# Patient Record
Sex: FEMALE | Race: WHITE | NOT HISPANIC OR LATINO | Employment: FULL TIME | URBAN - METROPOLITAN AREA
[De-identification: names, ages, dates, MRNs, and addresses within clinical notes are randomized per-mention and may not be internally consistent; named-entity substitution may affect disease eponyms.]

---

## 2017-05-05 ENCOUNTER — OFFICE VISIT (OUTPATIENT)
Dept: NEUROLOGY | Facility: CLINIC | Age: 47
End: 2017-05-05
Payer: COMMERCIAL

## 2017-05-05 ENCOUNTER — OFFICE VISIT (OUTPATIENT)
Dept: PSYCHIATRY | Facility: CLINIC | Age: 47
End: 2017-05-05
Payer: COMMERCIAL

## 2017-05-05 VITALS
HEART RATE: 114 BPM | DIASTOLIC BLOOD PRESSURE: 75 MMHG | BODY MASS INDEX: 26.32 KG/M2 | WEIGHT: 148.56 LBS | SYSTOLIC BLOOD PRESSURE: 128 MMHG | HEIGHT: 63 IN

## 2017-05-05 VITALS
WEIGHT: 149 LBS | BODY MASS INDEX: 26.4 KG/M2 | SYSTOLIC BLOOD PRESSURE: 116 MMHG | HEIGHT: 63 IN | DIASTOLIC BLOOD PRESSURE: 74 MMHG | HEART RATE: 118 BPM

## 2017-05-05 DIAGNOSIS — M54.12 CERVICAL RADICULOPATHY: Primary | ICD-10-CM

## 2017-05-05 DIAGNOSIS — M79.601 PAIN OF RIGHT UPPER EXTREMITY: ICD-10-CM

## 2017-05-05 DIAGNOSIS — F90.9 ATTENTION DEFICIT HYPERACTIVITY DISORDER (ADHD), UNSPECIFIED ADHD TYPE: Primary | ICD-10-CM

## 2017-05-05 PROCEDURE — 99213 OFFICE O/P EST LOW 20 MIN: CPT | Mod: S$GLB,,, | Performed by: PSYCHIATRY & NEUROLOGY

## 2017-05-05 PROCEDURE — 99999 PR PBB SHADOW E&M-EST. PATIENT-LVL II: CPT | Mod: PBBFAC,,, | Performed by: PSYCHIATRY & NEUROLOGY

## 2017-05-05 PROCEDURE — 1160F RVW MEDS BY RX/DR IN RCRD: CPT | Mod: S$GLB,,, | Performed by: PHYSICIAN ASSISTANT

## 2017-05-05 PROCEDURE — 99204 OFFICE O/P NEW MOD 45 MIN: CPT | Mod: S$GLB,,, | Performed by: PHYSICIAN ASSISTANT

## 2017-05-05 PROCEDURE — 99999 PR PBB SHADOW E&M-EST. PATIENT-LVL III: CPT | Mod: PBBFAC,,, | Performed by: PHYSICIAN ASSISTANT

## 2017-05-05 RX ORDER — DEXTROAMPHETAMINE SACCHARATE, AMPHETAMINE ASPARTATE, DEXTROAMPHETAMINE SULFATE AND AMPHETAMINE SULFATE 5; 5; 5; 5 MG/1; MG/1; MG/1; MG/1
1 TABLET ORAL 2 TIMES DAILY
Qty: 60 TABLET | Refills: 0 | Status: SHIPPED | OUTPATIENT
Start: 2017-07-04 | End: 2017-09-14 | Stop reason: SDUPTHER

## 2017-05-05 RX ORDER — DEXTROAMPHETAMINE SACCHARATE, AMPHETAMINE ASPARTATE, DEXTROAMPHETAMINE SULFATE AND AMPHETAMINE SULFATE 5; 5; 5; 5 MG/1; MG/1; MG/1; MG/1
1 TABLET ORAL 2 TIMES DAILY
Qty: 60 TABLET | Refills: 0 | Status: SHIPPED | OUTPATIENT
Start: 2017-05-05 | End: 2017-06-04

## 2017-05-05 RX ORDER — DEXTROAMPHETAMINE SACCHARATE, AMPHETAMINE ASPARTATE, DEXTROAMPHETAMINE SULFATE AND AMPHETAMINE SULFATE 5; 5; 5; 5 MG/1; MG/1; MG/1; MG/1
1 TABLET ORAL 2 TIMES DAILY
Qty: 60 TABLET | Refills: 0 | Status: SHIPPED | OUTPATIENT
Start: 2017-06-04 | End: 2017-07-04

## 2017-05-05 RX ORDER — GABAPENTIN 300 MG/1
300 CAPSULE ORAL 3 TIMES DAILY
Qty: 90 CAPSULE | Refills: 2 | Status: SHIPPED | OUTPATIENT
Start: 2017-05-05 | End: 2017-12-08

## 2017-05-05 NOTE — PATIENT INSTRUCTIONS
[facility]  Mental Health Medications: Stimulants    What are the side effects?    Most side effects are minor and disappear when dosage levels are lowered. The most common side effects include:   Decreased appetite. People seem to be less hungry during the middle of the day, but they are often hungry by dinnertime as the medication wears off.   Sleep problems. If a person cannot fall asleep, the doctor may prescribe a lower dose. The doctor might also suggest that the medication is taken earlier in the day, or stop the afternoon or evening dose.    Stomachaches and headaches.   Tics.  A few people develop sudden, repetitive movements or sounds called tics. These tics may or may not be noticeable. Changing the medication dosage may make tics go away. Some people also may appear to have a personality change, such as appearing flat or without emotion. Talk with yourdoctor if you see any of these side effects.    FDA warning on possible rare side effects    In 2007, the FDA required that all makers of ADHD medications develop Patient Medication Guides. The guides must alert patients to possible heart and psychiatric problems related to ADHD medicine. The FDA required the Patient Medication Guides because a review of data found that ADHD patients with heart conditions had a slightly higher risk of strokes, heart attacks, and sudden death when taking the medications. The review also found a slightly higher risk (about 1 in 1,000) for medication-related psychiatric problems, such as hearing voices, having hallucinations, becoming suspicious for no reason, or becoming manic. This happened to patients who had no history of psychiatric problems.    Stimulant medications may lead to drug abuse or dependence, but the risk is highest for those patient taking this class of medication who do not have ADHD. Research shows that teens with ADHD who took stimulant medications were less likely to abuse  drugs than those who did not take stimulant medications.  Proper diagnosis is the key to minimizing this risk.

## 2017-05-05 NOTE — PROGRESS NOTES
"5/5/2017 1:47 PM   Malu Montes   1970   [id number]       OUTPATIENT PSYCHIATRY FOLLOW UP NOTE      Clinical Status of Patient:  Outpatient (Ambulatory)    Chief Complaint:  Malu Montes is a 47 y.o. female who presents today for follow-up of attention problems.  Met with patient and I have reviewed the patient's medical history in detail and updated the computerized patient record.    Prefers to be called Malu    Interval History and Content of Current Session:  Interim Events/Subjective Report/Content of Current Session:   45 y/o female w/PMH ADHD presents for medication management/follow up. States mood today as "pretty good". Since last visit has tried to reduce Adderall use to during the work week as not to develop tolerance to medication. Was able to do this without issue. Discussed tachycardia on vitals. Says "I was just at another appointment for pinched nerve in my neck. I'm in pain." Says has MRI and physical therapy pending. No medication side effects. Denies problems with sleep or appetite.  Says medicine is effective. Denies chest pain, palpitations.  Denies HI/SI/AVH.      Current psychiatric medications:  Adderall IR 20mg PO BID PRN - has started taking holidays from using medication on weekend    Prior medication trials:  Denies prior trials      Substance use:  Etoh: ~1 drink a week, denies ever 6 or more drinks, denies blackouts  Drug use: denies  Nicotine: 1/2 or less per day - is trying to transition to vaporizer        SUBJECTIVE     Psychiatric Review Of Systems - Is patient experiencing or having changes in:  sleep: no  appetite: no  weight: no  energy/anergy: no  interest/pleasure/anhedonia: no  somatic symptoms: no  libido: no  guilty/hopelessness: no  concentration: no  S.I.B.s/risky behavior: no  SI/SA:  no    anxiety/panic: no  Agoraphobia:  no  Social phobia:  no  Recurrent nightmares:  no  hyper startle response:  no  Avoidance: no  Recurrent thoughts:  no  Recurrent " behaviors:  no    Irritability: no  Racing thoughts: no  Impulsive behaviors: no  Pressured speech:  no    Paranoia:no  Delusions: no  AVH:no    Screens and Inventories:  none    Substance abuse:  ETOH- ~1 wine glass per week  Drugs- denies    Past Medical, Family and Social History: The patient's past medical, family and social history have been reviewed and updated as appropriate within the electronic medical record - see encounter notes.    Current Psychotropic medications:  See HPI    Compliance: yes    Side effects: None    Risk Parameters:  Patient reports no suicidal ideation  Patient reports no homicidal ideation  Patient reports no self-injurious behavior  Patient reports no violent behavior    Psychotherapy:  · none    Psychosocial Stressors: none endorsed.   Functioning Relationships: good support system and good relationship with children  Strengths AND Liabilities  Strength: Patient accepts guidance/feedback, Strength: Patient is expressive/articulate., Strength: Patient is intelligent., Strength: Patient is physically healthy., Strength: Patient has positive support network., Strength: Patient has reasonable judgment., Strength: Patient is stable.    OBJECTIVE     Medical ROS  General ROS: negative for - chills, fatigue or fever  Respiratory ROS: no cough, shortness of breath, or wheezing  Cardiovascular ROS: no chest pain or dyspnea on exertion  Gastrointestinal ROS: no abdominal pain, change in bowel habits, or black or bloody stools  Musculoskeletal ROS: negative for - gait disturbance, joint stiffness, muscle pain or muscular weakness  Neurological ROS: negative for - confusion, dizziness, gait disturbance, headaches, impaired coordination/balance, seizures or visual changes    Nutritional Screening: Considering the patient's height and weight, medications, medical history and preferences, should a referral be made to the dietitian? no    Musculoskeletal  Muscle Strength/Tone:  not examined   Gait  "& Station:  non-ataxic     Relevant Elements of Neurological Exam: normal gait  AIMS:  n/a    Constitutional  Vitals:  Most recent vital signs, dated greater than 90 days prior to this appointment, were reviewed.    Vitals:    05/05/17 1022   BP: 116/74   Pulse: (!) 118   Weight: 67.6 kg (149 lb)   Height: 5' 3" (1.6 m)          Allergies  Review of patient's allergies indicates:   Allergen Reactions    No known drug allergies        Laboratory Data  No visits with results within 1 Month(s) from this visit.  Latest known visit with results is:    Lab Visit on 04/23/2015   Component Date Value Ref Range Status    Specimen UA 04/23/2015 Urine, Clean Catch   Final    Color, UA 04/23/2015 Yellow  Yellow, Straw, Marie Final    Appearance, UA 04/23/2015 Clear  Clear Final    pH, UA 04/23/2015 6.0  5.0 - 8.0 Final    Specific Gravity, UA 04/23/2015 1.020  1.005 - 1.030 Final    Protein, UA 04/23/2015 Negative  Negative Final    Glucose, UA 04/23/2015 Negative  Negative Final    Ketones, UA 04/23/2015 Negative  Negative Final    Bilirubin (UA) 04/23/2015 Negative  Negative Final    Occult Blood UA 04/23/2015 Negative  Negative Final    Nitrite, UA 04/23/2015 Negative  Negative Final    Urobilinogen, UA 04/23/2015 Negative  <2.0 EU/dL Final    Leukocytes, UA 04/23/2015 Negative  Negative Final    Microalbum.,U,Random 04/23/2015 9.0  ug/mL Final    Creatinine, Random Ur 04/23/2015 128.0  15.0 - 325.0 mg/dL Final    Microalb Creat Ratio 04/23/2015 7.0  0.0 - 30.0 ug/mg Final       All Medications  Outpatient Encounter Prescriptions as of 5/5/2017   Medication Sig Dispense Refill    dextroamphetamine-amphetamine (ADDERALL) 20 mg tablet Take 1 tablet by mouth 2 (two) times daily. 60 tablet 0    gabapentin (NEURONTIN) 300 MG capsule Take 1 capsule (300 mg total) by mouth 3 (three) times daily. 90 capsule 2    polyethylene glycol (MIRALAX) 17 gram/dose powder Take 17 g by mouth. 17 Powder Oral Every day.  take " while taking pain medication, stop if stools become loose      [DISCONTINUED] ciclopirox (PENLAC) 8 % Soln Apply to affected toenails once per day for 3 months. Once per week use alcohol to remove excess product 6.6 mL 3    [DISCONTINUED] dextroamphetamine-amphetamine (AMPHETAMINE SALT COMBO) 10 mg Tab Take 10 mg by mouth 3 (three) times daily. 90 tablet 0    [DISCONTINUED] dextroamphetamine-amphetamine (AMPHETAMINE SALT COMBO) 10 mg Tab Take 10 mg by mouth 3 (three) times daily. 90 tablet 0    [DISCONTINUED] dextroamphetamine-amphetamine (AMPHETAMINE SALT COMBO) 20 mg tablet Take 1 tablet by mouth 2 (two) times daily. 60 tablet 0    [DISCONTINUED] dextroamphetamine-amphetamine 10 mg Tab Take 10 mg by mouth 3 (three) times daily. 90 tablet 0     No facility-administered encounter medications on file as of 5/5/2017.        Psychiatric Mental Status Exam  Appearance: unremarkable, age appropriate  Behavior/Cooperation: limited/ appopriate friendly and cooperative  Speech: appropriate rate, volume and tone  Mood: steady, happy  Affect:  congruent with mood and appropriate to situation/content    Thought Process: normal and logical  Thought Content: normal, no suicidality, no homicidality, delusions, or paranoia  Sensorium:    grossly intact  Alert and Oriented: x3  Memory: grossly intact    Attention/concentration: appropriate for age/education.  Similarities:  grossly intact  Abstract reasoning: grossly intact  Insight: Intact  Judgment: Intact    ASSESSMENT      Impression:     ICD-10-CM ICD-9-CM   1. Attention deficit hyperactivity disorder (ADHD), unspecified ADHD type F90.9 314.01       Treatment Goals:  Specify outcomes written in observable, behavioral terms:   Drug & Alcohol: nicotine cessation - will continue to address with patient. Contemplating at this time and refusing further resources  Attention symptoms: continued remittance of symptoms via medication management    Status/Progress: Based on the  examination today, the patient's problem(s) is/are well controlled.  New problems have not been presented today.   Co-morbidities are not complicating management of the primary condition.  There are no active rule-out diagnoses for this patient at this time.     TREATMENT PLAN     · Medication Management: Continue current medications. Renew current medications Adderall IR 20mg PO BID. 3 - 30 day prescriptions provided  · Tachycardia on vitals noted - likely secondary to pain (neck injury). Prior vital signs in normal range. No cardiac symptoms endorsed.  · [place]  checked - no concerning findings - last fill 3/16/17  · Labs: prior labs reviewed - no recent labs  · The treatment plan and follow up plan were reviewed with the patient.  · Discussed with patient informed consent, risks vs. benefits, alternative treatments, side effect profile and the inherent unpredictability of individual responses to these treatments. The patient expresses understanding of the above and displays the capacity to agree with this current plan.  · Encouraged Patient to keep future appointments.   · Take medications as prescribed and abstain from substance abuse.   · In the event of an emergency patient was advised to go to the emergency room.    Return to Clinic: 3 months    Counseling/ psychotherapy time: 16 minutes motivational interviewing  Total time: 20 minutes  Consulting clinician was informed of the encounter and consult note    DO FERNANDO Caicedo, [facility]   [phone]  5/5/2017 1:47 PM

## 2017-05-05 NOTE — PROGRESS NOTES
[facility], Department of Neurology   [facility] [place] [address]  Phone:[phone]  Fax: [phone]    Patient Name: Malu Montes  : 1970  MRN:  [id number]    2017     chief complaint: arm pain    PCP: Brayden Chaney MD.    HPI 17:  Malu Montes is a 48 yo female presents complaining of burning pain of right upper extremity x 2 wks. Pt states 2-3 wks ago she had a cough, during a coughing spell, she felt a pain in her right shoulder. Later, noted radiating pain down right upper extremity. Pain is described as burning, aching pain. Fairly constant, made worse with certain positions. She has intermittent tingling of the right pointer and middle fingers. Denies neck pain or prior neck surgery. Denies weakness or numbness.    Has tried Mobic, Advil, Ultram- with no relief. Using ice on shoulder and heat on forearm which helps some.      Medications:   Current Outpatient Prescriptions   Medication Sig Dispense Refill    dextroamphetamine-amphetamine (ADDERALL) 20 mg tablet Take 1 tablet by mouth 2 (two) times daily. 60 tablet 0    polyethylene glycol (MIRALAX) 17 gram/dose powder Take 17 g by mouth. 17 Powder Oral Every day.  take while taking pain medication, stop if stools become loose      gabapentin (NEURONTIN) 300 MG capsule Take 1 capsule (300 mg total) by mouth 3 (three) times daily. 90 capsule 2     No current facility-administered medications for this visit.        Allergies:  Review of patient's allergies indicates:   Allergen Reactions    No known drug allergies        PMHx:  Past Medical History:   Diagnosis Date    ADD (attention deficit disorder)     Constipation     Vitamin D deficiency      Past Surgical History:   Procedure Laterality Date    breast augmentation       SECTION      HEMORRHOID SURGERY         Fhx:  Family History   Problem Relation Age of Onset    Cancer Mother      Uterine Cancer     No Known Problems  "Sister     Sleep apnea Daughter     ADD / ADHD Daughter     Heart disease Maternal Grandmother      CHF    COPD Maternal Grandfather     Heart disease Paternal Grandmother      CHF    Colon cancer Neg Hx     Inflammatory bowel disease Neg Hx     Stomach cancer Neg Hx     Breast cancer Neg Hx     Ovarian cancer Neg Hx        Shx:   Social History     Social History    Marital status:      Spouse name: N/A    Number of children: N/A    Years of education: N/A     Occupational History    [employer]      Social History Main Topics    Smoking status: Current Every Day Smoker     Packs/day: 0.50     Years: 27.00    Smokeless tobacco: Never Used    Alcohol use Yes      Comment: occas    Drug use: No    Sexual activity: Yes     Partners: Male     Other Topics Concern    Not on file     Social History Narrative       ROS:  Review of Systems (Questions asked; positives or additions noted in BOLD)  Gen Malaise/fatigue, weight change   HEENT Hearing loss, blurred vision, diplopia   Card CP, palpitations   Pulm SOB, cough    Vasc Easy bruising, easy bleeding    GI Nausea, vomiting, constipation    Frequency, urgency   M/S Joint pain, myalgias, falls    Endocrine Temperature intolerance, polyuria, polydipsia   Neuro Dizziness, tremors, seizures, focal weakness, Others- as noted in HPI   Psych Memory loss, depression, anxiety, hallucinations     PHYSICAL EXAM:   /75  Pulse (!) 114  Ht 5' 3" (1.6 m)  Wt 67.4 kg (148 lb 9.4 oz)  BMI 26.32 kg/m2   GEN:  NAD, well-developed, well-groomed.  HEENT: No cervical lymphadenopathy noted.  CARDIOVASCULAR: Radial pulses 2+ bilaterally. No LE edema noted.  NEURO:  Mental Status: Awake, alert, and oriented. Normal attention and concentration.  Speech is fluent and appropriate language function.    Cranial Nerves:  II Pupils are round and reactive to direct and consensual light and accommodation. Visual fields full to confrontation.   III, IV, VI " Extraocular eye movements full bilaterally. No ptosis noted.   V Normal facial sensation to pinprick and light touch.   VII Symmetric facial expressions.   VIII Hearing intact to finger rub bilaterally.   IX, X Palate elevates midline and symmetrically.   XI Shoulder elevation is symmetric and full strength bilaterally. Neck rotation strength is normal bilaterally.   XII Tongue is midline.     Sensory: Sensation is normal to vibration and pinprick in the upper and lower extremities bilaterally.    Motor: Extremities demonstrate normal bulk and tone in all four limbs. No atrophy or fasciculations noted. No pronator drift.   Strength-       RUE: 5/5                LUE: 5/5                RLE: 5/5                LLE: 5/5     Deep Tendon Relfexes: 2+ and symmetric in the upper and lower extremities bilaterally. Babinski downgoing bilaterally.    Coordination: Finger to nose WNL.     Gait: Normal heel, toe, tandem, and casual gait.    ASSESSMENT:     ICD-10-CM ICD-9-CM   1. Cervical radiculopathy M54.12 723.4   2. Pain of right upper extremity M79.601 729.5       PLAN:  Orders Placed This Encounter    MRI Cervical Spine Without Contrast    Ambulatory consult to Physical Therapy    gabapentin (NEURONTIN) 300 MG capsule     Orders Placed This Encounter   Procedures    MRI Cervical Spine Without Contrast    Ambulatory consult to Physical Therapy       46 yo female with 2 wks right upper extremity pain described as burning, aching. Query cervical radiculopathy. Will obtain MRI c spine- pt wishes to get outside of [facility] due to cost, gave her script with instructions to fax report to us.     -initiate Gabapentin 300 mg tid, can begin with qHS dosing and increase if tolerating   -MRI c spine  -physical therapy   -RTC after above     The patient indicates understanding of these issues and agrees to the plan.      Attending, Dr. Sandoval, was available during today's encounter. Any change to plan along with cosign to  appear in the EMR.       This document has been electronically signed by Michelle De La Cruz PA-C on 5/5/2017, 8:22 AM. I have personally typed this message using the EMR.

## 2017-05-10 ENCOUNTER — CLINICAL SUPPORT (OUTPATIENT)
Dept: REHABILITATION | Facility: HOSPITAL | Age: 47
End: 2017-05-10
Attending: PSYCHIATRY & NEUROLOGY
Payer: COMMERCIAL

## 2017-05-10 DIAGNOSIS — M79.2 RADICULAR PAIN IN RIGHT ARM: ICD-10-CM

## 2017-05-10 DIAGNOSIS — M54.12 CERVICAL RADICULOPATHY: ICD-10-CM

## 2017-05-10 PROCEDURE — 97161 PT EVAL LOW COMPLEX 20 MIN: CPT | Mod: PO | Performed by: PHYSICAL THERAPIST

## 2017-05-10 PROCEDURE — 97140 MANUAL THERAPY 1/> REGIONS: CPT | Mod: PO | Performed by: PHYSICAL THERAPIST

## 2017-05-10 NOTE — PROGRESS NOTES
Physical Therapy Evaluation    Name: Malu Styles [facility] Number: [id number]    Diagnosis:   Encounter Diagnoses   Name Primary?    Cervical radiculopathy     Radicular pain in right arm      Physician: Lius Sandoval, *  Treatment Orders: PT Eval and Treat    Past Medical History:   Diagnosis Date    ADD (attention deficit disorder)     Constipation     Vitamin D deficiency      Current Outpatient Prescriptions   Medication Sig    dextroamphetamine-amphetamine (ADDERALL) 20 mg tablet Take 1 tablet by mouth 2 (two) times daily.    [START ON 6/4/2017] dextroamphetamine-amphetamine (ADDERALL) 20 mg tablet Take 1 tablet by mouth 2 (two) times daily.    [START ON 7/4/2017] dextroamphetamine-amphetamine (ADDERALL) 20 mg tablet Take 1 tablet by mouth 2 (two) times daily.    gabapentin (NEURONTIN) 300 MG capsule Take 1 capsule (300 mg total) by mouth 3 (three) times daily.    polyethylene glycol (MIRALAX) 17 gram/dose powder Take 17 g by mouth. 17 Powder Oral Every day.  take while taking pain medication, stop if stools become loose     No current facility-administered medications for this visit.      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 female that presents to [facility] secondary to cervical radiculopathy. Malu states she had a sinus infection a few weeks ago and was couching a lot, her R arm started to tingle and have pain. Her pain is under her R shoulder blade, pec minor, down her R tricep and around her  forearm to her middle finger which is also numb.  It has not improved    Imaging: none on file, MRI ordered.    Pain: current 5/10, worst 10/10, best 3/10, Burning, Throbbing, Tingling, Numb and Shooting, intermittent  Radicular symptoms: yes  Aggravating factors: working on a computer, looking down and to right (increases pain in R shoulder blade)  Easing factors: placing her R arm on her head    Pts goals: to get rid of her pain.        Objective         Posture: WNL    Cervical Range of Motion:    Degrees Pain   Flexion full no     Extension 75% Increased R arm pain     Right Rotation full no     Left Rotation full no     Right Side Bending 50% R arm pain   Left Side Bending full no      Shoulder Range of Motion: WNL bilaterally      Upper Extremity Strength  (R) UE  (L) UE    Shoulder flexion: 5/5 Shoulder flexion: 5/5   Shoulder Abduction: 5/5 Shoulder abduction: 5/5   Shoulder ER 5/5 Shoulder ER 5/5   Shoulder IR 5/5 Shoulder IR 5/5   Elbow flexion: 5/5 Elbow flexion: 5/5   Elbow extension: 5/5 Elbow extension: 5/5   Wrist flexion: 5/5 Wrist flexion: 5/5   Wrist extension: 5/5 Wrist extension: 5/5    5/5 : 5/5         Special Tests:  Distraction relief   Compression -   Spurlings -   Sharp-Sushil -   VA test -   Lateral Flexion Alar Ligament -     Upper Limb Neurodynamic testing:   Right   Left     S1 S2  S1 S2   BPNT 5/5 5/5  5/5 5/5   UNT 5/5 5/5  5/5 5/5   MNT 5/5 5/5  5/5 5/5   RNT 5/5 5/5  5/5 5/5         Joint Mobility: cervical joint mobility WNL,      Thoracic mobility: limited mid and upper T spine    Palpation: R 4-5 ribs rotated anteriorly  , R first rib slightly elevated and palpation causes radicular symptoms    Sensation: intact    FOTO: 41% limitation   Goal: 27%    PT Evaluation Completed? Yes  Discussed Plan of Care with patient: Yes    TREATMENT:    Malu  received the following manual therapy techniques x 20 min. To include Joint mobilizations and Soft tissue Mobilization were  "applied to the: R ribs To include:     MET for anterior ribs on R 3 x 10"  MET for elevated 1st rib on R; x 3  Grade 3-5 mobilizations of 1st rib on R  Grade 5 mobilization C7/T1 seated  Manual traction x 4 minutes       Assessment     This is a 47 y.o. female referred to outpatient physical therapy and presents with a medical diagnosis of cervical radiculopathy and demonstrates limitations as described in the problem list. Pt will benefit from physcial therapy services in order to maximize pain free functional independence. The following goals were discussed with the patient and patient is in agreement with them as to be addressed in the treatment plan.     Anticipated barriers to physical therapy: none    Medical necessity is demonstrated by the following IMPAIRMENTS/PROBLEM LIST:     History  Co-morbidities and personal factors that may impact the plan of care Examination  Body Structures and Functions, activity limitations and participation restrictions that may impact the plan of care Clinical Presentation   Decision Making/ Complexity Score   Co-morbidities:         ADD        Personal Factors:    Body Regions:  R arm pain  Body Systems:   Decreased joint mobility  Poor rib alignment        Activity limitations:   Computer work    Participation Restrictions:   Some difficulty performing her work duties on her computer       Pt presents with radicular pain in her R arm after long bout of coughing. Pt presents with malalignment of her R ribs and a slightly elevated 1st rib with decreased mobility. Pt presents with decreased thoracic mobility. Her elevated first rib could be the source of her radicular pain, symptoms increased with mobilization but were lessened afterwards. Her condition is stable and non-evolving Low complexity           GOALS: Short Term Goals: 4 weeks  1.Report decreased R arm pain  <   / =  4  /10  to increase tolerance for ADLs  2. Pt to improve R first rib position in order to reduce her " radicular symptoms.  3. Pt to tolerate HEP to improve ROM and independence with ADL's      Long Term Goals: 8 weeks  1.Report decreased R arm pain  <   / =  2  /10  to increase tolerance for ADLs  2.Pt to present with normal rib 2-5 alignment on R to improve mobility and decrease pain.  3.  Pt will report at < or = 27% limitation on FOTO neck survey score for neck pain disability to demonstrate decrease in disability and improvement in neck pain.  4. Pt to be Independent with HEP to improve ROM and independence with ADL's        Plan     Pt will be treated by physical therapy 1-2 times a week for 8 weeks for Pt Education, HEP, therapeutic exercises, neuromuscular re-education, joint mobilizations, modalities prn to achieve established goals. Malu may at times be seen by a PTA as part of the Rehab Team.     Cont PT for  8  weeks.     Avni Quezada, PT 5/10/2017     I certify the need for these services furnished under this plan of treatment and while under my care.______________________________ Physician/Referring Practitioner  Date of Signature

## 2017-05-10 NOTE — PLAN OF CARE
Physical Therapy Evaluation    Name: Malu Styles [facility] Number: [id number]    Diagnosis:   Encounter Diagnoses   Name Primary?    Cervical radiculopathy     Radicular pain in right arm      Physician: Luis Sandoval, *  Treatment Orders: PT Eval and Treat    Past Medical History:   Diagnosis Date    ADD (attention deficit disorder)     Constipation     Vitamin D deficiency      Current Outpatient Prescriptions   Medication Sig    dextroamphetamine-amphetamine (ADDERALL) 20 mg tablet Take 1 tablet by mouth 2 (two) times daily.    [START ON 6/4/2017] dextroamphetamine-amphetamine (ADDERALL) 20 mg tablet Take 1 tablet by mouth 2 (two) times daily.    [START ON 7/4/2017] dextroamphetamine-amphetamine (ADDERALL) 20 mg tablet Take 1 tablet by mouth 2 (two) times daily.    gabapentin (NEURONTIN) 300 MG capsule Take 1 capsule (300 mg total) by mouth 3 (three) times daily.    polyethylene glycol (MIRALAX) 17 gram/dose powder Take 17 g by mouth. 17 Powder Oral Every day.  take while taking pain medication, stop if stools become loose     No current facility-administered medications for this visit.      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 female that presents to [facility] secondary to cervical radiculopathy. Malu states she had a sinus infection a few weeks ago and was couching a lot, her R arm started to tingle and have pain. Her pain is under her R shoulder blade, pec minor, down her R tricep and around her  forearm to her middle finger which is also numb.  It has not improved    Imaging: none on file, MRI ordered.    Pain: current 5/10, worst 10/10, best 3/10, Burning, Throbbing, Tingling, Numb and Shooting, intermittent  Radicular symptoms: yes  Aggravating factors: working on a computer, looking down and to right (increases pain in R shoulder blade)  Easing factors: placing her R arm on her head    Pts goals: to get rid of her pain.        Objective         Posture: WNL    Cervical Range of Motion:    Degrees Pain   Flexion full no     Extension 75% Increased R arm pain     Right Rotation full no     Left Rotation full no     Right Side Bending 50% R arm pain   Left Side Bending full no      Shoulder Range of Motion: WNL bilaterally      Upper Extremity Strength  (R) UE  (L) UE    Shoulder flexion: 5/5 Shoulder flexion: 5/5   Shoulder Abduction: 5/5 Shoulder abduction: 5/5   Shoulder ER 5/5 Shoulder ER 5/5   Shoulder IR 5/5 Shoulder IR 5/5   Elbow flexion: 5/5 Elbow flexion: 5/5   Elbow extension: 5/5 Elbow extension: 5/5   Wrist flexion: 5/5 Wrist flexion: 5/5   Wrist extension: 5/5 Wrist extension: 5/5    5/5 : 5/5         Special Tests:  Distraction relief   Compression -   Spurlings -   Sharp-Sushil -   VA test -   Lateral Flexion Alar Ligament -     Upper Limb Neurodynamic testing:   Right   Left     S1 S2  S1 S2   BPNT 5/5 5/5  5/5 5/5   UNT 5/5 5/5  5/5 5/5   MNT 5/5 5/5  5/5 5/5   RNT 5/5 5/5  5/5 5/5         Joint Mobility: cervical joint mobility WNL,      Thoracic mobility: limited mid and upper T spine    Palpation: R 4-5 ribs rotated anteriorly  , R first rib slightly elevated and palpation causes radicular symptoms    Sensation: intact    FOTO: 41% limitation   Goal: 27%    PT Evaluation Completed? Yes  Discussed Plan of Care with patient: Yes    TREATMENT:    Malu  received the following manual therapy techniques x 20 min. To include Joint mobilizations and Soft tissue Mobilization were  "applied to the: R ribs To include:     MET for anterior ribs on R 3 x 10"  MET for elevated 1st rib on R; x 3  Grade 3-5 mobilizations of 1st rib on R  Grade 5 mobilization C7/T1 seated  Manual traction x 4 minutes       Assessment     This is a 47 y.o. female referred to outpatient physical therapy and presents with a medical diagnosis of cervical radiculopathy and demonstrates limitations as described in the problem list. Pt will benefit from physcial therapy services in order to maximize pain free functional independence. The following goals were discussed with the patient and patient is in agreement with them as to be addressed in the treatment plan.     Anticipated barriers to physical therapy: none    Medical necessity is demonstrated by the following IMPAIRMENTS/PROBLEM LIST:     History  Co-morbidities and personal factors that may impact the plan of care Examination  Body Structures and Functions, activity limitations and participation restrictions that may impact the plan of care Clinical Presentation   Decision Making/ Complexity Score   Co-morbidities:         ADD        Personal Factors:    Body Regions:  R arm pain  Body Systems:   Decreased joint mobility  Poor rib alignment        Activity limitations:   Computer work    Participation Restrictions:   Some difficulty performing her work duties on her computer       Pt presents with radicular pain in her R arm after long bout of coughing. Pt presents with malalignment of her R ribs and a slightly elevated 1st rib with decreased mobility. Pt presents with decreased thoracic mobility. Her elevated first rib could be the source of her radicular pain, symptoms increased with mobilization but were lessened afterwards. Her condition is stable and non-evolving Low complexity           GOALS: Short Term Goals: 4 weeks  1.Report decreased R arm pain  <   / =  4  /10  to increase tolerance for ADLs  2. Pt to improve R first rib position in order to reduce her " radicular symptoms.  3. Pt to tolerate HEP to improve ROM and independence with ADL's      Long Term Goals: 8 weeks  1.Report decreased R arm pain  <   / =  2  /10  to increase tolerance for ADLs  2.Pt to present with normal rib 2-5 alignment on R to improve mobility and decrease pain.  3.  Pt will report at < or = 27% limitation on FOTO neck survey score for neck pain disability to demonstrate decrease in disability and improvement in neck pain.  4. Pt to be Independent with HEP to improve ROM and independence with ADL's        Plan     Pt will be treated by physical therapy 1-2 times a week for 8 weeks for Pt Education, HEP, therapeutic exercises, neuromuscular re-education, joint mobilizations, modalities prn to achieve established goals. Malu may at times be seen by a PTA as part of the Rehab Team.     Cont PT for  8  weeks.     Avni Quezada, PT 5/10/2017     I certify the need for these services furnished under this plan of treatment and while under my care.______________________________ Physician/Referring Practitioner  Date of Signature

## 2017-05-17 ENCOUNTER — CLINICAL SUPPORT (OUTPATIENT)
Dept: REHABILITATION | Facility: HOSPITAL | Age: 47
End: 2017-05-17
Attending: PSYCHIATRY & NEUROLOGY
Payer: COMMERCIAL

## 2017-05-17 DIAGNOSIS — M79.2 RADICULAR PAIN IN RIGHT ARM: ICD-10-CM

## 2017-05-17 PROCEDURE — 97140 MANUAL THERAPY 1/> REGIONS: CPT | Mod: PO | Performed by: PHYSICAL THERAPIST

## 2017-05-17 PROCEDURE — 97110 THERAPEUTIC EXERCISES: CPT | Mod: PO | Performed by: PHYSICAL THERAPIST

## 2017-05-17 NOTE — PROGRESS NOTES
"                                                    Physical Therapy Progress Note     Name: Malu Styles [facility] Number: [id number]  Diagnosis:   Encounter Diagnosis   Name Primary?    Radicular pain in right arm      Physician: Luis Sandoval, *  Treatment Orders: PT Eval and Treat  Past Medical History:   Diagnosis Date    ADD (attention deficit disorder)     Constipation     Vitamin D deficiency        Precautions: standard  Evaluation Date: 5/10/17  Visit # authorized: 2/  Authorization period: 5/5/18  Plan of care Expiration: 7/7/17    Missed visits:  no show(),  Cancellations()    Subjective   Pt reports: that her arm is feeling better  Pain Scale:  2/10 R arm radicular pain    Objective     Patient received individual therapy to increase strength, ROM, flexibility and posture with 0 patients with activities as follows:     Malu received therapeutic exercises to develop strength, ROM, flexibility and posture for 25 minutes including:     UBE: 3'/3' at 1.0 resistance    SL thoracic rotation: 10 x 10" B  Cat camel: 30 x 3"  R Scalene stretch: 3 x 30"  Thoracic extensions over 1/2 foam: x 6 minutes      Malu received the following manual therapy techniques: Joint mobilizations and Manual traction were applied to the: neck/1st rib for 30 minutes.     MET for elevated 1st rib on R; x 3  Grade 3-5 mobilizations of 1st rib on R  Manual traction x 5 minutes  Prone Thoracic PA grade 3-4  CT junction mob grade 5        Written Home Exercises Provided: thoracic extensions and rotations  Pt demo good understanding of the education provided. Malu demonstrated good return demonstration of activities.     Education provided re:  No spiritual or educational barriers to learning provided    Pt has no cultural, educational or language barriers to learning provided.  Assessment   Pt tolerated therapy well with decreased complaints of radicular pain post treatment. Pt continues to present with an elevated " 1st rib on R.  Pt prognosis is Good. Pt will continue to benefit from skilled outpatient physical therapy to address the deficits listed below in the problem list, provide pt/family education and to maximize pt's level of independence in the home and community environment.   Anticipated barriers to physical therapy: none    Pt's spiritual, cultural and educational needs considered and pt agreeable to plan of care and goals as stated below:     GOALS: Short Term Goals: 4 weeks  1.Report decreased R arm pain < / = 4 /10 to increase tolerance for ADLs  2. Pt to improve R first rib position in order to reduce her radicular symptoms.  3. Pt to tolerate HEP to improve ROM and independence with ADL's        Long Term Goals: 8 weeks  1.Report decreased R arm pain < / = 2 /10 to increase tolerance for ADLs  2.Pt to present with normal rib 2-5 alignment on R to improve mobility and decrease pain.  3. Pt will report at < or = 27% limitation on FOTO neck survey score for neck pain disability to demonstrate decrease in disability and improvement in neck pain.  4. Pt to be Independent with HEP to improve ROM and independence with ADL's     Plan   Continue with established Plan of Care towards PT goals.  Avni Quezada, PT 5/17/2017

## 2017-05-18 NOTE — PROGRESS NOTES
STAFF NOTE:  I have reviewed pt's chart and concur with the resident's history, assessment, and plan.  Case formally discussed on rounds.

## 2017-05-25 ENCOUNTER — PATIENT MESSAGE (OUTPATIENT)
Dept: ADMINISTRATIVE | Facility: OTHER | Age: 47
End: 2017-05-25

## 2017-07-06 ENCOUNTER — DOCUMENTATION ONLY (OUTPATIENT)
Dept: REHABILITATION | Facility: HOSPITAL | Age: 47
End: 2017-07-06

## 2017-07-06 NOTE — PROGRESS NOTES
PHYSICAL THERAPY DISCHARGE SUMMARY     Name: Malu Styles [facility] Number: [id number]    Diagnosis: R UE radicular pain  Physician: Luis Sandoval,  Treatment Orders: PT Eval and Treat  Past Medical History:   Diagnosis Date    ADD (attention deficit disorder)     Constipation     Vitamin D deficiency        Initial visit: 5/10/17  Date of Last visit: 5/17/17  Date of Discharge Note:  7/6/17  Total Visits Received: 2  Missed Visits: 7 (cancelled 6, no show 1  ASSESSMENT   Status Towards Goals Met:  Pt did not meet any goals.    Goals Not achieved and why: pt only attended 1 visit post eval. She cancelled 6 and no showed 1. Pt cancelled her last visits stating that her condition improved, however she was not reassessed by her PT.     Discharge reason : Patient self discharge    GOALS: Short Term Goals: 4 weeks  1.Report decreased R arm pain < / = 4 /10 to increase tolerance for ADLs  2. Pt to improve R first rib position in order to reduce her radicular symptoms.  3. Pt to tolerate HEP to improve ROM and independence with ADL's        Long Term Goals: 8 weeks  1.Report decreased R arm pain < / = 2 /10 to increase tolerance for ADLs  2.Pt to present with normal rib 2-5 alignment on R to improve mobility and decrease pain.  3. Pt will report at < or = 27% limitation on FOTO neck survey score for neck pain disability to demonstrate decrease in disability and improvement in neck pain.  4. Pt to be Independent with HEP to improve ROM and independence with ADL's        PLAN   This patient is discharged from Physical Therapy Services.       Avni Quezada, PT 7/6/2017

## 2017-09-13 ENCOUNTER — PATIENT MESSAGE (OUTPATIENT)
Dept: PSYCHIATRY | Facility: CLINIC | Age: 47
End: 2017-09-13

## 2017-09-13 RX ORDER — DEXTROAMPHETAMINE SACCHARATE, AMPHETAMINE ASPARTATE, DEXTROAMPHETAMINE SULFATE AND AMPHETAMINE SULFATE 5; 5; 5; 5 MG/1; MG/1; MG/1; MG/1
1 TABLET ORAL 2 TIMES DAILY
Qty: 60 TABLET | Refills: 0 | OUTPATIENT
Start: 2017-09-13 | End: 2017-10-13

## 2017-09-14 DIAGNOSIS — F90.9 ATTENTION DEFICIT HYPERACTIVITY DISORDER (ADHD), UNSPECIFIED ADHD TYPE: Primary | ICD-10-CM

## 2017-09-14 RX ORDER — DEXTROAMPHETAMINE SACCHARATE, AMPHETAMINE ASPARTATE, DEXTROAMPHETAMINE SULFATE AND AMPHETAMINE SULFATE 5; 5; 5; 5 MG/1; MG/1; MG/1; MG/1
1 TABLET ORAL 2 TIMES DAILY
Qty: 60 TABLET | Refills: 0 | Status: SHIPPED | OUTPATIENT
Start: 2017-09-14 | End: 2017-09-14 | Stop reason: SDUPTHER

## 2017-09-14 RX ORDER — DEXTROAMPHETAMINE SACCHARATE, AMPHETAMINE ASPARTATE, DEXTROAMPHETAMINE SULFATE AND AMPHETAMINE SULFATE 5; 5; 5; 5 MG/1; MG/1; MG/1; MG/1
1 TABLET ORAL 2 TIMES DAILY
Qty: 60 TABLET | Refills: 0 | Status: SHIPPED | OUTPATIENT
Start: 2017-10-12 | End: 2017-12-08 | Stop reason: SDUPTHER

## 2017-09-14 NOTE — PROGRESS NOTES
Received request for prescription refill. Chart reviewed as she is new to this provider. Patient on Adderall IR 20mg BID, and has appeared stable on this dose for months. Patient unable to get appointment prior to November 3. Will refill Adderall for 2 months to provide coverage until scheduled appointment. LA  was reviewed, and no concerning findings were present.    Caleb Duran MD  [facility]  PGY-3  9/14/2017 10:42 AM

## 2017-12-08 ENCOUNTER — OFFICE VISIT (OUTPATIENT)
Dept: PSYCHIATRY | Facility: CLINIC | Age: 47
End: 2017-12-08
Payer: COMMERCIAL

## 2017-12-08 VITALS
DIASTOLIC BLOOD PRESSURE: 75 MMHG | SYSTOLIC BLOOD PRESSURE: 142 MMHG | BODY MASS INDEX: 28 KG/M2 | HEART RATE: 100 BPM | HEIGHT: 63 IN | WEIGHT: 158 LBS

## 2017-12-08 DIAGNOSIS — F90.9 ATTENTION DEFICIT HYPERACTIVITY DISORDER (ADHD), UNSPECIFIED ADHD TYPE: Primary | ICD-10-CM

## 2017-12-08 PROCEDURE — 99999 PR PBB SHADOW E&M-EST. PATIENT-LVL II: CPT | Mod: PBBFAC,,, | Performed by: PSYCHIATRY & NEUROLOGY

## 2017-12-08 PROCEDURE — 99214 OFFICE O/P EST MOD 30 MIN: CPT | Mod: S$GLB,,, | Performed by: PSYCHIATRY & NEUROLOGY

## 2017-12-08 RX ORDER — DEXTROAMPHETAMINE SACCHARATE, AMPHETAMINE ASPARTATE, DEXTROAMPHETAMINE SULFATE AND AMPHETAMINE SULFATE 5; 5; 5; 5 MG/1; MG/1; MG/1; MG/1
1 TABLET ORAL 2 TIMES DAILY
Qty: 60 TABLET | Refills: 0 | Status: ON HOLD | OUTPATIENT
Start: 2018-02-02 | End: 2021-01-28 | Stop reason: HOSPADM

## 2017-12-08 RX ORDER — DEXTROAMPHETAMINE SACCHARATE, AMPHETAMINE ASPARTATE, DEXTROAMPHETAMINE SULFATE AND AMPHETAMINE SULFATE 5; 5; 5; 5 MG/1; MG/1; MG/1; MG/1
1 TABLET ORAL 2 TIMES DAILY
Qty: 60 TABLET | Refills: 0 | Status: SHIPPED | OUTPATIENT
Start: 2018-01-05 | End: 2017-12-08 | Stop reason: SDUPTHER

## 2017-12-08 RX ORDER — DEXTROAMPHETAMINE SACCHARATE, AMPHETAMINE ASPARTATE, DEXTROAMPHETAMINE SULFATE AND AMPHETAMINE SULFATE 5; 5; 5; 5 MG/1; MG/1; MG/1; MG/1
1 TABLET ORAL 2 TIMES DAILY
Qty: 60 TABLET | Refills: 0 | Status: SHIPPED | OUTPATIENT
Start: 2017-12-08 | End: 2017-12-08 | Stop reason: SDUPTHER

## 2017-12-08 NOTE — PROGRESS NOTES
"12/8/2017  Malu Montes   1970   [id number]       OUTPATIENT PSYCHIATRY FOLLOW UP NOTE      Clinical Status of Patient:  Outpatient (Ambulatory)    Chief Complaint:  Malu Montes is a 47 y.o. female who presents today for follow-up of attention problems.  Met with patient and I have reviewed the patient's medical history in detail and updated the computerized patient record.    Prefers to be called Malu    Interval History and Content of Current Session:  Interim Events/Subjective Report/Content of Current Session:   45 y/o female w/PMH ADHD presents for medication management/follow up. States mood today as "pretty good". Switched to a new position at work and has increased work load. Tolerating medication without adverse effects. Denies that medication has affected sleep, appetite, or cardiac complications. Sleeping "good," and reports ~6-7 hours/night. Appetite is "good," and reports some minor subjective weight gain. Denies any depression or anxiety. No SI, HI, AVH, delusions, or paranoia. Denies somatic complaints today. Overall stable and needs refills.    Current psychiatric medications:  Adderall IR 20mg PO BID PRN - has started taking holidays from using medication on weekend    Compliance: yes    Side effects: None    Prior medication trials:  Denies prior trials    Substance use:  Etoh: ~1 drink a week, denies ever 6 or more drinks, denies blackouts  Drug use: denies  Nicotine: ~1/2 ppd - will occasionally use a  vaporizer    SUBJECTIVE     Psychiatric Review Of Systems - Is patient experiencing or having changes in:  sleep: no  appetite: no  weight: no  energy/anergy: no  interest/pleasure/anhedonia: no  somatic symptoms: no  libido: no  guilty/hopelessness: no  concentration: no  S.I.B.s/risky behavior: no  SI/SA:  no    anxiety/panic: no  Agoraphobia:  no  Social phobia:  no  Recurrent nightmares:  no  hyper startle response:  no  Avoidance: no  Recurrent thoughts:  no  Recurrent " behaviors:  no    Irritability: no  Racing thoughts: no  Impulsive behaviors: no  Pressured speech:  no    Paranoia:no  Delusions: no  AVH:no    Screens and Inventories:  none    Past Medical, Family and Social History: The patient's past medical, family and social history have been reviewed and updated as appropriate within the electronic medical record - see encounter notes.    Risk Parameters:  Patient reports no suicidal ideation  Patient reports no homicidal ideation  Patient reports no self-injurious behavior  Patient reports no violent behavior    Psychotherapy:  · none    Psychosocial Stressors: none endorsed.   Functioning Relationships: good support system and good relationship with children  Strengths AND Liabilities  Strength: Patient accepts guidance/feedback, Strength: Patient is expressive/articulate., Strength: Patient is intelligent., Strength: Patient is physically healthy., Strength: Patient has positive support network., Strength: Patient has reasonable judgment., Strength: Patient is stable.    OBJECTIVE     Medical ROS  General ROS: negative for - chills, fatigue or fever  Respiratory ROS: no cough, shortness of breath, or wheezing  Cardiovascular ROS: no chest pain or dyspnea on exertion  Gastrointestinal ROS: no abdominal pain, change in bowel habits, or black or bloody stools  Musculoskeletal ROS: negative for - gait disturbance, joint stiffness, muscle pain or muscular weakness  Neurological ROS: negative for - confusion, dizziness, gait disturbance, headaches, impaired coordination/balance, seizures or visual changes    Nutritional Screening: Considering the patient's height and weight, medications, medical history and preferences, should a referral be made to the dietitian? no    Musculoskeletal  Muscle Strength/Tone:  not examined   Gait & Station:  non-ataxic     Relevant Elements of Neurological Exam: normal gait  AIMS:  n/a    Constitutional  Vitals:  Most recent vital signs, dated  "greater than 90 days prior to this appointment, were reviewed.    Vitals:    12/08/17 0934   BP: (!) 142/75   Pulse: 100   Weight: 71.7 kg (158 lb)   Height: 5' 3" (1.6 m)          Allergies  Review of patient's allergies indicates:   Allergen Reactions    No known drug allergies        Laboratory Data  No visits with results within 1 Month(s) from this visit.   Latest known visit with results is:   Lab Visit on 04/23/2015   Component Date Value Ref Range Status    Specimen UA 04/23/2015 Urine, Clean Catch   Final    Color, UA 04/23/2015 Yellow  Yellow, Straw, Marie Final    Appearance, UA 04/23/2015 Clear  Clear Final    pH, UA 04/23/2015 6.0  5.0 - 8.0 Final    Specific Gravity, UA 04/23/2015 1.020  1.005 - 1.030 Final    Protein, UA 04/23/2015 Negative  Negative Final    Glucose, UA 04/23/2015 Negative  Negative Final    Ketones, UA 04/23/2015 Negative  Negative Final    Bilirubin (UA) 04/23/2015 Negative  Negative Final    Occult Blood UA 04/23/2015 Negative  Negative Final    Nitrite, UA 04/23/2015 Negative  Negative Final    Urobilinogen, UA 04/23/2015 Negative  <2.0 EU/dL Final    Leukocytes, UA 04/23/2015 Negative  Negative Final    Microalbum.,U,Random 04/23/2015 9.0  ug/mL Final    Creatinine, Random Ur 04/23/2015 128.0  15.0 - 325.0 mg/dL Final    Microalb Creat Ratio 04/23/2015 7.0  0.0 - 30.0 ug/mg Final       All Medications  Outpatient Encounter Prescriptions as of 12/8/2017   Medication Sig Dispense Refill    dextroamphetamine-amphetamine (ADDERALL) 20 mg tablet Take 1 tablet by mouth 2 (two) times daily. 60 tablet 0    gabapentin (NEURONTIN) 300 MG capsule Take 1 capsule (300 mg total) by mouth 3 (three) times daily. 90 capsule 2    polyethylene glycol (MIRALAX) 17 gram/dose powder Take 17 g by mouth. 17 Powder Oral Every day.  take while taking pain medication, stop if stools become loose       No facility-administered encounter medications on file as of 12/8/2017.  " "      Psychiatric Mental Status Exam  Appearance: unremarkable, age appropriate  Behavior/Cooperation: limited/ appopriate friendly and cooperative  Speech: appropriate rate, volume and tone  Mood: "fine"  Affect:  congruent with mood and appropriate to situation/content    Thought Process: normal and logical  Thought Content: normal, no suicidality, no homicidality, delusions, or paranoia  Sensorium:    grossly intact  Alert and Oriented: x3  Memory: grossly intact    Attention/concentration: appropriate for age/education.  Similarities:  grossly intact  Abstract reasoning: grossly intact  Insight: Intact  Judgment: Intact    ASSESSMENT      Impression:     ICD-10-CM ICD-9-CM   1. Attention deficit hyperactivity disorder (ADHD), unspecified ADHD type F90.9 314.01       Treatment Goals:  Specify outcomes written in observable, behavioral terms:   Drug & Alcohol: nicotine cessation - will continue to address with patient. Contemplating at this time and refusing further resources  Attention symptoms: continued remittance of symptoms via medication management    Status/Progress: Based on the examination today, the patient's problem(s) is/are well controlled.  New problems have not been presented today.   Co-morbidities are not complicating management of the primary condition.  There are no active rule-out diagnoses for this patient at this time.     TREATMENT PLAN     · Medication Management: Continue current medications. Renew current medications   · Adderall IR 20mg PO BID   · Tachycardia and elevated BP on vitals noted - attributed to rushing to get to appointment on time after having difficulty parking  · LA  checked - no concerning findings   · Labs: prior labs reviewed - no recent labs. Advised to get well-exam checkup  · The treatment plan and follow up plan were reviewed with the patient.  · Discussed with patient informed consent, risks vs. benefits, alternative treatments, side effect profile and the " inherent unpredictability of individual responses to these treatments. The patient expresses understanding of the above and displays the capacity to agree with this current plan.  · Encouraged Patient to keep future appointments.   · Take medications as prescribed and abstain from substance abuse.   · In the event of an emergency patient was advised to go to the emergency room.    Return to Clinic: 3 months    Counseling/ psychotherapy time:  none  Total time: 20 minutes    Caleb Duran MD  [facility]  PGY-3  12/8/2017

## 2020-04-26 ENCOUNTER — PATIENT MESSAGE (OUTPATIENT)
Dept: INTERNAL MEDICINE | Facility: CLINIC | Age: 50
End: 2020-04-26

## 2020-04-26 DIAGNOSIS — Z30.9 ENCOUNTER FOR CONTRACEPTIVE MANAGEMENT, UNSPECIFIED TYPE: Primary | ICD-10-CM

## 2020-04-28 ENCOUNTER — OFFICE VISIT (OUTPATIENT)
Dept: OBSTETRICS AND GYNECOLOGY | Facility: CLINIC | Age: 50
End: 2020-04-28
Attending: OBSTETRICS & GYNECOLOGY
Payer: COMMERCIAL

## 2020-04-28 ENCOUNTER — PATIENT MESSAGE (OUTPATIENT)
Dept: OBSTETRICS AND GYNECOLOGY | Facility: CLINIC | Age: 50
End: 2020-04-28

## 2020-04-28 DIAGNOSIS — Z30.09 OTHER GENERAL COUNSELING AND ADVICE FOR CONTRACEPTIVE MANAGEMENT: Primary | ICD-10-CM

## 2020-04-28 DIAGNOSIS — Z12.39 BREAST CANCER SCREENING: ICD-10-CM

## 2020-04-28 PROCEDURE — 99203 OFFICE O/P NEW LOW 30 MIN: CPT | Mod: 95,,, | Performed by: OBSTETRICS & GYNECOLOGY

## 2020-04-28 PROCEDURE — 99203 PR OFFICE/OUTPT VISIT, NEW, LEVL III, 30-44 MIN: ICD-10-PCS | Mod: 95,,, | Performed by: OBSTETRICS & GYNECOLOGY

## 2020-04-28 RX ORDER — NORETHINDRONE ACETATE AND ETHINYL ESTRADIOL 1MG-20(21)
1 KIT ORAL DAILY
Qty: 90 TABLET | Refills: 0 | Status: SHIPPED | OUTPATIENT
Start: 2020-04-28 | End: 2020-07-22 | Stop reason: SDUPTHER

## 2020-04-28 RX ORDER — NORETHINDRONE ACETATE AND ETHINYL ESTRADIOL 1MG-20(21)
1 KIT ORAL DAILY
Qty: 28 TABLET | Refills: 11 | OUTPATIENT
Start: 2020-04-28 | End: 2021-04-28

## 2020-04-28 NOTE — PROGRESS NOTES
The patient location is: home  The chief complaint leading to consultation is: birth control pills  Visit type: Virtual visit with synchronous audio and video  Total time spent with patient: 15 minutes  Each patient to whom he or she provides medical services by telemedicine is:  (1) informed of the relationship between the physician and patient and the respective role of any other health care provider with respect to management of the patient; and (2) notified that he or she may decline to receive medical services by telemedicine and may withdraw from such care at any time.      SUBJECTIVE:   50 y.o. female   for contraception counseling. No LMP recorded..  PCP has been refilling OCP.  Patient has run out of Rx and desires to continue Junel.  Reports cutting back on cigarettes and vapes intermittently.         Past Medical History:   Diagnosis Date    ADD (attention deficit disorder)     Constipation     Vitamin D deficiency      Past Surgical History:   Procedure Laterality Date    breast augmentation       SECTION      HEMORRHOID SURGERY       Social History     Socioeconomic History    Marital status:      Spouse name: Not on file    Number of children: Not on file    Years of education: Not on file    Highest education level: Not on file   Occupational History    Occupation: [employer]   Social Needs    Financial resource strain: Not on file    Food insecurity:     Worry: Not on file     Inability: Not on file    Transportation needs:     Medical: Not on file     Non-medical: Not on file   Tobacco Use    Smoking status: Current Every Day Smoker     Packs/day: 0.50     Years: 27.00     Pack years: 13.50    Smokeless tobacco: Never Used   Substance and Sexual Activity    Alcohol use: Yes     Comment: occas    Drug use: No    Sexual activity: Yes     Partners: Male   Lifestyle    Physical activity:     Days per week: Not on file     Minutes per session: Not on file     Stress: Not on file   Relationships    Social connections:     Talks on phone: Not on file     Gets together: Not on file     Attends Moravian service: Not on file     Active member of club or organization: Not on file     Attends meetings of clubs or organizations: Not on file     Relationship status: Not on file   Other Topics Concern    Not on file   Social History Narrative    Not on file     Family History   Problem Relation Age of Onset    Cancer Mother         Uterine Cancer     No Known Problems Sister     Sleep apnea Daughter     ADD / ADHD Daughter     Heart disease Maternal Grandmother         CHF    COPD Maternal Grandfather     Heart disease Paternal Grandmother         CHF    Colon cancer Neg Hx     Inflammatory bowel disease Neg Hx     Stomach cancer Neg Hx     Breast cancer Neg Hx     Ovarian cancer Neg Hx      OB History    Para Term  AB Living   2 2       2   SAB TAB Ectopic Multiple Live Births           2      # Outcome Date GA Lbr Mervin/2nd Weight Sex Delivery Anes PTL Lv   2 Para      CS-Unspec   [place]   1 Para      Vag-Spont   [place]         Current Outpatient Medications   Medication Sig Dispense Refill    dextroamphetamine-amphetamine (ADDERALL) 20 mg tablet Take 1 tablet by mouth 2 (two) times daily. 60 tablet 0    norethindrone-ethinyl estradiol (JUNEL FE ) 1 mg-20 mcg (21)/75 mg (7) per tablet Take 1 tablet by mouth once daily. 90 tablet 0    polyethylene glycol (MIRALAX) 17 gram/dose powder Take 17 g by mouth. 17 Powder Oral Every day.  take while taking pain medication, stop if stools become loose       No current facility-administered medications for this visit.      Allergies: No known drug allergies     ROS:  Constitutional: no weight loss, weight gain, fever, fatigue  Eyes:  No vision changes, glasses/contacts  ENT/Mouth: No ulcers, sinus problems, ears ringing, headache  Cardiovascular: No inability to lie flat, chest pain, exercise intolerance,  swelling, heart palpitations  Respiratory: No wheezing, coughing blood, shortness of breath, or cough  Gastrointestinal: No diarrhea, bloody stool, nausea/vomiting, constipation, gas, hemorrhoids  Genitourinary: No blood in urine, painful urination, urgency of urination, frequency of urination, incomplete emptying, incontinence, abnormal bleeding, painful periods, heavy periods, vaginal discharge, vaginal odor, painful intercourse, sexual problems, bleeding after intercourse.  Musculoskeletal: No muscle weakness  Skin/Breast: No painful breasts, nipple discharge, masses, rash, ulcers  Neurological: No passing out, seizures, numbness, headache  Endocrine: No diabetes, hypothyroid, hyperthyroid, hot flashes, hair loss, abnormal hair growth, ance  Psychiatric: No depression, crying  Hematologic: No bruises, bleeding, swollen lymph nodes, anemia.      OBJECTIVE:   The patient appears well, alert, oriented x 3, in no distress.      ASSESSMENT:   1. Other general counseling and advice for contraceptive management     2. Breast cancer screening  Mammo Digital Screening Bilat w/ Bebeto       PLAN:   Orders Placed This Encounter    Mammo Digital Screening Bilat w/ Bebeto    norethindrone-ethinyl estradiol (JUNEL FE 1/20) 1 mg-20 mcg (21)/75 mg (7) per tablet     Discussed contraception options and risks of VTE including smoking.  Discussed Mirena, Nexplanon, Depo provera, prog-only pills.  Patient reports has been doing well on OCP's and only vapes occasionally.  Aware of risks.  Return to clinic for WWE

## 2020-05-06 ENCOUNTER — TELEPHONE (OUTPATIENT)
Dept: OBSTETRICS AND GYNECOLOGY | Facility: CLINIC | Age: 50
End: 2020-05-06

## 2020-05-12 ENCOUNTER — PATIENT MESSAGE (OUTPATIENT)
Dept: OBSTETRICS AND GYNECOLOGY | Facility: CLINIC | Age: 50
End: 2020-05-12

## 2020-07-06 ENCOUNTER — HOSPITAL ENCOUNTER (EMERGENCY)
Facility: HOSPITAL | Age: 50
Discharge: HOME | End: 2020-07-06
Attending: EMERGENCY MEDICINE
Payer: COMMERCIAL

## 2020-07-06 VITALS
DIASTOLIC BLOOD PRESSURE: 86 MMHG | SYSTOLIC BLOOD PRESSURE: 186 MMHG | TEMPERATURE: 98 F | HEIGHT: 63 IN | BODY MASS INDEX: 27.46 KG/M2 | WEIGHT: 155 LBS | RESPIRATION RATE: 16 BRPM | OXYGEN SATURATION: 98 % | HEART RATE: 93 BPM

## 2020-07-06 DIAGNOSIS — M25.511 ACUTE PAIN OF RIGHT SHOULDER: Primary | ICD-10-CM

## 2020-07-06 PROCEDURE — 99284 EMERGENCY DEPT VISIT MOD MDM: CPT | Mod: ,,, | Performed by: EMERGENCY MEDICINE

## 2020-07-06 PROCEDURE — 25000003 PHARM REV CODE 250: Performed by: EMERGENCY MEDICINE

## 2020-07-06 PROCEDURE — 99283 EMERGENCY DEPT VISIT LOW MDM: CPT

## 2020-07-06 PROCEDURE — 99284 PR EMERGENCY DEPT VISIT,LEVEL IV: ICD-10-PCS | Mod: ,,, | Performed by: EMERGENCY MEDICINE

## 2020-07-06 RX ORDER — CYCLOBENZAPRINE HCL 10 MG
10 TABLET ORAL
Status: COMPLETED | OUTPATIENT
Start: 2020-07-06 | End: 2020-07-06

## 2020-07-06 RX ORDER — CYCLOBENZAPRINE HCL 10 MG
10 TABLET ORAL 3 TIMES DAILY PRN
Qty: 6 TABLET | Refills: 0 | Status: SHIPPED | OUTPATIENT
Start: 2020-07-06 | End: 2020-07-08

## 2020-07-06 RX ADMIN — CYCLOBENZAPRINE 10 MG: 10 TABLET, FILM COATED ORAL at 02:07

## 2020-07-06 NOTE — ED TRIAGE NOTES
Left shoulder pain after waking up yesterday morning has gotten increasingly worse. Describes pain as constant throbbing/burning that radiates down to elbow and around to upper back. Tender with palpation. Decreased ROM due to pain. Has been alternating ibuprofen and Tylenol. Last took ibuprofen 800 mg about 5 hours pta. Denies CP, SOB.

## 2020-07-09 NOTE — ED PROVIDER NOTES
Source of History:  pt    Chief complaint:  Shoulder Pain (Pt reports left shoulder pain and painful to palpation starting after she woke up yesterday morning. She tried tylenol and ibuprofen 5 hours ago with minimal relief. )      HPI:  Malu Montes is a 50 y.o. female presenting with acute shoulder pain. She states she woke up with pain in the L shoulder that is worse with movement, better with rest.  It feels like it is superior to her scapula and deep.  No obvious trauma or overuse.  She states she had this once before it went away spontaneously.  Does not radiate down arm.  No neck injury or tenderness.  She notes that when she moves her own arm with her good arm he does not have pain, however she is unable to hold her own arm up by itself against gravity.    ROS: As per HPI and below:    General: No fever.  No chills.  Eyes: No visual changes.  Head: No headache.    Integument: No rashes or lesions.  Chest: No shortness of breath.  Cardiovascular: No chest pain.  Abdomen: No abdominal pain.  No nausea or vomiting.  Urinary: No abnormal urination.  Neurologic: No focal weakness.  No numbness.  Hematologic: No easy bruising.  Endocrine: No excessive thirst or urination.      Review of patient's allergies indicates:   Allergen Reactions    No known drug allergies        No current facility-administered medications on file prior to encounter.      Current Outpatient Medications on File Prior to Encounter   Medication Sig Dispense Refill    dextroamphetamine-amphetamine (ADDERALL) 20 mg tablet Take 1 tablet by mouth 2 (two) times daily. 60 tablet 0    norethindrone-ethinyl estradiol (JUNEL FE 1/20) 1 mg-20 mcg (21)/75 mg (7) per tablet Take 1 tablet by mouth once daily. 90 tablet 0    polyethylene glycol (MIRALAX) 17 gram/dose powder Take 17 g by mouth. 17 Powder Oral Every day.  take while taking pain medication, stop if stools become loose         PMH:  As per HPI and below:  Past Medical History:    Diagnosis Date    ADD (attention deficit disorder)     Constipation     Vitamin D deficiency      Past Surgical History:   Procedure Laterality Date    breast augmentation       SECTION      HEMORRHOID SURGERY         Social History     Socioeconomic History    Marital status:      Spouse name: Not on file    Number of children: Not on file    Years of education: Not on file    Highest education level: Not on file   Occupational History    Occupation: [employer]   Social Needs    Financial resource strain: Not on file    Food insecurity     Worry: Not on file     Inability: Not on file    Transportation needs     Medical: Not on file     Non-medical: Not on file   Tobacco Use    Smoking status: Current Every Day Smoker     Packs/day: 0.50     Years: 27.00     Pack years: 13.50    Smokeless tobacco: Never Used   Substance and Sexual Activity    Alcohol use: Yes     Comment: 1 per day    Drug use: No    Sexual activity: Yes     Partners: Male   Lifestyle    Physical activity     Days per week: Not on file     Minutes per session: Not on file    Stress: Not on file   Relationships    Social connections     Talks on phone: Not on file     Gets together: Not on file     Attends Lutheran service: Not on file     Active member of club or organization: Not on file     Attends meetings of clubs or organizations: Not on file     Relationship status: Not on file   Other Topics Concern    Not on file   Social History Narrative    Not on file       Family History   Problem Relation Age of Onset    Cancer Mother         Uterine Cancer     No Known Problems Sister     Sleep apnea Daughter     ADD / ADHD Daughter     Heart disease Maternal Grandmother         CHF    COPD Maternal Grandfather     Heart disease Paternal Grandmother         CHF    Colon cancer Neg Hx     Inflammatory bowel disease Neg Hx     Stomach cancer Neg Hx     Breast cancer Neg Hx     Ovarian cancer Neg  Hx        Physical Exam:    Vitals:    07/06/20 0211   BP: (!) 186/86   Pulse: 93   Resp: 16   Temp: 98.1 °F (36.7 °C)     Appearance: No acute distress.  Skin: No rashes seen.  Good turgor.  No abrasions.  No ecchymoses.  Eyes: No conjunctival injection. EOMI, PERRL.  ENT: Oropharynx clear.    Chest:  No increased work of breathing, bilateral chest rise.  Cardiovascular: Regular rate and rhythm.    Abdomen: Soft.  Not distended.  Nontender.  No guarding.  No rebound. No Masses  Musculoskeletal:  Pain in left shoulder with abduction or extension, pain holding her arm up against gravity.  Able to range arm fully without assistance.  No deformities.  Neck supple, no midline tenderness or deformity.  No meningismus.  Neurologic: Moves all extremities.  Normal sensation.  No facial droop.  Normal speech.    Mental Status:  Alert and oriented x 3.  Appropriate, conversant.          Laboratory Studies:  Labs Reviewed - No data to display        Imaging Results    None         Medications Given:  Medications   cyclobenzaprine tablet 10 mg (10 mg Oral Given 7/6/20 0258)       Discussed with:  Patient    MDM:    50 y.o. female with left shoulder pain for 2 days.  Vital stable.  Neurovascularly intact.  The patient is able to range her arm without pain when assisted, and I do not suspect septic joint or gout.  Most likely musculoskeletal given the reproducibility of the pain.  Given Flexeril in the ED along with short course of Flexeril for home.  Advised close outpatient follow-up.  Advised pt to follow up with PCP or return if concerning symptoms arise. Pt understands and agrees with plan. Will d/c home.          Diagnostic Impression:    1. Acute pain of right shoulder             Luis Adams MD  07/08/20 [id number]

## 2020-07-10 ENCOUNTER — OFFICE VISIT (OUTPATIENT)
Dept: SPORTS MEDICINE | Facility: CLINIC | Age: 50
End: 2020-07-10
Payer: COMMERCIAL

## 2020-07-10 ENCOUNTER — HOSPITAL ENCOUNTER (OUTPATIENT)
Dept: RADIOLOGY | Facility: HOSPITAL | Age: 50
Discharge: HOME | End: 2020-07-10
Attending: PHYSICIAN ASSISTANT
Payer: COMMERCIAL

## 2020-07-10 VITALS
BODY MASS INDEX: 27.46 KG/M2 | HEIGHT: 63 IN | WEIGHT: 155 LBS | HEART RATE: 107 BPM | DIASTOLIC BLOOD PRESSURE: 96 MMHG | SYSTOLIC BLOOD PRESSURE: 143 MMHG

## 2020-07-10 DIAGNOSIS — M75.32 CALCIFIC TENDONITIS OF LEFT SHOULDER: ICD-10-CM

## 2020-07-10 DIAGNOSIS — M25.512 ACUTE PAIN OF LEFT SHOULDER: ICD-10-CM

## 2020-07-10 DIAGNOSIS — M75.02 ADHESIVE CAPSULITIS OF LEFT SHOULDER: ICD-10-CM

## 2020-07-10 DIAGNOSIS — M25.512 ACUTE PAIN OF LEFT SHOULDER: Primary | ICD-10-CM

## 2020-07-10 PROCEDURE — 99204 OFFICE O/P NEW MOD 45 MIN: CPT | Mod: 25,S$GLB,, | Performed by: PHYSICIAN ASSISTANT

## 2020-07-10 PROCEDURE — 99204 PR OFFICE/OUTPT VISIT, NEW, LEVL IV, 45-59 MIN: ICD-10-PCS | Mod: 25,S$GLB,, | Performed by: PHYSICIAN ASSISTANT

## 2020-07-10 PROCEDURE — 73030 X-RAY EXAM OF SHOULDER: CPT | Mod: 26,LT,, | Performed by: RADIOLOGY

## 2020-07-10 PROCEDURE — 99999 PR PBB SHADOW E&M-EST. PATIENT-LVL III: CPT | Mod: PBBFAC,,, | Performed by: PHYSICIAN ASSISTANT

## 2020-07-10 PROCEDURE — 73030 X-RAY EXAM OF SHOULDER: CPT | Mod: TC,LT

## 2020-07-10 PROCEDURE — 73030 XR SHOULDER COMPLETE 2 OR MORE VIEWS LEFT: ICD-10-PCS | Mod: 26,LT,, | Performed by: RADIOLOGY

## 2020-07-10 PROCEDURE — 20610 DRAIN/INJ JOINT/BURSA W/O US: CPT | Mod: LT,S$GLB,, | Performed by: PHYSICIAN ASSISTANT

## 2020-07-10 PROCEDURE — 20610 PR DRAIN/INJECT LARGE JOINT/BURSA: ICD-10-PCS | Mod: LT,S$GLB,, | Performed by: PHYSICIAN ASSISTANT

## 2020-07-10 PROCEDURE — 99999 PR PBB SHADOW E&M-EST. PATIENT-LVL III: ICD-10-PCS | Mod: PBBFAC,,, | Performed by: PHYSICIAN ASSISTANT

## 2020-07-10 RX ORDER — BUPIVACAINE HYDROCHLORIDE 2.5 MG/ML
3 INJECTION, SOLUTION INFILTRATION; PERINEURAL
Status: COMPLETED | OUTPATIENT
Start: 2020-07-10 | End: 2020-07-10

## 2020-07-10 RX ORDER — TRIAMCINOLONE ACETONIDE 40 MG/ML
40 INJECTION, SUSPENSION INTRA-ARTICULAR; INTRAMUSCULAR
Status: COMPLETED | OUTPATIENT
Start: 2020-07-10 | End: 2020-07-10

## 2020-07-10 RX ORDER — MELOXICAM 15 MG/1
15 TABLET ORAL DAILY
Qty: 30 TABLET | Refills: 1 | Status: SHIPPED | OUTPATIENT
Start: 2020-07-10 | End: 2020-09-08

## 2020-07-10 RX ADMIN — TRIAMCINOLONE ACETONIDE 40 MG: 40 INJECTION, SUSPENSION INTRA-ARTICULAR; INTRAMUSCULAR at 03:07

## 2020-07-10 RX ADMIN — BUPIVACAINE HYDROCHLORIDE 7.5 MG: 2.5 INJECTION, SOLUTION INFILTRATION; PERINEURAL at 03:07

## 2020-07-10 NOTE — PROGRESS NOTES
Subjective:          Chief Complaint: Malu Montes is a 50 y.o. female who had concerns including Pain of the Left Shoulder.    HPI  Patient who is RHD senior  at [employer] presents to clinic with left shoulder pain x 5 days. She states that she woke up on 7/5/20 and was unable to move her left shoulder. Denies a specific INGA. She went to the ER on 7/6/20 and was prescribed Flexeril to take as needed. She has been icing her shoulder and taking Aleve as needed for pain. Pain today is 8/10 and 10/10 at its worst. She is unable to raise her arm above her head. This happened one week prior to 7/5/20 as well and resolved in a few days.  Denies any neck pain/numbness/tingling. She was seen by a chiropractor yesterday which increased her pain.     Review of Systems   Constitution: Negative. Negative for chills, fever, weight gain and weight loss.   HENT: Negative for congestion and sore throat.    Eyes: Negative for blurred vision and double vision.   Cardiovascular: Negative for chest pain, leg swelling and palpitations.   Respiratory: Negative for cough and shortness of breath.    Hematologic/Lymphatic: Does not bruise/bleed easily.   Skin: Negative for itching, poor wound healing and rash.   Musculoskeletal: Positive for joint pain and stiffness. Negative for back pain, joint swelling, muscle weakness and myalgias.   Gastrointestinal: Negative for abdominal pain, constipation, diarrhea, nausea and vomiting.   Genitourinary: Negative.  Negative for frequency and hematuria.   Neurological: Negative for dizziness, headaches, numbness, paresthesias and sensory change.   Psychiatric/Behavioral: Negative for altered mental status and depression. The patient is not nervous/anxious.    Allergic/Immunologic: Negative for hives.       Pain Related Questions  Over the past 3 days, what was your average pain during activity? (I.e. running, jogging, walking, climbing stairs, getting dressed, ect.): 4  Over the past  3 days, what was your highest pain level?: 10    Other  How many nights a week are you awakened by your affected body part?: 7  Was the patient's HEIGHT measured or patient reported?: Patient Reported  Was the patient's WEIGHT measured or patient reported?: Measured      Objective:        General: Malu is well-developed, well-nourished, appears stated age, in no acute distress, alert and oriented to time, place and person.     General    Vitals reviewed.  Constitutional: She is oriented to person, place, and time. She appears well-developed and well-nourished. No distress.   HENT:   Head: Normocephalic.   Eyes: EOM are normal.   Neck: Normal range of motion.   Cardiovascular: Normal rate and regular rhythm.    Pulmonary/Chest: Effort normal. No respiratory distress.   Neurological: She is alert and oriented to person, place, and time. She has normal reflexes. No cranial nerve deficit. Coordination normal.   Psychiatric: She has a normal mood and affect. Her behavior is normal. Judgment and thought content normal.         Right Shoulder Exam     Inspection/Observation   Swelling: absent  Bruising: absent  Scars: absent  Deformity: absent  Scapular Winging: absent  Scapular Dyskinesia: negative  Atrophy: absent    Tenderness   The patient is experiencing no tenderness.    Range of Motion   Active abduction:  160 normal   Passive abduction:  160 normal   Extension:  50 normal   Forward Flexion:  180 normal   Adduction: 60 normal  External Rotation 0 degrees:  60 normal   Internal rotation 0 degrees:  T8 normal     Muscle Strength   The patient has normal right shoulder strength.    Tests & Signs   Apprehension: negative  Cross arm: negative  Drop arm: negative  Cordero test: negative  Impingement: negative  Sulcus: absent  Lift Off Sign: negative  Active Compression Test (Hartley's Sign): negative  Yergason's Test: negative  Speed's Test: negative  Anterior Drawer Test: 0   Posterior Drawer Test: 0    Other    Sensation: normal    Left Shoulder Exam     Inspection/Observation   Swelling: absent  Bruising: absent  Scars: absent  Deformity: absent  Scapular Winging: absent  Scapular Dyskinesia: negative  Atrophy: absent    Tenderness   The patient is tender to palpation of the greater tuberosity.    Range of Motion   Active abduction:  30 (pain) abnormal   Passive abduction:  60 (pain) abnormal   Extension:  10 (pain) abnormal   Forward Flexion:  30 abnormal   Adduction: 0 abnormal  External Rotation 0 degrees:  0 (pain) abnormal   Internal rotation 0 degrees: abnormal Left shoulder internal rotation 0 degrees: body.     Tests & Signs   Apprehension: positive  Cross arm: positive  Cordero test: positive  Rotator Cuff Painful Arc/Range: severe    Other   Sensation: normal     Comments:  Unable to complete Special testing due to inability to raise arm      Muscle Strength   Right Upper Extremity   Shoulder Abduction: 5/5   Shoulder Internal Rotation: 5/5   Shoulder External Rotation: 5/5   Supraspinatus: 5/5/5   Subscapularis: 5/5/5   Biceps: 5/5/5     Reflexes     Left Side  Biceps:  2+  Triceps:  2+  Brachioradialis:  2+    Right Side   Biceps:  2+  Triceps:  2+  Brachioradialis:  2+    RADIOGRAPHS:  Left shoulder:  FINDINGS:  Two views: No fracture dislocation bone destruction seen.  There is DJD and calcific rotator cuff tendinitis.        Assessment:       Encounter Diagnoses   Name Primary?    Acute pain of left shoulder Yes    Adhesive capsulitis of left shoulder     Calcific tendonitis of left shoulder           Plan:       1.I made the decision to obtain old records of the patient including previous notes and imaging. New imaging was ordered today of the extremity or extremities evaluated. I independently reviewed and interpreted the radiographs  today as well as prior imaging.  Reviewed radiographs with patient in detail.     2. Injection Procedure left shoulder  A time out was performed, including verification  of patient ID, procedure, site and side, availability of information and equipment, review of safety issues, and agreement with consent, the procedure site was marked.    After time out was performed, the patient was prepped aseptically with povidone-iodine swabsticks. A diagnostic and therapeutic injection of 1:3cc Kenalog/Marcaine was given under sterile technique using a 22g x 1.5 needle from the Posterior  aspect of the left Subacromial in the sitting position.      Malu Montes had no adverse reactions to the medication. Pain decreased. She was instructed to apply ice to the joint for 20 minutes and avoid strenuous activities for 24-36 hours following the injection. She was warned of possible blood sugar and/or blood pressure changes during that time. Following that time, she can resume regular activities.    She was reminded to call the clinic immediately for any adverse side effects as explained in clinic today.    3. Stretches and range of motion exercises    4. Referral to formal PT to focus on ROM and strengthening    5. Mobic 15 mg once daily prn pain    6. Patient will message me next week regarding her pain.     7. Alternate ice and heat                    Patient questionnaires may have been collected.

## 2020-08-24 DIAGNOSIS — Z30.40 ENCOUNTER FOR REFILL OF PRESCRIPTION FOR CONTRACEPTION: Primary | ICD-10-CM

## 2020-08-24 RX ORDER — NORETHINDRONE ACETATE AND ETHINYL ESTRADIOL 1MG-20(21)
1 KIT ORAL DAILY
Qty: 28 TABLET | Refills: 0 | Status: SHIPPED | OUTPATIENT
Start: 2020-08-24 | End: 2021-07-29 | Stop reason: ALTCHOICE

## 2021-01-25 ENCOUNTER — HOSPITAL ENCOUNTER (INPATIENT)
Facility: HOSPITAL | Age: 51
LOS: 3 days | Discharge: HOME | DRG: 439 | End: 2021-01-28
Attending: EMERGENCY MEDICINE | Admitting: HOSPITALIST
Payer: COMMERCIAL

## 2021-01-25 DIAGNOSIS — E87.29 HIGH ANION GAP METABOLIC ACIDOSIS: ICD-10-CM

## 2021-01-25 DIAGNOSIS — R10.9 ABDOMINAL PAIN: ICD-10-CM

## 2021-01-25 DIAGNOSIS — R00.0 TACHYCARDIA: ICD-10-CM

## 2021-01-25 DIAGNOSIS — E87.29 ALCOHOLIC KETOACIDOSIS: Primary | ICD-10-CM

## 2021-01-25 DIAGNOSIS — E87.29 KETOACIDOSIS: ICD-10-CM

## 2021-01-25 DIAGNOSIS — E53.8 FOLATE DEFICIENCY: ICD-10-CM

## 2021-01-25 DIAGNOSIS — R94.31 QT PROLONGATION: ICD-10-CM

## 2021-01-25 DIAGNOSIS — K85.20 ALCOHOL-INDUCED ACUTE PANCREATITIS WITHOUT INFECTION OR NECROSIS: ICD-10-CM

## 2021-01-25 DIAGNOSIS — Z78.9 ALCOHOL USE: ICD-10-CM

## 2021-01-25 PROBLEM — E16.2 HYPOGLYCEMIA: Status: ACTIVE | Noted: 2021-01-25

## 2021-01-25 PROBLEM — D75.89 MACROCYTOSIS: Status: ACTIVE | Noted: 2021-01-25

## 2021-01-25 PROBLEM — E87.1 HYPONATREMIA: Status: ACTIVE | Noted: 2021-01-25

## 2021-01-25 PROBLEM — K85.90 ACUTE PANCREATITIS: Status: ACTIVE | Noted: 2021-01-25

## 2021-01-25 PROBLEM — R74.01 TRANSAMINITIS: Status: ACTIVE | Noted: 2021-01-25

## 2021-01-25 LAB
ALBUMIN SERPL BCP-MCNC: 3.8 G/DL (ref 3.5–5.2)
ALLENS TEST: ABNORMAL
ALLENS TEST: ABNORMAL
ALP SERPL-CCNC: 85 U/L (ref 55–135)
ALT SERPL W/O P-5'-P-CCNC: 86 U/L (ref 10–44)
ANION GAP SERPL CALC-SCNC: 26 MMOL/L (ref 8–16)
AST SERPL-CCNC: 157 U/L (ref 10–40)
BACTERIA #/AREA URNS AUTO: ABNORMAL /HPF
BASOPHILS # BLD AUTO: 0.06 K/UL (ref 0–0.2)
BASOPHILS NFR BLD: 0.7 % (ref 0–1.9)
BILIRUB SERPL-MCNC: 1.4 MG/DL (ref 0.1–1)
BILIRUB UR QL STRIP: NEGATIVE
BUN SERPL-MCNC: 10 MG/DL (ref 6–20)
CALCIUM SERPL-MCNC: 8.5 MG/DL (ref 8.7–10.5)
CHLORIDE SERPL-SCNC: 95 MMOL/L (ref 95–110)
CLARITY UR REFRACT.AUTO: ABNORMAL
CO2 SERPL-SCNC: 13 MMOL/L (ref 23–29)
COLOR UR AUTO: ABNORMAL
CREAT SERPL-MCNC: 0.7 MG/DL (ref 0.5–1.4)
CTP QC/QA: YES
DIFFERENTIAL METHOD: ABNORMAL
EOSINOPHIL # BLD AUTO: 0 K/UL (ref 0–0.5)
EOSINOPHIL NFR BLD: 0.1 % (ref 0–8)
ERYTHROCYTE [DISTWIDTH] IN BLOOD BY AUTOMATED COUNT: 12.6 % (ref 11.5–14.5)
EST. GFR  (AFRICAN AMERICAN): >60 ML/MIN/1.73 M^2
EST. GFR  (NON AFRICAN AMERICAN): >60 ML/MIN/1.73 M^2
GLUCOSE SERPL-MCNC: 48 MG/DL (ref 70–110)
GLUCOSE UR QL STRIP: NEGATIVE
HCO3 UR-SCNC: 14.5 MMOL/L (ref 24–28)
HCT VFR BLD AUTO: 45.8 % (ref 37–48.5)
HGB BLD-MCNC: 15.2 G/DL (ref 12–16)
HGB UR QL STRIP: ABNORMAL
HYALINE CASTS UR QL AUTO: 25 /LPF
IMM GRANULOCYTES # BLD AUTO: 0.03 K/UL (ref 0–0.04)
IMM GRANULOCYTES NFR BLD AUTO: 0.3 % (ref 0–0.5)
KETONES UR QL STRIP: ABNORMAL
LACTATE SERPL-SCNC: 2.7 MMOL/L (ref 0.5–2.2)
LDH SERPL L TO P-CCNC: 3.5 MMOL/L (ref 0.5–2.2)
LEUKOCYTE ESTERASE UR QL STRIP: NEGATIVE
LIPASE SERPL-CCNC: >1000 U/L (ref 4–60)
LYMPHOCYTES # BLD AUTO: 0.8 K/UL (ref 1–4.8)
LYMPHOCYTES NFR BLD: 8.2 % (ref 18–48)
MCH RBC QN AUTO: 34.9 PG (ref 27–31)
MCHC RBC AUTO-ENTMCNC: 33.2 G/DL (ref 32–36)
MCV RBC AUTO: 105 FL (ref 82–98)
MICROSCOPIC COMMENT: ABNORMAL
MONOCYTES # BLD AUTO: 0.5 K/UL (ref 0.3–1)
MONOCYTES NFR BLD: 5.4 % (ref 4–15)
NEUTROPHILS # BLD AUTO: 7.9 K/UL (ref 1.8–7.7)
NEUTROPHILS NFR BLD: 85.3 % (ref 38–73)
NITRITE UR QL STRIP: NEGATIVE
NRBC BLD-RTO: 0 /100 WBC
PCO2 BLDA: 26 MMHG (ref 35–45)
PH SMN: 7.35 [PH] (ref 7.35–7.45)
PH UR STRIP: 6 [PH] (ref 5–8)
PLATELET # BLD AUTO: 164 K/UL (ref 150–350)
PMV BLD AUTO: 11.3 FL (ref 9.2–12.9)
PO2 BLDA: 75 MMHG (ref 40–60)
POC BE: -11 MMOL/L
POC SATURATED O2: 95 % (ref 95–100)
POC TCO2: 15 MMOL/L (ref 24–29)
POCT GLUCOSE: 42 MG/DL (ref 70–110)
POTASSIUM SERPL-SCNC: 4.3 MMOL/L (ref 3.5–5.1)
PROT SERPL-MCNC: 7.2 G/DL (ref 6–8.4)
PROT UR QL STRIP: ABNORMAL
RBC # BLD AUTO: 4.36 M/UL (ref 4–5.4)
RBC #/AREA URNS AUTO: 1 /HPF (ref 0–4)
SAMPLE: ABNORMAL
SAMPLE: ABNORMAL
SARS-COV-2 RDRP RESP QL NAA+PROBE: NEGATIVE
SITE: ABNORMAL
SITE: ABNORMAL
SODIUM SERPL-SCNC: 134 MMOL/L (ref 136–145)
SP GR UR STRIP: 1.02 (ref 1–1.03)
SQUAMOUS #/AREA URNS AUTO: 11 /HPF
URN SPEC COLLECT METH UR: ABNORMAL
WBC # BLD AUTO: 9.23 K/UL (ref 3.9–12.7)
WBC #/AREA URNS AUTO: 3 /HPF (ref 0–5)

## 2021-01-25 PROCEDURE — 99291 CRITICAL CARE FIRST HOUR: CPT | Mod: 25

## 2021-01-25 PROCEDURE — 83690 ASSAY OF LIPASE: CPT

## 2021-01-25 PROCEDURE — 99223 PR INITIAL HOSPITAL CARE,LEVL III: ICD-10-PCS | Mod: ,,, | Performed by: HOSPITALIST

## 2021-01-25 PROCEDURE — U0002 COVID-19 LAB TEST NON-CDC: HCPCS | Performed by: EMERGENCY MEDICINE

## 2021-01-25 PROCEDURE — 86803 HEPATITIS C AB TEST: CPT

## 2021-01-25 PROCEDURE — 99291 PR CRITICAL CARE, E/M 30-74 MINUTES: ICD-10-PCS | Mod: CS,,, | Performed by: EMERGENCY MEDICINE

## 2021-01-25 PROCEDURE — 63600175 PHARM REV CODE 636 W HCPCS: Performed by: EMERGENCY MEDICINE

## 2021-01-25 PROCEDURE — 93005 ELECTROCARDIOGRAM TRACING: CPT

## 2021-01-25 PROCEDURE — 11000001 HC ACUTE MED/SURG PRIVATE ROOM

## 2021-01-25 PROCEDURE — 25000003 PHARM REV CODE 250: Performed by: EMERGENCY MEDICINE

## 2021-01-25 PROCEDURE — 96375 TX/PRO/DX INJ NEW DRUG ADDON: CPT

## 2021-01-25 PROCEDURE — 83605 ASSAY OF LACTIC ACID: CPT

## 2021-01-25 PROCEDURE — 63600175 PHARM REV CODE 636 W HCPCS: Performed by: HOSPITALIST

## 2021-01-25 PROCEDURE — 82803 BLOOD GASES ANY COMBINATION: CPT

## 2021-01-25 PROCEDURE — 86703 HIV-1/HIV-2 1 RESULT ANTBDY: CPT

## 2021-01-25 PROCEDURE — 93010 EKG 12-LEAD: ICD-10-PCS | Mod: ,,, | Performed by: INTERNAL MEDICINE

## 2021-01-25 PROCEDURE — 85025 COMPLETE CBC W/AUTO DIFF WBC: CPT

## 2021-01-25 PROCEDURE — 80053 COMPREHEN METABOLIC PANEL: CPT

## 2021-01-25 PROCEDURE — 99900035 HC TECH TIME PER 15 MIN (STAT)

## 2021-01-25 PROCEDURE — 82962 GLUCOSE BLOOD TEST: CPT

## 2021-01-25 PROCEDURE — 96374 THER/PROPH/DIAG INJ IV PUSH: CPT

## 2021-01-25 PROCEDURE — 93010 ELECTROCARDIOGRAM REPORT: CPT | Mod: ,,, | Performed by: INTERNAL MEDICINE

## 2021-01-25 PROCEDURE — 96361 HYDRATE IV INFUSION ADD-ON: CPT

## 2021-01-25 PROCEDURE — 99223 1ST HOSP IP/OBS HIGH 75: CPT | Mod: ,,, | Performed by: HOSPITALIST

## 2021-01-25 PROCEDURE — 81001 URINALYSIS AUTO W/SCOPE: CPT

## 2021-01-25 PROCEDURE — 25000003 PHARM REV CODE 250: Performed by: HOSPITALIST

## 2021-01-25 PROCEDURE — 99291 CRITICAL CARE FIRST HOUR: CPT | Mod: CS,,, | Performed by: EMERGENCY MEDICINE

## 2021-01-25 RX ORDER — AMOXICILLIN 250 MG
1 CAPSULE ORAL 2 TIMES DAILY PRN
Status: DISCONTINUED | OUTPATIENT
Start: 2021-01-25 | End: 2021-01-28 | Stop reason: HOSPADM

## 2021-01-25 RX ORDER — SODIUM CHLORIDE 0.9 % (FLUSH) 0.9 %
5 SYRINGE (ML) INJECTION
Status: DISCONTINUED | OUTPATIENT
Start: 2021-01-25 | End: 2021-01-28 | Stop reason: HOSPADM

## 2021-01-25 RX ORDER — DEXTROSE, SODIUM CHLORIDE, SODIUM LACTATE, POTASSIUM CHLORIDE, AND CALCIUM CHLORIDE 5; .6; .31; .03; .02 G/100ML; G/100ML; G/100ML; G/100ML; G/100ML
INJECTION, SOLUTION INTRAVENOUS CONTINUOUS
Status: DISCONTINUED | OUTPATIENT
Start: 2021-01-25 | End: 2021-01-26

## 2021-01-25 RX ORDER — DEXTROSE 50 % IN WATER (D50W) INTRAVENOUS SYRINGE
12.5
Status: COMPLETED | OUTPATIENT
Start: 2021-01-25 | End: 2021-01-25

## 2021-01-25 RX ORDER — HYDROCODONE BITARTRATE AND ACETAMINOPHEN 5; 325 MG/1; MG/1
1 TABLET ORAL EVERY 6 HOURS PRN
Status: DISCONTINUED | OUTPATIENT
Start: 2021-01-25 | End: 2021-01-26

## 2021-01-25 RX ORDER — LORAZEPAM 2 MG/ML
1 INJECTION INTRAMUSCULAR
Status: DISCONTINUED | OUTPATIENT
Start: 2021-01-25 | End: 2021-01-25

## 2021-01-25 RX ORDER — THIAMINE HCL 100 MG
100 TABLET ORAL DAILY
Status: DISCONTINUED | OUTPATIENT
Start: 2021-01-26 | End: 2021-01-28 | Stop reason: HOSPADM

## 2021-01-25 RX ORDER — DIAZEPAM 5 MG/1
5 TABLET ORAL
Status: COMPLETED | OUTPATIENT
Start: 2021-01-25 | End: 2021-01-25

## 2021-01-25 RX ORDER — ONDANSETRON 2 MG/ML
4 INJECTION INTRAMUSCULAR; INTRAVENOUS
Status: COMPLETED | OUTPATIENT
Start: 2021-01-25 | End: 2021-01-25

## 2021-01-25 RX ORDER — LORAZEPAM 2 MG/ML
1 INJECTION INTRAMUSCULAR
Status: DISCONTINUED | OUTPATIENT
Start: 2021-01-25 | End: 2021-01-26

## 2021-01-25 RX ORDER — ONDANSETRON 2 MG/ML
8 INJECTION INTRAMUSCULAR; INTRAVENOUS EVERY 8 HOURS PRN
Status: DISCONTINUED | OUTPATIENT
Start: 2021-01-25 | End: 2021-01-25

## 2021-01-25 RX ORDER — TALC
6 POWDER (GRAM) TOPICAL NIGHTLY PRN
Status: DISCONTINUED | OUTPATIENT
Start: 2021-01-25 | End: 2021-01-26 | Stop reason: SDUPTHER

## 2021-01-25 RX ORDER — MAGNESIUM SULFATE HEPTAHYDRATE 40 MG/ML
2 INJECTION, SOLUTION INTRAVENOUS ONCE
Status: COMPLETED | OUTPATIENT
Start: 2021-01-25 | End: 2021-01-25

## 2021-01-25 RX ORDER — ACETAMINOPHEN 325 MG/1
650 TABLET ORAL EVERY 4 HOURS PRN
Status: DISCONTINUED | OUTPATIENT
Start: 2021-01-25 | End: 2021-01-28 | Stop reason: HOSPADM

## 2021-01-25 RX ORDER — HYDROMORPHONE HYDROCHLORIDE 1 MG/ML
1 INJECTION, SOLUTION INTRAMUSCULAR; INTRAVENOUS; SUBCUTANEOUS EVERY 4 HOURS PRN
Status: DISCONTINUED | OUTPATIENT
Start: 2021-01-25 | End: 2021-01-25

## 2021-01-25 RX ORDER — FOLIC ACID 1 MG/1
1 TABLET ORAL DAILY
Status: DISCONTINUED | OUTPATIENT
Start: 2021-01-26 | End: 2021-01-26

## 2021-01-25 RX ORDER — FAMOTIDINE 10 MG/ML
20 INJECTION INTRAVENOUS
Status: COMPLETED | OUTPATIENT
Start: 2021-01-25 | End: 2021-01-25

## 2021-01-25 RX ORDER — SENNOSIDES 8.6 MG/1
8.6 TABLET ORAL 2 TIMES DAILY
Status: DISCONTINUED | OUTPATIENT
Start: 2021-01-25 | End: 2021-01-28 | Stop reason: HOSPADM

## 2021-01-25 RX ORDER — MORPHINE SULFATE 2 MG/ML
4 INJECTION, SOLUTION INTRAMUSCULAR; INTRAVENOUS EVERY 4 HOURS PRN
Status: DISCONTINUED | OUTPATIENT
Start: 2021-01-25 | End: 2021-01-28 | Stop reason: HOSPADM

## 2021-01-25 RX ADMIN — FAMOTIDINE 20 MG: 10 INJECTION INTRAVENOUS at 07:01

## 2021-01-25 RX ADMIN — DEXTROSE, SODIUM CHLORIDE, SODIUM LACTATE, POTASSIUM CHLORIDE, AND CALCIUM CHLORIDE: 5; .6; .31; .03; .02 INJECTION, SOLUTION INTRAVENOUS at 10:01

## 2021-01-25 RX ADMIN — DEXTROSE MONOHYDRATE 12.5 G: 25 INJECTION, SOLUTION INTRAVENOUS at 08:01

## 2021-01-25 RX ADMIN — ONDANSETRON 4 MG: 2 INJECTION INTRAMUSCULAR; INTRAVENOUS at 05:01

## 2021-01-25 RX ADMIN — SODIUM CHLORIDE, SODIUM LACTATE, POTASSIUM CHLORIDE, AND CALCIUM CHLORIDE 1000 ML: .6; .31; .03; .02 INJECTION, SOLUTION INTRAVENOUS at 07:01

## 2021-01-25 RX ADMIN — DIAZEPAM 5 MG: 5 TABLET ORAL at 09:01

## 2021-01-25 RX ADMIN — SODIUM CHLORIDE, SODIUM LACTATE, POTASSIUM CHLORIDE, AND CALCIUM CHLORIDE 1000 ML: .6; .31; .03; .02 INJECTION, SOLUTION INTRAVENOUS at 05:01

## 2021-01-25 RX ADMIN — SENNOSIDES 8.6 MG: 8.6 TABLET, FILM COATED ORAL at 09:01

## 2021-01-25 RX ADMIN — MAGNESIUM SULFATE 2 G: 2 INJECTION INTRAVENOUS at 09:01

## 2021-01-26 PROBLEM — E16.2 HYPOGLYCEMIA: Status: RESOLVED | Noted: 2021-01-25 | Resolved: 2021-01-26

## 2021-01-26 PROBLEM — E53.8 FOLATE DEFICIENCY: Status: ACTIVE | Noted: 2021-01-26

## 2021-01-26 LAB
ALBUMIN SERPL BCP-MCNC: 3.5 G/DL (ref 3.5–5.2)
ALP SERPL-CCNC: 81 U/L (ref 55–135)
ALT SERPL W/O P-5'-P-CCNC: 74 U/L (ref 10–44)
ANION GAP SERPL CALC-SCNC: 17 MMOL/L (ref 8–16)
AST SERPL-CCNC: 131 U/L (ref 10–40)
B-OH-BUTYR BLD STRIP-SCNC: 3.7 MMOL/L (ref 0–0.5)
BASOPHILS # BLD AUTO: 0.04 K/UL (ref 0–0.2)
BASOPHILS NFR BLD: 0.5 % (ref 0–1.9)
BILIRUB SERPL-MCNC: 1.8 MG/DL (ref 0.1–1)
BUN SERPL-MCNC: 7 MG/DL (ref 6–20)
CALCIUM SERPL-MCNC: 8.6 MG/DL (ref 8.7–10.5)
CHLORIDE SERPL-SCNC: 96 MMOL/L (ref 95–110)
CHOLEST SERPL-MCNC: 218 MG/DL (ref 120–199)
CHOLEST/HDLC SERPL: 3.6 {RATIO} (ref 2–5)
CO2 SERPL-SCNC: 20 MMOL/L (ref 23–29)
CREAT SERPL-MCNC: 0.7 MG/DL (ref 0.5–1.4)
DIFFERENTIAL METHOD: ABNORMAL
EOSINOPHIL # BLD AUTO: 0.1 K/UL (ref 0–0.5)
EOSINOPHIL NFR BLD: 1.1 % (ref 0–8)
ERYTHROCYTE [DISTWIDTH] IN BLOOD BY AUTOMATED COUNT: 12.3 % (ref 11.5–14.5)
EST. GFR  (AFRICAN AMERICAN): >60 ML/MIN/1.73 M^2
EST. GFR  (NON AFRICAN AMERICAN): >60 ML/MIN/1.73 M^2
FERRITIN SERPL-MCNC: 519 NG/ML (ref 20–300)
FOLATE SERPL-MCNC: 3.7 NG/ML (ref 4–24)
GLUCOSE SERPL-MCNC: 102 MG/DL (ref 70–110)
HCT VFR BLD AUTO: 36.6 % (ref 37–48.5)
HCV AB SERPL QL IA: NEGATIVE
HDLC SERPL-MCNC: 60 MG/DL (ref 40–75)
HDLC SERPL: 27.5 % (ref 20–50)
HGB BLD-MCNC: 12.6 G/DL (ref 12–16)
HIV 1+2 AB+HIV1 P24 AG SERPL QL IA: NEGATIVE
IMM GRANULOCYTES # BLD AUTO: 0.04 K/UL (ref 0–0.04)
IMM GRANULOCYTES NFR BLD AUTO: 0.5 % (ref 0–0.5)
IRON SERPL-MCNC: 141 UG/DL (ref 30–160)
LACTATE SERPL-SCNC: 0.8 MMOL/L (ref 0.5–2.2)
LDLC SERPL CALC-MCNC: 140.8 MG/DL (ref 63–159)
LYMPHOCYTES # BLD AUTO: 0.8 K/UL (ref 1–4.8)
LYMPHOCYTES NFR BLD: 10.9 % (ref 18–48)
MAGNESIUM SERPL-MCNC: 1.8 MG/DL (ref 1.6–2.6)
MCH RBC QN AUTO: 35.6 PG (ref 27–31)
MCHC RBC AUTO-ENTMCNC: 34.4 G/DL (ref 32–36)
MCV RBC AUTO: 103 FL (ref 82–98)
MONOCYTES # BLD AUTO: 0.4 K/UL (ref 0.3–1)
MONOCYTES NFR BLD: 5.5 % (ref 4–15)
NEUTROPHILS # BLD AUTO: 6.1 K/UL (ref 1.8–7.7)
NEUTROPHILS NFR BLD: 81.5 % (ref 38–73)
NONHDLC SERPL-MCNC: 158 MG/DL
NRBC BLD-RTO: 0 /100 WBC
PHOSPHATE SERPL-MCNC: 2 MG/DL (ref 2.7–4.5)
PLATELET # BLD AUTO: 113 K/UL (ref 150–350)
PMV BLD AUTO: 11.1 FL (ref 9.2–12.9)
POCT GLUCOSE: 97 MG/DL (ref 70–110)
POTASSIUM SERPL-SCNC: 3.4 MMOL/L (ref 3.5–5.1)
PROT SERPL-MCNC: 6.5 G/DL (ref 6–8.4)
RBC # BLD AUTO: 3.54 M/UL (ref 4–5.4)
SATURATED IRON: 54 % (ref 20–50)
SODIUM SERPL-SCNC: 133 MMOL/L (ref 136–145)
TOTAL IRON BINDING CAPACITY: 262 UG/DL (ref 250–450)
TRANSFERRIN SERPL-MCNC: 177 MG/DL (ref 200–375)
TRIGL SERPL-MCNC: 86 MG/DL (ref 30–150)
VIT B12 SERPL-MCNC: 716 PG/ML (ref 210–950)
WBC # BLD AUTO: 7.49 K/UL (ref 3.9–12.7)

## 2021-01-26 PROCEDURE — 85025 COMPLETE CBC W/AUTO DIFF WBC: CPT

## 2021-01-26 PROCEDURE — 11000001 HC ACUTE MED/SURG PRIVATE ROOM

## 2021-01-26 PROCEDURE — 25000003 PHARM REV CODE 250: Performed by: HOSPITALIST

## 2021-01-26 PROCEDURE — 93010 EKG 12-LEAD: ICD-10-PCS | Mod: ,,, | Performed by: INTERNAL MEDICINE

## 2021-01-26 PROCEDURE — 82010 KETONE BODYS QUAN: CPT

## 2021-01-26 PROCEDURE — 63600175 PHARM REV CODE 636 W HCPCS: Performed by: HOSPITALIST

## 2021-01-26 PROCEDURE — 84100 ASSAY OF PHOSPHORUS: CPT

## 2021-01-26 PROCEDURE — 99233 PR SUBSEQUENT HOSPITAL CARE,LEVL III: ICD-10-PCS | Mod: ,,, | Performed by: HOSPITALIST

## 2021-01-26 PROCEDURE — 80321 ALCOHOLS BIOMARKERS 1OR 2: CPT

## 2021-01-26 PROCEDURE — 83540 ASSAY OF IRON: CPT

## 2021-01-26 PROCEDURE — 82746 ASSAY OF FOLIC ACID SERUM: CPT

## 2021-01-26 PROCEDURE — 83735 ASSAY OF MAGNESIUM: CPT

## 2021-01-26 PROCEDURE — 83605 ASSAY OF LACTIC ACID: CPT

## 2021-01-26 PROCEDURE — 80061 LIPID PANEL: CPT

## 2021-01-26 PROCEDURE — 93005 ELECTROCARDIOGRAM TRACING: CPT

## 2021-01-26 PROCEDURE — 25000003 PHARM REV CODE 250: Performed by: EMERGENCY MEDICINE

## 2021-01-26 PROCEDURE — 82728 ASSAY OF FERRITIN: CPT

## 2021-01-26 PROCEDURE — 82607 VITAMIN B-12: CPT

## 2021-01-26 PROCEDURE — 99233 SBSQ HOSP IP/OBS HIGH 50: CPT | Mod: ,,, | Performed by: HOSPITALIST

## 2021-01-26 PROCEDURE — 93010 ELECTROCARDIOGRAM REPORT: CPT | Mod: ,,, | Performed by: INTERNAL MEDICINE

## 2021-01-26 PROCEDURE — 36415 COLL VENOUS BLD VENIPUNCTURE: CPT

## 2021-01-26 PROCEDURE — 80053 COMPREHEN METABOLIC PANEL: CPT

## 2021-01-26 RX ORDER — TALC
6 POWDER (GRAM) TOPICAL NIGHTLY PRN
Status: DISCONTINUED | OUTPATIENT
Start: 2021-01-26 | End: 2021-01-28 | Stop reason: HOSPADM

## 2021-01-26 RX ORDER — AMOXICILLIN 250 MG
1 CAPSULE ORAL 2 TIMES DAILY
Status: DISCONTINUED | OUTPATIENT
Start: 2021-01-26 | End: 2021-01-28 | Stop reason: HOSPADM

## 2021-01-26 RX ORDER — POLYETHYLENE GLYCOL 3350 17 G/17G
17 POWDER, FOR SOLUTION ORAL DAILY
Status: DISCONTINUED | OUTPATIENT
Start: 2021-01-26 | End: 2021-01-28 | Stop reason: HOSPADM

## 2021-01-26 RX ORDER — LORAZEPAM 1 MG/1
1 TABLET ORAL EVERY 4 HOURS PRN
Status: DISCONTINUED | OUTPATIENT
Start: 2021-01-26 | End: 2021-01-28 | Stop reason: HOSPADM

## 2021-01-26 RX ORDER — SODIUM CHLORIDE 0.9 % (FLUSH) 0.9 %
10 SYRINGE (ML) INJECTION
Status: DISCONTINUED | OUTPATIENT
Start: 2021-01-26 | End: 2021-01-28 | Stop reason: HOSPADM

## 2021-01-26 RX ORDER — SODIUM,POTASSIUM PHOSPHATES 280-250MG
2 POWDER IN PACKET (EA) ORAL
Status: COMPLETED | OUTPATIENT
Start: 2021-01-26 | End: 2021-01-27

## 2021-01-26 RX ORDER — BISACODYL 5 MG
5 TABLET, DELAYED RELEASE (ENTERIC COATED) ORAL DAILY PRN
Status: ON HOLD | COMMUNITY
End: 2022-06-10 | Stop reason: HOSPADM

## 2021-01-26 RX ORDER — BISACODYL 5 MG
5 TABLET, DELAYED RELEASE (ENTERIC COATED) ORAL DAILY PRN
Status: DISCONTINUED | OUTPATIENT
Start: 2021-01-26 | End: 2021-01-28 | Stop reason: HOSPADM

## 2021-01-26 RX ORDER — MORPHINE SULFATE 2 MG/ML
2 INJECTION, SOLUTION INTRAMUSCULAR; INTRAVENOUS EVERY 4 HOURS PRN
Status: DISCONTINUED | OUTPATIENT
Start: 2021-01-26 | End: 2021-01-28 | Stop reason: HOSPADM

## 2021-01-26 RX ORDER — ENOXAPARIN SODIUM 100 MG/ML
40 INJECTION SUBCUTANEOUS EVERY 24 HOURS
Status: DISCONTINUED | OUTPATIENT
Start: 2021-01-26 | End: 2021-01-28 | Stop reason: HOSPADM

## 2021-01-26 RX ORDER — POTASSIUM CHLORIDE 20 MEQ/1
40 TABLET, EXTENDED RELEASE ORAL 3 TIMES DAILY
Status: COMPLETED | OUTPATIENT
Start: 2021-01-26 | End: 2021-01-26

## 2021-01-26 RX ORDER — SODIUM CHLORIDE, SODIUM LACTATE, POTASSIUM CHLORIDE, CALCIUM CHLORIDE 600; 310; 30; 20 MG/100ML; MG/100ML; MG/100ML; MG/100ML
INJECTION, SOLUTION INTRAVENOUS CONTINUOUS
Status: DISCONTINUED | OUTPATIENT
Start: 2021-01-26 | End: 2021-01-27

## 2021-01-26 RX ORDER — FOLIC ACID 1 MG/1
1 TABLET ORAL
Status: DISCONTINUED | OUTPATIENT
Start: 2021-01-26 | End: 2021-01-28 | Stop reason: HOSPADM

## 2021-01-26 RX ADMIN — SENNOSIDES 8.6 MG: 8.6 TABLET, FILM COATED ORAL at 08:01

## 2021-01-26 RX ADMIN — POLYETHYLENE GLYCOL 3350 17 G: 17 POWDER, FOR SOLUTION ORAL at 10:01

## 2021-01-26 RX ADMIN — LORAZEPAM 1 MG: 2 INJECTION INTRAMUSCULAR; INTRAVENOUS at 04:01

## 2021-01-26 RX ADMIN — SODIUM CHLORIDE, SODIUM LACTATE, POTASSIUM CHLORIDE, AND CALCIUM CHLORIDE: .6; .31; .03; .02 INJECTION, SOLUTION INTRAVENOUS at 02:01

## 2021-01-26 RX ADMIN — FOLIC ACID 1 MG: 1 TABLET ORAL at 04:01

## 2021-01-26 RX ADMIN — MELATONIN TAB 3 MG 6 MG: 3 TAB at 08:01

## 2021-01-26 RX ADMIN — ACETAMINOPHEN 650 MG: 325 TABLET ORAL at 06:01

## 2021-01-26 RX ADMIN — SODIUM CHLORIDE, SODIUM LACTATE, POTASSIUM CHLORIDE, AND CALCIUM CHLORIDE: .6; .31; .03; .02 INJECTION, SOLUTION INTRAVENOUS at 06:01

## 2021-01-26 RX ADMIN — THIAMINE HCL TAB 100 MG 100 MG: 100 TAB at 08:01

## 2021-01-26 RX ADMIN — FOLIC ACID 1 MG: 1 TABLET ORAL at 08:01

## 2021-01-26 RX ADMIN — POTASSIUM CHLORIDE 40 MEQ: 1500 TABLET, EXTENDED RELEASE ORAL at 10:01

## 2021-01-26 RX ADMIN — POTASSIUM & SODIUM PHOSPHATES POWDER PACK 280-160-250 MG 2 PACKET: 280-160-250 PACK at 12:01

## 2021-01-26 RX ADMIN — THERA TABS 1 TABLET: TAB at 08:01

## 2021-01-26 RX ADMIN — POTASSIUM & SODIUM PHOSPHATES POWDER PACK 280-160-250 MG 2 PACKET: 280-160-250 PACK at 04:01

## 2021-01-26 RX ADMIN — ENOXAPARIN SODIUM 40 MG: 40 INJECTION SUBCUTANEOUS at 11:01

## 2021-01-26 RX ADMIN — SODIUM CHLORIDE, SODIUM LACTATE, POTASSIUM CHLORIDE, AND CALCIUM CHLORIDE: .6; .31; .03; .02 INJECTION, SOLUTION INTRAVENOUS at 11:01

## 2021-01-26 RX ADMIN — LORAZEPAM 1 MG: 1 TABLET ORAL at 09:01

## 2021-01-26 RX ADMIN — POTASSIUM CHLORIDE 40 MEQ: 1500 TABLET, EXTENDED RELEASE ORAL at 04:01

## 2021-01-26 RX ADMIN — POTASSIUM & SODIUM PHOSPHATES POWDER PACK 280-160-250 MG 2 PACKET: 280-160-250 PACK at 09:01

## 2021-01-26 RX ADMIN — DOCUSATE SODIUM 50MG AND SENNOSIDES 8.6MG 1 TABLET: 8.6; 5 TABLET, FILM COATED ORAL at 09:01

## 2021-01-27 PROBLEM — E87.6 HYPOKALEMIA: Status: RESOLVED | Noted: 2021-01-27 | Resolved: 2021-01-27

## 2021-01-27 PROBLEM — E83.42 HYPOMAGNESEMIA: Status: RESOLVED | Noted: 2021-01-27 | Resolved: 2021-01-27

## 2021-01-27 PROBLEM — R94.31 PROLONGED Q-T INTERVAL ON ECG: Status: RESOLVED | Noted: 2021-01-25 | Resolved: 2021-01-27

## 2021-01-27 PROBLEM — E83.39 HYPOPHOSPHATEMIA: Status: ACTIVE | Noted: 2021-01-27

## 2021-01-27 PROBLEM — E87.1 HYPONATREMIA: Status: RESOLVED | Noted: 2021-01-25 | Resolved: 2021-01-27

## 2021-01-27 PROBLEM — F10.10 ALCOHOL ABUSE: Status: ACTIVE | Noted: 2021-01-27

## 2021-01-27 PROBLEM — E87.6 HYPOKALEMIA: Status: ACTIVE | Noted: 2021-01-27

## 2021-01-27 PROBLEM — E83.42 HYPOMAGNESEMIA: Status: ACTIVE | Noted: 2021-01-27

## 2021-01-27 LAB
ALBUMIN SERPL BCP-MCNC: 3.3 G/DL (ref 3.5–5.2)
ALBUMIN SERPL BCP-MCNC: 3.7 G/DL (ref 3.5–5.2)
ALP SERPL-CCNC: 81 U/L (ref 55–135)
ALT SERPL W/O P-5'-P-CCNC: 69 U/L (ref 10–44)
ANION GAP SERPL CALC-SCNC: 14 MMOL/L (ref 8–16)
ANION GAP SERPL CALC-SCNC: 14 MMOL/L (ref 8–16)
AST SERPL-CCNC: 137 U/L (ref 10–40)
B-OH-BUTYR BLD STRIP-SCNC: 4.3 MMOL/L (ref 0–0.5)
BASOPHILS # BLD AUTO: 0.03 K/UL (ref 0–0.2)
BASOPHILS NFR BLD: 0.4 % (ref 0–1.9)
BILIRUB SERPL-MCNC: 1.7 MG/DL (ref 0.1–1)
BUN SERPL-MCNC: 2 MG/DL (ref 6–20)
BUN SERPL-MCNC: 3 MG/DL (ref 6–20)
CALCIUM SERPL-MCNC: 8.5 MG/DL (ref 8.7–10.5)
CALCIUM SERPL-MCNC: 8.8 MG/DL (ref 8.7–10.5)
CHLORIDE SERPL-SCNC: 94 MMOL/L (ref 95–110)
CHLORIDE SERPL-SCNC: 98 MMOL/L (ref 95–110)
CO2 SERPL-SCNC: 23 MMOL/L (ref 23–29)
CO2 SERPL-SCNC: 28 MMOL/L (ref 23–29)
CREAT SERPL-MCNC: 0.5 MG/DL (ref 0.5–1.4)
CREAT SERPL-MCNC: 0.5 MG/DL (ref 0.5–1.4)
DIFFERENTIAL METHOD: ABNORMAL
EOSINOPHIL # BLD AUTO: 0.2 K/UL (ref 0–0.5)
EOSINOPHIL NFR BLD: 2.2 % (ref 0–8)
ERYTHROCYTE [DISTWIDTH] IN BLOOD BY AUTOMATED COUNT: 12.1 % (ref 11.5–14.5)
EST. GFR  (AFRICAN AMERICAN): >60 ML/MIN/1.73 M^2
EST. GFR  (AFRICAN AMERICAN): >60 ML/MIN/1.73 M^2
EST. GFR  (NON AFRICAN AMERICAN): >60 ML/MIN/1.73 M^2
EST. GFR  (NON AFRICAN AMERICAN): >60 ML/MIN/1.73 M^2
GLUCOSE SERPL-MCNC: 69 MG/DL (ref 70–110)
GLUCOSE SERPL-MCNC: 88 MG/DL (ref 70–110)
HCT VFR BLD AUTO: 34.6 % (ref 37–48.5)
HGB BLD-MCNC: 11.5 G/DL (ref 12–16)
IMM GRANULOCYTES # BLD AUTO: 0.03 K/UL (ref 0–0.04)
IMM GRANULOCYTES NFR BLD AUTO: 0.4 % (ref 0–0.5)
LYMPHOCYTES # BLD AUTO: 1.3 K/UL (ref 1–4.8)
LYMPHOCYTES NFR BLD: 16.1 % (ref 18–48)
MAGNESIUM SERPL-MCNC: 1.2 MG/DL (ref 1.6–2.6)
MAGNESIUM SERPL-MCNC: 2.5 MG/DL (ref 1.6–2.6)
MCH RBC QN AUTO: 34.7 PG (ref 27–31)
MCHC RBC AUTO-ENTMCNC: 33.2 G/DL (ref 32–36)
MCV RBC AUTO: 105 FL (ref 82–98)
MONOCYTES # BLD AUTO: 0.4 K/UL (ref 0.3–1)
MONOCYTES NFR BLD: 4.7 % (ref 4–15)
NEUTROPHILS # BLD AUTO: 5.9 K/UL (ref 1.8–7.7)
NEUTROPHILS NFR BLD: 76.2 % (ref 38–73)
NRBC BLD-RTO: 0 /100 WBC
PHOSPHATE SERPL-MCNC: 1.9 MG/DL (ref 2.7–4.5)
PHOSPHATE SERPL-MCNC: 4.4 MG/DL (ref 2.7–4.5)
PLATELET # BLD AUTO: 91 K/UL (ref 150–350)
PMV BLD AUTO: 11.8 FL (ref 9.2–12.9)
POCT GLUCOSE: 73 MG/DL (ref 70–110)
POCT GLUCOSE: 74 MG/DL (ref 70–110)
POCT GLUCOSE: 98 MG/DL (ref 70–110)
POTASSIUM SERPL-SCNC: 3.5 MMOL/L (ref 3.5–5.1)
POTASSIUM SERPL-SCNC: 3.7 MMOL/L (ref 3.5–5.1)
PROT SERPL-MCNC: 5.9 G/DL (ref 6–8.4)
RBC # BLD AUTO: 3.31 M/UL (ref 4–5.4)
SODIUM SERPL-SCNC: 135 MMOL/L (ref 136–145)
SODIUM SERPL-SCNC: 136 MMOL/L (ref 136–145)
TRIGL SERPL-MCNC: 90 MG/DL (ref 30–150)
WBC # BLD AUTO: 7.74 K/UL (ref 3.9–12.7)

## 2021-01-27 PROCEDURE — 99233 PR SUBSEQUENT HOSPITAL CARE,LEVL III: ICD-10-PCS | Mod: ,,, | Performed by: HOSPITALIST

## 2021-01-27 PROCEDURE — 85025 COMPLETE CBC W/AUTO DIFF WBC: CPT

## 2021-01-27 PROCEDURE — 25000003 PHARM REV CODE 250: Performed by: HOSPITALIST

## 2021-01-27 PROCEDURE — 82010 KETONE BODYS QUAN: CPT

## 2021-01-27 PROCEDURE — 63600175 PHARM REV CODE 636 W HCPCS: Performed by: HOSPITALIST

## 2021-01-27 PROCEDURE — 84100 ASSAY OF PHOSPHORUS: CPT

## 2021-01-27 PROCEDURE — 84478 ASSAY OF TRIGLYCERIDES: CPT

## 2021-01-27 PROCEDURE — 36415 COLL VENOUS BLD VENIPUNCTURE: CPT

## 2021-01-27 PROCEDURE — 83735 ASSAY OF MAGNESIUM: CPT

## 2021-01-27 PROCEDURE — 80053 COMPREHEN METABOLIC PANEL: CPT

## 2021-01-27 PROCEDURE — 83735 ASSAY OF MAGNESIUM: CPT | Mod: 91

## 2021-01-27 PROCEDURE — 80069 RENAL FUNCTION PANEL: CPT

## 2021-01-27 PROCEDURE — 11000001 HC ACUTE MED/SURG PRIVATE ROOM

## 2021-01-27 PROCEDURE — 25000003 PHARM REV CODE 250: Performed by: EMERGENCY MEDICINE

## 2021-01-27 PROCEDURE — 99233 SBSQ HOSP IP/OBS HIGH 50: CPT | Mod: ,,, | Performed by: HOSPITALIST

## 2021-01-27 RX ORDER — MAGNESIUM SULFATE HEPTAHYDRATE 40 MG/ML
2 INJECTION, SOLUTION INTRAVENOUS
Status: ACTIVE | OUTPATIENT
Start: 2021-01-27 | End: 2021-01-27

## 2021-01-27 RX ORDER — IBUPROFEN 200 MG
24 TABLET ORAL
Status: DISCONTINUED | OUTPATIENT
Start: 2021-01-27 | End: 2021-01-28 | Stop reason: HOSPADM

## 2021-01-27 RX ORDER — IBUPROFEN 200 MG
1 TABLET ORAL DAILY
Status: DISCONTINUED | OUTPATIENT
Start: 2021-01-27 | End: 2021-01-28 | Stop reason: HOSPADM

## 2021-01-27 RX ORDER — GLUCAGON 1 MG
1 KIT INJECTION
Status: DISCONTINUED | OUTPATIENT
Start: 2021-01-27 | End: 2021-01-28 | Stop reason: HOSPADM

## 2021-01-27 RX ORDER — MAGNESIUM SULFATE HEPTAHYDRATE 40 MG/ML
2 INJECTION, SOLUTION INTRAVENOUS ONCE
Status: COMPLETED | OUTPATIENT
Start: 2021-01-27 | End: 2021-01-27

## 2021-01-27 RX ORDER — POTASSIUM CHLORIDE 750 MG/1
30 CAPSULE, EXTENDED RELEASE ORAL 3 TIMES DAILY
Status: COMPLETED | OUTPATIENT
Start: 2021-01-27 | End: 2021-01-27

## 2021-01-27 RX ORDER — DEXTROSE MONOHYDRATE AND SODIUM CHLORIDE 5; .9 G/100ML; G/100ML
INJECTION, SOLUTION INTRAVENOUS CONTINUOUS
Status: ACTIVE | OUTPATIENT
Start: 2021-01-27 | End: 2021-01-28

## 2021-01-27 RX ORDER — ONDANSETRON 2 MG/ML
4 INJECTION INTRAMUSCULAR; INTRAVENOUS EVERY 6 HOURS PRN
Status: DISCONTINUED | OUTPATIENT
Start: 2021-01-27 | End: 2021-01-28 | Stop reason: HOSPADM

## 2021-01-27 RX ORDER — IBUPROFEN 200 MG
16 TABLET ORAL
Status: DISCONTINUED | OUTPATIENT
Start: 2021-01-27 | End: 2021-01-28 | Stop reason: HOSPADM

## 2021-01-27 RX ORDER — PROCHLORPERAZINE EDISYLATE 5 MG/ML
5 INJECTION INTRAMUSCULAR; INTRAVENOUS EVERY 6 HOURS PRN
Status: DISCONTINUED | OUTPATIENT
Start: 2021-01-27 | End: 2021-01-28 | Stop reason: HOSPADM

## 2021-01-27 RX ADMIN — POTASSIUM & SODIUM PHOSPHATES POWDER PACK 280-160-250 MG 2 PACKET: 280-160-250 PACK at 09:01

## 2021-01-27 RX ADMIN — MAGNESIUM SULFATE 2 G: 2 INJECTION INTRAVENOUS at 09:01

## 2021-01-27 RX ADMIN — SENNOSIDES 8.6 MG: 8.6 TABLET, FILM COATED ORAL at 08:01

## 2021-01-27 RX ADMIN — POTASSIUM CHLORIDE 30 MEQ: 10 CAPSULE, COATED, EXTENDED RELEASE ORAL at 09:01

## 2021-01-27 RX ADMIN — THERA TABS 1 TABLET: TAB at 09:01

## 2021-01-27 RX ADMIN — SODIUM CHLORIDE, SODIUM LACTATE, POTASSIUM CHLORIDE, AND CALCIUM CHLORIDE: .6; .31; .03; .02 INJECTION, SOLUTION INTRAVENOUS at 05:01

## 2021-01-27 RX ADMIN — ENOXAPARIN SODIUM 40 MG: 40 INJECTION SUBCUTANEOUS at 04:01

## 2021-01-27 RX ADMIN — POTASSIUM PHOSPHATE, MONOBASIC AND POTASSIUM PHOSPHATE, DIBASIC 30 MMOL: 224; 236 INJECTION, SOLUTION, CONCENTRATE INTRAVENOUS at 01:01

## 2021-01-27 RX ADMIN — FOLIC ACID 1 MG: 1 TABLET ORAL at 06:01

## 2021-01-27 RX ADMIN — MELATONIN TAB 3 MG 6 MG: 3 TAB at 08:01

## 2021-01-27 RX ADMIN — DOCUSATE SODIUM 50MG AND SENNOSIDES 8.6MG 1 TABLET: 8.6; 5 TABLET, FILM COATED ORAL at 08:01

## 2021-01-27 RX ADMIN — THIAMINE HCL TAB 100 MG 100 MG: 100 TAB at 09:01

## 2021-01-27 RX ADMIN — MAGNESIUM SULFATE 2 G: 2 INJECTION INTRAVENOUS at 02:01

## 2021-01-27 RX ADMIN — POTASSIUM CHLORIDE 30 MEQ: 10 CAPSULE, COATED, EXTENDED RELEASE ORAL at 02:01

## 2021-01-27 RX ADMIN — LORAZEPAM 1 MG: 1 TABLET ORAL at 03:01

## 2021-01-27 RX ADMIN — FOLIC ACID 1 MG: 1 TABLET ORAL at 04:01

## 2021-01-27 RX ADMIN — LORAZEPAM 1 MG: 1 TABLET ORAL at 08:01

## 2021-01-27 RX ADMIN — DEXTROSE MONOHYDRATE AND SODIUM CHLORIDE: 5; .9 INJECTION, SOLUTION INTRAVENOUS at 10:01

## 2021-01-27 RX ADMIN — SODIUM CHLORIDE, SODIUM LACTATE, POTASSIUM CHLORIDE, AND CALCIUM CHLORIDE: .6; .31; .03; .02 INJECTION, SOLUTION INTRAVENOUS at 08:01

## 2021-01-28 VITALS
DIASTOLIC BLOOD PRESSURE: 75 MMHG | WEIGHT: 156.06 LBS | SYSTOLIC BLOOD PRESSURE: 120 MMHG | HEART RATE: 106 BPM | BODY MASS INDEX: 27.65 KG/M2 | HEIGHT: 63 IN | TEMPERATURE: 98 F | RESPIRATION RATE: 20 BRPM | OXYGEN SATURATION: 98 %

## 2021-01-28 PROBLEM — E83.39 HYPOPHOSPHATEMIA: Status: RESOLVED | Noted: 2021-01-27 | Resolved: 2021-01-28

## 2021-01-28 PROBLEM — D69.6 THROMBOCYTOPENIA: Status: ACTIVE | Noted: 2021-01-28

## 2021-01-28 LAB
ALBUMIN SERPL BCP-MCNC: 3.5 G/DL (ref 3.5–5.2)
ALP SERPL-CCNC: 86 U/L (ref 55–135)
ALT SERPL W/O P-5'-P-CCNC: 68 U/L (ref 10–44)
ANION GAP SERPL CALC-SCNC: 18 MMOL/L (ref 8–16)
AST SERPL-CCNC: 112 U/L (ref 10–40)
BASOPHILS # BLD AUTO: 0.03 K/UL (ref 0–0.2)
BASOPHILS NFR BLD: 0.4 % (ref 0–1.9)
BILIRUB SERPL-MCNC: 1.4 MG/DL (ref 0.1–1)
BUN SERPL-MCNC: <2 MG/DL (ref 6–20)
CALCIUM SERPL-MCNC: 8.7 MG/DL (ref 8.7–10.5)
CHLORIDE SERPL-SCNC: 95 MMOL/L (ref 95–110)
CO2 SERPL-SCNC: 25 MMOL/L (ref 23–29)
CREAT SERPL-MCNC: 0.5 MG/DL (ref 0.5–1.4)
DIFFERENTIAL METHOD: ABNORMAL
EOSINOPHIL # BLD AUTO: 0.2 K/UL (ref 0–0.5)
EOSINOPHIL NFR BLD: 2.1 % (ref 0–8)
ERYTHROCYTE [DISTWIDTH] IN BLOOD BY AUTOMATED COUNT: 12 % (ref 11.5–14.5)
EST. GFR  (AFRICAN AMERICAN): >60 ML/MIN/1.73 M^2
EST. GFR  (NON AFRICAN AMERICAN): >60 ML/MIN/1.73 M^2
GLUCOSE SERPL-MCNC: 107 MG/DL (ref 70–110)
HCT VFR BLD AUTO: 36.6 % (ref 37–48.5)
HGB BLD-MCNC: 12 G/DL (ref 12–16)
IMM GRANULOCYTES # BLD AUTO: 0.04 K/UL (ref 0–0.04)
IMM GRANULOCYTES NFR BLD AUTO: 0.5 % (ref 0–0.5)
LYMPHOCYTES # BLD AUTO: 1.3 K/UL (ref 1–4.8)
LYMPHOCYTES NFR BLD: 16 % (ref 18–48)
MAGNESIUM SERPL-MCNC: 1.8 MG/DL (ref 1.6–2.6)
MCH RBC QN AUTO: 34.6 PG (ref 27–31)
MCHC RBC AUTO-ENTMCNC: 32.8 G/DL (ref 32–36)
MCV RBC AUTO: 106 FL (ref 82–98)
MONOCYTES # BLD AUTO: 0.5 K/UL (ref 0.3–1)
MONOCYTES NFR BLD: 5.9 % (ref 4–15)
NEUTROPHILS # BLD AUTO: 6.2 K/UL (ref 1.8–7.7)
NEUTROPHILS NFR BLD: 75.1 % (ref 38–73)
NRBC BLD-RTO: 0 /100 WBC
PHOSPHATE SERPL-MCNC: 4.6 MG/DL (ref 2.7–4.5)
PLATELET # BLD AUTO: 103 K/UL (ref 150–350)
PMV BLD AUTO: 12.3 FL (ref 9.2–12.9)
POCT GLUCOSE: 112 MG/DL (ref 70–110)
POCT GLUCOSE: 143 MG/DL (ref 70–110)
POTASSIUM SERPL-SCNC: 3.5 MMOL/L (ref 3.5–5.1)
PROT SERPL-MCNC: 6.4 G/DL (ref 6–8.4)
RBC # BLD AUTO: 3.47 M/UL (ref 4–5.4)
SODIUM SERPL-SCNC: 138 MMOL/L (ref 136–145)
WBC # BLD AUTO: 8.24 K/UL (ref 3.9–12.7)

## 2021-01-28 PROCEDURE — 84100 ASSAY OF PHOSPHORUS: CPT

## 2021-01-28 PROCEDURE — 36415 COLL VENOUS BLD VENIPUNCTURE: CPT

## 2021-01-28 PROCEDURE — 85025 COMPLETE CBC W/AUTO DIFF WBC: CPT

## 2021-01-28 PROCEDURE — 80053 COMPREHEN METABOLIC PANEL: CPT

## 2021-01-28 PROCEDURE — 83735 ASSAY OF MAGNESIUM: CPT

## 2021-01-28 PROCEDURE — 99239 PR HOSPITAL DISCHARGE DAY,>30 MIN: ICD-10-PCS | Mod: ,,, | Performed by: HOSPITALIST

## 2021-01-28 PROCEDURE — 25000003 PHARM REV CODE 250: Performed by: HOSPITALIST

## 2021-01-28 PROCEDURE — 99239 HOSP IP/OBS DSCHRG MGMT >30: CPT | Mod: ,,, | Performed by: HOSPITALIST

## 2021-01-28 RX ORDER — FOLIC ACID 1 MG/1
2 TABLET ORAL DAILY
Qty: 60 TABLET | Refills: 2 | Status: SHIPPED | OUTPATIENT
Start: 2021-01-28 | End: 2021-06-02 | Stop reason: SDUPTHER

## 2021-01-28 RX ORDER — LANOLIN ALCOHOL/MO/W.PET/CERES
100 CREAM (GRAM) TOPICAL DAILY
Qty: 30 TABLET | Refills: 2 | Status: SHIPPED | OUTPATIENT
Start: 2021-01-28 | End: 2021-06-02 | Stop reason: SDUPTHER

## 2021-01-28 RX ADMIN — POLYETHYLENE GLYCOL 3350 17 G: 17 POWDER, FOR SOLUTION ORAL at 09:01

## 2021-01-28 RX ADMIN — FOLIC ACID 1 MG: 1 TABLET ORAL at 05:01

## 2021-01-28 RX ADMIN — THIAMINE HCL TAB 100 MG 100 MG: 100 TAB at 09:01

## 2021-01-28 RX ADMIN — DOCUSATE SODIUM 50MG AND SENNOSIDES 8.6MG 1 TABLET: 8.6; 5 TABLET, FILM COATED ORAL at 09:01

## 2021-01-28 RX ADMIN — SENNOSIDES 8.6 MG: 8.6 TABLET, FILM COATED ORAL at 09:01

## 2021-01-28 RX ADMIN — THERA TABS 1 TABLET: TAB at 09:01

## 2021-01-29 LAB — PHOSPHATIDYLETHANOL (PETH): 1588 NG/ML

## 2021-04-10 ENCOUNTER — HOSPITAL ENCOUNTER (EMERGENCY)
Facility: HOSPITAL | Age: 51
Discharge: HOME | End: 2021-04-11
Attending: EMERGENCY MEDICINE
Payer: COMMERCIAL

## 2021-04-10 VITALS
HEIGHT: 63 IN | WEIGHT: 136 LBS | BODY MASS INDEX: 24.1 KG/M2 | SYSTOLIC BLOOD PRESSURE: 112 MMHG | RESPIRATION RATE: 16 BRPM | TEMPERATURE: 98 F | DIASTOLIC BLOOD PRESSURE: 69 MMHG | OXYGEN SATURATION: 98 % | HEART RATE: 103 BPM

## 2021-04-10 DIAGNOSIS — K80.20 CALCULUS OF GALLBLADDER WITHOUT CHOLECYSTITIS WITHOUT OBSTRUCTION: ICD-10-CM

## 2021-04-10 DIAGNOSIS — R00.0 TACHYCARDIA: ICD-10-CM

## 2021-04-10 DIAGNOSIS — R79.89 ELEVATED LFTS: ICD-10-CM

## 2021-04-10 DIAGNOSIS — K85.90 ACUTE PANCREATITIS WITHOUT INFECTION OR NECROSIS, UNSPECIFIED PANCREATITIS TYPE: Primary | ICD-10-CM

## 2021-04-10 LAB
ALBUMIN SERPL BCP-MCNC: 3.7 G/DL (ref 3.5–5.2)
ALP SERPL-CCNC: 148 U/L (ref 55–135)
ALT SERPL W/O P-5'-P-CCNC: 157 U/L (ref 10–44)
ANION GAP SERPL CALC-SCNC: 27 MMOL/L (ref 8–16)
AST SERPL-CCNC: 559 U/L (ref 10–40)
B-HCG UR QL: NEGATIVE
BACTERIA #/AREA URNS AUTO: ABNORMAL /HPF
BASOPHILS # BLD AUTO: 0.07 K/UL (ref 0–0.2)
BASOPHILS NFR BLD: 1.4 % (ref 0–1.9)
BILIRUB SERPL-MCNC: 2.5 MG/DL (ref 0.1–1)
BILIRUB UR QL STRIP: ABNORMAL
BUN SERPL-MCNC: 4 MG/DL (ref 6–20)
CALCIUM SERPL-MCNC: 9.1 MG/DL (ref 8.7–10.5)
CHLORIDE SERPL-SCNC: 91 MMOL/L (ref 95–110)
CLARITY UR REFRACT.AUTO: ABNORMAL
CO2 SERPL-SCNC: 19 MMOL/L (ref 23–29)
COLOR UR AUTO: ABNORMAL
CREAT SERPL-MCNC: 0.7 MG/DL (ref 0.5–1.4)
CTP QC/QA: YES
CTP QC/QA: YES
DIFFERENTIAL METHOD: ABNORMAL
EOSINOPHIL # BLD AUTO: 0 K/UL (ref 0–0.5)
EOSINOPHIL NFR BLD: 0.6 % (ref 0–8)
ERYTHROCYTE [DISTWIDTH] IN BLOOD BY AUTOMATED COUNT: 14.5 % (ref 11.5–14.5)
EST. GFR  (AFRICAN AMERICAN): >60 ML/MIN/1.73 M^2
EST. GFR  (NON AFRICAN AMERICAN): >60 ML/MIN/1.73 M^2
GLUCOSE SERPL-MCNC: 103 MG/DL (ref 70–110)
GLUCOSE UR QL STRIP: NEGATIVE
HCT VFR BLD AUTO: 36.2 % (ref 37–48.5)
HGB BLD-MCNC: 12.1 G/DL (ref 12–16)
HGB UR QL STRIP: ABNORMAL
HYALINE CASTS UR QL AUTO: 0 /LPF
IMM GRANULOCYTES # BLD AUTO: 0.03 K/UL (ref 0–0.04)
IMM GRANULOCYTES NFR BLD AUTO: 0.6 % (ref 0–0.5)
KETONES UR QL STRIP: ABNORMAL
LEUKOCYTE ESTERASE UR QL STRIP: NEGATIVE
LIPASE SERPL-CCNC: 209 U/L (ref 4–60)
LIPASE SERPL-CCNC: 227 U/L (ref 4–60)
LYMPHOCYTES # BLD AUTO: 0.9 K/UL (ref 1–4.8)
LYMPHOCYTES NFR BLD: 17.3 % (ref 18–48)
MAGNESIUM SERPL-MCNC: 1.2 MG/DL (ref 1.6–2.6)
MCH RBC QN AUTO: 31.3 PG (ref 27–31)
MCHC RBC AUTO-ENTMCNC: 33.4 G/DL (ref 32–36)
MCV RBC AUTO: 94 FL (ref 82–98)
MICROSCOPIC COMMENT: ABNORMAL
MONOCYTES # BLD AUTO: 0.4 K/UL (ref 0.3–1)
MONOCYTES NFR BLD: 7.8 % (ref 4–15)
NEUTROPHILS # BLD AUTO: 3.7 K/UL (ref 1.8–7.7)
NEUTROPHILS NFR BLD: 72.3 % (ref 38–73)
NITRITE UR QL STRIP: NEGATIVE
NRBC BLD-RTO: 0 /100 WBC
PH UR STRIP: 6 [PH] (ref 5–8)
PLATELET # BLD AUTO: 161 K/UL (ref 150–450)
PMV BLD AUTO: 10.2 FL (ref 9.2–12.9)
POTASSIUM SERPL-SCNC: 3.2 MMOL/L (ref 3.5–5.1)
PROT SERPL-MCNC: 7.3 G/DL (ref 6–8.4)
PROT UR QL STRIP: ABNORMAL
RBC # BLD AUTO: 3.86 M/UL (ref 4–5.4)
RBC #/AREA URNS AUTO: 42 /HPF (ref 0–4)
SARS-COV-2 RDRP RESP QL NAA+PROBE: NEGATIVE
SODIUM SERPL-SCNC: 137 MMOL/L (ref 136–145)
SP GR UR STRIP: 1.02 (ref 1–1.03)
SQUAMOUS #/AREA URNS AUTO: 13 /HPF
T4 FREE SERPL-MCNC: 0.75 NG/DL (ref 0.71–1.51)
TROPONIN I SERPL DL<=0.01 NG/ML-MCNC: <0.006 NG/ML (ref 0–0.03)
TSH SERPL DL<=0.005 MIU/L-ACNC: 11.57 UIU/ML (ref 0.4–4)
URN SPEC COLLECT METH UR: ABNORMAL
WBC # BLD AUTO: 5.13 K/UL (ref 3.9–12.7)
WBC #/AREA URNS AUTO: 2 /HPF (ref 0–5)

## 2021-04-10 PROCEDURE — 93010 ELECTROCARDIOGRAM REPORT: CPT | Mod: ,,, | Performed by: INTERNAL MEDICINE

## 2021-04-10 PROCEDURE — 81025 URINE PREGNANCY TEST: CPT | Performed by: EMERGENCY MEDICINE

## 2021-04-10 PROCEDURE — 93010 EKG 12-LEAD: ICD-10-PCS | Mod: ,,, | Performed by: INTERNAL MEDICINE

## 2021-04-10 PROCEDURE — 99285 EMERGENCY DEPT VISIT HI MDM: CPT | Mod: CS,,, | Performed by: EMERGENCY MEDICINE

## 2021-04-10 PROCEDURE — 99285 PR EMERGENCY DEPT VISIT,LEVEL V: ICD-10-PCS | Mod: CS,,, | Performed by: EMERGENCY MEDICINE

## 2021-04-10 PROCEDURE — 93005 ELECTROCARDIOGRAM TRACING: CPT

## 2021-04-10 PROCEDURE — 85025 COMPLETE CBC W/AUTO DIFF WBC: CPT | Performed by: EMERGENCY MEDICINE

## 2021-04-10 PROCEDURE — 81001 URINALYSIS AUTO W/SCOPE: CPT | Performed by: EMERGENCY MEDICINE

## 2021-04-10 PROCEDURE — 96374 THER/PROPH/DIAG INJ IV PUSH: CPT

## 2021-04-10 PROCEDURE — 83690 ASSAY OF LIPASE: CPT | Mod: 91 | Performed by: EMERGENCY MEDICINE

## 2021-04-10 PROCEDURE — U0002 COVID-19 LAB TEST NON-CDC: HCPCS | Performed by: EMERGENCY MEDICINE

## 2021-04-10 PROCEDURE — 84443 ASSAY THYROID STIM HORMONE: CPT | Performed by: EMERGENCY MEDICINE

## 2021-04-10 PROCEDURE — 80053 COMPREHEN METABOLIC PANEL: CPT | Performed by: EMERGENCY MEDICINE

## 2021-04-10 PROCEDURE — 25000003 PHARM REV CODE 250: Performed by: EMERGENCY MEDICINE

## 2021-04-10 PROCEDURE — 84439 ASSAY OF FREE THYROXINE: CPT | Performed by: EMERGENCY MEDICINE

## 2021-04-10 PROCEDURE — 84484 ASSAY OF TROPONIN QUANT: CPT | Performed by: EMERGENCY MEDICINE

## 2021-04-10 PROCEDURE — 25500020 PHARM REV CODE 255: Performed by: EMERGENCY MEDICINE

## 2021-04-10 PROCEDURE — 99284 EMERGENCY DEPT VISIT MOD MDM: CPT | Mod: 25

## 2021-04-10 PROCEDURE — 63600175 PHARM REV CODE 636 W HCPCS: Performed by: EMERGENCY MEDICINE

## 2021-04-10 PROCEDURE — 96361 HYDRATE IV INFUSION ADD-ON: CPT

## 2021-04-10 PROCEDURE — 83735 ASSAY OF MAGNESIUM: CPT | Performed by: EMERGENCY MEDICINE

## 2021-04-10 RX ORDER — ONDANSETRON 4 MG/1
4 TABLET, ORALLY DISINTEGRATING ORAL EVERY 6 HOURS PRN
Qty: 20 TABLET | Refills: 0 | Status: SHIPPED | OUTPATIENT
Start: 2021-04-10 | End: 2021-07-29 | Stop reason: ALTCHOICE

## 2021-04-10 RX ORDER — ONDANSETRON 2 MG/ML
4 INJECTION INTRAMUSCULAR; INTRAVENOUS
Status: COMPLETED | OUTPATIENT
Start: 2021-04-10 | End: 2021-04-10

## 2021-04-10 RX ADMIN — IOHEXOL 75 ML: 350 INJECTION, SOLUTION INTRAVENOUS at 10:04

## 2021-04-10 RX ADMIN — ONDANSETRON 4 MG: 2 INJECTION INTRAMUSCULAR; INTRAVENOUS at 06:04

## 2021-04-10 RX ADMIN — SODIUM CHLORIDE 1000 ML: 0.9 INJECTION, SOLUTION INTRAVENOUS at 06:04

## 2021-04-16 ENCOUNTER — PATIENT MESSAGE (OUTPATIENT)
Dept: RESEARCH | Facility: HOSPITAL | Age: 51
End: 2021-04-16

## 2021-04-20 ENCOUNTER — TELEPHONE (OUTPATIENT)
Dept: INTERNAL MEDICINE | Facility: CLINIC | Age: 51
End: 2021-04-20

## 2021-04-21 ENCOUNTER — TELEPHONE (OUTPATIENT)
Dept: INTERNAL MEDICINE | Facility: CLINIC | Age: 51
End: 2021-04-21

## 2021-04-22 ENCOUNTER — OFFICE VISIT (OUTPATIENT)
Dept: INTERNAL MEDICINE | Facility: CLINIC | Age: 51
End: 2021-04-22
Payer: COMMERCIAL

## 2021-04-22 ENCOUNTER — LAB VISIT (OUTPATIENT)
Dept: LAB | Facility: HOSPITAL | Age: 51
End: 2021-04-22
Attending: INTERNAL MEDICINE
Payer: COMMERCIAL

## 2021-04-22 VITALS
TEMPERATURE: 97 F | SYSTOLIC BLOOD PRESSURE: 116 MMHG | BODY MASS INDEX: 25.2 KG/M2 | HEART RATE: 106 BPM | OXYGEN SATURATION: 96 % | DIASTOLIC BLOOD PRESSURE: 80 MMHG | WEIGHT: 142.19 LBS | HEIGHT: 63 IN

## 2021-04-22 DIAGNOSIS — K85.90 ACUTE PANCREATITIS, UNSPECIFIED COMPLICATION STATUS, UNSPECIFIED PANCREATITIS TYPE: ICD-10-CM

## 2021-04-22 DIAGNOSIS — F41.9 ANXIETY: ICD-10-CM

## 2021-04-22 DIAGNOSIS — F10.10 ETOH ABUSE: ICD-10-CM

## 2021-04-22 DIAGNOSIS — R74.8 ELEVATED LIVER ENZYMES: ICD-10-CM

## 2021-04-22 DIAGNOSIS — R79.89 ELEVATED TSH: ICD-10-CM

## 2021-04-22 DIAGNOSIS — Z12.4 CERVICAL CANCER SCREENING: ICD-10-CM

## 2021-04-22 DIAGNOSIS — N94.89 ADNEXAL MASS: ICD-10-CM

## 2021-04-22 DIAGNOSIS — K85.90 ACUTE PANCREATITIS, UNSPECIFIED COMPLICATION STATUS, UNSPECIFIED PANCREATITIS TYPE: Primary | ICD-10-CM

## 2021-04-22 LAB
ALBUMIN SERPL BCP-MCNC: 3.2 G/DL (ref 3.5–5.2)
ALP SERPL-CCNC: 159 U/L (ref 55–135)
ALT SERPL W/O P-5'-P-CCNC: 126 U/L (ref 10–44)
AMYLASE SERPL-CCNC: 22 U/L (ref 20–110)
ANION GAP SERPL CALC-SCNC: 14 MMOL/L (ref 8–16)
AST SERPL-CCNC: 363 U/L (ref 10–40)
BILIRUB SERPL-MCNC: 1.3 MG/DL (ref 0.1–1)
BUN SERPL-MCNC: 3 MG/DL (ref 6–20)
CALCIUM SERPL-MCNC: 8.2 MG/DL (ref 8.7–10.5)
CHLORIDE SERPL-SCNC: 100 MMOL/L (ref 95–110)
CO2 SERPL-SCNC: 29 MMOL/L (ref 23–29)
CREAT SERPL-MCNC: 0.6 MG/DL (ref 0.5–1.4)
EST. GFR  (AFRICAN AMERICAN): >60 ML/MIN/1.73 M^2
EST. GFR  (NON AFRICAN AMERICAN): >60 ML/MIN/1.73 M^2
GLUCOSE SERPL-MCNC: 120 MG/DL (ref 70–110)
LIPASE SERPL-CCNC: 54 U/L (ref 4–60)
POTASSIUM SERPL-SCNC: 3.3 MMOL/L (ref 3.5–5.1)
PROT SERPL-MCNC: 6.9 G/DL (ref 6–8.4)
SODIUM SERPL-SCNC: 143 MMOL/L (ref 136–145)
T4 FREE SERPL-MCNC: 0.82 NG/DL (ref 0.71–1.51)
TSH SERPL DL<=0.005 MIU/L-ACNC: 5.95 UIU/ML (ref 0.4–4)

## 2021-04-22 PROCEDURE — 84443 ASSAY THYROID STIM HORMONE: CPT | Performed by: INTERNAL MEDICINE

## 2021-04-22 PROCEDURE — 99204 OFFICE O/P NEW MOD 45 MIN: CPT | Mod: S$GLB,,, | Performed by: INTERNAL MEDICINE

## 2021-04-22 PROCEDURE — 99999 PR PBB SHADOW E&M-EST. PATIENT-LVL III: ICD-10-PCS | Mod: PBBFAC,,, | Performed by: INTERNAL MEDICINE

## 2021-04-22 PROCEDURE — 99204 PR OFFICE/OUTPT VISIT, NEW, LEVL IV, 45-59 MIN: ICD-10-PCS | Mod: S$GLB,,, | Performed by: INTERNAL MEDICINE

## 2021-04-22 PROCEDURE — 80053 COMPREHEN METABOLIC PANEL: CPT | Performed by: INTERNAL MEDICINE

## 2021-04-22 PROCEDURE — 82150 ASSAY OF AMYLASE: CPT | Performed by: INTERNAL MEDICINE

## 2021-04-22 PROCEDURE — 84439 ASSAY OF FREE THYROXINE: CPT | Performed by: INTERNAL MEDICINE

## 2021-04-22 PROCEDURE — 99999 PR PBB SHADOW E&M-EST. PATIENT-LVL III: CPT | Mod: PBBFAC,,, | Performed by: INTERNAL MEDICINE

## 2021-04-22 PROCEDURE — 83690 ASSAY OF LIPASE: CPT | Performed by: INTERNAL MEDICINE

## 2021-04-22 PROCEDURE — 36415 COLL VENOUS BLD VENIPUNCTURE: CPT | Mod: PO | Performed by: INTERNAL MEDICINE

## 2021-04-26 ENCOUNTER — TELEPHONE (OUTPATIENT)
Dept: INTERNAL MEDICINE | Facility: CLINIC | Age: 51
End: 2021-04-26

## 2021-04-26 DIAGNOSIS — R74.8 ELEVATED LIVER ENZYMES: Primary | ICD-10-CM

## 2021-04-26 RX ORDER — POTASSIUM CHLORIDE 20 MEQ/1
20 TABLET, EXTENDED RELEASE ORAL 2 TIMES DAILY
Qty: 6 TABLET | Refills: 0 | Status: SHIPPED | OUTPATIENT
Start: 2021-04-26 | End: 2021-04-29

## 2021-04-27 NOTE — TELEPHONE ENCOUNTER
Spoke to patient , lab results given. She is out of town and will  the [facility] Friday morning and scheduled the lab for Monday 5-3-21

## 2021-04-29 ENCOUNTER — OFFICE VISIT (OUTPATIENT)
Dept: PSYCHIATRY | Facility: CLINIC | Age: 51
End: 2021-04-29
Payer: COMMERCIAL

## 2021-04-29 VITALS
BODY MASS INDEX: 24.56 KG/M2 | HEART RATE: 95 BPM | SYSTOLIC BLOOD PRESSURE: 97 MMHG | WEIGHT: 138.69 LBS | DIASTOLIC BLOOD PRESSURE: 69 MMHG

## 2021-04-29 DIAGNOSIS — F43.21 GRIEF: Primary | ICD-10-CM

## 2021-04-29 PROCEDURE — 99999 PR PBB SHADOW E&M-EST. PATIENT-LVL I: ICD-10-PCS | Mod: PBBFAC,,, | Performed by: PSYCHIATRY & NEUROLOGY

## 2021-04-29 PROCEDURE — 90792 PSYCH DIAG EVAL W/MED SRVCS: CPT | Mod: S$GLB,,, | Performed by: PSYCHIATRY & NEUROLOGY

## 2021-04-29 PROCEDURE — 90792 PR PSYCHIATRIC DIAGNOSTIC EVALUATION W/MEDICAL SERVICES: ICD-10-PCS | Mod: S$GLB,,, | Performed by: PSYCHIATRY & NEUROLOGY

## 2021-04-29 PROCEDURE — 99999 PR PBB SHADOW E&M-EST. PATIENT-LVL I: CPT | Mod: PBBFAC,,, | Performed by: PSYCHIATRY & NEUROLOGY

## 2021-04-30 ENCOUNTER — PATIENT MESSAGE (OUTPATIENT)
Dept: PSYCHIATRY | Facility: CLINIC | Age: 51
End: 2021-04-30

## 2021-05-03 ENCOUNTER — LAB VISIT (OUTPATIENT)
Dept: LAB | Facility: HOSPITAL | Age: 51
End: 2021-05-03
Attending: INTERNAL MEDICINE
Payer: COMMERCIAL

## 2021-05-03 DIAGNOSIS — R74.8 ELEVATED LIVER ENZYMES: ICD-10-CM

## 2021-05-03 PROCEDURE — 83735 ASSAY OF MAGNESIUM: CPT | Performed by: INTERNAL MEDICINE

## 2021-05-03 PROCEDURE — 36415 COLL VENOUS BLD VENIPUNCTURE: CPT | Mod: PO | Performed by: INTERNAL MEDICINE

## 2021-05-03 PROCEDURE — 80053 COMPREHEN METABOLIC PANEL: CPT | Performed by: INTERNAL MEDICINE

## 2021-05-04 ENCOUNTER — TELEPHONE (OUTPATIENT)
Dept: TRANSPLANT | Facility: CLINIC | Age: 51
End: 2021-05-04

## 2021-05-04 ENCOUNTER — HOSPITAL ENCOUNTER (OUTPATIENT)
Dept: RADIOLOGY | Facility: OTHER | Age: 51
Discharge: HOME | End: 2021-05-04
Attending: INTERNAL MEDICINE
Payer: COMMERCIAL

## 2021-05-04 ENCOUNTER — TELEPHONE (OUTPATIENT)
Dept: INTERNAL MEDICINE | Facility: CLINIC | Age: 51
End: 2021-05-04

## 2021-05-04 DIAGNOSIS — R74.8 ELEVATED LIVER ENZYMES: Primary | ICD-10-CM

## 2021-05-04 DIAGNOSIS — N94.89 ADNEXAL MASS: ICD-10-CM

## 2021-05-04 DIAGNOSIS — E87.6 HYPOKALEMIA: ICD-10-CM

## 2021-05-04 LAB
ALBUMIN SERPL BCP-MCNC: 3.1 G/DL (ref 3.5–5.2)
ALP SERPL-CCNC: 180 U/L (ref 55–135)
ALT SERPL W/O P-5'-P-CCNC: 109 U/L (ref 10–44)
ANION GAP SERPL CALC-SCNC: 17 MMOL/L (ref 8–16)
AST SERPL-CCNC: 331 U/L (ref 10–40)
BILIRUB SERPL-MCNC: 3.2 MG/DL (ref 0.1–1)
BUN SERPL-MCNC: 4 MG/DL (ref 6–20)
CALCIUM SERPL-MCNC: 8.2 MG/DL (ref 8.7–10.5)
CHLORIDE SERPL-SCNC: 95 MMOL/L (ref 95–110)
CO2 SERPL-SCNC: 25 MMOL/L (ref 23–29)
CREAT SERPL-MCNC: 0.5 MG/DL (ref 0.5–1.4)
EST. GFR  (AFRICAN AMERICAN): >60 ML/MIN/1.73 M^2
EST. GFR  (NON AFRICAN AMERICAN): >60 ML/MIN/1.73 M^2
GLUCOSE SERPL-MCNC: 76 MG/DL (ref 70–110)
MAGNESIUM SERPL-MCNC: 1.4 MG/DL (ref 1.6–2.6)
POTASSIUM SERPL-SCNC: 3 MMOL/L (ref 3.5–5.1)
PROT SERPL-MCNC: 6.9 G/DL (ref 6–8.4)
SODIUM SERPL-SCNC: 137 MMOL/L (ref 136–145)

## 2021-05-04 PROCEDURE — 76856 US EXAM PELVIC COMPLETE: CPT | Mod: 26,,, | Performed by: RADIOLOGY

## 2021-05-04 PROCEDURE — 76856 US EXAM PELVIC COMPLETE: CPT | Mod: TC

## 2021-05-04 PROCEDURE — 76830 TRANSVAGINAL US NON-OB: CPT | Mod: 26,,, | Performed by: RADIOLOGY

## 2021-05-04 PROCEDURE — 76856 US PELVIS COMP WITH TRANSVAG NON-OB (XPD): ICD-10-PCS | Mod: 26,,, | Performed by: RADIOLOGY

## 2021-05-04 PROCEDURE — 76830 US PELVIS COMP WITH TRANSVAG NON-OB (XPD): ICD-10-PCS | Mod: 26,,, | Performed by: RADIOLOGY

## 2021-05-04 RX ORDER — LANOLIN ALCOHOL/MO/W.PET/CERES
400 CREAM (GRAM) TOPICAL 2 TIMES DAILY
Qty: 6 TABLET | Refills: 0 | Status: SHIPPED | OUTPATIENT
Start: 2021-05-04 | End: 2021-05-07

## 2021-05-04 RX ORDER — POTASSIUM CHLORIDE 20 MEQ/1
20 TABLET, EXTENDED RELEASE ORAL 2 TIMES DAILY
Qty: 6 TABLET | Refills: 11 | Status: SHIPPED | OUTPATIENT
Start: 2021-05-04 | End: 2021-05-07

## 2021-05-19 ENCOUNTER — PATIENT MESSAGE (OUTPATIENT)
Dept: PSYCHIATRY | Facility: CLINIC | Age: 51
End: 2021-05-19

## 2021-05-21 ENCOUNTER — TELEPHONE (OUTPATIENT)
Dept: INTERNAL MEDICINE | Facility: CLINIC | Age: 51
End: 2021-05-21

## 2021-05-21 DIAGNOSIS — Z00.00 ANNUAL PHYSICAL EXAM: Primary | ICD-10-CM

## 2021-05-21 DIAGNOSIS — E87.6 HYPOKALEMIA: ICD-10-CM

## 2021-05-21 DIAGNOSIS — R74.8 ELEVATED LIVER ENZYMES: ICD-10-CM

## 2021-05-21 DIAGNOSIS — R79.89 ELEVATED TSH: ICD-10-CM

## 2021-05-25 ENCOUNTER — OFFICE VISIT (OUTPATIENT)
Dept: PSYCHIATRY | Facility: CLINIC | Age: 51
End: 2021-05-25
Payer: COMMERCIAL

## 2021-05-25 VITALS
SYSTOLIC BLOOD PRESSURE: 116 MMHG | WEIGHT: 142.06 LBS | DIASTOLIC BLOOD PRESSURE: 76 MMHG | BODY MASS INDEX: 25.17 KG/M2 | HEIGHT: 63 IN | HEART RATE: 106 BPM

## 2021-05-25 DIAGNOSIS — F43.21 GRIEF: Primary | ICD-10-CM

## 2021-05-25 PROCEDURE — 99215 PR OFFICE/OUTPT VISIT, EST, LEVL V, 40-54 MIN: ICD-10-PCS | Mod: S$GLB,,, | Performed by: PSYCHIATRY & NEUROLOGY

## 2021-05-25 PROCEDURE — 99999 PR PBB SHADOW E&M-EST. PATIENT-LVL II: ICD-10-PCS | Mod: PBBFAC,,, | Performed by: PSYCHIATRY & NEUROLOGY

## 2021-05-25 PROCEDURE — 99999 PR PBB SHADOW E&M-EST. PATIENT-LVL II: CPT | Mod: PBBFAC,,, | Performed by: PSYCHIATRY & NEUROLOGY

## 2021-05-25 PROCEDURE — 99215 OFFICE O/P EST HI 40 MIN: CPT | Mod: S$GLB,,, | Performed by: PSYCHIATRY & NEUROLOGY

## 2021-06-02 ENCOUNTER — OFFICE VISIT (OUTPATIENT)
Dept: HEPATOLOGY | Facility: CLINIC | Age: 51
End: 2021-06-02
Payer: COMMERCIAL

## 2021-06-02 ENCOUNTER — LAB VISIT (OUTPATIENT)
Dept: LAB | Facility: HOSPITAL | Age: 51
End: 2021-06-02
Attending: INTERNAL MEDICINE
Payer: COMMERCIAL

## 2021-06-02 VITALS
DIASTOLIC BLOOD PRESSURE: 82 MMHG | HEIGHT: 63 IN | BODY MASS INDEX: 26.16 KG/M2 | HEART RATE: 118 BPM | TEMPERATURE: 98 F | WEIGHT: 147.63 LBS | OXYGEN SATURATION: 98 % | SYSTOLIC BLOOD PRESSURE: 126 MMHG | RESPIRATION RATE: 17 BRPM

## 2021-06-02 DIAGNOSIS — Z12.12 SCREENING FOR COLORECTAL CANCER: ICD-10-CM

## 2021-06-02 DIAGNOSIS — R74.01 ELEVATED TRANSAMINASE LEVEL: ICD-10-CM

## 2021-06-02 DIAGNOSIS — R74.8 ELEVATED LIVER ENZYMES: ICD-10-CM

## 2021-06-02 DIAGNOSIS — F10.10 ALCOHOL ABUSE: Primary | ICD-10-CM

## 2021-06-02 DIAGNOSIS — R14.0 ABDOMINAL DISTENSION: ICD-10-CM

## 2021-06-02 DIAGNOSIS — K74.60 HEPATIC CIRRHOSIS, UNSPECIFIED HEPATIC CIRRHOSIS TYPE, UNSPECIFIED WHETHER ASCITES PRESENT: ICD-10-CM

## 2021-06-02 DIAGNOSIS — Z12.11 SCREENING FOR COLORECTAL CANCER: ICD-10-CM

## 2021-06-02 LAB
A1AT SERPL-MCNC: 271 MG/DL (ref 100–190)
AFP SERPL-MCNC: 14 NG/ML (ref 0–8.4)
ALBUMIN SERPL BCP-MCNC: 3 G/DL (ref 3.5–5.2)
ALP SERPL-CCNC: 185 U/L (ref 55–135)
ALT SERPL W/O P-5'-P-CCNC: 37 U/L (ref 10–44)
ANION GAP SERPL CALC-SCNC: 18 MMOL/L (ref 8–16)
AST SERPL-CCNC: 200 U/L (ref 10–40)
BASOPHILS # BLD AUTO: 0.15 K/UL (ref 0–0.2)
BASOPHILS NFR BLD: 1.3 % (ref 0–1.9)
BILIRUB SERPL-MCNC: 2.5 MG/DL (ref 0.1–1)
BUN SERPL-MCNC: 6 MG/DL (ref 6–20)
CALCIUM SERPL-MCNC: 9.1 MG/DL (ref 8.7–10.5)
CHLORIDE SERPL-SCNC: 96 MMOL/L (ref 95–110)
CO2 SERPL-SCNC: 23 MMOL/L (ref 23–29)
CREAT SERPL-MCNC: 0.6 MG/DL (ref 0.5–1.4)
DIFFERENTIAL METHOD: ABNORMAL
EOSINOPHIL # BLD AUTO: 0 K/UL (ref 0–0.5)
EOSINOPHIL NFR BLD: 0.3 % (ref 0–8)
ERYTHROCYTE [DISTWIDTH] IN BLOOD BY AUTOMATED COUNT: 14 % (ref 11.5–14.5)
EST. GFR  (AFRICAN AMERICAN): >60 ML/MIN/1.73 M^2
EST. GFR  (NON AFRICAN AMERICAN): >60 ML/MIN/1.73 M^2
ETHANOL SERPL-MCNC: 132 MG/DL
FERRITIN SERPL-MCNC: 184 NG/ML (ref 20–300)
GGT SERPL-CCNC: 620 U/L (ref 8–55)
GLUCOSE SERPL-MCNC: 95 MG/DL (ref 70–110)
HCT VFR BLD AUTO: 42 % (ref 37–48.5)
HGB BLD-MCNC: 13.6 G/DL (ref 12–16)
IGA SERPL-MCNC: 587 MG/DL (ref 40–350)
IGG SERPL-MCNC: 1425 MG/DL (ref 650–1600)
IMM GRANULOCYTES # BLD AUTO: 0.04 K/UL (ref 0–0.04)
IMM GRANULOCYTES NFR BLD AUTO: 0.3 % (ref 0–0.5)
INR PPP: 1.2 (ref 0.8–1.2)
IRON SERPL-MCNC: 117 UG/DL (ref 30–160)
LYMPHOCYTES # BLD AUTO: 1.6 K/UL (ref 1–4.8)
LYMPHOCYTES NFR BLD: 13.3 % (ref 18–48)
MCH RBC QN AUTO: 36.3 PG (ref 27–31)
MCHC RBC AUTO-ENTMCNC: 32.4 G/DL (ref 32–36)
MCV RBC AUTO: 112 FL (ref 82–98)
MONOCYTES # BLD AUTO: 0.5 K/UL (ref 0.3–1)
MONOCYTES NFR BLD: 4.3 % (ref 4–15)
NEUTROPHILS # BLD AUTO: 9.3 K/UL (ref 1.8–7.7)
NEUTROPHILS NFR BLD: 80.5 % (ref 38–73)
NRBC BLD-RTO: 0 /100 WBC
PLATELET # BLD AUTO: 269 K/UL (ref 150–450)
PMV BLD AUTO: 10.8 FL (ref 9.2–12.9)
POTASSIUM SERPL-SCNC: 3.2 MMOL/L (ref 3.5–5.1)
PROT SERPL-MCNC: 7.5 G/DL (ref 6–8.4)
PROTHROMBIN TIME: 12.6 SEC (ref 9–12.5)
RBC # BLD AUTO: 3.75 M/UL (ref 4–5.4)
SATURATED IRON: 37 % (ref 20–50)
SODIUM SERPL-SCNC: 137 MMOL/L (ref 136–145)
TOTAL IRON BINDING CAPACITY: 317 UG/DL (ref 250–450)
TRANSFERRIN SERPL-MCNC: 214 MG/DL (ref 200–375)
WBC # BLD AUTO: 11.62 K/UL (ref 3.9–12.7)

## 2021-06-02 PROCEDURE — 86256 FLUORESCENT ANTIBODY TITER: CPT | Performed by: INTERNAL MEDICINE

## 2021-06-02 PROCEDURE — 83516 IMMUNOASSAY NONANTIBODY: CPT | Mod: 59 | Performed by: INTERNAL MEDICINE

## 2021-06-02 PROCEDURE — 86235 NUCLEAR ANTIGEN ANTIBODY: CPT | Mod: 59 | Performed by: INTERNAL MEDICINE

## 2021-06-02 PROCEDURE — 86704 HEP B CORE ANTIBODY TOTAL: CPT | Performed by: INTERNAL MEDICINE

## 2021-06-02 PROCEDURE — 80053 COMPREHEN METABOLIC PANEL: CPT | Performed by: INTERNAL MEDICINE

## 2021-06-02 PROCEDURE — 85025 COMPLETE CBC W/AUTO DIFF WBC: CPT | Performed by: INTERNAL MEDICINE

## 2021-06-02 PROCEDURE — 99204 PR OFFICE/OUTPT VISIT, NEW, LEVL IV, 45-59 MIN: ICD-10-PCS | Mod: S$GLB,,, | Performed by: INTERNAL MEDICINE

## 2021-06-02 PROCEDURE — 86039 ANTINUCLEAR ANTIBODIES (ANA): CPT | Performed by: INTERNAL MEDICINE

## 2021-06-02 PROCEDURE — 82105 ALPHA-FETOPROTEIN SERUM: CPT | Performed by: INTERNAL MEDICINE

## 2021-06-02 PROCEDURE — 99999 PR PBB SHADOW E&M-EST. PATIENT-LVL V: ICD-10-PCS | Mod: PBBFAC,,, | Performed by: INTERNAL MEDICINE

## 2021-06-02 PROCEDURE — 99204 OFFICE O/P NEW MOD 45 MIN: CPT | Mod: S$GLB,,, | Performed by: INTERNAL MEDICINE

## 2021-06-02 PROCEDURE — 86706 HEP B SURFACE ANTIBODY: CPT | Performed by: INTERNAL MEDICINE

## 2021-06-02 PROCEDURE — 85610 PROTHROMBIN TIME: CPT | Performed by: INTERNAL MEDICINE

## 2021-06-02 PROCEDURE — 80321 ALCOHOLS BIOMARKERS 1OR 2: CPT | Performed by: INTERNAL MEDICINE

## 2021-06-02 PROCEDURE — 86790 VIRUS ANTIBODY NOS: CPT | Performed by: INTERNAL MEDICINE

## 2021-06-02 PROCEDURE — 99999 PR PBB SHADOW E&M-EST. PATIENT-LVL V: CPT | Mod: PBBFAC,,, | Performed by: INTERNAL MEDICINE

## 2021-06-02 PROCEDURE — 83540 ASSAY OF IRON: CPT | Performed by: INTERNAL MEDICINE

## 2021-06-02 PROCEDURE — 82784 ASSAY IGA/IGD/IGG/IGM EACH: CPT | Mod: 59 | Performed by: INTERNAL MEDICINE

## 2021-06-02 PROCEDURE — 86703 HIV-1/HIV-2 1 RESULT ANTBDY: CPT | Performed by: INTERNAL MEDICINE

## 2021-06-02 PROCEDURE — 82977 ASSAY OF GGT: CPT | Performed by: INTERNAL MEDICINE

## 2021-06-02 PROCEDURE — 87340 HEPATITIS B SURFACE AG IA: CPT | Performed by: INTERNAL MEDICINE

## 2021-06-02 PROCEDURE — 82103 ALPHA-1-ANTITRYPSIN TOTAL: CPT | Mod: 91 | Performed by: INTERNAL MEDICINE

## 2021-06-02 PROCEDURE — 82728 ASSAY OF FERRITIN: CPT | Performed by: INTERNAL MEDICINE

## 2021-06-02 PROCEDURE — 36415 COLL VENOUS BLD VENIPUNCTURE: CPT | Mod: PN | Performed by: INTERNAL MEDICINE

## 2021-06-02 PROCEDURE — 82077 ASSAY SPEC XCP UR&BREATH IA: CPT | Performed by: INTERNAL MEDICINE

## 2021-06-02 PROCEDURE — 82784 ASSAY IGA/IGD/IGG/IGM EACH: CPT | Performed by: INTERNAL MEDICINE

## 2021-06-02 PROCEDURE — 86038 ANTINUCLEAR ANTIBODIES: CPT | Performed by: INTERNAL MEDICINE

## 2021-06-02 PROCEDURE — 82103 ALPHA-1-ANTITRYPSIN TOTAL: CPT | Performed by: INTERNAL MEDICINE

## 2021-06-02 RX ORDER — FOLIC ACID 1 MG/1
2 TABLET ORAL DAILY
Qty: 60 TABLET | Refills: 11 | Status: ON HOLD | OUTPATIENT
Start: 2021-06-02 | End: 2021-12-14

## 2021-06-02 RX ORDER — LANOLIN ALCOHOL/MO/W.PET/CERES
100 CREAM (GRAM) TOPICAL DAILY
Qty: 30 TABLET | Refills: 11 | Status: SHIPPED | OUTPATIENT
Start: 2021-06-02 | End: 2021-10-11

## 2021-06-03 DIAGNOSIS — R79.89 ELEVATED LIVER FUNCTION TESTS: Primary | ICD-10-CM

## 2021-06-03 LAB
ANA PATTERN 1: NORMAL
ANA PATTERN 2: NORMAL
ANA SER QL IF: POSITIVE
ANA TITER 2: NORMAL
ANA TITR SER IF: NORMAL {TITER}
HBV CORE AB SERPL QL IA: NEGATIVE
HBV SURFACE AB SER-ACNC: NEGATIVE M[IU]/ML
HBV SURFACE AG SERPL QL IA: NEGATIVE
HEPATITIS A ANTIBODY, IGG: NEGATIVE
HIV 1+2 AB+HIV1 P24 AG SERPL QL IA: NEGATIVE
SMOOTH MUSCLE AB TITR SER IF: NORMAL {TITER}

## 2021-06-04 ENCOUNTER — HOSPITAL ENCOUNTER (OUTPATIENT)
Dept: RADIOLOGY | Facility: OTHER | Age: 51
Discharge: HOME | End: 2021-06-04
Attending: INTERNAL MEDICINE
Payer: COMMERCIAL

## 2021-06-04 DIAGNOSIS — R18.8 OTHER ASCITES: Primary | ICD-10-CM

## 2021-06-04 DIAGNOSIS — R14.0 ABDOMINAL DISTENSION: ICD-10-CM

## 2021-06-04 LAB
ANTI SM ANTIBODY: 0.06 RATIO (ref 0–0.99)
ANTI SM/RNP ANTIBODY: 0.06 RATIO (ref 0–0.99)
ANTI-SM INTERPRETATION: NEGATIVE
ANTI-SM/RNP INTERPRETATION: NEGATIVE
ANTI-SSA ANTIBODY: 0.06 RATIO (ref 0–0.99)
ANTI-SSA INTERPRETATION: NEGATIVE
ANTI-SSB ANTIBODY: 0.07 RATIO (ref 0–0.99)
ANTI-SSB INTERPRETATION: NEGATIVE
DSDNA AB SER-ACNC: NORMAL [IU]/ML

## 2021-06-04 PROCEDURE — 76705 ECHO EXAM OF ABDOMEN: CPT | Mod: TC

## 2021-06-04 PROCEDURE — 76705 US ABDOMEN LIMITED: ICD-10-PCS | Mod: 26,,, | Performed by: RADIOLOGY

## 2021-06-04 PROCEDURE — 76705 ECHO EXAM OF ABDOMEN: CPT | Mod: 26,,, | Performed by: RADIOLOGY

## 2021-06-04 RX ORDER — SPIRONOLACTONE 50 MG/1
100 TABLET, FILM COATED ORAL DAILY
Qty: 60 TABLET | Refills: 11 | Status: SHIPPED | OUTPATIENT
Start: 2021-06-04 | End: 2021-06-14

## 2021-06-04 RX ORDER — FUROSEMIDE 40 MG/1
40 TABLET ORAL DAILY
Qty: 30 TABLET | Refills: 11 | Status: SHIPPED | OUTPATIENT
Start: 2021-06-04 | End: 2021-06-14

## 2021-06-06 ENCOUNTER — PATIENT MESSAGE (OUTPATIENT)
Dept: HEPATOLOGY | Facility: CLINIC | Age: 51
End: 2021-06-06

## 2021-06-07 LAB
A1AT PHENOTYP SERPL-IMP: ABNORMAL BANDS
A1AT SERPL NEPH-MCNC: 280 MG/DL (ref 100–190)
TTG IGA SER-ACNC: 13 UNITS

## 2021-06-08 ENCOUNTER — HOSPITAL ENCOUNTER (OUTPATIENT)
Facility: OTHER | Age: 51
Discharge: HOME | End: 2021-06-08
Attending: RADIOLOGY | Admitting: RADIOLOGY
Payer: COMMERCIAL

## 2021-06-08 VITALS
WEIGHT: 139 LBS | BODY MASS INDEX: 24.63 KG/M2 | SYSTOLIC BLOOD PRESSURE: 130 MMHG | DIASTOLIC BLOOD PRESSURE: 73 MMHG | TEMPERATURE: 99 F | HEART RATE: 110 BPM | HEIGHT: 63 IN | OXYGEN SATURATION: 98 % | RESPIRATION RATE: 16 BRPM

## 2021-06-08 DIAGNOSIS — R79.89 ELEVATED LIVER FUNCTION TESTS: ICD-10-CM

## 2021-06-08 DIAGNOSIS — R18.8 OTHER ASCITES: ICD-10-CM

## 2021-06-08 LAB
APPEARANCE FLD: NORMAL
BODY FLD TYPE: NORMAL
COLOR FLD: YELLOW
LYMPHOCYTES NFR FLD MANUAL: 11 %
MONOS+MACROS NFR FLD MANUAL: 85 %
NEUTROPHILS NFR FLD MANUAL: 4 %
WBC # FLD: 128 /CU MM

## 2021-06-08 PROCEDURE — C1894 INTRO/SHEATH, NON-LASER: HCPCS | Performed by: RADIOLOGY

## 2021-06-08 PROCEDURE — 25000003 PHARM REV CODE 250: Performed by: RADIOLOGY

## 2021-06-08 PROCEDURE — 89051 BODY FLUID CELL COUNT: CPT | Performed by: INTERNAL MEDICINE

## 2021-06-08 PROCEDURE — 84157 ASSAY OF PROTEIN OTHER: CPT | Performed by: INTERNAL MEDICINE

## 2021-06-08 PROCEDURE — 82042 OTHER SOURCE ALBUMIN QUAN EA: CPT | Performed by: INTERNAL MEDICINE

## 2021-06-08 RX ORDER — LIDOCAINE HYDROCHLORIDE 10 MG/ML
INJECTION INFILTRATION; PERINEURAL
Status: DISCONTINUED | OUTPATIENT
Start: 2021-06-08 | End: 2021-06-08 | Stop reason: HOSPADM

## 2021-06-09 LAB
ALBUMIN FLD-MCNC: 0.6 G/DL
PROT FLD-MCNC: 1.2 G/DL
SPECIMEN SOURCE: NORMAL
SPECIMEN SOURCE: NORMAL

## 2021-06-11 LAB — PHOSPHATIDYLETHANOL (PETH): >2000 NG/ML

## 2021-06-14 ENCOUNTER — OFFICE VISIT (OUTPATIENT)
Dept: HEPATOLOGY | Facility: CLINIC | Age: 51
End: 2021-06-14
Payer: COMMERCIAL

## 2021-06-14 ENCOUNTER — TELEPHONE (OUTPATIENT)
Dept: HEPATOLOGY | Facility: CLINIC | Age: 51
End: 2021-06-14

## 2021-06-14 ENCOUNTER — LAB VISIT (OUTPATIENT)
Dept: LAB | Facility: HOSPITAL | Age: 51
End: 2021-06-14
Attending: INTERNAL MEDICINE
Payer: COMMERCIAL

## 2021-06-14 VITALS
HEIGHT: 63 IN | OXYGEN SATURATION: 99 % | SYSTOLIC BLOOD PRESSURE: 112 MMHG | BODY MASS INDEX: 24.92 KG/M2 | WEIGHT: 140.63 LBS | RESPIRATION RATE: 18 BRPM | DIASTOLIC BLOOD PRESSURE: 61 MMHG | HEART RATE: 110 BPM | TEMPERATURE: 99 F

## 2021-06-14 DIAGNOSIS — R18.8 OTHER ASCITES: ICD-10-CM

## 2021-06-14 DIAGNOSIS — K76.6 PORTAL HYPERTENSION: ICD-10-CM

## 2021-06-14 DIAGNOSIS — F10.10 ALCOHOL ABUSE: ICD-10-CM

## 2021-06-14 DIAGNOSIS — R79.89 ELEVATED LIVER FUNCTION TESTS: Primary | ICD-10-CM

## 2021-06-14 DIAGNOSIS — R79.89 ELEVATED LIVER FUNCTION TESTS: ICD-10-CM

## 2021-06-14 PROBLEM — R74.01 ELEVATED TRANSAMINASE LEVEL: Status: RESOLVED | Noted: 2021-01-25 | Resolved: 2021-06-14

## 2021-06-14 LAB
ALBUMIN SERPL BCP-MCNC: 2.7 G/DL (ref 3.5–5.2)
ALP SERPL-CCNC: 152 U/L (ref 55–135)
ALT SERPL W/O P-5'-P-CCNC: 29 U/L (ref 10–44)
ANION GAP SERPL CALC-SCNC: 15 MMOL/L (ref 8–16)
AST SERPL-CCNC: 96 U/L (ref 10–40)
BASOPHILS # BLD AUTO: 0.11 K/UL (ref 0–0.2)
BASOPHILS NFR BLD: 0.8 % (ref 0–1.9)
BILIRUB SERPL-MCNC: 4 MG/DL (ref 0.1–1)
BUN SERPL-MCNC: 7 MG/DL (ref 6–20)
CALCIUM SERPL-MCNC: 8.6 MG/DL (ref 8.7–10.5)
CHLORIDE SERPL-SCNC: 88 MMOL/L (ref 95–110)
CO2 SERPL-SCNC: 32 MMOL/L (ref 23–29)
CREAT SERPL-MCNC: 0.8 MG/DL (ref 0.5–1.4)
DIFFERENTIAL METHOD: ABNORMAL
EOSINOPHIL # BLD AUTO: 0.1 K/UL (ref 0–0.5)
EOSINOPHIL NFR BLD: 0.7 % (ref 0–8)
ERYTHROCYTE [DISTWIDTH] IN BLOOD BY AUTOMATED COUNT: 13.5 % (ref 11.5–14.5)
EST. GFR  (AFRICAN AMERICAN): >60 ML/MIN/1.73 M^2
EST. GFR  (NON AFRICAN AMERICAN): >60 ML/MIN/1.73 M^2
GLUCOSE SERPL-MCNC: 108 MG/DL (ref 70–110)
HCT VFR BLD AUTO: 39.7 % (ref 37–48.5)
HGB BLD-MCNC: 13.5 G/DL (ref 12–16)
IMM GRANULOCYTES # BLD AUTO: 0.05 K/UL (ref 0–0.04)
IMM GRANULOCYTES NFR BLD AUTO: 0.4 % (ref 0–0.5)
INR PPP: 1.3 (ref 0.8–1.2)
LYMPHOCYTES # BLD AUTO: 1.8 K/UL (ref 1–4.8)
LYMPHOCYTES NFR BLD: 13.5 % (ref 18–48)
MCH RBC QN AUTO: 35.3 PG (ref 27–31)
MCHC RBC AUTO-ENTMCNC: 34 G/DL (ref 32–36)
MCV RBC AUTO: 104 FL (ref 82–98)
MONOCYTES # BLD AUTO: 1 K/UL (ref 0.3–1)
MONOCYTES NFR BLD: 7.3 % (ref 4–15)
NEUTROPHILS # BLD AUTO: 10 K/UL (ref 1.8–7.7)
NEUTROPHILS NFR BLD: 77.3 % (ref 38–73)
NRBC BLD-RTO: 0 /100 WBC
PLATELET # BLD AUTO: 310 K/UL (ref 150–450)
PMV BLD AUTO: 11.3 FL (ref 9.2–12.9)
POTASSIUM SERPL-SCNC: 2.7 MMOL/L (ref 3.5–5.1)
PROT SERPL-MCNC: 7 G/DL (ref 6–8.4)
PROTHROMBIN TIME: 14.2 SEC (ref 9–12.5)
RBC # BLD AUTO: 3.82 M/UL (ref 4–5.4)
SODIUM SERPL-SCNC: 135 MMOL/L (ref 136–145)
WBC # BLD AUTO: 12.97 K/UL (ref 3.9–12.7)

## 2021-06-14 PROCEDURE — 36415 COLL VENOUS BLD VENIPUNCTURE: CPT | Mod: PN | Performed by: INTERNAL MEDICINE

## 2021-06-14 PROCEDURE — 85610 PROTHROMBIN TIME: CPT | Performed by: INTERNAL MEDICINE

## 2021-06-14 PROCEDURE — 99999 PR PBB SHADOW E&M-EST. PATIENT-LVL IV: ICD-10-PCS | Mod: PBBFAC,,, | Performed by: INTERNAL MEDICINE

## 2021-06-14 PROCEDURE — 80321 ALCOHOLS BIOMARKERS 1OR 2: CPT | Performed by: INTERNAL MEDICINE

## 2021-06-14 PROCEDURE — 80053 COMPREHEN METABOLIC PANEL: CPT | Performed by: INTERNAL MEDICINE

## 2021-06-14 PROCEDURE — 85025 COMPLETE CBC W/AUTO DIFF WBC: CPT | Performed by: INTERNAL MEDICINE

## 2021-06-14 PROCEDURE — 99214 OFFICE O/P EST MOD 30 MIN: CPT | Mod: S$GLB,,, | Performed by: INTERNAL MEDICINE

## 2021-06-14 PROCEDURE — 99214 PR OFFICE/OUTPT VISIT, EST, LEVL IV, 30-39 MIN: ICD-10-PCS | Mod: S$GLB,,, | Performed by: INTERNAL MEDICINE

## 2021-06-14 PROCEDURE — 99999 PR PBB SHADOW E&M-EST. PATIENT-LVL IV: CPT | Mod: PBBFAC,,, | Performed by: INTERNAL MEDICINE

## 2021-06-14 RX ORDER — SPIRONOLACTONE 50 MG/1
150 TABLET, FILM COATED ORAL DAILY
Qty: 90 TABLET | Refills: 11 | Status: SHIPPED | OUTPATIENT
Start: 2021-06-14 | End: 2021-07-29 | Stop reason: ALTCHOICE

## 2021-06-14 RX ORDER — POTASSIUM CHLORIDE 20 MEQ/1
40 TABLET, EXTENDED RELEASE ORAL ONCE
Qty: 2 TABLET | Refills: 2 | Status: SHIPPED | OUTPATIENT
Start: 2021-06-14 | End: 2021-06-14

## 2021-06-14 RX ORDER — FUROSEMIDE 40 MG/1
20 TABLET ORAL DAILY
Qty: 30 TABLET | Refills: 11 | Status: SHIPPED | OUTPATIENT
Start: 2021-06-14 | End: 2021-07-29 | Stop reason: ALTCHOICE

## 2021-06-15 ENCOUNTER — LAB VISIT (OUTPATIENT)
Dept: LAB | Facility: HOSPITAL | Age: 51
End: 2021-06-15
Attending: INTERNAL MEDICINE
Payer: COMMERCIAL

## 2021-06-15 DIAGNOSIS — R18.8 OTHER ASCITES: ICD-10-CM

## 2021-06-15 PROCEDURE — 36415 COLL VENOUS BLD VENIPUNCTURE: CPT | Mod: PO | Performed by: INTERNAL MEDICINE

## 2021-06-15 PROCEDURE — 84132 ASSAY OF SERUM POTASSIUM: CPT | Performed by: INTERNAL MEDICINE

## 2021-06-16 ENCOUNTER — TELEPHONE (OUTPATIENT)
Dept: HEPATOLOGY | Facility: CLINIC | Age: 51
End: 2021-06-16

## 2021-06-16 DIAGNOSIS — E87.6 LOW BLOOD POTASSIUM: Primary | ICD-10-CM

## 2021-06-16 LAB — POTASSIUM SERPL-SCNC: 3 MMOL/L (ref 3.5–5.1)

## 2021-06-21 LAB — PHOSPHATIDYLETHANOL (PETH): 495 NG/ML

## 2021-06-22 ENCOUNTER — PATIENT MESSAGE (OUTPATIENT)
Dept: HEPATOLOGY | Facility: CLINIC | Age: 51
End: 2021-06-22

## 2021-06-22 DIAGNOSIS — R18.8 OTHER ASCITES: ICD-10-CM

## 2021-06-22 DIAGNOSIS — N39.0 URINARY TRACT INFECTION WITHOUT HEMATURIA, SITE UNSPECIFIED: Primary | ICD-10-CM

## 2021-06-24 ENCOUNTER — LAB VISIT (OUTPATIENT)
Dept: LAB | Facility: HOSPITAL | Age: 51
End: 2021-06-24
Attending: INTERNAL MEDICINE
Payer: COMMERCIAL

## 2021-06-24 DIAGNOSIS — N39.0 URINARY TRACT INFECTION WITHOUT HEMATURIA, SITE UNSPECIFIED: ICD-10-CM

## 2021-06-24 DIAGNOSIS — E87.6 LOW BLOOD POTASSIUM: ICD-10-CM

## 2021-06-24 LAB
BACTERIA #/AREA URNS AUTO: ABNORMAL /HPF
BILIRUB UR QL STRIP: ABNORMAL
CLARITY UR REFRACT.AUTO: ABNORMAL
COLOR UR AUTO: ABNORMAL
GLUCOSE UR QL STRIP: NEGATIVE
HGB UR QL STRIP: ABNORMAL
HYALINE CASTS UR QL AUTO: 36 /LPF
KETONES UR QL STRIP: ABNORMAL
LEUKOCYTE ESTERASE UR QL STRIP: NEGATIVE
MICROSCOPIC COMMENT: ABNORMAL
NITRITE UR QL STRIP: NEGATIVE
NON-SQ EPI CELLS #/AREA URNS AUTO: <1 /HPF
PH UR STRIP: 5 [PH] (ref 5–8)
POTASSIUM SERPL-SCNC: 3.4 MMOL/L (ref 3.5–5.1)
PROT UR QL STRIP: ABNORMAL
RBC #/AREA URNS AUTO: >100 /HPF (ref 0–4)
SP GR UR STRIP: 1.02 (ref 1–1.03)
SQUAMOUS #/AREA URNS AUTO: 19 /HPF
URN SPEC COLLECT METH UR: ABNORMAL
WBC #/AREA URNS AUTO: 11 /HPF (ref 0–5)

## 2021-06-24 PROCEDURE — 84132 ASSAY OF SERUM POTASSIUM: CPT | Performed by: INTERNAL MEDICINE

## 2021-06-24 PROCEDURE — 36415 COLL VENOUS BLD VENIPUNCTURE: CPT | Mod: PO | Performed by: INTERNAL MEDICINE

## 2021-06-24 PROCEDURE — 81001 URINALYSIS AUTO W/SCOPE: CPT | Performed by: INTERNAL MEDICINE

## 2021-06-25 ENCOUNTER — HOSPITAL ENCOUNTER (OUTPATIENT)
Facility: OTHER | Age: 51
Discharge: HOME | End: 2021-06-25
Attending: RADIOLOGY | Admitting: RADIOLOGY
Payer: COMMERCIAL

## 2021-06-25 VITALS
TEMPERATURE: 98 F | OXYGEN SATURATION: 98 % | SYSTOLIC BLOOD PRESSURE: 94 MMHG | HEART RATE: 101 BPM | DIASTOLIC BLOOD PRESSURE: 67 MMHG | RESPIRATION RATE: 18 BRPM

## 2021-06-25 DIAGNOSIS — R18.8 ASCITES: ICD-10-CM

## 2021-06-25 DIAGNOSIS — R18.8 OTHER ASCITES: ICD-10-CM

## 2021-06-25 PROCEDURE — C1894 INTRO/SHEATH, NON-LASER: HCPCS | Performed by: RADIOLOGY

## 2021-06-28 ENCOUNTER — PATIENT MESSAGE (OUTPATIENT)
Dept: HEPATOLOGY | Facility: CLINIC | Age: 51
End: 2021-06-28

## 2021-06-28 DIAGNOSIS — N39.0 URINARY TRACT INFECTION WITHOUT HEMATURIA, SITE UNSPECIFIED: Primary | ICD-10-CM

## 2021-06-28 RX ORDER — CIPROFLOXACIN 500 MG/1
500 TABLET ORAL EVERY 12 HOURS
Qty: 14 TABLET | Refills: 0 | Status: ON HOLD | OUTPATIENT
Start: 2021-06-28 | End: 2021-07-12 | Stop reason: ALTCHOICE

## 2021-06-30 ENCOUNTER — LAB VISIT (OUTPATIENT)
Dept: LAB | Facility: HOSPITAL | Age: 51
End: 2021-06-30
Attending: INTERNAL MEDICINE
Payer: COMMERCIAL

## 2021-06-30 DIAGNOSIS — F10.10 ALCOHOL ABUSE: ICD-10-CM

## 2021-06-30 LAB
ALBUMIN SERPL BCP-MCNC: 2.1 G/DL (ref 3.5–5.2)
ALP SERPL-CCNC: 159 U/L (ref 55–135)
ALT SERPL W/O P-5'-P-CCNC: 25 U/L (ref 10–44)
ANION GAP SERPL CALC-SCNC: 10 MMOL/L (ref 8–16)
AST SERPL-CCNC: 106 U/L (ref 10–40)
BASOPHILS # BLD AUTO: 0.14 K/UL (ref 0–0.2)
BASOPHILS NFR BLD: 0.6 % (ref 0–1.9)
BILIRUB SERPL-MCNC: 4.7 MG/DL (ref 0.1–1)
BUN SERPL-MCNC: 14 MG/DL (ref 6–20)
CALCIUM SERPL-MCNC: 8.6 MG/DL (ref 8.7–10.5)
CHLORIDE SERPL-SCNC: 96 MMOL/L (ref 95–110)
CO2 SERPL-SCNC: 28 MMOL/L (ref 23–29)
CREAT SERPL-MCNC: 1.2 MG/DL (ref 0.5–1.4)
DIFFERENTIAL METHOD: ABNORMAL
EOSINOPHIL # BLD AUTO: 0.6 K/UL (ref 0–0.5)
EOSINOPHIL NFR BLD: 2.7 % (ref 0–8)
ERYTHROCYTE [DISTWIDTH] IN BLOOD BY AUTOMATED COUNT: 14.7 % (ref 11.5–14.5)
EST. GFR  (AFRICAN AMERICAN): >60 ML/MIN/1.73 M^2
EST. GFR  (NON AFRICAN AMERICAN): 52.8 ML/MIN/1.73 M^2
ETHANOL SERPL-MCNC: <10 MG/DL
GLUCOSE SERPL-MCNC: 124 MG/DL (ref 70–110)
HCT VFR BLD AUTO: 39 % (ref 37–48.5)
HGB BLD-MCNC: 12.7 G/DL (ref 12–16)
IMM GRANULOCYTES # BLD AUTO: 0.1 K/UL (ref 0–0.04)
IMM GRANULOCYTES NFR BLD AUTO: 0.4 % (ref 0–0.5)
INR PPP: 1.3 (ref 0.8–1.2)
LYMPHOCYTES # BLD AUTO: 2.6 K/UL (ref 1–4.8)
LYMPHOCYTES NFR BLD: 11.3 % (ref 18–48)
MCH RBC QN AUTO: 33.2 PG (ref 27–31)
MCHC RBC AUTO-ENTMCNC: 32.6 G/DL (ref 32–36)
MCV RBC AUTO: 102 FL (ref 82–98)
MONOCYTES # BLD AUTO: 1 K/UL (ref 0.3–1)
MONOCYTES NFR BLD: 4.6 % (ref 4–15)
NEUTROPHILS # BLD AUTO: 18.2 K/UL (ref 1.8–7.7)
NEUTROPHILS NFR BLD: 80.4 % (ref 38–73)
NRBC BLD-RTO: 0 /100 WBC
PLATELET # BLD AUTO: 309 K/UL (ref 150–450)
PMV BLD AUTO: 11.3 FL (ref 9.2–12.9)
POTASSIUM SERPL-SCNC: 3.5 MMOL/L (ref 3.5–5.1)
PROT SERPL-MCNC: 6.6 G/DL (ref 6–8.4)
PROTHROMBIN TIME: 13.8 SEC (ref 9–12.5)
RBC # BLD AUTO: 3.82 M/UL (ref 4–5.4)
SODIUM SERPL-SCNC: 134 MMOL/L (ref 136–145)
WBC # BLD AUTO: 22.61 K/UL (ref 3.9–12.7)

## 2021-06-30 PROCEDURE — 85025 COMPLETE CBC W/AUTO DIFF WBC: CPT | Performed by: INTERNAL MEDICINE

## 2021-06-30 PROCEDURE — 85610 PROTHROMBIN TIME: CPT | Performed by: INTERNAL MEDICINE

## 2021-06-30 PROCEDURE — 36415 COLL VENOUS BLD VENIPUNCTURE: CPT | Mod: PO | Performed by: INTERNAL MEDICINE

## 2021-06-30 PROCEDURE — 80053 COMPREHEN METABOLIC PANEL: CPT | Performed by: INTERNAL MEDICINE

## 2021-06-30 PROCEDURE — 80321 ALCOHOLS BIOMARKERS 1OR 2: CPT | Performed by: INTERNAL MEDICINE

## 2021-06-30 PROCEDURE — 82077 ASSAY SPEC XCP UR&BREATH IA: CPT | Performed by: INTERNAL MEDICINE

## 2021-07-06 ENCOUNTER — PATIENT MESSAGE (OUTPATIENT)
Dept: HEPATOLOGY | Facility: CLINIC | Age: 51
End: 2021-07-06

## 2021-07-06 ENCOUNTER — PATIENT MESSAGE (OUTPATIENT)
Dept: ADMINISTRATIVE | Facility: HOSPITAL | Age: 51
End: 2021-07-06

## 2021-07-06 DIAGNOSIS — R18.8 OTHER ASCITES: Primary | ICD-10-CM

## 2021-07-06 RX ORDER — METOLAZONE 2.5 MG/1
2.5 TABLET ORAL DAILY
Qty: 30 TABLET | Refills: 11 | Status: SHIPPED | OUTPATIENT
Start: 2021-07-06 | End: 2021-07-29 | Stop reason: ALTCHOICE

## 2021-07-07 ENCOUNTER — TELEPHONE (OUTPATIENT)
Dept: HEPATOLOGY | Facility: CLINIC | Age: 51
End: 2021-07-07

## 2021-07-07 ENCOUNTER — TELEPHONE (OUTPATIENT)
Dept: TRANSPLANT | Facility: CLINIC | Age: 51
End: 2021-07-07

## 2021-07-08 ENCOUNTER — PATIENT MESSAGE (OUTPATIENT)
Dept: HEPATOLOGY | Facility: CLINIC | Age: 51
End: 2021-07-08

## 2021-07-08 ENCOUNTER — TELEPHONE (OUTPATIENT)
Dept: HEPATOLOGY | Facility: CLINIC | Age: 51
End: 2021-07-08

## 2021-07-08 DIAGNOSIS — R18.8 OTHER ASCITES: Primary | ICD-10-CM

## 2021-07-08 LAB — PHOSPHATIDYLETHANOL (PETH): 88 NG/ML

## 2021-07-09 ENCOUNTER — LAB VISIT (OUTPATIENT)
Dept: LAB | Facility: HOSPITAL | Age: 51
End: 2021-07-09
Attending: INTERNAL MEDICINE
Payer: COMMERCIAL

## 2021-07-09 DIAGNOSIS — R18.8 OTHER ASCITES: ICD-10-CM

## 2021-07-09 LAB
ALBUMIN SERPL BCP-MCNC: 2.4 G/DL (ref 3.5–5.2)
ALP SERPL-CCNC: 154 U/L (ref 55–135)
ALT SERPL W/O P-5'-P-CCNC: 26 U/L (ref 10–44)
ANION GAP SERPL CALC-SCNC: 13 MMOL/L (ref 8–16)
AST SERPL-CCNC: 84 U/L (ref 10–40)
BILIRUB SERPL-MCNC: 3.6 MG/DL (ref 0.1–1)
BUN SERPL-MCNC: 31 MG/DL (ref 6–20)
CALCIUM SERPL-MCNC: 9.9 MG/DL (ref 8.7–10.5)
CHLORIDE SERPL-SCNC: 97 MMOL/L (ref 95–110)
CO2 SERPL-SCNC: 24 MMOL/L (ref 23–29)
CREAT SERPL-MCNC: 2.3 MG/DL (ref 0.5–1.4)
EST. GFR  (AFRICAN AMERICAN): 27.7 ML/MIN/1.73 M^2
EST. GFR  (NON AFRICAN AMERICAN): 24.1 ML/MIN/1.73 M^2
GLUCOSE SERPL-MCNC: 116 MG/DL (ref 70–110)
POTASSIUM SERPL-SCNC: 3.5 MMOL/L (ref 3.5–5.1)
PROT SERPL-MCNC: 7.4 G/DL (ref 6–8.4)
SODIUM SERPL-SCNC: 134 MMOL/L (ref 136–145)

## 2021-07-09 PROCEDURE — 80053 COMPREHEN METABOLIC PANEL: CPT | Mod: NTX | Performed by: INTERNAL MEDICINE

## 2021-07-09 PROCEDURE — 36415 COLL VENOUS BLD VENIPUNCTURE: CPT | Mod: PO,TXP | Performed by: INTERNAL MEDICINE

## 2021-07-10 DIAGNOSIS — N17.9 AKI (ACUTE KIDNEY INJURY): Primary | ICD-10-CM

## 2021-07-12 ENCOUNTER — HOSPITAL ENCOUNTER (OUTPATIENT)
Facility: OTHER | Age: 51
Discharge: HOME | End: 2021-07-12
Attending: RADIOLOGY | Admitting: RADIOLOGY
Payer: COMMERCIAL

## 2021-07-12 ENCOUNTER — PATIENT MESSAGE (OUTPATIENT)
Dept: HEPATOLOGY | Facility: CLINIC | Age: 51
End: 2021-07-12

## 2021-07-12 ENCOUNTER — TELEPHONE (OUTPATIENT)
Dept: HEPATOLOGY | Facility: CLINIC | Age: 51
End: 2021-07-12

## 2021-07-12 VITALS
SYSTOLIC BLOOD PRESSURE: 101 MMHG | DIASTOLIC BLOOD PRESSURE: 60 MMHG | TEMPERATURE: 98 F | OXYGEN SATURATION: 98 % | RESPIRATION RATE: 18 BRPM | HEART RATE: 110 BPM

## 2021-07-12 DIAGNOSIS — R18.8 OTHER ASCITES: ICD-10-CM

## 2021-07-12 DIAGNOSIS — K70.31 ASCITES DUE TO ALCOHOLIC CIRRHOSIS: Primary | ICD-10-CM

## 2021-07-12 LAB
ALBUMIN FLD-MCNC: 0.5 G/DL
ALBUMIN SERPL BCP-MCNC: 2.3 G/DL (ref 3.5–5.2)
ALP SERPL-CCNC: 134 U/L (ref 55–135)
ALT SERPL W/O P-5'-P-CCNC: 18 U/L (ref 10–44)
ANION GAP SERPL CALC-SCNC: 12 MMOL/L (ref 8–16)
APPEARANCE FLD: NORMAL
AST SERPL-CCNC: 70 U/L (ref 10–40)
BILIRUB SERPL-MCNC: 3.4 MG/DL (ref 0.1–1)
BODY FLD TYPE: NORMAL
BUN SERPL-MCNC: 28 MG/DL (ref 6–20)
CALCIUM SERPL-MCNC: 8.4 MG/DL (ref 8.7–10.5)
CHLORIDE SERPL-SCNC: 99 MMOL/L (ref 95–110)
CO2 SERPL-SCNC: 23 MMOL/L (ref 23–29)
COLOR FLD: YELLOW
CREAT SERPL-MCNC: 1.9 MG/DL (ref 0.5–1.4)
EOSINOPHIL NFR FLD MANUAL: 2 %
EST. GFR  (AFRICAN AMERICAN): 35 ML/MIN/1.73 M^2
EST. GFR  (NON AFRICAN AMERICAN): 30 ML/MIN/1.73 M^2
GLUCOSE SERPL-MCNC: 105 MG/DL (ref 70–110)
LYMPHOCYTES NFR FLD MANUAL: 35 %
MESOTHL CELL NFR FLD MANUAL: 61 %
MONOS+MACROS NFR FLD MANUAL: 2 %
POTASSIUM SERPL-SCNC: 3.4 MMOL/L (ref 3.5–5.1)
PROT FLD-MCNC: 1 G/DL
PROT SERPL-MCNC: 7 G/DL (ref 6–8.4)
SODIUM SERPL-SCNC: 134 MMOL/L (ref 136–145)
SPECIMEN SOURCE: NORMAL
SPECIMEN SOURCE: NORMAL
WBC # FLD: 86 /CU MM

## 2021-07-12 PROCEDURE — 82042 OTHER SOURCE ALBUMIN QUAN EA: CPT | Mod: NTX | Performed by: RADIOLOGY

## 2021-07-12 PROCEDURE — P9047 ALBUMIN (HUMAN), 25%, 50ML: HCPCS | Mod: JG,TXP | Performed by: RADIOLOGY

## 2021-07-12 PROCEDURE — 63600175 PHARM REV CODE 636 W HCPCS: Mod: JG,TXP | Performed by: RADIOLOGY

## 2021-07-12 PROCEDURE — 36415 COLL VENOUS BLD VENIPUNCTURE: CPT | Mod: TXP | Performed by: RADIOLOGY

## 2021-07-12 PROCEDURE — 25000003 PHARM REV CODE 250: Mod: TXP | Performed by: RADIOLOGY

## 2021-07-12 PROCEDURE — 89051 BODY FLUID CELL COUNT: CPT | Mod: NTX | Performed by: RADIOLOGY

## 2021-07-12 PROCEDURE — 80053 COMPREHEN METABOLIC PANEL: CPT | Mod: NTX | Performed by: RADIOLOGY

## 2021-07-12 PROCEDURE — 84157 ASSAY OF PROTEIN OTHER: CPT | Mod: NTX | Performed by: RADIOLOGY

## 2021-07-12 RX ORDER — ALBUMIN HUMAN 250 G/1000ML
SOLUTION INTRAVENOUS
Status: DISCONTINUED | OUTPATIENT
Start: 2021-07-12 | End: 2021-07-12 | Stop reason: HOSPADM

## 2021-07-12 RX ORDER — LIDOCAINE HYDROCHLORIDE 10 MG/ML
INJECTION INFILTRATION; PERINEURAL
Status: DISCONTINUED | OUTPATIENT
Start: 2021-07-12 | End: 2021-07-12 | Stop reason: HOSPADM

## 2021-07-13 ENCOUNTER — LAB VISIT (OUTPATIENT)
Dept: LAB | Facility: HOSPITAL | Age: 51
End: 2021-07-13
Attending: INTERNAL MEDICINE
Payer: COMMERCIAL

## 2021-07-13 DIAGNOSIS — N17.9 AKI (ACUTE KIDNEY INJURY): ICD-10-CM

## 2021-07-13 LAB
ALBUMIN SERPL BCP-MCNC: 3 G/DL (ref 3.5–5.2)
ALP SERPL-CCNC: 123 U/L (ref 55–135)
ALT SERPL W/O P-5'-P-CCNC: 17 U/L (ref 10–44)
ANION GAP SERPL CALC-SCNC: 11 MMOL/L (ref 8–16)
AST SERPL-CCNC: 60 U/L (ref 10–40)
BILIRUB SERPL-MCNC: 3.1 MG/DL (ref 0.1–1)
BUN SERPL-MCNC: 22 MG/DL (ref 6–20)
CALCIUM SERPL-MCNC: 9 MG/DL (ref 8.7–10.5)
CHLORIDE SERPL-SCNC: 100 MMOL/L (ref 95–110)
CO2 SERPL-SCNC: 23 MMOL/L (ref 23–29)
CREAT SERPL-MCNC: 1.4 MG/DL (ref 0.5–1.4)
EST. GFR  (AFRICAN AMERICAN): 50.5 ML/MIN/1.73 M^2
EST. GFR  (NON AFRICAN AMERICAN): 43.8 ML/MIN/1.73 M^2
GLUCOSE SERPL-MCNC: 120 MG/DL (ref 70–110)
POTASSIUM SERPL-SCNC: 3.4 MMOL/L (ref 3.5–5.1)
PROT SERPL-MCNC: 7.1 G/DL (ref 6–8.4)
SODIUM SERPL-SCNC: 134 MMOL/L (ref 136–145)

## 2021-07-13 PROCEDURE — 80053 COMPREHEN METABOLIC PANEL: CPT | Mod: NTX | Performed by: INTERNAL MEDICINE

## 2021-07-13 PROCEDURE — 36415 COLL VENOUS BLD VENIPUNCTURE: CPT | Mod: PO,TXP | Performed by: INTERNAL MEDICINE

## 2021-07-14 ENCOUNTER — LAB VISIT (OUTPATIENT)
Dept: LAB | Facility: HOSPITAL | Age: 51
End: 2021-07-14
Attending: INTERNAL MEDICINE
Payer: COMMERCIAL

## 2021-07-14 DIAGNOSIS — R79.89 ELEVATED LIVER FUNCTION TESTS: ICD-10-CM

## 2021-07-14 LAB
ALBUMIN SERPL BCP-MCNC: 2.9 G/DL (ref 3.5–5.2)
ALP SERPL-CCNC: 126 U/L (ref 55–135)
ALT SERPL W/O P-5'-P-CCNC: 16 U/L (ref 10–44)
ANION GAP SERPL CALC-SCNC: 16 MMOL/L (ref 8–16)
AST SERPL-CCNC: 61 U/L (ref 10–40)
BASOPHILS # BLD AUTO: 0.11 K/UL (ref 0–0.2)
BASOPHILS NFR BLD: 0.7 % (ref 0–1.9)
BILIRUB SERPL-MCNC: 2.5 MG/DL (ref 0.1–1)
BUN SERPL-MCNC: 25 MG/DL (ref 6–20)
CALCIUM SERPL-MCNC: 8.8 MG/DL (ref 8.7–10.5)
CHLORIDE SERPL-SCNC: 100 MMOL/L (ref 95–110)
CO2 SERPL-SCNC: 17 MMOL/L (ref 23–29)
CREAT SERPL-MCNC: 1.8 MG/DL (ref 0.5–1.4)
DIFFERENTIAL METHOD: ABNORMAL
EOSINOPHIL # BLD AUTO: 0.4 K/UL (ref 0–0.5)
EOSINOPHIL NFR BLD: 2.6 % (ref 0–8)
ERYTHROCYTE [DISTWIDTH] IN BLOOD BY AUTOMATED COUNT: 14.9 % (ref 11.5–14.5)
EST. GFR  (AFRICAN AMERICAN): 37.3 ML/MIN/1.73 M^2
EST. GFR  (NON AFRICAN AMERICAN): 32.4 ML/MIN/1.73 M^2
GLUCOSE SERPL-MCNC: 139 MG/DL (ref 70–110)
HCT VFR BLD AUTO: 36.7 % (ref 37–48.5)
HGB BLD-MCNC: 12.3 G/DL (ref 12–16)
IMM GRANULOCYTES # BLD AUTO: 0.06 K/UL (ref 0–0.04)
IMM GRANULOCYTES NFR BLD AUTO: 0.4 % (ref 0–0.5)
INR PPP: 1.2 (ref 0.8–1.2)
LYMPHOCYTES # BLD AUTO: 2.3 K/UL (ref 1–4.8)
LYMPHOCYTES NFR BLD: 15 % (ref 18–48)
MCH RBC QN AUTO: 33.2 PG (ref 27–31)
MCHC RBC AUTO-ENTMCNC: 33.5 G/DL (ref 32–36)
MCV RBC AUTO: 99 FL (ref 82–98)
MONOCYTES # BLD AUTO: 0.8 K/UL (ref 0.3–1)
MONOCYTES NFR BLD: 5.5 % (ref 4–15)
NEUTROPHILS # BLD AUTO: 11.6 K/UL (ref 1.8–7.7)
NEUTROPHILS NFR BLD: 75.8 % (ref 38–73)
NRBC BLD-RTO: 0 /100 WBC
PLATELET # BLD AUTO: 258 K/UL (ref 150–450)
PMV BLD AUTO: 10.9 FL (ref 9.2–12.9)
POTASSIUM SERPL-SCNC: 3.3 MMOL/L (ref 3.5–5.1)
PROT SERPL-MCNC: 6.9 G/DL (ref 6–8.4)
PROTHROMBIN TIME: 12.6 SEC (ref 9–12.5)
RBC # BLD AUTO: 3.71 M/UL (ref 4–5.4)
SODIUM SERPL-SCNC: 133 MMOL/L (ref 136–145)
WBC # BLD AUTO: 15.24 K/UL (ref 3.9–12.7)

## 2021-07-14 PROCEDURE — 36415 COLL VENOUS BLD VENIPUNCTURE: CPT | Mod: PO,TXP | Performed by: INTERNAL MEDICINE

## 2021-07-14 PROCEDURE — 85610 PROTHROMBIN TIME: CPT | Mod: NTX | Performed by: INTERNAL MEDICINE

## 2021-07-14 PROCEDURE — 80053 COMPREHEN METABOLIC PANEL: CPT | Mod: NTX | Performed by: INTERNAL MEDICINE

## 2021-07-14 PROCEDURE — 80321 ALCOHOLS BIOMARKERS 1OR 2: CPT | Mod: NTX | Performed by: INTERNAL MEDICINE

## 2021-07-14 PROCEDURE — 85025 COMPLETE CBC W/AUTO DIFF WBC: CPT | Mod: TXP | Performed by: INTERNAL MEDICINE

## 2021-07-15 ENCOUNTER — LAB VISIT (OUTPATIENT)
Dept: LAB | Facility: HOSPITAL | Age: 51
End: 2021-07-15
Payer: COMMERCIAL

## 2021-07-15 DIAGNOSIS — E87.6 HYPOKALEMIA: ICD-10-CM

## 2021-07-15 DIAGNOSIS — Z00.00 ANNUAL PHYSICAL EXAM: ICD-10-CM

## 2021-07-15 DIAGNOSIS — R74.8 ELEVATED LIVER ENZYMES: ICD-10-CM

## 2021-07-15 DIAGNOSIS — R79.89 ELEVATED TSH: ICD-10-CM

## 2021-07-15 LAB
ALBUMIN SERPL BCP-MCNC: 2.9 G/DL (ref 3.5–5.2)
ALP SERPL-CCNC: 123 U/L (ref 55–135)
ALT SERPL W/O P-5'-P-CCNC: 18 U/L (ref 10–44)
ANION GAP SERPL CALC-SCNC: 14 MMOL/L (ref 8–16)
AST SERPL-CCNC: 70 U/L (ref 10–40)
BASOPHILS # BLD AUTO: 0.16 K/UL (ref 0–0.2)
BASOPHILS NFR BLD: 1 % (ref 0–1.9)
BILIRUB SERPL-MCNC: 3.3 MG/DL (ref 0.1–1)
BUN SERPL-MCNC: 25 MG/DL (ref 6–20)
CALCIUM SERPL-MCNC: 9.3 MG/DL (ref 8.7–10.5)
CHLORIDE SERPL-SCNC: 101 MMOL/L (ref 95–110)
CHOLEST SERPL-MCNC: 265 MG/DL (ref 120–199)
CHOLEST/HDLC SERPL: 15.6 {RATIO} (ref 2–5)
CO2 SERPL-SCNC: 21 MMOL/L (ref 23–29)
CREAT SERPL-MCNC: 1.6 MG/DL (ref 0.5–1.4)
DIFFERENTIAL METHOD: ABNORMAL
EOSINOPHIL # BLD AUTO: 0.7 K/UL (ref 0–0.5)
EOSINOPHIL NFR BLD: 4.3 % (ref 0–8)
ERYTHROCYTE [DISTWIDTH] IN BLOOD BY AUTOMATED COUNT: 14.8 % (ref 11.5–14.5)
EST. GFR  (AFRICAN AMERICAN): 43 ML/MIN/1.73 M^2
EST. GFR  (NON AFRICAN AMERICAN): 37.3 ML/MIN/1.73 M^2
ESTIMATED AVG GLUCOSE: 85 MG/DL (ref 68–131)
GLUCOSE SERPL-MCNC: 105 MG/DL (ref 70–110)
HBA1C MFR BLD: 4.6 % (ref 4–5.6)
HCT VFR BLD AUTO: 38.7 % (ref 37–48.5)
HDLC SERPL-MCNC: 17 MG/DL (ref 40–75)
HDLC SERPL: 6.4 % (ref 20–50)
HGB BLD-MCNC: 13 G/DL (ref 12–16)
IMM GRANULOCYTES # BLD AUTO: 0.05 K/UL (ref 0–0.04)
IMM GRANULOCYTES NFR BLD AUTO: 0.3 % (ref 0–0.5)
LDLC SERPL CALC-MCNC: 213.6 MG/DL (ref 63–159)
LYMPHOCYTES # BLD AUTO: 3.1 K/UL (ref 1–4.8)
LYMPHOCYTES NFR BLD: 19.4 % (ref 18–48)
MCH RBC QN AUTO: 33.2 PG (ref 27–31)
MCHC RBC AUTO-ENTMCNC: 33.6 G/DL (ref 32–36)
MCV RBC AUTO: 99 FL (ref 82–98)
MONOCYTES # BLD AUTO: 1.1 K/UL (ref 0.3–1)
MONOCYTES NFR BLD: 7.2 % (ref 4–15)
NEUTROPHILS # BLD AUTO: 10.7 K/UL (ref 1.8–7.7)
NEUTROPHILS NFR BLD: 67.8 % (ref 38–73)
NONHDLC SERPL-MCNC: 248 MG/DL
NRBC BLD-RTO: 0 /100 WBC
PLATELET # BLD AUTO: 311 K/UL (ref 150–450)
PMV BLD AUTO: 10.6 FL (ref 9.2–12.9)
POTASSIUM SERPL-SCNC: 3.4 MMOL/L (ref 3.5–5.1)
PROT SERPL-MCNC: 7.2 G/DL (ref 6–8.4)
RBC # BLD AUTO: 3.91 M/UL (ref 4–5.4)
SODIUM SERPL-SCNC: 136 MMOL/L (ref 136–145)
T4 FREE SERPL-MCNC: 0.94 NG/DL (ref 0.71–1.51)
TRIGL SERPL-MCNC: 172 MG/DL (ref 30–150)
TSH SERPL DL<=0.005 MIU/L-ACNC: 7.39 UIU/ML (ref 0.4–4)
WBC # BLD AUTO: 15.74 K/UL (ref 3.9–12.7)

## 2021-07-15 PROCEDURE — 84443 ASSAY THYROID STIM HORMONE: CPT | Mod: TXP | Performed by: INTERNAL MEDICINE

## 2021-07-15 PROCEDURE — 85025 COMPLETE CBC W/AUTO DIFF WBC: CPT | Mod: NTX | Performed by: INTERNAL MEDICINE

## 2021-07-15 PROCEDURE — 83036 HEMOGLOBIN GLYCOSYLATED A1C: CPT | Mod: TXP | Performed by: INTERNAL MEDICINE

## 2021-07-15 PROCEDURE — 80053 COMPREHEN METABOLIC PANEL: CPT | Mod: NTX | Performed by: INTERNAL MEDICINE

## 2021-07-15 PROCEDURE — 36415 COLL VENOUS BLD VENIPUNCTURE: CPT | Mod: PO,TXP | Performed by: INTERNAL MEDICINE

## 2021-07-15 PROCEDURE — 80061 LIPID PANEL: CPT | Mod: NTX | Performed by: INTERNAL MEDICINE

## 2021-07-15 PROCEDURE — 84439 ASSAY OF FREE THYROXINE: CPT | Mod: NTX | Performed by: INTERNAL MEDICINE

## 2021-07-19 ENCOUNTER — TELEPHONE (OUTPATIENT)
Dept: TRANSPLANT | Facility: CLINIC | Age: 51
End: 2021-07-19

## 2021-07-20 ENCOUNTER — TELEPHONE (OUTPATIENT)
Dept: TRANSPLANT | Facility: CLINIC | Age: 51
End: 2021-07-20

## 2021-07-20 ENCOUNTER — PATIENT MESSAGE (OUTPATIENT)
Dept: HEPATOLOGY | Facility: CLINIC | Age: 51
End: 2021-07-20

## 2021-07-20 DIAGNOSIS — Z76.82 ORGAN TRANSPLANT CANDIDATE: Primary | ICD-10-CM

## 2021-07-21 ENCOUNTER — PATIENT MESSAGE (OUTPATIENT)
Dept: HEPATOLOGY | Facility: CLINIC | Age: 51
End: 2021-07-21

## 2021-07-21 ENCOUNTER — TELEPHONE (OUTPATIENT)
Dept: HEPATOLOGY | Facility: CLINIC | Age: 51
End: 2021-07-21

## 2021-07-21 ENCOUNTER — LAB VISIT (OUTPATIENT)
Dept: LAB | Facility: HOSPITAL | Age: 51
End: 2021-07-21
Attending: INTERNAL MEDICINE
Payer: COMMERCIAL

## 2021-07-21 DIAGNOSIS — R18.8 OTHER ASCITES: Primary | ICD-10-CM

## 2021-07-21 DIAGNOSIS — F10.10 ALCOHOL ABUSE: ICD-10-CM

## 2021-07-21 LAB
ALBUMIN SERPL BCP-MCNC: 2.7 G/DL (ref 3.5–5.2)
ALP SERPL-CCNC: 116 U/L (ref 55–135)
ALT SERPL W/O P-5'-P-CCNC: 17 U/L (ref 10–44)
ANION GAP SERPL CALC-SCNC: 12 MMOL/L (ref 8–16)
AST SERPL-CCNC: 59 U/L (ref 10–40)
BASOPHILS # BLD AUTO: 0.12 K/UL (ref 0–0.2)
BASOPHILS NFR BLD: 0.9 % (ref 0–1.9)
BILIRUB SERPL-MCNC: 2.7 MG/DL (ref 0.1–1)
BUN SERPL-MCNC: 22 MG/DL (ref 6–20)
CALCIUM SERPL-MCNC: 9.4 MG/DL (ref 8.7–10.5)
CHLORIDE SERPL-SCNC: 101 MMOL/L (ref 95–110)
CO2 SERPL-SCNC: 23 MMOL/L (ref 23–29)
CREAT SERPL-MCNC: 1.5 MG/DL (ref 0.5–1.4)
DIFFERENTIAL METHOD: ABNORMAL
EOSINOPHIL # BLD AUTO: 0.5 K/UL (ref 0–0.5)
EOSINOPHIL NFR BLD: 3.5 % (ref 0–8)
ERYTHROCYTE [DISTWIDTH] IN BLOOD BY AUTOMATED COUNT: 13.9 % (ref 11.5–14.5)
EST. GFR  (AFRICAN AMERICAN): 46.5 ML/MIN/1.73 M^2
EST. GFR  (NON AFRICAN AMERICAN): 40.3 ML/MIN/1.73 M^2
ETHANOL SERPL-MCNC: <10 MG/DL
GLUCOSE SERPL-MCNC: 102 MG/DL (ref 70–110)
HCT VFR BLD AUTO: 38.1 % (ref 37–48.5)
HGB BLD-MCNC: 12.3 G/DL (ref 12–16)
IMM GRANULOCYTES # BLD AUTO: 0.04 K/UL (ref 0–0.04)
IMM GRANULOCYTES NFR BLD AUTO: 0.3 % (ref 0–0.5)
INR PPP: 1.2 (ref 0.8–1.2)
LYMPHOCYTES # BLD AUTO: 2.6 K/UL (ref 1–4.8)
LYMPHOCYTES NFR BLD: 20.1 % (ref 18–48)
MCH RBC QN AUTO: 33.2 PG (ref 27–31)
MCHC RBC AUTO-ENTMCNC: 32.3 G/DL (ref 32–36)
MCV RBC AUTO: 103 FL (ref 82–98)
MONOCYTES # BLD AUTO: 1.1 K/UL (ref 0.3–1)
MONOCYTES NFR BLD: 8.3 % (ref 4–15)
NEUTROPHILS # BLD AUTO: 8.7 K/UL (ref 1.8–7.7)
NEUTROPHILS NFR BLD: 66.9 % (ref 38–73)
NRBC BLD-RTO: 0 /100 WBC
PLATELET # BLD AUTO: 288 K/UL (ref 150–450)
PMV BLD AUTO: 10.9 FL (ref 9.2–12.9)
POTASSIUM SERPL-SCNC: 3.4 MMOL/L (ref 3.5–5.1)
PROT SERPL-MCNC: 7 G/DL (ref 6–8.4)
PROTHROMBIN TIME: 12.9 SEC (ref 9–12.5)
RBC # BLD AUTO: 3.71 M/UL (ref 4–5.4)
SODIUM SERPL-SCNC: 136 MMOL/L (ref 136–145)
WBC # BLD AUTO: 13.02 K/UL (ref 3.9–12.7)

## 2021-07-21 PROCEDURE — 36415 COLL VENOUS BLD VENIPUNCTURE: CPT | Mod: PO,TXP | Performed by: INTERNAL MEDICINE

## 2021-07-21 PROCEDURE — 85025 COMPLETE CBC W/AUTO DIFF WBC: CPT | Mod: TXP | Performed by: INTERNAL MEDICINE

## 2021-07-21 PROCEDURE — 80321 ALCOHOLS BIOMARKERS 1OR 2: CPT | Mod: TXP | Performed by: INTERNAL MEDICINE

## 2021-07-21 PROCEDURE — 80053 COMPREHEN METABOLIC PANEL: CPT | Mod: TXP | Performed by: INTERNAL MEDICINE

## 2021-07-21 PROCEDURE — 85610 PROTHROMBIN TIME: CPT | Mod: TXP | Performed by: INTERNAL MEDICINE

## 2021-07-21 PROCEDURE — 82077 ASSAY SPEC XCP UR&BREATH IA: CPT | Mod: TXP | Performed by: INTERNAL MEDICINE

## 2021-07-22 ENCOUNTER — LAB VISIT (OUTPATIENT)
Dept: LAB | Facility: HOSPITAL | Age: 51
End: 2021-07-22
Attending: INTERNAL MEDICINE
Payer: COMMERCIAL

## 2021-07-22 ENCOUNTER — OFFICE VISIT (OUTPATIENT)
Dept: INTERNAL MEDICINE | Facility: CLINIC | Age: 51
End: 2021-07-22
Payer: COMMERCIAL

## 2021-07-22 ENCOUNTER — PATIENT MESSAGE (OUTPATIENT)
Dept: INTERNAL MEDICINE | Facility: CLINIC | Age: 51
End: 2021-07-22

## 2021-07-22 VITALS
SYSTOLIC BLOOD PRESSURE: 118 MMHG | RESPIRATION RATE: 16 BRPM | BODY MASS INDEX: 24.34 KG/M2 | DIASTOLIC BLOOD PRESSURE: 70 MMHG | HEART RATE: 107 BPM | HEIGHT: 63 IN | TEMPERATURE: 98 F | WEIGHT: 137.38 LBS | OXYGEN SATURATION: 98 %

## 2021-07-22 DIAGNOSIS — N18.30 STAGE 3 CHRONIC KIDNEY DISEASE, UNSPECIFIED WHETHER STAGE 3A OR 3B CKD: ICD-10-CM

## 2021-07-22 DIAGNOSIS — R31.9 HEMATURIA, UNSPECIFIED TYPE: ICD-10-CM

## 2021-07-22 DIAGNOSIS — F10.10 ALCOHOL ABUSE: ICD-10-CM

## 2021-07-22 DIAGNOSIS — R18.8 OTHER ASCITES: ICD-10-CM

## 2021-07-22 DIAGNOSIS — F17.200 NICOTINE USE DISORDER: ICD-10-CM

## 2021-07-22 DIAGNOSIS — K70.0 ALCOHOL INDUCED FATTY LIVER: ICD-10-CM

## 2021-07-22 DIAGNOSIS — E03.9 HYPOTHYROIDISM, UNSPECIFIED TYPE: ICD-10-CM

## 2021-07-22 DIAGNOSIS — E78.00 HYPERCHOLESTEREMIA: ICD-10-CM

## 2021-07-22 DIAGNOSIS — Z00.00 ANNUAL PHYSICAL EXAM: Primary | ICD-10-CM

## 2021-07-22 LAB
BACTERIA #/AREA URNS AUTO: ABNORMAL /HPF
BILIRUB UR QL STRIP: NEGATIVE
CLARITY UR REFRACT.AUTO: ABNORMAL
COLOR UR AUTO: ABNORMAL
GLUCOSE UR QL STRIP: NEGATIVE
HGB UR QL STRIP: ABNORMAL
HYALINE CASTS UR QL AUTO: 0 /LPF
KETONES UR QL STRIP: NEGATIVE
LEUKOCYTE ESTERASE UR QL STRIP: ABNORMAL
MICROSCOPIC COMMENT: ABNORMAL
NITRITE UR QL STRIP: NEGATIVE
PH UR STRIP: 5 [PH] (ref 5–8)
PROT UR QL STRIP: ABNORMAL
RBC #/AREA URNS AUTO: 15 /HPF (ref 0–4)
SP GR UR STRIP: 1.02 (ref 1–1.03)
SQUAMOUS #/AREA URNS AUTO: 15 /HPF
URN SPEC COLLECT METH UR: ABNORMAL
WBC #/AREA URNS AUTO: 14 /HPF (ref 0–5)

## 2021-07-22 PROCEDURE — 81001 URINALYSIS AUTO W/SCOPE: CPT | Mod: TXP | Performed by: INTERNAL MEDICINE

## 2021-07-22 PROCEDURE — 99396 PR PREVENTIVE VISIT,EST,40-64: ICD-10-PCS | Mod: S$GLB,,, | Performed by: INTERNAL MEDICINE

## 2021-07-22 PROCEDURE — 99396 PREV VISIT EST AGE 40-64: CPT | Mod: S$GLB,,, | Performed by: INTERNAL MEDICINE

## 2021-07-22 PROCEDURE — 99999 PR PBB SHADOW E&M-EST. PATIENT-LVL III: CPT | Mod: PBBFAC,,, | Performed by: INTERNAL MEDICINE

## 2021-07-22 PROCEDURE — 87086 URINE CULTURE/COLONY COUNT: CPT | Mod: TXP | Performed by: INTERNAL MEDICINE

## 2021-07-22 PROCEDURE — 99999 PR PBB SHADOW E&M-EST. PATIENT-LVL III: ICD-10-PCS | Mod: PBBFAC,,, | Performed by: INTERNAL MEDICINE

## 2021-07-22 RX ORDER — LEVOTHYROXINE SODIUM 50 UG/1
50 TABLET ORAL
Qty: 30 TABLET | Refills: 1 | Status: SHIPPED | OUTPATIENT
Start: 2021-07-22 | End: 2021-09-24

## 2021-07-23 ENCOUNTER — HOSPITAL ENCOUNTER (OUTPATIENT)
Facility: OTHER | Age: 51
Discharge: HOME | End: 2021-07-23
Attending: RADIOLOGY | Admitting: RADIOLOGY
Payer: COMMERCIAL

## 2021-07-23 VITALS
DIASTOLIC BLOOD PRESSURE: 64 MMHG | OXYGEN SATURATION: 99 % | HEART RATE: 92 BPM | SYSTOLIC BLOOD PRESSURE: 103 MMHG | RESPIRATION RATE: 18 BRPM | TEMPERATURE: 98 F

## 2021-07-23 DIAGNOSIS — R18.8 OTHER ASCITES: ICD-10-CM

## 2021-07-23 LAB — BACTERIA UR CULT: NO GROWTH

## 2021-07-23 PROCEDURE — C1894 INTRO/SHEATH, NON-LASER: HCPCS | Mod: TXP | Performed by: RADIOLOGY

## 2021-07-23 PROCEDURE — 25000003 PHARM REV CODE 250: Mod: NTX | Performed by: RADIOLOGY

## 2021-07-23 PROCEDURE — 63600175 PHARM REV CODE 636 W HCPCS: Mod: JG,NTX | Performed by: RADIOLOGY

## 2021-07-23 PROCEDURE — P9047 ALBUMIN (HUMAN), 25%, 50ML: HCPCS | Mod: JG,NTX | Performed by: RADIOLOGY

## 2021-07-23 RX ORDER — ALBUMIN HUMAN 250 G/1000ML
SOLUTION INTRAVENOUS
Status: DISCONTINUED | OUTPATIENT
Start: 2021-07-23 | End: 2021-07-23 | Stop reason: HOSPADM

## 2021-07-23 RX ORDER — LIDOCAINE HYDROCHLORIDE 10 MG/ML
INJECTION INFILTRATION; PERINEURAL
Status: DISCONTINUED | OUTPATIENT
Start: 2021-07-23 | End: 2021-07-23 | Stop reason: HOSPADM

## 2021-07-24 PROBLEM — K70.0 ALCOHOL INDUCED FATTY LIVER: Status: ACTIVE | Noted: 2021-07-24

## 2021-07-27 ENCOUNTER — TELEPHONE (OUTPATIENT)
Dept: TRANSPLANT | Facility: CLINIC | Age: 51
End: 2021-07-27

## 2021-07-27 ENCOUNTER — TELEPHONE (OUTPATIENT)
Dept: INTERNAL MEDICINE | Facility: CLINIC | Age: 51
End: 2021-07-27

## 2021-07-27 DIAGNOSIS — R31.9 HEMATURIA, UNSPECIFIED TYPE: Primary | ICD-10-CM

## 2021-07-27 LAB — PHOSPHATIDYLETHANOL (PETH): NEGATIVE NG/ML

## 2021-07-29 ENCOUNTER — HOSPITAL ENCOUNTER (OUTPATIENT)
Dept: CARDIOLOGY | Facility: HOSPITAL | Age: 51
Discharge: HOME | End: 2021-07-29
Attending: INTERNAL MEDICINE
Payer: COMMERCIAL

## 2021-07-29 ENCOUNTER — EVALUATION (OUTPATIENT)
Dept: TRANSPLANT | Facility: CLINIC | Age: 51
End: 2021-07-29
Payer: COMMERCIAL

## 2021-07-29 ENCOUNTER — CLINICAL SUPPORT (OUTPATIENT)
Dept: TRANSPLANT | Facility: CLINIC | Age: 51
End: 2021-07-29
Payer: COMMERCIAL

## 2021-07-29 ENCOUNTER — PATIENT MESSAGE (OUTPATIENT)
Dept: HEPATOLOGY | Facility: CLINIC | Age: 51
End: 2021-07-29

## 2021-07-29 ENCOUNTER — TELEPHONE (OUTPATIENT)
Dept: HEPATOLOGY | Facility: CLINIC | Age: 51
End: 2021-07-29

## 2021-07-29 VITALS
DIASTOLIC BLOOD PRESSURE: 82 MMHG | HEIGHT: 63 IN | HEART RATE: 93 BPM | WEIGHT: 130 LBS | RESPIRATION RATE: 20 BRPM | BODY MASS INDEX: 23.04 KG/M2 | SYSTOLIC BLOOD PRESSURE: 116 MMHG

## 2021-07-29 VITALS
BODY MASS INDEX: 24.53 KG/M2 | RESPIRATION RATE: 20 BRPM | DIASTOLIC BLOOD PRESSURE: 70 MMHG | HEIGHT: 63 IN | WEIGHT: 138.44 LBS | DIASTOLIC BLOOD PRESSURE: 70 MMHG | WEIGHT: 138.44 LBS | HEIGHT: 63 IN | OXYGEN SATURATION: 99 % | SYSTOLIC BLOOD PRESSURE: 113 MMHG | TEMPERATURE: 97 F | TEMPERATURE: 97 F | HEART RATE: 107 BPM | DIASTOLIC BLOOD PRESSURE: 70 MMHG | SYSTOLIC BLOOD PRESSURE: 113 MMHG | OXYGEN SATURATION: 99 % | RESPIRATION RATE: 20 BRPM | RESPIRATION RATE: 20 BRPM | TEMPERATURE: 97 F | BODY MASS INDEX: 24.53 KG/M2 | HEART RATE: 107 BPM | SYSTOLIC BLOOD PRESSURE: 113 MMHG | WEIGHT: 138.44 LBS | OXYGEN SATURATION: 99 % | HEART RATE: 107 BPM | BODY MASS INDEX: 24.53 KG/M2 | HEIGHT: 63 IN

## 2021-07-29 DIAGNOSIS — Z76.82 ORGAN TRANSPLANT CANDIDATE: ICD-10-CM

## 2021-07-29 DIAGNOSIS — Z01.818 PRE-TRANSPLANT EVALUATION FOR LIVER TRANSPLANT: Primary | ICD-10-CM

## 2021-07-29 DIAGNOSIS — R18.8 OTHER ASCITES: Primary | ICD-10-CM

## 2021-07-29 LAB
AMPHET+METHAMPHET UR QL: NEGATIVE
ASCENDING AORTA: 2.25 CM
AV INDEX (PROSTH): 0.53
AV MEAN GRADIENT: 6 MMHG
AV PEAK GRADIENT: 11 MMHG
AV VALVE AREA: 1.68 CM2
AV VELOCITY RATIO: 0.53
BACTERIA #/AREA URNS AUTO: ABNORMAL /HPF
BARBITURATES UR QL SCN>200 NG/ML: NEGATIVE
BENZODIAZ UR QL SCN>200 NG/ML: NEGATIVE
BILIRUB UR QL STRIP: NEGATIVE
BSA FOR ECHO PROCEDURE: 1.62 M2
BZE UR QL SCN: NEGATIVE
CANNABINOIDS UR QL SCN: NEGATIVE
CLARITY UR REFRACT.AUTO: ABNORMAL
COLOR UR AUTO: ABNORMAL
CREAT UR-MCNC: 253 MG/DL (ref 15–325)
CV ECHO LV RWT: 0.31 CM
CV STRESS BASE HR: 93 BPM
DIASTOLIC BLOOD PRESSURE: 82 MMHG
DOP CALC AO PEAK VEL: 1.63 M/S
DOP CALC AO VTI: 32.24 CM
DOP CALC LVOT AREA: 3.1 CM2
DOP CALC LVOT DIAMETER: 2 CM
DOP CALC LVOT PEAK VEL: 0.87 M/S
DOP CALC LVOT STROKE VOLUME: 54.13 CM3
DOP CALCLVOT PEAK VEL VTI: 17.24 CM
E WAVE DECELERATION TIME: 162.8 MSEC
E/A RATIO: 0.7
E/E' RATIO: 8.93 M/S
ECHO LV POSTERIOR WALL: 0.76 CM (ref 0.6–1.1)
EJECTION FRACTION: 58 %
ETHANOL UR-MCNC: <10 MG/DL
FRACTIONAL SHORTENING: 30 % (ref 28–44)
GLUCOSE UR QL STRIP: NEGATIVE
HGB UR QL STRIP: ABNORMAL
HYALINE CASTS UR QL AUTO: 17 /LPF
INTERVENTRICULAR SEPTUM: 0.65 CM (ref 0.6–1.1)
IVRT: 111.32 MSEC
KETONES UR QL STRIP: NEGATIVE
LA MAJOR: 3.41 CM
LA MINOR: 3.24 CM
LA WIDTH: 3.45 CM
LEFT ATRIUM SIZE: 3.32 CM
LEFT ATRIUM VOLUME INDEX MOD: 11.4 ML/M2
LEFT ATRIUM VOLUME INDEX: 20.1 ML/M2
LEFT ATRIUM VOLUME MOD: 18.37 CM3
LEFT ATRIUM VOLUME: 32.35 CM3
LEFT INTERNAL DIMENSION IN SYSTOLE: 3.41 CM (ref 2.1–4)
LEFT VENTRICLE DIASTOLIC VOLUME INDEX: 68.78 ML/M2
LEFT VENTRICLE DIASTOLIC VOLUME: 110.73 ML
LEFT VENTRICLE MASS INDEX: 68 G/M2
LEFT VENTRICLE SYSTOLIC VOLUME INDEX: 29.7 ML/M2
LEFT VENTRICLE SYSTOLIC VOLUME: 47.84 ML
LEFT VENTRICULAR INTERNAL DIMENSION IN DIASTOLE: 4.86 CM (ref 3.5–6)
LEFT VENTRICULAR MASS: 110.17 G
LEUKOCYTE ESTERASE UR QL STRIP: ABNORMAL
LV LATERAL E/E' RATIO: 11.17 M/S
LV SEPTAL E/E' RATIO: 7.44 M/S
METHADONE UR QL SCN>300 NG/ML: NEGATIVE
MICROSCOPIC COMMENT: ABNORMAL
MV A" WAVE DURATION": 14.84 MSEC
MV PEAK A VEL: 0.96 M/S
MV PEAK E VEL: 0.67 M/S
MV STENOSIS PRESSURE HALF TIME: 47.21 MS
MV VALVE AREA P 1/2 METHOD: 4.66 CM2
NITRITE UR QL STRIP: NEGATIVE
OHS CV CPX 1 MINUTE RECOVERY HEART RATE: 150 BPM
OHS CV CPX 85 PERCENT MAX PREDICTED HEART RATE MALE: 138
OHS CV CPX MAX PREDICTED HEART RATE: 162
OHS CV CPX PATIENT IS FEMALE: 1
OHS CV CPX PATIENT IS MALE: 0
OHS CV CPX PEAK DIASTOLIC BLOOD PRESSURE: 74 MMHG
OHS CV CPX PEAK HEAR RATE: 150 BPM
OHS CV CPX PEAK RATE PRESSURE PRODUCT: NORMAL
OHS CV CPX PEAK SYSTOLIC BLOOD PRESSURE: 112 MMHG
OHS CV CPX PERCENT MAX PREDICTED HEART RATE ACHIEVED: 93
OHS CV CPX RATE PRESSURE PRODUCT PRESENTING: NORMAL
OPIATES UR QL SCN: NEGATIVE
PCP UR QL SCN>25 NG/ML: NEGATIVE
PH UR STRIP: 5 [PH] (ref 5–8)
PISA TR MAX VEL: 2.45 M/S
PROT UR QL STRIP: ABNORMAL
PULM VEIN S/D RATIO: 1.37
PV PEAK D VEL: 0.41 M/S
PV PEAK S VEL: 0.56 M/S
RA MAJOR: 3.29 CM
RA PRESSURE: 3 MMHG
RA WIDTH: 3.07 CM
RBC #/AREA URNS AUTO: 24 /HPF (ref 0–4)
RIGHT VENTRICULAR END-DIASTOLIC DIMENSION: 2.75 CM
RV TISSUE DOPPLER FREE WALL SYSTOLIC VELOCITY 1 (APICAL 4 CHAMBER VIEW): 15.01 CM/S
SINUS: 2.14 CM
SP GR UR STRIP: 1.02 (ref 1–1.03)
SQUAMOUS #/AREA URNS AUTO: 6 /HPF
STJ: 2.07 CM
SYSTOLIC BLOOD PRESSURE: 116 MMHG
TDI LATERAL: 0.06 M/S
TDI SEPTAL: 0.09 M/S
TDI: 0.08 M/S
TOXICOLOGY INFORMATION: NORMAL
TR MAX PG: 24 MMHG
TRICUSPID ANNULAR PLANE SYSTOLIC EXCURSION: 2.09 CM
TV REST PULMONARY ARTERY PRESSURE: 27 MMHG
URN SPEC COLLECT METH UR: ABNORMAL
WBC #/AREA URNS AUTO: 11 /HPF (ref 0–5)

## 2021-07-29 PROCEDURE — 99999 PR PBB SHADOW E&M-EST. PATIENT-LVL III: ICD-10-PCS | Mod: PBBFAC,TXP,, | Performed by: TRANSPLANT SURGERY

## 2021-07-29 PROCEDURE — 93325 STRESS ECHO (CUPID ONLY): ICD-10-PCS | Mod: 26,TXP,, | Performed by: INTERNAL MEDICINE

## 2021-07-29 PROCEDURE — 93351 STRESS ECHO (CUPID ONLY): ICD-10-PCS | Mod: 26,TXP,, | Performed by: INTERNAL MEDICINE

## 2021-07-29 PROCEDURE — 99999 PR PBB SHADOW E&M-EST. PATIENT-LVL III: CPT | Mod: PBBFAC,TXP,, | Performed by: TRANSPLANT SURGERY

## 2021-07-29 PROCEDURE — 99999 PR PBB SHADOW E&M-EST. PATIENT-LVL III: ICD-10-PCS | Mod: PBBFAC,TXP,,

## 2021-07-29 PROCEDURE — 63600175 PHARM REV CODE 636 W HCPCS: Mod: TXP | Performed by: INTERNAL MEDICINE

## 2021-07-29 PROCEDURE — 93325 DOPPLER ECHO COLOR FLOW MAPG: CPT | Mod: 26,TXP,, | Performed by: INTERNAL MEDICINE

## 2021-07-29 PROCEDURE — 99215 PR OFFICE/OUTPT VISIT, EST, LEVL V, 40-54 MIN: ICD-10-PCS | Mod: S$GLB,TXP,, | Performed by: TRANSPLANT SURGERY

## 2021-07-29 PROCEDURE — 97802 PR MED NUTR THER, 1ST, INDIV, EA 15 MIN: ICD-10-PCS | Mod: S$GLB,TXP,, | Performed by: DIETITIAN, REGISTERED

## 2021-07-29 PROCEDURE — 93351 STRESS TTE COMPLETE: CPT | Mod: 26,TXP,, | Performed by: INTERNAL MEDICINE

## 2021-07-29 PROCEDURE — 81001 URINALYSIS AUTO W/SCOPE: CPT | Mod: TXP | Performed by: INTERNAL MEDICINE

## 2021-07-29 PROCEDURE — 93325 DOPPLER ECHO COLOR FLOW MAPG: CPT | Mod: TXP

## 2021-07-29 PROCEDURE — 99215 OFFICE O/P EST HI 40 MIN: CPT | Mod: S$GLB,TXP,, | Performed by: TRANSPLANT SURGERY

## 2021-07-29 PROCEDURE — 80307 DRUG TEST PRSMV CHEM ANLYZR: CPT | Mod: TXP | Performed by: INTERNAL MEDICINE

## 2021-07-29 PROCEDURE — 93320 DOPPLER ECHO COMPLETE: CPT | Mod: 26,TXP,, | Performed by: INTERNAL MEDICINE

## 2021-07-29 PROCEDURE — 99999 PR PBB SHADOW E&M-EST. PATIENT-LVL III: CPT | Mod: PBBFAC,TXP,,

## 2021-07-29 PROCEDURE — 93320 STRESS ECHO (CUPID ONLY): ICD-10-PCS | Mod: 26,TXP,, | Performed by: INTERNAL MEDICINE

## 2021-07-29 PROCEDURE — 97802 MEDICAL NUTRITION INDIV IN: CPT | Mod: S$GLB,TXP,, | Performed by: DIETITIAN, REGISTERED

## 2021-07-29 RX ORDER — DOBUTAMINE HYDROCHLORIDE 400 MG/100ML
20 INJECTION INTRAVENOUS
Status: COMPLETED | OUTPATIENT
Start: 2021-07-29 | End: 2021-07-29

## 2021-07-29 RX ORDER — ATROPINE SULFATE 0.1 MG/ML
1 INJECTION INTRAVENOUS
Status: CANCELLED | OUTPATIENT
Start: 2021-07-29 | End: 2021-07-29

## 2021-07-29 RX ADMIN — DOBUTAMINE HYDROCHLORIDE 20 MCG/KG/MIN: 400 INJECTION INTRAVENOUS at 09:07

## 2021-07-30 ENCOUNTER — HOSPITAL ENCOUNTER (OUTPATIENT)
Dept: RADIOLOGY | Facility: HOSPITAL | Age: 51
Discharge: HOME | End: 2021-07-30
Attending: INTERNAL MEDICINE
Payer: COMMERCIAL

## 2021-07-30 DIAGNOSIS — Z76.82 ORGAN TRANSPLANT CANDIDATE: Primary | ICD-10-CM

## 2021-07-30 DIAGNOSIS — Z76.82 ORGAN TRANSPLANT CANDIDATE: ICD-10-CM

## 2021-07-30 LAB — PHOSPHATIDYLETHANOL (PETH): NEGATIVE NG/ML

## 2021-07-30 PROCEDURE — 77067 MAMMO DIGITAL SCREENING BILAT WITH TOMO: ICD-10-PCS | Mod: 26,TXP,, | Performed by: RADIOLOGY

## 2021-07-30 PROCEDURE — 71046 X-RAY EXAM CHEST 2 VIEWS: CPT | Mod: TC,TXP

## 2021-07-30 PROCEDURE — 76700 US EXAM ABDOM COMPLETE: CPT | Mod: 26,XS,TXP, | Performed by: RADIOLOGY

## 2021-07-30 PROCEDURE — 71046 XR CHEST PA AND LATERAL: ICD-10-PCS | Mod: 26,TXP,, | Performed by: RADIOLOGY

## 2021-07-30 PROCEDURE — 77067 SCR MAMMO BI INCL CAD: CPT | Mod: 26,TXP,, | Performed by: RADIOLOGY

## 2021-07-30 PROCEDURE — 77063 BREAST TOMOSYNTHESIS BI: CPT | Mod: 26,TXP,, | Performed by: RADIOLOGY

## 2021-07-30 PROCEDURE — 76700 US ABDOMEN COMP WITH DOPPLER_PRE LIVER TRANSPLANT: ICD-10-PCS | Mod: 26,XS,TXP, | Performed by: RADIOLOGY

## 2021-07-30 PROCEDURE — 77067 SCR MAMMO BI INCL CAD: CPT | Mod: TC,TXP

## 2021-07-30 PROCEDURE — 93975 VASCULAR STUDY: CPT | Mod: 26,TXP,, | Performed by: RADIOLOGY

## 2021-07-30 PROCEDURE — 77063 MAMMO DIGITAL SCREENING BILAT WITH TOMO: ICD-10-PCS | Mod: 26,TXP,, | Performed by: RADIOLOGY

## 2021-07-30 PROCEDURE — 71046 X-RAY EXAM CHEST 2 VIEWS: CPT | Mod: 26,TXP,, | Performed by: RADIOLOGY

## 2021-07-30 PROCEDURE — 93975 VASCULAR STUDY: CPT | Mod: TC,TXP

## 2021-07-30 PROCEDURE — 93975 US ABDOMEN COMP WITH DOPPLER_PRE LIVER TRANSPLANT: ICD-10-PCS | Mod: 26,TXP,, | Performed by: RADIOLOGY

## 2021-08-02 ENCOUNTER — OFFICE VISIT (OUTPATIENT)
Dept: OBSTETRICS AND GYNECOLOGY | Facility: CLINIC | Age: 51
End: 2021-08-02
Attending: STUDENT IN AN ORGANIZED HEALTH CARE EDUCATION/TRAINING PROGRAM
Payer: COMMERCIAL

## 2021-08-02 ENCOUNTER — TELEPHONE (OUTPATIENT)
Dept: TRANSPLANT | Facility: CLINIC | Age: 51
End: 2021-08-02

## 2021-08-02 ENCOUNTER — OFFICE VISIT (OUTPATIENT)
Dept: UROLOGY | Facility: CLINIC | Age: 51
End: 2021-08-02
Payer: COMMERCIAL

## 2021-08-02 VITALS
SYSTOLIC BLOOD PRESSURE: 104 MMHG | DIASTOLIC BLOOD PRESSURE: 60 MMHG | HEIGHT: 63 IN | WEIGHT: 142.75 LBS | BODY MASS INDEX: 25.29 KG/M2

## 2021-08-02 VITALS
WEIGHT: 142.44 LBS | HEIGHT: 63 IN | SYSTOLIC BLOOD PRESSURE: 102 MMHG | DIASTOLIC BLOOD PRESSURE: 70 MMHG | BODY MASS INDEX: 25.24 KG/M2 | HEART RATE: 108 BPM

## 2021-08-02 DIAGNOSIS — R31.9 HEMATURIA, UNSPECIFIED TYPE: ICD-10-CM

## 2021-08-02 DIAGNOSIS — R31.29 HEMATURIA, MICROSCOPIC: Primary | ICD-10-CM

## 2021-08-02 DIAGNOSIS — Z01.419 WELL WOMAN EXAM: ICD-10-CM

## 2021-08-02 DIAGNOSIS — Z12.4 CERVICAL CANCER SCREENING: ICD-10-CM

## 2021-08-02 DIAGNOSIS — R31.9 HEMATURIA OF UNKNOWN CAUSE: ICD-10-CM

## 2021-08-02 DIAGNOSIS — Z11.51 SCREENING FOR HPV (HUMAN PAPILLOMAVIRUS): ICD-10-CM

## 2021-08-02 DIAGNOSIS — Z12.4 SCREENING FOR CERVICAL CANCER: Primary | ICD-10-CM

## 2021-08-02 DIAGNOSIS — Z12.31 ENCOUNTER FOR SCREENING MAMMOGRAM FOR BREAST CANCER: ICD-10-CM

## 2021-08-02 PROCEDURE — 99999 PR PBB SHADOW E&M-EST. PATIENT-LVL III: CPT | Mod: PBBFAC,,, | Performed by: STUDENT IN AN ORGANIZED HEALTH CARE EDUCATION/TRAINING PROGRAM

## 2021-08-02 PROCEDURE — 88175 CYTOPATH C/V AUTO FLUID REDO: CPT | Performed by: STUDENT IN AN ORGANIZED HEALTH CARE EDUCATION/TRAINING PROGRAM

## 2021-08-02 PROCEDURE — 87624 HPV HI-RISK TYP POOLED RSLT: CPT | Performed by: STUDENT IN AN ORGANIZED HEALTH CARE EDUCATION/TRAINING PROGRAM

## 2021-08-02 PROCEDURE — 99204 PR OFFICE/OUTPT VISIT, NEW, LEVL IV, 45-59 MIN: ICD-10-PCS | Mod: NTX,S$GLB,, | Performed by: UROLOGY

## 2021-08-02 PROCEDURE — 99204 OFFICE O/P NEW MOD 45 MIN: CPT | Mod: NTX,S$GLB,, | Performed by: UROLOGY

## 2021-08-02 PROCEDURE — 99396 PR PREVENTIVE VISIT,EST,40-64: ICD-10-PCS | Mod: S$GLB,,, | Performed by: STUDENT IN AN ORGANIZED HEALTH CARE EDUCATION/TRAINING PROGRAM

## 2021-08-02 PROCEDURE — 99999 PR PBB SHADOW E&M-EST. PATIENT-LVL III: ICD-10-PCS | Mod: PBBFAC,,, | Performed by: STUDENT IN AN ORGANIZED HEALTH CARE EDUCATION/TRAINING PROGRAM

## 2021-08-02 PROCEDURE — 99999 PR PBB SHADOW E&M-EST. PATIENT-LVL IV: ICD-10-PCS | Mod: PBBFAC,TXP,, | Performed by: UROLOGY

## 2021-08-02 PROCEDURE — 99999 PR PBB SHADOW E&M-EST. PATIENT-LVL IV: CPT | Mod: PBBFAC,TXP,, | Performed by: UROLOGY

## 2021-08-02 PROCEDURE — 99396 PREV VISIT EST AGE 40-64: CPT | Mod: S$GLB,,, | Performed by: STUDENT IN AN ORGANIZED HEALTH CARE EDUCATION/TRAINING PROGRAM

## 2021-08-02 NOTE — TELEPHONE ENCOUNTER
Pt is in liver transplant evaluation. Per Junior requesting financial clearance for outpatient kidney evaluation for (chronic kidney disease) stage 3, GFR 30-59 ml/min [N18.30]

## 2021-08-03 ENCOUNTER — HOSPITAL ENCOUNTER (OUTPATIENT)
Facility: OTHER | Age: 51
Discharge: HOME | End: 2021-08-03
Attending: RADIOLOGY | Admitting: RADIOLOGY
Payer: COMMERCIAL

## 2021-08-03 ENCOUNTER — TELEPHONE (OUTPATIENT)
Dept: TRANSPLANT | Facility: CLINIC | Age: 51
End: 2021-08-03

## 2021-08-03 ENCOUNTER — HOSPITAL ENCOUNTER (EMERGENCY)
Facility: HOSPITAL | Age: 51
Discharge: HOME | End: 2021-08-03
Attending: EMERGENCY MEDICINE
Payer: COMMERCIAL

## 2021-08-03 ENCOUNTER — HOSPITAL ENCOUNTER (OUTPATIENT)
Dept: RADIOLOGY | Facility: HOSPITAL | Age: 51
Discharge: HOME | End: 2021-08-03
Attending: INTERNAL MEDICINE
Payer: COMMERCIAL

## 2021-08-03 VITALS
HEART RATE: 99 BPM | TEMPERATURE: 99 F | RESPIRATION RATE: 18 BRPM | HEIGHT: 63 IN | DIASTOLIC BLOOD PRESSURE: 53 MMHG | WEIGHT: 128 LBS | SYSTOLIC BLOOD PRESSURE: 106 MMHG | OXYGEN SATURATION: 95 % | BODY MASS INDEX: 22.68 KG/M2

## 2021-08-03 VITALS
BODY MASS INDEX: 25.16 KG/M2 | OXYGEN SATURATION: 99 % | RESPIRATION RATE: 18 BRPM | HEART RATE: 108 BPM | TEMPERATURE: 99 F | SYSTOLIC BLOOD PRESSURE: 110 MMHG | DIASTOLIC BLOOD PRESSURE: 65 MMHG | WEIGHT: 142 LBS | HEIGHT: 63 IN

## 2021-08-03 DIAGNOSIS — Z76.82 ORGAN TRANSPLANT CANDIDATE: ICD-10-CM

## 2021-08-03 DIAGNOSIS — R18.8 OTHER ASCITES: ICD-10-CM

## 2021-08-03 DIAGNOSIS — K65.2 SBP (SPONTANEOUS BACTERIAL PERITONITIS): Primary | ICD-10-CM

## 2021-08-03 LAB
ALBUMIN SERPL BCP-MCNC: 3.2 G/DL (ref 3.5–5.2)
ALP SERPL-CCNC: 82 U/L (ref 55–135)
ALT SERPL W/O P-5'-P-CCNC: 9 U/L (ref 10–44)
ANION GAP SERPL CALC-SCNC: 6 MMOL/L (ref 8–16)
APPEARANCE FLD: NORMAL
AST SERPL-CCNC: 20 U/L (ref 10–40)
B-HCG UR QL: NEGATIVE
BACTERIA #/AREA URNS AUTO: ABNORMAL /HPF
BASOPHILS # BLD AUTO: 0.05 K/UL (ref 0–0.2)
BASOPHILS NFR BLD: 0.5 % (ref 0–1.9)
BILIRUB SERPL-MCNC: 1.9 MG/DL (ref 0.1–1)
BILIRUB UR QL STRIP: NEGATIVE
BODY FLD TYPE: NORMAL
BUN SERPL-MCNC: 28 MG/DL (ref 6–20)
BUN SERPL-MCNC: 28 MG/DL (ref 6–30)
CALCIUM SERPL-MCNC: 8.8 MG/DL (ref 8.7–10.5)
CHLORIDE SERPL-SCNC: 100 MMOL/L (ref 95–110)
CHLORIDE SERPL-SCNC: 101 MMOL/L (ref 95–110)
CLARITY UR REFRACT.AUTO: ABNORMAL
CO2 SERPL-SCNC: 24 MMOL/L (ref 23–29)
COLOR FLD: YELLOW
COLOR UR AUTO: YELLOW
CREAT SERPL-MCNC: 1.7 MG/DL (ref 0.5–1.4)
CREAT SERPL-MCNC: 1.7 MG/DL (ref 0.5–1.4)
CTP QC/QA: YES
DIFFERENTIAL METHOD: ABNORMAL
EOSINOPHIL # BLD AUTO: 0.3 K/UL (ref 0–0.5)
EOSINOPHIL NFR BLD: 2.5 % (ref 0–8)
ERYTHROCYTE [DISTWIDTH] IN BLOOD BY AUTOMATED COUNT: 13.3 % (ref 11.5–14.5)
EST. GFR  (AFRICAN AMERICAN): 40 ML/MIN/1.73 M^2
EST. GFR  (NON AFRICAN AMERICAN): 34.7 ML/MIN/1.73 M^2
GLUCOSE SERPL-MCNC: 133 MG/DL (ref 70–110)
GLUCOSE SERPL-MCNC: 137 MG/DL (ref 70–110)
GLUCOSE UR QL STRIP: NEGATIVE
HCT VFR BLD AUTO: 30.6 % (ref 37–48.5)
HCT VFR BLD CALC: 30 %PCV (ref 36–54)
HGB BLD-MCNC: 10.5 G/DL (ref 12–16)
HGB UR QL STRIP: ABNORMAL
HYALINE CASTS UR QL AUTO: 0 /LPF
IMM GRANULOCYTES # BLD AUTO: 0.04 K/UL (ref 0–0.04)
IMM GRANULOCYTES NFR BLD AUTO: 0.4 % (ref 0–0.5)
KETONES UR QL STRIP: NEGATIVE
LACTATE SERPL-SCNC: 1.8 MMOL/L (ref 0.5–2.2)
LEUKOCYTE ESTERASE UR QL STRIP: NEGATIVE
LIPASE SERPL-CCNC: 9 U/L (ref 4–60)
LYMPHOCYTES # BLD AUTO: 1.5 K/UL (ref 1–4.8)
LYMPHOCYTES NFR BLD: 14.1 % (ref 18–48)
LYMPHOCYTES NFR FLD MANUAL: 9 %
MCH RBC QN AUTO: 32.9 PG (ref 27–31)
MCHC RBC AUTO-ENTMCNC: 34.3 G/DL (ref 32–36)
MCV RBC AUTO: 96 FL (ref 82–98)
MICROSCOPIC COMMENT: ABNORMAL
MONOCYTES # BLD AUTO: 0.8 K/UL (ref 0.3–1)
MONOCYTES NFR BLD: 7.6 % (ref 4–15)
MONOS+MACROS NFR FLD MANUAL: 19 %
NEUTROPHILS # BLD AUTO: 8.1 K/UL (ref 1.8–7.7)
NEUTROPHILS NFR BLD: 74.9 % (ref 38–73)
NEUTROPHILS NFR FLD MANUAL: 72 %
NITRITE UR QL STRIP: NEGATIVE
NRBC BLD-RTO: 0 /100 WBC
PH UR STRIP: 6 [PH] (ref 5–8)
PLATELET # BLD AUTO: 249 K/UL (ref 150–450)
PMV BLD AUTO: 10.5 FL (ref 9.2–12.9)
POC IONIZED CALCIUM: 1.11 MMOL/L (ref 1.06–1.42)
POC TCO2 (MEASURED): 19 MMOL/L (ref 23–29)
POTASSIUM BLD-SCNC: 3 MMOL/L (ref 3.5–5.1)
POTASSIUM SERPL-SCNC: 3 MMOL/L (ref 3.5–5.1)
PROT SERPL-MCNC: 6.6 G/DL (ref 6–8.4)
PROT UR QL STRIP: ABNORMAL
RBC # BLD AUTO: 3.19 M/UL (ref 4–5.4)
RBC #/AREA URNS AUTO: 97 /HPF (ref 0–4)
SAMPLE: ABNORMAL
SARS-COV-2 RDRP RESP QL NAA+PROBE: NEGATIVE
SODIUM BLD-SCNC: 136 MMOL/L (ref 136–145)
SODIUM SERPL-SCNC: 131 MMOL/L (ref 136–145)
SP GR UR STRIP: 1.02 (ref 1–1.03)
SQUAMOUS #/AREA URNS AUTO: 6 /HPF
URN SPEC COLLECT METH UR: ABNORMAL
WBC # BLD AUTO: 10.79 K/UL (ref 3.9–12.7)
WBC # FLD: 1429 /CU MM
WBC #/AREA URNS AUTO: 5 /HPF (ref 0–5)

## 2021-08-03 PROCEDURE — P9047 ALBUMIN (HUMAN), 25%, 50ML: HCPCS | Mod: JG,NTX | Performed by: RADIOLOGY

## 2021-08-03 PROCEDURE — 96365 THER/PROPH/DIAG IV INF INIT: CPT | Mod: NTX

## 2021-08-03 PROCEDURE — 80053 COMPREHEN METABOLIC PANEL: CPT | Mod: NTX | Performed by: EMERGENCY MEDICINE

## 2021-08-03 PROCEDURE — 81025 URINE PREGNANCY TEST: CPT | Mod: NTX | Performed by: EMERGENCY MEDICINE

## 2021-08-03 PROCEDURE — 83690 ASSAY OF LIPASE: CPT | Mod: NTX | Performed by: EMERGENCY MEDICINE

## 2021-08-03 PROCEDURE — 81001 URINALYSIS AUTO W/SCOPE: CPT | Mod: NTX | Performed by: EMERGENCY MEDICINE

## 2021-08-03 PROCEDURE — 63600175 PHARM REV CODE 636 W HCPCS: Mod: JG,NTX | Performed by: RADIOLOGY

## 2021-08-03 PROCEDURE — 89051 BODY FLUID CELL COUNT: CPT | Mod: NTX | Performed by: INTERNAL MEDICINE

## 2021-08-03 PROCEDURE — U0002 COVID-19 LAB TEST NON-CDC: HCPCS | Mod: TXP | Performed by: RADIOLOGY

## 2021-08-03 PROCEDURE — 99285 PR EMERGENCY DEPT VISIT,LEVEL V: ICD-10-PCS | Mod: NTX,,, | Performed by: EMERGENCY MEDICINE

## 2021-08-03 PROCEDURE — 25000003 PHARM REV CODE 250: Mod: TXP | Performed by: RADIOLOGY

## 2021-08-03 PROCEDURE — 63600175 PHARM REV CODE 636 W HCPCS: Mod: NTX | Performed by: EMERGENCY MEDICINE

## 2021-08-03 PROCEDURE — 77080 DXA BONE DENSITY AXIAL: CPT | Mod: 26,TXP,, | Performed by: RADIOLOGY

## 2021-08-03 PROCEDURE — 85025 COMPLETE CBC W/AUTO DIFF WBC: CPT | Mod: NTX | Performed by: EMERGENCY MEDICINE

## 2021-08-03 PROCEDURE — 77080 DXA BONE DENSITY AXIAL: CPT | Mod: TC,TXP

## 2021-08-03 PROCEDURE — 99285 EMERGENCY DEPT VISIT HI MDM: CPT | Mod: NTX,,, | Performed by: EMERGENCY MEDICINE

## 2021-08-03 PROCEDURE — 77080 DEXA BONE DENSITY SPINE HIP: ICD-10-PCS | Mod: 26,TXP,, | Performed by: RADIOLOGY

## 2021-08-03 PROCEDURE — 83605 ASSAY OF LACTIC ACID: CPT | Mod: NTX | Performed by: EMERGENCY MEDICINE

## 2021-08-03 PROCEDURE — 25000003 PHARM REV CODE 250: Mod: NTX | Performed by: PHYSICIAN ASSISTANT

## 2021-08-03 PROCEDURE — 99284 EMERGENCY DEPT VISIT MOD MDM: CPT | Mod: 25,NTX

## 2021-08-03 PROCEDURE — 25000003 PHARM REV CODE 250: Mod: NTX | Performed by: EMERGENCY MEDICINE

## 2021-08-03 RX ORDER — CIPROFLOXACIN 500 MG/1
500 TABLET ORAL EVERY 12 HOURS
Qty: 14 TABLET | Refills: 0 | Status: SHIPPED | OUTPATIENT
Start: 2021-08-03 | End: 2021-08-12

## 2021-08-03 RX ORDER — ALBUMIN HUMAN 250 G/1000ML
SOLUTION INTRAVENOUS
Status: DISCONTINUED | OUTPATIENT
Start: 2021-08-03 | End: 2021-08-03 | Stop reason: HOSPADM

## 2021-08-03 RX ORDER — LIDOCAINE HYDROCHLORIDE 10 MG/ML
INJECTION INFILTRATION; PERINEURAL
Status: DISCONTINUED | OUTPATIENT
Start: 2021-08-03 | End: 2021-08-03 | Stop reason: HOSPADM

## 2021-08-03 RX ORDER — CIPROFLOXACIN 500 MG/1
500 TABLET ORAL
Status: COMPLETED | OUTPATIENT
Start: 2021-08-03 | End: 2021-08-03

## 2021-08-03 RX ORDER — POTASSIUM CHLORIDE 20 MEQ/1
40 TABLET, EXTENDED RELEASE ORAL ONCE
Qty: 2 TABLET | Refills: 0 | Status: SHIPPED | OUTPATIENT
Start: 2021-08-03 | End: 2021-08-03

## 2021-08-03 RX ORDER — POTASSIUM CHLORIDE 20 MEQ/1
40 TABLET, EXTENDED RELEASE ORAL ONCE
Status: COMPLETED | OUTPATIENT
Start: 2021-08-03 | End: 2021-08-03

## 2021-08-03 RX ADMIN — CIPROFLOXACIN 500 MG: 500 TABLET, FILM COATED ORAL at 11:08

## 2021-08-03 RX ADMIN — SODIUM CHLORIDE 500 ML: 0.9 INJECTION, SOLUTION INTRAVENOUS at 09:08

## 2021-08-03 RX ADMIN — POTASSIUM CHLORIDE 40 MEQ: 1500 TABLET, EXTENDED RELEASE ORAL at 10:08

## 2021-08-03 RX ADMIN — CEFTRIAXONE 2 G: 2 INJECTION, SOLUTION INTRAVENOUS at 09:08

## 2021-08-04 ENCOUNTER — TELEPHONE (OUTPATIENT)
Dept: TRANSPLANT | Facility: CLINIC | Age: 51
End: 2021-08-04

## 2021-08-04 DIAGNOSIS — Z01.818 PRE-PROCEDURAL EXAMINATION: Primary | ICD-10-CM

## 2021-08-06 ENCOUNTER — HOSPITAL ENCOUNTER (OUTPATIENT)
Dept: INTERVENTIONAL RADIOLOGY/VASCULAR | Facility: HOSPITAL | Age: 51
Discharge: HOME | End: 2021-08-06
Attending: INTERNAL MEDICINE
Payer: COMMERCIAL

## 2021-08-06 ENCOUNTER — HOSPITAL ENCOUNTER (OUTPATIENT)
Dept: RADIOLOGY | Facility: HOSPITAL | Age: 51
Discharge: HOME | End: 2021-08-06
Attending: INTERNAL MEDICINE
Payer: COMMERCIAL

## 2021-08-06 ENCOUNTER — OFFICE VISIT (OUTPATIENT)
Dept: INFECTIOUS DISEASES | Facility: CLINIC | Age: 51
End: 2021-08-06
Payer: COMMERCIAL

## 2021-08-06 VITALS
HEART RATE: 109 BPM | TEMPERATURE: 99 F | HEIGHT: 63 IN | DIASTOLIC BLOOD PRESSURE: 74 MMHG | WEIGHT: 127 LBS | SYSTOLIC BLOOD PRESSURE: 116 MMHG | BODY MASS INDEX: 22.5 KG/M2

## 2021-08-06 DIAGNOSIS — Z76.82 ORGAN TRANSPLANT CANDIDATE: ICD-10-CM

## 2021-08-06 DIAGNOSIS — K65.2 SBP (SPONTANEOUS BACTERIAL PERITONITIS): ICD-10-CM

## 2021-08-06 DIAGNOSIS — Z23 NEED FOR VACCINATION WITH TWINRIX: ICD-10-CM

## 2021-08-06 DIAGNOSIS — Z23 NEED FOR VACCINATION AGAINST STREPTOCOCCUS PNEUMONIAE: ICD-10-CM

## 2021-08-06 DIAGNOSIS — R18.8 OTHER ASCITES: ICD-10-CM

## 2021-08-06 DIAGNOSIS — Z23 NEED FOR ZOSTER VACCINATION: ICD-10-CM

## 2021-08-06 LAB
APPEARANCE FLD: CLEAR
B-HCG UR QL: NEGATIVE
BODY FLD TYPE: NORMAL
COLOR FLD: YELLOW
CTP QC/QA: YES
EOSINOPHIL NFR FLD MANUAL: 1 %
LYMPHOCYTES NFR FLD MANUAL: 68 %
MESOTHL CELL NFR FLD MANUAL: 15 %
MONOS+MACROS NFR FLD MANUAL: 10 %
NEUTROPHILS NFR FLD MANUAL: 6 %
WBC # FLD: 168 /CU MM

## 2021-08-06 PROCEDURE — 74183 MRI ABD W/O CNTR FLWD CNTR: CPT | Mod: 26,TXP,, | Performed by: RADIOLOGY

## 2021-08-06 PROCEDURE — 25500020 PHARM REV CODE 255: Mod: TXP | Performed by: INTERNAL MEDICINE

## 2021-08-06 PROCEDURE — 74183 MRI ABD W/O CNTR FLWD CNTR: CPT | Mod: TC,TXP

## 2021-08-06 PROCEDURE — 99203 OFFICE O/P NEW LOW 30 MIN: CPT | Mod: S$GLB,TXP,, | Performed by: INTERNAL MEDICINE

## 2021-08-06 PROCEDURE — 99999 PR PBB SHADOW E&M-EST. PATIENT-LVL III: ICD-10-PCS | Mod: PBBFAC,TXP,, | Performed by: INTERNAL MEDICINE

## 2021-08-06 PROCEDURE — 87075 CULTR BACTERIA EXCEPT BLOOD: CPT | Mod: TXP | Performed by: INTERNAL MEDICINE

## 2021-08-06 PROCEDURE — 49083 IR PARACENTESIS WITH IMAGING: ICD-10-PCS | Mod: TXP,,, | Performed by: FAMILY MEDICINE

## 2021-08-06 PROCEDURE — 49083 ABD PARACENTESIS W/IMAGING: CPT | Mod: TXP,,, | Performed by: FAMILY MEDICINE

## 2021-08-06 PROCEDURE — 89051 BODY FLUID CELL COUNT: CPT | Mod: TXP | Performed by: INTERNAL MEDICINE

## 2021-08-06 PROCEDURE — A4215 STERILE NEEDLE: HCPCS | Mod: NTX

## 2021-08-06 PROCEDURE — A9585 GADOBUTROL INJECTION: HCPCS | Mod: TXP | Performed by: INTERNAL MEDICINE

## 2021-08-06 PROCEDURE — 87070 CULTURE OTHR SPECIMN AEROBIC: CPT | Mod: NTX | Performed by: INTERNAL MEDICINE

## 2021-08-06 PROCEDURE — 99203 PR OFFICE/OUTPT VISIT, NEW, LEVL III, 30-44 MIN: ICD-10-PCS | Mod: S$GLB,TXP,, | Performed by: INTERNAL MEDICINE

## 2021-08-06 PROCEDURE — 74183 MRI ABDOMEN W WO CONTRAST: ICD-10-PCS | Mod: 26,TXP,, | Performed by: RADIOLOGY

## 2021-08-06 PROCEDURE — 99999 PR PBB SHADOW E&M-EST. PATIENT-LVL III: CPT | Mod: PBBFAC,TXP,, | Performed by: INTERNAL MEDICINE

## 2021-08-06 PROCEDURE — 49083 ABD PARACENTESIS W/IMAGING: CPT | Mod: TXP | Performed by: RADIOLOGY

## 2021-08-06 RX ORDER — POTASSIUM CHLORIDE 20 MEQ/1
TABLET, EXTENDED RELEASE ORAL
Status: ON HOLD | COMMUNITY
Start: 2021-08-04 | End: 2021-08-23

## 2021-08-06 RX ORDER — GADOBUTROL 604.72 MG/ML
10 INJECTION INTRAVENOUS
Status: COMPLETED | OUTPATIENT
Start: 2021-08-06 | End: 2021-08-06

## 2021-08-06 RX ADMIN — GADOBUTROL 10 ML: 604.72 INJECTION INTRAVENOUS at 04:08

## 2021-08-08 DIAGNOSIS — Z12.11 SPECIAL SCREENING FOR MALIGNANT NEOPLASM OF COLON: Primary | ICD-10-CM

## 2021-08-09 ENCOUNTER — TELEPHONE (OUTPATIENT)
Dept: TRANSPLANT | Facility: CLINIC | Age: 51
End: 2021-08-09

## 2021-08-10 LAB — BACTERIA SPEC AEROBE CULT: NO GROWTH

## 2021-08-11 ENCOUNTER — TELEPHONE (OUTPATIENT)
Dept: TRANSPLANT | Facility: CLINIC | Age: 51
End: 2021-08-11

## 2021-08-11 ENCOUNTER — LAB VISIT (OUTPATIENT)
Dept: LAB | Facility: HOSPITAL | Age: 51
End: 2021-08-11
Attending: INTERNAL MEDICINE
Payer: COMMERCIAL

## 2021-08-11 ENCOUNTER — PATIENT MESSAGE (OUTPATIENT)
Dept: HEPATOLOGY | Facility: CLINIC | Age: 51
End: 2021-08-11

## 2021-08-11 DIAGNOSIS — Z12.11 SPECIAL SCREENING FOR MALIGNANT NEOPLASMS, COLON: Primary | ICD-10-CM

## 2021-08-11 DIAGNOSIS — K74.60 HEPATIC CIRRHOSIS, UNSPECIFIED HEPATIC CIRRHOSIS TYPE, UNSPECIFIED WHETHER ASCITES PRESENT: Primary | ICD-10-CM

## 2021-08-11 DIAGNOSIS — R79.89 ELEVATED LIVER FUNCTION TESTS: ICD-10-CM

## 2021-08-11 DIAGNOSIS — E55.9 VITAMIN D DEFICIENCY: Primary | ICD-10-CM

## 2021-08-11 DIAGNOSIS — N17.9 AKI (ACUTE KIDNEY INJURY): Primary | ICD-10-CM

## 2021-08-11 DIAGNOSIS — Z01.818 PRE-OP TESTING: ICD-10-CM

## 2021-08-11 LAB
ALBUMIN SERPL BCP-MCNC: 3 G/DL (ref 3.5–5.2)
ALP SERPL-CCNC: 94 U/L (ref 55–135)
ALT SERPL W/O P-5'-P-CCNC: 18 U/L (ref 10–44)
ANION GAP SERPL CALC-SCNC: 12 MMOL/L (ref 8–16)
AST SERPL-CCNC: 39 U/L (ref 10–40)
BASOPHILS # BLD AUTO: 0.09 K/UL (ref 0–0.2)
BASOPHILS NFR BLD: 0.6 % (ref 0–1.9)
BILIRUB SERPL-MCNC: 1.9 MG/DL (ref 0.1–1)
BUN SERPL-MCNC: 24 MG/DL (ref 6–20)
CALCIUM SERPL-MCNC: 8.9 MG/DL (ref 8.7–10.5)
CHLORIDE SERPL-SCNC: 101 MMOL/L (ref 95–110)
CO2 SERPL-SCNC: 18 MMOL/L (ref 23–29)
CREAT SERPL-MCNC: 2.1 MG/DL (ref 0.5–1.4)
DIFFERENTIAL METHOD: ABNORMAL
EOSINOPHIL # BLD AUTO: 0.7 K/UL (ref 0–0.5)
EOSINOPHIL NFR BLD: 4.8 % (ref 0–8)
ERYTHROCYTE [DISTWIDTH] IN BLOOD BY AUTOMATED COUNT: 13.6 % (ref 11.5–14.5)
EST. GFR  (AFRICAN AMERICAN): 31 ML/MIN/1.73 M^2
EST. GFR  (NON AFRICAN AMERICAN): 26.9 ML/MIN/1.73 M^2
GLUCOSE SERPL-MCNC: 116 MG/DL (ref 70–110)
HCT VFR BLD AUTO: 35.8 % (ref 37–48.5)
HGB BLD-MCNC: 11.7 G/DL (ref 12–16)
IMM GRANULOCYTES # BLD AUTO: 0.04 K/UL (ref 0–0.04)
IMM GRANULOCYTES NFR BLD AUTO: 0.3 % (ref 0–0.5)
INR PPP: 1.2 (ref 0.8–1.2)
LYMPHOCYTES # BLD AUTO: 2.3 K/UL (ref 1–4.8)
LYMPHOCYTES NFR BLD: 16.2 % (ref 18–48)
MCH RBC QN AUTO: 32.3 PG (ref 27–31)
MCHC RBC AUTO-ENTMCNC: 32.7 G/DL (ref 32–36)
MCV RBC AUTO: 99 FL (ref 82–98)
MONOCYTES # BLD AUTO: 1 K/UL (ref 0.3–1)
MONOCYTES NFR BLD: 6.7 % (ref 4–15)
NEUTROPHILS # BLD AUTO: 10.2 K/UL (ref 1.8–7.7)
NEUTROPHILS NFR BLD: 71.4 % (ref 38–73)
NRBC BLD-RTO: 0 /100 WBC
PLATELET # BLD AUTO: 295 K/UL (ref 150–450)
PMV BLD AUTO: 10 FL (ref 9.2–12.9)
POTASSIUM SERPL-SCNC: 4.1 MMOL/L (ref 3.5–5.1)
PROT SERPL-MCNC: 6.6 G/DL (ref 6–8.4)
PROTHROMBIN TIME: 12.8 SEC (ref 9–12.5)
RBC # BLD AUTO: 3.62 M/UL (ref 4–5.4)
SODIUM SERPL-SCNC: 131 MMOL/L (ref 136–145)
WBC # BLD AUTO: 14.24 K/UL (ref 3.9–12.7)

## 2021-08-11 PROCEDURE — 85025 COMPLETE CBC W/AUTO DIFF WBC: CPT | Mod: TXP | Performed by: INTERNAL MEDICINE

## 2021-08-11 PROCEDURE — 80053 COMPREHEN METABOLIC PANEL: CPT | Mod: NTX | Performed by: INTERNAL MEDICINE

## 2021-08-11 PROCEDURE — 85610 PROTHROMBIN TIME: CPT | Mod: NTX | Performed by: INTERNAL MEDICINE

## 2021-08-11 PROCEDURE — 80321 ALCOHOLS BIOMARKERS 1OR 2: CPT | Mod: NTX | Performed by: INTERNAL MEDICINE

## 2021-08-11 PROCEDURE — 36415 COLL VENOUS BLD VENIPUNCTURE: CPT | Mod: PO,NTX | Performed by: INTERNAL MEDICINE

## 2021-08-11 RX ORDER — SODIUM, POTASSIUM,MAG SULFATES 17.5-3.13G
1 SOLUTION, RECONSTITUTED, ORAL ORAL DAILY
Qty: 1 KIT | Refills: 0 | Status: SHIPPED | OUTPATIENT
Start: 2021-08-11 | End: 2021-08-13

## 2021-08-11 RX ORDER — ERGOCALCIFEROL 1.25 MG/1
50000 CAPSULE ORAL
Qty: 12 CAPSULE | Refills: 1 | Status: SHIPPED | OUTPATIENT
Start: 2021-08-11 | End: 2021-10-02 | Stop reason: SDUPTHER

## 2021-08-12 ENCOUNTER — TELEPHONE (OUTPATIENT)
Dept: HEPATOLOGY | Facility: CLINIC | Age: 51
End: 2021-08-12

## 2021-08-12 ENCOUNTER — LAB VISIT (OUTPATIENT)
Dept: LAB | Facility: HOSPITAL | Age: 51
End: 2021-08-12
Attending: INTERNAL MEDICINE
Payer: COMMERCIAL

## 2021-08-12 DIAGNOSIS — N17.9 AKI (ACUTE KIDNEY INJURY): ICD-10-CM

## 2021-08-12 LAB
ALBUMIN SERPL BCP-MCNC: 3 G/DL (ref 3.5–5.2)
ALP SERPL-CCNC: 88 U/L (ref 55–135)
ALT SERPL W/O P-5'-P-CCNC: 15 U/L (ref 10–44)
ANION GAP SERPL CALC-SCNC: 8 MMOL/L (ref 8–16)
AST SERPL-CCNC: 34 U/L (ref 10–40)
BILIRUB SERPL-MCNC: 1.5 MG/DL (ref 0.1–1)
BUN SERPL-MCNC: 29 MG/DL (ref 6–20)
CALCIUM SERPL-MCNC: 9.1 MG/DL (ref 8.7–10.5)
CHLORIDE SERPL-SCNC: 103 MMOL/L (ref 95–110)
CO2 SERPL-SCNC: 23 MMOL/L (ref 23–29)
CREAT SERPL-MCNC: 2.2 MG/DL (ref 0.5–1.4)
EST. GFR  (AFRICAN AMERICAN): 29.3 ML/MIN/1.73 M^2
EST. GFR  (NON AFRICAN AMERICAN): 25.4 ML/MIN/1.73 M^2
GLUCOSE SERPL-MCNC: 102 MG/DL (ref 70–110)
POTASSIUM SERPL-SCNC: 4.3 MMOL/L (ref 3.5–5.1)
PROT SERPL-MCNC: 6.7 G/DL (ref 6–8.4)
SODIUM SERPL-SCNC: 134 MMOL/L (ref 136–145)

## 2021-08-12 PROCEDURE — 80053 COMPREHEN METABOLIC PANEL: CPT | Mod: TXP | Performed by: INTERNAL MEDICINE

## 2021-08-12 PROCEDURE — 36415 COLL VENOUS BLD VENIPUNCTURE: CPT | Mod: PO,TXP | Performed by: INTERNAL MEDICINE

## 2021-08-12 RX ORDER — CIPROFLOXACIN 500 MG/1
500 TABLET ORAL DAILY
Qty: 30 TABLET | Refills: 11 | Status: ON HOLD | OUTPATIENT
Start: 2021-08-12 | End: 2021-08-16 | Stop reason: SDUPTHER

## 2021-08-13 ENCOUNTER — PATIENT MESSAGE (OUTPATIENT)
Dept: HEPATOLOGY | Facility: CLINIC | Age: 51
End: 2021-08-13

## 2021-08-16 ENCOUNTER — HOSPITAL ENCOUNTER (OUTPATIENT)
Facility: OTHER | Age: 51
Discharge: HOME | End: 2021-08-16
Attending: RADIOLOGY | Admitting: RADIOLOGY
Payer: COMMERCIAL

## 2021-08-16 VITALS
RESPIRATION RATE: 14 BRPM | OXYGEN SATURATION: 99 % | WEIGHT: 127.19 LBS | HEART RATE: 94 BPM | TEMPERATURE: 99 F | SYSTOLIC BLOOD PRESSURE: 105 MMHG | DIASTOLIC BLOOD PRESSURE: 64 MMHG | HEIGHT: 63 IN | BODY MASS INDEX: 22.54 KG/M2

## 2021-08-16 DIAGNOSIS — R18.8 OTHER ASCITES: ICD-10-CM

## 2021-08-16 DIAGNOSIS — R18.8 ASCITES: ICD-10-CM

## 2021-08-16 DIAGNOSIS — K74.60 HEPATIC CIRRHOSIS, UNSPECIFIED HEPATIC CIRRHOSIS TYPE, UNSPECIFIED WHETHER ASCITES PRESENT: ICD-10-CM

## 2021-08-16 LAB
APPEARANCE FLD: NORMAL
BACTERIA SPEC ANAEROBE CULT: NORMAL
BODY FLD TYPE: NORMAL
COLOR FLD: YELLOW
LYMPHOCYTES NFR FLD MANUAL: 32 %
MONOS+MACROS NFR FLD MANUAL: 53 %
NEUTROPHILS NFR FLD MANUAL: 15 %
SARS-COV-2 RDRP RESP QL NAA+PROBE: NEGATIVE
WBC # FLD: 196 /CU MM

## 2021-08-16 PROCEDURE — 25000003 PHARM REV CODE 250: Mod: TXP | Performed by: RADIOLOGY

## 2021-08-16 PROCEDURE — U0002 COVID-19 LAB TEST NON-CDC: HCPCS | Mod: TXP | Performed by: RADIOLOGY

## 2021-08-16 PROCEDURE — 89051 BODY FLUID CELL COUNT: CPT | Mod: TXP | Performed by: INTERNAL MEDICINE

## 2021-08-16 PROCEDURE — C1894 INTRO/SHEATH, NON-LASER: HCPCS | Mod: TXP | Performed by: RADIOLOGY

## 2021-08-16 PROCEDURE — 63600175 PHARM REV CODE 636 W HCPCS: Mod: JG,TXP | Performed by: RADIOLOGY

## 2021-08-16 PROCEDURE — P9047 ALBUMIN (HUMAN), 25%, 50ML: HCPCS | Mod: JG,TXP | Performed by: RADIOLOGY

## 2021-08-16 RX ORDER — CIPROFLOXACIN 500 MG/1
500 TABLET ORAL DAILY
Qty: 30 TABLET | Refills: 11 | Status: ON HOLD | OUTPATIENT
Start: 2021-08-16 | End: 2021-12-14 | Stop reason: SDUPTHER

## 2021-08-16 RX ORDER — LIDOCAINE HYDROCHLORIDE 10 MG/ML
INJECTION INFILTRATION; PERINEURAL
Status: DISCONTINUED | OUTPATIENT
Start: 2021-08-16 | End: 2021-08-16 | Stop reason: HOSPADM

## 2021-08-16 RX ORDER — ALBUMIN HUMAN 250 G/1000ML
SOLUTION INTRAVENOUS
Status: DISCONTINUED | OUTPATIENT
Start: 2021-08-16 | End: 2021-08-16 | Stop reason: HOSPADM

## 2021-08-16 RX ORDER — ZINC SULFATE 50(220)MG
220 CAPSULE ORAL NIGHTLY
COMMUNITY
End: 2021-10-01 | Stop reason: ALTCHOICE

## 2021-08-17 ENCOUNTER — TELEPHONE (OUTPATIENT)
Dept: TRANSPLANT | Facility: CLINIC | Age: 51
End: 2021-08-17

## 2021-08-17 ENCOUNTER — LAB VISIT (OUTPATIENT)
Dept: LAB | Facility: HOSPITAL | Age: 51
End: 2021-08-17
Attending: INTERNAL MEDICINE
Payer: COMMERCIAL

## 2021-08-17 DIAGNOSIS — Z76.82 ORGAN TRANSPLANT CANDIDATE: Primary | ICD-10-CM

## 2021-08-17 DIAGNOSIS — Z76.82 ORGAN TRANSPLANT CANDIDATE: ICD-10-CM

## 2021-08-17 DIAGNOSIS — R79.89 ELEVATED SERUM CREATININE: Primary | ICD-10-CM

## 2021-08-17 DIAGNOSIS — R79.89 ELEVATED SERUM CREATININE: ICD-10-CM

## 2021-08-17 LAB
ANION GAP SERPL CALC-SCNC: 14 MMOL/L (ref 8–16)
BUN SERPL-MCNC: 25 MG/DL (ref 6–20)
CALCIUM SERPL-MCNC: 9.5 MG/DL (ref 8.7–10.5)
CHLORIDE SERPL-SCNC: 101 MMOL/L (ref 95–110)
CO2 SERPL-SCNC: 21 MMOL/L (ref 23–29)
CREAT SERPL-MCNC: 1.6 MG/DL (ref 0.5–1.4)
EST. GFR  (AFRICAN AMERICAN): 43 ML/MIN/1.73 M^2
EST. GFR  (NON AFRICAN AMERICAN): 37.3 ML/MIN/1.73 M^2
GLUCOSE SERPL-MCNC: 98 MG/DL (ref 70–110)
POTASSIUM SERPL-SCNC: 3.7 MMOL/L (ref 3.5–5.1)
SODIUM SERPL-SCNC: 136 MMOL/L (ref 136–145)

## 2021-08-17 PROCEDURE — 86829 HLA CLASS I/II ANTIBODY QUAL: CPT | Mod: PO,TXP | Performed by: INTERNAL MEDICINE

## 2021-08-17 PROCEDURE — 81373 HLA I TYPING 1 LOCUS LR: CPT | Mod: 91,PO,TXP | Performed by: INTERNAL MEDICINE

## 2021-08-17 PROCEDURE — 81376 HLA II TYPING 1 LOCUS LR: CPT | Mod: 91,PO,TXP | Performed by: INTERNAL MEDICINE

## 2021-08-17 PROCEDURE — 81373 HLA I TYPING 1 LOCUS LR: CPT | Mod: PO,TXP | Performed by: INTERNAL MEDICINE

## 2021-08-17 PROCEDURE — 36415 COLL VENOUS BLD VENIPUNCTURE: CPT | Mod: PO,NTX | Performed by: INTERNAL MEDICINE

## 2021-08-17 PROCEDURE — 81376 HLA II TYPING 1 LOCUS LR: CPT | Mod: PO,TXP | Performed by: INTERNAL MEDICINE

## 2021-08-17 PROCEDURE — 80048 BASIC METABOLIC PNL TOTAL CA: CPT | Mod: TXP | Performed by: INTERNAL MEDICINE

## 2021-08-17 PROCEDURE — 86825 HLA X-MATH NON-CYTOTOXIC: CPT | Mod: 91,PO,TXP | Performed by: INTERNAL MEDICINE

## 2021-08-17 PROCEDURE — 86825 HLA X-MATH NON-CYTOTOXIC: CPT | Mod: PO,TXP | Performed by: INTERNAL MEDICINE

## 2021-08-17 PROCEDURE — 86828 HLA CLASS I&II ANTIBODY QUAL: CPT | Mod: 91,PO,TXP | Performed by: INTERNAL MEDICINE

## 2021-08-18 ENCOUNTER — CLINICAL SUPPORT (OUTPATIENT)
Dept: TRANSPLANT | Facility: CLINIC | Age: 51
End: 2021-08-18
Payer: COMMERCIAL

## 2021-08-18 LAB — PHOSPHATIDYLETHANOL (PETH): NEGATIVE NG/ML

## 2021-08-19 ENCOUNTER — LAB VISIT (OUTPATIENT)
Dept: LAB | Facility: HOSPITAL | Age: 51
End: 2021-08-19
Attending: INTERNAL MEDICINE
Payer: COMMERCIAL

## 2021-08-19 DIAGNOSIS — Z76.82 ORGAN TRANSPLANT CANDIDATE: ICD-10-CM

## 2021-08-19 PROCEDURE — 82575 CREATININE CLEARANCE TEST: CPT | Mod: TXP | Performed by: INTERNAL MEDICINE

## 2021-08-20 ENCOUNTER — DOCUMENTATION ONLY (OUTPATIENT)
Dept: TRANSPLANT | Facility: CLINIC | Age: 51
End: 2021-08-20

## 2021-08-20 ENCOUNTER — TELEPHONE (OUTPATIENT)
Dept: UROLOGY | Facility: CLINIC | Age: 51
End: 2021-08-20

## 2021-08-20 DIAGNOSIS — R31.9 HEMATURIA, UNSPECIFIED TYPE: Primary | ICD-10-CM

## 2021-08-20 LAB
CREAT CL/1.73 SQ M 12H UR+SERPL-ARVRAT: 39 ML/MIN (ref 70–110)
CREAT SERPL-MCNC: 1.7 MG/DL (ref 0.5–1.4)
CREAT UR-MCNC: 120 MG/DL (ref 15–325)
CREATININE, URINE (MG/SPEC): 960 MG/SPEC
URINE COLLECTION DURATION: 24 HR
URINE VOLUME: 800 ML

## 2021-08-23 ENCOUNTER — TELEPHONE (OUTPATIENT)
Dept: TRANSPLANT | Facility: CLINIC | Age: 51
End: 2021-08-23

## 2021-08-23 ENCOUNTER — HOSPITAL ENCOUNTER (OUTPATIENT)
Facility: OTHER | Age: 51
Discharge: HOME | End: 2021-08-23
Attending: RADIOLOGY | Admitting: RADIOLOGY
Payer: COMMERCIAL

## 2021-08-23 VITALS
HEART RATE: 92 BPM | TEMPERATURE: 98 F | OXYGEN SATURATION: 100 % | DIASTOLIC BLOOD PRESSURE: 63 MMHG | RESPIRATION RATE: 18 BRPM | SYSTOLIC BLOOD PRESSURE: 101 MMHG

## 2021-08-23 DIAGNOSIS — R18.8 OTHER ASCITES: ICD-10-CM

## 2021-08-23 DIAGNOSIS — Z76.82 MULTIPLE ORGAN TRANSPLANT CANDIDATE: Primary | ICD-10-CM

## 2021-08-23 DIAGNOSIS — R18.8 ASCITES: ICD-10-CM

## 2021-08-23 LAB
APPEARANCE FLD: NORMAL
BODY FLD TYPE: NORMAL
COLOR FLD: YELLOW
LYMPHOCYTES NFR FLD MANUAL: 26 %
MESOTHL CELL NFR FLD MANUAL: 12 %
MONOS+MACROS NFR FLD MANUAL: 55 %
NEUTROPHILS NFR FLD MANUAL: 7 %
SARS-COV-2 RDRP RESP QL NAA+PROBE: NEGATIVE
WBC # FLD: 187 /CU MM

## 2021-08-23 PROCEDURE — 63600175 PHARM REV CODE 636 W HCPCS: Mod: JG,TXP | Performed by: RADIOLOGY

## 2021-08-23 PROCEDURE — 89051 BODY FLUID CELL COUNT: CPT | Mod: TXP | Performed by: INTERNAL MEDICINE

## 2021-08-23 PROCEDURE — P9047 ALBUMIN (HUMAN), 25%, 50ML: HCPCS | Mod: JG,TXP | Performed by: RADIOLOGY

## 2021-08-23 PROCEDURE — C1894 INTRO/SHEATH, NON-LASER: HCPCS | Mod: NTX | Performed by: RADIOLOGY

## 2021-08-23 PROCEDURE — 99000 SPECIMEN HANDLING OFFICE-LAB: CPT | Mod: TXP | Performed by: INTERNAL MEDICINE

## 2021-08-23 PROCEDURE — U0002 COVID-19 LAB TEST NON-CDC: HCPCS | Mod: TXP | Performed by: RADIOLOGY

## 2021-08-23 RX ORDER — VITAMIN A 3000 MCG
10000 CAPSULE ORAL NIGHTLY
Status: ON HOLD | COMMUNITY
End: 2022-06-10 | Stop reason: HOSPADM

## 2021-08-23 RX ORDER — ALBUMIN HUMAN 250 G/1000ML
SOLUTION INTRAVENOUS
Status: DISCONTINUED | OUTPATIENT
Start: 2021-08-23 | End: 2021-08-23 | Stop reason: HOSPADM

## 2021-09-07 ENCOUNTER — HOSPITAL ENCOUNTER (OUTPATIENT)
Dept: INTERVENTIONAL RADIOLOGY/VASCULAR | Facility: HOSPITAL | Age: 51
Discharge: HOME | End: 2021-09-07
Attending: INTERNAL MEDICINE | Admitting: INTERNAL MEDICINE
Payer: COMMERCIAL

## 2021-09-07 VITALS
RESPIRATION RATE: 18 BRPM | DIASTOLIC BLOOD PRESSURE: 61 MMHG | SYSTOLIC BLOOD PRESSURE: 108 MMHG | HEART RATE: 80 BPM | TEMPERATURE: 98 F | OXYGEN SATURATION: 100 %

## 2021-09-07 DIAGNOSIS — R18.8 OTHER ASCITES: ICD-10-CM

## 2021-09-07 LAB
APPEARANCE FLD: CLEAR
BODY FLD TYPE: NORMAL
COLOR FLD: YELLOW
EOSINOPHIL NFR FLD MANUAL: 1 %
LYMPHOCYTES NFR FLD MANUAL: 58 %
MESOTHL CELL NFR FLD MANUAL: 4 %
MONOS+MACROS NFR FLD MANUAL: 14 %
NEUTROPHILS NFR FLD MANUAL: 23 %
WBC # FLD: 97 /CU MM

## 2021-09-07 PROCEDURE — 63600175 PHARM REV CODE 636 W HCPCS: Mod: JG,TXP | Performed by: PHYSICIAN ASSISTANT

## 2021-09-07 PROCEDURE — P9047 ALBUMIN (HUMAN), 25%, 50ML: HCPCS | Mod: JG,TXP | Performed by: PHYSICIAN ASSISTANT

## 2021-09-07 PROCEDURE — 49083 ABD PARACENTESIS W/IMAGING: CPT | Mod: TXP,,, | Performed by: PHYSICIAN ASSISTANT

## 2021-09-07 PROCEDURE — 27100111 IR PARACENTESIS WITH IMAGING: Mod: TXP

## 2021-09-07 PROCEDURE — 89051 BODY FLUID CELL COUNT: CPT | Mod: TXP | Performed by: INTERNAL MEDICINE

## 2021-09-07 PROCEDURE — 49083 ABD PARACENTESIS W/IMAGING: CPT | Mod: TXP | Performed by: RADIOLOGY

## 2021-09-07 PROCEDURE — 49083 IR PARACENTESIS WITH IMAGING: ICD-10-PCS | Mod: TXP,,, | Performed by: PHYSICIAN ASSISTANT

## 2021-09-07 RX ORDER — ALBUMIN HUMAN 250 G/1000ML
50 SOLUTION INTRAVENOUS ONCE AS NEEDED
Status: COMPLETED | OUTPATIENT
Start: 2021-09-07 | End: 2021-09-07

## 2021-09-07 RX ADMIN — ALBUMIN HUMAN 50 G: 0.25 SOLUTION INTRAVENOUS at 09:09

## 2021-09-08 ENCOUNTER — PATIENT MESSAGE (OUTPATIENT)
Dept: UROLOGY | Facility: CLINIC | Age: 51
End: 2021-09-08

## 2021-09-09 ENCOUNTER — TELEPHONE (OUTPATIENT)
Dept: TRANSPLANT | Facility: CLINIC | Age: 51
End: 2021-09-09

## 2021-09-09 ENCOUNTER — OFFICE VISIT (OUTPATIENT)
Dept: TRANSPLANT | Facility: CLINIC | Age: 51
End: 2021-09-09
Payer: COMMERCIAL

## 2021-09-09 ENCOUNTER — TELEPHONE (OUTPATIENT)
Dept: UROLOGY | Facility: CLINIC | Age: 51
End: 2021-09-09

## 2021-09-09 VITALS
TEMPERATURE: 98 F | RESPIRATION RATE: 16 BRPM | OXYGEN SATURATION: 97 % | BODY MASS INDEX: 21.99 KG/M2 | HEART RATE: 116 BPM | HEIGHT: 63 IN | DIASTOLIC BLOOD PRESSURE: 70 MMHG | SYSTOLIC BLOOD PRESSURE: 123 MMHG | WEIGHT: 124.13 LBS

## 2021-09-09 DIAGNOSIS — F10.10 ALCOHOL ABUSE: ICD-10-CM

## 2021-09-09 DIAGNOSIS — R73.01 IMPAIRED FASTING GLUCOSE: ICD-10-CM

## 2021-09-09 DIAGNOSIS — N18.32 STAGE 3B CHRONIC KIDNEY DISEASE: Primary | ICD-10-CM

## 2021-09-09 DIAGNOSIS — K76.6 PORTAL HYPERTENSION: ICD-10-CM

## 2021-09-09 PROCEDURE — 99999 PR PBB SHADOW E&M-EST. PATIENT-LVL IV: ICD-10-PCS | Mod: PBBFAC,TXP,, | Performed by: INTERNAL MEDICINE

## 2021-09-09 PROCEDURE — 99205 OFFICE O/P NEW HI 60 MIN: CPT | Mod: S$GLB,TXP,, | Performed by: INTERNAL MEDICINE

## 2021-09-09 PROCEDURE — 99205 PR OFFICE/OUTPT VISIT, NEW, LEVL V, 60-74 MIN: ICD-10-PCS | Mod: S$GLB,TXP,, | Performed by: INTERNAL MEDICINE

## 2021-09-09 PROCEDURE — 99999 PR PBB SHADOW E&M-EST. PATIENT-LVL IV: CPT | Mod: PBBFAC,TXP,, | Performed by: INTERNAL MEDICINE

## 2021-09-10 ENCOUNTER — ANESTHESIA EVENT (OUTPATIENT)
Dept: SURGERY | Facility: HOSPITAL | Age: 51
End: 2021-09-10
Payer: COMMERCIAL

## 2021-09-10 ENCOUNTER — ANESTHESIA (OUTPATIENT)
Dept: SURGERY | Facility: HOSPITAL | Age: 51
End: 2021-09-10
Payer: COMMERCIAL

## 2021-09-10 ENCOUNTER — HOSPITAL ENCOUNTER (OUTPATIENT)
Facility: HOSPITAL | Age: 51
Discharge: HOME | End: 2021-09-10
Attending: UROLOGY | Admitting: UROLOGY
Payer: COMMERCIAL

## 2021-09-10 ENCOUNTER — TELEPHONE (OUTPATIENT)
Dept: TRANSPLANT | Facility: CLINIC | Age: 51
End: 2021-09-10

## 2021-09-10 VITALS
BODY MASS INDEX: 21.97 KG/M2 | HEIGHT: 63 IN | DIASTOLIC BLOOD PRESSURE: 55 MMHG | RESPIRATION RATE: 18 BRPM | SYSTOLIC BLOOD PRESSURE: 92 MMHG | TEMPERATURE: 97 F | WEIGHT: 124 LBS | HEART RATE: 89 BPM | OXYGEN SATURATION: 99 %

## 2021-09-10 DIAGNOSIS — R31.29 OTHER MICROSCOPIC HEMATURIA: ICD-10-CM

## 2021-09-10 DIAGNOSIS — R31.29 MICROSCOPIC HEMATURIA: Primary | ICD-10-CM

## 2021-09-10 LAB
B-HCG UR QL: NEGATIVE
CTP QC/QA: YES
SARS-COV-2 RDRP RESP QL NAA+PROBE: NEGATIVE

## 2021-09-10 PROCEDURE — D9220A PRA ANESTHESIA: Mod: ANES,NTX,, | Performed by: ANESTHESIOLOGY

## 2021-09-10 PROCEDURE — 63600175 PHARM REV CODE 636 W HCPCS: Mod: NTX | Performed by: STUDENT IN AN ORGANIZED HEALTH CARE EDUCATION/TRAINING PROGRAM

## 2021-09-10 PROCEDURE — 25500020 PHARM REV CODE 255: Mod: NTX | Performed by: UROLOGY

## 2021-09-10 PROCEDURE — C1769 GUIDE WIRE: HCPCS | Mod: NTX | Performed by: UROLOGY

## 2021-09-10 PROCEDURE — 25000003 PHARM REV CODE 250: Mod: TXP | Performed by: STUDENT IN AN ORGANIZED HEALTH CARE EDUCATION/TRAINING PROGRAM

## 2021-09-10 PROCEDURE — 71000044 HC DOSC ROUTINE RECOVERY FIRST HOUR: Mod: TXP | Performed by: UROLOGY

## 2021-09-10 PROCEDURE — C1758 CATHETER, URETERAL: HCPCS | Mod: NTX | Performed by: UROLOGY

## 2021-09-10 PROCEDURE — U0002 COVID-19 LAB TEST NON-CDC: HCPCS | Mod: TXP | Performed by: UROLOGY

## 2021-09-10 PROCEDURE — 37000008 HC ANESTHESIA 1ST 15 MINUTES: Mod: TXP | Performed by: UROLOGY

## 2021-09-10 PROCEDURE — 52005 PR CYSTOURETHROSCOPY,URETER CATHETER: ICD-10-PCS | Mod: NTX,,, | Performed by: UROLOGY

## 2021-09-10 PROCEDURE — D9220A PRA ANESTHESIA: ICD-10-PCS | Mod: CRNA,NTX,, | Performed by: STUDENT IN AN ORGANIZED HEALTH CARE EDUCATION/TRAINING PROGRAM

## 2021-09-10 PROCEDURE — 74420 UROGRAPHY RTRGR +-KUB: CPT | Mod: 26,NTX,, | Performed by: UROLOGY

## 2021-09-10 PROCEDURE — 63600175 PHARM REV CODE 636 W HCPCS: Mod: TXP | Performed by: STUDENT IN AN ORGANIZED HEALTH CARE EDUCATION/TRAINING PROGRAM

## 2021-09-10 PROCEDURE — 71000015 HC POSTOP RECOV 1ST HR: Mod: TXP | Performed by: UROLOGY

## 2021-09-10 PROCEDURE — 81025 URINE PREGNANCY TEST: CPT | Mod: NTX | Performed by: UROLOGY

## 2021-09-10 PROCEDURE — D9220A PRA ANESTHESIA: Mod: CRNA,NTX,, | Performed by: STUDENT IN AN ORGANIZED HEALTH CARE EDUCATION/TRAINING PROGRAM

## 2021-09-10 PROCEDURE — 36000707: Mod: NTX | Performed by: UROLOGY

## 2021-09-10 PROCEDURE — 74420 PR  X-RAY RETROGRADE PYELOGRAM: ICD-10-PCS | Mod: 26,NTX,, | Performed by: UROLOGY

## 2021-09-10 PROCEDURE — 37000009 HC ANESTHESIA EA ADD 15 MINS: Mod: NTX | Performed by: UROLOGY

## 2021-09-10 PROCEDURE — 52005 CYSTO W/URTRL CATHJ: CPT | Mod: NTX,,, | Performed by: UROLOGY

## 2021-09-10 PROCEDURE — D9220A PRA ANESTHESIA: ICD-10-PCS | Mod: ANES,NTX,, | Performed by: ANESTHESIOLOGY

## 2021-09-10 PROCEDURE — 36000706: Mod: NTX | Performed by: UROLOGY

## 2021-09-10 RX ORDER — FENTANYL CITRATE 50 UG/ML
INJECTION, SOLUTION INTRAMUSCULAR; INTRAVENOUS
Status: DISCONTINUED | OUTPATIENT
Start: 2021-09-10 | End: 2021-09-10

## 2021-09-10 RX ORDER — PROPOFOL 10 MG/ML
VIAL (ML) INTRAVENOUS CONTINUOUS PRN
Status: DISCONTINUED | OUTPATIENT
Start: 2021-09-10 | End: 2021-09-10

## 2021-09-10 RX ORDER — CEFAZOLIN SODIUM 1 G/3ML
2 INJECTION, POWDER, FOR SOLUTION INTRAMUSCULAR; INTRAVENOUS
Status: COMPLETED | OUTPATIENT
Start: 2021-09-10 | End: 2021-09-10

## 2021-09-10 RX ORDER — SODIUM CHLORIDE 0.9 % (FLUSH) 0.9 %
3 SYRINGE (ML) INJECTION
Status: DISCONTINUED | OUTPATIENT
Start: 2021-09-10 | End: 2021-09-10 | Stop reason: HOSPADM

## 2021-09-10 RX ORDER — ONDANSETRON 2 MG/ML
INJECTION INTRAMUSCULAR; INTRAVENOUS
Status: DISCONTINUED | OUTPATIENT
Start: 2021-09-10 | End: 2021-09-10

## 2021-09-10 RX ORDER — LIDOCAINE HYDROCHLORIDE 20 MG/ML
INJECTION, SOLUTION EPIDURAL; INFILTRATION; INTRACAUDAL; PERINEURAL
Status: DISCONTINUED | OUTPATIENT
Start: 2021-09-10 | End: 2021-09-10

## 2021-09-10 RX ORDER — MIDAZOLAM HYDROCHLORIDE 1 MG/ML
INJECTION, SOLUTION INTRAMUSCULAR; INTRAVENOUS
Status: DISCONTINUED | OUTPATIENT
Start: 2021-09-10 | End: 2021-09-10

## 2021-09-10 RX ORDER — LIDOCAINE HYDROCHLORIDE 10 MG/ML
1 INJECTION, SOLUTION EPIDURAL; INFILTRATION; INTRACAUDAL; PERINEURAL ONCE
Status: DISCONTINUED | OUTPATIENT
Start: 2021-09-10 | End: 2021-09-10 | Stop reason: HOSPADM

## 2021-09-10 RX ORDER — PROPOFOL 10 MG/ML
VIAL (ML) INTRAVENOUS
Status: DISCONTINUED | OUTPATIENT
Start: 2021-09-10 | End: 2021-09-10

## 2021-09-10 RX ADMIN — LIDOCAINE HYDROCHLORIDE 40 MG: 20 INJECTION, SOLUTION EPIDURAL; INFILTRATION; INTRACAUDAL at 07:09

## 2021-09-10 RX ADMIN — SODIUM CHLORIDE: 9 INJECTION, SOLUTION INTRAVENOUS at 06:09

## 2021-09-10 RX ADMIN — CEFAZOLIN 2 G: 330 INJECTION, POWDER, FOR SOLUTION INTRAMUSCULAR; INTRAVENOUS at 07:09

## 2021-09-10 RX ADMIN — PROPOFOL 30 MG: 10 INJECTION, EMULSION INTRAVENOUS at 07:09

## 2021-09-10 RX ADMIN — Medication 150 MCG/KG/MIN: at 07:09

## 2021-09-10 RX ADMIN — FENTANYL CITRATE 25 MCG: 50 INJECTION INTRAMUSCULAR; INTRAVENOUS at 07:09

## 2021-09-10 RX ADMIN — MIDAZOLAM 2 MG: 1 INJECTION INTRAMUSCULAR; INTRAVENOUS at 06:09

## 2021-09-10 RX ADMIN — ONDANSETRON 4 MG: 2 INJECTION INTRAMUSCULAR; INTRAVENOUS at 07:09

## 2021-09-13 ENCOUNTER — PATIENT MESSAGE (OUTPATIENT)
Dept: ENDOSCOPY | Facility: HOSPITAL | Age: 51
End: 2021-09-13

## 2021-09-13 DIAGNOSIS — Z76.82 ORGAN TRANSPLANT CANDIDATE: Primary | ICD-10-CM

## 2021-09-14 ENCOUNTER — LAB VISIT (OUTPATIENT)
Dept: SURGERY | Facility: CLINIC | Age: 51
End: 2021-09-14
Payer: COMMERCIAL

## 2021-09-14 ENCOUNTER — LAB VISIT (OUTPATIENT)
Dept: LAB | Facility: HOSPITAL | Age: 51
End: 2021-09-14
Attending: INTERNAL MEDICINE
Payer: COMMERCIAL

## 2021-09-14 DIAGNOSIS — R79.89 ELEVATED LIVER FUNCTION TESTS: ICD-10-CM

## 2021-09-14 DIAGNOSIS — Z01.818 PRE-OP TESTING: ICD-10-CM

## 2021-09-14 LAB
ALBUMIN SERPL BCP-MCNC: 3.3 G/DL (ref 3.5–5.2)
ALP SERPL-CCNC: 76 U/L (ref 55–135)
ALT SERPL W/O P-5'-P-CCNC: 7 U/L (ref 10–44)
ANION GAP SERPL CALC-SCNC: 14 MMOL/L (ref 8–16)
AST SERPL-CCNC: 26 U/L (ref 10–40)
BASOPHILS # BLD AUTO: 0.1 K/UL (ref 0–0.2)
BASOPHILS NFR BLD: 1 % (ref 0–1.9)
BILIRUB SERPL-MCNC: 1.4 MG/DL (ref 0.1–1)
BUN SERPL-MCNC: 46 MG/DL (ref 6–20)
CALCIUM SERPL-MCNC: 9.8 MG/DL (ref 8.7–10.5)
CHLORIDE SERPL-SCNC: 102 MMOL/L (ref 95–110)
CO2 SERPL-SCNC: 22 MMOL/L (ref 23–29)
CREAT SERPL-MCNC: 2.4 MG/DL (ref 0.5–1.4)
DIFFERENTIAL METHOD: ABNORMAL
EOSINOPHIL # BLD AUTO: 1 K/UL (ref 0–0.5)
EOSINOPHIL NFR BLD: 10 % (ref 0–8)
ERYTHROCYTE [DISTWIDTH] IN BLOOD BY AUTOMATED COUNT: 13.2 % (ref 11.5–14.5)
EST. GFR  (AFRICAN AMERICAN): 26.3 ML/MIN/1.73 M^2
EST. GFR  (NON AFRICAN AMERICAN): 22.8 ML/MIN/1.73 M^2
GLUCOSE SERPL-MCNC: 92 MG/DL (ref 70–110)
HCT VFR BLD AUTO: 37.9 % (ref 37–48.5)
HGB BLD-MCNC: 12.5 G/DL (ref 12–16)
IMM GRANULOCYTES # BLD AUTO: 0.03 K/UL (ref 0–0.04)
IMM GRANULOCYTES NFR BLD AUTO: 0.3 % (ref 0–0.5)
INR PPP: 1.1 (ref 0.8–1.2)
LYMPHOCYTES # BLD AUTO: 2.1 K/UL (ref 1–4.8)
LYMPHOCYTES NFR BLD: 22.2 % (ref 18–48)
MCH RBC QN AUTO: 30.6 PG (ref 27–31)
MCHC RBC AUTO-ENTMCNC: 33 G/DL (ref 32–36)
MCV RBC AUTO: 93 FL (ref 82–98)
MONOCYTES # BLD AUTO: 0.7 K/UL (ref 0.3–1)
MONOCYTES NFR BLD: 7.6 % (ref 4–15)
NEUTROPHILS # BLD AUTO: 5.7 K/UL (ref 1.8–7.7)
NEUTROPHILS NFR BLD: 58.9 % (ref 38–73)
NRBC BLD-RTO: 0 /100 WBC
PLATELET # BLD AUTO: 303 K/UL (ref 150–450)
PMV BLD AUTO: 10 FL (ref 9.2–12.9)
POTASSIUM SERPL-SCNC: 4 MMOL/L (ref 3.5–5.1)
PROT SERPL-MCNC: 6.9 G/DL (ref 6–8.4)
PROTHROMBIN TIME: 12.2 SEC (ref 9–12.5)
RBC # BLD AUTO: 4.08 M/UL (ref 4–5.4)
SARS-COV-2 RNA RESP QL NAA+PROBE: NOT DETECTED
SODIUM SERPL-SCNC: 138 MMOL/L (ref 136–145)
WBC # BLD AUTO: 9.66 K/UL (ref 3.9–12.7)

## 2021-09-14 PROCEDURE — 85025 COMPLETE CBC W/AUTO DIFF WBC: CPT | Mod: TXP | Performed by: INTERNAL MEDICINE

## 2021-09-14 PROCEDURE — 85610 PROTHROMBIN TIME: CPT | Mod: TXP | Performed by: INTERNAL MEDICINE

## 2021-09-14 PROCEDURE — 80321 ALCOHOLS BIOMARKERS 1OR 2: CPT | Mod: TXP | Performed by: INTERNAL MEDICINE

## 2021-09-14 PROCEDURE — U0005 INFEC AGEN DETEC AMPLI PROBE: HCPCS | Performed by: FAMILY MEDICINE

## 2021-09-14 PROCEDURE — U0003 INFECTIOUS AGENT DETECTION BY NUCLEIC ACID (DNA OR RNA); SEVERE ACUTE RESPIRATORY SYNDROME CORONAVIRUS 2 (SARS-COV-2) (CORONAVIRUS DISEASE [COVID-19]), AMPLIFIED PROBE TECHNIQUE, MAKING USE OF HIGH THROUGHPUT TECHNOLOGIES AS DESCRIBED BY CMS-2020-01-R: HCPCS | Mod: NTX | Performed by: FAMILY MEDICINE

## 2021-09-14 PROCEDURE — 36415 COLL VENOUS BLD VENIPUNCTURE: CPT | Mod: TXP | Performed by: INTERNAL MEDICINE

## 2021-09-14 PROCEDURE — 80053 COMPREHEN METABOLIC PANEL: CPT | Mod: TXP | Performed by: INTERNAL MEDICINE

## 2021-09-15 DIAGNOSIS — Z01.818 PRE-PROCEDURAL EXAMINATION: Primary | ICD-10-CM

## 2021-09-15 LAB
B CELL RESULTS - XM AUTO: NEGATIVE
B MCS AVERAGE - XM AUTO: 3.7
FXMAU TESTING DATE: NORMAL
HLA AB QL: NEGATIVE
HLA AB SERPL: NEGATIVE
HLA DQA1 1: NORMAL
HLA DQA1 2: NORMAL
HLA DRB4 1: NORMAL
HLA-A 1 SERO. EQUIV: 26
HLA-A 1: NORMAL
HLA-A 2 SERO. EQUIV: 32
HLA-A 2: NORMAL
HLA-B 1 SERO. EQUIV: 18
HLA-B 1: NORMAL
HLA-B 2 SERO. EQUIV: 61
HLA-B 2: NORMAL
HLA-BW 1 SERO. EQUIV: 6
HLA-BW 2 SERO. EQUIV: NORMAL
HLA-C 1: NORMAL
HLA-C 2: NORMAL
HLA-CW 1 SERO. EQUIV: 2
HLA-CW 2 SERO. EQUIV: 12
HLA-DPA1 1: NORMAL
HLA-DPA1 2: NORMAL
HLA-DPB1 1: NORMAL
HLA-DPB1 2: NORMAL
HLA-DQ 1 SERO. EQUIV: 7
HLA-DQ 2 SERO. EQUIV: 6
HLA-DQB1 1: NORMAL
HLA-DQB1 2: NORMAL
HLA-DRB1 1 SERO. EQUIV: 11
HLA-DRB1 1: NORMAL
HLA-DRB1 2 SERO. EQUIV: 13
HLA-DRB1 2: NORMAL
HLA-DRB3 1: NORMAL
HLA-DRB3 2: NORMAL
HLA-DRB345 1 SERO. EQUIV: 52
HLA-DRB345 2 SERO. EQUIV: NORMAL
HLA-DRB4 2: NORMAL
HLA-DRB5 1: NORMAL
HLA-DRB5 2: NORMAL
HLATY INTERPRETATION: NORMAL
SCRFL TESTING DATE: NORMAL
SERUM COLLECTION DT - XM AUTO: NORMAL
SERUM COLLECTION DT: NORMAL
SSALL INTERPRETATION: NORMAL
SSALL TESTING DATE: NORMAL
SSDPA TESTING DATE: NORMAL
SSDPB TESTING DATE: NORMAL
SSDQA TESTING DATE: NORMAL
SSDQB TESTING DATE: NORMAL
SSDRB TESTING DATE: NORMAL
SSOA TESTING DATE: NORMAL
SSOB TESTING DATE: NORMAL
SSOC TESTING DATE: NORMAL
SSODR TESTING DATE: NORMAL
T CELL RESULTS - XM AUTO: NEGATIVE
T MCS AVERAGE - XM AUTO: -1

## 2021-09-16 ENCOUNTER — TELEPHONE (OUTPATIENT)
Dept: TRANSPLANT | Facility: CLINIC | Age: 51
End: 2021-09-16

## 2021-09-16 ENCOUNTER — OFFICE VISIT (OUTPATIENT)
Dept: HEPATOLOGY | Facility: CLINIC | Age: 51
End: 2021-09-16
Payer: COMMERCIAL

## 2021-09-16 ENCOUNTER — HOSPITAL ENCOUNTER (OUTPATIENT)
Facility: OTHER | Age: 51
Discharge: HOME | End: 2021-09-16
Attending: RADIOLOGY | Admitting: RADIOLOGY
Payer: COMMERCIAL

## 2021-09-16 VITALS
BODY MASS INDEX: 22.86 KG/M2 | WEIGHT: 129 LBS | TEMPERATURE: 99 F | HEART RATE: 97 BPM | SYSTOLIC BLOOD PRESSURE: 108 MMHG | RESPIRATION RATE: 18 BRPM | OXYGEN SATURATION: 99 % | HEIGHT: 63 IN | DIASTOLIC BLOOD PRESSURE: 62 MMHG

## 2021-09-16 VITALS
BODY MASS INDEX: 23.02 KG/M2 | OXYGEN SATURATION: 100 % | TEMPERATURE: 98 F | DIASTOLIC BLOOD PRESSURE: 69 MMHG | WEIGHT: 129.94 LBS | SYSTOLIC BLOOD PRESSURE: 100 MMHG | HEART RATE: 90 BPM | HEIGHT: 63 IN | RESPIRATION RATE: 17 BRPM

## 2021-09-16 DIAGNOSIS — F10.10 ALCOHOL ABUSE: Primary | ICD-10-CM

## 2021-09-16 DIAGNOSIS — N18.32 STAGE 3B CHRONIC KIDNEY DISEASE: ICD-10-CM

## 2021-09-16 DIAGNOSIS — R18.8 ASCITES: ICD-10-CM

## 2021-09-16 DIAGNOSIS — R18.8 OTHER ASCITES: ICD-10-CM

## 2021-09-16 DIAGNOSIS — K70.31 ALCOHOLIC CIRRHOSIS OF LIVER WITH ASCITES: ICD-10-CM

## 2021-09-16 DIAGNOSIS — K65.2 SBP (SPONTANEOUS BACTERIAL PERITONITIS): ICD-10-CM

## 2021-09-16 PROBLEM — K74.60 CIRRHOSIS: Status: ACTIVE | Noted: 2021-09-16

## 2021-09-16 LAB
APPEARANCE FLD: CLEAR
BODY FLD TYPE: NORMAL
COLOR FLD: YELLOW
LYMPHOCYTES NFR FLD MANUAL: 49 %
MESOTHL CELL NFR FLD MANUAL: 43 %
MONOS+MACROS NFR FLD MANUAL: 1 %
NEUTROPHILS NFR FLD MANUAL: 7 %
WBC # FLD: 158 /CU MM

## 2021-09-16 PROCEDURE — 89051 BODY FLUID CELL COUNT: CPT | Mod: TXP | Performed by: RADIOLOGY

## 2021-09-16 PROCEDURE — 99999 PR PBB SHADOW E&M-EST. PATIENT-LVL IV: ICD-10-PCS | Mod: PBBFAC,TXP,, | Performed by: INTERNAL MEDICINE

## 2021-09-16 PROCEDURE — C1894 INTRO/SHEATH, NON-LASER: HCPCS | Mod: TXP | Performed by: RADIOLOGY

## 2021-09-16 PROCEDURE — 99999 PR PBB SHADOW E&M-EST. PATIENT-LVL IV: CPT | Mod: PBBFAC,TXP,, | Performed by: INTERNAL MEDICINE

## 2021-09-16 PROCEDURE — 99214 PR OFFICE/OUTPT VISIT, EST, LEVL IV, 30-39 MIN: ICD-10-PCS | Mod: S$GLB,TXP,, | Performed by: INTERNAL MEDICINE

## 2021-09-16 PROCEDURE — 63600175 PHARM REV CODE 636 W HCPCS: Mod: JG,NTX | Performed by: RADIOLOGY

## 2021-09-16 PROCEDURE — P9047 ALBUMIN (HUMAN), 25%, 50ML: HCPCS | Mod: JG,NTX | Performed by: RADIOLOGY

## 2021-09-16 PROCEDURE — 99214 OFFICE O/P EST MOD 30 MIN: CPT | Mod: S$GLB,TXP,, | Performed by: INTERNAL MEDICINE

## 2021-09-16 PROCEDURE — 25000003 PHARM REV CODE 250: Mod: NTX | Performed by: RADIOLOGY

## 2021-09-16 RX ORDER — LIDOCAINE HYDROCHLORIDE 10 MG/ML
INJECTION INFILTRATION; PERINEURAL
Status: DISCONTINUED | OUTPATIENT
Start: 2021-09-16 | End: 2021-09-16 | Stop reason: HOSPADM

## 2021-09-16 RX ORDER — ALBUMIN HUMAN 250 G/1000ML
SOLUTION INTRAVENOUS
Status: DISCONTINUED | OUTPATIENT
Start: 2021-09-16 | End: 2021-09-16 | Stop reason: HOSPADM

## 2021-09-17 ENCOUNTER — HOSPITAL ENCOUNTER (OUTPATIENT)
Facility: HOSPITAL | Age: 51
Discharge: HOME | End: 2021-09-17
Attending: INTERNAL MEDICINE | Admitting: INTERNAL MEDICINE
Payer: COMMERCIAL

## 2021-09-17 ENCOUNTER — TELEPHONE (OUTPATIENT)
Dept: GASTROENTEROLOGY | Facility: HOSPITAL | Age: 51
End: 2021-09-17

## 2021-09-17 ENCOUNTER — COMMITTEE REVIEW (OUTPATIENT)
Dept: TRANSPLANT | Facility: CLINIC | Age: 51
End: 2021-09-17

## 2021-09-17 ENCOUNTER — ANESTHESIA EVENT (OUTPATIENT)
Dept: ENDOSCOPY | Facility: HOSPITAL | Age: 51
End: 2021-09-17
Payer: COMMERCIAL

## 2021-09-17 ENCOUNTER — ANESTHESIA (OUTPATIENT)
Dept: ENDOSCOPY | Facility: HOSPITAL | Age: 51
End: 2021-09-17
Payer: COMMERCIAL

## 2021-09-17 VITALS
SYSTOLIC BLOOD PRESSURE: 107 MMHG | OXYGEN SATURATION: 100 % | HEART RATE: 97 BPM | RESPIRATION RATE: 18 BRPM | TEMPERATURE: 97 F | DIASTOLIC BLOOD PRESSURE: 50 MMHG

## 2021-09-17 DIAGNOSIS — Z12.11 COLON CANCER SCREENING: ICD-10-CM

## 2021-09-17 DIAGNOSIS — K70.31 ALCOHOLIC CIRRHOSIS OF LIVER WITH ASCITES: Primary | ICD-10-CM

## 2021-09-17 DIAGNOSIS — K65.2 SBP (SPONTANEOUS BACTERIAL PERITONITIS): Primary | ICD-10-CM

## 2021-09-17 DIAGNOSIS — K70.31 ALCOHOLIC CIRRHOSIS OF LIVER WITH ASCITES: ICD-10-CM

## 2021-09-17 LAB
B-HCG UR QL: NEGATIVE
CTP QC/QA: YES

## 2021-09-17 PROCEDURE — 63600175 PHARM REV CODE 636 W HCPCS: Mod: TXP | Performed by: NURSE ANESTHETIST, CERTIFIED REGISTERED

## 2021-09-17 PROCEDURE — D9220A PRA ANESTHESIA: ICD-10-PCS | Mod: 33,ANES,TXP, | Performed by: ANESTHESIOLOGY

## 2021-09-17 PROCEDURE — 25000003 PHARM REV CODE 250: Mod: TXP | Performed by: NURSE ANESTHETIST, CERTIFIED REGISTERED

## 2021-09-17 PROCEDURE — 43235 PR EGD, FLEX, DIAGNOSTIC: ICD-10-PCS | Mod: 51,TXP,, | Performed by: INTERNAL MEDICINE

## 2021-09-17 PROCEDURE — G0121 COLON CA SCRN NOT HI RSK IND: HCPCS | Mod: TXP,,, | Performed by: INTERNAL MEDICINE

## 2021-09-17 PROCEDURE — G0121 COLON CA SCRN NOT HI RSK IND: HCPCS | Mod: TXP | Performed by: INTERNAL MEDICINE

## 2021-09-17 PROCEDURE — 43235 EGD DIAGNOSTIC BRUSH WASH: CPT | Mod: 51,TXP,, | Performed by: INTERNAL MEDICINE

## 2021-09-17 PROCEDURE — D9220A PRA ANESTHESIA: ICD-10-PCS | Mod: 33,CRNA,TXP, | Performed by: NURSE ANESTHETIST, CERTIFIED REGISTERED

## 2021-09-17 PROCEDURE — 43235 EGD DIAGNOSTIC BRUSH WASH: CPT | Mod: TXP | Performed by: INTERNAL MEDICINE

## 2021-09-17 PROCEDURE — D9220A PRA ANESTHESIA: Mod: 33,ANES,TXP, | Performed by: ANESTHESIOLOGY

## 2021-09-17 PROCEDURE — G0121 COLON CA SCRN NOT HI RSK IND: ICD-10-PCS | Mod: TXP,,, | Performed by: INTERNAL MEDICINE

## 2021-09-17 PROCEDURE — 37000009 HC ANESTHESIA EA ADD 15 MINS: Mod: TXP | Performed by: INTERNAL MEDICINE

## 2021-09-17 PROCEDURE — 37000008 HC ANESTHESIA 1ST 15 MINUTES: Mod: TXP | Performed by: INTERNAL MEDICINE

## 2021-09-17 PROCEDURE — D9220A PRA ANESTHESIA: Mod: 33,CRNA,TXP, | Performed by: NURSE ANESTHETIST, CERTIFIED REGISTERED

## 2021-09-17 RX ORDER — PROPOFOL 10 MG/ML
VIAL (ML) INTRAVENOUS
Status: DISCONTINUED | OUTPATIENT
Start: 2021-09-17 | End: 2021-09-17

## 2021-09-17 RX ORDER — LIDOCAINE HYDROCHLORIDE 20 MG/ML
INJECTION INTRAVENOUS
Status: DISCONTINUED | OUTPATIENT
Start: 2021-09-17 | End: 2021-09-17

## 2021-09-17 RX ORDER — PROPOFOL 10 MG/ML
VIAL (ML) INTRAVENOUS CONTINUOUS PRN
Status: DISCONTINUED | OUTPATIENT
Start: 2021-09-17 | End: 2021-09-17

## 2021-09-17 RX ORDER — SODIUM CHLORIDE 9 MG/ML
INJECTION, SOLUTION INTRAVENOUS CONTINUOUS
Status: DISCONTINUED | OUTPATIENT
Start: 2021-09-17 | End: 2021-09-17 | Stop reason: HOSPADM

## 2021-09-17 RX ADMIN — Medication 30 MG: at 09:09

## 2021-09-17 RX ADMIN — Medication 100 MG: at 09:09

## 2021-09-17 RX ADMIN — SODIUM CHLORIDE: 0.9 INJECTION, SOLUTION INTRAVENOUS at 09:09

## 2021-09-17 RX ADMIN — Medication 70 MG: at 09:09

## 2021-09-17 RX ADMIN — GLYCOPYRROLATE 0.2 MG: 0.2 INJECTION, SOLUTION INTRAMUSCULAR; INTRAVITREAL at 09:09

## 2021-09-17 RX ADMIN — PROPOFOL 200 MCG/KG/MIN: 10 INJECTION, EMULSION INTRAVENOUS at 09:09

## 2021-09-17 RX ADMIN — LIDOCAINE HYDROCHLORIDE 100 MG: 20 INJECTION, SOLUTION INTRAVENOUS at 09:09

## 2021-09-21 DIAGNOSIS — E03.9 HYPOTHYROIDISM, UNSPECIFIED TYPE: ICD-10-CM

## 2021-09-23 ENCOUNTER — TELEPHONE (OUTPATIENT)
Dept: TRANSPLANT | Facility: CLINIC | Age: 51
End: 2021-09-23

## 2021-09-23 ENCOUNTER — OFFICE VISIT (OUTPATIENT)
Dept: PSYCHIATRY | Facility: CLINIC | Age: 51
End: 2021-09-23
Payer: COMMERCIAL

## 2021-09-23 VITALS
BODY MASS INDEX: 23.01 KG/M2 | DIASTOLIC BLOOD PRESSURE: 74 MMHG | HEART RATE: 99 BPM | SYSTOLIC BLOOD PRESSURE: 117 MMHG | HEIGHT: 63 IN | WEIGHT: 129.88 LBS

## 2021-09-23 DIAGNOSIS — K70.31 ALCOHOLIC CIRRHOSIS OF LIVER WITH ASCITES: ICD-10-CM

## 2021-09-23 DIAGNOSIS — F17.200 NICOTINE USE DISORDER: ICD-10-CM

## 2021-09-23 DIAGNOSIS — Z01.818 ENCOUNTER FOR PRE-TRANSPLANT EVALUATION FOR LIVER TRANSPLANT: Primary | ICD-10-CM

## 2021-09-23 DIAGNOSIS — F10.21 ALCOHOL USE DISORDER, SEVERE, IN EARLY REMISSION: Chronic | ICD-10-CM

## 2021-09-23 PROCEDURE — 99214 OFFICE O/P EST MOD 30 MIN: CPT | Mod: S$GLB,TXP,, | Performed by: PSYCHIATRY & NEUROLOGY

## 2021-09-23 PROCEDURE — 99999 PR PBB SHADOW E&M-EST. PATIENT-LVL III: CPT | Mod: PBBFAC,TXP,, | Performed by: PSYCHIATRY & NEUROLOGY

## 2021-09-23 PROCEDURE — 99999 PR PBB SHADOW E&M-EST. PATIENT-LVL III: ICD-10-PCS | Mod: PBBFAC,TXP,, | Performed by: PSYCHIATRY & NEUROLOGY

## 2021-09-23 PROCEDURE — 99214 PR OFFICE/OUTPT VISIT, EST, LEVL IV, 30-39 MIN: ICD-10-PCS | Mod: S$GLB,TXP,, | Performed by: PSYCHIATRY & NEUROLOGY

## 2021-09-24 ENCOUNTER — PATIENT MESSAGE (OUTPATIENT)
Dept: INTERNAL MEDICINE | Facility: CLINIC | Age: 51
End: 2021-09-24

## 2021-09-24 ENCOUNTER — HOSPITAL ENCOUNTER (OUTPATIENT)
Facility: OTHER | Age: 51
Discharge: HOME | End: 2021-09-24
Attending: RADIOLOGY | Admitting: RADIOLOGY
Payer: COMMERCIAL

## 2021-09-24 VITALS
BODY MASS INDEX: 22.86 KG/M2 | RESPIRATION RATE: 16 BRPM | HEART RATE: 90 BPM | HEIGHT: 63 IN | DIASTOLIC BLOOD PRESSURE: 53 MMHG | TEMPERATURE: 98 F | WEIGHT: 129 LBS | SYSTOLIC BLOOD PRESSURE: 96 MMHG | OXYGEN SATURATION: 100 %

## 2021-09-24 DIAGNOSIS — R18.8 ASCITES: ICD-10-CM

## 2021-09-24 DIAGNOSIS — R18.8 OTHER ASCITES: ICD-10-CM

## 2021-09-24 LAB
PHOSPHATIDYLETHANOL (PETH): NEGATIVE NG/ML
SARS-COV-2 RDRP RESP QL NAA+PROBE: NEGATIVE

## 2021-09-24 PROCEDURE — C1894 INTRO/SHEATH, NON-LASER: HCPCS | Mod: NTX | Performed by: RADIOLOGY

## 2021-09-24 PROCEDURE — U0002 COVID-19 LAB TEST NON-CDC: HCPCS | Mod: NTX | Performed by: RADIOLOGY

## 2021-09-24 PROCEDURE — 25000003 PHARM REV CODE 250: Mod: NTX | Performed by: RADIOLOGY

## 2021-09-24 PROCEDURE — 63600175 PHARM REV CODE 636 W HCPCS: Mod: JG,TXP | Performed by: RADIOLOGY

## 2021-09-24 PROCEDURE — P9047 ALBUMIN (HUMAN), 25%, 50ML: HCPCS | Mod: JG,TXP | Performed by: RADIOLOGY

## 2021-09-24 RX ORDER — LIDOCAINE HYDROCHLORIDE 10 MG/ML
INJECTION INFILTRATION; PERINEURAL
Status: DISCONTINUED | OUTPATIENT
Start: 2021-09-24 | End: 2021-09-24 | Stop reason: HOSPADM

## 2021-09-24 RX ORDER — ALBUMIN HUMAN 250 G/1000ML
SOLUTION INTRAVENOUS
Status: DISCONTINUED | OUTPATIENT
Start: 2021-09-24 | End: 2021-09-24 | Stop reason: HOSPADM

## 2021-09-25 RX ORDER — LEVOTHYROXINE SODIUM 50 UG/1
TABLET ORAL
Qty: 90 TABLET | Refills: 3 | Status: SHIPPED | OUTPATIENT
Start: 2021-09-25 | End: 2022-06-24 | Stop reason: SDUPTHER

## 2021-09-30 ENCOUNTER — PATIENT OUTREACH (OUTPATIENT)
Dept: ADMINISTRATIVE | Facility: OTHER | Age: 51
End: 2021-09-30

## 2021-10-01 ENCOUNTER — OFFICE VISIT (OUTPATIENT)
Dept: NEPHROLOGY | Facility: CLINIC | Age: 51
End: 2021-10-01
Payer: COMMERCIAL

## 2021-10-01 ENCOUNTER — HOSPITAL ENCOUNTER (OUTPATIENT)
Dept: RADIOLOGY | Facility: HOSPITAL | Age: 51
Discharge: HOME | End: 2021-10-01
Attending: INTERNAL MEDICINE
Payer: COMMERCIAL

## 2021-10-01 VITALS
OXYGEN SATURATION: 100 % | HEART RATE: 105 BPM | DIASTOLIC BLOOD PRESSURE: 82 MMHG | WEIGHT: 131.81 LBS | HEIGHT: 63 IN | BODY MASS INDEX: 23.36 KG/M2 | SYSTOLIC BLOOD PRESSURE: 110 MMHG

## 2021-10-01 DIAGNOSIS — Z76.82 ORGAN TRANSPLANT CANDIDATE: ICD-10-CM

## 2021-10-01 DIAGNOSIS — E55.9 VITAMIN D DEFICIENCY: ICD-10-CM

## 2021-10-01 DIAGNOSIS — N18.32 STAGE 3B CHRONIC KIDNEY DISEASE: Primary | ICD-10-CM

## 2021-10-01 DIAGNOSIS — E87.20 METABOLIC ACIDOSIS: ICD-10-CM

## 2021-10-01 PROCEDURE — 99999 PR PBB SHADOW E&M-EST. PATIENT-LVL IV: ICD-10-PCS | Mod: PBBFAC,,, | Performed by: INTERNAL MEDICINE

## 2021-10-01 PROCEDURE — 76770 US EXAM ABDO BACK WALL COMP: CPT | Mod: 59,TC,TXP

## 2021-10-01 PROCEDURE — 99204 PR OFFICE/OUTPT VISIT, NEW, LEVL IV, 45-59 MIN: ICD-10-PCS | Mod: S$GLB,,, | Performed by: INTERNAL MEDICINE

## 2021-10-01 PROCEDURE — 99204 OFFICE O/P NEW MOD 45 MIN: CPT | Mod: S$GLB,,, | Performed by: INTERNAL MEDICINE

## 2021-10-01 PROCEDURE — 93978 US DOPPLER ILIACS BILATERAL PRE TRANSPLANT: ICD-10-PCS | Mod: 26,TXP,, | Performed by: RADIOLOGY

## 2021-10-01 PROCEDURE — 99999 PR PBB SHADOW E&M-EST. PATIENT-LVL IV: CPT | Mod: PBBFAC,,, | Performed by: INTERNAL MEDICINE

## 2021-10-01 PROCEDURE — 93978 VASCULAR STUDY: CPT | Mod: 26,TXP,, | Performed by: RADIOLOGY

## 2021-10-01 PROCEDURE — 76770 US EXAM ABDO BACK WALL COMP: CPT | Mod: 26,59,TXP, | Performed by: RADIOLOGY

## 2021-10-01 PROCEDURE — 76770 US RETROPERITONEAL COMPLETE: ICD-10-PCS | Mod: 26,59,TXP, | Performed by: RADIOLOGY

## 2021-10-01 PROCEDURE — 93978 VASCULAR STUDY: CPT | Mod: TC,TXP

## 2021-10-02 RX ORDER — SODIUM BICARBONATE 650 MG/1
650 TABLET ORAL 2 TIMES DAILY
Qty: 60 TABLET | Refills: 5 | Status: SHIPPED | OUTPATIENT
Start: 2021-10-02 | End: 2022-03-04

## 2021-10-02 RX ORDER — ERGOCALCIFEROL 1.25 MG/1
50000 CAPSULE ORAL
Qty: 14 CAPSULE | Refills: 0 | Status: SHIPPED | OUTPATIENT
Start: 2021-10-02 | End: 2022-02-24 | Stop reason: ALTCHOICE

## 2021-10-04 ENCOUNTER — HOSPITAL ENCOUNTER (OUTPATIENT)
Dept: INTERVENTIONAL RADIOLOGY/VASCULAR | Facility: HOSPITAL | Age: 51
Discharge: HOME | End: 2021-10-04
Attending: INTERNAL MEDICINE
Payer: COMMERCIAL

## 2021-10-04 DIAGNOSIS — R18.8 OTHER ASCITES: ICD-10-CM

## 2021-10-04 LAB
ALBUMIN FLD-MCNC: 1.2 G/DL
APPEARANCE FLD: CLEAR
BODY FLD TYPE: NORMAL
COLOR FLD: YELLOW
LYMPHOCYTES NFR FLD MANUAL: 66 %
MONOS+MACROS NFR FLD MANUAL: 15 %
NEUTROPHILS NFR FLD MANUAL: 19 %
PROT FLD-MCNC: 1.8 G/DL
SPECIMEN SOURCE: NORMAL
SPECIMEN SOURCE: NORMAL
WBC # FLD: 81 /CU MM

## 2021-10-04 PROCEDURE — P9047 ALBUMIN (HUMAN), 25%, 50ML: HCPCS | Mod: JG,TXP | Performed by: INTERNAL MEDICINE

## 2021-10-04 PROCEDURE — 89051 BODY FLUID CELL COUNT: CPT | Mod: NTX | Performed by: INTERNAL MEDICINE

## 2021-10-04 PROCEDURE — 49083 ABD PARACENTESIS W/IMAGING: CPT | Mod: TXP,,, | Performed by: FAMILY MEDICINE

## 2021-10-04 PROCEDURE — 49083 ABD PARACENTESIS W/IMAGING: CPT | Mod: NTX | Performed by: RADIOLOGY

## 2021-10-04 PROCEDURE — 63600175 PHARM REV CODE 636 W HCPCS: Mod: JG,TXP | Performed by: INTERNAL MEDICINE

## 2021-10-04 PROCEDURE — 49083 IR PARACENTESIS WITH IMAGING: ICD-10-PCS | Mod: TXP,,, | Performed by: FAMILY MEDICINE

## 2021-10-04 PROCEDURE — 82042 OTHER SOURCE ALBUMIN QUAN EA: CPT | Mod: NTX | Performed by: INTERNAL MEDICINE

## 2021-10-04 PROCEDURE — 87070 CULTURE OTHR SPECIMN AEROBIC: CPT | Mod: TXP | Performed by: INTERNAL MEDICINE

## 2021-10-04 PROCEDURE — 27100111 IR PARACENTESIS WITH IMAGING: Mod: TXP

## 2021-10-04 PROCEDURE — 84157 ASSAY OF PROTEIN OTHER: CPT | Mod: TXP | Performed by: INTERNAL MEDICINE

## 2021-10-04 PROCEDURE — 87075 CULTR BACTERIA EXCEPT BLOOD: CPT | Mod: NTX | Performed by: INTERNAL MEDICINE

## 2021-10-04 RX ORDER — ALBUMIN HUMAN 250 G/1000ML
12.5 SOLUTION INTRAVENOUS ONCE
Status: COMPLETED | OUTPATIENT
Start: 2021-10-04 | End: 2021-10-04

## 2021-10-04 RX ORDER — ALBUMIN HUMAN 250 G/1000ML
50 SOLUTION INTRAVENOUS ONCE
Status: COMPLETED | OUTPATIENT
Start: 2021-10-04 | End: 2021-10-04

## 2021-10-04 RX ORDER — ALBUMIN HUMAN 250 G/1000ML
12.5 SOLUTION INTRAVENOUS ONCE
Status: DISCONTINUED | OUTPATIENT
Start: 2021-10-04 | End: 2021-10-05 | Stop reason: HOSPADM

## 2021-10-04 RX ADMIN — ALBUMIN HUMAN 50 G: 0.25 SOLUTION INTRAVENOUS at 11:10

## 2021-10-04 RX ADMIN — ALBUMIN (HUMAN) 12.5 G: 12.5 SOLUTION INTRAVENOUS at 11:10

## 2021-10-06 ENCOUNTER — TELEPHONE (OUTPATIENT)
Dept: NEPHROLOGY | Facility: CLINIC | Age: 51
End: 2021-10-06

## 2021-10-06 DIAGNOSIS — N17.9 AKI (ACUTE KIDNEY INJURY): Primary | ICD-10-CM

## 2021-10-07 ENCOUNTER — LAB VISIT (OUTPATIENT)
Dept: LAB | Facility: HOSPITAL | Age: 51
End: 2021-10-07
Attending: INTERNAL MEDICINE
Payer: COMMERCIAL

## 2021-10-07 DIAGNOSIS — N17.9 AKI (ACUTE KIDNEY INJURY): ICD-10-CM

## 2021-10-07 LAB
APTT BLDCRRT: 29.2 SEC (ref 21–32)
C3 SERPL-MCNC: 90 MG/DL (ref 50–180)
C4 SERPL-MCNC: 22 MG/DL (ref 11–44)
INR PPP: 1.1 (ref 0.8–1.2)
PROTHROMBIN TIME: 12.3 SEC (ref 9–12.5)

## 2021-10-07 PROCEDURE — 86334 IMMUNOFIX E-PHORESIS SERUM: CPT | Mod: NTX | Performed by: INTERNAL MEDICINE

## 2021-10-07 PROCEDURE — 83516 IMMUNOASSAY NONANTIBODY: CPT | Mod: 59,TXP | Performed by: INTERNAL MEDICINE

## 2021-10-07 PROCEDURE — 83520 IMMUNOASSAY QUANT NOS NONAB: CPT | Mod: NTX | Performed by: INTERNAL MEDICINE

## 2021-10-07 PROCEDURE — 86334 IMMUNOFIX E-PHORESIS SERUM: CPT | Mod: 26,NTX,, | Performed by: PATHOLOGY

## 2021-10-07 PROCEDURE — 85610 PROTHROMBIN TIME: CPT | Mod: NTX | Performed by: INTERNAL MEDICINE

## 2021-10-07 PROCEDURE — 85730 THROMBOPLASTIN TIME PARTIAL: CPT | Mod: NTX | Performed by: INTERNAL MEDICINE

## 2021-10-07 PROCEDURE — 86334 PATHOLOGIST INTERPRETATION IFE: ICD-10-PCS | Mod: 26,NTX,, | Performed by: PATHOLOGY

## 2021-10-07 PROCEDURE — 84165 PROTEIN E-PHORESIS SERUM: CPT | Mod: NTX | Performed by: INTERNAL MEDICINE

## 2021-10-07 PROCEDURE — 86160 COMPLEMENT ANTIGEN: CPT | Mod: TXP | Performed by: INTERNAL MEDICINE

## 2021-10-07 PROCEDURE — 83520 IMMUNOASSAY QUANT NOS NONAB: CPT | Mod: 59,TXP | Performed by: INTERNAL MEDICINE

## 2021-10-07 PROCEDURE — 84165 PROTEIN E-PHORESIS SERUM: CPT | Mod: 26,NTX,, | Performed by: PATHOLOGY

## 2021-10-07 PROCEDURE — 86160 COMPLEMENT ANTIGEN: CPT | Mod: 59,TXP | Performed by: INTERNAL MEDICINE

## 2021-10-07 PROCEDURE — 84165 PATHOLOGIST INTERPRETATION SPE: ICD-10-PCS | Mod: 26,NTX,, | Performed by: PATHOLOGY

## 2021-10-07 PROCEDURE — 82595 ASSAY OF CRYOGLOBULIN: CPT | Mod: TXP | Performed by: INTERNAL MEDICINE

## 2021-10-07 PROCEDURE — 83516 IMMUNOASSAY NONANTIBODY: CPT | Mod: TXP | Performed by: INTERNAL MEDICINE

## 2021-10-08 LAB
ALBUMIN SERPL ELPH-MCNC: 3.47 G/DL (ref 3.35–5.55)
ALPHA1 GLOB SERPL ELPH-MCNC: 0.33 G/DL (ref 0.17–0.41)
ALPHA2 GLOB SERPL ELPH-MCNC: 0.68 G/DL (ref 0.43–0.99)
B-GLOBULIN SERPL ELPH-MCNC: 0.64 G/DL (ref 0.5–1.1)
BACTERIA SPEC AEROBE CULT: NO GROWTH
GAMMA GLOB SERPL ELPH-MCNC: 0.78 G/DL (ref 0.67–1.58)
INTERPRETATION SERPL IFE-IMP: NORMAL
KAPPA LC SER QL IA: 9.12 MG/DL (ref 0.33–1.94)
KAPPA LC/LAMBDA SER IA: 2.08 (ref 0.26–1.65)
LAMBDA LC SER QL IA: 4.39 MG/DL (ref 0.57–2.63)
PROT SERPL-MCNC: 5.9 G/DL (ref 6–8.4)

## 2021-10-09 LAB
BM IGG SER-ACNC: <0.2 U
PROTEINASE3 IGG SER-ACNC: <0.2 U

## 2021-10-11 ENCOUNTER — HOSPITAL ENCOUNTER (OUTPATIENT)
Dept: INTERVENTIONAL RADIOLOGY/VASCULAR | Facility: HOSPITAL | Age: 51
Discharge: HOME | End: 2021-10-11
Attending: INTERNAL MEDICINE
Payer: COMMERCIAL

## 2021-10-11 DIAGNOSIS — R18.8 OTHER ASCITES: ICD-10-CM

## 2021-10-11 LAB
MYELOPEROXIDASE AB SER-ACNC: 2 UNITS
PATHOLOGIST INTERPRETATION IFE: NORMAL
PATHOLOGIST INTERPRETATION SPE: NORMAL

## 2021-10-11 PROCEDURE — 27100111 IR PARACENTESIS WITH IMAGING: Mod: NTX

## 2021-10-11 PROCEDURE — 49083 IR PARACENTESIS WITH IMAGING: ICD-10-PCS | Mod: TXP,,, | Performed by: FAMILY MEDICINE

## 2021-10-11 PROCEDURE — 49083 ABD PARACENTESIS W/IMAGING: CPT | Mod: NTX | Performed by: RADIOLOGY

## 2021-10-11 PROCEDURE — 49083 ABD PARACENTESIS W/IMAGING: CPT | Mod: TXP,,, | Performed by: FAMILY MEDICINE

## 2021-10-11 PROCEDURE — 63600175 PHARM REV CODE 636 W HCPCS: Mod: JG,NTX | Performed by: INTERNAL MEDICINE

## 2021-10-11 PROCEDURE — P9047 ALBUMIN (HUMAN), 25%, 50ML: HCPCS | Mod: JG,NTX | Performed by: INTERNAL MEDICINE

## 2021-10-11 RX ORDER — ALBUMIN HUMAN 250 G/1000ML
25 SOLUTION INTRAVENOUS ONCE
Status: COMPLETED | OUTPATIENT
Start: 2021-10-11 | End: 2021-10-11

## 2021-10-11 RX ADMIN — ALBUMIN HUMAN 25 G: 0.25 SOLUTION INTRAVENOUS at 11:10

## 2021-10-13 LAB — BACTERIA SPEC ANAEROBE CULT: NORMAL

## 2021-10-13 PROCEDURE — 86833 HLA CLASS II HIGH DEFIN QUAL: CPT | Mod: PO,TXP | Performed by: INTERNAL MEDICINE

## 2021-10-13 PROCEDURE — 86977 RBC SERUM PRETX INCUBJ/INHIB: CPT | Mod: 59,PO,TXP | Performed by: INTERNAL MEDICINE

## 2021-10-13 PROCEDURE — 86832 HLA CLASS I HIGH DEFIN QUAL: CPT | Mod: PO,TXP | Performed by: INTERNAL MEDICINE

## 2021-10-14 ENCOUNTER — TELEPHONE (OUTPATIENT)
Dept: HEMATOLOGY/ONCOLOGY | Facility: CLINIC | Age: 51
End: 2021-10-14

## 2021-10-14 ENCOUNTER — OFFICE VISIT (OUTPATIENT)
Dept: HEPATOLOGY | Facility: CLINIC | Age: 51
End: 2021-10-14
Payer: COMMERCIAL

## 2021-10-14 ENCOUNTER — PATIENT MESSAGE (OUTPATIENT)
Dept: ENDOSCOPY | Facility: HOSPITAL | Age: 51
End: 2021-10-14
Payer: COMMERCIAL

## 2021-10-14 ENCOUNTER — TELEPHONE (OUTPATIENT)
Dept: TRANSPLANT | Facility: CLINIC | Age: 51
End: 2021-10-14

## 2021-10-14 ENCOUNTER — TELEPHONE (OUTPATIENT)
Dept: ENDOSCOPY | Facility: HOSPITAL | Age: 51
End: 2021-10-14

## 2021-10-14 ENCOUNTER — LAB VISIT (OUTPATIENT)
Dept: LAB | Facility: HOSPITAL | Age: 51
End: 2021-10-14
Attending: INTERNAL MEDICINE
Payer: COMMERCIAL

## 2021-10-14 VITALS
BODY MASS INDEX: 21.84 KG/M2 | HEIGHT: 63 IN | HEART RATE: 93 BPM | RESPIRATION RATE: 17 BRPM | DIASTOLIC BLOOD PRESSURE: 58 MMHG | WEIGHT: 123.25 LBS | TEMPERATURE: 98 F | SYSTOLIC BLOOD PRESSURE: 109 MMHG | OXYGEN SATURATION: 99 %

## 2021-10-14 DIAGNOSIS — R79.89 ELEVATED LIVER FUNCTION TESTS: ICD-10-CM

## 2021-10-14 DIAGNOSIS — R18.8 OTHER ASCITES: ICD-10-CM

## 2021-10-14 DIAGNOSIS — F10.21 ALCOHOL USE DISORDER, SEVERE, IN EARLY REMISSION: Chronic | ICD-10-CM

## 2021-10-14 DIAGNOSIS — K74.60 HEPATIC CIRRHOSIS, UNSPECIFIED HEPATIC CIRRHOSIS TYPE, UNSPECIFIED WHETHER ASCITES PRESENT: Primary | ICD-10-CM

## 2021-10-14 DIAGNOSIS — D89.89 KAPPA LIGHT CHAIN DISEASE: ICD-10-CM

## 2021-10-14 DIAGNOSIS — K70.31 ALCOHOLIC CIRRHOSIS OF LIVER WITH ASCITES: Primary | ICD-10-CM

## 2021-10-14 DIAGNOSIS — K74.60 HEPATIC CIRRHOSIS, UNSPECIFIED HEPATIC CIRRHOSIS TYPE, UNSPECIFIED WHETHER ASCITES PRESENT: ICD-10-CM

## 2021-10-14 DIAGNOSIS — Z01.812 PRE-PROCEDURE LAB EXAM: Primary | ICD-10-CM

## 2021-10-14 DIAGNOSIS — N18.32 STAGE 3B CHRONIC KIDNEY DISEASE: ICD-10-CM

## 2021-10-14 LAB
ALBUMIN SERPL BCP-MCNC: 3.3 G/DL (ref 3.5–5.2)
ALP SERPL-CCNC: 71 U/L (ref 55–135)
ALT SERPL W/O P-5'-P-CCNC: 13 U/L (ref 10–44)
ANION GAP SERPL CALC-SCNC: 14 MMOL/L (ref 8–16)
AST SERPL-CCNC: 29 U/L (ref 10–40)
BASOPHILS # BLD AUTO: 0.08 K/UL (ref 0–0.2)
BASOPHILS NFR BLD: 0.9 % (ref 0–1.9)
BILIRUB SERPL-MCNC: 0.8 MG/DL (ref 0.1–1)
BUN SERPL-MCNC: 49 MG/DL (ref 6–20)
CALCIUM SERPL-MCNC: 9.4 MG/DL (ref 8.7–10.5)
CHLORIDE SERPL-SCNC: 97 MMOL/L (ref 95–110)
CLASS I ANTIBODIES - LUMINEX: NORMAL
CLASS I ANTIBODY COMMENTS - LUMINEX: NORMAL
CLASS II ANTIBODY COMMENTS - LUMINEX: NORMAL
CO2 SERPL-SCNC: 20 MMOL/L (ref 23–29)
CPRA %: 7
CREAT SERPL-MCNC: 2.1 MG/DL (ref 0.5–1.4)
DIFFERENTIAL METHOD: NORMAL
EOSINOPHIL # BLD AUTO: 0.5 K/UL (ref 0–0.5)
EOSINOPHIL NFR BLD: 5.5 % (ref 0–8)
ERYTHROCYTE [DISTWIDTH] IN BLOOD BY AUTOMATED COUNT: 13.9 % (ref 11.5–14.5)
EST. GFR  (AFRICAN AMERICAN): 31 ML/MIN/1.73 M^2
EST. GFR  (NON AFRICAN AMERICAN): 26.9 ML/MIN/1.73 M^2
ETHANOL SERPL-MCNC: <10 MG/DL
GLUCOSE SERPL-MCNC: 109 MG/DL (ref 70–110)
HCT VFR BLD AUTO: 38.1 % (ref 37–48.5)
HGB BLD-MCNC: 12.4 G/DL (ref 12–16)
HPRA INTERPRETATION: NORMAL
IMM GRANULOCYTES # BLD AUTO: 0.03 K/UL (ref 0–0.04)
IMM GRANULOCYTES NFR BLD AUTO: 0.3 % (ref 0–0.5)
INR PPP: 1 (ref 0.8–1.2)
LYMPHOCYTES # BLD AUTO: 1.7 K/UL (ref 1–4.8)
LYMPHOCYTES NFR BLD: 18.2 % (ref 18–48)
MCH RBC QN AUTO: 30.7 PG (ref 27–31)
MCHC RBC AUTO-ENTMCNC: 32.5 G/DL (ref 32–36)
MCV RBC AUTO: 94 FL (ref 82–98)
MONOCYTES # BLD AUTO: 0.6 K/UL (ref 0.3–1)
MONOCYTES NFR BLD: 6.9 % (ref 4–15)
NEUTROPHILS # BLD AUTO: 6.3 K/UL (ref 1.8–7.7)
NEUTROPHILS NFR BLD: 68.2 % (ref 38–73)
NRBC BLD-RTO: 0 /100 WBC
PLATELET # BLD AUTO: 279 K/UL (ref 150–450)
PMV BLD AUTO: 10.3 FL (ref 9.2–12.9)
POTASSIUM SERPL-SCNC: 3.7 MMOL/L (ref 3.5–5.1)
PROT SERPL-MCNC: 6.2 G/DL (ref 6–8.4)
PROTHROMBIN TIME: 11.4 SEC (ref 9–12.5)
RBC # BLD AUTO: 4.04 M/UL (ref 4–5.4)
SERUM COLLECTION DT - LUMINEX CLASS I: NORMAL
SERUM COLLECTION DT - LUMINEX CLASS II: NORMAL
SODIUM SERPL-SCNC: 131 MMOL/L (ref 136–145)
SPCL1 TESTING DATE: NORMAL
SPCL2 TESTING DATE: NORMAL
SPLUA TESTING DATE: NORMAL
WBC # BLD AUTO: 9.22 K/UL (ref 3.9–12.7)

## 2021-10-14 PROCEDURE — 99999 PR PBB SHADOW E&M-EST. PATIENT-LVL V: ICD-10-PCS | Mod: PBBFAC,TXP,, | Performed by: INTERNAL MEDICINE

## 2021-10-14 PROCEDURE — 99999 PR PBB SHADOW E&M-EST. PATIENT-LVL V: CPT | Mod: PBBFAC,TXP,, | Performed by: INTERNAL MEDICINE

## 2021-10-14 PROCEDURE — 85610 PROTHROMBIN TIME: CPT | Mod: TXP | Performed by: INTERNAL MEDICINE

## 2021-10-14 PROCEDURE — 82077 ASSAY SPEC XCP UR&BREATH IA: CPT | Mod: TXP | Performed by: INTERNAL MEDICINE

## 2021-10-14 PROCEDURE — 80321 ALCOHOLS BIOMARKERS 1OR 2: CPT | Mod: TXP | Performed by: INTERNAL MEDICINE

## 2021-10-14 PROCEDURE — 99215 OFFICE O/P EST HI 40 MIN: CPT | Mod: S$GLB,TXP,, | Performed by: INTERNAL MEDICINE

## 2021-10-14 PROCEDURE — 99215 PR OFFICE/OUTPT VISIT, EST, LEVL V, 40-54 MIN: ICD-10-PCS | Mod: S$GLB,TXP,, | Performed by: INTERNAL MEDICINE

## 2021-10-14 PROCEDURE — 85025 COMPLETE CBC W/AUTO DIFF WBC: CPT | Mod: TXP | Performed by: INTERNAL MEDICINE

## 2021-10-14 PROCEDURE — 36415 COLL VENOUS BLD VENIPUNCTURE: CPT | Mod: PN,TXP | Performed by: INTERNAL MEDICINE

## 2021-10-14 PROCEDURE — 80053 COMPREHEN METABOLIC PANEL: CPT | Mod: TXP | Performed by: INTERNAL MEDICINE

## 2021-10-15 ENCOUNTER — OFFICE VISIT (OUTPATIENT)
Dept: HEMATOLOGY/ONCOLOGY | Facility: CLINIC | Age: 51
End: 2021-10-15
Payer: COMMERCIAL

## 2021-10-15 ENCOUNTER — TELEPHONE (OUTPATIENT)
Dept: TRANSPLANT | Facility: CLINIC | Age: 51
End: 2021-10-15

## 2021-10-15 VITALS
HEART RATE: 93 BPM | HEIGHT: 63 IN | RESPIRATION RATE: 12 BRPM | WEIGHT: 125.75 LBS | BODY MASS INDEX: 22.28 KG/M2 | OXYGEN SATURATION: 100 % | SYSTOLIC BLOOD PRESSURE: 92 MMHG | DIASTOLIC BLOOD PRESSURE: 53 MMHG | TEMPERATURE: 99 F

## 2021-10-15 DIAGNOSIS — D89.89 KAPPA LIGHT CHAIN DISEASE: ICD-10-CM

## 2021-10-15 DIAGNOSIS — R77.9 ELEVATED BLOOD PROTEIN: Primary | ICD-10-CM

## 2021-10-15 PROCEDURE — 99204 OFFICE O/P NEW MOD 45 MIN: CPT | Mod: S$GLB,TXP,, | Performed by: INTERNAL MEDICINE

## 2021-10-15 PROCEDURE — 99204 PR OFFICE/OUTPT VISIT, NEW, LEVL IV, 45-59 MIN: ICD-10-PCS | Mod: S$GLB,TXP,, | Performed by: INTERNAL MEDICINE

## 2021-10-15 PROCEDURE — 99999 PR PBB SHADOW E&M-EST. PATIENT-LVL IV: ICD-10-PCS | Mod: PBBFAC,TXP,, | Performed by: INTERNAL MEDICINE

## 2021-10-15 PROCEDURE — 99999 PR PBB SHADOW E&M-EST. PATIENT-LVL IV: CPT | Mod: PBBFAC,TXP,, | Performed by: INTERNAL MEDICINE

## 2021-10-18 ENCOUNTER — HOSPITAL ENCOUNTER (OUTPATIENT)
Dept: INTERVENTIONAL RADIOLOGY/VASCULAR | Facility: HOSPITAL | Age: 51
Discharge: HOME | End: 2021-10-18
Attending: INTERNAL MEDICINE
Payer: COMMERCIAL

## 2021-10-18 VITALS
TEMPERATURE: 99 F | DIASTOLIC BLOOD PRESSURE: 62 MMHG | OXYGEN SATURATION: 100 % | SYSTOLIC BLOOD PRESSURE: 97 MMHG | RESPIRATION RATE: 16 BRPM | HEART RATE: 86 BPM

## 2021-10-18 DIAGNOSIS — R18.8 OTHER ASCITES: ICD-10-CM

## 2021-10-18 LAB
APPEARANCE FLD: NORMAL
B-HCG UR QL: NEGATIVE
BODY FLD TYPE: NORMAL
COLOR FLD: YELLOW
CRYOGLOB SER QL: NORMAL
CTP QC/QA: YES
LYMPHOCYTES NFR FLD MANUAL: 90 %
MESOTHL CELL NFR FLD MANUAL: 2 %
MONOS+MACROS NFR FLD MANUAL: 6 %
NEUTROPHILS NFR FLD MANUAL: 2 %
WBC # FLD: 104 /CU MM

## 2021-10-18 PROCEDURE — P9047 ALBUMIN (HUMAN), 25%, 50ML: HCPCS | Mod: JG,TXP | Performed by: INTERNAL MEDICINE

## 2021-10-18 PROCEDURE — 49083 ABD PARACENTESIS W/IMAGING: CPT | Mod: NTX | Performed by: RADIOLOGY

## 2021-10-18 PROCEDURE — 49083 IR PARACENTESIS WITH IMAGING: ICD-10-PCS | Mod: TXP,,, | Performed by: FAMILY MEDICINE

## 2021-10-18 PROCEDURE — 49083 ABD PARACENTESIS W/IMAGING: CPT | Mod: TXP,,, | Performed by: FAMILY MEDICINE

## 2021-10-18 PROCEDURE — 63600175 PHARM REV CODE 636 W HCPCS: Mod: JG,TXP | Performed by: INTERNAL MEDICINE

## 2021-10-18 PROCEDURE — 89051 BODY FLUID CELL COUNT: CPT | Mod: NTX | Performed by: INTERNAL MEDICINE

## 2021-10-18 PROCEDURE — 81025 URINE PREGNANCY TEST: CPT | Mod: NTX | Performed by: FAMILY MEDICINE

## 2021-10-18 PROCEDURE — 27100111 IR PARACENTESIS WITH IMAGING: Mod: TXP

## 2021-10-18 RX ORDER — ALBUMIN HUMAN 250 G/1000ML
12.5 SOLUTION INTRAVENOUS ONCE
Status: DISCONTINUED | OUTPATIENT
Start: 2021-10-18 | End: 2021-10-19 | Stop reason: HOSPADM

## 2021-10-18 RX ORDER — ALBUMIN HUMAN 250 G/1000ML
37.5 SOLUTION INTRAVENOUS ONCE
Status: COMPLETED | OUTPATIENT
Start: 2021-10-18 | End: 2021-10-18

## 2021-10-18 RX ADMIN — ALBUMIN (HUMAN) 37.5 G: 25 SOLUTION INTRAVENOUS at 09:10

## 2021-10-19 ENCOUNTER — TELEPHONE (OUTPATIENT)
Dept: TRANSPLANT | Facility: CLINIC | Age: 51
End: 2021-10-19

## 2021-10-19 ENCOUNTER — TELEPHONE (OUTPATIENT)
Dept: NEPHROLOGY | Facility: CLINIC | Age: 51
End: 2021-10-19

## 2021-10-20 ENCOUNTER — COMMITTEE REVIEW (OUTPATIENT)
Dept: TRANSPLANT | Facility: CLINIC | Age: 51
End: 2021-10-20

## 2021-10-20 ENCOUNTER — TELEPHONE (OUTPATIENT)
Dept: TRANSPLANT | Facility: CLINIC | Age: 51
End: 2021-10-20

## 2021-10-21 ENCOUNTER — TELEPHONE (OUTPATIENT)
Dept: TRANSPLANT | Facility: CLINIC | Age: 51
End: 2021-10-21

## 2021-10-22 ENCOUNTER — TELEPHONE (OUTPATIENT)
Dept: TRANSPLANT | Facility: CLINIC | Age: 51
End: 2021-10-22

## 2021-10-22 ENCOUNTER — COMMITTEE REVIEW (OUTPATIENT)
Dept: TRANSPLANT | Facility: CLINIC | Age: 51
End: 2021-10-22
Payer: COMMERCIAL

## 2021-10-23 ENCOUNTER — LAB VISIT (OUTPATIENT)
Dept: SPORTS MEDICINE | Facility: CLINIC | Age: 51
End: 2021-10-23
Payer: COMMERCIAL

## 2021-10-23 DIAGNOSIS — Z01.812 PRE-PROCEDURE LAB EXAM: ICD-10-CM

## 2021-10-23 LAB
SARS-COV-2 RNA RESP QL NAA+PROBE: NOT DETECTED
SARS-COV-2- CYCLE NUMBER: NORMAL

## 2021-10-23 PROCEDURE — U0003 INFECTIOUS AGENT DETECTION BY NUCLEIC ACID (DNA OR RNA); SEVERE ACUTE RESPIRATORY SYNDROME CORONAVIRUS 2 (SARS-COV-2) (CORONAVIRUS DISEASE [COVID-19]), AMPLIFIED PROBE TECHNIQUE, MAKING USE OF HIGH THROUGHPUT TECHNOLOGIES AS DESCRIBED BY CMS-2020-01-R: HCPCS | Mod: TXP | Performed by: FAMILY MEDICINE

## 2021-10-23 PROCEDURE — U0005 INFEC AGEN DETEC AMPLI PROBE: HCPCS | Mod: TXP | Performed by: FAMILY MEDICINE

## 2021-10-25 ENCOUNTER — HOSPITAL ENCOUNTER (OUTPATIENT)
Dept: INTERVENTIONAL RADIOLOGY/VASCULAR | Facility: HOSPITAL | Age: 51
Discharge: HOME | End: 2021-10-25
Attending: INTERNAL MEDICINE
Payer: COMMERCIAL

## 2021-10-25 VITALS
HEART RATE: 87 BPM | SYSTOLIC BLOOD PRESSURE: 100 MMHG | OXYGEN SATURATION: 100 % | DIASTOLIC BLOOD PRESSURE: 61 MMHG | RESPIRATION RATE: 18 BRPM

## 2021-10-25 DIAGNOSIS — R18.8 OTHER ASCITES: ICD-10-CM

## 2021-10-25 LAB
ALBUMIN FLD-MCNC: 1.2 G/DL
APPEARANCE FLD: CLEAR
BODY FLD TYPE: NORMAL
COLOR FLD: YELLOW
LYMPHOCYTES NFR FLD MANUAL: 17 %
MONOS+MACROS NFR FLD MANUAL: 82 %
NEUTROPHILS NFR FLD MANUAL: 1 %
PROT FLD-MCNC: 1.8 G/DL
SPECIMEN SOURCE: NORMAL
SPECIMEN SOURCE: NORMAL
WBC # FLD: 106 /CU MM

## 2021-10-25 PROCEDURE — 84157 ASSAY OF PROTEIN OTHER: CPT | Mod: NTX | Performed by: INTERNAL MEDICINE

## 2021-10-25 PROCEDURE — P9047 ALBUMIN (HUMAN), 25%, 50ML: HCPCS | Mod: JG,NTX | Performed by: INTERNAL MEDICINE

## 2021-10-25 PROCEDURE — 89051 BODY FLUID CELL COUNT: CPT | Mod: NTX | Performed by: INTERNAL MEDICINE

## 2021-10-25 PROCEDURE — 49083 ABD PARACENTESIS W/IMAGING: CPT | Mod: TXP,,, | Performed by: FAMILY MEDICINE

## 2021-10-25 PROCEDURE — 49083 ABD PARACENTESIS W/IMAGING: CPT | Mod: TXP | Performed by: RADIOLOGY

## 2021-10-25 PROCEDURE — 63600175 PHARM REV CODE 636 W HCPCS: Mod: JG,NTX | Performed by: INTERNAL MEDICINE

## 2021-10-25 PROCEDURE — 49083 IR PARACENTESIS WITH IMAGING: ICD-10-PCS | Mod: TXP,,, | Performed by: FAMILY MEDICINE

## 2021-10-25 PROCEDURE — 82042 OTHER SOURCE ALBUMIN QUAN EA: CPT | Mod: TXP | Performed by: INTERNAL MEDICINE

## 2021-10-25 PROCEDURE — 27100111 IR PARACENTESIS WITH IMAGING: Mod: NTX

## 2021-10-25 RX ORDER — ALBUMIN HUMAN 250 G/1000ML
50 SOLUTION INTRAVENOUS ONCE
Status: COMPLETED | OUTPATIENT
Start: 2021-10-25 | End: 2021-10-25

## 2021-10-25 RX ADMIN — ALBUMIN HUMAN 50 G: 0.25 SOLUTION INTRAVENOUS at 12:10

## 2021-10-26 ENCOUNTER — HOSPITAL ENCOUNTER (OUTPATIENT)
Facility: HOSPITAL | Age: 51
Discharge: HOME | End: 2021-10-26
Attending: INTERNAL MEDICINE | Admitting: INTERNAL MEDICINE
Payer: COMMERCIAL

## 2021-10-26 ENCOUNTER — ANESTHESIA (OUTPATIENT)
Dept: ENDOSCOPY | Facility: HOSPITAL | Age: 51
End: 2021-10-26
Payer: COMMERCIAL

## 2021-10-26 ENCOUNTER — ANESTHESIA EVENT (OUTPATIENT)
Dept: ENDOSCOPY | Facility: HOSPITAL | Age: 51
End: 2021-10-26
Payer: COMMERCIAL

## 2021-10-26 VITALS
HEIGHT: 63 IN | HEART RATE: 89 BPM | RESPIRATION RATE: 16 BRPM | TEMPERATURE: 98 F | SYSTOLIC BLOOD PRESSURE: 80 MMHG | WEIGHT: 113 LBS | DIASTOLIC BLOOD PRESSURE: 52 MMHG | BODY MASS INDEX: 20.02 KG/M2 | OXYGEN SATURATION: 100 %

## 2021-10-26 DIAGNOSIS — I85.10: ICD-10-CM

## 2021-10-26 DIAGNOSIS — I85.10 SECONDARY ESOPHAGEAL VARICES WITHOUT BLEEDING: Primary | ICD-10-CM

## 2021-10-26 LAB
B-HCG UR QL: NEGATIVE
CTP QC/QA: YES

## 2021-10-26 PROCEDURE — 25000003 PHARM REV CODE 250: Mod: TXP | Performed by: NURSE ANESTHETIST, CERTIFIED REGISTERED

## 2021-10-26 PROCEDURE — 27201022: Mod: TXP | Performed by: INTERNAL MEDICINE

## 2021-10-26 PROCEDURE — 37000008 HC ANESTHESIA 1ST 15 MINUTES: Mod: NTX | Performed by: INTERNAL MEDICINE

## 2021-10-26 PROCEDURE — D9220A PRA ANESTHESIA: Mod: CRNA,NTX,, | Performed by: NURSE ANESTHETIST, CERTIFIED REGISTERED

## 2021-10-26 PROCEDURE — 37000009 HC ANESTHESIA EA ADD 15 MINS: Mod: TXP | Performed by: INTERNAL MEDICINE

## 2021-10-26 PROCEDURE — 81025 URINE PREGNANCY TEST: CPT | Mod: NTX | Performed by: INTERNAL MEDICINE

## 2021-10-26 PROCEDURE — 43244 EGD VARICES LIGATION: CPT | Mod: NTX | Performed by: INTERNAL MEDICINE

## 2021-10-26 PROCEDURE — 43244 PR EGD, FLEX, W/BAND LIGATION, ESOPH/GASTR VARICES: ICD-10-PCS | Mod: NTX,,, | Performed by: INTERNAL MEDICINE

## 2021-10-26 PROCEDURE — 63600175 PHARM REV CODE 636 W HCPCS: Mod: NTX | Performed by: NURSE ANESTHETIST, CERTIFIED REGISTERED

## 2021-10-26 PROCEDURE — D9220A PRA ANESTHESIA: Mod: ANES,NTX,, | Performed by: ANESTHESIOLOGY

## 2021-10-26 PROCEDURE — D9220A PRA ANESTHESIA: ICD-10-PCS | Mod: CRNA,NTX,, | Performed by: NURSE ANESTHETIST, CERTIFIED REGISTERED

## 2021-10-26 PROCEDURE — 25000003 PHARM REV CODE 250: Mod: TXP | Performed by: INTERNAL MEDICINE

## 2021-10-26 PROCEDURE — 43244 EGD VARICES LIGATION: CPT | Mod: NTX,,, | Performed by: INTERNAL MEDICINE

## 2021-10-26 PROCEDURE — D9220A PRA ANESTHESIA: ICD-10-PCS | Mod: ANES,NTX,, | Performed by: ANESTHESIOLOGY

## 2021-10-26 RX ORDER — PHENYLEPHRINE HYDROCHLORIDE 10 MG/ML
INJECTION INTRAVENOUS
Status: DISCONTINUED | OUTPATIENT
Start: 2021-10-26 | End: 2021-10-26

## 2021-10-26 RX ORDER — LIDOCAINE HYDROCHLORIDE 20 MG/ML
INJECTION INTRAVENOUS
Status: DISCONTINUED | OUTPATIENT
Start: 2021-10-26 | End: 2021-10-26

## 2021-10-26 RX ORDER — FENTANYL CITRATE 50 UG/ML
INJECTION, SOLUTION INTRAMUSCULAR; INTRAVENOUS
Status: DISCONTINUED | OUTPATIENT
Start: 2021-10-26 | End: 2021-10-26

## 2021-10-26 RX ORDER — ONDANSETRON 2 MG/ML
4 INJECTION INTRAMUSCULAR; INTRAVENOUS ONCE AS NEEDED
Status: DISCONTINUED | OUTPATIENT
Start: 2021-10-26 | End: 2021-10-26 | Stop reason: HOSPADM

## 2021-10-26 RX ORDER — PROPOFOL 10 MG/ML
VIAL (ML) INTRAVENOUS CONTINUOUS PRN
Status: DISCONTINUED | OUTPATIENT
Start: 2021-10-26 | End: 2021-10-26

## 2021-10-26 RX ORDER — PROPOFOL 10 MG/ML
VIAL (ML) INTRAVENOUS
Status: DISCONTINUED | OUTPATIENT
Start: 2021-10-26 | End: 2021-10-26

## 2021-10-26 RX ORDER — SODIUM CHLORIDE 0.9 % (FLUSH) 0.9 %
10 SYRINGE (ML) INJECTION
Status: DISCONTINUED | OUTPATIENT
Start: 2021-10-26 | End: 2021-10-26 | Stop reason: HOSPADM

## 2021-10-26 RX ORDER — SODIUM CHLORIDE 9 MG/ML
INJECTION, SOLUTION INTRAVENOUS CONTINUOUS
Status: DISCONTINUED | OUTPATIENT
Start: 2021-10-26 | End: 2021-10-26 | Stop reason: HOSPADM

## 2021-10-26 RX ADMIN — PHENYLEPHRINE HYDROCHLORIDE 80 MCG: 10 INJECTION INTRAVENOUS at 08:10

## 2021-10-26 RX ADMIN — SODIUM CHLORIDE: 0.9 INJECTION, SOLUTION INTRAVENOUS at 07:10

## 2021-10-26 RX ADMIN — FENTANYL CITRATE 50 MCG: 50 INJECTION, SOLUTION INTRAMUSCULAR; INTRAVENOUS at 07:10

## 2021-10-26 RX ADMIN — PROPOFOL 60 MG: 10 INJECTION, EMULSION INTRAVENOUS at 07:10

## 2021-10-26 RX ADMIN — FENTANYL CITRATE 25 MCG: 50 INJECTION, SOLUTION INTRAMUSCULAR; INTRAVENOUS at 08:10

## 2021-10-26 RX ADMIN — LIDOCAINE HYDROCHLORIDE 50 MG: 20 INJECTION, SOLUTION INTRAVENOUS at 07:10

## 2021-10-26 RX ADMIN — Medication 200 MCG/KG/MIN: at 07:10

## 2021-10-26 RX ADMIN — PROPOFOL 40 MG: 10 INJECTION, EMULSION INTRAVENOUS at 07:10

## 2021-10-26 RX ADMIN — GLYCOPYRROLATE 0.2 MG: 0.2 INJECTION, SOLUTION INTRAMUSCULAR; INTRAVITREAL at 07:10

## 2021-10-29 ENCOUNTER — PATIENT MESSAGE (OUTPATIENT)
Dept: NEPHROLOGY | Facility: CLINIC | Age: 51
End: 2021-10-29

## 2021-10-29 ENCOUNTER — OFFICE VISIT (OUTPATIENT)
Dept: NEPHROLOGY | Facility: CLINIC | Age: 51
End: 2021-10-29
Payer: COMMERCIAL

## 2021-10-29 VITALS
BODY MASS INDEX: 21.25 KG/M2 | WEIGHT: 119.94 LBS | OXYGEN SATURATION: 99 % | HEART RATE: 96 BPM | SYSTOLIC BLOOD PRESSURE: 90 MMHG | DIASTOLIC BLOOD PRESSURE: 68 MMHG | HEIGHT: 63 IN

## 2021-10-29 DIAGNOSIS — N18.4 CKD (CHRONIC KIDNEY DISEASE) STAGE 4, GFR 15-29 ML/MIN: ICD-10-CM

## 2021-10-29 DIAGNOSIS — K59.00 CONSTIPATION, UNSPECIFIED CONSTIPATION TYPE: Primary | ICD-10-CM

## 2021-10-29 DIAGNOSIS — N18.32 STAGE 3B CHRONIC KIDNEY DISEASE: ICD-10-CM

## 2021-10-29 PROCEDURE — 99215 PR OFFICE/OUTPT VISIT, EST, LEVL V, 40-54 MIN: ICD-10-PCS | Mod: S$GLB,,, | Performed by: INTERNAL MEDICINE

## 2021-10-29 PROCEDURE — 99999 PR PBB SHADOW E&M-EST. PATIENT-LVL III: CPT | Mod: PBBFAC,,, | Performed by: INTERNAL MEDICINE

## 2021-10-29 PROCEDURE — 99999 PR PBB SHADOW E&M-EST. PATIENT-LVL III: ICD-10-PCS | Mod: PBBFAC,,, | Performed by: INTERNAL MEDICINE

## 2021-10-29 PROCEDURE — 99215 OFFICE O/P EST HI 40 MIN: CPT | Mod: S$GLB,,, | Performed by: INTERNAL MEDICINE

## 2021-10-29 RX ORDER — LACTULOSE 10 G/15ML
10 SOLUTION ORAL DAILY PRN
Qty: 300 ML | Refills: 0 | Status: SHIPPED | OUTPATIENT
Start: 2021-10-29 | End: 2022-02-04 | Stop reason: ALTCHOICE

## 2021-11-01 ENCOUNTER — HOSPITAL ENCOUNTER (OUTPATIENT)
Dept: INTERVENTIONAL RADIOLOGY/VASCULAR | Facility: HOSPITAL | Age: 51
Discharge: HOME | End: 2021-11-01
Attending: INTERNAL MEDICINE
Payer: COMMERCIAL

## 2021-11-01 VITALS
OXYGEN SATURATION: 99 % | TEMPERATURE: 98 F | RESPIRATION RATE: 18 BRPM | HEART RATE: 88 BPM | DIASTOLIC BLOOD PRESSURE: 54 MMHG | SYSTOLIC BLOOD PRESSURE: 93 MMHG

## 2021-11-01 DIAGNOSIS — R18.8 OTHER ASCITES: ICD-10-CM

## 2021-11-01 LAB
APPEARANCE FLD: NORMAL
BODY FLD TYPE: NORMAL
COLOR FLD: YELLOW
EOSINOPHIL NFR FLD MANUAL: 1 %
LYMPHOCYTES NFR FLD MANUAL: 88 %
MONOS+MACROS NFR FLD MANUAL: 10 %
NEUTROPHILS NFR FLD MANUAL: 1 %
WBC # FLD: 120 /CU MM

## 2021-11-01 PROCEDURE — A4215 STERILE NEEDLE: HCPCS | Mod: TXP

## 2021-11-01 PROCEDURE — 49083 ABD PARACENTESIS W/IMAGING: CPT | Mod: TXP | Performed by: RADIOLOGY

## 2021-11-01 PROCEDURE — 27100111 IR PARACENTESIS WITH IMAGING: Mod: TXP

## 2021-11-01 PROCEDURE — 63600175 PHARM REV CODE 636 W HCPCS: Mod: JG,NTX | Performed by: INTERNAL MEDICINE

## 2021-11-01 PROCEDURE — P9047 ALBUMIN (HUMAN), 25%, 50ML: HCPCS | Mod: JG,NTX | Performed by: INTERNAL MEDICINE

## 2021-11-01 PROCEDURE — 89051 BODY FLUID CELL COUNT: CPT | Mod: TXP | Performed by: INTERNAL MEDICINE

## 2021-11-01 PROCEDURE — 49083 ABD PARACENTESIS W/IMAGING: CPT | Mod: TXP,,, | Performed by: FAMILY MEDICINE

## 2021-11-01 PROCEDURE — 49083 IR PARACENTESIS WITH IMAGING: ICD-10-PCS | Mod: TXP,,, | Performed by: FAMILY MEDICINE

## 2021-11-01 RX ORDER — ALBUMIN HUMAN 250 G/1000ML
25 SOLUTION INTRAVENOUS ONCE
Status: COMPLETED | OUTPATIENT
Start: 2021-11-01 | End: 2021-11-01

## 2021-11-01 RX ADMIN — ALBUMIN HUMAN 25 G: 0.25 SOLUTION INTRAVENOUS at 11:11

## 2021-11-01 RX ADMIN — ALBUMIN (HUMAN) 25 G: 25 SOLUTION INTRAVENOUS at 12:11

## 2021-11-05 ENCOUNTER — PATIENT MESSAGE (OUTPATIENT)
Dept: NEPHROLOGY | Facility: CLINIC | Age: 51
End: 2021-11-05
Payer: COMMERCIAL

## 2021-11-08 ENCOUNTER — HOSPITAL ENCOUNTER (OUTPATIENT)
Dept: INTERVENTIONAL RADIOLOGY/VASCULAR | Facility: HOSPITAL | Age: 51
Discharge: HOME | End: 2021-11-08
Attending: INTERNAL MEDICINE
Payer: COMMERCIAL

## 2021-11-08 VITALS
OXYGEN SATURATION: 100 % | RESPIRATION RATE: 18 BRPM | DIASTOLIC BLOOD PRESSURE: 63 MMHG | SYSTOLIC BLOOD PRESSURE: 106 MMHG | HEART RATE: 63 BPM

## 2021-11-08 DIAGNOSIS — R18.8 OTHER ASCITES: ICD-10-CM

## 2021-11-08 LAB
APPEARANCE FLD: CLEAR
BODY FLD TYPE: NORMAL
COLOR FLD: YELLOW
LYMPHOCYTES NFR FLD MANUAL: 66 %
MESOTHL CELL NFR FLD MANUAL: 7 %
MONOS+MACROS NFR FLD MANUAL: 14 %
NEUTROPHILS NFR FLD MANUAL: 13 %
WBC # FLD: 113 /CU MM

## 2021-11-08 PROCEDURE — 49083 ABD PARACENTESIS W/IMAGING: CPT | Mod: NTX | Performed by: RADIOLOGY

## 2021-11-08 PROCEDURE — 27100111 IR PARACENTESIS WITH IMAGING: Mod: TXP

## 2021-11-08 PROCEDURE — 49083 IR PARACENTESIS WITH IMAGING: ICD-10-PCS | Mod: TXP,,, | Performed by: RADIOLOGY

## 2021-11-08 PROCEDURE — 63600175 PHARM REV CODE 636 W HCPCS: Mod: JG,NTX | Performed by: INTERNAL MEDICINE

## 2021-11-08 PROCEDURE — 89051 BODY FLUID CELL COUNT: CPT | Mod: NTX | Performed by: INTERNAL MEDICINE

## 2021-11-08 PROCEDURE — P9047 ALBUMIN (HUMAN), 25%, 50ML: HCPCS | Mod: JG,NTX | Performed by: INTERNAL MEDICINE

## 2021-11-08 RX ORDER — ALBUMIN HUMAN 250 G/1000ML
37.5 SOLUTION INTRAVENOUS ONCE
Status: COMPLETED | OUTPATIENT
Start: 2021-11-08 | End: 2021-11-08

## 2021-11-08 RX ORDER — ALBUMIN HUMAN 250 G/1000ML
12.5 SOLUTION INTRAVENOUS ONCE
Status: COMPLETED | OUTPATIENT
Start: 2021-11-08 | End: 2021-11-08

## 2021-11-08 RX ADMIN — ALBUMIN (HUMAN) 37.5 G: 12.5 SOLUTION INTRAVENOUS at 10:11

## 2021-11-08 RX ADMIN — ALBUMIN (HUMAN) 12.5 G: 25 SOLUTION INTRAVENOUS at 11:11

## 2021-11-09 LAB
PETH 16:0/18.1 (POPETH): <10 NG/ML
PETH 16:0/18.2 (PLPETH): <10 NG/ML

## 2021-11-10 ENCOUNTER — PATIENT OUTREACH (OUTPATIENT)
Dept: ADMINISTRATIVE | Facility: OTHER | Age: 51
End: 2021-11-10
Payer: COMMERCIAL

## 2021-11-11 ENCOUNTER — LAB VISIT (OUTPATIENT)
Dept: LAB | Facility: HOSPITAL | Age: 51
End: 2021-11-11
Attending: INTERNAL MEDICINE
Payer: COMMERCIAL

## 2021-11-11 ENCOUNTER — OFFICE VISIT (OUTPATIENT)
Dept: NEPHROLOGY | Facility: CLINIC | Age: 51
End: 2021-11-11
Payer: COMMERCIAL

## 2021-11-11 DIAGNOSIS — N18.4 CKD (CHRONIC KIDNEY DISEASE) STAGE 4, GFR 15-29 ML/MIN: Primary | ICD-10-CM

## 2021-11-11 DIAGNOSIS — N18.4 CKD (CHRONIC KIDNEY DISEASE) STAGE 4, GFR 15-29 ML/MIN: ICD-10-CM

## 2021-11-11 DIAGNOSIS — N17.9 AKI (ACUTE KIDNEY INJURY): Primary | ICD-10-CM

## 2021-11-11 LAB
ALBUMIN SERPL BCP-MCNC: 3.3 G/DL (ref 3.5–5.2)
ALP SERPL-CCNC: 65 U/L (ref 55–135)
ALP SERPL-CCNC: 65 U/L (ref 55–135)
ALT SERPL W/O P-5'-P-CCNC: 16 U/L (ref 10–44)
ALT SERPL W/O P-5'-P-CCNC: 16 U/L (ref 10–44)
ANION GAP SERPL CALC-SCNC: 9 MMOL/L (ref 8–16)
ANION GAP SERPL CALC-SCNC: 9 MMOL/L (ref 8–16)
APTT BLDCRRT: 27.6 SEC (ref 21–32)
AST SERPL-CCNC: 28 U/L (ref 10–40)
AST SERPL-CCNC: 28 U/L (ref 10–40)
BASOPHILS # BLD AUTO: 0.08 K/UL (ref 0–0.2)
BASOPHILS NFR BLD: 0.8 % (ref 0–1.9)
BILIRUB DIRECT SERPL-MCNC: 0.4 MG/DL (ref 0.1–0.3)
BILIRUB SERPL-MCNC: 1 MG/DL (ref 0.1–1)
BILIRUB SERPL-MCNC: 1 MG/DL (ref 0.1–1)
BUN SERPL-MCNC: 68 MG/DL (ref 6–20)
BUN SERPL-MCNC: 68 MG/DL (ref 6–20)
CALCIUM SERPL-MCNC: 9.2 MG/DL (ref 8.7–10.5)
CALCIUM SERPL-MCNC: 9.2 MG/DL (ref 8.7–10.5)
CHLORIDE SERPL-SCNC: 101 MMOL/L (ref 95–110)
CHLORIDE SERPL-SCNC: 101 MMOL/L (ref 95–110)
CO2 SERPL-SCNC: 26 MMOL/L (ref 23–29)
CO2 SERPL-SCNC: 26 MMOL/L (ref 23–29)
CREAT SERPL-MCNC: 3 MG/DL (ref 0.5–1.4)
CREAT SERPL-MCNC: 3 MG/DL (ref 0.5–1.4)
DIFFERENTIAL METHOD: ABNORMAL
EOSINOPHIL # BLD AUTO: 0.6 K/UL (ref 0–0.5)
EOSINOPHIL NFR BLD: 5.9 % (ref 0–8)
ERYTHROCYTE [DISTWIDTH] IN BLOOD BY AUTOMATED COUNT: 14.1 % (ref 11.5–14.5)
EST. GFR  (AFRICAN AMERICAN): 20.1 ML/MIN/1.73 M^2
EST. GFR  (AFRICAN AMERICAN): 20.1 ML/MIN/1.73 M^2
EST. GFR  (NON AFRICAN AMERICAN): 17.4 ML/MIN/1.73 M^2
EST. GFR  (NON AFRICAN AMERICAN): 17.4 ML/MIN/1.73 M^2
GLUCOSE SERPL-MCNC: 114 MG/DL (ref 70–110)
GLUCOSE SERPL-MCNC: 114 MG/DL (ref 70–110)
HCT VFR BLD AUTO: 36.7 % (ref 37–48.5)
HGB BLD-MCNC: 11.7 G/DL (ref 12–16)
IMM GRANULOCYTES # BLD AUTO: 0.03 K/UL (ref 0–0.04)
IMM GRANULOCYTES NFR BLD AUTO: 0.3 % (ref 0–0.5)
INR PPP: 1 (ref 0.8–1.2)
LYMPHOCYTES # BLD AUTO: 1.6 K/UL (ref 1–4.8)
LYMPHOCYTES NFR BLD: 15.7 % (ref 18–48)
MAGNESIUM SERPL-MCNC: 2.4 MG/DL (ref 1.6–2.6)
MCH RBC QN AUTO: 29.9 PG (ref 27–31)
MCHC RBC AUTO-ENTMCNC: 31.9 G/DL (ref 32–36)
MCV RBC AUTO: 94 FL (ref 82–98)
MONOCYTES # BLD AUTO: 0.7 K/UL (ref 0.3–1)
MONOCYTES NFR BLD: 6.5 % (ref 4–15)
NEUTROPHILS # BLD AUTO: 7.2 K/UL (ref 1.8–7.7)
NEUTROPHILS NFR BLD: 70.8 % (ref 38–73)
NRBC BLD-RTO: 0 /100 WBC
PHOSPHATE SERPL-MCNC: 4.9 MG/DL (ref 2.7–4.5)
PHOSPHATE SERPL-MCNC: 4.9 MG/DL (ref 2.7–4.5)
PLATELET # BLD AUTO: 265 K/UL (ref 150–450)
PMV BLD AUTO: 10.1 FL (ref 9.2–12.9)
POTASSIUM SERPL-SCNC: 4.7 MMOL/L (ref 3.5–5.1)
POTASSIUM SERPL-SCNC: 4.7 MMOL/L (ref 3.5–5.1)
PROT SERPL-MCNC: 5.8 G/DL (ref 6–8.4)
PROT SERPL-MCNC: 5.8 G/DL (ref 6–8.4)
PROTHROMBIN TIME: 11.3 SEC (ref 9–12.5)
RBC # BLD AUTO: 3.91 M/UL (ref 4–5.4)
SODIUM SERPL-SCNC: 136 MMOL/L (ref 136–145)
SODIUM SERPL-SCNC: 136 MMOL/L (ref 136–145)
URATE SERPL-MCNC: 11.2 MG/DL (ref 2.4–5.7)
WBC # BLD AUTO: 10.14 K/UL (ref 3.9–12.7)

## 2021-11-11 PROCEDURE — 84550 ASSAY OF BLOOD/URIC ACID: CPT | Mod: TXP | Performed by: INTERNAL MEDICINE

## 2021-11-11 PROCEDURE — 85730 THROMBOPLASTIN TIME PARTIAL: CPT | Mod: TXP | Performed by: INTERNAL MEDICINE

## 2021-11-11 PROCEDURE — 84100 ASSAY OF PHOSPHORUS: CPT | Mod: NTX | Performed by: INTERNAL MEDICINE

## 2021-11-11 PROCEDURE — 99213 OFFICE O/P EST LOW 20 MIN: CPT | Mod: S$GLB,,, | Performed by: INTERNAL MEDICINE

## 2021-11-11 PROCEDURE — 83735 ASSAY OF MAGNESIUM: CPT | Mod: NTX | Performed by: INTERNAL MEDICINE

## 2021-11-11 PROCEDURE — 99213 PR OFFICE/OUTPT VISIT, EST, LEVL III, 20-29 MIN: ICD-10-PCS | Mod: S$GLB,,, | Performed by: INTERNAL MEDICINE

## 2021-11-11 PROCEDURE — 36415 COLL VENOUS BLD VENIPUNCTURE: CPT | Mod: NTX | Performed by: INTERNAL MEDICINE

## 2021-11-11 PROCEDURE — 85025 COMPLETE CBC W/AUTO DIFF WBC: CPT | Mod: NTX | Performed by: INTERNAL MEDICINE

## 2021-11-11 PROCEDURE — 85610 PROTHROMBIN TIME: CPT | Mod: NTX | Performed by: INTERNAL MEDICINE

## 2021-11-11 PROCEDURE — 80053 COMPREHEN METABOLIC PANEL: CPT | Mod: NTX | Performed by: INTERNAL MEDICINE

## 2021-11-12 ENCOUNTER — TELEPHONE (OUTPATIENT)
Dept: NEPHROLOGY | Facility: CLINIC | Age: 51
End: 2021-11-12
Payer: COMMERCIAL

## 2021-11-12 DIAGNOSIS — E79.0 HYPERURICEMIA: ICD-10-CM

## 2021-11-12 DIAGNOSIS — N18.4 CKD (CHRONIC KIDNEY DISEASE) STAGE 4, GFR 15-29 ML/MIN: Primary | ICD-10-CM

## 2021-11-12 RX ORDER — ALLOPURINOL 100 MG/1
100 TABLET ORAL DAILY
Qty: 90 TABLET | Refills: 0 | Status: SHIPPED | OUTPATIENT
Start: 2021-11-12 | End: 2022-02-23

## 2021-11-16 ENCOUNTER — HOSPITAL ENCOUNTER (OUTPATIENT)
Dept: INTERVENTIONAL RADIOLOGY/VASCULAR | Facility: HOSPITAL | Age: 51
Discharge: HOME | End: 2021-11-16
Attending: INTERNAL MEDICINE
Payer: COMMERCIAL

## 2021-11-16 DIAGNOSIS — R18.8 OTHER ASCITES: ICD-10-CM

## 2021-11-16 LAB
ALBUMIN FLD-MCNC: 1.1 G/DL
APPEARANCE FLD: CLEAR
B-HCG UR QL: NEGATIVE
BODY FLD TYPE: NORMAL
COLOR FLD: YELLOW
CTP QC/QA: YES
EOSINOPHIL NFR FLD MANUAL: 1 %
GRAM STN SPEC: NORMAL
GRAM STN SPEC: NORMAL
LYMPHOCYTES NFR FLD MANUAL: 67 %
MESOTHL CELL NFR FLD MANUAL: 6 %
MONOS+MACROS NFR FLD MANUAL: 13 %
NEUTROPHILS NFR FLD MANUAL: 13 %
PROT FLD-MCNC: 1.6 G/DL
SPECIMEN SOURCE: NORMAL
SPECIMEN SOURCE: NORMAL
WBC # FLD: 100 /CU MM

## 2021-11-16 PROCEDURE — 84157 ASSAY OF PROTEIN OTHER: CPT | Mod: TXP | Performed by: INTERNAL MEDICINE

## 2021-11-16 PROCEDURE — 87070 CULTURE OTHR SPECIMN AEROBIC: CPT | Mod: 59,TXP | Performed by: INTERNAL MEDICINE

## 2021-11-16 PROCEDURE — 87102 FUNGUS ISOLATION CULTURE: CPT | Mod: TXP | Performed by: INTERNAL MEDICINE

## 2021-11-16 PROCEDURE — 87116 MYCOBACTERIA CULTURE: CPT | Mod: NTX | Performed by: INTERNAL MEDICINE

## 2021-11-16 PROCEDURE — 82042 OTHER SOURCE ALBUMIN QUAN EA: CPT | Mod: TXP | Performed by: INTERNAL MEDICINE

## 2021-11-16 PROCEDURE — 49083 ABD PARACENTESIS W/IMAGING: CPT | Mod: TXP | Performed by: RADIOLOGY

## 2021-11-16 PROCEDURE — 63600175 PHARM REV CODE 636 W HCPCS: Mod: JG,TXP | Performed by: INTERNAL MEDICINE

## 2021-11-16 PROCEDURE — 87206 SMEAR FLUORESCENT/ACID STAI: CPT | Mod: TXP | Performed by: INTERNAL MEDICINE

## 2021-11-16 PROCEDURE — 27100111 IR PARACENTESIS WITH IMAGING: Mod: TXP

## 2021-11-16 PROCEDURE — 25000003 PHARM REV CODE 250: Mod: NTX | Performed by: PHYSICIAN ASSISTANT

## 2021-11-16 PROCEDURE — 89051 BODY FLUID CELL COUNT: CPT | Mod: TXP | Performed by: INTERNAL MEDICINE

## 2021-11-16 PROCEDURE — P9047 ALBUMIN (HUMAN), 25%, 50ML: HCPCS | Mod: JG,TXP | Performed by: INTERNAL MEDICINE

## 2021-11-16 PROCEDURE — 87075 CULTR BACTERIA EXCEPT BLOOD: CPT | Mod: NTX | Performed by: INTERNAL MEDICINE

## 2021-11-16 PROCEDURE — 81025 URINE PREGNANCY TEST: CPT | Mod: NTX | Performed by: PHYSICIAN ASSISTANT

## 2021-11-16 PROCEDURE — 87070 CULTURE OTHR SPECIMN AEROBIC: CPT | Mod: TXP | Performed by: INTERNAL MEDICINE

## 2021-11-16 PROCEDURE — 49083 IR PARACENTESIS WITH IMAGING: ICD-10-PCS | Mod: NTX,,, | Performed by: RADIOLOGY

## 2021-11-16 PROCEDURE — 87205 SMEAR GRAM STAIN: CPT | Mod: NTX | Performed by: INTERNAL MEDICINE

## 2021-11-16 RX ORDER — ALBUMIN HUMAN 250 G/1000ML
25 SOLUTION INTRAVENOUS ONCE
Status: COMPLETED | OUTPATIENT
Start: 2021-11-16 | End: 2021-11-16

## 2021-11-16 RX ORDER — LIDOCAINE HYDROCHLORIDE 10 MG/ML
INJECTION INFILTRATION; PERINEURAL CODE/TRAUMA/SEDATION MEDICATION
Status: COMPLETED | OUTPATIENT
Start: 2021-11-16 | End: 2021-11-16

## 2021-11-16 RX ADMIN — ALBUMIN (HUMAN) 25 G: 25 SOLUTION INTRAVENOUS at 11:11

## 2021-11-16 RX ADMIN — LIDOCAINE HYDROCHLORIDE 5 ML: 10 INJECTION, SOLUTION INFILTRATION; PERINEURAL at 09:11

## 2021-11-19 LAB — BACTERIA SPEC AEROBE CULT: NO GROWTH

## 2021-11-21 LAB — BACTERIA FLD CULT: NORMAL

## 2021-11-22 ENCOUNTER — HOSPITAL ENCOUNTER (OUTPATIENT)
Dept: INTERVENTIONAL RADIOLOGY/VASCULAR | Facility: HOSPITAL | Age: 51
Discharge: HOME | End: 2021-11-22
Attending: INTERNAL MEDICINE
Payer: COMMERCIAL

## 2021-11-22 VITALS
DIASTOLIC BLOOD PRESSURE: 54 MMHG | OXYGEN SATURATION: 100 % | HEART RATE: 91 BPM | SYSTOLIC BLOOD PRESSURE: 90 MMHG | RESPIRATION RATE: 16 BRPM

## 2021-11-22 DIAGNOSIS — R18.8 OTHER ASCITES: ICD-10-CM

## 2021-11-22 LAB
APPEARANCE FLD: CLEAR
BASOPHILS NFR FLD MANUAL: 1 %
BODY FLD TYPE: NORMAL
COLOR FLD: YELLOW
LYMPHOCYTES NFR FLD MANUAL: 92 %
MONOS+MACROS NFR FLD MANUAL: 5 %
NEUTROPHILS NFR FLD MANUAL: 2 %
WBC # FLD: 37 /CU MM

## 2021-11-22 PROCEDURE — 63600175 PHARM REV CODE 636 W HCPCS: Mod: JG,TXP | Performed by: INTERNAL MEDICINE

## 2021-11-22 PROCEDURE — A4215 STERILE NEEDLE: HCPCS | Mod: TXP

## 2021-11-22 PROCEDURE — P9047 ALBUMIN (HUMAN), 25%, 50ML: HCPCS | Mod: JG,TXP | Performed by: INTERNAL MEDICINE

## 2021-11-22 PROCEDURE — 49083 IR PARACENTESIS WITH IMAGING: ICD-10-PCS | Mod: TXP,,, | Performed by: FAMILY MEDICINE

## 2021-11-22 PROCEDURE — 27100111 IR PARACENTESIS WITH IMAGING: Mod: NTX

## 2021-11-22 PROCEDURE — 49083 ABD PARACENTESIS W/IMAGING: CPT | Mod: TXP,,, | Performed by: FAMILY MEDICINE

## 2021-11-22 PROCEDURE — 89051 BODY FLUID CELL COUNT: CPT | Mod: NTX | Performed by: INTERNAL MEDICINE

## 2021-11-22 PROCEDURE — 49083 ABD PARACENTESIS W/IMAGING: CPT | Mod: NTX | Performed by: STUDENT IN AN ORGANIZED HEALTH CARE EDUCATION/TRAINING PROGRAM

## 2021-11-22 PROCEDURE — 25000003 PHARM REV CODE 250: Mod: NTX | Performed by: FAMILY MEDICINE

## 2021-11-22 RX ORDER — ALBUMIN HUMAN 250 G/1000ML
37.5 SOLUTION INTRAVENOUS ONCE
Status: DISCONTINUED | OUTPATIENT
Start: 2021-11-22 | End: 2021-11-23 | Stop reason: HOSPADM

## 2021-11-22 RX ORDER — LIDOCAINE HYDROCHLORIDE 10 MG/ML
INJECTION INFILTRATION; PERINEURAL CODE/TRAUMA/SEDATION MEDICATION
Status: COMPLETED | OUTPATIENT
Start: 2021-11-22 | End: 2021-11-22

## 2021-11-22 RX ORDER — ALBUMIN HUMAN 250 G/1000ML
25 SOLUTION INTRAVENOUS ONCE
Status: COMPLETED | OUTPATIENT
Start: 2021-11-22 | End: 2021-11-22

## 2021-11-22 RX ORDER — ALBUMIN HUMAN 250 G/1000ML
12.5 SOLUTION INTRAVENOUS ONCE
Status: DISCONTINUED | OUTPATIENT
Start: 2021-11-22 | End: 2021-11-23 | Stop reason: HOSPADM

## 2021-11-22 RX ADMIN — ALBUMIN HUMAN 25 G: 0.25 SOLUTION INTRAVENOUS at 09:11

## 2021-11-22 RX ADMIN — LIDOCAINE HYDROCHLORIDE 5 ML: 10 INJECTION, SOLUTION INFILTRATION; PERINEURAL at 08:11

## 2021-11-23 LAB — BACTERIA SPEC ANAEROBE CULT: NORMAL

## 2021-11-26 ENCOUNTER — OFFICE VISIT (OUTPATIENT)
Dept: HEPATOLOGY | Facility: CLINIC | Age: 51
End: 2021-11-26
Payer: COMMERCIAL

## 2021-11-26 DIAGNOSIS — K59.00 CONSTIPATION, UNSPECIFIED CONSTIPATION TYPE: ICD-10-CM

## 2021-11-26 DIAGNOSIS — N18.32 STAGE 3B CHRONIC KIDNEY DISEASE: ICD-10-CM

## 2021-11-26 DIAGNOSIS — I85.10 SECONDARY ESOPHAGEAL VARICES WITHOUT BLEEDING: ICD-10-CM

## 2021-11-26 DIAGNOSIS — K70.31 ALCOHOLIC CIRRHOSIS OF LIVER WITH ASCITES: ICD-10-CM

## 2021-11-26 DIAGNOSIS — R18.8 OTHER ASCITES: Primary | ICD-10-CM

## 2021-11-26 DIAGNOSIS — K65.2 SBP (SPONTANEOUS BACTERIAL PERITONITIS): ICD-10-CM

## 2021-11-26 DIAGNOSIS — I85.00 ESOPHAGEAL VARICES WITHOUT BLEEDING, UNSPECIFIED ESOPHAGEAL VARICES TYPE: Primary | ICD-10-CM

## 2021-11-26 PROCEDURE — 99213 PR OFFICE/OUTPT VISIT, EST, LEVL III, 20-29 MIN: ICD-10-PCS | Mod: 95,TXP,, | Performed by: INTERNAL MEDICINE

## 2021-11-26 PROCEDURE — 99213 OFFICE O/P EST LOW 20 MIN: CPT | Mod: 95,TXP,, | Performed by: INTERNAL MEDICINE

## 2021-11-29 ENCOUNTER — HOSPITAL ENCOUNTER (OUTPATIENT)
Dept: INTERVENTIONAL RADIOLOGY/VASCULAR | Facility: HOSPITAL | Age: 51
Discharge: HOME | End: 2021-11-29
Attending: INTERNAL MEDICINE
Payer: COMMERCIAL

## 2021-11-29 DIAGNOSIS — R18.8 OTHER ASCITES: ICD-10-CM

## 2021-11-29 LAB
ALBUMIN FLD-MCNC: 1.2 G/DL
APPEARANCE FLD: NORMAL
B-HCG UR QL: NEGATIVE
BODY FLD TYPE: NORMAL
COLOR FLD: YELLOW
CTP QC/QA: YES
GRAM STN SPEC: NORMAL
GRAM STN SPEC: NORMAL
LYMPHOCYTES NFR FLD MANUAL: 63 %
MESOTHL CELL NFR FLD MANUAL: 2 %
MONOS+MACROS NFR FLD MANUAL: 34 %
NEUTROPHILS NFR FLD MANUAL: 1 %
PROT FLD-MCNC: 1.8 G/DL
SPECIMEN SOURCE: NORMAL
SPECIMEN SOURCE: NORMAL
WBC # FLD: 94 /CU MM

## 2021-11-29 PROCEDURE — 89051 BODY FLUID CELL COUNT: CPT | Mod: NTX | Performed by: INTERNAL MEDICINE

## 2021-11-29 PROCEDURE — 87070 CULTURE OTHR SPECIMN AEROBIC: CPT | Mod: TXP | Performed by: INTERNAL MEDICINE

## 2021-11-29 PROCEDURE — 87116 MYCOBACTERIA CULTURE: CPT | Mod: NTX | Performed by: INTERNAL MEDICINE

## 2021-11-29 PROCEDURE — 87206 SMEAR FLUORESCENT/ACID STAI: CPT | Mod: TXP | Performed by: INTERNAL MEDICINE

## 2021-11-29 PROCEDURE — 63600175 PHARM REV CODE 636 W HCPCS: Mod: JG,TXP | Performed by: INTERNAL MEDICINE

## 2021-11-29 PROCEDURE — 84157 ASSAY OF PROTEIN OTHER: CPT | Mod: NTX | Performed by: INTERNAL MEDICINE

## 2021-11-29 PROCEDURE — P9047 ALBUMIN (HUMAN), 25%, 50ML: HCPCS | Mod: JG,TXP | Performed by: INTERNAL MEDICINE

## 2021-11-29 PROCEDURE — 87070 CULTURE OTHR SPECIMN AEROBIC: CPT | Mod: 59,TXP | Performed by: INTERNAL MEDICINE

## 2021-11-29 PROCEDURE — 81025 URINE PREGNANCY TEST: CPT | Mod: TXP | Performed by: FAMILY MEDICINE

## 2021-11-29 PROCEDURE — 27100111 IR PARACENTESIS WITH IMAGING: Mod: TXP

## 2021-11-29 PROCEDURE — 49083 ABD PARACENTESIS W/IMAGING: CPT | Mod: TXP | Performed by: RADIOLOGY

## 2021-11-29 PROCEDURE — 49083 IR PARACENTESIS WITH IMAGING: ICD-10-PCS | Mod: NTX,,, | Performed by: FAMILY MEDICINE

## 2021-11-29 PROCEDURE — 87102 FUNGUS ISOLATION CULTURE: CPT | Mod: TXP | Performed by: INTERNAL MEDICINE

## 2021-11-29 PROCEDURE — 25000003 PHARM REV CODE 250: Mod: TXP | Performed by: FAMILY MEDICINE

## 2021-11-29 PROCEDURE — 87075 CULTR BACTERIA EXCEPT BLOOD: CPT | Mod: NTX | Performed by: INTERNAL MEDICINE

## 2021-11-29 PROCEDURE — 87205 SMEAR GRAM STAIN: CPT | Mod: NTX | Performed by: INTERNAL MEDICINE

## 2021-11-29 PROCEDURE — 49083 ABD PARACENTESIS W/IMAGING: CPT | Mod: NTX,,, | Performed by: FAMILY MEDICINE

## 2021-11-29 PROCEDURE — 82042 OTHER SOURCE ALBUMIN QUAN EA: CPT | Mod: TXP | Performed by: INTERNAL MEDICINE

## 2021-11-29 RX ORDER — ALBUMIN HUMAN 250 G/1000ML
50 SOLUTION INTRAVENOUS ONCE
Status: COMPLETED | OUTPATIENT
Start: 2021-11-29 | End: 2021-11-29

## 2021-11-29 RX ORDER — LIDOCAINE HYDROCHLORIDE 10 MG/ML
INJECTION INFILTRATION; PERINEURAL CODE/TRAUMA/SEDATION MEDICATION
Status: COMPLETED | OUTPATIENT
Start: 2021-11-29 | End: 2021-11-29

## 2021-11-29 RX ADMIN — LIDOCAINE HYDROCHLORIDE 5 ML: 10 INJECTION, SOLUTION INFILTRATION; PERINEURAL at 08:11

## 2021-11-29 RX ADMIN — ALBUMIN HUMAN 50 G: 0.25 SOLUTION INTRAVENOUS at 09:11

## 2021-12-02 ENCOUNTER — PATIENT MESSAGE (OUTPATIENT)
Dept: INTERNAL MEDICINE | Facility: CLINIC | Age: 51
End: 2021-12-02
Payer: COMMERCIAL

## 2021-12-02 ENCOUNTER — PATIENT MESSAGE (OUTPATIENT)
Dept: HEPATOLOGY | Facility: CLINIC | Age: 51
End: 2021-12-02
Payer: COMMERCIAL

## 2021-12-02 LAB — BACTERIA SPEC AEROBE CULT: NO GROWTH

## 2021-12-04 LAB — BACTERIA FLD CULT: NORMAL

## 2021-12-06 ENCOUNTER — HOSPITAL ENCOUNTER (OUTPATIENT)
Dept: INTERVENTIONAL RADIOLOGY/VASCULAR | Facility: HOSPITAL | Age: 51
Discharge: HOME | End: 2021-12-06
Attending: INTERNAL MEDICINE
Payer: COMMERCIAL

## 2021-12-06 DIAGNOSIS — R18.8 OTHER ASCITES: ICD-10-CM

## 2021-12-06 LAB
ALBUMIN FLD-MCNC: 1.2 G/DL
APPEARANCE FLD: NORMAL
BACTERIA SPEC ANAEROBE CULT: NORMAL
BODY FLD TYPE: NORMAL
COLOR FLD: YELLOW
EOSINOPHIL NFR FLD MANUAL: 1 %
LYMPHOCYTES NFR FLD MANUAL: 45 %
MESOTHL CELL NFR FLD MANUAL: 7 %
MONOS+MACROS NFR FLD MANUAL: 42 %
NEUTROPHILS NFR FLD MANUAL: 5 %
PROT FLD-MCNC: 1.7 G/DL
SPECIMEN SOURCE: NORMAL
SPECIMEN SOURCE: NORMAL
WBC # FLD: 101 /CU MM

## 2021-12-06 PROCEDURE — 89051 BODY FLUID CELL COUNT: CPT | Mod: NTX | Performed by: INTERNAL MEDICINE

## 2021-12-06 PROCEDURE — 49083 IR PARACENTESIS WITH IMAGING: ICD-10-PCS | Mod: TXP,,, | Performed by: STUDENT IN AN ORGANIZED HEALTH CARE EDUCATION/TRAINING PROGRAM

## 2021-12-06 PROCEDURE — P9047 ALBUMIN (HUMAN), 25%, 50ML: HCPCS | Mod: JG,NTX

## 2021-12-06 PROCEDURE — 49083 ABD PARACENTESIS W/IMAGING: CPT | Mod: NTX | Performed by: STUDENT IN AN ORGANIZED HEALTH CARE EDUCATION/TRAINING PROGRAM

## 2021-12-06 PROCEDURE — 63600175 PHARM REV CODE 636 W HCPCS: Mod: JG,NTX

## 2021-12-06 PROCEDURE — 63600175 PHARM REV CODE 636 W HCPCS: Mod: JG,NTX | Performed by: INTERNAL MEDICINE

## 2021-12-06 PROCEDURE — P9047 ALBUMIN (HUMAN), 25%, 50ML: HCPCS | Mod: JG,NTX | Performed by: INTERNAL MEDICINE

## 2021-12-06 PROCEDURE — 82042 OTHER SOURCE ALBUMIN QUAN EA: CPT | Mod: TXP | Performed by: INTERNAL MEDICINE

## 2021-12-06 PROCEDURE — 27100111 IR PARACENTESIS WITH IMAGING: Mod: TXP

## 2021-12-06 PROCEDURE — 84157 ASSAY OF PROTEIN OTHER: CPT | Mod: NTX | Performed by: INTERNAL MEDICINE

## 2021-12-06 RX ORDER — ALBUMIN HUMAN 250 G/1000ML
SOLUTION INTRAVENOUS
Status: COMPLETED
Start: 2021-12-06 | End: 2021-12-06

## 2021-12-06 RX ORDER — ALBUMIN HUMAN 250 G/1000ML
25 SOLUTION INTRAVENOUS ONCE
Status: COMPLETED | OUTPATIENT
Start: 2021-12-06 | End: 2021-12-06

## 2021-12-06 RX ADMIN — ALBUMIN HUMAN 25 G: 0.25 SOLUTION INTRAVENOUS at 09:12

## 2021-12-06 RX ADMIN — ALBUMIN HUMAN 25 G: 0.25 SOLUTION INTRAVENOUS at 10:12

## 2021-12-06 RX ADMIN — ALBUMIN HUMAN 25 G: 250 SOLUTION INTRAVENOUS at 09:12

## 2021-12-06 RX ADMIN — ALBUMIN (HUMAN) 25 G: 25 SOLUTION INTRAVENOUS at 10:12

## 2021-12-07 ENCOUNTER — NURSE TRIAGE (OUTPATIENT)
Dept: ADMINISTRATIVE | Facility: CLINIC | Age: 51
End: 2021-12-07
Payer: COMMERCIAL

## 2021-12-07 ENCOUNTER — HOSPITAL ENCOUNTER (OUTPATIENT)
Facility: HOSPITAL | Age: 51
Discharge: HOME | End: 2021-12-08
Attending: EMERGENCY MEDICINE | Admitting: INTERNAL MEDICINE
Payer: COMMERCIAL

## 2021-12-07 ENCOUNTER — PATIENT MESSAGE (OUTPATIENT)
Dept: ENDOSCOPY | Facility: HOSPITAL | Age: 51
End: 2021-12-07
Payer: COMMERCIAL

## 2021-12-07 ENCOUNTER — PATIENT MESSAGE (OUTPATIENT)
Dept: HEPATOLOGY | Facility: CLINIC | Age: 51
End: 2021-12-07
Payer: COMMERCIAL

## 2021-12-07 ENCOUNTER — TELEPHONE (OUTPATIENT)
Dept: ENDOSCOPY | Facility: HOSPITAL | Age: 51
End: 2021-12-07
Payer: COMMERCIAL

## 2021-12-07 ENCOUNTER — TELEPHONE (OUTPATIENT)
Dept: INTERNAL MEDICINE | Facility: CLINIC | Age: 51
End: 2021-12-07
Payer: COMMERCIAL

## 2021-12-07 DIAGNOSIS — R07.9 CHEST PAIN: ICD-10-CM

## 2021-12-07 DIAGNOSIS — R31.9 HEMATURIA, UNSPECIFIED TYPE: ICD-10-CM

## 2021-12-07 DIAGNOSIS — Z01.812 PRE-PROCEDURE LAB EXAM: Primary | ICD-10-CM

## 2021-12-07 DIAGNOSIS — R10.9 ABDOMINAL PAIN, UNSPECIFIED ABDOMINAL LOCATION: Primary | ICD-10-CM

## 2021-12-07 DIAGNOSIS — K74.60 HEPATIC CIRRHOSIS, UNSPECIFIED HEPATIC CIRRHOSIS TYPE, UNSPECIFIED WHETHER ASCITES PRESENT: Primary | ICD-10-CM

## 2021-12-07 DIAGNOSIS — R93.5 ABNORMAL CT OF THE ABDOMEN: ICD-10-CM

## 2021-12-07 PROBLEM — K65.2 SBP (SPONTANEOUS BACTERIAL PERITONITIS): Chronic | Status: ACTIVE | Noted: 2021-08-06

## 2021-12-07 PROBLEM — Z01.818 ENCOUNTER FOR PRE-TRANSPLANT EVALUATION FOR LIVER TRANSPLANT: Chronic | Status: ACTIVE | Noted: 2021-09-23

## 2021-12-07 PROBLEM — K85.20 ALCOHOL-INDUCED ACUTE PANCREATITIS: Chronic | Status: ACTIVE | Noted: 2021-01-25

## 2021-12-07 PROBLEM — N18.30 CKD (CHRONIC KIDNEY DISEASE) STAGE 3, GFR 30-59 ML/MIN: Chronic | Status: ACTIVE | Noted: 2021-07-22

## 2021-12-07 LAB
BACTERIA #/AREA URNS AUTO: ABNORMAL /HPF
BASOPHILS # BLD AUTO: 0.1 K/UL (ref 0–0.2)
BASOPHILS NFR BLD: 0.8 % (ref 0–1.9)
BILIRUB UR QL STRIP: NEGATIVE
BUN SERPL-MCNC: 65 MG/DL (ref 6–30)
CHLORIDE SERPL-SCNC: 101 MMOL/L (ref 95–110)
CLARITY UR REFRACT.AUTO: ABNORMAL
COLOR UR AUTO: YELLOW
CREAT SERPL-MCNC: 2.8 MG/DL (ref 0.5–1.4)
DIFFERENTIAL METHOD: ABNORMAL
EOSINOPHIL # BLD AUTO: 0.6 K/UL (ref 0–0.5)
EOSINOPHIL NFR BLD: 4.6 % (ref 0–8)
ERYTHROCYTE [DISTWIDTH] IN BLOOD BY AUTOMATED COUNT: 14.2 % (ref 11.5–14.5)
GLUCOSE SERPL-MCNC: 109 MG/DL (ref 70–110)
GLUCOSE UR QL STRIP: NEGATIVE
HCT VFR BLD AUTO: 37.5 % (ref 37–48.5)
HCT VFR BLD CALC: 34 %PCV (ref 36–54)
HGB BLD-MCNC: 12.3 G/DL (ref 12–16)
HGB UR QL STRIP: ABNORMAL
HYALINE CASTS UR QL AUTO: 2 /LPF
IMM GRANULOCYTES # BLD AUTO: 0.03 K/UL (ref 0–0.04)
IMM GRANULOCYTES NFR BLD AUTO: 0.3 % (ref 0–0.5)
KETONES UR QL STRIP: NEGATIVE
LEUKOCYTE ESTERASE UR QL STRIP: NEGATIVE
LIPASE SERPL-CCNC: 20 U/L (ref 4–60)
LYMPHOCYTES # BLD AUTO: 2.2 K/UL (ref 1–4.8)
LYMPHOCYTES NFR BLD: 18.7 % (ref 18–48)
MCH RBC QN AUTO: 30.1 PG (ref 27–31)
MCHC RBC AUTO-ENTMCNC: 32.8 G/DL (ref 32–36)
MCV RBC AUTO: 92 FL (ref 82–98)
MICROSCOPIC COMMENT: ABNORMAL
MONOCYTES # BLD AUTO: 0.8 K/UL (ref 0.3–1)
MONOCYTES NFR BLD: 6.8 % (ref 4–15)
NEUTROPHILS # BLD AUTO: 8.2 K/UL (ref 1.8–7.7)
NEUTROPHILS NFR BLD: 68.8 % (ref 38–73)
NITRITE UR QL STRIP: NEGATIVE
NRBC BLD-RTO: 0 /100 WBC
PH UR STRIP: 5 [PH] (ref 5–8)
PLATELET # BLD AUTO: 264 K/UL (ref 150–450)
PMV BLD AUTO: 10.6 FL (ref 9.2–12.9)
POC IONIZED CALCIUM: 1.1 MMOL/L (ref 1.06–1.42)
POC TCO2 (MEASURED): 20 MMOL/L (ref 23–29)
POTASSIUM BLD-SCNC: 4.7 MMOL/L (ref 3.5–5.1)
PROT UR QL STRIP: NEGATIVE
RBC # BLD AUTO: 4.08 M/UL (ref 4–5.4)
RBC #/AREA URNS AUTO: 16 /HPF (ref 0–4)
SAMPLE: ABNORMAL
SODIUM BLD-SCNC: 133 MMOL/L (ref 136–145)
SP GR UR STRIP: 1.01 (ref 1–1.03)
SQUAMOUS #/AREA URNS AUTO: 22 /HPF
URN SPEC COLLECT METH UR: ABNORMAL
WBC # BLD AUTO: 11.91 K/UL (ref 3.9–12.7)

## 2021-12-07 PROCEDURE — 99285 PR EMERGENCY DEPT VISIT,LEVEL V: ICD-10-PCS | Mod: NTX,CS,, | Performed by: EMERGENCY MEDICINE

## 2021-12-07 PROCEDURE — 80053 COMPREHEN METABOLIC PANEL: CPT | Mod: NTX

## 2021-12-07 PROCEDURE — 80047 BASIC METABLC PNL IONIZED CA: CPT | Mod: NTX

## 2021-12-07 PROCEDURE — 83690 ASSAY OF LIPASE: CPT | Mod: NTX

## 2021-12-07 PROCEDURE — 99285 EMERGENCY DEPT VISIT HI MDM: CPT | Mod: NTX,CS,, | Performed by: EMERGENCY MEDICINE

## 2021-12-07 PROCEDURE — 85025 COMPLETE CBC W/AUTO DIFF WBC: CPT | Mod: NTX

## 2021-12-07 PROCEDURE — 99285 EMERGENCY DEPT VISIT HI MDM: CPT | Mod: 25,NTX

## 2021-12-07 PROCEDURE — 81001 URINALYSIS AUTO W/SCOPE: CPT | Mod: NTX

## 2021-12-08 ENCOUNTER — TELEPHONE (OUTPATIENT)
Dept: TRANSPLANT | Facility: CLINIC | Age: 51
End: 2021-12-08
Payer: COMMERCIAL

## 2021-12-08 VITALS
DIASTOLIC BLOOD PRESSURE: 48 MMHG | HEART RATE: 85 BPM | RESPIRATION RATE: 18 BRPM | TEMPERATURE: 98 F | BODY MASS INDEX: 20.02 KG/M2 | OXYGEN SATURATION: 98 % | SYSTOLIC BLOOD PRESSURE: 92 MMHG | WEIGHT: 113 LBS

## 2021-12-08 PROBLEM — D64.9 ANEMIA: Status: ACTIVE | Noted: 2021-12-08

## 2021-12-08 PROBLEM — R10.9 ABDOMINAL PAIN: Status: ACTIVE | Noted: 2021-12-08

## 2021-12-08 PROBLEM — K70.31 ALCOHOLIC CIRRHOSIS OF LIVER WITH ASCITES: Status: ACTIVE | Noted: 2021-09-16

## 2021-12-08 LAB
ALBUMIN SERPL BCP-MCNC: 3.1 G/DL (ref 3.5–5.2)
ALBUMIN SERPL BCP-MCNC: 3.6 G/DL (ref 3.5–5.2)
ALP SERPL-CCNC: 68 U/L (ref 55–135)
ALP SERPL-CCNC: 77 U/L (ref 55–135)
ALT SERPL W/O P-5'-P-CCNC: 12 U/L (ref 10–44)
ALT SERPL W/O P-5'-P-CCNC: 16 U/L (ref 10–44)
ANION GAP SERPL CALC-SCNC: 14 MMOL/L (ref 8–16)
ANION GAP SERPL CALC-SCNC: 9 MMOL/L (ref 8–16)
AST SERPL-CCNC: 29 U/L (ref 10–40)
AST SERPL-CCNC: 32 U/L (ref 10–40)
BASOPHILS # BLD AUTO: 0.09 K/UL (ref 0–0.2)
BASOPHILS NFR BLD: 0.8 % (ref 0–1.9)
BILIRUB SERPL-MCNC: 0.6 MG/DL (ref 0.1–1)
BILIRUB SERPL-MCNC: 0.7 MG/DL (ref 0.1–1)
BUN SERPL-MCNC: 68 MG/DL (ref 6–20)
BUN SERPL-MCNC: 70 MG/DL (ref 6–20)
CALCIUM SERPL-MCNC: 8.9 MG/DL (ref 8.7–10.5)
CALCIUM SERPL-MCNC: 9.1 MG/DL (ref 8.7–10.5)
CHLORIDE SERPL-SCNC: 100 MMOL/L (ref 95–110)
CHLORIDE SERPL-SCNC: 99 MMOL/L (ref 95–110)
CO2 SERPL-SCNC: 21 MMOL/L (ref 23–29)
CO2 SERPL-SCNC: 22 MMOL/L (ref 23–29)
CREAT SERPL-MCNC: 2.3 MG/DL (ref 0.5–1.4)
CREAT SERPL-MCNC: 2.6 MG/DL (ref 0.5–1.4)
CTP QC/QA: YES
DIFFERENTIAL METHOD: ABNORMAL
EOSINOPHIL # BLD AUTO: 0.7 K/UL (ref 0–0.5)
EOSINOPHIL NFR BLD: 7 % (ref 0–8)
ERYTHROCYTE [DISTWIDTH] IN BLOOD BY AUTOMATED COUNT: 13.9 % (ref 11.5–14.5)
EST. GFR  (AFRICAN AMERICAN): 23.9 ML/MIN/1.73 M^2
EST. GFR  (AFRICAN AMERICAN): 27.7 ML/MIN/1.73 M^2
EST. GFR  (NON AFRICAN AMERICAN): 20.7 ML/MIN/1.73 M^2
EST. GFR  (NON AFRICAN AMERICAN): 24.1 ML/MIN/1.73 M^2
GLUCOSE SERPL-MCNC: 110 MG/DL (ref 70–110)
GLUCOSE SERPL-MCNC: 142 MG/DL (ref 70–110)
HCT VFR BLD AUTO: 32.3 % (ref 37–48.5)
HGB BLD-MCNC: 10.8 G/DL (ref 12–16)
HGB BLD-MCNC: 10.9 G/DL (ref 12–16)
IMM GRANULOCYTES # BLD AUTO: 0.03 K/UL (ref 0–0.04)
IMM GRANULOCYTES NFR BLD AUTO: 0.3 % (ref 0–0.5)
INR PPP: 1 (ref 0.8–1.2)
INR PPP: 1.1 (ref 0.8–1.2)
LYMPHOCYTES # BLD AUTO: 2.3 K/UL (ref 1–4.8)
LYMPHOCYTES NFR BLD: 21.6 % (ref 18–48)
MAGNESIUM SERPL-MCNC: 2.2 MG/DL (ref 1.6–2.6)
MCH RBC QN AUTO: 30.1 PG (ref 27–31)
MCHC RBC AUTO-ENTMCNC: 33.4 G/DL (ref 32–36)
MCV RBC AUTO: 90 FL (ref 82–98)
MONOCYTES # BLD AUTO: 0.7 K/UL (ref 0.3–1)
MONOCYTES NFR BLD: 6.8 % (ref 4–15)
NEUTROPHILS # BLD AUTO: 6.8 K/UL (ref 1.8–7.7)
NEUTROPHILS NFR BLD: 63.5 % (ref 38–73)
NRBC BLD-RTO: 0 /100 WBC
PHOSPHATE SERPL-MCNC: 3.4 MG/DL (ref 2.7–4.5)
PLATELET # BLD AUTO: 238 K/UL (ref 150–450)
PMV BLD AUTO: 10.5 FL (ref 9.2–12.9)
POTASSIUM SERPL-SCNC: 4.3 MMOL/L (ref 3.5–5.1)
POTASSIUM SERPL-SCNC: 5.1 MMOL/L (ref 3.5–5.1)
PROT SERPL-MCNC: 5.4 G/DL (ref 6–8.4)
PROT SERPL-MCNC: 5.8 G/DL (ref 6–8.4)
PROTHROMBIN TIME: 11.4 SEC (ref 9–12.5)
PROTHROMBIN TIME: 12 SEC (ref 9–12.5)
RBC # BLD AUTO: 3.59 M/UL (ref 4–5.4)
SARS-COV-2 RDRP RESP QL NAA+PROBE: NEGATIVE
SODIUM SERPL-SCNC: 130 MMOL/L (ref 136–145)
SODIUM SERPL-SCNC: 135 MMOL/L (ref 136–145)
WBC # BLD AUTO: 10.63 K/UL (ref 3.9–12.7)

## 2021-12-08 PROCEDURE — 99499 NO LOS: ICD-10-PCS | Mod: NTX,,, | Performed by: PHYSICIAN ASSISTANT

## 2021-12-08 PROCEDURE — 83735 ASSAY OF MAGNESIUM: CPT | Mod: NTX | Performed by: PHYSICIAN ASSISTANT

## 2021-12-08 PROCEDURE — 80053 COMPREHEN METABOLIC PANEL: CPT | Mod: NTX | Performed by: PHYSICIAN ASSISTANT

## 2021-12-08 PROCEDURE — 99236 HOSP IP/OBS SAME DATE HI 85: CPT | Mod: NTX,,, | Performed by: PHYSICIAN ASSISTANT

## 2021-12-08 PROCEDURE — 85025 COMPLETE CBC W/AUTO DIFF WBC: CPT | Mod: NTX | Performed by: PHYSICIAN ASSISTANT

## 2021-12-08 PROCEDURE — 85018 HEMOGLOBIN: CPT | Mod: NTX | Performed by: PHYSICIAN ASSISTANT

## 2021-12-08 PROCEDURE — 25000003 PHARM REV CODE 250: Mod: NTX | Performed by: PHYSICIAN ASSISTANT

## 2021-12-08 PROCEDURE — G0378 HOSPITAL OBSERVATION PER HR: HCPCS | Mod: NTX

## 2021-12-08 PROCEDURE — 99499 UNLISTED E&M SERVICE: CPT | Mod: NTX,,, | Performed by: PHYSICIAN ASSISTANT

## 2021-12-08 PROCEDURE — 84100 ASSAY OF PHOSPHORUS: CPT | Mod: NTX | Performed by: PHYSICIAN ASSISTANT

## 2021-12-08 PROCEDURE — U0002 COVID-19 LAB TEST NON-CDC: HCPCS | Mod: NTX | Performed by: EMERGENCY MEDICINE

## 2021-12-08 PROCEDURE — 87040 BLOOD CULTURE FOR BACTERIA: CPT | Mod: NTX | Performed by: PHYSICIAN ASSISTANT

## 2021-12-08 PROCEDURE — 99236 PR OBSERV/HOSP SAME DATE,LEVL V: ICD-10-PCS | Mod: NTX,,, | Performed by: PHYSICIAN ASSISTANT

## 2021-12-08 PROCEDURE — 85610 PROTHROMBIN TIME: CPT | Mod: NTX | Performed by: PHYSICIAN ASSISTANT

## 2021-12-08 PROCEDURE — 85610 PROTHROMBIN TIME: CPT | Mod: 91,NTX

## 2021-12-08 RX ORDER — PROMETHAZINE HYDROCHLORIDE 25 MG/1
25 TABLET ORAL EVERY 6 HOURS PRN
Status: DISCONTINUED | OUTPATIENT
Start: 2021-12-08 | End: 2021-12-08 | Stop reason: HOSPADM

## 2021-12-08 RX ORDER — ONDANSETRON 8 MG/1
8 TABLET, ORALLY DISINTEGRATING ORAL EVERY 8 HOURS PRN
Status: DISCONTINUED | OUTPATIENT
Start: 2021-12-08 | End: 2021-12-08 | Stop reason: HOSPADM

## 2021-12-08 RX ORDER — IBUPROFEN 200 MG
1 TABLET ORAL DAILY PRN
Status: DISCONTINUED | OUTPATIENT
Start: 2021-12-08 | End: 2021-12-08 | Stop reason: HOSPADM

## 2021-12-08 RX ORDER — FOLIC ACID 1 MG/1
2 TABLET ORAL DAILY
Status: DISCONTINUED | OUTPATIENT
Start: 2021-12-08 | End: 2021-12-08 | Stop reason: HOSPADM

## 2021-12-08 RX ORDER — HYDROXYZINE PAMOATE 25 MG/1
25 CAPSULE ORAL NIGHTLY PRN
Status: DISCONTINUED | OUTPATIENT
Start: 2021-12-08 | End: 2021-12-08 | Stop reason: HOSPADM

## 2021-12-08 RX ORDER — ALLOPURINOL 100 MG/1
100 TABLET ORAL DAILY
Status: DISCONTINUED | OUTPATIENT
Start: 2021-12-08 | End: 2021-12-08 | Stop reason: HOSPADM

## 2021-12-08 RX ORDER — GLUCAGON 1 MG
1 KIT INJECTION
Status: DISCONTINUED | OUTPATIENT
Start: 2021-12-08 | End: 2021-12-08 | Stop reason: HOSPADM

## 2021-12-08 RX ORDER — SODIUM BICARBONATE 650 MG/1
650 TABLET ORAL DAILY
Status: DISCONTINUED | OUTPATIENT
Start: 2021-12-08 | End: 2021-12-08 | Stop reason: HOSPADM

## 2021-12-08 RX ORDER — IBUPROFEN 200 MG
24 TABLET ORAL
Status: DISCONTINUED | OUTPATIENT
Start: 2021-12-08 | End: 2021-12-08 | Stop reason: HOSPADM

## 2021-12-08 RX ORDER — THIAMINE HCL 100 MG
100 TABLET ORAL NIGHTLY
Status: DISCONTINUED | OUTPATIENT
Start: 2021-12-08 | End: 2021-12-08 | Stop reason: HOSPADM

## 2021-12-08 RX ORDER — LEVOTHYROXINE SODIUM 50 UG/1
50 TABLET ORAL
Status: DISCONTINUED | OUTPATIENT
Start: 2021-12-08 | End: 2021-12-08 | Stop reason: HOSPADM

## 2021-12-08 RX ORDER — SENNOSIDES 8.6 MG/1
8.6 TABLET ORAL DAILY
Status: DISCONTINUED | OUTPATIENT
Start: 2021-12-08 | End: 2021-12-08 | Stop reason: HOSPADM

## 2021-12-08 RX ORDER — VITAMIN A 3000 MCG
10000 CAPSULE ORAL NIGHTLY
Status: DISCONTINUED | OUTPATIENT
Start: 2021-12-08 | End: 2021-12-08 | Stop reason: HOSPADM

## 2021-12-08 RX ORDER — POLYETHYLENE GLYCOL 3350 17 G/17G
17 POWDER, FOR SOLUTION ORAL DAILY PRN
Status: DISCONTINUED | OUTPATIENT
Start: 2021-12-08 | End: 2021-12-08 | Stop reason: HOSPADM

## 2021-12-08 RX ORDER — POLYETHYLENE GLYCOL 3350 17 G/17G
17 POWDER, FOR SOLUTION ORAL 2 TIMES DAILY
Status: DISCONTINUED | OUTPATIENT
Start: 2021-12-08 | End: 2021-12-08 | Stop reason: HOSPADM

## 2021-12-08 RX ORDER — ACETAMINOPHEN 325 MG/1
650 TABLET ORAL EVERY 4 HOURS PRN
Status: DISCONTINUED | OUTPATIENT
Start: 2021-12-08 | End: 2021-12-08 | Stop reason: HOSPADM

## 2021-12-08 RX ORDER — CIPROFLOXACIN 500 MG/1
500 TABLET ORAL DAILY
Status: DISCONTINUED | OUTPATIENT
Start: 2021-12-08 | End: 2021-12-08 | Stop reason: HOSPADM

## 2021-12-08 RX ORDER — IPRATROPIUM BROMIDE AND ALBUTEROL SULFATE 2.5; .5 MG/3ML; MG/3ML
3 SOLUTION RESPIRATORY (INHALATION) EVERY 4 HOURS PRN
Status: DISCONTINUED | OUTPATIENT
Start: 2021-12-08 | End: 2021-12-08 | Stop reason: HOSPADM

## 2021-12-08 RX ORDER — TALC
6 POWDER (GRAM) TOPICAL NIGHTLY PRN
Status: DISCONTINUED | OUTPATIENT
Start: 2021-12-08 | End: 2021-12-08 | Stop reason: HOSPADM

## 2021-12-08 RX ORDER — BISACODYL 10 MG
10 SUPPOSITORY, RECTAL RECTAL DAILY PRN
Status: DISCONTINUED | OUTPATIENT
Start: 2021-12-08 | End: 2021-12-08 | Stop reason: HOSPADM

## 2021-12-08 RX ORDER — IBUPROFEN 200 MG
16 TABLET ORAL
Status: DISCONTINUED | OUTPATIENT
Start: 2021-12-08 | End: 2021-12-08 | Stop reason: HOSPADM

## 2021-12-08 RX ADMIN — FOLIC ACID 2 MG: 1 TABLET ORAL at 08:12

## 2021-12-08 RX ADMIN — POLYETHYLENE GLYCOL 3350 17 G: 17 POWDER, FOR SOLUTION ORAL at 08:12

## 2021-12-08 RX ADMIN — SODIUM BICARBONATE 650 MG TABLET 650 MG: at 08:12

## 2021-12-08 RX ADMIN — SENNOSIDES 8.6 MG: 8.6 TABLET, FILM COATED ORAL at 08:12

## 2021-12-09 ENCOUNTER — HOSPITAL ENCOUNTER (INPATIENT)
Facility: HOSPITAL | Age: 51
LOS: 2 days | Discharge: HOME | DRG: 433 | End: 2021-12-14
Attending: EMERGENCY MEDICINE | Admitting: STUDENT IN AN ORGANIZED HEALTH CARE EDUCATION/TRAINING PROGRAM
Payer: COMMERCIAL

## 2021-12-09 ENCOUNTER — TELEPHONE (OUTPATIENT)
Dept: TRANSPLANT | Facility: CLINIC | Age: 51
End: 2021-12-09
Payer: COMMERCIAL

## 2021-12-09 ENCOUNTER — TELEPHONE (OUTPATIENT)
Dept: NEPHROLOGY | Facility: CLINIC | Age: 51
End: 2021-12-09
Payer: COMMERCIAL

## 2021-12-09 ENCOUNTER — TELEPHONE (OUTPATIENT)
Dept: HEPATOLOGY | Facility: CLINIC | Age: 51
End: 2021-12-09
Payer: COMMERCIAL

## 2021-12-09 DIAGNOSIS — N28.9 DECREASED RENAL FUNCTION: ICD-10-CM

## 2021-12-09 DIAGNOSIS — K74.60 HEPATIC CIRRHOSIS, UNSPECIFIED HEPATIC CIRRHOSIS TYPE, UNSPECIFIED WHETHER ASCITES PRESENT: ICD-10-CM

## 2021-12-09 DIAGNOSIS — R60.0 EDEMA OF ABDOMINAL WALL: ICD-10-CM

## 2021-12-09 DIAGNOSIS — N18.9 ACUTE KIDNEY INJURY SUPERIMPOSED ON CHRONIC KIDNEY DISEASE: ICD-10-CM

## 2021-12-09 DIAGNOSIS — K76.9 IGA NEPHROPATHY ASSOCIATED WITH LIVER DISEASE: ICD-10-CM

## 2021-12-09 DIAGNOSIS — N17.9 ACUTE KIDNEY INJURY SUPERIMPOSED ON CHRONIC KIDNEY DISEASE: ICD-10-CM

## 2021-12-09 DIAGNOSIS — K70.31 ASCITES DUE TO ALCOHOLIC CIRRHOSIS: Primary | ICD-10-CM

## 2021-12-09 DIAGNOSIS — N18.32 STAGE 3B CHRONIC KIDNEY DISEASE: ICD-10-CM

## 2021-12-09 DIAGNOSIS — K70.31 ALCOHOLIC CIRRHOSIS OF LIVER WITH ASCITES: ICD-10-CM

## 2021-12-09 DIAGNOSIS — F10.10 ALCOHOL ABUSE: ICD-10-CM

## 2021-12-09 DIAGNOSIS — N02.B9 IGA NEPHROPATHY ASSOCIATED WITH LIVER DISEASE: ICD-10-CM

## 2021-12-09 PROBLEM — D75.89 MACROCYTOSIS: Status: RESOLVED | Noted: 2021-01-25 | Resolved: 2021-12-09

## 2021-12-09 PROBLEM — Z23 NEED FOR ZOSTER VACCINATION: Status: RESOLVED | Noted: 2021-08-06 | Resolved: 2021-12-09

## 2021-12-09 PROBLEM — R79.89 ELEVATED LIVER FUNCTION TESTS: Status: RESOLVED | Noted: 2021-06-08 | Resolved: 2021-12-09

## 2021-12-09 PROBLEM — Z23 NEED FOR VACCINATION WITH TWINRIX: Status: RESOLVED | Noted: 2021-08-06 | Resolved: 2021-12-09

## 2021-12-09 PROBLEM — D69.6 THROMBOCYTOPENIA: Status: RESOLVED | Noted: 2021-01-28 | Resolved: 2021-12-09

## 2021-12-09 PROBLEM — Z12.11 COLON CANCER SCREENING: Status: RESOLVED | Noted: 2021-09-17 | Resolved: 2021-12-09

## 2021-12-09 PROBLEM — Z23 NEED FOR VACCINATION AGAINST STREPTOCOCCUS PNEUMONIAE: Status: RESOLVED | Noted: 2021-08-06 | Resolved: 2021-12-09

## 2021-12-09 LAB
ALBUMIN SERPL BCP-MCNC: 3.1 G/DL (ref 3.5–5.2)
ALP SERPL-CCNC: 85 U/L (ref 55–135)
ALT SERPL W/O P-5'-P-CCNC: 17 U/L (ref 10–44)
ANION GAP SERPL CALC-SCNC: 13 MMOL/L (ref 8–16)
AST SERPL-CCNC: 38 U/L (ref 10–40)
B-HCG UR QL: NEGATIVE
BACTERIA #/AREA URNS AUTO: ABNORMAL /HPF
BASOPHILS # BLD AUTO: 0.08 K/UL (ref 0–0.2)
BASOPHILS NFR BLD: 0.7 % (ref 0–1.9)
BILIRUB SERPL-MCNC: 0.5 MG/DL (ref 0.1–1)
BILIRUB UR QL STRIP: NEGATIVE
BUN SERPL-MCNC: 64 MG/DL (ref 6–20)
CALCIUM SERPL-MCNC: 9.2 MG/DL (ref 8.7–10.5)
CHLORIDE SERPL-SCNC: 106 MMOL/L (ref 95–110)
CLARITY UR REFRACT.AUTO: CLEAR
CO2 SERPL-SCNC: 18 MMOL/L (ref 23–29)
COLOR UR AUTO: YELLOW
CREAT SERPL-MCNC: 2.4 MG/DL (ref 0.5–1.4)
CTP QC/QA: YES
CTP QC/QA: YES
DIFFERENTIAL METHOD: ABNORMAL
EOSINOPHIL # BLD AUTO: 0.7 K/UL (ref 0–0.5)
EOSINOPHIL NFR BLD: 6 % (ref 0–8)
ERYTHROCYTE [DISTWIDTH] IN BLOOD BY AUTOMATED COUNT: 14.2 % (ref 11.5–14.5)
EST. GFR  (AFRICAN AMERICAN): 26.3 ML/MIN/1.73 M^2
EST. GFR  (NON AFRICAN AMERICAN): 22.8 ML/MIN/1.73 M^2
GLUCOSE SERPL-MCNC: 93 MG/DL (ref 70–110)
GLUCOSE UR QL STRIP: NEGATIVE
HCT VFR BLD AUTO: 33.4 % (ref 37–48.5)
HGB BLD-MCNC: 10.6 G/DL (ref 12–16)
HGB UR QL STRIP: ABNORMAL
IMM GRANULOCYTES # BLD AUTO: 0.03 K/UL (ref 0–0.04)
IMM GRANULOCYTES NFR BLD AUTO: 0.3 % (ref 0–0.5)
KETONES UR QL STRIP: ABNORMAL
LACTATE SERPL-SCNC: 2 MMOL/L (ref 0.5–2.2)
LEUKOCYTE ESTERASE UR QL STRIP: NEGATIVE
LYMPHOCYTES # BLD AUTO: 2.1 K/UL (ref 1–4.8)
LYMPHOCYTES NFR BLD: 19.3 % (ref 18–48)
MCH RBC QN AUTO: 29.3 PG (ref 27–31)
MCHC RBC AUTO-ENTMCNC: 31.7 G/DL (ref 32–36)
MCV RBC AUTO: 92 FL (ref 82–98)
MICROSCOPIC COMMENT: ABNORMAL
MONOCYTES # BLD AUTO: 0.8 K/UL (ref 0.3–1)
MONOCYTES NFR BLD: 7.8 % (ref 4–15)
NEUTROPHILS # BLD AUTO: 7.1 K/UL (ref 1.8–7.7)
NEUTROPHILS NFR BLD: 65.9 % (ref 38–73)
NITRITE UR QL STRIP: NEGATIVE
NRBC BLD-RTO: 0 /100 WBC
PH UR STRIP: 6 [PH] (ref 5–8)
PLATELET # BLD AUTO: 229 K/UL (ref 150–450)
PMV BLD AUTO: 10.5 FL (ref 9.2–12.9)
POTASSIUM SERPL-SCNC: 4.8 MMOL/L (ref 3.5–5.1)
PROT SERPL-MCNC: 6 G/DL (ref 6–8.4)
PROT UR QL STRIP: NEGATIVE
RBC # BLD AUTO: 3.62 M/UL (ref 4–5.4)
RBC #/AREA URNS AUTO: 14 /HPF (ref 0–4)
SARS-COV-2 RDRP RESP QL NAA+PROBE: NEGATIVE
SODIUM SERPL-SCNC: 137 MMOL/L (ref 136–145)
SP GR UR STRIP: 1.01 (ref 1–1.03)
SQUAMOUS #/AREA URNS AUTO: 6 /HPF
URN SPEC COLLECT METH UR: ABNORMAL
WBC # BLD AUTO: 10.77 K/UL (ref 3.9–12.7)
WBC #/AREA URNS AUTO: 2 /HPF (ref 0–5)

## 2021-12-09 PROCEDURE — 81025 URINE PREGNANCY TEST: CPT | Mod: NTX | Performed by: PHYSICIAN ASSISTANT

## 2021-12-09 PROCEDURE — 99285 EMERGENCY DEPT VISIT HI MDM: CPT | Mod: NTX,CS,, | Performed by: PHYSICIAN ASSISTANT

## 2021-12-09 PROCEDURE — U0002 COVID-19 LAB TEST NON-CDC: HCPCS | Mod: NTX | Performed by: PHYSICIAN ASSISTANT

## 2021-12-09 PROCEDURE — 99285 PR EMERGENCY DEPT VISIT,LEVEL V: ICD-10-PCS | Mod: NTX,CS,, | Performed by: PHYSICIAN ASSISTANT

## 2021-12-09 PROCEDURE — 63600175 PHARM REV CODE 636 W HCPCS: Mod: NTX | Performed by: PHYSICIAN ASSISTANT

## 2021-12-09 PROCEDURE — 99220 PR INITIAL OBSERVATION CARE,LEVL III: CPT | Mod: NTX,,, | Performed by: HOSPITALIST

## 2021-12-09 PROCEDURE — 83605 ASSAY OF LACTIC ACID: CPT | Mod: NTX | Performed by: PHYSICIAN ASSISTANT

## 2021-12-09 PROCEDURE — 63600175 PHARM REV CODE 636 W HCPCS: Mod: NTX | Performed by: HOSPITALIST

## 2021-12-09 PROCEDURE — 96374 THER/PROPH/DIAG INJ IV PUSH: CPT | Mod: NTX

## 2021-12-09 PROCEDURE — 85025 COMPLETE CBC W/AUTO DIFF WBC: CPT | Mod: NTX | Performed by: PHYSICIAN ASSISTANT

## 2021-12-09 PROCEDURE — 25000003 PHARM REV CODE 250: Mod: NTX | Performed by: HOSPITALIST

## 2021-12-09 PROCEDURE — 80053 COMPREHEN METABOLIC PANEL: CPT | Mod: NTX | Performed by: PHYSICIAN ASSISTANT

## 2021-12-09 PROCEDURE — 81001 URINALYSIS AUTO W/SCOPE: CPT | Mod: NTX | Performed by: PHYSICIAN ASSISTANT

## 2021-12-09 PROCEDURE — G0378 HOSPITAL OBSERVATION PER HR: HCPCS | Mod: NTX

## 2021-12-09 PROCEDURE — 96375 TX/PRO/DX INJ NEW DRUG ADDON: CPT | Mod: NTX

## 2021-12-09 PROCEDURE — 99285 EMERGENCY DEPT VISIT HI MDM: CPT | Mod: 25,NTX

## 2021-12-09 PROCEDURE — 99220 PR INITIAL OBSERVATION CARE,LEVL III: ICD-10-PCS | Mod: NTX,,, | Performed by: HOSPITALIST

## 2021-12-09 RX ORDER — SODIUM BICARBONATE 650 MG/1
650 TABLET ORAL NIGHTLY
Status: DISCONTINUED | OUTPATIENT
Start: 2021-12-09 | End: 2021-12-14 | Stop reason: HOSPADM

## 2021-12-09 RX ORDER — THIAMINE HCL 100 MG
100 TABLET ORAL NIGHTLY
Status: DISCONTINUED | OUTPATIENT
Start: 2021-12-09 | End: 2021-12-14 | Stop reason: HOSPADM

## 2021-12-09 RX ORDER — CIPROFLOXACIN 500 MG/1
500 TABLET ORAL EVERY OTHER DAY
Status: DISCONTINUED | OUTPATIENT
Start: 2021-12-10 | End: 2021-12-14 | Stop reason: HOSPADM

## 2021-12-09 RX ORDER — FOLIC ACID 1 MG/1
2 TABLET ORAL DAILY
Status: DISCONTINUED | OUTPATIENT
Start: 2021-12-10 | End: 2021-12-09

## 2021-12-09 RX ORDER — ACETAMINOPHEN 500 MG
1000 TABLET ORAL EVERY 8 HOURS PRN
Status: DISCONTINUED | OUTPATIENT
Start: 2021-12-09 | End: 2021-12-14 | Stop reason: HOSPADM

## 2021-12-09 RX ORDER — SODIUM BICARBONATE 650 MG/1
650 TABLET ORAL 2 TIMES DAILY
Status: DISCONTINUED | OUTPATIENT
Start: 2021-12-09 | End: 2021-12-09

## 2021-12-09 RX ORDER — FUROSEMIDE 10 MG/ML
20 INJECTION INTRAMUSCULAR; INTRAVENOUS 2 TIMES DAILY
Status: DISCONTINUED | OUTPATIENT
Start: 2021-12-10 | End: 2021-12-14 | Stop reason: HOSPADM

## 2021-12-09 RX ORDER — SODIUM CHLORIDE 0.9 % (FLUSH) 0.9 %
5 SYRINGE (ML) INJECTION
Status: DISCONTINUED | OUTPATIENT
Start: 2021-12-09 | End: 2021-12-14 | Stop reason: HOSPADM

## 2021-12-09 RX ORDER — TALC
6 POWDER (GRAM) TOPICAL NIGHTLY PRN
Status: DISCONTINUED | OUTPATIENT
Start: 2021-12-09 | End: 2021-12-14 | Stop reason: HOSPADM

## 2021-12-09 RX ORDER — POLYETHYLENE GLYCOL 3350 17 G/17G
17 POWDER, FOR SOLUTION ORAL DAILY PRN
Status: DISCONTINUED | OUTPATIENT
Start: 2021-12-09 | End: 2021-12-14 | Stop reason: HOSPADM

## 2021-12-09 RX ORDER — ONDANSETRON 2 MG/ML
8 INJECTION INTRAMUSCULAR; INTRAVENOUS EVERY 6 HOURS PRN
Status: DISCONTINUED | OUTPATIENT
Start: 2021-12-09 | End: 2021-12-14 | Stop reason: HOSPADM

## 2021-12-09 RX ORDER — ALLOPURINOL 100 MG/1
100 TABLET ORAL DAILY
Status: DISCONTINUED | OUTPATIENT
Start: 2021-12-10 | End: 2021-12-11

## 2021-12-09 RX ORDER — LEVOTHYROXINE SODIUM 50 UG/1
50 TABLET ORAL
Status: DISCONTINUED | OUTPATIENT
Start: 2021-12-10 | End: 2021-12-14 | Stop reason: HOSPADM

## 2021-12-09 RX ORDER — ACETAMINOPHEN 325 MG/1
650 TABLET ORAL EVERY 4 HOURS PRN
Status: DISCONTINUED | OUTPATIENT
Start: 2021-12-09 | End: 2021-12-14 | Stop reason: HOSPADM

## 2021-12-09 RX ORDER — FUROSEMIDE 10 MG/ML
20 INJECTION INTRAMUSCULAR; INTRAVENOUS
Status: COMPLETED | OUTPATIENT
Start: 2021-12-09 | End: 2021-12-09

## 2021-12-09 RX ORDER — FOLIC ACID 1 MG/1
2 TABLET ORAL NIGHTLY
Status: DISCONTINUED | OUTPATIENT
Start: 2021-12-09 | End: 2021-12-14 | Stop reason: HOSPADM

## 2021-12-09 RX ADMIN — SODIUM BICARBONATE 650 MG TABLET 650 MG: at 11:12

## 2021-12-09 RX ADMIN — POLYETHYLENE GLYCOL 3350 17 G: 17 POWDER, FOR SOLUTION ORAL at 11:12

## 2021-12-09 RX ADMIN — FUROSEMIDE 20 MG: 10 INJECTION, SOLUTION INTRAVENOUS at 09:12

## 2021-12-09 RX ADMIN — FOLIC ACID 2 MG: 1 TABLET ORAL at 11:12

## 2021-12-09 RX ADMIN — ONDANSETRON 8 MG: 2 INJECTION INTRAMUSCULAR; INTRAVENOUS at 11:12

## 2021-12-09 RX ADMIN — Medication 100 MG: at 11:12

## 2021-12-10 PROBLEM — N18.9 ACUTE KIDNEY INJURY SUPERIMPOSED ON CHRONIC KIDNEY DISEASE: Status: ACTIVE | Noted: 2021-12-10

## 2021-12-10 PROBLEM — N17.9 ACUTE RENAL FAILURE SUPERIMPOSED ON STAGE 3 CHRONIC KIDNEY DISEASE: Status: ACTIVE | Noted: 2021-12-10

## 2021-12-10 PROBLEM — N18.30 ACUTE RENAL FAILURE SUPERIMPOSED ON STAGE 3 CHRONIC KIDNEY DISEASE: Status: ACTIVE | Noted: 2021-12-10

## 2021-12-10 LAB
ALBUMIN SERPL BCP-MCNC: 2.8 G/DL (ref 3.5–5.2)
ALP SERPL-CCNC: 74 U/L (ref 55–135)
ALT SERPL W/O P-5'-P-CCNC: 13 U/L (ref 10–44)
ANION GAP SERPL CALC-SCNC: 10 MMOL/L (ref 8–16)
AST SERPL-CCNC: 29 U/L (ref 10–40)
BASOPHILS # BLD AUTO: 0.08 K/UL (ref 0–0.2)
BASOPHILS NFR BLD: 1 % (ref 0–1.9)
BILIRUB SERPL-MCNC: 0.5 MG/DL (ref 0.1–1)
BUN SERPL-MCNC: 70 MG/DL (ref 6–20)
CALCIUM SERPL-MCNC: 8.8 MG/DL (ref 8.7–10.5)
CHLORIDE SERPL-SCNC: 105 MMOL/L (ref 95–110)
CO2 SERPL-SCNC: 21 MMOL/L (ref 23–29)
CREAT SERPL-MCNC: 2.5 MG/DL (ref 0.5–1.4)
DIFFERENTIAL METHOD: ABNORMAL
EOSINOPHIL # BLD AUTO: 0.6 K/UL (ref 0–0.5)
EOSINOPHIL NFR BLD: 7.2 % (ref 0–8)
ERYTHROCYTE [DISTWIDTH] IN BLOOD BY AUTOMATED COUNT: 14.2 % (ref 11.5–14.5)
EST. GFR  (AFRICAN AMERICAN): 25.1 ML/MIN/1.73 M^2
EST. GFR  (NON AFRICAN AMERICAN): 21.7 ML/MIN/1.73 M^2
GLUCOSE SERPL-MCNC: 114 MG/DL (ref 70–110)
HCT VFR BLD AUTO: 28.5 % (ref 37–48.5)
HGB BLD-MCNC: 9.4 G/DL (ref 12–16)
IMM GRANULOCYTES # BLD AUTO: 0.02 K/UL (ref 0–0.04)
IMM GRANULOCYTES NFR BLD AUTO: 0.2 % (ref 0–0.5)
INR PPP: 1.1 (ref 0.8–1.2)
LYMPHOCYTES # BLD AUTO: 2.2 K/UL (ref 1–4.8)
LYMPHOCYTES NFR BLD: 26 % (ref 18–48)
MAGNESIUM SERPL-MCNC: 2.1 MG/DL (ref 1.6–2.6)
MCH RBC QN AUTO: 29.7 PG (ref 27–31)
MCHC RBC AUTO-ENTMCNC: 33 G/DL (ref 32–36)
MCV RBC AUTO: 90 FL (ref 82–98)
MONOCYTES # BLD AUTO: 0.8 K/UL (ref 0.3–1)
MONOCYTES NFR BLD: 9.9 % (ref 4–15)
NEUTROPHILS # BLD AUTO: 4.7 K/UL (ref 1.8–7.7)
NEUTROPHILS NFR BLD: 55.7 % (ref 38–73)
NRBC BLD-RTO: 0 /100 WBC
PHOSPHATE SERPL-MCNC: 4.3 MG/DL (ref 2.7–4.5)
PLATELET # BLD AUTO: 173 K/UL (ref 150–450)
PMV BLD AUTO: 10.7 FL (ref 9.2–12.9)
POTASSIUM SERPL-SCNC: 4.7 MMOL/L (ref 3.5–5.1)
PROT SERPL-MCNC: 5.1 G/DL (ref 6–8.4)
PROTHROMBIN TIME: 11.9 SEC (ref 9–12.5)
RBC # BLD AUTO: 3.16 M/UL (ref 4–5.4)
SODIUM SERPL-SCNC: 136 MMOL/L (ref 136–145)
WBC # BLD AUTO: 8.39 K/UL (ref 3.9–12.7)

## 2021-12-10 PROCEDURE — 85025 COMPLETE CBC W/AUTO DIFF WBC: CPT | Mod: NTX | Performed by: HOSPITALIST

## 2021-12-10 PROCEDURE — 80321 ALCOHOLS BIOMARKERS 1OR 2: CPT | Mod: NTX | Performed by: HOSPITALIST

## 2021-12-10 PROCEDURE — 25000003 PHARM REV CODE 250: Mod: NTX | Performed by: HOSPITALIST

## 2021-12-10 PROCEDURE — 87075 CULTR BACTERIA EXCEPT BLOOD: CPT | Mod: NTX | Performed by: HOSPITALIST

## 2021-12-10 PROCEDURE — G0378 HOSPITAL OBSERVATION PER HR: HCPCS | Mod: NTX

## 2021-12-10 PROCEDURE — 25000003 PHARM REV CODE 250: Mod: NTX | Performed by: STUDENT IN AN ORGANIZED HEALTH CARE EDUCATION/TRAINING PROGRAM

## 2021-12-10 PROCEDURE — 99283 PR EMERGENCY DEPT VISIT,LEVEL III: ICD-10-PCS | Mod: NTX,,, | Performed by: INTERNAL MEDICINE

## 2021-12-10 PROCEDURE — 99225 PR SUBSEQUENT OBSERVATION CARE,LEVEL II: ICD-10-PCS | Mod: NTX,,, | Performed by: STUDENT IN AN ORGANIZED HEALTH CARE EDUCATION/TRAINING PROGRAM

## 2021-12-10 PROCEDURE — 87070 CULTURE OTHR SPECIMN AEROBIC: CPT | Mod: NTX | Performed by: HOSPITALIST

## 2021-12-10 PROCEDURE — 83735 ASSAY OF MAGNESIUM: CPT | Mod: NTX | Performed by: HOSPITALIST

## 2021-12-10 PROCEDURE — 87205 SMEAR GRAM STAIN: CPT | Mod: NTX | Performed by: HOSPITALIST

## 2021-12-10 PROCEDURE — 99215 PR OFFICE/OUTPT VISIT, EST, LEVL V, 40-54 MIN: ICD-10-PCS | Mod: NTX,,, | Performed by: INTERNAL MEDICINE

## 2021-12-10 PROCEDURE — 99225 PR SUBSEQUENT OBSERVATION CARE,LEVEL II: CPT | Mod: NTX,,, | Performed by: STUDENT IN AN ORGANIZED HEALTH CARE EDUCATION/TRAINING PROGRAM

## 2021-12-10 PROCEDURE — 85610 PROTHROMBIN TIME: CPT | Mod: NTX | Performed by: HOSPITALIST

## 2021-12-10 PROCEDURE — 96376 TX/PRO/DX INJ SAME DRUG ADON: CPT | Mod: NTX

## 2021-12-10 PROCEDURE — 63600175 PHARM REV CODE 636 W HCPCS: Mod: NTX | Performed by: HOSPITALIST

## 2021-12-10 PROCEDURE — 84100 ASSAY OF PHOSPHORUS: CPT | Mod: NTX | Performed by: HOSPITALIST

## 2021-12-10 PROCEDURE — 99215 OFFICE O/P EST HI 40 MIN: CPT | Mod: NTX,,, | Performed by: INTERNAL MEDICINE

## 2021-12-10 PROCEDURE — 99283 EMERGENCY DEPT VISIT LOW MDM: CPT | Mod: NTX,,, | Performed by: INTERNAL MEDICINE

## 2021-12-10 PROCEDURE — 80053 COMPREHEN METABOLIC PANEL: CPT | Mod: NTX | Performed by: HOSPITALIST

## 2021-12-10 PROCEDURE — 96376 TX/PRO/DX INJ SAME DRUG ADON: CPT

## 2021-12-10 RX ORDER — AMOXICILLIN 250 MG
1 CAPSULE ORAL DAILY PRN
Status: DISCONTINUED | OUTPATIENT
Start: 2021-12-10 | End: 2021-12-14 | Stop reason: HOSPADM

## 2021-12-10 RX ADMIN — FOLIC ACID 2 MG: 1 TABLET ORAL at 10:12

## 2021-12-10 RX ADMIN — SODIUM BICARBONATE 650 MG TABLET 650 MG: at 10:12

## 2021-12-10 RX ADMIN — CIPROFLOXACIN 500 MG: 500 TABLET, FILM COATED ORAL at 08:12

## 2021-12-10 RX ADMIN — ALLOPURINOL 100 MG: 100 TABLET ORAL at 08:12

## 2021-12-10 RX ADMIN — POLYETHYLENE GLYCOL 3350 17 G: 17 POWDER, FOR SOLUTION ORAL at 10:12

## 2021-12-10 RX ADMIN — MELATONIN TAB 3 MG 6 MG: 3 TAB at 10:12

## 2021-12-10 RX ADMIN — Medication 100 MG: at 10:12

## 2021-12-10 RX ADMIN — FUROSEMIDE 20 MG: 20 INJECTION, SOLUTION INTRAMUSCULAR; INTRAVENOUS at 08:12

## 2021-12-10 RX ADMIN — SENNOSIDES AND DOCUSATE SODIUM 1 TABLET: 50; 8.6 TABLET ORAL at 10:12

## 2021-12-10 RX ADMIN — ACETAMINOPHEN 1000 MG: 500 TABLET ORAL at 10:12

## 2021-12-10 RX ADMIN — LEVOTHYROXINE SODIUM 50 MCG: 50 TABLET ORAL at 05:12

## 2021-12-11 PROBLEM — I95.89 CHRONIC LOW BLOOD PRESSURE: Status: ACTIVE | Noted: 2021-12-11

## 2021-12-11 LAB
ALBUMIN SERPL BCP-MCNC: 2.6 G/DL (ref 3.5–5.2)
ANION GAP SERPL CALC-SCNC: 12 MMOL/L (ref 8–16)
BACTERIA BLD CULT: ABNORMAL
BASOPHILS # BLD AUTO: 0.07 K/UL (ref 0–0.2)
BASOPHILS NFR BLD: 1.1 % (ref 0–1.9)
BUN SERPL-MCNC: 66 MG/DL (ref 6–20)
CALCIUM SERPL-MCNC: 8.3 MG/DL (ref 8.7–10.5)
CHLORIDE SERPL-SCNC: 105 MMOL/L (ref 95–110)
CO2 SERPL-SCNC: 20 MMOL/L (ref 23–29)
CREAT SERPL-MCNC: 2.7 MG/DL (ref 0.5–1.4)
DIFFERENTIAL METHOD: ABNORMAL
EOSINOPHIL # BLD AUTO: 0.6 K/UL (ref 0–0.5)
EOSINOPHIL NFR BLD: 8.9 % (ref 0–8)
ERYTHROCYTE [DISTWIDTH] IN BLOOD BY AUTOMATED COUNT: 14.6 % (ref 11.5–14.5)
EST. GFR  (AFRICAN AMERICAN): 22.8 ML/MIN/1.73 M^2
EST. GFR  (NON AFRICAN AMERICAN): 19.8 ML/MIN/1.73 M^2
GLUCOSE SERPL-MCNC: 92 MG/DL (ref 70–110)
HCT VFR BLD AUTO: 28.1 % (ref 37–48.5)
HGB BLD-MCNC: 9.4 G/DL (ref 12–16)
IMM GRANULOCYTES # BLD AUTO: 0.01 K/UL (ref 0–0.04)
IMM GRANULOCYTES NFR BLD AUTO: 0.2 % (ref 0–0.5)
LYMPHOCYTES # BLD AUTO: 1.8 K/UL (ref 1–4.8)
LYMPHOCYTES NFR BLD: 28.4 % (ref 18–48)
MCH RBC QN AUTO: 30.6 PG (ref 27–31)
MCHC RBC AUTO-ENTMCNC: 33.5 G/DL (ref 32–36)
MCV RBC AUTO: 92 FL (ref 82–98)
MONOCYTES # BLD AUTO: 0.7 K/UL (ref 0.3–1)
MONOCYTES NFR BLD: 10.3 % (ref 4–15)
NEUTROPHILS # BLD AUTO: 3.3 K/UL (ref 1.8–7.7)
NEUTROPHILS NFR BLD: 51.1 % (ref 38–73)
NRBC BLD-RTO: 0 /100 WBC
PHOSPHATE SERPL-MCNC: 4.9 MG/DL (ref 2.7–4.5)
PLATELET # BLD AUTO: 186 K/UL (ref 150–450)
PMV BLD AUTO: 10.8 FL (ref 9.2–12.9)
POTASSIUM SERPL-SCNC: 4.5 MMOL/L (ref 3.5–5.1)
RBC # BLD AUTO: 3.07 M/UL (ref 4–5.4)
SODIUM SERPL-SCNC: 137 MMOL/L (ref 136–145)
WBC # BLD AUTO: 6.41 K/UL (ref 3.9–12.7)

## 2021-12-11 PROCEDURE — 96376 TX/PRO/DX INJ SAME DRUG ADON: CPT

## 2021-12-11 PROCEDURE — 80069 RENAL FUNCTION PANEL: CPT | Mod: NTX | Performed by: STUDENT IN AN ORGANIZED HEALTH CARE EDUCATION/TRAINING PROGRAM

## 2021-12-11 PROCEDURE — 99225 PR SUBSEQUENT OBSERVATION CARE,LEVEL II: CPT | Mod: NTX,,, | Performed by: STUDENT IN AN ORGANIZED HEALTH CARE EDUCATION/TRAINING PROGRAM

## 2021-12-11 PROCEDURE — 63600175 PHARM REV CODE 636 W HCPCS: Mod: NTX | Performed by: HOSPITALIST

## 2021-12-11 PROCEDURE — 25000003 PHARM REV CODE 250: Mod: NTX | Performed by: HOSPITALIST

## 2021-12-11 PROCEDURE — G0378 HOSPITAL OBSERVATION PER HR: HCPCS | Mod: NTX

## 2021-12-11 PROCEDURE — 25000003 PHARM REV CODE 250: Mod: NTX | Performed by: STUDENT IN AN ORGANIZED HEALTH CARE EDUCATION/TRAINING PROGRAM

## 2021-12-11 PROCEDURE — 36415 COLL VENOUS BLD VENIPUNCTURE: CPT | Mod: NTX | Performed by: STUDENT IN AN ORGANIZED HEALTH CARE EDUCATION/TRAINING PROGRAM

## 2021-12-11 PROCEDURE — 85025 COMPLETE CBC W/AUTO DIFF WBC: CPT | Mod: NTX | Performed by: STUDENT IN AN ORGANIZED HEALTH CARE EDUCATION/TRAINING PROGRAM

## 2021-12-11 PROCEDURE — 99225 PR SUBSEQUENT OBSERVATION CARE,LEVEL II: ICD-10-PCS | Mod: NTX,,, | Performed by: STUDENT IN AN ORGANIZED HEALTH CARE EDUCATION/TRAINING PROGRAM

## 2021-12-11 RX ORDER — LACTULOSE 10 G/15ML
15 SOLUTION ORAL EVERY 6 HOURS PRN
Status: DISCONTINUED | OUTPATIENT
Start: 2021-12-11 | End: 2021-12-14 | Stop reason: HOSPADM

## 2021-12-11 RX ADMIN — POLYETHYLENE GLYCOL 3350 17 G: 17 POWDER, FOR SOLUTION ORAL at 09:12

## 2021-12-11 RX ADMIN — SODIUM BICARBONATE 650 MG TABLET 650 MG: at 09:12

## 2021-12-11 RX ADMIN — LACTULOSE 15 G: 10 SOLUTION ORAL at 09:12

## 2021-12-11 RX ADMIN — LEVOTHYROXINE SODIUM 50 MCG: 50 TABLET ORAL at 05:12

## 2021-12-11 RX ADMIN — FOLIC ACID 2 MG: 1 TABLET ORAL at 09:12

## 2021-12-11 RX ADMIN — Medication 100 MG: at 09:12

## 2021-12-11 RX ADMIN — SENNOSIDES AND DOCUSATE SODIUM 1 TABLET: 50; 8.6 TABLET ORAL at 09:12

## 2021-12-11 RX ADMIN — FUROSEMIDE 20 MG: 20 INJECTION, SOLUTION INTRAMUSCULAR; INTRAVENOUS at 06:12

## 2021-12-11 RX ADMIN — FUROSEMIDE 20 MG: 20 INJECTION, SOLUTION INTRAMUSCULAR; INTRAVENOUS at 08:12

## 2021-12-12 LAB
ALBUMIN SERPL BCP-MCNC: 2.6 G/DL (ref 3.5–5.2)
ANION GAP SERPL CALC-SCNC: 8 MMOL/L (ref 8–16)
BUN SERPL-MCNC: 64 MG/DL (ref 6–20)
CALCIUM SERPL-MCNC: 8.5 MG/DL (ref 8.7–10.5)
CHLORIDE SERPL-SCNC: 111 MMOL/L (ref 95–110)
CO2 SERPL-SCNC: 17 MMOL/L (ref 23–29)
CREAT SERPL-MCNC: 2.2 MG/DL (ref 0.5–1.4)
EST. GFR  (AFRICAN AMERICAN): 29.3 ML/MIN/1.73 M^2
EST. GFR  (NON AFRICAN AMERICAN): 25.4 ML/MIN/1.73 M^2
GLUCOSE SERPL-MCNC: 110 MG/DL (ref 70–110)
PHOSPHATE SERPL-MCNC: 4.8 MG/DL (ref 2.7–4.5)
POTASSIUM SERPL-SCNC: 3.5 MMOL/L (ref 3.5–5.1)
SODIUM SERPL-SCNC: 136 MMOL/L (ref 136–145)

## 2021-12-12 PROCEDURE — 80069 RENAL FUNCTION PANEL: CPT | Mod: NTX | Performed by: STUDENT IN AN ORGANIZED HEALTH CARE EDUCATION/TRAINING PROGRAM

## 2021-12-12 PROCEDURE — 99233 PR SUBSEQUENT HOSPITAL CARE,LEVL III: ICD-10-PCS | Mod: GT,NTX,, | Performed by: INTERNAL MEDICINE

## 2021-12-12 PROCEDURE — 25000003 PHARM REV CODE 250: Mod: NTX | Performed by: HOSPITALIST

## 2021-12-12 PROCEDURE — 99233 SBSQ HOSP IP/OBS HIGH 50: CPT | Mod: GT,NTX,, | Performed by: INTERNAL MEDICINE

## 2021-12-12 PROCEDURE — 36415 COLL VENOUS BLD VENIPUNCTURE: CPT | Mod: NTX | Performed by: STUDENT IN AN ORGANIZED HEALTH CARE EDUCATION/TRAINING PROGRAM

## 2021-12-12 PROCEDURE — 96376 TX/PRO/DX INJ SAME DRUG ADON: CPT

## 2021-12-12 PROCEDURE — 63600175 PHARM REV CODE 636 W HCPCS: Mod: NTX | Performed by: HOSPITALIST

## 2021-12-12 PROCEDURE — 11000001 HC ACUTE MED/SURG PRIVATE ROOM: Mod: NTX

## 2021-12-12 PROCEDURE — 25000003 PHARM REV CODE 250: Mod: NTX | Performed by: STUDENT IN AN ORGANIZED HEALTH CARE EDUCATION/TRAINING PROGRAM

## 2021-12-12 RX ADMIN — Medication 100 MG: at 09:12

## 2021-12-12 RX ADMIN — LEVOTHYROXINE SODIUM 50 MCG: 50 TABLET ORAL at 05:12

## 2021-12-12 RX ADMIN — FUROSEMIDE 20 MG: 20 INJECTION, SOLUTION INTRAMUSCULAR; INTRAVENOUS at 09:12

## 2021-12-12 RX ADMIN — FUROSEMIDE 20 MG: 20 INJECTION, SOLUTION INTRAMUSCULAR; INTRAVENOUS at 05:12

## 2021-12-12 RX ADMIN — SENNOSIDES AND DOCUSATE SODIUM 1 TABLET: 50; 8.6 TABLET ORAL at 09:12

## 2021-12-12 RX ADMIN — FOLIC ACID 2 MG: 1 TABLET ORAL at 09:12

## 2021-12-12 RX ADMIN — CIPROFLOXACIN 500 MG: 500 TABLET, FILM COATED ORAL at 09:12

## 2021-12-12 RX ADMIN — LACTULOSE 15 G: 10 SOLUTION ORAL at 09:12

## 2021-12-12 RX ADMIN — ALLOPURINOL 50 MG: 300 TABLET ORAL at 09:12

## 2021-12-12 RX ADMIN — MELATONIN TAB 3 MG 6 MG: 3 TAB at 09:12

## 2021-12-12 RX ADMIN — POLYETHYLENE GLYCOL 3350 17 G: 17 POWDER, FOR SOLUTION ORAL at 09:12

## 2021-12-12 RX ADMIN — SODIUM BICARBONATE 650 MG TABLET 650 MG: at 09:12

## 2021-12-13 LAB — BACTERIA BLD CULT: NORMAL

## 2021-12-13 PROCEDURE — 11000001 HC ACUTE MED/SURG PRIVATE ROOM: Mod: NTX

## 2021-12-13 PROCEDURE — P9047 ALBUMIN (HUMAN), 25%, 50ML: HCPCS | Mod: JG,NTX

## 2021-12-13 PROCEDURE — 25000003 PHARM REV CODE 250: Mod: NTX | Performed by: FAMILY MEDICINE

## 2021-12-13 PROCEDURE — 99233 PR SUBSEQUENT HOSPITAL CARE,LEVL III: ICD-10-PCS | Mod: GT,NTX,, | Performed by: INTERNAL MEDICINE

## 2021-12-13 PROCEDURE — 99231 PR SUBSEQUENT HOSPITAL CARE,LEVL I: ICD-10-PCS | Mod: NTX,,, | Performed by: INTERNAL MEDICINE

## 2021-12-13 PROCEDURE — 63600175 PHARM REV CODE 636 W HCPCS: Mod: JG,NTX

## 2021-12-13 PROCEDURE — 25000003 PHARM REV CODE 250: Mod: NTX | Performed by: STUDENT IN AN ORGANIZED HEALTH CARE EDUCATION/TRAINING PROGRAM

## 2021-12-13 PROCEDURE — 99231 SBSQ HOSP IP/OBS SF/LOW 25: CPT | Mod: NTX,,, | Performed by: INTERNAL MEDICINE

## 2021-12-13 PROCEDURE — 25000003 PHARM REV CODE 250: Mod: NTX | Performed by: HOSPITALIST

## 2021-12-13 PROCEDURE — 99233 SBSQ HOSP IP/OBS HIGH 50: CPT | Mod: GT,NTX,, | Performed by: INTERNAL MEDICINE

## 2021-12-13 PROCEDURE — 63600175 PHARM REV CODE 636 W HCPCS: Mod: NTX | Performed by: HOSPITALIST

## 2021-12-13 RX ORDER — LIDOCAINE HYDROCHLORIDE 10 MG/ML
INJECTION INFILTRATION; PERINEURAL CODE/TRAUMA/SEDATION MEDICATION
Status: COMPLETED | OUTPATIENT
Start: 2021-12-13 | End: 2021-12-13

## 2021-12-13 RX ORDER — ALBUMIN HUMAN 250 G/1000ML
SOLUTION INTRAVENOUS
Status: COMPLETED
Start: 2021-12-13 | End: 2021-12-13

## 2021-12-13 RX ADMIN — ALBUMIN HUMAN: 0.25 SOLUTION INTRAVENOUS at 02:12

## 2021-12-13 RX ADMIN — MELATONIN TAB 3 MG 6 MG: 3 TAB at 10:12

## 2021-12-13 RX ADMIN — LEVOTHYROXINE SODIUM 50 MCG: 50 TABLET ORAL at 06:12

## 2021-12-13 RX ADMIN — FUROSEMIDE 20 MG: 20 INJECTION, SOLUTION INTRAMUSCULAR; INTRAVENOUS at 08:12

## 2021-12-13 RX ADMIN — SENNOSIDES AND DOCUSATE SODIUM 1 TABLET: 50; 8.6 TABLET ORAL at 09:12

## 2021-12-13 RX ADMIN — SODIUM BICARBONATE 650 MG TABLET 650 MG: at 09:12

## 2021-12-13 RX ADMIN — LIDOCAINE HYDROCHLORIDE 5 ML: 10 INJECTION, SOLUTION INFILTRATION; PERINEURAL at 01:12

## 2021-12-13 RX ADMIN — Medication 100 MG: at 09:12

## 2021-12-13 RX ADMIN — FOLIC ACID 2 MG: 1 TABLET ORAL at 09:12

## 2021-12-13 RX ADMIN — LACTULOSE 15 G: 10 SOLUTION ORAL at 09:12

## 2021-12-13 RX ADMIN — POLYETHYLENE GLYCOL 3350 17 G: 17 POWDER, FOR SOLUTION ORAL at 09:12

## 2021-12-13 RX ADMIN — FUROSEMIDE 20 MG: 20 INJECTION, SOLUTION INTRAMUSCULAR; INTRAVENOUS at 06:12

## 2021-12-14 ENCOUNTER — TELEPHONE (OUTPATIENT)
Dept: TRANSPLANT | Facility: CLINIC | Age: 51
End: 2021-12-14
Payer: COMMERCIAL

## 2021-12-14 ENCOUNTER — DOCUMENTATION ONLY (OUTPATIENT)
Dept: TRANSPLANT | Facility: CLINIC | Age: 51
End: 2021-12-14
Payer: COMMERCIAL

## 2021-12-14 VITALS
TEMPERATURE: 97 F | SYSTOLIC BLOOD PRESSURE: 96 MMHG | WEIGHT: 113 LBS | HEART RATE: 107 BPM | RESPIRATION RATE: 18 BRPM | BODY MASS INDEX: 20.02 KG/M2 | OXYGEN SATURATION: 100 % | DIASTOLIC BLOOD PRESSURE: 54 MMHG | HEIGHT: 63 IN

## 2021-12-14 DIAGNOSIS — Z76.82 MULTIPLE ORGAN TRANSPLANT CANDIDATE: Primary | ICD-10-CM

## 2021-12-14 DIAGNOSIS — Z76.82 ORGAN TRANSPLANT CANDIDATE: Primary | ICD-10-CM

## 2021-12-14 PROCEDURE — 99231 SBSQ HOSP IP/OBS SF/LOW 25: CPT | Mod: NTX,,, | Performed by: INTERNAL MEDICINE

## 2021-12-14 PROCEDURE — 99231 PR SUBSEQUENT HOSPITAL CARE,LEVL I: ICD-10-PCS | Mod: NTX,,, | Performed by: INTERNAL MEDICINE

## 2021-12-14 PROCEDURE — 25000003 PHARM REV CODE 250: Mod: NTX | Performed by: STUDENT IN AN ORGANIZED HEALTH CARE EDUCATION/TRAINING PROGRAM

## 2021-12-14 PROCEDURE — 25000003 PHARM REV CODE 250: Mod: NTX | Performed by: HOSPITALIST

## 2021-12-14 PROCEDURE — 99239 HOSP IP/OBS DSCHRG MGMT >30: CPT | Mod: GT,NTX,, | Performed by: INTERNAL MEDICINE

## 2021-12-14 PROCEDURE — 63600175 PHARM REV CODE 636 W HCPCS: Mod: NTX | Performed by: HOSPITALIST

## 2021-12-14 PROCEDURE — 99239 PR HOSPITAL DISCHARGE DAY,>30 MIN: ICD-10-PCS | Mod: GT,NTX,, | Performed by: INTERNAL MEDICINE

## 2021-12-14 RX ORDER — CIPROFLOXACIN 500 MG/1
500 TABLET ORAL EVERY OTHER DAY
Start: 2021-12-14 | End: 2021-12-23

## 2021-12-14 RX ORDER — FOLIC ACID 1 MG/1
2 TABLET ORAL DAILY
Qty: 60 TABLET | Refills: 11 | Status: SHIPPED | OUTPATIENT
Start: 2021-12-14 | End: 2022-05-26

## 2021-12-14 RX ORDER — FUROSEMIDE 40 MG/1
40 TABLET ORAL 2 TIMES DAILY
Qty: 60 TABLET | Refills: 11 | Status: SHIPPED | OUTPATIENT
Start: 2021-12-14 | End: 2022-01-06

## 2021-12-14 RX ADMIN — CIPROFLOXACIN 500 MG: 500 TABLET, FILM COATED ORAL at 09:12

## 2021-12-14 RX ADMIN — ALLOPURINOL 50 MG: 300 TABLET ORAL at 09:12

## 2021-12-14 RX ADMIN — FUROSEMIDE 20 MG: 20 INJECTION, SOLUTION INTRAMUSCULAR; INTRAVENOUS at 09:12

## 2021-12-14 RX ADMIN — LEVOTHYROXINE SODIUM 50 MCG: 50 TABLET ORAL at 06:12

## 2021-12-15 ENCOUNTER — PATIENT OUTREACH (OUTPATIENT)
Dept: ADMINISTRATIVE | Facility: CLINIC | Age: 51
End: 2021-12-15
Payer: COMMERCIAL

## 2021-12-15 ENCOUNTER — TELEPHONE (OUTPATIENT)
Dept: TRANSPLANT | Facility: CLINIC | Age: 51
End: 2021-12-15

## 2021-12-15 ENCOUNTER — TELEPHONE (OUTPATIENT)
Dept: TRANSPLANT | Facility: CLINIC | Age: 51
End: 2021-12-15
Payer: COMMERCIAL

## 2021-12-15 ENCOUNTER — COMMITTEE REVIEW (OUTPATIENT)
Dept: TRANSPLANT | Facility: CLINIC | Age: 51
End: 2021-12-15

## 2021-12-15 LAB
PETH 16:0/18.1 (POPETH): <10 NG/ML
PETH 16:0/18.2 (PLPETH): <10 NG/ML

## 2021-12-15 NOTE — COMMITTEE REVIEW
Malu Montes's case presented to selection committee.  Patient has been accepted for liver/kidney transplant due to Alcoholic Cirrhosis and complications of end stage liver disease including ascites, esophageal varices, splenomegaly, SBP and CKD, SBP and fatigue.  with a MELD score of 16.  Patient has no absolute contraindications for liver transplant.  Patient will be listed pending financial approval.    Patient will accept HBcAb positive livers.  Patient will accept HCVAB positive livers.  Patient will accept DCD livers.  Patient will accept HCV JACOBO positive livers  Patient will accept HBV JACOBO positive livers  I was present at the committee meeting and attest to the decision of the committee.    Kendrick Conroyondos  12/17/2021

## 2021-12-16 ENCOUNTER — TELEPHONE (OUTPATIENT)
Dept: TRANSPLANT | Facility: CLINIC | Age: 51
End: 2021-12-16
Payer: COMMERCIAL

## 2021-12-17 ENCOUNTER — TELEPHONE (OUTPATIENT)
Dept: NEPHROLOGY | Facility: CLINIC | Age: 51
End: 2021-12-17
Payer: COMMERCIAL

## 2021-12-20 ENCOUNTER — HOSPITAL ENCOUNTER (OUTPATIENT)
Dept: INTERVENTIONAL RADIOLOGY/VASCULAR | Facility: HOSPITAL | Age: 51
Discharge: HOME | End: 2021-12-20
Attending: INTERNAL MEDICINE
Payer: COMMERCIAL

## 2021-12-20 DIAGNOSIS — N18.4 CKD (CHRONIC KIDNEY DISEASE) STAGE 4, GFR 15-29 ML/MIN: Primary | ICD-10-CM

## 2021-12-20 DIAGNOSIS — R18.8 OTHER ASCITES: ICD-10-CM

## 2021-12-20 LAB
APPEARANCE FLD: CLEAR
B-HCG UR QL: NEGATIVE
BODY FLD TYPE: NORMAL
COLOR FLD: YELLOW
CTP QC/QA: YES
FUNGUS SPEC CULT: NORMAL
LYMPHOCYTES NFR FLD MANUAL: 69 %
MESOTHL CELL NFR FLD MANUAL: 9 %
MONOS+MACROS NFR FLD MANUAL: 21 %
NEUTROPHILS NFR FLD MANUAL: 1 %
WBC # FLD: 24 /CU MM

## 2021-12-20 PROCEDURE — 63600175 PHARM REV CODE 636 W HCPCS: Mod: JG,NTX

## 2021-12-20 PROCEDURE — P9047 ALBUMIN (HUMAN), 25%, 50ML: HCPCS | Mod: JG,NTX

## 2021-12-20 PROCEDURE — 81025 URINE PREGNANCY TEST: CPT | Mod: TXP | Performed by: FAMILY MEDICINE

## 2021-12-20 PROCEDURE — 25000003 PHARM REV CODE 250: Mod: NTX | Performed by: FAMILY MEDICINE

## 2021-12-20 PROCEDURE — 49083 ABD PARACENTESIS W/IMAGING: CPT | Mod: NTX,,, | Performed by: FAMILY MEDICINE

## 2021-12-20 PROCEDURE — 49083 ABD PARACENTESIS W/IMAGING: CPT | Mod: TXP | Performed by: STUDENT IN AN ORGANIZED HEALTH CARE EDUCATION/TRAINING PROGRAM

## 2021-12-20 PROCEDURE — 89051 BODY FLUID CELL COUNT: CPT | Mod: NTX | Performed by: INTERNAL MEDICINE

## 2021-12-20 PROCEDURE — 27100111 IR PARACENTESIS WITH IMAGING: Mod: TXP

## 2021-12-20 PROCEDURE — 49083 IR PARACENTESIS WITH IMAGING: ICD-10-PCS | Mod: NTX,,, | Performed by: FAMILY MEDICINE

## 2021-12-20 RX ORDER — LIDOCAINE HYDROCHLORIDE 10 MG/ML
INJECTION INFILTRATION; PERINEURAL CODE/TRAUMA/SEDATION MEDICATION
Status: COMPLETED | OUTPATIENT
Start: 2021-12-20 | End: 2021-12-20

## 2021-12-20 RX ORDER — ALBUMIN HUMAN 250 G/1000ML
SOLUTION INTRAVENOUS
Status: COMPLETED
Start: 2021-12-20 | End: 2021-12-20

## 2021-12-20 RX ADMIN — ALBUMIN (HUMAN): 12.5 SOLUTION INTRAVENOUS at 10:12

## 2021-12-20 RX ADMIN — ALBUMIN HUMAN: 0.25 SOLUTION INTRAVENOUS at 10:12

## 2021-12-20 RX ADMIN — LIDOCAINE HYDROCHLORIDE 5 ML: 10 INJECTION, SOLUTION INFILTRATION; PERINEURAL at 09:12

## 2021-12-21 ENCOUNTER — ANESTHESIA (OUTPATIENT)
Dept: ENDOSCOPY | Facility: HOSPITAL | Age: 51
End: 2021-12-21
Payer: COMMERCIAL

## 2021-12-21 ENCOUNTER — HOSPITAL ENCOUNTER (OUTPATIENT)
Facility: HOSPITAL | Age: 51
Discharge: HOME | End: 2021-12-21
Attending: INTERNAL MEDICINE | Admitting: INTERNAL MEDICINE
Payer: COMMERCIAL

## 2021-12-21 ENCOUNTER — ANESTHESIA EVENT (OUTPATIENT)
Dept: ENDOSCOPY | Facility: HOSPITAL | Age: 51
End: 2021-12-21
Payer: COMMERCIAL

## 2021-12-21 VITALS
SYSTOLIC BLOOD PRESSURE: 96 MMHG | TEMPERATURE: 98 F | OXYGEN SATURATION: 99 % | DIASTOLIC BLOOD PRESSURE: 54 MMHG | RESPIRATION RATE: 16 BRPM | HEART RATE: 103 BPM

## 2021-12-21 DIAGNOSIS — K74.60 CIRRHOSIS: Primary | ICD-10-CM

## 2021-12-21 DIAGNOSIS — K74.60 HEPATIC CIRRHOSIS, UNSPECIFIED HEPATIC CIRRHOSIS TYPE, UNSPECIFIED WHETHER ASCITES PRESENT: ICD-10-CM

## 2021-12-21 DIAGNOSIS — K70.31 ALCOHOLIC CIRRHOSIS OF LIVER WITH ASCITES: Primary | ICD-10-CM

## 2021-12-21 LAB
B-HCG UR QL: NEGATIVE
CTP QC/QA: YES
SARS-COV-2 RDRP RESP QL NAA+PROBE: NEGATIVE

## 2021-12-21 PROCEDURE — 27201012 HC FORCEPS, HOT/COLD, DISP: Mod: TXP | Performed by: INTERNAL MEDICINE

## 2021-12-21 PROCEDURE — E9220 PRA ENDO ANESTHESIA: HCPCS | Mod: NTX,,, | Performed by: STUDENT IN AN ORGANIZED HEALTH CARE EDUCATION/TRAINING PROGRAM

## 2021-12-21 PROCEDURE — 88305 TISSUE EXAM BY PATHOLOGIST: CPT | Mod: 26,TXP,, | Performed by: PATHOLOGY

## 2021-12-21 PROCEDURE — 37000008 HC ANESTHESIA 1ST 15 MINUTES: Mod: TXP | Performed by: INTERNAL MEDICINE

## 2021-12-21 PROCEDURE — 37000009 HC ANESTHESIA EA ADD 15 MINS: Mod: TXP | Performed by: INTERNAL MEDICINE

## 2021-12-21 PROCEDURE — 81025 URINE PREGNANCY TEST: CPT | Mod: TXP | Performed by: INTERNAL MEDICINE

## 2021-12-21 PROCEDURE — 88305 TISSUE EXAM BY PATHOLOGIST: CPT | Mod: TXP | Performed by: PATHOLOGY

## 2021-12-21 PROCEDURE — 63600175 PHARM REV CODE 636 W HCPCS: Mod: TXP | Performed by: STUDENT IN AN ORGANIZED HEALTH CARE EDUCATION/TRAINING PROGRAM

## 2021-12-21 PROCEDURE — 25000003 PHARM REV CODE 250: Mod: TXP | Performed by: STUDENT IN AN ORGANIZED HEALTH CARE EDUCATION/TRAINING PROGRAM

## 2021-12-21 PROCEDURE — 43239 EGD BIOPSY SINGLE/MULTIPLE: CPT | Mod: NTX,,, | Performed by: INTERNAL MEDICINE

## 2021-12-21 PROCEDURE — E9220 PRA ENDO ANESTHESIA: ICD-10-PCS | Mod: NTX,,, | Performed by: STUDENT IN AN ORGANIZED HEALTH CARE EDUCATION/TRAINING PROGRAM

## 2021-12-21 PROCEDURE — 25000003 PHARM REV CODE 250: Mod: TXP | Performed by: INTERNAL MEDICINE

## 2021-12-21 PROCEDURE — U0002 COVID-19 LAB TEST NON-CDC: HCPCS | Mod: TXP | Performed by: INTERNAL MEDICINE

## 2021-12-21 PROCEDURE — 43239 PR EGD, FLEX, W/BIOPSY, SGL/MULTI: ICD-10-PCS | Mod: NTX,,, | Performed by: INTERNAL MEDICINE

## 2021-12-21 PROCEDURE — 88305 TISSUE EXAM BY PATHOLOGIST: ICD-10-PCS | Mod: 26,TXP,, | Performed by: PATHOLOGY

## 2021-12-21 PROCEDURE — 43239 EGD BIOPSY SINGLE/MULTIPLE: CPT | Mod: TXP | Performed by: INTERNAL MEDICINE

## 2021-12-21 RX ORDER — PROPOFOL 10 MG/ML
VIAL (ML) INTRAVENOUS
Status: DISCONTINUED | OUTPATIENT
Start: 2021-12-21 | End: 2021-12-21

## 2021-12-21 RX ORDER — PROPOFOL 10 MG/ML
VIAL (ML) INTRAVENOUS CONTINUOUS PRN
Status: DISCONTINUED | OUTPATIENT
Start: 2021-12-21 | End: 2021-12-21

## 2021-12-21 RX ORDER — SODIUM CHLORIDE 9 MG/ML
INJECTION, SOLUTION INTRAVENOUS CONTINUOUS
Status: DISCONTINUED | OUTPATIENT
Start: 2021-12-21 | End: 2021-12-21 | Stop reason: HOSPADM

## 2021-12-21 RX ORDER — LIDOCAINE HCL/PF 100 MG/5ML
SYRINGE (ML) INTRAVENOUS
Status: DISCONTINUED | OUTPATIENT
Start: 2021-12-21 | End: 2021-12-21

## 2021-12-21 RX ORDER — FENTANYL CITRATE 50 UG/ML
INJECTION, SOLUTION INTRAMUSCULAR; INTRAVENOUS
Status: DISCONTINUED | OUTPATIENT
Start: 2021-12-21 | End: 2021-12-21

## 2021-12-21 RX ADMIN — SODIUM CHLORIDE: 0.9 INJECTION, SOLUTION INTRAVENOUS at 08:12

## 2021-12-21 RX ADMIN — PROPOFOL 200 MCG/KG/MIN: 10 INJECTION, EMULSION INTRAVENOUS at 10:12

## 2021-12-21 RX ADMIN — FENTANYL CITRATE 25 MCG: 50 INJECTION, SOLUTION INTRAMUSCULAR; INTRAVENOUS at 10:12

## 2021-12-21 RX ADMIN — PROPOFOL 100 MG: 10 INJECTION, EMULSION INTRAVENOUS at 10:12

## 2021-12-21 RX ADMIN — Medication 80 MG: at 10:12

## 2021-12-21 RX ADMIN — GLYCOPYRROLATE 0.2 MG: 0.2 INJECTION, SOLUTION INTRAMUSCULAR; INTRAVITREAL at 10:12

## 2021-12-22 ENCOUNTER — TELEPHONE (OUTPATIENT)
Dept: TRANSPLANT | Facility: CLINIC | Age: 51
End: 2021-12-22
Payer: COMMERCIAL

## 2021-12-22 DIAGNOSIS — Z76.82 ORGAN TRANSPLANT CANDIDATE: Primary | ICD-10-CM

## 2021-12-22 DIAGNOSIS — N17.9 AKI (ACUTE KIDNEY INJURY): Primary | ICD-10-CM

## 2021-12-22 RX ORDER — SODIUM CHLORIDE 9 MG/ML
INJECTION, SOLUTION INTRAVENOUS CONTINUOUS
Status: CANCELLED | OUTPATIENT
Start: 2021-12-22 | End: 2021-12-22

## 2021-12-23 ENCOUNTER — TELEPHONE (OUTPATIENT)
Dept: NEPHROLOGY | Facility: CLINIC | Age: 51
End: 2021-12-23
Payer: COMMERCIAL

## 2021-12-23 ENCOUNTER — HOSPITAL ENCOUNTER (OUTPATIENT)
Facility: OTHER | Age: 51
Discharge: HOME | End: 2021-12-23
Attending: RADIOLOGY | Admitting: RADIOLOGY
Payer: COMMERCIAL

## 2021-12-23 VITALS
HEART RATE: 92 BPM | OXYGEN SATURATION: 100 % | TEMPERATURE: 98 F | RESPIRATION RATE: 16 BRPM | SYSTOLIC BLOOD PRESSURE: 100 MMHG | DIASTOLIC BLOOD PRESSURE: 56 MMHG

## 2021-12-23 DIAGNOSIS — D50.8 OTHER IRON DEFICIENCY ANEMIA: ICD-10-CM

## 2021-12-23 DIAGNOSIS — R18.8 ASCITES: ICD-10-CM

## 2021-12-23 DIAGNOSIS — R18.8 OTHER ASCITES: ICD-10-CM

## 2021-12-23 PROBLEM — D50.9 IRON DEFICIENCY ANEMIA: Status: ACTIVE | Noted: 2021-12-23

## 2021-12-23 PROCEDURE — 25000003 PHARM REV CODE 250: Mod: TXP | Performed by: RADIOLOGY

## 2021-12-23 PROCEDURE — C1894 INTRO/SHEATH, NON-LASER: HCPCS | Mod: NTX | Performed by: RADIOLOGY

## 2021-12-23 PROCEDURE — P9047 ALBUMIN (HUMAN), 25%, 50ML: HCPCS | Mod: JG,TXP | Performed by: RADIOLOGY

## 2021-12-23 PROCEDURE — 63600175 PHARM REV CODE 636 W HCPCS: Mod: JG,TXP | Performed by: RADIOLOGY

## 2021-12-23 RX ORDER — SODIUM CHLORIDE 0.9 % (FLUSH) 0.9 %
10 SYRINGE (ML) INJECTION
Status: CANCELLED | OUTPATIENT
Start: 2021-12-23

## 2021-12-23 RX ORDER — METHYLPREDNISOLONE SOD SUCC 125 MG
125 VIAL (EA) INJECTION ONCE AS NEEDED
Status: CANCELLED | OUTPATIENT
Start: 2021-12-23

## 2021-12-23 RX ORDER — DIPHENHYDRAMINE HYDROCHLORIDE 50 MG/ML
50 INJECTION INTRAMUSCULAR; INTRAVENOUS ONCE AS NEEDED
Status: CANCELLED | OUTPATIENT
Start: 2021-12-23

## 2021-12-23 RX ORDER — EPINEPHRINE 0.3 MG/.3ML
0.3 INJECTION SUBCUTANEOUS ONCE AS NEEDED
Status: CANCELLED | OUTPATIENT
Start: 2021-12-23

## 2021-12-23 RX ORDER — ALBUMIN HUMAN 250 G/1000ML
SOLUTION INTRAVENOUS
Status: DISCONTINUED | OUTPATIENT
Start: 2021-12-23 | End: 2021-12-23 | Stop reason: HOSPADM

## 2021-12-23 RX ORDER — HEPARIN 100 UNIT/ML
500 SYRINGE INTRAVENOUS
Status: CANCELLED | OUTPATIENT
Start: 2021-12-23

## 2021-12-23 RX ORDER — LIDOCAINE HYDROCHLORIDE 10 MG/ML
INJECTION INFILTRATION; PERINEURAL
Status: DISCONTINUED | OUTPATIENT
Start: 2021-12-23 | End: 2021-12-23 | Stop reason: HOSPADM

## 2021-12-27 ENCOUNTER — TELEPHONE (OUTPATIENT)
Dept: TRANSPLANT | Facility: CLINIC | Age: 51
End: 2021-12-27

## 2021-12-27 ENCOUNTER — TELEPHONE (OUTPATIENT)
Dept: TRANSPLANT | Facility: CLINIC | Age: 51
End: 2021-12-27
Payer: COMMERCIAL

## 2021-12-27 ENCOUNTER — PATIENT MESSAGE (OUTPATIENT)
Dept: TRANSPLANT | Facility: CLINIC | Age: 51
End: 2021-12-27
Payer: COMMERCIAL

## 2021-12-27 ENCOUNTER — HOSPITAL ENCOUNTER (OUTPATIENT)
Dept: INTERVENTIONAL RADIOLOGY/VASCULAR | Facility: HOSPITAL | Age: 51
Discharge: HOME | End: 2021-12-27
Attending: INTERNAL MEDICINE
Payer: COMMERCIAL

## 2021-12-27 VITALS
RESPIRATION RATE: 18 BRPM | HEART RATE: 91 BPM | DIASTOLIC BLOOD PRESSURE: 68 MMHG | SYSTOLIC BLOOD PRESSURE: 106 MMHG | OXYGEN SATURATION: 100 %

## 2021-12-27 DIAGNOSIS — R18.8 OTHER ASCITES: ICD-10-CM

## 2021-12-27 LAB
ALBUMIN FLD-MCNC: 1.2 G/DL
APPEARANCE FLD: CLEAR
BASOPHILS NFR FLD MANUAL: 1 %
BODY FLD TYPE: NORMAL
COLOR FLD: YELLOW
EOSINOPHIL NFR FLD MANUAL: 2 %
LYMPHOCYTES NFR FLD MANUAL: 66 %
MESOTHL CELL NFR FLD MANUAL: 6 %
MONOS+MACROS NFR FLD MANUAL: 20 %
NEUTROPHILS NFR FLD MANUAL: 5 %
PROT FLD-MCNC: 1.6 G/DL
SPECIMEN SOURCE: NORMAL
SPECIMEN SOURCE: NORMAL
WBC # FLD: 71 /CU MM

## 2021-12-27 PROCEDURE — 49083 IR PARACENTESIS WITH IMAGING: ICD-10-PCS | Mod: TXP,,, | Performed by: FAMILY MEDICINE

## 2021-12-27 PROCEDURE — P9047 ALBUMIN (HUMAN), 25%, 50ML: HCPCS | Mod: JG,TXP

## 2021-12-27 PROCEDURE — 84157 ASSAY OF PROTEIN OTHER: CPT | Mod: NTX | Performed by: INTERNAL MEDICINE

## 2021-12-27 PROCEDURE — 27100111 IR PARACENTESIS WITH IMAGING: Mod: NTX

## 2021-12-27 PROCEDURE — 89051 BODY FLUID CELL COUNT: CPT | Mod: TXP | Performed by: INTERNAL MEDICINE

## 2021-12-27 PROCEDURE — 82042 OTHER SOURCE ALBUMIN QUAN EA: CPT | Mod: TXP | Performed by: INTERNAL MEDICINE

## 2021-12-27 PROCEDURE — 49083 ABD PARACENTESIS W/IMAGING: CPT | Mod: TXP | Performed by: RADIOLOGY

## 2021-12-27 PROCEDURE — 49083 ABD PARACENTESIS W/IMAGING: CPT | Mod: TXP,,, | Performed by: FAMILY MEDICINE

## 2021-12-27 PROCEDURE — 63600175 PHARM REV CODE 636 W HCPCS: Mod: JG,TXP

## 2021-12-27 RX ORDER — ALBUMIN HUMAN 250 G/1000ML
50 SOLUTION INTRAVENOUS ONCE
Status: DISCONTINUED | OUTPATIENT
Start: 2021-12-27 | End: 2021-12-28 | Stop reason: HOSPADM

## 2021-12-27 RX ORDER — ALBUMIN HUMAN 250 G/1000ML
SOLUTION INTRAVENOUS
Status: COMPLETED
Start: 2021-12-27 | End: 2021-12-27

## 2021-12-27 RX ADMIN — ALBUMIN HUMAN 200 G: 0.25 SOLUTION INTRAVENOUS at 09:12

## 2021-12-28 LAB
FINAL PATHOLOGIC DIAGNOSIS: NORMAL
FUNGUS SPEC CULT: NORMAL
GROSS: NORMAL
Lab: NORMAL

## 2021-12-30 ENCOUNTER — HOSPITAL ENCOUNTER (OUTPATIENT)
Facility: OTHER | Age: 51
Discharge: HOME | End: 2021-12-30
Attending: RADIOLOGY | Admitting: RADIOLOGY
Payer: COMMERCIAL

## 2021-12-30 VITALS
HEART RATE: 94 BPM | DIASTOLIC BLOOD PRESSURE: 50 MMHG | RESPIRATION RATE: 16 BRPM | SYSTOLIC BLOOD PRESSURE: 100 MMHG | TEMPERATURE: 98 F | OXYGEN SATURATION: 100 %

## 2021-12-30 DIAGNOSIS — R18.8 OTHER ASCITES: ICD-10-CM

## 2021-12-30 PROCEDURE — C1894 INTRO/SHEATH, NON-LASER: HCPCS | Mod: TXP | Performed by: RADIOLOGY

## 2021-12-30 PROCEDURE — P9047 ALBUMIN (HUMAN), 25%, 50ML: HCPCS | Mod: JG,TXP | Performed by: RADIOLOGY

## 2021-12-30 PROCEDURE — 25000003 PHARM REV CODE 250: Mod: TXP | Performed by: RADIOLOGY

## 2021-12-30 PROCEDURE — 63600175 PHARM REV CODE 636 W HCPCS: Mod: JG,TXP | Performed by: RADIOLOGY

## 2021-12-30 RX ORDER — HEPARIN 100 UNIT/ML
500 SYRINGE INTRAVENOUS
Status: CANCELLED | OUTPATIENT
Start: 2021-12-30

## 2021-12-30 RX ORDER — ALBUMIN HUMAN 250 G/1000ML
SOLUTION INTRAVENOUS
Status: DISCONTINUED | OUTPATIENT
Start: 2021-12-30 | End: 2021-12-30 | Stop reason: HOSPADM

## 2021-12-30 RX ORDER — LIDOCAINE HYDROCHLORIDE 10 MG/ML
INJECTION INFILTRATION; PERINEURAL
Status: DISCONTINUED | OUTPATIENT
Start: 2021-12-30 | End: 2021-12-30 | Stop reason: HOSPADM

## 2021-12-30 RX ORDER — SODIUM CHLORIDE 0.9 % (FLUSH) 0.9 %
10 SYRINGE (ML) INJECTION
Status: CANCELLED | OUTPATIENT
Start: 2021-12-30

## 2021-12-30 NOTE — DISCHARGE INSTRUCTIONS
Patient Education       Abdominal Paracentesis Discharge Instructions   About this topic   The belly has the liver, stomach, bowels and other organs in it. In most cases there is only a small amount of fluid in the belly. But, some illnesses cause fluid to collect in the belly. This is called ascites. Too much fluid in the belly may happen from injury, infection, liver disease, or some kinds of cancers.  An abdominal paracentesis is a procedure done to get rid of the extra fluid in the belly. It may also help your doctor learn the cause of the extra fluid.  What care is needed at home?   · Ask your doctor what you need to do when you go home. Make sure you ask questions if you do not understand what the doctor says. This way you will know what you need to do.  · Talk to your doctor about how to care for your drainage site. Ask your doctor about:  ? When you should change your bandages. Fluid may still come out from the site.  ? When you may take a bath or shower  ? If you need to be careful with lifting things over 10 pounds (4.5 kg)  ? When you may go back to your normal activities like work or driving  · Be sure to wash your hands before and after touching your wound or dressing.  · Ask your doctor if you need a low salt diet.  · Based on the reason for the ascites, your doctor may tell you not to drink beer, wine, or mixed drinks (alcohol).  · The doctor may ask you to measure your belly with a tape measure and weigh yourself each day.  What follow-up care is needed?   Your doctor may ask you to make visits to the office to check on your progress. Be sure to keep these visits.  What drugs may be needed?   Your doctor may order drugs to:  · Get rid of extra fluid  · Build up the protein in your blood  Will physical activity be limited?   You may have to limit your activity for a short time. Talk to your doctor about the right amount of activity for you.  What problems could happen?    · Infection  · Bleeding  · Injury to an organ near where fluid was removed, such as the liver, spleen, or other organs  · Drop in blood pressure due to the amount of fluid that was removed  When do I need to call the doctor?   · Signs of infection. These include fever of 100.4°F (38°C) or higher, chills.  · Very bad belly pain  · Trouble breathing  · Blood in the urine  · Bleeding or fluid leak from the site does not stop in 24 to 48 hours  Teach Back: Helping You Understand   The Teach Back Method helps you understand the information we are giving you. After you talk with the staff, tell them in your own words what you learned. This helps to make sure the staff has described each thing clearly. It also helps to explain things that may have been confusing. Before going home, make sure you can do these:  · I can tell you about my procedure.  · I can tell you how to care for my drainage site.  · I can tell you what I will do if I have a fever, chills, blood in my urine, belly pain, or trouble breathing.  Last Reviewed Date   2021-02-09  Consumer Information Use and Disclaimer   This information is not specific medical advice and does not replace information you receive from your health care provider. This is only a brief summary of general information. It does NOT include all information about conditions, illnesses, injuries, tests, procedures, treatments, therapies, discharge instructions or life-style choices that may apply to you. You must talk with your health care provider for complete information about your health and treatment options. This information should not be used to decide whether or not to accept your health care providers advice, instructions or recommendations. Only your health care provider has the knowledge and training to provide advice that is right for you.  Copyright   Copyright © 2021 [employer] and its affiliates and/or licensors. All rights reserved.

## 2021-12-30 NOTE — BRIEF OP NOTE
Radiology Post-Procedure Note    Pre Op Diagnosis: Recurrent painful, tense ascites  Post Op Diagnosis: Same    Procedure: 1. US-guided percutaneous RUQ-approach therapeutic LVP    Procedure performed by: Salas Cabrera MD    Written Informed Consent Obtained: Yes  Specimen Removed: YES, 4,250-cc of thin, straw-colored ascitic fluid  Estimated Blood Loss: none    Findings:   Successful US-guided percutaneous RUQ-approach therapeutic LVP with local anesthetic only and albumin infusion post PRN as indicated per institutional protocol. Patient tolerated the procedure well. No immediate post-procedural complications noted.     Thank you for considering IR for the care of your patient.     Salas Cabrera MD  Interventional Radiology

## 2021-12-30 NOTE — DISCHARGE SUMMARY
Radiology Discharge Summary      Hospital Course: No complications    Admit Date: 12/30/2021  Discharge Date: 12/30/2021     Instructions Given to Patient: Yes  Diet: Resume prior diet  Activity: activity as tolerated    Description of Condition on Discharge: Good  Vital Signs (Most Recent): Pulse: 92 (12/30/21 1210)  Resp: 16 (12/30/21 1210)  BP: (!) 89/46 (12/30/21 1210)  SpO2: 100 % (12/30/21 1210)    Discharge Disposition: Home    Discharge Diagnosis:  51 y.o. female with recurrent painful, tense ascites s/p successful US-guided percutaneous RUQ-approach therapeutic LVP with local anesthetic only and albumin infusion post PRN as indicated per institutional protocol. Patient tolerated the procedure well. No immediate post-procedural complications noted.     Thank you for considering IR for the care of your patient.     Salas Cabrera MD  Interventional Radiology

## 2021-12-30 NOTE — H&P
Radiology History & Physical      SUBJECTIVE:     Chief Complaint: Recurrent painful, tense ascites    History of Present Illness:  Malu Montes is a 51 y.o. female with pertinent PMHx of decompensated EtOH hepatic cirrhosis complicated by recurrent abdominal distension and pain 2/2 recurrent painful, tense ascites wrequiring frequent therapeutic large-volume paracentesis.     A new outpatient IR consult received for US-guided percutaneous RUQ-approach therapeutic LVP.    Past Medical History:   Diagnosis Date    ADD (attention deficit disorder)     Anxiety     Ascites of liver     Cirrhosis 2021    CKD (chronic kidney disease) stage 3, GFR 30-59 ml/min     Constipation     ETOH abuse     Hyperlipidemia     Other ascites 2021    Pancreatitis, acute     Tobacco use     Vitamin D deficiency      Past Surgical History:   Procedure Laterality Date    AUGMENTATION OF BREAST      breast augmentation       SECTION      COLONOSCOPY N/A 2021    Procedure: COLONOSCOPY;  Surgeon: Dao Gray MD;  Location: [facility] (2ND FLR);  Service: Endoscopy;  Laterality: N/A;  COVID test 21 General surgery clinic [facility]    CYSTOSCOPY N/A 9/10/2021    Procedure: CYSTOSCOPY;  Surgeon: Rj Sánchez Jr., MD;  [facility];  Service: Urology;  Laterality: N/A;    ESOPHAGOGASTRODUODENOSCOPY N/A 2021    Procedure: ESOPHAGOGASTRODUODENOSCOPY (EGD);  Surgeon: Dao Gray MD;  Location: [facility] (2ND FLR);  Service: Endoscopy;  Laterality: N/A;  labs current on     ESOPHAGOGASTRODUODENOSCOPY N/A 10/26/2021    Procedure: ESOPHAGOGASTRODUODENOSCOPY (EGD);  Surgeon: Dao Gray MD;  Location: [facility] (2ND FLR);  Service: Endoscopy;  Laterality: N/A;  to be done the week of 10/18/21 per Dr. Gray-BB  covid-10/23/21-[facility]   10/25 arrival time confirmed with pt-rb    ESOPHAGOGASTRODUODENOSCOPY Left 2021    Procedure: EGD  (ESOPHAGOGASTRODUODENOSCOPY);  Surgeon: Koffi Monteiro MD;  [facility]  Service: Endoscopy;  Laterality: Left;  Rapid  pt requested AM appt and first available in December-BB  labs morning of procedure-BB  12/14 lvm to confirm appt-rb    HEMORRHOID SURGERY      PERITONEOCENTESIS Right 6/8/2021    Procedure: PARACENTESIS, ABDOMINAL;  Surgeon: Jay Torre MD;  Location: [facility] LAB;  Service: Radiology;  Laterality: Right;    PERITONEOCENTESIS Right 6/25/2021    Procedure: PARACENTESIS, ABDOMINAL;  Surgeon: Jay Torre MD;  Location: [facility] CATH LAB;  Service: Radiology;  Laterality: Right;    PERITONEOCENTESIS Right 7/12/2021    Procedure: PARACENTESIS, ABDOMINAL;  Surgeon: Jay Torre MD;  Location: [facility] CATH LAB;  Service: Radiology;  Laterality: Right;    PERITONEOCENTESIS Right 7/23/2021    Procedure: PARACENTESIS, ABDOMINAL;  Surgeon: Edward De La Torre II, MD;  [facility] LAB;  Service: Interventional Radiology;  Laterality: Right;    PERITONEOCENTESIS Right 8/3/2021    Procedure: PARACENTESIS, ABDOMINAL;  Surgeon: Franco Cheung MD;  Location: [facility] CATH LAB;  Service: Radiology;  Laterality: Right;    PERITONEOCENTESIS Right 8/16/2021    Procedure: PARACENTESIS, ABDOMINAL;  Surgeon: Jay Torre MD;  Location: [facility] CATH LAB;  Service: Radiology;  Laterality: Right;    PERITONEOCENTESIS Right 8/23/2021    Procedure: PARACENTESIS, ABDOMINAL;  Surgeon: Jay Torre MD;  Location: [facility] CATH LAB;  Service: Radiology;  Laterality: Right;    PERITONEOCENTESIS Right 9/16/2021    Procedure: PARACENTESIS, ABDOMINAL;  Surgeon: Jay Torre MD;  Location: [facility] CATH LAB;  Service: Radiology;  Laterality: Right;    PERITONEOCENTESIS N/A 9/24/2021    Procedure: PARACENTESIS, ABDOMINAL;  Surgeon: Jay Torre MD;  Location: [facility] CATH LAB;  Service: Radiology;  Laterality: N/A;    PERITONEOCENTESIS Right 12/23/2021    Procedure:  PARACENTESIS, ABDOMINAL;  Surgeon: Jay Torre MD;  Location: [facility] CATH LAB;  Service: Radiology;  Laterality: Right;    RETROGRADE PYELOGRAPHY Bilateral 9/10/2021    Procedure: PYELOGRAM, RETROGRADE;  Surgeon: Rj Sánchez Jr., MD;  Location: [facility] OR [address] [place] [address];  Service: Urology;  Laterality: Bilateral;    TONSILLECTOMY       Home Meds:   Prior to Admission medications    Medication Sig Start Date End Date Taking? Authorizing Provider   allopurinoL (ZYLOPRIM) 100 MG tablet Take 1 tablet (100 mg total) by mouth once daily. 11/12/21   Rashel Cohn MD   bisacodyL (DULCOLAX) 5 mg EC tablet Take 5 mg by mouth daily as needed for Constipation.    Historical Provider   ciprofloxacin HCl (CIPRO) 250 MG tablet Take 1 tablet (250 mg total) by mouth once daily. 12/23/21   Rashel Cohn MD   ergocalciferol (ERGOCALCIFEROL) 50,000 unit Cap Take 1 capsule (50,000 Units total) by mouth every 7 days. 10/2/21   Rashel Cohn MD   folic acid (FOLVITE) 1 MG tablet Take 2 tablets (2 mg total) by mouth once daily. 12/14/21 3/14/22  Uma Perry MD   furosemide (LASIX) 40 MG tablet Take 1 tablet (40 mg total) by mouth 2 (two) times daily. 12/14/21 12/14/22  Uma Perry MD   lactulose 10 gram/15 ml (CHRONULAC) 10 gram/15 mL (15 mL) solution Take 15 mLs (10 g total) by mouth daily as needed (Constipation). 10/29/21   Rashel Cohn MD   levothyroxine (SYNTHROID) 50 MCG tablet TAKE 1 TABLET(50 MCG) BY MOUTH BEFORE BREAKFAST 9/25/21   Killian Dodd DO   linaCLOtide (LINZESS) 72 mcg Cap capsule Take 2 capsules (144 mcg total) by mouth before breakfast. 11/26/21   Sharmila Pacheco MD   sodium bicarbonate 650 MG tablet Take 1 tablet (650 mg total) by mouth 2 (two) times daily. 10/2/21 3/31/22  Rashel Cohn MD   thiamine (VITAMIN B-1) 100 MG tablet Take 1 tablet (100 mg total) by mouth every evening. 10/11/21   Sharmila Pacheco MD   vitamin A 94161 UNIT capsule Take 10,000 Units by mouth every  evening.     Historical Provider     Anticoagulants/Antiplatelets: no anticoagulation    Allergies: Review of patient's allergies indicates:  No Known Allergies     Sedation History:  no adverse reactions    Review of Systems:   Hematological: no known coagulopathies  Respiratory: no cough, shortness of breath, or wheezing  Cardiovascular: no chest pain or dyspnea on exertion  Gastrointestinal: positive for - abdominal pain and distension  Genito-Urinary: no dysuria, trouble voiding, or hematuria  Musculoskeletal: negative  Neurological: no TIA or stroke symptoms       OBJECTIVE:     Vital Signs (Most Recent)     Physical Exam:  General: no acute distress  Mental Status: alert and oriented to person, place and time  HEENT: normocephalic, atraumatic  Chest: unlabored breathing  Heart: regular heart rate  Abdomen: +distended with +fluid-wave. No TTP/r/g.  Extremity: moves all extremities    Laboratory  Lab Results   Component Value Date    INR 1.0 12/27/2021       Lab Results   Component Value Date    WBC 8.22 12/27/2021    HGB 11.2 (L) 12/27/2021    HCT 35.4 (L) 12/27/2021    MCV 95 12/27/2021     12/27/2021      Lab Results   Component Value Date     (H) 12/27/2021     (L) 12/27/2021    K 3.6 12/27/2021     12/27/2021    CO2 23 12/27/2021    BUN 51 (H) 12/27/2021    CREATININE 1.9 (H) 12/27/2021    CALCIUM 8.9 12/27/2021    MG 2.1 12/10/2021    ALT 16 12/27/2021    AST 31 12/27/2021    ALBUMIN 3.3 (L) 12/27/2021    BILITOT 0.7 12/27/2021    BILIDIR 0.4 (H) 11/11/2021     ASSESSMENT/PLAN:     51 y.o. female with pertinent PMHx of decompensated EtOH hepatic cirrhosis complicated by recurrent abdominal distension and pain 2/2 recurrent painful, tense ascites wrequiring frequent therapeutic large-volume paracentesis.    1. Recurrent painful, tense ascites - Will attempt US-guided percutaneous RUQ-approach therapeutic LVP with local anesthetic only and albumin infusion post PRN as indicated per  institutional protocol.    Risks (including, but not limited to, pain, bleeding, infection, damage to nearby structures, failure to obtain sufficient material for a diagnosis, the need for additional procedures, and death), benefits, and alternatives were discussed with the patient. All questions were answered to the best of my abilities. The patient wishes to proceed with the procedure. Written informed consent was obtained.    Thank you for considering IR for the care of your patient.     Salas Cabrera MD  Interventional Radiology

## 2022-01-03 ENCOUNTER — HOSPITAL ENCOUNTER (OUTPATIENT)
Dept: INTERVENTIONAL RADIOLOGY/VASCULAR | Facility: HOSPITAL | Age: 52
Discharge: HOME | End: 2022-01-03
Attending: INTERNAL MEDICINE
Payer: COMMERCIAL

## 2022-01-03 VITALS
OXYGEN SATURATION: 100 % | HEART RATE: 94 BPM | SYSTOLIC BLOOD PRESSURE: 91 MMHG | DIASTOLIC BLOOD PRESSURE: 58 MMHG | RESPIRATION RATE: 16 BRPM

## 2022-01-03 DIAGNOSIS — R18.8 OTHER ASCITES: ICD-10-CM

## 2022-01-03 PROCEDURE — 76705 IR US ABDOMEN LIMITED: ICD-10-PCS | Mod: 26,NTX,, | Performed by: PHYSICIAN ASSISTANT

## 2022-01-03 PROCEDURE — 76705 ECHO EXAM OF ABDOMEN: CPT | Mod: 26,NTX,, | Performed by: PHYSICIAN ASSISTANT

## 2022-01-03 PROCEDURE — 76705 ECHO EXAM OF ABDOMEN: CPT | Mod: TC,NTX | Performed by: RADIOLOGY

## 2022-01-03 NOTE — PLAN OF CARE
Pt arrived to [facility] bay1 for outpt para. Pt in no acute distress, placed on monitor, VSS. Awaiting consent.

## 2022-01-03 NOTE — H&P
Radiology History & Physical      SUBJECTIVE:     Chief Complaint: abdominal distention    History of Present Illness:  Malu Montes is a 51 y.o. female who presents for ultrasound guided paracentesis  Past Medical History:   Diagnosis Date    ADD (attention deficit disorder)     Anxiety     Ascites of liver     Cirrhosis 2021    CKD (chronic kidney disease) stage 3, GFR 30-59 ml/min     Constipation     ETOH abuse     Hyperlipidemia     Other ascites 2021    Pancreatitis, acute     Tobacco use     Vitamin D deficiency      Past Surgical History:   Procedure Laterality Date    AUGMENTATION OF BREAST      breast augmentation       SECTION      COLONOSCOPY N/A 2021    Procedure: COLONOSCOPY;  Surgeon: Dao Gray MD;  Location: [facility] (2ND FLR);  Service: Endoscopy;  Laterality: N/A;  COVID test 21 General surgery clinic [facility]    CYSTOSCOPY N/A 9/10/2021    Procedure: CYSTOSCOPY;  Surgeon: Rj Sánchez Jr., MD;  [facility];  Service: Urology;  Laterality: N/A;    ESOPHAGOGASTRODUODENOSCOPY N/A 2021    Procedure: ESOPHAGOGASTRODUODENOSCOPY (EGD);  Surgeon: Dao Gray MD;  Location: [facility] (2ND FLR);  Service: Endoscopy;  Laterality: N/A;  labs current on     ESOPHAGOGASTRODUODENOSCOPY N/A 10/26/2021    Procedure: ESOPHAGOGASTRODUODENOSCOPY (EGD);  Surgeon: Dao Gray MD;  Location: [facility] (2ND FLR);  Service: Endoscopy;  Laterality: N/A;  to be done the week of 10/18/21 per Dr. Gray-BB  covid-10/23/21-[facility]-   10/25 arrival time confirmed with pt-rb    ESOPHAGOGASTRODUODENOSCOPY Left 2021    Procedure: EGD (ESOPHAGOGASTRODUODENOSCOPY);  Surgeon: Koffi Monteiro MD;  Location: [facility] (4TH FLR);  Service: Endoscopy;  Laterality: Left;  Rapid  pt requested AM appt and first available in December-  labs morning of procedure-BB   lvm to confirm appt-rb    HEMORRHOID SURGERY       PERITONEOCENTESIS Right 6/8/2021    Procedure: PARACENTESIS, ABDOMINAL;  Surgeon: Jay Torre MD;  Location: [facility] CATH LAB;  Service: Radiology;  Laterality: Right;    PERITONEOCENTESIS Right 6/25/2021    Procedure: PARACENTESIS, ABDOMINAL;  Surgeon: Jay Torre MD;  Location: [facility] CATH LAB;  Service: Radiology;  Laterality: Right;    PERITONEOCENTESIS Right 7/12/2021    Procedure: PARACENTESIS, ABDOMINAL;  Surgeon: Jay Torre MD;  Location: [facility] CATH LAB;  Service: Radiology;  Laterality: Right;    PERITONEOCENTESIS Right 7/23/2021    Procedure: PARACENTESIS, ABDOMINAL;  Surgeon: Edward De La Torre II, MD;  Location: [facility] CATH LAB;  Service: Interventional Radiology;  Laterality: Right;    PERITONEOCENTESIS Right 8/3/2021    Procedure: PARACENTESIS, ABDOMINAL;  Surgeon: Franco Cheung MD;  Location: [facility] CATH LAB;  Service: Radiology;  Laterality: Right;    PERITONEOCENTESIS Right 8/16/2021    Procedure: PARACENTESIS, ABDOMINAL;  Surgeon: Jay Torre MD;  Location: [facility] CATH LAB;  Service: Radiology;  Laterality: Right;    PERITONEOCENTESIS Right 8/23/2021    Procedure: PARACENTESIS, ABDOMINAL;  Surgeon: Jay Torre MD;  Location: [facility] CATH LAB;  Service: Radiology;  Laterality: Right;    PERITONEOCENTESIS Right 9/16/2021    Procedure: PARACENTESIS, ABDOMINAL;  Surgeon: Jay Torre MD;  Location: [facility] CATH LAB;  Service: Radiology;  Laterality: Right;    PERITONEOCENTESIS N/A 9/24/2021    Procedure: PARACENTESIS, ABDOMINAL;  Surgeon: Jay Torre MD;  Location: [facility] CATH LAB;  Service: Radiology;  Laterality: N/A;    PERITONEOCENTESIS Right 12/23/2021    Procedure: PARACENTESIS, ABDOMINAL;  Surgeon: Jay Torre MD;  Location: [facility] CATH LAB;  Service: Radiology;  Laterality: Right;    RETROGRADE PYELOGRAPHY Bilateral 9/10/2021    Procedure: PYELOGRAM, RETROGRADE;  Surgeon: Rj Sánchez Jr., MD;  Location: [address];  Service:  Urology;  Laterality: Bilateral;    TONSILLECTOMY         Home Meds:   Prior to Admission medications    Medication Sig Start Date End Date Taking? Authorizing Provider   allopurinoL (ZYLOPRIM) 100 MG tablet Take 1 tablet (100 mg total) by mouth once daily. 11/12/21   Rashel Cohn MD   bisacodyL (DULCOLAX) 5 mg EC tablet Take 5 mg by mouth daily as needed for Constipation.    Historical Provider   ciprofloxacin HCl (CIPRO) 250 MG tablet Take 1 tablet (250 mg total) by mouth once daily.  Patient taking differently: Take 250 mg by mouth every other day. 12/23/21   Rashel Cohn MD   ergocalciferol (ERGOCALCIFEROL) 50,000 unit Cap Take 1 capsule (50,000 Units total) by mouth every 7 days. 10/2/21   Rashel Cohn MD   folic acid (FOLVITE) 1 MG tablet Take 2 tablets (2 mg total) by mouth once daily. 12/14/21 3/14/22  Uma Perry MD   furosemide (LASIX) 40 MG tablet Take 1 tablet (40 mg total) by mouth 2 (two) times daily. 12/14/21 12/14/22  Uma Perry MD   lactulose 10 gram/15 ml (CHRONULAC) 10 gram/15 mL (15 mL) solution Take 15 mLs (10 g total) by mouth daily as needed (Constipation). 10/29/21   Rashel Cohn MD   levothyroxine (SYNTHROID) 50 MCG tablet TAKE 1 TABLET(50 MCG) BY MOUTH BEFORE BREAKFAST 9/25/21   Killian Dodd DO   linaCLOtide (LINZESS) 72 mcg Cap capsule Take 2 capsules (144 mcg total) by mouth before breakfast. 11/26/21   Sharmila Pacheco MD   sodium bicarbonate 650 MG tablet Take 1 tablet (650 mg total) by mouth 2 (two) times daily.  Patient taking differently: Take 650 mg by mouth Daily. 10/2/21 3/31/22  Rashel Cohn MD   thiamine (VITAMIN B-1) 100 MG tablet Take 1 tablet (100 mg total) by mouth every evening. 10/11/21   Sharmila Pacheco MD   vitamin A 58097 UNIT capsule Take 10,000 Units by mouth every evening.     Historical Provider     Anticoagulants/Antiplatelets: no anticoagulation    Allergies: Review of patient's allergies indicates:  No Known  Allergies  Sedation History:  no adverse reactions    Review of Systems:   Hematological: no known coagulopathies  Respiratory: no shortness of breath  Cardiovascular: no chest pain  Gastrointestinal: no abdominal pain  Genito-Urinary: no dysuria  Musculoskeletal: negative  Neurological: no TIA or stroke symptoms         OBJECTIVE:     Vital Signs (Most Recent)  Pulse: 94 (01/03/22 0817)  Resp: 16 (01/03/22 0817)  BP: (!) 91/58 (01/03/22 0817)  SpO2: 100 % (01/03/22 0817)    Physical Exam:  ASA: 2  Mallampati: n/a    General: no acute distress  Mental Status: alert and oriented to person, place and time  HEENT: normocephalic, atraumatic  Chest: unlabored breathing  Heart: regular heart rate  Abdomen: nondistended  Extremity: moves all extremities    ASSESSMENT/PLAN:     Sedation Plan: local  Patient will undergo ultrasound guided paracentesis.    Kay Arnold PA-C  Interventional Radiology  Clinic [phone]

## 2022-01-03 NOTE — PROCEDURES
During ultrasound evaluation, small volume ascites identified LLQ.   Patient declined therapeutic paracentesis, citing has appt. Later this week and will hope to have more fluid drained then.  No paracentesis performed.     Kay Arnold PA-C  Interventional Radiology  Clinic [phone]

## 2022-01-04 LAB
ACID FAST MOD KINY STN SPEC: NORMAL
MYCOBACTERIUM SPEC QL CULT: NORMAL

## 2022-01-06 ENCOUNTER — PATIENT MESSAGE (OUTPATIENT)
Dept: NEPHROLOGY | Facility: CLINIC | Age: 52
End: 2022-01-06
Payer: COMMERCIAL

## 2022-01-06 ENCOUNTER — HOSPITAL ENCOUNTER (OUTPATIENT)
Dept: INTERVENTIONAL RADIOLOGY/VASCULAR | Facility: HOSPITAL | Age: 52
Discharge: HOME | End: 2022-01-06
Attending: INTERNAL MEDICINE | Admitting: RADIOLOGY
Payer: COMMERCIAL

## 2022-01-06 ENCOUNTER — OFFICE VISIT (OUTPATIENT)
Dept: HEPATOLOGY | Facility: CLINIC | Age: 52
End: 2022-01-06
Payer: COMMERCIAL

## 2022-01-06 ENCOUNTER — TELEPHONE (OUTPATIENT)
Dept: TRANSPLANT | Facility: CLINIC | Age: 52
End: 2022-01-06

## 2022-01-06 VITALS
HEIGHT: 63 IN | BODY MASS INDEX: 20.54 KG/M2 | DIASTOLIC BLOOD PRESSURE: 59 MMHG | HEART RATE: 110 BPM | TEMPERATURE: 99 F | RESPIRATION RATE: 17 BRPM | OXYGEN SATURATION: 99 % | SYSTOLIC BLOOD PRESSURE: 99 MMHG | WEIGHT: 115.94 LBS

## 2022-01-06 VITALS
HEART RATE: 89 BPM | OXYGEN SATURATION: 100 % | DIASTOLIC BLOOD PRESSURE: 55 MMHG | SYSTOLIC BLOOD PRESSURE: 106 MMHG | RESPIRATION RATE: 14 BRPM

## 2022-01-06 DIAGNOSIS — N02.B9 IGA NEPHROPATHY ASSOCIATED WITH LIVER DISEASE: ICD-10-CM

## 2022-01-06 DIAGNOSIS — K76.6 PORTAL HYPERTENSION: ICD-10-CM

## 2022-01-06 DIAGNOSIS — K70.31 ALCOHOLIC CIRRHOSIS OF LIVER WITH ASCITES: Primary | ICD-10-CM

## 2022-01-06 DIAGNOSIS — R18.8 OTHER ASCITES: ICD-10-CM

## 2022-01-06 DIAGNOSIS — R18.8 ASCITES: ICD-10-CM

## 2022-01-06 DIAGNOSIS — I85.10 SECONDARY ESOPHAGEAL VARICES WITHOUT BLEEDING: ICD-10-CM

## 2022-01-06 DIAGNOSIS — K76.9 IGA NEPHROPATHY ASSOCIATED WITH LIVER DISEASE: ICD-10-CM

## 2022-01-06 DIAGNOSIS — K65.2 SBP (SPONTANEOUS BACTERIAL PERITONITIS): Chronic | ICD-10-CM

## 2022-01-06 DIAGNOSIS — K70.0 ALCOHOL INDUCED FATTY LIVER: ICD-10-CM

## 2022-01-06 DIAGNOSIS — N18.32 STAGE 3B CHRONIC KIDNEY DISEASE: ICD-10-CM

## 2022-01-06 PROBLEM — Z01.818 ENCOUNTER FOR PRE-TRANSPLANT EVALUATION FOR LIVER TRANSPLANT: Chronic | Status: RESOLVED | Noted: 2021-09-23 | Resolved: 2022-01-06

## 2022-01-06 LAB
ALBUMIN FLD-MCNC: 1 G/DL
APPEARANCE FLD: NORMAL
BODY FLD TYPE: NORMAL
COLOR FLD: YELLOW
LYMPHOCYTES NFR FLD MANUAL: 53 %
MESOTHL CELL NFR FLD MANUAL: 5 %
MONOS+MACROS NFR FLD MANUAL: 36 %
NEUTROPHILS NFR FLD MANUAL: 6 %
PROT FLD-MCNC: 1.4 G/DL
SPECIMEN SOURCE: NORMAL
SPECIMEN SOURCE: NORMAL
WBC # FLD: 87 /CU MM

## 2022-01-06 PROCEDURE — 82042 OTHER SOURCE ALBUMIN QUAN EA: CPT | Mod: NTX | Performed by: INTERNAL MEDICINE

## 2022-01-06 PROCEDURE — 84157 ASSAY OF PROTEIN OTHER: CPT | Mod: NTX | Performed by: INTERNAL MEDICINE

## 2022-01-06 PROCEDURE — 99999 PR PBB SHADOW E&M-EST. PATIENT-LVL IV: CPT | Mod: PBBFAC,TXP,, | Performed by: INTERNAL MEDICINE

## 2022-01-06 PROCEDURE — 49083 IR PARACENTESIS WITH IMAGING: ICD-10-PCS | Mod: TXP,,, | Performed by: RADIOLOGY

## 2022-01-06 PROCEDURE — 49083 ABD PARACENTESIS W/IMAGING: CPT | Mod: TXP | Performed by: RADIOLOGY

## 2022-01-06 PROCEDURE — P9047 ALBUMIN (HUMAN), 25%, 50ML: HCPCS | Mod: JG,NTX | Performed by: RADIOLOGY

## 2022-01-06 PROCEDURE — 63600175 PHARM REV CODE 636 W HCPCS: Mod: JG,NTX | Performed by: RADIOLOGY

## 2022-01-06 PROCEDURE — 99999 PR PBB SHADOW E&M-EST. PATIENT-LVL IV: ICD-10-PCS | Mod: PBBFAC,TXP,, | Performed by: INTERNAL MEDICINE

## 2022-01-06 PROCEDURE — 99215 OFFICE O/P EST HI 40 MIN: CPT | Mod: S$GLB,TXP,, | Performed by: INTERNAL MEDICINE

## 2022-01-06 PROCEDURE — 89051 BODY FLUID CELL COUNT: CPT | Mod: NTX | Performed by: INTERNAL MEDICINE

## 2022-01-06 PROCEDURE — 25000003 PHARM REV CODE 250: Mod: TXP | Performed by: RADIOLOGY

## 2022-01-06 PROCEDURE — 99215 PR OFFICE/OUTPT VISIT, EST, LEVL V, 40-54 MIN: ICD-10-PCS | Mod: S$GLB,TXP,, | Performed by: INTERNAL MEDICINE

## 2022-01-06 PROCEDURE — C1729 CATH, DRAINAGE: HCPCS | Mod: NTX

## 2022-01-06 RX ORDER — FUROSEMIDE 40 MG/1
TABLET ORAL
Qty: 90 TABLET | Refills: 11 | Status: SHIPPED | OUTPATIENT
Start: 2022-01-06 | End: 2022-03-05 | Stop reason: SDUPTHER

## 2022-01-06 RX ORDER — ALBUMIN HUMAN 250 G/1000ML
50 SOLUTION INTRAVENOUS ONCE
Status: COMPLETED | OUTPATIENT
Start: 2022-01-06 | End: 2022-01-06

## 2022-01-06 RX ORDER — LIDOCAINE HYDROCHLORIDE 10 MG/ML
INJECTION INFILTRATION; PERINEURAL CODE/TRAUMA/SEDATION MEDICATION
Status: COMPLETED | OUTPATIENT
Start: 2022-01-06 | End: 2022-01-06

## 2022-01-06 RX ADMIN — ALBUMIN (HUMAN) 50 G: 5 SOLUTION INTRAVENOUS at 08:01

## 2022-01-06 RX ADMIN — LIDOCAINE HYDROCHLORIDE 4 ML: 10 INJECTION, SOLUTION INFILTRATION; PERINEURAL at 08:01

## 2022-01-06 NOTE — PATIENT INSTRUCTIONS
1. Increase lasix to 80/40 from 40 mg bid  2. Can drink more free water  3. Iron infusions  4. No kidney biopsy  5. Weekly labs  6. Lvisits every 4 weeks  7. Paracenteses prn

## 2022-01-06 NOTE — TELEPHONE ENCOUNTER
Pt seen in clinic today. Per Tramainej recommend Pt does not have a kidney biopsy as requested by pt's nephrologist.

## 2022-01-06 NOTE — H&P
Radiology History & Physical      SUBJECTIVE:     Chief Complaint: Recurrent painful, tense ascites     History of Present Illness:  Malu Montes is a 51 y.o. female with pertinent PMHx of decompensated EtOH hepatic cirrhosis complicated by recurrent abdominal distension and pain 2/2 recurrent painful, tense ascites wrequiring frequent therapeutic large-volume paracentesis.      A new outpatient IR consult received for US-guided percutaneous RUQ-approach therapeutic LVP.    Past Medical History:   Diagnosis Date    ADD (attention deficit disorder)     Anxiety     Ascites of liver     Cirrhosis 2021    CKD (chronic kidney disease) stage 3, GFR 30-59 ml/min     Constipation     ETOH abuse     Hyperlipidemia     Other ascites 2021    Pancreatitis, acute     Tobacco use     Vitamin D deficiency      Past Surgical History:   Procedure Laterality Date    AUGMENTATION OF BREAST      breast augmentation       SECTION      COLONOSCOPY N/A 2021    Procedure: COLONOSCOPY;  Surgeon: Dao Gray MD;  Location: [facility] (2ND FLR);  Service: Endoscopy;  Laterality: N/A;  COVID test 21 General surgery clinic [facility]    CYSTOSCOPY N/A 9/10/2021    Procedure: CYSTOSCOPY;  Surgeon: Rj Sánchez Jr., MD;  [facility];  Service: Urology;  Laterality: N/A;    ESOPHAGOGASTRODUODENOSCOPY N/A 2021    Procedure: ESOPHAGOGASTRODUODENOSCOPY (EGD);  Surgeon: Dao Gray MD;  Location: [facility] (2ND FLR);  Service: Endoscopy;  Laterality: N/A;  labs current on     ESOPHAGOGASTRODUODENOSCOPY N/A 10/26/2021    Procedure: ESOPHAGOGASTRODUODENOSCOPY (EGD);  Surgeon: Dao Gray MD;  Location: [facility] (2ND FLR);  Service: Endoscopy;  Laterality: N/A;  to be done the week of 10/18/21 per Dr. Gray-BB  covid-10/23/21-[facility]-BB   10/25 arrival time confirmed with pt-rb    ESOPHAGOGASTRODUODENOSCOPY Left 2021    Procedure: EGD  (ESOPHAGOGASTRODUODENOSCOPY);  Surgeon: Koffi Monteiro MD;  [facility]  Service: Endoscopy;  Laterality: Left;  Rapid  pt requested AM appt and first available in December-BB  labs morning of procedure-BB  12/14 lvm to confirm appt-rb    HEMORRHOID SURGERY      PERITONEOCENTESIS Right 6/8/2021    Procedure: PARACENTESIS, ABDOMINAL;  Surgeon: Jay Torre MD;  Location: [facility] CATH LAB;  Service: Radiology;  Laterality: Right;    PERITONEOCENTESIS Right 6/25/2021    Procedure: PARACENTESIS, ABDOMINAL;  Surgeon: Jay Torre MD;  Location: [facility] CATH LAB;  Service: Radiology;  Laterality: Right;    PERITONEOCENTESIS Right 7/12/2021    Procedure: PARACENTESIS, ABDOMINAL;  Surgeon: Jay Torre MD;  Location: [facility] CATH LAB;  Service: Radiology;  Laterality: Right;    PERITONEOCENTESIS Right 7/23/2021    Procedure: PARACENTESIS, ABDOMINAL;  Surgeon: Edward De La Torre II, MD;  Location: [facility] LAB;  Service: Interventional Radiology;  Laterality: Right;    PERITONEOCENTESIS Right 8/3/2021    Procedure: PARACENTESIS, ABDOMINAL;  Surgeon: Franco Cheung MD;  Location: [facility] CATH LAB;  Service: Radiology;  Laterality: Right;    PERITONEOCENTESIS Right 8/16/2021    Procedure: PARACENTESIS, ABDOMINAL;  Surgeon: Jay Torre MD;  Location: [facility] CATH LAB;  Service: Radiology;  Laterality: Right;    PERITONEOCENTESIS Right 8/23/2021    Procedure: PARACENTESIS, ABDOMINAL;  Surgeon: Jay Torre MD;  Location: [facility] CATH LAB;  Service: Radiology;  Laterality: Right;    PERITONEOCENTESIS Right 9/16/2021    Procedure: PARACENTESIS, ABDOMINAL;  Surgeon: Jay Torre MD;  Location: [facility] CATH LAB;  Service: Radiology;  Laterality: Right;    PERITONEOCENTESIS N/A 9/24/2021    Procedure: PARACENTESIS, ABDOMINAL;  Surgeon: Jay Torre MD;  Location: [facility] CATH LAB;  Service: Radiology;  Laterality: N/A;    PERITONEOCENTESIS Right 12/23/2021    Procedure:  PARACENTESIS, ABDOMINAL;  Surgeon: Jay Torre MD;  Location: [facility] CATH LAB;  Service: Radiology;  Laterality: Right;    PERITONEOCENTESIS N/A 12/30/2021    Procedure: PARACENTESIS, ABDOMINAL;  Surgeon: Salas Cabrera MD;  Location: [facility] CATH LAB;  Service: Radiology;  Laterality: N/A;    RETROGRADE PYELOGRAPHY Bilateral 9/10/2021    Procedure: PYELOGRAM, RETROGRADE;  Surgeon: Rj Sánchez Jr., MD;  Location: [address];  Service: Urology;  Laterality: Bilateral;    TONSILLECTOMY       Home Meds:   Prior to Admission medications    Medication Sig Start Date End Date Taking? Authorizing Provider   allopurinoL (ZYLOPRIM) 100 MG tablet Take 1 tablet (100 mg total) by mouth once daily. 11/12/21   Rashel Cohn MD   bisacodyL (DULCOLAX) 5 mg EC tablet Take 5 mg by mouth daily as needed for Constipation.    Historical Provider   ciprofloxacin HCl (CIPRO) 250 MG tablet Take 1 tablet (250 mg total) by mouth once daily.  Patient taking differently: Take 250 mg by mouth every other day. 12/23/21   Rashel Cohn MD   ergocalciferol (ERGOCALCIFEROL) 50,000 unit Cap Take 1 capsule (50,000 Units total) by mouth every 7 days. 10/2/21   Rashel Cohn MD   folic acid (FOLVITE) 1 MG tablet Take 2 tablets (2 mg total) by mouth once daily. 12/14/21 3/14/22  Uma Perry MD   furosemide (LASIX) 40 MG tablet Take 1 tablet (40 mg total) by mouth 2 (two) times daily. 12/14/21 12/14/22  Uma Perry MD   lactulose 10 gram/15 ml (CHRONULAC) 10 gram/15 mL (15 mL) solution Take 15 mLs (10 g total) by mouth daily as needed (Constipation). 10/29/21   Rashel Cohn MD   levothyroxine (SYNTHROID) 50 MCG tablet TAKE 1 TABLET(50 MCG) BY MOUTH BEFORE BREAKFAST 9/25/21   Killian Dodd DO   linaCLOtide (LINZESS) 72 mcg Cap capsule Take 2 capsules (144 mcg total) by mouth before breakfast. 11/26/21   Sharmila Pacheco MD   sodium bicarbonate 650 MG tablet Take 1 tablet (650 mg total) by mouth 2 (two)  times daily.  Patient taking differently: Take 650 mg by mouth Daily. 10/2/21 3/31/22  Rashel Cohn MD   thiamine (VITAMIN B-1) 100 MG tablet Take 1 tablet (100 mg total) by mouth every evening. 10/11/21   Sharmila Pacheco MD   vitamin A 95343 UNIT capsule Take 10,000 Units by mouth every evening.     Historical Provider     Anticoagulants/Antiplatelets: no anticoagulation    Allergies: Review of patient's allergies indicates:  No Known Allergies    Sedation History:  no adverse reactions     Review of Systems:   Hematological: no known coagulopathies  Respiratory: no cough, shortness of breath, or wheezing  Cardiovascular: no chest pain or dyspnea on exertion  Gastrointestinal: positive for - abdominal pain and distension  Genito-Urinary: no dysuria, trouble voiding, or hematuria  Musculoskeletal: negative  Neurological: no TIA or stroke symptoms       OBJECTIVE:     Vital Signs (Most Recent)  Pulse: 87 (01/06/22 0814)  Resp: 14 (01/06/22 0814)  BP: 120/64 (01/06/22 0814)  SpO2: 100 % (01/06/22 0814)    Physical Exam:  General: no acute distress  Mental Status: alert and oriented to person, place and time  HEENT: normocephalic, atraumatic  Chest: unlabored breathing  Heart: regular heart rate  Abdomen: +distended with +fluid-wave. No TTP/r/g.  Extremity: moves all extremities    Laboratory  Lab Results   Component Value Date    INR 1.0 12/27/2021       Lab Results   Component Value Date    WBC 8.22 12/27/2021    HGB 11.2 (L) 12/27/2021    HCT 35.4 (L) 12/27/2021    MCV 95 12/27/2021     12/27/2021      Lab Results   Component Value Date     (H) 12/27/2021     (L) 12/27/2021    K 3.6 12/27/2021     12/27/2021    CO2 23 12/27/2021    BUN 51 (H) 12/27/2021    CREATININE 1.9 (H) 12/27/2021    CALCIUM 8.9 12/27/2021    MG 2.1 12/10/2021    ALT 16 12/27/2021    AST 31 12/27/2021    ALBUMIN 3.3 (L) 12/27/2021    BILITOT 0.7 12/27/2021    BILIDIR 0.4 (H) 11/11/2021     ASSESSMENT/PLAN:      51 y.o. female with pertinent PMHx of decompensated EtOH hepatic cirrhosis complicated by recurrent abdominal distension and pain 2/2 recurrent painful, tense ascites wrequiring frequent therapeutic large-volume paracentesis.     1. Recurrent painful, tense ascites - Will attempt US-guided percutaneous RUQ-approach therapeutic LVP with local anesthetic only and albumin infusion post PRN as indicated per institutional protocol.     Risks (including, but not limited to, pain, bleeding, infection, damage to nearby structures, failure to obtain sufficient material for a diagnosis, the need for additional procedures, and death), benefits, and alternatives were discussed with the patient. All questions were answered to the best of my abilities. The patient wishes to proceed with the procedure. Written informed consent was obtained.     Thank you for considering IR for the care of your patient.      Salas Cabrera MD  Interventional Radiology

## 2022-01-06 NOTE — BRIEF OP NOTE
Radiology Post-Procedure Note     Pre Op Diagnosis: Recurrent painful, tense ascites  Post Op Diagnosis: Same     Procedure: 1. US-guided percutaneous RUQ-approach therapeutic LVP     Procedure performed by: Salas Cabrera MD     Written Informed Consent Obtained: Yes  Specimen Removed: YES, 8,600-cc of thin, straw-colored ascitic fluid  Estimated Blood Loss: none     Findings:   Successful US-guided percutaneous RUQ-approach therapeutic LVP with local anesthetic only and albumin infusion post PRN as indicated per institutional protocol. Patient tolerated the procedure well. No immediate post-procedural complications noted.      Thank you for considering IR for the care of your patient.      Salas Cabrera MD  Interventional Radiology

## 2022-01-06 NOTE — PROGRESS NOTES
HEPATOLOGY FOLLOW UP    Referring Physician: Killian Dodd DO  Current Corresponding Physician: Killian Dodd DO, Rashel Cohn MD    Malu Montes is here for follow up of decompensated alcohol-induced cirrhosis    HPI  Malu Montes is a 51 y.o. female who has a history of alcohol abuse and decompensated cirrhosis now undergoing a liver transplant evaluation    Interval History  Since Malu Montes's last visit:    She was listed for liver-kidney transplant. MELD 17. She is requiring a paracentesis every 5-6 days    Labs 12/27/21: Tbil 0.7, ALT 16, AST 31, ALKP 92, AFP 14, plts 265, INR 1.0  Creatinine, elevated: dysmorphic RBC in the urine- kidney bx recommended to r/o IgA nephropathy- now deferred  Hematologic labs: Juliette free light chains 9.12 (0.33-1.94), Lambda Free Light Chains 4.39 (0.57-2.63), Kappa/Lambda FLC Ratio: 2.08 (0.26-1.65); hematology consult- abn attributed to CKD; no futher f/u needed    Abdo US 7/30/21: Satisfactory Doppler evaluation of the liver; Coarsened hepatic echotexture with nodular liver contour in keeping with history of cirrhosis.   MRI abdo 8/6/21: The liver is normal in size.  Hepatic parenchyma demonstrates diffuse heterogeneous enhancement on the early arterial phase that normalizes on the portal venous and delayed phases.  There is a 0.8 cm arterial hyperenhancing focus within the superior left hepatic lobe, possibly a prominent vascular structure and unchanged when compared to the previous exam; enlarged spleen    Ascites: on laisx 40 mg bid  SBP: yes; on cipro every other day  HE: on lactulose  CKD: ongoing; now meets criteria for simultaneous kidney transplant  Alcohol abuse: peth negative; now attending completed IOP    MELD-Na score: 17 at 12/27/2021  7:44 AM  MELD score: 13 at 12/27/2021  7:44 AM  Calculated from:  Serum Creatinine: 1.9 mg/dL at 12/27/2021  7:44 AM  Serum Sodium: 132 mmol/L at 12/27/2021  7:44 AM  Total Bilirubin: 0.7  mg/dL (Using min of 1 mg/dL) at 12/27/2021  7:44 AM  INR(ratio): 1.0 at 12/27/2021  7:44 AM  Age: 51 years    Outpatient Encounter Medications as of 1/6/2022   Medication Sig Dispense Refill    allopurinoL (ZYLOPRIM) 100 MG tablet Take 1 tablet (100 mg total) by mouth once daily. 90 tablet 0    bisacodyL (DULCOLAX) 5 mg EC tablet Take 5 mg by mouth daily as needed for Constipation.      ciprofloxacin HCl (CIPRO) 250 MG tablet Take 1 tablet (250 mg total) by mouth once daily. (Patient taking differently: Take 250 mg by mouth every other day.) 90 tablet 0    ergocalciferol (ERGOCALCIFEROL) 50,000 unit Cap Take 1 capsule (50,000 Units total) by mouth every 7 days. 14 capsule 0    folic acid (FOLVITE) 1 MG tablet Take 2 tablets (2 mg total) by mouth once daily. 60 tablet 11    furosemide (LASIX) 40 MG tablet Take 1 tablet (40 mg total) by mouth 2 (two) times daily. 60 tablet 11    lactulose 10 gram/15 ml (CHRONULAC) 10 gram/15 mL (15 mL) solution Take 15 mLs (10 g total) by mouth daily as needed (Constipation). 300 mL 0    levothyroxine (SYNTHROID) 50 MCG tablet TAKE 1 TABLET(50 MCG) BY MOUTH BEFORE BREAKFAST 90 tablet 3    linaCLOtide (LINZESS) 72 mcg Cap capsule Take 2 capsules (144 mcg total) by mouth before breakfast. 180 capsule 5    sodium bicarbonate 650 MG tablet Take 1 tablet (650 mg total) by mouth 2 (two) times daily. (Patient taking differently: Take 650 mg by mouth once daily.) 60 tablet 5    thiamine (VITAMIN B-1) 100 MG tablet Take 1 tablet (100 mg total) by mouth every evening. 30 tablet 5    vitamin A 96937 UNIT capsule Take 10,000 Units by mouth every evening.        Facility-Administered Encounter Medications as of 1/6/2022   Medication Dose Route Frequency Provider Last Rate Last Admin    [COMPLETED] albumin human 25% bottle 50 g  50 g Intravenous Once Salas Cabrera  mL/hr at 01/06/22 0830 50 g at 01/06/22 0830    [COMPLETED] LIDOcaine HCL 10 mg/ml (1%) injection   Other  Code/trauma/sedation Med Salas Cabrera MD   4 mL at 01/06/22 0817     Review of patient's allergies indicates:  No Known Allergies  Past Medical History:   Diagnosis Date    ADD (attention deficit disorder)     Anxiety     Ascites of liver     Cirrhosis 9/16/2021    CKD (chronic kidney disease) stage 3, GFR 30-59 ml/min     Constipation     ETOH abuse     Hyperlipidemia     Other ascites 6/14/2021    Pancreatitis, acute     Tobacco use     Vitamin D deficiency        Review of Systems   Constitutional: Negative.    HENT: Negative.    Eyes: Negative.    Respiratory: Negative.    Cardiovascular: Negative.    Gastrointestinal: Negative.    Genitourinary: Negative.    Musculoskeletal: Negative.    Skin: Negative.    Neurological: Negative.    Psychiatric/Behavioral: Negative.      MELD-Na score: 17 at 12/27/2021  7:44 AM  MELD score: 13 at 12/27/2021  7:44 AM  Calculated from:  Serum Creatinine: 1.9 mg/dL at 12/27/2021  7:44 AM  Serum Sodium: 132 mmol/L at 12/27/2021  7:44 AM  Total Bilirubin: 0.7 mg/dL (Using min of 1 mg/dL) at 12/27/2021  7:44 AM  INR(ratio): 1.0 at 12/27/2021  7:44 AM  Age: 51 years    Physical Exam  Vitals reviewed.   Constitutional:       Appearance: She is well-developed.   HENT:      Head: Normocephalic and atraumatic.   Eyes:      General: No scleral icterus.     Conjunctiva/sclera: Conjunctivae normal.      Pupils: Pupils are equal, round, and reactive to light.   Neck:      Thyroid: No thyromegaly.   Cardiovascular:      Rate and Rhythm: Normal rate and regular rhythm.      Heart sounds: Normal heart sounds.   Pulmonary:      Effort: Pulmonary effort is normal.      Breath sounds: Normal breath sounds. No rales.   Abdominal:      General: Bowel sounds are normal. There is distension (+shifting dullness).      Palpations: Abdomen is soft. There is no mass.      Tenderness: There is no abdominal tenderness.   Musculoskeletal:         General: Normal range of motion.      Cervical  back: Normal range of motion and neck supple.   Skin:     General: Skin is warm and dry.      Findings: No rash.   Neurological:      Mental Status: She is alert and oriented to person, place, and time.         MELD-Na score: 17 at 12/27/2021  7:44 AM  MELD score: 13 at 12/27/2021  7:44 AM  Calculated from:  Serum Creatinine: 1.9 mg/dL at 12/27/2021  7:44 AM  Serum Sodium: 132 mmol/L at 12/27/2021  7:44 AM  Total Bilirubin: 0.7 mg/dL (Using min of 1 mg/dL) at 12/27/2021  7:44 AM  INR(ratio): 1.0 at 12/27/2021  7:44 AM  Age: 51 years    Lab Results   Component Value Date     (H) 12/27/2021    BUN 51 (H) 12/27/2021    CREATININE 1.9 (H) 12/27/2021    CALCIUM 8.9 12/27/2021     (L) 12/27/2021    K 3.6 12/27/2021     12/27/2021    PROT 5.9 (L) 12/27/2021    CO2 23 12/27/2021    ANIONGAP 7 (L) 12/27/2021    WBC 8.22 12/27/2021    RBC 3.71 (L) 12/27/2021    HGB 11.2 (L) 12/27/2021    HCT 35.4 (L) 12/27/2021    HCT 34 (L) 12/07/2021    MCV 95 12/27/2021    MCH 30.2 12/27/2021    MCHC 31.6 (L) 12/27/2021     Lab Results   Component Value Date    RDW 15.6 (H) 12/27/2021     12/27/2021    MPV 9.9 12/27/2021    GRAN 5.3 12/27/2021    GRAN 64.9 12/27/2021    LYMPH 1.6 12/27/2021    LYMPH 19.6 12/27/2021    MONO 0.5 12/27/2021    MONO 6.6 12/27/2021    EOSINOPHIL 7.8 12/27/2021    BASOPHIL 0.9 12/27/2021    EOS 2 12/27/2021    EOS 0.6 (H) 12/27/2021    BASO 1 12/27/2021    BASO 0.07 12/27/2021    APTT 27.6 11/11/2021    GROUPTRH A POS 07/30/2021    CHOL 146 12/06/2021    TRIG 68 12/06/2021    HDL 32 (L) 12/06/2021    CHOLHDL 21.9 12/06/2021    TOTALCHOLEST 4.6 12/06/2021    ALBUMIN 3.3 (L) 12/27/2021    BILIDIR 0.4 (H) 11/11/2021    AST 31 12/27/2021    ALT 16 12/27/2021    ALKPHOS 92 12/27/2021    MG 2.1 12/10/2021    LABPROT 11.4 12/27/2021    INR 1.0 12/27/2021       Assessment and Plan:    Malu Styles Simon is a 51 y.o. female with decompensated cirrhosis secondary to alcohol., now listed for L-K  transplant. Patient willing to consider liver tx alone with aim to be prioritized for kidney tx in the first year post liver tx. Current recomemndations:  1. Ascites complicated by SBP; continue cipro prophylaxis and prn paracentesis every 5-6 days; doubt tips would work given renal insufficinecy; increase lasix to 80 mg in am and 40 mg in pm  2. Portal htn: EV s/p banding; no banding 12/21-repeat in 3 months 03/22   3. Decompensated alcoholic liver disease: labs every week; will consider liver tx alone  4. +MARIBEL: consider liver biopsy if live enzymes increase  5. Colorectal Ca screening: repeat colonoscopy in 10 years  6. Alcohol abuse, in remission: now s/p IOP; continue AA  7. Frailty: at risk for worsening since getting paracenteses every 10 days; continue whey protein supplementation; stay active;   8. CKD: GFR now <30. possible IgA nephropathy; kidney bx deferred;  9. Elevated Kappa/Lambda FLC ratio: due to CKD; no further w/u  Needed  10. Anemia: iron infusions per hematology    Return 4 weeks

## 2022-01-06 NOTE — SEDATION DOCUMENTATION
4x4 gauze placed at previous L. Forearm PIV site and secured with a self-adhesive dressing. 8.6L clear yellow drainage removed during paracentesis. Band-aid placed over RUQ abdomen paracentesis site. Patient without complaints or discomfort.

## 2022-01-06 NOTE — DISCHARGE SUMMARY
Radiology Discharge Summary      Hospital Course: No complications    Admit Date: 1/6/2022  Discharge Date: 01/06/2022     Instructions Given to Patient: Yes  Diet: Resume prior diet  Activity: activity as tolerated    Description of Condition on Discharge: Stable  Vital Signs (Most Recent): Pulse: 89 (01/06/22 0906)  Resp: 14 (01/06/22 0906)  BP: (!) 106/55 (01/06/22 0906)  SpO2: 100 % (01/06/22 0906)    Discharge Disposition: Home      Discharge Diagnosis:  51 y.o. female with recurrent painful, tense ascites s/p successful US-guided percutaneous RUQ-approach therapeutic LVP with local anesthetic only and albumin infusion post PRN as indicated per institutional protocol. Patient tolerated the procedure well. No immediate post-procedural complications noted.      Thank you for considering IR for the care of your patient.      Salas Cabrera MD  Interventional Radiology

## 2022-01-07 ENCOUNTER — TELEPHONE (OUTPATIENT)
Dept: INTERVENTIONAL RADIOLOGY/VASCULAR | Facility: CLINIC | Age: 52
End: 2022-01-07
Payer: COMMERCIAL

## 2022-01-07 ENCOUNTER — PATIENT OUTREACH (OUTPATIENT)
Dept: ADMINISTRATIVE | Facility: OTHER | Age: 52
End: 2022-01-07
Payer: COMMERCIAL

## 2022-01-07 ENCOUNTER — TELEPHONE (OUTPATIENT)
Dept: TRANSPLANT | Facility: CLINIC | Age: 52
End: 2022-01-07
Payer: COMMERCIAL

## 2022-01-07 NOTE — PROGRESS NOTES
Health Maintenance Due   Topic Date Due    Pneumococcal Vaccines (Age 0-64) (1 of 2 - PPSV23) Never done    TETANUS VACCINE  Never done    Shingles Vaccine (1 of 2) Never done    Influenza Vaccine (1) Never done     Updates were requested from care everywhere.  Chart was reviewed for overdue Proactive [facility] Encounters ([facility]) topics (CRS, Breast Cancer Screening, Eye exam)  Health Maintenance has been updated.  LINKS immunization registry triggered.  Immunizations were reconciled.

## 2022-01-07 NOTE — TELEPHONE ENCOUNTER
Left message for pt/calling to confirm appt on 01/10 for IR para/labs. Please forward call to [phone]. Thanks

## 2022-01-08 ENCOUNTER — LAB VISIT (OUTPATIENT)
Dept: TRANSPLANT | Facility: CLINIC | Age: 52
End: 2022-01-08
Payer: COMMERCIAL

## 2022-01-08 ENCOUNTER — TELEPHONE (OUTPATIENT)
Dept: TRANSPLANT | Facility: CLINIC | Age: 52
End: 2022-01-08
Payer: COMMERCIAL

## 2022-01-08 DIAGNOSIS — Z01.818 OTHER SPECIFIED PRE-OPERATIVE EXAMINATION: ICD-10-CM

## 2022-01-08 DIAGNOSIS — Z01.818 OTHER SPECIFIED PRE-OPERATIVE EXAMINATION: Primary | ICD-10-CM

## 2022-01-08 LAB — SARS-COV-2 RDRP RESP QL NAA+PROBE: POSITIVE

## 2022-01-08 PROCEDURE — U0002 COVID-19 LAB TEST NON-CDC: HCPCS | Mod: TXP | Performed by: TRANSPLANT SURGERY

## 2022-01-08 NOTE — TELEPHONE ENCOUNTER
Patient was called as a back up. Her rapid covid test came back as positive.  Patient denies symptoms.  Case reviewed with Dr. Stuart.  Plan- inactivate patient on the liver and kidney list.  Send patient to get a PCR covid test to confirm her result.  Patient has labs, para and nephrology appointment on Monday.  Patient informed she will not be able to keep those appointments.  Patient given resource to get a PCR covid test.  Patient informed she was being inactivated on the liver and kidney transplant list.

## 2022-01-08 NOTE — TELEPHONE ENCOUNTER
On Call Transplant SW Note:    No changes in psychosocial suitability for transplant; Recommend ongoing sobriety and attendance (AA, and additional resources as recommended/indicated). I spoke with pt by phone today. Pt reports celebrated (six) 6 months sober in Dec 2021.    77yo mother-in-law, drives and independent with ADLs  Resides with spouse and mother-in-law, both supportive and involved in pts wellness and care.  Spouse - works and will assist as caregiver as needed.    Sophie older daughter - is local and will also assist as needed    Insurance remains employee group health plan through [employer], pts current employer - works from home    Lost daughter, 22yo - overdose March 2021 -  Resources, supportive services, listening, encouragement and brief counseling provided to pt by phone. Pt is coping well overall with her health and with the loss of her daughter. Pt states she is looking for latif to living well with a healthy liver if transplanted, and plans to remain sober. Pt denies additional needs at this time. Transplant on call SW remains available and pt states clear understanding of how to access resources as needed.

## 2022-01-08 NOTE — TELEPHONE ENCOUNTER
BACK-UP ORGAN OFFER NOTE    Notified by Lorenzo Rubio, , of backup liver offer with donor information.  Donor and recipient information read back and verified.  Spoke with Malu Montes and identified no acute medical issues in telephone assessment.  Patient instructed we do not have times for the transplant. She can stay home, continue to eat, drink, and take medications. Pt informed donor + for covid. Patient verbalized understanding that she has been called as a backup.    Patient was asked if they have had a positive COVID-19 test or if they have any signs or symptoms. Informed patient that they will be tested for COVID-19 upon arrival to the hospital, unless have a previous positive result. If tested and result is positive, the transplant will not be able to occur, they will be inactivated on the wait list for 28 days per protocol and required to quarantine.

## 2022-01-10 ENCOUNTER — PATIENT MESSAGE (OUTPATIENT)
Dept: TRANSPLANT | Facility: CLINIC | Age: 52
End: 2022-01-10
Payer: COMMERCIAL

## 2022-01-10 ENCOUNTER — PATIENT MESSAGE (OUTPATIENT)
Dept: HEPATOLOGY | Facility: CLINIC | Age: 52
End: 2022-01-10
Payer: COMMERCIAL

## 2022-01-10 ENCOUNTER — TELEPHONE (OUTPATIENT)
Dept: TRANSPLANT | Facility: CLINIC | Age: 52
End: 2022-01-10
Payer: COMMERCIAL

## 2022-01-10 ENCOUNTER — TELEPHONE (OUTPATIENT)
Dept: NEPHROLOGY | Facility: CLINIC | Age: 52
End: 2022-01-10
Payer: COMMERCIAL

## 2022-01-10 ENCOUNTER — PATIENT MESSAGE (OUTPATIENT)
Dept: NEPHROLOGY | Facility: CLINIC | Age: 52
End: 2022-01-10
Payer: COMMERCIAL

## 2022-01-10 NOTE — TELEPHONE ENCOUNTER
Spoke to pt regarding positive COVID test 1/8/22. Pt is aware she has been placed on inactive on the transplant list. Pt states she was told to have PCR for COVID test, results should be in tomorrow. Pt states she has not experienced any symptoms.

## 2022-01-11 ENCOUNTER — PATIENT MESSAGE (OUTPATIENT)
Dept: TRANSPLANT | Facility: CLINIC | Age: 52
End: 2022-01-11
Payer: COMMERCIAL

## 2022-01-14 ENCOUNTER — HOSPITAL ENCOUNTER (OUTPATIENT)
Dept: INTERVENTIONAL RADIOLOGY/VASCULAR | Facility: HOSPITAL | Age: 52
Discharge: HOME | End: 2022-01-14
Attending: INTERNAL MEDICINE
Payer: COMMERCIAL

## 2022-01-14 VITALS
OXYGEN SATURATION: 100 % | DIASTOLIC BLOOD PRESSURE: 67 MMHG | RESPIRATION RATE: 20 BRPM | HEART RATE: 101 BPM | SYSTOLIC BLOOD PRESSURE: 99 MMHG

## 2022-01-14 DIAGNOSIS — R18.8 OTHER ASCITES: ICD-10-CM

## 2022-01-14 LAB
ALBUMIN FLD-MCNC: 1.1 G/DL
APPEARANCE FLD: CLEAR
BODY FLD TYPE: NORMAL
COLOR FLD: YELLOW
LYMPHOCYTES NFR FLD MANUAL: 64 %
MONOS+MACROS NFR FLD MANUAL: 22 %
NEUTROPHILS NFR FLD MANUAL: 14 %
PROT FLD-MCNC: 1.5 G/DL
SPECIMEN SOURCE: NORMAL
SPECIMEN SOURCE: NORMAL
WBC # FLD: 45 /CU MM

## 2022-01-14 PROCEDURE — P9047 ALBUMIN (HUMAN), 25%, 50ML: HCPCS | Mod: JG,NTX | Performed by: INTERNAL MEDICINE

## 2022-01-14 PROCEDURE — 84157 ASSAY OF PROTEIN OTHER: CPT | Mod: NTX | Performed by: INTERNAL MEDICINE

## 2022-01-14 PROCEDURE — 49083 ABD PARACENTESIS W/IMAGING: CPT | Mod: TXP,,, | Performed by: FAMILY MEDICINE

## 2022-01-14 PROCEDURE — 49083 IR PARACENTESIS WITH IMAGING: ICD-10-PCS | Mod: TXP,,, | Performed by: FAMILY MEDICINE

## 2022-01-14 PROCEDURE — 82042 OTHER SOURCE ALBUMIN QUAN EA: CPT | Mod: NTX | Performed by: INTERNAL MEDICINE

## 2022-01-14 PROCEDURE — 27100111 IR PARACENTESIS WITH IMAGING: Mod: TXP

## 2022-01-14 PROCEDURE — 89051 BODY FLUID CELL COUNT: CPT | Mod: NTX | Performed by: INTERNAL MEDICINE

## 2022-01-14 PROCEDURE — 49083 ABD PARACENTESIS W/IMAGING: CPT | Mod: TXP | Performed by: RADIOLOGY

## 2022-01-14 PROCEDURE — 63600175 PHARM REV CODE 636 W HCPCS: Mod: JG,NTX | Performed by: INTERNAL MEDICINE

## 2022-01-14 RX ORDER — ALBUMIN HUMAN 250 G/1000ML
50 SOLUTION INTRAVENOUS ONCE
Status: COMPLETED | OUTPATIENT
Start: 2022-01-14 | End: 2022-01-14

## 2022-01-14 RX ORDER — ALBUMIN HUMAN 250 G/1000ML
SOLUTION INTRAVENOUS
Status: DISPENSED
Start: 2022-01-14 | End: 2022-01-14

## 2022-01-14 RX ORDER — ALBUMIN HUMAN 250 G/1000ML
12.5 SOLUTION INTRAVENOUS ONCE
Status: COMPLETED | OUTPATIENT
Start: 2022-01-14 | End: 2022-01-14

## 2022-01-14 RX ADMIN — ALBUMIN (HUMAN) 12.5 G: 25 SOLUTION INTRAVENOUS at 10:01

## 2022-01-14 RX ADMIN — ALBUMIN (HUMAN) 50 G: 25 SOLUTION INTRAVENOUS at 09:01

## 2022-01-14 NOTE — PROCEDURES
Radiology Post-Procedure Note    Pre Op Diagnosis: Ascites  Post Op Diagnosis: Same    Procedure: Ultrasound Guided Paracentesis    Procedure performed by: Fabrizio PEÑA, Cee     Written Informed Consent Obtained: Yes  Specimen Removed: YES clear yellow  Estimated Blood Loss: Minimal    Findings:   Successful paracentesis.  Albumin administered PRN per protocol.    Patient tolerated procedure well.    Cee Dinh, APRN, FNP  Interventional Radiology  [phone] clinic

## 2022-01-14 NOTE — PLAN OF CARE
Patient awake and alert, no distress noted, respirations even and unlabored. Allergies reviewed. Waiting for eval and consent.

## 2022-01-14 NOTE — SEDATION DOCUMENTATION
Patient 6 days s/p positive covid test. Patient states she has been asymptomatic however once brought back into the procedure room patient exhibiting coughing. Patient placed in N95 mask, however patient spent >30 minutes in cloth mask with nose exposed in waiting room. Leadership has been contacted.

## 2022-01-14 NOTE — H&P
Radiology History & Physical      SUBJECTIVE:     Chief Complaint: abdominal distention    History of Present Illness:  Malu Montes is a 51 y.o. female who presents for ultrasound guided paracentesis  Past Medical History:   Diagnosis Date    ADD (attention deficit disorder)     Anxiety     Ascites of liver     Cirrhosis 2021    CKD (chronic kidney disease) stage 3, GFR 30-59 ml/min     Constipation     ETOH abuse     Hyperlipidemia     Other ascites 2021    Pancreatitis, acute     Tobacco use     Vitamin D deficiency      Past Surgical History:   Procedure Laterality Date    AUGMENTATION OF BREAST      breast augmentation       SECTION      COLONOSCOPY N/A 2021    Procedure: COLONOSCOPY;  Surgeon: Dao Gray MD;  Location: [facility] (2ND FLR);  Service: Endoscopy;  Laterality: N/A;  COVID test 21 General surgery clinic [facility]    CYSTOSCOPY N/A 9/10/2021    Procedure: CYSTOSCOPY;  Surgeon: Rj Sánchez Jr., MD;  [facility];  Service: Urology;  Laterality: N/A;    ESOPHAGOGASTRODUODENOSCOPY N/A 2021    Procedure: ESOPHAGOGASTRODUODENOSCOPY (EGD);  Surgeon: Dao Gray MD;  Location: [facility] (2ND FLR);  Service: Endoscopy;  Laterality: N/A;  labs current on     ESOPHAGOGASTRODUODENOSCOPY N/A 10/26/2021    Procedure: ESOPHAGOGASTRODUODENOSCOPY (EGD);  Surgeon: Dao Gray MD;  Location: [facility] (2ND FLR);  Service: Endoscopy;  Laterality: N/A;  to be done the week of 10/18/21 per Dr. Gray-BB  covid-10/23/21-[facility]-   10/25 arrival time confirmed with pt-rb    ESOPHAGOGASTRODUODENOSCOPY Left 2021    Procedure: EGD (ESOPHAGOGASTRODUODENOSCOPY);  Surgeon: Koffi Monteiro MD;  Location: [facility] (4TH FLR);  Service: Endoscopy;  Laterality: Left;  Rapid  pt requested AM appt and first available in December-  labs morning of procedure-BB   lvm to confirm appt-rb    HEMORRHOID SURGERY       PERITONEOCENTESIS Right 6/8/2021    Procedure: PARACENTESIS, ABDOMINAL;  Surgeon: Jay Torre MD;  Location: [facility] CATH LAB;  Service: Radiology;  Laterality: Right;    PERITONEOCENTESIS Right 6/25/2021    Procedure: PARACENTESIS, ABDOMINAL;  Surgeon: Jay Torre MD;  Location: [facility] CATH LAB;  Service: Radiology;  Laterality: Right;    PERITONEOCENTESIS Right 7/12/2021    Procedure: PARACENTESIS, ABDOMINAL;  Surgeon: Jay Torre MD;  Location: [facility] CATH LAB;  Service: Radiology;  Laterality: Right;    PERITONEOCENTESIS Right 7/23/2021    Procedure: PARACENTESIS, ABDOMINAL;  Surgeon: Edward De La Torre II, MD;  Location: [facility] CATH LAB;  Service: Interventional Radiology;  Laterality: Right;    PERITONEOCENTESIS Right 8/3/2021    Procedure: PARACENTESIS, ABDOMINAL;  Surgeon: Franco Cheung MD;  Location: [facility] CATH LAB;  Service: Radiology;  Laterality: Right;    PERITONEOCENTESIS Right 8/16/2021    Procedure: PARACENTESIS, ABDOMINAL;  Surgeon: Jay Torre MD;  Location: [facility] CATH LAB;  Service: Radiology;  Laterality: Right;    PERITONEOCENTESIS Right 8/23/2021    Procedure: PARACENTESIS, ABDOMINAL;  Surgeon: Jay Torre MD;  Location: [facility] CATH LAB;  Service: Radiology;  Laterality: Right;    PERITONEOCENTESIS Right 9/16/2021    Procedure: PARACENTESIS, ABDOMINAL;  Surgeon: Jay Torre MD;  Location: [facility] CATH LAB;  Service: Radiology;  Laterality: Right;    PERITONEOCENTESIS N/A 9/24/2021    Procedure: PARACENTESIS, ABDOMINAL;  Surgeon: Jay Torre MD;  Location: [facility] CATH LAB;  Service: Radiology;  Laterality: N/A;    PERITONEOCENTESIS Right 12/23/2021    Procedure: PARACENTESIS, ABDOMINAL;  Surgeon: Jay Torre MD;  Location: [facility] CATH LAB;  Service: Radiology;  Laterality: Right;    PERITONEOCENTESIS N/A 12/30/2021    Procedure: PARACENTESIS, ABDOMINAL;  Surgeon: Salas Cabrera MD;  Location: [facility] CATH LAB;  Service: Radiology;  Laterality:  N/A;    RETROGRADE PYELOGRAPHY Bilateral 9/10/2021    Procedure: PYELOGRAM, RETROGRADE;  Surgeon: Rj Sánchez Jr., MD;  Location: [facility] OR [address] [place] [address];  Service: Urology;  Laterality: Bilateral;    TONSILLECTOMY         Home Meds:   Prior to Admission medications    Medication Sig Start Date End Date Taking? Authorizing Provider   allopurinoL (ZYLOPRIM) 100 MG tablet Take 1 tablet (100 mg total) by mouth once daily. 11/12/21   Rashel Cohn MD   bisacodyL (DULCOLAX) 5 mg EC tablet Take 5 mg by mouth daily as needed for Constipation.    Historical Provider   ciprofloxacin HCl (CIPRO) 250 MG tablet Take 1 tablet (250 mg total) by mouth once daily.  Patient taking differently: Take 250 mg by mouth every other day. 12/23/21   Rashel Cohn MD   ergocalciferol (ERGOCALCIFEROL) 50,000 unit Cap Take 1 capsule (50,000 Units total) by mouth every 7 days. 10/2/21   Rashel Cohn MD   folic acid (FOLVITE) 1 MG tablet Take 2 tablets (2 mg total) by mouth once daily. 12/14/21 3/14/22  Uma Perry MD   furosemide (LASIX) 40 MG tablet Take 80 mg every am and 40 mg every pm 1/6/22   Sharmila Pacheco MD   lactulose 10 gram/15 ml (CHRONULAC) 10 gram/15 mL (15 mL) solution Take 15 mLs (10 g total) by mouth daily as needed (Constipation). 10/29/21   Rashel Cohn MD   levothyroxine (SYNTHROID) 50 MCG tablet TAKE 1 TABLET(50 MCG) BY MOUTH BEFORE BREAKFAST 9/25/21   Killian Dodd DO   linaCLOtide (LINZESS) 72 mcg Cap capsule Take 2 capsules (144 mcg total) by mouth before breakfast. 11/26/21   Sharmila Pacheco MD   sodium bicarbonate 650 MG tablet Take 1 tablet (650 mg total) by mouth 2 (two) times daily.  Patient taking differently: Take 650 mg by mouth once daily. 10/2/21 3/31/22  Rashel Cohn MD   thiamine (VITAMIN B-1) 100 MG tablet Take 1 tablet (100 mg total) by mouth every evening. 10/11/21   Sharmila Pacheco MD   vitamin A 02080 UNIT capsule Take 10,000 Units by mouth every evening.      Historical Provider     Anticoagulants/Antiplatelets: no anticoagulation    Allergies: Review of patient's allergies indicates:  No Known Allergies  Sedation History:  no adverse reactions    Review of Systems:   Hematological: no known coagulopathies  Respiratory: no shortness of breath  Cardiovascular: no chest pain  Gastrointestinal: no abdominal pain  Genito-Urinary: no dysuria  Musculoskeletal: negative  Neurological: no TIA or stroke symptoms         OBJECTIVE:     Vital Signs (Most Recent)       Physical Exam:  ASA: 2  Mallampati: n/a    General: no acute distress  Mental Status: alert and oriented to person, place and time  HEENT: normocephalic, atraumatic  Chest: unlabored breathing  Heart: regular heart rate  Abdomen: distended  Extremity: moves all extremities    ASSESSMENT/PLAN:     Sedation Plan: local  Patient will undergo ultrasound guided paracentesis.    RENATA Marie, FNP  Interventional Radiology  [phone] [facility]

## 2022-01-17 LAB
ACID FAST MOD KINY STN SPEC: NORMAL
MYCOBACTERIUM SPEC QL CULT: NORMAL

## 2022-01-18 ENCOUNTER — HOSPITAL ENCOUNTER (OUTPATIENT)
Dept: INTERVENTIONAL RADIOLOGY/VASCULAR | Facility: HOSPITAL | Age: 52
Discharge: HOME | End: 2022-01-18
Attending: INTERNAL MEDICINE
Payer: COMMERCIAL

## 2022-01-18 ENCOUNTER — PATIENT MESSAGE (OUTPATIENT)
Dept: TRANSPLANT | Facility: CLINIC | Age: 52
End: 2022-01-18
Payer: COMMERCIAL

## 2022-01-18 ENCOUNTER — TELEPHONE (OUTPATIENT)
Dept: TRANSPLANT | Facility: CLINIC | Age: 52
End: 2022-01-18
Payer: COMMERCIAL

## 2022-01-18 VITALS
RESPIRATION RATE: 20 BRPM | OXYGEN SATURATION: 99 % | SYSTOLIC BLOOD PRESSURE: 89 MMHG | HEART RATE: 92 BPM | DIASTOLIC BLOOD PRESSURE: 50 MMHG

## 2022-01-18 DIAGNOSIS — R18.8 OTHER ASCITES: ICD-10-CM

## 2022-01-18 DIAGNOSIS — Z76.82 ORGAN TRANSPLANT CANDIDATE: Primary | ICD-10-CM

## 2022-01-18 PROCEDURE — 49083 ABD PARACENTESIS W/IMAGING: CPT | Mod: NTX,,, | Performed by: PHYSICIAN ASSISTANT

## 2022-01-18 PROCEDURE — C1729 CATH, DRAINAGE: HCPCS | Mod: NTX

## 2022-01-18 PROCEDURE — 63600175 PHARM REV CODE 636 W HCPCS: Mod: JG,NTX | Performed by: PHYSICIAN ASSISTANT

## 2022-01-18 PROCEDURE — 49083 ABD PARACENTESIS W/IMAGING: CPT | Mod: NTX | Performed by: RADIOLOGY

## 2022-01-18 PROCEDURE — P9047 ALBUMIN (HUMAN), 25%, 50ML: HCPCS | Mod: JG,NTX | Performed by: PHYSICIAN ASSISTANT

## 2022-01-18 PROCEDURE — 49083 IR PARACENTESIS WITH IMAGING: ICD-10-PCS | Mod: NTX,,, | Performed by: PHYSICIAN ASSISTANT

## 2022-01-18 RX ORDER — ALBUMIN HUMAN 250 G/1000ML
SOLUTION INTRAVENOUS
Status: COMPLETED | OUTPATIENT
Start: 2022-01-18 | End: 2022-01-18

## 2022-01-18 RX ADMIN — ALBUMIN (HUMAN) 50 G: 25 SOLUTION INTRAVENOUS at 09:01

## 2022-01-18 NOTE — PLAN OF CARE
Patient awake and alert, no distress noted, respirations even and unlabored. Allergies reviewed. Waiting for eval and consent.  Vitals:    01/18/22 0907   BP: (!) 103/52   Pulse: 103   Resp: (!) 22

## 2022-01-18 NOTE — H&P
Radiology History & Physical      SUBJECTIVE:     Chief Complaint: abdominal distention    History of Present Illness:  Malu Montes is a 51 y.o. female who presents for ultrasound guided paracentesis  Past Medical History:   Diagnosis Date    ADD (attention deficit disorder)     Anxiety     Ascites of liver     Cirrhosis 2021    CKD (chronic kidney disease) stage 3, GFR 30-59 ml/min     Constipation     ETOH abuse     Hyperlipidemia     Other ascites 2021    Pancreatitis, acute     Tobacco use     Vitamin D deficiency      Past Surgical History:   Procedure Laterality Date    AUGMENTATION OF BREAST      breast augmentation       SECTION      COLONOSCOPY N/A 2021    Procedure: COLONOSCOPY;  Surgeon: Dao Gray MD;  Location: [facility] (2ND FLR);  Service: Endoscopy;  Laterality: N/A;  COVID test 21 General surgery clinic [facility]    CYSTOSCOPY N/A 9/10/2021    Procedure: CYSTOSCOPY;  Surgeon: Rj Sánchez Jr., MD;  [facility];  Service: Urology;  Laterality: N/A;    ESOPHAGOGASTRODUODENOSCOPY N/A 2021    Procedure: ESOPHAGOGASTRODUODENOSCOPY (EGD);  Surgeon: Dao Gray MD;  Location: [facility] (2ND FLR);  Service: Endoscopy;  Laterality: N/A;  labs current on     ESOPHAGOGASTRODUODENOSCOPY N/A 10/26/2021    Procedure: ESOPHAGOGASTRODUODENOSCOPY (EGD);  Surgeon: Dao Gray MD;  Location: [facility] (2ND FLR);  Service: Endoscopy;  Laterality: N/A;  to be done the week of 10/18/21 per Dr. Gray-BB  cov[facility]-10[facility]23/21-[facility]-   10/25 arrival time confirmed with pt-rb    ESOPHAGOGASTRODUODENOSCOPY Left 2021    Procedure: EGD (ESOPHAGOGASTRODUODENOSCOPY);  Surgeon: Koffi Monteiro MD;  Location: [facility] (4TH FLR);  Service: Endoscopy;  Laterality: Left;  Rapid  pt requested AM appt and first available in December-  labs morning of procedure-BB   lvm to confirm appt-rb    HEMORRHOID SURGERY       PERITONEOCENTESIS Right 6/8/2021    Procedure: PARACENTESIS, ABDOMINAL;  Surgeon: Jay Torre MD;  Location: [facility] CATH LAB;  Service: Radiology;  Laterality: Right;    PERITONEOCENTESIS Right 6/25/2021    Procedure: PARACENTESIS, ABDOMINAL;  Surgeon: Jay Torre MD;  Location: [facility] CATH LAB;  Service: Radiology;  Laterality: Right;    PERITONEOCENTESIS Right 7/12/2021    Procedure: PARACENTESIS, ABDOMINAL;  Surgeon: Jay Torre MD;  Location: [facility] CATH LAB;  Service: Radiology;  Laterality: Right;    PERITONEOCENTESIS Right 7/23/2021    Procedure: PARACENTESIS, ABDOMINAL;  Surgeon: Edward De La Torre II, MD;  Location: [facility] CATH LAB;  Service: Interventional Radiology;  Laterality: Right;    PERITONEOCENTESIS Right 8/3/2021    Procedure: PARACENTESIS, ABDOMINAL;  Surgeon: Franco Cheung MD;  Location: [facility] CATH LAB;  Service: Radiology;  Laterality: Right;    PERITONEOCENTESIS Right 8/16/2021    Procedure: PARACENTESIS, ABDOMINAL;  Surgeon: Jay Torre MD;  Location: [facility] CATH LAB;  Service: Radiology;  Laterality: Right;    PERITONEOCENTESIS Right 8/23/2021    Procedure: PARACENTESIS, ABDOMINAL;  Surgeon: Jay Torre MD;  Location: [facility] CATH LAB;  Service: Radiology;  Laterality: Right;    PERITONEOCENTESIS Right 9/16/2021    Procedure: PARACENTESIS, ABDOMINAL;  Surgeon: Jay Torre MD;  Location: [facility] CATH LAB;  Service: Radiology;  Laterality: Right;    PERITONEOCENTESIS N/A 9/24/2021    Procedure: PARACENTESIS, ABDOMINAL;  Surgeon: Jay Torre MD;  Location: [facility] CATH LAB;  Service: Radiology;  Laterality: N/A;    PERITONEOCENTESIS Right 12/23/2021    Procedure: PARACENTESIS, ABDOMINAL;  Surgeon: Jay Torre MD;  Location: [facility] CATH LAB;  Service: Radiology;  Laterality: Right;    PERITONEOCENTESIS N/A 12/30/2021    Procedure: PARACENTESIS, ABDOMINAL;  Surgeon: Salas Cabrera MD;  Location: [facility] CATH LAB;  Service: Radiology;  Laterality:  N/A;    RETROGRADE PYELOGRAPHY Bilateral 9/10/2021    Procedure: PYELOGRAM, RETROGRADE;  Surgeon: Rj Sánchez Jr., MD;  Location: [facility] OR [address] [place] [address];  Service: Urology;  Laterality: Bilateral;    TONSILLECTOMY         Home Meds:   Prior to Admission medications    Medication Sig Start Date End Date Taking? Authorizing Provider   allopurinoL (ZYLOPRIM) 100 MG tablet Take 1 tablet (100 mg total) by mouth once daily. 11/12/21   Rashel Cohn MD   bisacodyL (DULCOLAX) 5 mg EC tablet Take 5 mg by mouth daily as needed for Constipation.    Historical Provider   ciprofloxacin HCl (CIPRO) 250 MG tablet Take 1 tablet (250 mg total) by mouth once daily.  Patient taking differently: Take 250 mg by mouth every other day. 12/23/21   Rashel Cohn MD   ergocalciferol (ERGOCALCIFEROL) 50,000 unit Cap Take 1 capsule (50,000 Units total) by mouth every 7 days. 10/2/21   Rashel Cohn MD   folic acid (FOLVITE) 1 MG tablet Take 2 tablets (2 mg total) by mouth once daily. 12/14/21 3/14/22  Uma Perry MD   furosemide (LASIX) 40 MG tablet Take 80 mg every am and 40 mg every pm 1/6/22   Sharmila Pacheco MD   lactulose 10 gram/15 ml (CHRONULAC) 10 gram/15 mL (15 mL) solution Take 15 mLs (10 g total) by mouth daily as needed (Constipation). 10/29/21   Rashel Cohn MD   levothyroxine (SYNTHROID) 50 MCG tablet TAKE 1 TABLET(50 MCG) BY MOUTH BEFORE BREAKFAST 9/25/21   Killian Dodd DO   linaCLOtide (LINZESS) 72 mcg Cap capsule Take 2 capsules (144 mcg total) by mouth before breakfast. 11/26/21   Sharmila Pacheco MD   sodium bicarbonate 650 MG tablet Take 1 tablet (650 mg total) by mouth 2 (two) times daily.  Patient taking differently: Take 650 mg by mouth once daily. 10/2/21 3/31/22  Rashel Cohn MD   thiamine (VITAMIN B-1) 100 MG tablet Take 1 tablet (100 mg total) by mouth every evening. 10/11/21   Sharmila Pacheco MD   vitamin A 27084 UNIT capsule Take 10,000 Units by mouth every evening.      Historical Provider     Anticoagulants/Antiplatelets: no anticoagulation    Allergies: Review of patient's allergies indicates:  No Known Allergies  Sedation History:  no adverse reactions    Review of Systems:   Hematological: no known coagulopathies  Respiratory: no shortness of breath  Cardiovascular: no chest pain  Gastrointestinal: no abdominal pain  Genito-Urinary: no dysuria  Musculoskeletal: negative  Neurological: no TIA or stroke symptoms         OBJECTIVE:     Vital Signs (Most Recent)       Physical Exam:  ASA: 2  Mallampati: n/a    General: no acute distress  Mental Status: alert and oriented to person, place and time  HEENT: normocephalic, atraumatic  Chest: unlabored breathing  Heart: regular heart rate  Abdomen: nondistended  Extremity: moves all extremities    ASSESSMENT/PLAN:     Sedation Plan: local  Patient will undergo ultrasound guided paracentesis.    Kay Arnold PA-C  Interventional Radiology  Clinic [phone]

## 2022-01-18 NOTE — PROCEDURES
Radiology Post-Procedure Note    Pre Op Diagnosis: Ascites  Post Op Diagnosis: Same    Procedure: Ultrasound Guided Paracentesis    Procedure performed by: Kay Arnold PA-C    Written Informed Consent Obtained: Yes  Specimen Removed: YES clear, yellow  Estimated Blood Loss: Minimal    Findings:   Successful paracentesis. RLQ.  Albumin administered PRN per protocol.    Patient tolerated procedure well.    Kay Arnold PA-C  Interventional Radiology  Clinic [phone]

## 2022-01-18 NOTE — TELEPHONE ENCOUNTER
Pt called this morning about a lab being add to Her labs talked with Francesca she's going to check and see if the lab is need and will add if so.      ----- Message from Malu Wyman sent at 1/18/2022  9:36 AM CST -----  Regarding: Labs  Contact: Malu Zavala called inquiring about a letter she received for labs today.     Contact: [phone]

## 2022-01-20 ENCOUNTER — PATIENT MESSAGE (OUTPATIENT)
Dept: NEPHROLOGY | Facility: CLINIC | Age: 52
End: 2022-01-20
Payer: COMMERCIAL

## 2022-01-21 ENCOUNTER — INFUSION (OUTPATIENT)
Dept: INFECTIOUS DISEASES | Facility: HOSPITAL | Age: 52
End: 2022-01-21
Attending: INTERNAL MEDICINE
Payer: COMMERCIAL

## 2022-01-21 ENCOUNTER — HOSPITAL ENCOUNTER (OUTPATIENT)
Dept: INTERVENTIONAL RADIOLOGY/VASCULAR | Facility: HOSPITAL | Age: 52
Discharge: HOME | End: 2022-01-21
Attending: INTERNAL MEDICINE | Admitting: INTERNAL MEDICINE
Payer: COMMERCIAL

## 2022-01-21 VITALS
RESPIRATION RATE: 20 BRPM | WEIGHT: 116.88 LBS | SYSTOLIC BLOOD PRESSURE: 100 MMHG | DIASTOLIC BLOOD PRESSURE: 51 MMHG | BODY MASS INDEX: 20.71 KG/M2 | OXYGEN SATURATION: 100 % | HEIGHT: 63 IN | HEART RATE: 108 BPM | TEMPERATURE: 98 F

## 2022-01-21 VITALS
DIASTOLIC BLOOD PRESSURE: 62 MMHG | RESPIRATION RATE: 16 BRPM | SYSTOLIC BLOOD PRESSURE: 96 MMHG | OXYGEN SATURATION: 100 % | HEART RATE: 89 BPM

## 2022-01-21 DIAGNOSIS — D50.8 OTHER IRON DEFICIENCY ANEMIA: Primary | ICD-10-CM

## 2022-01-21 DIAGNOSIS — R18.8 OTHER ASCITES: ICD-10-CM

## 2022-01-21 PROCEDURE — 49083 ABD PARACENTESIS W/IMAGING: CPT | Mod: TXP | Performed by: STUDENT IN AN ORGANIZED HEALTH CARE EDUCATION/TRAINING PROGRAM

## 2022-01-21 PROCEDURE — 63600175 PHARM REV CODE 636 W HCPCS: Mod: NTX | Performed by: INTERNAL MEDICINE

## 2022-01-21 PROCEDURE — 49083 ABD PARACENTESIS W/IMAGING: CPT | Mod: TXP | Performed by: RADIOLOGY

## 2022-01-21 PROCEDURE — 49083 IR US ABDOMEN LIMITED: ICD-10-PCS | Mod: TXP,,, | Performed by: STUDENT IN AN ORGANIZED HEALTH CARE EDUCATION/TRAINING PROGRAM

## 2022-01-21 PROCEDURE — 96365 THER/PROPH/DIAG IV INF INIT: CPT | Mod: NTX

## 2022-01-21 PROCEDURE — 25000003 PHARM REV CODE 250: Mod: NTX | Performed by: INTERNAL MEDICINE

## 2022-01-21 PROCEDURE — C1729 CATH, DRAINAGE: HCPCS | Mod: TXP

## 2022-01-21 RX ORDER — SODIUM CHLORIDE 0.9 % (FLUSH) 0.9 %
10 SYRINGE (ML) INJECTION
Status: CANCELLED | OUTPATIENT
Start: 2022-01-28

## 2022-01-21 RX ORDER — HEPARIN 100 UNIT/ML
500 SYRINGE INTRAVENOUS
Status: CANCELLED | OUTPATIENT
Start: 2022-01-28

## 2022-01-21 RX ORDER — SODIUM CHLORIDE 0.9 % (FLUSH) 0.9 %
10 SYRINGE (ML) INJECTION
Status: DISCONTINUED | OUTPATIENT
Start: 2022-01-21 | End: 2022-01-21 | Stop reason: HOSPADM

## 2022-01-21 RX ADMIN — SODIUM CHLORIDE: 0.9 INJECTION, SOLUTION INTRAVENOUS at 11:01

## 2022-01-21 RX ADMIN — IRON SUCROSE 200 MG: 20 INJECTION, SOLUTION INTRAVENOUS at 12:01

## 2022-01-21 NOTE — SEDATION DOCUMENTATION
Para complete. Pt tolerated the procedure well. 4.1 L removed. Site CDI. VS stable. Pt discharged to family.

## 2022-01-21 NOTE — H&P
Radiology History & Physical      SUBJECTIVE:     Chief Complaint: abdominal distention    History of Present Illness:  Malu Montes is a 51 y.o. female who presents for ultrasound guided paracentesis  Past Medical History:   Diagnosis Date    ADD (attention deficit disorder)     Anxiety     Ascites of liver     Cirrhosis 2021    CKD (chronic kidney disease) stage 3, GFR 30-59 ml/min     Constipation     ETOH abuse     Hyperlipidemia     Other ascites 2021    Pancreatitis, acute     Tobacco use     Vitamin D deficiency      Past Surgical History:   Procedure Laterality Date    AUGMENTATION OF BREAST      breast augmentation       SECTION      COLONOSCOPY N/A 2021    Procedure: COLONOSCOPY;  Surgeon: Dao Gray MD;  Location: [facility] (2ND FLR);  Service: Endoscopy;  Laterality: N/A;  COVID test 21 General surgery clinic [facility]    CYSTOSCOPY N/A 9/10/2021    Procedure: CYSTOSCOPY;  Surgeon: Rj Sánchez Jr., MD;  [facility];  Service: Urology;  Laterality: N/A;    ESOPHAGOGASTRODUODENOSCOPY N/A 2021    Procedure: ESOPHAGOGASTRODUODENOSCOPY (EGD);  Surgeon: Dao Gray MD;  Location: [facility] (2ND FLR);  Service: Endoscopy;  Laterality: N/A;  labs current on     ESOPHAGOGASTRODUODENOSCOPY N/A 10/26/2021    Procedure: ESOPHAGOGASTRODUODENOSCOPY (EGD);  Surgeon: Dao Gray MD;  Location: [facility] (2ND FLR);  Service: Endoscopy;  Laterality: N/A;  to be done the week of 10/18/21 per Dr. Gray-BB  covid-10/23/21-[facility]-   10/25 arrival time confirmed with pt-rb    ESOPHAGOGASTRODUODENOSCOPY Left 2021    Procedure: EGD (ESOPHAGOGASTRODUODENOSCOPY);  Surgeon: Kfofi Monteiro MD;  Location: [facility] (4TH FLR);  Service: Endoscopy;  Laterality: Left;  Rapid  pt requested AM appt and first available in December-  labs morning of procedure-BB   lvm to confirm appt-rb    HEMORRHOID SURGERY       PERITONEOCENTESIS Right 6/8/2021    Procedure: PARACENTESIS, ABDOMINAL;  Surgeon: Jay Torre MD;  Location: [facility] CATH LAB;  Service: Radiology;  Laterality: Right;    PERITONEOCENTESIS Right 6/25/2021    Procedure: PARACENTESIS, ABDOMINAL;  Surgeon: Jay Torre MD;  Location: [facility] CATH LAB;  Service: Radiology;  Laterality: Right;    PERITONEOCENTESIS Right 7/12/2021    Procedure: PARACENTESIS, ABDOMINAL;  Surgeon: Jay Torre MD;  Location: [facility] CATH LAB;  Service: Radiology;  Laterality: Right;    PERITONEOCENTESIS Right 7/23/2021    Procedure: PARACENTESIS, ABDOMINAL;  Surgeon: Edward De La Torre II, MD;  Location: [facility] CATH LAB;  Service: Interventional Radiology;  Laterality: Right;    PERITONEOCENTESIS Right 8/3/2021    Procedure: PARACENTESIS, ABDOMINAL;  Surgeon: Franco Cheung MD;  Location: [facility] CATH LAB;  Service: Radiology;  Laterality: Right;    PERITONEOCENTESIS Right 8/16/2021    Procedure: PARACENTESIS, ABDOMINAL;  Surgeon: Jay Torre MD;  Location: [facility] CATH LAB;  Service: Radiology;  Laterality: Right;    PERITONEOCENTESIS Right 8/23/2021    Procedure: PARACENTESIS, ABDOMINAL;  Surgeon: Jay Torre MD;  Location: [facility] CATH LAB;  Service: Radiology;  Laterality: Right;    PERITONEOCENTESIS Right 9/16/2021    Procedure: PARACENTESIS, ABDOMINAL;  Surgeon: Jay Torre MD;  Location: [facility] CATH LAB;  Service: Radiology;  Laterality: Right;    PERITONEOCENTESIS N/A 9/24/2021    Procedure: PARACENTESIS, ABDOMINAL;  Surgeon: Jay Torre MD;  Location: [facility] CATH LAB;  Service: Radiology;  Laterality: N/A;    PERITONEOCENTESIS Right 12/23/2021    Procedure: PARACENTESIS, ABDOMINAL;  Surgeon: Jay Torre MD;  Location: [facility] CATH LAB;  Service: Radiology;  Laterality: Right;    PERITONEOCENTESIS N/A 12/30/2021    Procedure: PARACENTESIS, ABDOMINAL;  Surgeon: Salas Cabrera MD;  Location: [facility] CATH LAB;  Service: Radiology;  Laterality:  N/A;    RETROGRADE PYELOGRAPHY Bilateral 9/10/2021    Procedure: PYELOGRAM, RETROGRADE;  Surgeon: Rj Sánchez Jr., MD;  Location: [facility] OR [address] [place] [address];  Service: Urology;  Laterality: Bilateral;    TONSILLECTOMY         Home Meds:   Prior to Admission medications    Medication Sig Start Date End Date Taking? Authorizing Provider   allopurinoL (ZYLOPRIM) 100 MG tablet Take 1 tablet (100 mg total) by mouth once daily. 11/12/21   aRshel Cohn MD   bisacodyL (DULCOLAX) 5 mg EC tablet Take 5 mg by mouth daily as needed for Constipation.    Historical Provider   ciprofloxacin HCl (CIPRO) 250 MG tablet Take 1 tablet (250 mg total) by mouth once daily.  Patient taking differently: Take 250 mg by mouth every other day. 12/23/21   Rashel Cohn MD   ergocalciferol (ERGOCALCIFEROL) 50,000 unit Cap Take 1 capsule (50,000 Units total) by mouth every 7 days. 10/2/21   Rashel Cohn MD   folic acid (FOLVITE) 1 MG tablet Take 2 tablets (2 mg total) by mouth once daily. 12/14/21 3/14/22  Uma Perry MD   furosemide (LASIX) 40 MG tablet Take 80 mg every am and 40 mg every pm 1/6/22   Sharmila Pacheco MD   lactulose 10 gram/15 ml (CHRONULAC) 10 gram/15 mL (15 mL) solution Take 15 mLs (10 g total) by mouth daily as needed (Constipation). 10/29/21   Rashel Cohn MD   levothyroxine (SYNTHROID) 50 MCG tablet TAKE 1 TABLET(50 MCG) BY MOUTH BEFORE BREAKFAST 9/25/21   Killian Dodd DO   linaCLOtide (LINZESS) 72 mcg Cap capsule Take 2 capsules (144 mcg total) by mouth before breakfast. 11/26/21   Sharmila Pacheco MD   sodium bicarbonate 650 MG tablet Take 1 tablet (650 mg total) by mouth 2 (two) times daily.  Patient taking differently: Take 650 mg by mouth once daily. 10/2/21 3/31/22  Rashel Cohn MD   thiamine (VITAMIN B-1) 100 MG tablet Take 1 tablet (100 mg total) by mouth every evening. 10/11/21   Sharmila Pacheco MD   vitamin A 49446 UNIT capsule Take 10,000 Units by mouth every evening.      Historical Provider     Anticoagulants/Antiplatelets: no anticoagulation    Allergies: Review of patient's allergies indicates:  No Known Allergies  Sedation History:  no adverse reactions    Review of Systems:   Hematological: no known coagulopathies  Respiratory: no shortness of breath  Cardiovascular: no chest pain  Gastrointestinal: no abdominal pain  Genito-Urinary: no dysuria  Musculoskeletal: negative  Neurological: no TIA or stroke symptoms         OBJECTIVE:     Vital Signs (Most Recent)  Pulse: 95 (01/21/22 0830)  Resp: 18 (01/21/22 0830)  BP: 107/61 (01/21/22 0830)  SpO2: 100 % (01/21/22 0830)    Physical Exam:  ASA: 2  Mallampati: n/a    General: no acute distress  Mental Status: alert and oriented to person, place and time  HEENT: normocephalic, atraumatic  Chest: unlabored breathing  Heart: regular heart rate  Abdomen: nondistended  Extremity: moves all extremities    ASSESSMENT/PLAN:     Sedation Plan: local  Patient will undergo ultrasound guided paracentesis.    Kay Arnold PA-C  Interventional Radiology  Clinic [phone]

## 2022-01-21 NOTE — PROGRESS NOTES
Arrived per ambulation to Ambulatory Infusion suite  for 1/5 Venofer 200 mg infusion.  Tolerated infusion without signs of adverse reactions observed for 1 hour after infusion completed.      Left unit in NAD next appointment made.

## 2022-01-21 NOTE — PROCEDURES
Radiology Post-Procedure Note    Pre Op Diagnosis: Ascites  Post Op Diagnosis: Same    Procedure: Ultrasound Guided Paracentesis    Procedure performed by: Kay Arnold PA-C    Written Informed Consent Obtained: Yes  Specimen Removed: YES clear, yellow  Estimated Blood Loss: Minimal    Findings:   Successful paracentesis.  LLQ. Albumin administered PRN per protocol.    Patient tolerated procedure well.    Kay Arnold PA-C  Interventional Radiology  Clinic [phone]

## 2022-01-21 NOTE — PLAN OF CARE
Pt arrived to [facility] bay 2 for outpt bay 2. Pt in no acute distress, placed on monitor, VSS. Awaiting consent.

## 2022-01-24 DIAGNOSIS — Z76.82 ORGAN TRANSPLANT CANDIDATE: Primary | ICD-10-CM

## 2022-01-25 ENCOUNTER — HOSPITAL ENCOUNTER (OUTPATIENT)
Dept: INTERVENTIONAL RADIOLOGY/VASCULAR | Facility: HOSPITAL | Age: 52
Discharge: HOME | End: 2022-01-25
Attending: INTERNAL MEDICINE
Payer: COMMERCIAL

## 2022-01-25 ENCOUNTER — PATIENT MESSAGE (OUTPATIENT)
Dept: HEPATOLOGY | Facility: CLINIC | Age: 52
End: 2022-01-25
Payer: COMMERCIAL

## 2022-01-25 VITALS
OXYGEN SATURATION: 100 % | DIASTOLIC BLOOD PRESSURE: 50 MMHG | RESPIRATION RATE: 18 BRPM | SYSTOLIC BLOOD PRESSURE: 91 MMHG | HEART RATE: 88 BPM

## 2022-01-25 DIAGNOSIS — R18.8 OTHER ASCITES: ICD-10-CM

## 2022-01-25 LAB
ALBUMIN FLD-MCNC: 0.9 G/DL
APPEARANCE FLD: CLEAR
BASOPHILS NFR FLD MANUAL: 1 %
BODY FLD TYPE: NORMAL
COLOR FLD: YELLOW
EOSINOPHIL NFR FLD MANUAL: 2 %
LYMPHOCYTES NFR FLD MANUAL: 80 %
MESOTHL CELL NFR FLD MANUAL: 7 %
MONOS+MACROS NFR FLD MANUAL: 8 %
NEUTROPHILS NFR FLD MANUAL: 2 %
PROT FLD-MCNC: 1.3 G/DL
SPECIMEN SOURCE: NORMAL
SPECIMEN SOURCE: NORMAL
WBC # FLD: 56 /CU MM

## 2022-01-25 PROCEDURE — 63600175 PHARM REV CODE 636 W HCPCS: Mod: JG,TXP

## 2022-01-25 PROCEDURE — P9047 ALBUMIN (HUMAN), 25%, 50ML: HCPCS | Mod: JG,TXP

## 2022-01-25 PROCEDURE — 49083 ABD PARACENTESIS W/IMAGING: CPT | Mod: TXP | Performed by: RADIOLOGY

## 2022-01-25 PROCEDURE — 84157 ASSAY OF PROTEIN OTHER: CPT | Mod: TXP | Performed by: INTERNAL MEDICINE

## 2022-01-25 PROCEDURE — 49083 IR PARACENTESIS WITH IMAGING: ICD-10-PCS | Mod: TXP,,, | Performed by: PHYSICIAN ASSISTANT

## 2022-01-25 PROCEDURE — 89051 BODY FLUID CELL COUNT: CPT | Mod: TXP | Performed by: INTERNAL MEDICINE

## 2022-01-25 PROCEDURE — C1729 CATH, DRAINAGE: HCPCS | Mod: TXP

## 2022-01-25 PROCEDURE — 82042 OTHER SOURCE ALBUMIN QUAN EA: CPT | Mod: TXP | Performed by: INTERNAL MEDICINE

## 2022-01-25 PROCEDURE — 49083 ABD PARACENTESIS W/IMAGING: CPT | Mod: TXP,,, | Performed by: PHYSICIAN ASSISTANT

## 2022-01-25 RX ORDER — ALBUMIN HUMAN 250 G/1000ML
50 SOLUTION INTRAVENOUS ONCE
Status: COMPLETED | OUTPATIENT
Start: 2022-01-25 | End: 2022-01-25

## 2022-01-25 RX ORDER — ALBUMIN HUMAN 250 G/1000ML
SOLUTION INTRAVENOUS
Status: COMPLETED
Start: 2022-01-25 | End: 2022-01-25

## 2022-01-25 RX ADMIN — ALBUMIN HUMAN 50 G: 250 SOLUTION INTRAVENOUS at 11:01

## 2022-01-25 RX ADMIN — ALBUMIN HUMAN 50 G: 0.25 SOLUTION INTRAVENOUS at 11:01

## 2022-01-25 NOTE — PROCEDURES
Radiology Post-Procedure Note    Pre Op Diagnosis: Ascites  Post Op Diagnosis: Same    Procedure: Ultrasound Guided Paracentesis    Procedure performed by: Kay Arnold PA-C    Written Informed Consent Obtained: Yes  Specimen Removed: YES clear, yellow  Estimated Blood Loss: Minimal    Findings:   Successful paracentesis.  RLQ. Albumin administered PRN per protocol.    Patient tolerated procedure well.    Kay Arnold PA-C  Interventional Radiology  Clinic [phone]

## 2022-01-25 NOTE — H&P
Radiology History & Physical      SUBJECTIVE:     Chief Complaint: abdominal distention    History of Present Illness:  Malu Montes is a 51 y.o. female who presents for ultrasound guided paracentesis  Past Medical History:   Diagnosis Date    ADD (attention deficit disorder)     Anxiety     Ascites of liver     Cirrhosis 2021    CKD (chronic kidney disease) stage 3, GFR 30-59 ml/min     Constipation     ETOH abuse     Hyperlipidemia     Other ascites 2021    Pancreatitis, acute     Tobacco use     Vitamin D deficiency      Past Surgical History:   Procedure Laterality Date    AUGMENTATION OF BREAST      breast augmentation       SECTION      COLONOSCOPY N/A 2021    Procedure: COLONOSCOPY;  Surgeon: Dao Gray MD;  Location: [facility] (2ND FLR);  Service: Endoscopy;  Laterality: N/A;  COVID test 21 General surgery clinic [facility]    CYSTOSCOPY N/A 9/10/2021    Procedure: CYSTOSCOPY;  Surgeon: Rj Sánchez Jr., MD;  [facility];  Service: Urology;  Laterality: N/A;    ESOPHAGOGASTRODUODENOSCOPY N/A 2021    Procedure: ESOPHAGOGASTRODUODENOSCOPY (EGD);  Surgeon: Dao Gray MD;  Location: [facility] (2ND FLR);  Service: Endoscopy;  Laterality: N/A;  labs current on     ESOPHAGOGASTRODUODENOSCOPY N/A 10/26/2021    Procedure: ESOPHAGOGASTRODUODENOSCOPY (EGD);  Surgeon: Dao Gray MD;  Location: [facility] (2ND FLR);  Service: Endoscopy;  Laterality: N/A;  to be done the week of 10/18/21 per Dr. Gray-BB  covid-10/23/21-[facility]-   10/25 arrival time confirmed with pt-rb    ESOPHAGOGASTRODUODENOSCOPY Left 2021    Procedure: EGD (ESOPHAGOGASTRODUODENOSCOPY);  Surgeon: Koffi Monteiro MD;  Location: [facility] (4TH FLR);  Service: Endoscopy;  Laterality: Left;  Rapid  pt requested AM appt and first available in December-  labs morning of procedure-BB   lvm to confirm appt-rb    HEMORRHOID SURGERY       PERITONEOCENTESIS Right 6/8/2021    Procedure: PARACENTESIS, ABDOMINAL;  Surgeon: Jay Torre MD;  Location: [facility] [place] CATH LAB;  Service: Radiology;  Laterality: Right;    PERITONEOCENTESIS Right 6/25/2021    Procedure: PARACENTESIS, ABDOMINAL;  Surgeon: Jay Torre MD;  Location: [facility] [place] CATH LAB;  Service: Radiology;  Laterality: Right;    PERITONEOCENTESIS Right 7/12/2021    Procedure: PARACENTESIS, ABDOMINAL;  Surgeon: Jay Torre MD;  Location: [facility] [place] CATH LAB;  Service: Radiology;  Laterality: Right;    PERITONEOCENTESIS Right 7/23/2021    Procedure: PARACENTESIS, ABDOMINAL;  Surgeon: Edward De La Torre II, MD;  Location: [facility] [place] CATH LAB;  Service: Interventional Radiology;  Laterality: Right;    PERITONEOCENTESIS Right 8/3/2021    Procedure: PARACENTESIS, ABDOMINAL;  Surgeon: Franco Cheung MD;  Location: [facility] [place] CATH LAB;  Service: Radiology;  Laterality: Right;    PERITONEOCENTESIS Right 8/16/2021    Procedure: PARACENTESIS, ABDOMINAL;  Surgeon: Jay Torre MD;  Location: [facility] [place] CATH LAB;  Service: Radiology;  Laterality: Right;    PERITONEOCENTESIS Right 8/23/2021    Procedure: PARACENTESIS, ABDOMINAL;  Surgeon: Jay Torre MD;  Location: [facility] [place] CATH LAB;  Service: Radiology;  Laterality: Right;    PERITONEOCENTESIS Right 9/16/2021    Procedure: PARACENTESIS, ABDOMINAL;  Surgeon: Jay Torre MD;  Location: [facility] [place] CATH LAB;  Service: Radiology;  Laterality: Right;    PERITONEOCENTESIS N/A 9/24/2021    Procedure: PARACENTESIS, ABDOMINAL;  Surgeon: Jay Torre MD;  Location: [facility] [place] CATH LAB;  Service: Radiology;  Laterality: N/A;    PERITONEOCENTESIS Right 12/23/2021    Procedure: PARACENTESIS, ABDOMINAL;  Surgeon: Jay Torre MD;  Location: [facility] [place] CATH LAB;  Service: Radiology;  Laterality: Right;    PERITONEOCENTESIS N/A 12/30/2021    Procedure: PARACENTESIS, ABDOMINAL;  Surgeon: Salas Cabrera MD;  Location: [facility] [place] CATH LAB;  Service: Radiology;  Laterality:  N/A;    RETROGRADE PYELOGRAPHY Bilateral 9/10/2021    Procedure: PYELOGRAM, RETROGRADE;  Surgeon: Rj Sánchez Jr., MD;  [facility] [address] [place] [address];  Service: Urology;  Laterality: Bilateral;    TONSILLECTOMY         Home Meds:   Prior to Admission medications    Medication Sig Start Date End Date Taking? Authorizing Provider   allopurinoL (ZYLOPRIM) 100 MG tablet Take 1 tablet (100 mg total) by mouth once daily. 11/12/21   Rashel Cohn MD   bisacodyL (DULCOLAX) 5 mg EC tablet Take 5 mg by mouth daily as needed for Constipation.    Historical Provider   ciprofloxacin HCl (CIPRO) 250 MG tablet Take 1 tablet (250 mg total) by mouth once daily.  Patient taking differently: Take 250 mg by mouth every other day. 12/23/21   Rashel Cohn MD   ergocalciferol (ERGOCALCIFEROL) 50,000 unit Cap Take 1 capsule (50,000 Units total) by mouth every 7 days. 10/2/21   Rashel Cohn MD   folic acid (FOLVITE) 1 MG tablet Take 2 tablets (2 mg total) by mouth once daily. 12/14/21 3/14/22  Uma Perry MD   furosemide (LASIX) 40 MG tablet Take 80 mg every am and 40 mg every pm 1/6/22   Sharmila Pacheco MD   lactulose 10 gram/15 ml (CHRONULAC) 10 gram/15 mL (15 mL) solution Take 15 mLs (10 g total) by mouth daily as needed (Constipation). 10/29/21   Rashel Cohn MD   levothyroxine (SYNTHROID) 50 MCG tablet TAKE 1 TABLET(50 MCG) BY MOUTH BEFORE BREAKFAST 9/25/21   Killian Dodd DO   linaCLOtide (LINZESS) 72 mcg Cap capsule Take 2 capsules (144 mcg total) by mouth before breakfast. 11/26/21   Sharmila Pacheco MD   sodium bicarbonate 650 MG tablet Take 1 tablet (650 mg total) by mouth 2 (two) times daily.  Patient taking differently: Take 650 mg by mouth once daily. 10/2/21 3/31/22  Rashel Cohn MD   thiamine (VITAMIN B-1) 100 MG tablet Take 1 tablet (100 mg total) by mouth every evening. 10/11/21   Sharmila Pacheco MD   vitamin A 30714 UNIT capsule Take 10,000 Units by mouth every evening.      Historical Provider     Anticoagulants/Antiplatelets: no anticoagulation    Allergies: Review of patient's allergies indicates:  No Known Allergies  Sedation History:  no adverse reactions    Review of Systems:   Hematological: no known coagulopathies  Respiratory: no shortness of breath  Cardiovascular: no chest pain  Gastrointestinal: no abdominal pain  Genito-Urinary: no dysuria  Musculoskeletal: negative  Neurological: no TIA or stroke symptoms         OBJECTIVE:     Vital Signs (Most Recent)       Physical Exam:  ASA: 2  Mallampati: n/a    General: no acute distress  Mental Status: alert and oriented to person, place and time  HEENT: normocephalic, atraumatic  Chest: unlabored breathing  Heart: regular heart rate  Abdomen: distended  Extremity: moves all extremities    ASSESSMENT/PLAN:     Sedation Plan: local  Patient will undergo ultrasound guided paracentesis.    Kay Arnold PA-C  Interventional Radiology  Clinic [phone]

## 2022-01-27 DIAGNOSIS — R18.8 OTHER ASCITES: Primary | ICD-10-CM

## 2022-01-28 ENCOUNTER — INFUSION (OUTPATIENT)
Dept: INFECTIOUS DISEASES | Facility: HOSPITAL | Age: 52
End: 2022-01-28
Attending: INTERNAL MEDICINE
Payer: COMMERCIAL

## 2022-01-28 ENCOUNTER — HOSPITAL ENCOUNTER (OUTPATIENT)
Dept: INTERVENTIONAL RADIOLOGY/VASCULAR | Facility: HOSPITAL | Age: 52
Discharge: HOME | End: 2022-01-28
Attending: INTERNAL MEDICINE | Admitting: INTERNAL MEDICINE
Payer: COMMERCIAL

## 2022-01-28 VITALS
WEIGHT: 117.06 LBS | SYSTOLIC BLOOD PRESSURE: 99 MMHG | DIASTOLIC BLOOD PRESSURE: 56 MMHG | RESPIRATION RATE: 18 BRPM | BODY MASS INDEX: 20.74 KG/M2 | TEMPERATURE: 98 F | HEART RATE: 115 BPM | OXYGEN SATURATION: 99 %

## 2022-01-28 DIAGNOSIS — R18.8 OTHER ASCITES: ICD-10-CM

## 2022-01-28 DIAGNOSIS — D50.8 OTHER IRON DEFICIENCY ANEMIA: Primary | ICD-10-CM

## 2022-01-28 LAB
APPEARANCE FLD: CLEAR
BODY FLD TYPE: NORMAL
COLOR FLD: YELLOW
LYMPHOCYTES NFR FLD MANUAL: 57 %
MESOTHL CELL NFR FLD MANUAL: 3 %
MONOS+MACROS NFR FLD MANUAL: 30 %
NEUTROPHILS NFR FLD MANUAL: 10 %
WBC # FLD: 90 /CU MM

## 2022-01-28 PROCEDURE — P9047 ALBUMIN (HUMAN), 25%, 50ML: HCPCS | Mod: JG,NTX

## 2022-01-28 PROCEDURE — 49083 ABD PARACENTESIS W/IMAGING: CPT | Mod: TXP | Performed by: RADIOLOGY

## 2022-01-28 PROCEDURE — C1729 CATH, DRAINAGE: HCPCS | Mod: NTX

## 2022-01-28 PROCEDURE — 63600175 PHARM REV CODE 636 W HCPCS: Mod: JG,NTX

## 2022-01-28 PROCEDURE — 25000003 PHARM REV CODE 250: Mod: NTX | Performed by: INTERNAL MEDICINE

## 2022-01-28 PROCEDURE — 49083 ABD PARACENTESIS W/IMAGING: CPT | Mod: NTX,,, | Performed by: PHYSICIAN ASSISTANT

## 2022-01-28 PROCEDURE — 89051 BODY FLUID CELL COUNT: CPT | Mod: NTX | Performed by: INTERNAL MEDICINE

## 2022-01-28 PROCEDURE — 49083 IR US ABDOMEN LIMITED: ICD-10-PCS | Mod: NTX,,, | Performed by: PHYSICIAN ASSISTANT

## 2022-01-28 PROCEDURE — 96365 THER/PROPH/DIAG IV INF INIT: CPT | Mod: TXP

## 2022-01-28 PROCEDURE — 63600175 PHARM REV CODE 636 W HCPCS: Mod: NTX | Performed by: INTERNAL MEDICINE

## 2022-01-28 RX ORDER — ALBUMIN HUMAN 250 G/1000ML
SOLUTION INTRAVENOUS
Status: COMPLETED
Start: 2022-01-28 | End: 2022-01-28

## 2022-01-28 RX ORDER — HEPARIN 100 UNIT/ML
500 SYRINGE INTRAVENOUS
Status: CANCELLED | OUTPATIENT
Start: 2022-02-04

## 2022-01-28 RX ORDER — SODIUM CHLORIDE 0.9 % (FLUSH) 0.9 %
10 SYRINGE (ML) INJECTION
Status: DISCONTINUED | OUTPATIENT
Start: 2022-01-28 | End: 2022-01-28 | Stop reason: HOSPADM

## 2022-01-28 RX ORDER — SODIUM CHLORIDE 0.9 % (FLUSH) 0.9 %
10 SYRINGE (ML) INJECTION
Status: CANCELLED | OUTPATIENT
Start: 2022-02-04

## 2022-01-28 RX ADMIN — ALBUMIN (HUMAN) 12.5 G: 12.5 SOLUTION INTRAVENOUS at 10:01

## 2022-01-28 RX ADMIN — SODIUM CHLORIDE: 0.9 INJECTION, SOLUTION INTRAVENOUS at 11:01

## 2022-01-28 RX ADMIN — ALBUMIN HUMAN 25 G: 0.25 SOLUTION INTRAVENOUS at 10:01

## 2022-01-28 RX ADMIN — IRON SUCROSE 200 MG: 20 INJECTION, SOLUTION INTRAVENOUS at 11:01

## 2022-01-28 NOTE — PROGRESS NOTES
Arrived per ambulation to Ambulatory Infusion suite  for 2/5 Venofer 200 mg infusion.  Tolerated infusion without signs of adverse reactions observed for 30 min after infusion completed.      Left unit in NAD next appointment made.

## 2022-01-28 NOTE — PROCEDURES
Radiology Post-Procedure Note    Pre Op Diagnosis: Ascites  Post Op Diagnosis: Same    Procedure: Ultrasound Guided Paracentesis    Procedure performed by: Kay Arnold PA-C    Written Informed Consent Obtained: Yes  Specimen Removed: YES clear, yellow  Estimated Blood Loss: Minimal    Findings:   Successful paracentesis.  RLQ. Albumin administered PRN per protocol.    Patient tolerated procedure well.    Kay Arnold PA-C  Interventional Radiology  Clinic [phone]

## 2022-01-28 NOTE — H&P
Radiology History & Physical      SUBJECTIVE:     Chief Complaint: abdominal distention    History of Present Illness:  Malu Montes is a 51 y.o. female who presents for ultrasound guided paracentesis  Past Medical History:   Diagnosis Date    ADD (attention deficit disorder)     Anxiety     Ascites of liver     Cirrhosis 2021    CKD (chronic kidney disease) stage 3, GFR 30-59 ml/min     Constipation     ETOH abuse     Hyperlipidemia     Other ascites 2021    Pancreatitis, acute     Tobacco use     Vitamin D deficiency      Past Surgical History:   Procedure Laterality Date    AUGMENTATION OF BREAST      breast augmentation       SECTION      COLONOSCOPY N/A 2021    Procedure: COLONOSCOPY;  Surgeon: Dao Gray MD;  Location: [facility] (2ND FLR);  Service: Endoscopy;  Laterality: N/A;  COVID test 21 General surgery clinic [facility]    CYSTOSCOPY N/A 9/10/2021    Procedure: CYSTOSCOPY;  Surgeon: Rj Sánchez Jr., MD;  [facility];  Service: Urology;  Laterality: N/A;    ESOPHAGOGASTRODUODENOSCOPY N/A 2021    Procedure: ESOPHAGOGASTRODUODENOSCOPY (EGD);  Surgeon: Dao Gray MD;  Location: [facility] (2ND FLR);  Service: Endoscopy;  Laterality: N/A;  labs current on     ESOPHAGOGASTRODUODENOSCOPY N/A 10/26/2021    Procedure: ESOPHAGOGASTRODUODENOSCOPY (EGD);  Surgeon: Dao Gray MD;  Location: [facility] (2ND FLR);  Service: Endoscopy;  Laterality: N/A;  to be done the week of 10/18/21 per Dr. Gray-BB  covid-10/23/21-[facility]-   10/25 arrival time confirmed with pt-rb    ESOPHAGOGASTRODUODENOSCOPY Left 2021    Procedure: EGD (ESOPHAGOGASTRODUODENOSCOPY);  Surgeon: Koffi Monteiro MD;  Location: [facility] (4TH FLR);  Service: Endoscopy;  Laterality: Left;  Rapid  pt requested AM appt and first available in December-  labs morning of procedure-BB   lvm to confirm appt-rb    HEMORRHOID SURGERY       PERITONEOCENTESIS Right 6/8/2021    Procedure: PARACENTESIS, ABDOMINAL;  Surgeon: Jay Torre MD;  Location: [facility] CATH LAB;  Service: Radiology;  Laterality: Right;    PERITONEOCENTESIS Right 6/25/2021    Procedure: PARACENTESIS, ABDOMINAL;  Surgeon: Jay Torre MD;  Location: [facility] CATH LAB;  Service: Radiology;  Laterality: Right;    PERITONEOCENTESIS Right 7/12/2021    Procedure: PARACENTESIS, ABDOMINAL;  Surgeon: Jay Torre MD;  Location: [facility] CATH LAB;  Service: Radiology;  Laterality: Right;    PERITONEOCENTESIS Right 7/23/2021    Procedure: PARACENTESIS, ABDOMINAL;  Surgeon: Edward De La Torre II, MD;  Location: [facility] CATH LAB;  Service: Interventional Radiology;  Laterality: Right;    PERITONEOCENTESIS Right 8/3/2021    Procedure: PARACENTESIS, ABDOMINAL;  Surgeon: Franco Cheung MD;  Location: [facility] CATH LAB;  Service: Radiology;  Laterality: Right;    PERITONEOCENTESIS Right 8/16/2021    Procedure: PARACENTESIS, ABDOMINAL;  Surgeon: Jay Torre MD;  Location: [facility] CATH LAB;  Service: Radiology;  Laterality: Right;    PERITONEOCENTESIS Right 8/23/2021    Procedure: PARACENTESIS, ABDOMINAL;  Surgeon: Jay Torre MD;  Location: [facility] CATH LAB;  Service: Radiology;  Laterality: Right;    PERITONEOCENTESIS Right 9/16/2021    Procedure: PARACENTESIS, ABDOMINAL;  Surgeon: Jay Torre MD;  Location: [facility] CATH LAB;  Service: Radiology;  Laterality: Right;    PERITONEOCENTESIS N/A 9/24/2021    Procedure: PARACENTESIS, ABDOMINAL;  Surgeon: Jay Torre MD;  Location: [facility] CATH LAB;  Service: Radiology;  Laterality: N/A;    PERITONEOCENTESIS Right 12/23/2021    Procedure: PARACENTESIS, ABDOMINAL;  Surgeon: Jay Torre MD;  Location: [facility] CATH LAB;  Service: Radiology;  Laterality: Right;    PERITONEOCENTESIS N/A 12/30/2021    Procedure: PARACENTESIS, ABDOMINAL;  Surgeon: Salas Cabrera MD;  Location: [facility] CATH LAB;  Service: Radiology;  Laterality:  N/A;    RETROGRADE PYELOGRAPHY Bilateral 9/10/2021    Procedure: PYELOGRAM, RETROGRADE;  Surgeon: Rj Sánchez Jr., MD;  Location: [facility] OR [address] [place] [address];  Service: Urology;  Laterality: Bilateral;    TONSILLECTOMY         Home Meds:   Prior to Admission medications    Medication Sig Start Date End Date Taking? Authorizing Provider   allopurinoL (ZYLOPRIM) 100 MG tablet Take 1 tablet (100 mg total) by mouth once daily. 11/12/21   Rashel Cohn MD   bisacodyL (DULCOLAX) 5 mg EC tablet Take 5 mg by mouth daily as needed for Constipation.    Historical Provider   ciprofloxacin HCl (CIPRO) 250 MG tablet Take 1 tablet (250 mg total) by mouth once daily.  Patient taking differently: Take 250 mg by mouth every other day. 12/23/21   Rashel Cohn MD   ergocalciferol (ERGOCALCIFEROL) 50,000 unit Cap Take 1 capsule (50,000 Units total) by mouth every 7 days. 10/2/21   Rashel Cohn MD   folic acid (FOLVITE) 1 MG tablet Take 2 tablets (2 mg total) by mouth once daily. 12/14/21 3/14/22  Uma Perry MD   furosemide (LASIX) 40 MG tablet Take 80 mg every am and 40 mg every pm 1/6/22   Sharmila Pacheco MD   lactulose 10 gram/15 ml (CHRONULAC) 10 gram/15 mL (15 mL) solution Take 15 mLs (10 g total) by mouth daily as needed (Constipation). 10/29/21   Rashel Cohn MD   levothyroxine (SYNTHROID) 50 MCG tablet TAKE 1 TABLET(50 MCG) BY MOUTH BEFORE BREAKFAST 9/25/21   Killian Dodd DO   linaCLOtide (LINZESS) 72 mcg Cap capsule Take 2 capsules (144 mcg total) by mouth before breakfast. 11/26/21   Sharmila Pacheco MD   sodium bicarbonate 650 MG tablet Take 1 tablet (650 mg total) by mouth 2 (two) times daily.  Patient taking differently: Take 650 mg by mouth once daily. 10/2/21 3/31/22  Rashel Cohn MD   thiamine (VITAMIN B-1) 100 MG tablet Take 1 tablet (100 mg total) by mouth every evening. 10/11/21   Sharmila Pacheco MD   vitamin A 71768 UNIT capsule Take 10,000 Units by mouth every evening.      Historical Provider     Anticoagulants/Antiplatelets: no anticoagulation    Allergies: Review of patient's allergies indicates:  No Known Allergies  Sedation History:  no adverse reactions    Review of Systems:   Hematological: no known coagulopathies  Respiratory: no shortness of breath  Cardiovascular: no chest pain  Gastrointestinal: no abdominal pain  Genito-Urinary: no dysuria  Musculoskeletal: negative  Neurological: no TIA or stroke symptoms         OBJECTIVE:     Vital Signs (Most Recent)       Physical Exam:  ASA: 2  Mallampati: n/a    General: no acute distress  Mental Status: alert and oriented to person, place and time  HEENT: normocephalic, atraumatic  Chest: unlabored breathing  Heart: regular heart rate  Abdomen: nondistended  Extremity: moves all extremities    ASSESSMENT/PLAN:     Sedation Plan: local  Patient will undergo ultrasound guided paracentesis.    Kay Arnold PA-C  Interventional Radiology  Clinic [phone]

## 2022-01-28 NOTE — PLAN OF CARE
Paracentesis completed. 5.6 L removed. Patient tolerated well; VSS. RLQ site clean, dry and intact. 37.5 G Albumin administered per protocol. Labs sent per orders. IV placed and left in at discharge per patient request as patient is going to ambulatory infusion center for IV Iron infusion. Infusion center notified and was agreeable with leaving in IV. Patient ambulated to [facility] for discharge; paperwork refused.

## 2022-01-31 ENCOUNTER — HOSPITAL ENCOUNTER (OUTPATIENT)
Dept: INTERVENTIONAL RADIOLOGY/VASCULAR | Facility: HOSPITAL | Age: 52
Discharge: HOME | End: 2022-01-31
Attending: INTERNAL MEDICINE
Payer: COMMERCIAL

## 2022-01-31 ENCOUNTER — PATIENT MESSAGE (OUTPATIENT)
Dept: TRANSPLANT | Facility: CLINIC | Age: 52
End: 2022-01-31
Payer: COMMERCIAL

## 2022-01-31 VITALS
SYSTOLIC BLOOD PRESSURE: 100 MMHG | HEART RATE: 89 BPM | RESPIRATION RATE: 18 BRPM | OXYGEN SATURATION: 100 % | DIASTOLIC BLOOD PRESSURE: 61 MMHG

## 2022-01-31 DIAGNOSIS — K21.9 GASTROESOPHAGEAL REFLUX DISEASE, UNSPECIFIED WHETHER ESOPHAGITIS PRESENT: Primary | ICD-10-CM

## 2022-01-31 DIAGNOSIS — R18.8 OTHER ASCITES: ICD-10-CM

## 2022-01-31 LAB
APPEARANCE FLD: CLEAR
BODY FLD TYPE: NORMAL
COLOR FLD: YELLOW
LYMPHOCYTES NFR FLD MANUAL: 77 %
MESOTHL CELL NFR FLD MANUAL: 9 %
MONOS+MACROS NFR FLD MANUAL: 10 %
NEUTROPHILS NFR FLD MANUAL: 4 %
WBC # FLD: 56 /CU MM

## 2022-01-31 PROCEDURE — C1729 CATH, DRAINAGE: HCPCS | Mod: NTX

## 2022-01-31 PROCEDURE — 89051 BODY FLUID CELL COUNT: CPT | Mod: NTX | Performed by: INTERNAL MEDICINE

## 2022-01-31 PROCEDURE — 49083 ABD PARACENTESIS W/IMAGING: CPT | Mod: NTX,,, | Performed by: FAMILY MEDICINE

## 2022-01-31 PROCEDURE — 49083 ABD PARACENTESIS W/IMAGING: CPT | Mod: NTX | Performed by: RADIOLOGY

## 2022-01-31 PROCEDURE — 49083 IR PARACENTESIS WITH IMAGING: ICD-10-PCS | Mod: NTX,,, | Performed by: FAMILY MEDICINE

## 2022-01-31 NOTE — PLAN OF CARE
Patient awake and alert, no distress noted, respirations even and unlabored. Allergies reviewed. Waiting for eval and consent.  Vitals:    01/31/22 0820   BP: 94/61   Pulse: 102   Resp: 20

## 2022-01-31 NOTE — PROCEDURES
Radiology Post-Procedure Note    Pre Op Diagnosis: Ascites  Post Op Diagnosis: Same    Procedure: Ultrasound Guided Paracentesis    Procedure performed by: Fabrizio PEÑA, Cee     Written Informed Consent Obtained: Yes  Specimen Removed: YES clear yellow  Estimated Blood Loss: Minimal    Findings:   Successful paracentesis.  Albumin administered PRN per protocol.    Patient tolerated procedure well.    Cee Dinh, APRN, FNP  Interventional Radiology  [phone] clinic

## 2022-01-31 NOTE — PLAN OF CARE
Paracentesis done. Removed 4200 ml. Patient awake and alert, no distress noted, respirations even and unlabored.   Vitals:    01/31/22 0907   BP: 100/61   Pulse: 89   Resp: 18

## 2022-01-31 NOTE — H&P
Radiology History & Physical      SUBJECTIVE:     Chief Complaint: abdominal distention    History of Present Illness:  Malu Montes is a 51 y.o. female who presents for ultrasound guided paracentesis  Past Medical History:   Diagnosis Date    ADD (attention deficit disorder)     Anxiety     Ascites of liver     Cirrhosis 2021    CKD (chronic kidney disease) stage 3, GFR 30-59 ml/min     Constipation     ETOH abuse     Hyperlipidemia     Other ascites 2021    Pancreatitis, acute     Tobacco use     Vitamin D deficiency      Past Surgical History:   Procedure Laterality Date    AUGMENTATION OF BREAST      breast augmentation       SECTION      COLONOSCOPY N/A 2021    Procedure: COLONOSCOPY;  Surgeon: Dao Gray MD;  Location: [facility] (2ND FLR);  Service: Endoscopy;  Laterality: N/A;  COVID test 21 General surgery clinic [facility]    CYSTOSCOPY N/A 9/10/2021    Procedure: CYSTOSCOPY;  Surgeon: Rj Sánchez Jr., MD;  [facility]  Service: Urology;  Laterality: N/A;    ESOPHAGOGASTRODUODENOSCOPY N/A 2021    Procedure: ESOPHAGOGASTRODUODENOSCOPY (EGD);  Surgeon: Dao Gray MD;  Location: [facility] (2ND FLR);  Service: Endoscopy;  Laterality: N/A;  labs current on     ESOPHAGOGASTRODUODENOSCOPY N/A 10/26/2021    Procedure: ESOPHAGOGASTRODUODENOSCOPY (EGD);  Surgeon: Dao Gray MD;  Location: [facility] (2ND FLR);  Service: Endoscopy;  Laterality: N/A;  to be done the week of 10/18/21 per Dr. Gray-BB  covid-10/23/21-[facility]-   10/25 arrival time confirmed with pt-rb    ESOPHAGOGASTRODUODENOSCOPY Left 2021    Procedure: EGD (ESOPHAGOGASTRODUODENOSCOPY);  Surgeon: Koffi Monteiro MD;  Location: [facility] (4TH FLR);  Service: Endoscopy;  Laterality: Left;  Rapid  pt requested AM appt and first available in December-  labs morning of procedure-BB   lvm to confirm appt-rb    HEMORRHOID SURGERY       PERITONEOCENTESIS Right 6/8/2021    Procedure: PARACENTESIS, ABDOMINAL;  Surgeon: Jay Torre MD;  Location: [facility] CATH LAB;  Service: Radiology;  Laterality: Right;    PERITONEOCENTESIS Right 6/25/2021    Procedure: PARACENTESIS, ABDOMINAL;  Surgeon: Jay Torre MD;  Location: [facility] CATH LAB;  Service: Radiology;  Laterality: Right;    PERITONEOCENTESIS Right 7/12/2021    Procedure: PARACENTESIS, ABDOMINAL;  Surgeon: Jay Torre MD;  Location: [facility] CATH LAB;  Service: Radiology;  Laterality: Right;    PERITONEOCENTESIS Right 7/23/2021    Procedure: PARACENTESIS, ABDOMINAL;  Surgeon: Ewdard De La Torre II, MD;  Location: [facility] CATH LAB;  Service: Interventional Radiology;  Laterality: Right;    PERITONEOCENTESIS Right 8/3/2021    Procedure: PARACENTESIS, ABDOMINAL;  Surgeon: Franco Cheung MD;  Location: [facility] CATH LAB;  Service: Radiology;  Laterality: Right;    PERITONEOCENTESIS Right 8/16/2021    Procedure: PARACENTESIS, ABDOMINAL;  Surgeon: Jay Torre MD;  Location: [facility] CATH LAB;  Service: Radiology;  Laterality: Right;    PERITONEOCENTESIS Right 8/23/2021    Procedure: PARACENTESIS, ABDOMINAL;  Surgeon: Jay Torre MD;  Location: [facility] CATH LAB;  Service: Radiology;  Laterality: Right;    PERITONEOCENTESIS Right 9/16/2021    Procedure: PARACENTESIS, ABDOMINAL;  Surgeon: Jay Torre MD;  Location: [facility] CATH LAB;  Service: Radiology;  Laterality: Right;    PERITONEOCENTESIS N/A 9/24/2021    Procedure: PARACENTESIS, ABDOMINAL;  Surgeon: Jay Torre MD;  Location: [facility] CATH LAB;  Service: Radiology;  Laterality: N/A;    PERITONEOCENTESIS Right 12/23/2021    Procedure: PARACENTESIS, ABDOMINAL;  Surgeon: Jay Torre MD;  Location: [facility] CATH LAB;  Service: Radiology;  Laterality: Right;    PERITONEOCENTESIS N/A 12/30/2021    Procedure: PARACENTESIS, ABDOMINAL;  Surgeon: Salas Cabrera MD;  Location: [facility] CATH LAB;  Service: Radiology;  Laterality:  N/A;    RETROGRADE PYELOGRAPHY Bilateral 9/10/2021    Procedure: PYELOGRAM, RETROGRADE;  Surgeon: Rj Sánchez Jr., MD;  Location: [facility] OR [address] [place] [address];  Service: Urology;  Laterality: Bilateral;    TONSILLECTOMY         Home Meds:   Prior to Admission medications    Medication Sig Start Date End Date Taking? Authorizing Provider   allopurinoL (ZYLOPRIM) 100 MG tablet Take 1 tablet (100 mg total) by mouth once daily. 11/12/21   Rashel Cohn MD   bisacodyL (DULCOLAX) 5 mg EC tablet Take 5 mg by mouth daily as needed for Constipation.    Historical Provider   ciprofloxacin HCl (CIPRO) 250 MG tablet Take 1 tablet (250 mg total) by mouth once daily.  Patient taking differently: Take 250 mg by mouth every other day. 12/23/21   Rashel Cohn MD   ergocalciferol (ERGOCALCIFEROL) 50,000 unit Cap Take 1 capsule (50,000 Units total) by mouth every 7 days. 10/2/21   Rashel Cohn MD   folic acid (FOLVITE) 1 MG tablet Take 2 tablets (2 mg total) by mouth once daily. 12/14/21 3/14/22  Uma Perry MD   furosemide (LASIX) 40 MG tablet Take 80 mg every am and 40 mg every pm 1/6/22   Sharmila Pacheco MD   lactulose 10 gram/15 ml (CHRONULAC) 10 gram/15 mL (15 mL) solution Take 15 mLs (10 g total) by mouth daily as needed (Constipation). 10/29/21   Rashel Cohn MD   levothyroxine (SYNTHROID) 50 MCG tablet TAKE 1 TABLET(50 MCG) BY MOUTH BEFORE BREAKFAST 9/25/21   Killina Dodd DO   linaCLOtide (LINZESS) 72 mcg Cap capsule Take 2 capsules (144 mcg total) by mouth before breakfast. 11/26/21   Sharmila Pacheco MD   sodium bicarbonate 650 MG tablet Take 1 tablet (650 mg total) by mouth 2 (two) times daily.  Patient taking differently: Take 650 mg by mouth once daily. 10/2/21 3/31/22  Rashel Cohn MD   thiamine (VITAMIN B-1) 100 MG tablet Take 1 tablet (100 mg total) by mouth every evening. 10/11/21   Sharmila Pacheco MD   vitamin A 16030 UNIT capsule Take 10,000 Units by mouth every evening.      Historical Provider     Anticoagulants/Antiplatelets: no anticoagulation    Allergies: Review of patient's allergies indicates:  No Known Allergies  Sedation History:  no adverse reactions    Review of Systems:   Hematological: no known coagulopathies  Respiratory: no shortness of breath  Cardiovascular: no chest pain  Gastrointestinal: no abdominal pain  Genito-Urinary: no dysuria  Musculoskeletal: negative  Neurological: no TIA or stroke symptoms         OBJECTIVE:     Vital Signs (Most Recent)  Pulse: 102 (01/31/22 0820)  Resp: 20 (01/31/22 0820)  BP: 94/61 (01/31/22 0820)  SpO2: 100 % (01/31/22 0820)    Physical Exam:  ASA: 2  Mallampati: n/a    General: no acute distress  Mental Status: alert and oriented to person, place and time  HEENT: normocephalic, atraumatic  Chest: unlabored breathing  Heart: regular heart rate  Abdomen: distended  Extremity: moves all extremities    ASSESSMENT/PLAN:     Sedation Plan: local  Patient will undergo ultrasound guided paracentesis.    RENATA Marie, FNP  Interventional Radiology  [phone] [facility]

## 2022-02-02 ENCOUNTER — PATIENT MESSAGE (OUTPATIENT)
Dept: ENDOSCOPY | Facility: HOSPITAL | Age: 52
End: 2022-02-02
Payer: COMMERCIAL

## 2022-02-02 ENCOUNTER — TELEPHONE (OUTPATIENT)
Dept: TRANSPLANT | Facility: CLINIC | Age: 52
End: 2022-02-02
Payer: COMMERCIAL

## 2022-02-02 NOTE — TELEPHONE ENCOUNTER
Rerturned pt call about her appointment on 02/04 pt will leave everything as is and not R/S her appointment.    ----- Message from Kei Mora sent at 2/2/2022  3:58 PM CST -----  Pt calling to speak w/ Francesca about r/s - 2/4 appts for tomorrow.    [phone]

## 2022-02-03 ENCOUNTER — LAB VISIT (OUTPATIENT)
Dept: LAB | Facility: HOSPITAL | Age: 52
End: 2022-02-03
Attending: INTERNAL MEDICINE
Payer: COMMERCIAL

## 2022-02-03 ENCOUNTER — OFFICE VISIT (OUTPATIENT)
Dept: HEPATOLOGY | Facility: CLINIC | Age: 52
End: 2022-02-03
Payer: COMMERCIAL

## 2022-02-03 ENCOUNTER — TELEPHONE (OUTPATIENT)
Dept: NEPHROLOGY | Facility: CLINIC | Age: 52
End: 2022-02-03
Payer: COMMERCIAL

## 2022-02-03 VITALS
BODY MASS INDEX: 22.61 KG/M2 | RESPIRATION RATE: 17 BRPM | WEIGHT: 127.63 LBS | SYSTOLIC BLOOD PRESSURE: 100 MMHG | OXYGEN SATURATION: 96 % | TEMPERATURE: 99 F | DIASTOLIC BLOOD PRESSURE: 67 MMHG | HEART RATE: 108 BPM | HEIGHT: 63 IN

## 2022-02-03 DIAGNOSIS — K74.60 HEPATIC CIRRHOSIS, UNSPECIFIED HEPATIC CIRRHOSIS TYPE, UNSPECIFIED WHETHER ASCITES PRESENT: Primary | ICD-10-CM

## 2022-02-03 DIAGNOSIS — K65.2 SBP (SPONTANEOUS BACTERIAL PERITONITIS): Chronic | ICD-10-CM

## 2022-02-03 DIAGNOSIS — R18.8 OTHER ASCITES: ICD-10-CM

## 2022-02-03 DIAGNOSIS — K70.31 ALCOHOLIC CIRRHOSIS OF LIVER WITH ASCITES: ICD-10-CM

## 2022-02-03 DIAGNOSIS — K74.60 HEPATIC CIRRHOSIS, UNSPECIFIED HEPATIC CIRRHOSIS TYPE, UNSPECIFIED WHETHER ASCITES PRESENT: ICD-10-CM

## 2022-02-03 LAB
AFP SERPL-MCNC: 3.6 NG/ML (ref 0–8.4)
ALBUMIN SERPL BCP-MCNC: 3 G/DL (ref 3.5–5.2)
ALP SERPL-CCNC: 108 U/L (ref 55–135)
ALT SERPL W/O P-5'-P-CCNC: 20 U/L (ref 10–44)
ANION GAP SERPL CALC-SCNC: 10 MMOL/L (ref 8–16)
AST SERPL-CCNC: 37 U/L (ref 10–40)
BASOPHILS # BLD AUTO: 0.07 K/UL (ref 0–0.2)
BASOPHILS NFR BLD: 0.6 % (ref 0–1.9)
BILIRUB SERPL-MCNC: 0.5 MG/DL (ref 0.1–1)
BUN SERPL-MCNC: 53 MG/DL (ref 6–20)
CALCIUM SERPL-MCNC: 9 MG/DL (ref 8.7–10.5)
CHLORIDE SERPL-SCNC: 106 MMOL/L (ref 95–110)
CO2 SERPL-SCNC: 20 MMOL/L (ref 23–29)
CREAT SERPL-MCNC: 1.5 MG/DL (ref 0.5–1.4)
DIFFERENTIAL METHOD: ABNORMAL
EOSINOPHIL # BLD AUTO: 0.6 K/UL (ref 0–0.5)
EOSINOPHIL NFR BLD: 5.8 % (ref 0–8)
ERYTHROCYTE [DISTWIDTH] IN BLOOD BY AUTOMATED COUNT: 17.1 % (ref 11.5–14.5)
EST. GFR  (AFRICAN AMERICAN): 46.5 ML/MIN/1.73 M^2
EST. GFR  (NON AFRICAN AMERICAN): 40.3 ML/MIN/1.73 M^2
GLUCOSE SERPL-MCNC: 151 MG/DL (ref 70–110)
HCT VFR BLD AUTO: 36 % (ref 37–48.5)
HGB BLD-MCNC: 11.4 G/DL (ref 12–16)
IMM GRANULOCYTES # BLD AUTO: 0.02 K/UL (ref 0–0.04)
IMM GRANULOCYTES NFR BLD AUTO: 0.2 % (ref 0–0.5)
INR PPP: 1.1 (ref 0.8–1.2)
LYMPHOCYTES # BLD AUTO: 1.6 K/UL (ref 1–4.8)
LYMPHOCYTES NFR BLD: 14.5 % (ref 18–48)
MCH RBC QN AUTO: 30.8 PG (ref 27–31)
MCHC RBC AUTO-ENTMCNC: 31.7 G/DL (ref 32–36)
MCV RBC AUTO: 97 FL (ref 82–98)
MONOCYTES # BLD AUTO: 0.8 K/UL (ref 0.3–1)
MONOCYTES NFR BLD: 7.2 % (ref 4–15)
NEUTROPHILS # BLD AUTO: 8 K/UL (ref 1.8–7.7)
NEUTROPHILS NFR BLD: 71.7 % (ref 38–73)
NRBC BLD-RTO: 0 /100 WBC
PLATELET # BLD AUTO: 299 K/UL (ref 150–450)
PMV BLD AUTO: 10.5 FL (ref 9.2–12.9)
POTASSIUM SERPL-SCNC: 4.6 MMOL/L (ref 3.5–5.1)
PROT SERPL-MCNC: 6 G/DL (ref 6–8.4)
PROTHROMBIN TIME: 10.9 SEC (ref 9–12.5)
RBC # BLD AUTO: 3.7 M/UL (ref 4–5.4)
SODIUM SERPL-SCNC: 136 MMOL/L (ref 136–145)
WBC # BLD AUTO: 11.09 K/UL (ref 3.9–12.7)

## 2022-02-03 PROCEDURE — 82105 ALPHA-FETOPROTEIN SERUM: CPT | Mod: TXP | Performed by: INTERNAL MEDICINE

## 2022-02-03 PROCEDURE — 99214 PR OFFICE/OUTPT VISIT, EST, LEVL IV, 30-39 MIN: ICD-10-PCS | Mod: S$GLB,TXP,, | Performed by: INTERNAL MEDICINE

## 2022-02-03 PROCEDURE — 36415 COLL VENOUS BLD VENIPUNCTURE: CPT | Mod: PN,TXP | Performed by: INTERNAL MEDICINE

## 2022-02-03 PROCEDURE — 99999 PR PBB SHADOW E&M-EST. PATIENT-LVL V: ICD-10-PCS | Mod: PBBFAC,TXP,, | Performed by: INTERNAL MEDICINE

## 2022-02-03 PROCEDURE — 80053 COMPREHEN METABOLIC PANEL: CPT | Mod: TXP | Performed by: INTERNAL MEDICINE

## 2022-02-03 PROCEDURE — 85610 PROTHROMBIN TIME: CPT | Mod: TXP | Performed by: INTERNAL MEDICINE

## 2022-02-03 PROCEDURE — 99214 OFFICE O/P EST MOD 30 MIN: CPT | Mod: S$GLB,TXP,, | Performed by: INTERNAL MEDICINE

## 2022-02-03 PROCEDURE — 99999 PR PBB SHADOW E&M-EST. PATIENT-LVL V: CPT | Mod: PBBFAC,TXP,, | Performed by: INTERNAL MEDICINE

## 2022-02-03 PROCEDURE — 85025 COMPLETE CBC W/AUTO DIFF WBC: CPT | Mod: TXP | Performed by: INTERNAL MEDICINE

## 2022-02-03 NOTE — PROGRESS NOTES
HEPATOLOGY FOLLOW UP    Referring Physician: Killian Dodd DO  Current Corresponding Physician: Killian Dodd DO, Rashel Cohn MD    Malu Montes is here for follow up of decompensated alcohol-induced cirrhosis    HPI  Malu Montes is a 51 y.o. female who has a history of alcohol abuse and decompensated cirrhosis now undergoing a liver transplant evaluation    Interval History  Since Malu Montes's last visit:    She was listed for liver-kidney transplant. MELD 17. She is requiring a paracentesis every 5-6 days. Unfortunately she got covid (+1/8/22). She is inactive on the list and can be activated 2/8/22. She has agreed to proceed with liver alone.    Labs 1/31/22: Tbil 0.8, ALT 20, AST 34, ALKP 106, AFP 14, plts 265, INR 1.0  Creatinine, elevated: due to IgA nephropathy- cystatin C 2.45  Hematologic labs: Juliette free light chains 9.12 (0.33-1.94), Lambda Free Light Chains 4.39 (0.57-2.63), Kappa/Lambda FLC Ratio: 2.08 (0.26-1.65); hematology consult- abn attributed to CKD; no futher f/u needed    Abdo US 10/1/21: Satisfactory Doppler evaluation of the liver; no hcc  MRI abdo 8/6/21: The liver is normal in size.  Hepatic parenchyma demonstrates diffuse heterogeneous enhancement on the early arterial phase that normalizes on the portal venous and delayed phases.  There is a 0.8 cm arterial hyperenhancing focus within the superior left hepatic lobe, possibly a prominent vascular structure and unchanged when compared to the previous exam; enlarged spleen    Ascites: on laisx 80 mg in am and 40 mg in pm  SBP: yes; on cipro every other day  HE: on lactulose  CKD: ongoing; now meets criteria for simultaneous kidney transplant  Alcohol abuse: peth negative; now attending completed IOP    MELD-Na score: 14 at 1/31/2022  9:19 AM  MELD score: 12 at 1/31/2022  9:19 AM  Calculated from:  Serum Creatinine: 1.7 mg/dL at 1/31/2022  9:19 AM  Serum Sodium: 135 mmol/L at 1/31/2022  9:19 AM  Total  Bilirubin: 0.8 mg/dL (Using min of 1 mg/dL) at 1/31/2022  9:19 AM  INR(ratio): 1.0 at 1/31/2022  9:19 AM  Age: 51 years    Outpatient Encounter Medications as of 2/3/2022   Medication Sig Dispense Refill    allopurinoL (ZYLOPRIM) 100 MG tablet Take 1 tablet (100 mg total) by mouth once daily. 90 tablet 0    bisacodyL (DULCOLAX) 5 mg EC tablet Take 5 mg by mouth daily as needed for Constipation.      ciprofloxacin HCl (CIPRO) 250 MG tablet Take 1 tablet (250 mg total) by mouth once daily. (Patient taking differently: Take 250 mg by mouth every other day.) 90 tablet 0    folic acid (FOLVITE) 1 MG tablet Take 2 tablets (2 mg total) by mouth once daily. 60 tablet 11    furosemide (LASIX) 40 MG tablet Take 80 mg every am and 40 mg every pm 90 tablet 11    levothyroxine (SYNTHROID) 50 MCG tablet TAKE 1 TABLET(50 MCG) BY MOUTH BEFORE BREAKFAST 90 tablet 3    linaCLOtide (LINZESS) 72 mcg Cap capsule Take 2 capsules (144 mcg total) by mouth before breakfast. 180 capsule 5    sodium bicarbonate 650 MG tablet Take 1 tablet (650 mg total) by mouth 2 (two) times daily. (Patient taking differently: Take 650 mg by mouth once daily.) 60 tablet 5    thiamine (VITAMIN B-1) 100 MG tablet Take 1 tablet (100 mg total) by mouth every evening. 30 tablet 5    vitamin A 61707 UNIT capsule Take 10,000 Units by mouth every evening.       ergocalciferol (ERGOCALCIFEROL) 50,000 unit Cap Take 1 capsule (50,000 Units total) by mouth every 7 days. (Patient not taking: Reported on 2/3/2022) 14 capsule 0    lactulose 10 gram/15 ml (CHRONULAC) 10 gram/15 mL (15 mL) solution Take 15 mLs (10 g total) by mouth daily as needed (Constipation). (Patient not taking: Reported on 2/3/2022) 300 mL 0     No facility-administered encounter medications on file as of 2/3/2022.     Review of patient's allergies indicates:  No Known Allergies  Past Medical History:   Diagnosis Date    ADD (attention deficit disorder)     Anxiety     Ascites of liver      Cirrhosis 9/16/2021    CKD (chronic kidney disease) stage 3, GFR 30-59 ml/min     Constipation     ETOH abuse     Hyperlipidemia     Other ascites 6/14/2021    Pancreatitis, acute     Tobacco use     Vitamin D deficiency        Review of Systems   Constitutional: Negative.    HENT: Negative.    Eyes: Negative.    Respiratory: Negative.    Cardiovascular: Negative.    Gastrointestinal: Negative.    Genitourinary: Negative.    Musculoskeletal: Negative.    Skin: Negative.    Neurological: Negative.    Psychiatric/Behavioral: Negative.      MELD-Na score: 14 at 1/31/2022  9:19 AM  MELD score: 12 at 1/31/2022  9:19 AM  Calculated from:  Serum Creatinine: 1.7 mg/dL at 1/31/2022  9:19 AM  Serum Sodium: 135 mmol/L at 1/31/2022  9:19 AM  Total Bilirubin: 0.8 mg/dL (Using min of 1 mg/dL) at 1/31/2022  9:19 AM  INR(ratio): 1.0 at 1/31/2022  9:19 AM  Age: 51 years    Physical Exam  Vitals reviewed.   Constitutional:       Appearance: She is well-developed.   HENT:      Head: Normocephalic and atraumatic.   Eyes:      General: No scleral icterus.     Conjunctiva/sclera: Conjunctivae normal.      Pupils: Pupils are equal, round, and reactive to light.   Neck:      Thyroid: No thyromegaly.   Cardiovascular:      Rate and Rhythm: Normal rate and regular rhythm.      Heart sounds: Normal heart sounds.   Pulmonary:      Effort: Pulmonary effort is normal.      Breath sounds: Normal breath sounds. No rales.   Abdominal:      General: Bowel sounds are normal. There is distension (+shifting dullness).      Palpations: Abdomen is soft. There is no mass.      Tenderness: There is no abdominal tenderness.   Musculoskeletal:         General: Normal range of motion.      Cervical back: Normal range of motion and neck supple.   Skin:     General: Skin is warm and dry.      Findings: No rash.   Neurological:      Mental Status: She is alert and oriented to person, place, and time.         MELD-Na score: 14 at 1/31/2022  9:19  AM  MELD score: 12 at 1/31/2022  9:19 AM  Calculated from:  Serum Creatinine: 1.7 mg/dL at 1/31/2022  9:19 AM  Serum Sodium: 135 mmol/L at 1/31/2022  9:19 AM  Total Bilirubin: 0.8 mg/dL (Using min of 1 mg/dL) at 1/31/2022  9:19 AM  INR(ratio): 1.0 at 1/31/2022  9:19 AM  Age: 51 years    Lab Results   Component Value Date    GLU 98 01/31/2022    BUN 54 (H) 01/31/2022    CREATININE 1.7 (H) 01/31/2022    CALCIUM 9.1 01/31/2022     (L) 01/31/2022    K 4.0 01/31/2022     01/31/2022    PROT 5.7 (L) 01/31/2022    CO2 24 01/31/2022    ANIONGAP 8 01/31/2022    WBC 9.75 01/31/2022    RBC 3.54 (L) 01/31/2022    HGB 10.9 (L) 01/31/2022    HCT 33.9 (L) 01/31/2022    HCT 34 (L) 12/07/2021    MCV 96 01/31/2022    MCH 30.8 01/31/2022    MCHC 32.2 01/31/2022     Lab Results   Component Value Date    RDW 16.6 (H) 01/31/2022     01/31/2022    MPV 10.1 01/31/2022    GRAN 6.7 01/31/2022    GRAN 68.6 01/31/2022    LYMPH 1.5 01/31/2022    LYMPH 15.6 (L) 01/31/2022    MONO 0.8 01/31/2022    MONO 7.8 01/31/2022    EOSINOPHIL 6.8 01/31/2022    BASOPHIL 0.8 01/31/2022    EOS 0.7 (H) 01/31/2022    EOS 2 01/25/2022    BASO 0.08 01/31/2022    BASO 1 01/25/2022    APTT 27.6 11/11/2021    GROUPTRH A POS 07/30/2021    CHOL 146 12/06/2021    TRIG 68 12/06/2021    HDL 32 (L) 12/06/2021    CHOLHDL 21.9 12/06/2021    TOTALCHOLEST 4.6 12/06/2021    ALBUMIN 3.1 (L) 01/31/2022    BILIDIR 0.4 (H) 11/11/2021    AST 34 01/31/2022    ALT 20 01/31/2022    ALKPHOS 106 01/31/2022    MG 2.1 12/10/2021    LABPROT 10.9 01/31/2022    INR 1.0 01/31/2022       Assessment and Plan:    Malu Montes is a 51 y.o. female with decompensated cirrhosis secondary to alcohol., now listed for L-K transplant. Patient willing to consider liver tx alone with aim to be prioritized for kidney tx in the first year post liver tx. Reactivate 2/5/22. Current recomemndations:  1. Ascites complicated by SBP; continue cipro prophylaxis (every other day) and prn  paracentesis twice a we as needed; doubt tips would work given renal insufficinecy; continue lasix to 80 mg in am and 40 mg in pm  2. Portal htn: EV s/p banding; no banding 12/21-repeat in 3 months 03/22   3. Decompensated alcoholic liver disease: labs every week; will consider liver tx alone  4. +MARIBEL: consider liver biopsy if live enzymes increase  5. Colorectal Ca screening: repeat colonoscopy in 10 years  6. Alcohol abuse, in remission: now s/p IOP; continue AA  7. Frailty: at risk for worsening since getting paracenteses every 10 days; continue whey protein supplementation; stay active;   8. CKD: GFR now <30. possible IgA nephropathy; kidney bx deferred;  9. Elevated Kappa/Lambda FLC ratio: due to CKD; no further w/u  Needed  10. Anemia: iron infusions per hematology    Return 4 weeks

## 2022-02-03 NOTE — TELEPHONE ENCOUNTER
----- Message from Valeriy Otoole sent at 2/3/2022 11:13 AM CST -----  Patient called to speak w/ someone in regards to rescheduling her 2/4 appt for an earlier time slot. Requesting callback [phone]

## 2022-02-04 ENCOUNTER — TELEPHONE (OUTPATIENT)
Dept: TRANSPLANT | Facility: CLINIC | Age: 52
End: 2022-02-04
Payer: COMMERCIAL

## 2022-02-04 ENCOUNTER — HOSPITAL ENCOUNTER (OUTPATIENT)
Dept: INTERVENTIONAL RADIOLOGY/VASCULAR | Facility: HOSPITAL | Age: 52
Discharge: HOME | End: 2022-02-04
Attending: INTERNAL MEDICINE
Payer: COMMERCIAL

## 2022-02-04 ENCOUNTER — OFFICE VISIT (OUTPATIENT)
Dept: NEPHROLOGY | Facility: CLINIC | Age: 52
End: 2022-02-04
Payer: COMMERCIAL

## 2022-02-04 ENCOUNTER — INFUSION (OUTPATIENT)
Dept: INFECTIOUS DISEASES | Facility: HOSPITAL | Age: 52
End: 2022-02-04
Attending: INTERNAL MEDICINE
Payer: COMMERCIAL

## 2022-02-04 VITALS
DIASTOLIC BLOOD PRESSURE: 60 MMHG | SYSTOLIC BLOOD PRESSURE: 100 MMHG | HEART RATE: 98 BPM | OXYGEN SATURATION: 100 % | RESPIRATION RATE: 18 BRPM

## 2022-02-04 VITALS
WEIGHT: 118.19 LBS | HEIGHT: 63 IN | DIASTOLIC BLOOD PRESSURE: 70 MMHG | HEART RATE: 108 BPM | OXYGEN SATURATION: 99 % | SYSTOLIC BLOOD PRESSURE: 100 MMHG | BODY MASS INDEX: 20.94 KG/M2

## 2022-02-04 DIAGNOSIS — E87.20 METABOLIC ACIDOSIS: ICD-10-CM

## 2022-02-04 DIAGNOSIS — N18.4 CKD (CHRONIC KIDNEY DISEASE) STAGE 4, GFR 15-29 ML/MIN: Primary | ICD-10-CM

## 2022-02-04 DIAGNOSIS — R18.8 OTHER ASCITES: ICD-10-CM

## 2022-02-04 DIAGNOSIS — D50.8 OTHER IRON DEFICIENCY ANEMIA: Primary | ICD-10-CM

## 2022-02-04 DIAGNOSIS — D50.8 OTHER IRON DEFICIENCY ANEMIA: ICD-10-CM

## 2022-02-04 LAB
APPEARANCE FLD: NORMAL
BODY FLD TYPE: NORMAL
COLOR FLD: YELLOW
LYMPHOCYTES NFR FLD MANUAL: 81 %
MONOS+MACROS NFR FLD MANUAL: 4 %
NEUTROPHILS NFR FLD MANUAL: 15 %
WBC # FLD: 56 /CU MM

## 2022-02-04 PROCEDURE — 49083 ABD PARACENTESIS W/IMAGING: CPT | Mod: TXP | Performed by: RADIOLOGY

## 2022-02-04 PROCEDURE — 89051 BODY FLUID CELL COUNT: CPT | Mod: NTX | Performed by: INTERNAL MEDICINE

## 2022-02-04 PROCEDURE — 99999 PR PBB SHADOW E&M-EST. PATIENT-LVL IV: CPT | Mod: PBBFAC,,, | Performed by: INTERNAL MEDICINE

## 2022-02-04 PROCEDURE — 99214 OFFICE O/P EST MOD 30 MIN: CPT | Mod: S$GLB,,, | Performed by: INTERNAL MEDICINE

## 2022-02-04 PROCEDURE — 49083 IR PARACENTESIS WITH IMAGING: ICD-10-PCS | Mod: TXP,,, | Performed by: FAMILY MEDICINE

## 2022-02-04 PROCEDURE — 99214 PR OFFICE/OUTPT VISIT, EST, LEVL IV, 30-39 MIN: ICD-10-PCS | Mod: S$GLB,,, | Performed by: INTERNAL MEDICINE

## 2022-02-04 PROCEDURE — C1729 CATH, DRAINAGE: HCPCS | Mod: NTX

## 2022-02-04 PROCEDURE — P9047 ALBUMIN (HUMAN), 25%, 50ML: HCPCS | Mod: JG,NTX

## 2022-02-04 PROCEDURE — 49083 ABD PARACENTESIS W/IMAGING: CPT | Mod: TXP,,, | Performed by: FAMILY MEDICINE

## 2022-02-04 PROCEDURE — 63600175 PHARM REV CODE 636 W HCPCS: Mod: TXP | Performed by: INTERNAL MEDICINE

## 2022-02-04 PROCEDURE — 96365 THER/PROPH/DIAG IV INF INIT: CPT | Mod: TXP

## 2022-02-04 PROCEDURE — 25000003 PHARM REV CODE 250: Mod: NTX | Performed by: INTERNAL MEDICINE

## 2022-02-04 PROCEDURE — 63600175 PHARM REV CODE 636 W HCPCS: Mod: JG,NTX

## 2022-02-04 PROCEDURE — 99999 PR PBB SHADOW E&M-EST. PATIENT-LVL IV: ICD-10-PCS | Mod: PBBFAC,,, | Performed by: INTERNAL MEDICINE

## 2022-02-04 RX ORDER — ALBUMIN HUMAN 250 G/1000ML
50 SOLUTION INTRAVENOUS ONCE
Status: COMPLETED | OUTPATIENT
Start: 2022-02-04 | End: 2022-02-04

## 2022-02-04 RX ORDER — SODIUM CHLORIDE 0.9 % (FLUSH) 0.9 %
10 SYRINGE (ML) INJECTION
Status: DISCONTINUED | OUTPATIENT
Start: 2022-02-04 | End: 2022-02-04 | Stop reason: HOSPADM

## 2022-02-04 RX ORDER — HEPARIN 100 UNIT/ML
500 SYRINGE INTRAVENOUS
Status: CANCELLED | OUTPATIENT
Start: 2022-02-11

## 2022-02-04 RX ORDER — ALBUMIN HUMAN 250 G/1000ML
SOLUTION INTRAVENOUS
Status: COMPLETED
Start: 2022-02-04 | End: 2022-02-04

## 2022-02-04 RX ORDER — SODIUM CHLORIDE 0.9 % (FLUSH) 0.9 %
10 SYRINGE (ML) INJECTION
Status: CANCELLED | OUTPATIENT
Start: 2022-02-11

## 2022-02-04 RX ADMIN — ALBUMIN HUMAN 50 G: 0.25 SOLUTION INTRAVENOUS at 09:02

## 2022-02-04 RX ADMIN — IRON SUCROSE 200 MG: 20 INJECTION, SOLUTION INTRAVENOUS at 12:02

## 2022-02-04 RX ADMIN — ALBUMIN HUMAN 50 G: 250 SOLUTION INTRAVENOUS at 09:02

## 2022-02-04 NOTE — TELEPHONE ENCOUNTER
PATIENT NAME: Malu Styles [facility] #: [id number]    Lab Results   Component Value Date    CREATININE 1.5 (H) 02/03/2022     02/03/2022    BILITOT 0.5 02/03/2022    ALBUMIN 3.0 (L) 02/03/2022    INR 1.1 02/03/2022   MELD 11    Encephalopathy: 1 - 2  Ascites: moderate  Dialysis: no     Recertification requestor: Francesca Brunson

## 2022-02-04 NOTE — H&P
Radiology History & Physical      SUBJECTIVE:     Chief Complaint: abdominal distention    History of Present Illness:  Malu Montes is a 51 y.o. female who presents for ultrasound guided paracentesis  Past Medical History:   Diagnosis Date    ADD (attention deficit disorder)     Anxiety     Ascites of liver     Cirrhosis 2021    CKD (chronic kidney disease) stage 3, GFR 30-59 ml/min     Constipation     ETOH abuse     Hyperlipidemia     Other ascites 2021    Pancreatitis, acute     Tobacco use     Vitamin D deficiency      Past Surgical History:   Procedure Laterality Date    AUGMENTATION OF BREAST      breast augmentation       SECTION      COLONOSCOPY N/A 2021    Procedure: COLONOSCOPY;  Surgeon: Dao Gray MD;  Location: [facility] (2ND FLR);  Service: Endoscopy;  Laterality: N/A;  COVID test 21 General surgery clinic [facility]    CYSTOSCOPY N/A 9/10/2021    Procedure: CYSTOSCOPY;  Surgeon: Rj Sánchez Jr., MD;  [facility];  Service: Urology;  Laterality: N/A;    ESOPHAGOGASTRODUODENOSCOPY N/A 2021    Procedure: ESOPHAGOGASTRODUODENOSCOPY (EGD);  Surgeon: Dao Gray MD;  Location: [facility] (2ND FLR);  Service: Endoscopy;  Laterality: N/A;  labs current on     ESOPHAGOGASTRODUODENOSCOPY N/A 10/26/2021    Procedure: ESOPHAGOGASTRODUODENOSCOPY (EGD);  Surgeon: Dao Gray MD;  Location: [facility] (2ND FLR);  Service: Endoscopy;  Laterality: N/A;  to be done the week of 10/18/21 per Dr. Gray-BB  covid-10/23/21-[facility]-   10/25 arrival time confirmed with pt-rb    ESOPHAGOGASTRODUODENOSCOPY Left 2021    Procedure: EGD (ESOPHAGOGASTRODUODENOSCOPY);  Surgeon: Koffi Monteiro MD;  Location: [facility] (4TH FLR);  Service: Endoscopy;  Laterality: Left;  Rapid  pt requested AM appt and first available in December-  labs morning of procedure-BB   lvm to confirm appt-rb    HEMORRHOID SURGERY       PERITONEOCENTESIS Right 6/8/2021    Procedure: PARACENTESIS, ABDOMINAL;  Surgeon: Jay Torre MD;  Location: [facility] CATH LAB;  Service: Radiology;  Laterality: Right;    PERITONEOCENTESIS Right 6/25/2021    Procedure: PARACENTESIS, ABDOMINAL;  Surgeon: Jay Torre MD;  Location: [facility] CATH LAB;  Service: Radiology;  Laterality: Right;    PERITONEOCENTESIS Right 7/12/2021    Procedure: PARACENTESIS, ABDOMINAL;  Surgeon: Jay Torre MD;  Location: [facility] CATH LAB;  Service: Radiology;  Laterality: Right;    PERITONEOCENTESIS Right 7/23/2021    Procedure: PARACENTESIS, ABDOMINAL;  Surgeon: Edward De La Torre II, MD;  Location: [facility] CATH LAB;  Service: Interventional Radiology;  Laterality: Right;    PERITONEOCENTESIS Right 8/3/2021    Procedure: PARACENTESIS, ABDOMINAL;  Surgeon: Franco Cheung MD;  Location: [facility] CATH LAB;  Service: Radiology;  Laterality: Right;    PERITONEOCENTESIS Right 8/16/2021    Procedure: PARACENTESIS, ABDOMINAL;  Surgeon: Jay Torre MD;  Location: [facility] CATH LAB;  Service: Radiology;  Laterality: Right;    PERITONEOCENTESIS Right 8/23/2021    Procedure: PARACENTESIS, ABDOMINAL;  Surgeon: Jay Torre MD;  Location: [facility] CATH LAB;  Service: Radiology;  Laterality: Right;    PERITONEOCENTESIS Right 9/16/2021    Procedure: PARACENTESIS, ABDOMINAL;  Surgeon: Jay Torre MD;  Location: [facility] CATH LAB;  Service: Radiology;  Laterality: Right;    PERITONEOCENTESIS N/A 9/24/2021    Procedure: PARACENTESIS, ABDOMINAL;  Surgeon: Jay Torre MD;  Location: [facility] CATH LAB;  Service: Radiology;  Laterality: N/A;    PERITONEOCENTESIS Right 12/23/2021    Procedure: PARACENTESIS, ABDOMINAL;  Surgeon: Jay Torre MD;  Location: [facility] CATH LAB;  Service: Radiology;  Laterality: Right;    PERITONEOCENTESIS N/A 12/30/2021    Procedure: PARACENTESIS, ABDOMINAL;  Surgeon: Salas Cabrera MD;  Location: [facility] CATH LAB;  Service: Radiology;  Laterality:  N/A;    RETROGRADE PYELOGRAPHY Bilateral 9/10/2021    Procedure: PYELOGRAM, RETROGRADE;  Surgeon: Rj Sánchez Jr., MD;  Location: [facility] OR [address] [place] [address];  Service: Urology;  Laterality: Bilateral;    TONSILLECTOMY         Home Meds:   Prior to Admission medications    Medication Sig Start Date End Date Taking? Authorizing Provider   allopurinoL (ZYLOPRIM) 100 MG tablet Take 1 tablet (100 mg total) by mouth once daily. 11/12/21   Rashel Cohn MD   bisacodyL (DULCOLAX) 5 mg EC tablet Take 5 mg by mouth daily as needed for Constipation.    Historical Provider   ciprofloxacin HCl (CIPRO) 250 MG tablet Take 1 tablet (250 mg total) by mouth once daily.  Patient taking differently: Take 250 mg by mouth every other day. 12/23/21   Rashel Cohn MD   ergocalciferol (ERGOCALCIFEROL) 50,000 unit Cap Take 1 capsule (50,000 Units total) by mouth every 7 days.  Patient not taking: Reported on 2/3/2022 10/2/21   Rashel Cohn MD   folic acid (FOLVITE) 1 MG tablet Take 2 tablets (2 mg total) by mouth once daily. 12/14/21 3/14/22  Uma Perry MD   furosemide (LASIX) 40 MG tablet Take 80 mg every am and 40 mg every pm 1/6/22   Sharmila Pacheco MD   lactulose 10 gram/15 ml (CHRONULAC) 10 gram/15 mL (15 mL) solution Take 15 mLs (10 g total) by mouth daily as needed (Constipation).  Patient not taking: Reported on 2/3/2022 10/29/21   Rashel Cohn MD   levothyroxine (SYNTHROID) 50 MCG tablet TAKE 1 TABLET(50 MCG) BY MOUTH BEFORE BREAKFAST 9/25/21   Killian Dodd DO   linaCLOtide (LINZESS) 72 mcg Cap capsule Take 2 capsules (144 mcg total) by mouth before breakfast. 11/26/21   Sharmila Pacheco MD   sodium bicarbonate 650 MG tablet Take 1 tablet (650 mg total) by mouth 2 (two) times daily.  Patient taking differently: Take 650 mg by mouth once daily. 10/2/21 3/31/22  Rashel Cohn MD   thiamine (VITAMIN B-1) 100 MG tablet Take 1 tablet (100 mg total) by mouth every evening. 10/11/21   Sharmila Pacheco MD    vitamin A 60210 UNIT capsule Take 10,000 Units by mouth every evening.     Historical Provider     Anticoagulants/Antiplatelets: no anticoagulation    Allergies: Review of patient's allergies indicates:  No Known Allergies  Sedation History:  no adverse reactions    Review of Systems:   Hematological: no known coagulopathies  Respiratory: no shortness of breath  Cardiovascular: no chest pain  Gastrointestinal: no abdominal pain  Genito-Urinary: no dysuria  Musculoskeletal: negative  Neurological: no TIA or stroke symptoms         OBJECTIVE:     Vital Signs (Most Recent)  Pulse: 96 (02/04/22 0820)  Resp: 18 (02/04/22 0820)  BP: 99/65 (02/04/22 0820)  SpO2: 100 % (02/04/22 0820)    Physical Exam:  ASA: 2  Mallampati: n/a    General: no acute distress  Mental Status: alert and oriented to person, place and time  HEENT: normocephalic, atraumatic  Chest: unlabored breathing  Heart: regular heart rate  Abdomen: distended  Extremity: moves all extremities    ASSESSMENT/PLAN:     Sedation Plan: local  Patient will undergo ultrasound guided paracentesis.    RENATA Marie, FNP  Interventional Radiology  [phone] [facility]

## 2022-02-04 NOTE — PROCEDURES
Radiology Post-Procedure Note    Pre Op Diagnosis: Ascites  Post Op Diagnosis: Same    Procedure: Ultrasound Guided Paracentesis    Procedure performed by: Fabrizio PEÑA, Cee     Written Informed Consent Obtained: Yes  Specimen Removed: YES clear yellow  Estimated Blood Loss: Minimal      Findings:   Successful paracentesis.  Albumin administered PRN per protocol.    Patient tolerated procedure well.    Cee Dinh, APRN, FNP  Interventional Radiology  [phone] clinic

## 2022-02-04 NOTE — SEDATION DOCUMENTATION
Paracentesis complete. 6100  mLs peritoneal fluid drained. Pt tolerated well. Dressing to right abd clean, dry, and intact. Albumin 25% given 200 mLs. Specimens sent per lab order. Pt discharged

## 2022-02-04 NOTE — PROGRESS NOTES
Arrived per ambulation to Ambulatory Infusion suite  for 3/5 Venofer 200 mg infusion.  No NS infusing, pt receives paracentesis, 6 liters fluid removed today per pt. Tolerated infusion without signs of adverse reactions observed for 30 min after infusion completed.      Left unit in NAD next appointment made.

## 2022-02-05 ENCOUNTER — TELEPHONE (OUTPATIENT)
Dept: TRANSPLANT | Facility: CLINIC | Age: 52
End: 2022-02-05
Payer: COMMERCIAL

## 2022-02-05 NOTE — PROGRESS NOTES
REASON FOR CONSULT/CHIEF COMPLAIN: Acute kidney injury    REFERRING PHYSICIAN: Killian Dodd DO    HISTORY OF PRESENT ILLNESS: 51 y.o. female  has a past medical history of ADD (attention deficit disorder), Anxiety, Ascites of liver, Cirrhosis (9/16/2021), CKD (chronic kidney disease) stage 3, GFR 30-59 ml/min, Constipation, ETOH abuse, Hyperlipidemia, Other ascites (6/14/2021), Pancreatitis, acute, Tobacco use, and Vitamin D deficiency. patient was referred here for abnormal renal function.   No chronic NSAID use, no known exposure to lithium, lead. No family history of Lupus, kidney disease or deafness. No ingestion of licorice. One kidney stone at age 25 after her first baby, needed lithotripsy. No smoking.   Had an episode of pancreatitis in Jan 2021. Cirrhosis diagnosed June of 2021, blamed on alcohol. Has been needing paracentesis since then, now needing it every 5 days. She reports that the paracentesis is the only thing that has been needed since her diagnosis of cirrhosis. Has not needed admission to the hospital for any other reason. Evaluated and approved for liver and kidney transplant.   Denied any new issues with urination including dysuria, hematuria, urgency, hesitancy, nocturia, incomplete voiding. Denied chest pain, nausea, vomiting, abd pain, nausea, vomiting, diarrhea, shortness of breath, pedal edema, Orthopnea, PND    ROS:  General: negative for chills, or fatigue  Respiratory: No cough, shortness of breath, or wheezing  Cardiovascular: No chest pain or dyspnea   Gastrointestinal: No abdominal pain, change in bowel habits  Neurological: No new focal weakness, no numbness  Dermatological: No rash or ulcers.    PAST MEDICAL HISTORY:  Past Medical History:   Diagnosis Date    ADD (attention deficit disorder)     Anxiety     Ascites of liver     Cirrhosis 9/16/2021    CKD (chronic kidney disease) stage 3, GFR 30-59 ml/min     Constipation     ETOH abuse     Hyperlipidemia     Other  ascites 2021    Pancreatitis, acute     Tobacco use     Vitamin D deficiency        PAST SURGICAL HISTORY:  Past Surgical History:   Procedure Laterality Date    AUGMENTATION OF BREAST      breast augmentation       SECTION      COLONOSCOPY N/A 2021    Procedure: COLONOSCOPY;  Surgeon: Dao Gray MD;  Location: [facility] (2ND FLR);  Service: Endoscopy;  Laterality: N/A;  COVID test 21 General surgery clinic -     CYSTOSCOPY N/A 9/10/2021    Procedure: CYSTOSCOPY;  Surgeon: Rj Sánchez Jr., MD;  Location: [facility] OR 1ST FLR;  Service: Urology;  Laterality: N/A;    ESOPHAGOGASTRODUODENOSCOPY N/A 2021    Procedure: ESOPHAGOGASTRODUODENOSCOPY (EGD);  Surgeon: Dao Gray MD;  Location: [facility] (2ND FLR);  Service: Endoscopy;  Laterality: N/A;  labs current on     ESOPHAGOGASTRODUODENOSCOPY N/A 10/26/2021    Procedure: ESOPHAGOGASTRODUODENOSCOPY (EGD);  Surgeon: Dao Gray MD;  Location: [facility] (2ND FLR);  Service: Endoscopy;  Laterality: N/A;  to be done the week of 10/18/21 per Dr[facility]23[facility]-   10/25 arrival time confirmed with pt-rb    ESOPHAGOGASTRODUODENOSCOPY Left 2021    Procedure: EGD (ESOPHAGOGASTRODUODENOSCOPY);  Surgeon: Koffi Monteiro MD;  Location: [facility] (4TH FLR);  Service: Endoscopy;  Laterality: Left;  Rapid  pt requested AM appt and first available in December-  labs morning of procedure-   lvm to confirm appt-rb    HEMORRHOID SURGERY      PERITONEOCENTESIS Right 2021    Procedure: PARACENTESIS, ABDOMINAL;  Surgeon: Jay Torre MD;  Location: [facility] CATH LAB;  Service: Radiology;  Laterality: Right;    PERITONEOCENTESIS Right 2021    Procedure: PARACENTESIS, ABDOMINAL;  Surgeon: Jay Torre MD;  Location: [facility] CATH LAB;  Service: Radiology;  Laterality: Right;    PERITONEOCENTESIS Right 2021    Procedure: PARACENTESIS, ABDOMINAL;  Surgeon: Jay CARR  MD Nicho;  Location: [facility] CATH LAB;  Service: Radiology;  Laterality: Right;    PERITONEOCENTESIS Right 7/23/2021    Procedure: PARACENTESIS, ABDOMINAL;  Surgeon: Edward De La Torre II, MD;  Location: [facility] CATH LAB;  Service: Interventional Radiology;  Laterality: Right;    PERITONEOCENTESIS Right 8/3/2021    Procedure: PARACENTESIS, ABDOMINAL;  Surgeon: Franco Cheung MD;  Location: [facility] CATH LAB;  Service: Radiology;  Laterality: Right;    PERITONEOCENTESIS Right 8/16/2021    Procedure: PARACENTESIS, ABDOMINAL;  Surgeon: Jay Torre MD;  Location: [facility] CATH LAB;  Service: Radiology;  Laterality: Right;    PERITONEOCENTESIS Right 8/23/2021    Procedure: PARACENTESIS, ABDOMINAL;  Surgeon: Jay Torre MD;  Location: [facility] CATH LAB;  Service: Radiology;  Laterality: Right;    PERITONEOCENTESIS Right 9/16/2021    Procedure: PARACENTESIS, ABDOMINAL;  Surgeon: Jay Torre MD;  Location: [facility] CATH LAB;  Service: Radiology;  Laterality: Right;    PERITONEOCENTESIS N/A 9/24/2021    Procedure: PARACENTESIS, ABDOMINAL;  Surgeon: Jay Torre MD;  Location: [facility] CATH LAB;  Service: Radiology;  Laterality: N/A;    PERITONEOCENTESIS Right 12/23/2021    Procedure: PARACENTESIS, ABDOMINAL;  Surgeon: Jay Torre MD;  Location: [facility] CATH LAB;  Service: Radiology;  Laterality: Right;    PERITONEOCENTESIS N/A 12/30/2021    Procedure: PARACENTESIS, ABDOMINAL;  Surgeon: Salas Cabrera MD;  Location: [facility] CATH LAB;  Service: Radiology;  Laterality: N/A;    RETROGRADE PYELOGRAPHY Bilateral 9/10/2021    Procedure: PYELOGRAM, RETROGRADE;  Surgeon: Rj Sánchez Jr., MD;  Location: [address];  Service: Urology;  Laterality: Bilateral;    TONSILLECTOMY         FAMILY HISTORY:   Family History   Problem Relation Age of Onset    Cancer Mother         Uterine Cancer     No Known Problems Father     No Known Problems Sister     Sleep apnea Daughter     ADD / ADHD Daughter     Heart  disease Maternal Grandmother         CHF    COPD Maternal Grandfather     Heart disease Paternal Grandmother         CHF    Colon cancer Neg Hx     Inflammatory bowel disease Neg Hx     Stomach cancer Neg Hx     Breast cancer Neg Hx     Ovarian cancer Neg Hx     Learning disabilities Neg Hx        SOCIAL HISTORY:  Social History     Socioeconomic History    Marital status:     Number of children: 1   Occupational History    Occupation: Assistant   Tobacco Use    Smoking status: Current Every Day Smoker     Years: 27.00     Types: Cigarettes    Smokeless tobacco: Never Used    Tobacco comment: two cigarettes a day    Substance and Sexual Activity    Alcohol use: Not Currently     Comment: quit caridad 15    Drug use: No    Sexual activity: Yes     Partners: Male   Social History Narrative    They live in [place], [place]      Social Determinants of Health     Financial Resource Strain: Low Risk     Difficulty of Paying Living Expenses: Not hard at all   Food Insecurity: No Food Insecurity    Worried About Running Out of Food in the Last Year: Never true    Ran Out of Food in the Last Year: Never true   Transportation Needs: No Transportation Needs    Lack of Transportation (Medical): No    Lack of Transportation (Non-Medical): No   Physical Activity: Unknown    Days of Exercise per Week: 1 day   Stress: No Stress Concern Present    Feeling of Stress : Only a little   Social Connections: Unknown    Frequency of Communication with Friends and Family: More than three times a week    Frequency of Social Gatherings with Friends and Family: Twice a week    Active Member of Clubs or Organizations: No    Attends Club or Organization Meetings: Never    Marital Status:    Housing Stability: Low Risk     Unable to Pay for Housing in the Last Year: No    Number of Places Lived in the Last Year: 1    Unstable Housing in the Last Year: No       ALLERGIES:  Review of patient's allergies  indicates:  No Known Allergies    MEDICATIONS:    Current Outpatient Medications:     allopurinoL (ZYLOPRIM) 100 MG tablet, Take 1 tablet (100 mg total) by mouth once daily., Disp: 90 tablet, Rfl: 0    bisacodyL (DULCOLAX) 5 mg EC tablet, Take 5 mg by mouth daily as needed for Constipation., Disp: , Rfl:     ciprofloxacin HCl (CIPRO) 250 MG tablet, Take 1 tablet (250 mg total) by mouth once daily. (Patient taking differently: Take 250 mg by mouth every other day.), Disp: 90 tablet, Rfl: 0    folic acid (FOLVITE) 1 MG tablet, Take 2 tablets (2 mg total) by mouth once daily., Disp: 60 tablet, Rfl: 11    furosemide (LASIX) 40 MG tablet, Take 80 mg every am and 40 mg every pm, Disp: 90 tablet, Rfl: 11    levothyroxine (SYNTHROID) 50 MCG tablet, TAKE 1 TABLET(50 MCG) BY MOUTH BEFORE BREAKFAST, Disp: 90 tablet, Rfl: 3    linaCLOtide (LINZESS) 72 mcg Cap capsule, Take 2 capsules (144 mcg total) by mouth before breakfast., Disp: 180 capsule, Rfl: 5    sodium bicarbonate 650 MG tablet, Take 1 tablet (650 mg total) by mouth 2 (two) times daily. (Patient taking differently: Take 650 mg by mouth once daily.), Disp: 60 tablet, Rfl: 5    thiamine (VITAMIN B-1) 100 MG tablet, Take 1 tablet (100 mg total) by mouth every evening., Disp: 30 tablet, Rfl: 5    vitamin A 74837 UNIT capsule, Take 10,000 Units by mouth every evening. , Disp: , Rfl:     ergocalciferol (ERGOCALCIFEROL) 50,000 unit Cap, Take 1 capsule (50,000 Units total) by mouth every 7 days. (Patient not taking: No sig reported), Disp: 14 capsule, Rfl: 0    lactulose 10 gram/15 ml (CHRONULAC) 10 gram/15 mL (15 mL) solution, Take 15 mLs (10 g total) by mouth daily as needed (Constipation). (Patient not taking: No sig reported), Disp: 300 mL, Rfl: 0  No current facility-administered medications for this visit.   Medication List with Changes/Refills   Current Medications    ALLOPURINOL (ZYLOPRIM) 100 MG TABLET    Take 1 tablet (100 mg total) by mouth once daily.  "   BISACODYL (DULCOLAX) 5 MG EC TABLET    Take 5 mg by mouth daily as needed for Constipation.    CIPROFLOXACIN HCL (CIPRO) 250 MG TABLET    Take 1 tablet (250 mg total) by mouth once daily.    ERGOCALCIFEROL (ERGOCALCIFEROL) 50,000 UNIT CAP    Take 1 capsule (50,000 Units total) by mouth every 7 days.    FOLIC ACID (FOLVITE) 1 MG TABLET    Take 2 tablets (2 mg total) by mouth once daily.    FUROSEMIDE (LASIX) 40 MG TABLET    Take 80 mg every am and 40 mg every pm    LACTULOSE 10 GRAM/15 ML (CHRONULAC) 10 GRAM/15 ML (15 ML) SOLUTION    Take 15 mLs (10 g total) by mouth daily as needed (Constipation).    LEVOTHYROXINE (SYNTHROID) 50 MCG TABLET    TAKE 1 TABLET(50 MCG) BY MOUTH BEFORE BREAKFAST    LINACLOTIDE (LINZESS) 72 MCG CAP CAPSULE    Take 2 capsules (144 mcg total) by mouth before breakfast.    SODIUM BICARBONATE 650 MG TABLET    Take 1 tablet (650 mg total) by mouth 2 (two) times daily.    THIAMINE (VITAMIN B-1) 100 MG TABLET    Take 1 tablet (100 mg total) by mouth every evening.    VITAMIN A 95246 UNIT CAPSULE    Take 10,000 Units by mouth every evening.         PHYSICAL EXAM:  /70   Pulse 108   Ht 5' 3" (1.6 m)   Wt 53.6 kg (118 lb 2.7 oz)   SpO2 99%   BMI 20.93 kg/m²     General: No distress, No fever or chills  Neck: No adenopathy,no carotid bruit,no JVD  Lungs:Clear to auscultation bilaterally, No Crackles  Heart: Regular rate and rhythm, no murmur, gallops or rubs  Abdomen: Ascites.  Extremities: Atraumatic, no edema in LE  Skin: Turgor normal. No rashes  Neurologic: No focal weakness, oriented.  Dialysis Access: Non applicable        LABS:  Lab Results   Component Value Date    HGB 11.4 (L) 02/03/2022        Lab Results   Component Value Date    CREATININE 1.5 (H) 02/03/2022       Prot/Creat Ratio, Urine   Date Value Ref Range Status   11/11/2021 0.06 0.00 - 0.20 Final       Lab Results   Component Value Date     02/03/2022    K 4.6 02/03/2022    CO2 20 (L) 02/03/2022       last PTH "   Lab Results   Component Value Date    PTH 78.6 (H) 10/01/2021    CALCIUM 9.0 02/03/2022    PHOS 4.8 (H) 12/12/2021       Lab Results   Component Value Date    HGBA1C 4.6 07/15/2021       Lab Results   Component Value Date    LDLCALC 100.4 12/06/2021         Creatinine, Urine   Date Value Ref Range Status   11/11/2021 188.0 15.0 - 325.0 mg/dL Final   07/29/2021 253.0 15.0 - 325.0 mg/dL Final   07/15/2021 147.0 15.0 - 325.0 mg/dL Final       Protein, Urine   Date Value Ref Range Status   10/18/2021 11 0 - 15 mg/dL Final   07/30/2021 28 (H) 0 - 15 mg/dL Final     Protein, Urine Random   Date Value Ref Range Status   11/11/2021 12 0 - 15 mg/dL Final       Prot/Creat Ratio, Urine   Date Value Ref Range Status   11/11/2021 0.06 0.00 - 0.20 Final         ASSESSMENT:    1) Chronic Kidney disease stage 4  2) Metabolic acidosis  3) Anemia due to iron deficiency  4) Vitamin D deficiency  5) Cirrhosis of liver with ascites   Renal ultrasound showed 9.6 and 11.3 cm kidneys (no known issues from child rosa that would explain the reason for her asymmetric kidney sizes). Patients baseline creatinine is less than 1 till June 2021, since then it has been fluctuating between 1.4-1.8, however her cystatin C is 2.5 and her eGFR is 23. Urine analysis is showing some hematuria without proteinuria. She has had cystoscopy done in the past with no significant detected abnormalities. Urine microscopy showed dysmorphic RBC and so likely etiology for her ARLIN on CKD is IgA nephropathy as her other serological work up is negative (ANCA, GBM, MARIBEL, cryoglobulin).    She was tried on lasix and spironolactone before but were discontinued due to issues with hypokalemia. Tried and tolerating lasix alone for now.   Electrolytes, in acceptable range. IV Iron infusion for iron def anemia. On start sodium bicarbonate for her mild acidosis. Continue vitamin D supplementation.    - Fluid restriction to 1200 ml a day  - Lasix 80 mg am and 40 mg pm  - Avoid  NSAIDs intake  - Continue Sodium bicarbonate 650 mg BID  - Continue Vitamin D    RTC in 2 months    Diagnosis and plan of care explained to the patient.  Pt Verbalized understanding. Answered all questions.  Thanks for allowing me to participate in the care of this patient.     1:19 PM    Rashel Cohn MD  Nephrology & Critical Care

## 2022-02-05 NOTE — TELEPHONE ENCOUNTER
Patient Notification of Waitlist Status Change    Status has been changed from inactive to active on the liver and kidney waitlst on 2/5/2022 due to completing the 28 day wait period due to a positive Covid test. This status change has been discussed with Malu Montes by phone.    Waitlist comments updated in the liver and kidney episodes, including do not retest for 90 days (4/9/2022)

## 2022-02-05 NOTE — TELEPHONE ENCOUNTER
PATIENT NAME: Malu Styles [facility] #: [id number]    Lab Results   Component Value Date    CREATININE 1.5 (H) 02/03/2022     02/03/2022    BILITOT 0.5 02/03/2022    ALBUMIN 3.0 (L) 02/03/2022    INR 1.1 02/03/2022       Encephalopathy: 1 - 2  Ascites: moderate  Dialysis: no     Recertification requestor: Sharmila Pacheco      MELD 11  (labs to reactivate on the liver/kidney)

## 2022-02-07 ENCOUNTER — LAB VISIT (OUTPATIENT)
Dept: LAB | Facility: HOSPITAL | Age: 52
End: 2022-02-07
Attending: INTERNAL MEDICINE
Payer: COMMERCIAL

## 2022-02-07 ENCOUNTER — HOSPITAL ENCOUNTER (OUTPATIENT)
Dept: INTERVENTIONAL RADIOLOGY/VASCULAR | Facility: HOSPITAL | Age: 52
Discharge: HOME | End: 2022-02-07
Attending: INTERNAL MEDICINE
Payer: COMMERCIAL

## 2022-02-07 DIAGNOSIS — K70.31 ALCOHOLIC CIRRHOSIS OF LIVER WITH ASCITES: ICD-10-CM

## 2022-02-07 DIAGNOSIS — R18.8 OTHER ASCITES: ICD-10-CM

## 2022-02-07 LAB
ALBUMIN SERPL BCP-MCNC: 3.6 G/DL (ref 3.5–5.2)
ALP SERPL-CCNC: 99 U/L (ref 55–135)
ALT SERPL W/O P-5'-P-CCNC: 18 U/L (ref 10–44)
ANION GAP SERPL CALC-SCNC: 8 MMOL/L (ref 8–16)
APPEARANCE FLD: NORMAL
AST SERPL-CCNC: 31 U/L (ref 10–40)
B-HCG UR QL: NEGATIVE
BASOPHILS # BLD AUTO: 0.08 K/UL (ref 0–0.2)
BASOPHILS NFR BLD: 0.9 % (ref 0–1.9)
BASOPHILS NFR FLD MANUAL: 1 %
BILIRUB SERPL-MCNC: 0.7 MG/DL (ref 0.1–1)
BODY FLD TYPE: NORMAL
BUN SERPL-MCNC: 48 MG/DL (ref 6–20)
CALCIUM SERPL-MCNC: 9.2 MG/DL (ref 8.7–10.5)
CHLORIDE SERPL-SCNC: 105 MMOL/L (ref 95–110)
CO2 SERPL-SCNC: 23 MMOL/L (ref 23–29)
COLOR FLD: YELLOW
CREAT SERPL-MCNC: 1.5 MG/DL (ref 0.5–1.4)
CTP QC/QA: YES
DIFFERENTIAL METHOD: ABNORMAL
EOSINOPHIL # BLD AUTO: 0.5 K/UL (ref 0–0.5)
EOSINOPHIL NFR BLD: 6.4 % (ref 0–8)
ERYTHROCYTE [DISTWIDTH] IN BLOOD BY AUTOMATED COUNT: 17.4 % (ref 11.5–14.5)
EST. GFR  (AFRICAN AMERICAN): 46.5 ML/MIN/1.73 M^2
EST. GFR  (NON AFRICAN AMERICAN): 40.3 ML/MIN/1.73 M^2
GLUCOSE SERPL-MCNC: 75 MG/DL (ref 70–110)
HCT VFR BLD AUTO: 33.5 % (ref 37–48.5)
HGB BLD-MCNC: 10.5 G/DL (ref 12–16)
IMM GRANULOCYTES # BLD AUTO: 0.02 K/UL (ref 0–0.04)
IMM GRANULOCYTES NFR BLD AUTO: 0.2 % (ref 0–0.5)
INR PPP: 1.1 (ref 0.8–1.2)
LYMPHOCYTES # BLD AUTO: 1.1 K/UL (ref 1–4.8)
LYMPHOCYTES NFR BLD: 13.2 % (ref 18–48)
LYMPHOCYTES NFR FLD MANUAL: 92 %
MCH RBC QN AUTO: 31.2 PG (ref 27–31)
MCHC RBC AUTO-ENTMCNC: 31.3 G/DL (ref 32–36)
MCV RBC AUTO: 99 FL (ref 82–98)
MESOTHL CELL NFR FLD MANUAL: 2 %
MONOCYTES # BLD AUTO: 0.7 K/UL (ref 0.3–1)
MONOCYTES NFR BLD: 8.8 % (ref 4–15)
MONOS+MACROS NFR FLD MANUAL: 4 %
NEUTROPHILS # BLD AUTO: 5.9 K/UL (ref 1.8–7.7)
NEUTROPHILS NFR BLD: 70.5 % (ref 38–73)
NEUTROPHILS NFR FLD MANUAL: 1 %
NRBC BLD-RTO: 0 /100 WBC
PLATELET # BLD AUTO: 244 K/UL (ref 150–450)
PMV BLD AUTO: 10.6 FL (ref 9.2–12.9)
POTASSIUM SERPL-SCNC: 4.4 MMOL/L (ref 3.5–5.1)
PROT SERPL-MCNC: 6 G/DL (ref 6–8.4)
PROTHROMBIN TIME: 11.2 SEC (ref 9–12.5)
RBC # BLD AUTO: 3.37 M/UL (ref 4–5.4)
SODIUM SERPL-SCNC: 136 MMOL/L (ref 136–145)
WBC # BLD AUTO: 8.43 K/UL (ref 3.9–12.7)
WBC # FLD: 53 /CU MM

## 2022-02-07 PROCEDURE — 49083 IR PARACENTESIS WITH IMAGING: ICD-10-PCS | Mod: NTX,,, | Performed by: FAMILY MEDICINE

## 2022-02-07 PROCEDURE — 49083 ABD PARACENTESIS W/IMAGING: CPT | Mod: TXP | Performed by: RADIOLOGY

## 2022-02-07 PROCEDURE — 81025 URINE PREGNANCY TEST: CPT | Mod: TXP | Performed by: INTERNAL MEDICINE

## 2022-02-07 PROCEDURE — 63600175 PHARM REV CODE 636 W HCPCS: Mod: JG,NTX

## 2022-02-07 PROCEDURE — 89051 BODY FLUID CELL COUNT: CPT | Mod: TXP | Performed by: INTERNAL MEDICINE

## 2022-02-07 PROCEDURE — 80053 COMPREHEN METABOLIC PANEL: CPT | Mod: TXP | Performed by: INTERNAL MEDICINE

## 2022-02-07 PROCEDURE — 85025 COMPLETE CBC W/AUTO DIFF WBC: CPT | Mod: TXP | Performed by: INTERNAL MEDICINE

## 2022-02-07 PROCEDURE — C1729 CATH, DRAINAGE: HCPCS | Mod: TXP

## 2022-02-07 PROCEDURE — 85610 PROTHROMBIN TIME: CPT | Mod: TXP | Performed by: INTERNAL MEDICINE

## 2022-02-07 PROCEDURE — 36415 COLL VENOUS BLD VENIPUNCTURE: CPT | Mod: TXP | Performed by: INTERNAL MEDICINE

## 2022-02-07 PROCEDURE — P9047 ALBUMIN (HUMAN), 25%, 50ML: HCPCS | Mod: JG,NTX

## 2022-02-07 PROCEDURE — 49083 ABD PARACENTESIS W/IMAGING: CPT | Mod: NTX,,, | Performed by: FAMILY MEDICINE

## 2022-02-07 RX ORDER — ALBUMIN HUMAN 250 G/1000ML
SOLUTION INTRAVENOUS
Status: COMPLETED
Start: 2022-02-07 | End: 2022-02-07

## 2022-02-07 RX ADMIN — ALBUMIN HUMAN 25 G: 0.25 SOLUTION INTRAVENOUS at 09:02

## 2022-02-07 NOTE — PROCEDURES
Radiology Post-Procedure Note    Pre Op Diagnosis: Ascites  Post Op Diagnosis: Same    Procedure: Ultrasound Guided Paracentesis    Procedure performed by: Fabrizio PEÑA, Cee     Written Informed Consent Obtained: Yes  Specimen Removed: YES clear yellow  Estimated Blood Loss: Minimal    Findings:   Successful paracentesis.  Albumin administered PRN per protocol.    Patient tolerated procedure well.    Cee Dinh, APRN, FNP  Interventional Radiology  [phone] clinic

## 2022-02-07 NOTE — H&P
Radiology History & Physical      SUBJECTIVE:     Chief Complaint: abdominal distention    History of Present Illness:  Malu Montes is a 51 y.o. female who presents for ultrasound guided paracentesis  Past Medical History:   Diagnosis Date    ADD (attention deficit disorder)     Anxiety     Ascites of liver     Cirrhosis 2021    CKD (chronic kidney disease) stage 3, GFR 30-59 ml/min     Constipation     ETOH abuse     Hyperlipidemia     Other ascites 2021    Pancreatitis, acute     Tobacco use     Vitamin D deficiency      Past Surgical History:   Procedure Laterality Date    AUGMENTATION OF BREAST      breast augmentation       SECTION      COLONOSCOPY N/A 2021    Procedure: COLONOSCOPY;  Surgeon: Dao Gary MD;  Location: [facility] (2ND FLR);  Service: Endoscopy;  Laterality: N/A;  COVID test 21 General surgery clinic [facility]    CYSTOSCOPY N/A 9/10/2021    Procedure: CYSTOSCOPY;  Surgeon: Rj Sánchez Jr., MD;  [facility];  Service: Urology;  Laterality: N/A;    ESOPHAGOGASTRODUODENOSCOPY N/A 2021    Procedure: ESOPHAGOGASTRODUODENOSCOPY (EGD);  Surgeon: Dao Gray MD;  Location: [facility] (2ND FLR);  Service: Endoscopy;  Laterality: N/A;  labs current on     ESOPHAGOGASTRODUODENOSCOPY N/A 10/26/2021    Procedure: ESOPHAGOGASTRODUODENOSCOPY (EGD);  Surgeon: Dao Gray MD;  Location: [facility] (2ND FLR);  Service: Endoscopy;  Laterality: N/A;  to be done the week of 10/18/21 per Dr. Gray-[facility]  covid-10/23/21-[facility]-   10/25 arrival time confirmed with pt-rb    ESOPHAGOGASTRODUODENOSCOPY Left 2021    Procedure: EGD (ESOPHAGOGASTRODUODENOSCOPY);  Surgeon: Koffi Monteiro MD;  Location: [facility] (4TH FLR);  Service: Endoscopy;  Laterality: Left;  Rapid  pt requested AM appt and first available in December-  labs morning of procedure-BB   lvm to confirm appt-rb    HEMORRHOID SURGERY       PERITONEOCENTESIS Right 6/8/2021    Procedure: PARACENTESIS, ABDOMINAL;  Surgeon: Jay Torre MD;  Location: [facility] CATH LAB;  Service: Radiology;  Laterality: Right;    PERITONEOCENTESIS Right 6/25/2021    Procedure: PARACENTESIS, ABDOMINAL;  Surgeon: Jay Torre MD;  Location: [facility] CATH LAB;  Service: Radiology;  Laterality: Right;    PERITONEOCENTESIS Right 7/12/2021    Procedure: PARACENTESIS, ABDOMINAL;  Surgeon: Jay Torre MD;  Location: [facility] CATH LAB;  Service: Radiology;  Laterality: Right;    PERITONEOCENTESIS Right 7/23/2021    Procedure: PARACENTESIS, ABDOMINAL;  Surgeon: Edward De La Torre II, MD;  Location: [facility] CATH LAB;  Service: Interventional Radiology;  Laterality: Right;    PERITONEOCENTESIS Right 8/3/2021    Procedure: PARACENTESIS, ABDOMINAL;  Surgeon: Franco Cheung MD;  Location: [facility] CATH LAB;  Service: Radiology;  Laterality: Right;    PERITONEOCENTESIS Right 8/16/2021    Procedure: PARACENTESIS, ABDOMINAL;  Surgeon: Jay Torre MD;  Location: [facility] CATH LAB;  Service: Radiology;  Laterality: Right;    PERITONEOCENTESIS Right 8/23/2021    Procedure: PARACENTESIS, ABDOMINAL;  Surgeon: Jay Torre MD;  Location: [facility] CATH LAB;  Service: Radiology;  Laterality: Right;    PERITONEOCENTESIS Right 9/16/2021    Procedure: PARACENTESIS, ABDOMINAL;  Surgeon: Jay Torre MD;  Location: [facility] CATH LAB;  Service: Radiology;  Laterality: Right;    PERITONEOCENTESIS N/A 9/24/2021    Procedure: PARACENTESIS, ABDOMINAL;  Surgeon: Jay Torre MD;  Location: [facility] CATH LAB;  Service: Radiology;  Laterality: N/A;    PERITONEOCENTESIS Right 12/23/2021    Procedure: PARACENTESIS, ABDOMINAL;  Surgeon: Jay Torre MD;  Location: [facility] CATH LAB;  Service: Radiology;  Laterality: Right;    PERITONEOCENTESIS N/A 12/30/2021    Procedure: PARACENTESIS, ABDOMINAL;  Surgeon: Salas Cabrera MD;  Location: [facility] CATH LAB;  Service: Radiology;  Laterality:  N/A;    RETROGRADE PYELOGRAPHY Bilateral 9/10/2021    Procedure: PYELOGRAM, RETROGRADE;  Surgeon: Rj Sánchez Jr., MD;  Location: [facility] OR [address] [place] [address];  Service: Urology;  Laterality: Bilateral;    TONSILLECTOMY         Home Meds:   Prior to Admission medications    Medication Sig Start Date End Date Taking? Authorizing Provider   allopurinoL (ZYLOPRIM) 100 MG tablet Take 1 tablet (100 mg total) by mouth once daily. 11/12/21   Rashel Cohn MD   bisacodyL (DULCOLAX) 5 mg EC tablet Take 5 mg by mouth daily as needed for Constipation.    Historical Provider   ciprofloxacin HCl (CIPRO) 250 MG tablet Take 1 tablet (250 mg total) by mouth once daily.  Patient taking differently: Take 250 mg by mouth every other day. 12/23/21   Rashel Cohn MD   ergocalciferol (ERGOCALCIFEROL) 50,000 unit Cap Take 1 capsule (50,000 Units total) by mouth every 7 days.  Patient not taking: No sig reported 10/2/21   Rashel Cohn MD   folic acid (FOLVITE) 1 MG tablet Take 2 tablets (2 mg total) by mouth once daily. 12/14/21 3/14/22  Uma Perry MD   furosemide (LASIX) 40 MG tablet Take 80 mg every am and 40 mg every pm 1/6/22   Sharmila Pacheco MD   levothyroxine (SYNTHROID) 50 MCG tablet TAKE 1 TABLET(50 MCG) BY MOUTH BEFORE BREAKFAST 9/25/21   Killian Dodd DO   linaCLOtide (LINZESS) 72 mcg Cap capsule Take 2 capsules (144 mcg total) by mouth before breakfast. 11/26/21   Sharmila Pacheco MD   sodium bicarbonate 650 MG tablet Take 1 tablet (650 mg total) by mouth 2 (two) times daily.  Patient taking differently: Take 650 mg by mouth once daily. 10/2/21 3/31/22  Rashel Cohn MD   thiamine (VITAMIN B-1) 100 MG tablet Take 1 tablet (100 mg total) by mouth every evening. 10/11/21   Sharmila Pacheco MD   vitamin A 57046 UNIT capsule Take 10,000 Units by mouth every evening.     Historical Provider     Anticoagulants/Antiplatelets: no anticoagulation    Allergies: Review of patient's allergies indicates:  No  Known Allergies  Sedation History:  no adverse reactions    Review of Systems:   Hematological: no known coagulopathies  Respiratory: no shortness of breath  Cardiovascular: no chest pain  Gastrointestinal: no abdominal pain  Genito-Urinary: no dysuria  Musculoskeletal: negative  Neurological: no TIA or stroke symptoms         OBJECTIVE:     Vital Signs (Most Recent)       Physical Exam:  ASA: 2  Mallampati: n/a    General: no acute distress  Mental Status: alert and oriented to person, place and time  HEENT: normocephalic, atraumatic  Chest: unlabored breathing  Heart: regular heart rate  Abdomen: distended  Extremity: moves all extremities    ASSESSMENT/PLAN:     Sedation Plan: local  Patient will undergo ultrasound guided paracentesis.    RENATA Marie, FNP  Interventional Radiology  [phone] clinic

## 2022-02-07 NOTE — PLAN OF CARE
Paracentesis completed. Patient tolerated well; VSS. 4200 ml removed; labs sent per orders. RLQ site clean, dry and intact. 25 G Albumin administered per protocol. Patient refused printed discharge instructions and ambulated independently to lobby for discharge home.

## 2022-02-08 ENCOUNTER — CONFERENCE (OUTPATIENT)
Dept: TRANSPLANT | Facility: CLINIC | Age: 52
End: 2022-02-08
Payer: COMMERCIAL

## 2022-02-08 ENCOUNTER — EPISODE CHANGES (OUTPATIENT)
Dept: TRANSPLANT | Facility: CLINIC | Age: 52
End: 2022-02-08

## 2022-02-08 NOTE — TELEPHONE ENCOUNTER
Patient's case discussed in Liver Transplant Discussion Committee. Patient was recently activated  post covid isolation.  She can accept liver alone.  Patient gets paracentesis twice a week.  Her creatinine does not reflect her renal function due to her decreased muscle mass.  The committee recommended to pursue hemodialysis.  Dr. Pacheco will discuss her case with Nephrology.  The committee would like to keep her in mind for back up offers.  Dr. Carlson updated waitlist comments.

## 2022-02-11 ENCOUNTER — HOSPITAL ENCOUNTER (OUTPATIENT)
Dept: INTERVENTIONAL RADIOLOGY/VASCULAR | Facility: HOSPITAL | Age: 52
Discharge: HOME | End: 2022-02-11
Attending: INTERNAL MEDICINE
Payer: COMMERCIAL

## 2022-02-11 ENCOUNTER — INFUSION (OUTPATIENT)
Dept: INFECTIOUS DISEASES | Facility: HOSPITAL | Age: 52
End: 2022-02-11
Attending: INTERNAL MEDICINE
Payer: COMMERCIAL

## 2022-02-11 VITALS
WEIGHT: 117.5 LBS | SYSTOLIC BLOOD PRESSURE: 116 MMHG | DIASTOLIC BLOOD PRESSURE: 65 MMHG | HEIGHT: 63 IN | BODY MASS INDEX: 20.82 KG/M2 | TEMPERATURE: 98 F | HEART RATE: 104 BPM | OXYGEN SATURATION: 100 % | RESPIRATION RATE: 19 BRPM

## 2022-02-11 VITALS
HEART RATE: 91 BPM | RESPIRATION RATE: 18 BRPM | DIASTOLIC BLOOD PRESSURE: 67 MMHG | OXYGEN SATURATION: 99 % | SYSTOLIC BLOOD PRESSURE: 101 MMHG

## 2022-02-11 DIAGNOSIS — R18.8 OTHER ASCITES: ICD-10-CM

## 2022-02-11 DIAGNOSIS — D50.8 OTHER IRON DEFICIENCY ANEMIA: Primary | ICD-10-CM

## 2022-02-11 LAB
APPEARANCE FLD: NORMAL
BODY FLD TYPE: NORMAL
COLOR FLD: YELLOW
LYMPHOCYTES NFR FLD MANUAL: 84 %
MESOTHL CELL NFR FLD MANUAL: 1 %
MONOS+MACROS NFR FLD MANUAL: 11 %
NEUTROPHILS NFR FLD MANUAL: 4 %
WBC # FLD: 51 /CU MM

## 2022-02-11 PROCEDURE — 96365 THER/PROPH/DIAG IV INF INIT: CPT | Mod: 59,NTX

## 2022-02-11 PROCEDURE — 49083 ABD PARACENTESIS W/IMAGING: CPT | Mod: TXP,,, | Performed by: FAMILY MEDICINE

## 2022-02-11 PROCEDURE — 49083 ABD PARACENTESIS W/IMAGING: CPT | Mod: NTX | Performed by: RADIOLOGY

## 2022-02-11 PROCEDURE — 63600175 PHARM REV CODE 636 W HCPCS: Mod: JG,NTX | Performed by: FAMILY MEDICINE

## 2022-02-11 PROCEDURE — C1729 CATH, DRAINAGE: HCPCS | Mod: TXP

## 2022-02-11 PROCEDURE — 25000003 PHARM REV CODE 250: Mod: TXP | Performed by: INTERNAL MEDICINE

## 2022-02-11 PROCEDURE — 89051 BODY FLUID CELL COUNT: CPT | Mod: TXP | Performed by: INTERNAL MEDICINE

## 2022-02-11 PROCEDURE — 63600175 PHARM REV CODE 636 W HCPCS: Mod: NTX | Performed by: INTERNAL MEDICINE

## 2022-02-11 PROCEDURE — P9047 ALBUMIN (HUMAN), 25%, 50ML: HCPCS | Mod: JG,NTX | Performed by: FAMILY MEDICINE

## 2022-02-11 PROCEDURE — 49083 IR PARACENTESIS WITH IMAGING: ICD-10-PCS | Mod: TXP,,, | Performed by: FAMILY MEDICINE

## 2022-02-11 RX ORDER — SODIUM CHLORIDE 0.9 % (FLUSH) 0.9 %
10 SYRINGE (ML) INJECTION
Status: CANCELLED | OUTPATIENT
Start: 2022-02-18

## 2022-02-11 RX ORDER — HEPARIN 100 UNIT/ML
500 SYRINGE INTRAVENOUS
Status: CANCELLED | OUTPATIENT
Start: 2022-02-18

## 2022-02-11 RX ORDER — ALBUMIN HUMAN 250 G/1000ML
SOLUTION INTRAVENOUS
Status: COMPLETED | OUTPATIENT
Start: 2022-02-11 | End: 2022-02-11

## 2022-02-11 RX ORDER — ALBUMIN HUMAN 250 G/1000ML
SOLUTION INTRAVENOUS
Status: DISPENSED
Start: 2022-02-11 | End: 2022-02-11

## 2022-02-11 RX ORDER — SODIUM CHLORIDE 0.9 % (FLUSH) 0.9 %
10 SYRINGE (ML) INJECTION
Status: DISCONTINUED | OUTPATIENT
Start: 2022-02-11 | End: 2022-02-11 | Stop reason: HOSPADM

## 2022-02-11 RX ADMIN — IRON SUCROSE 200 MG: 20 INJECTION, SOLUTION INTRAVENOUS at 10:02

## 2022-02-11 RX ADMIN — ALBUMIN (HUMAN) 25 G: 25 SOLUTION INTRAVENOUS at 09:02

## 2022-02-11 RX ADMIN — ALBUMIN (HUMAN) 12.5 G: 25 SOLUTION INTRAVENOUS at 09:02

## 2022-02-11 NOTE — H&P
Radiology History & Physical      SUBJECTIVE:     Chief Complaint: abdominal distention    History of Present Illness:  Malu Montes is a 51 y.o. female who presents for ultrasound guided paracentesis  Past Medical History:   Diagnosis Date    ADD (attention deficit disorder)     Anxiety     Ascites of liver     Cirrhosis 2021    CKD (chronic kidney disease) stage 3, GFR 30-59 ml/min     Constipation     ETOH abuse     Hyperlipidemia     Other ascites 2021    Pancreatitis, acute     Tobacco use     Vitamin D deficiency      Past Surgical History:   Procedure Laterality Date    AUGMENTATION OF BREAST      breast augmentation       SECTION      COLONOSCOPY N/A 2021    Procedure: COLONOSCOPY;  Surgeon: Dao Gray MD;  Location: [facility] (2ND FLR);  Service: Endoscopy;  Laterality: N/A;  COVID test 21 General surgery clinic [facility]    CYSTOSCOPY N/A 9/10/2021    Procedure: CYSTOSCOPY;  Surgeon: Rj Sánchez Jr., MD;  [facility];  Service: Urology;  Laterality: N/A;    ESOPHAGOGASTRODUODENOSCOPY N/A 2021    Procedure: ESOPHAGOGASTRODUODENOSCOPY (EGD);  Surgeon: Dao Gray MD;  Location: [facility] (2ND FLR);  Service: Endoscopy;  Laterality: N/A;  labs current on     ESOPHAGOGASTRODUODENOSCOPY N/A 10/26/2021    Procedure: ESOPHAGOGASTRODUODENOSCOPY (EGD);  Surgeon: Dao Gray MD;  Location: [facility] (2ND FLR);  Service: Endoscopy;  Laterality: N/A;  to be done the week of 10/18/21 per Dr. Gray-BB  covid-10/23/21-[facility]-   10/25 arrival time confirmed with pt-rb    ESOPHAGOGASTRODUODENOSCOPY Left 2021    Procedure: EGD (ESOPHAGOGASTRODUODENOSCOPY);  Surgeon: Koffi Monteiro MD;  Location: [facility] (4TH FLR);  Service: Endoscopy;  Laterality: Left;  Rapid  pt requested AM appt and first available in December-  labs morning of procedure-BB   lvm to confirm appt-rb    HEMORRHOID SURGERY       PERITONEOCENTESIS Right 6/8/2021    Procedure: PARACENTESIS, ABDOMINAL;  Surgeon: Jay Torre MD;  Location: [facility] CATH LAB;  Service: Radiology;  Laterality: Right;    PERITONEOCENTESIS Right 6/25/2021    Procedure: PARACENTESIS, ABDOMINAL;  Surgeon: Jay Torre MD;  Location: [facility] CATH LAB;  Service: Radiology;  Laterality: Right;    PERITONEOCENTESIS Right 7/12/2021    Procedure: PARACENTESIS, ABDOMINAL;  Surgeon: Jay Torre MD;  Location: [facility] CATH LAB;  Service: Radiology;  Laterality: Right;    PERITONEOCENTESIS Right 7/23/2021    Procedure: PARACENTESIS, ABDOMINAL;  Surgeon: Edward De La Torre II, MD;  Location: [facility] CATH LAB;  Service: Interventional Radiology;  Laterality: Right;    PERITONEOCENTESIS Right 8/3/2021    Procedure: PARACENTESIS, ABDOMINAL;  Surgeon: Franco Cheung MD;  Location: [facility] CATH LAB;  Service: Radiology;  Laterality: Right;    PERITONEOCENTESIS Right 8/16/2021    Procedure: PARACENTESIS, ABDOMINAL;  Surgeon: Jay Torre MD;  Location: [facility] CATH LAB;  Service: Radiology;  Laterality: Right;    PERITONEOCENTESIS Right 8/23/2021    Procedure: PARACENTESIS, ABDOMINAL;  Surgeon: Jay Torre MD;  Location: [facility] CATH LAB;  Service: Radiology;  Laterality: Right;    PERITONEOCENTESIS Right 9/16/2021    Procedure: PARACENTESIS, ABDOMINAL;  Surgeon: Jay Torre MD;  Location: [facility] CATH LAB;  Service: Radiology;  Laterality: Right;    PERITONEOCENTESIS N/A 9/24/2021    Procedure: PARACENTESIS, ABDOMINAL;  Surgeon: Jay Torre MD;  Location: [facility] CATH LAB;  Service: Radiology;  Laterality: N/A;    PERITONEOCENTESIS Right 12/23/2021    Procedure: PARACENTESIS, ABDOMINAL;  Surgeon: Jay Torre MD;  Location: [facility] CATH LAB;  Service: Radiology;  Laterality: Right;    PERITONEOCENTESIS N/A 12/30/2021    Procedure: PARACENTESIS, ABDOMINAL;  Surgeon: Salas Cabrera MD;  Location: [facility] CATH LAB;  Service: Radiology;  Laterality:  N/A;    RETROGRADE PYELOGRAPHY Bilateral 9/10/2021    Procedure: PYELOGRAM, RETROGRADE;  Surgeon: Rj Sánchez Jr., MD;  Location: [facility] OR [address] [place] [address];  Service: Urology;  Laterality: Bilateral;    TONSILLECTOMY         Home Meds:   Prior to Admission medications    Medication Sig Start Date End Date Taking? Authorizing Provider   allopurinoL (ZYLOPRIM) 100 MG tablet Take 1 tablet (100 mg total) by mouth once daily. 11/12/21   Rashel Cohn MD   bisacodyL (DULCOLAX) 5 mg EC tablet Take 5 mg by mouth daily as needed for Constipation.    Historical Provider   ciprofloxacin HCl (CIPRO) 250 MG tablet Take 1 tablet (250 mg total) by mouth once daily.  Patient taking differently: Take 250 mg by mouth every other day. 12/23/21   Rashel Cohn MD   ergocalciferol (ERGOCALCIFEROL) 50,000 unit Cap Take 1 capsule (50,000 Units total) by mouth every 7 days.  Patient not taking: No sig reported 10/2/21   Rashel Cohn MD   folic acid (FOLVITE) 1 MG tablet Take 2 tablets (2 mg total) by mouth once daily. 12/14/21 3/14/22  Uma Perry MD   furosemide (LASIX) 40 MG tablet Take 80 mg every am and 40 mg every pm 1/6/22   Sharmila Pacheco MD   levothyroxine (SYNTHROID) 50 MCG tablet TAKE 1 TABLET(50 MCG) BY MOUTH BEFORE BREAKFAST 9/25/21   Killian Dodd DO   linaCLOtide (LINZESS) 72 mcg Cap capsule Take 2 capsules (144 mcg total) by mouth before breakfast. 11/26/21   Sharmila Pacheco MD   sodium bicarbonate 650 MG tablet Take 1 tablet (650 mg total) by mouth 2 (two) times daily.  Patient taking differently: Take 650 mg by mouth once daily. 10/2/21 3/31/22  Rashel Cohn MD   thiamine (VITAMIN B-1) 100 MG tablet Take 1 tablet (100 mg total) by mouth every evening. 10/11/21   Sharmila Pacheco MD   vitamin A 63363 UNIT capsule Take 10,000 Units by mouth every evening.     Historical Provider     Anticoagulants/Antiplatelets: no anticoagulation    Allergies: Review of patient's allergies indicates:  No  Known Allergies  Sedation History:  no adverse reactions    Review of Systems:   Hematological: no known coagulopathies  Respiratory: no shortness of breath  Cardiovascular: no chest pain  Gastrointestinal: no abdominal pain  Genito-Urinary: no dysuria  Musculoskeletal: negative  Neurological: no TIA or stroke symptoms         OBJECTIVE:     Vital Signs (Most Recent)  Pulse: 97 (02/11/22 0812)  Resp: 18 (02/11/22 0812)  BP: 109/73 (02/11/22 0812)  SpO2: 100 % (02/11/22 0812)    Physical Exam:  ASA: 2  Mallampati: n/a    General: no acute distress  Mental Status: alert and oriented to person, place and time  HEENT: normocephalic, atraumatic  Chest: unlabored breathing  Heart: regular heart rate  Abdomen: distended  Extremity: moves all extremities    ASSESSMENT/PLAN:     Sedation Plan: local  Patient will undergo Ultrasound guided paracentesis.    RENATA Marie, FNP  Interventional Radiology  [phone] [facility]

## 2022-02-11 NOTE — PLAN OF CARE
Patient AAOx3, no distress noted, respirations even and unlabored, will continue to monitor. Allergies reviewed. Waiting for eval and consent.  Vitals:    02/11/22 0812   BP: 109/73   Pulse: 97   Resp: 18

## 2022-02-11 NOTE — PLAN OF CARE
Paracentesis done. Removed 5450 ml. Albumin 37.5 given. Patient AAOx3, no distress noted, respirations even and unlabored.   Vitals:    02/11/22 0927   BP: 101/67   Pulse: 91   Resp: 18

## 2022-02-11 NOTE — PROGRESS NOTES
Arrived for Venofer infusion. S/P paracentesis prior to arrival. Tolerated infusion without any difficulty, observation thirty minutes post administration, left unit in NAD, future appointment on hand.

## 2022-02-11 NOTE — PROCEDURES
Radiology Post-Procedure Note    Pre Op Diagnosis: Ascites  Post Op Diagnosis: Same    Procedure: Ultrasound Guided Paracentesis    Procedure performed by: Fabrizio PEÑA, Cee     Written Informed Consent Obtained: Yes  Specimen Removed: YES clear yellow  Estimated Blood Loss: Minimal    Findings:   Successful paracentesis.  Albumin administered PRN per protocol.    Patient tolerated procedure well.    Cee Dinh, APRN, FNP  Interventional Radiology  [phone] clinic

## 2022-02-14 ENCOUNTER — HOSPITAL ENCOUNTER (OUTPATIENT)
Dept: INTERVENTIONAL RADIOLOGY/VASCULAR | Facility: HOSPITAL | Age: 52
Discharge: HOME | End: 2022-02-14
Attending: INTERNAL MEDICINE
Payer: COMMERCIAL

## 2022-02-14 VITALS
HEART RATE: 85 BPM | DIASTOLIC BLOOD PRESSURE: 57 MMHG | OXYGEN SATURATION: 100 % | SYSTOLIC BLOOD PRESSURE: 94 MMHG | RESPIRATION RATE: 18 BRPM

## 2022-02-14 DIAGNOSIS — R18.8 OTHER ASCITES: ICD-10-CM

## 2022-02-14 LAB
APPEARANCE FLD: NORMAL
BODY FLD TYPE: NORMAL
COLOR FLD: YELLOW
LYMPHOCYTES NFR FLD MANUAL: 87 %
MESOTHL CELL NFR FLD MANUAL: 1 %
MONOS+MACROS NFR FLD MANUAL: 9 %
NEUTROPHILS NFR FLD MANUAL: 3 %
WBC # FLD: 51 /CU MM

## 2022-02-14 PROCEDURE — P9047 ALBUMIN (HUMAN), 25%, 50ML: HCPCS | Mod: JG,NTX

## 2022-02-14 PROCEDURE — 49083 ABD PARACENTESIS W/IMAGING: CPT | Mod: TXP | Performed by: RADIOLOGY

## 2022-02-14 PROCEDURE — C1894 INTRO/SHEATH, NON-LASER: HCPCS | Mod: TXP

## 2022-02-14 PROCEDURE — A4215 STERILE NEEDLE: HCPCS | Mod: NTX

## 2022-02-14 PROCEDURE — 49083 IR PARACENTESIS WITH IMAGING: ICD-10-PCS | Mod: TXP,,, | Performed by: FAMILY MEDICINE

## 2022-02-14 PROCEDURE — 89051 BODY FLUID CELL COUNT: CPT | Mod: TXP | Performed by: INTERNAL MEDICINE

## 2022-02-14 PROCEDURE — 63600175 PHARM REV CODE 636 W HCPCS: Mod: JG,NTX

## 2022-02-14 PROCEDURE — 49083 ABD PARACENTESIS W/IMAGING: CPT | Mod: TXP,,, | Performed by: FAMILY MEDICINE

## 2022-02-14 RX ORDER — ALBUMIN HUMAN 250 G/1000ML
SOLUTION INTRAVENOUS
Status: COMPLETED
Start: 2022-02-14 | End: 2022-02-14

## 2022-02-14 RX ORDER — ALBUMIN HUMAN 250 G/1000ML
50 SOLUTION INTRAVENOUS ONCE
Status: DISCONTINUED | OUTPATIENT
Start: 2022-02-14 | End: 2022-02-14

## 2022-02-14 RX ORDER — ALBUMIN HUMAN 250 G/1000ML
25 SOLUTION INTRAVENOUS ONCE
Status: COMPLETED | OUTPATIENT
Start: 2022-02-14 | End: 2022-02-14

## 2022-02-14 RX ADMIN — ALBUMIN HUMAN 25 G: 0.25 SOLUTION INTRAVENOUS at 09:02

## 2022-02-14 RX ADMIN — ALBUMIN HUMAN 25 G: 250 SOLUTION INTRAVENOUS at 09:02

## 2022-02-14 NOTE — PROCEDURES
Radiology Post-Procedure Note    Pre Op Diagnosis: Ascites  Post Op Diagnosis: Same    Procedure: Ultrasound Guided Paracentesis    Procedure performed by: Fabrizio PEÑA, Cee     Written Informed Consent Obtained: Yes  Specimen Removed: YES clear yellow  Estimated Blood Loss: Minimal    Findings:   Successful paracentesis.  Albumin administered PRN per protocol.    Patient tolerated procedure well.    Cee Dinh, APRN, FNP  Interventional Radiology  [phone] clinic

## 2022-02-14 NOTE — H&P
Radiology History & Physical      SUBJECTIVE:     Chief Complaint: abdominal distention    History of Present Illness:  Malu Montes is a 51 y.o. female who presents for ultrasound guided paracentesis  Past Medical History:   Diagnosis Date    ADD (attention deficit disorder)     Anxiety     Ascites of liver     Cirrhosis 2021    CKD (chronic kidney disease) stage 3, GFR 30-59 ml/min     Constipation     ETOH abuse     Hyperlipidemia     Other ascites 2021    Pancreatitis, acute     Tobacco use     Vitamin D deficiency      Past Surgical History:   Procedure Laterality Date    AUGMENTATION OF BREAST      breast augmentation       SECTION      COLONOSCOPY N/A 2021    Procedure: COLONOSCOPY;  Surgeon: Dao Gray MD;  Location: [facility] (2ND FLR);  Service: Endoscopy;  Laterality: N/A;  COVID test 21 General surgery clinic [facility]    CYSTOSCOPY N/A 9/10/2021    Procedure: CYSTOSCOPY;  Surgeon: Rj Sánchez Jr., MD;  [facility];  Service: Urology;  Laterality: N/A;    ESOPHAGOGASTRODUODENOSCOPY N/A 2021    Procedure: ESOPHAGOGASTRODUODENOSCOPY (EGD);  Surgeon: Dao Gray MD;  Location: [facility] (2ND FLR);  Service: Endoscopy;  Laterality: N/A;  labs current on     ESOPHAGOGASTRODUODENOSCOPY N/A 10/26/2021    Procedure: ESOPHAGOGASTRODUODENOSCOPY (EGD);  Surgeon: Dao Gray MD;  Location: [facility] (2ND FLR);  Service: Endoscopy;  Laterality: N/A;  to be done the week of 10/18/21 per Dr. Gray-BB  cov[facility]-10/23/21-[facility]-   10/25 arrival time confirmed with pt-rb    ESOPHAGOGASTRODUODENOSCOPY Left 2021    Procedure: EGD (ESOPHAGOGASTRODUODENOSCOPY);  Surgeon: Koffi Monteiro MD;  Location: [facility] (4TH FLR);  Service: Endoscopy;  Laterality: Left;  Rapid  pt requested AM appt and first available in December-  labs morning of procedure-BB   lvm to confirm appt-rb    HEMORRHOID SURGERY       PERITONEOCENTESIS Right 6/8/2021    Procedure: PARACENTESIS, ABDOMINAL;  Surgeon: Jay Torre MD;  Location: [facility] [place] CATH LAB;  Service: Radiology;  Laterality: Right;    PERITONEOCENTESIS Right 6/25/2021    Procedure: PARACENTESIS, ABDOMINAL;  Surgeon: Jay Torre MD;  Location: [facility] [place] CATH LAB;  Service: Radiology;  Laterality: Right;    PERITONEOCENTESIS Right 7/12/2021    Procedure: PARACENTESIS, ABDOMINAL;  Surgeon: Jay Torre MD;  Location: [facility] [place] CATH LAB;  Service: Radiology;  Laterality: Right;    PERITONEOCENTESIS Right 7/23/2021    Procedure: PARACENTESIS, ABDOMINAL;  Surgeon: Edward De La Torre II, MD;  Location: [facility] [place] CATH LAB;  Service: Interventional Radiology;  Laterality: Right;    PERITONEOCENTESIS Right 8/3/2021    Procedure: PARACENTESIS, ABDOMINAL;  Surgeon: Franco Cheung MD;  Location: [facility] [place] CATH LAB;  Service: Radiology;  Laterality: Right;    PERITONEOCENTESIS Right 8/16/2021    Procedure: PARACENTESIS, ABDOMINAL;  Surgeon: Jay Torre MD;  Location: [facility] [place] CATH LAB;  Service: Radiology;  Laterality: Right;    PERITONEOCENTESIS Right 8/23/2021    Procedure: PARACENTESIS, ABDOMINAL;  Surgeon: Jay Torre MD;  Location: [facility] [place] CATH LAB;  Service: Radiology;  Laterality: Right;    PERITONEOCENTESIS Right 9/16/2021    Procedure: PARACENTESIS, ABDOMINAL;  Surgeon: Jay Torre MD;  Location: [facility] [place] CATH LAB;  Service: Radiology;  Laterality: Right;    PERITONEOCENTESIS N/A 9/24/2021    Procedure: PARACENTESIS, ABDOMINAL;  Surgeon: Jay Torre MD;  Location: [facility] [place] CATH LAB;  Service: Radiology;  Laterality: N/A;    PERITONEOCENTESIS Right 12/23/2021    Procedure: PARACENTESIS, ABDOMINAL;  Surgeon: Jay Torre MD;  Location: [facility] [place] CATH LAB;  Service: Radiology;  Laterality: Right;    PERITONEOCENTESIS N/A 12/30/2021    Procedure: PARACENTESIS, ABDOMINAL;  Surgeon: Salas Cabrera MD;  Location: [facility] [place] CATH LAB;  Service: Radiology;  Laterality:  N/A;    RETROGRADE PYELOGRAPHY Bilateral 9/10/2021    Procedure: PYELOGRAM, RETROGRADE;  Surgeon: Rj Sánchez Jr., MD;  Location: [facility] OR [address] [place] [address];  Service: Urology;  Laterality: Bilateral;    TONSILLECTOMY         Home Meds:   Prior to Admission medications    Medication Sig Start Date End Date Taking? Authorizing Provider   allopurinoL (ZYLOPRIM) 100 MG tablet Take 1 tablet (100 mg total) by mouth once daily. 11/12/21   Rashel Cohn MD   bisacodyL (DULCOLAX) 5 mg EC tablet Take 5 mg by mouth daily as needed for Constipation.    Historical Provider   ciprofloxacin HCl (CIPRO) 250 MG tablet Take 1 tablet (250 mg total) by mouth once daily.  Patient taking differently: Take 250 mg by mouth every other day. 12/23/21   Rashel Cohn MD   ergocalciferol (ERGOCALCIFEROL) 50,000 unit Cap Take 1 capsule (50,000 Units total) by mouth every 7 days.  Patient not taking: No sig reported 10/2/21   Rashel Cohn MD   folic acid (FOLVITE) 1 MG tablet Take 2 tablets (2 mg total) by mouth once daily. 12/14/21 3/14/22  Uma Perry MD   furosemide (LASIX) 40 MG tablet Take 80 mg every am and 40 mg every pm 1/6/22   Sharmila Pacheco MD   levothyroxine (SYNTHROID) 50 MCG tablet TAKE 1 TABLET(50 MCG) BY MOUTH BEFORE BREAKFAST 9/25/21   Killian Dodd DO   linaCLOtide (LINZESS) 72 mcg Cap capsule Take 2 capsules (144 mcg total) by mouth before breakfast. 11/26/21   Sharmila Pacheco MD   sodium bicarbonate 650 MG tablet Take 1 tablet (650 mg total) by mouth 2 (two) times daily.  Patient taking differently: Take 650 mg by mouth once daily. 10/2/21 3/31/22  Rashel Cohn MD   thiamine (VITAMIN B-1) 100 MG tablet Take 1 tablet (100 mg total) by mouth every evening. 10/11/21   Sharmila Pacheco MD   vitamin A 74173 UNIT capsule Take 10,000 Units by mouth every evening.     Historical Provider     Anticoagulants/Antiplatelets: no anticoagulation    Allergies: Review of patient's allergies indicates:  No  Known Allergies  Sedation History:  no adverse reactions    Review of Systems:   Hematological: no known coagulopathies  Respiratory: no shortness of breath  Cardiovascular: no chest pain  Gastrointestinal: no abdominal pain  Genito-Urinary: no dysuria  Musculoskeletal: negative  Neurological: no TIA or stroke symptoms         OBJECTIVE:     Vital Signs (Most Recent)       Physical Exam:  ASA: 2  Mallampati: n/a    General: no acute distress  Mental Status: alert and oriented to person, place and time  HEENT: normocephalic, atraumatic  Chest: unlabored breathing  Heart: regular heart rate  Abdomen: distended  Extremity: moves all extremities    ASSESSMENT/PLAN:     Sedation Plan: local  Patient will undergo ultrasound guided paracentesis.    RENATA Marie, FNP  Interventional Radiology  [phone] clinic

## 2022-02-14 NOTE — PROCEDURES
Radiology Post-Procedure Note    Pre Op Diagnosis: right kidney transplant ureteral stricture    Post Op Diagnosis: same     Procedure:right transplant nephrostogram, ureteral stricture dilatation, and neph-u tube change    Procedure performed by: Solitario Spencer MD    Written Informed Consent Obtained: Yes    Specimen Removed: YES old neph-u tube    Estimated Blood Loss: Minimal    Findings: Local anesthesia and moderate sedation were used.      The indwelling nephrostomy tube was removed over a wire.  Nephrostogram revealed persistent UPJ stricture.  Stricture dilated to 6 mm.  A new 8.0 neph-u drainage catheter was placed under fluoroscopic guidance.  The drain was secured in place and dressed.      There were no complications and the patient tolerated the procedure well.  Please see Imaging report for further details.      Solitario Spencer MD  Staff Radiologist  Department of Radiology  Pager: [phone]

## 2022-02-15 ENCOUNTER — TELEPHONE (OUTPATIENT)
Dept: HEPATOLOGY | Facility: CLINIC | Age: 52
End: 2022-02-15
Payer: COMMERCIAL

## 2022-02-15 DIAGNOSIS — R18.8 REFRACTORY ASCITES: Primary | ICD-10-CM

## 2022-02-15 RX ORDER — PANTOPRAZOLE SODIUM 40 MG/1
40 TABLET, DELAYED RELEASE ORAL DAILY
Qty: 30 TABLET | Refills: 11 | Status: SHIPPED | OUTPATIENT
Start: 2022-02-15 | End: 2022-06-10

## 2022-02-15 NOTE — TELEPHONE ENCOUNTER
Discussed at selection. Will proceed with IR consult for TIPS. Explained risks, benefits, alternatives of TIPS. Team willing to try TIPS although remains unclear if will work since GFR is low. Pt will see me back in the office after IR consult. Have asked her also to get info from nephrology re terlipressin trial that will start in the near future.

## 2022-02-18 ENCOUNTER — HOSPITAL ENCOUNTER (OUTPATIENT)
Dept: INTERVENTIONAL RADIOLOGY/VASCULAR | Facility: HOSPITAL | Age: 52
Discharge: HOME | End: 2022-02-18
Attending: INTERNAL MEDICINE
Payer: COMMERCIAL

## 2022-02-18 ENCOUNTER — INFUSION (OUTPATIENT)
Dept: INFECTIOUS DISEASES | Facility: HOSPITAL | Age: 52
End: 2022-02-18
Attending: INTERNAL MEDICINE
Payer: COMMERCIAL

## 2022-02-18 VITALS
SYSTOLIC BLOOD PRESSURE: 102 MMHG | TEMPERATURE: 98 F | DIASTOLIC BLOOD PRESSURE: 53 MMHG | HEART RATE: 98 BPM | BODY MASS INDEX: 21.07 KG/M2 | SYSTOLIC BLOOD PRESSURE: 107 MMHG | RESPIRATION RATE: 21 BRPM | TEMPERATURE: 99 F | DIASTOLIC BLOOD PRESSURE: 58 MMHG | OXYGEN SATURATION: 100 % | WEIGHT: 118.94 LBS | RESPIRATION RATE: 20 BRPM | HEART RATE: 96 BPM | HEIGHT: 63 IN | OXYGEN SATURATION: 97 %

## 2022-02-18 DIAGNOSIS — D50.8 OTHER IRON DEFICIENCY ANEMIA: Primary | ICD-10-CM

## 2022-02-18 DIAGNOSIS — R18.8 OTHER ASCITES: ICD-10-CM

## 2022-02-18 LAB
APPEARANCE FLD: NORMAL
BODY FLD TYPE: NORMAL
COLOR FLD: YELLOW
LYMPHOCYTES NFR FLD MANUAL: 82 %
MONOS+MACROS NFR FLD MANUAL: 15 %
NEUTROPHILS NFR FLD MANUAL: 3 %
WBC # FLD: 58 /CU MM

## 2022-02-18 PROCEDURE — 96365 THER/PROPH/DIAG IV INF INIT: CPT | Mod: TXP

## 2022-02-18 PROCEDURE — C1729 CATH, DRAINAGE: HCPCS | Mod: TXP

## 2022-02-18 PROCEDURE — 63600175 PHARM REV CODE 636 W HCPCS: Mod: JG,NTX | Performed by: FAMILY MEDICINE

## 2022-02-18 PROCEDURE — 63600175 PHARM REV CODE 636 W HCPCS: Mod: NTX | Performed by: INTERNAL MEDICINE

## 2022-02-18 PROCEDURE — 89051 BODY FLUID CELL COUNT: CPT | Mod: TXP | Performed by: INTERNAL MEDICINE

## 2022-02-18 PROCEDURE — 49083 ABD PARACENTESIS W/IMAGING: CPT | Mod: TXP,,, | Performed by: FAMILY MEDICINE

## 2022-02-18 PROCEDURE — 49083 IR PARACENTESIS WITH IMAGING: ICD-10-PCS | Mod: TXP,,, | Performed by: FAMILY MEDICINE

## 2022-02-18 PROCEDURE — P9047 ALBUMIN (HUMAN), 25%, 50ML: HCPCS | Mod: JG,NTX | Performed by: FAMILY MEDICINE

## 2022-02-18 PROCEDURE — 25000003 PHARM REV CODE 250: Mod: TXP | Performed by: INTERNAL MEDICINE

## 2022-02-18 PROCEDURE — 49083 ABD PARACENTESIS W/IMAGING: CPT | Mod: TXP | Performed by: RADIOLOGY

## 2022-02-18 RX ORDER — HEPARIN 100 UNIT/ML
500 SYRINGE INTRAVENOUS
Status: CANCELLED | OUTPATIENT
Start: 2022-02-25

## 2022-02-18 RX ORDER — SODIUM CHLORIDE 0.9 % (FLUSH) 0.9 %
10 SYRINGE (ML) INJECTION
Status: DISCONTINUED | OUTPATIENT
Start: 2022-02-18 | End: 2022-02-18 | Stop reason: HOSPADM

## 2022-02-18 RX ORDER — SODIUM CHLORIDE 0.9 % (FLUSH) 0.9 %
10 SYRINGE (ML) INJECTION
Status: CANCELLED | OUTPATIENT
Start: 2022-02-25

## 2022-02-18 RX ORDER — ALBUMIN HUMAN 250 G/1000ML
SOLUTION INTRAVENOUS
Status: DISPENSED
Start: 2022-02-18 | End: 2022-02-18

## 2022-02-18 RX ORDER — ALBUMIN HUMAN 250 G/1000ML
SOLUTION INTRAVENOUS
Status: COMPLETED | OUTPATIENT
Start: 2022-02-18 | End: 2022-02-18

## 2022-02-18 RX ADMIN — ALBUMIN (HUMAN) 37.5 G: 25 SOLUTION INTRAVENOUS at 10:02

## 2022-02-18 RX ADMIN — IRON SUCROSE 200 MG: 20 INJECTION, SOLUTION INTRAVENOUS at 11:02

## 2022-02-18 NOTE — PROCEDURES
Radiology Post-Procedure Note    Pre Op Diagnosis: Ascites  Post Op Diagnosis: Same    Procedure: Ultrasound Guided Paracentesis    Procedure performed by: Fabrizio PEÑA, Cee     Written Informed Consent Obtained: Yes  Specimen Removed: YES clear yellow  Estimated Blood Loss: Minimal    Findings:   Successful paracentesis.  Albumin administered PRN per protocol.    Patient tolerated procedure well.    Cee Dinh, APRN, FNP  Interventional Radiology  [phone] clinic

## 2022-02-18 NOTE — PLAN OF CARE
Paracentesis completed. Removed 5200ml. Albumin 37.5g given. Patient AAOx3, no distress noted, respirations even and unlabored.   Vitals:    02/18/22 1023   BP: (!) 102/58   Pulse: 96   Resp: 20   Temp: 99 °F (37.2 °C)

## 2022-02-18 NOTE — PLAN OF CARE
Pt received into [facility] for paracentesis/thoracentesis. Pt oriented to unit, call bell, and order of events. VS obtained. Patient AAOx3, no distress noted, respirations even and unlabored, will continue to monitor. Allergies reviewed. Waiting for eval and consent.

## 2022-02-18 NOTE — H&P
Radiology History & Physical      SUBJECTIVE:     Chief Complaint: abdominal distention    History of Present Illness:  Malu Montes is a 51 y.o. female who presents for ultrasound guided paracentesid  Past Medical History:   Diagnosis Date    ADD (attention deficit disorder)     Anxiety     Ascites of liver     Cirrhosis 2021    CKD (chronic kidney disease) stage 3, GFR 30-59 ml/min     Constipation     ETOH abuse     Hyperlipidemia     Other ascites 2021    Pancreatitis, acute     Tobacco use     Vitamin D deficiency      Past Surgical History:   Procedure Laterality Date    AUGMENTATION OF BREAST      breast augmentation       SECTION      COLONOSCOPY N/A 2021    Procedure: COLONOSCOPY;  Surgeon: Dao Gray MD;  Location: [facility] (2ND FLR);  Service: Endoscopy;  Laterality: N/A;  COVID test 21 General surgery clinic [facility]    CYSTOSCOPY N/A 9/10/2021    Procedure: CYSTOSCOPY;  Surgeon: Rj Sánchez Jr., MD;  [facility];  Service: Urology;  Laterality: N/A;    ESOPHAGOGASTRODUODENOSCOPY N/A 2021    Procedure: ESOPHAGOGASTRODUODENOSCOPY (EGD);  Surgeon: Dao Gray MD;  Location: [facility] (2ND FLR);  Service: Endoscopy;  Laterality: N/A;  labs current on     ESOPHAGOGASTRODUODENOSCOPY N/A 10/26/2021    Procedure: ESOPHAGOGASTRODUODENOSCOPY (EGD);  Surgeon: Dao Gray MD;  Location: [facility] (2ND FLR);  Service: Endoscopy;  Laterality: N/A;  to be done the week of 10/18/21 per Dr. Gray-BB  covid-10/23/21-[facility]-   10/25 arrival time confirmed with pt-rb    ESOPHAGOGASTRODUODENOSCOPY Left 2021    Procedure: EGD (ESOPHAGOGASTRODUODENOSCOPY);  Surgeon: Koffi Monteiro MD;  Location: [facility] (4TH FLR);  Service: Endoscopy;  Laterality: Left;  Rapid  pt requested AM appt and first available in December-  labs morning of procedure-BB   lvm to confirm appt-rb    HEMORRHOID SURGERY       PERITONEOCENTESIS Right 6/8/2021    Procedure: PARACENTESIS, ABDOMINAL;  Surgeon: Jay Torre MD;  [place] [facility] [place] [facility];  Service: Radiology;  Laterality: Right;    PERITONEOCENTESIS Right 6/25/2021    Procedure: PARACENTESIS, ABDOMINAL;  Surgeon: Jay Torre MD;  [place] [facility] [place] [place] [facility] [facility];  Service: Radiology;  Laterality: Right;    PERITONEOCENTESIS Right 7/12/2021    Procedure: PARACENTESIS, ABDOMINAL;  Surgeon: Jay Torre MD;  [place] [facility] [place] [place] [facility] [facility];  Service: Radiology;  Laterality: Right;    PERITONEOCENTESIS Right 7/23/2021    Procedure: PARACENTESIS, ABDOMINAL;  Surgeon: Edward De La Torre II, MD;  [place] [facility] [place] [place] [facility] C[place] [facility];  Service: Interventional Radiology;  Laterality: Right;    PERITONEOCENTESIS Right 8/3/2021    Procedure: PARACENTESIS, ABDOMINAL;  Surgeon: Franco Cheung MD;  [place]: [facility] [place] [place] [facility] C[facility];  Service: Radiology;  Laterality: Right;    PERITONEOCENTESIS Right 8/16/2021    Procedure: PARACENTESIS, ABDOMINAL;  Surgeon: Jay Torre MD;  [place]: [facility] [place] C[facility];  Service: Radiology;  Laterality: Right;    PERITONEOCENTESIS Right 8/23/2021    Procedure: PARACENTESIS, ABDOMINAL;  Surgeon: Jay Torre MD;  [place]: [facility] [place] [place] [facility] C[facility];  Service: Radiology;  Laterality: Right;    PERITONEOCENTESIS Right 9/16/2021    Procedure: PARACENTESIS, ABDOMINAL;  Surgeon: Jay Torre MD;  [place]: [facility] [place] C[facility];  Service: Radiology;  Laterality: Right;    PERITONEOCENTESIS N/A 9/24/2021    Procedure: PARACENTESIS, ABDOMINAL;  Surgeon: Jay Torre MD;  [place]: [facility] [place] C[facility];  Service: Radiology;  Laterality: N/A;    PERITONEOCENTESIS Right 12/23/2021    Procedure: PARACENTESIS, ABDOMINAL;  Surgeon: Jay Torre MD;  [place]: [facility] [place] C[facility];  Service: Radiology;  Laterality: Right;    PERITONEOCENTESIS N/A 12/30/2021    Procedure: PARACENTESIS, ABDOMINAL;  Surgeon: Salas Cabrera MD;  Location: [facility] [place] C[facility] LAB;  Service: Radiology;  Laterality:  N/A;    RETROGRADE PYELOGRAPHY Bilateral 9/10/2021    Procedure: PYELOGRAM, RETROGRADE;  Surgeon: Rj Sánchez Jr., MD;  [facility] [address] [place] [address];  Service: Urology;  Laterality: Bilateral;    TONSILLECTOMY         Home Meds:   Prior to Admission medications    Medication Sig Start Date End Date Taking? Authorizing Provider   allopurinoL (ZYLOPRIM) 100 MG tablet Take 1 tablet (100 mg total) by mouth once daily. 11/12/21   Rashel Cohn MD   bisacodyL (DULCOLAX) 5 mg EC tablet Take 5 mg by mouth daily as needed for Constipation.    Historical Provider   ciprofloxacin HCl (CIPRO) 250 MG tablet Take 1 tablet (250 mg total) by mouth once daily.  Patient taking differently: Take 250 mg by mouth every other day. 12/23/21   Rashel Cohn MD   ergocalciferol (ERGOCALCIFEROL) 50,000 unit Cap Take 1 capsule (50,000 Units total) by mouth every 7 days.  Patient not taking: No sig reported 10/2/21   Rashel Cohn MD   folic acid (FOLVITE) 1 MG tablet Take 2 tablets (2 mg total) by mouth once daily. 12/14/21 3/14/22  Uma Perry MD   furosemide (LASIX) 40 MG tablet Take 80 mg every am and 40 mg every pm 1/6/22   Sharmila Pacheco MD   levothyroxine (SYNTHROID) 50 MCG tablet TAKE 1 TABLET(50 MCG) BY MOUTH BEFORE BREAKFAST 9/25/21   Killian Dodd DO   linaCLOtide (LINZESS) 72 mcg Cap capsule Take 2 capsules (144 mcg total) by mouth before breakfast. 11/26/21   Sharmila Pacheco MD   pantoprazole (PROTONIX) 40 MG tablet Take 1 tablet (40 mg total) by mouth once daily. 2/15/22 2/15/23  Sharmila Pacheco MD   sodium bicarbonate 650 MG tablet Take 1 tablet (650 mg total) by mouth 2 (two) times daily.  Patient taking differently: Take 650 mg by mouth once daily. 10/2/21 3/31/22  Rashel Cohn MD   thiamine (VITAMIN B-1) 100 MG tablet Take 1 tablet (100 mg total) by mouth every evening. 10/11/21   Sharmila Pacheco MD   vitamin A 42723 UNIT capsule Take 10,000 Units by mouth every evening.      Historical Provider     Anticoagulants/Antiplatelets: no anticoagulation    Allergies: Review of patient's allergies indicates:  No Known Allergies  Sedation History:  no adverse reactions    Review of Systems:   Hematological: no known coagulopathies  Respiratory: no shortness of breath  Cardiovascular: no chest pain  Gastrointestinal: no abdominal pain  Genito-Urinary: no dysuria  Musculoskeletal: negative  Neurological: no TIA or stroke symptoms         OBJECTIVE:     Vital Signs (Most Recent)  Temp: 97.9 °F (36.6 °C) (02/18/22 0836)  Pulse: 99 (02/18/22 0836)  Resp: 20 (02/18/22 0836)  BP: (!) 90/56 (02/18/22 0836)  SpO2: 100 % (02/18/22 0836)    Physical Exam:  ASA: 2  Mallampati: n/a    General: no acute distress  Mental Status: alert and oriented to person, place and time  HEENT: normocephalic, atraumatic  Chest: unlabored breathing  Heart: regular heart rate  Abdomen: distended  Extremity: moves all extremities    ASSESSMENT/PLAN:     Sedation Plan: local  Patient will undergo ultrasound guided paracentesis.    RENATA Marie, FNP  Interventional Radiology  [phone] [facility]

## 2022-02-18 NOTE — PROGRESS NOTES
Arrived per ambulation to Ambulatory Infusion suite  for 5/5 Venofer 200 mg infusion.  No NS infusing, pt receives paracentesis, 6 liters fluid removed today per pt. Tolerated infusion without signs of adverse reactions observed for 30 min after infusion completed.      Left unit in NAD

## 2022-02-21 ENCOUNTER — HOSPITAL ENCOUNTER (OUTPATIENT)
Dept: INTERVENTIONAL RADIOLOGY/VASCULAR | Facility: HOSPITAL | Age: 52
Discharge: HOME | End: 2022-02-21
Attending: INTERNAL MEDICINE
Payer: COMMERCIAL

## 2022-02-21 ENCOUNTER — TELEPHONE (OUTPATIENT)
Dept: TRANSPLANT | Facility: CLINIC | Age: 52
End: 2022-02-21
Payer: COMMERCIAL

## 2022-02-21 VITALS
HEART RATE: 92 BPM | DIASTOLIC BLOOD PRESSURE: 59 MMHG | OXYGEN SATURATION: 100 % | SYSTOLIC BLOOD PRESSURE: 96 MMHG | RESPIRATION RATE: 18 BRPM

## 2022-02-21 DIAGNOSIS — R18.8 OTHER ASCITES: ICD-10-CM

## 2022-02-21 LAB
APPEARANCE FLD: NORMAL
BODY FLD TYPE: NORMAL
COLOR FLD: YELLOW
GRAM STN SPEC: NORMAL
GRAM STN SPEC: NORMAL
LYMPHOCYTES NFR FLD MANUAL: 88 %
MESOTHL CELL NFR FLD MANUAL: 4 %
MONOS+MACROS NFR FLD MANUAL: 4 %
NEUTROPHILS NFR FLD MANUAL: 4 %
WBC # FLD: 52 /CU MM

## 2022-02-21 PROCEDURE — 49083 ABD PARACENTESIS W/IMAGING: CPT | Mod: TXP,,, | Performed by: FAMILY MEDICINE

## 2022-02-21 PROCEDURE — P9047 ALBUMIN (HUMAN), 25%, 50ML: HCPCS | Mod: JG,TXP

## 2022-02-21 PROCEDURE — C1729 CATH, DRAINAGE: HCPCS | Mod: TXP

## 2022-02-21 PROCEDURE — 49083 IR PARACENTESIS WITH IMAGING: ICD-10-PCS | Mod: TXP,,, | Performed by: FAMILY MEDICINE

## 2022-02-21 PROCEDURE — 63600175 PHARM REV CODE 636 W HCPCS: Mod: JG,TXP

## 2022-02-21 PROCEDURE — 89051 BODY FLUID CELL COUNT: CPT | Mod: NTX | Performed by: INTERNAL MEDICINE

## 2022-02-21 PROCEDURE — 49083 ABD PARACENTESIS W/IMAGING: CPT | Mod: TXP | Performed by: RADIOLOGY

## 2022-02-21 RX ORDER — ALBUMIN HUMAN 250 G/1000ML
SOLUTION INTRAVENOUS
Status: DISPENSED
Start: 2022-02-21 | End: 2022-02-21

## 2022-02-21 RX ORDER — ALBUMIN HUMAN 250 G/1000ML
SOLUTION INTRAVENOUS
Status: COMPLETED
Start: 2022-02-21 | End: 2022-02-21

## 2022-02-21 RX ORDER — ALBUMIN HUMAN 250 G/1000ML
37.5 SOLUTION INTRAVENOUS ONCE
Status: COMPLETED | OUTPATIENT
Start: 2022-02-21 | End: 2022-02-21

## 2022-02-21 RX ADMIN — ALBUMIN HUMAN 25 G: 250 SOLUTION INTRAVENOUS at 08:02

## 2022-02-21 RX ADMIN — ALBUMIN HUMAN 25 G: 0.25 SOLUTION INTRAVENOUS at 08:02

## 2022-02-21 NOTE — H&P
Radiology History & Physical      SUBJECTIVE:     Chief Complaint: abdominal distention    History of Present Illness:  Malu Montes is a 51 y.o. female who presents for ultrasound guided paracentesis  Past Medical History:   Diagnosis Date    ADD (attention deficit disorder)     Anxiety     Ascites of liver     Cirrhosis 2021    CKD (chronic kidney disease) stage 3, GFR 30-59 ml/min     Constipation     ETOH abuse     Hyperlipidemia     Other ascites 2021    Pancreatitis, acute     Tobacco use     Vitamin D deficiency      Past Surgical History:   Procedure Laterality Date    AUGMENTATION OF BREAST      breast augmentation       SECTION      COLONOSCOPY N/A 2021    Procedure: COLONOSCOPY;  Surgeon: Dao Gray MD;  Location: [facility] (2ND FLR);  Service: Endoscopy;  Laterality: N/A;  COVID test 21 General surgery clinic [facility]    CYSTOSCOPY N/A 9/10/2021    Procedure: CYSTOSCOPY;  Surgeon: Rj Sánchez Jr., MD;  [facility];  Service: Urology;  Laterality: N/A;    ESOPHAGOGASTRODUODENOSCOPY N/A 2021    Procedure: ESOPHAGOGASTRODUODENOSCOPY (EGD);  Surgeon: Dao Gray MD;  Location: [facility] (2ND FLR);  Service: Endoscopy;  Laterality: N/A;  labs current on     ESOPHAGOGASTRODUODENOSCOPY N/A 10/26/2021    Procedure: ESOPHAGOGASTRODUODENOSCOPY (EGD);  Surgeon: Dao Gray MD;  Location: [facility] (2ND FLR);  Service: Endoscopy;  Laterality: N/A;  to be done the week of 10/18/21 per Dr. Gray-BB  covid-10/23/21-[facility]-   10/25 arrival time confirmed with pt-rb    ESOPHAGOGASTRODUODENOSCOPY Left 2021    Procedure: EGD (ESOPHAGOGASTRODUODENOSCOPY);  Surgeon: Koffi Monteiro MD;  Location: [facility] (4TH FLR);  Service: Endoscopy;  Laterality: Left;  Rapid  pt requested AM appt and first available in December-  labs morning of procedure-BB   lvm to confirm appt-rb    HEMORRHOID SURGERY       PERITONEOCENTESIS Right 6/8/2021    Procedure: PARACENTESIS, ABDOMINAL;  Surgeon: Jay Torre MD;  Location: [facility] CATH LAB;  Service: Radiology;  Laterality: Right;    PERITONEOCENTESIS Right 6/25/2021    Procedure: PARACENTESIS, ABDOMINAL;  Surgeon: Jay Torre MD;  Location: [facility] CATH LAB;  Service: Radiology;  Laterality: Right;    PERITONEOCENTESIS Right 7/12/2021    Procedure: PARACENTESIS, ABDOMINAL;  Surgeon: Jay Torre MD;  Location: [facility] CATH LAB;  Service: Radiology;  Laterality: Right;    PERITONEOCENTESIS Right 7/23/2021    Procedure: PARACENTESIS, ABDOMINAL;  Surgeon: Edward De La Torre II, MD;  Location: [facility] CATH LAB;  Service: Interventional Radiology;  Laterality: Right;    PERITONEOCENTESIS Right 8/3/2021    Procedure: PARACENTESIS, ABDOMINAL;  Surgeon: Franco Cheung MD;  Location: [facility] CATH LAB;  Service: Radiology;  Laterality: Right;    PERITONEOCENTESIS Right 8/16/2021    Procedure: PARACENTESIS, ABDOMINAL;  Surgeon: Jay Torre MD;  Location: [facility] CATH LAB;  Service: Radiology;  Laterality: Right;    PERITONEOCENTESIS Right 8/23/2021    Procedure: PARACENTESIS, ABDOMINAL;  Surgeon: Jay Torre MD;  Location: [facility] CATH LAB;  Service: Radiology;  Laterality: Right;    PERITONEOCENTESIS Right 9/16/2021    Procedure: PARACENTESIS, ABDOMINAL;  Surgeon: Jay Torre MD;  Location: [facility] CATH LAB;  Service: Radiology;  Laterality: Right;    PERITONEOCENTESIS N/A 9/24/2021    Procedure: PARACENTESIS, ABDOMINAL;  Surgeon: Jay Torre MD;  Location: [facility] CATH LAB;  Service: Radiology;  Laterality: N/A;    PERITONEOCENTESIS Right 12/23/2021    Procedure: PARACENTESIS, ABDOMINAL;  Surgeon: Jay Torre MD;  Location: [facility] CATH LAB;  Service: Radiology;  Laterality: Right;    PERITONEOCENTESIS N/A 12/30/2021    Procedure: PARACENTESIS, ABDOMINAL;  Surgeon: Salas Cabrera MD;  Location: [facility] CATH LAB;  Service: Radiology;  Laterality:  N/A;    RETROGRADE PYELOGRAPHY Bilateral 9/10/2021    Procedure: PYELOGRAM, RETROGRADE;  Surgeon: Rj Sánchez Jr., MD;  Location: [facility] OR [address] [place] [address];  Service: Urology;  Laterality: Bilateral;    TONSILLECTOMY         Home Meds:   Prior to Admission medications    Medication Sig Start Date End Date Taking? Authorizing Provider   allopurinoL (ZYLOPRIM) 100 MG tablet Take 1 tablet (100 mg total) by mouth once daily. 11/12/21   Rashel Cohn MD   bisacodyL (DULCOLAX) 5 mg EC tablet Take 5 mg by mouth daily as needed for Constipation.    Historical Provider   ciprofloxacin HCl (CIPRO) 250 MG tablet Take 1 tablet (250 mg total) by mouth once daily.  Patient taking differently: Take 250 mg by mouth every other day. 12/23/21   Rashel Cohn MD   ergocalciferol (ERGOCALCIFEROL) 50,000 unit Cap Take 1 capsule (50,000 Units total) by mouth every 7 days.  Patient not taking: No sig reported 10/2/21   Rashel Cohn MD   folic acid (FOLVITE) 1 MG tablet Take 2 tablets (2 mg total) by mouth once daily. 12/14/21 3/14/22  Uma Perry MD   furosemide (LASIX) 40 MG tablet Take 80 mg every am and 40 mg every pm 1/6/22   Sharmila Pacheco MD   levothyroxine (SYNTHROID) 50 MCG tablet TAKE 1 TABLET(50 MCG) BY MOUTH BEFORE BREAKFAST 9/25/21   Killian Dodd DO   linaCLOtide (LINZESS) 72 mcg Cap capsule Take 2 capsules (144 mcg total) by mouth before breakfast. 11/26/21   Sharmila Pacheco MD   pantoprazole (PROTONIX) 40 MG tablet Take 1 tablet (40 mg total) by mouth once daily. 2/15/22 2/15/23  Sharmila Pacheco MD   sodium bicarbonate 650 MG tablet Take 1 tablet (650 mg total) by mouth 2 (two) times daily.  Patient taking differently: Take 650 mg by mouth once daily. 10/2/21 3/31/22  Rashel Cohn MD   thiamine (VITAMIN B-1) 100 MG tablet Take 1 tablet (100 mg total) by mouth every evening. 10/11/21   Sharmila Pacheco MD   vitamin A 10296 UNIT capsule Take 10,000 Units by mouth every evening.      Historical Provider     Anticoagulants/Antiplatelets: no anticoagulation    Allergies: Review of patient's allergies indicates:  No Known Allergies  Sedation History:  no adverse reactions    Review of Systems:   Hematological: no known coagulopathies  Respiratory: no shortness of breath  Cardiovascular: no chest pain  Gastrointestinal: no abdominal pain  Genito-Urinary: no dysuria  Musculoskeletal: negative  Neurological: no TIA or stroke symptoms         OBJECTIVE:     Vital Signs (Most Recent)       Physical Exam:  ASA: 2  Mallampati: n/a    General: no acute distress  Mental Status: alert and oriented to person, place and time  HEENT: normocephalic, atraumatic  Chest: unlabored breathing  Heart: regular heart rate  Abdomen: distended  Extremity: moves all extremities    ASSESSMENT/PLAN:     Sedation Plan: local  Patient will undergo ultrasound guided paracentesis.    RENATA Marie, FNP  Interventional Radiology  [phone] [facility]

## 2022-02-21 NOTE — PROCEDURES
Radiology Post-Procedure Note    Pre Op Diagnosis: Ascites  Post Op Diagnosis: Same    Procedure: Ultrasound Guided Paracentesis    Procedure performed by: Fabrizio PEÑA, Cee     Written Informed Consent Obtained: Yes  Specimen Removed: YES clear yellow  Estimated Blood Loss: Minimal    Findings:   Successful paracentesis.  Albumin administered PRN per protocol.    Patient tolerated procedure well.    Cee Dinh, APRN, FNP  Interventional Radiology  [phone] clinic

## 2022-02-21 NOTE — TELEPHONE ENCOUNTER
PATIENT NAME: Malu Styles [facility] #: [id number]    Lab Results   Component Value Date    CREATININE 1.6 (H) 02/21/2022     (L) 02/21/2022    BILITOT 1.1 (H) 02/21/2022    ALBUMIN 3.3 (L) 02/21/2022    INR 1.1 02/21/2022   MELD 15    Encephalopathy: 1 - 2  Ascites: moderate  Dialysis: no     Recertification requestor: Francesca Brunson

## 2022-02-23 ENCOUNTER — PATIENT MESSAGE (OUTPATIENT)
Dept: TRANSPLANT | Facility: CLINIC | Age: 52
End: 2022-02-23
Payer: COMMERCIAL

## 2022-02-24 ENCOUNTER — OFFICE VISIT (OUTPATIENT)
Dept: INTERVENTIONAL RADIOLOGY/VASCULAR | Facility: CLINIC | Age: 52
End: 2022-02-24
Payer: COMMERCIAL

## 2022-02-24 ENCOUNTER — PATIENT MESSAGE (OUTPATIENT)
Dept: HEPATOLOGY | Facility: CLINIC | Age: 52
End: 2022-02-24
Payer: COMMERCIAL

## 2022-02-24 ENCOUNTER — TELEPHONE (OUTPATIENT)
Dept: HEPATOLOGY | Facility: CLINIC | Age: 52
End: 2022-02-24
Payer: COMMERCIAL

## 2022-02-24 ENCOUNTER — PATIENT MESSAGE (OUTPATIENT)
Dept: NEPHROLOGY | Facility: CLINIC | Age: 52
End: 2022-02-24
Payer: COMMERCIAL

## 2022-02-24 VITALS
HEART RATE: 108 BPM | SYSTOLIC BLOOD PRESSURE: 92 MMHG | BODY MASS INDEX: 22.81 KG/M2 | WEIGHT: 128.75 LBS | HEIGHT: 63 IN | DIASTOLIC BLOOD PRESSURE: 65 MMHG

## 2022-02-24 DIAGNOSIS — R18.8 OTHER ASCITES: Primary | ICD-10-CM

## 2022-02-24 DIAGNOSIS — Z01.818 PRE-PROCEDURAL EXAMINATION: ICD-10-CM

## 2022-02-24 LAB — BACTERIA SPEC AEROBE CULT: NO GROWTH

## 2022-02-24 PROCEDURE — 99214 PR OFFICE/OUTPT VISIT, EST, LEVL IV, 30-39 MIN: ICD-10-PCS | Mod: S$GLB,TXP,CS, | Performed by: FAMILY MEDICINE

## 2022-02-24 PROCEDURE — 99214 OFFICE O/P EST MOD 30 MIN: CPT | Mod: S$GLB,TXP,CS, | Performed by: FAMILY MEDICINE

## 2022-02-24 PROCEDURE — 99999 PR PBB SHADOW E&M-EST. PATIENT-LVL IV: CPT | Mod: PBBFAC,TXP,, | Performed by: FAMILY MEDICINE

## 2022-02-24 PROCEDURE — 99999 PR PBB SHADOW E&M-EST. PATIENT-LVL IV: ICD-10-PCS | Mod: PBBFAC,TXP,, | Performed by: FAMILY MEDICINE

## 2022-02-24 NOTE — Clinical Note
"Thank you for referring Ms. Montes to Interventional Radiology at the [facility]. Please don't hesitate to contact us if there are any questions regarding this evaluation at [phone]. If you have any other patients for whom you would like a consultation, please place an order for "[id number]", and we will be happy to review their case.  Sincerely, RENATA Marie, FNP Interventional Radiology    "

## 2022-02-24 NOTE — PROGRESS NOTES
"Subjective:       Patient ID: Malu Montes is a 51 y.o. female.    Chief Complaint: Ascites    Patient referred to Interventional Radiology by Sharmila Pacheco MD to discuss transjugular intrahepatic portosystemic shunt (TIPS) placement to alleviate ascites accumulation. Patient endorses requiring large volume paracentesis twice each week. She drains between 4600 - 6600mL with each paracentesis. She has complaints of a "stretching" pain with ascites accumulation. She is accompanied by her .    Review of Systems   Constitutional: Positive for fatigue. Negative for activity change, appetite change, chills and fever.   HENT: Negative for nasal congestion, drooling, ear discharge, postnasal drip, sneezing and trouble swallowing.    Eyes: Negative for pain, discharge, redness and itching.   Respiratory: Negative for cough, shortness of breath, wheezing and stridor.    Cardiovascular: Negative for chest pain, palpitations and leg swelling.   Gastrointestinal: Positive for abdominal distention (with ascites), abdominal pain ("stretching pain") and nausea. Negative for constipation, diarrhea and vomiting.   Genitourinary: Negative for difficulty urinating, dysuria, frequency and urgency.   Musculoskeletal: Positive for arthralgias. Negative for back pain, gait problem, joint swelling, myalgias and neck pain.   Integumentary:  Negative for color change, pallor and rash.   Neurological: Negative for dizziness, weakness and headaches.         Objective:      Physical Exam  Vitals reviewed.   Constitutional:       General: She is not in acute distress.     Appearance: She is well-developed. She is not diaphoretic.   HENT:      Head: Normocephalic and atraumatic.      Right Ear: External ear normal.      Left Ear: External ear normal.      Nose: Nose normal.      Mouth/Throat:      Pharynx: No oropharyngeal exudate.   Eyes:      General: No scleral icterus.        Right eye: No discharge.         Left eye: No " discharge.      Conjunctiva/sclera: Conjunctivae normal.      Pupils: Pupils are equal, round, and reactive to light.   Neck:      Thyroid: No thyromegaly.      Vascular: No JVD.      Trachea: No tracheal deviation.   Cardiovascular:      Rate and Rhythm: Normal rate and regular rhythm.      Heart sounds: Normal heart sounds. No murmur heard.    No friction rub. No gallop.   Pulmonary:      Effort: Pulmonary effort is normal. No respiratory distress.      Breath sounds: Normal breath sounds. No stridor. No wheezing or rales.   Abdominal:      General: Bowel sounds are normal. There is no distension.      Palpations: Abdomen is soft. There is no mass.      Tenderness: There is no abdominal tenderness. There is no guarding or rebound.   Musculoskeletal:      Cervical back: Neck supple.   Lymphadenopathy:      Cervical: No cervical adenopathy.   Skin:     General: Skin is warm and dry.      Nails: There is no clubbing.   Neurological:      Mental Status: She is alert and oriented to person, place, and time.      Gait: Gait normal.       Summary of Stress ECHO 7/29/2021  · The patient reached the end of the protocol.  · The left ventricle is normal in size with normal systolic function.  · The estimated ejection fraction is 58%.  · Normal left ventricular diastolic function.  · Normal right ventricular size with normal right ventricular systolic function.  · There is mild aortic valve stenosis.  · Aortic valve area is 1.68 cm2; peak velocity is 1.63 m/s; mean gradient is 6 mmHg.  · Normal central venous pressure (3 mmHg).  · The estimated PA systolic pressure is 27 mmHg.  · Posterior pericardial effusion.  · There is evidence of ascites present.  · The ECG portion of this study is negative for myocardial ischemia.  · During stress, the following significant arrhythmias were observed: rare PVCs.  · The stress echo portion of this study is negative for myocardial ischemia.    MRI  8/6/2021  Impression:     1.  Heterogeneous arterial enhancement of the hepatic parenchyma, likely related to the patient's known history of cirrhosis.  0.8 cm hepatic segment II arterial hyperenhancing focus, too small to accurately characterize but stable when compared to the previous CT and possibly representing a prominent vascular structure.  Follow-up exam should be considered.  2. Sequela of portal hypertension including splenomegaly, small gastroesophageal varices, ascites and a suspected small recanalized umbilical vein.  3. Gallbladder mural edema, likely related to chronic hepatic dysfunction.  4. Increased number of slightly prominent upper abdominal and retroperitoneal lymph nodes, nonspecific.  5. Additional findings as detailed in the body of the report.    Assessment:       Problem List Items Addressed This Visit        GI    Other ascites - Primary    Relevant Orders    IR TIPS Insertion    US Liver with Doppler (xpd)    CBC Auto Differential    Comprehensive Metabolic Panel    Protime-INR      Other Visit Diagnoses     Pre-procedural examination        Relevant Orders    CBC Auto Differential    Comprehensive Metabolic Panel    Protime-INR    COVID-19 Routine Screening          Plan:         Discussed case with Dr. Camejo and Dr. Jain. Explained to patient can offer TIPS. Discussed how the procedure will be performed, risks (including, but not limited to, pain, bleeding, infection, damage to nearby structures, cardiac arrest, and the need for additional procedures), benefits, possible complications (such as renal failure requiring dialysis and hepatic encephalopathy), pre-post procedure expectations, and alternatives. A doppler ultrasound will be obtained approximately 1 month post TIPS placement. The patient voices understanding and all questions have been answered.  The patient agrees to proceed as planned. Patient scheduled for 4/1/2022. Pre-procedure handout with clinic phone number provided. Pre-procedure labs scheduled  3/28/2022. COVID test scheduled 3/29/2022.

## 2022-02-24 NOTE — LETTER
February 24, 2022    Malu Styles Simon  Formerly [facility] [address]     [facility] 6th Fl  [address]  [facility] [address]  Phone: [phone] PRE-PROCEDURE INSTRUCTIONS    Your procedure with Interventional Radiology is scheduled for 4/1/2022. Please arrive by 6:30am. Please note your appointment time in [facility] will be different.    You must check-in and receive a wristband before going to your procedure. We will call you the day before to let you know your check in location    **Do not eat or drink anything between midnight and the time of your procedure. This includes gum, mints, and candy lemon drops.    **Do not smoke or drink alcoholic beverages 24 hours prior to your procedure.    **If you wear contact lenses, dentures, hearing aids, or glasses, bring a container to put them in during the procedure and give them to a family member for safekeeping.    **If you have been diagnosed with sleep apnea please bring your CPAP machine.    **If your doctor has scheduled you for an overnight stay, bring a small overnight bag with any personal items that you may need.    **Make arrangements in advance for transportation home by a responsible adult. It is not safe to drive a vehicle during the 24 hours following the procedure.    **All [facility] facilities and properties are tobacco free. Smoking is NOT allowed.    PLEASE NOTE: The procedure schedule has many variables which affect the time of your procedure. Family members should be available if your surgery time changes.    If you have any questions about these instructions call Interventional Radiology at [phone] Monday - Friday between 8:00am and 4:00pm or [phone] (ask for interventional radiology resident) for after hours.

## 2022-02-25 ENCOUNTER — PATIENT MESSAGE (OUTPATIENT)
Dept: HEPATOLOGY | Facility: CLINIC | Age: 52
End: 2022-02-25
Payer: COMMERCIAL

## 2022-02-25 ENCOUNTER — HOSPITAL ENCOUNTER (OUTPATIENT)
Dept: INTERVENTIONAL RADIOLOGY/VASCULAR | Facility: HOSPITAL | Age: 52
Discharge: HOME | End: 2022-02-25
Attending: INTERNAL MEDICINE
Payer: COMMERCIAL

## 2022-02-25 DIAGNOSIS — R18.8 OTHER ASCITES: ICD-10-CM

## 2022-02-25 LAB
APPEARANCE FLD: NORMAL
BODY FLD TYPE: NORMAL
COLOR FLD: YELLOW
EOSINOPHIL NFR FLD MANUAL: 2 %
LYMPHOCYTES NFR FLD MANUAL: 76 %
MONOS+MACROS NFR FLD MANUAL: 14 %
NEUTROPHILS NFR FLD MANUAL: 8 %
WBC # FLD: 85 /CU MM

## 2022-02-25 PROCEDURE — 49083 ABD PARACENTESIS W/IMAGING: CPT | Mod: TXP,,, | Performed by: FAMILY MEDICINE

## 2022-02-25 PROCEDURE — C1729 CATH, DRAINAGE: HCPCS | Mod: NTX

## 2022-02-25 PROCEDURE — 49083 IR PARACENTESIS WITH IMAGING: ICD-10-PCS | Mod: TXP,,, | Performed by: FAMILY MEDICINE

## 2022-02-25 PROCEDURE — 49083 ABD PARACENTESIS W/IMAGING: CPT | Mod: NTX | Performed by: RADIOLOGY

## 2022-02-25 PROCEDURE — 89051 BODY FLUID CELL COUNT: CPT | Mod: NTX | Performed by: INTERNAL MEDICINE

## 2022-02-25 PROCEDURE — 63600175 PHARM REV CODE 636 W HCPCS: Mod: JG,NTX

## 2022-02-25 PROCEDURE — P9047 ALBUMIN (HUMAN), 25%, 50ML: HCPCS | Mod: JG,NTX

## 2022-02-25 RX ORDER — ALBUMIN HUMAN 250 G/1000ML
SOLUTION INTRAVENOUS
Status: COMPLETED
Start: 2022-02-25 | End: 2022-02-25

## 2022-02-25 RX ADMIN — ALBUMIN HUMAN 25 G: 0.25 SOLUTION INTRAVENOUS at 09:02

## 2022-02-25 RX ADMIN — ALBUMIN (HUMAN) 12.5 G: 12.5 SOLUTION INTRAVENOUS at 09:02

## 2022-02-25 NOTE — PROCEDURES
Radiology Post-Procedure Note    Pre Op Diagnosis: Ascites  Post Op Diagnosis: Same    Procedure: Ultrasound Guided Paracentesis    Procedure performed by: Fabrizio PEÑA, Cee     Written Informed Consent Obtained: Yes  Specimen Removed: YES clear yellow  Estimated Blood Loss: Minimal    Findings:   Successful paracentesis.  Albumin administered PRN per protocol.    Patient tolerated procedure well.    Cee Dinh, APRN, FNP  Interventional Radiology  [phone] clinic

## 2022-02-25 NOTE — H&P
Radiology History & Physical      SUBJECTIVE:     Chief Complaint: abdominal distention    History of Present Illness:  aMlu Montes is a 51 y.o. female who presents for ultrasound guided paracentesis  Past Medical History:   Diagnosis Date    ADD (attention deficit disorder)     Anxiety     Ascites of liver     Cirrhosis 2021    CKD (chronic kidney disease) stage 3, GFR 30-59 ml/min     Constipation     ETOH abuse     Hyperlipidemia     Hypothyroidism     Other ascites 2021    Pancreatitis, acute     Tobacco use     Vitamin D deficiency      Past Surgical History:   Procedure Laterality Date    AUGMENTATION OF BREAST      breast augmentation       SECTION      COLONOSCOPY N/A 2021    Procedure: COLONOSCOPY;  Surgeon: Dao Gray MD;  Location: [facility] (2ND FLR);  Service: Endoscopy;  Laterality: N/A;  COVID test 21 General surgery clinic [facility]    CYSTOSCOPY N/A 9/10/2021    Procedure: CYSTOSCOPY;  Surgeon: Rj Sánchez Jr., MD;  [facility];  Service: Urology;  Laterality: N/A;    ESOPHAGOGASTRODUODENOSCOPY N/A 2021    Procedure: ESOPHAGOGASTRODUODENOSCOPY (EGD);  Surgeon: Dao Gray MD;  Location: [facility] (2ND FLR);  Service: Endoscopy;  Laterality: N/A;  labs current on     ESOPHAGOGASTRODUODENOSCOPY N/A 10/26/2021    Procedure: ESOPHAGOGASTRODUODENOSCOPY (EGD);  Surgeon: Dao Gray MD;  Location: [facility] (2ND FLR);  Service: Endoscopy;  Laterality: N/A;  to be done the week of 10/18/21 per Dr. Gray-BB  covid-10/23/21-[facility]-   10/25 arrival time confirmed with pt-rb    ESOPHAGOGASTRODUODENOSCOPY Left 2021    Procedure: EGD (ESOPHAGOGASTRODUODENOSCOPY);  Surgeon: Koffi Monteiro MD;  Location: [facility] (4TH FLR);  Service: Endoscopy;  Laterality: Left;  Rapid  pt requested AM appt and first available in December-  labs morning of procedure-BB   lvm to confirm appt-rb    HEMORRHOID SURGERY       PERITONEOCENTESIS Right 6/8/2021    Procedure: PARACENTESIS, ABDOMINAL;  Surgeon: Jay Torre MD;  Location: [facility] CATH LAB;  Service: Radiology;  Laterality: Right;    PERITONEOCENTESIS Right 6/25/2021    Procedure: PARACENTESIS, ABDOMINAL;  Surgeon: Jay Torre MD;  Location: [facility] CATH LAB;  Service: Radiology;  Laterality: Right;    PERITONEOCENTESIS Right 7/12/2021    Procedure: PARACENTESIS, ABDOMINAL;  Surgeon: Jay Torre MD;  Location: [facility] CATH LAB;  Service: Radiology;  Laterality: Right;    PERITONEOCENTESIS Right 7/23/2021    Procedure: PARACENTESIS, ABDOMINAL;  Surgeon: Edward De La Torre II, MD;  Location: [facility] CATH LAB;  Service: Interventional Radiology;  Laterality: Right;    PERITONEOCENTESIS Right 8/3/2021    Procedure: PARACENTESIS, ABDOMINAL;  Surgeon: Franco Cheung MD;  Location: [facility] CATH LAB;  Service: Radiology;  Laterality: Right;    PERITONEOCENTESIS Right 8/16/2021    Procedure: PARACENTESIS, ABDOMINAL;  Surgeon: Jay Torre MD;  Location: [facility] CATH LAB;  Service: Radiology;  Laterality: Right;    PERITONEOCENTESIS Right 8/23/2021    Procedure: PARACENTESIS, ABDOMINAL;  Surgeon: Jay Torre MD;  Location: [facility] CATH LAB;  Service: Radiology;  Laterality: Right;    PERITONEOCENTESIS Right 9/16/2021    Procedure: PARACENTESIS, ABDOMINAL;  Surgeon: Jay Torre MD;  Location: [facility] CATH LAB;  Service: Radiology;  Laterality: Right;    PERITONEOCENTESIS N/A 9/24/2021    Procedure: PARACENTESIS, ABDOMINAL;  Surgeon: Jay Torre MD;  Location: [facility] CATH LAB;  Service: Radiology;  Laterality: N/A;    PERITONEOCENTESIS Right 12/23/2021    Procedure: PARACENTESIS, ABDOMINAL;  Surgeon: Jay Torre MD;  Location: [facility] CATH LAB;  Service: Radiology;  Laterality: Right;    PERITONEOCENTESIS N/A 12/30/2021    Procedure: PARACENTESIS, ABDOMINAL;  Surgeon: Salas Cabrera MD;  Location: [facility] CATH LAB;  Service: Radiology;   Laterality: N/A;    RETROGRADE PYELOGRAPHY Bilateral 9/10/2021    Procedure: PYELOGRAM, RETROGRADE;  Surgeon: Rj Sánchez Jr., MD;  Location: [facility] OR [address] [place] [address];  Service: Urology;  Laterality: Bilateral;    TONSILLECTOMY         Home Meds:   Prior to Admission medications    Medication Sig Start Date End Date Taking? Authorizing Provider   allopurinoL (ZYLOPRIM) 100 MG tablet TAKE 1 TABLET(100 MG) BY MOUTH EVERY DAY 2/23/22   Rashel Cohn MD   bisacodyL (DULCOLAX) 5 mg EC tablet Take 5 mg by mouth daily as needed for Constipation.    Historical Provider   ciprofloxacin HCl (CIPRO) 250 MG tablet Take 1 tablet (250 mg total) by mouth once daily.  Patient taking differently: Take 250 mg by mouth every other day. 12/23/21   Rashel Cohn MD   ergocalciferol, vitamin D2, (VITAMIN D ORAL) Take by mouth.    Historical Provider   folic acid (FOLVITE) 1 MG tablet Take 2 tablets (2 mg total) by mouth once daily. 12/14/21 3/14/22  Uma Perry MD   furosemide (LASIX) 40 MG tablet Take 80 mg every am and 40 mg every pm 1/6/22   Sharmila Pacheco MD   levothyroxine (SYNTHROID) 50 MCG tablet TAKE 1 TABLET(50 MCG) BY MOUTH BEFORE BREAKFAST 9/25/21   Killian Dodd DO   linaCLOtide (LINZESS) 72 mcg Cap capsule Take 2 capsules (144 mcg total) by mouth before breakfast. 11/26/21   Sharmila Pacheco MD   pantoprazole (PROTONIX) 40 MG tablet Take 1 tablet (40 mg total) by mouth once daily. 2/15/22 2/15/23  Sharmila Pacheco MD   sodium bicarbonate 650 MG tablet Take 1 tablet (650 mg total) by mouth 2 (two) times daily.  Patient taking differently: Take 650 mg by mouth once daily. 10/2/21 3/31/22  Rashel Cohn MD   thiamine (VITAMIN B-1) 100 MG tablet Take 1 tablet (100 mg total) by mouth every evening. 10/11/21   Sharmila Pacheco MD   vitamin A 01999 UNIT capsule Take 10,000 Units by mouth every evening.     Historical Provider     Anticoagulants/Antiplatelets: no anticoagulation    Allergies: Review of  patient's allergies indicates:  No Known Allergies  Sedation History:  no adverse reactions    Review of Systems:   Hematological: no known coagulopathies  Respiratory: no shortness of breath  Cardiovascular: no chest pain  Gastrointestinal: no abdominal pain  Genito-Urinary: no dysuria  Musculoskeletal: negative  Neurological: no TIA or stroke symptoms         OBJECTIVE:     Vital Signs (Most Recent)       Physical Exam:  ASA: 2  Mallampati: n/a    General: no acute distress  Mental Status: alert and oriented to person, place and time  HEENT: normocephalic, atraumatic  Chest: unlabored breathing  Heart: regular heart rate  Abdomen: distended  Extremity: moves all extremities    ASSESSMENT/PLAN:     Sedation Plan: local  Patient will undergo ultrasound guided paracentesis.    RENATA Marie, FNP  Interventional Radiology  [phone] [facility]

## 2022-02-25 NOTE — PLAN OF CARE
Paracentesis completed. Patient tolerated well; VSS. 5.5 L removed; labs collected and sent per orders. 37.5 G Albumin administered per protocol. RLQ site clean, dry and intact. Patient refused printed discharge instructions and ambulated to lobby for discharge home.

## 2022-02-28 ENCOUNTER — HOSPITAL ENCOUNTER (EMERGENCY)
Facility: HOSPITAL | Age: 52
Discharge: HOME | End: 2022-02-28
Attending: EMERGENCY MEDICINE
Payer: COMMERCIAL

## 2022-02-28 VITALS
OXYGEN SATURATION: 100 % | RESPIRATION RATE: 27 BRPM | SYSTOLIC BLOOD PRESSURE: 120 MMHG | HEART RATE: 96 BPM | DIASTOLIC BLOOD PRESSURE: 59 MMHG | TEMPERATURE: 98 F | WEIGHT: 116 LBS | BODY MASS INDEX: 20.55 KG/M2

## 2022-02-28 DIAGNOSIS — R41.82 ALTERED MENTAL STATUS: ICD-10-CM

## 2022-02-28 DIAGNOSIS — R00.0 TACHYCARDIA: ICD-10-CM

## 2022-02-28 DIAGNOSIS — R42 LIGHTHEADED: ICD-10-CM

## 2022-02-28 DIAGNOSIS — R41.0 CONFUSION: Primary | ICD-10-CM

## 2022-02-28 LAB
ALBUMIN SERPL BCP-MCNC: 3.6 G/DL (ref 3.5–5.2)
ALP SERPL-CCNC: 114 U/L (ref 55–135)
ALT SERPL W/O P-5'-P-CCNC: 25 U/L (ref 10–44)
AMMONIA PLAS-SCNC: 43 UMOL/L (ref 10–50)
ANION GAP SERPL CALC-SCNC: 14 MMOL/L (ref 8–16)
AST SERPL-CCNC: 35 U/L (ref 10–40)
BASOPHILS # BLD AUTO: 0.08 K/UL (ref 0–0.2)
BASOPHILS NFR BLD: 0.6 % (ref 0–1.9)
BILIRUB SERPL-MCNC: 1.4 MG/DL (ref 0.1–1)
BUN SERPL-MCNC: 60 MG/DL (ref 6–20)
CALCIUM SERPL-MCNC: 10 MG/DL (ref 8.7–10.5)
CHLORIDE SERPL-SCNC: 103 MMOL/L (ref 95–110)
CO2 SERPL-SCNC: 20 MMOL/L (ref 23–29)
CREAT SERPL-MCNC: 1.8 MG/DL (ref 0.5–1.4)
DIFFERENTIAL METHOD: ABNORMAL
EOSINOPHIL # BLD AUTO: 0.3 K/UL (ref 0–0.5)
EOSINOPHIL NFR BLD: 2.6 % (ref 0–8)
ERYTHROCYTE [DISTWIDTH] IN BLOOD BY AUTOMATED COUNT: 16.4 % (ref 11.5–14.5)
EST. GFR  (AFRICAN AMERICAN): 37.3 ML/MIN/1.73 M^2
EST. GFR  (NON AFRICAN AMERICAN): 32.4 ML/MIN/1.73 M^2
ETHANOL SERPL-MCNC: <10 MG/DL
GLUCOSE SERPL-MCNC: 107 MG/DL (ref 70–110)
HCT VFR BLD AUTO: 38.9 % (ref 37–48.5)
HGB BLD-MCNC: 13.5 G/DL (ref 12–16)
IMM GRANULOCYTES # BLD AUTO: 0.05 K/UL (ref 0–0.04)
IMM GRANULOCYTES NFR BLD AUTO: 0.4 % (ref 0–0.5)
INR PPP: 1.1 (ref 0.8–1.2)
LYMPHOCYTES # BLD AUTO: 1.6 K/UL (ref 1–4.8)
LYMPHOCYTES NFR BLD: 12.3 % (ref 18–48)
MAGNESIUM SERPL-MCNC: 2.4 MG/DL (ref 1.6–2.6)
MCH RBC QN AUTO: 33.4 PG (ref 27–31)
MCHC RBC AUTO-ENTMCNC: 34.7 G/DL (ref 32–36)
MCV RBC AUTO: 96 FL (ref 82–98)
MONOCYTES # BLD AUTO: 0.8 K/UL (ref 0.3–1)
MONOCYTES NFR BLD: 5.9 % (ref 4–15)
NEUTROPHILS # BLD AUTO: 9.9 K/UL (ref 1.8–7.7)
NEUTROPHILS NFR BLD: 78.2 % (ref 38–73)
NRBC BLD-RTO: 0 /100 WBC
PLATELET # BLD AUTO: 301 K/UL (ref 150–450)
PMV BLD AUTO: 11.1 FL (ref 9.2–12.9)
POCT GLUCOSE: 105 MG/DL (ref 70–110)
POTASSIUM SERPL-SCNC: 4.4 MMOL/L (ref 3.5–5.1)
PROT SERPL-MCNC: 6.6 G/DL (ref 6–8.4)
PROTHROMBIN TIME: 11.5 SEC (ref 9–12.5)
RBC # BLD AUTO: 4.04 M/UL (ref 4–5.4)
SODIUM SERPL-SCNC: 137 MMOL/L (ref 136–145)
WBC # BLD AUTO: 12.68 K/UL (ref 3.9–12.7)

## 2022-02-28 PROCEDURE — 93010 ELECTROCARDIOGRAM REPORT: CPT | Mod: NTX,,, | Performed by: INTERNAL MEDICINE

## 2022-02-28 PROCEDURE — 93005 ELECTROCARDIOGRAM TRACING: CPT | Mod: NTX

## 2022-02-28 PROCEDURE — 99284 EMERGENCY DEPT VISIT MOD MDM: CPT | Mod: NTX,,, | Performed by: EMERGENCY MEDICINE

## 2022-02-28 PROCEDURE — 82140 ASSAY OF AMMONIA: CPT | Mod: NTX | Performed by: EMERGENCY MEDICINE

## 2022-02-28 PROCEDURE — 80053 COMPREHEN METABOLIC PANEL: CPT | Mod: NTX | Performed by: PHYSICIAN ASSISTANT

## 2022-02-28 PROCEDURE — 85025 COMPLETE CBC W/AUTO DIFF WBC: CPT | Mod: NTX | Performed by: PHYSICIAN ASSISTANT

## 2022-02-28 PROCEDURE — 93010 EKG 12-LEAD: ICD-10-PCS | Mod: NTX,,, | Performed by: INTERNAL MEDICINE

## 2022-02-28 PROCEDURE — 99285 EMERGENCY DEPT VISIT HI MDM: CPT | Mod: 25,NTX

## 2022-02-28 PROCEDURE — 82962 GLUCOSE BLOOD TEST: CPT | Mod: NTX

## 2022-02-28 PROCEDURE — 82077 ASSAY SPEC XCP UR&BREATH IA: CPT | Mod: NTX | Performed by: PHYSICIAN ASSISTANT

## 2022-02-28 PROCEDURE — 99284 PR EMERGENCY DEPT VISIT,LEVEL IV: ICD-10-PCS | Mod: NTX,,, | Performed by: EMERGENCY MEDICINE

## 2022-02-28 PROCEDURE — 85610 PROTHROMBIN TIME: CPT | Mod: NTX | Performed by: PHYSICIAN ASSISTANT

## 2022-02-28 PROCEDURE — 83735 ASSAY OF MAGNESIUM: CPT | Mod: NTX | Performed by: PHYSICIAN ASSISTANT

## 2022-02-28 NOTE — FIRST PROVIDER EVALUATION
Emergency Department TeleTriage Encounter Note      CHIEF COMPLAINT    Chief Complaint   Patient presents with    Altered Mental Status     Feels disoriented, forgot about her paracentesis appt today. Liver/kidney pt       VITAL SIGNS   Initial Vitals [02/28/22 1526]   BP Pulse Resp Temp SpO2   (!) 147/78 (!) 130 20 98 °F (36.7 °C) 100 %      MAP       --            ALLERGIES    Review of patient's allergies indicates:  No Known Allergies    PROVIDER TRIAGE NOTE      51-year-old female presents the ER for evaluation of altered mental status.  Patient reports feeling loopy and disoriented.  Missed paracentesis today reports that she usually gets it twice a week.  No headache, blurred vision.    Initial orders will be placed and care will be transferred to an alternate provider when patient is roomed for a full evaluation. Any additional orders and the final disposition will be determined by that provider.        ORDERS  Labs Reviewed - No data to display    ED Orders (720h ago, onward)    Start Ordered     Status Ordering Provider    02/28/22 1620 02/28/22 1621  Cardiac Monitoring - Adult  Continuous        Comments: Notify Physician If:    Ordered SANDRA VELASQUEZ    02/28/22 1620 02/28/22 1621  Pulse Oximetry Continuous  Continuous         Ordered RON SANDRA    02/28/22 1529 02/28/22 1529  EKG 12-lead  Once         Completed by PENNY AMAYA on 2/28/2022 at  3:32 PM MARQUIS VERMA    Unscheduled 02/28/22 1621  Protime-INR  STAT         Ordered RON, SANDRA    Unscheduled 02/28/22 1621  Ammonia  STAT         Ordered PIERCEUTTY SANDRA    Unscheduled 02/28/22 1621  CBC auto differential  STAT         Ordered PIERCEUTTY, SANDRA    Unscheduled 02/28/22 1621  Comprehensive metabolic panel  STAT         Ordered PIERCEUTTY, SANDRA    Unscheduled 02/28/22 1621  X-Ray Chest AP Portable  1 time imaging         Ordered RON SANDRA    Unscheduled 02/28/22 1621  Magnesium  STAT         Ordered  VINCEYKUTTY, SANDRA    Unscheduled 02/28/22 1621  Urinalysis, Reflex to Urine Culture Urine, Clean Catch  STAT         Ordered JOHNYKUTTY, SANDRA    Unscheduled 02/28/22 1621  Ethanol  STAT         Ordered JOHNYKUTTY, SANDRA    Unscheduled 02/28/22 1621  Insert Saline lock IV  Once         Ordered JOHNYKUTTY, SANDRA    Unscheduled 02/28/22 1621  EKG 12-lead  Once         Ordered JOHNROMEUTTY, SANDRA    Unscheduled 02/28/22 1621  CT Head Without Contrast  1 time imaging         Ordered SANDRA VELASQUEZ            Virtual Visit Note: The provider triage portion of this emergency department evaluation and documentation was performed via Grid20/20, a HIPAA-compliant telemedicine application, in concert with a tele-presenter in the room. A face to face patient evaluation with one of my colleagues will occur once the patient is placed in an emergency department room.      DISCLAIMER: This note was prepared with [employer] voice recognition transcription software. Garbled syntax, mangled pronouns, and other bizarre constructions may be attributed to that software system.

## 2022-03-01 NOTE — ED PROVIDER NOTES
Encounter Date: 2022       History     Chief Complaint   Patient presents with    Altered Mental Status     Feels disoriented, forgot about her paracentesis appt today. Liver/kidney pt     Malu Montes is a 51-year-old female history of alcoholic cirrhosis, pancreatitis, CKD stage 3 presenting today for confusion.  Patient states she gets biweekly paracentesis, overslept and could not remember that she had an appointment today.  She does report feeling slightly disoriented but otherwise denies no fever or chills.  No tremor.  Has been compliant with her medications        Review of patient's allergies indicates:  No Known Allergies  Past Medical History:   Diagnosis Date    ADD (attention deficit disorder)     Anxiety     Ascites of liver     Cirrhosis 2021    CKD (chronic kidney disease) stage 3, GFR 30-59 ml/min     Constipation     ETOH abuse     Hyperlipidemia     Hypothyroidism     Other ascites 2021    Pancreatitis, acute     Tobacco use     Vitamin D deficiency      Past Surgical History:   Procedure Laterality Date    AUGMENTATION OF BREAST      breast augmentation       SECTION      COLONOSCOPY N/A 2021    Procedure: COLONOSCOPY;  Surgeon: Dao Gray MD;  Location: [facility] (2ND FLR);  Service: Endoscopy;  Laterality: N/A;  COVID test 21 [facility] surgery clinic [facility]    CYSTOSCOPY N/A 9/10/2021    Procedure: CYSTOSCOPY;  Surgeon: Rj Sánchez Jr., MD;  Location: [facility] OR [address] [place] [address];  Service: Urology;  Laterality: N/A;    ESOPHAGOGASTRODUODENOSCOPY N/A 2021    Procedure: ESOPHAGOGASTRODUODENOSCOPY (EGD);  Surgeon: Dao Gray MD;  Location: [facility] (2ND FLR);  Service: Endoscopy;  Laterality: N/A;  labs current on     ESOPHAGOGASTRODUODENOSCOPY N/A 10/26/2021    Procedure: ESOPHAGOGASTRODUODENOSCOPY (EGD);  Surgeon: Dao Gray MD;  Location: [facility] (2ND FLR);  Service: Endoscopy;  Laterality: N/A;  to be done the week  of 10/18/21 per Dr. Gray-  covid-10/23/21-[facility]-   10/25 arrival time confirmed with pt-rb    ESOPHAGOGASTRODUODENOSCOPY Left 12/21/2021    Procedure: EGD (ESOPHAGOGASTRODUODENOSCOPY);  Surgeon: Koffi Monteiro MD;  Location: [address]);  Service: Endoscopy;  Laterality: Left;  Rapid  pt requested AM appt and first available in December-  labs morning of procedure-  12/14 lvm to confirm appt-rb    HEMORRHOID SURGERY      PERITONEOCENTESIS Right 6/8/2021    Procedure: PARACENTESIS, ABDOMINAL;  Surgeon: Jay Torre MD;  Location: [facility] CATH LAB;  Service: Radiology;  Laterality: Right;    PERITONEOCENTESIS Right 6/25/2021    Procedure: PARACENTESIS, ABDOMINAL;  Surgeon: Jay Torre MD;  Location: [facility] CATH LAB;  Service: Radiology;  Laterality: Right;    PERITONEOCENTESIS Right 7/12/2021    Procedure: PARACENTESIS, ABDOMINAL;  Surgeon: Jay Torre MD;  Location: [facility] CATH LAB;  Service: Radiology;  Laterality: Right;    PERITONEOCENTESIS Right 7/23/2021    Procedure: PARACENTESIS, ABDOMINAL;  Surgeon: Edward De La Torre II, MD;  Location: [facility] CATH LAB;  Service: Interventional Radiology;  Laterality: Right;    PERITONEOCENTESIS Right 8/3/2021    Procedure: PARACENTESIS, ABDOMINAL;  Surgeon: Franco Cheung MD;  Location: [facility] CATH LAB;  Service: Radiology;  Laterality: Right;    PERITONEOCENTESIS Right 8/16/2021    Procedure: PARACENTESIS, ABDOMINAL;  Surgeon: Jay Torre MD;  Location: [facility] CATH LAB;  Service: Radiology;  Laterality: Right;    PERITONEOCENTESIS Right 8/23/2021    Procedure: PARACENTESIS, ABDOMINAL;  Surgeon: Jay Torre MD;  Location: [facility] CATH LAB;  Service: Radiology;  Laterality: Right;    PERITONEOCENTESIS Right 9/16/2021    Procedure: PARACENTESIS, ABDOMINAL;  Surgeon: Jay Torre MD;  Location: [facility] CATH LAB;  Service: Radiology;  Laterality: Right;    PERITONEOCENTESIS N/A 9/24/2021    Procedure: PARACENTESIS,  ABDOMINAL;  Surgeon: Jay Torre MD;  Location: [facility] CATH LAB;  Service: Radiology;  Laterality: N/A;    PERITONEOCENTESIS Right 12/23/2021    Procedure: PARACENTESIS, ABDOMINAL;  Surgeon: Jay Torre MD;  Location: [facility] CATH LAB;  Service: Radiology;  Laterality: Right;    PERITONEOCENTESIS N/A 12/30/2021    Procedure: PARACENTESIS, ABDOMINAL;  Surgeon: Salas Cabrera MD;  Location: [facility] CATH LAB;  Service: Radiology;  Laterality: N/A;    RETROGRADE PYELOGRAPHY Bilateral 9/10/2021    Procedure: PYELOGRAM, RETROGRADE;  Surgeon: Rj Sánchez Jr., MD;  Location: [facility] OR [address] [place] [address];  Service: Urology;  Laterality: Bilateral;    TONSILLECTOMY       Family History   Problem Relation Age of Onset    Cancer Mother         Uterine Cancer     No Known Problems Father     No Known Problems Sister     Sleep apnea Daughter     ADD / ADHD Daughter     Heart disease Maternal Grandmother         CHF    COPD Maternal Grandfather     Heart disease Paternal Grandmother         CHF    Colon cancer Neg Hx     Inflammatory bowel disease Neg Hx     Stomach cancer Neg Hx     Breast cancer Neg Hx     Ovarian cancer Neg Hx     Learning disabilities Neg Hx      Social History     Tobacco Use    Smoking status: Current Every Day Smoker     Years: 27.00     Types: Cigarettes    Smokeless tobacco: Never Used    Tobacco comment: two cigarettes a day    Substance Use Topics    Alcohol use: Not Currently     Comment: quit caridad 15    Drug use: No     Review of Systems   Constitutional: Negative for chills and fever.   HENT: Negative for sore throat.    Respiratory: Negative for cough and shortness of breath.    Cardiovascular: Positive for palpitations. Negative for chest pain.   Gastrointestinal: Negative for nausea and vomiting.   Genitourinary: Negative for dysuria.   Musculoskeletal: Negative for back pain.   Skin: Negative for rash.   Neurological: Negative for weakness.   Hematological: Does not  bruise/bleed easily.   Psychiatric/Behavioral: Positive for confusion.       Physical Exam     Initial Vitals [02/28/22 1526]   BP Pulse Resp Temp SpO2   (!) 147/78 (!) 130 20 98 °F (36.7 °C) 100 %      MAP       --         Physical Exam    Nursing note and vitals reviewed.  Constitutional: She appears well-developed and well-nourished. No distress.   HENT:   Mouth/Throat: Oropharynx is clear and moist.   Eyes: Conjunctivae and EOM are normal. Pupils are equal, round, and reactive to light. No scleral icterus.   Neck: No JVD present.   Cardiovascular: Normal rate, regular rhythm and intact distal pulses.   Pulmonary/Chest: Breath sounds normal. No respiratory distress. She has no wheezes. She has no rales.   Abdominal: Abdomen is soft. Bowel sounds are normal. She exhibits no distension. There is no abdominal tenderness. There is no rebound.   Musculoskeletal:         General: No edema.     Lymphadenopathy:     She has no cervical adenopathy.   Neurological: She is alert and oriented to person, place, and time.   - tremor  Answers questions appropriately, CNII-XII intact.   Skin: Skin is warm. No rash noted.         ED Course   Procedures  Labs Reviewed   CBC W/ AUTO DIFFERENTIAL - Abnormal; Notable for the following components:       Result Value    MCH 33.4 (*)     RDW 16.4 (*)     Gran # (ANC) 9.9 (*)     Immature Grans (Abs) 0.05 (*)     Gran % 78.2 (*)     Lymph % 12.3 (*)     All other components within normal limits   COMPREHENSIVE METABOLIC PANEL - Abnormal; Notable for the following components:    CO2 20 (*)     BUN 60 (*)     Creatinine 1.8 (*)     Total Bilirubin 1.4 (*)     eGFR if  37.3 (*)     eGFR if non  32.4 (*)     All other components within normal limits   PROTIME-INR   MAGNESIUM   ALCOHOL,MEDICAL (ETHANOL)   AMMONIA   URINALYSIS, REFLEX TO URINE CULTURE   POCT GLUCOSE          Imaging Results          X-Ray Chest AP Portable (Final result)  Result time 02/28/22  18:06:08    Final result by Killian Islas MD (02/28/22 18:06:08)                 Impression:      No acute cardiopulmonary process.    Electronically signed by resident: Vannesa Hanson  Date:    02/28/2022  Time:    17:56    Electronically signed by: Killian Islas MD  Date:    02/28/2022  Time:    18:06             Narrative:    EXAMINATION:  XR CHEST AP PORTABLE    CLINICAL HISTORY:  Dizziness and giddiness    TECHNIQUE:  Single frontal view of the chest was performed.    COMPARISON:  Chest radiograph 07/30/2021.  CT abdomen pelvis 12/08/2021    FINDINGS:  Bilateral breast implants in place.    Mediastinal structures are midline. Cardiac silhouette and pulmonary vascular distribution are normal.    Lung volumes are normal and symmetric.  Bibasilar opacifications, likely related to breast implants.  No pulmonary disease, pleural fluid, pneumothorax, pneumomediastinum, pneumoperitoneum, or significant osseous abnormality.                                 Medications - No data to display  Medical Decision Making:   History:   Old Medical Records: I decided to obtain old medical records.  Initial Assessment:   Emergent evaluation of 51-year-old female presenting today with confusion, missed paracentesis this morning    Vital signs stable  Differential Diagnosis:   Hepatic encephalopathy, electrolyte derangement, SBP, decompensated liver failure  Independently Interpreted Test(s):   I have ordered and independently interpreted X-rays - see prior notes.  I have ordered and independently interpreted EKG Reading(s) - see prior notes  Clinical Tests:   Lab Tests: Reviewed and Ordered  Radiological Study: Ordered and Reviewed  Medical Tests: Ordered and Reviewed  ED Management:  - labs  - EKG  - CT head               ED Course as of 03/01/22 1804   Mon Feb 28, 2022   1910 Ammonia: 43 [JL]      ED Course User Index  [JL] Rose Lozano MD          patient signed out to Dr. Lozano in stable condition pending CT head.   Labs overall reassuring.  Anticipate discharge if workup negative to schedule outpatient paracentesis.    Clinical Impression:   Final diagnoses:  [R00.0] Tachycardia  [R42] Lightheaded  [R41.82] Altered mental status                 Michela Coreas MD  03/01/22 4974

## 2022-03-01 NOTE — DISCHARGE INSTRUCTIONS
Follow up with your doctor.  Contact your doctor about rescheduling your paracentesis.  Return to ED for fevers, confusion, abdominal pain, chest pain or any other concerns

## 2022-03-01 NOTE — ED NOTES
Patient identifiers verified and correct for Malu Simon   C/C: Presents to the ED for evaluation of AMS, loss of memory   APPEARANCE: awake and alert in NAD.  SKIN: warm, dry and intact. No breakdown or bruising.  MUSCULOSKELETAL: Patient moving all extremities spontaneously, no obvious swelling or deformities noted. Ambulates independently.  RESPIRATORY: Denies shortness of breath.Respirations unlabored.   CARDIAC: Denies CP, 2+ distal pulses; no peripheral edema  ABDOMEN: S/ND/NT, Denies nausea  : voids spontaneously, denies difficulty  Neurologic: AAO x 4; follows commands equal strength in all extremities; denies numbness/tingling. Denies dizziness

## 2022-03-03 ENCOUNTER — HOSPITAL ENCOUNTER (OUTPATIENT)
Dept: INTERVENTIONAL RADIOLOGY/VASCULAR | Facility: HOSPITAL | Age: 52
Discharge: HOME | End: 2022-03-03
Attending: INTERNAL MEDICINE
Payer: COMMERCIAL

## 2022-03-03 VITALS
OXYGEN SATURATION: 99 % | DIASTOLIC BLOOD PRESSURE: 55 MMHG | SYSTOLIC BLOOD PRESSURE: 96 MMHG | HEART RATE: 97 BPM | RESPIRATION RATE: 16 BRPM

## 2022-03-03 DIAGNOSIS — R18.8 OTHER ASCITES: ICD-10-CM

## 2022-03-03 LAB
APPEARANCE FLD: NORMAL
BACTERIA SPEC ANAEROBE CULT: NORMAL
BASOPHILS NFR FLD MANUAL: 1 %
BODY FLD TYPE: NORMAL
COLOR FLD: YELLOW
EOSINOPHIL NFR FLD MANUAL: 2 %
LYMPHOCYTES NFR FLD MANUAL: 78 %
MESOTHL CELL NFR FLD MANUAL: 1 %
MONOS+MACROS NFR FLD MANUAL: 13 %
NEUTROPHILS NFR FLD MANUAL: 5 %
WBC # FLD: 59 /CU MM

## 2022-03-03 PROCEDURE — 49083 ABD PARACENTESIS W/IMAGING: CPT | Mod: TXP,,, | Performed by: PHYSICIAN ASSISTANT

## 2022-03-03 PROCEDURE — P9047 ALBUMIN (HUMAN), 25%, 50ML: HCPCS | Mod: JG,TXP | Performed by: INTERNAL MEDICINE

## 2022-03-03 PROCEDURE — C1729 CATH, DRAINAGE: HCPCS | Mod: TXP

## 2022-03-03 PROCEDURE — 89051 BODY FLUID CELL COUNT: CPT | Mod: TXP | Performed by: INTERNAL MEDICINE

## 2022-03-03 PROCEDURE — 63600175 PHARM REV CODE 636 W HCPCS: Mod: JG,TXP | Performed by: INTERNAL MEDICINE

## 2022-03-03 PROCEDURE — 49083 ABD PARACENTESIS W/IMAGING: CPT | Mod: NTX | Performed by: RADIOLOGY

## 2022-03-03 PROCEDURE — 49083 IR PARACENTESIS WITH IMAGING: ICD-10-PCS | Mod: TXP,,, | Performed by: PHYSICIAN ASSISTANT

## 2022-03-03 RX ORDER — ALBUMIN HUMAN 250 G/1000ML
50 SOLUTION INTRAVENOUS ONCE
Status: COMPLETED | OUTPATIENT
Start: 2022-03-03 | End: 2022-03-03

## 2022-03-03 RX ORDER — ALBUMIN HUMAN 250 G/1000ML
SOLUTION INTRAVENOUS
Status: DISPENSED
Start: 2022-03-03 | End: 2022-03-03

## 2022-03-03 RX ADMIN — ALBUMIN (HUMAN) 50 G: 25 SOLUTION INTRAVENOUS at 09:03

## 2022-03-03 NOTE — PROGRESS NOTES
PT in MPU for paracentesis. No acute distress noted. Labs and orders reviewed on chart. Awaiting consent.

## 2022-03-03 NOTE — SEDATION DOCUMENTATION
Paracentesis complete, 6500 MLs removed. Specimen sent to lab. 200 mL 25% Albumin administered per protocol. Dressing applied to RLQ puncture site, dressing clean dry and intact. Pt given discharge instructions and handouts, pt verbalizes understanding. Questions answered. Pt denies pain and discomfort. Pt refusing transport. Pt ambulated to lobby for transport home with family. Gait steady.

## 2022-03-03 NOTE — H&P
Radiology History & Physical      SUBJECTIVE:     Chief Complaint: abdominal distention    History of Present Illness:  Malu Montes is a 51 y.o. female who presents for ultrasound guided paracentesis  Past Medical History:   Diagnosis Date    ADD (attention deficit disorder)     Anxiety     Ascites of liver     Cirrhosis 2021    CKD (chronic kidney disease) stage 3, GFR 30-59 ml/min     Constipation     ETOH abuse     Hyperlipidemia     Hypothyroidism     Other ascites 2021    Pancreatitis, acute     Tobacco use     Vitamin D deficiency      Past Surgical History:   Procedure Laterality Date    AUGMENTATION OF BREAST      breast augmentation       SECTION      COLONOSCOPY N/A 2021    Procedure: COLONOSCOPY;  Surgeon: Dao Gray MD;  Location: [facility] (2ND FLR);  Service: Endoscopy;  Laterality: N/A;  COVID test 21 General surgery clinic [facility]    CYSTOSCOPY N/A 9/10/2021    Procedure: CYSTOSCOPY;  Surgeon: Rj Sánchez Jr., MD;  [facility];  Service: Urology;  Laterality: N/A;    ESOPHAGOGASTRODUODENOSCOPY N/A 2021    Procedure: ESOPHAGOGASTRODUODENOSCOPY (EGD);  Surgeon: Dao Gray MD;  Location: [facility] (2ND FLR);  Service: Endoscopy;  Laterality: N/A;  labs current on     ESOPHAGOGASTRODUODENOSCOPY N/A 10/26/2021    Procedure: ESOPHAGOGASTRODUODENOSCOPY (EGD);  Surgeon: Dao Gray MD;  Location: [facility] (2ND FLR);  Service: Endoscopy;  Laterality: N/A;  to be done the week of 10/18/21 per Dr. Grya-BB  covid-10/23/21-[facility]-   10/25 arrival time confirmed with pt-rb    ESOPHAGOGASTRODUODENOSCOPY Left 2021    Procedure: EGD (ESOPHAGOGASTRODUODENOSCOPY);  Surgeon: Koffi Monteiro MD;  Location: [facility] (4TH FLR);  Service: Endoscopy;  Laterality: Left;  Rapid  pt requested AM appt and first available in December-  labs morning of procedure-BB   lvm to confirm appt-rb    HEMORRHOID SURGERY       PERITONEOCENTESIS Right 6/8/2021    Procedure: PARACENTESIS, ABDOMINAL;  Surgeon: Jay Torre MD;  Location: [facility] CATH LAB;  Service: Radiology;  Laterality: Right;    PERITONEOCENTESIS Right 6/25/2021    Procedure: PARACENTESIS, ABDOMINAL;  Surgeon: Jay Torre MD;  Location: [facility] CATH LAB;  Service: Radiology;  Laterality: Right;    PERITONEOCENTESIS Right 7/12/2021    Procedure: PARACENTESIS, ABDOMINAL;  Surgeon: Jay Torre MD;  Location: [facility] CATH LAB;  Service: Radiology;  Laterality: Right;    PERITONEOCENTESIS Right 7/23/2021    Procedure: PARACENTESIS, ABDOMINAL;  Surgeon: Edward De La Torre II, MD;  Location: [facility] CATH LAB;  Service: Interventional Radiology;  Laterality: Right;    PERITONEOCENTESIS Right 8/3/2021    Procedure: PARACENTESIS, ABDOMINAL;  Surgeon: Franco Cheung MD;  Location: [facility] CATH LAB;  Service: Radiology;  Laterality: Right;    PERITONEOCENTESIS Right 8/16/2021    Procedure: PARACENTESIS, ABDOMINAL;  Surgeon: Jay Torre MD;  Location: [facility] CATH LAB;  Service: Radiology;  Laterality: Right;    PERITONEOCENTESIS Right 8/23/2021    Procedure: PARACENTESIS, ABDOMINAL;  Surgeon: Jay Torre MD;  Location: [facility] CATH LAB;  Service: Radiology;  Laterality: Right;    PERITONEOCENTESIS Right 9/16/2021    Procedure: PARACENTESIS, ABDOMINAL;  Surgeon: Jay Torre MD;  Location: [facility] CATH LAB;  Service: Radiology;  Laterality: Right;    PERITONEOCENTESIS N/A 9/24/2021    Procedure: PARACENTESIS, ABDOMINAL;  Surgeon: Jay Torre MD;  Location: [facility] CATH LAB;  Service: Radiology;  Laterality: N/A;    PERITONEOCENTESIS Right 12/23/2021    Procedure: PARACENTESIS, ABDOMINAL;  Surgeon: Jay Torre MD;  Location: [facility] CATH LAB;  Service: Radiology;  Laterality: Right;    PERITONEOCENTESIS N/A 12/30/2021    Procedure: PARACENTESIS, ABDOMINAL;  Surgeon: Salas Cabrera MD;  Location: [facility] CATH LAB;  Service: Radiology;   Laterality: N/A;    RETROGRADE PYELOGRAPHY Bilateral 9/10/2021    Procedure: PYELOGRAM, RETROGRADE;  Surgeon: Rj Sánchez Jr., MD;  Location: [facility] OR [address] [place] [address];  Service: Urology;  Laterality: Bilateral;    TONSILLECTOMY         Home Meds:   Prior to Admission medications    Medication Sig Start Date End Date Taking? Authorizing Provider   allopurinoL (ZYLOPRIM) 100 MG tablet TAKE 1 TABLET(100 MG) BY MOUTH EVERY DAY 2/23/22   Rashel Cohn MD   bisacodyL (DULCOLAX) 5 mg EC tablet Take 5 mg by mouth daily as needed for Constipation.    Historical Provider   ciprofloxacin HCl (CIPRO) 250 MG tablet Take 1 tablet (250 mg total) by mouth once daily.  Patient taking differently: Take 250 mg by mouth every other day. 12/23/21   Rashel Cohn MD   ergocalciferol, vitamin D2, (VITAMIN D ORAL) Take by mouth.    Historical Provider   folic acid (FOLVITE) 1 MG tablet Take 2 tablets (2 mg total) by mouth once daily. 12/14/21 3/14/22  Uma Perry MD   furosemide (LASIX) 40 MG tablet Take 80 mg every am and 40 mg every pm 1/6/22   Sharmila Pacheco MD   levothyroxine (SYNTHROID) 50 MCG tablet TAKE 1 TABLET(50 MCG) BY MOUTH BEFORE BREAKFAST 9/25/21   Killian Dodd DO   linaCLOtide (LINZESS) 72 mcg Cap capsule Take 2 capsules (144 mcg total) by mouth before breakfast. 11/26/21   Sharmila Pacheco MD   pantoprazole (PROTONIX) 40 MG tablet Take 1 tablet (40 mg total) by mouth once daily. 2/15/22 2/15/23  Sharmila Pacheco MD   sodium bicarbonate 650 MG tablet Take 1 tablet (650 mg total) by mouth 2 (two) times daily.  Patient taking differently: Take 650 mg by mouth once daily. 10/2/21 3/31/22  Rashel Cohn MD   thiamine (VITAMIN B-1) 100 MG tablet Take 1 tablet (100 mg total) by mouth every evening. 10/11/21   Sharmila Pacheco MD   vitamin A 16768 UNIT capsule Take 10,000 Units by mouth every evening.     Historical Provider     Anticoagulants/Antiplatelets: no anticoagulation    Allergies: Review of  patient's allergies indicates:  No Known Allergies  Sedation History:  no adverse reactions    Review of Systems:   Hematological: no known coagulopathies  Respiratory: no shortness of breath  Cardiovascular: no chest pain  Gastrointestinal: no abdominal pain  Genito-Urinary: no dysuria  Musculoskeletal: negative  Neurological: no TIA or stroke symptoms         OBJECTIVE:     Vital Signs (Most Recent)       Physical Exam:  ASA: 2  Mallampati: n/a    General: no acute distress  Mental Status: alert and oriented to person, place and time  HEENT: normocephalic, atraumatic  Chest: unlabored breathing  Heart: regular heart rate  Abdomen: nondistended  Extremity: moves all extremities    ASSESSMENT/PLAN:     Sedation Plan: local  Patient will undergo ultrasound guided paracentesis.    Kay Arnold PA-C  Interventional Radiology  Clinic [phone]

## 2022-03-03 NOTE — PROCEDURES
Radiology Post-Procedure Note    Pre Op Diagnosis: Ascites  Post Op Diagnosis: Same    Procedure: Ultrasound Guided Paracentesis    Procedure performed by: Kay Arnold PA-C    Written Informed Consent Obtained: Yes  Specimen Removed: YES clear,   Estimated Blood Loss: Minimal    Findings:   Successful paracentesis.  RLQ. Albumin administered PRN per protocol.    Patient tolerated procedure well.    Kay Arnold PA-C  Interventional Radiology  Clinic [phone]

## 2022-03-04 ENCOUNTER — OFFICE VISIT (OUTPATIENT)
Dept: HEPATOLOGY | Facility: CLINIC | Age: 52
End: 2022-03-04
Payer: COMMERCIAL

## 2022-03-04 VITALS
TEMPERATURE: 97 F | HEIGHT: 63 IN | WEIGHT: 125 LBS | RESPIRATION RATE: 18 BRPM | BODY MASS INDEX: 22.15 KG/M2 | DIASTOLIC BLOOD PRESSURE: 51 MMHG | HEART RATE: 108 BPM | SYSTOLIC BLOOD PRESSURE: 96 MMHG | OXYGEN SATURATION: 100 %

## 2022-03-04 DIAGNOSIS — N18.4 CKD (CHRONIC KIDNEY DISEASE) STAGE 4, GFR 15-29 ML/MIN: ICD-10-CM

## 2022-03-04 DIAGNOSIS — K70.31 ALCOHOLIC CIRRHOSIS OF LIVER WITH ASCITES: Primary | ICD-10-CM

## 2022-03-04 DIAGNOSIS — I85.10 SECONDARY ESOPHAGEAL VARICES WITHOUT BLEEDING: ICD-10-CM

## 2022-03-04 DIAGNOSIS — R18.8 OTHER ASCITES: ICD-10-CM

## 2022-03-04 PROCEDURE — 99999 PR PBB SHADOW E&M-EST. PATIENT-LVL V: ICD-10-PCS | Mod: PBBFAC,TXP,, | Performed by: INTERNAL MEDICINE

## 2022-03-04 PROCEDURE — 99214 PR OFFICE/OUTPT VISIT, EST, LEVL IV, 30-39 MIN: ICD-10-PCS | Mod: S$GLB,TXP,, | Performed by: INTERNAL MEDICINE

## 2022-03-04 PROCEDURE — 99214 OFFICE O/P EST MOD 30 MIN: CPT | Mod: S$GLB,TXP,, | Performed by: INTERNAL MEDICINE

## 2022-03-04 PROCEDURE — 99999 PR PBB SHADOW E&M-EST. PATIENT-LVL V: CPT | Mod: PBBFAC,TXP,, | Performed by: INTERNAL MEDICINE

## 2022-03-04 RX ORDER — LACTULOSE 10 G/15ML
20 SOLUTION ORAL 2 TIMES DAILY
Qty: 3000 ML | Refills: 1 | Status: SHIPPED | OUTPATIENT
Start: 2022-03-04 | End: 2022-07-11 | Stop reason: SDUPTHER

## 2022-03-04 NOTE — Clinical Note
Rashel, She is not sure she will do the TIPS. In the meantime, I stopped the sodium bicarb (restart if you think this is really wrong); can you increase lasix or not?

## 2022-03-04 NOTE — PROGRESS NOTES
HEPATOLOGY FOLLOW UP    Referring Physician: Killian Dodd DO  Current Corresponding Physician: Killian Dodd DO, Rashel Cohn MD    Malu Montes is here for follow up of decompensated alcohol-induced cirrhosis    HPI  Malu Montes is a 51 y.o. female who has a history of alcohol abuse and decompensated cirrhosis now listed for a liver-kidney transplant.    Interval History  Since Malu Montes's last visit:    She is listed for liver-kidney transplant. MELD 15 (2/21/22-5/22/22). She continues to require a paracentesis every 5-6 days. Unfortunately she got covid (+1/8/22). She was reactivated on the list post covid infection on 2/8/22. She has agreed to proceed with liver alone. There was discussion about proceeding with TIPS to try to improve ascites control. This has been scheduled 4/1/22. The patient is not sure she will proceed with TIPS due to risk of complications and possible acceleration of need for HD.    She was seen in the ER on 3/1/22: confusion but normal ammonia level and ct head- discharged home; lactulose was stopped 2/4/22.    Labs 2/28/22: Tbil 1.4, ALT 25, AST 35, ALKP 114  AFP 2/3/22: 3.6    Creatinine, elevated: due to IgA nephropathy- cystatin C 2.45  Hematologic labs: Juliette free light chains 9.12 (0.33-1.94), Lambda Free Light Chains 4.39 (0.57-2.63), Kappa/Lambda FLC Ratio: 2.08 (0.26-1.65); hematology consult- abn attributed to CKD; no futher f/u needed    Abdo US 10/1/21: Satisfactory Doppler evaluation of the liver; no hcc  MRI abdo 8/6/21: The liver is normal in size.  Hepatic parenchyma demonstrates diffuse heterogeneous enhancement on the early arterial phase that normalizes on the portal venous and delayed phases.  There is a 0.8 cm arterial hyperenhancing focus within the superior left hepatic lobe, possibly a prominent vascular structure and unchanged when compared to the previous exam; enlarged spleen    Ascites: on laisx 80 mg in am and 40 mg in  pm  SBP: yes; on cipro 250 mg every other day  HE: not on lactulose  CKD: ongoing; now meets criteria for simultaneous kidney transplant  Alcohol abuse: peth negative; now attending completed IOP    MELD-Na score: 14 at 2/28/2022  4:33 PM  MELD score: 14 at 2/28/2022  4:33 PM  Calculated from:  Serum Creatinine: 1.8 mg/dL at 2/28/2022  4:33 PM  Serum Sodium: 137 mmol/L at 2/28/2022  4:33 PM  Total Bilirubin: 1.4 mg/dL at 2/28/2022  4:33 PM  INR(ratio): 1.1 at 2/28/2022  4:33 PM  Age: 51 years    Outpatient Encounter Medications as of 3/4/2022   Medication Sig Dispense Refill    allopurinoL (ZYLOPRIM) 100 MG tablet TAKE 1 TABLET(100 MG) BY MOUTH EVERY DAY 90 tablet 0    bisacodyL (DULCOLAX) 5 mg EC tablet Take 5 mg by mouth daily as needed for Constipation.      ciprofloxacin HCl (CIPRO) 250 MG tablet Take 1 tablet (250 mg total) by mouth once daily. (Patient taking differently: Take 250 mg by mouth every other day.) 90 tablet 0    ergocalciferol, vitamin D2, (VITAMIN D ORAL) Take by mouth.      folic acid (FOLVITE) 1 MG tablet Take 2 tablets (2 mg total) by mouth once daily. 60 tablet 11    furosemide (LASIX) 40 MG tablet Take 80 mg every am and 40 mg every pm 90 tablet 11    levothyroxine (SYNTHROID) 50 MCG tablet TAKE 1 TABLET(50 MCG) BY MOUTH BEFORE BREAKFAST 90 tablet 3    linaCLOtide (LINZESS) 72 mcg Cap capsule Take 2 capsules (144 mcg total) by mouth before breakfast. 180 capsule 5    pantoprazole (PROTONIX) 40 MG tablet Take 1 tablet (40 mg total) by mouth once daily. 30 tablet 11    sodium bicarbonate 650 MG tablet Take 1 tablet (650 mg total) by mouth 2 (two) times daily. (Patient taking differently: Take 650 mg by mouth once daily.) 60 tablet 5    thiamine (VITAMIN B-1) 100 MG tablet Take 1 tablet (100 mg total) by mouth every evening. 30 tablet 5    vitamin A 37750 UNIT capsule Take 10,000 Units by mouth every evening.        No facility-administered encounter medications on file as of  3/4/2022.     Review of patient's allergies indicates:  No Known Allergies  Past Medical History:   Diagnosis Date    ADD (attention deficit disorder)     Anxiety     Ascites of liver     Cirrhosis 9/16/2021    CKD (chronic kidney disease) stage 3, GFR 30-59 ml/min     Constipation     ETOH abuse     Hyperlipidemia     Hypothyroidism     Other ascites 6/14/2021    Pancreatitis, acute     Tobacco use     Vitamin D deficiency        Review of Systems   Constitutional: Negative.    HENT: Negative.    Eyes: Negative.    Respiratory: Negative.    Cardiovascular: Negative.    Gastrointestinal: Negative.    Genitourinary: Negative.    Musculoskeletal: Negative.    Skin: Negative.    Neurological: Negative.    Psychiatric/Behavioral: Negative.      Vitals:    03/04/22 1434   BP: (!) 96/51   Pulse: 108   Resp: 18   Temp: 96.7 °F (35.9 °C)       Physical Exam  Vitals reviewed.   Constitutional:       Appearance: She is well-developed.   HENT:      Head: Normocephalic and atraumatic.   Eyes:      General: No scleral icterus.     Conjunctiva/sclera: Conjunctivae normal.      Pupils: Pupils are equal, round, and reactive to light.   Neck:      Thyroid: No thyromegaly.   Cardiovascular:      Rate and Rhythm: Normal rate and regular rhythm.      Heart sounds: Normal heart sounds.   Pulmonary:      Effort: Pulmonary effort is normal.      Breath sounds: Normal breath sounds. No rales.   Abdominal:      General: Bowel sounds are normal. There is distension (+shifting dullness).      Palpations: Abdomen is soft. There is no mass.      Tenderness: There is no abdominal tenderness.   Musculoskeletal:         General: Normal range of motion.      Cervical back: Normal range of motion and neck supple.   Skin:     General: Skin is warm and dry.      Findings: No rash.   Neurological:      Mental Status: She is alert and oriented to person, place, and time.         MELD-Na score: 14 at 2/28/2022  4:33 PM  MELD score: 14 at  2/28/2022  4:33 PM  Calculated from:  Serum Creatinine: 1.8 mg/dL at 2/28/2022  4:33 PM  Serum Sodium: 137 mmol/L at 2/28/2022  4:33 PM  Total Bilirubin: 1.4 mg/dL at 2/28/2022  4:33 PM  INR(ratio): 1.1 at 2/28/2022  4:33 PM  Age: 51 years    Lab Results   Component Value Date     02/28/2022    BUN 60 (H) 02/28/2022    CREATININE 1.8 (H) 02/28/2022    CALCIUM 10.0 02/28/2022     02/28/2022    K 4.4 02/28/2022     02/28/2022    PROT 6.6 02/28/2022    CO2 20 (L) 02/28/2022    ANIONGAP 14 02/28/2022    WBC 12.68 02/28/2022    RBC 4.04 02/28/2022    HGB 13.5 02/28/2022    HCT 38.9 02/28/2022    HCT 34 (L) 12/07/2021    MCV 96 02/28/2022    MCH 33.4 (H) 02/28/2022    MCHC 34.7 02/28/2022     Lab Results   Component Value Date    RDW 16.4 (H) 02/28/2022     02/28/2022    MPV 11.1 02/28/2022    GRAN 9.9 (H) 02/28/2022    GRAN 78.2 (H) 02/28/2022    LYMPH 1.6 02/28/2022    LYMPH 12.3 (L) 02/28/2022    MONO 0.8 02/28/2022    MONO 5.9 02/28/2022    EOSINOPHIL 2.6 02/28/2022    BASOPHIL 0.6 02/28/2022    EOS 2 03/03/2022    BASO 1 03/03/2022    APTT 27.6 11/11/2021    GROUPTRH A POS 07/30/2021    CHOL 146 12/06/2021    TRIG 68 12/06/2021    HDL 32 (L) 12/06/2021    CHOLHDL 21.9 12/06/2021    TOTALCHOLEST 4.6 12/06/2021    ALBUMIN 3.6 02/28/2022    BILIDIR 0.4 (H) 11/11/2021    AST 35 02/28/2022    ALT 25 02/28/2022    ALKPHOS 114 02/28/2022    MG 2.4 02/28/2022    LABPROT 11.5 02/28/2022    INR 1.1 02/28/2022       Assessment and Plan:    Malu Montes is a 51 y.o. female with decompensated cirrhosis secondary to alcohol., now listed for L-K transplant. Patient willing to consider liver tx alone with aim to be prioritized for kidney tx in the first year post liver tx. Current recomemndations:  1. Ascites complicated by SBP; continue cipro prophylaxis (every other day) and prn paracentesis twice a we as needed; continue lasix to 80 mg in am and 40 mg in pm; TIPS scheduled 4/1/22 (she may not  proceed with TIPS)  2. Portal htn: EV s/p banding; no banding 12/21-repeat in 3 months 03/22   3. Decompensated alcoholic liver disease: labs every week; will consider liver tx alone  4. +MARIBEL: consider liver biopsy if live enzymes increase  5. Colorectal Ca screening: repeat colonoscopy in 10 years  6. Alcohol abuse, in remission: now s/p IOP; continue AA  7. Frailty: at risk for worsening since getting paracenteses every 10 days; continue whey protein supplementation; stay active;   8. CKD: GFR now <30. possible IgA nephropathy; kidney bx deferred; hodl Na bicarb; will discuss with nephrology if can increase lasix  9. Elevated Kappa/Lambda FLC ratio: due to CKD; no further w/u  Needed  10. Anemia: iron infusions per hematology  11. Recent episode of confusion- not clear if was HE, but will restart lactulose    Return 6 weeks

## 2022-03-04 NOTE — PATIENT INSTRUCTIONS
Stop sodium bicarbonate  Will ask the nephrologist if can increase diuretic  Restart lactulose  TIPS- scheduled 4/1/22

## 2022-03-05 ENCOUNTER — PATIENT MESSAGE (OUTPATIENT)
Dept: HEPATOLOGY | Facility: CLINIC | Age: 52
End: 2022-03-05
Payer: COMMERCIAL

## 2022-03-05 DIAGNOSIS — R18.8 OTHER ASCITES: ICD-10-CM

## 2022-03-05 RX ORDER — FUROSEMIDE 40 MG/1
80 TABLET ORAL 2 TIMES DAILY
Qty: 120 TABLET | Refills: 11 | Status: SHIPPED | OUTPATIENT
Start: 2022-03-05 | End: 2022-04-01 | Stop reason: SDUPTHER

## 2022-03-07 ENCOUNTER — HOSPITAL ENCOUNTER (OUTPATIENT)
Dept: INTERVENTIONAL RADIOLOGY/VASCULAR | Facility: HOSPITAL | Age: 52
Discharge: HOME | End: 2022-03-07
Attending: INTERNAL MEDICINE
Payer: COMMERCIAL

## 2022-03-07 ENCOUNTER — TELEPHONE (OUTPATIENT)
Dept: INTERVENTIONAL RADIOLOGY/VASCULAR | Facility: CLINIC | Age: 52
End: 2022-03-07
Payer: COMMERCIAL

## 2022-03-07 ENCOUNTER — LAB VISIT (OUTPATIENT)
Dept: LAB | Facility: HOSPITAL | Age: 52
End: 2022-03-07
Attending: INTERNAL MEDICINE
Payer: COMMERCIAL

## 2022-03-07 ENCOUNTER — PATIENT MESSAGE (OUTPATIENT)
Dept: TRANSPLANT | Facility: CLINIC | Age: 52
End: 2022-03-07
Payer: COMMERCIAL

## 2022-03-07 ENCOUNTER — TELEPHONE (OUTPATIENT)
Dept: TRANSPLANT | Facility: CLINIC | Age: 52
End: 2022-03-07
Payer: COMMERCIAL

## 2022-03-07 VITALS
DIASTOLIC BLOOD PRESSURE: 64 MMHG | SYSTOLIC BLOOD PRESSURE: 107 MMHG | HEART RATE: 91 BPM | RESPIRATION RATE: 18 BRPM | OXYGEN SATURATION: 100 %

## 2022-03-07 DIAGNOSIS — R18.8 OTHER ASCITES: ICD-10-CM

## 2022-03-07 DIAGNOSIS — Z76.82 ORGAN TRANSPLANT CANDIDATE: ICD-10-CM

## 2022-03-07 DIAGNOSIS — K70.31 ALCOHOLIC CIRRHOSIS OF LIVER WITH ASCITES: ICD-10-CM

## 2022-03-07 LAB
ALBUMIN SERPL BCP-MCNC: 3.3 G/DL (ref 3.5–5.2)
ALP SERPL-CCNC: 108 U/L (ref 55–135)
ALT SERPL W/O P-5'-P-CCNC: 24 U/L (ref 10–44)
ANION GAP SERPL CALC-SCNC: 11 MMOL/L (ref 8–16)
APPEARANCE FLD: NORMAL
AST SERPL-CCNC: 31 U/L (ref 10–40)
BASOPHILS # BLD AUTO: 0.09 K/UL (ref 0–0.2)
BASOPHILS NFR BLD: 1 % (ref 0–1.9)
BILIRUB SERPL-MCNC: 0.6 MG/DL (ref 0.1–1)
BODY FLD TYPE: NORMAL
BUN SERPL-MCNC: 43 MG/DL (ref 6–20)
CALCIUM SERPL-MCNC: 8.9 MG/DL (ref 8.7–10.5)
CHLORIDE SERPL-SCNC: 106 MMOL/L (ref 95–110)
CO2 SERPL-SCNC: 19 MMOL/L (ref 23–29)
COLOR FLD: YELLOW
CREAT SERPL-MCNC: 1.7 MG/DL (ref 0.5–1.4)
DIFFERENTIAL METHOD: ABNORMAL
EOSINOPHIL # BLD AUTO: 0.6 K/UL (ref 0–0.5)
EOSINOPHIL NFR BLD: 6.8 % (ref 0–8)
ERYTHROCYTE [DISTWIDTH] IN BLOOD BY AUTOMATED COUNT: 16 % (ref 11.5–14.5)
EST. GFR  (AFRICAN AMERICAN): 40 ML/MIN/1.73 M^2
EST. GFR  (NON AFRICAN AMERICAN): 34.7 ML/MIN/1.73 M^2
GLUCOSE SERPL-MCNC: 136 MG/DL (ref 70–110)
HCT VFR BLD AUTO: 35.7 % (ref 37–48.5)
HGB BLD-MCNC: 12 G/DL (ref 12–16)
IMM GRANULOCYTES # BLD AUTO: 0.04 K/UL (ref 0–0.04)
IMM GRANULOCYTES NFR BLD AUTO: 0.4 % (ref 0–0.5)
INR PPP: 1.1 (ref 0.8–1.2)
LYMPHOCYTES # BLD AUTO: 1.6 K/UL (ref 1–4.8)
LYMPHOCYTES NFR BLD: 17.2 % (ref 18–48)
LYMPHOCYTES NFR FLD MANUAL: 69 %
MCH RBC QN AUTO: 33.1 PG (ref 27–31)
MCHC RBC AUTO-ENTMCNC: 33.6 G/DL (ref 32–36)
MCV RBC AUTO: 99 FL (ref 82–98)
MESOTHL CELL NFR FLD MANUAL: 2 %
MONOCYTES # BLD AUTO: 0.6 K/UL (ref 0.3–1)
MONOCYTES NFR BLD: 6.8 % (ref 4–15)
MONOS+MACROS NFR FLD MANUAL: 27 %
NEUTROPHILS # BLD AUTO: 6.3 K/UL (ref 1.8–7.7)
NEUTROPHILS NFR BLD: 67.8 % (ref 38–73)
NEUTROPHILS NFR FLD MANUAL: 2 %
NRBC BLD-RTO: 0 /100 WBC
PLATELET # BLD AUTO: 255 K/UL (ref 150–450)
PMV BLD AUTO: 10.8 FL (ref 9.2–12.9)
POTASSIUM SERPL-SCNC: 3.4 MMOL/L (ref 3.5–5.1)
PROT SERPL-MCNC: 5.9 G/DL (ref 6–8.4)
PROTHROMBIN TIME: 11.4 SEC (ref 9–12.5)
RBC # BLD AUTO: 3.62 M/UL (ref 4–5.4)
SODIUM SERPL-SCNC: 136 MMOL/L (ref 136–145)
WBC # BLD AUTO: 9.23 K/UL (ref 3.9–12.7)
WBC # FLD: 66 /CU MM

## 2022-03-07 PROCEDURE — 85610 PROTHROMBIN TIME: CPT | Mod: TXP | Performed by: INTERNAL MEDICINE

## 2022-03-07 PROCEDURE — 49083 ABD PARACENTESIS W/IMAGING: CPT | Mod: TXP,,, | Performed by: FAMILY MEDICINE

## 2022-03-07 PROCEDURE — 89051 BODY FLUID CELL COUNT: CPT | Mod: NTX | Performed by: INTERNAL MEDICINE

## 2022-03-07 PROCEDURE — 86833 HLA CLASS II HIGH DEFIN QUAL: CPT | Mod: PO,TXP | Performed by: INTERNAL MEDICINE

## 2022-03-07 PROCEDURE — 63600175 PHARM REV CODE 636 W HCPCS: Mod: JG,TXP

## 2022-03-07 PROCEDURE — 49083 ABD PARACENTESIS W/IMAGING: CPT | Mod: TXP | Performed by: STUDENT IN AN ORGANIZED HEALTH CARE EDUCATION/TRAINING PROGRAM

## 2022-03-07 PROCEDURE — 49083 IR PARACENTESIS WITH IMAGING: ICD-10-PCS | Mod: TXP,,, | Performed by: FAMILY MEDICINE

## 2022-03-07 PROCEDURE — 80053 COMPREHEN METABOLIC PANEL: CPT | Mod: TXP | Performed by: INTERNAL MEDICINE

## 2022-03-07 PROCEDURE — C1729 CATH, DRAINAGE: HCPCS | Mod: TXP

## 2022-03-07 PROCEDURE — P9047 ALBUMIN (HUMAN), 25%, 50ML: HCPCS | Mod: JG,TXP

## 2022-03-07 PROCEDURE — 36415 COLL VENOUS BLD VENIPUNCTURE: CPT | Mod: TXP | Performed by: INTERNAL MEDICINE

## 2022-03-07 PROCEDURE — 85025 COMPLETE CBC W/AUTO DIFF WBC: CPT | Mod: TXP | Performed by: INTERNAL MEDICINE

## 2022-03-07 PROCEDURE — 86832 HLA CLASS I HIGH DEFIN QUAL: CPT | Mod: PO,TXP | Performed by: INTERNAL MEDICINE

## 2022-03-07 RX ORDER — ALBUMIN HUMAN 250 G/1000ML
SOLUTION INTRAVENOUS
Status: COMPLETED
Start: 2022-03-07 | End: 2022-03-07

## 2022-03-07 RX ADMIN — ALBUMIN HUMAN 50 G: 0.25 SOLUTION INTRAVENOUS at 09:03

## 2022-03-07 NOTE — PROCEDURES
Radiology Post-Procedure Note    Pre Op Diagnosis: Ascites  Post Op Diagnosis: Same    Procedure: Ultrasound Guided Paracentesis    Procedure performed by: Fabrizio PEÑA, Cee     Written Informed Consent Obtained: Yes  Specimen Removed: YES clear yellow  Estimated Blood Loss: Minimal    Findings:   Successful paracentesis.  Albumin administered PRN per protocol.    Patient tolerated procedure well.    Cee Dinh, APRN, FNP  Interventional Radiology  [phone] clinic

## 2022-03-07 NOTE — TELEPHONE ENCOUNTER
Spoke to pt about her IR procedure (TIPS) on 04/01, pt is cancelling, dont want it at this time, will call if she changes her mind. Verbally understood, Thanks

## 2022-03-07 NOTE — PLAN OF CARE
Patient AAOx3, no distress noted, respirations even and unlabored, will continue to monitor. Allergies reviewed. Waiting for eval and consent.  Vitals:    03/07/22 0827   BP: 105/69   Pulse: 106   Resp: 18

## 2022-03-07 NOTE — H&P
Radiology History & Physical      SUBJECTIVE:     Chief Complaint: abdominal distention    History of Present Illness:  Malu Montes is a 51 y.o. female who presents for ultrasound guided paracentesis  Past Medical History:   Diagnosis Date    ADD (attention deficit disorder)     Anxiety     Ascites of liver     Cirrhosis 2021    CKD (chronic kidney disease) stage 3, GFR 30-59 ml/min     Constipation     ETOH abuse     Hyperlipidemia     Hypothyroidism     Other ascites 2021    Pancreatitis, acute     Tobacco use     Vitamin D deficiency      Past Surgical History:   Procedure Laterality Date    AUGMENTATION OF BREAST      breast augmentation       SECTION      COLONOSCOPY N/A 2021    Procedure: COLONOSCOPY;  Surgeon: Dao Gray MD;  Location: [facility] (2ND FLR);  Service: Endoscopy;  Laterality: N/A;  COVID test 21 General surgery clinic [facility]    CYSTOSCOPY N/A 9/10/2021    Procedure: CYSTOSCOPY;  Surgeon: Rj Sánchez Jr., MD;  [facility];  Service: Urology;  Laterality: N/A;    ESOPHAGOGASTRODUODENOSCOPY N/A 2021    Procedure: ESOPHAGOGASTRODUODENOSCOPY (EGD);  Surgeon: Dao Gray MD;  Location: [facility] (2ND FLR);  Service: Endoscopy;  Laterality: N/A;  labs current on     ESOPHAGOGASTRODUODENOSCOPY N/A 10/26/2021    Procedure: ESOPHAGOGASTRODUODENOSCOPY (EGD);  Surgeon: Dao Gray MD;  Location: [facility] (2ND FLR);  Service: Endoscopy;  Laterality: N/A;  to be done the week of 10/18/21 per Dr. Gray-BB  covid-10/23/21-[facility]-   10/25 arrival time confirmed with pt-rb    ESOPHAGOGASTRODUODENOSCOPY Left 2021    Procedure: EGD (ESOPHAGOGASTRODUODENOSCOPY);  Surgeon: Koffi Monteiro MD;  Location: [facility] (4TH FLR);  Service: Endoscopy;  Laterality: Left;  Rapid  pt requested AM appt and first available in December-  labs morning of procedure-BB   lvm to confirm appt-rb    HEMORRHOID SURGERY       PERITONEOCENTESIS Right 6/8/2021    Procedure: PARACENTESIS, ABDOMINAL;  Surgeon: Jay Torre MD;  Location: [facility] CATH LAB;  Service: Radiology;  Laterality: Right;    PERITONEOCENTESIS Right 6/25/2021    Procedure: PARACENTESIS, ABDOMINAL;  Surgeon: Jay Torre MD;  Location: [facility] CATH LAB;  Service: Radiology;  Laterality: Right;    PERITONEOCENTESIS Right 7/12/2021    Procedure: PARACENTESIS, ABDOMINAL;  Surgeon: Jay Torre MD;  Location: [facility] CATH LAB;  Service: Radiology;  Laterality: Right;    PERITONEOCENTESIS Right 7/23/2021    Procedure: PARACENTESIS, ABDOMINAL;  Surgeon: Edward De La Torre II, MD;  Location: [facility] CATH LAB;  Service: Interventional Radiology;  Laterality: Right;    PERITONEOCENTESIS Right 8/3/2021    Procedure: PARACENTESIS, ABDOMINAL;  Surgeon: Franco Cheung MD;  Location: [facility] CATH LAB;  Service: Radiology;  Laterality: Right;    PERITONEOCENTESIS Right 8/16/2021    Procedure: PARACENTESIS, ABDOMINAL;  Surgeon: Jay Torre MD;  Location: [facility] CATH LAB;  Service: Radiology;  Laterality: Right;    PERITONEOCENTESIS Right 8/23/2021    Procedure: PARACENTESIS, ABDOMINAL;  Surgeon: Jay Torre MD;  Location: [facility] CATH LAB;  Service: Radiology;  Laterality: Right;    PERITONEOCENTESIS Right 9/16/2021    Procedure: PARACENTESIS, ABDOMINAL;  Surgeon: Jay Torre MD;  Location: [facility] CATH LAB;  Service: Radiology;  Laterality: Right;    PERITONEOCENTESIS N/A 9/24/2021    Procedure: PARACENTESIS, ABDOMINAL;  Surgeon: Jay Torre MD;  Location: [facility] CATH LAB;  Service: Radiology;  Laterality: N/A;    PERITONEOCENTESIS Right 12/23/2021    Procedure: PARACENTESIS, ABDOMINAL;  Surgeon: Jay Torre MD;  Location: [facility] CATH LAB;  Service: Radiology;  Laterality: Right;    PERITONEOCENTESIS N/A 12/30/2021    Procedure: PARACENTESIS, ABDOMINAL;  Surgeon: Salas Cabrera MD;  Location: [facility] CATH LAB;  Service: Radiology;   Laterality: N/A;    RETROGRADE PYELOGRAPHY Bilateral 9/10/2021    Procedure: PYELOGRAM, RETROGRADE;  Surgeon: Rj Sánchez Jr., MD;  Location: [facility] OR [address] [place] [address];  Service: Urology;  Laterality: Bilateral;    TONSILLECTOMY         Home Meds:   Prior to Admission medications    Medication Sig Start Date End Date Taking? Authorizing Provider   allopurinoL (ZYLOPRIM) 100 MG tablet TAKE 1 TABLET(100 MG) BY MOUTH EVERY DAY 2/23/22   Rashel Cohn MD   bisacodyL (DULCOLAX) 5 mg EC tablet Take 5 mg by mouth daily as needed for Constipation.    Historical Provider   ciprofloxacin HCl (CIPRO) 250 MG tablet Take 1 tablet (250 mg total) by mouth once daily.  Patient taking differently: Take 250 mg by mouth every other day. 12/23/21   Rashel Cohn MD   ergocalciferol, vitamin D2, (VITAMIN D ORAL) Take by mouth.    Historical Provider   folic acid (FOLVITE) 1 MG tablet Take 2 tablets (2 mg total) by mouth once daily. 12/14/21 3/14/22  Uma Perry MD   furosemide (LASIX) 40 MG tablet Take 2 tablets (80 mg total) by mouth 2 (two) times a day. Take 80 mg every am and 40 mg every pm 3/5/22   Sharmila Pacheco MD   lactulose (CHRONULAC) 20 gram/30 mL Soln Take 30 mLs (20 g total) by mouth 2 (two) times daily. for 100 doses 3/4/22 4/23/22  Sharmila Pacheco MD   levothyroxine (SYNTHROID) 50 MCG tablet TAKE 1 TABLET(50 MCG) BY MOUTH BEFORE BREAKFAST 9/25/21   Killian Dodd DO   linaCLOtide (LINZESS) 72 mcg Cap capsule Take 2 capsules (144 mcg total) by mouth before breakfast. 11/26/21   Sharmila Pacheco MD   pantoprazole (PROTONIX) 40 MG tablet Take 1 tablet (40 mg total) by mouth once daily. 2/15/22 2/15/23  Sharmila Pacheco MD   thiamine (VITAMIN B-1) 100 MG tablet Take 1 tablet (100 mg total) by mouth every evening. 10/11/21   Sharmila Pacheco MD   vitamin A 44888 UNIT capsule Take 10,000 Units by mouth every evening.     Historical Provider     Anticoagulants/Antiplatelets: no  anticoagulation    Allergies: Review of patient's allergies indicates:  No Known Allergies  Sedation History:  no adverse reactions    Review of Systems:   Hematological: no known coagulopathies  Respiratory: no shortness of breath  Cardiovascular: no chest pain  Gastrointestinal: no abdominal pain  Genito-Urinary: no dysuria  Musculoskeletal: negative  Neurological: no TIA or stroke symptoms         OBJECTIVE:     Vital Signs (Most Recent)       Physical Exam:  ASA: 2  Mallampati: n/a    General: no acute distress  Mental Status: alert and oriented to person, place and time  HEENT: normocephalic, atraumatic  Chest: unlabored breathing  Heart: regular heart rate  Abdomen: distended  Extremity: moves all extremities    ASSESSMENT/PLAN:     Sedation Plan: local  Patient will undergo ultrasound guided paracentesis.    RENATA Marie, FNP  Interventional Radiology  [phone] clinic

## 2022-03-07 NOTE — PLAN OF CARE
Paracentesis done. Removed 6600 ml. Albumin 50 grams given. Patient AAOx3, no distress noted, respirations even and unlabored.   Vitals:    03/07/22 0946   BP: 107/64   Pulse: 91   Resp: 18

## 2022-03-08 ENCOUNTER — TELEPHONE (OUTPATIENT)
Dept: TRANSPLANT | Facility: CLINIC | Age: 52
End: 2022-03-08
Payer: COMMERCIAL

## 2022-03-08 NOTE — TELEPHONE ENCOUNTER
Called patient to check and notified no new information regarding case that she is back up for .  Case # [id number]  INformed that once information received she will be called with an update.

## 2022-03-09 ENCOUNTER — TELEPHONE (OUTPATIENT)
Dept: TRANSPLANT | Facility: CLINIC | Age: 52
End: 2022-03-09
Payer: COMMERCIAL

## 2022-03-09 NOTE — TELEPHONE ENCOUNTER
Patient called with information on case.  Instructed that she will need to be at hospital for 2am.  She should begin NPO status at 7pm.  Patient verbalized understanding. Patient allowed time for questions.

## 2022-03-11 ENCOUNTER — TELEPHONE (OUTPATIENT)
Dept: INFECTIOUS DISEASES | Facility: CLINIC | Age: 52
End: 2022-03-11
Payer: COMMERCIAL

## 2022-03-11 ENCOUNTER — HOSPITAL ENCOUNTER (OUTPATIENT)
Dept: INTERVENTIONAL RADIOLOGY/VASCULAR | Facility: HOSPITAL | Age: 52
Discharge: HOME | End: 2022-03-11
Attending: INTERNAL MEDICINE
Payer: COMMERCIAL

## 2022-03-11 ENCOUNTER — CLINICAL SUPPORT (OUTPATIENT)
Dept: INFECTIOUS DISEASES | Facility: CLINIC | Age: 52
End: 2022-03-11
Payer: COMMERCIAL

## 2022-03-11 DIAGNOSIS — Z76.82 ORGAN TRANSPLANT CANDIDATE: ICD-10-CM

## 2022-03-11 DIAGNOSIS — Z76.82 ORGAN TRANSPLANT CANDIDATE: Primary | ICD-10-CM

## 2022-03-11 DIAGNOSIS — R18.8 OTHER ASCITES: ICD-10-CM

## 2022-03-11 LAB
APPEARANCE FLD: CLEAR
BODY FLD TYPE: NORMAL
COLOR FLD: YELLOW
LYMPHOCYTES NFR FLD MANUAL: 58 %
MONOS+MACROS NFR FLD MANUAL: 34 %
NEUTROPHILS NFR FLD MANUAL: 8 %
WBC # FLD: 66 /CU MM

## 2022-03-11 PROCEDURE — 90750 HZV VACC RECOMBINANT IM: CPT | Mod: S$GLB,TXP,, | Performed by: INTERNAL MEDICINE

## 2022-03-11 PROCEDURE — 90670 PCV13 VACCINE IM: CPT | Mod: S$GLB,TXP,, | Performed by: INTERNAL MEDICINE

## 2022-03-11 PROCEDURE — 49083 ABD PARACENTESIS W/IMAGING: CPT | Mod: TXP,,, | Performed by: FAMILY MEDICINE

## 2022-03-11 PROCEDURE — 90670 PNEUMOCOCCAL CONJUGATE VACCINE 13-VALENT LESS THAN 5YO & GREATER THAN: ICD-10-PCS | Mod: S$GLB,TXP,, | Performed by: INTERNAL MEDICINE

## 2022-03-11 PROCEDURE — 90750 ZOSTER RECOMBINANT VACCINE: ICD-10-PCS | Mod: S$GLB,TXP,, | Performed by: INTERNAL MEDICINE

## 2022-03-11 PROCEDURE — 63600175 PHARM REV CODE 636 W HCPCS: Mod: JG,NTX

## 2022-03-11 PROCEDURE — 90472 IMMUNIZATION ADMIN EACH ADD: CPT | Mod: S$GLB,TXP,, | Performed by: INTERNAL MEDICINE

## 2022-03-11 PROCEDURE — 49083 ABD PARACENTESIS W/IMAGING: CPT | Mod: NTX | Performed by: STUDENT IN AN ORGANIZED HEALTH CARE EDUCATION/TRAINING PROGRAM

## 2022-03-11 PROCEDURE — 90636 HEPATITIS A HEPATITIS B COMBINED VACCINE IM: ICD-10-PCS | Mod: S$GLB,TXP,, | Performed by: INTERNAL MEDICINE

## 2022-03-11 PROCEDURE — 90472 PNEUMOCOCCAL CONJUGATE VACCINE 13-VALENT LESS THAN 5YO & GREATER THAN: ICD-10-PCS | Mod: S$GLB,TXP,, | Performed by: INTERNAL MEDICINE

## 2022-03-11 PROCEDURE — P9047 ALBUMIN (HUMAN), 25%, 50ML: HCPCS | Mod: JG,NTX

## 2022-03-11 PROCEDURE — C1729 CATH, DRAINAGE: HCPCS | Mod: TXP

## 2022-03-11 PROCEDURE — 90471 ZOSTER RECOMBINANT VACCINE: ICD-10-PCS | Mod: S$GLB,TXP,, | Performed by: INTERNAL MEDICINE

## 2022-03-11 PROCEDURE — 49083 IR PARACENTESIS WITH IMAGING: ICD-10-PCS | Mod: TXP,,, | Performed by: FAMILY MEDICINE

## 2022-03-11 PROCEDURE — 90471 IMMUNIZATION ADMIN: CPT | Mod: S$GLB,TXP,, | Performed by: INTERNAL MEDICINE

## 2022-03-11 PROCEDURE — 90636 HEP A/HEP B VACC ADULT IM: CPT | Mod: S$GLB,TXP,, | Performed by: INTERNAL MEDICINE

## 2022-03-11 PROCEDURE — 89051 BODY FLUID CELL COUNT: CPT | Mod: TXP | Performed by: INTERNAL MEDICINE

## 2022-03-11 PROCEDURE — 49083 ABD PARACENTESIS W/IMAGING: CPT | Mod: TXP | Performed by: RADIOLOGY

## 2022-03-11 RX ORDER — ALBUMIN HUMAN 250 G/1000ML
SOLUTION INTRAVENOUS
Status: COMPLETED
Start: 2022-03-11 | End: 2022-03-11

## 2022-03-11 RX ADMIN — ALBUMIN HUMAN 50 G: 0.25 SOLUTION INTRAVENOUS at 09:03

## 2022-03-11 NOTE — PROGRESS NOTES
Patient received zoster #1, twinrix #1 in the left deltoid, and prevnar 13 vaccine in the right deltoid. Pt tolerated well. Pt asked to wait in the clinic 15 minutes after injection in the event of an allergic reaction. Pt verbalized understanding. Pt left in NAD.

## 2022-03-11 NOTE — PROCEDURES
Radiology Post-Procedure Note    Pre Op Diagnosis: Ascites  Post Op Diagnosis: Same    Procedure: Ultrasound Guided Paracentesis    Procedure performed by: Fabrizio PEÑA, Cee     Written Informed Consent Obtained: Yes  Specimen Removed: YES clear yellow  Estimated Blood Loss: Minimal    Findings:   Successful paracentesis.  Albumin administered PRN per protocol.    Patient tolerated procedure well.    Cee Dinh, APRN, FNP  Interventional Radiology  [phone] clinic

## 2022-03-11 NOTE — H&P
Radiology History & Physical      SUBJECTIVE:     Chief Complaint: abdominal distention    History of Present Illness:  Malu Montes is a 51 y.o. female who presents for ultrasound guided paracentesis  Past Medical History:   Diagnosis Date    ADD (attention deficit disorder)     Anxiety     Ascites of liver     Cirrhosis 2021    CKD (chronic kidney disease) stage 3, GFR 30-59 ml/min     Constipation     ETOH abuse     Hyperlipidemia     Hypothyroidism     Other ascites 2021    Pancreatitis, acute     Tobacco use     Vitamin D deficiency      Past Surgical History:   Procedure Laterality Date    AUGMENTATION OF BREAST      breast augmentation       SECTION      COLONOSCOPY N/A 2021    Procedure: COLONOSCOPY;  Surgeon: Dao Gray MD;  Location: [facility] (2ND FLR);  Service: Endoscopy;  Laterality: N/A;  COVID test 21 General surgery clinic [facility]    CYSTOSCOPY N/A 9/10/2021    Procedure: CYSTOSCOPY;  Surgeon: Rj Sánchez Jr., MD;  [facility]  Service: Urology;  Laterality: N/A;    ESOPHAGOGASTRODUODENOSCOPY N/A 2021    Procedure: ESOPHAGOGASTRODUODENOSCOPY (EGD);  Surgeon: Dao Gray MD;  Location: [facility] (2ND FLR);  Service: Endoscopy;  Laterality: N/A;  labs current on     ESOPHAGOGASTRODUODENOSCOPY N/A 10/26/2021    Procedure: ESOPHAGOGASTRODUODENOSCOPY (EGD);  Surgeon: Dao Gray MD;  Location: [facility] (2ND FLR);  Service: Endoscopy;  Laterality: N/A;  to be done the week of 10/18/21 per Dr. Gray-BB  covid-10/23/21-[facility]-   10/25 arrival time confirmed with pt-rb    ESOPHAGOGASTRODUODENOSCOPY Left 2021    Procedure: EGD (ESOPHAGOGASTRODUODENOSCOPY);  Surgeon: Koffi Monteiro MD;  Location: [facility] (4TH FLR);  Service: Endoscopy;  Laterality: Left;  Rapid  pt requested AM appt and first available in December-  labs morning of procedure-BB   lvm to confirm appt-rb    HEMORRHOID SURGERY       PERITONEOCENTESIS Right 6/8/2021    Procedure: PARACENTESIS, ABDOMINAL;  Surgeon: Jay Torre MD;  Location: [facility] CATH LAB;  Service: Radiology;  Laterality: Right;    PERITONEOCENTESIS Right 6/25/2021    Procedure: PARACENTESIS, ABDOMINAL;  Surgeon: Jay Torre MD;  Location: [facility] CATH LAB;  Service: Radiology;  Laterality: Right;    PERITONEOCENTESIS Right 7/12/2021    Procedure: PARACENTESIS, ABDOMINAL;  Surgeon: Jay Torre MD;  Location: [facility] CATH LAB;  Service: Radiology;  Laterality: Right;    PERITONEOCENTESIS Right 7/23/2021    Procedure: PARACENTESIS, ABDOMINAL;  Surgeon: Edward De La Torre II, MD;  Location: [facility] CATH LAB;  Service: Interventional Radiology;  Laterality: Right;    PERITONEOCENTESIS Right 8/3/2021    Procedure: PARACENTESIS, ABDOMINAL;  Surgeon: Franco Cheung MD;  Location: [facility] CATH LAB;  Service: Radiology;  Laterality: Right;    PERITONEOCENTESIS Right 8/16/2021    Procedure: PARACENTESIS, ABDOMINAL;  Surgeon: Jay Torre MD;  Location: [facility] CATH LAB;  Service: Radiology;  Laterality: Right;    PERITONEOCENTESIS Right 8/23/2021    Procedure: PARACENTESIS, ABDOMINAL;  Surgeon: Jay Torre MD;  Location: [facility] CATH LAB;  Service: Radiology;  Laterality: Right;    PERITONEOCENTESIS Right 9/16/2021    Procedure: PARACENTESIS, ABDOMINAL;  Surgeon: Jay Torre MD;  Location: [facility] CATH LAB;  Service: Radiology;  Laterality: Right;    PERITONEOCENTESIS N/A 9/24/2021    Procedure: PARACENTESIS, ABDOMINAL;  Surgeon: Jay Torre MD;  Location: [facility] CATH LAB;  Service: Radiology;  Laterality: N/A;    PERITONEOCENTESIS Right 12/23/2021    Procedure: PARACENTESIS, ABDOMINAL;  Surgeon: Jay Torre MD;  Location: [facility] CATH LAB;  Service: Radiology;  Laterality: Right;    PERITONEOCENTESIS N/A 12/30/2021    Procedure: PARACENTESIS, ABDOMINAL;  Surgeon: Salas Cabrera MD;  Location: [facility] CATH LAB;  Service: Radiology;   Laterality: N/A;    RETROGRADE PYELOGRAPHY Bilateral 9/10/2021    Procedure: PYELOGRAM, RETROGRADE;  Surgeon: Rj Sánchez Jr., MD;  Location: [facility] OR [address] [place] [address];  Service: Urology;  Laterality: Bilateral;    TONSILLECTOMY         Home Meds:   Prior to Admission medications    Medication Sig Start Date End Date Taking? Authorizing Provider   allopurinoL (ZYLOPRIM) 100 MG tablet TAKE 1 TABLET(100 MG) BY MOUTH EVERY DAY 2/23/22   Rashel Cohn MD   bisacodyL (DULCOLAX) 5 mg EC tablet Take 5 mg by mouth daily as needed for Constipation.    Historical Provider   ciprofloxacin HCl (CIPRO) 250 MG tablet Take 1 tablet (250 mg total) by mouth once daily.  Patient taking differently: Take 250 mg by mouth every other day. 12/23/21   Rashel Cohn MD   ergocalciferol, vitamin D2, (VITAMIN D ORAL) Take by mouth.    Historical Provider   folic acid (FOLVITE) 1 MG tablet Take 2 tablets (2 mg total) by mouth once daily. 12/14/21 3/14/22  Uma Perry MD   furosemide (LASIX) 40 MG tablet Take 2 tablets (80 mg total) by mouth 2 (two) times a day. Take 80 mg every am and 40 mg every pm 3/5/22   Sharmila Pacheco MD   lactulose (CHRONULAC) 20 gram/30 mL Soln Take 30 mLs (20 g total) by mouth 2 (two) times daily. for 100 doses 3/4/22 4/23/22  Sharmila Pacheco MD   levothyroxine (SYNTHROID) 50 MCG tablet TAKE 1 TABLET(50 MCG) BY MOUTH BEFORE BREAKFAST 9/25/21   Killian Dodd DO   linaCLOtide (LINZESS) 72 mcg Cap capsule Take 2 capsules (144 mcg total) by mouth before breakfast. 11/26/21   Sharmila Pacheco MD   pantoprazole (PROTONIX) 40 MG tablet Take 1 tablet (40 mg total) by mouth once daily. 2/15/22 2/15/23  Sharmila Pacheco MD   thiamine (VITAMIN B-1) 100 MG tablet Take 1 tablet (100 mg total) by mouth every evening. 10/11/21   Sharmila Pcaheco MD   vitamin A 58301 UNIT capsule Take 10,000 Units by mouth every evening.     Historical Provider     Anticoagulants/Antiplatelets: no  anticoagulation    Allergies: Review of patient's allergies indicates:  No Known Allergies  Sedation History:  no adverse reactions    Review of Systems:   Hematological: no known coagulopathies  Respiratory: no shortness of breath  Cardiovascular: no chest pain  Gastrointestinal: no abdominal pain  Genito-Urinary: no dysuria  Musculoskeletal: negative  Neurological: no TIA or stroke symptoms         OBJECTIVE:     Vital Signs (Most Recent)       Physical Exam:  ASA: 2  Mallampati: n/a    General: no acute distress  Mental Status: alert and oriented to person, place and time  HEENT: normocephalic, atraumatic  Chest: unlabored breathing  Heart: regular heart rate  Abdomen: distended  Extremity: moves all extremities    ASSESSMENT/PLAN:     Sedation Plan: local  Patient will undergo ultrasound guided paracentesis.    RENATA Marie, FNP  Interventional Radiology  [phone] clinic

## 2022-03-11 NOTE — PLAN OF CARE
Paracentesis completed. Patient tolerated well; VSS. 6.2 L removed; labs collected and sent per orders. 50 G Albumin administered per protocol. Patient refused printed discharge instructions and ambulated to lobby for discharge home.

## 2022-03-14 ENCOUNTER — HOSPITAL ENCOUNTER (OUTPATIENT)
Dept: INTERVENTIONAL RADIOLOGY/VASCULAR | Facility: HOSPITAL | Age: 52
Discharge: HOME | End: 2022-03-14
Attending: INTERNAL MEDICINE
Payer: COMMERCIAL

## 2022-03-14 VITALS
HEART RATE: 86 BPM | RESPIRATION RATE: 18 BRPM | SYSTOLIC BLOOD PRESSURE: 98 MMHG | OXYGEN SATURATION: 100 % | DIASTOLIC BLOOD PRESSURE: 54 MMHG

## 2022-03-14 DIAGNOSIS — R18.8 OTHER ASCITES: ICD-10-CM

## 2022-03-14 LAB
APPEARANCE FLD: NORMAL
BODY FLD TYPE: NORMAL
COLOR FLD: YELLOW
EOSINOPHIL NFR FLD MANUAL: 3 %
LYMPHOCYTES NFR FLD MANUAL: 64 %
MESOTHL CELL NFR FLD MANUAL: 4 %
MONOS+MACROS NFR FLD MANUAL: 21 %
NEUTROPHILS NFR FLD MANUAL: 8 %
WBC # FLD: 53 /CU MM

## 2022-03-14 PROCEDURE — 89051 BODY FLUID CELL COUNT: CPT | Mod: TXP | Performed by: INTERNAL MEDICINE

## 2022-03-14 PROCEDURE — 49083 ABD PARACENTESIS W/IMAGING: CPT | Mod: TXP,,, | Performed by: FAMILY MEDICINE

## 2022-03-14 PROCEDURE — C1729 CATH, DRAINAGE: HCPCS | Mod: NTX

## 2022-03-14 PROCEDURE — P9047 ALBUMIN (HUMAN), 25%, 50ML: HCPCS | Mod: JG,TXP | Performed by: FAMILY MEDICINE

## 2022-03-14 PROCEDURE — 49083 IR PARACENTESIS WITH IMAGING: ICD-10-PCS | Mod: TXP,,, | Performed by: FAMILY MEDICINE

## 2022-03-14 PROCEDURE — 49083 ABD PARACENTESIS W/IMAGING: CPT | Mod: TXP | Performed by: RADIOLOGY

## 2022-03-14 PROCEDURE — 63600175 PHARM REV CODE 636 W HCPCS: Mod: JG,TXP | Performed by: FAMILY MEDICINE

## 2022-03-14 RX ORDER — ALBUMIN HUMAN 250 G/1000ML
SOLUTION INTRAVENOUS
Status: DISPENSED
Start: 2022-03-14 | End: 2022-03-14

## 2022-03-14 RX ORDER — ALBUMIN HUMAN 250 G/1000ML
SOLUTION INTRAVENOUS
Status: COMPLETED | OUTPATIENT
Start: 2022-03-14 | End: 2022-03-14

## 2022-03-14 RX ADMIN — ALBUMIN (HUMAN) 12.5 G: 25 SOLUTION INTRAVENOUS at 09:03

## 2022-03-14 RX ADMIN — ALBUMIN (HUMAN) 25 G: 25 SOLUTION INTRAVENOUS at 09:03

## 2022-03-14 NOTE — H&P
Radiology History & Physical      SUBJECTIVE:     Chief Complaint: abdominal distention    History of Present Illness:  Malu Montes is a 51 y.o. female who presents for ultrasound guided paracentesis  Past Medical History:   Diagnosis Date    ADD (attention deficit disorder)     Anxiety     Ascites of liver     Cirrhosis 2021    CKD (chronic kidney disease) stage 3, GFR 30-59 ml/min     Constipation     ETOH abuse     Hyperlipidemia     Hypothyroidism     Other ascites 2021    Pancreatitis, acute     Tobacco use     Vitamin D deficiency      Past Surgical History:   Procedure Laterality Date    AUGMENTATION OF BREAST      breast augmentation       SECTION      COLONOSCOPY N/A 2021    Procedure: COLONOSCOPY;  Surgeon: Dao Gray MD;  Location: [facility] (2ND FLR);  Service: Endoscopy;  Laterality: N/A;  COVID test 21 General surgery clinic [facility]    CYSTOSCOPY N/A 9/10/2021    Procedure: CYSTOSCOPY;  Surgeon: Rj Sánchez Jr., MD;  [facility]  Service: Urology;  Laterality: N/A;    ESOPHAGOGASTRODUODENOSCOPY N/A 2021    Procedure: ESOPHAGOGASTRODUODENOSCOPY (EGD);  Surgeon: Dao Gray MD;  Location: [facility] (2ND FLR);  Service: Endoscopy;  Laterality: N/A;  labs current on     ESOPHAGOGASTRODUODENOSCOPY N/A 10/26/2021    Procedure: ESOPHAGOGASTRODUODENOSCOPY (EGD);  Surgeon: Dao Gray MD;  Location: [facility] (2ND FLR);  Service: Endoscopy;  Laterality: N/A;  to be done the week of 10/18/21 per Dr. Gray-BB  covid-10/23/21-[facility]-   10/25 arrival time confirmed with pt-rb    ESOPHAGOGASTRODUODENOSCOPY Left 2021    Procedure: EGD (ESOPHAGOGASTRODUODENOSCOPY);  Surgeon: Koffi Monteiro MD;  Location: [facility] (4TH FLR);  Service: Endoscopy;  Laterality: Left;  Rapid  pt requested AM appt and first available in December-  labs morning of procedure-BB   lvm to confirm appt-rb    HEMORRHOID SURGERY       PERITONEOCENTESIS Right 6/8/2021    Procedure: PARACENTESIS, ABDOMINAL;  Surgeon: Jay Torre MD;  Location: [facility] CATH LAB;  Service: Radiology;  Laterality: Right;    PERITONEOCENTESIS Right 6/25/2021    Procedure: PARACENTESIS, ABDOMINAL;  Surgeon: Jay Torre MD;  Location: [facility] CATH LAB;  Service: Radiology;  Laterality: Right;    PERITONEOCENTESIS Right 7/12/2021    Procedure: PARACENTESIS, ABDOMINAL;  Surgeon: Jay Torre MD;  Location: [facility] CATH LAB;  Service: Radiology;  Laterality: Right;    PERITONEOCENTESIS Right 7/23/2021    Procedure: PARACENTESIS, ABDOMINAL;  Surgeon: Edward De La Torre II, MD;  Location: [facility] CATH LAB;  Service: Interventional Radiology;  Laterality: Right;    PERITONEOCENTESIS Right 8/3/2021    Procedure: PARACENTESIS, ABDOMINAL;  Surgeon: Franco Cheung MD;  Location: [facility] CATH LAB;  Service: Radiology;  Laterality: Right;    PERITONEOCENTESIS Right 8/16/2021    Procedure: PARACENTESIS, ABDOMINAL;  Surgeon: Jay Torre MD;  Location: [facility] CATH LAB;  Service: Radiology;  Laterality: Right;    PERITONEOCENTESIS Right 8/23/2021    Procedure: PARACENTESIS, ABDOMINAL;  Surgeon: Jay Torre MD;  Location: [facility] CATH LAB;  Service: Radiology;  Laterality: Right;    PERITONEOCENTESIS Right 9/16/2021    Procedure: PARACENTESIS, ABDOMINAL;  Surgeon: Jay Torre MD;  Location: [facility] CATH LAB;  Service: Radiology;  Laterality: Right;    PERITONEOCENTESIS N/A 9/24/2021    Procedure: PARACENTESIS, ABDOMINAL;  Surgeon: Jay Torre MD;  Location: [facility] CATH LAB;  Service: Radiology;  Laterality: N/A;    PERITONEOCENTESIS Right 12/23/2021    Procedure: PARACENTESIS, ABDOMINAL;  Surgeon: Jay Torre MD;  Location: [facility] CATH LAB;  Service: Radiology;  Laterality: Right;    PERITONEOCENTESIS N/A 12/30/2021    Procedure: PARACENTESIS, ABDOMINAL;  Surgeon: Salas Cabrera MD;  Location: [facility] CATH LAB;  Service: Radiology;   Laterality: N/A;    RETROGRADE PYELOGRAPHY Bilateral 9/10/2021    Procedure: PYELOGRAM, RETROGRADE;  Surgeon: Rj Sánchez Jr., MD;  Location: [facility] OR [address] [place] [address];  Service: Urology;  Laterality: Bilateral;    TONSILLECTOMY         Home Meds:   Prior to Admission medications    Medication Sig Start Date End Date Taking? Authorizing Provider   allopurinoL (ZYLOPRIM) 100 MG tablet TAKE 1 TABLET(100 MG) BY MOUTH EVERY DAY 2/23/22   Rashel Cohn MD   bisacodyL (DULCOLAX) 5 mg EC tablet Take 5 mg by mouth daily as needed for Constipation.    Historical Provider   ciprofloxacin HCl (CIPRO) 250 MG tablet Take 1 tablet (250 mg total) by mouth once daily.  Patient taking differently: Take 250 mg by mouth every other day. 12/23/21   Rashel Cohn MD   ergocalciferol, vitamin D2, (VITAMIN D ORAL) Take by mouth.    Historical Provider   folic acid (FOLVITE) 1 MG tablet Take 2 tablets (2 mg total) by mouth once daily. 12/14/21 3/14/22  Uma Perry MD   furosemide (LASIX) 40 MG tablet Take 2 tablets (80 mg total) by mouth 2 (two) times a day. Take 80 mg every am and 40 mg every pm 3/5/22   Sharmila Pacheco MD   lactulose (CHRONULAC) 20 gram/30 mL Soln Take 30 mLs (20 g total) by mouth 2 (two) times daily. for 100 doses 3/4/22 4/23/22  Sharmila Pacheco MD   levothyroxine (SYNTHROID) 50 MCG tablet TAKE 1 TABLET(50 MCG) BY MOUTH BEFORE BREAKFAST 9/25/21   Killian Dodd DO   linaCLOtide (LINZESS) 72 mcg Cap capsule Take 2 capsules (144 mcg total) by mouth before breakfast. 11/26/21   Sharmila Pacheco MD   pantoprazole (PROTONIX) 40 MG tablet Take 1 tablet (40 mg total) by mouth once daily. 2/15/22 2/15/23  Sharmila Pacheco MD   thiamine (VITAMIN B-1) 100 MG tablet Take 1 tablet (100 mg total) by mouth every evening. 10/11/21   Sharmila Pacheco MD   vitamin A 09721 UNIT capsule Take 10,000 Units by mouth every evening.     Historical Provider     Anticoagulants/Antiplatelets: no  anticoagulation    Allergies: Review of patient's allergies indicates:  No Known Allergies  Sedation History:  no adverse reactions    Review of Systems:   Hematological: no known coagulopathies  Respiratory: no shortness of breath  Cardiovascular: no chest pain  Gastrointestinal: no abdominal pain  Genito-Urinary: no dysuria  Musculoskeletal: negative  Neurological: no TIA or stroke symptoms         OBJECTIVE:     Vital Signs (Most Recent)  Pulse: 84 (03/14/22 0815)  Resp: 18 (03/14/22 0815)  BP: 111/63 (03/14/22 0815)  SpO2: 100 % (03/14/22 0815)    Physical Exam:  ASA: 2  Mallampati: n/a    General: no acute distress  Mental Status: alert and oriented to person, place and time  HEENT: normocephalic, atraumatic  Chest: unlabored breathing  Heart: regular heart rate  Abdomen: distended  Extremity: moves all extremities    ASSESSMENT/PLAN:     Sedation Plan: local  Patient will undergo ultrasound guided paracentesis.    RENATA Marie, FNP  Interventional Radiology  [phone] [facility]

## 2022-03-14 NOTE — H&P
Radiology History & Physical      SUBJECTIVE:     Chief Complaint: abdominal distention    History of Present Illness:  Malu Montes is a 51 y.o. female who presents for ultrasound guided paracentesis  Past Medical History:   Diagnosis Date    ADD (attention deficit disorder)     Anxiety     Ascites of liver     Cirrhosis 2021    CKD (chronic kidney disease) stage 3, GFR 30-59 ml/min     Constipation     ETOH abuse     Hyperlipidemia     Hypothyroidism     Other ascites 2021    Pancreatitis, acute     Tobacco use     Vitamin D deficiency      Past Surgical History:   Procedure Laterality Date    AUGMENTATION OF BREAST      breast augmentation       SECTION      COLONOSCOPY N/A 2021    Procedure: COLONOSCOPY;  Surgeon: Dao Gray MD;  Location: [facility] (2ND FLR);  Service: Endoscopy;  Laterality: N/A;  COVID test 21 General surgery clinic [facility]    CYSTOSCOPY N/A 9/10/2021    Procedure: CYSTOSCOPY;  Surgeon: Rj Sánchez Jr., MD;  [facility];  Service: Urology;  Laterality: N/A;    ESOPHAGOGASTRODUODENOSCOPY N/A 2021    Procedure: ESOPHAGOGASTRODUODENOSCOPY (EGD);  Surgeon: Dao Gray MD;  Location: [facility] (2ND FLR);  Service: Endoscopy;  Laterality: N/A;  labs current on     ESOPHAGOGASTRODUODENOSCOPY N/A 10/26/2021    Procedure: ESOPHAGOGASTRODUODENOSCOPY (EGD);  Surgeon: Dao Gray MD;  Location: [facility] (2ND FLR);  Service: Endoscopy;  Laterality: N/A;  to be done the week of 10/18/21 per Dr. Gray-BB  covid-10/23/21-[facility]-   10/25 arrival time confirmed with pt-rb    ESOPHAGOGASTRODUODENOSCOPY Left 2021    Procedure: EGD (ESOPHAGOGASTRODUODENOSCOPY);  Surgeon: Koffi Monteiro MD;  Location: [facility] (4TH FLR);  Service: Endoscopy;  Laterality: Left;  Rapid  pt requested AM appt and first available in December-  labs morning of procedure-BB   lvm to confirm appt-rb    HEMORRHOID SURGERY       PERITONEOCENTESIS Right 6/8/2021    Procedure: PARACENTESIS, ABDOMINAL;  Surgeon: Jay Torre MD;  Location: [facility] CATH LAB;  Service: Radiology;  Laterality: Right;    PERITONEOCENTESIS Right 6/25/2021    Procedure: PARACENTESIS, ABDOMINAL;  Surgeon: Jay Torre MD;  Location: [facility] CATH LAB;  Service: Radiology;  Laterality: Right;    PERITONEOCENTESIS Right 7/12/2021    Procedure: PARACENTESIS, ABDOMINAL;  Surgeon: Jay Torre MD;  Location: [facility] CATH LAB;  Service: Radiology;  Laterality: Right;    PERITONEOCENTESIS Right 7/23/2021    Procedure: PARACENTESIS, ABDOMINAL;  Surgeon: Edward De La Torre II, MD;  Location: [facility] CATH LAB;  Service: Interventional Radiology;  Laterality: Right;    PERITONEOCENTESIS Right 8/3/2021    Procedure: PARACENTESIS, ABDOMINAL;  Surgeon: Franco Cheung MD;  Location: [facility] CATH LAB;  Service: Radiology;  Laterality: Right;    PERITONEOCENTESIS Right 8/16/2021    Procedure: PARACENTESIS, ABDOMINAL;  Surgeon: Jay Torre MD;  Location: [facility] CATH LAB;  Service: Radiology;  Laterality: Right;    PERITONEOCENTESIS Right 8/23/2021    Procedure: PARACENTESIS, ABDOMINAL;  Surgeon: Jay Torre MD;  Location: [facility] CATH LAB;  Service: Radiology;  Laterality: Right;    PERITONEOCENTESIS Right 9/16/2021    Procedure: PARACENTESIS, ABDOMINAL;  Surgeon: Jay Torre MD;  Location: [facility] CATH LAB;  Service: Radiology;  Laterality: Right;    PERITONEOCENTESIS N/A 9/24/2021    Procedure: PARACENTESIS, ABDOMINAL;  Surgeon: Jay Torre MD;  Location: [facility] CATH LAB;  Service: Radiology;  Laterality: N/A;    PERITONEOCENTESIS Right 12/23/2021    Procedure: PARACENTESIS, ABDOMINAL;  Surgeon: Jay Torre MD;  Location: [facility] CATH LAB;  Service: Radiology;  Laterality: Right;    PERITONEOCENTESIS N/A 12/30/2021    Procedure: PARACENTESIS, ABDOMINAL;  Surgeon: Salas Caberra MD;  Location: [facility] CATH LAB;  Service: Radiology;   Laterality: N/A;    RETROGRADE PYELOGRAPHY Bilateral 9/10/2021    Procedure: PYELOGRAM, RETROGRADE;  Surgeon: Rj Sánchez Jr., MD;  Location: [facility] OR [address] [place] [address];  Service: Urology;  Laterality: Bilateral;    TONSILLECTOMY         Home Meds:   Prior to Admission medications    Medication Sig Start Date End Date Taking? Authorizing Provider   allopurinoL (ZYLOPRIM) 100 MG tablet TAKE 1 TABLET(100 MG) BY MOUTH EVERY DAY 2/23/22   Rashel Cohn MD   bisacodyL (DULCOLAX) 5 mg EC tablet Take 5 mg by mouth daily as needed for Constipation.    Historical Provider   ciprofloxacin HCl (CIPRO) 250 MG tablet Take 1 tablet (250 mg total) by mouth once daily.  Patient taking differently: Take 250 mg by mouth every other day. 12/23/21   Rashel Cohn MD   ergocalciferol, vitamin D2, (VITAMIN D ORAL) Take by mouth.    Historical Provider   folic acid (FOLVITE) 1 MG tablet Take 2 tablets (2 mg total) by mouth once daily. 12/14/21 3/14/22  Uma Perry MD   furosemide (LASIX) 40 MG tablet Take 2 tablets (80 mg total) by mouth 2 (two) times a day. Take 80 mg every am and 40 mg every pm 3/5/22   Sharmila Pacheco MD   lactulose (CHRONULAC) 20 gram/30 mL Soln Take 30 mLs (20 g total) by mouth 2 (two) times daily. for 100 doses 3/4/22 4/23/22  Sharmila Pacheco MD   levothyroxine (SYNTHROID) 50 MCG tablet TAKE 1 TABLET(50 MCG) BY MOUTH BEFORE BREAKFAST 9/25/21   Killian Dodd DO   linaCLOtide (LINZESS) 72 mcg Cap capsule Take 2 capsules (144 mcg total) by mouth before breakfast. 11/26/21   Sharmila Pacheco MD   pantoprazole (PROTONIX) 40 MG tablet Take 1 tablet (40 mg total) by mouth once daily. 2/15/22 2/15/23  Sharmila Pachceo MD   thiamine (VITAMIN B-1) 100 MG tablet Take 1 tablet (100 mg total) by mouth every evening. 10/11/21   Sharmila Pacheco MD   vitamin A 53036 UNIT capsule Take 10,000 Units by mouth every evening.     Historical Provider     Anticoagulants/Antiplatelets: no  anticoagulation    Allergies: Review of patient's allergies indicates:  No Known Allergies  Sedation History:  no adverse reactions    Review of Systems:   Hematological: no known coagulopathies  Respiratory: no shortness of breath  Cardiovascular: no chest pain  Gastrointestinal: no abdominal pain  Genito-Urinary: no dysuria  Musculoskeletal: negative  Neurological: no TIA or stroke symptoms         OBJECTIVE:     Vital Signs (Most Recent)  Pulse: 84 (03/14/22 0815)  Resp: 18 (03/14/22 0815)  BP: 111/63 (03/14/22 0815)  SpO2: 100 % (03/14/22 0815)    Physical Exam:  ASA: 2  Mallampati: n/a    General: no acute distress  Mental Status: alert and oriented to person, place and time  HEENT: normocephalic, atraumatic  Chest: unlabored breathing  Heart: regular heart rate  Abdomen: distended  Extremity: moves all extremities    ASSESSMENT/PLAN:     Sedation Plan: local  Patient will undergo ultrasound guided paracentesis.    RENATA Marie, FNP  Interventional Radiology  [phone] [facility]

## 2022-03-14 NOTE — PLAN OF CARE
Patient AAOx3, no distress noted, respirations even and unlabored, will continue to monitor. Allergies reviewed. Waiting for eval and consent.  Vitals:    03/14/22 0815   BP: 111/63   Pulse: 84   Resp: 18

## 2022-03-14 NOTE — PROCEDURES
Radiology Post-Procedure Note    Pre Op Diagnosis: Ascites  Post Op Diagnosis: Same    Procedure: Ultrasound Guided Paracentesis    Procedure performed by: Fabrizio PEÑA, Cee     Written Informed Consent Obtained: Yes  Specimen Removed: YES cloudy yellow  Estimated Blood Loss: Minimal    Findings:   Successful paracentesis.  Albumin administered PRN per protocol.    Patient tolerated procedure well.    Cee Dinh, APRN, FNP  Interventional Radiology  [phone] clinic

## 2022-03-14 NOTE — PLAN OF CARE
Paracentesis done. Removed 5400 ml. Albumin 37.5 g given. Patient AAOx3, no distress noted, respirations even and unlabored.   Vitals:    03/14/22 0952   BP: (!) 98/54   Pulse: 86   Resp: 18

## 2022-03-17 ENCOUNTER — TELEPHONE (OUTPATIENT)
Dept: TRANSPLANT | Facility: CLINIC | Age: 52
End: 2022-03-17
Payer: COMMERCIAL

## 2022-03-18 ENCOUNTER — HOSPITAL ENCOUNTER (OUTPATIENT)
Dept: INTERVENTIONAL RADIOLOGY/VASCULAR | Facility: HOSPITAL | Age: 52
Discharge: HOME | End: 2022-03-18
Attending: INTERNAL MEDICINE
Payer: COMMERCIAL

## 2022-03-18 ENCOUNTER — TELEPHONE (OUTPATIENT)
Dept: TRANSPLANT | Facility: CLINIC | Age: 52
End: 2022-03-18
Payer: COMMERCIAL

## 2022-03-18 DIAGNOSIS — R18.8 OTHER ASCITES: ICD-10-CM

## 2022-03-18 LAB
APPEARANCE FLD: NORMAL
BODY FLD TYPE: NORMAL
COLOR FLD: YELLOW
LYMPHOCYTES NFR FLD MANUAL: 50 %
MESOTHL CELL NFR FLD MANUAL: 4 %
MONOS+MACROS NFR FLD MANUAL: 38 %
NEUTROPHILS NFR FLD MANUAL: 8 %
WBC # FLD: 42 /CU MM

## 2022-03-18 PROCEDURE — C1729 CATH, DRAINAGE: HCPCS | Mod: TXP

## 2022-03-18 PROCEDURE — 49083 ABD PARACENTESIS W/IMAGING: CPT | Mod: NTX | Performed by: RADIOLOGY

## 2022-03-18 PROCEDURE — P9047 ALBUMIN (HUMAN), 25%, 50ML: HCPCS | Mod: JG,TXP

## 2022-03-18 PROCEDURE — 63600175 PHARM REV CODE 636 W HCPCS: Mod: JG,TXP

## 2022-03-18 PROCEDURE — 49083 ABD PARACENTESIS W/IMAGING: CPT | Mod: TXP,,, | Performed by: FAMILY MEDICINE

## 2022-03-18 PROCEDURE — 89051 BODY FLUID CELL COUNT: CPT | Mod: TXP | Performed by: INTERNAL MEDICINE

## 2022-03-18 PROCEDURE — 49083 IR PARACENTESIS WITH IMAGING: ICD-10-PCS | Mod: TXP,,, | Performed by: FAMILY MEDICINE

## 2022-03-18 RX ORDER — ALBUMIN HUMAN 250 G/1000ML
25 SOLUTION INTRAVENOUS ONCE
Status: COMPLETED | OUTPATIENT
Start: 2022-03-18 | End: 2022-03-18

## 2022-03-18 RX ORDER — ALBUMIN HUMAN 250 G/1000ML
SOLUTION INTRAVENOUS
Status: COMPLETED
Start: 2022-03-18 | End: 2022-03-18

## 2022-03-18 RX ORDER — ALBUMIN HUMAN 250 G/1000ML
12.5 SOLUTION INTRAVENOUS ONCE
Status: COMPLETED | OUTPATIENT
Start: 2022-03-18 | End: 2022-03-18

## 2022-03-18 RX ADMIN — ALBUMIN HUMAN 12.5 G: 250 SOLUTION INTRAVENOUS at 01:03

## 2022-03-18 RX ADMIN — ALBUMIN HUMAN 25 G: 0.25 SOLUTION INTRAVENOUS at 01:03

## 2022-03-18 RX ADMIN — ALBUMIN (HUMAN) 12.5 G: 12.5 SOLUTION INTRAVENOUS at 01:03

## 2022-03-18 RX ADMIN — ALBUMIN HUMAN 25 G: 250 SOLUTION INTRAVENOUS at 01:03

## 2022-03-18 NOTE — PLAN OF CARE
Paracentesis complete, 6500 MLs removed. Specimen sent to lab. 50 grams (200mL) Albumin administered per protocol. Dressing applied to RLQ puncture site, dressing clean dry and intact. Pt ambulated to [facility] for transport home with family. Gait steady.

## 2022-03-18 NOTE — PROCEDURES
Radiology Post-Procedure Note    Pre Op Diagnosis: Ascites  Post Op Diagnosis: Same    Procedure: Ultrasound Guided Paracentesis    Procedure performed by: Fabrizio PEÑA, Cee and Rylie Winkler NP    Written Informed Consent Obtained: Yes  Specimen Removed: YES clear yellow  Estimated Blood Loss: Minimal    Findings:   Successful paracentesis.  Albumin administered PRN per protocol.    Patient tolerated procedure well.    Cee Dinh, RENATA, FNP  Interventional Radiology  [phone] clinic

## 2022-03-18 NOTE — TELEPHONE ENCOUNTER
"[facility] RISK CRITERIA BEHAVIOR DONOR ORGAN OFFER NOTE    Notified by Rohit Terry, , of liver offer with donor information.  Donor and recipient information read back and verified.  Spoke with Malu Montes and identified no acute medical issues during telephone assessment.  Patient instructed NPO.    Patient verbalized understanding that he/she has been called as backup recipient.    The donor's reported social history includes PHS risk criteria    The risk of getting HIV or other hepatitis viruses from this donor is very small compared to the risk of dying while waiting for another liver. The risk of clinical illness from any transmitted infection is also small due to the availability of highly effective oral drugs for all three of these viruses. While the patient has the right to decline any organ for any reason, available scientific data do not support turning down an organ based on Hepatitis C or behavioral risk factors as the risks associated with waiting longer for the transplant are many times greater. Informed patient that Dr. Slaughter thinks that this is a good liver for the patient.     Patient was asked if they have had a positive COVID-19 test or if they have any signs or symptoms. Informed patient that they will be tested for COVID-19 upon arrival to the hospital, unless have a previous positive result. If tested and result is positive, the transplant will not be able to occur, they will be inactivated on the wait list for 28 days per protocol and required to quarantine.     Patient was also informed that if she turned down the offer, "A transplant doctor will call you after we hang up". Patient was also informed that if she turned down this donor, she would not be punished or removed from the transplant list, that she would remain on the list at her current status/MELD score. However, by waiting on the list longer rather than receiving this transplant, she will face a greater risk of " death than any risk from the slight possibility of transmitted infection.    Patient was also informed that she would have the opportunity to see and talk to the surgeon when she got to the hospital and before she went down to the operating room.    Patient understands risk and agrees to proceed with transplant.

## 2022-03-18 NOTE — H&P
Radiology History & Physical      SUBJECTIVE:     Chief Complaint: abdominal distention    History of Present Illness:  Malu Montes is a 51 y.o. female who presents for ultrasound guided paracentesis  Past Medical History:   Diagnosis Date    ADD (attention deficit disorder)     Anxiety     Ascites of liver     Cirrhosis 2021    CKD (chronic kidney disease) stage 3, GFR 30-59 ml/min     Constipation     ETOH abuse     Hyperlipidemia     Hypothyroidism     Other ascites 2021    Pancreatitis, acute     Tobacco use     Vitamin D deficiency      Past Surgical History:   Procedure Laterality Date    AUGMENTATION OF BREAST      breast augmentation       SECTION      COLONOSCOPY N/A 2021    Procedure: COLONOSCOPY;  Surgeon: Dao Gray MD;  Location: [facility] (2ND FLR);  Service: Endoscopy;  Laterality: N/A;  COVID test 21 General surgery clinic [facility]    CYSTOSCOPY N/A 9/10/2021    Procedure: CYSTOSCOPY;  Surgeon: Rj Sánchez Jr., MD;  [facility];  Service: Urology;  Laterality: N/A;    ESOPHAGOGASTRODUODENOSCOPY N/A 2021    Procedure: ESOPHAGOGASTRODUODENOSCOPY (EGD);  Surgeon: Dao Gray MD;  Location: [facility] (2ND FLR);  Service: Endoscopy;  Laterality: N/A;  labs current on     ESOPHAGOGASTRODUODENOSCOPY N/A 10/26/2021    Procedure: ESOPHAGOGASTRODUODENOSCOPY (EGD);  Surgeon: Dao Gray MD;  Location: [facility] (2ND FLR);  Service: Endoscopy;  Laterality: N/A;  to be done the week of 10/18/21 per Dr. Gray-BB  covid-10/23/21-[facility]-   10/25 arrival time confirmed with pt-rb    ESOPHAGOGASTRODUODENOSCOPY Left 2021    Procedure: EGD (ESOPHAGOGASTRODUODENOSCOPY);  Surgeon: Koffi Monteiro MD;  Location: [facility] (4TH FLR);  Service: Endoscopy;  Laterality: Left;  Rapid  pt requested AM appt and first available in December-  labs morning of procedure-BB   lvm to confirm appt-rb    HEMORRHOID SURGERY       PERITONEOCENTESIS Right 6/8/2021    Procedure: PARACENTESIS, ABDOMINAL;  Surgeon: Jay Torre MD;  Location: [facility] CATH LAB;  Service: Radiology;  Laterality: Right;    PERITONEOCENTESIS Right 6/25/2021    Procedure: PARACENTESIS, ABDOMINAL;  Surgeon: Jay Torre MD;  Location: [facility] CATH LAB;  Service: Radiology;  Laterality: Right;    PERITONEOCENTESIS Right 7/12/2021    Procedure: PARACENTESIS, ABDOMINAL;  Surgeon: Jay Torre MD;  Location: [facility] CATH LAB;  Service: Radiology;  Laterality: Right;    PERITONEOCENTESIS Right 7/23/2021    Procedure: PARACENTESIS, ABDOMINAL;  Surgeon: Edward De La Torre II, MD;  Location: [facility] CATH LAB;  Service: Interventional Radiology;  Laterality: Right;    PERITONEOCENTESIS Right 8/3/2021    Procedure: PARACENTESIS, ABDOMINAL;  Surgeon: Franco Cheung MD;  Location: [facility] CATH LAB;  Service: Radiology;  Laterality: Right;    PERITONEOCENTESIS Right 8/16/2021    Procedure: PARACENTESIS, ABDOMINAL;  Surgeon: Jay Torre MD;  Location: [facility] CATH LAB;  Service: Radiology;  Laterality: Right;    PERITONEOCENTESIS Right 8/23/2021    Procedure: PARACENTESIS, ABDOMINAL;  Surgeon: Jay Torre MD;  Location: [facility] CATH LAB;  Service: Radiology;  Laterality: Right;    PERITONEOCENTESIS Right 9/16/2021    Procedure: PARACENTESIS, ABDOMINAL;  Surgeon: Jay Torre MD;  Location: [facility] CATH LAB;  Service: Radiology;  Laterality: Right;    PERITONEOCENTESIS N/A 9/24/2021    Procedure: PARACENTESIS, ABDOMINAL;  Surgeon: Jay Torre MD;  Location: [facility] CATH LAB;  Service: Radiology;  Laterality: N/A;    PERITONEOCENTESIS Right 12/23/2021    Procedure: PARACENTESIS, ABDOMINAL;  Surgeon: Jay Torre MD;  Location: [facility] CATH LAB;  Service: Radiology;  Laterality: Right;    PERITONEOCENTESIS N/A 12/30/2021    Procedure: PARACENTESIS, ABDOMINAL;  Surgeon: Salas Cabrera MD;  Location: [facility] CATH LAB;  Service: Radiology;   Laterality: N/A;    RETROGRADE PYELOGRAPHY Bilateral 9/10/2021    Procedure: PYELOGRAM, RETROGRADE;  Surgeon: Rj Sánchez Jr., MD;  Location: [facility] OR [address] [place] [address];  Service: Urology;  Laterality: Bilateral;    TONSILLECTOMY         Home Meds:   Prior to Admission medications    Medication Sig Start Date End Date Taking? Authorizing Provider   allopurinoL (ZYLOPRIM) 100 MG tablet TAKE 1 TABLET(100 MG) BY MOUTH EVERY DAY 2/23/22   Rashel Cohn MD   bisacodyL (DULCOLAX) 5 mg EC tablet Take 5 mg by mouth daily as needed for Constipation.    Historical Provider   ciprofloxacin HCl (CIPRO) 250 MG tablet Take 1 tablet (250 mg total) by mouth once daily.  Patient taking differently: Take 250 mg by mouth every other day. 12/23/21   Rashel Cohn MD   ergocalciferol, vitamin D2, (VITAMIN D ORAL) Take by mouth.    Historical Provider   folic acid (FOLVITE) 1 MG tablet Take 2 tablets (2 mg total) by mouth once daily. 12/14/21 3/14/22  Uma Perry MD   furosemide (LASIX) 40 MG tablet Take 2 tablets (80 mg total) by mouth 2 (two) times a day. Take 80 mg every am and 40 mg every pm 3/5/22   Sharmila Pacheco MD   lactulose (CHRONULAC) 20 gram/30 mL Soln Take 30 mLs (20 g total) by mouth 2 (two) times daily. for 100 doses 3/4/22 4/23/22  Sharmila Pacheco MD   levothyroxine (SYNTHROID) 50 MCG tablet TAKE 1 TABLET(50 MCG) BY MOUTH BEFORE BREAKFAST 9/25/21   Killian Dodd DO   linaCLOtide (LINZESS) 72 mcg Cap capsule Take 2 capsules (144 mcg total) by mouth before breakfast. 11/26/21   Sharmila Pacheco MD   pantoprazole (PROTONIX) 40 MG tablet Take 1 tablet (40 mg total) by mouth once daily. 2/15/22 2/15/23  Sharmila Pacheco MD   thiamine (VITAMIN B-1) 100 MG tablet Take 1 tablet (100 mg total) by mouth every evening. 10/11/21   Sharmila Pacheco MD   vitamin A 53176 UNIT capsule Take 10,000 Units by mouth every evening.     Historical Provider     Anticoagulants/Antiplatelets: no  anticoagulation    Allergies: Review of patient's allergies indicates:  No Known Allergies  Sedation History:  no adverse reactions    Review of Systems:   Hematological: no known coagulopathies  Respiratory: no shortness of breath  Cardiovascular: no chest pain  Gastrointestinal: no abdominal pain  Genito-Urinary: no dysuria  Musculoskeletal: negative  Neurological: no TIA or stroke symptoms         OBJECTIVE:     Vital Signs (Most Recent)       Physical Exam:  ASA: 2  Mallampati: n/a    General: no acute distress  Mental Status: alert and oriented to person, place and time  HEENT: normocephalic, atraumatic  Chest: unlabored breathing  Heart: regular heart rate  Abdomen: distended  Extremity: moves all extremities    ASSESSMENT/PLAN:     Sedation Plan: local  Patient will undergo ultrasound guided paracentesis.    RENATA Marie, FNP  Interventional Radiology  [phone] clinic

## 2022-03-21 ENCOUNTER — HOSPITAL ENCOUNTER (OUTPATIENT)
Dept: INTERVENTIONAL RADIOLOGY/VASCULAR | Facility: HOSPITAL | Age: 52
Discharge: HOME | End: 2022-03-21
Attending: INTERNAL MEDICINE
Payer: COMMERCIAL

## 2022-03-21 VITALS
DIASTOLIC BLOOD PRESSURE: 58 MMHG | HEART RATE: 80 BPM | RESPIRATION RATE: 20 BRPM | SYSTOLIC BLOOD PRESSURE: 96 MMHG | OXYGEN SATURATION: 100 % | TEMPERATURE: 98 F

## 2022-03-21 DIAGNOSIS — R18.8 OTHER ASCITES: ICD-10-CM

## 2022-03-21 LAB
APPEARANCE FLD: NORMAL
BODY FLD TYPE: NORMAL
COLOR FLD: YELLOW
LYMPHOCYTES NFR FLD MANUAL: 51 %
MESOTHL CELL NFR FLD MANUAL: 1 %
MONOS+MACROS NFR FLD MANUAL: 42 %
NEUTROPHILS NFR FLD MANUAL: 6 %
WBC # FLD: 57 /CU MM

## 2022-03-21 PROCEDURE — 89051 BODY FLUID CELL COUNT: CPT | Mod: NTX | Performed by: INTERNAL MEDICINE

## 2022-03-21 PROCEDURE — 49083 ABD PARACENTESIS W/IMAGING: CPT | Mod: NTX | Performed by: STUDENT IN AN ORGANIZED HEALTH CARE EDUCATION/TRAINING PROGRAM

## 2022-03-21 PROCEDURE — 49083 IR PARACENTESIS WITH IMAGING: ICD-10-PCS | Mod: TXP,,, | Performed by: FAMILY MEDICINE

## 2022-03-21 PROCEDURE — C1729 CATH, DRAINAGE: HCPCS | Mod: TXP

## 2022-03-21 PROCEDURE — P9047 ALBUMIN (HUMAN), 25%, 50ML: HCPCS | Mod: JG,NTX

## 2022-03-21 PROCEDURE — 49083 ABD PARACENTESIS W/IMAGING: CPT | Mod: TXP,,, | Performed by: FAMILY MEDICINE

## 2022-03-21 PROCEDURE — 63600175 PHARM REV CODE 636 W HCPCS: Mod: JG,NTX

## 2022-03-21 RX ORDER — ALBUMIN HUMAN 250 G/1000ML
SOLUTION INTRAVENOUS
Status: COMPLETED
Start: 2022-03-21 | End: 2022-03-21

## 2022-03-21 RX ADMIN — ALBUMIN (HUMAN) 37.5 G: 12.5 SOLUTION INTRAVENOUS at 10:03

## 2022-03-21 NOTE — PROCEDURES
Radiology Post-Procedure Note    Pre Op Diagnosis: Ascites  Post Op Diagnosis: Same    Procedure: Ultrasound Guided Paracentesis    Procedure performed by: Fabrizio PEÑA, Cee and Rylie Winkler NP    Written Informed Consent Obtained: Yes  Specimen Removed: YES clear yellow  Estimated Blood Loss: Minimal    Findings:   Successful paracentesis.  Albumin administered PRN per protocol.    Patient tolerated procedure well.    Cee Dinh, RENATA, FNP  Interventional Radiology  [phone] clinic

## 2022-03-21 NOTE — H&P
Radiology History & Physical      SUBJECTIVE:     Chief Complaint: abdominal distention    History of Present Illness:  Malu Montes is a 51 y.o. female who presents for ultrasound guided paracentesis  Past Medical History:   Diagnosis Date    ADD (attention deficit disorder)     Anxiety     Ascites of liver     Cirrhosis 2021    CKD (chronic kidney disease) stage 3, GFR 30-59 ml/min     Constipation     ETOH abuse     Hyperlipidemia     Hypothyroidism     Other ascites 2021    Pancreatitis, acute     Tobacco use     Vitamin D deficiency      Past Surgical History:   Procedure Laterality Date    AUGMENTATION OF BREAST      breast augmentation       SECTION      COLONOSCOPY N/A 2021    Procedure: COLONOSCOPY;  Surgeon: Dao Gray MD;  Location: [facility] (2ND FLR);  Service: Endoscopy;  Laterality: N/A;  COVID test 21 General surgery clinic [facility]    CYSTOSCOPY N/A 9/10/2021    Procedure: CYSTOSCOPY;  Surgeon: Rj Sánchez Jr., MD;  [facility];  Service: Urology;  Laterality: N/A;    ESOPHAGOGASTRODUODENOSCOPY N/A 2021    Procedure: ESOPHAGOGASTRODUODENOSCOPY (EGD);  Surgeon: Dao Gray MD;  Location: [facility] (2ND FLR);  Service: Endoscopy;  Laterality: N/A;  labs current on     ESOPHAGOGASTRODUODENOSCOPY N/A 10/26/2021    Procedure: ESOPHAGOGASTRODUODENOSCOPY (EGD);  Surgeon: Dao Gray MD;  Location: [facility] (2ND FLR);  Service: Endoscopy;  Laterality: N/A;  to be done the week of 10/18/21 per Dr. Gray-BB  covid-10/23/21-[facility]-   10/25 arrival time confirmed with pt-rb    ESOPHAGOGASTRODUODENOSCOPY Left 2021    Procedure: EGD (ESOPHAGOGASTRODUODENOSCOPY);  Surgeon: Koffi Monteiro MD;  Location: [facility] (4TH FLR);  Service: Endoscopy;  Laterality: Left;  Rapid  pt requested AM appt and first available in December-  labs morning of procedure-BB   lvm to confirm appt-rb    HEMORRHOID SURGERY       PERITONEOCENTESIS Right 6/8/2021    Procedure: PARACENTESIS, ABDOMINAL;  Surgeon: Jay Torre MD;  Location: [facility] CATH LAB;  Service: Radiology;  Laterality: Right;    PERITONEOCENTESIS Right 6/25/2021    Procedure: PARACENTESIS, ABDOMINAL;  Surgeon: Jay Torre MD;  Location: [facility] CATH LAB;  Service: Radiology;  Laterality: Right;    PERITONEOCENTESIS Right 7/12/2021    Procedure: PARACENTESIS, ABDOMINAL;  Surgeon: Jay Torre MD;  Location: [facility] CATH LAB;  Service: Radiology;  Laterality: Right;    PERITONEOCENTESIS Right 7/23/2021    Procedure: PARACENTESIS, ABDOMINAL;  Surgeon: Edward De La Torre II, MD;  Location: [facility] CATH LAB;  Service: Interventional Radiology;  Laterality: Right;    PERITONEOCENTESIS Right 8/3/2021    Procedure: PARACENTESIS, ABDOMINAL;  Surgeon: Franco Cheung MD;  Location: [facility] CATH LAB;  Service: Radiology;  Laterality: Right;    PERITONEOCENTESIS Right 8/16/2021    Procedure: PARACENTESIS, ABDOMINAL;  Surgeon: Jay Torre MD;  Location: [facility] CATH LAB;  Service: Radiology;  Laterality: Right;    PERITONEOCENTESIS Right 8/23/2021    Procedure: PARACENTESIS, ABDOMINAL;  Surgeon: Jay Torre MD;  Location: [facility] CATH LAB;  Service: Radiology;  Laterality: Right;    PERITONEOCENTESIS Right 9/16/2021    Procedure: PARACENTESIS, ABDOMINAL;  Surgeon: Jay Torre MD;  Location: [facility] CATH LAB;  Service: Radiology;  Laterality: Right;    PERITONEOCENTESIS N/A 9/24/2021    Procedure: PARACENTESIS, ABDOMINAL;  Surgeon: Jay Torre MD;  Location: [facility] CATH LAB;  Service: Radiology;  Laterality: N/A;    PERITONEOCENTESIS Right 12/23/2021    Procedure: PARACENTESIS, ABDOMINAL;  Surgeon: Jay Torre MD;  Location: [facility] CATH LAB;  Service: Radiology;  Laterality: Right;    PERITONEOCENTESIS N/A 12/30/2021    Procedure: PARACENTESIS, ABDOMINAL;  Surgeon: Salas Cabrera MD;  Location: [facility] CATH LAB;  Service: Radiology;   Laterality: N/A;    RETROGRADE PYELOGRAPHY Bilateral 9/10/2021    Procedure: PYELOGRAM, RETROGRADE;  Surgeon: Rj Sánchez Jr., MD;  Location: [facility] OR [address] [place] [address];  Service: Urology;  Laterality: Bilateral;    TONSILLECTOMY         Home Meds:   Prior to Admission medications    Medication Sig Start Date End Date Taking? Authorizing Provider   allopurinoL (ZYLOPRIM) 100 MG tablet TAKE 1 TABLET(100 MG) BY MOUTH EVERY DAY 2/23/22   Rashel Cohn MD   bisacodyL (DULCOLAX) 5 mg EC tablet Take 5 mg by mouth daily as needed for Constipation.    Historical Provider   ciprofloxacin HCl (CIPRO) 250 MG tablet Take 1 tablet (250 mg total) by mouth once daily.  Patient taking differently: Take 250 mg by mouth every other day. 12/23/21   Rashel Cohn MD   ergocalciferol, vitamin D2, (VITAMIN D ORAL) Take by mouth.    Historical Provider   folic acid (FOLVITE) 1 MG tablet Take 2 tablets (2 mg total) by mouth once daily. 12/14/21 3/14/22  Uma Perry MD   furosemide (LASIX) 40 MG tablet Take 2 tablets (80 mg total) by mouth 2 (two) times a day. Take 80 mg every am and 40 mg every pm 3/5/22   Sharmila Pacheco MD   lactulose (CHRONULAC) 20 gram/30 mL Soln Take 30 mLs (20 g total) by mouth 2 (two) times daily. for 100 doses 3/4/22 4/23/22  Sharmila Pacheco MD   levothyroxine (SYNTHROID) 50 MCG tablet TAKE 1 TABLET(50 MCG) BY MOUTH BEFORE BREAKFAST 9/25/21   Killian Dodd DO   linaCLOtide (LINZESS) 72 mcg Cap capsule Take 2 capsules (144 mcg total) by mouth before breakfast. 11/26/21   Sharmila Pacheco MD   pantoprazole (PROTONIX) 40 MG tablet Take 1 tablet (40 mg total) by mouth once daily. 2/15/22 2/15/23  Sharmila Pacheco MD   thiamine (VITAMIN B-1) 100 MG tablet Take 1 tablet (100 mg total) by mouth every evening. 10/11/21   Sharmila Pacheco MD   vitamin A 22415 UNIT capsule Take 10,000 Units by mouth every evening.     Historical Provider     Anticoagulants/Antiplatelets: no  anticoagulation    Allergies: Review of patient's allergies indicates:  No Known Allergies  Sedation History:  no adverse reactions    Review of Systems:   Hematological: no known coagulopathies  Respiratory: no shortness of breath  Cardiovascular: no chest pain  Gastrointestinal: no abdominal pain  Genito-Urinary: no dysuria  Musculoskeletal: negative  Neurological: no TIA or stroke symptoms         OBJECTIVE:     Vital Signs (Most Recent)  Temp: 98.6 °F (37 °C) (03/21/22 0839)  Pulse: 89 (03/21/22 0839)  Resp: (!) 22 (03/21/22 0839)  BP: (!) 98/50 (03/21/22 0839)  SpO2: 100 % (03/21/22 0839)    Physical Exam:  ASA: 2  Mallampati: n/a    General: no acute distress  Mental Status: alert and oriented to person, place and time  HEENT: normocephalic, atraumatic  Chest: unlabored breathing  Heart: regular heart rate  Abdomen: distended  Extremity: moves all extremities    ASSESSMENT/PLAN:     Sedation Plan: local  Patient will undergo  Ultrasound guided paracentesis.    RENATA Marie, FNP  Interventional Radiology  [phone] [facility]

## 2022-03-21 NOTE — PLAN OF CARE
Paracentesis completed. Removed 5800ml. Albumin 37.5g given. Patient AAOx3, no distress noted, respirations even and unlabored.   Vitals:    03/21/22 0839   BP: (!) 98/50   Pulse: 89   Resp: (!) 22   Temp: 98.6 °F (37 °C)

## 2022-03-25 ENCOUNTER — HOSPITAL ENCOUNTER (OUTPATIENT)
Dept: INTERVENTIONAL RADIOLOGY/VASCULAR | Facility: HOSPITAL | Age: 52
Discharge: HOME | End: 2022-03-25
Attending: INTERNAL MEDICINE
Payer: COMMERCIAL

## 2022-03-25 VITALS
RESPIRATION RATE: 18 BRPM | OXYGEN SATURATION: 100 % | DIASTOLIC BLOOD PRESSURE: 61 MMHG | SYSTOLIC BLOOD PRESSURE: 104 MMHG | HEART RATE: 91 BPM

## 2022-03-25 DIAGNOSIS — R18.8 OTHER ASCITES: ICD-10-CM

## 2022-03-25 LAB
APPEARANCE FLD: NORMAL
BODY FLD TYPE: NORMAL
COLOR FLD: YELLOW
LYMPHOCYTES NFR FLD MANUAL: 63 %
MESOTHL CELL NFR FLD MANUAL: 2 %
MONOS+MACROS NFR FLD MANUAL: 30 %
NEUTROPHILS NFR FLD MANUAL: 5 %
WBC # FLD: 50 /CU MM

## 2022-03-25 PROCEDURE — 63600175 PHARM REV CODE 636 W HCPCS: Mod: JG,NTX

## 2022-03-25 PROCEDURE — P9047 ALBUMIN (HUMAN), 25%, 50ML: HCPCS | Mod: JG,NTX

## 2022-03-25 PROCEDURE — 49083 ABD PARACENTESIS W/IMAGING: CPT | Mod: TXP,,, | Performed by: FAMILY MEDICINE

## 2022-03-25 PROCEDURE — 49083 ABD PARACENTESIS W/IMAGING: CPT | Mod: NTX

## 2022-03-25 PROCEDURE — 89051 BODY FLUID CELL COUNT: CPT | Mod: NTX | Performed by: INTERNAL MEDICINE

## 2022-03-25 PROCEDURE — 49083 IR PARACENTESIS WITH IMAGING: ICD-10-PCS | Mod: TXP,,, | Performed by: FAMILY MEDICINE

## 2022-03-25 PROCEDURE — C1729 CATH, DRAINAGE: HCPCS | Mod: TXP

## 2022-03-25 RX ORDER — ALBUMIN HUMAN 250 G/1000ML
50 SOLUTION INTRAVENOUS ONCE
Status: COMPLETED | OUTPATIENT
Start: 2022-03-25 | End: 2022-03-25

## 2022-03-25 RX ORDER — ALBUMIN HUMAN 250 G/1000ML
SOLUTION INTRAVENOUS
Status: COMPLETED
Start: 2022-03-25 | End: 2022-03-25

## 2022-03-25 RX ADMIN — ALBUMIN HUMAN 50 G: 0.25 SOLUTION INTRAVENOUS at 09:03

## 2022-03-25 RX ADMIN — ALBUMIN HUMAN 50 G: 250 SOLUTION INTRAVENOUS at 09:03

## 2022-03-25 NOTE — PROCEDURES
Radiology Post-Procedure Note    Pre Op Diagnosis: Ascites  Post Op Diagnosis: Same    Procedure: Ultrasound Guided Paracentesis    Procedure performed by: Fabrizio PEÑA, Cee and Rylie Winkler NP    Written Informed Consent Obtained: Yes  Specimen Removed: YES clear yellow  Estimated Blood Loss: Minimal    Findings:   Successful paracentesis.  Albumin administered PRN per protocol.    Patient tolerated procedure well.    Cee Dinh, RENATA, FNP  Interventional Radiology  [phone] clinic

## 2022-03-25 NOTE — SEDATION DOCUMENTATION
Paracentesis complete.6300 mLs peritoneal fluid drained. Pt tolerated well. Dressing to right abd clean, dry, and intact. Albumin 25% given 200 mLs. Specimens sent per lab order.  Pt discharged

## 2022-03-25 NOTE — H&P
Medications administered and monitored by anesthesia. See anesthesia record. Radiology History & Physical      SUBJECTIVE:     Chief Complaint: abdominal distention    History of Present Illness:  Malu Montes is a 51 y.o. female who presents for ultrasound guided paracentesis  Past Medical History:   Diagnosis Date    ADD (attention deficit disorder)     Anxiety     Ascites of liver     Cirrhosis 2021    CKD (chronic kidney disease) stage 3, GFR 30-59 ml/min     Constipation     ETOH abuse     Hyperlipidemia     Hypothyroidism     Other ascites 2021    Pancreatitis, acute     Tobacco use     Vitamin D deficiency      Past Surgical History:   Procedure Laterality Date    AUGMENTATION OF BREAST      breast augmentation       SECTION      COLONOSCOPY N/A 2021    Procedure: COLONOSCOPY;  Surgeon: Dao Gray MD;  [facility] (2ND FLR);  Service: Endoscopy;  Laterality: N/A;  COVID test 21 General surgery clinic [facility]    CYSTOSCOPY N/A 9/10/2021    Procedure: CYSTOSCOPY;  Surgeon: Rj Sánchez Jr., MD;  [facility];  Service: Urology;  Laterality: N/A;    ESOPHAGOGASTRODUODENOSCOPY N/A 2021    Procedure: ESOPHAGOGASTRODUODENOSCOPY (EGD);  Surgeon: Dao Gray MD;  Location: [facility] (2ND FLR);  Service: Endoscopy;  Laterality: N/A;  labs current on     ESOPHAGOGASTRODUODENOSCOPY N/A 10/26/2021    Procedure: ESOPHAGOGASTRODUODENOSCOPY (EGD);  Surgeon: Dao Gray MD;  Location: [facility] (2ND FLR);  Service: Endoscopy;  Laterality: N/A;  to be done the week of 10/18/21 per Dr. Gray-BB  [facility]10[facility]23/21-[facility]-   10/25 arrival time confirmed with pt-rb    ESOPHAGOGASTRODUODENOSCOPY Left 2021    Procedure: EGD (ESOPHAGOGASTRODUODENOSCOPY);  Surgeon: Koffi Monteiro MD;  Location: [facility] (4TH FLR);  Service: Endoscopy;  Laterality: Left;  Rapid  pt requested AM appt and first available in December-  labs morning of procedure-BB   lvm to confirm appt-rb    HEMORRHOID SURGERY       PERITONEOCENTESIS Right 6/8/2021    Procedure: PARACENTESIS, ABDOMINAL;  Surgeon: Jay Torer MD;  Location: [facility] CATH LAB;  Service: Radiology;  Laterality: Right;    PERITONEOCENTESIS Right 6/25/2021    Procedure: PARACENTESIS, ABDOMINAL;  Surgeon: Jay Torre MD;  Location: [facility] CATH LAB;  Service: Radiology;  Laterality: Right;    PERITONEOCENTESIS Right 7/12/2021    Procedure: PARACENTESIS, ABDOMINAL;  Surgeon: Jay Torre MD;  Location: [facility] CATH LAB;  Service: Radiology;  Laterality: Right;    PERITONEOCENTESIS Right 7/23/2021    Procedure: PARACENTESIS, ABDOMINAL;  Surgeon: Edward De La Torre II, MD;  Location: [facility] CATH LAB;  Service: Interventional Radiology;  Laterality: Right;    PERITONEOCENTESIS Right 8/3/2021    Procedure: PARACENTESIS, ABDOMINAL;  Surgeon: Franco Cheung MD;  Location: [facility] CATH LAB;  Service: Radiology;  Laterality: Right;    PERITONEOCENTESIS Right 8/16/2021    Procedure: PARACENTESIS, ABDOMINAL;  Surgeon: Jay Torre MD;  Location: [facility] CATH LAB;  Service: Radiology;  Laterality: Right;    PERITONEOCENTESIS Right 8/23/2021    Procedure: PARACENTESIS, ABDOMINAL;  Surgeon: Jay Torre MD;  Location: [facility] CATH LAB;  Service: Radiology;  Laterality: Right;    PERITONEOCENTESIS Right 9/16/2021    Procedure: PARACENTESIS, ABDOMINAL;  Surgeon: Jay Torre MD;  Location: [facility] CATH LAB;  Service: Radiology;  Laterality: Right;    PERITONEOCENTESIS N/A 9/24/2021    Procedure: PARACENTESIS, ABDOMINAL;  Surgeon: Jay Torre MD;  Location: [facility] CATH LAB;  Service: Radiology;  Laterality: N/A;    PERITONEOCENTESIS Right 12/23/2021    Procedure: PARACENTESIS, ABDOMINAL;  Surgeon: Jay Torre MD;  Location: [facility] CATH LAB;  Service: Radiology;  Laterality: Right;    PERITONEOCENTESIS N/A 12/30/2021    Procedure: PARACENTESIS, ABDOMINAL;  Surgeon: Salas Cabrera MD;  Location: [facility] CATH LAB;  Service: Radiology;   Laterality: N/A;    RETROGRADE PYELOGRAPHY Bilateral 9/10/2021    Procedure: PYELOGRAM, RETROGRADE;  Surgeon: Rj Sánchez Jr., MD;  Location: [facility] OR [address] [place] [address];  Service: Urology;  Laterality: Bilateral;    TONSILLECTOMY         Home Meds:   Prior to Admission medications    Medication Sig Start Date End Date Taking? Authorizing Provider   allopurinoL (ZYLOPRIM) 100 MG tablet TAKE 1 TABLET(100 MG) BY MOUTH EVERY DAY 2/23/22   Rashel Cohn MD   bisacodyL (DULCOLAX) 5 mg EC tablet Take 5 mg by mouth daily as needed for Constipation.    Historical Provider   ciprofloxacin HCl (CIPRO) 250 MG tablet Take 1 tablet (250 mg total) by mouth once daily.  Patient taking differently: Take 250 mg by mouth every other day. 12/23/21   Rashel Cohn MD   ergocalciferol, vitamin D2, (VITAMIN D ORAL) Take by mouth.    Historical Provider   folic acid (FOLVITE) 1 MG tablet Take 2 tablets (2 mg total) by mouth once daily. 12/14/21 3/14/22  Uma Perry MD   furosemide (LASIX) 40 MG tablet Take 2 tablets (80 mg total) by mouth 2 (two) times a day. Take 80 mg every am and 40 mg every pm 3/5/22   Sharmila Pacheco MD   lactulose (CHRONULAC) 20 gram/30 mL Soln Take 30 mLs (20 g total) by mouth 2 (two) times daily. for 100 doses 3/4/22 4/23/22  Sharmila Pacheco MD   levothyroxine (SYNTHROID) 50 MCG tablet TAKE 1 TABLET(50 MCG) BY MOUTH BEFORE BREAKFAST 9/25/21   Killian Dodd DO   linaCLOtide (LINZESS) 72 mcg Cap capsule Take 2 capsules (144 mcg total) by mouth before breakfast. 11/26/21   Sharmila Pacheco MD   pantoprazole (PROTONIX) 40 MG tablet Take 1 tablet (40 mg total) by mouth once daily. 2/15/22 2/15/23  Sharmila Pacheco MD   thiamine (VITAMIN B-1) 100 MG tablet Take 1 tablet (100 mg total) by mouth every evening. 10/11/21   Sharmila Pacheco MD   vitamin A 76682 UNIT capsule Take 10,000 Units by mouth every evening.     Historical Provider     Anticoagulants/Antiplatelets: no  anticoagulation    Allergies: Review of patient's allergies indicates:  No Known Allergies  Sedation History:  no adverse reactions    Review of Systems:   Hematological: no known coagulopathies  Respiratory: no shortness of breath  Cardiovascular: no chest pain  Gastrointestinal: no abdominal pain  Genito-Urinary: no dysuria  Musculoskeletal: negative  Neurological: no TIA or stroke symptoms         OBJECTIVE:     Vital Signs (Most Recent)       Physical Exam:  ASA: 2  Mallampati: n/a    General: no acute distress  Mental Status: alert and oriented to person, place and time  HEENT: normocephalic, atraumatic  Chest: unlabored breathing  Heart: regular heart rate  Abdomen: distended  Extremity: moves all extremities    ASSESSMENT/PLAN:     Sedation Plan: local  Patient will undergo ultrasound guided paracentesis.    RENATA Marie, FNP  Interventional Radiology  [phone] clinic

## 2022-03-28 ENCOUNTER — HOSPITAL ENCOUNTER (OUTPATIENT)
Dept: INTERVENTIONAL RADIOLOGY/VASCULAR | Facility: HOSPITAL | Age: 52
Discharge: HOME | End: 2022-03-28
Attending: INTERNAL MEDICINE
Payer: COMMERCIAL

## 2022-03-28 DIAGNOSIS — R18.8 OTHER ASCITES: ICD-10-CM

## 2022-03-28 LAB
APPEARANCE FLD: NORMAL
BODY FLD TYPE: NORMAL
COLOR FLD: YELLOW
LYMPHOCYTES NFR FLD MANUAL: 94 %
MESOTHL CELL NFR FLD MANUAL: 4 %
MONOS+MACROS NFR FLD MANUAL: 1 %
NEUTROPHILS NFR FLD MANUAL: 1 %
WBC # FLD: 48 /CU MM

## 2022-03-28 PROCEDURE — 63600175 PHARM REV CODE 636 W HCPCS: Mod: JG,TXP

## 2022-03-28 PROCEDURE — 49083 IR PARACENTESIS WITH IMAGING: ICD-10-PCS | Mod: TXP,,, | Performed by: FAMILY MEDICINE

## 2022-03-28 PROCEDURE — P9047 ALBUMIN (HUMAN), 25%, 50ML: HCPCS | Mod: JG,NTX

## 2022-03-28 PROCEDURE — 89051 BODY FLUID CELL COUNT: CPT | Mod: NTX | Performed by: INTERNAL MEDICINE

## 2022-03-28 PROCEDURE — 49083 ABD PARACENTESIS W/IMAGING: CPT | Mod: TXP,,, | Performed by: FAMILY MEDICINE

## 2022-03-28 PROCEDURE — 49083 ABD PARACENTESIS W/IMAGING: CPT | Mod: TXP | Performed by: RADIOLOGY

## 2022-03-28 PROCEDURE — C1729 CATH, DRAINAGE: HCPCS | Mod: TXP

## 2022-03-28 RX ORDER — ALBUMIN HUMAN 250 G/1000ML
SOLUTION INTRAVENOUS
Status: COMPLETED
Start: 2022-03-28 | End: 2022-03-28

## 2022-03-28 RX ADMIN — ALBUMIN (HUMAN) 12.5 G: 12.5 SOLUTION INTRAVENOUS at 09:03

## 2022-03-28 RX ADMIN — ALBUMIN HUMAN 25 G: 0.25 SOLUTION INTRAVENOUS at 09:03

## 2022-03-28 NOTE — PROCEDURES
Radiology Post-Procedure Note    Pre Op Diagnosis: Ascites  Post Op Diagnosis: Same    Procedure: Ultrasound Guided Paracentesis    Procedure performed by: Fabrizio PEÑA, Cee     Written Informed Consent Obtained: Yes  Specimen Removed: YES clear yellow  Estimated Blood Loss: Minimal    Findings:   Successful paracentesis.  Albumin administered PRN per protocol.    Patient tolerated procedure well.    Cee Dinh, APRN, FNP  Interventional Radiology  [phone] clinic

## 2022-03-28 NOTE — H&P
Radiology History & Physical      SUBJECTIVE:     Chief Complaint: abdominal distention    History of Present Illness:  Malu Montes is a 51 y.o. female who presents for ultrasound guided paracentesis  Past Medical History:   Diagnosis Date    ADD (attention deficit disorder)     Anxiety     Ascites of liver     Cirrhosis 2021    CKD (chronic kidney disease) stage 3, GFR 30-59 ml/min     Constipation     ETOH abuse     Hyperlipidemia     Hypothyroidism     Other ascites 2021    Pancreatitis, acute     Tobacco use     Vitamin D deficiency      Past Surgical History:   Procedure Laterality Date    AUGMENTATION OF BREAST      breast augmentation       SECTION      COLONOSCOPY N/A 2021    Procedure: COLONOSCOPY;  Surgeon: Dao Gray MD;  Location: [facility] (2ND FLR);  Service: Endoscopy;  Laterality: N/A;  COVID test 21 General surgery clinic [facility]    CYSTOSCOPY N/A 9/10/2021    Procedure: CYSTOSCOPY;  Surgeon: Rj Sánchez Jr., MD;  [facility];  Service: Urology;  Laterality: N/A;    ESOPHAGOGASTRODUODENOSCOPY N/A 2021    Procedure: ESOPHAGOGASTRODUODENOSCOPY (EGD);  Surgeon: Dao Gray MD;  Location: [facility] (2ND FLR);  Service: Endoscopy;  Laterality: N/A;  labs current on     ESOPHAGOGASTRODUODENOSCOPY N/A 10/26/2021    Procedure: ESOPHAGOGASTRODUODENOSCOPY (EGD);  Surgeon: Dao Gray MD;  Location: [facility] (2ND FLR);  Service: Endoscopy;  Laterality: N/A;  to be done the week of 10/18/21 per Dr. Gray-BB  cov[facility]-10/23/21-[facility]-   10/25 arrival time confirmed with pt-rb    ESOPHAGOGASTRODUODENOSCOPY Left 2021    Procedure: EGD (ESOPHAGOGASTRODUODENOSCOPY);  Surgeon: Koffi Monteiro MD;  Location: [facility] (4TH FLR);  Service: Endoscopy;  Laterality: Left;  Rapid  pt requested AM appt and first available in December-  labs morning of procedure-BB   lvm to confirm appt-rb    HEMORRHOID SURGERY       PERITONEOCENTESIS Right 6/8/2021    Procedure: PARACENTESIS, ABDOMINAL;  Surgeon: Jay Torre MD;  Location: [facility] C[facility] LAB;  Service: Radiology;  Laterality: Right;    PERITONEOCENTESIS Right 6/25/2021    Procedure: PARACENTESIS, ABDOMINAL;  Surgeon: Jay Torre MD;  Location: [facility] CATH LAB;  Service: Radiology;  Laterality: Right;    PERITONEOCENTESIS Right 7/12/2021    Procedure: PARACENTESIS, ABDOMINAL;  Surgeon: Jay Torre MD;  Location: [facility] CATH LAB;  Service: Radiology;  Laterality: Right;    PERITONEOCENTESIS Right 7/23/2021    Procedure: PARACENTESIS, ABDOMINAL;  Surgeon: Edward De La Torre II, MD;  Location: [facility] [place] CATH LAB;  Service: Interventional Radiology;  Laterality: Right;    PERITONEOCENTESIS Right 8/3/2021    Procedure: PARACENTESIS, ABDOMINAL;  Surgeon: Franco Cheung MD;  Location: [facility] [place] CATH LAB;  Service: Radiology;  Laterality: Right;    PERITONEOCENTESIS Right 8/16/2021    Procedure: PARACENTESIS, ABDOMINAL;  Surgeon: Jay Torre MD;  Location: [facility] CATH LAB;  Service: Radiology;  Laterality: Right;    PERITONEOCENTESIS Right 8/23/2021    Procedure: PARACENTESIS, ABDOMINAL;  Surgeon: Jay Torre MD;  Location: [facility] [place] CATH LAB;  Service: Radiology;  Laterality: Right;    PERITONEOCENTESIS Right 9/16/2021    Procedure: PARACENTESIS, ABDOMINAL;  Surgeon: Jay Torre MD;  Location: [facility] [place] C[facility] LAB;  Service: Radiology;  Laterality: Right;    PERITONEOCENTESIS N/A 9/24/2021    Procedure: PARACENTESIS, ABDOMINAL;  Surgeon: Jay Torre MD;  Location: [facility] [place] CATH LAB;  Service: Radiology;  Laterality: N/A;    PERITONEOCENTESIS Right 12/23/2021    Procedure: PARACENTESIS, ABDOMINAL;  Surgeon: Jay Torre MD;  Location: [facility] [place] C[facility];  Service: Radiology;  Laterality: Right;    PERITONEOCENTESIS N/A 12/30/2021    Procedure: PARACENTESIS, ABDOMINAL;  Surgeon: Salas Cabrera MD;  Location: [facility] [place] C[facility];  Service: Radiology;   Laterality: N/A;    RETROGRADE PYELOGRAPHY Bilateral 9/10/2021    Procedure: PYELOGRAM, RETROGRADE;  Surgeon: Rj Sánchez Jr., MD;  Location: [facility] OR [address] [place] [address];  Service: Urology;  Laterality: Bilateral;    TONSILLECTOMY         Home Meds:   Prior to Admission medications    Medication Sig Start Date End Date Taking? Authorizing Provider   allopurinoL (ZYLOPRIM) 100 MG tablet TAKE 1 TABLET(100 MG) BY MOUTH EVERY DAY 2/23/22   Rashel Cohn MD   bisacodyL (DULCOLAX) 5 mg EC tablet Take 5 mg by mouth daily as needed for Constipation.    Historical Provider   ciprofloxacin HCl (CIPRO) 250 MG tablet Take 1 tablet (250 mg total) by mouth once daily.  Patient taking differently: Take 250 mg by mouth every other day. 12/23/21   Rashel Cohn MD   ergocalciferol, vitamin D2, (VITAMIN D ORAL) Take by mouth.    Historical Provider   folic acid (FOLVITE) 1 MG tablet Take 2 tablets (2 mg total) by mouth once daily. 12/14/21 3/14/22  Uma Perry MD   furosemide (LASIX) 40 MG tablet Take 2 tablets (80 mg total) by mouth 2 (two) times a day. Take 80 mg every am and 40 mg every pm 3/5/22   Sharmila Pacheco MD   lactulose (CHRONULAC) 20 gram/30 mL Soln Take 30 mLs (20 g total) by mouth 2 (two) times daily. for 100 doses 3/4/22 4/23/22  Sharmila Pacheco MD   levothyroxine (SYNTHROID) 50 MCG tablet TAKE 1 TABLET(50 MCG) BY MOUTH BEFORE BREAKFAST 9/25/21   Killian Dodd DO   linaCLOtide (LINZESS) 72 mcg Cap capsule Take 2 capsules (144 mcg total) by mouth before breakfast. 11/26/21   Sharmila Pacheco MD   pantoprazole (PROTONIX) 40 MG tablet Take 1 tablet (40 mg total) by mouth once daily. 2/15/22 2/15/23  Sharmila Pacheco MD   thiamine (VITAMIN B-1) 100 MG tablet Take 1 tablet (100 mg total) by mouth every evening. 10/11/21   Sharmila Pacheco MD   vitamin A 32238 UNIT capsule Take 10,000 Units by mouth every evening.     Historical Provider     Anticoagulants/Antiplatelets: no  anticoagulation    Allergies: Review of patient's allergies indicates:  No Known Allergies  Sedation History:  no adverse reactions    Review of Systems:   Hematological: no known coagulopathies  Respiratory: no shortness of breath  Cardiovascular: no chest pain  Gastrointestinal: no abdominal pain  Genito-Urinary: no dysuria  Musculoskeletal: negative  Neurological: no TIA or stroke symptoms         OBJECTIVE:     Vital Signs (Most Recent)       Physical Exam:  ASA: 2  Mallampati: n/a    General: no acute distress  Mental Status: alert and oriented to person, place and time  HEENT: normocephalic, atraumatic  Chest: unlabored breathing  Heart: regular heart rate  Abdomen: distended  Extremity: moves all extremities    ASSESSMENT/PLAN:     Sedation Plan: local  Patient will undergo ultrasound guided paracentesis.    RENATA Marie, FNP  Interventional Radiology  [phone] clinic

## 2022-03-28 NOTE — PLAN OF CARE
Paracentesis completed. Patient tolerated well; VSS. 5.8 L removed. Labs collected and sent per orders. RLQ site clean, dry and intact. 37.5 G Albumin administered per protocol. Patient refused printed discharge instructions and ambulated to lobby for discharge home.

## 2022-03-29 LAB — HPRA INTERPRETATION: NORMAL

## 2022-04-01 ENCOUNTER — OFFICE VISIT (OUTPATIENT)
Dept: HEPATOLOGY | Facility: CLINIC | Age: 52
End: 2022-04-01
Payer: COMMERCIAL

## 2022-04-01 ENCOUNTER — HOSPITAL ENCOUNTER (OUTPATIENT)
Dept: INTERVENTIONAL RADIOLOGY/VASCULAR | Facility: HOSPITAL | Age: 52
Discharge: HOME | End: 2022-04-01
Attending: INTERNAL MEDICINE | Admitting: INTERNAL MEDICINE
Payer: COMMERCIAL

## 2022-04-01 VITALS
RESPIRATION RATE: 18 BRPM | HEART RATE: 94 BPM | TEMPERATURE: 98 F | BODY MASS INDEX: 21.94 KG/M2 | DIASTOLIC BLOOD PRESSURE: 61 MMHG | WEIGHT: 123.81 LBS | HEIGHT: 63 IN | SYSTOLIC BLOOD PRESSURE: 107 MMHG | OXYGEN SATURATION: 100 %

## 2022-04-01 DIAGNOSIS — K70.31 ALCOHOLIC CIRRHOSIS OF LIVER WITH ASCITES: ICD-10-CM

## 2022-04-01 DIAGNOSIS — R18.8 OTHER ASCITES: ICD-10-CM

## 2022-04-01 DIAGNOSIS — L29.9 PRURITUS: Primary | ICD-10-CM

## 2022-04-01 DIAGNOSIS — K65.2 SBP (SPONTANEOUS BACTERIAL PERITONITIS): Chronic | ICD-10-CM

## 2022-04-01 DIAGNOSIS — N18.32 STAGE 3B CHRONIC KIDNEY DISEASE: ICD-10-CM

## 2022-04-01 DIAGNOSIS — I85.10 SECONDARY ESOPHAGEAL VARICES WITHOUT BLEEDING: ICD-10-CM

## 2022-04-01 LAB
B-HCG UR QL: NEGATIVE
CTP QC/QA: YES

## 2022-04-01 PROCEDURE — 99214 PR OFFICE/OUTPT VISIT, EST, LEVL IV, 30-39 MIN: ICD-10-PCS | Mod: S$GLB,TXP,, | Performed by: INTERNAL MEDICINE

## 2022-04-01 PROCEDURE — 49083 IR PARACENTESIS WITH IMAGING: ICD-10-PCS | Mod: NTX,,, | Performed by: FAMILY MEDICINE

## 2022-04-01 PROCEDURE — 27100111 IR PARACENTESIS WITH IMAGING: Mod: NTX

## 2022-04-01 PROCEDURE — 63600175 PHARM REV CODE 636 W HCPCS: Mod: JG,TXP

## 2022-04-01 PROCEDURE — 99214 OFFICE O/P EST MOD 30 MIN: CPT | Mod: S$GLB,TXP,, | Performed by: INTERNAL MEDICINE

## 2022-04-01 PROCEDURE — P9047 ALBUMIN (HUMAN), 25%, 50ML: HCPCS | Mod: JG,TXP

## 2022-04-01 PROCEDURE — 49083 ABD PARACENTESIS W/IMAGING: CPT | Mod: TXP | Performed by: RADIOLOGY

## 2022-04-01 PROCEDURE — 99999 PR PBB SHADOW E&M-EST. PATIENT-LVL V: ICD-10-PCS | Mod: PBBFAC,TXP,, | Performed by: INTERNAL MEDICINE

## 2022-04-01 PROCEDURE — 49083 ABD PARACENTESIS W/IMAGING: CPT | Mod: NTX,,, | Performed by: FAMILY MEDICINE

## 2022-04-01 PROCEDURE — 99999 PR PBB SHADOW E&M-EST. PATIENT-LVL V: CPT | Mod: PBBFAC,TXP,, | Performed by: INTERNAL MEDICINE

## 2022-04-01 PROCEDURE — 81025 URINE PREGNANCY TEST: CPT | Mod: TXP | Performed by: FAMILY MEDICINE

## 2022-04-01 RX ORDER — LACTULOSE 10 G/15ML
30 SOLUTION ORAL; RECTAL NIGHTLY
Status: ON HOLD | COMMUNITY
Start: 2022-03-04 | End: 2022-06-10 | Stop reason: HOSPADM

## 2022-04-01 RX ORDER — HYDROXYZINE HYDROCHLORIDE 25 MG/1
25 TABLET, FILM COATED ORAL
Qty: 30 TABLET | Refills: 2 | Status: ON HOLD | OUTPATIENT
Start: 2022-04-01 | End: 2022-06-10 | Stop reason: HOSPADM

## 2022-04-01 RX ORDER — ALBUMIN HUMAN 250 G/1000ML
SOLUTION INTRAVENOUS
Status: COMPLETED
Start: 2022-04-01 | End: 2022-04-01

## 2022-04-01 RX ORDER — MONTELUKAST SODIUM 4 MG/1
1 TABLET, CHEWABLE ORAL 2 TIMES DAILY
Qty: 180 TABLET | Refills: 3 | Status: SHIPPED | OUTPATIENT
Start: 2022-04-01 | End: 2022-04-18

## 2022-04-01 RX ORDER — FUROSEMIDE 40 MG/1
80 TABLET ORAL 2 TIMES DAILY
Qty: 120 TABLET | Refills: 11 | Status: SHIPPED | OUTPATIENT
Start: 2022-04-01 | End: 2022-04-18

## 2022-04-01 RX ADMIN — ALBUMIN HUMAN: 0.25 SOLUTION INTRAVENOUS at 09:04

## 2022-04-01 RX ADMIN — ALBUMIN (HUMAN): 12.5 SOLUTION INTRAVENOUS at 08:04

## 2022-04-01 NOTE — PLAN OF CARE
Patient AAOx3, no distress noted, respirations even and unlabored, vss, will continue to monitor. Acceptance of education, consents signed, H/P done. Labs reviewed. Patient in [facility] [place] 3 for paracentesis

## 2022-04-01 NOTE — PLAN OF CARE
Paracentesis done. Removed 5800 ml. 37.5g of Albumin 25% administered per protocol. Patient AAOx3, no distress noted, respirations even and unlabored. VSS. Right abdominal site clean, dry, and intact; no bleeding or hematoma noted. Patient to ambulate to lobby to meet family for ride home. Patient stable for transport.

## 2022-04-01 NOTE — H&P
Radiology History & Physical      SUBJECTIVE:     Chief Complaint: abdominal distention    History of Present Illness:  Malu Montes is a 51 y.o. female who presents for ultrasound guided paracentesis  Past Medical History:   Diagnosis Date    ADD (attention deficit disorder)     Anxiety     Ascites of liver     Cirrhosis 2021    CKD (chronic kidney disease) stage 3, GFR 30-59 ml/min     Constipation     ETOH abuse     Hyperlipidemia     Hypothyroidism     Other ascites 2021    Pancreatitis, acute     Tobacco use     Vitamin D deficiency      Past Surgical History:   Procedure Laterality Date    AUGMENTATION OF BREAST      breast augmentation       SECTION      COLONOSCOPY N/A 2021    Procedure: COLONOSCOPY;  Surgeon: Dao Gray MD;  Location: [facility] (2ND FLR);  Service: Endoscopy;  Laterality: N/A;  COVID test 21 General surgery clinic [facility]    CYSTOSCOPY N/A 9/10/2021    Procedure: CYSTOSCOPY;  Surgeon: Rj Sánchez Jr., MD;  [facility];  Service: Urology;  Laterality: N/A;    ESOPHAGOGASTRODUODENOSCOPY N/A 2021    Procedure: ESOPHAGOGASTRODUODENOSCOPY (EGD);  Surgeon: Dao Gray MD;  Location: [facility] (2ND FLR);  Service: Endoscopy;  Laterality: N/A;  labs current on     ESOPHAGOGASTRODUODENOSCOPY N/A 10/26/2021    Procedure: ESOPHAGOGASTRODUODENOSCOPY (EGD);  Surgeon: Dao Gray MD;  Location: [facility] (2ND FLR);  Service: Endoscopy;  Laterality: N/A;  to be done the week of 10/18/21 per Dr. Gray-BB  covid-10/23/21-[facility]-   10/25 arrival time confirmed with pt-rb    ESOPHAGOGASTRODUODENOSCOPY Left 2021    Procedure: EGD (ESOPHAGOGASTRODUODENOSCOPY);  Surgeon: Koffi Monteiro MD;  Location: [facility] (4TH FLR);  Service: Endoscopy;  Laterality: Left;  Rapid  pt requested AM appt and first available in December-  labs morning of procedure-BB   lvm to confirm appt-rb    HEMORRHOID SURGERY       PERITONEOCENTESIS Right 6/8/2021    Procedure: PARACENTESIS, ABDOMINAL;  Surgeon: Jay Torre MD;  Location: [facility] CATH LAB;  Service: Radiology;  Laterality: Right;    PERITONEOCENTESIS Right 6/25/2021    Procedure: PARACENTESIS, ABDOMINAL;  Surgeon: Jay Torre MD;  Location: [facility] CATH LAB;  Service: Radiology;  Laterality: Right;    PERITONEOCENTESIS Right 7/12/2021    Procedure: PARACENTESIS, ABDOMINAL;  Surgeon: Jay Torre MD;  Location: [facility] CATH LAB;  Service: Radiology;  Laterality: Right;    PERITONEOCENTESIS Right 7/23/2021    Procedure: PARACENTESIS, ABDOMINAL;  Surgeon: Edward De La Torre II, MD;  Location: [facility] CATH LAB;  Service: Interventional Radiology;  Laterality: Right;    PERITONEOCENTESIS Right 8/3/2021    Procedure: PARACENTESIS, ABDOMINAL;  Surgeon: Franco Cheung MD;  Location: [facility] CATH LAB;  Service: Radiology;  Laterality: Right;    PERITONEOCENTESIS Right 8/16/2021    Procedure: PARACENTESIS, ABDOMINAL;  Surgeon: Jay Torre MD;  Location: [facility] CATH LAB;  Service: Radiology;  Laterality: Right;    PERITONEOCENTESIS Right 8/23/2021    Procedure: PARACENTESIS, ABDOMINAL;  Surgeon: Jay Torre MD;  Location: [facility] CATH LAB;  Service: Radiology;  Laterality: Right;    PERITONEOCENTESIS Right 9/16/2021    Procedure: PARACENTESIS, ABDOMINAL;  Surgeon: Jay Torre MD;  Location: [facility] CATH LAB;  Service: Radiology;  Laterality: Right;    PERITONEOCENTESIS N/A 9/24/2021    Procedure: PARACENTESIS, ABDOMINAL;  Surgeon: Jay Torre MD;  Location: [facility] CATH LAB;  Service: Radiology;  Laterality: N/A;    PERITONEOCENTESIS Right 12/23/2021    Procedure: PARACENTESIS, ABDOMINAL;  Surgeon: Jay Torre MD;  Location: [facility] CATH LAB;  Service: Radiology;  Laterality: Right;    PERITONEOCENTESIS N/A 12/30/2021    Procedure: PARACENTESIS, ABDOMINAL;  Surgeon: Salas Cabrera MD;  Location: [facility] CATH LAB;  Service: Radiology;   Laterality: N/A;    RETROGRADE PYELOGRAPHY Bilateral 9/10/2021    Procedure: PYELOGRAM, RETROGRADE;  Surgeon: Rj Sánchez Jr., MD;  Location: [facility] OR [address] [place] [address];  Service: Urology;  Laterality: Bilateral;    TONSILLECTOMY         Home Meds:   Prior to Admission medications    Medication Sig Start Date End Date Taking? Authorizing Provider   allopurinoL (ZYLOPRIM) 100 MG tablet TAKE 1 TABLET(100 MG) BY MOUTH EVERY DAY 2/23/22   Rashel Cohn MD   bisacodyL (DULCOLAX) 5 mg EC tablet Take 5 mg by mouth daily as needed for Constipation.    Historical Provider   ciprofloxacin HCl (CIPRO) 250 MG tablet Take 1 tablet (250 mg total) by mouth once daily.  Patient taking differently: Take 250 mg by mouth every other day. 12/23/21   Rashel Cohn MD   ergocalciferol, vitamin D2, (VITAMIN D ORAL) Take by mouth.    Historical Provider   folic acid (FOLVITE) 1 MG tablet Take 2 tablets (2 mg total) by mouth once daily. 12/14/21 3/14/22  Uma Perry MD   furosemide (LASIX) 40 MG tablet Take 2 tablets (80 mg total) by mouth 2 (two) times a day. Take 80 mg every am and 40 mg every pm 3/5/22   Sharmila Pacheco MD   lactulose (CHRONULAC) 20 gram/30 mL Soln Take 30 mLs (20 g total) by mouth 2 (two) times daily. for 100 doses 3/4/22 4/23/22  Sharmila Pacheco MD   levothyroxine (SYNTHROID) 50 MCG tablet TAKE 1 TABLET(50 MCG) BY MOUTH BEFORE BREAKFAST 9/25/21   Killian Dodd DO   linaCLOtide (LINZESS) 72 mcg Cap capsule Take 2 capsules (144 mcg total) by mouth before breakfast. 11/26/21   Sharmila Pacheco MD   pantoprazole (PROTONIX) 40 MG tablet Take 1 tablet (40 mg total) by mouth once daily. 2/15/22 2/15/23  Sharmila Pacheco MD   thiamine (VITAMIN B-1) 100 MG tablet Take 1 tablet (100 mg total) by mouth every evening. 10/11/21   Sharmila Pacheco MD   vitamin A 88256 UNIT capsule Take 10,000 Units by mouth every evening.     Historical Provider     Anticoagulants/Antiplatelets: no  anticoagulation    Allergies: Review of patient's allergies indicates:  No Known Allergies  Sedation History:  no adverse reactions    Review of Systems:   Hematological: no known coagulopathies  Respiratory: no shortness of breath  Cardiovascular: no chest pain  Gastrointestinal: no abdominal pain  Genito-Urinary: no dysuria  Musculoskeletal: negative  Neurological: no TIA or stroke symptoms         OBJECTIVE:     Vital Signs (Most Recent)       Physical Exam:  ASA: 2  Mallampati: n/a    General: no acute distress  Mental Status: alert and oriented to person, place and time  HEENT: normocephalic, atraumatic  Chest: unlabored breathing  Heart: regular heart rate  Abdomen: distended  Extremity: moves all extremities    ASSESSMENT/PLAN:     Sedation Plan: local  Patient will undergo ultrasound guided paracentesis.    RENATA Marie, FNP  Interventional Radiology  [phone] clinic

## 2022-04-01 NOTE — PROCEDURES
Radiology Post-Procedure Note    Pre Op Diagnosis: Ascites  Post Op Diagnosis: Same    Procedure: Ultrasound Guided Paracentesis    Procedure performed by: Fabrizio PEÑA, Cee     Written Informed Consent Obtained: Yes  Specimen Removed: YES clear yellow  Estimated Blood Loss: Minimal    Findings:   Successful paracentesis.  Albumin administered PRN per protocol.    Patient tolerated procedure well.    Cee Dinh, APRN, FNP  Interventional Radiology  [phone] clinic

## 2022-04-01 NOTE — PATIENT INSTRUCTIONS
Atarax for itching  Colestid 2 mg twice daily  Check bile acids  Appt as scheduled and also third week of may

## 2022-04-01 NOTE — PROGRESS NOTES
HEPATOLOGY FOLLOW UP    Referring Physician: Killian Dodd DO  Current Corresponding Physician: Killian Dodd DO, Rashel Cohn MD    Malu Montes is here for follow up of decompensated alcohol-induced cirrhosis    HPI  Malu Montes is a 51 y.o. female who has a history of alcohol abuse and decompensated cirrhosis now listed for a liver-kidney transplant.    Interval History  Since Malu Montes's last visit:    She is listed for liver-kidney transplant. MELD 15 (2/21/22-5/22/22). She continues to require a paracentesis every 5-6 days.    She got covid (+1/8/22). She was reactivated on the list post covid infection on 2/8/22. She has agreed to proceed with liver alone. There was discussion about proceeding with TIPS to try to improve ascites control. This has been scheduled 5/6/22. TIPS carries a risk of complications and possible acceleration of need for HD.    She was seen in the ER on 3/1/22: confusion but normal ammonia level and ct head- discharged home; lactulose was stopped 2/4/22.    Labs 3/288/22: Tbil 0.8, ALT 26, AST 35, ALKP 115  AFP 2/3/22: 3.6    Creatinine, elevated: due to IgA nephropathy- cystatin C 2.45  Hematologic labs: Juliette free light chains 9.12 (0.33-1.94), Lambda Free Light Chains 4.39 (0.57-2.63), Kappa/Lambda FLC Ratio: 2.08 (0.26-1.65); hematology consult- abn attributed to CKD; no futher f/u needed    Abdo US 10/1/21: Satisfactory Doppler evaluation of the liver; no hcc  MRI abdo 8/6/21: The liver is normal in size.  Hepatic parenchyma demonstrates diffuse heterogeneous enhancement on the early arterial phase that normalizes on the portal venous and delayed phases.  There is a 0.8 cm arterial hyperenhancing focus within the superior left hepatic lobe, possibly a prominent vascular structure and unchanged when compared to the previous exam; enlarged spleen    Ascites: on laisx 80 mg in am and 80 mg in pm  SBP: yes; on cipro 250 mg every other day  HE:  not on lactulose  CKD: ongoing; now meets criteria for simultaneous kidney transplant  Alcohol abuse: peth negative; now attending completed IOP    MELD-Na score: 11 at 3/28/2022  8:07 AM  MELD score: 11 at 3/28/2022  8:07 AM  Calculated from:  Serum Creatinine: 1.5 mg/dL at 3/28/2022  8:07 AM  Serum Sodium: 134 mmol/L at 3/28/2022  8:07 AM  Total Bilirubin: 0.8 mg/dL (Using min of 1 mg/dL) at 3/28/2022  8:07 AM  INR(ratio): 1.1 at 3/28/2022  8:07 AM  Age: 51 years    Outpatient Encounter Medications as of 4/1/2022   Medication Sig Dispense Refill    allopurinoL (ZYLOPRIM) 100 MG tablet TAKE 1 TABLET(100 MG) BY MOUTH EVERY DAY 90 tablet 0    bisacodyL (DULCOLAX) 5 mg EC tablet Take 5 mg by mouth daily as needed for Constipation.      ciprofloxacin HCl (CIPRO) 250 MG tablet Take 1 tablet (250 mg total) by mouth once daily. (Patient taking differently: Take 250 mg by mouth every other day.) 90 tablet 0    ergocalciferol, vitamin D2, (VITAMIN D ORAL) Take by mouth.      folic acid (FOLVITE) 1 MG tablet Take 2 tablets (2 mg total) by mouth once daily. 60 tablet 11    furosemide (LASIX) 40 MG tablet Take 2 tablets (80 mg total) by mouth 2 (two) times a day. Take 80 mg every am and 40 mg every pm 120 tablet 11    lactulose (CHRONULAC) 10 gram/15 mL solution Take 30 mLs by mouth 2 (two) times daily.      lactulose (CHRONULAC) 20 gram/30 mL Soln Take 30 mLs (20 g total) by mouth 2 (two) times daily. for 100 doses 3000 mL 1    levothyroxine (SYNTHROID) 50 MCG tablet TAKE 1 TABLET(50 MCG) BY MOUTH BEFORE BREAKFAST 90 tablet 3    linaCLOtide (LINZESS) 72 mcg Cap capsule Take 2 capsules (144 mcg total) by mouth before breakfast. 180 capsule 5    pantoprazole (PROTONIX) 40 MG tablet Take 1 tablet (40 mg total) by mouth once daily. 30 tablet 11    thiamine (VITAMIN B-1) 100 MG tablet Take 1 tablet (100 mg total) by mouth every evening. 30 tablet 5    vitamin A 11517 UNIT capsule Take 10,000 Units by mouth every  evening.        Facility-Administered Encounter Medications as of 4/1/2022   Medication Dose Route Frequency Provider Last Rate Last Admin    [COMPLETED] albumin human 25% 25 % bottle        Stopped at 04/01/22 0930    [COMPLETED] albumin human 25% 25 % bottle        Stopped at 04/01/22 0900     Review of patient's allergies indicates:  No Known Allergies  Past Medical History:   Diagnosis Date    ADD (attention deficit disorder)     Anxiety     Ascites of liver     Cirrhosis 9/16/2021    CKD (chronic kidney disease) stage 3, GFR 30-59 ml/min     Constipation     ETOH abuse     Hyperlipidemia     Hypothyroidism     Other ascites 6/14/2021    Pancreatitis, acute     Tobacco use     Vitamin D deficiency        Review of Systems   Constitutional: Negative.    HENT: Negative.    Eyes: Negative.    Respiratory: Negative.    Cardiovascular: Negative.    Gastrointestinal: Negative.    Genitourinary: Negative.    Musculoskeletal: Negative.    Skin: Negative.    Neurological: Negative.    Psychiatric/Behavioral: Negative.      Vitals:    04/01/22 1356   BP: 107/61   Pulse: 94   Resp: 18   Temp: 98.1 °F (36.7 °C)       Physical Exam  Vitals reviewed.   Constitutional:       Appearance: She is well-developed.   HENT:      Head: Normocephalic and atraumatic.   Eyes:      General: No scleral icterus.     Conjunctiva/sclera: Conjunctivae normal.      Pupils: Pupils are equal, round, and reactive to light.   Neck:      Thyroid: No thyromegaly.   Cardiovascular:      Rate and Rhythm: Normal rate and regular rhythm.      Heart sounds: Normal heart sounds.   Pulmonary:      Effort: Pulmonary effort is normal.      Breath sounds: Normal breath sounds. No rales.   Abdominal:      General: Bowel sounds are normal. There is distension (+shifting dullness).      Palpations: Abdomen is soft. There is no mass.      Tenderness: There is no abdominal tenderness.   Musculoskeletal:         General: Normal range of motion.       Cervical back: Normal range of motion and neck supple.   Skin:     General: Skin is warm and dry.      Findings: No rash.   Neurological:      Mental Status: She is alert and oriented to person, place, and time.         MELD-Na score: 11 at 3/28/2022  8:07 AM  MELD score: 11 at 3/28/2022  8:07 AM  Calculated from:  Serum Creatinine: 1.5 mg/dL at 3/28/2022  8:07 AM  Serum Sodium: 134 mmol/L at 3/28/2022  8:07 AM  Total Bilirubin: 0.8 mg/dL (Using min of 1 mg/dL) at 3/28/2022  8:07 AM  INR(ratio): 1.1 at 3/28/2022  8:07 AM  Age: 51 years    Lab Results   Component Value Date     03/28/2022    BUN 46 (H) 03/28/2022    CREATININE 1.5 (H) 03/28/2022    CALCIUM 9.1 03/28/2022     (L) 03/28/2022    K 3.9 03/28/2022     03/28/2022    PROT 6.0 03/28/2022    CO2 21 (L) 03/28/2022    ANIONGAP 10 03/28/2022    WBC 12.14 03/28/2022    RBC 3.57 (L) 03/28/2022    HGB 12.0 03/28/2022    HCT 35.7 (L) 03/28/2022    HCT 34 (L) 12/07/2021     (H) 03/28/2022    MCH 33.6 (H) 03/28/2022    MCHC 33.6 03/28/2022     Lab Results   Component Value Date    RDW 15.1 (H) 03/28/2022     03/28/2022    MPV 10.8 03/28/2022    GRAN 9.1 (H) 03/28/2022    GRAN 75.2 (H) 03/28/2022    LYMPH 1.4 03/28/2022    LYMPH 11.5 (L) 03/28/2022    MONO 0.9 03/28/2022    MONO 7.7 03/28/2022    EOSINOPHIL 4.6 03/28/2022    BASOPHIL 0.7 03/28/2022    EOS 0.6 (H) 03/28/2022    EOS 3 03/14/2022    BASO 0.09 03/28/2022    BASO 1 03/03/2022    APTT 27.6 11/11/2021    GROUPTRH A POS 07/30/2021    CHOL 146 12/06/2021    TRIG 68 12/06/2021    HDL 32 (L) 12/06/2021    CHOLHDL 21.9 12/06/2021    TOTALCHOLEST 4.6 12/06/2021    ALBUMIN 3.5 03/28/2022    BILIDIR 0.4 (H) 11/11/2021    AST 35 03/28/2022    ALT 26 03/28/2022    ALKPHOS 115 03/28/2022    MG 2.4 02/28/2022    LABPROT 11.2 03/28/2022    INR 1.1 03/28/2022       Assessment and Plan:    Malu Styles Simon is a 51 y.o. female with decompensated cirrhosis secondary to alcohol., now listed  for L-K transplant. Patient willing to consider liver tx alone with aim to be prioritized for kidney tx in the first year post liver tx. Current recomemndations:  1. Ascites complicated by SBP; continue cipro prophylaxis (every other day) and prn paracentesis as needed; continue lasix to 80 mg in am and 40 mg in pm; TIPS scheduled   2. Portal htn: EV s/p banding; no banding 12/21-repeat in 3 months 03/22   3. Decompensated alcoholic liver disease: labs every week; will consider liver tx alone  4. +MARIBEL: consider liver biopsy if liver enzymes increase  5. Colorectal Ca screening: repeat colonoscopy in 10 years  6. Alcohol abuse, in remission: now s/p IOP; continue AA  7. Frailty: at risk for worsening since getting paracenteses every 10 days; continue whey protein supplementation; stay active;   8. CKD: GFR now <30. possible IgA nephropathy; kidney bx deferred; hold Na bicarb; will discuss with nephrology if can increase lasix  9. Elevated Kappa/Lambda FLC ratio: due to CKD; no further w/u  Needed  10. Anemia: iron infusions per hematology  11. Recent episode of confusion- not clear if was HE, but will restart lactulose  12. Pruritus with elevated bile acids: atarax and colestid 2 g bid    Return 6 weeks

## 2022-04-04 ENCOUNTER — HOSPITAL ENCOUNTER (OUTPATIENT)
Dept: INTERVENTIONAL RADIOLOGY/VASCULAR | Facility: HOSPITAL | Age: 52
Discharge: HOME | End: 2022-04-04
Attending: INTERNAL MEDICINE
Payer: COMMERCIAL

## 2022-04-04 ENCOUNTER — PATIENT MESSAGE (OUTPATIENT)
Dept: NEPHROLOGY | Facility: CLINIC | Age: 52
End: 2022-04-04
Payer: COMMERCIAL

## 2022-04-04 ENCOUNTER — PATIENT MESSAGE (OUTPATIENT)
Dept: HEPATOLOGY | Facility: CLINIC | Age: 52
End: 2022-04-04
Payer: COMMERCIAL

## 2022-04-04 DIAGNOSIS — R18.8 OTHER ASCITES: ICD-10-CM

## 2022-04-04 LAB
APPEARANCE FLD: NORMAL
BODY FLD TYPE: NORMAL
COLOR FLD: YELLOW
EOSINOPHIL NFR FLD MANUAL: 2 %
LYMPHOCYTES NFR FLD MANUAL: 63 %
MESOTHL CELL NFR FLD MANUAL: 11 %
MONOS+MACROS NFR FLD MANUAL: 17 %
NEUTROPHILS NFR FLD MANUAL: 7 %
WBC # FLD: 150 /CU MM

## 2022-04-04 PROCEDURE — 49083 ABD PARACENTESIS W/IMAGING: CPT | Mod: TXP,,, | Performed by: PHYSICIAN ASSISTANT

## 2022-04-04 PROCEDURE — A4550 SURGICAL TRAYS: HCPCS | Mod: TXP

## 2022-04-04 PROCEDURE — P9047 ALBUMIN (HUMAN), 25%, 50ML: HCPCS | Mod: JG,TXP

## 2022-04-04 PROCEDURE — 49083 IR PARACENTESIS WITH IMAGING: ICD-10-PCS | Mod: TXP,,, | Performed by: PHYSICIAN ASSISTANT

## 2022-04-04 PROCEDURE — 89051 BODY FLUID CELL COUNT: CPT | Mod: NTX | Performed by: INTERNAL MEDICINE

## 2022-04-04 PROCEDURE — 49083 ABD PARACENTESIS W/IMAGING: CPT | Mod: NTX | Performed by: RADIOLOGY

## 2022-04-04 PROCEDURE — A4357 BEDSIDE DRAINAGE BAG: HCPCS | Mod: NTX

## 2022-04-04 PROCEDURE — 63600175 PHARM REV CODE 636 W HCPCS: Mod: JG,TXP

## 2022-04-04 RX ORDER — ALBUMIN HUMAN 250 G/1000ML
25 SOLUTION INTRAVENOUS ONCE
Status: DISCONTINUED | OUTPATIENT
Start: 2022-04-04 | End: 2022-04-05 | Stop reason: HOSPADM

## 2022-04-04 RX ORDER — ALBUMIN HUMAN 250 G/1000ML
SOLUTION INTRAVENOUS
Status: COMPLETED
Start: 2022-04-04 | End: 2022-04-04

## 2022-04-04 RX ORDER — ALBUMIN HUMAN 250 G/1000ML
12.5 SOLUTION INTRAVENOUS ONCE
Status: DISCONTINUED | OUTPATIENT
Start: 2022-04-04 | End: 2022-04-04

## 2022-04-04 RX ADMIN — ALBUMIN HUMAN 25 G: 0.25 SOLUTION INTRAVENOUS at 09:04

## 2022-04-04 NOTE — PROCEDURES
Radiology Post-Procedure Note    Pre Op Diagnosis: Ascites  Post Op Diagnosis: Same    Procedure: Ultrasound Guided Paracentesis    Procedure performed by: kay Arnold PA-C    Written Informed Consent Obtained: Yes  Specimen Removed: YES cloudy, yellow  Estimated Blood Loss: Minimal    Findings:   Successful paracentesis.  RLQ.  Albumin administered PRN per protocol.    Patient tolerated procedure well.    Kay Arnold PA-C  Interventional Radiology  Clinic [phone]

## 2022-04-04 NOTE — PLAN OF CARE
Paracentesis complete, 4800 MLs removed. Specimen sent to lab. 100mL (25g) Albumin administered per MD Pacheco. Dressing applied to RLQ puncture site, dressing clean dry and intact. Pt ambulated to [facility] for transport home. Gait steady.

## 2022-04-06 ENCOUNTER — PATIENT MESSAGE (OUTPATIENT)
Dept: TRANSPLANT | Facility: CLINIC | Age: 52
End: 2022-04-06
Payer: COMMERCIAL

## 2022-04-08 ENCOUNTER — HOSPITAL ENCOUNTER (OUTPATIENT)
Dept: INTERVENTIONAL RADIOLOGY/VASCULAR | Facility: HOSPITAL | Age: 52
Discharge: HOME | End: 2022-04-08
Attending: INTERNAL MEDICINE
Payer: COMMERCIAL

## 2022-04-08 VITALS
SYSTOLIC BLOOD PRESSURE: 100 MMHG | RESPIRATION RATE: 16 BRPM | HEART RATE: 77 BPM | OXYGEN SATURATION: 100 % | DIASTOLIC BLOOD PRESSURE: 60 MMHG

## 2022-04-08 DIAGNOSIS — R18.8 OTHER ASCITES: ICD-10-CM

## 2022-04-08 LAB
APPEARANCE FLD: CLEAR
BODY FLD TYPE: NORMAL
COLOR FLD: YELLOW
EOSINOPHIL NFR FLD MANUAL: 1 %
LYMPHOCYTES NFR FLD MANUAL: 64 %
MONOS+MACROS NFR FLD MANUAL: 29 %
NEUTROPHILS NFR FLD MANUAL: 6 %
WBC # FLD: 67 /CU MM

## 2022-04-08 PROCEDURE — P9047 ALBUMIN (HUMAN), 25%, 50ML: HCPCS | Mod: JG,TXP | Performed by: PHYSICIAN ASSISTANT

## 2022-04-08 PROCEDURE — 49083 ABD PARACENTESIS W/IMAGING: CPT | Mod: TXP,,, | Performed by: PHYSICIAN ASSISTANT

## 2022-04-08 PROCEDURE — C1729 CATH, DRAINAGE: HCPCS | Mod: TXP

## 2022-04-08 PROCEDURE — 89051 BODY FLUID CELL COUNT: CPT | Mod: NTX | Performed by: INTERNAL MEDICINE

## 2022-04-08 PROCEDURE — 49083 ABD PARACENTESIS W/IMAGING: CPT | Mod: NTX | Performed by: RADIOLOGY

## 2022-04-08 PROCEDURE — 49083 IR PARACENTESIS WITH IMAGING: ICD-10-PCS | Mod: TXP,,, | Performed by: PHYSICIAN ASSISTANT

## 2022-04-08 PROCEDURE — 63600175 PHARM REV CODE 636 W HCPCS: Mod: JG,TXP | Performed by: PHYSICIAN ASSISTANT

## 2022-04-08 RX ORDER — ALBUMIN HUMAN 250 G/1000ML
SOLUTION INTRAVENOUS
Status: COMPLETED | OUTPATIENT
Start: 2022-04-08 | End: 2022-04-08

## 2022-04-08 RX ORDER — ALBUMIN HUMAN 250 G/1000ML
SOLUTION INTRAVENOUS
Status: DISPENSED
Start: 2022-04-08 | End: 2022-04-08

## 2022-04-08 RX ADMIN — ALBUMIN (HUMAN) 50 G: 25 SOLUTION INTRAVENOUS at 09:04

## 2022-04-08 NOTE — H&P
Radiology History & Physical      SUBJECTIVE:     Chief Complaint: abdominal distention    History of Present Illness:  Malu Montes is a 51 y.o. female who presents for ultrasound guided paracentesis  Past Medical History:   Diagnosis Date    ADD (attention deficit disorder)     Anxiety     Ascites of liver     Cirrhosis 2021    CKD (chronic kidney disease) stage 3, GFR 30-59 ml/min     Constipation     ETOH abuse     Hyperlipidemia     Hypothyroidism     Other ascites 2021    Pancreatitis, acute     Tobacco use     Vitamin D deficiency      Past Surgical History:   Procedure Laterality Date    AUGMENTATION OF BREAST      breast augmentation       SECTION      COLONOSCOPY N/A 2021    Procedure: COLONOSCOPY;  Surgeon: Dao Gray MD;  Location: [facility] (2ND FLR);  Service: Endoscopy;  Laterality: N/A;  COVID test 21 General surgery clinic [facility]    CYSTOSCOPY N/A 9/10/2021    Procedure: CYSTOSCOPY;  Surgeon: Rj Sánchez Jr., MD;  [facility];  Service: Urology;  Laterality: N/A;    ESOPHAGOGASTRODUODENOSCOPY N/A 2021    Procedure: ESOPHAGOGASTRODUODENOSCOPY (EGD);  Surgeon: Dao Gray MD;  Location: [facility] (2ND FLR);  Service: Endoscopy;  Laterality: N/A;  labs current on     ESOPHAGOGASTRODUODENOSCOPY N/A 10/26/2021    Procedure: ESOPHAGOGASTRODUODENOSCOPY (EGD);  Surgeon: Dao Gray MD;  Location: [facility] (2ND FLR);  Service: Endoscopy;  Laterality: N/A;  to be done the week of 10/18/21 per Dr. Gray-BB  cov[facility]-10/23/21-[facility]-   10/25 arrival time confirmed with pt-rb    ESOPHAGOGASTRODUODENOSCOPY Left 2021    Procedure: EGD (ESOPHAGOGASTRODUODENOSCOPY);  Surgeon: Koffi Monteiro MD;  Location: [facility] (4TH FLR);  Service: Endoscopy;  Laterality: Left;  Rapid  pt requested AM appt and first available in December-  labs morning of procedure-BB   lvm to confirm appt-rb    HEMORRHOID SURGERY       PERITONEOCENTESIS Right 6/8/2021    Procedure: PARACENTESIS, ABDOMINAL;  Surgeon: Jay Torre MD;  Location: [facility] CATH LAB;  Service: Radiology;  Laterality: Right;    PERITONEOCENTESIS Right 6/25/2021    Procedure: PARACENTESIS, ABDOMINAL;  Surgeon: Jay Torre MD;  Location: [facility] CATH LAB;  Service: Radiology;  Laterality: Right;    PERITONEOCENTESIS Right 7/12/2021    Procedure: PARACENTESIS, ABDOMINAL;  Surgeon: Jay Torre MD;  Location: [facility] CATH LAB;  Service: Radiology;  Laterality: Right;    PERITONEOCENTESIS Right 7/23/2021    Procedure: PARACENTESIS, ABDOMINAL;  Surgeon: Edward De La Torre II, MD;  Location: [facility] CATH LAB;  Service: Interventional Radiology;  Laterality: Right;    PERITONEOCENTESIS Right 8/3/2021    Procedure: PARACENTESIS, ABDOMINAL;  Surgeon: Franco Cheung MD;  Location: [facility] CATH LAB;  Service: Radiology;  Laterality: Right;    PERITONEOCENTESIS Right 8/16/2021    Procedure: PARACENTESIS, ABDOMINAL;  Surgeon: Jay Torre MD;  Location: [facility] CATH LAB;  Service: Radiology;  Laterality: Right;    PERITONEOCENTESIS Right 8/23/2021    Procedure: PARACENTESIS, ABDOMINAL;  Surgeon: Jay Torre MD;  Location: [facility] CATH LAB;  Service: Radiology;  Laterality: Right;    PERITONEOCENTESIS Right 9/16/2021    Procedure: PARACENTESIS, ABDOMINAL;  Surgeon: Jay Torre MD;  Location: [facility] CATH LAB;  Service: Radiology;  Laterality: Right;    PERITONEOCENTESIS N/A 9/24/2021    Procedure: PARACENTESIS, ABDOMINAL;  Surgeon: Jay Torre MD;  Location: [facility] CATH LAB;  Service: Radiology;  Laterality: N/A;    PERITONEOCENTESIS Right 12/23/2021    Procedure: PARACENTESIS, ABDOMINAL;  Surgeon: Jay Torre MD;  Location: [facility] CATH LAB;  Service: Radiology;  Laterality: Right;    PERITONEOCENTESIS N/A 12/30/2021    Procedure: PARACENTESIS, ABDOMINAL;  Surgeon: Salas Cabrera MD;  Location: [facility] CATH LAB;  Service: Radiology;   Laterality: N/A;    RETROGRADE PYELOGRAPHY Bilateral 9/10/2021    Procedure: PYELOGRAM, RETROGRADE;  Surgeon: Rj Sánchez Jr., MD;  Location: [facility] OR [address] [place] [address];  Service: Urology;  Laterality: Bilateral;    TONSILLECTOMY         Home Meds:   Prior to Admission medications    Medication Sig Start Date End Date Taking? Authorizing Provider   allopurinoL (ZYLOPRIM) 100 MG tablet TAKE 1 TABLET(100 MG) BY MOUTH EVERY DAY 2/23/22   Rashel Cohn MD   bisacodyL (DULCOLAX) 5 mg EC tablet Take 5 mg by mouth daily as needed for Constipation.    Historical Provider   ciprofloxacin HCl (CIPRO) 250 MG tablet Take 1 tablet (250 mg total) by mouth once daily.  Patient taking differently: Take 250 mg by mouth every other day. 12/23/21   Rashel Cohn MD   colestipoL (COLESTID) 1 gram Tab Take 1 tablet (1 g total) by mouth 2 (two) times daily. 4/1/22 4/1/23  Sharmila Pacheco MD   ergocalciferol, vitamin D2, (VITAMIN D ORAL) Take by mouth.    Historical Provider   folic acid (FOLVITE) 1 MG tablet Take 2 tablets (2 mg total) by mouth once daily. 12/14/21 3/14/22  Uma Perry MD   furosemide (LASIX) 40 MG tablet Take 2 tablets (80 mg total) by mouth 2 (two) times a day. Take 80 mg every am and 40 mg every pm 4/1/22   Sharmila Pacheco MD   hydrOXYzine HCL (ATARAX) 25 MG tablet Take 1 tablet (25 mg total) by mouth every 6 to 8 hours as needed for Itching. 4/1/22   Sharmila Pacheco MD   lactulose (CHRONULAC) 10 gram/15 mL solution Take 30 mLs by mouth 2 (two) times daily. 3/4/22   Historical Provider   lactulose (CHRONULAC) 20 gram/30 mL Soln Take 30 mLs (20 g total) by mouth 2 (two) times daily. for 100 doses 3/4/22 4/23/22  Sharmila Pacheco MD   levothyroxine (SYNTHROID) 50 MCG tablet TAKE 1 TABLET(50 MCG) BY MOUTH BEFORE BREAKFAST 9/25/21   Killian Dodd DO   linaCLOtide (LINZESS) 72 mcg Cap capsule Take 2 capsules (144 mcg total) by mouth before breakfast. 11/26/21   Sharmila Pacheco MD   pantoprazole  (PROTONIX) 40 MG tablet Take 1 tablet (40 mg total) by mouth once daily. 2/15/22 2/15/23  Sharmila Pacheco MD   thiamine (VITAMIN B-1) 100 MG tablet Take 1 tablet (100 mg total) by mouth every evening. 10/11/21   Sharmila Pacheco MD   vitamin A 35428 UNIT capsule Take 10,000 Units by mouth every evening.     Historical Provider     Anticoagulants/Antiplatelets: no anticoagulation    Allergies: Review of patient's allergies indicates:  No Known Allergies  Sedation History:  no adverse reactions    Review of Systems:   Hematological: no known coagulopathies  Respiratory: no shortness of breath  Cardiovascular: no chest pain  Gastrointestinal: no abdominal pain  Genito-Urinary: no dysuria  Musculoskeletal: negative  Neurological: no TIA or stroke symptoms         OBJECTIVE:     Vital Signs (Most Recent)       Physical Exam:  ASA: 2  Mallampati: n/a    General: no acute distress  Mental Status: alert and oriented to person, place and time  HEENT: normocephalic, atraumatic  Chest: unlabored breathing  Heart: regular heart rate  Abdomen: nondistended  Extremity: moves all extremities    ASSESSMENT/PLAN:     Sedation Plan: local  Patient will undergo ultrasound guided paracentesis.    Kay Arnold PA-C  Interventional Radiology  Clinic [phone]

## 2022-04-08 NOTE — PROCEDURES
Radiology Post-Procedure Note    Pre Op Diagnosis: Ascites  Post Op Diagnosis: Same    Procedure: Ultrasound Guided Paracentesis    Procedure performed by: Kay Arnold PA-C    Written Informed Consent Obtained: Yes  Specimen Removed: YES clear, yellow  Estimated Blood Loss: Minimal    Findings:   Successful paracentesis. RLQ. Albumin administered PRN per protocol.    Patient tolerated procedure well.    Kay Arnold PA-C  Interventional Radiology  Clinic [phone]

## 2022-04-08 NOTE — PLAN OF CARE
Para completed, pt tolerated well. No apparent distress noted. 7.4 Liters removed from right abd, mepore applied CDI. Labs collected and sent. Albumin 200 ml given per protocol.  Discharge instructions reviewed and acknowledged. Pt discharged via ambulation steady gait observed and private vehicle.

## 2022-04-08 NOTE — PLAN OF CARE
Pt arrived to MPU room 1 for para, no acute distress noted. Orders and labs reviewed on chart. Awaiting consent.

## 2022-04-11 ENCOUNTER — HOSPITAL ENCOUNTER (OUTPATIENT)
Dept: INTERVENTIONAL RADIOLOGY/VASCULAR | Facility: HOSPITAL | Age: 52
Discharge: HOME | End: 2022-04-11
Attending: INTERNAL MEDICINE
Payer: COMMERCIAL

## 2022-04-11 DIAGNOSIS — R18.8 OTHER ASCITES: ICD-10-CM

## 2022-04-11 LAB
APPEARANCE FLD: NORMAL
BODY FLD TYPE: NORMAL
CLASS I ANTIBODIES - LUMINEX: NEGATIVE
CLASS II ANTIBODY COMMENTS - LUMINEX: NORMAL
COLOR FLD: NORMAL
CPRA %: 0
LYMPHOCYTES NFR FLD MANUAL: 8 %
MESOTHL CELL NFR FLD MANUAL: 3 %
MONOS+MACROS NFR FLD MANUAL: 80 %
NEUTROPHILS NFR FLD MANUAL: 9 %
SERUM COLLECTION DT - LUMINEX CLASS I: NORMAL
SERUM COLLECTION DT - LUMINEX CLASS II: NORMAL
SPCL1 TESTING DATE: NORMAL
SPCL2 TESTING DATE: NORMAL
SPCLU TESTING DATE: NORMAL
WBC # FLD: 103 /CU MM

## 2022-04-11 PROCEDURE — 63600175 PHARM REV CODE 636 W HCPCS: Mod: JG,TXP

## 2022-04-11 PROCEDURE — P9047 ALBUMIN (HUMAN), 25%, 50ML: HCPCS | Mod: JG,TXP

## 2022-04-11 PROCEDURE — 49083 IR PARACENTESIS WITH IMAGING: ICD-10-PCS | Mod: TXP,,, | Performed by: PHYSICIAN ASSISTANT

## 2022-04-11 PROCEDURE — 49083 ABD PARACENTESIS W/IMAGING: CPT | Mod: TXP | Performed by: STUDENT IN AN ORGANIZED HEALTH CARE EDUCATION/TRAINING PROGRAM

## 2022-04-11 PROCEDURE — 49083 ABD PARACENTESIS W/IMAGING: CPT | Mod: TXP,,, | Performed by: PHYSICIAN ASSISTANT

## 2022-04-11 PROCEDURE — 89051 BODY FLUID CELL COUNT: CPT | Mod: TXP | Performed by: INTERNAL MEDICINE

## 2022-04-11 PROCEDURE — C1769 GUIDE WIRE: HCPCS | Mod: NTX

## 2022-04-11 RX ORDER — ALBUMIN HUMAN 250 G/1000ML
SOLUTION INTRAVENOUS
Status: COMPLETED
Start: 2022-04-11 | End: 2022-04-11

## 2022-04-11 RX ORDER — LIDOCAINE HYDROCHLORIDE 20 MG/ML
INJECTION, SOLUTION EPIDURAL; INFILTRATION; INTRACAUDAL; PERINEURAL
Status: DISPENSED
Start: 2022-04-11 | End: 2022-04-11

## 2022-04-11 RX ADMIN — ALBUMIN HUMAN 25 G: 0.25 SOLUTION INTRAVENOUS at 09:04

## 2022-04-11 RX ADMIN — ALBUMIN (HUMAN) 12.5 G: 12.5 SOLUTION INTRAVENOUS at 09:04

## 2022-04-11 NOTE — H&P
Radiology History & Physical      SUBJECTIVE:     Chief Complaint: abdominal distention    History of Present Illness:  Malu Montes is a 51 y.o. female who presents for ultrasound guided paracentesis  Past Medical History:   Diagnosis Date    ADD (attention deficit disorder)     Anxiety     Ascites of liver     Cirrhosis 2021    CKD (chronic kidney disease) stage 3, GFR 30-59 ml/min     Constipation     ETOH abuse     Hyperlipidemia     Hypothyroidism     Other ascites 2021    Pancreatitis, acute     Tobacco use     Vitamin D deficiency      Past Surgical History:   Procedure Laterality Date    AUGMENTATION OF BREAST      breast augmentation       SECTION      COLONOSCOPY N/A 2021    Procedure: COLONOSCOPY;  Surgeon: Dao Gray MD;  Location: [facility] (2ND FLR);  Service: Endoscopy;  Laterality: N/A;  COVID test 21 General surgery clinic [facility]    CYSTOSCOPY N/A 9/10/2021    Procedure: CYSTOSCOPY;  Surgeon: Rj Sánchez Jr., MD;  [facility];  Service: Urology;  Laterality: N/A;    ESOPHAGOGASTRODUODENOSCOPY N/A 2021    Procedure: ESOPHAGOGASTRODUODENOSCOPY (EGD);  Surgeon: Dao Gray MD;  Location: [facility] (2ND FLR);  Service: Endoscopy;  Laterality: N/A;  labs current on     ESOPHAGOGASTRODUODENOSCOPY N/A 10/26/2021    Procedure: ESOPHAGOGASTRODUODENOSCOPY (EGD);  Surgeon: Dao Gray MD;  Location: [facility] (2ND FLR);  Service: Endoscopy;  Laterality: N/A;  to be done the week of 10/18/21 per Dr. Gray-BB  covid-10/23/21-[facility]-   10/25 arrival time confirmed with pt-rb    ESOPHAGOGASTRODUODENOSCOPY Left 2021    Procedure: EGD (ESOPHAGOGASTRODUODENOSCOPY);  Surgeon: Koffi Monteiro MD;  Location: [facility] (4TH FLR);  Service: Endoscopy;  Laterality: Left;  Rapid  pt requested AM appt and first available in December-  labs morning of procedure-BB   lvm to confirm appt-rb    HEMORRHOID SURGERY       PERITONEOCENTESIS Right 6/8/2021    Procedure: PARACENTESIS, ABDOMINAL;  Surgeon: Jay Torre MD;  Location: [facility] CATH LAB;  Service: Radiology;  Laterality: Right;    PERITONEOCENTESIS Right 6/25/2021    Procedure: PARACENTESIS, ABDOMINAL;  Surgeon: Jay Torre MD;  Location: [facility] CATH LAB;  Service: Radiology;  Laterality: Right;    PERITONEOCENTESIS Right 7/12/2021    Procedure: PARACENTESIS, ABDOMINAL;  Surgeon: Jay Torre MD;  Location: [facility] CATH LAB;  Service: Radiology;  Laterality: Right;    PERITONEOCENTESIS Right 7/23/2021    Procedure: PARACENTESIS, ABDOMINAL;  Surgeon: Edward De La Torre II, MD;  Location: [facility] CATH LAB;  Service: Interventional Radiology;  Laterality: Right;    PERITONEOCENTESIS Right 8/3/2021    Procedure: PARACENTESIS, ABDOMINAL;  Surgeon: Franco Cheung MD;  Location: [facility] CATH LAB;  Service: Radiology;  Laterality: Right;    PERITONEOCENTESIS Right 8/16/2021    Procedure: PARACENTESIS, ABDOMINAL;  Surgeon: Jay Torre MD;  Location: [facility] CATH LAB;  Service: Radiology;  Laterality: Right;    PERITONEOCENTESIS Right 8/23/2021    Procedure: PARACENTESIS, ABDOMINAL;  Surgeon: Jay Torre MD;  Location: [facility] CATH LAB;  Service: Radiology;  Laterality: Right;    PERITONEOCENTESIS Right 9/16/2021    Procedure: PARACENTESIS, ABDOMINAL;  Surgeon: Jay Torre MD;  Location: [facility] CATH LAB;  Service: Radiology;  Laterality: Right;    PERITONEOCENTESIS N/A 9/24/2021    Procedure: PARACENTESIS, ABDOMINAL;  Surgeon: Jay Torre MD;  Location: [facility] CATH LAB;  Service: Radiology;  Laterality: N/A;    PERITONEOCENTESIS Right 12/23/2021    Procedure: PARACENTESIS, ABDOMINAL;  Surgeon: Jay Torre MD;  Location: [facility] CATH LAB;  Service: Radiology;  Laterality: Right;    PERITONEOCENTESIS N/A 12/30/2021    Procedure: PARACENTESIS, ABDOMINAL;  Surgeon: Salas Cabrera MD;  Location: [facility] CATH LAB;  Service: Radiology;   Laterality: N/A;    RETROGRADE PYELOGRAPHY Bilateral 9/10/2021    Procedure: PYELOGRAM, RETROGRADE;  Surgeon: Rj Sánchez Jr., MD;  [facility] [address] [place] [address];  Service: Urology;  Laterality: Bilateral;    TONSILLECTOMY         Home Meds:   Prior to Admission medications    Medication Sig Start Date End Date Taking? Authorizing Provider   allopurinoL (ZYLOPRIM) 100 MG tablet TAKE 1 TABLET(100 MG) BY MOUTH EVERY DAY 2/23/22   Rashel Cohn MD   bisacodyL (DULCOLAX) 5 mg EC tablet Take 5 mg by mouth daily as needed for Constipation.    Historical Provider   ciprofloxacin HCl (CIPRO) 250 MG tablet Take 1 tablet (250 mg total) by mouth once daily.  Patient taking differently: Take 250 mg by mouth every other day. 12/23/21   Rashel Cohn MD   colestipoL (COLESTID) 1 gram Tab Take 1 tablet (1 g total) by mouth 2 (two) times daily. 4/1/22 4/1/23  Sharmila Pacheco MD   ergocalciferol, vitamin D2, (VITAMIN D ORAL) Take by mouth.    Historical Provider   folic acid (FOLVITE) 1 MG tablet Take 2 tablets (2 mg total) by mouth once daily. 12/14/21 3/14/22  Uma Perry MD   furosemide (LASIX) 40 MG tablet Take 2 tablets (80 mg total) by mouth 2 (two) times a day. Take 80 mg every am and 40 mg every pm 4/1/22   Sharmila Pacheco MD   hydrOXYzine HCL (ATARAX) 25 MG tablet Take 1 tablet (25 mg total) by mouth every 6 to 8 hours as needed for Itching. 4/1/22   Sharmila Pacheco MD   lactulose (CHRONULAC) 10 gram/15 mL solution Take 30 mLs by mouth 2 (two) times daily. 3/4/22   Historical Provider   lactulose (CHRONULAC) 20 gram/30 mL Soln Take 30 mLs (20 g total) by mouth 2 (two) times daily. for 100 doses 3/4/22 4/23/22  Sharmila Pacheco MD   levothyroxine (SYNTHROID) 50 MCG tablet TAKE 1 TABLET(50 MCG) BY MOUTH BEFORE BREAKFAST 9/25/21   Killian Dodd DO   linaCLOtide (LINZESS) 72 mcg Cap capsule Take 2 capsules (144 mcg total) by mouth before breakfast. 11/26/21   Sharmila Pacheco MD   pantoprazole  (PROTONIX) 40 MG tablet Take 1 tablet (40 mg total) by mouth once daily. 2/15/22 2/15/23  Sharmila Pacheco MD   thiamine (VITAMIN B-1) 100 MG tablet Take 1 tablet (100 mg total) by mouth every evening. 10/11/21   Sharmila Pacheco MD   vitamin A 09843 UNIT capsule Take 10,000 Units by mouth every evening.     Historical Provider     Anticoagulants/Antiplatelets: no anticoagulation    Allergies: Review of patient's allergies indicates:  No Known Allergies  Sedation History:  no adverse reactions    Review of Systems:   Hematological: no known coagulopathies  Respiratory: no shortness of breath  Cardiovascular: no chest pain  Gastrointestinal: no abdominal pain  Genito-Urinary: no dysuria  Musculoskeletal: negative  Neurological: no TIA or stroke symptoms         OBJECTIVE:     Vital Signs (Most Recent)       Physical Exam:  ASA: 2  Mallampati: n/a    General: no acute distress  Mental Status: alert and oriented to person, place and time  HEENT: normocephalic, atraumatic  Chest: unlabored breathing  Heart: regular heart rate  Abdomen: nondistended  Extremity: moves all extremities    ASSESSMENT/PLAN:     Sedation Plan: local  Patient will undergo ultrasound guided paracentesis.    Kay Arnold PA-C  Interventional Radiology  Clinic [phone]

## 2022-04-11 NOTE — PLAN OF CARE
Paracentesis completed. Patient tolerated well; VSS. 5.5 L removed; 37.5 G Albumin administered per protocol. Labs collected and sent per orders. RLQ site clean, dry and intact. Patient refused printed discharge instructions and ambulated to lobby for discharge home.

## 2022-04-11 NOTE — PROCEDURES
Radiology Post-Procedure Note    Pre Op Diagnosis: Ascites  Post Op Diagnosis: Same    Procedure: Ultrasound Guided Paracentesis    Procedure performed by: Kay Arnold PA-C    Written Informed Consent Obtained: Yes  Specimen Removed: YES clear, yellow  Estimated Blood Loss: Minimal    Findings:   Successful paracentesis.  RLQ. Albumin administered PRN per protocol.    Patient tolerated procedure well.    Kay Arnold PA-C  Interventional Radiology  Clinic [phone]

## 2022-04-12 ENCOUNTER — TELEPHONE (OUTPATIENT)
Dept: TRANSPLANT | Facility: CLINIC | Age: 52
End: 2022-04-12
Payer: COMMERCIAL

## 2022-04-12 NOTE — TELEPHONE ENCOUNTER
Spoke to patient regarding HLA needed, pt scheduled for Monday 4/18. Discussed with patient her bile acids are up and Dr. Pacheco wants to make she is taking the Colestid 2 g bid. Need to take other meds 1 hour before or 4- 6 hours after colestid. Pt states the pills are very large and sometimes difficult to take. States she can take them, she thought is was just for itching. Pt has TIPS scheduled 5/6/22

## 2022-04-14 ENCOUNTER — HOSPITAL ENCOUNTER (OUTPATIENT)
Dept: INTERVENTIONAL RADIOLOGY/VASCULAR | Facility: HOSPITAL | Age: 52
Discharge: HOME | End: 2022-04-14
Attending: INTERNAL MEDICINE | Admitting: INTERNAL MEDICINE
Payer: COMMERCIAL

## 2022-04-14 VITALS
SYSTOLIC BLOOD PRESSURE: 113 MMHG | OXYGEN SATURATION: 100 % | DIASTOLIC BLOOD PRESSURE: 64 MMHG | TEMPERATURE: 98 F | HEART RATE: 74 BPM | RESPIRATION RATE: 16 BRPM

## 2022-04-14 DIAGNOSIS — R18.8 OTHER ASCITES: ICD-10-CM

## 2022-04-14 LAB
APPEARANCE FLD: NORMAL
BODY FLD TYPE: NORMAL
COLOR FLD: YELLOW
EOSINOPHIL NFR FLD MANUAL: 1 %
LYMPHOCYTES NFR FLD MANUAL: 59 %
MESOTHL CELL NFR FLD MANUAL: 2 %
MONOS+MACROS NFR FLD MANUAL: 30 %
NEUTROPHILS NFR FLD MANUAL: 8 %
WBC # FLD: 86 /CU MM

## 2022-04-14 PROCEDURE — 49083 IR PARACENTESIS WITH IMAGING: ICD-10-PCS | Mod: TXP,,, | Performed by: RADIOLOGY

## 2022-04-14 PROCEDURE — 89051 BODY FLUID CELL COUNT: CPT | Mod: TXP | Performed by: INTERNAL MEDICINE

## 2022-04-14 PROCEDURE — 63600175 PHARM REV CODE 636 W HCPCS: Mod: JG,NTX | Performed by: INTERNAL MEDICINE

## 2022-04-14 PROCEDURE — P9047 ALBUMIN (HUMAN), 25%, 50ML: HCPCS | Mod: JG,NTX | Performed by: INTERNAL MEDICINE

## 2022-04-14 PROCEDURE — C1729 CATH, DRAINAGE: HCPCS | Mod: TXP

## 2022-04-14 PROCEDURE — 49083 ABD PARACENTESIS W/IMAGING: CPT | Mod: NTX | Performed by: RADIOLOGY

## 2022-04-14 RX ORDER — ALBUMIN HUMAN 250 G/1000ML
SOLUTION INTRAVENOUS
Status: DISPENSED
Start: 2022-04-14 | End: 2022-04-14

## 2022-04-14 RX ORDER — ALBUMIN HUMAN 250 G/1000ML
25 SOLUTION INTRAVENOUS ONCE
Status: COMPLETED | OUTPATIENT
Start: 2022-04-14 | End: 2022-04-14

## 2022-04-14 RX ADMIN — ALBUMIN (HUMAN) 25 G: 25 SOLUTION INTRAVENOUS at 11:04

## 2022-04-14 NOTE — H&P
Radiology History & Physical      SUBJECTIVE:     Chief Complaint: abdominal distension    History of Present Illness:  Malu Montes is a 51 y.o. female who presents for US-guided paracentesis.     Past Medical History:   Diagnosis Date    ADD (attention deficit disorder)     Anxiety     Ascites of liver     Cirrhosis 2021    CKD (chronic kidney disease) stage 3, GFR 30-59 ml/min     Constipation     ETOH abuse     Hyperlipidemia     Hypothyroidism     Other ascites 2021    Pancreatitis, acute     Tobacco use     Vitamin D deficiency      Past Surgical History:   Procedure Laterality Date    AUGMENTATION OF BREAST      breast augmentation       SECTION      COLONOSCOPY N/A 2021    Procedure: COLONOSCOPY;  Surgeon: Dao Gray MD;  Location: [facility] (2ND FLR);  Service: Endoscopy;  Laterality: N/A;  COVID test 21 General surgery clinic [facility]    CYSTOSCOPY N/A 9/10/2021    Procedure: CYSTOSCOPY;  Surgeon: Rj Sánchez Jr., MD;  [facility];  Service: Urology;  Laterality: N/A;    ESOPHAGOGASTRODUODENOSCOPY N/A 2021    Procedure: ESOPHAGOGASTRODUODENOSCOPY (EGD);  Surgeon: Dao Gray MD;  Location: [facility] (2ND FLR);  Service: Endoscopy;  Laterality: N/A;  labs current on     ESOPHAGOGASTRODUODENOSCOPY N/A 10/26/2021    Procedure: ESOPHAGOGASTRODUODENOSCOPY (EGD);  Surgeon: Dao Gray MD;  Location: [facility] (2ND FLR);  Service: Endoscopy;  Laterality: N/A;  to be done the week of 10/18/21 per Dr. Gray-BB  covid-10/23/21-[facility]-   10/25 arrival time confirmed with pt-rb    ESOPHAGOGASTRODUODENOSCOPY Left 2021    Procedure: EGD (ESOPHAGOGASTRODUODENOSCOPY);  Surgeon: Koffi Monteiro MD;  Location: [facility] (4TH FLR);  Service: Endoscopy;  Laterality: Left;  Rapid  pt requested AM appt and first available in December-  labs morning of procedure-BB   lvm to confirm appt-rb    HEMORRHOID SURGERY       PERITONEOCENTESIS Right 6/8/2021    Procedure: PARACENTESIS, ABDOMINAL;  Surgeon: Jay Torre MD;  Location: [facility] CATH LAB;  Service: Radiology;  Laterality: Right;    PERITONEOCENTESIS Right 6/25/2021    Procedure: PARACENTESIS, ABDOMINAL;  Surgeon: Jay Torre MD;  Location: [facility] CATH LAB;  Service: Radiology;  Laterality: Right;    PERITONEOCENTESIS Right 7/12/2021    Procedure: PARACENTESIS, ABDOMINAL;  Surgeon: Jay Torre MD;  Location: [facility] CATH LAB;  Service: Radiology;  Laterality: Right;    PERITONEOCENTESIS Right 7/23/2021    Procedure: PARACENTESIS, ABDOMINAL;  Surgeon: Edward De La Torre II, MD;  Location: [facility] CATH LAB;  Service: Interventional Radiology;  Laterality: Right;    PERITONEOCENTESIS Right 8/3/2021    Procedure: PARACENTESIS, ABDOMINAL;  Surgeon: Franco Cheung MD;  Location: [facility] CATH LAB;  Service: Radiology;  Laterality: Right;    PERITONEOCENTESIS Right 8/16/2021    Procedure: PARACENTESIS, ABDOMINAL;  Surgeon: Jay Torre MD;  Location: [facility] CATH LAB;  Service: Radiology;  Laterality: Right;    PERITONEOCENTESIS Right 8/23/2021    Procedure: PARACENTESIS, ABDOMINAL;  Surgeon: Jay Torre MD;  Location: [facility] CATH LAB;  Service: Radiology;  Laterality: Right;    PERITONEOCENTESIS Right 9/16/2021    Procedure: PARACENTESIS, ABDOMINAL;  Surgeon: Jay Torre MD;  Location: [facility] CATH LAB;  Service: Radiology;  Laterality: Right;    PERITONEOCENTESIS N/A 9/24/2021    Procedure: PARACENTESIS, ABDOMINAL;  Surgeon: Jay Torre MD;  Location: [facility] CATH LAB;  Service: Radiology;  Laterality: N/A;    PERITONEOCENTESIS Right 12/23/2021    Procedure: PARACENTESIS, ABDOMINAL;  Surgeon: Jay Torre MD;  Location: [facility] CATH LAB;  Service: Radiology;  Laterality: Right;    PERITONEOCENTESIS N/A 12/30/2021    Procedure: PARACENTESIS, ABDOMINAL;  Surgeon: Salas Cabrera MD;  Location: [facility] CATH LAB;  Service: Radiology;   Laterality: N/A;    RETROGRADE PYELOGRAPHY Bilateral 9/10/2021    Procedure: PYELOGRAM, RETROGRADE;  Surgeon: Rj Sánchez Jr., MD;  Location: [facility] OR [address] [place] [address];  Service: Urology;  Laterality: Bilateral;    TONSILLECTOMY         Home Meds:   Prior to Admission medications    Medication Sig Start Date End Date Taking? Authorizing Provider   allopurinoL (ZYLOPRIM) 100 MG tablet TAKE 1 TABLET(100 MG) BY MOUTH EVERY DAY 2/23/22   Rashel Cohn MD   bisacodyL (DULCOLAX) 5 mg EC tablet Take 5 mg by mouth daily as needed for Constipation.    Historical Provider   ciprofloxacin HCl (CIPRO) 250 MG tablet Take 1 tablet (250 mg total) by mouth once daily.  Patient taking differently: Take 250 mg by mouth every other day. 12/23/21   Rashel Cohn MD   colestipoL (COLESTID) 1 gram Tab Take 1 tablet (1 g total) by mouth 2 (two) times daily. 4/1/22 4/1/23  Sharmila Pacheco MD   ergocalciferol, vitamin D2, (VITAMIN D ORAL) Take by mouth.    Historical Provider   folic acid (FOLVITE) 1 MG tablet Take 2 tablets (2 mg total) by mouth once daily. 12/14/21 3/14/22  Uma Perry MD   furosemide (LASIX) 40 MG tablet Take 2 tablets (80 mg total) by mouth 2 (two) times a day. Take 80 mg every am and 40 mg every pm 4/1/22   Sharmila Pacheco MD   hydrOXYzine HCL (ATARAX) 25 MG tablet Take 1 tablet (25 mg total) by mouth every 6 to 8 hours as needed for Itching. 4/1/22   Sharmila Pacheco MD   lactulose (CHRONULAC) 10 gram/15 mL solution Take 30 mLs by mouth 2 (two) times daily. 3/4/22   Historical Provider   lactulose (CHRONULAC) 20 gram/30 mL Soln Take 30 mLs (20 g total) by mouth 2 (two) times daily. for 100 doses 3/4/22 4/23/22  Sharmila Pacheco MD   levothyroxine (SYNTHROID) 50 MCG tablet TAKE 1 TABLET(50 MCG) BY MOUTH BEFORE BREAKFAST 9/25/21   Killian Dodd DO   linaCLOtide (LINZESS) 72 mcg Cap capsule Take 2 capsules (144 mcg total) by mouth before breakfast. 11/26/21   Sharmila Pacheco MD   pantoprazole  (PROTONIX) 40 MG tablet Take 1 tablet (40 mg total) by mouth once daily. 2/15/22 2/15/23  Sharmila Pacheco MD   thiamine (VITAMIN B-1) 100 MG tablet Take 1 tablet (100 mg total) by mouth every evening. 10/11/21   Sharmila Pacheco MD   vitamin A 04792 UNIT capsule Take 10,000 Units by mouth every evening.     Historical Provider     Anticoagulants/Antiplatelets: no anticoagulation    Allergies: Review of patient's allergies indicates:  No Known Allergies  Sedation History:  no adverse reactions    Review of Systems:   Hematological: no known coagulopathies  Respiratory: no shortness of breath  Cardiovascular: no chest pain  Gastrointestinal: no abdominal pain  Genito-Urinary: no dysuria  Musculoskeletal: negative  Neurological: no TIA or stroke symptoms         OBJECTIVE:     Vital Signs (Most Recent)       Physical Exam:  ASA: 2  Mallampati: 2    General: no acute distress  Mental Status: alert and oriented to person, place and time  HEENT: normocephalic, atraumatic  Chest: unlabored breathing  Heart: regular heart rate  Abdomen: distended  Extremity: moves all extremities    Laboratory  Lab Results   Component Value Date    INR 1.0 04/11/2022       Lab Results   Component Value Date    WBC 7.52 04/11/2022    HGB 12.2 04/11/2022    HCT 37.0 04/11/2022    MCV 99 (H) 04/11/2022     04/11/2022      Lab Results   Component Value Date    GLU 91 04/11/2022     04/11/2022    K 3.8 04/11/2022     04/11/2022    CO2 21 (L) 04/11/2022    BUN 53 (H) 04/11/2022    CREATININE 1.9 (H) 04/11/2022    CALCIUM 9.3 04/11/2022    MG 2.4 02/28/2022    ALT 26 04/11/2022    AST 38 04/11/2022    ALBUMIN 3.6 04/11/2022    BILITOT 0.6 04/11/2022    BILIDIR 0.4 (H) 11/11/2021       ASSESSMENT/PLAN:     Sedation Plan: local  Patient will undergo US-guided paracentesis.    Bk Nunez MD PGY2  Department of Radiology  [facility]

## 2022-04-14 NOTE — PROCEDURES
Radiology Post-Procedure Note    Pre Op Diagnosis: Ascites  Post Op Diagnosis: Same    Procedure: Ultrasound Guided Paracentesis    Procedure performed by: Bk Nunez MD    Written Informed Consent Obtained: Yes  Specimen Removed: YES cloudy yellow  Estimated Blood Loss: Minimal    Findings:   Successful LLQ US-guided paracentesis.  Albumin administered PRN per protocol.    Patient tolerated procedure well.    Bk Nunez MD PGY2  Department of Radiology  [facility]

## 2022-04-18 ENCOUNTER — HOSPITAL ENCOUNTER (OUTPATIENT)
Dept: INTERVENTIONAL RADIOLOGY/VASCULAR | Facility: HOSPITAL | Age: 52
Discharge: HOME | End: 2022-04-18
Attending: INTERNAL MEDICINE
Payer: COMMERCIAL

## 2022-04-18 ENCOUNTER — OFFICE VISIT (OUTPATIENT)
Dept: HEPATOLOGY | Facility: CLINIC | Age: 52
End: 2022-04-18
Payer: COMMERCIAL

## 2022-04-18 ENCOUNTER — OFFICE VISIT (OUTPATIENT)
Dept: NEPHROLOGY | Facility: CLINIC | Age: 52
End: 2022-04-18
Payer: COMMERCIAL

## 2022-04-18 ENCOUNTER — LAB VISIT (OUTPATIENT)
Dept: LAB | Facility: HOSPITAL | Age: 52
End: 2022-04-18
Payer: COMMERCIAL

## 2022-04-18 VITALS
WEIGHT: 124.88 LBS | RESPIRATION RATE: 17 BRPM | HEIGHT: 63 IN | BODY MASS INDEX: 22.12 KG/M2 | DIASTOLIC BLOOD PRESSURE: 59 MMHG | TEMPERATURE: 98 F | SYSTOLIC BLOOD PRESSURE: 100 MMHG | HEART RATE: 102 BPM | OXYGEN SATURATION: 100 %

## 2022-04-18 VITALS
DIASTOLIC BLOOD PRESSURE: 54 MMHG | RESPIRATION RATE: 20 BRPM | SYSTOLIC BLOOD PRESSURE: 101 MMHG | TEMPERATURE: 98 F | HEART RATE: 94 BPM | OXYGEN SATURATION: 100 % | DIASTOLIC BLOOD PRESSURE: 62 MMHG | HEART RATE: 86 BPM | WEIGHT: 123.69 LBS | HEIGHT: 63 IN | OXYGEN SATURATION: 100 % | SYSTOLIC BLOOD PRESSURE: 98 MMHG | BODY MASS INDEX: 21.91 KG/M2

## 2022-04-18 DIAGNOSIS — R18.8 OTHER ASCITES: ICD-10-CM

## 2022-04-18 DIAGNOSIS — R79.89 ELEVATED LIVER FUNCTION TESTS: ICD-10-CM

## 2022-04-18 DIAGNOSIS — K76.6 PORTAL HYPERTENSION: ICD-10-CM

## 2022-04-18 DIAGNOSIS — N18.32 STAGE 3B CHRONIC KIDNEY DISEASE: ICD-10-CM

## 2022-04-18 DIAGNOSIS — N18.4 CKD (CHRONIC KIDNEY DISEASE) STAGE 4, GFR 15-29 ML/MIN: Primary | ICD-10-CM

## 2022-04-18 DIAGNOSIS — K74.60 HEPATIC CIRRHOSIS, UNSPECIFIED HEPATIC CIRRHOSIS TYPE, UNSPECIFIED WHETHER ASCITES PRESENT: ICD-10-CM

## 2022-04-18 DIAGNOSIS — E79.0 HYPERURICEMIA: ICD-10-CM

## 2022-04-18 DIAGNOSIS — N25.81 SECONDARY HYPERPARATHYROIDISM OF RENAL ORIGIN: ICD-10-CM

## 2022-04-18 DIAGNOSIS — K65.2 SBP (SPONTANEOUS BACTERIAL PERITONITIS): Primary | Chronic | ICD-10-CM

## 2022-04-18 DIAGNOSIS — Z76.82 ORGAN TRANSPLANT CANDIDATE: ICD-10-CM

## 2022-04-18 DIAGNOSIS — E55.9 VITAMIN D DEFICIENCY: ICD-10-CM

## 2022-04-18 LAB
ALBUMIN SERPL BCP-MCNC: 3.3 G/DL (ref 3.5–5.2)
ALP SERPL-CCNC: 125 U/L (ref 55–135)
ALT SERPL W/O P-5'-P-CCNC: 20 U/L (ref 10–44)
ANION GAP SERPL CALC-SCNC: 11 MMOL/L (ref 8–16)
APPEARANCE FLD: NORMAL
AST SERPL-CCNC: 28 U/L (ref 10–40)
BASOPHILS # BLD AUTO: 0.07 K/UL (ref 0–0.2)
BASOPHILS NFR BLD: 0.9 % (ref 0–1.9)
BILIRUB SERPL-MCNC: 0.8 MG/DL (ref 0.1–1)
BODY FLD TYPE: NORMAL
BUN SERPL-MCNC: 60 MG/DL (ref 6–20)
CALCIUM SERPL-MCNC: 9 MG/DL (ref 8.7–10.5)
CHLORIDE SERPL-SCNC: 107 MMOL/L (ref 95–110)
CO2 SERPL-SCNC: 18 MMOL/L (ref 23–29)
COLOR FLD: YELLOW
CREAT SERPL-MCNC: 1.9 MG/DL (ref 0.5–1.4)
DIFFERENTIAL METHOD: ABNORMAL
EOSINOPHIL # BLD AUTO: 0.6 K/UL (ref 0–0.5)
EOSINOPHIL NFR BLD: 7.3 % (ref 0–8)
ERYTHROCYTE [DISTWIDTH] IN BLOOD BY AUTOMATED COUNT: 14.3 % (ref 11.5–14.5)
EST. GFR  (AFRICAN AMERICAN): 34.9 ML/MIN/1.73 M^2
EST. GFR  (NON AFRICAN AMERICAN): 30.3 ML/MIN/1.73 M^2
GLUCOSE SERPL-MCNC: 159 MG/DL (ref 70–110)
HCT VFR BLD AUTO: 36.5 % (ref 37–48.5)
HGB BLD-MCNC: 12.1 G/DL (ref 12–16)
IMM GRANULOCYTES # BLD AUTO: 0.01 K/UL (ref 0–0.04)
IMM GRANULOCYTES NFR BLD AUTO: 0.1 % (ref 0–0.5)
INR PPP: 1.1 (ref 0.8–1.2)
LYMPHOCYTES # BLD AUTO: 1.6 K/UL (ref 1–4.8)
LYMPHOCYTES NFR BLD: 20.7 % (ref 18–48)
LYMPHOCYTES NFR FLD MANUAL: 83 %
MCH RBC QN AUTO: 33.2 PG (ref 27–31)
MCHC RBC AUTO-ENTMCNC: 33.2 G/DL (ref 32–36)
MCV RBC AUTO: 100 FL (ref 82–98)
MONOCYTES # BLD AUTO: 0.6 K/UL (ref 0.3–1)
MONOCYTES NFR BLD: 7.3 % (ref 4–15)
MONOS+MACROS NFR FLD MANUAL: 12 %
NEUTROPHILS # BLD AUTO: 5.1 K/UL (ref 1.8–7.7)
NEUTROPHILS NFR BLD: 63.7 % (ref 38–73)
NEUTROPHILS NFR FLD MANUAL: 5 %
NRBC BLD-RTO: 0 /100 WBC
PLATELET # BLD AUTO: 221 K/UL (ref 150–450)
PMV BLD AUTO: 10.8 FL (ref 9.2–12.9)
POTASSIUM SERPL-SCNC: 4 MMOL/L (ref 3.5–5.1)
PROT SERPL-MCNC: 5.9 G/DL (ref 6–8.4)
PROTHROMBIN TIME: 11.3 SEC (ref 9–12.5)
RBC # BLD AUTO: 3.64 M/UL (ref 4–5.4)
SODIUM SERPL-SCNC: 136 MMOL/L (ref 136–145)
WBC # BLD AUTO: 7.93 K/UL (ref 3.9–12.7)
WBC # FLD: 34 /CU MM

## 2022-04-18 PROCEDURE — P9047 ALBUMIN (HUMAN), 25%, 50ML: HCPCS | Mod: JG,TXP | Performed by: FAMILY MEDICINE

## 2022-04-18 PROCEDURE — 99999 PR PBB SHADOW E&M-EST. PATIENT-LVL V: CPT | Mod: PBBFAC,TXP,, | Performed by: INTERNAL MEDICINE

## 2022-04-18 PROCEDURE — 63600175 PHARM REV CODE 636 W HCPCS: Mod: JG,TXP | Performed by: FAMILY MEDICINE

## 2022-04-18 PROCEDURE — 99215 OFFICE O/P EST HI 40 MIN: CPT | Mod: S$GLB,,, | Performed by: INTERNAL MEDICINE

## 2022-04-18 PROCEDURE — 49083 ABD PARACENTESIS W/IMAGING: CPT | Mod: TXP,,, | Performed by: FAMILY MEDICINE

## 2022-04-18 PROCEDURE — C1729 CATH, DRAINAGE: HCPCS | Mod: NTX

## 2022-04-18 PROCEDURE — 49083 IR PARACENTESIS WITH IMAGING: ICD-10-PCS | Mod: TXP,,, | Performed by: FAMILY MEDICINE

## 2022-04-18 PROCEDURE — 85025 COMPLETE CBC W/AUTO DIFF WBC: CPT | Mod: TXP | Performed by: INTERNAL MEDICINE

## 2022-04-18 PROCEDURE — 99999 PR PBB SHADOW E&M-EST. PATIENT-LVL IV: CPT | Mod: PBBFAC,,, | Performed by: INTERNAL MEDICINE

## 2022-04-18 PROCEDURE — 36415 COLL VENOUS BLD VENIPUNCTURE: CPT | Mod: NTX | Performed by: INTERNAL MEDICINE

## 2022-04-18 PROCEDURE — 99001 SPECIMEN HANDLING PT-LAB: CPT | Mod: PO,TXP | Performed by: INTERNAL MEDICINE

## 2022-04-18 PROCEDURE — 89051 BODY FLUID CELL COUNT: CPT | Mod: TXP | Performed by: INTERNAL MEDICINE

## 2022-04-18 PROCEDURE — 99214 PR OFFICE/OUTPT VISIT, EST, LEVL IV, 30-39 MIN: ICD-10-PCS | Mod: S$GLB,TXP,, | Performed by: INTERNAL MEDICINE

## 2022-04-18 PROCEDURE — 99999 PR PBB SHADOW E&M-EST. PATIENT-LVL V: ICD-10-PCS | Mod: PBBFAC,TXP,, | Performed by: INTERNAL MEDICINE

## 2022-04-18 PROCEDURE — 85610 PROTHROMBIN TIME: CPT | Mod: NTX | Performed by: INTERNAL MEDICINE

## 2022-04-18 PROCEDURE — 80053 COMPREHEN METABOLIC PANEL: CPT | Mod: TXP | Performed by: INTERNAL MEDICINE

## 2022-04-18 PROCEDURE — 99214 OFFICE O/P EST MOD 30 MIN: CPT | Mod: S$GLB,TXP,, | Performed by: INTERNAL MEDICINE

## 2022-04-18 PROCEDURE — 99215 PR OFFICE/OUTPT VISIT, EST, LEVL V, 40-54 MIN: ICD-10-PCS | Mod: S$GLB,,, | Performed by: INTERNAL MEDICINE

## 2022-04-18 PROCEDURE — 49083 ABD PARACENTESIS W/IMAGING: CPT | Mod: NTX | Performed by: RADIOLOGY

## 2022-04-18 PROCEDURE — 99999 PR PBB SHADOW E&M-EST. PATIENT-LVL IV: ICD-10-PCS | Mod: PBBFAC,,, | Performed by: INTERNAL MEDICINE

## 2022-04-18 RX ORDER — ALBUMIN HUMAN 250 G/1000ML
SOLUTION INTRAVENOUS
Status: DISPENSED
Start: 2022-04-18 | End: 2022-04-18

## 2022-04-18 RX ORDER — FUROSEMIDE 40 MG/1
80 TABLET ORAL 3 TIMES DAILY
Qty: 120 TABLET | Refills: 11
Start: 2022-04-18 | End: 2022-04-24 | Stop reason: SDUPTHER

## 2022-04-18 RX ORDER — CIPROFLOXACIN 250 MG/1
250 TABLET, FILM COATED ORAL EVERY OTHER DAY
Qty: 90 TABLET | Refills: 0 | Status: ON HOLD
Start: 2022-04-18 | End: 2022-06-10 | Stop reason: HOSPADM

## 2022-04-18 RX ORDER — LIDOCAINE HYDROCHLORIDE 10 MG/ML
INJECTION INFILTRATION; PERINEURAL
Status: DISPENSED
Start: 2022-04-18 | End: 2022-04-18

## 2022-04-18 RX ORDER — ALBUMIN HUMAN 250 G/1000ML
SOLUTION INTRAVENOUS
Status: COMPLETED | OUTPATIENT
Start: 2022-04-18 | End: 2022-04-18

## 2022-04-18 RX ADMIN — ALBUMIN (HUMAN) 37.5 G: 25 SOLUTION INTRAVENOUS at 09:04

## 2022-04-18 NOTE — PROGRESS NOTES
HEPATOLOGY FOLLOW UP    Referring Physician: Killian Dodd DO  Current Corresponding Physician: Killian Dodd DO, Rashel Cohn MD    Malu Montes is here for follow up of decompensated alcohol-induced cirrhosis    HPI  Malu Montes is a 51 y.o. female who has a history of alcohol abuse and decompensated cirrhosis now listed for a liver-kidney transplant.    Interval History  Since Malu Montes's last visit:    She is listed for liver-kidney transplant. MELD 15 (2/21/22-5/22/22). She continues to require a paracentesis every 5-6 days.    She got covid (+1/8/22). She was reactivated on the list post covid infection on 2/8/22. She has agreed to proceed with liver alone. There was discussion about proceeding with TIPS to try to improve ascites control. This has been scheduled 5/6/22. TIPS carries a risk of complications and possible acceleration of need for HD.    She was seen in the ER on 3/1/22: confusion but normal ammonia level and ct head- discharged home; lactulose was stopped 2/4/22.    Labs 4/18/22: Tbil 0.8, ALT 20, AST 28, ALKP 125  AFP 2/3/22: 3.6  Bile acids 4/4/22: 37    Creatinine, elevated: due to IgA nephropathy- cystatin C 2.45  Hematologic labs: Juliette free light chains 9.12 (0.33-1.94), Lambda Free Light Chains 4.39 (0.57-2.63), Kappa/Lambda FLC Ratio: 2.08 (0.26-1.65); hematology consult- abn attributed to CKD; no futher f/u needed    Abdo US 10/1/21: Satisfactory Doppler evaluation of the liver; no hcc  MRI abdo 8/6/21: The liver is normal in size.  Hepatic parenchyma demonstrates diffuse heterogeneous enhancement on the early arterial phase that normalizes on the portal venous and delayed phases.  There is a 0.8 cm arterial hyperenhancing focus within the superior left hepatic lobe, possibly a prominent vascular structure and unchanged when compared to the previous exam; enlarged spleen    Ascites: on laisx 80 mg tid (no change in amount of fluid or frequency of  taps)  SBP: yes; on cipro 250 mg every other day  HE: not on lactulose  CKD: ongoing; now meets criteria for simultaneous kidney transplant  Alcohol abuse: peth negative; now attending completed IOP  Pruritus: due to elevated bile acids; improved with atarax; ?improved with colestid-having +++bloating and harder to pass BMs    MELD-Na score: 15 at 4/18/2022  8:12 AM  MELD score: 14 at 4/18/2022  8:12 AM  Calculated from:  Serum Creatinine: 1.9 mg/dL at 4/18/2022  8:12 AM  Serum Sodium: 136 mmol/L at 4/18/2022  8:12 AM  Total Bilirubin: 0.8 mg/dL (Using min of 1 mg/dL) at 4/18/2022  8:12 AM  INR(ratio): 1.1 at 4/18/2022  8:12 AM  Age: 51 years    Outpatient Encounter Medications as of 4/18/2022   Medication Sig Dispense Refill    allopurinoL (ZYLOPRIM) 100 MG tablet TAKE 1 TABLET(100 MG) BY MOUTH EVERY DAY 90 tablet 0    bisacodyL (DULCOLAX) 5 mg EC tablet Take 5 mg by mouth daily as needed for Constipation.      ciprofloxacin HCl (CIPRO) 250 MG tablet Take 1 tablet (250 mg total) by mouth every other day. 90 tablet 0    colestipoL (COLESTID) 1 gram Tab Take 1 tablet (1 g total) by mouth 2 (two) times daily. 180 tablet 3    ergocalciferol, vitamin D2, (VITAMIN D ORAL) Take by mouth.      folic acid (FOLVITE) 1 MG tablet Take 2 tablets (2 mg total) by mouth once daily. 60 tablet 11    furosemide (LASIX) 40 MG tablet Take 2 tablets (80 mg total) by mouth 3 (three) times daily. Take 80 mg every am and 40 mg every pm 120 tablet 11    hydrOXYzine HCL (ATARAX) 25 MG tablet Take 1 tablet (25 mg total) by mouth every 6 to 8 hours as needed for Itching. 30 tablet 2    lactulose (CHRONULAC) 10 gram/15 mL solution Take 30 mLs by mouth 2 (two) times daily.      levothyroxine (SYNTHROID) 50 MCG tablet TAKE 1 TABLET(50 MCG) BY MOUTH BEFORE BREAKFAST 90 tablet 3    linaCLOtide (LINZESS) 72 mcg Cap capsule Take 2 capsules (144 mcg total) by mouth before breakfast. 180 capsule 5    pantoprazole (PROTONIX) 40 MG tablet  Take 1 tablet (40 mg total) by mouth once daily. 30 tablet 11    thiamine (VITAMIN B-1) 100 MG tablet Take 1 tablet (100 mg total) by mouth every evening. 30 tablet 5    vitamin A 04424 UNIT capsule Take 10,000 Units by mouth every evening.       [DISCONTINUED] ciprofloxacin HCl (CIPRO) 250 MG tablet Take 1 tablet (250 mg total) by mouth once daily. (Patient taking differently: Take 250 mg by mouth every other day.) 90 tablet 0    [DISCONTINUED] furosemide (LASIX) 40 MG tablet Take 2 tablets (80 mg total) by mouth 2 (two) times a day. Take 80 mg every am and 40 mg every pm 120 tablet 11    [DISCONTINUED] lactulose (CHRONULAC) 20 gram/30 mL Soln Take 30 mLs (20 g total) by mouth 2 (two) times daily. for 100 doses 3000 mL 1     Facility-Administered Encounter Medications as of 4/18/2022   Medication Dose Route Frequency Provider Last Rate Last Admin    albumin human 25% 25 % bottle             albumin human 25% 25 % bottle             [COMPLETED] albumin human 25% bottle   Intravenous Code/Trauma/sedation Continuous Med Cee Dinh, NP   37.5 g at 04/18/22 0948    LIDOcaine HCL 10 mg/ml (1%) 10 mg/mL (1 %) injection              Review of patient's allergies indicates:  No Known Allergies  Past Medical History:   Diagnosis Date    ADD (attention deficit disorder)     Anxiety     Ascites of liver     Cirrhosis 9/16/2021    CKD (chronic kidney disease) stage 3, GFR 30-59 ml/min     Constipation     ETOH abuse     Hyperlipidemia     Hypothyroidism     Other ascites 6/14/2021    Pancreatitis, acute     Tobacco use     Vitamin D deficiency        Review of Systems   Constitutional: Negative.    HENT: Negative.    Eyes: Negative.    Respiratory: Negative.    Cardiovascular: Negative.    Gastrointestinal: Negative.    Genitourinary: Negative.    Musculoskeletal: Negative.    Skin: Negative.    Neurological: Negative.    Psychiatric/Behavioral: Negative.      There were no vitals filed for this  visit.    Physical Exam  Vitals reviewed.   Constitutional:       Appearance: She is well-developed.   HENT:      Head: Normocephalic and atraumatic.   Eyes:      General: No scleral icterus.     Conjunctiva/sclera: Conjunctivae normal.      Pupils: Pupils are equal, round, and reactive to light.   Neck:      Thyroid: No thyromegaly.   Cardiovascular:      Rate and Rhythm: Normal rate and regular rhythm.      Heart sounds: Normal heart sounds.   Pulmonary:      Effort: Pulmonary effort is normal.      Breath sounds: Normal breath sounds. No rales.   Abdominal:      General: Bowel sounds are normal. There is distension (+shifting dullness).      Palpations: Abdomen is soft. There is no mass.      Tenderness: There is no abdominal tenderness.   Musculoskeletal:         General: Normal range of motion.      Cervical back: Normal range of motion and neck supple.   Skin:     General: Skin is warm and dry.      Findings: No rash.   Neurological:      Mental Status: She is alert and oriented to person, place, and time.         MELD-Na score: 15 at 4/18/2022  8:12 AM  MELD score: 14 at 4/18/2022  8:12 AM  Calculated from:  Serum Creatinine: 1.9 mg/dL at 4/18/2022  8:12 AM  Serum Sodium: 136 mmol/L at 4/18/2022  8:12 AM  Total Bilirubin: 0.8 mg/dL (Using min of 1 mg/dL) at 4/18/2022  8:12 AM  INR(ratio): 1.1 at 4/18/2022  8:12 AM  Age: 51 years    Lab Results   Component Value Date     (H) 04/18/2022    BUN 60 (H) 04/18/2022    CREATININE 1.9 (H) 04/18/2022    CALCIUM 9.0 04/18/2022     04/18/2022    K 4.0 04/18/2022     04/18/2022    PROT 5.9 (L) 04/18/2022    CO2 18 (L) 04/18/2022    ANIONGAP 11 04/18/2022    WBC 7.93 04/18/2022    RBC 3.64 (L) 04/18/2022    HGB 12.1 04/18/2022    HCT 36.5 (L) 04/18/2022    HCT 34 (L) 12/07/2021     (H) 04/18/2022    MCH 33.2 (H) 04/18/2022    MCHC 33.2 04/18/2022     Lab Results   Component Value Date    RDW 14.3 04/18/2022     04/18/2022    MPV 10.8  04/18/2022    GRAN 5.1 04/18/2022    GRAN 63.7 04/18/2022    LYMPH 1.6 04/18/2022    LYMPH 20.7 04/18/2022    MONO 0.6 04/18/2022    MONO 7.3 04/18/2022    EOSINOPHIL 7.3 04/18/2022    BASOPHIL 0.9 04/18/2022    EOS 0.6 (H) 04/18/2022    EOS 1 04/14/2022    BASO 0.07 04/18/2022    BASO 1 03/03/2022    APTT 27.6 11/11/2021    GROUPTRH A POS 07/30/2021    CHOL 146 12/06/2021    TRIG 68 12/06/2021    HDL 32 (L) 12/06/2021    CHOLHDL 21.9 12/06/2021    TOTALCHOLEST 4.6 12/06/2021    ALBUMIN 3.3 (L) 04/18/2022    BILIDIR 0.4 (H) 11/11/2021    AST 28 04/18/2022    ALT 20 04/18/2022    ALKPHOS 125 04/18/2022    MG 2.4 02/28/2022    LABPROT 11.3 04/18/2022    INR 1.1 04/18/2022       Assessment and Plan:    Malu Montes is a 51 y.o. female with decompensated cirrhosis secondary to alcohol., now listed for L-K transplant. Patient willing to consider liver tx alone with aim to be prioritized for kidney tx in the first year post liver tx. Current recomemndations:  1. Ascites complicated by SBP; continue cipro prophylaxis (every other day) and prn paracentesis as needed; continue lasix to 80 tid; TIPS scheduled 5/6/22  2. Portal htn: EV s/p banding; no banding 12/21-repeat in 3 months 03/22   3. Decompensated alcoholic liver disease: labs every week; will consider liver tx alone  4. +MARIBEL: consider liver biopsy if liver enzymes increase  5. Colorectal Ca screening: repeat colonoscopy in 10 years  6. Alcohol abuse, in remission: now s/p IOP; continue AA  7. Frailty: at risk for worsening since getting paracenteses every 10 days; continue whey protein supplementation; stay active;   8. CKD: GFR now <30. possible IgA nephropathy; kidney bx deferred; hold Na bicarb; will discuss with nephrology if can increase lasix  9. Elevated Kappa/Lambda FLC ratio: due to CKD; no further w/u  Needed  10. Anemia: iron infusions per hematology  11. Recent episode of confusion- not clear if was HE, but will restart lactulose  12. Pruritus  with elevated bile acids: continue prn atarax; d/c colestid since she is having side effects she attributes to colestid    Return 2 weeks after TIPS; will need weekly labs after TIPS

## 2022-04-18 NOTE — PLAN OF CARE
Paracentesis completed. Removed 5700ml. Albumin 37.5g given. Patient AAOx3, no distress noted, respirations even and unlabored.   Vitals:    04/18/22 0834   BP: (!) 98/55   Pulse: 82   Resp: 18   Temp: 97.9 °F (36.6 °C)

## 2022-04-18 NOTE — PROCEDURES
Radiology History & Physical      SUBJECTIVE:     Chief Complaint: abdominal distention    History of Present Illness:  Malu Montes is a 51 y.o. female who presents for ultrasound guided paracentesis  Past Medical History:   Diagnosis Date    ADD (attention deficit disorder)     Anxiety     Ascites of liver     Cirrhosis 2021    CKD (chronic kidney disease) stage 3, GFR 30-59 ml/min     Constipation     ETOH abuse     Hyperlipidemia     Hypothyroidism     Other ascites 2021    Pancreatitis, acute     Tobacco use     Vitamin D deficiency      Past Surgical History:   Procedure Laterality Date    AUGMENTATION OF BREAST      breast augmentation       SECTION      COLONOSCOPY N/A 2021    Procedure: COLONOSCOPY;  Surgeon: Dao Gray MD;  Location: [facility] (2ND FLR);  Service: Endoscopy;  Laterality: N/A;  COVID test 21 General surgery clinic [facility]    CYSTOSCOPY N/A 9/10/2021    Procedure: CYSTOSCOPY;  Surgeon: Rj Sánchez Jr., MD;  [facility]  Service: Urology;  Laterality: N/A;    ESOPHAGOGASTRODUODENOSCOPY N/A 2021    Procedure: ESOPHAGOGASTRODUODENOSCOPY (EGD);  Surgeon: Dao Gray MD;  Location: [facility] (2ND FLR);  Service: Endoscopy;  Laterality: N/A;  labs current on     ESOPHAGOGASTRODUODENOSCOPY N/A 10/26/2021    Procedure: ESOPHAGOGASTRODUODENOSCOPY (EGD);  Surgeon: Dao Gray MD;  Location: [facility] (2ND FLR);  Service: Endoscopy;  Laterality: N/A;  to be done the week of 10/18/21 per Dr. Gray-BB  covid-10/23/21-[facility]-   10/25 arrival time confirmed with pt-rb    ESOPHAGOGASTRODUODENOSCOPY Left 2021    Procedure: EGD (ESOPHAGOGASTRODUODENOSCOPY);  Surgeon: Koffi Monteiro MD;  Location: [facility] (4TH FLR);  Service: Endoscopy;  Laterality: Left;  Rapid  pt requested AM appt and first available in December-  labs morning of procedure-BB   lvm to confirm appt-rb    HEMORRHOID SURGERY       PERITONEOCENTESIS Right 6/8/2021    Procedure: PARACENTESIS, ABDOMINAL;  Surgeon: Jay Torre MD;  Location: [facility] CATH LAB;  Service: Radiology;  Laterality: Right;    PERITONEOCENTESIS Right 6/25/2021    Procedure: PARACENTESIS, ABDOMINAL;  Surgeon: Jay Torre MD;  Location: [facility] CATH LAB;  Service: Radiology;  Laterality: Right;    PERITONEOCENTESIS Right 7/12/2021    Procedure: PARACENTESIS, ABDOMINAL;  Surgeon: Jay Torre MD;  Location: [facility] CATH LAB;  Service: Radiology;  Laterality: Right;    PERITONEOCENTESIS Right 7/23/2021    Procedure: PARACENTESIS, ABDOMINAL;  Surgeon: Edward De La Torre II, MD;  Location: [facility] CATH LAB;  Service: Interventional Radiology;  Laterality: Right;    PERITONEOCENTESIS Right 8/3/2021    Procedure: PARACENTESIS, ABDOMINAL;  Surgeon: Franco Cheung MD;  Location: [facility] CATH LAB;  Service: Radiology;  Laterality: Right;    PERITONEOCENTESIS Right 8/16/2021    Procedure: PARACENTESIS, ABDOMINAL;  Surgeon: Jay Torre MD;  Location: [facility] CATH LAB;  Service: Radiology;  Laterality: Right;    PERITONEOCENTESIS Right 8/23/2021    Procedure: PARACENTESIS, ABDOMINAL;  Surgeon: Jay Torre MD;  Location: [facility] CATH LAB;  Service: Radiology;  Laterality: Right;    PERITONEOCENTESIS Right 9/16/2021    Procedure: PARACENTESIS, ABDOMINAL;  Surgeon: Jay Torre MD;  Location: [facility] CATH LAB;  Service: Radiology;  Laterality: Right;    PERITONEOCENTESIS N/A 9/24/2021    Procedure: PARACENTESIS, ABDOMINAL;  Surgeon: Jay Torre MD;  Location: [facility] CATH LAB;  Service: Radiology;  Laterality: N/A;    PERITONEOCENTESIS Right 12/23/2021    Procedure: PARACENTESIS, ABDOMINAL;  Surgeon: Jay Torre MD;  Location: [facility] CATH LAB;  Service: Radiology;  Laterality: Right;    PERITONEOCENTESIS N/A 12/30/2021    Procedure: PARACENTESIS, ABDOMINAL;  Surgeon: Salas Cabrera MD;  Location: [facility] CATH LAB;  Service: Radiology;   Laterality: N/A;    RETROGRADE PYELOGRAPHY Bilateral 9/10/2021    Procedure: PYELOGRAM, RETROGRADE;  Surgeon: Rj Sánchez Jr., MD;  [facility] [address] [place] [address];  Service: Urology;  Laterality: Bilateral;    TONSILLECTOMY         Home Meds:   Prior to Admission medications    Medication Sig Start Date End Date Taking? Authorizing Provider   allopurinoL (ZYLOPRIM) 100 MG tablet TAKE 1 TABLET(100 MG) BY MOUTH EVERY DAY 2/23/22   Rashel Cohn MD   bisacodyL (DULCOLAX) 5 mg EC tablet Take 5 mg by mouth daily as needed for Constipation.    Historical Provider   ciprofloxacin HCl (CIPRO) 250 MG tablet Take 1 tablet (250 mg total) by mouth once daily.  Patient taking differently: Take 250 mg by mouth every other day. 12/23/21   Rashel Cohn MD   colestipoL (COLESTID) 1 gram Tab Take 1 tablet (1 g total) by mouth 2 (two) times daily. 4/1/22 4/1/23  Sharmila Pacheco MD   ergocalciferol, vitamin D2, (VITAMIN D ORAL) Take by mouth.    Historical Provider   folic acid (FOLVITE) 1 MG tablet Take 2 tablets (2 mg total) by mouth once daily. 12/14/21 3/14/22  Uma Perry MD   furosemide (LASIX) 40 MG tablet Take 2 tablets (80 mg total) by mouth 2 (two) times a day. Take 80 mg every am and 40 mg every pm 4/1/22   Sharmila Pacheco MD   hydrOXYzine HCL (ATARAX) 25 MG tablet Take 1 tablet (25 mg total) by mouth every 6 to 8 hours as needed for Itching. 4/1/22   Sharmila Pacheco MD   lactulose (CHRONULAC) 10 gram/15 mL solution Take 30 mLs by mouth 2 (two) times daily. 3/4/22   Historical Provider   lactulose (CHRONULAC) 20 gram/30 mL Soln Take 30 mLs (20 g total) by mouth 2 (two) times daily. for 100 doses 3/4/22 4/23/22  Sharmila Pacheco MD   levothyroxine (SYNTHROID) 50 MCG tablet TAKE 1 TABLET(50 MCG) BY MOUTH BEFORE BREAKFAST 9/25/21   Killian Dodd DO   linaCLOtide (LINZESS) 72 mcg Cap capsule Take 2 capsules (144 mcg total) by mouth before breakfast. 11/26/21   Sharmila Pacheco MD   pantoprazole  (PROTONIX) 40 MG tablet Take 1 tablet (40 mg total) by mouth once daily. 2/15/22 2/15/23  Sharmila Pacheco MD   thiamine (VITAMIN B-1) 100 MG tablet Take 1 tablet (100 mg total) by mouth every evening. 10/11/21   Sharmila Pcaheco MD   vitamin A 94949 UNIT capsule Take 10,000 Units by mouth every evening.     Historical Provider     Anticoagulants/Antiplatelets: no anticoagulation    Allergies: Review of patient's allergies indicates:  No Known Allergies  Sedation History:  no adverse reactions    Review of Systems:   Hematological: no known coagulopathies  Respiratory: no shortness of breath  Cardiovascular: no chest pain  Gastrointestinal: no abdominal pain  Genito-Urinary: no dysuria  Musculoskeletal: negative  Neurological: no TIA or stroke symptoms         OBJECTIVE:     Vital Signs (Most Recent)       Physical Exam:  ASA: 2  Mallampati: n/a    General: no acute distress  Mental Status: alert and oriented to person, place and time  HEENT: normocephalic, atraumatic  Chest: unlabored breathing  Heart: regular heart rate  Abdomen: distended  Extremity: moves all extremities    ASSESSMENT/PLAN:     Sedation Plan: local  Patient will undergo ultrasound guided paracentesis.    RENATA Marie, FNP  Interventional Radiology  [phone] clinic

## 2022-04-18 NOTE — PROCEDURES
Radiology Post-Procedure Note    Pre Op Diagnosis: Ascites  Post Op Diagnosis: Same    Procedure: Ultrasound Guided Paracentesis    Procedure performed by: Fabrizio PEÑA, Cee     Written Informed Consent Obtained: Yes  Specimen Removed: YES clear yellow  Estimated Blood Loss: Minimal    Findings:   Successful paracentesis.  Albumin administered PRN per protocol.    Patient tolerated procedure well.    Cee Dinh, APRN, FNP  Interventional Radiology  [phone] clinic

## 2022-04-18 NOTE — PLAN OF CARE
Pt received into [facility] [place] [facility] for paracentesis/thoracentesis. Pt oriented to unit, call bell, and order of events. VS obtained. Patient AAOx3, no distress noted, respirations even and unlabored, will continue to monitor. Allergies reviewed. Waiting for eval and consent.

## 2022-04-21 ENCOUNTER — PATIENT MESSAGE (OUTPATIENT)
Dept: HEPATOLOGY | Facility: CLINIC | Age: 52
End: 2022-04-21
Payer: COMMERCIAL

## 2022-04-22 ENCOUNTER — PATIENT MESSAGE (OUTPATIENT)
Dept: HEPATOLOGY | Facility: CLINIC | Age: 52
End: 2022-04-22
Payer: COMMERCIAL

## 2022-04-22 ENCOUNTER — HOSPITAL ENCOUNTER (OUTPATIENT)
Dept: INTERVENTIONAL RADIOLOGY/VASCULAR | Facility: HOSPITAL | Age: 52
Discharge: HOME | End: 2022-04-22
Attending: INTERNAL MEDICINE
Payer: COMMERCIAL

## 2022-04-22 VITALS
SYSTOLIC BLOOD PRESSURE: 98 MMHG | RESPIRATION RATE: 18 BRPM | DIASTOLIC BLOOD PRESSURE: 58 MMHG | HEART RATE: 85 BPM | OXYGEN SATURATION: 100 %

## 2022-04-22 DIAGNOSIS — R18.8 OTHER ASCITES: ICD-10-CM

## 2022-04-22 LAB
APPEARANCE FLD: NORMAL
BODY FLD TYPE: NORMAL
COLOR FLD: YELLOW
EOSINOPHIL NFR FLD MANUAL: 1 %
LYMPHOCYTES NFR FLD MANUAL: 87 %
MESOTHL CELL NFR FLD MANUAL: 2 %
MONOS+MACROS NFR FLD MANUAL: 6 %
NEUTROPHILS NFR FLD MANUAL: 4 %
WBC # FLD: 39 /CU MM

## 2022-04-22 PROCEDURE — 89051 BODY FLUID CELL COUNT: CPT | Mod: TXP | Performed by: INTERNAL MEDICINE

## 2022-04-22 PROCEDURE — 49083 ABD PARACENTESIS W/IMAGING: CPT | Mod: TXP | Performed by: RADIOLOGY

## 2022-04-22 PROCEDURE — 63600175 PHARM REV CODE 636 W HCPCS: Mod: JG,TXP | Performed by: FAMILY MEDICINE

## 2022-04-22 PROCEDURE — C1729 CATH, DRAINAGE: HCPCS | Mod: TXP

## 2022-04-22 PROCEDURE — 49083 ABD PARACENTESIS W/IMAGING: CPT | Mod: TXP,,, | Performed by: FAMILY MEDICINE

## 2022-04-22 PROCEDURE — P9047 ALBUMIN (HUMAN), 25%, 50ML: HCPCS | Mod: JG,TXP | Performed by: FAMILY MEDICINE

## 2022-04-22 PROCEDURE — 49083 IR PARACENTESIS WITH IMAGING: ICD-10-PCS | Mod: TXP,,, | Performed by: FAMILY MEDICINE

## 2022-04-22 RX ORDER — ALBUMIN HUMAN 250 G/1000ML
SOLUTION INTRAVENOUS
Status: COMPLETED
Start: 2022-04-22 | End: 2022-04-22

## 2022-04-22 RX ORDER — LIDOCAINE HYDROCHLORIDE 10 MG/ML
INJECTION INFILTRATION; PERINEURAL
Status: DISPENSED
Start: 2022-04-22 | End: 2022-04-22

## 2022-04-22 RX ORDER — ALBUMIN HUMAN 250 G/1000ML
SOLUTION INTRAVENOUS
Status: COMPLETED | OUTPATIENT
Start: 2022-04-22 | End: 2022-04-22

## 2022-04-22 RX ADMIN — ALBUMIN (HUMAN) 25 G: 25 SOLUTION INTRAVENOUS at 09:04

## 2022-04-22 NOTE — H&P
Radiology History & Physical      SUBJECTIVE:     Chief Complaint: abdominal distention    History of Present Illness:  Malu Montes is a 51 y.o. female who presents for ultrasound guided paracentesis  Past Medical History:   Diagnosis Date    ADD (attention deficit disorder)     Anxiety     Ascites of liver     Cirrhosis 2021    CKD (chronic kidney disease) stage 3, GFR 30-59 ml/min     Constipation     ETOH abuse     Hyperlipidemia     Hypothyroidism     Other ascites 2021    Pancreatitis, acute     Tobacco use     Vitamin D deficiency      Past Surgical History:   Procedure Laterality Date    AUGMENTATION OF BREAST      breast augmentation       SECTION      COLONOSCOPY N/A 2021    Procedure: COLONOSCOPY;  Surgeon: Dao Gray MD;  Location: [facility] (2ND FLR);  Service: Endoscopy;  Laterality: N/A;  COVID test 21 General surgery clinic [facility]    CYSTOSCOPY N/A 9/10/2021    Procedure: CYSTOSCOPY;  Surgeon: jR Sánchez Jr., MD;  [facility];  Service: Urology;  Laterality: N/A;    ESOPHAGOGASTRODUODENOSCOPY N/A 2021    Procedure: ESOPHAGOGASTRODUODENOSCOPY (EGD);  Surgeon: Dao Gray MD;  Location: [facility] (2ND FLR);  Service: Endoscopy;  Laterality: N/A;  labs current on     ESOPHAGOGASTRODUODENOSCOPY N/A 10/26/2021    Procedure: ESOPHAGOGASTRODUODENOSCOPY (EGD);  Surgeon: Dao Gray MD;  Location: [facility] (2ND FLR);  Service: Endoscopy;  Laterality: N/A;  to be done the week of 10/18/21 per Dr. Gray-BB  cov[facility]-10/23/21-[facility]-   10/25 arrival time confirmed with pt-rb    ESOPHAGOGASTRODUODENOSCOPY Left 2021    Procedure: EGD (ESOPHAGOGASTRODUODENOSCOPY);  Surgeon: Koffi Monteiro MD;  Location: [facility] (4TH FLR);  Service: Endoscopy;  Laterality: Left;  Rapid  pt requested AM appt and first available in December-  labs morning of procedure-BB   lvm to confirm appt-rb    HEMORRHOID SURGERY       PERITONEOCENTESIS Right 6/8/2021    Procedure: PARACENTESIS, ABDOMINAL;  Surgeon: Jay Torre MD;  Location: [facility] [place] CATH LAB;  Service: Radiology;  Laterality: Right;    PERITONEOCENTESIS Right 6/25/2021    Procedure: PARACENTESIS, ABDOMINAL;  Surgeon: Jay Torre MD;  Location: [facility] [place] CATH LAB;  Service: Radiology;  Laterality: Right;    PERITONEOCENTESIS Right 7/12/2021    Procedure: PARACENTESIS, ABDOMINAL;  Surgeon: Jay Torre MD;  Location: [facility] [place] CATH LAB;  Service: Radiology;  Laterality: Right;    PERITONEOCENTESIS Right 7/23/2021    Procedure: PARACENTESIS, ABDOMINAL;  Surgeon: Edward De La Torre II, MD;  Location: [facility] [place] CATH LAB;  Service: Interventional Radiology;  Laterality: Right;    PERITONEOCENTESIS Right 8/3/2021    Procedure: PARACENTESIS, ABDOMINAL;  Surgeon: Franco Cheung MD;  Location: [facility] [place] CATH LAB;  Service: Radiology;  Laterality: Right;    PERITONEOCENTESIS Right 8/16/2021    Procedure: PARACENTESIS, ABDOMINAL;  Surgeon: Jay Torre MD;  Location: [facility] [place] CATH LAB;  Service: Radiology;  Laterality: Right;    PERITONEOCENTESIS Right 8/23/2021    Procedure: PARACENTESIS, ABDOMINAL;  Surgeon: Jay Torre MD;  Location: [facility] [place] CATH LAB;  Service: Radiology;  Laterality: Right;    PERITONEOCENTESIS Right 9/16/2021    Procedure: PARACENTESIS, ABDOMINAL;  Surgeon: Jay Torre MD;  Location: [facility] [place] CATH LAB;  Service: Radiology;  Laterality: Right;    PERITONEOCENTESIS N/A 9/24/2021    Procedure: PARACENTESIS, ABDOMINAL;  Surgeon: Jay Torre MD;  Location: [facility] [place] CATH LAB;  Service: Radiology;  Laterality: N/A;    PERITONEOCENTESIS Right 12/23/2021    Procedure: PARACENTESIS, ABDOMINAL;  Surgeon: Jay Torre MD;  Location: [facility] [place] CATH LAB;  Service: Radiology;  Laterality: Right;    PERITONEOCENTESIS N/A 12/30/2021    Procedure: PARACENTESIS, ABDOMINAL;  Surgeon: Salas Cabrera MD;  Location: [facility] [place] CATH LAB;  Service: Radiology;   Laterality: N/A;    RETROGRADE PYELOGRAPHY Bilateral 9/10/2021    Procedure: PYELOGRAM, RETROGRADE;  Surgeon: Rj Sánchez Jr., MD;  Location: [facility] OR [address] [place] [address];  Service: Urology;  Laterality: Bilateral;    TONSILLECTOMY         Home Meds:   Prior to Admission medications    Medication Sig Start Date End Date Taking? Authorizing Provider   allopurinoL (ZYLOPRIM) 100 MG tablet TAKE 1 TABLET(100 MG) BY MOUTH EVERY DAY 2/23/22   Rashel Cohn MD   bisacodyL (DULCOLAX) 5 mg EC tablet Take 5 mg by mouth daily as needed for Constipation.    Historical Provider   ciprofloxacin HCl (CIPRO) 250 MG tablet Take 1 tablet (250 mg total) by mouth every other day. 4/18/22   Rashel Cohn MD   ergocalciferol, vitamin D2, (VITAMIN D ORAL) Take by mouth.    Historical Provider   folic acid (FOLVITE) 1 MG tablet Take 2 tablets (2 mg total) by mouth once daily. 12/14/21 4/18/22  Uma Perry MD   furosemide (LASIX) 40 MG tablet Take 2 tablets (80 mg total) by mouth 3 (three) times daily. Take 80 mg every am and 40 mg every pm 4/18/22   Rashel Cohn MD   hydrOXYzine HCL (ATARAX) 25 MG tablet Take 1 tablet (25 mg total) by mouth every 6 to 8 hours as needed for Itching. 4/1/22   Sharmila Pacheco MD   lactulose (CHRONULAC) 10 gram/15 mL solution Take 30 mLs by mouth 2 (two) times daily. 3/4/22   Historical Provider   levothyroxine (SYNTHROID) 50 MCG tablet TAKE 1 TABLET(50 MCG) BY MOUTH BEFORE BREAKFAST 9/25/21   Killian Dodd DO   linaCLOtide (LINZESS) 72 mcg Cap capsule Take 2 capsules (144 mcg total) by mouth before breakfast. 11/26/21   Sharmila Pacheco MD   pantoprazole (PROTONIX) 40 MG tablet Take 1 tablet (40 mg total) by mouth once daily. 2/15/22 2/15/23  Sharmila Pacheco MD   thiamine (VITAMIN B-1) 100 MG tablet Take 1 tablet (100 mg total) by mouth every evening. 10/11/21   Sharmila Pacheco MD   vitamin A 72216 UNIT capsule Take 10,000 Units by mouth every evening.     Historical Provider      Anticoagulants/Antiplatelets: no anticoagulation    Allergies: Review of patient's allergies indicates:  No Known Allergies  Sedation History:  no adverse reactions    Review of Systems:   Hematological: no known coagulopathies  Respiratory: no shortness of breath  Cardiovascular: no chest pain  Gastrointestinal: no abdominal pain  Genito-Urinary: no dysuria  Musculoskeletal: negative  Neurological: no TIA or stroke symptoms         OBJECTIVE:     Vital Signs (Most Recent)  Pulse: 70 (04/22/22 0833)  Resp: 18 (04/22/22 0833)  BP: 109/64 (04/22/22 0833)  SpO2: 100 % (04/22/22 0833)    Physical Exam:  ASA: 2  Mallampati: n/a    General: no acute distress  Mental Status: alert and oriented to person, place and time  HEENT: normocephalic, atraumatic  Chest: unlabored breathing  Heart: regular heart rate  Abdomen: distended  Extremity: moves all extremities    ASSESSMENT/PLAN:     Sedation Plan: local   Patient will undergo ultrasound guided paracentesis.    RENATA Marie, FNP  Interventional Radiology  [phone] [facility]

## 2022-04-22 NOTE — PLAN OF CARE
Paracentesis done. Removed 6000 ml. Albumin 50g administered. Patient awake and alert, no distress noted, respirations even and unlabored.   Vitals:    04/22/22 0950   BP: (!) 98/58   Pulse: 85   Resp: 18

## 2022-04-22 NOTE — PLAN OF CARE
Patient awake and alert, no distress noted, respirations even and unlabored. Allergies reviewed. Waiting for eval and consent.  Vitals:    04/22/22 0833   BP: 109/64   Pulse: 70   Resp: 18

## 2022-04-22 NOTE — PROCEDURES
Radiology Post-Procedure Note    Pre Op Diagnosis: Ascites  Post Op Diagnosis: Same    Procedure: Ultrasound Guided Paracentesis    Procedure performed by: Fabrizio PEÑA, Cee     Written Informed Consent Obtained: Yes  Specimen Removed: YES clear yellow  Estimated Blood Loss: Minimal    Findings:   Successful paracentesis.  Albumin administered PRN per protocol.    Patient tolerated procedure well.    Cee Dinh, APRN, FNP  Interventional Radiology  [phone] clinic

## 2022-04-24 ENCOUNTER — PATIENT MESSAGE (OUTPATIENT)
Dept: NEPHROLOGY | Facility: CLINIC | Age: 52
End: 2022-04-24
Payer: COMMERCIAL

## 2022-04-24 ENCOUNTER — PATIENT MESSAGE (OUTPATIENT)
Dept: TRANSPLANT | Facility: CLINIC | Age: 52
End: 2022-04-24
Payer: COMMERCIAL

## 2022-04-24 ENCOUNTER — PATIENT MESSAGE (OUTPATIENT)
Dept: HEPATOLOGY | Facility: CLINIC | Age: 52
End: 2022-04-24
Payer: COMMERCIAL

## 2022-04-24 PROBLEM — E79.0 HYPERURICEMIA: Status: ACTIVE | Noted: 2022-04-24

## 2022-04-24 PROBLEM — N25.81 SECONDARY HYPERPARATHYROIDISM OF RENAL ORIGIN: Status: ACTIVE | Noted: 2022-04-24

## 2022-04-24 NOTE — PROGRESS NOTES
REASON FOR CONSULT/CHIEF COMPLAIN: Acute kidney injury    REFERRING PHYSICIAN: Killian Dodd DO    HISTORY OF PRESENT ILLNESS: 51 y.o. female  has a past medical history of ADD (attention deficit disorder), Anxiety, Ascites of liver, Cirrhosis (9/16/2021), CKD (chronic kidney disease) stage 3, GFR 30-59 ml/min, Constipation, ETOH abuse, Hyperlipidemia, Hypothyroidism, Other ascites (6/14/2021), Pancreatitis, acute, Tobacco use, and Vitamin D deficiency. patient was referred here for abnormal renal function.   No chronic NSAID use, no known exposure to lithium, lead. No family history of Lupus, kidney disease or deafness. No ingestion of licorice. One kidney stone at age 25 after her first baby, needed lithotripsy. No smoking.   Had an episode of pancreatitis in Jan 2021. Cirrhosis diagnosed June of 2021, blamed on alcohol. Has been needing paracentesis since then, now needing it every 5 days. She reports that the paracentesis is the only thing that has been needed since her diagnosis of cirrhosis. Has not needed admission to the hospital for any other reason. Evaluated and approved for liver and kidney transplant.   Denied any new issues with urination including dysuria, hematuria, urgency, hesitancy, nocturia, incomplete voiding. Denied chest pain, nausea, vomiting, abd pain, nausea, vomiting, diarrhea, shortness of breath, pedal edema, Orthopnea, PND    ROS:  General: negative for chills, or fatigue  Respiratory: No cough, shortness of breath, or wheezing  Cardiovascular: No chest pain or dyspnea   Gastrointestinal: No abdominal pain, change in bowel habits  Neurological: No new focal weakness, no numbness  Dermatological: No rash or ulcers.    PAST MEDICAL HISTORY:  Past Medical History:   Diagnosis Date    ADD (attention deficit disorder)     Anxiety     Ascites of liver     Cirrhosis 9/16/2021    CKD (chronic kidney disease) stage 3, GFR 30-59 ml/min     Constipation     ETOH abuse      Hyperlipidemia     Hypothyroidism     Other ascites 2021    Pancreatitis, acute     Tobacco use     Vitamin D deficiency        PAST SURGICAL HISTORY:  Past Surgical History:   Procedure Laterality Date    AUGMENTATION OF BREAST      breast augmentation       SECTION      COLONOSCOPY N/A 2021    Procedure: COLONOSCOPY;  Surgeon: Dao Gray MD;  Location: [facility] (2ND FLR);  Service: Endoscopy;  Laterality: N/A;  COVID test 21 General surgery clinic [facility]    CYSTOSCOPY N/A 9/10/2021    Procedure: CYSTOSCOPY;  Surgeon: Rj Sánchez Jr., MD;  Location: [facility] OR 1ST FLR;  Service: Urology;  Laterality: N/A;    ESOPHAGOGASTRODUODENOSCOPY N/A 2021    Procedure: ESOPHAGOGASTRODUODENOSCOPY (EGD);  Surgeon: Dao Gray MD;  Location: [facility] (2ND FLR);  Service: Endoscopy;  Laterality: N/A;  labs current on     ESOPHAGOGASTRODUODENOSCOPY N/A 10/26/2021    Procedure: ESOPHAGOGASTRODUODENOSCOPY (EGD);  Surgeon: Dao Gray MD;  Location: [facility] (2ND FLR);  Service: Endoscopy;  Laterality: N/A;  to be done the week of 10/18/21 per Dr. Gray-[facility]-   10/25 arrival time confirmed with pt-rb    ESOPHAGOGASTRODUODENOSCOPY Left 2021    Procedure: EGD (ESOPHAGOGASTRODUODENOSCOPY);  Surgeon: Koffi Monteiro MD;  Location: [facility] (4TH FLR);  Service: Endoscopy;  Laterality: Left;  Rapid  pt requested AM appt and first available in December-  labs morning of procedure-   lvm to confirm appt-rb    HEMORRHOID SURGERY      PERITONEOCENTESIS Right 2021    Procedure: PARACENTESIS, ABDOMINAL;  Surgeon: Jay Torre MD;  Location: [facility] CATH LAB;  Service: Radiology;  Laterality: Right;    PERITONEOCENTESIS Right 2021    Procedure: PARACENTESIS, ABDOMINAL;  Surgeon: Jay Torre MD;  Location: [facility] CATH LAB;  Service: Radiology;  Laterality: Right;    PERITONEOCENTESIS Right 2021    Procedure:  PARACENTESIS, ABDOMINAL;  Surgeon: Jay Torre MD;  Location: [facility] CATH LAB;  Service: Radiology;  Laterality: Right;    PERITONEOCENTESIS Right 7/23/2021    Procedure: PARACENTESIS, ABDOMINAL;  Surgeon: Edward De La Torre II, MD;  Location: [facility] CATH LAB;  Service: Interventional Radiology;  Laterality: Right;    PERITONEOCENTESIS Right 8/3/2021    Procedure: PARACENTESIS, ABDOMINAL;  Surgeon: Franco Cheung MD;  Location: [facility] CATH LAB;  Service: Radiology;  Laterality: Right;    PERITONEOCENTESIS Right 8/16/2021    Procedure: PARACENTESIS, ABDOMINAL;  Surgeon: Jay Torre MD;  Location: [facility] CATH LAB;  Service: Radiology;  Laterality: Right;    PERITONEOCENTESIS Right 8/23/2021    Procedure: PARACENTESIS, ABDOMINAL;  Surgeon: Jay Torre MD;  Location: [facility] CATH LAB;  Service: Radiology;  Laterality: Right;    PERITONEOCENTESIS Right 9/16/2021    Procedure: PARACENTESIS, ABDOMINAL;  Surgeon: Jay Torre MD;  Location: [facility] CATH LAB;  Service: Radiology;  Laterality: Right;    PERITONEOCENTESIS N/A 9/24/2021    Procedure: PARACENTESIS, ABDOMINAL;  Surgeon: Jay Torre MD;  Location: [facility] CATH LAB;  Service: Radiology;  Laterality: N/A;    PERITONEOCENTESIS Right 12/23/2021    Procedure: PARACENTESIS, ABDOMINAL;  Surgeon: Jay Torre MD;  Location: [facility] CATH LAB;  Service: Radiology;  Laterality: Right;    PERITONEOCENTESIS N/A 12/30/2021    Procedure: PARACENTESIS, ABDOMINAL;  Surgeon: Salas Cabrera MD;  Location: [facility] CATH LAB;  Service: Radiology;  Laterality: N/A;    RETROGRADE PYELOGRAPHY Bilateral 9/10/2021    Procedure: PYELOGRAM, RETROGRADE;  Surgeon: Rj Sánchez Jr., MD;  Location: [address];  Service: Urology;  Laterality: Bilateral;    TONSILLECTOMY         FAMILY HISTORY:   Family History   Problem Relation Age of Onset    Cancer Mother         Uterine Cancer     No Known Problems Father     No Known Problems Sister     Sleep  apnea Daughter     ADD / ADHD Daughter     Heart disease Maternal Grandmother         CHF    COPD Maternal Grandfather     Heart disease Paternal Grandmother         CHF    Colon cancer Neg Hx     Inflammatory bowel disease Neg Hx     Stomach cancer Neg Hx     Breast cancer Neg Hx     Ovarian cancer Neg Hx     Learning disabilities Neg Hx        SOCIAL HISTORY:  Social History     Socioeconomic History    Marital status:     Number of children: 1   Occupational History    Occupation: Assistant   Tobacco Use    Smoking status: Current Every Day Smoker     Years: 27.00     Types: Cigarettes    Smokeless tobacco: Never Used    Tobacco comment: two cigarettes a day    Substance and Sexual Activity    Alcohol use: Not Currently     Comment: quit caridad 15    Drug use: No    Sexual activity: Yes     Partners: Male   Social History Narrative    They live in [place], [place]      Social Determinants of Health     Financial Resource Strain: Low Risk     Difficulty of Paying Living Expenses: Not hard at all   Food Insecurity: No Food Insecurity    Worried About Running Out of Food in the Last Year: Never true    Ran Out of Food in the Last Year: Never true   Transportation Needs: No Transportation Needs    Lack of Transportation (Medical): No    Lack of Transportation (Non-Medical): No   Physical Activity: Unknown    Days of Exercise per Week: 1 day   Stress: No Stress Concern Present    Feeling of Stress : Only a little   Social Connections: Unknown    Frequency of Communication with Friends and Family: More than three times a week    Frequency of Social Gatherings with Friends and Family: Twice a week    Active Member of Clubs or Organizations: No    Attends Club or Organization Meetings: Never    Marital Status:    Housing Stability: Low Risk     Unable to Pay for Housing in the Last Year: No    Number of Places Lived in the Last Year: 1    Unstable Housing in the Last Year: No        ALLERGIES:  Review of patient's allergies indicates:  No Known Allergies    MEDICATIONS:    Current Outpatient Medications:     allopurinoL (ZYLOPRIM) 100 MG tablet, TAKE 1 TABLET(100 MG) BY MOUTH EVERY DAY, Disp: 90 tablet, Rfl: 0    bisacodyL (DULCOLAX) 5 mg EC tablet, Take 5 mg by mouth daily as needed for Constipation., Disp: , Rfl:     ergocalciferol, vitamin D2, (VITAMIN D ORAL), Take by mouth., Disp: , Rfl:     folic acid (FOLVITE) 1 MG tablet, Take 2 tablets (2 mg total) by mouth once daily., Disp: 60 tablet, Rfl: 11    hydrOXYzine HCL (ATARAX) 25 MG tablet, Take 1 tablet (25 mg total) by mouth every 6 to 8 hours as needed for Itching., Disp: 30 tablet, Rfl: 2    lactulose (CHRONULAC) 10 gram/15 mL solution, Take 30 mLs by mouth 2 (two) times daily., Disp: , Rfl:     levothyroxine (SYNTHROID) 50 MCG tablet, TAKE 1 TABLET(50 MCG) BY MOUTH BEFORE BREAKFAST, Disp: 90 tablet, Rfl: 3    linaCLOtide (LINZESS) 72 mcg Cap capsule, Take 2 capsules (144 mcg total) by mouth before breakfast., Disp: 180 capsule, Rfl: 5    pantoprazole (PROTONIX) 40 MG tablet, Take 1 tablet (40 mg total) by mouth once daily., Disp: 30 tablet, Rfl: 11    thiamine (VITAMIN B-1) 100 MG tablet, Take 1 tablet (100 mg total) by mouth every evening., Disp: 30 tablet, Rfl: 5    vitamin A 24704 UNIT capsule, Take 10,000 Units by mouth every evening. , Disp: , Rfl:     ciprofloxacin HCl (CIPRO) 250 MG tablet, Take 1 tablet (250 mg total) by mouth every other day., Disp: 90 tablet, Rfl: 0    furosemide (LASIX) 40 MG tablet, Take 2 tablets (80 mg total) by mouth 3 (three) times daily. Take 80 mg every am and 40 mg every pm, Disp: 120 tablet, Rfl: 11   Medication List with Changes/Refills   Current Medications    ALLOPURINOL (ZYLOPRIM) 100 MG TABLET    TAKE 1 TABLET(100 MG) BY MOUTH EVERY DAY    BISACODYL (DULCOLAX) 5 MG EC TABLET    Take 5 mg by mouth daily as needed for Constipation.    ERGOCALCIFEROL, VITAMIN D2, (VITAMIN D  "ORAL)    Take by mouth.    FOLIC ACID (FOLVITE) 1 MG TABLET    Take 2 tablets (2 mg total) by mouth once daily.    HYDROXYZINE HCL (ATARAX) 25 MG TABLET    Take 1 tablet (25 mg total) by mouth every 6 to 8 hours as needed for Itching.    LACTULOSE (CHRONULAC) 10 GRAM/15 ML SOLUTION    Take 30 mLs by mouth 2 (two) times daily.    LEVOTHYROXINE (SYNTHROID) 50 MCG TABLET    TAKE 1 TABLET(50 MCG) BY MOUTH BEFORE BREAKFAST    LINACLOTIDE (LINZESS) 72 MCG CAP CAPSULE    Take 2 capsules (144 mcg total) by mouth before breakfast.    PANTOPRAZOLE (PROTONIX) 40 MG TABLET    Take 1 tablet (40 mg total) by mouth once daily.    THIAMINE (VITAMIN B-1) 100 MG TABLET    Take 1 tablet (100 mg total) by mouth every evening.    VITAMIN A 09124 UNIT CAPSULE    Take 10,000 Units by mouth every evening.    Changed and/or Refilled Medications    Modified Medication Previous Medication    CIPROFLOXACIN HCL (CIPRO) 250 MG TABLET ciprofloxacin HCl (CIPRO) 250 MG tablet       Take 1 tablet (250 mg total) by mouth every other day.    Take 1 tablet (250 mg total) by mouth once daily.    FUROSEMIDE (LASIX) 40 MG TABLET furosemide (LASIX) 40 MG tablet       Take 2 tablets (80 mg total) by mouth 3 (three) times daily. Take 80 mg every am and 40 mg every pm    Take 2 tablets (80 mg total) by mouth 2 (two) times a day. Take 80 mg every am and 40 mg every pm   Discontinued Medications    LACTULOSE (CHRONULAC) 20 GRAM/30 ML SOLN    Take 30 mLs (20 g total) by mouth 2 (two) times daily. for 100 doses        PHYSICAL EXAM:  BP 98/62   Pulse 94   Ht 5' 3" (1.6 m)   Wt 56.1 kg (123 lb 10.9 oz)   SpO2 100%   BMI 21.91 kg/m²     General: No distress, No fever or chills  Neck: No adenopathy,no carotid bruit,no JVD  Lungs:Clear to auscultation bilaterally, No Crackles  Heart: Regular rate and rhythm, no murmur, gallops or rubs  Abdomen: Ascites.  Extremities: Atraumatic, no edema in LE  Skin: Turgor normal. No rashes  Neurologic: No focal weakness, " oriented.  Dialysis Access: Non applicable        LABS:  Lab Results   Component Value Date    HGB 12.1 04/18/2022        Lab Results   Component Value Date    CREATININE 1.9 (H) 04/18/2022       Prot/Creat Ratio, Urine   Date Value Ref Range Status   11/11/2021 0.06 0.00 - 0.20 Final       Lab Results   Component Value Date     04/18/2022    K 4.0 04/18/2022    CO2 18 (L) 04/18/2022       last PTH   Lab Results   Component Value Date    PTH 78.6 (H) 10/01/2021    CALCIUM 9.0 04/18/2022    PHOS 4.8 (H) 12/12/2021       Lab Results   Component Value Date    HGBA1C 4.6 07/15/2021       Lab Results   Component Value Date    LDLCALC 100.4 12/06/2021         Creatinine, Urine   Date Value Ref Range Status   11/11/2021 188.0 15.0 - 325.0 mg/dL Final   07/29/2021 253.0 15.0 - 325.0 mg/dL Final   07/15/2021 147.0 15.0 - 325.0 mg/dL Final       Protein, Urine   Date Value Ref Range Status   10/18/2021 11 0 - 15 mg/dL Final   07/30/2021 28 (H) 0 - 15 mg/dL Final     Protein, Urine Random   Date Value Ref Range Status   11/11/2021 12 0 - 15 mg/dL Final       Prot/Creat Ratio, Urine   Date Value Ref Range Status   11/11/2021 0.06 0.00 - 0.20 Final         ASSESSMENT:    1) Chronic Kidney disease stage 4  2) Metabolic acidosis  3) Anemia due to iron deficiency  4) Vitamin D deficiency  5) Cirrhosis of liver with ascites  6) Secondary hyperparathyroidism   Renal ultrasound showed 9.6 and 11.3 cm kidneys (no known issues from child rosa that would explain the reason for her asymmetric kidney sizes). Patients baseline creatinine is less than 1 till June 2021, since then it has been fluctuating between 1.4-1.8, however her cystatin C is 2.5 and her eGFR is 23. Urine analysis is showing some hematuria without proteinuria. She has had cystoscopy done in the past with no significant detected abnormalities.Serological work up including ANCA, GBM, MARIBEL, Cryoglobulin are all negative. Urine microscopy showed dysmorphic RBC and so  likely etiology for her CKD is HRS and IgA nephropathy.   She was tried on lasix and spironolactone before but were discontinued due to issues with hypokalemia. Tried and tolerating lasix alone for now.   Electrolytes, in acceptable range. s/p IV Iron infusion for iron def anemia. On start sodium bicarbonate for her mild acidosis. Continue vitamin D supplementation.    - Fluid restriction to 1200 ml a day  - now on Lasix 80 mg three times a day  - Avoid NSAIDs intake  - Continue Sodium bicarbonate 650 mg BID  - Continue Vitamin D    RTC in 2 months    Diagnosis and plan of care explained to the patient.  Pt Verbalized understanding. Answered all questions.  Thanks for allowing me to participate in the care of this patient.     1:19 PM    Rashel Cohn MD  Nephrology & Critical Care

## 2022-04-25 ENCOUNTER — PATIENT MESSAGE (OUTPATIENT)
Dept: HEPATOLOGY | Facility: CLINIC | Age: 52
End: 2022-04-25
Payer: COMMERCIAL

## 2022-04-25 ENCOUNTER — PATIENT MESSAGE (OUTPATIENT)
Dept: TRANSPLANT | Facility: CLINIC | Age: 52
End: 2022-04-25
Payer: COMMERCIAL

## 2022-04-25 ENCOUNTER — PATIENT MESSAGE (OUTPATIENT)
Dept: NEPHROLOGY | Facility: CLINIC | Age: 52
End: 2022-04-25
Payer: COMMERCIAL

## 2022-04-25 ENCOUNTER — HOSPITAL ENCOUNTER (OUTPATIENT)
Dept: INTERVENTIONAL RADIOLOGY/VASCULAR | Facility: HOSPITAL | Age: 52
Discharge: HOME | End: 2022-04-25
Attending: INTERNAL MEDICINE
Payer: COMMERCIAL

## 2022-04-25 DIAGNOSIS — R18.8 OTHER ASCITES: ICD-10-CM

## 2022-04-25 LAB
APPEARANCE FLD: NORMAL
BODY FLD TYPE: NORMAL
COLOR FLD: YELLOW
EOSINOPHIL NFR FLD MANUAL: 2 %
LYMPHOCYTES NFR FLD MANUAL: 69 %
MESOTHL CELL NFR FLD MANUAL: 1 %
MONOS+MACROS NFR FLD MANUAL: 19 %
NEUTROPHILS NFR FLD MANUAL: 9 %
WBC # FLD: 60 /CU MM

## 2022-04-25 PROCEDURE — 63600175 PHARM REV CODE 636 W HCPCS: Mod: JG,NTX

## 2022-04-25 PROCEDURE — 89051 BODY FLUID CELL COUNT: CPT | Mod: TXP | Performed by: INTERNAL MEDICINE

## 2022-04-25 PROCEDURE — P9047 ALBUMIN (HUMAN), 25%, 50ML: HCPCS | Mod: JG,NTX

## 2022-04-25 PROCEDURE — C1729 CATH, DRAINAGE: HCPCS | Mod: NTX

## 2022-04-25 PROCEDURE — 49083 ABD PARACENTESIS W/IMAGING: CPT | Mod: TXP | Performed by: RADIOLOGY

## 2022-04-25 PROCEDURE — 49083 IR PARACENTESIS WITH IMAGING: ICD-10-PCS | Mod: TXP,,, | Performed by: FAMILY MEDICINE

## 2022-04-25 PROCEDURE — 49083 ABD PARACENTESIS W/IMAGING: CPT | Mod: TXP,,, | Performed by: FAMILY MEDICINE

## 2022-04-25 RX ORDER — ALBUMIN HUMAN 250 G/1000ML
25 SOLUTION INTRAVENOUS ONCE
Status: COMPLETED | OUTPATIENT
Start: 2022-04-25 | End: 2022-04-25

## 2022-04-25 RX ORDER — ALBUMIN HUMAN 250 G/1000ML
SOLUTION INTRAVENOUS
Status: COMPLETED
Start: 2022-04-25 | End: 2022-04-25

## 2022-04-25 RX ORDER — ALBUMIN HUMAN 250 G/1000ML
12.5 SOLUTION INTRAVENOUS ONCE
Status: COMPLETED | OUTPATIENT
Start: 2022-04-25 | End: 2022-04-25

## 2022-04-25 RX ADMIN — ALBUMIN HUMAN 25 G: 250 SOLUTION INTRAVENOUS at 09:04

## 2022-04-25 RX ADMIN — ALBUMIN HUMAN 12.5 G: 250 SOLUTION INTRAVENOUS at 09:04

## 2022-04-25 RX ADMIN — ALBUMIN (HUMAN) 12.5 G: 12.5 SOLUTION INTRAVENOUS at 09:04

## 2022-04-25 RX ADMIN — ALBUMIN HUMAN 25 G: 0.25 SOLUTION INTRAVENOUS at 09:04

## 2022-04-25 NOTE — PLAN OF CARE
Paracentesis complete, 6000 MLs removed. Specimen sent to lab. 37.5g (150mL) Albumin administered per protocol. Dressing applied to RLQ puncture site, dressing clean dry and intact. Pt denies pain and discomfort. Pt refusing transport. Pt ambulated to [facility] for transport home. Gait steady.

## 2022-04-25 NOTE — H&P
Radiology History & Physical      SUBJECTIVE:     Chief Complaint: abdominal distention    History of Present Illness:  Malu Montes is a 51 y.o. female who presents for ultrasound guided paracentesis  Past Medical History:   Diagnosis Date    ADD (attention deficit disorder)     Anxiety     Ascites of liver     Cirrhosis 2021    CKD (chronic kidney disease) stage 3, GFR 30-59 ml/min     Constipation     ETOH abuse     Hyperlipidemia     Hypothyroidism     Other ascites 2021    Pancreatitis, acute     Tobacco use     Vitamin D deficiency      Past Surgical History:   Procedure Laterality Date    AUGMENTATION OF BREAST      breast augmentation       SECTION      COLONOSCOPY N/A 2021    Procedure: COLONOSCOPY;  Surgeon: Dao Gray MD;  Location: [facility] (2ND FLR);  Service: Endoscopy;  Laterality: N/A;  COVID test 21 [facility]    CYSTOSCOPY N/A 9/10/2021    Procedure: CYSTOSCOPY;  Surgeon: Rj Sánchez Jr., MD;  [facility];  Service: Urology;  Laterality: N/A;    ESOPHAGOGASTRODUODENOSCOPY N/A 2021    Procedure: ESOPHAGOGASTRODUODENOSCOPY (EGD);  Surgeon: Dao Gray MD;  Location: [facility] (2ND FLR);  Service: Endoscopy;  Laterality: N/A;  labs current on     ESOPHAGOGASTRODUODENOSCOPY N/A 10/26/2021    Procedure: ESOPHAGOGASTRODUODENOSCOPY (EGD);  Surgeon: Dao Gray MD;  Location: [facility] (2ND FLR);  Service: Endoscopy;  Laterality: N/A;  to be done the week of 10/18/21 per Dr. Gray-BB  covid-10/23/21-[facility]-   10/25 arrival time confirmed with pt-rb    ESOPHAGOGASTRODUODENOSCOPY Left 2021    Procedure: EGD (ESOPHAGOGASTRODUODENOSCOPY);  Surgeon: Koffi Monteiro MD;  Location: [facility] (4TH FLR);  Service: Endoscopy;  Laterality: Left;  Rapid  pt requested AM appt and first available in December-  labs morning of procedure-BB   lvm to confirm appt-rb    HEMORRHOID SURGERY       PERITONEOCENTESIS Right 6/8/2021    Procedure: PARACENTESIS, ABDOMINAL;  Surgeon: Jay Torre MD;  Location: [facility] CATH LAB;  Service: Radiology;  Laterality: Right;    PERITONEOCENTESIS Right 6/25/2021    Procedure: PARACENTESIS, ABDOMINAL;  Surgeon: Jay Torre MD;  Location: [facility] [place] CATH LAB;  Service: Radiology;  Laterality: Right;    PERITONEOCENTESIS Right 7/12/2021    Procedure: PARACENTESIS, ABDOMINAL;  Surgeon: Jay Torre MD;  Location: [facility] [place] CATH LAB;  Service: Radiology;  Laterality: Right;    PERITONEOCENTESIS Right 7/23/2021    Procedure: PARACENTESIS, ABDOMINAL;  Surgeon: Edward De La Torre II, MD;  Location: [facility] [place] CATH LAB;  Service: Interventional Radiology;  Laterality: Right;    PERITONEOCENTESIS Right 8/3/2021    Procedure: PARACENTESIS, ABDOMINAL;  Surgeon: Franco Cheung MD;  Location: [facility] [place] CATH LAB;  Service: Radiology;  Laterality: Right;    PERITONEOCENTESIS Right 8/16/2021    Procedure: PARACENTESIS, ABDOMINAL;  Surgeon: Jay Torre MD;  Location: [facility] [place] CATH LAB;  Service: Radiology;  Laterality: Right;    PERITONEOCENTESIS Right 8/23/2021    Procedure: PARACENTESIS, ABDOMINAL;  Surgeon: Jay Torre MD;  Location: [facility] [place] CATH LAB;  Service: Radiology;  Laterality: Right;    PERITONEOCENTESIS Right 9/16/2021    Procedure: PARACENTESIS, ABDOMINAL;  Surgeon: Jay Torre MD;  Location: [facility] [place] CATH LAB;  Service: Radiology;  Laterality: Right;    PERITONEOCENTESIS N/A 9/24/2021    Procedure: PARACENTESIS, ABDOMINAL;  Surgeon: Jay Torre MD;  Location: [facility] [place] CATH LAB;  Service: Radiology;  Laterality: N/A;    PERITONEOCENTESIS Right 12/23/2021    Procedure: PARACENTESIS, ABDOMINAL;  Surgeon: Jay Torre MD;  Location: [facility] [place] CATH LAB;  Service: Radiology;  Laterality: Right;    PERITONEOCENTESIS N/A 12/30/2021    Procedure: PARACENTESIS, ABDOMINAL;  Surgeon: Salas Cabrera MD;  Location: [facility] [place] CATH LAB;  Service: Radiology;   Laterality: N/A;    RETROGRADE PYELOGRAPHY Bilateral 9/10/2021    Procedure: PYELOGRAM, RETROGRADE;  Surgeon: Rj Sánchez Jr., MD;  Location: [facility] OR [address] [place] [address];  Service: Urology;  Laterality: Bilateral;    TONSILLECTOMY         Home Meds:   Prior to Admission medications    Medication Sig Start Date End Date Taking? Authorizing Provider   allopurinoL (ZYLOPRIM) 100 MG tablet TAKE 1 TABLET(100 MG) BY MOUTH EVERY DAY 2/23/22   Rashel Cohn MD   bisacodyL (DULCOLAX) 5 mg EC tablet Take 5 mg by mouth daily as needed for Constipation.    Historical Provider   ciprofloxacin HCl (CIPRO) 250 MG tablet Take 1 tablet (250 mg total) by mouth every other day. 4/18/22   Rashel Cohn MD   ergocalciferol, vitamin D2, (VITAMIN D ORAL) Take by mouth.    Historical Provider   folic acid (FOLVITE) 1 MG tablet Take 2 tablets (2 mg total) by mouth once daily. 12/14/21 4/18/22  Uma Perry MD   furosemide (LASIX) 40 MG tablet Take 2 tablets (80 mg total) by mouth 3 (three) times daily. Take 80 mg every am and 40 mg every pm 4/18/22   Rashel Cohn MD   hydrOXYzine HCL (ATARAX) 25 MG tablet Take 1 tablet (25 mg total) by mouth every 6 to 8 hours as needed for Itching. 4/1/22   Sharmila Pacheco MD   lactulose (CHRONULAC) 10 gram/15 mL solution Take 30 mLs by mouth 2 (two) times daily. 3/4/22   Historical Provider   levothyroxine (SYNTHROID) 50 MCG tablet TAKE 1 TABLET(50 MCG) BY MOUTH BEFORE BREAKFAST 9/25/21   Killian Dodd DO   linaCLOtide (LINZESS) 72 mcg Cap capsule Take 2 capsules (144 mcg total) by mouth before breakfast. 11/26/21   Sharmila Pacheco MD   pantoprazole (PROTONIX) 40 MG tablet Take 1 tablet (40 mg total) by mouth once daily. 2/15/22 2/15/23  Sharmila Pacheco MD   thiamine (VITAMIN B-1) 100 MG tablet Take 1 tablet (100 mg total) by mouth every evening. 10/11/21   Sharmila Pacheco MD   vitamin A 55224 UNIT capsule Take 10,000 Units by mouth every evening.     Historical Provider      Anticoagulants/Antiplatelets: no anticoagulation    Allergies: Review of patient's allergies indicates:  No Known Allergies  Sedation History:  no adverse reactions    Review of Systems:   Hematological: no known coagulopathies  Respiratory: no shortness of breath  Cardiovascular: no chest pain  Gastrointestinal: no abdominal pain  Genito-Urinary: no dysuria  Musculoskeletal: negative  Neurological: no TIA or stroke symptoms         OBJECTIVE:     Vital Signs (Most Recent)       Physical Exam:  ASA: 2  Mallampati: n/a    General: no acute distress  Mental Status: alert and oriented to person, place and time  HEENT: normocephalic, atraumatic  Chest: unlabored breathing  Heart: regular heart rate  Abdomen: distended  Extremity: moves all extremities    ASSESSMENT/PLAN:     Sedation Plan: local  Patient will undergo ultrasound guided paracentesis.    RENATA Marie, FNP  Interventional Radiology  [phone] [facility]

## 2022-04-25 NOTE — PROCEDURES
Radiology Post-Procedure Note    Pre Op Diagnosis: Ascites  Post Op Diagnosis: Same    Procedure: Ultrasound Guided Paracentesis    Procedure performed by: Fabrizio PEÑA, Cee     Written Informed Consent Obtained: Yes  Specimen Removed: YES clear yellow  Estimated Blood Loss: Minimal    Findings:   Successful paracentesis.  Albumin administered PRN per protocol.    Patient tolerated procedure well.    Cee Dinh, APRN, FNP  Interventional Radiology  [phone] clinic

## 2022-04-26 ENCOUNTER — TELEPHONE (OUTPATIENT)
Dept: TRANSPLANT | Facility: CLINIC | Age: 52
End: 2022-04-26
Payer: COMMERCIAL

## 2022-04-26 ENCOUNTER — CONFERENCE (OUTPATIENT)
Dept: TRANSPLANT | Facility: CLINIC | Age: 52
End: 2022-04-26
Payer: COMMERCIAL

## 2022-04-26 NOTE — TELEPHONE ENCOUNTER
Patient discussed in Liver Transplant Discussion Committee.  Doing a living donor liver on this patient listed for a liver/kidney was discussed.  Plan - obtain an updated ultrasound on patient and re-discuss next week.  We will also begin to screen patient's potential donor.  Patient's coordinator , Francesca, and Nicholas , living donor coordinator messaged.

## 2022-04-26 NOTE — TELEPHONE ENCOUNTER
PATIENT NAME: Malu Styles [facility] #: [id number]    Lab Results   Component Value Date    CREATININE 2.3 (H) 04/25/2022     (L) 04/25/2022    BILITOT 0.5 04/25/2022    ALBUMIN 4.0 04/25/2022    INR 1.1 04/25/2022   MELD 17    Encephalopathy: none  Ascites: moderate  Dialysis: no     Recertification requestor: Francesca Brunson

## 2022-04-28 ENCOUNTER — PATIENT MESSAGE (OUTPATIENT)
Dept: TRANSPLANT | Facility: CLINIC | Age: 52
End: 2022-04-28
Payer: COMMERCIAL

## 2022-04-29 ENCOUNTER — CONFERENCE (OUTPATIENT)
Dept: TRANSPLANT | Facility: CLINIC | Age: 52
End: 2022-04-29
Payer: COMMERCIAL

## 2022-04-29 ENCOUNTER — CLINICAL SUPPORT (OUTPATIENT)
Dept: INFECTIOUS DISEASES | Facility: CLINIC | Age: 52
End: 2022-04-29
Payer: COMMERCIAL

## 2022-04-29 ENCOUNTER — HOSPITAL ENCOUNTER (OUTPATIENT)
Dept: INTERVENTIONAL RADIOLOGY/VASCULAR | Facility: HOSPITAL | Age: 52
Discharge: HOME | End: 2022-04-29
Attending: INTERNAL MEDICINE
Payer: COMMERCIAL

## 2022-04-29 VITALS
DIASTOLIC BLOOD PRESSURE: 68 MMHG | OXYGEN SATURATION: 100 % | RESPIRATION RATE: 16 BRPM | SYSTOLIC BLOOD PRESSURE: 106 MMHG | HEART RATE: 98 BPM

## 2022-04-29 DIAGNOSIS — R18.8 OTHER ASCITES: ICD-10-CM

## 2022-04-29 LAB
APPEARANCE FLD: NORMAL
BODY FLD TYPE: NORMAL
COLOR FLD: NORMAL
LYMPHOCYTES NFR FLD MANUAL: 45 %
MESOTHL CELL NFR FLD MANUAL: 7 %
MONOS+MACROS NFR FLD MANUAL: 43 %
NEUTROPHILS NFR FLD MANUAL: 5 %
WBC # FLD: 83 /CU MM

## 2022-04-29 PROCEDURE — 99999 PR PBB SHADOW E&M-EST. PATIENT-LVL I: ICD-10-PCS | Mod: PBBFAC,TXP,,

## 2022-04-29 PROCEDURE — 49083 IR PARACENTESIS WITH IMAGING: ICD-10-PCS | Mod: TXP,,, | Performed by: FAMILY MEDICINE

## 2022-04-29 PROCEDURE — 49083 ABD PARACENTESIS W/IMAGING: CPT | Mod: TXP,,, | Performed by: FAMILY MEDICINE

## 2022-04-29 PROCEDURE — 63600175 PHARM REV CODE 636 W HCPCS: Mod: JG,TXP

## 2022-04-29 PROCEDURE — 90636 HEPATITIS A HEPATITIS B COMBINED VACCINE IM: ICD-10-PCS | Mod: S$GLB,TXP,, | Performed by: INTERNAL MEDICINE

## 2022-04-29 PROCEDURE — 99999 PR PBB SHADOW E&M-EST. PATIENT-LVL I: CPT | Mod: PBBFAC,TXP,,

## 2022-04-29 PROCEDURE — 90471 IMMUNIZATION ADMIN: CPT | Mod: S$GLB,TXP,, | Performed by: INTERNAL MEDICINE

## 2022-04-29 PROCEDURE — P9047 ALBUMIN (HUMAN), 25%, 50ML: HCPCS | Mod: JG,TXP

## 2022-04-29 PROCEDURE — 63600175 PHARM REV CODE 636 W HCPCS: Mod: JG,NTX | Performed by: FAMILY MEDICINE

## 2022-04-29 PROCEDURE — C1729 CATH, DRAINAGE: HCPCS | Mod: TXP

## 2022-04-29 PROCEDURE — P9047 ALBUMIN (HUMAN), 25%, 50ML: HCPCS | Mod: JG,NTX | Performed by: FAMILY MEDICINE

## 2022-04-29 PROCEDURE — 90471 HEPATITIS A HEPATITIS B COMBINED VACCINE IM: ICD-10-PCS | Mod: S$GLB,TXP,, | Performed by: INTERNAL MEDICINE

## 2022-04-29 PROCEDURE — 90636 HEP A/HEP B VACC ADULT IM: CPT | Mod: S$GLB,TXP,, | Performed by: INTERNAL MEDICINE

## 2022-04-29 PROCEDURE — 49083 ABD PARACENTESIS W/IMAGING: CPT | Mod: NTX | Performed by: STUDENT IN AN ORGANIZED HEALTH CARE EDUCATION/TRAINING PROGRAM

## 2022-04-29 PROCEDURE — 89051 BODY FLUID CELL COUNT: CPT | Mod: TXP | Performed by: FAMILY MEDICINE

## 2022-04-29 RX ORDER — ALBUMIN HUMAN 250 G/1000ML
SOLUTION INTRAVENOUS
Status: COMPLETED | OUTPATIENT
Start: 2022-04-29 | End: 2022-04-29

## 2022-04-29 RX ORDER — ALBUMIN HUMAN 250 G/1000ML
SOLUTION INTRAVENOUS
Status: COMPLETED
Start: 2022-04-29 | End: 2022-04-29

## 2022-04-29 RX ADMIN — ALBUMIN HUMAN 25 G: 0.25 SOLUTION INTRAVENOUS at 09:04

## 2022-04-29 RX ADMIN — ALBUMIN (HUMAN) 12.5 G: 25 SOLUTION INTRAVENOUS at 09:04

## 2022-04-29 NOTE — PROGRESS NOTES
Patient received twinrix vaccine in the left deltoid. Pt tolerated well. Pt asked to wait in the clinic 15 minutes after injection in the event of an allergic reaction. Pt verbalized understanding. Pt left in NAD.

## 2022-04-29 NOTE — PLAN OF CARE
Procedure complete. Pt tolerated well. Site clean, dry, intact no bleeding, no hematoma. Pt given site care and discharge instructions. Pt verbalized understanding. Pt to DC home. Pt refused AVS.

## 2022-04-29 NOTE — H&P
Radiology History & Physical      SUBJECTIVE:     Chief Complaint: abdominal distention    History of Present Illness:  Malu Montes is a 52 y.o. female who presents for ultrasound guided paracentesis  Past Medical History:   Diagnosis Date    ADD (attention deficit disorder)     Anxiety     Ascites of liver     Cirrhosis 2021    CKD (chronic kidney disease) stage 3, GFR 30-59 ml/min     Constipation     ETOH abuse     Hyperlipidemia     Hypothyroidism     Other ascites 2021    Pancreatitis, acute     Tobacco use     Vitamin D deficiency      Past Surgical History:   Procedure Laterality Date    AUGMENTATION OF BREAST      breast augmentation       SECTION      COLONOSCOPY N/A 2021    Procedure: COLONOSCOPY;  Surgeon: Dao Gray MD;  Location: [facility] (2ND FLR);  Service: Endoscopy;  Laterality: N/A;  COVID test 21 General surgery clinic [facility]    CYSTOSCOPY N/A 9/10/2021    Procedure: CYSTOSCOPY;  Surgeon: Rj Sánchez Jr., MD;  [facility];  Service: Urology;  Laterality: N/A;    ESOPHAGOGASTRODUODENOSCOPY N/A 2021    Procedure: ESOPHAGOGASTRODUODENOSCOPY (EGD);  Surgeon: Dao Gray MD;  Location: [facility] (2ND FLR);  Service: Endoscopy;  Laterality: N/A;  labs current on     ESOPHAGOGASTRODUODENOSCOPY N/A 10/26/2021    Procedure: ESOPHAGOGASTRODUODENOSCOPY (EGD);  Surgeon: Dao Gray MD;  Location: [facility] (2ND FLR);  Service: Endoscopy;  Laterality: N/A;  to be done the week of 10/18/21 per Dr. Gray-BB  covid-10/23/21-[facility]-   10/25 arrival time confirmed with pt-rb    ESOPHAGOGASTRODUODENOSCOPY Left 2021    Procedure: EGD (ESOPHAGOGASTRODUODENOSCOPY);  Surgeon: Koffi Monteiro MD;  Location: [facility] (4TH FLR);  Service: Endoscopy;  Laterality: Left;  Rapid  pt requested AM appt and first available in December-  labs morning of procedure-BB   lvm to confirm appt-rb    HEMORRHOID SURGERY       PERITONEOCENTESIS Right 6/8/2021    Procedure: PARACENTESIS, ABDOMINAL;  Surgeon: Jay Torre MD;  Location: [facility] CATH LAB;  Service: Radiology;  Laterality: Right;    PERITONEOCENTESIS Right 6/25/2021    Procedure: PARACENTESIS, ABDOMINAL;  Surgeon: Jay Torre MD;  Location: [facility] CATH LAB;  Service: Radiology;  Laterality: Right;    PERITONEOCENTESIS Right 7/12/2021    Procedure: PARACENTESIS, ABDOMINAL;  Surgeon: Jay Torre MD;  Location: [facility] CATH LAB;  Service: Radiology;  Laterality: Right;    PERITONEOCENTESIS Right 7/23/2021    Procedure: PARACENTESIS, ABDOMINAL;  Surgeon: Edward De La Torre II, MD;  Location: [facility] CATH LAB;  Service: Interventional Radiology;  Laterality: Right;    PERITONEOCENTESIS Right 8/3/2021    Procedure: PARACENTESIS, ABDOMINAL;  Surgeon: Franco Cheung MD;  Location: [facility] CATH LAB;  Service: Radiology;  Laterality: Right;    PERITONEOCENTESIS Right 8/16/2021    Procedure: PARACENTESIS, ABDOMINAL;  Surgeon: Jay Torre MD;  Location: [facility] CATH LAB;  Service: Radiology;  Laterality: Right;    PERITONEOCENTESIS Right 8/23/2021    Procedure: PARACENTESIS, ABDOMINAL;  Surgeon: Jay Torre MD;  Location: [facility] CATH LAB;  Service: Radiology;  Laterality: Right;    PERITONEOCENTESIS Right 9/16/2021    Procedure: PARACENTESIS, ABDOMINAL;  Surgeon: Jay Torre MD;  Location: [facility] CATH LAB;  Service: Radiology;  Laterality: Right;    PERITONEOCENTESIS N/A 9/24/2021    Procedure: PARACENTESIS, ABDOMINAL;  Surgeon: Jay Torre MD;  Location: [facility] CATH LAB;  Service: Radiology;  Laterality: N/A;    PERITONEOCENTESIS Right 12/23/2021    Procedure: PARACENTESIS, ABDOMINAL;  Surgeon: Jay Torre MD;  Location: [facility] CATH LAB;  Service: Radiology;  Laterality: Right;    PERITONEOCENTESIS N/A 12/30/2021    Procedure: PARACENTESIS, ABDOMINAL;  Surgeon: Salas Cabrera MD;  Location: [facility] CATH LAB;  Service: Radiology;   Laterality: N/A;    RETROGRADE PYELOGRAPHY Bilateral 9/10/2021    Procedure: PYELOGRAM, RETROGRADE;  Surgeon: Rj Sánchez Jr., MD;  Location: [facility] OR [address] [place] [address];  Service: Urology;  Laterality: Bilateral;    TONSILLECTOMY         Home Meds:   Prior to Admission medications    Medication Sig Start Date End Date Taking? Authorizing Provider   allopurinoL (ZYLOPRIM) 100 MG tablet TAKE 1 TABLET(100 MG) BY MOUTH EVERY DAY 2/23/22   Rashel Cohn MD   bisacodyL (DULCOLAX) 5 mg EC tablet Take 5 mg by mouth daily as needed for Constipation.    Historical Provider   ciprofloxacin HCl (CIPRO) 250 MG tablet Take 1 tablet (250 mg total) by mouth every other day. 4/18/22   Rashel Cohn MD   ergocalciferol, vitamin D2, (VITAMIN D ORAL) Take by mouth.    Historical Provider   folic acid (FOLVITE) 1 MG tablet Take 2 tablets (2 mg total) by mouth once daily. 12/14/21 4/18/22  Uma Perry MD   furosemide (LASIX) 80 MG tablet Take 1 tablet (80 mg total) by mouth 3 (three) times daily. 4/26/22   Rashel Cohn MD   hydrOXYzine HCL (ATARAX) 25 MG tablet Take 1 tablet (25 mg total) by mouth every 6 to 8 hours as needed for Itching. 4/1/22   Sharmila Pacheco MD   lactulose (CHRONULAC) 10 gram/15 mL solution Take 30 mLs by mouth 2 (two) times daily. 3/4/22   Historical Provider   levothyroxine (SYNTHROID) 50 MCG tablet TAKE 1 TABLET(50 MCG) BY MOUTH BEFORE BREAKFAST 9/25/21   Killian Dodd DO   linaCLOtide (LINZESS) 72 mcg Cap capsule Take 2 capsules (144 mcg total) by mouth before breakfast. 11/26/21   Sharmila Pacheco MD   pantoprazole (PROTONIX) 40 MG tablet Take 1 tablet (40 mg total) by mouth once daily. 2/15/22 2/15/23  Sharmila Pacheco MD   thiamine (VITAMIN B-1) 100 MG tablet Take 1 tablet (100 mg total) by mouth every evening. 10/11/21   Sharmila Pacheco MD   vitamin A 13415 UNIT capsule Take 10,000 Units by mouth every evening.     Historical Provider     Anticoagulants/Antiplatelets: no  anticoagulation    Allergies: Review of patient's allergies indicates:  No Known Allergies  Sedation History:  no adverse reactions    Review of Systems:   Hematological: no known coagulopathies  Respiratory: no shortness of breath  Cardiovascular: no chest pain  Gastrointestinal: no abdominal pain  Genito-Urinary: no dysuria  Musculoskeletal: negative  Neurological: no TIA or stroke symptoms         OBJECTIVE:     Vital Signs (Most Recent)  Pulse: 95 (04/29/22 0752)  Resp: 16 (04/29/22 0752)  BP: 117/65 (04/29/22 0752)  SpO2: 100 % (04/29/22 0752)    Physical Exam:  ASA: 2  Mallampati: n/a    General: no acute distress  Mental Status: alert and oriented to person, place and time  HEENT: normocephalic, atraumatic  Chest: unlabored breathing  Heart: regular heart rate  Abdomen: distended  Extremity: moves all extremities    ASSESSMENT/PLAN:     Sedation Plan: local   Patient will undergo ultrasound guided paracentesis.    RENATA Marie, DEMIP  Interventional Radiology  [phone] [facility]

## 2022-04-29 NOTE — TELEPHONE ENCOUNTER
Discussed LLD case in donor conference on 4/28/2022. Pt to repeat US and discuss in committee next week if US resulted.

## 2022-04-29 NOTE — PROCEDURES
Radiology Post-Procedure Note    Pre Op Diagnosis: Ascites  Post Op Diagnosis: Same    Procedure: Ultrasound Guided Paracentesis    Procedure performed by: Fabrizio PEÑA, Cee     Written Informed Consent Obtained: Yes  Specimen Removed: YES cloudy  Estimated Blood Loss: Minimal    Findings:   Successful paracentesis.  Albumin administered PRN per protocol.    Patient tolerated procedure well.    Cee Dinh, APRN, FNP  Interventional Radiology  [phone] clinic

## 2022-05-02 ENCOUNTER — PATIENT MESSAGE (OUTPATIENT)
Dept: HEPATOLOGY | Facility: CLINIC | Age: 52
End: 2022-05-02
Payer: COMMERCIAL

## 2022-05-02 ENCOUNTER — HOSPITAL ENCOUNTER (OUTPATIENT)
Dept: INTERVENTIONAL RADIOLOGY/VASCULAR | Facility: HOSPITAL | Age: 52
Discharge: HOME | End: 2022-05-02
Attending: INTERNAL MEDICINE
Payer: COMMERCIAL

## 2022-05-02 VITALS
HEART RATE: 89 BPM | OXYGEN SATURATION: 100 % | DIASTOLIC BLOOD PRESSURE: 55 MMHG | RESPIRATION RATE: 18 BRPM | SYSTOLIC BLOOD PRESSURE: 110 MMHG

## 2022-05-02 DIAGNOSIS — R18.8 OTHER ASCITES: ICD-10-CM

## 2022-05-02 DIAGNOSIS — Z76.82 ORGAN TRANSPLANT CANDIDATE: Primary | ICD-10-CM

## 2022-05-02 LAB
APPEARANCE FLD: NORMAL
BODY FLD TYPE: NORMAL
COLOR FLD: YELLOW
LYMPHOCYTES NFR FLD MANUAL: 73 %
MESOTHL CELL NFR FLD MANUAL: 1 %
MONOS+MACROS NFR FLD MANUAL: 15 %
NEUTROPHILS NFR FLD MANUAL: 11 %
WBC # FLD: 74 /CU MM

## 2022-05-02 PROCEDURE — C1769 GUIDE WIRE: HCPCS | Mod: TXP

## 2022-05-02 PROCEDURE — 63600175 PHARM REV CODE 636 W HCPCS: Mod: JG,TXP

## 2022-05-02 PROCEDURE — P9047 ALBUMIN (HUMAN), 25%, 50ML: HCPCS | Mod: JG,TXP

## 2022-05-02 PROCEDURE — 49083 IR PARACENTESIS WITH IMAGING: ICD-10-PCS | Mod: TXP,,, | Performed by: FAMILY MEDICINE

## 2022-05-02 PROCEDURE — 49083 ABD PARACENTESIS W/IMAGING: CPT | Mod: NTX | Performed by: RADIOLOGY

## 2022-05-02 PROCEDURE — 49083 ABD PARACENTESIS W/IMAGING: CPT | Mod: TXP,,, | Performed by: FAMILY MEDICINE

## 2022-05-02 PROCEDURE — 89051 BODY FLUID CELL COUNT: CPT | Mod: TXP | Performed by: INTERNAL MEDICINE

## 2022-05-02 RX ORDER — ALBUMIN HUMAN 250 G/1000ML
SOLUTION INTRAVENOUS
Status: COMPLETED
Start: 2022-05-02 | End: 2022-05-02

## 2022-05-02 RX ADMIN — ALBUMIN HUMAN: 0.25 SOLUTION INTRAVENOUS at 09:05

## 2022-05-02 RX ADMIN — ALBUMIN (HUMAN): 12.5 SOLUTION INTRAVENOUS at 09:05

## 2022-05-02 NOTE — H&P
Radiology History & Physical      SUBJECTIVE:     Chief Complaint: abdominal distention    History of Present Illness:  Malu Montes is a 52 y.o. female who presents for ultrasound guided paracentesis  Past Medical History:   Diagnosis Date    ADD (attention deficit disorder)     Anxiety     Ascites of liver     Cirrhosis 2021    CKD (chronic kidney disease) stage 3, GFR 30-59 ml/min     Constipation     ETOH abuse     Hyperlipidemia     Hypothyroidism     Other ascites 2021    Pancreatitis, acute     Tobacco use     Vitamin D deficiency      Past Surgical History:   Procedure Laterality Date    AUGMENTATION OF BREAST      breast augmentation       SECTION      COLONOSCOPY N/A 2021    Procedure: COLONOSCOPY;  Surgeon: Dao Gray MD;  Location: [facility] (2ND FLR);  Service: Endoscopy;  Laterality: N/A;  COVID test 21 General surgery clinic [facility]    CYSTOSCOPY N/A 9/10/2021    Procedure: CYSTOSCOPY;  Surgeon: Rj Sánchez Jr., MD;  [facility];  Service: Urology;  Laterality: N/A;    ESOPHAGOGASTRODUODENOSCOPY N/A 2021    Procedure: ESOPHAGOGASTRODUODENOSCOPY (EGD);  Surgeon: Dao Gray MD;  Location: [facility] (2ND FLR);  Service: Endoscopy;  Laterality: N/A;  labs current on     ESOPHAGOGASTRODUODENOSCOPY N/A 10/26/2021    Procedure: ESOPHAGOGASTRODUODENOSCOPY (EGD);  Surgeon: Dao Gray MD;  Location: [facility] (2ND FLR);  Service: Endoscopy;  Laterality: N/A;  to be done the week of 10/18/21 per Dr. Gray-BB  [facility]10[facility]23/21-[facility]-   10/25 arrival time confirmed with pt-rb    ESOPHAGOGASTRODUODENOSCOPY Left 2021    Procedure: EGD (ESOPHAGOGASTRODUODENOSCOPY);  Surgeon: Koffi Monteiro MD;  Location: [facility] (4TH FLR);  Service: Endoscopy;  Laterality: Left;  Rapid  pt requested AM appt and first available in December-  labs morning of procedure-BB   lvm to confirm appt-rb    HEMORRHOID SURGERY       PERITONEOCENTESIS Right 6/8/2021    Procedure: PARACENTESIS, ABDOMINAL;  Surgeon: Jay Torre MD;  Location: [facility] CATH LAB;  Service: Radiology;  Laterality: Right;    PERITONEOCENTESIS Right 6/25/2021    Procedure: PARACENTESIS, ABDOMINAL;  Surgeon: Jay Torre MD;  Location: [facility] CATH LAB;  Service: Radiology;  Laterality: Right;    PERITONEOCENTESIS Right 7/12/2021    Procedure: PARACENTESIS, ABDOMINAL;  Surgeon: Jay Torre MD;  Location: [facility] CATH LAB;  Service: Radiology;  Laterality: Right;    PERITONEOCENTESIS Right 7/23/2021    Procedure: PARACENTESIS, ABDOMINAL;  Surgeon: Edward De La Torre II, MD;  Location: [facility] CATH LAB;  Service: Interventional Radiology;  Laterality: Right;    PERITONEOCENTESIS Right 8/3/2021    Procedure: PARACENTESIS, ABDOMINAL;  Surgeon: Franco Cheung MD;  Location: [facility] CATH LAB;  Service: Radiology;  Laterality: Right;    PERITONEOCENTESIS Right 8/16/2021    Procedure: PARACENTESIS, ABDOMINAL;  Surgeon: Jay Torre MD;  Location: [facility] CATH LAB;  Service: Radiology;  Laterality: Right;    PERITONEOCENTESIS Right 8/23/2021    Procedure: PARACENTESIS, ABDOMINAL;  Surgeon: Jay Torre MD;  Location: [facility] CATH LAB;  Service: Radiology;  Laterality: Right;    PERITONEOCENTESIS Right 9/16/2021    Procedure: PARACENTESIS, ABDOMINAL;  Surgeon: Jay Torre MD;  Location: [facility] CATH LAB;  Service: Radiology;  Laterality: Right;    PERITONEOCENTESIS N/A 9/24/2021    Procedure: PARACENTESIS, ABDOMINAL;  Surgeon: Jay Torre MD;  Location: [facility] CATH LAB;  Service: Radiology;  Laterality: N/A;    PERITONEOCENTESIS Right 12/23/2021    Procedure: PARACENTESIS, ABDOMINAL;  Surgeon: Jay Torre MD;  Location: [facility] CATH LAB;  Service: Radiology;  Laterality: Right;    PERITONEOCENTESIS N/A 12/30/2021    Procedure: PARACENTESIS, ABDOMINAL;  Surgeon: Salas Cabrera MD;  Location: [facility] CATH LAB;  Service: Radiology;   Laterality: N/A;    RETROGRADE PYELOGRAPHY Bilateral 9/10/2021    Procedure: PYELOGRAM, RETROGRADE;  Surgeon: Rj Sánchez Jr., MD;  Location: [facility] OR [address] [place] [address];  Service: Urology;  Laterality: Bilateral;    TONSILLECTOMY         Home Meds:   Prior to Admission medications    Medication Sig Start Date End Date Taking? Authorizing Provider   allopurinoL (ZYLOPRIM) 100 MG tablet TAKE 1 TABLET(100 MG) BY MOUTH EVERY DAY 2/23/22   Rashel Cohn MD   bisacodyL (DULCOLAX) 5 mg EC tablet Take 5 mg by mouth daily as needed for Constipation.    Historical Provider   ciprofloxacin HCl (CIPRO) 250 MG tablet Take 1 tablet (250 mg total) by mouth every other day. 4/18/22   Rashel Cohn MD   ergocalciferol, vitamin D2, (VITAMIN D ORAL) Take by mouth.    Historical Provider   folic acid (FOLVITE) 1 MG tablet Take 2 tablets (2 mg total) by mouth once daily. 12/14/21 4/18/22  Uma Perry MD   furosemide (LASIX) 80 MG tablet Take 1 tablet (80 mg total) by mouth 3 (three) times daily. 4/26/22   Rashel Cohn MD   hydrOXYzine HCL (ATARAX) 25 MG tablet Take 1 tablet (25 mg total) by mouth every 6 to 8 hours as needed for Itching. 4/1/22   Sharmila Pacheco MD   lactulose (CHRONULAC) 10 gram/15 mL solution Take 30 mLs by mouth 2 (two) times daily. 3/4/22   Historical Provider   levothyroxine (SYNTHROID) 50 MCG tablet TAKE 1 TABLET(50 MCG) BY MOUTH BEFORE BREAKFAST 9/25/21   Killian Dodd DO   linaCLOtide (LINZESS) 72 mcg Cap capsule Take 2 capsules (144 mcg total) by mouth before breakfast. 11/26/21   Sharmila Pacheco MD   pantoprazole (PROTONIX) 40 MG tablet Take 1 tablet (40 mg total) by mouth once daily. 2/15/22 2/15/23  Sharmila Pacheco MD   thiamine (VITAMIN B-1) 100 MG tablet Take 1 tablet (100 mg total) by mouth every evening. 10/11/21   Sharmila Pacheco MD   vitamin A 78198 UNIT capsule Take 10,000 Units by mouth every evening.     Historical Provider     Anticoagulants/Antiplatelets: no  anticoagulation    Allergies: Review of patient's allergies indicates:  No Known Allergies  Sedation History:  no adverse reactions    Review of Systems:   Hematological: no known coagulopathies  Respiratory: no shortness of breath  Cardiovascular: no chest pain  Gastrointestinal: no abdominal pain  Genito-Urinary: no dysuria  Musculoskeletal: negative  Neurological: no TIA or stroke symptoms         OBJECTIVE:     Vital Signs (Most Recent)       Physical Exam:  ASA: 2  Mallampati: n/a    General: no acute distress  Mental Status: alert and oriented to person, place and time  HEENT: normocephalic, atraumatic  Chest: unlabored breathing  Heart: regular heart rate  Abdomen: distended  Extremity: moves all extremities    ASSESSMENT/PLAN:     Sedation Plan: local  Patient will undergo ultrasound guided paracentesis.    RENATA Marie, FNP  Interventional Radiology  [phone] clinic

## 2022-05-02 NOTE — PROCEDURES
Radiology Post-Procedure Note    Pre Op Diagnosis: Ascites  Post Op Diagnosis: Same    Procedure: Ultrasound Guided Paracentesis    Procedure performed by: Fabrizio PEÑA, Cee     Written Informed Consent Obtained: Yes  Specimen Removed: YES cloudy  Estimated Blood Loss: Minimal    Findings:   Successful paracentesis.  Albumin administered PRN per protocol.    Patient tolerated procedure well.    Cee Dinh, APRN, FNP  Interventional Radiology  [phone] clinic

## 2022-05-02 NOTE — PLAN OF CARE
Para complete. Pt federioc well. 5.7 L off; 37.5 g albumin given per protocol. Pt D/C independently via ambulation, pt A&O, ambulatory, VSS, no c/o pain or distress. D/C instructions reviewed with pt, all questions answered.

## 2022-05-03 ENCOUNTER — TELEPHONE (OUTPATIENT)
Dept: TRANSPLANT | Facility: CLINIC | Age: 52
End: 2022-05-03
Payer: COMMERCIAL

## 2022-05-03 NOTE — TELEPHONE ENCOUNTER
----- Message from Westley Cerda sent at 5/3/2022  4:02 PM CDT -----  Regarding: speak to coordinator  Patient's  Hang returning call. Requesting a call back.    Call: [phone] ( Hang's [phone]

## 2022-05-05 ENCOUNTER — PATIENT MESSAGE (OUTPATIENT)
Dept: INTERVENTIONAL RADIOLOGY/VASCULAR | Facility: HOSPITAL | Age: 52
End: 2022-05-05
Payer: COMMERCIAL

## 2022-05-05 ENCOUNTER — DOCUMENTATION ONLY (OUTPATIENT)
Dept: PREADMISSION TESTING | Facility: HOSPITAL | Age: 52
End: 2022-05-05
Payer: COMMERCIAL

## 2022-05-05 ENCOUNTER — PATIENT MESSAGE (OUTPATIENT)
Dept: HEPATOLOGY | Facility: CLINIC | Age: 52
End: 2022-05-05
Payer: COMMERCIAL

## 2022-05-05 NOTE — PRE-PROCEDURE INSTRUCTIONS
PRE-OP INSTRUCTIONS:  Instructed patient to have no food,milk or milk products after midnight   It is ok to have clear liquids up until 2 hours before the procedure  Medication instructions for pm prior to and am of surgery reviewed.  Instructed patient to avoid taking vitamins,supplements,aspirin or ibuprofen the am of surgery.  Shower instructions provided    Patient denies any side effects or issues with anesthesia or sedation.

## 2022-05-06 ENCOUNTER — ANESTHESIA EVENT (OUTPATIENT)
Dept: INTERVENTIONAL RADIOLOGY/VASCULAR | Facility: HOSPITAL | Age: 52
DRG: 194 | End: 2022-05-06
Payer: COMMERCIAL

## 2022-05-06 PROBLEM — Z98.890 S/P ABDOMINAL PARACENTESIS: Status: ACTIVE | Noted: 2022-05-06

## 2022-05-06 PROCEDURE — 25000003 PHARM REV CODE 250: Mod: NTX | Performed by: STUDENT IN AN ORGANIZED HEALTH CARE EDUCATION/TRAINING PROGRAM

## 2022-05-06 PROCEDURE — D9220A PRA ANESTHESIA: ICD-10-PCS | Mod: ANES,NTX,, | Performed by: ANESTHESIOLOGY

## 2022-05-06 PROCEDURE — 63600175 PHARM REV CODE 636 W HCPCS: Mod: NTX | Performed by: STUDENT IN AN ORGANIZED HEALTH CARE EDUCATION/TRAINING PROGRAM

## 2022-05-06 PROCEDURE — D9220A PRA ANESTHESIA: Mod: ANES,NTX,, | Performed by: ANESTHESIOLOGY

## 2022-05-06 PROCEDURE — D9220A PRA ANESTHESIA: ICD-10-PCS | Mod: CRNA,NTX,, | Performed by: STUDENT IN AN ORGANIZED HEALTH CARE EDUCATION/TRAINING PROGRAM

## 2022-05-06 PROCEDURE — D9220A PRA ANESTHESIA: Mod: CRNA,NTX,, | Performed by: STUDENT IN AN ORGANIZED HEALTH CARE EDUCATION/TRAINING PROGRAM

## 2022-05-06 PROCEDURE — 25000003 PHARM REV CODE 250: Mod: NTX | Performed by: FAMILY MEDICINE

## 2022-05-06 PROCEDURE — P9047 ALBUMIN (HUMAN), 25%, 50ML: HCPCS | Mod: JG,NTX | Performed by: STUDENT IN AN ORGANIZED HEALTH CARE EDUCATION/TRAINING PROGRAM

## 2022-05-06 PROCEDURE — 63600175 PHARM REV CODE 636 W HCPCS: Mod: NTX | Performed by: FAMILY MEDICINE

## 2022-05-06 RX ORDER — LIDOCAINE HYDROCHLORIDE 20 MG/ML
INJECTION, SOLUTION EPIDURAL; INFILTRATION; INTRACAUDAL; PERINEURAL
Status: DISCONTINUED | OUTPATIENT
Start: 2022-05-06 | End: 2022-05-06

## 2022-05-06 RX ORDER — DEXAMETHASONE SODIUM PHOSPHATE 4 MG/ML
INJECTION, SOLUTION INTRA-ARTICULAR; INTRALESIONAL; INTRAMUSCULAR; INTRAVENOUS; SOFT TISSUE
Status: DISCONTINUED | OUTPATIENT
Start: 2022-05-06 | End: 2022-05-06

## 2022-05-06 RX ORDER — ROCURONIUM BROMIDE 10 MG/ML
INJECTION, SOLUTION INTRAVENOUS
Status: DISCONTINUED | OUTPATIENT
Start: 2022-05-06 | End: 2022-05-06

## 2022-05-06 RX ORDER — FENTANYL CITRATE 50 UG/ML
INJECTION, SOLUTION INTRAMUSCULAR; INTRAVENOUS
Status: DISCONTINUED | OUTPATIENT
Start: 2022-05-06 | End: 2022-05-06

## 2022-05-06 RX ORDER — PROPOFOL 10 MG/ML
VIAL (ML) INTRAVENOUS
Status: DISCONTINUED | OUTPATIENT
Start: 2022-05-06 | End: 2022-05-06

## 2022-05-06 RX ORDER — ALBUMIN HUMAN 250 G/1000ML
SOLUTION INTRAVENOUS CONTINUOUS PRN
Status: DISCONTINUED | OUTPATIENT
Start: 2022-05-06 | End: 2022-05-06

## 2022-05-06 RX ORDER — MIDAZOLAM HYDROCHLORIDE 1 MG/ML
INJECTION INTRAMUSCULAR; INTRAVENOUS
Status: DISCONTINUED | OUTPATIENT
Start: 2022-05-06 | End: 2022-05-06

## 2022-05-06 RX ORDER — NEOSTIGMINE METHYLSULFATE 0.5 MG/ML
INJECTION, SOLUTION INTRAVENOUS
Status: DISCONTINUED | OUTPATIENT
Start: 2022-05-06 | End: 2022-05-06

## 2022-05-06 RX ORDER — ONDANSETRON 2 MG/ML
INJECTION INTRAMUSCULAR; INTRAVENOUS
Status: DISCONTINUED | OUTPATIENT
Start: 2022-05-06 | End: 2022-05-06

## 2022-05-06 RX ORDER — PHENYLEPHRINE HCL IN 0.9% NACL 1 MG/10 ML
SYRINGE (ML) INTRAVENOUS
Status: DISCONTINUED | OUTPATIENT
Start: 2022-05-06 | End: 2022-05-06

## 2022-05-06 RX ADMIN — Medication 100 MCG: at 05:05

## 2022-05-06 RX ADMIN — SODIUM CHLORIDE: 0.9 INJECTION, SOLUTION INTRAVENOUS at 04:05

## 2022-05-06 RX ADMIN — FENTANYL CITRATE 50 MCG: 50 INJECTION, SOLUTION INTRAMUSCULAR; INTRAVENOUS at 04:05

## 2022-05-06 RX ADMIN — LIDOCAINE HYDROCHLORIDE 80 MG: 20 INJECTION, SOLUTION EPIDURAL; INFILTRATION; INTRACAUDAL at 05:05

## 2022-05-06 RX ADMIN — ALBUMIN (HUMAN): 12.5 SOLUTION INTRAVENOUS at 05:05

## 2022-05-06 RX ADMIN — MIDAZOLAM HYDROCHLORIDE 2 MG: 1 INJECTION, SOLUTION INTRAMUSCULAR; INTRAVENOUS at 04:05

## 2022-05-06 RX ADMIN — CEFTRIAXONE 1 G: 1 INJECTION, POWDER, FOR SOLUTION INTRAMUSCULAR; INTRAVENOUS at 05:05

## 2022-05-06 RX ADMIN — NEOSTIGMINE METHYLSULFATE 4 MG: 0.5 INJECTION, SOLUTION INTRAVENOUS at 06:05

## 2022-05-06 RX ADMIN — ONDANSETRON 4 MG: 2 INJECTION INTRAMUSCULAR; INTRAVENOUS at 05:05

## 2022-05-06 RX ADMIN — PROPOFOL 150 MG: 10 INJECTION, EMULSION INTRAVENOUS at 05:05

## 2022-05-06 RX ADMIN — DEXAMETHASONE SODIUM PHOSPHATE 4 MG: 4 INJECTION INTRA-ARTICULAR; INTRALESIONAL; INTRAMUSCULAR; INTRAVENOUS; SOFT TISSUE at 05:05

## 2022-05-06 RX ADMIN — ROCURONIUM BROMIDE 40 MG: 10 INJECTION INTRAVENOUS at 05:05

## 2022-05-06 RX ADMIN — GLYCOPYRROLATE 0.6 MG: 0.2 INJECTION INTRAMUSCULAR; INTRAVENOUS at 06:05

## 2022-05-06 NOTE — ANESTHESIA PROCEDURE NOTES
Intubation    Date/Time: 5/6/2022 5:04 PM  Performed by: Niyah Fernandez CRNA  Authorized by: Aakash Cooper MD     Intubation:     Induction:  Intravenous    Intubated:  Postinduction    Mask Ventilation:  N/a    Attempts:  1    Attempted By:  CRNA    Method of Intubation:  Direct    Blade:  Carrera 3    Laryngeal View Grade: Grade I - full view of cords      Difficult Airway Encountered?: No      Complications:  None    Airway Device:  Oral endotracheal tube    Airway Device Size:  7.0    Style/Cuff Inflation:  Cuffed    Tube secured:  22    Secured at:  The lips    Placement Verified By:  Capnometry    Complicating Factors:  None    Findings Post-Intubation:  BS equal bilateral and atraumatic/condition of teeth unchanged

## 2022-05-06 NOTE — ANESTHESIA PREPROCEDURE EVALUATION
[facility]  Anesthesia Pre-Operative Evaluation         Patient Name: Malu Montes  YOB: 1970  MRN: [id number]    SUBJECTIVE:     05/06/2022    * No procedures listed *    Malu Montes is a 52 y.o. female here for above procedure    Drips:    sodium chloride 0.9%         Patient Active Problem List   Diagnosis    Anal fissure    Internal hemorrhoids with other complication    Ptosis of breast    Impaired fasting glucose    Vitamin D deficiency    Nicotine use disorder    Radicular pain in right arm    Ketoacidosis    Alcohol-induced acute pancreatitis    Folate deficiency    Alcohol use disorder, severe, in early remission    Other ascites    Portal hypertension    Hypothyroidism    CKD (chronic kidney disease) stage 3, GFR 30-59 ml/min    Alcohol induced fatty liver    SBP (spontaneous bacterial peritonitis)    Microscopic hematuria    Alcoholic cirrhosis of liver with ascites    Secondary esophageal varices    Right sided abdominal pain    Hyperlipidemia    Anemia    IgA nephropathy associated with liver disease    Acute kidney injury superimposed on chronic kidney disease    Chronic low blood pressure    Iron deficiency anemia    CKD (chronic kidney disease) stage 4, GFR 15-29 ml/min    Metabolic acidosis    Secondary hyperparathyroidism of renal origin    Hyperuricemia       Review of patient's allergies indicates:  No Known Allergies    Current Outpatient Medications on File Prior to Encounter   Medication Sig Dispense Refill    allopurinoL (ZYLOPRIM) 100 MG tablet TAKE 1 TABLET(100 MG) BY MOUTH EVERY DAY (Patient taking differently: Take by mouth every morning.) 90 tablet 0    bisacodyL (DULCOLAX) 5 mg EC tablet Take 5 mg by mouth daily as needed for Constipation.      ciprofloxacin HCl (CIPRO) 250 MG tablet Take 1 tablet (250 mg total) by mouth every other day. 90 tablet 0    ergocalciferol, vitamin D2, (VITAMIN D ORAL) Take by  mouth every evening.      folic acid (FOLVITE) 1 MG tablet Take 2 tablets (2 mg total) by mouth once daily. (Patient taking differently: Take 2 mg by mouth every evening.) 60 tablet 11    furosemide (LASIX) 80 MG tablet Take 1 tablet (80 mg total) by mouth 3 (three) times daily. 270 tablet 1    hydrOXYzine HCL (ATARAX) 25 MG tablet Take 1 tablet (25 mg total) by mouth every 6 to 8 hours as needed for Itching. 30 tablet 2    lactulose (CHRONULAC) 10 gram/15 mL solution Take 30 mLs by mouth every evening.      levothyroxine (SYNTHROID) 50 MCG tablet TAKE 1 TABLET(50 MCG) BY MOUTH BEFORE BREAKFAST 90 tablet 3    linaCLOtide (LINZESS) 72 mcg Cap capsule Take 2 capsules (144 mcg total) by mouth before breakfast. 180 capsule 5    pantoprazole (PROTONIX) 40 MG tablet Take 1 tablet (40 mg total) by mouth once daily. (Patient taking differently: Take 40 mg by mouth every evening.) 30 tablet 11    thiamine (VITAMIN B-1) 100 MG tablet Take 1 tablet (100 mg total) by mouth every evening. 30 tablet 5    vitamin A 16754 UNIT capsule Take 10,000 Units by mouth every evening.        No current facility-administered medications on file prior to encounter.       Past Surgical History:   Procedure Laterality Date    AUGMENTATION OF BREAST      breast augmentation       SECTION      COLONOSCOPY N/A 2021    Procedure: COLONOSCOPY;  Surgeon: Dao Gray MD;  Location: [address]);  Service: Endoscopy;  Laterality: N/A;  COVID test 21 General surgery clinic [facility]    CYSTOSCOPY N/A 9/10/2021    Procedure: CYSTOSCOPY;  Surgeon: Rj Sánchez Jr., MD;  Location: [address];  Service: Urology;  Laterality: N/A;    ESOPHAGOGASTRODUODENOSCOPY N/A 2021    Procedure: ESOPHAGOGASTRODUODENOSCOPY (EGD);  Surgeon: Dao Gray MD;  Location: [address]);  Service: Endoscopy;  Laterality: N/A;  labs current on     ESOPHAGOGASTRODUODENOSCOPY N/A 10/26/2021    Procedure:  ESOPHAGOGASTRODUODENOSCOPY (EGD);  Surgeon: Dao Gray MD;  Location: [facility] ENDO (2ND FLR);  Service: Endoscopy;  Laterality: N/A;  to be done the week of 10/18/21 per Dr. Gray-[facility]-10/23/21[facility]   10/25 arrival time confirmed with pt-rb    ESOPHAGOGASTRODUODENOSCOPY Left 12/21/2021    Procedure: EGD (ESOPHAGOGASTRODUODENOSCOPY);  Surgeon: Koffi Monteiro MD;  Location: [facility] ENDO (4TH FLR);  Service: Endoscopy;  Laterality: Left;  Rapid  pt requested AM appt and first available in December-BB  labs morning of procedure-  12/14 lvm to confirm appt-rb    HEMORRHOID SURGERY      PERITONEOCENTESIS Right 6/8/2021    Procedure: PARACENTESIS, ABDOMINAL;  Surgeon: Jay Torre MD;  Location: [facility] CATH LAB;  Service: Radiology;  Laterality: Right;    PERITONEOCENTESIS Right 6/25/2021    Procedure: PARACENTESIS, ABDOMINAL;  Surgeon: Jay Torre MD;  Location: [facility] CATH LAB;  Service: Radiology;  Laterality: Right;    PERITONEOCENTESIS Right 7/12/2021    Procedure: PARACENTESIS, ABDOMINAL;  Surgeon: Jay Torre MD;  Location: [facility] CATH LAB;  Service: Radiology;  Laterality: Right;    PERITONEOCENTESIS Right 7/23/2021    Procedure: PARACENTESIS, ABDOMINAL;  Surgeon: Edward De La Torre II, MD;  Location: [facility] CATH LAB;  Service: Interventional Radiology;  Laterality: Right;    PERITONEOCENTESIS Right 8/3/2021    Procedure: PARACENTESIS, ABDOMINAL;  Surgeon: Franco Cheung MD;  Location: [facility] CATH LAB;  Service: Radiology;  Laterality: Right;    PERITONEOCENTESIS Right 8/16/2021    Procedure: PARACENTESIS, ABDOMINAL;  Surgeon: Jay Torre MD;  Location: [facility] CATH LAB;  Service: Radiology;  Laterality: Right;    PERITONEOCENTESIS Right 8/23/2021    Procedure: PARACENTESIS, ABDOMINAL;  Surgeon: Jay Torre MD;  Location: [facility] CATH LAB;  Service: Radiology;  Laterality: Right;    PERITONEOCENTESIS Right 9/16/2021    Procedure: PARACENTESIS, ABDOMINAL;  Surgeon:  Jay Torre MD;  Location: [facility] CATH LAB;  Service: Radiology;  Laterality: Right;    PERITONEOCENTESIS N/A 9/24/2021    Procedure: PARACENTESIS, ABDOMINAL;  Surgeon: Jay Torre MD;  Location: [facility] CATH LAB;  Service: Radiology;  Laterality: N/A;    PERITONEOCENTESIS Right 12/23/2021    Procedure: PARACENTESIS, ABDOMINAL;  Surgeon: Jay Torre MD;  Location: [facility] CATH LAB;  Service: Radiology;  Laterality: Right;    PERITONEOCENTESIS N/A 12/30/2021    Procedure: PARACENTESIS, ABDOMINAL;  Surgeon: Salas Cabrera MD;  Location: [facility] CATH LAB;  Service: Radiology;  Laterality: N/A;    RETROGRADE PYELOGRAPHY Bilateral 9/10/2021    Procedure: PYELOGRAM, RETROGRADE;  Surgeon: Rj Sánchez Jr., MD;  Location: [address];  Service: Urology;  Laterality: Bilateral;    TONSILLECTOMY         Social History     Socioeconomic History    Marital status:     Number of children: 1   Occupational History    Occupation: Assistant   Tobacco Use    Smoking status: Current Every Day Smoker     Packs/day: 0.25     Years: 27.00     Pack years: 6.75     Types: Cigarettes    Smokeless tobacco: Never Used    Tobacco comment: two cigarettes a day    Substance and Sexual Activity    Alcohol use: Not Currently     Comment: quit caridad 15    Drug use: No    Sexual activity: Yes     Partners: Male   Social History Narrative    They live in [place], [place]      Social Determinants of Health     Financial Resource Strain: Low Risk     Difficulty of Paying Living Expenses: Not hard at all   Food Insecurity: No Food Insecurity    Worried About Running Out of Food in the Last Year: Never true    Ran Out of Food in the Last Year: Never true   Transportation Needs: No Transportation Needs    Lack of Transportation (Medical): No    Lack of Transportation (Non-Medical): No   Physical Activity: Unknown    Days of Exercise per Week: 1 day   Stress: No Stress Concern Present    Feeling of Stress : Only  a little   Social Connections: Unknown    Frequency of Communication with Friends and Family: More than three times a week    Frequency of Social Gatherings with Friends and Family: Twice a week    Active Member of Clubs or Organizations: No    Attends Club or Organization Meetings: Never    Marital Status:    Housing Stability: Low Risk     Unable to Pay for Housing in the Last Year: No    Number of Places Lived in the Last Year: 1    Unstable Housing in the Last Year: No         OBJECTIVE:     Vital Signs Range (Last 24H):  Temp:  [36.4 °C (97.5 °F)] 36.4 °C (97.5 °F)  Pulse:  [90] 90  Resp:  [16] 16  SpO2:  [100 %] 100 %  BP: (103)/(68) 103/68    Significant Labs:  Lab Results   Component Value Date    WBC 7.31 05/02/2022    HGB 11.8 (L) 05/02/2022    HCT 35.5 (L) 05/02/2022     05/02/2022    CHOL 146 12/06/2021    TRIG 68 12/06/2021    HDL 32 (L) 12/06/2021    ALT 27 05/02/2022    AST 39 05/02/2022     05/02/2022    K 3.6 05/02/2022     05/02/2022    CREATININE 1.6 (H) 05/02/2022    BUN 52 (H) 05/02/2022    CO2 21 (L) 05/02/2022    TSH 2.443 12/06/2021    INR 1.0 05/02/2022    HGBA1C 4.6 07/15/2021       Diagnostic Studies:    EKG:   Results for orders placed or performed during the hospital encounter of 02/28/22   EKG 12-lead    Collection Time: 02/28/22  3:37 PM    Narrative    Test Reason : R00.0,    Vent. Rate : 120 BPM     Atrial Rate : 120 BPM     P-R Int : 140 ms          QRS Dur : 058 ms      QT Int : 312 ms       P-R-T Axes : 078 071 075 degrees     QTc Int : 440 ms    Sinus tachycardia  Biatrial enlargement  Low voltage QRS  Abnormal ECG  When compared with ECG of 29-JUL-2021 08:18,  No significant change was found  Confirmed by YARELIS EMERSON MD ([id number]) on 3/1/2022 10:11:55 AM    Referred By: [email or web address]   SELF           Confirmed By:YARELIS EMERSON MD       2D ECHO:  TTE:  No results found for this or any previous visit.      SHILO:  No results found for this or any  previous visit.        Pre-op Assessment    I have reviewed the Patient Summary Reports.     I have reviewed the Nursing Notes. I have reviewed the NPO Status.   I have reviewed the Medications.     Review of Systems  Anesthesia Hx:  No problems with previous Anesthesia  History of prior surgery of interest to airway management or planning: Previous anesthesia: General   Cardiovascular:  Functional Capacity good / => 4 METS        Physical Exam  General: Well nourished, Cooperative, Alert and Oriented    Airway:  Mallampati: II   Mouth Opening: Normal  TM Distance: Normal  Tongue: Normal  Neck ROM: Normal ROM    Dental:  Intact    Chest/Lungs:  Clear to auscultation    Heart:  Rate: Normal  Rhythm: Regular Rhythm  Sounds: Normal    Abdomen:  Normal, Soft, Ascites, Distention        Anesthesia Plan  Type of Anesthesia, risks & benefits discussed:    Anesthesia Type: Gen ETT, Gen Supraglottic Airway  Intra-op Monitoring Plan: Standard ASA Monitors  Post Op Pain Control Plan: multimodal analgesia and IV/PO Opioids PRN  Induction:  IV  Airway Plan: Direct and Video, Post-Induction  Informed Consent: Informed consent signed with the Patient and all parties understand the risks and agree with anesthesia plan.  All questions answered. Patient consented to blood products? Yes  ASA Score: 3  Day of Surgery Review of History & Physical: H&P Update referred to the surgeon/provider.    Ready For Surgery From Anesthesia Perspective.     .

## 2022-05-06 NOTE — TRANSFER OF CARE
"Anesthesia Transfer of Care Note    Patient: Malu Montes    Procedure(s) Performed: * No procedures listed *    Patient location: PACU    Anesthesia Type: general    Transport from OR: Transported from OR on 6-10 L/min O2 by face mask with adequate spontaneous ventilation    Post pain: adequate analgesia    Post assessment: no apparent anesthetic complications    Post vital signs: stable    Level of consciousness: awake and alert    Nausea/Vomiting: no nausea/vomiting    Complications: none    Transfer of care protocol was followed      Last vitals:   Visit Vitals  /68   Pulse 90   Temp 36.4 °C (97.5 °F) (Temporal)   Resp 16   Ht 5' 3" (1.6 m)   Wt 56.7 kg (125 lb)   LMP 04/01/2022   SpO2 100%   Breastfeeding No   BMI 22.14 kg/m²     "

## 2022-05-08 ENCOUNTER — HOSPITAL ENCOUNTER (INPATIENT)
Facility: HOSPITAL | Age: 52
LOS: 2 days | Discharge: HOME HEALTH | DRG: 194 | End: 2022-05-10
Attending: EMERGENCY MEDICINE | Admitting: SURGERY
Payer: COMMERCIAL

## 2022-05-08 ENCOUNTER — NURSE TRIAGE (OUTPATIENT)
Dept: ADMINISTRATIVE | Facility: CLINIC | Age: 52
End: 2022-05-08
Payer: COMMERCIAL

## 2022-05-08 DIAGNOSIS — K70.31 ALCOHOLIC CIRRHOSIS OF LIVER WITH ASCITES: ICD-10-CM

## 2022-05-08 DIAGNOSIS — E87.20 METABOLIC ACIDOSIS: ICD-10-CM

## 2022-05-08 DIAGNOSIS — E79.0 HYPERURICEMIA: ICD-10-CM

## 2022-05-08 DIAGNOSIS — R18.8 OTHER ASCITES: ICD-10-CM

## 2022-05-08 DIAGNOSIS — K59.09 CHRONIC CONSTIPATION: ICD-10-CM

## 2022-05-08 DIAGNOSIS — R79.89 ELEVATED TROPONIN: ICD-10-CM

## 2022-05-08 DIAGNOSIS — N18.30 STAGE 3 CHRONIC KIDNEY DISEASE, UNSPECIFIED WHETHER STAGE 3A OR 3B CKD: ICD-10-CM

## 2022-05-08 DIAGNOSIS — B95.5 STREPTOCOCCAL BACTEREMIA: Primary | ICD-10-CM

## 2022-05-08 DIAGNOSIS — R06.02 SHORTNESS OF BREATH: ICD-10-CM

## 2022-05-08 DIAGNOSIS — D63.8 ANEMIA OF CHRONIC DISEASE: ICD-10-CM

## 2022-05-08 DIAGNOSIS — D72.829 LEUKOCYTOSIS, UNSPECIFIED TYPE: ICD-10-CM

## 2022-05-08 DIAGNOSIS — E87.6 HYPOKALEMIA: ICD-10-CM

## 2022-05-08 DIAGNOSIS — R78.81 STREPTOCOCCAL BACTEREMIA: Primary | ICD-10-CM

## 2022-05-08 DIAGNOSIS — K76.6 PORTAL HYPERTENSION: ICD-10-CM

## 2022-05-08 DIAGNOSIS — R06.02 SOB (SHORTNESS OF BREATH): ICD-10-CM

## 2022-05-08 LAB
ALBUMIN SERPL BCP-MCNC: 3.4 G/DL (ref 3.5–5.2)
ALP SERPL-CCNC: 144 U/L (ref 55–135)
ALT SERPL W/O P-5'-P-CCNC: 47 U/L (ref 10–44)
ANION GAP SERPL CALC-SCNC: 12 MMOL/L (ref 8–16)
AST SERPL-CCNC: 52 U/L (ref 10–40)
BASOPHILS # BLD AUTO: 0.06 K/UL (ref 0–0.2)
BASOPHILS NFR BLD: 0.3 % (ref 0–1.9)
BILIRUB SERPL-MCNC: 0.8 MG/DL (ref 0.1–1)
BILIRUB UR QL STRIP: NEGATIVE
BNP SERPL-MCNC: 1344 PG/ML (ref 0–99)
BUN SERPL-MCNC: 53 MG/DL (ref 6–20)
CALCIUM SERPL-MCNC: 8.2 MG/DL (ref 8.7–10.5)
CHLORIDE SERPL-SCNC: 109 MMOL/L (ref 95–110)
CLARITY UR REFRACT.AUTO: CLEAR
CO2 SERPL-SCNC: 17 MMOL/L (ref 23–29)
COLOR UR AUTO: YELLOW
CREAT SERPL-MCNC: 1.4 MG/DL (ref 0.5–1.4)
CTP QC/QA: YES
DIFFERENTIAL METHOD: ABNORMAL
EOSINOPHIL # BLD AUTO: 0.4 K/UL (ref 0–0.5)
EOSINOPHIL NFR BLD: 2.2 % (ref 0–8)
ERYTHROCYTE [DISTWIDTH] IN BLOOD BY AUTOMATED COUNT: 13.9 % (ref 11.5–14.5)
EST. GFR  (AFRICAN AMERICAN): 50.2 ML/MIN/1.73 M^2
EST. GFR  (NON AFRICAN AMERICAN): 43.5 ML/MIN/1.73 M^2
GLUCOSE SERPL-MCNC: 164 MG/DL (ref 70–110)
GLUCOSE UR QL STRIP: NEGATIVE
HCT VFR BLD AUTO: 28.1 % (ref 37–48.5)
HGB BLD-MCNC: 9.4 G/DL (ref 12–16)
HGB UR QL STRIP: NEGATIVE
IMM GRANULOCYTES # BLD AUTO: 0.06 K/UL (ref 0–0.04)
IMM GRANULOCYTES NFR BLD AUTO: 0.3 % (ref 0–0.5)
KETONES UR QL STRIP: NEGATIVE
LEUKOCYTE ESTERASE UR QL STRIP: NEGATIVE
LYMPHOCYTES # BLD AUTO: 1.2 K/UL (ref 1–4.8)
LYMPHOCYTES NFR BLD: 7.1 % (ref 18–48)
MCH RBC QN AUTO: 33.6 PG (ref 27–31)
MCHC RBC AUTO-ENTMCNC: 33.5 G/DL (ref 32–36)
MCV RBC AUTO: 100 FL (ref 82–98)
MONOCYTES # BLD AUTO: 1.3 K/UL (ref 0.3–1)
MONOCYTES NFR BLD: 7.7 % (ref 4–15)
NEUTROPHILS # BLD AUTO: 14.3 K/UL (ref 1.8–7.7)
NEUTROPHILS NFR BLD: 82.4 % (ref 38–73)
NITRITE UR QL STRIP: NEGATIVE
NRBC BLD-RTO: 0 /100 WBC
PH UR STRIP: 5 [PH] (ref 5–8)
PLATELET # BLD AUTO: 180 K/UL (ref 150–450)
PMV BLD AUTO: 9.9 FL (ref 9.2–12.9)
POTASSIUM SERPL-SCNC: 3.4 MMOL/L (ref 3.5–5.1)
PROT SERPL-MCNC: 5.4 G/DL (ref 6–8.4)
PROT UR QL STRIP: NEGATIVE
RBC # BLD AUTO: 2.8 M/UL (ref 4–5.4)
SARS-COV-2 RDRP RESP QL NAA+PROBE: NEGATIVE
SODIUM SERPL-SCNC: 138 MMOL/L (ref 136–145)
SP GR UR STRIP: 1.02 (ref 1–1.03)
TROPONIN I SERPL DL<=0.01 NG/ML-MCNC: 0.07 NG/ML (ref 0–0.03)
TROPONIN I SERPL DL<=0.01 NG/ML-MCNC: 0.1 NG/ML (ref 0–0.03)
URN SPEC COLLECT METH UR: NORMAL
WBC # BLD AUTO: 17.43 K/UL (ref 3.9–12.7)

## 2022-05-08 PROCEDURE — 20600001 HC STEP DOWN PRIVATE ROOM: Mod: NTX

## 2022-05-08 PROCEDURE — 25000003 PHARM REV CODE 250: Mod: NTX | Performed by: PHYSICIAN ASSISTANT

## 2022-05-08 PROCEDURE — 63600175 PHARM REV CODE 636 W HCPCS: Mod: NTX | Performed by: PHYSICIAN ASSISTANT

## 2022-05-08 PROCEDURE — 93010 EKG 12-LEAD: ICD-10-PCS | Mod: NTX,,, | Performed by: INTERNAL MEDICINE

## 2022-05-08 PROCEDURE — 63600175 PHARM REV CODE 636 W HCPCS: Mod: NTX | Performed by: INTERNAL MEDICINE

## 2022-05-08 PROCEDURE — 25000003 PHARM REV CODE 250: Mod: NTX | Performed by: INTERNAL MEDICINE

## 2022-05-08 PROCEDURE — U0002 COVID-19 LAB TEST NON-CDC: HCPCS | Mod: NTX | Performed by: STUDENT IN AN ORGANIZED HEALTH CARE EDUCATION/TRAINING PROGRAM

## 2022-05-08 PROCEDURE — 99285 EMERGENCY DEPT VISIT HI MDM: CPT | Mod: NTX,CS,, | Performed by: EMERGENCY MEDICINE

## 2022-05-08 PROCEDURE — 93005 ELECTROCARDIOGRAM TRACING: CPT | Mod: NTX

## 2022-05-08 PROCEDURE — 84484 ASSAY OF TROPONIN QUANT: CPT | Mod: NTX | Performed by: STUDENT IN AN ORGANIZED HEALTH CARE EDUCATION/TRAINING PROGRAM

## 2022-05-08 PROCEDURE — 85025 COMPLETE CBC W/AUTO DIFF WBC: CPT | Mod: NTX | Performed by: STUDENT IN AN ORGANIZED HEALTH CARE EDUCATION/TRAINING PROGRAM

## 2022-05-08 PROCEDURE — 25500020 PHARM REV CODE 255: Mod: NTX | Performed by: EMERGENCY MEDICINE

## 2022-05-08 PROCEDURE — 87389 HIV-1 AG W/HIV-1&-2 AB AG IA: CPT | Mod: NTX | Performed by: EMERGENCY MEDICINE

## 2022-05-08 PROCEDURE — 99285 EMERGENCY DEPT VISIT HI MDM: CPT | Mod: 25,NTX

## 2022-05-08 PROCEDURE — 36415 COLL VENOUS BLD VENIPUNCTURE: CPT | Mod: NTX | Performed by: PHYSICIAN ASSISTANT

## 2022-05-08 PROCEDURE — 93010 ELECTROCARDIOGRAM REPORT: CPT | Mod: NTX,,, | Performed by: INTERNAL MEDICINE

## 2022-05-08 PROCEDURE — 83880 ASSAY OF NATRIURETIC PEPTIDE: CPT | Mod: NTX | Performed by: STUDENT IN AN ORGANIZED HEALTH CARE EDUCATION/TRAINING PROGRAM

## 2022-05-08 PROCEDURE — 99285 PR EMERGENCY DEPT VISIT,LEVEL V: ICD-10-PCS | Mod: NTX,CS,, | Performed by: EMERGENCY MEDICINE

## 2022-05-08 PROCEDURE — 81003 URINALYSIS AUTO W/O SCOPE: CPT | Mod: NTX | Performed by: PHYSICIAN ASSISTANT

## 2022-05-08 PROCEDURE — 87040 BLOOD CULTURE FOR BACTERIA: CPT | Mod: 59,NTX | Performed by: PHYSICIAN ASSISTANT

## 2022-05-08 PROCEDURE — 80053 COMPREHEN METABOLIC PANEL: CPT | Mod: NTX | Performed by: STUDENT IN AN ORGANIZED HEALTH CARE EDUCATION/TRAINING PROGRAM

## 2022-05-08 PROCEDURE — 84484 ASSAY OF TROPONIN QUANT: CPT | Mod: 91,NTX | Performed by: PHYSICIAN ASSISTANT

## 2022-05-08 PROCEDURE — 86803 HEPATITIS C AB TEST: CPT | Mod: NTX | Performed by: EMERGENCY MEDICINE

## 2022-05-08 RX ORDER — HYDROXYZINE HYDROCHLORIDE 25 MG/1
25 TABLET, FILM COATED ORAL EVERY 6 HOURS PRN
Status: DISCONTINUED | OUTPATIENT
Start: 2022-05-08 | End: 2022-05-10 | Stop reason: HOSPADM

## 2022-05-08 RX ORDER — FUROSEMIDE 10 MG/ML
80 INJECTION INTRAMUSCULAR; INTRAVENOUS ONCE
Status: COMPLETED | OUTPATIENT
Start: 2022-05-08 | End: 2022-05-08

## 2022-05-08 RX ORDER — ACETAMINOPHEN 325 MG/1
650 TABLET ORAL EVERY 8 HOURS PRN
Status: DISCONTINUED | OUTPATIENT
Start: 2022-05-08 | End: 2022-05-10 | Stop reason: HOSPADM

## 2022-05-08 RX ORDER — THIAMINE HCL 100 MG
100 TABLET ORAL NIGHTLY
Status: DISCONTINUED | OUTPATIENT
Start: 2022-05-08 | End: 2022-05-10 | Stop reason: HOSPADM

## 2022-05-08 RX ORDER — PANTOPRAZOLE SODIUM 40 MG/1
40 TABLET, DELAYED RELEASE ORAL NIGHTLY
Status: DISCONTINUED | OUTPATIENT
Start: 2022-05-08 | End: 2022-05-10 | Stop reason: HOSPADM

## 2022-05-08 RX ORDER — CEFEPIME HYDROCHLORIDE 1 G/50ML
1 INJECTION, SOLUTION INTRAVENOUS
Status: DISCONTINUED | OUTPATIENT
Start: 2022-05-08 | End: 2022-05-10

## 2022-05-08 RX ORDER — LACTULOSE 10 G/15ML
20 SOLUTION ORAL NIGHTLY
Status: DISCONTINUED | OUTPATIENT
Start: 2022-05-08 | End: 2022-05-10 | Stop reason: HOSPADM

## 2022-05-08 RX ORDER — VITAMIN A 3000 MCG
10000 CAPSULE ORAL NIGHTLY
Status: DISCONTINUED | OUTPATIENT
Start: 2022-05-08 | End: 2022-05-10 | Stop reason: HOSPADM

## 2022-05-08 RX ORDER — BISACODYL 10 MG
10 SUPPOSITORY, RECTAL RECTAL ONCE
Status: COMPLETED | OUTPATIENT
Start: 2022-05-08 | End: 2022-05-08

## 2022-05-08 RX ORDER — SODIUM CHLORIDE 0.9 % (FLUSH) 0.9 %
10 SYRINGE (ML) INJECTION
Status: DISCONTINUED | OUTPATIENT
Start: 2022-05-08 | End: 2022-05-10 | Stop reason: HOSPADM

## 2022-05-08 RX ORDER — ONDANSETRON 8 MG/1
8 TABLET, ORALLY DISINTEGRATING ORAL EVERY 8 HOURS PRN
Status: DISCONTINUED | OUTPATIENT
Start: 2022-05-08 | End: 2022-05-10 | Stop reason: HOSPADM

## 2022-05-08 RX ORDER — LEVOTHYROXINE SODIUM 50 UG/1
50 TABLET ORAL
Status: DISCONTINUED | OUTPATIENT
Start: 2022-05-09 | End: 2022-05-10 | Stop reason: HOSPADM

## 2022-05-08 RX ADMIN — FUROSEMIDE 80 MG: 10 INJECTION, SOLUTION INTRAMUSCULAR; INTRAVENOUS at 06:05

## 2022-05-08 RX ADMIN — PANTOPRAZOLE SODIUM 40 MG: 40 TABLET, DELAYED RELEASE ORAL at 10:05

## 2022-05-08 RX ADMIN — VANCOMYCIN HYDROCHLORIDE 1250 MG: 1.25 INJECTION, POWDER, LYOPHILIZED, FOR SOLUTION INTRAVENOUS at 10:05

## 2022-05-08 RX ADMIN — CEFEPIME HYDROCHLORIDE 1 G: 1 INJECTION, SOLUTION INTRAVENOUS at 06:05

## 2022-05-08 RX ADMIN — LACTULOSE 20 G: 20 SOLUTION ORAL at 10:05

## 2022-05-08 RX ADMIN — BISACODYL 10 MG: 10 SUPPOSITORY RECTAL at 10:05

## 2022-05-08 RX ADMIN — Medication 10000 UNITS: at 10:05

## 2022-05-08 RX ADMIN — THIAMINE HCL TAB 100 MG 100 MG: 100 TAB at 10:05

## 2022-05-08 RX ADMIN — HYDROXYZINE HYDROCHLORIDE 25 MG: 25 TABLET, FILM COATED ORAL at 10:05

## 2022-05-08 RX ADMIN — IOHEXOL 75 ML: 350 INJECTION, SOLUTION INTRAVENOUS at 04:05

## 2022-05-08 NOTE — ED PROVIDER NOTES
Encounter Date: 2022       History     Chief Complaint   Patient presents with    Post-op Problem     Had TIPS proc on Friday, now having sob     52-year-old female history of cirrhosis status post tips procedure two days ago, hypothyroidism, hyperlipidemia presenting to the ED with shortness of breath that started yesterday night worsened today. She reports that she has difficulty taking deep breaths.  Patient denies any cough, chest pain, fevers, nausea, vomiting.  Denies any lower extremity swelling, history of DVTs, recent travel.  She gets regular paracentesis  and  every week.  Plan for paracentesis tomorrow.  She reports this shortness of breath is not like when she has significant amount of fluid in her abdomen.    The history is provided by the patient.     Review of patient's allergies indicates:  No Known Allergies  Past Medical History:   Diagnosis Date    ADD (attention deficit disorder)     Anxiety     Ascites of liver     Cirrhosis 2021    CKD (chronic kidney disease) stage 3, GFR 30-59 ml/min     Constipation     ETOH abuse     Hyperlipidemia     Hypothyroidism     Other ascites 2021    Pancreatitis, acute     Tobacco use     Vitamin D deficiency      Past Surgical History:   Procedure Laterality Date    AUGMENTATION OF BREAST      breast augmentation       SECTION      COLONOSCOPY N/A 2021    Procedure: COLONOSCOPY;  Surgeon: Dao Gray MD;  Location: [facility] ([address] [place] [address]);  Service: Endoscopy;  Laterality: N/A;  COVID test 21 General surgery clinic [facility]    CYSTOSCOPY N/A 9/10/2021    Procedure: CYSTOSCOPY;  Surgeon: Rj Sánchez Jr., MD;  Location: [facility] OR [address] [place] [address];  Service: Urology;  Laterality: N/A;    ESOPHAGOGASTRODUODENOSCOPY N/A 2021    Procedure: ESOPHAGOGASTRODUODENOSCOPY (EGD);  Surgeon: Dao Gray MD;  Location: [facility] ENDO ([address] [place] [address]);  Service: Endoscopy;  Laterality: N/A;  labs current on      ESOPHAGOGASTRODUODENOSCOPY N/A 10/26/2021    Procedure: ESOPHAGOGASTRODUODENOSCOPY (EGD);  Surgeon: Dao Gray MD;  Location: [facility] (2ND FLR);  Service: Endoscopy;  Laterality: N/A;  to be done the week of 10/18/21 per Dr. Gray-[employer]-10/23/21-[facility]   10/25 arrival time confirmed with pt-rb    ESOPHAGOGASTRODUODENOSCOPY Left 12/21/2021    Procedure: EGD (ESOPHAGOGASTRODUODENOSCOPY);  Surgeon: Koffi Monteiro MD;  Location: [facility] (4TH FLR);  Service: Endoscopy;  Laterality: Left;  Rapid  pt requested AM appt and first available in December-BB  labs morning of procedure-  12/14 lvm to confirm appt-rb    HEMORRHOID SURGERY      PERITONEOCENTESIS Right 6/8/2021    Procedure: PARACENTESIS, ABDOMINAL;  Surgeon: Jay Torre MD;  Location: [facility] CATH LAB;  Service: Radiology;  Laterality: Right;    PERITONEOCENTESIS Right 6/25/2021    Procedure: PARACENTESIS, ABDOMINAL;  Surgeon: Jay Torre MD;  Location: [facility] CATH LAB;  Service: Radiology;  Laterality: Right;    PERITONEOCENTESIS Right 7/12/2021    Procedure: PARACENTESIS, ABDOMINAL;  Surgeon: Jay Torre MD;  Location: [facility] CATH LAB;  Service: Radiology;  Laterality: Right;    PERITONEOCENTESIS Right 7/23/2021    Procedure: PARACENTESIS, ABDOMINAL;  Surgeon: Edward De La Torre II, MD;  Location: [facility] CATH LAB;  Service: Interventional Radiology;  Laterality: Right;    PERITONEOCENTESIS Right 8/3/2021    Procedure: PARACENTESIS, ABDOMINAL;  Surgeon: Franco Cheung MD;  Location: [facility] CATH LAB;  Service: Radiology;  Laterality: Right;    PERITONEOCENTESIS Right 8/16/2021    Procedure: PARACENTESIS, ABDOMINAL;  Surgeon: Jay Torre MD;  Location: [facility] CATH LAB;  Service: Radiology;  Laterality: Right;    PERITONEOCENTESIS Right 8/23/2021    Procedure: PARACENTESIS, ABDOMINAL;  Surgeon: Jay Torre MD;  Location: [facility] CATH LAB;  Service: Radiology;  Laterality: Right;    PERITONEOCENTESIS Right  9/16/2021    Procedure: PARACENTESIS, ABDOMINAL;  Surgeon: Jay Torre MD;  Location: [facility] CATH LAB;  Service: Radiology;  Laterality: Right;    PERITONEOCENTESIS N/A 9/24/2021    Procedure: PARACENTESIS, ABDOMINAL;  Surgeon: Jay Torre MD;  Location: [facility] CATH LAB;  Service: Radiology;  Laterality: N/A;    PERITONEOCENTESIS Right 12/23/2021    Procedure: PARACENTESIS, ABDOMINAL;  Surgeon: Jay Torre MD;  Location: [facility] CATH LAB;  Service: Radiology;  Laterality: Right;    PERITONEOCENTESIS N/A 12/30/2021    Procedure: PARACENTESIS, ABDOMINAL;  Surgeon: Salas Cabrera MD;  Location: [facility] CATH LAB;  Service: Radiology;  Laterality: N/A;    RETROGRADE PYELOGRAPHY Bilateral 9/10/2021    Procedure: PYELOGRAM, RETROGRADE;  Surgeon: Rj Sánchez Jr., MD;  Location: [address];  Service: Urology;  Laterality: Bilateral;    TONSILLECTOMY       Family History   Problem Relation Age of Onset    Cancer Mother         Uterine Cancer     No Known Problems Father     No Known Problems Sister     Sleep apnea Daughter     ADD / ADHD Daughter     Heart disease Maternal Grandmother         CHF    COPD Maternal Grandfather     Heart disease Paternal Grandmother         CHF    Colon cancer Neg Hx     Inflammatory bowel disease Neg Hx     Stomach cancer Neg Hx     Breast cancer Neg Hx     Ovarian cancer Neg Hx     Learning disabilities Neg Hx      Social History     Tobacco Use    Smoking status: Current Every Day Smoker     Packs/day: 0.25     Years: 27.00     Pack years: 6.75     Types: Cigarettes    Smokeless tobacco: Never Used    Tobacco comment: two cigarettes a day    Substance Use Topics    Alcohol use: Not Currently     Comment: quit caridad 15    Drug use: No     Review of Systems   Constitutional: Negative for chills and fever.   HENT: Negative for congestion and sore throat.    Eyes: Negative for visual disturbance.   Respiratory: Positive for shortness of breath.     Cardiovascular: Negative for chest pain.   Gastrointestinal: Negative for abdominal pain, diarrhea, nausea and vomiting.   Genitourinary: Negative for dysuria.   Musculoskeletal: Negative for gait problem.   Skin: Negative for rash.   Neurological: Negative for dizziness, light-headedness and headaches.   Hematological: Does not bruise/bleed easily.       Physical Exam     Initial Vitals [05/08/22 1359]   BP Pulse Resp Temp SpO2   (!) 102/55 (!) 112 20 98.7 °F (37.1 °C) 97 %      MAP       --         Physical Exam    Nursing note and vitals reviewed.  Constitutional: She appears well-developed and well-nourished. She is not diaphoretic. No distress.   HENT:   Head: Normocephalic and atraumatic.   Eyes: Conjunctivae and EOM are normal. Right eye exhibits no discharge. Left eye exhibits no discharge. No scleral icterus.   Neck:   Normal range of motion.  Cardiovascular: Normal rate and regular rhythm. Exam reveals no gallop and no friction rub.    No murmur heard.  Pulmonary/Chest: No respiratory distress. She has no wheezes. She has no rhonchi. She has no rales. She exhibits no tenderness.   Clear lung sounds   Abdominal: Abdomen is soft. She exhibits no distension and no mass. There is no abdominal tenderness. There is no rebound and no guarding.   Musculoskeletal:      Cervical back: Normal range of motion.     Neurological: She is alert.   Skin: Skin is warm and dry. No erythema. No pallor.         ED Course   Procedures  Labs Reviewed   CBC W/ AUTO DIFFERENTIAL - Abnormal; Notable for the following components:       Result Value    WBC 17.43 (*)     RBC 2.80 (*)     Hemoglobin 9.4 (*)     Hematocrit 28.1 (*)      (*)     MCH 33.6 (*)     Gran # (ANC) 14.3 (*)     Immature Grans (Abs) 0.06 (*)     Mono # 1.3 (*)     Gran % 82.4 (*)     Lymph % 7.1 (*)     All other components within normal limits   COMPREHENSIVE METABOLIC PANEL - Abnormal; Notable for the following components:    Potassium 3.4 (*)     CO2  17 (*)     Glucose 164 (*)     BUN 53 (*)     Calcium 8.2 (*)     Total Protein 5.4 (*)     Albumin 3.4 (*)     Alkaline Phosphatase 144 (*)     AST 52 (*)     ALT 47 (*)     eGFR if  50.2 (*)     eGFR if non  43.5 (*)     All other components within normal limits   TROPONIN I - Abnormal; Notable for the following components:    Troponin I 0.100 (*)     All other components within normal limits   B-TYPE NATRIURETIC PEPTIDE - Abnormal; Notable for the following components:    BNP 1,344 (*)     All other components within normal limits   CULTURE, BLOOD   CULTURE, BLOOD   URINALYSIS, REFLEX TO URINE CULTURE    Narrative:     Specimen Source->Urine   HIV 1 / 2 ANTIBODY   HEPATITIS C ANTIBODY   SARS-COV-2 RDRP GENE     EKG Readings: (Independently Interpreted)   Sinus tachycardia, rate of 104. No ST elevations or depressions concerning for ischemia.  No STEMI per my independent interpretation         Imaging Results          CTA Chest Non-Coronary (PE Study) (Final result)  Result time 05/08/22 16:46:47    Final result by Esvin Perry MD (05/08/22 16:46:47)                 Impression:      1. No evidence of pulmonary embolism.  2. Bilateral peripheral patchy alveolar infiltrates most prominent in the upper lobes.  This can be seen with viral pneumonia.  Recommend clinical correlation and follow-up.  3. Bibasilar posterior consolidation and atelectasis.  4. Bilateral breast implants with bilateral intracapsular rupture.  5. Cirrhosis of the liver with upper abdominal ascites and TIPS shunt catheter noted.      Electronically signed by: Esvin Perry  Date:    05/08/2022  Time:    16:46             Narrative:    EXAMINATION:  CTA CHEST NON CORONARY    CLINICAL HISTORY:  Pulmonary embolism (PE) suspected, high prob;    TECHNIQUE:  Low dose axial only images are submitted from the thoracic inlet to the lung bases following the IV administration of 75 mL of Omnipaque 350.  Contrast timing  was optimized to evaluate the pulmonary arteries.  MIP images were performed.    COMPARISON:  None    FINDINGS:  Pulmonary arteries:Pulmonary arteries are suboptimally enhanced.No filling defects to indicate pulmonary embolism.  Limited visualization of segmental and subsegmental pulmonary arteries.    Thoracic soft tissues: Bilateral breast implants with linguine sign suggesting bilateral intracapsular rupture.    Aorta: Left-sided aortic arch.  No aneurysm or dissection.    Heart: Normal size. No effusion.    Barbara/Mediastinum: No pathologic dwight enlargement.    Airways: Patent.    Lungs/Pleura: Bilateral peripheral patchy alveolar infiltrates most prominent the upper lobes.  This can be seen with viral pneumonia recommend clinical correlation.    Bibasilar posterior consolidation and atelectasis.    Esophagus: Unremarkable.    Upper Abdomen: Cirrhotic changes of the liver.  Tips shunt catheter is noted.  Upper abdominal ascites is noted.    Bones: No acute fracture. No suspicious lytic or sclerotic lesions.                               X-Ray Chest 1 View (Final result)  Result time 05/08/22 15:18:06    Final result by Esvin Perry MD (05/08/22 15:18:06)                 Impression:      No acute abnormality.      Electronically signed by: Esvin Perry  Date:    05/08/2022  Time:    15:18             Narrative:    EXAMINATION:  XR CHEST 1 VIEW    CLINICAL HISTORY:  shortness of breath;    TECHNIQUE:  Single frontal view of the chest was performed.    COMPARISON:  02/28/2022    FINDINGS:  Bilateral breast augmentation.The lungs are clear, with normal appearance of pulmonary vasculature and no pleural effusion or pneumothorax.    The cardiac silhouette is normal in size. The hilar and mediastinal contours are unremarkable.    Bones are intact.                              X-Rays:   Independently Interpreted Readings:   Other Readings:  No pneumonia, pneumothorax, or pleural effusion noted on  CXR      Medications   lactulose 20 gram/30 mL solution Soln 20 g (20 g Oral Given 5/8/22 2211)   levothyroxine tablet 50 mcg (has no administration in time range)   pantoprazole EC tablet 40 mg (40 mg Oral Given 5/8/22 2211)   thiamine tablet 100 mg (100 mg Oral Given 5/8/22 2211)   vitamin A capsule 10,000 Units (10,000 Units Oral Given 5/8/22 2240)   sodium chloride 0.9% flush 10 mL (has no administration in time range)   ondansetron disintegrating tablet 8 mg (has no administration in time range)   acetaminophen tablet 650 mg (has no administration in time range)   cefepime in dextrose 5 % 1 gram/50 mL IVPB 1 g (0 g Intravenous Stopped 5/8/22 1857)   vancomycin - pharmacy to dose (has no administration in time range)   vancomycin 1.25 g in dextrose 5% 250 mL IVPB (ready to mix) (1,250 mg Intravenous New Bag 5/8/22 2210)   vancomycin 750 mg in dextrose 5 % 250 mL IVPB (ready to mix system) (750 mg Intravenous Trough Due As Scheduled Before Dose 5/10/22 1730)   hydrOXYzine HCL tablet 25 mg (25 mg Oral Given 5/8/22 2221)   iohexoL (OMNIPAQUE 350) injection 75 mL (75 mLs Intravenous Given 5/8/22 1622)   furosemide injection 80 mg (80 mg Intravenous Given 5/8/22 1821)   bisacodyL suppository 10 mg (10 mg Rectal Given 5/8/22 2211)     Medical Decision Making:   History:   Old Medical Records: I decided to obtain old medical records.  Initial Assessment:   52-year-old female with recent tips procedure presenting to the ED with worsening shortness of breath x2 days.  No other associated symptoms.  Clear lung sounds on exam.  Mildly tachycardic on arrival.    Differential includes is not limited to PE, pneumonia, pneumothorax, pleural effusion, ACS, electrolyte abnormalities.    CBC without leukocytosis or anemia.  CMP showed no trauma as concern for hospitalization.  Troponin elevated at 0.1.  Suspect NSTEMI versus type 2 NSTEMI due to patient having no EKG changes.  BNP significantly elevated, 1344. However, patient  has no signs volume overload on exam.  Chest x-ray was not concerning for any pulmonary vascular congestion.  No history of CHF.  Discussed with liver transplant Medicine who evaluated patient, plan to admit onto their service.    Independently Interpreted Test(s):   I have ordered and independently interpreted X-rays - see prior notes.  I have ordered and independently interpreted EKG Reading(s) - see prior notes  Clinical Tests:   Lab Tests: Ordered and Reviewed  Radiological Study: Ordered and Reviewed  Medical Tests: Ordered and Reviewed  Other:   I have discussed this case with another health care provider.       <> Summary of the Discussion: Discussed with liver transplant medicine             ED Course as of 05/08/22 2302   Sun May 08, 2022   1550 BNP(!): 1,344 [AU]   1550 Troponin I(!): 0.100 [AU]      ED Course User Index  [AU] Neel Mckay MD             Clinical Impression:   Final diagnoses:  [R06.02] Shortness of breath  [R06.02] SOB (shortness of breath)  [R77.8] Elevated troponin (Primary)          ED Disposition Condition    Admit               Neel Mckay MD  Resident  05/08/22 2302       Neel Mckay MD  Resident  05/08/22 2303

## 2022-05-08 NOTE — ASSESSMENT & PLAN NOTE
- EKG with sinus tach   - Troponin mildly elevated, will trend  - Chest x-ray unremarkable  - CTA with no PE but possible viral PNA  - BNP is elevated   - Will obtain echo  - Give IV Lasix 80 mg x 1

## 2022-05-08 NOTE — HPI
Ms. 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 Liver disease has been complicated by HE, ascites requiring paracentesis twice a week. She now reports to the ED with complaints of SOB x 1 day. She had a TIPS procedure on Friday 5/6/22 and was admitted to the observation unit and discharged yesterday 5/7/22. She states that the SOB started yesterday evening. She denies chest pain, fever/chills, abdominal pain. Pulse ox in ED 97% on room air. Lab work in ED remarkable for leukocytosis, elevated BNP, and mildly elevated troponin. Chest x-ray with no acute findings. CTA ordered by ED staff with no PE seen. Possible viral PNA findings. Plan for infectious work up (blood cx x 2, UA/cx). Will start broad spectrum antibiotics. For SOB, will diuresis with IV lasix and order Echo   Post-Care Instructions: Patient instructed to not lie down for 4 hours and limit physical activity for 24 hours. Patient instructed not to travel by airplane for 48 hours.

## 2022-05-08 NOTE — SUBJECTIVE & OBJECTIVE
Past Medical History:   Diagnosis Date    ADD (attention deficit disorder)     Anxiety     Ascites of liver     Cirrhosis 2021    CKD (chronic kidney disease) stage 3, GFR 30-59 ml/min     Constipation     ETOH abuse     Hyperlipidemia     Hypothyroidism     Other ascites 2021    Pancreatitis, acute     Tobacco use     Vitamin D deficiency        Past Surgical History:   Procedure Laterality Date    AUGMENTATION OF BREAST      breast augmentation       SECTION      COLONOSCOPY N/A 2021    Procedure: COLONOSCOPY;  Surgeon: Dao Gray MD;  Location: [facility] (2ND FLR);  Service: Endoscopy;  Laterality: N/A;  COVID test 21 General surgery clinic [facility]    CYSTOSCOPY N/A 9/10/2021    Procedure: CYSTOSCOPY;  Surgeon: Rj Sánchez Jr., MD;  Location: [facility] OR 1ST FLR;  Service: Urology;  Laterality: N/A;    ESOPHAGOGASTRODUODENOSCOPY N/A 2021    Procedure: ESOPHAGOGASTRODUODENOSCOPY (EGD);  Surgeon: Dao Gray MD;  Location: [facility] (2ND FLR);  Service: Endoscopy;  Laterality: N/A;  labs current on     ESOPHAGOGASTRODUODENOSCOPY N/A 10/26/2021    Procedure: ESOPHAGOGASTRODUODENOSCOPY (EGD);  Surgeon: Dao Gray MD;  Location: [facility] (2ND FLR);  Service: Endoscopy;  Laterality: N/A;  to be done the week of 10/18/21 per Dr. Gray-BB  covid-10/23/21-[facility]-   10/25 arrival time confirmed with pt-rb    ESOPHAGOGASTRODUODENOSCOPY Left 2021    Procedure: EGD (ESOPHAGOGASTRODUODENOSCOPY);  Surgeon: Koffi Monteiro MD;  Location: [facility] (4TH FLR);  Service: Endoscopy;  Laterality: Left;  Rapid  pt requested AM appt and first available in December-  labs morning of procedure-   lvm to confirm appt-rb    HEMORRHOID SURGERY      PERITONEOCENTESIS Right 2021    Procedure: PARACENTESIS, ABDOMINAL;  Surgeon: Jay Torre MD;  Location: [facility] CATH LAB;  Service: Radiology;  Laterality: Right;    PERITONEOCENTESIS Right 2021    Procedure:  PARACENTESIS, ABDOMINAL;  Surgeon: Jay Torre MD;  Location: [facility] CATH LAB;  Service: Radiology;  Laterality: Right;    PERITONEOCENTESIS Right 7/12/2021    Procedure: PARACENTESIS, ABDOMINAL;  Surgeon: Jay Torre MD;  Location: [facility] CATH LAB;  Service: Radiology;  Laterality: Right;    PERITONEOCENTESIS Right 7/23/2021    Procedure: PARACENTESIS, ABDOMINAL;  Surgeon: Edward De La Torre II, MD;  Location: [facility] CATH LAB;  Service: Interventional Radiology;  Laterality: Right;    PERITONEOCENTESIS Right 8/3/2021    Procedure: PARACENTESIS, ABDOMINAL;  Surgeon: Franco Cheung MD;  Location: [facility] CATH LAB;  Service: Radiology;  Laterality: Right;    PERITONEOCENTESIS Right 8/16/2021    Procedure: PARACENTESIS, ABDOMINAL;  Surgeon: Jay Torre MD;  Location: [facility] CATH LAB;  Service: Radiology;  Laterality: Right;    PERITONEOCENTESIS Right 8/23/2021    Procedure: PARACENTESIS, ABDOMINAL;  Surgeon: Jay Torre MD;  Location: [facility] CATH LAB;  Service: Radiology;  Laterality: Right;    PERITONEOCENTESIS Right 9/16/2021    Procedure: PARACENTESIS, ABDOMINAL;  Surgeon: Jay Torre MD;  Location: [facility] CATH LAB;  Service: Radiology;  Laterality: Right;    PERITONEOCENTESIS N/A 9/24/2021    Procedure: PARACENTESIS, ABDOMINAL;  Surgeon: Jay Torre MD;  Location: [facility] CATH LAB;  Service: Radiology;  Laterality: N/A;    PERITONEOCENTESIS Right 12/23/2021    Procedure: PARACENTESIS, ABDOMINAL;  Surgeon: Jay Torre MD;  Location: [facility] CATH LAB;  Service: Radiology;  Laterality: Right;    PERITONEOCENTESIS N/A 12/30/2021    Procedure: PARACENTESIS, ABDOMINAL;  Surgeon: Salas Cabrera MD;  Location: [facility] CATH LAB;  Service: Radiology;  Laterality: N/A;    RETROGRADE PYELOGRAPHY Bilateral 9/10/2021    Procedure: PYELOGRAM, RETROGRADE;  Surgeon: Rj Sánchez Jr., MD;  Location: [address];  Service: Urology;  Laterality: Bilateral;    TONSILLECTOMY         Review of  patient's allergies indicates:  No Known Allergies    Family History       Problem Relation (Age of Onset)    ADD / ADHD Daughter    COPD Maternal Grandfather    Cancer Mother    Heart disease Maternal Grandmother, Paternal Grandmother    No Known Problems Father, Sister    Sleep apnea Daughter          Tobacco Use    Smoking status: Current Every Day Smoker     Packs/day: 0.25     Years: 27.00     Pack years: 6.75     Types: Cigarettes    Smokeless tobacco: Never Used    Tobacco comment: two cigarettes a day    Substance and Sexual Activity    Alcohol use: Not Currently     Comment: quit caridad 16    Drug use: No    Sexual activity: Yes     Partners: Male       (Not in a hospital admission)      Review of Systems   Constitutional:  Positive for activity change. Negative for chills and fever.   Respiratory:  Positive for shortness of breath.    Cardiovascular:  Negative for chest pain and leg swelling.   Gastrointestinal:  Positive for abdominal distention. Negative for abdominal pain, constipation, diarrhea, nausea and vomiting.   Neurological:  Negative for headaches.   Psychiatric/Behavioral:  Negative for agitation and confusion.    Objective:     Vital Signs (Most Recent):  Temp: 98.9 °F (37.2 °C) (05/08/22 1555)  Pulse: 109 (05/08/22 1416)  Resp: (!) 21 (05/08/22 1416)  BP: (!) 97/49 (05/08/22 1416)  SpO2: 97 % (05/08/22 1416)   Vital Signs (24h Range):  Temp:  [98.2 °F (36.8 °C)-98.9 °F (37.2 °C)] 98.9 °F (37.2 °C)  Pulse:  [109-112] 109  Resp:  [20-21] 21  SpO2:  [97 %] 97 %  BP: ()/(49-55) 97/49     Weight: 56.7 kg (125 lb)  Body mass index is 22.14 kg/m².    No intake or output data in the 24 hours ending 05/08/22 1659    Physical Exam  Vitals and nursing note reviewed.   Cardiovascular:      Rate and Rhythm: Regular rhythm. Tachycardia present.   Pulmonary:      Effort: Tachypnea present.      Breath sounds: Normal breath sounds. No wheezing or rales.   Abdominal:      General: There is distension.       Palpations: Abdomen is soft.      Tenderness: There is no abdominal tenderness. There is no guarding or rebound.   Neurological:      Mental Status: She is alert and oriented to person, place, and time.   Psychiatric:         Mood and Affect: Mood normal.         Behavior: Behavior normal.         Thought Content: Thought content normal.         Judgment: Judgment normal.     Laboratory:  CBC:   Recent Labs   Lab 05/08/22  1451   WBC 17.43*   RBC 2.80*   HGB 9.4*   HCT 28.1*      *   MCH 33.6*   MCHC 33.5     CMP:   Recent Labs   Lab 05/08/22  1451   *   CALCIUM 8.2*   ALBUMIN 3.4*   PROT 5.4*      K 3.4*   CO2 17*      BUN 53*   CREATININE 1.4   ALKPHOS 144*   ALT 47*   AST 52*   BILITOT 0.8     Labs within the past 24 hours have been reviewed.    Diagnostic Results:  XR Chest: Results for orders placed during the hospital encounter of 05/08/22    X-Ray Chest 1 View    Narrative  EXAMINATION:  XR CHEST 1 VIEW    CLINICAL HISTORY:  shortness of breath;    TECHNIQUE:  Single frontal view of the chest was performed.    COMPARISON:  02/28/2022    FINDINGS:  Bilateral breast augmentation.The lungs are clear, with normal appearance of pulmonary vasculature and no pleural effusion or pneumothorax.    The cardiac silhouette is normal in size. The hilar and mediastinal contours are unremarkable.    Bones are intact.    Impression  No acute abnormality.      Electronically signed by: Esvin Doyle  Date:    05/08/2022  Time:    15:18

## 2022-05-08 NOTE — H&P
[facility] - Emergency Dept  Liver Transplant  History & Physical    Patient Name: Malu Montes  MRN: [id number]  Admission Date: 2022  Code Status: Full Code  Primary Care Provider: Killian Dodd DO    Subjective:     History of Present Illness:  Ms. 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 Liver disease has been complicated by HE, ascites requiring paracentesis twice a week. She now reports to the ED with complaints of SOB x 1 day. She had a TIPS procedure on 22 and was admitted to the observation unit and discharged yesterday 22. She states that the SOB started yesterday evening. She denies chest pain, fever/chills, abdominal pain. Pulse ox in ED 97% on room air. Lab work in ED remarkable for leukocytosis, elevated BNP, and mildly elevated troponin. Chest x-ray with no acute findings. CTA ordered by ED staff with no PE seen. Possible viral PNA findings. Plan for infectious work up (blood cx x 2, UA/cx). Will start broad spectrum antibiotics. For SOB, will diuresis with IV lasix and order Echo      Past Medical History:   Diagnosis Date    ADD (attention deficit disorder)     Anxiety     Ascites of liver     Cirrhosis 2021    CKD (chronic kidney disease) stage 3, GFR 30-59 ml/min     Constipation     ETOH abuse     Hyperlipidemia     Hypothyroidism     Other ascites 2021    Pancreatitis, acute     Tobacco use     Vitamin D deficiency        Past Surgical History:   Procedure Laterality Date    AUGMENTATION OF BREAST      breast augmentation       SECTION      COLONOSCOPY N/A 2021    Procedure: COLONOSCOPY;  Surgeon: Dao Gray MD;  Location: [facility] (2ND FLR);  Service: Endoscopy;  Laterality: N/A;  COVID test 21 General surgery clinic -     CYSTOSCOPY N/A 9/10/2021    Procedure: CYSTOSCOPY;  Surgeon: Rj Sánchez Jr., MD;  Location: [facility] OR [address] [place] [address];  Service:  Urology;  Laterality: N/A;    ESOPHAGOGASTRODUODENOSCOPY N/A 9/17/2021    Procedure: ESOPHAGOGASTRODUODENOSCOPY (EGD);  Surgeon: Dao Gray MD;  Location: [facility] (2ND FLR);  Service: Endoscopy;  Laterality: N/A;  labs current on 9/7    ESOPHAGOGASTRODUODENOSCOPY N/A 10/26/2021    Procedure: ESOPHAGOGASTRODUODENOSCOPY (EGD);  Surgeon: Dao Gray MD;  Location: [facility] (2ND FLR);  Service: Endoscopy;  Laterality: N/A;  to be done the week of 10/18/21 per Dr. Gray-  covid-10/23/21-[facility]-   10/25 arrival time confirmed with pt-rb    ESOPHAGOGASTRODUODENOSCOPY Left 12/21/2021    Procedure: EGD (ESOPHAGOGASTRODUODENOSCOPY);  Surgeon: Koffi Monteiro MD;  Location: [facility] (4TH FLR);  Service: Endoscopy;  Laterality: Left;  Rapid  pt requested AM appt and first available in December-  labs morning of procedure-  12/14 lvm to confirm appt-rb    HEMORRHOID SURGERY      PERITONEOCENTESIS Right 6/8/2021    Procedure: PARACENTESIS, ABDOMINAL;  Surgeon: Jay Torre MD;  Location: [facility] CATH LAB;  Service: Radiology;  Laterality: Right;    PERITONEOCENTESIS Right 6/25/2021    Procedure: PARACENTESIS, ABDOMINAL;  Surgeon: Jay Torre MD;  Location: [facility] CATH LAB;  Service: Radiology;  Laterality: Right;    PERITONEOCENTESIS Right 7/12/2021    Procedure: PARACENTESIS, ABDOMINAL;  Surgeon: Jay Torre MD;  Location: [facility] CATH LAB;  Service: Radiology;  Laterality: Right;    PERITONEOCENTESIS Right 7/23/2021    Procedure: PARACENTESIS, ABDOMINAL;  Surgeon: Edward De La Torre II, MD;  Location: [facility] CATH LAB;  Service: Interventional Radiology;  Laterality: Right;    PERITONEOCENTESIS Right 8/3/2021    Procedure: PARACENTESIS, ABDOMINAL;  Surgeon: Franco Cheung MD;  Location: [facility] CATH LAB;  Service: Radiology;  Laterality: Right;    PERITONEOCENTESIS Right 8/16/2021    Procedure: PARACENTESIS, ABDOMINAL;  Surgeon: Jay Torre MD;  Location: [facility] CATH LAB;   Service: Radiology;  Laterality: Right;    PERITONEOCENTESIS Right 8/23/2021    Procedure: PARACENTESIS, ABDOMINAL;  Surgeon: Jay Torre MD;  Location: [facility] CATH LAB;  Service: Radiology;  Laterality: Right;    PERITONEOCENTESIS Right 9/16/2021    Procedure: PARACENTESIS, ABDOMINAL;  Surgeon: Jay Torre MD;  Location: [facility] CATH LAB;  Service: Radiology;  Laterality: Right;    PERITONEOCENTESIS N/A 9/24/2021    Procedure: PARACENTESIS, ABDOMINAL;  Surgeon: Jay Torre MD;  Location: [facility] CATH LAB;  Service: Radiology;  Laterality: N/A;    PERITONEOCENTESIS Right 12/23/2021    Procedure: PARACENTESIS, ABDOMINAL;  Surgeon: Jay Torre MD;  Location: [facility] CATH LAB;  Service: Radiology;  Laterality: Right;    PERITONEOCENTESIS N/A 12/30/2021    Procedure: PARACENTESIS, ABDOMINAL;  Surgeon: Salas Cabrera MD;  Location: [facility] CATH LAB;  Service: Radiology;  Laterality: N/A;    RETROGRADE PYELOGRAPHY Bilateral 9/10/2021    Procedure: PYELOGRAM, RETROGRADE;  Surgeon: Rj Sánchez Jr., MD;  Location: [address];  Service: Urology;  Laterality: Bilateral;    TONSILLECTOMY         Review of patient's allergies indicates:  No Known Allergies    Family History       Problem Relation (Age of Onset)    ADD / ADHD Daughter    COPD Maternal Grandfather    Cancer Mother    Heart disease Maternal Grandmother, Paternal Grandmother    No Known Problems Father, Sister    Sleep apnea Daughter          Tobacco Use    Smoking status: Current Every Day Smoker     Packs/day: 0.25     Years: 27.00     Pack years: 6.75     Types: Cigarettes    Smokeless tobacco: Never Used    Tobacco comment: two cigarettes a day    Substance and Sexual Activity    Alcohol use: Not Currently     Comment: quit caridad 15    Drug use: No    Sexual activity: Yes     Partners: Male       (Not in a hospital admission)      Review of Systems   Constitutional:  Positive for activity change. Negative for chills and  fever.   Respiratory:  Positive for shortness of breath.    Cardiovascular:  Negative for chest pain and leg swelling.   Gastrointestinal:  Positive for abdominal distention. Negative for abdominal pain, constipation, diarrhea, nausea and vomiting.   Neurological:  Negative for headaches.   Psychiatric/Behavioral:  Negative for agitation and confusion.    Objective:     Vital Signs (Most Recent):  Temp: 98.9 °F (37.2 °C) (05/08/22 1555)  Pulse: 109 (05/08/22 1416)  Resp: (!) 21 (05/08/22 1416)  BP: (!) 97/49 (05/08/22 1416)  SpO2: 97 % (05/08/22 1416)   Vital Signs (24h Range):  Temp:  [98.2 °F (36.8 °C)-98.9 °F (37.2 °C)] 98.9 °F (37.2 °C)  Pulse:  [109-112] 109  Resp:  [20-21] 21  SpO2:  [97 %] 97 %  BP: ()/(49-55) 97/49     Weight: 56.7 kg (125 lb)  Body mass index is 22.14 kg/m².    No intake or output data in the 24 hours ending 05/08/22 1659    Physical Exam  Vitals and nursing note reviewed.   Cardiovascular:      Rate and Rhythm: Regular rhythm. Tachycardia present.   Pulmonary:      Effort: Tachypnea present.      Breath sounds: Normal breath sounds. No wheezing or rales.   Abdominal:      General: There is distension.      Palpations: Abdomen is soft.      Tenderness: There is no abdominal tenderness. There is no guarding or rebound.   Neurological:      Mental Status: She is alert and oriented to person, place, and time.   Psychiatric:         Mood and Affect: Mood normal.         Behavior: Behavior normal.         Thought Content: Thought content normal.         Judgment: Judgment normal.     Laboratory:  CBC:   Recent Labs   Lab 05/08/22  1451   WBC 17.43*   RBC 2.80*   HGB 9.4*   HCT 28.1*      *   MCH 33.6*   MCHC 33.5     CMP:   Recent Labs   Lab 05/08/22  1451   *   CALCIUM 8.2*   ALBUMIN 3.4*   PROT 5.4*      K 3.4*   CO2 17*      BUN 53*   CREATININE 1.4   ALKPHOS 144*   ALT 47*   AST 52*   BILITOT 0.8     Labs within the past 24 hours have been  reviewed.    Diagnostic Results:  XR Chest: Results for orders placed during the hospital encounter of 05/08/22    X-Ray Chest 1 View    Narrative  EXAMINATION:  XR CHEST 1 VIEW    CLINICAL HISTORY:  shortness of breath;    TECHNIQUE:  Single frontal view of the chest was performed.    COMPARISON:  02/28/2022    FINDINGS:  Bilateral breast augmentation.The lungs are clear, with normal appearance of pulmonary vasculature and no pleural effusion or pneumothorax.    The cardiac silhouette is normal in size. The hilar and mediastinal contours are unremarkable.    Bones are intact.    Impression  No acute abnormality.      Electronically signed by: Esvin Doyle  Date:    05/08/2022  Time:    15:18    Assessment/Plan:     * SOB (shortness of breath)  - EKG with sinus tach   - Troponin mildly elevated, will trend  - Chest x-ray unremarkable  - CTA with no PE but possible viral PNA  - BNP is elevated   - Will obtain echo  - Give IV Lasix 80 mg x 1      Leukocytosis  - Infectious work up initiated (blood cx x 2, UA)  - COVID negative  - Chest x-ray unremarkable  - CTA with possible viral PNA findings  - Plan to start broad spectrum antibiotics  - Likely plan for para tomorrow to rule out SBP      Anemia of chronic disease  - H/H stable  - Monitor with daily cbc      Alcoholic cirrhosis of liver with ascites  - Listed for liver/kidney transplant  MELD-Na score: 12 at 5/6/2022  8:39 PM  MELD score: 12 at 5/6/2022  8:39 PM  Calculated from:  Serum Creatinine: 1.4 mg/dL at 5/6/2022  8:39 PM  Serum Sodium: 138 mmol/L (Using max of 137 mmol/L) at 5/6/2022  8:39 PM  Total Bilirubin: 1.3 mg/dL at 5/6/2022  8:39 PM  INR(ratio): 1.1 at 5/6/2022  8:39 PM  Age: 52 years      CKD (chronic kidney disease) stage 3, GFR 30-59 ml/min  - Creatinine stable  - Continue to monitor      Other ascites  - Due for paracentesis tomorrow 5/9  - s/p TIPs for refractory ascites on 5/6          MELD-Na score: 11 at 5/8/2022  2:51 PM  MELD score: 11 at  5/8/2022  2:51 PM  Calculated from:  Serum Creatinine: 1.4 mg/dL at 5/8/2022  2:51 PM  Serum Sodium: 138 mmol/L (Using max of 137 mmol/L) at 5/8/2022  2:51 PM  Total Bilirubin: 0.8 mg/dL (Using min of 1 mg/dL) at 5/8/2022  2:51 PM  INR(ratio): 1.1 at 5/6/2022  8:39 PM  Age: 52 years  Patient was SARS-CoV-2 /COVID-19 tested with negative results.       Discharge Planning:  No Patient Care Coordination Note on file.      Nhung Domínguez, PARodrickC  Liver Transplant  [facility] - Emergency Dept

## 2022-05-08 NOTE — TELEPHONE ENCOUNTER
Reason for Disposition   Difficulty breathing   SEVERE difficulty breathing (e.g., struggling for each breath, speaks in single words)    Additional Information   Negative: Sounds like a life-threatening emergency to the triager   Negative: Chest pain    Protocols used: POST-OP SYMPTOMS AND [id number], BREATHING DIFFICULTY-A-AH    Pt stated she has cirrhosis of the liver and is on the transplant list. Stated she had a TIPS procedure done on 5/6/22. Stated she is sob and has shallow breathing. Per triage protocol, advised to call 911. Pt refused and stated her spouse will bring her to the ED.

## 2022-05-08 NOTE — ASSESSMENT & PLAN NOTE
- Listed for liver/kidney transplant  MELD-Na score: 12 at 5/6/2022  8:39 PM  MELD score: 12 at 5/6/2022  8:39 PM  Calculated from:  Serum Creatinine: 1.4 mg/dL at 5/6/2022  8:39 PM  Serum Sodium: 138 mmol/L (Using max of 137 mmol/L) at 5/6/2022  8:39 PM  Total Bilirubin: 1.3 mg/dL at 5/6/2022  8:39 PM  INR(ratio): 1.1 at 5/6/2022  8:39 PM  Age: 52 years

## 2022-05-08 NOTE — ED NOTES
"Patient identifiers for Malu Montes 52 y.o. female checked and correct.  Chief Complaint   Patient presents with    Post-op Problem     Had TIPS proc on Friday, now having sob     Past Medical History:   Diagnosis Date    ADD (attention deficit disorder)     Anxiety     Ascites of liver     Cirrhosis 9/16/2021    CKD (chronic kidney disease) stage 3, GFR 30-59 ml/min     Constipation     ETOH abuse     Hyperlipidemia     Hypothyroidism     Other ascites 6/14/2021    Pancreatitis, acute     Tobacco use     Vitamin D deficiency      Allergies reported: Review of patient's allergies indicates:  No Known Allergies      LOC: Patient is awake, alert, and aware of environment with an appropriate affect. Patient is oriented x 4 and speaking appropriately.  APPEARANCE: Patient resting comfortably and in no acute distress. Patient is clean and well groomed, patient's clothing is properly fastened.  HEENT: - JVD, +midline trach. - lacerations or bruises to face or scalp.  SKIN: The skin is warm and dry. Patient has normal skin turgor and moist mucus membranes. Pt. Has a bruise to R side of lower neck from TIPS procedure.   MUSKULOSKELETAL: Patient is moving all extremities well, no obvious deformities noted. Pulses intact.   RESPIRATORY: Airway is open and patent. Pt. Is having to strain to breathe and says that she has "not had a good breath since Friday". Pt. Is at 97% on RA.  CARDIAC: Patient has a normal rate and rhythm. ** on cardiac monitor. No peripheral edema noted.   ABDOMEN: No distention noted. Soft and non-tender upon palpation. Paracentesis incision noted in LLQ, no bleeding or redness.  NEUROLOGICAL: pupils 3 mm, PERRL. Facial expression is symmetrical. Hand grasps are equal bilaterally. Normal sensation in all extremities when touched with finger.        "

## 2022-05-08 NOTE — ASSESSMENT & PLAN NOTE
- Infectious work up initiated (blood cx x 2, UA)  - COVID negative  - Chest x-ray unremarkable  - CTA with possible viral PNA findings  - Plan to start broad spectrum antibiotics  - Likely plan for para tomorrow to rule out SBP

## 2022-05-08 NOTE — ED TRIAGE NOTES
51 y/o F presents to ER with c/c SOB x 2 days. Pt. States that she had TIPS procedure on Friday and that she has had SOB since procedure. Pt. Denies pain. Pt. Has slightly distended abdomen and states that she has pericentesis procedure on Mon and Fri every week.

## 2022-05-08 NOTE — PROGRESS NOTES
Pharmacokinetic Initial Assessment: IV Vancomycin    Assessment/Plan:    Initiate intravenous vancomycin with loading dose of 1250 mg once followed by a maintenance dose of vancomycin 750 mg IV every 24 hours.  Desired empiric serum trough concentration is 15 to 20 mcg/mL.  Draw vancomycin trough level 60 min prior to third dose on 05/10/2022 at 1730.  Pharmacy will continue to follow and monitor vancomycin.      Please contact pharmacy at extension [phone] with any questions regarding this assessment.     Thank you for the consult,   Wolf Ellsworth       Patient brief summary:  Malu Montes is a 52 y.o. female initiated on antimicrobial therapy with IV Vancomycin for treatment of suspected lower respiratory infection.    Drug Allergies:   Review of patient's allergies indicates:  No Known Allergies    Actual Body Weight:   56.7 kg    Renal Function:   Estimated Creatinine Clearance: 39.3 mL/min (based on SCr of 1.4 mg/dL).     CBC (last 72 hours):  Recent Labs   Lab Result Units 05/06/22 2039 05/08/22  1451   WBC K/uL 7.00 17.43*   Hemoglobin g/dL 10.9* 9.4*   Hematocrit % 33.1* 28.1*   Platelets K/uL 207 180   Gran % %  --  82.4*   Lymph % %  --  7.1*   Mono % %  --  7.7   Eosinophil % %  --  2.2   Basophil % %  --  0.3   Differential Method   --  Automated       Metabolic Panel (last 72 hours):  Recent Labs   Lab Result Units 05/06/22 2039 05/08/22  1451   Sodium mmol/L 138 138   Potassium mmol/L 3.6 3.4*   Chloride mmol/L 110 109   CO2 mmol/L 18* 17*   Glucose mg/dL 110 164*   BUN mg/dL 50* 53*   Creatinine mg/dL 1.4 1.4   Albumin g/dL 3.9 3.4*   Total Bilirubin mg/dL 1.3* 0.8   Alkaline Phosphatase U/L 140* 144*   AST U/L 44* 52*   ALT U/L 32 47*       Drug levels (last 3 results):  No results for input(s): VANCOMYCINRA, VANCOMYCINPE, VANCOMYCINTR in the last 72 hours.    Microbiologic Results:  Microbiology Results (last 7 days)     Procedure Component Value Units Date/Time    Blood culture  [[id number]] Collected: 05/08/22 1700    Order Status: Sent Specimen: Blood from Peripheral, Antecubital, Left Updated: 05/08/22 1711    Blood culture [[id number]] Collected: 05/08/22 1649    Order Status: Sent Specimen: Blood from Peripheral, Forearm, Right Updated: 05/08/22 1711

## 2022-05-09 ENCOUNTER — DOCUMENTATION ONLY (OUTPATIENT)
Dept: CARDIOLOGY | Facility: HOSPITAL | Age: 52
End: 2022-05-09
Payer: COMMERCIAL

## 2022-05-09 PROBLEM — K59.09 CHRONIC CONSTIPATION: Status: ACTIVE | Noted: 2022-05-09

## 2022-05-09 PROBLEM — R10.9 RIGHT SIDED ABDOMINAL PAIN: Status: RESOLVED | Noted: 2021-12-08 | Resolved: 2022-05-09

## 2022-05-09 PROBLEM — M79.2 RADICULAR PAIN IN RIGHT ARM: Status: RESOLVED | Noted: 2017-05-10 | Resolved: 2022-05-09

## 2022-05-09 LAB
ALBUMIN SERPL BCP-MCNC: 3.2 G/DL (ref 3.5–5.2)
ALP SERPL-CCNC: 140 U/L (ref 55–135)
ALT SERPL W/O P-5'-P-CCNC: 43 U/L (ref 10–44)
ANION GAP SERPL CALC-SCNC: 11 MMOL/L (ref 8–16)
ANION GAP SERPL CALC-SCNC: 7 MMOL/L (ref 8–16)
ASCENDING AORTA: 3.05 CM
AST SERPL-CCNC: 44 U/L (ref 10–40)
AV INDEX (PROSTH): 0.58
AV MEAN GRADIENT: 13 MMHG
AV PEAK GRADIENT: 22 MMHG
AV VALVE AREA: 1.65 CM2
AV VELOCITY RATIO: 0.52
BASOPHILS # BLD AUTO: 0.07 K/UL (ref 0–0.2)
BASOPHILS NFR BLD: 0.6 % (ref 0–1.9)
BILIRUB SERPL-MCNC: 1.3 MG/DL (ref 0.1–1)
BNP SERPL-MCNC: 1416 PG/ML (ref 0–99)
BSA FOR ECHO PROCEDURE: 1.69 M2
BSA FOR ECHO PROCEDURE: 1.69 M2
BUN SERPL-MCNC: 42 MG/DL (ref 6–20)
BUN SERPL-MCNC: 46 MG/DL (ref 6–20)
CALCIUM SERPL-MCNC: 8.2 MG/DL (ref 8.7–10.5)
CALCIUM SERPL-MCNC: 8.6 MG/DL (ref 8.7–10.5)
CHLORIDE SERPL-SCNC: 108 MMOL/L (ref 95–110)
CHLORIDE SERPL-SCNC: 108 MMOL/L (ref 95–110)
CO2 SERPL-SCNC: 17 MMOL/L (ref 23–29)
CO2 SERPL-SCNC: 22 MMOL/L (ref 23–29)
CREAT SERPL-MCNC: 1.3 MG/DL (ref 0.5–1.4)
CREAT SERPL-MCNC: 1.3 MG/DL (ref 0.5–1.4)
CV ECHO LV RWT: 0.27 CM
DIFFERENTIAL METHOD: ABNORMAL
DOP CALC AO PEAK VEL: 2.33 M/S
DOP CALC AO VTI: 46.27 CM
DOP CALC LVOT AREA: 2.8 CM2
DOP CALC LVOT DIAMETER: 1.9 CM
DOP CALC LVOT PEAK VEL: 1.2 M/S
DOP CALC LVOT STROKE VOLUME: 76.29 CM3
DOP CALCLVOT PEAK VEL VTI: 26.92 CM
E WAVE DECELERATION TIME: 176.95 MSEC
E/A RATIO: 1.25
E/E' RATIO: 9.25 M/S
ECHO LV POSTERIOR WALL: 0.68 CM (ref 0.6–1.1)
EJECTION FRACTION: 60 %
EOSINOPHIL # BLD AUTO: 0.6 K/UL (ref 0–0.5)
EOSINOPHIL NFR BLD: 4.9 % (ref 0–8)
ERYTHROCYTE [DISTWIDTH] IN BLOOD BY AUTOMATED COUNT: 13.8 % (ref 11.5–14.5)
EST. GFR  (AFRICAN AMERICAN): 54.9 ML/MIN/1.73 M^2
EST. GFR  (AFRICAN AMERICAN): 54.9 ML/MIN/1.73 M^2
EST. GFR  (NON AFRICAN AMERICAN): 47.6 ML/MIN/1.73 M^2
EST. GFR  (NON AFRICAN AMERICAN): 47.6 ML/MIN/1.73 M^2
FRACTIONAL SHORTENING: 29 % (ref 28–44)
GLUCOSE SERPL-MCNC: 120 MG/DL (ref 70–110)
GLUCOSE SERPL-MCNC: 124 MG/DL (ref 70–110)
HCT VFR BLD AUTO: 27.5 % (ref 37–48.5)
HGB BLD-MCNC: 9.3 G/DL (ref 12–16)
HLA FREEZE AND HOLD INTERPRETATION: NORMAL
HLAFH COLLECTION DATE: NORMAL
HPRA INTERPRETATION: NORMAL
IMM GRANULOCYTES # BLD AUTO: 0.03 K/UL (ref 0–0.04)
IMM GRANULOCYTES NFR BLD AUTO: 0.3 % (ref 0–0.5)
INR PPP: 1.2 (ref 0.8–1.2)
INTERVENTRICULAR SEPTUM: 0.72 CM (ref 0.6–1.1)
LA MAJOR: 4.56 CM
LA MINOR: 4.23 CM
LA WIDTH: 4.13 CM
LEFT ATRIUM SIZE: 3.72 CM
LEFT ATRIUM VOLUME INDEX MOD: 38.9 ML/M2
LEFT ATRIUM VOLUME INDEX: 34.3 ML/M2
LEFT ATRIUM VOLUME MOD: 65 CM3
LEFT ATRIUM VOLUME: 57.31 CM3
LEFT INTERNAL DIMENSION IN SYSTOLE: 3.59 CM (ref 2.1–4)
LEFT VENTRICLE DIASTOLIC VOLUME INDEX: 72.63 ML/M2
LEFT VENTRICLE DIASTOLIC VOLUME: 121.29 ML
LEFT VENTRICLE MASS INDEX: 70 G/M2
LEFT VENTRICLE SYSTOLIC VOLUME INDEX: 32.4 ML/M2
LEFT VENTRICLE SYSTOLIC VOLUME: 54.08 ML
LEFT VENTRICULAR INTERNAL DIMENSION IN DIASTOLE: 5.05 CM (ref 3.5–6)
LEFT VENTRICULAR MASS: 116.7 G
LV LATERAL E/E' RATIO: 9.25 M/S
LV SEPTAL E/E' RATIO: 9.25 M/S
LYMPHOCYTES # BLD AUTO: 1.5 K/UL (ref 1–4.8)
LYMPHOCYTES NFR BLD: 12.9 % (ref 18–48)
MAGNESIUM SERPL-MCNC: 2 MG/DL (ref 1.6–2.6)
MCH RBC QN AUTO: 33.5 PG (ref 27–31)
MCHC RBC AUTO-ENTMCNC: 33.8 G/DL (ref 32–36)
MCV RBC AUTO: 99 FL (ref 82–98)
MONOCYTES # BLD AUTO: 1.2 K/UL (ref 0.3–1)
MONOCYTES NFR BLD: 10.3 % (ref 4–15)
MV PEAK A VEL: 0.89 M/S
MV PEAK E VEL: 1.11 M/S
MV STENOSIS PRESSURE HALF TIME: 51.32 MS
MV VALVE AREA P 1/2 METHOD: 4.29 CM2
NEUTROPHILS # BLD AUTO: 8.2 K/UL (ref 1.8–7.7)
NEUTROPHILS NFR BLD: 71 % (ref 38–73)
NRBC BLD-RTO: 0 /100 WBC
PHOSPHATE SERPL-MCNC: 3.2 MG/DL (ref 2.7–4.5)
PISA TR MAX VEL: 2.77 M/S
PLATELET # BLD AUTO: 175 K/UL (ref 150–450)
PMV BLD AUTO: 10.7 FL (ref 9.2–12.9)
POTASSIUM SERPL-SCNC: 2.8 MMOL/L (ref 3.5–5.1)
POTASSIUM SERPL-SCNC: 3.6 MMOL/L (ref 3.5–5.1)
PROT SERPL-MCNC: 4.8 G/DL (ref 6–8.4)
PROTHROMBIN TIME: 12.3 SEC (ref 9–12.5)
RA MAJOR: 4.41 CM
RA PRESSURE: 15 MMHG
RA WIDTH: 3.13 CM
RBC # BLD AUTO: 2.78 M/UL (ref 4–5.4)
RIGHT VENTRICULAR END-DIASTOLIC DIMENSION: 3.3 CM
SINUS: 2.74 CM
SODIUM SERPL-SCNC: 136 MMOL/L (ref 136–145)
SODIUM SERPL-SCNC: 137 MMOL/L (ref 136–145)
STJ: 2.36 CM
TDI LATERAL: 0.12 M/S
TDI SEPTAL: 0.12 M/S
TDI: 0.12 M/S
TR MAX PG: 31 MMHG
TRICUSPID ANNULAR PLANE SYSTOLIC EXCURSION: 2.01 CM
TROPONIN I SERPL DL<=0.01 NG/ML-MCNC: 0.03 NG/ML (ref 0–0.03)
TROPONIN I SERPL DL<=0.01 NG/ML-MCNC: 0.04 NG/ML (ref 0–0.03)
TV REST PULMONARY ARTERY PRESSURE: 46 MMHG
WBC # BLD AUTO: 11.5 K/UL (ref 3.9–12.7)

## 2022-05-09 PROCEDURE — 83735 ASSAY OF MAGNESIUM: CPT | Mod: NTX | Performed by: PHYSICIAN ASSISTANT

## 2022-05-09 PROCEDURE — 80053 COMPREHEN METABOLIC PANEL: CPT | Mod: NTX | Performed by: PHYSICIAN ASSISTANT

## 2022-05-09 PROCEDURE — 63600175 PHARM REV CODE 636 W HCPCS: Mod: NTX | Performed by: PHYSICIAN ASSISTANT

## 2022-05-09 PROCEDURE — 94761 N-INVAS EAR/PLS OXIMETRY MLT: CPT | Mod: NTX

## 2022-05-09 PROCEDURE — 36415 COLL VENOUS BLD VENIPUNCTURE: CPT | Mod: NTX | Performed by: PHYSICIAN ASSISTANT

## 2022-05-09 PROCEDURE — 85610 PROTHROMBIN TIME: CPT | Mod: NTX | Performed by: PHYSICIAN ASSISTANT

## 2022-05-09 PROCEDURE — 99233 SBSQ HOSP IP/OBS HIGH 50: CPT | Mod: NTX,,, | Performed by: PHYSICIAN ASSISTANT

## 2022-05-09 PROCEDURE — 99233 PR SUBSEQUENT HOSPITAL CARE,LEVL III: ICD-10-PCS | Mod: NTX,,, | Performed by: PHYSICIAN ASSISTANT

## 2022-05-09 PROCEDURE — 25000003 PHARM REV CODE 250: Mod: NTX | Performed by: PHYSICIAN ASSISTANT

## 2022-05-09 PROCEDURE — 80048 BASIC METABOLIC PNL TOTAL CA: CPT | Mod: NTX,XB | Performed by: PHYSICIAN ASSISTANT

## 2022-05-09 PROCEDURE — 85025 COMPLETE CBC W/AUTO DIFF WBC: CPT | Mod: NTX | Performed by: PHYSICIAN ASSISTANT

## 2022-05-09 PROCEDURE — 20600001 HC STEP DOWN PRIVATE ROOM: Mod: NTX

## 2022-05-09 PROCEDURE — 84484 ASSAY OF TROPONIN QUANT: CPT | Mod: 91,NTX | Performed by: PHYSICIAN ASSISTANT

## 2022-05-09 PROCEDURE — 84100 ASSAY OF PHOSPHORUS: CPT | Mod: NTX | Performed by: PHYSICIAN ASSISTANT

## 2022-05-09 PROCEDURE — 83880 ASSAY OF NATRIURETIC PEPTIDE: CPT | Mod: NTX | Performed by: PHYSICIAN ASSISTANT

## 2022-05-09 RX ORDER — FUROSEMIDE 40 MG/1
80 TABLET ORAL 3 TIMES DAILY
Status: DISCONTINUED | OUTPATIENT
Start: 2022-05-09 | End: 2022-05-10 | Stop reason: HOSPADM

## 2022-05-09 RX ORDER — POLYETHYLENE GLYCOL 3350 17 G/17G
17 POWDER, FOR SOLUTION ORAL 2 TIMES DAILY PRN
Status: DISCONTINUED | OUTPATIENT
Start: 2022-05-09 | End: 2022-05-10 | Stop reason: HOSPADM

## 2022-05-09 RX ORDER — SODIUM BICARBONATE 650 MG/1
650 TABLET ORAL 2 TIMES DAILY
Status: DISCONTINUED | OUTPATIENT
Start: 2022-05-09 | End: 2022-05-09

## 2022-05-09 RX ORDER — SODIUM BICARBONATE 650 MG/1
650 TABLET ORAL 2 TIMES DAILY
Status: DISCONTINUED | OUTPATIENT
Start: 2022-05-09 | End: 2022-05-10 | Stop reason: HOSPADM

## 2022-05-09 RX ORDER — POTASSIUM CHLORIDE 20 MEQ/1
40 TABLET, EXTENDED RELEASE ORAL EVERY 4 HOURS
Status: DISCONTINUED | OUTPATIENT
Start: 2022-05-09 | End: 2022-05-09

## 2022-05-09 RX ORDER — ALLOPURINOL 100 MG/1
100 TABLET ORAL DAILY
Status: DISCONTINUED | OUTPATIENT
Start: 2022-05-09 | End: 2022-05-10 | Stop reason: HOSPADM

## 2022-05-09 RX ORDER — POTASSIUM CHLORIDE 20 MEQ/1
40 TABLET, EXTENDED RELEASE ORAL EVERY 4 HOURS
Status: COMPLETED | OUTPATIENT
Start: 2022-05-09 | End: 2022-05-09

## 2022-05-09 RX ADMIN — LACTULOSE 20 G: 20 SOLUTION ORAL at 08:05

## 2022-05-09 RX ADMIN — PANTOPRAZOLE SODIUM 40 MG: 40 TABLET, DELAYED RELEASE ORAL at 08:05

## 2022-05-09 RX ADMIN — Medication 10000 UNITS: at 08:05

## 2022-05-09 RX ADMIN — POTASSIUM CHLORIDE 40 MEQ: 20 TABLET, EXTENDED RELEASE ORAL at 09:05

## 2022-05-09 RX ADMIN — THIAMINE HCL TAB 100 MG 100 MG: 100 TAB at 08:05

## 2022-05-09 RX ADMIN — LINACLOTIDE 145 MCG: 145 CAPSULE, GELATIN COATED ORAL at 12:05

## 2022-05-09 RX ADMIN — SODIUM BICARBONATE 650 MG TABLET 650 MG: at 01:05

## 2022-05-09 RX ADMIN — FUROSEMIDE 80 MG: 40 TABLET ORAL at 05:05

## 2022-05-09 RX ADMIN — POTASSIUM CHLORIDE 40 MEQ: 1500 TABLET, EXTENDED RELEASE ORAL at 01:05

## 2022-05-09 RX ADMIN — VANCOMYCIN HYDROCHLORIDE 750 MG: 750 INJECTION, POWDER, LYOPHILIZED, FOR SOLUTION INTRAVENOUS at 06:05

## 2022-05-09 RX ADMIN — CEFEPIME HYDROCHLORIDE 1 G: 1 INJECTION, SOLUTION INTRAVENOUS at 06:05

## 2022-05-09 RX ADMIN — FUROSEMIDE 80 MG: 40 TABLET ORAL at 08:05

## 2022-05-09 RX ADMIN — LEVOTHYROXINE SODIUM 50 MCG: 50 TABLET ORAL at 06:05

## 2022-05-09 RX ADMIN — CEFEPIME HYDROCHLORIDE 1 G: 1 INJECTION, SOLUTION INTRAVENOUS at 05:05

## 2022-05-09 RX ADMIN — ALLOPURINOL 100 MG: 100 TABLET ORAL at 09:05

## 2022-05-09 RX ADMIN — SODIUM BICARBONATE 650 MG TABLET 650 MG: at 08:05

## 2022-05-09 NOTE — H&P
Inpatient Radiology Pre-procedure Note    History of Present Illness:  Malu Montes is a 52 y.o. female who presents for ultrasound guided paracentesis.  Admission H&P reviewed.  Past Medical History:   Diagnosis Date    ADD (attention deficit disorder)     Anxiety     Ascites of liver     Cirrhosis 2021    CKD (chronic kidney disease) stage 3, GFR 30-59 ml/min     Constipation     ETOH abuse     Hyperlipidemia     Hypothyroidism     Other ascites 2021    Pancreatitis, acute     Tobacco use     Vitamin D deficiency      Past Surgical History:   Procedure Laterality Date    AUGMENTATION OF BREAST      breast augmentation       SECTION      COLONOSCOPY N/A 2021    Procedure: COLONOSCOPY;  Surgeon: Dao Gray MD;  Location: [facility] (2ND FLR);  Service: Endoscopy;  Laterality: N/A;  COVID test 21 General surgery clinic [facility]    CYSTOSCOPY N/A 9/10/2021    Procedure: CYSTOSCOPY;  Surgeon: Rj Sánchez Jr., MD;  Location: [facility] OR 1ST FLR;  Service: Urology;  Laterality: N/A;    ESOPHAGOGASTRODUODENOSCOPY N/A 2021    Procedure: ESOPHAGOGASTRODUODENOSCOPY (EGD);  Surgeon: Dao Gray MD;  Location: [facility] (2ND FLR);  Service: Endoscopy;  Laterality: N/A;  labs current on     ESOPHAGOGASTRODUODENOSCOPY N/A 10/26/2021    Procedure: ESOPHAGOGASTRODUODENOSCOPY (EGD);  Surgeon: Dao Gray MD;  Location: [facility] (2ND FLR);  Service: Endoscopy;  Laterality: N/A;  to be done the week of 10/18/21 per Dr. Gray-BB  covid-10/23/21-[facility]-   10/25 arrival time confirmed with pt-rb    ESOPHAGOGASTRODUODENOSCOPY Left 2021    Procedure: EGD (ESOPHAGOGASTRODUODENOSCOPY);  Surgeon: Koffi Monteiro MD;  Location: [facility] (4TH FLR);  Service: Endoscopy;  Laterality: Left;  Rapid  pt requested AM appt and first available in December-  labs morning of procedure-BB   lvm to confirm appt-rb    HEMORRHOID SURGERY       PERITONEOCENTESIS Right 6/8/2021    Procedure: PARACENTESIS, ABDOMINAL;  Surgeon: Jay Torre MD;  Location: [facility] CATH LAB;  Service: Radiology;  Laterality: Right;    PERITONEOCENTESIS Right 6/25/2021    Procedure: PARACENTESIS, ABDOMINAL;  Surgeon: Jay Torre MD;  Location: [facility] CATH LAB;  Service: Radiology;  Laterality: Right;    PERITONEOCENTESIS Right 7/12/2021    Procedure: PARACENTESIS, ABDOMINAL;  Surgeon: Jay Torre MD;  Location: [facility] CATH LAB;  Service: Radiology;  Laterality: Right;    PERITONEOCENTESIS Right 7/23/2021    Procedure: PARACENTESIS, ABDOMINAL;  Surgeon: Edward De La Torre II, MD;  Location: [facility] CATH LAB;  Service: Interventional Radiology;  Laterality: Right;    PERITONEOCENTESIS Right 8/3/2021    Procedure: PARACENTESIS, ABDOMINAL;  Surgeon: Franco Cheung MD;  Location: [facility] CATH LAB;  Service: Radiology;  Laterality: Right;    PERITONEOCENTESIS Right 8/16/2021    Procedure: PARACENTESIS, ABDOMINAL;  Surgeon: Jay Torre MD;  Location: [facility] CATH LAB;  Service: Radiology;  Laterality: Right;    PERITONEOCENTESIS Right 8/23/2021    Procedure: PARACENTESIS, ABDOMINAL;  Surgeon: Jay Torre MD;  Location: [facility] CATH LAB;  Service: Radiology;  Laterality: Right;    PERITONEOCENTESIS Right 9/16/2021    Procedure: PARACENTESIS, ABDOMINAL;  Surgeon: Jay Torre MD;  Location: [facility] CATH LAB;  Service: Radiology;  Laterality: Right;    PERITONEOCENTESIS N/A 9/24/2021    Procedure: PARACENTESIS, ABDOMINAL;  Surgeon: Jay Torre MD;  Location: [facility] CATH LAB;  Service: Radiology;  Laterality: N/A;    PERITONEOCENTESIS Right 12/23/2021    Procedure: PARACENTESIS, ABDOMINAL;  Surgeon: Jay Torre MD;  Location: [facility] CATH LAB;  Service: Radiology;  Laterality: Right;    PERITONEOCENTESIS N/A 12/30/2021    Procedure: PARACENTESIS, ABDOMINAL;  Surgeon: Salas Cabrera MD;  Location: [facility] CATH LAB;  Service: Radiology;  Laterality:  N/A;    RETROGRADE PYELOGRAPHY Bilateral 9/10/2021    Procedure: PYELOGRAM, RETROGRADE;  Surgeon: Rj Sánchez Jr., MD;  Location: [facility] OR [address] [place] [address];  Service: Urology;  Laterality: Bilateral;    TONSILLECTOMY         Review of Systems:   As documented in primary team H&P    Home Meds:   Prior to Admission medications    Medication Sig Start Date End Date Taking? Authorizing Provider   allopurinoL (ZYLOPRIM) 100 MG tablet TAKE 1 TABLET(100 MG) BY MOUTH EVERY DAY  Patient taking differently: Take by mouth every morning. 2/23/22   Rashel Cohn MD   bisacodyL (DULCOLAX) 5 mg EC tablet Take 5 mg by mouth daily as needed for Constipation.    Historical Provider   ciprofloxacin HCl (CIPRO) 250 MG tablet Take 1 tablet (250 mg total) by mouth every other day. 4/18/22   Rashel Cohn MD   ergocalciferol, vitamin D2, (VITAMIN D ORAL) Take by mouth every evening.    Historical Provider   folic acid (FOLVITE) 1 MG tablet Take 2 tablets (2 mg total) by mouth once daily.  Patient taking differently: Take 2 mg by mouth every evening. 12/14/21 5/5/22  Uma Perry MD   furosemide (LASIX) 80 MG tablet Take 1 tablet (80 mg total) by mouth 3 (three) times daily. 4/26/22   Rashel Cohn MD   hydrOXYzine HCL (ATARAX) 25 MG tablet Take 1 tablet (25 mg total) by mouth every 6 to 8 hours as needed for Itching. 4/1/22   Sharmila Pacheco MD   lactulose (CHRONULAC) 10 gram/15 mL solution Take 30 mLs by mouth every evening. 3/4/22   Historical Provider   levothyroxine (SYNTHROID) 50 MCG tablet TAKE 1 TABLET(50 MCG) BY MOUTH BEFORE BREAKFAST 9/25/21   Killian Dodd DO   linaCLOtide (LINZESS) 72 mcg Cap capsule Take 2 capsules (144 mcg total) by mouth before breakfast. 11/26/21   Sharmila Pacheco MD   pantoprazole (PROTONIX) 40 MG tablet Take 1 tablet (40 mg total) by mouth once daily.  Patient taking differently: Take 40 mg by mouth every evening. 2/15/22 2/15/23  Sharmila Pacheco MD   thiamine (VITAMIN B-1) 100  MG tablet Take 1 tablet (100 mg total) by mouth every evening. 10/11/21   Sharmila Pacheco MD   vitamin A 69571 UNIT capsule Take 10,000 Units by mouth every evening.     Historical Provider     Scheduled Meds:    allopurinoL  100 mg Oral Daily    ceFEPime (MAXIPIME) IVPB  1 g Intravenous Q12H    furosemide  80 mg Oral TID    lactulose  20 g Oral QHS    levothyroxine  50 mcg Oral Before breakfast    linaCLOtide  145 mcg Oral Before breakfast    pantoprazole  40 mg Oral QHS    sodium bicarbonate  650 mg Oral BID    thiamine  100 mg Oral QHS    vancomycin (VANCOCIN) IVPB  15 mg/kg Intravenous Q24H    vitamin A  10,000 Units Oral QHS     Continuous Infusions:   PRN Meds:acetaminophen, hydrOXYzine HCL, ondansetron, polyethylene glycol, sodium chloride 0.9%, Pharmacy to dose Vancomycin consult **AND** vancomycin - pharmacy to dose  Anticoagulants/Antiplatelets: no anticoagulation    Allergies: Review of patient's allergies indicates:  No Known Allergies  Sedation Hx: have not been any systemic reactions    Vitals:  Temp: 98.7 °F (37.1 °C) (05/09/22 1225)  Pulse: 98 (05/09/22 1225)  Resp: 16 (05/09/22 0800)  BP: 93/61 (05/09/22 0800)  SpO2: 96 % (05/09/22 0800)     Physical Exam:  ASA: 3  Mallampati: n/a    General: no acute distress  Mental Status: alert and oriented to person, place and time  HEENT: normocephalic, atraumatic  Chest: unlabored breathing  Heart: regular heart rate  Abdomen: distended  Extremity: moves all extremities    Plan: ultrasound guided paracentesis  Sedation Plan: local    RENATA Marie, FNP  Interventional Radiology  [phone] [facility]

## 2022-05-09 NOTE — HOSPITAL COURSE
Interval History: No acute events overnight. Patient admitted 5/8 for SOB. CTA negative for PE, findings possibly suggestive of viral PNA. Patient reports congestion, but states SOB significantly improved. COVID negative. Sp02 high 90s on RA. Labs notable for elevated troponin and BNP + leukocytosis. EKG sinus tach. Troponin trending down. 2D echo with systolic PA pressure 48 and CVP of 15 Mild to moderate tricuspid regurgitaion. EF 60%. Diuresed with lasix 80 mg IV 5/8. Resumed home dose of 80 mg TID oral today. Also plan for paracentesis (patient not significantly distended, but has regular Mon/Fri para schedule). F/u fluid studies. Continue empiric BS abx at this time. Blood cx NGTD. UA clean. Patient afebrile. Listed with MELD of 17. MELD 13 today. Replace potassium with oral supplement and recheck BMP at 1600. Continue to monitor.

## 2022-05-09 NOTE — PROGRESS NOTES
Admit Note     Met with patient to assess needs. Patient is a 52 y.o.  female, admitted for Shortness of breath [R06.02]  SOB (shortness of breath) [R06.02]  Elevated troponin [R77.8]  Patient is on the wait list for liver and kidney transplant.     Patient admitted on 5/8/2022 .  At this time, patient presents as alert and oriented x 4, pleasant, good eye contact, well groomed, recall good, concentration/judgement good, average intelligence, calm, communicative, cooperative and asking and answering questions appropriately.  At this time, patients caregiver was not present.    Household/Family Systems     Patient resides with patient's  and mother in law, at [address] [place] [address].  Support system includes her 26 year old daughter named Sophie Oakes who lives in [place]. Patient has another daughter, Rosita, who passed away in March 2021 at 24 years old. Patient has 1 sister, Mora, who lives in [place]. Patient reports having uncles, aunts and cousins and good relationship with her family members. Patient has a mother-in-law who will be her primary caregiver. Patient does not have dependents that are need of being cared for.     Patients primary caregiver is Soco Mrea, patients mother in law, phone number [phone].  Confirmed patients contact information is There is no home phone number on file.;   Telephone Information:   Mobile [phone]   .    During admission, patient's caregiver plans to stay at home.  Confirmed patient and patients caregivers do have access to reliable transportation.    Cognitive Status/Learning     Patient reports reading ability as 12th grade and states patient does not have difficulty with reading, seeing, hearing and memory.  Patient reports patient learns best by written information.   Needed: No.   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 Patient is able to work from home and will utilize STD during transplant. Pt also has a lot of time she can use for time off.    Adherence     Patient reports a high level of adherence to patients health care regimen.  Adherence counseling and education provided. Patient verbalizes understanding.    Substance Use    Patient reports the following substance usage.    Tobacco: Patient reports smoking cigarettes. She states she has weaned herself off of it and now she will only smoke about 1 cigarette per day and usually doesn't finish it.  Patient plans to quit, with aid of smoking cessation or patches.   Alcohol: none, patient denies any use. Patient states this week she makes 11 months sober from alcohol and she is looking forward to 1 year next month. Patient reports last alcohol use was Lynsey 15, 2021. Patient states she quit alcohol use shortly after learning of liver disease. Patient reports heavy drinking daily prior to quitting. Patient states she was able to maintain sobriety on her own without any rehab or treatment. Patient denies any withdrawal symptoms, cravings or urges. Patient denies any AA or IOP, however declines any issues with her sobriety and states she has better ways of coping with stressors. SW congratulated pt on sobriety.  Illicit Drugs/Non-prescribed Medications: none, patient denies any use.  Patient states clear understanding of the potential impact of substance use.  Substance abstinence/cessation counseling, education and resources provided and reviewed.     Services Utilizing/ADLS    Infusion Service: Prior to admission, patient utilizing? no  Home Health: Prior to admission, patient utilizing? no  DME: Prior to admission, no  Pulmonary/Cardiac Rehab: Prior to admission, no  Dialysis:  Prior to admission, no  Transplant Specialty Pharmacy:  Prior to admission, no.    Prior to admission, patient reports patient was  independent with ADLS and was driving.  Patient reports patient is able to care for self at this time..  Patient indicates a willingness to care for self once medically cleared to do so.    Insurance/Medications    Insured by   Payer/Plan Subscr  Sex Relation Sub. Ins. ID Effective Group Num   1. AETNA - AETNA* LISSA DIAZ* 1970 Female Self [id number]                                   [address]      Primary Insurance (for UNOS reporting): Private Insurance  Secondary Insurance (for UNOS reporting): None    Patient reports patient is able to obtain and afford medications at this time and at time of discharge.    Living Will/Healthcare Power of     Patient states patient does not have a LW and/or HCPA.   provided education regarding LW and HCPA and the completion of forms.    Coping/Mental Health    Patient is coping adequately with the aid of  family members and friends. Patient denies any issues with depression. Pt lost her 24 year old daughter 1 year ago but states she ravi better with it now and has come to terms with her death. Pt states that she feels sad a lot thinking about her daughter's passing. Pt denies taking any medications for mood.  Patient denies mental health difficulties at this time. SW provided emotional support to the patient.     Discharge Planning    At time of discharge, patient plans to return to patient's home under the care of her  and mother in law.  Patients  will transport patient.  Per rounds today, expected discharge date has not been medically determined at this time. Patient and patients caregiver  verbalize understanding and are involved in treatment planning and discharge process.    Additional Concerns    Patient is being followed for needs, education, resources, information, emotional support, supportive counseling, and for supportive and skilled discharge plan of care.  providing ongoing  psychosocial support, education, resources and d/c planning as needed.  SW remains available. Patient denies additional needs and/or concerns at this time. Patient verbalizes understanding and agreement with information reviewed, social work availability, and how to access available resources as needed.

## 2022-05-09 NOTE — ASSESSMENT & PLAN NOTE
- Listed for liver/kidney transplant  MELD-Na score: 13 at 5/9/2022  6:30 AM  MELD score: 12 at 5/9/2022  6:30 AM  Calculated from:  Serum Creatinine: 1.3 mg/dL at 5/9/2022  6:30 AM  Serum Sodium: 136 mmol/L at 5/9/2022  6:30 AM  Total Bilirubin: 1.3 mg/dL at 5/9/2022  6:30 AM  INR(ratio): 1.2 at 5/9/2022  6:30 AM  Age: 52 years

## 2022-05-09 NOTE — NURSING
- Off unit for TTE & paracentsis  - Continuing IV antibiotics.  Vanc trough due prior to third dose on 5/10  - Pt reports no improvement in constipation following suppository overnight.  Restarting home dose Linzess following return to [facility] [id number]  - Visi monitoring in use.  NSR on telemetry  - WBC improved from 17.43 to 11.5.  Afebrile & other VS stable  - PETH to be drawn with AM labs  - 2 doses oral K replaced, repeat labs this after return to [facility]

## 2022-05-09 NOTE — PLAN OF CARE
Patient scanned with US by NP; insufficient fluid present to drain. Procedure not performed. Report called to inpatient RN; patient awaiting transport back to bed.

## 2022-05-09 NOTE — ASSESSMENT & PLAN NOTE
- Infectious work up initiated (blood cx x 2, UA) negative to date  - COVID negative  - Chest x-ray unremarkable  - CTA with possible viral PNA findings, no PE  - Started BS abx on admit, continue at this time  - Paracentesis 5/9, r/o SBP  - Afebrile, symptoms improving

## 2022-05-09 NOTE — PROGRESS NOTES
[facility]  Liver Transplant  Progress Note    Patient Name: Malu Montes  MRN: [id number]  Admission Date: 5/8/2022  Hospital Length of Stay: 1 days  Code Status: Full Code  Primary Care Provider: Killian Dodd DO  Post-Operative Day:     ORGAN:     Disease Etiology: Alcoholic Cirrhosis  Donor Type:     CDC High Risk:     Donor CMV Status:   Donor CMV Status:   Donor HBcAB:     Donor HCV Status:     Donor HBV JACOBO: Organ record is missing.  Donor HCV JACOBO: Organ record is missing.  Whole or Partial:   Biliary Anastomosis:   Arterial Anatomy:   Subjective:     History of Present Illness:  Ms. 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 Liver disease has been complicated by HE, ascites requiring paracentesis twice a week. She now reports to the ED with complaints of SOB x 1 day. She had a TIPS procedure on Friday 5/6/22 and was admitted to the observation unit and discharged yesterday 5/7/22. She states that the SOB started yesterday evening. She denies chest pain, fever/chills, abdominal pain. Pulse ox in ED 97% on room air. Lab work in ED remarkable for leukocytosis, elevated BNP, and mildly elevated troponin. Chest x-ray with no acute findings. CTA ordered by ED staff with no PE seen. Possible viral PNA findings. Plan for infectious work up (blood cx x 2, UA/cx). Will start broad spectrum antibiotics. For SOB, will diuresis with IV lasix and order Echo      Interval History: No acute events overnight. Patient admitted yesterday 5/8 for SOB. CTA negative for PE, findings possibly suggestive of viral PNA. Patient reports congestion, but states SOB significantly improved. COVID negative. Sp02 high 90s on RA. Labs notable for elevated troponin and BNP. EKG sinus tach. Troponin trending down. 2D echo with systolic PA pressure 48 and CVP of 15 Mild to moderate tricuspid regurgitaion. EF 60%. BNP still >1,000. Diuresed with lasix 80 mg  IV yesterday. Plan to resume home dose of 80 mg TID oral today. Also plan for paracentesis (patient not significantly distended, but has regular Mon/Fri para schedule). F/u fluid studies. Continue empiric BS abx at this time. Blood cx NGTD. UA clean. Patient afebrile. Listed with MELD of 17. MELD 13 today. Replace potassium with oral supplement and recheck BMP at 1600. Continue to monitor.      Scheduled Meds:   allopurinoL  100 mg Oral Daily    ceFEPime (MAXIPIME) IVPB  1 g Intravenous Q12H    furosemide  80 mg Oral TID    lactulose  20 g Oral QHS    levothyroxine  50 mcg Oral Before breakfast    linaCLOtide  145 mcg Oral Before breakfast    pantoprazole  40 mg Oral QHS    potassium chloride  40 mEq Oral Q4H    thiamine  100 mg Oral QHS    vancomycin (VANCOCIN) IVPB  15 mg/kg Intravenous Q24H    vitamin A  10,000 Units Oral QHS     Continuous Infusions:  PRN Meds:acetaminophen, hydrOXYzine HCL, ondansetron, polyethylene glycol, sodium chloride 0.9%, Pharmacy to dose Vancomycin consult **AND** vancomycin - pharmacy to dose    Review of Systems   Constitutional:  Positive for activity change. Negative for chills and fever.   HENT:  Positive for congestion. Negative for facial swelling and trouble swallowing.    Eyes:  Negative for photophobia and redness.   Respiratory:  Positive for shortness of breath. Negative for cough and stridor.    Cardiovascular:  Negative for chest pain and leg swelling.   Gastrointestinal:  Positive for abdominal distention and constipation. Negative for abdominal pain, diarrhea, nausea and vomiting.   Genitourinary:  Negative for decreased urine volume, difficulty urinating and dysuria.   Musculoskeletal:  Negative for neck pain and neck stiffness.   Neurological:  Negative for dizziness and headaches.   Hematological:  Bruises/bleeds easily.   Psychiatric/Behavioral:  Negative for agitation, behavioral problems, confusion and decreased concentration.    Objective:     Vital  Signs (Most Recent):  Temp: 98 °F (36.7 °C) (05/09/22 0630)  Pulse: 101 (05/09/22 0630)  Resp: 20 (05/09/22 0630)  BP: 98/65 (05/09/22 0630)  SpO2: 97 % (05/09/22 0630) Vital Signs (24h Range):  Temp:  [98 °F (36.7 °C)-98.9 °F (37.2 °C)] 98 °F (36.7 °C)  Pulse:  [101-112] 101  Resp:  [15-21] 20  SpO2:  [95 %-99 %] 97 %  BP: ()/(49-70) 98/65     Weight: 64.4 kg (142 lb)  Body mass index is 25.15 kg/m².    Intake/Output - Last 3 Shifts         05/07 0700  05/08 0659 05/08 0700  05/09 0659 05/09 0700  05/10 0659    IV Piggyback   340.5    Total Intake(mL/kg)   340.5 (5.3)    Urine (mL/kg/hr)  1700     Total Output  1700     Net  -1700 +340.5                   Physical Exam  Vitals and nursing note reviewed.   Constitutional:       General: She is not in acute distress.  HENT:      Head: Normocephalic and atraumatic.   Eyes:      General: No scleral icterus.        Right eye: No discharge.         Left eye: No discharge.   Cardiovascular:      Rate and Rhythm: Normal rate and regular rhythm.   Pulmonary:      Effort: No respiratory distress.      Breath sounds: Normal breath sounds. No wheezing or rales.   Abdominal:      General: There is distension.      Palpations: Abdomen is soft.      Tenderness: There is no abdominal tenderness. There is no guarding or rebound.   Musculoskeletal:      Right lower leg: No edema.      Left lower leg: No edema.   Skin:     General: Skin is warm and dry.   Neurological:      Mental Status: She is alert and oriented to person, place, and time.   Psychiatric:         Mood and Affect: Mood normal.         Behavior: Behavior normal.         Thought Content: Thought content normal.         Judgment: Judgment normal.       Laboratory:  Immunosuppressants       None          CBC:   Recent Labs   Lab 05/09/22 0630   WBC 11.50   RBC 2.78*   HGB 9.3*   HCT 27.5*      MCV 99*   MCH 33.5*   MCHC 33.8     CMP:   Recent Labs   Lab 05/09/22  0630   *   CALCIUM 8.2*   ALBUMIN  "3.2*   PROT 4.8*      K 2.8*   CO2 17*      BUN 46*   CREATININE 1.3   ALKPHOS 140*   ALT 43   AST 44*   BILITOT 1.3*     Coagulation:   Recent Labs   Lab 05/09/22  0630   INR 1.2     Labs within the past 24 hours have been reviewed.    Diagnostic Results:  I have personally reviewed all pertinent imaging studies.    Debility/Functional status: No debility noted.    Assessment/Plan:     * SOB (shortness of breath)  - EKG with sinus tach   - Troponin mildly elevated, trending down  - Chest x-ray unremarkable  - CTA with no PE but possible viral PNA  - BNP is elevated, remains elevated  - IV Lasix 80 mg x 1 5/8 with good response  - Echo with systolic PA pressure of 48 and CVP 15  - Continue lasix 5/9 (home dose of 80 mg oral TID)  - Continue BS abx, infectious workup in process  - Monitor        Chronic constipation  - Continue Lactulose and home Linzess.     Leukocytosis  - Infectious work up initiated (blood cx x 2, UA) negative to date  - COVID negative  - Chest x-ray unremarkable  - CTA with possible viral PNA findings, no PE  - Started BS abx on admit, continue at this time  - Paracentesis 5/9, r/o SBP  - Afebrile, symptoms improving      Hyperuricemia  - Continue Allopurinol.      Metabolic acidosis  - Continue oral bicarb replacement        Anemia of chronic disease  - H/H stable  - Monitor with daily cbc      Alcoholic cirrhosis of liver with ascites  - Listed for liver/kidney transplant  MELD-Na score: 13 at 5/9/2022  6:30 AM  MELD score: 12 at 5/9/2022  6:30 AM  Calculated from:  Serum Creatinine: 1.3 mg/dL at 5/9/2022  6:30 AM  Serum Sodium: 136 mmol/L at 5/9/2022  6:30 AM  Total Bilirubin: 1.3 mg/dL at 5/9/2022  6:30 AM  INR(ratio): 1.2 at 5/9/2022  6:30 AM  Age: 52 years      CKD (chronic kidney disease) stage 3, GFR 30-59 ml/min  - Creatinine stable  - Continue to monitor      Portal hypertension  - Due to ESLD  - S/p TIPs 5/6 admitted with SOB 5/8  - See "SOB"    Other ascites  - Due for " paracentesis 5/9  - s/p TIPs for refractory ascites on 5/6  - Continue lasix.    Hypokalemia  - Replacing with oral K and rechecking labs at 1600            VTE Risk Mitigation (From admission, onward)         Ordered     Place sequential compression device  Until discontinued         05/08/22 1647     IP VTE LOW RISK PATIENT  Once         05/08/22 1647                The patients clinical status was discussed at multidisplinary rounds, involving transplant surgery, transplant medicine, pharmacy, nursing, nutrition, and social work    Discharge Planning:   Not a candidate for dc at this time.    Isidra Ashton PA-C  Liver Transplant  [facility] - [facility]

## 2022-05-09 NOTE — PLAN OF CARE
Patient AAO X 4, VSS. Denies pain, N/V. Visi and Telemonitor in place NS. Skin intact. Blood cultures pending. Chest xray unremarkable and CTA negative for PE. Patient goes for para today. IV antibiotics ordered. Patient output during shift 2L.   Up ambulatory, bed locked at lowest setting, yellow socks on, call bell in reach  Remains free from falls.

## 2022-05-09 NOTE — CARE UPDATE
RAPID RESPONSE NURSE ROUND       Rounding completed with charge RN, Kaia. No immediate concerns verbalized at this time. Instructed to call [phone] for further concerns or assistance.

## 2022-05-09 NOTE — SUBJECTIVE & OBJECTIVE
Scheduled Meds:   allopurinoL  100 mg Oral Daily    ceFEPime (MAXIPIME) IVPB  1 g Intravenous Q12H    furosemide  80 mg Oral TID    lactulose  20 g Oral QHS    levothyroxine  50 mcg Oral Before breakfast    linaCLOtide  145 mcg Oral Before breakfast    pantoprazole  40 mg Oral QHS    potassium chloride  40 mEq Oral Q4H    thiamine  100 mg Oral QHS    vancomycin (VANCOCIN) IVPB  15 mg/kg Intravenous Q24H    vitamin A  10,000 Units Oral QHS     Continuous Infusions:  PRN Meds:acetaminophen, hydrOXYzine HCL, ondansetron, polyethylene glycol, sodium chloride 0.9%, Pharmacy to dose Vancomycin consult **AND** vancomycin - pharmacy to dose    Review of Systems   Constitutional:  Positive for activity change. Negative for chills and fever.   HENT:  Positive for congestion. Negative for facial swelling and trouble swallowing.    Eyes:  Negative for photophobia and redness.   Respiratory:  Positive for shortness of breath. Negative for cough and stridor.    Cardiovascular:  Negative for chest pain and leg swelling.   Gastrointestinal:  Positive for abdominal distention and constipation. Negative for abdominal pain, diarrhea, nausea and vomiting.   Genitourinary:  Negative for decreased urine volume, difficulty urinating and dysuria.   Musculoskeletal:  Negative for neck pain and neck stiffness.   Neurological:  Negative for dizziness and headaches.   Hematological:  Bruises/bleeds easily.   Psychiatric/Behavioral:  Negative for agitation, behavioral problems, confusion and decreased concentration.    Objective:     Vital Signs (Most Recent):  Temp: 98 °F (36.7 °C) (05/09/22 0630)  Pulse: 101 (05/09/22 0630)  Resp: 20 (05/09/22 0630)  BP: 98/65 (05/09/22 0630)  SpO2: 97 % (05/09/22 0630) Vital Signs (24h Range):  Temp:  [98 °F (36.7 °C)-98.9 °F (37.2 °C)] 98 °F (36.7 °C)  Pulse:  [101-112] 101  Resp:  [15-21] 20  SpO2:  [95 %-99 %] 97 %  BP: ()/(49-70) 98/65     Weight: 64.4 kg (142 lb)  Body mass index is 25.15  kg/m².    Intake/Output - Last 3 Shifts         05/07 0700  05/08 0659 05/08 0700  05/09 0659 05/09 0700  05/10 0659    IV Piggyback   340.5    Total Intake(mL/kg)   340.5 (5.3)    Urine (mL/kg/hr)  1700     Total Output  1700     Net  -1700 +340.5                   Physical Exam  Vitals and nursing note reviewed.   Constitutional:       General: She is not in acute distress.  HENT:      Head: Normocephalic and atraumatic.   Eyes:      General: No scleral icterus.        Right eye: No discharge.         Left eye: No discharge.   Cardiovascular:      Rate and Rhythm: Normal rate and regular rhythm.   Pulmonary:      Effort: No respiratory distress.      Breath sounds: Normal breath sounds. No wheezing or rales.   Abdominal:      General: There is distension.      Palpations: Abdomen is soft.      Tenderness: There is no abdominal tenderness. There is no guarding or rebound.   Musculoskeletal:      Right lower leg: No edema.      Left lower leg: No edema.   Skin:     General: Skin is warm and dry.   Neurological:      Mental Status: She is alert and oriented to person, place, and time.   Psychiatric:         Mood and Affect: Mood normal.         Behavior: Behavior normal.         Thought Content: Thought content normal.         Judgment: Judgment normal.       Laboratory:  Immunosuppressants       None          CBC:   Recent Labs   Lab 05/09/22  0630   WBC 11.50   RBC 2.78*   HGB 9.3*   HCT 27.5*      MCV 99*   MCH 33.5*   MCHC 33.8     CMP:   Recent Labs   Lab 05/09/22  0630   *   CALCIUM 8.2*   ALBUMIN 3.2*   PROT 4.8*      K 2.8*   CO2 17*      BUN 46*   CREATININE 1.3   ALKPHOS 140*   ALT 43   AST 44*   BILITOT 1.3*     Coagulation:   Recent Labs   Lab 05/09/22  0630   INR 1.2     Labs within the past 24 hours have been reviewed.    Diagnostic Results:  I have personally reviewed all pertinent imaging studies.    Debility/Functional status: No debility noted.

## 2022-05-09 NOTE — ASSESSMENT & PLAN NOTE
- EKG with sinus tach   - Troponin mildly elevated, trending down  - Chest x-ray unremarkable  - CTA with no PE but possible viral PNA  - BNP is elevated, remains elevated  - IV Lasix 80 mg x 1 5/8 with good response  - Echo with systolic PA pressure of 48 and CVP 15  - Continue lasix 5/9 (home dose of 80 mg oral TID)  - Continue BS abx, infectious workup in process  - Monitor

## 2022-05-10 VITALS
SYSTOLIC BLOOD PRESSURE: 101 MMHG | RESPIRATION RATE: 19 BRPM | OXYGEN SATURATION: 98 % | HEIGHT: 63 IN | DIASTOLIC BLOOD PRESSURE: 68 MMHG | WEIGHT: 154.31 LBS | BODY MASS INDEX: 27.34 KG/M2 | TEMPERATURE: 98 F | HEART RATE: 102 BPM

## 2022-05-10 PROBLEM — R06.02 SOB (SHORTNESS OF BREATH): Status: RESOLVED | Noted: 2022-05-08 | Resolved: 2022-05-10

## 2022-05-10 PROBLEM — D72.829 LEUKOCYTOSIS: Status: RESOLVED | Noted: 2022-05-08 | Resolved: 2022-05-10

## 2022-05-10 PROBLEM — R18.8 OTHER ASCITES: Status: RESOLVED | Noted: 2021-06-14 | Resolved: 2022-05-10

## 2022-05-10 PROBLEM — A49.1 INFECTION DUE TO STREPTOCOCCUS MITIS GROUP: Status: ACTIVE | Noted: 2022-05-10

## 2022-05-10 LAB
ALBUMIN SERPL BCP-MCNC: 3.3 G/DL (ref 3.5–5.2)
ALP SERPL-CCNC: 150 U/L (ref 55–135)
ALT SERPL W/O P-5'-P-CCNC: 38 U/L (ref 10–44)
ANION GAP SERPL CALC-SCNC: 9 MMOL/L (ref 8–16)
AST SERPL-CCNC: 31 U/L (ref 10–40)
BASOPHILS # BLD AUTO: 0.06 K/UL (ref 0–0.2)
BASOPHILS NFR BLD: 0.7 % (ref 0–1.9)
BILIRUB SERPL-MCNC: 1.3 MG/DL (ref 0.1–1)
BNP SERPL-MCNC: 891 PG/ML (ref 0–99)
BUN SERPL-MCNC: 43 MG/DL (ref 6–20)
CALCIUM SERPL-MCNC: 8.7 MG/DL (ref 8.7–10.5)
CHLORIDE SERPL-SCNC: 107 MMOL/L (ref 95–110)
CO2 SERPL-SCNC: 22 MMOL/L (ref 23–29)
CREAT SERPL-MCNC: 1.4 MG/DL (ref 0.5–1.4)
DIFFERENTIAL METHOD: ABNORMAL
EOSINOPHIL # BLD AUTO: 0.8 K/UL (ref 0–0.5)
EOSINOPHIL NFR BLD: 9.8 % (ref 0–8)
ERYTHROCYTE [DISTWIDTH] IN BLOOD BY AUTOMATED COUNT: 14.2 % (ref 11.5–14.5)
EST. GFR  (AFRICAN AMERICAN): 50.2 ML/MIN/1.73 M^2
EST. GFR  (NON AFRICAN AMERICAN): 43.5 ML/MIN/1.73 M^2
GLUCOSE SERPL-MCNC: 146 MG/DL (ref 70–110)
HCT VFR BLD AUTO: 30.6 % (ref 37–48.5)
HCV AB SERPL QL IA: NEGATIVE
HGB BLD-MCNC: 10.2 G/DL (ref 12–16)
HIV 1+2 AB+HIV1 P24 AG SERPL QL IA: NEGATIVE
IMM GRANULOCYTES # BLD AUTO: 0.04 K/UL (ref 0–0.04)
IMM GRANULOCYTES NFR BLD AUTO: 0.5 % (ref 0–0.5)
INR PPP: 1.2 (ref 0.8–1.2)
LYMPHOCYTES # BLD AUTO: 1.2 K/UL (ref 1–4.8)
LYMPHOCYTES NFR BLD: 14.3 % (ref 18–48)
MAGNESIUM SERPL-MCNC: 2.1 MG/DL (ref 1.6–2.6)
MCH RBC QN AUTO: 33.4 PG (ref 27–31)
MCHC RBC AUTO-ENTMCNC: 33.3 G/DL (ref 32–36)
MCV RBC AUTO: 100 FL (ref 82–98)
MONOCYTES # BLD AUTO: 0.8 K/UL (ref 0.3–1)
MONOCYTES NFR BLD: 9.3 % (ref 4–15)
NEUTROPHILS # BLD AUTO: 5.5 K/UL (ref 1.8–7.7)
NEUTROPHILS NFR BLD: 65.4 % (ref 38–73)
NRBC BLD-RTO: 0 /100 WBC
PHOSPHATE SERPL-MCNC: 2.8 MG/DL (ref 2.7–4.5)
PLATELET # BLD AUTO: 191 K/UL (ref 150–450)
PMV BLD AUTO: 10.3 FL (ref 9.2–12.9)
POTASSIUM SERPL-SCNC: 3.4 MMOL/L (ref 3.5–5.1)
PROT SERPL-MCNC: 5.4 G/DL (ref 6–8.4)
PROTHROMBIN TIME: 12.1 SEC (ref 9–12.5)
RBC # BLD AUTO: 3.05 M/UL (ref 4–5.4)
SODIUM SERPL-SCNC: 138 MMOL/L (ref 136–145)
WBC # BLD AUTO: 8.39 K/UL (ref 3.9–12.7)

## 2022-05-10 PROCEDURE — 83880 ASSAY OF NATRIURETIC PEPTIDE: CPT | Mod: NTX | Performed by: PHYSICIAN ASSISTANT

## 2022-05-10 PROCEDURE — 36415 COLL VENOUS BLD VENIPUNCTURE: CPT | Mod: NTX | Performed by: PHYSICIAN ASSISTANT

## 2022-05-10 PROCEDURE — 76937 US GUIDE VASCULAR ACCESS: CPT | Mod: NTX

## 2022-05-10 PROCEDURE — 63600175 PHARM REV CODE 636 W HCPCS: Mod: NTX | Performed by: INTERNAL MEDICINE

## 2022-05-10 PROCEDURE — 80053 COMPREHEN METABOLIC PANEL: CPT | Mod: NTX | Performed by: PHYSICIAN ASSISTANT

## 2022-05-10 PROCEDURE — 36410 VNPNXR 3YR/> PHY/QHP DX/THER: CPT | Mod: NTX

## 2022-05-10 PROCEDURE — 99222 PR INITIAL HOSPITAL CARE,LEVL II: ICD-10-PCS | Mod: NTX,,, | Performed by: INTERNAL MEDICINE

## 2022-05-10 PROCEDURE — 94761 N-INVAS EAR/PLS OXIMETRY MLT: CPT | Mod: NTX

## 2022-05-10 PROCEDURE — 25000003 PHARM REV CODE 250: Mod: NTX | Performed by: PHYSICIAN ASSISTANT

## 2022-05-10 PROCEDURE — 85025 COMPLETE CBC W/AUTO DIFF WBC: CPT | Mod: NTX | Performed by: PHYSICIAN ASSISTANT

## 2022-05-10 PROCEDURE — 63600175 PHARM REV CODE 636 W HCPCS: Mod: NTX | Performed by: PHYSICIAN ASSISTANT

## 2022-05-10 PROCEDURE — 80321 ALCOHOLS BIOMARKERS 1OR 2: CPT | Mod: NTX | Performed by: PHYSICIAN ASSISTANT

## 2022-05-10 PROCEDURE — 99222 1ST HOSP IP/OBS MODERATE 55: CPT | Mod: NTX,,, | Performed by: INTERNAL MEDICINE

## 2022-05-10 PROCEDURE — 85610 PROTHROMBIN TIME: CPT | Mod: NTX | Performed by: PHYSICIAN ASSISTANT

## 2022-05-10 PROCEDURE — 83735 ASSAY OF MAGNESIUM: CPT | Mod: NTX | Performed by: PHYSICIAN ASSISTANT

## 2022-05-10 PROCEDURE — C1751 CATH, INF, PER/CENT/MIDLINE: HCPCS | Mod: NTX

## 2022-05-10 PROCEDURE — 84100 ASSAY OF PHOSPHORUS: CPT | Mod: NTX | Performed by: PHYSICIAN ASSISTANT

## 2022-05-10 PROCEDURE — 87040 BLOOD CULTURE FOR BACTERIA: CPT | Mod: 59,NTX | Performed by: PHYSICIAN ASSISTANT

## 2022-05-10 RX ORDER — POTASSIUM CHLORIDE 20 MEQ/1
40 TABLET, EXTENDED RELEASE ORAL ONCE
Status: COMPLETED | OUTPATIENT
Start: 2022-05-10 | End: 2022-05-10

## 2022-05-10 RX ORDER — AMOXICILLIN AND CLAVULANATE POTASSIUM 875; 125 MG/1; MG/1
1 TABLET, FILM COATED ORAL EVERY 12 HOURS
Status: DISCONTINUED | OUTPATIENT
Start: 2022-05-10 | End: 2022-05-10

## 2022-05-10 RX ADMIN — LINACLOTIDE 145 MCG: 145 CAPSULE, GELATIN COATED ORAL at 06:05

## 2022-05-10 RX ADMIN — AMOXICILLIN AND CLAVULANATE POTASSIUM 1 TABLET: 875; 125 TABLET, FILM COATED ORAL at 11:05

## 2022-05-10 RX ADMIN — FUROSEMIDE 80 MG: 40 TABLET ORAL at 08:05

## 2022-05-10 RX ADMIN — CEFTRIAXONE 2 G: 2 INJECTION, SOLUTION INTRAVENOUS at 01:05

## 2022-05-10 RX ADMIN — FUROSEMIDE 80 MG: 40 TABLET ORAL at 03:05

## 2022-05-10 RX ADMIN — LEVOTHYROXINE SODIUM 50 MCG: 50 TABLET ORAL at 05:05

## 2022-05-10 RX ADMIN — ALLOPURINOL 100 MG: 100 TABLET ORAL at 08:05

## 2022-05-10 RX ADMIN — SODIUM BICARBONATE 650 MG TABLET 650 MG: at 08:05

## 2022-05-10 RX ADMIN — POTASSIUM CHLORIDE 40 MEQ: 1500 TABLET, EXTENDED RELEASE ORAL at 08:05

## 2022-05-10 RX ADMIN — CEFEPIME HYDROCHLORIDE 1 G: 1 INJECTION, SOLUTION INTRAVENOUS at 05:05

## 2022-05-10 NOTE — PROGRESS NOTES
Social Work Discharge Note    Social work presented to patients bedside for continuity of care and any discharge needs. Patient presented sitting up in bed, alert and oriented x 4 and alone at this time. Patient reported coping adequately and ready to go home. Social work let patient know that her IV antibiotics were set up through [facility] with [facility] () and her home health was set up through [facility]. Patient reported understanding. Social work let patient know she will be receiving a phone call from [facility] to set everything up and patient reported understanding. Patient reported her , Berny Montes () will be coming to get her and bring her home at the time of discharge. Patient with no other questions or concerns at this time. Social work to continue to follow for psychosocial needs, resources and continuity of care.

## 2022-05-10 NOTE — CONSULTS
Placed 20 g x 8 cm midline in left basilic vein for 2 weeks of rocephin using realtime ultrasound guidance.  Lot#[id number].  Max dwell date 6/8/22.  Imagery recorded and saved.

## 2022-05-10 NOTE — NURSING
Patient discharged to home as ordered.  Left independently ambulatory, declined wheelchair assist. Prior to discharge: Midline placed in prep for home antibiotic infusion.  Infusion nurse to bedside for education & home ceftriaxone to be delivered in AM to her home. Reviewed medications with patient who had no questions

## 2022-05-10 NOTE — PLAN OF CARE
Pt has call bell in reach, non slip socks on, and bedrails up x2.  Pt encouraged to wash hands.  Pt on tele and visi monitoring.  Pt has prn pain and nausea meds.  I have switched out her ivs to one that is more comfortable for the meds.  Pt is on iv vanc and cefepime.  I told the pt that the results from the test today in cath lab will be explained by the dr tomorrow on what the next step in her care will be.

## 2022-05-10 NOTE — DISCHARGE SUMMARY
[facility] - Transplant Stepdown  Liver Transplant  Discharge Summary      Patient Name: Malu Montes  MRN: [id number]  Admission Date: 5/8/2022  Hospital Length of Stay: 2 days  Discharge Date and Time:  05/10/2022 2:53 PM  Attending Physician: Cynthia Linares MD  Discharging Provider: Isidra Ashton PA-C  Primary Care Provider: Killian Dodd DO  HPI:   Ms. 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 Liver disease has been complicated by HE, ascites requiring paracentesis twice a week. She now reports to the ED with complaints of SOB x 1 day. She had a TIPS procedure on Friday 5/6/22 and was admitted to the observation unit and discharged yesterday 5/7/22. She states that the SOB started yesterday evening. She denies chest pain, fever/chills, abdominal pain. Pulse ox in ED 97% on room air. Lab work in ED remarkable for leukocytosis, elevated BNP, and mildly elevated troponin. Chest x-ray with no acute findings. CTA ordered by ED staff with no PE seen. Possible viral PNA findings. Plan for infectious work up (blood cx x 2, UA/cx). Will start broad spectrum antibiotics. For SOB, will diuresis with IV lasix and order Echo      Hospital Course:      Patient admitted 5/8 for SOB. CTA negative for PE, findings possibly suggestive of viral PNA. Labs notable for elevated troponin and BNP + leukocytosis. EKG sinus tach.  COVID negative. Sp02 high 90s on RA Troponin trending down. 2D echo with systolic PA pressure 48 and CVP of 15 Mild to moderate tricuspid regurgitaion. EF 60%. Diuresed with lasix 80 mg IV 5/8. Resumed home dose of 80 mg TID oral 5/10. Made >2L urine on oral diuresis. She will discharge on 80 mg PO lasix TID (prior home dose). Patient reported congestion 5/9, but stated SOB significantly improved 5/9-5/10. She did have a TTE with bubble study 5/9 that showed elevated PA systolic pressure of 46 mmHg, CVP of 15, mild to moderate tricuspid  regurgitation, normal right ventricular size with normal RV function, mild aortic valve stenosis, normal LV diastolic function, and  LVEF 60%. Bubble study significant for bubbles present 3 beats from LA opacification suggestive of intracardiac shunt. Findings to be relayed to patient's coordinator and reviewed with anesthesia. Attempted paracentesis 5/9 (patient not significantly distended, but has regular Mon/Fri para schedule), but insufficient fluid to drain on US. K replaced orally during admission. Of note, 1/2 blood cx 5/8 growing streptococcus mitis/oralis. Final sensitivities pending, but ID saw patient and stated ok to discharge her on IV Rocephin x 2 weeks (via midline that was placed prior to dc). ID stated ok to keep patient active on list at this time despite bacteremia. Patient is stable and ready for discharge at this time. She remains afebrile and leukocytosis resolved on abx. She is no longer feeling SOB, and remains stable on RA. Listed with MELD of 17. Of note, PETH sent 5/10 (pending). Follow up to include labs 5/23 and transplant clinic appointment 5/26. Additional f/u per PDOL protocol. She will also need f/u with ID for bacteremia. Patient is in agreement with discharge from the hospital today and follow up plan.       Goals of Care Treatment Preferences:  Code Status: Full Code      Final Active Diagnoses:    Diagnosis Date Noted POA    Chronic constipation [K59.09] 05/09/2022 Yes    Hyperuricemia [E79.0] 04/24/2022 Yes    Metabolic acidosis [E87.2] 02/04/2022 Yes    Anemia of chronic disease [D63.8] 12/08/2021 Yes    Alcoholic cirrhosis of liver with ascites [K70.31] 09/16/2021 Yes    CKD (chronic kidney disease) stage 3, GFR 30-59 ml/min [N18.30] 07/22/2021 Yes    Portal hypertension [K76.6] 06/14/2021 Yes    Hypokalemia [E87.6] 01/27/2021 Yes      Problems Resolved During this Admission:    Diagnosis Date Noted Date Resolved POA    PRINCIPAL PROBLEM:  SOB (shortness of breath)  [R06.02] 05/08/2022 05/10/2022 Yes    Leukocytosis [D72.829] 05/08/2022 05/10/2022 Yes    Other ascites [R18.8] 06/14/2021 05/10/2022 Yes       Consults (From admission, onward)        Status Ordering Provider     Inpatient consult to Midline team  Once        Provider:  (Not yet assigned)    Acknowledged ESPINOZA OLIVEROS     Inpatient consult to Infectious Diseases  Once        Provider:  (Not yet assigned)    Acknowledged ESPINOZA OLIVEROS          Pending Diagnostic Studies:     Procedure Component Value Units Date/Time    Phosphatidylethanol (PETH) [[id number]] Collected: 05/10/22 0715    Order Status: Sent Lab Status: In process Updated: 05/10/22 0726    Specimen: Blood         Significant Diagnostic Studies: Labs:   CMP   Recent Labs   Lab 05/09/22  0630 05/09/22  1722 05/10/22  0715    137 138   K 2.8* 3.6 3.4*    108 107   CO2 17* 22* 22*   * 120* 146*   BUN 46* 42* 43*   CREATININE 1.3 1.3 1.4   CALCIUM 8.2* 8.6* 8.7   PROT 4.8*  --  5.4*   ALBUMIN 3.2*  --  3.3*   BILITOT 1.3*  --  1.3*   ALKPHOS 140*  --  150*   AST 44*  --  31   ALT 43  --  38   ANIONGAP 11 7* 9   ESTGFRAFRICA 54.9* 54.9* 50.2*   EGFRNONAA 47.6* 47.6* 43.5*   , CBC   Recent Labs   Lab 05/09/22  0630 05/10/22  0715   WBC 11.50 8.39   HGB 9.3* 10.2*   HCT 27.5* 30.6*    191    and INR   Lab Results   Component Value Date    INR 1.2 05/10/2022    INR 1.2 05/09/2022    INR 1.1 05/06/2022       The patients clinical status was discussed at multidisplinary rounds, involving transplant surgery, transplant medicine, pharmacy, nursing, nutrition, and social work    Discharged Condition: stable    Disposition: Home or Self Care    Follow Up: See above    Patient Instructions:      Ambulatory referral/consult to Home Health   Standing Status: Future   Referral Priority: Routine Referral Type: Home Health Care   Referral Reason: Specialty Services Required   Requested Specialty: Home Health Services   Number of Visits  Requested: 1     Diet Adult Regular     Order Specific Question Answer Comments   Na restriction, if any: 2gNa      Notify your health care provider if you experience any of the following:  increased confusion or weakness     Notify your health care provider if you experience any of the following:  persistent dizziness, light-headedness, or visual disturbances     Notify your health care provider if you experience any of the following:  worsening rash     Notify your health care provider if you experience any of the following:  severe persistent headache     Notify your health care provider if you experience any of the following:  difficulty breathing or increased cough     Notify your health care provider if you experience any of the following:  redness, tenderness, or signs of infection (pain, swelling, redness, odor or green/yellow discharge around incision site)     Notify your health care provider if you experience any of the following:  severe uncontrolled pain     Notify your health care provider if you experience any of the following:  persistent nausea and vomiting or diarrhea     Notify your health care provider if you experience any of the following:  temperature >100.4     Activity as tolerated     Medications:  Reconciled Home Medications:      Medication List      START taking these medications    cefTRIAXone 2 g/50 mL Pgbk IVPB  Commonly known as: ROCEPHIN  Inject 50 mLs (2 g total) into the vein once daily. Stop 5/24/22 for 14 days        CHANGE how you take these medications    allopurinoL 100 MG tablet  Commonly known as: ZYLOPRIM  TAKE 1 TABLET(100 MG) BY MOUTH EVERY DAY  What changed:   · how much to take  · when to take this     folic acid 1 MG tablet  Commonly known as: FOLVITE  Take 2 tablets (2 mg total) by mouth once daily.  What changed: when to take this     pantoprazole 40 MG tablet  Commonly known as: PROTONIX  Take 1 tablet (40 mg total) by mouth once daily.  What changed: when to take  this        CONTINUE taking these medications    bisacodyL 5 mg EC tablet  Commonly known as: DULCOLAX  Take 5 mg by mouth daily as needed for Constipation.     ciprofloxacin HCl 250 MG tablet  Commonly known as: CIPRO  Take 1 tablet (250 mg total) by mouth every other day.     furosemide 80 MG tablet  Commonly known as: LASIX  Take 1 tablet (80 mg total) by mouth 3 (three) times daily.     hydrOXYzine HCL 25 MG tablet  Commonly known as: ATARAX  Take 1 tablet (25 mg total) by mouth every 6 to 8 hours as needed for Itching.     lactulose 10 gram/15 mL solution  Commonly known as: CHRONULAC  Take 30 mLs by mouth every evening.     levothyroxine 50 MCG tablet  Commonly known as: SYNTHROID  TAKE 1 TABLET(50 MCG) BY MOUTH BEFORE BREAKFAST     linaCLOtide 72 mcg Cap capsule  Commonly known as: LINZESS  Take 2 capsules (144 mcg total) by mouth before breakfast.     thiamine 100 MG tablet  Commonly known as: VITAMIN B-1  Take 1 tablet (100 mg total) by mouth every evening.     vitamin A 23554 UNIT capsule  Take 10,000 Units by mouth every evening.     VITAMIN D ORAL  Take by mouth every evening.          Time spent caring for patient (Greater than 1/2 spent in direct face-to-face contact): > 30 minutes    Isidra Ashton PA-C  Liver Transplant  [facility]

## 2022-05-11 ENCOUNTER — PATIENT MESSAGE (OUTPATIENT)
Dept: TRANSPLANT | Facility: CLINIC | Age: 52
End: 2022-05-11
Payer: COMMERCIAL

## 2022-05-11 ENCOUNTER — TELEPHONE (OUTPATIENT)
Dept: TRANSPLANT | Facility: CLINIC | Age: 52
End: 2022-05-11
Payer: COMMERCIAL

## 2022-05-11 LAB
BACTERIA BLD CULT: ABNORMAL

## 2022-05-11 NOTE — HPI
52-year-old female with ETOH cirrhosis requiring serial paracenteses twice weekly s/p TIPS 5/6/22 developed shortness of breath the night before this admission.  Found to have streptococcus mitis bacteremia.  Has been complaining of runny nose, sinus congestion, throat irritation for the past few months.  TIPS site at right side of neck bruised but not painful.  Denies fevers, tooth pain, dysphagia, sputum production, abdominal pain, other wounds.

## 2022-05-11 NOTE — SUBJECTIVE & OBJECTIVE
Past Medical History:   Diagnosis Date    ADD (attention deficit disorder)     Anxiety     Ascites of liver     Cirrhosis 2021    CKD (chronic kidney disease) stage 3, GFR 30-59 ml/min     Constipation     ETOH abuse     Hyperlipidemia     Hypothyroidism     Other ascites 2021    Pancreatitis, acute     Tobacco use     Vitamin D deficiency        Past Surgical History:   Procedure Laterality Date    AUGMENTATION OF BREAST      breast augmentation       SECTION      COLONOSCOPY N/A 2021    Procedure: COLONOSCOPY;  Surgeon: Dao Gray MD;  Location: [facility] (2ND FLR);  Service: Endoscopy;  Laterality: N/A;  COVID test 21 General surgery clinic [facility]    CYSTOSCOPY N/A 9/10/2021    Procedure: CYSTOSCOPY;  Surgeon: Rj Sánchez Jr., MD;  Location: [facility] OR 1ST FLR;  Service: Urology;  Laterality: N/A;    ESOPHAGOGASTRODUODENOSCOPY N/A 2021    Procedure: ESOPHAGOGASTRODUODENOSCOPY (EGD);  Surgeon: Dao Gray MD;  Location: [facility] (2ND FLR);  Service: Endoscopy;  Laterality: N/A;  labs current on     ESOPHAGOGASTRODUODENOSCOPY N/A 10/26/2021    Procedure: ESOPHAGOGASTRODUODENOSCOPY (EGD);  Surgeon: Dao Gray MD;  Location: [facility] (2ND FLR);  Service: Endoscopy;  Laterality: N/A;  to be done the week of 10/18/21 per Dr. Gray-BB  covid-10/23/21-[facility]-   10/25 arrival time confirmed with pt-rb    ESOPHAGOGASTRODUODENOSCOPY Left 2021    Procedure: EGD (ESOPHAGOGASTRODUODENOSCOPY);  Surgeon: Koffi Monteiro MD;  Location: [facility] (4TH FLR);  Service: Endoscopy;  Laterality: Left;  Rapid  pt requested AM appt and first available in December-  labs morning of procedure-   lvm to confirm appt-rb    HEMORRHOID SURGERY      PERITONEOCENTESIS Right 2021    Procedure: PARACENTESIS, ABDOMINAL;  Surgeon: Jay Torre MD;  Location: [facility] CATH LAB;  Service: Radiology;  Laterality: Right;    PERITONEOCENTESIS Right 2021    Procedure:  PARACENTESIS, ABDOMINAL;  Surgeon: Jay Torre MD;  Location: [facility] CATH LAB;  Service: Radiology;  Laterality: Right;    PERITONEOCENTESIS Right 7/12/2021    Procedure: PARACENTESIS, ABDOMINAL;  Surgeon: Jay Torre MD;  Location: [facility] CATH LAB;  Service: Radiology;  Laterality: Right;    PERITONEOCENTESIS Right 7/23/2021    Procedure: PARACENTESIS, ABDOMINAL;  Surgeon: Edward De La Torre II, MD;  Location: [facility] CATH LAB;  Service: Interventional Radiology;  Laterality: Right;    PERITONEOCENTESIS Right 8/3/2021    Procedure: PARACENTESIS, ABDOMINAL;  Surgeon: Franco Cheung MD;  Location: [facility] CATH LAB;  Service: Radiology;  Laterality: Right;    PERITONEOCENTESIS Right 8/16/2021    Procedure: PARACENTESIS, ABDOMINAL;  Surgeon: Jay Torre MD;  Location: [facility] CATH LAB;  Service: Radiology;  Laterality: Right;    PERITONEOCENTESIS Right 8/23/2021    Procedure: PARACENTESIS, ABDOMINAL;  Surgeon: Jay Torre MD;  Location: [facility] CATH LAB;  Service: Radiology;  Laterality: Right;    PERITONEOCENTESIS Right 9/16/2021    Procedure: PARACENTESIS, ABDOMINAL;  Surgeon: Jay Torre MD;  Location: [facility] CATH LAB;  Service: Radiology;  Laterality: Right;    PERITONEOCENTESIS N/A 9/24/2021    Procedure: PARACENTESIS, ABDOMINAL;  Surgeon: Jay Torre MD;  Location: [facility] CATH LAB;  Service: Radiology;  Laterality: N/A;    PERITONEOCENTESIS Right 12/23/2021    Procedure: PARACENTESIS, ABDOMINAL;  Surgeon: Jay Torre MD;  Location: [facility] CATH LAB;  Service: Radiology;  Laterality: Right;    PERITONEOCENTESIS N/A 12/30/2021    Procedure: PARACENTESIS, ABDOMINAL;  Surgeon: Salas Cabrera MD;  Location: [facility] CATH LAB;  Service: Radiology;  Laterality: N/A;    RETROGRADE PYELOGRAPHY Bilateral 9/10/2021    Procedure: PYELOGRAM, RETROGRADE;  Surgeon: Rj Sánchez Jr., MD;  Location: [address];  Service: Urology;  Laterality: Bilateral;    TONSILLECTOMY         Review of  patient's allergies indicates:  No Known Allergies    Medications:  No medications prior to admission.     Antibiotics (From admission, onward)                None          Antifungals (From admission, onward)                None          Antivirals (From admission, onward)      None             Immunization History   Administered Date(s) Administered    COVID-19, vector-nr, rS-Ad26, PF (Rosalba) 12/27/2021    Hepatitis A / Hepatitis B 03/11/2022, 04/29/2022    Pneumococcal Conjugate - 13 Valent 03/11/2022    Zoster Recombinant 03/11/2022       Family History       Problem Relation (Age of Onset)    ADD / ADHD Daughter    COPD Maternal Grandfather    Cancer Mother    Heart disease Maternal Grandmother, Paternal Grandmother    No Known Problems Father, Sister    Sleep apnea Daughter          Social History     Socioeconomic History    Marital status:     Number of children: 1   Occupational History    Occupation: Assistant   Tobacco Use    Smoking status: Current Every Day Smoker     Packs/day: 0.25     Years: 27.00     Pack years: 6.75     Types: Cigarettes    Smokeless tobacco: Never Used    Tobacco comment: two cigarettes a day    Substance and Sexual Activity    Alcohol use: Not Currently     Comment: quit caridad 15    Drug use: No    Sexual activity: Yes     Partners: Male   Social History Narrative    They live in [place], [place]      Social [facility]     Financial Resource Strain: Low Risk     Difficulty of Paying Living Expenses: Not hard at all   Food Insecurity: No Food Insecurity    Worried About Running Out of Food in the Last Year: Never true    Ran Out of Food in the Last Year: Never true   Transportation Needs: No Transportation Needs    Lack of Transportation (Medical): No    Lack of Transportation (Non-Medical): No   Physical Activity: Unknown    Days of Exercise per Week: 1 day   Stress: No Stress Concern Present    Feeling of Stress : Only a little   Social Connections: Unknown     Frequency of Communication with Friends and Family: More than three times a week    Frequency of Social Gatherings with Friends and Family: Twice a week    Active Member of Clubs or Organizations: No    Attends Club or Organization Meetings: Never    Marital Status:    Housing Stability: Low Risk     Unable to Pay for Housing in the Last Year: No    Number of Places Lived in the Last Year: 1    Unstable Housing in the Last Year: No     Review of Systems   Constitutional:  Negative for appetite change, diaphoresis and fever.   HENT:  Negative for ear pain, facial swelling, hearing loss and sore throat.    Eyes:  Negative for pain, discharge and redness.   Respiratory:  Positive for shortness of breath. Negative for apnea and cough.    Cardiovascular:  Negative for chest pain and leg swelling.   Gastrointestinal:  Negative for diarrhea, nausea and vomiting.   Endocrine: Negative for cold intolerance and heat intolerance.   Genitourinary:  Negative for dysuria, hematuria and urgency.   Musculoskeletal:  Negative for arthralgias and joint swelling.   Skin:  Negative for color change and rash.   Allergic/Immunologic: Negative for environmental allergies and food allergies.   Neurological:  Negative for syncope, light-headedness and headaches.   Hematological:  Does not bruise/bleed easily.   Psychiatric/Behavioral:  Negative for agitation, behavioral problems, confusion and hallucinations.    Objective:     Vital Signs (Most Recent):  Temp: 98 °F (36.7 °C) (05/10/22 1201)  Pulse: 102 (05/10/22 0855)  Resp: 19 (05/10/22 0722)  BP: 101/68 (05/10/22 0722)  SpO2: 98 % (05/10/22 0855) Vital Signs (24h Range):  Temp:  [97.9 °F (36.6 °C)-98 °F (36.7 °C)] 98 °F (36.7 °C)  Pulse:  [] 102  Resp:  [17-25] 19  SpO2:  [96 %-98 %] 98 %  BP: ()/(56-68) 101/68     Weight: 70 kg (154 lb 5.2 oz)  Body mass index is 27.34 kg/m².    Estimated Creatinine Clearance: 44.6 mL/min (based on SCr of 1.4 mg/dL).    Physical  Exam  Vitals reviewed.   Constitutional:       General: She is not in acute distress.     Appearance: She is not ill-appearing, toxic-appearing or diaphoretic.   HENT:      Head: Normocephalic and atraumatic.      Right Ear: External ear normal.      Left Ear: External ear normal.      Nose: Nose normal.      Comments: -sinuses nontender     Mouth/Throat:      Mouth: Mucous membranes are moist.      Pharynx: Oropharynx is clear.      Comments: -good dentition  -pharyngeal cobblestoning  Eyes:      General:         Right eye: No discharge.         Left eye: No discharge.      Extraocular Movements: Extraocular movements intact.      Conjunctiva/sclera: Conjunctivae normal.   Neck:      Comments: -ecchymosis at right anterior neck at TIPS site, nontender, no fluctuance, no swelling  Cardiovascular:      Rate and Rhythm: Normal rate and regular rhythm.      Heart sounds: Normal heart sounds.   Pulmonary:      Effort: Pulmonary effort is normal.      Breath sounds: Normal breath sounds.   Abdominal:      Palpations: Abdomen is soft.      Tenderness: There is no abdominal tenderness.   Musculoskeletal:         General: No tenderness or deformity. Normal range of motion.      Cervical back: Normal range of motion.   Skin:     General: Skin is warm and dry.      Findings: Bruising present. No lesion.   Neurological:      Mental Status: She is alert and oriented to person, place, and time. Mental status is at baseline.   Psychiatric:         Mood and Affect: Mood normal.         Behavior: Behavior normal.         Thought Content: Thought content normal.         Judgment: Judgment normal.       Significant Labs: All pertinent labs within the past 24 hours have been reviewed.    Significant Imaging: I have reviewed all pertinent imaging results/findings within the past 24 hours.

## 2022-05-11 NOTE — ASSESSMENT & PLAN NOTE
Streptococcus mitis bacteremia, suspect 2/2 GI translocation in the setting of recent TIPS procedure.  No endocarditis stigmata on physical exam.  TTE with mild-moderate TR, mild MR, AV stenosis, no vegetations, adequate study.  Echo bubble study was positive for intracardiac shunt.    Recommendations:  -ceftriaxone 2g q24h, recommending 2 weeks total of IV antibiotics  -weekly CBC, CMP  -will make ID outpatient follow-up

## 2022-05-11 NOTE — CONSULTS
[facility] - Transplant Stepdown  Infectious Disease  Consult Note    Patient Name: Malu Montes  MRN: [id number]  Admission Date: 5/8/2022  Hospital Length of Stay: 2 days  Attending Physician: No att. providers found  Primary Care Provider: Killian Dodd DO     Isolation Status: No active isolations    Patient information was obtained from patient and ER records.      Consults  Assessment/Plan:     Infection due to Streptococcus mitis group  Streptococcus mitis bacteremia, suspect 2/2 GI translocation in the setting of recent TIPS procedure.  No endocarditis stigmata on physical exam.  TTE with mild-moderate TR, mild MR, AV stenosis, no vegetations, adequate study.  Echo bubble study was positive for intracardiac shunt.    Recommendations:  -ceftriaxone 2g q24h, recommending 2 weeks total of IV antibiotics  -weekly CBC, CMP  -will make ID outpatient follow-up    Sinus congestion       -Will refer to ENT on outpatient basis during ID visit.    Outpatient Antibiotic Therapy Plan:    Please send referral to [facility].    1) Infection: Streptococcus mitis bacteremia    2) Discharge Antibiotics:    Intravenous antibiotics:   Ceftriaxone 2g q24h     3) Therapy Duration:  2 weeks    Estimated end date of IV antibiotics: 5/21/22    4) Outpatient Weekly Labs:    Order the following labs to be drawn on Mondays:    CBC   CMP     5) Fax Lab Results to Infectious Diseases Provider: Dr russell burch    [facility] Fax Number: [phone]    6) Outpatient Infectious Diseases Follow-up     Follow-up appointment will be arranged by the ID clinic and will be found in the patient's appointments tab.     Prior to discharge, please ensure the patient's follow-up has been scheduled.     If there is still no follow-up scheduled prior to discharge, please send an EPIC message to Lela Florian in Infectious Diseases.            Thank you for your consult. I will sign off. Please contact  us if you have any additional questions.    Edward Alexis MD  Infectious Disease  [facility] - Transplant Stepdown    Subjective:     Principal Problem: SOB (shortness of breath)    HPI: 52-year-old female with ETOH cirrhosis requiring serial paracenteses twice weekly s/p TIPS 22 developed shortness of breath the night before this admission.  Found to have streptococcus mitis bacteremia.  Has been complaining of runny nose, sinus congestion, throat irritation for the past few months.  TIPS site at right side of neck bruised but not painful.  Denies fevers, tooth pain, dysphagia, sputum production, abdominal pain, other wounds.      Past Medical History:   Diagnosis Date    ADD (attention deficit disorder)     Anxiety     Ascites of liver     Cirrhosis 2021    CKD (chronic kidney disease) stage 3, GFR 30-59 ml/min     Constipation     ETOH abuse     Hyperlipidemia     Hypothyroidism     Other ascites 2021    Pancreatitis, acute     Tobacco use     Vitamin D deficiency        Past Surgical History:   Procedure Laterality Date    AUGMENTATION OF BREAST      breast augmentation       SECTION      COLONOSCOPY N/A 2021    Procedure: COLONOSCOPY;  Surgeon: Dao Gray MD;  Location: [facility] (2ND FLR);  Service: Endoscopy;  Laterality: N/A;  COVID test 21 General surgery clinic [facility]    CYSTOSCOPY N/A 9/10/2021    Procedure: CYSTOSCOPY;  Surgeon: Rj Sánchez Jr., MD;  Location: [facility] OR [address] [place] [address];  Service: Urology;  Laterality: N/A;    ESOPHAGOGASTRODUODENOSCOPY N/A 2021    Procedure: ESOPHAGOGASTRODUODENOSCOPY (EGD);  Surgeon: Dao Gray MD;  Location: [facility] (2ND FLR);  Service: Endoscopy;  Laterality: N/A;  labs current on     ESOPHAGOGASTRODUODENOSCOPY N/A 10/26/2021    Procedure: ESOPHAGOGASTRODUODENOSCOPY (EGD);  Surgeon: Dao Gray MD;  Location: [facility] (2ND FLR);  Service: Endoscopy;  Laterality: N/A;  to be done the week of  10/18/21 per Dr. Gray-  covid-10/23/21-[facility]-   10/25 arrival time confirmed with pt-rb    ESOPHAGOGASTRODUODENOSCOPY Left 12/21/2021    Procedure: EGD (ESOPHAGOGASTRODUODENOSCOPY);  Surgeon: Koffi Monteiro MD;  Location: [address]);  Service: Endoscopy;  Laterality: Left;  Rapid  pt requested AM appt and first available in December-  labs morning of procedure-  12/14 lvm to confirm appt-rb    HEMORRHOID SURGERY      PERITONEOCENTESIS Right 6/8/2021    Procedure: PARACENTESIS, ABDOMINAL;  Surgeon: Jay Torre MD;  Location: [facility] CATH LAB;  Service: Radiology;  Laterality: Right;    PERITONEOCENTESIS Right 6/25/2021    Procedure: PARACENTESIS, ABDOMINAL;  Surgeon: Jay Torre MD;  Location: [facility] CATH LAB;  Service: Radiology;  Laterality: Right;    PERITONEOCENTESIS Right 7/12/2021    Procedure: PARACENTESIS, ABDOMINAL;  Surgeon: Jay Torre MD;  Location: [facility] CATH LAB;  Service: Radiology;  Laterality: Right;    PERITONEOCENTESIS Right 7/23/2021    Procedure: PARACENTESIS, ABDOMINAL;  Surgeon: Edward De La Torre II, MD;  Location: [facility] CATH LAB;  Service: Interventional Radiology;  Laterality: Right;    PERITONEOCENTESIS Right 8/3/2021    Procedure: PARACENTESIS, ABDOMINAL;  Surgeon: Franco Cheung MD;  Location: [facility] CATH LAB;  Service: Radiology;  Laterality: Right;    PERITONEOCENTESIS Right 8/16/2021    Procedure: PARACENTESIS, ABDOMINAL;  Surgeon: Jay Torre MD;  Location: [facility] CATH LAB;  Service: Radiology;  Laterality: Right;    PERITONEOCENTESIS Right 8/23/2021    Procedure: PARACENTESIS, ABDOMINAL;  Surgeon: Jay Torre MD;  Location: [facility] CATH LAB;  Service: Radiology;  Laterality: Right;    PERITONEOCENTESIS Right 9/16/2021    Procedure: PARACENTESIS, ABDOMINAL;  Surgeon: Jay Torre MD;  Location: [facility] CATH LAB;  Service: Radiology;  Laterality: Right;    PERITONEOCENTESIS N/A 9/24/2021    Procedure: PARACENTESIS, ABDOMINAL;   Surgeon: Jay Torre MD;  Location: [facility] CATH LAB;  Service: Radiology;  Laterality: N/A;    PERITONEOCENTESIS Right 12/23/2021    Procedure: PARACENTESIS, ABDOMINAL;  Surgeon: Jay Torre MD;  Location: [facility] CATH LAB;  Service: Radiology;  Laterality: Right;    PERITONEOCENTESIS N/A 12/30/2021    Procedure: PARACENTESIS, ABDOMINAL;  Surgeon: Salas Cabrera MD;  Location: [facility] CATH LAB;  Service: Radiology;  Laterality: N/A;    RETROGRADE PYELOGRAPHY Bilateral 9/10/2021    Procedure: PYELOGRAM, RETROGRADE;  Surgeon: Rj Sánchez Jr., MD;  Location: [address];  Service: Urology;  Laterality: Bilateral;    TONSILLECTOMY         Review of patient's allergies indicates:  No Known Allergies    Medications:  No medications prior to admission.     Antibiotics (From admission, onward)                None          Antifungals (From admission, onward)                None          Antivirals (From admission, onward)      None             Immunization History   Administered Date(s) Administered    COVID-19, vector-nr, rS-Ad26, PF ([employer]) 12/27/2021    Hepatitis A / Hepatitis B 03/11/2022, 04/29/2022    Pneumococcal Conjugate - 13 Valent 03/11/2022    Zoster Recombinant 03/11/2022       Family History       Problem Relation (Age of Onset)    ADD / ADHD Daughter    COPD Maternal Grandfather    Cancer Mother    Heart disease Maternal Grandmother, Paternal Grandmother    No Known Problems Father, Sister    Sleep apnea Daughter          Social History     Socioeconomic History    Marital status:     Number of children: 1   Occupational History    Occupation: Assistant   Tobacco Use    Smoking status: Current Every Day Smoker     Packs/day: 0.25     Years: 27.00     Pack years: 6.75     Types: Cigarettes    Smokeless tobacco: Never Used    Tobacco comment: two cigarettes a day    Substance and Sexual Activity    Alcohol use: Not Currently     Comment: quit caridad 15    Drug use: No     Sexual activity: Yes     Partners: Male   Social History Narrative    They live in [place], [place]      Social Determinants of Health     Financial Resource Strain: Low Risk     Difficulty of Paying Living Expenses: Not hard at all   Food Insecurity: No Food Insecurity    Worried About Running Out of Food in the Last Year: Never true    Ran Out of Food in the Last Year: Never true   Transportation Needs: No Transportation Needs    Lack of Transportation (Medical): No    Lack of Transportation (Non-Medical): No   Physical Activity: Unknown    Days of Exercise per Week: 1 day   Stress: No Stress Concern Present    Feeling of Stress : Only a little   Social Connections: Unknown    Frequency of Communication with Friends and Family: More than three times a week    Frequency of Social Gatherings with Friends and Family: Twice a week    Active Member of Clubs or Organizations: No    Attends Club or Organization Meetings: Never    Marital Status:    Housing Stability: Low Risk     Unable to Pay for Housing in the Last Year: No    Number of Places Lived in the Last Year: 1    Unstable Housing in the Last Year: No     Review of Systems   Constitutional:  Negative for appetite change, diaphoresis and fever.   HENT:  Negative for ear pain, facial swelling, hearing loss and sore throat.    Eyes:  Negative for pain, discharge and redness.   Respiratory:  Positive for shortness of breath. Negative for apnea and cough.    Cardiovascular:  Negative for chest pain and leg swelling.   Gastrointestinal:  Negative for diarrhea, nausea and vomiting.   Endocrine: Negative for cold intolerance and heat intolerance.   Genitourinary:  Negative for dysuria, hematuria and urgency.   Musculoskeletal:  Negative for arthralgias and joint swelling.   Skin:  Negative for color change and rash.   Allergic/Immunologic: Negative for environmental allergies and food allergies.   Neurological:  Negative for syncope, light-headedness  and headaches.   Hematological:  Does not bruise/bleed easily.   Psychiatric/Behavioral:  Negative for agitation, behavioral problems, confusion and hallucinations.    Objective:     Vital Signs (Most Recent):  Temp: 98 °F (36.7 °C) (05/10/22 1201)  Pulse: 102 (05/10/22 0855)  Resp: 19 (05/10/22 0722)  BP: 101/68 (05/10/22 0722)  SpO2: 98 % (05/10/22 0855) Vital Signs (24h Range):  Temp:  [97.9 °F (36.6 °C)-98 °F (36.7 °C)] 98 °F (36.7 °C)  Pulse:  [] 102  Resp:  [17-25] 19  SpO2:  [96 %-98 %] 98 %  BP: ()/(56-68) 101/68     Weight: 70 kg (154 lb 5.2 oz)  Body mass index is 27.34 kg/m².    Estimated Creatinine Clearance: 44.6 mL/min (based on SCr of 1.4 mg/dL).    Physical Exam  Vitals reviewed.   Constitutional:       General: She is not in acute distress.     Appearance: She is not ill-appearing, toxic-appearing or diaphoretic.   HENT:      Head: Normocephalic and atraumatic.      Right Ear: External ear normal.      Left Ear: External ear normal.      Nose: Nose normal.      Comments: -sinuses nontender     Mouth/Throat:      Mouth: Mucous membranes are moist.      Pharynx: Oropharynx is clear.      Comments: -good dentition  -pharyngeal cobblestoning  Eyes:      General:         Right eye: No discharge.         Left eye: No discharge.      Extraocular Movements: Extraocular movements intact.      Conjunctiva/sclera: Conjunctivae normal.   Neck:      Comments: -ecchymosis at right anterior neck at TIPS site, nontender, no fluctuance, no swelling  Cardiovascular:      Rate and Rhythm: Normal rate and regular rhythm.      Heart sounds: Normal heart sounds.   Pulmonary:      Effort: Pulmonary effort is normal.      Breath sounds: Normal breath sounds.   Abdominal:      Palpations: Abdomen is soft.      Tenderness: There is no abdominal tenderness.   Musculoskeletal:         General: No tenderness or deformity. Normal range of motion.      Cervical back: Normal range of motion.   Skin:     General: Skin  is warm and dry.      Findings: Bruising present. No lesion.   Neurological:      Mental Status: She is alert and oriented to person, place, and time. Mental status is at baseline.   Psychiatric:         Mood and Affect: Mood normal.         Behavior: Behavior normal.         Thought Content: Thought content normal.         Judgment: Judgment normal.       Significant Labs: All pertinent labs within the past 24 hours have been reviewed.    Significant Imaging: I have reviewed all pertinent imaging results/findings within the past 24 hours.

## 2022-05-11 NOTE — TELEPHONE ENCOUNTER
Patient called with questions to confirm her desire and acceptance of liver first if necessary vs. SLK due to low meld.  Confirmed and encouraged that yes this information was correct in her listing comments for our Drs and procurement team.    Secondly, we discussed her volume/edema at home. States she had been voiding lots of urine inpatient due to receiving IV Lasix but now at home she was on Lasix by mouth. She is not voiding as much.  She has a Mid line catheter and was curious if maybe this was possible.  We discussed this was not something often done with our patients due to need to monitor electrolytes.  Encouraged her to watch her Na intake as directed by nutritionist inpatient and oral intake of fluids as well.  Also encouraged to maintain movement and not remain sedentary. She verbalized understanding and stated she would wait and see how things went for awhile at home on the Lasix by mouth. She stated her lower body edema was not showing any pitting or indenting when she pushed her finger into her body.  Encouraged to continue to call and that with nursing monitoring for IV antibiotics they could also maintain communication with us.

## 2022-05-11 NOTE — ANESTHESIA POSTPROCEDURE EVALUATION
Anesthesia Post Evaluation    Patient: Malu Montes    Procedure(s) Performed: * No procedures listed *    Final Anesthesia Type: general      Patient location during evaluation: PACU  Patient participation: Yes- Able to Participate  Level of consciousness: awake and alert  Post-procedure vital signs: reviewed and stable  Pain management: adequate  Airway patency: patent    PONV status at discharge: No PONV  Anesthetic complications: no      Cardiovascular status: blood pressure returned to baseline and tachycardic  Respiratory status: unassisted, spontaneous ventilation and room air  Hydration status: euvolemic  Follow-up not needed.          Vitals Value Taken Time   /61 05/06/22 2100   Temp 36.8 °C (98.2 °F) 05/06/22 2100   Pulse 109 05/06/22 2100   Resp 20 05/06/22 2045   SpO2 97 % 05/06/22 2100         No case tracking events are documented in the log.      Pain/Herminia Score: No data recorded

## 2022-05-13 ENCOUNTER — PATIENT MESSAGE (OUTPATIENT)
Dept: TRANSPLANT | Facility: CLINIC | Age: 52
End: 2022-05-13
Payer: COMMERCIAL

## 2022-05-13 ENCOUNTER — HOSPITAL ENCOUNTER (OUTPATIENT)
Dept: INTERVENTIONAL RADIOLOGY/VASCULAR | Facility: HOSPITAL | Age: 52
Discharge: HOME | End: 2022-05-13
Attending: INTERNAL MEDICINE
Payer: COMMERCIAL

## 2022-05-13 ENCOUNTER — CONFERENCE (OUTPATIENT)
Dept: TRANSPLANT | Facility: CLINIC | Age: 52
End: 2022-05-13
Payer: COMMERCIAL

## 2022-05-13 VITALS
SYSTOLIC BLOOD PRESSURE: 96 MMHG | RESPIRATION RATE: 16 BRPM | OXYGEN SATURATION: 99 % | DIASTOLIC BLOOD PRESSURE: 57 MMHG | HEART RATE: 86 BPM

## 2022-05-13 DIAGNOSIS — R18.8 OTHER ASCITES: ICD-10-CM

## 2022-05-13 LAB
APPEARANCE FLD: NORMAL
BACTERIA BLD CULT: NORMAL
BODY FLD TYPE: NORMAL
COLOR FLD: NORMAL
EOSINOPHIL NFR FLD MANUAL: 1 %
LYMPHOCYTES NFR FLD MANUAL: 53 %
MESOTHL CELL NFR FLD MANUAL: 1 %
MONOS+MACROS NFR FLD MANUAL: 38 %
NEUTROPHILS NFR FLD MANUAL: 7 %
PETH 16:0/18.1 (POPETH): <10 NG/ML
PETH 16:0/18.2 (PLPETH): <10 NG/ML
WBC # FLD: 165 /CU MM

## 2022-05-13 PROCEDURE — 49083 ABD PARACENTESIS W/IMAGING: CPT | Mod: TXP,,, | Performed by: FAMILY MEDICINE

## 2022-05-13 PROCEDURE — C1729 CATH, DRAINAGE: HCPCS | Mod: TXP

## 2022-05-13 PROCEDURE — 49083 ABD PARACENTESIS W/IMAGING: CPT | Mod: TXP | Performed by: STUDENT IN AN ORGANIZED HEALTH CARE EDUCATION/TRAINING PROGRAM

## 2022-05-13 PROCEDURE — 49083 IR PARACENTESIS WITH IMAGING: ICD-10-PCS | Mod: TXP,,, | Performed by: FAMILY MEDICINE

## 2022-05-13 PROCEDURE — 89051 BODY FLUID CELL COUNT: CPT | Mod: NTX | Performed by: INTERNAL MEDICINE

## 2022-05-13 NOTE — TELEPHONE ENCOUNTER
Discussed LLD case in donor conference on 5/12/2022. Discussed pt's eligibility for liver only living donor graft in light of recent hospitalization. Pt with TIPS on 5/6. Will wait for f/up testing to re-discuss both pt and potential donor ().

## 2022-05-13 NOTE — H&P
Radiology History & Physical      SUBJECTIVE:     Chief Complaint: abdominal distention    History of Present Illness:  Malu Montes is a 52 y.o. female who presents for ultrasound guided paracentesis  Past Medical History:   Diagnosis Date    ADD (attention deficit disorder)     Anxiety     Ascites of liver     Cirrhosis 2021    CKD (chronic kidney disease) stage 3, GFR 30-59 ml/min     Constipation     ETOH abuse     Hyperlipidemia     Hypothyroidism     Other ascites 2021    Pancreatitis, acute     Tobacco use     Vitamin D deficiency      Past Surgical History:   Procedure Laterality Date    AUGMENTATION OF BREAST      breast augmentation       SECTION      COLONOSCOPY N/A 2021    Procedure: COLONOSCOPY;  Surgeon: Dao Gray MD;  Location: [facility] (2ND FLR);  Service: Endoscopy;  Laterality: N/A;  COVID test 21 General surgery clinic [facility]    CYSTOSCOPY N/A 9/10/2021    Procedure: CYSTOSCOPY;  Surgeon: Rj Sánchez Jr., MD;  [facility];  Service: Urology;  Laterality: N/A;    ESOPHAGOGASTRODUODENOSCOPY N/A 2021    Procedure: ESOPHAGOGASTRODUODENOSCOPY (EGD);  Surgeon: Dao Gray MD;  Location: [facility] (2ND FLR);  Service: Endoscopy;  Laterality: N/A;  labs current on     ESOPHAGOGASTRODUODENOSCOPY N/A 10/26/2021    Procedure: ESOPHAGOGASTRODUODENOSCOPY (EGD);  Surgeon: Dao Gray MD;  Location: [facility] (2ND FLR);  Service: Endoscopy;  Laterality: N/A;  to be done the week of 10/18/21 per Dr. Gray-BB  cov[facility]-10/23/21-[facility]-   10/25 arrival time confirmed with pt-rb    ESOPHAGOGASTRODUODENOSCOPY Left 2021    Procedure: EGD (ESOPHAGOGASTRODUODENOSCOPY);  Surgeon: Koffi Monteiro MD;  Location: [facility] (4TH FLR);  Service: Endoscopy;  Laterality: Left;  Rapid  pt requested AM appt and first available in December-  labs morning of procedure-BB   lvm to confirm appt-rb    HEMORRHOID SURGERY       PERITONEOCENTESIS Right 6/8/2021    Procedure: PARACENTESIS, ABDOMINAL;  Surgeon: Jay Torre MD;  Location: [facility] CATH LAB;  Service: Radiology;  Laterality: Right;    PERITONEOCENTESIS Right 6/25/2021    Procedure: PARACENTESIS, ABDOMINAL;  Surgeon: Jay Torre MD;  Location: [facility] CATH LAB;  Service: Radiology;  Laterality: Right;    PERITONEOCENTESIS Right 7/12/2021    Procedure: PARACENTESIS, ABDOMINAL;  Surgeon: Jay Torre MD;  Location: [facility] CATH LAB;  Service: Radiology;  Laterality: Right;    PERITONEOCENTESIS Right 7/23/2021    Procedure: PARACENTESIS, ABDOMINAL;  Surgeon: Edward De La Torre II, MD;  Location: [facility] CATH LAB;  Service: Interventional Radiology;  Laterality: Right;    PERITONEOCENTESIS Right 8/3/2021    Procedure: PARACENTESIS, ABDOMINAL;  Surgeon: Franco Cheung MD;  Location: [facility] CATH LAB;  Service: Radiology;  Laterality: Right;    PERITONEOCENTESIS Right 8/16/2021    Procedure: PARACENTESIS, ABDOMINAL;  Surgeon: Jay Torre MD;  Location: [facility] CATH LAB;  Service: Radiology;  Laterality: Right;    PERITONEOCENTESIS Right 8/23/2021    Procedure: PARACENTESIS, ABDOMINAL;  Surgeon: Jay Torre MD;  Location: [facility] CATH LAB;  Service: Radiology;  Laterality: Right;    PERITONEOCENTESIS Right 9/16/2021    Procedure: PARACENTESIS, ABDOMINAL;  Surgeon: Jay Torre MD;  Location: [facility] CATH LAB;  Service: Radiology;  Laterality: Right;    PERITONEOCENTESIS N/A 9/24/2021    Procedure: PARACENTESIS, ABDOMINAL;  Surgeon: Jay Torre MD;  Location: [facility] CATH LAB;  Service: Radiology;  Laterality: N/A;    PERITONEOCENTESIS Right 12/23/2021    Procedure: PARACENTESIS, ABDOMINAL;  Surgeon: Jay Torre MD;  Location: [facility] CATH LAB;  Service: Radiology;  Laterality: Right;    PERITONEOCENTESIS N/A 12/30/2021    Procedure: PARACENTESIS, ABDOMINAL;  Surgeon: Salas Cabrera MD;  Location: [facility] CATH LAB;  Service: Radiology;   Laterality: N/A;    RETROGRADE PYELOGRAPHY Bilateral 9/10/2021    Procedure: PYELOGRAM, RETROGRADE;  Surgeon: Rj Sánchez Jr., MD;  Location: [facility] OR [address] [place] [address];  Service: Urology;  Laterality: Bilateral;    TONSILLECTOMY         Home Meds:   Prior to Admission medications    Medication Sig Start Date End Date Taking? Authorizing Provider   allopurinoL (ZYLOPRIM) 100 MG tablet TAKE 1 TABLET(100 MG) BY MOUTH EVERY DAY  Patient taking differently: Take by mouth every morning. 2/23/22   Rashel Cohn MD   bisacodyL (DULCOLAX) 5 mg EC tablet Take 5 mg by mouth daily as needed for Constipation.    Historical Provider   cefTRIAXone (ROCEPHIN) 2 g/50 mL PgBk IVPB Inject 50 mLs (2 g total) into the vein once daily. Stop 5/24/22 for 14 days 5/10/22 5/24/22  Leslie Umaña MD   ciprofloxacin HCl (CIPRO) 250 MG tablet Take 1 tablet (250 mg total) by mouth every other day. 4/18/22   Rashel Cohn MD   ergocalciferol, vitamin D2, (VITAMIN D ORAL) Take by mouth every evening.    Historical Provider   folic acid (FOLVITE) 1 MG tablet Take 2 tablets (2 mg total) by mouth once daily.  Patient taking differently: Take 2 mg by mouth every evening. 12/14/21 5/5/22  Uma Perry MD   furosemide (LASIX) 80 MG tablet Take 1 tablet (80 mg total) by mouth 3 (three) times daily. 4/26/22   Rashel Cohn MD   hydrOXYzine HCL (ATARAX) 25 MG tablet Take 1 tablet (25 mg total) by mouth every 6 to 8 hours as needed for Itching. 4/1/22   Sharmila Pacheco MD   lactulose (CHRONULAC) 10 gram/15 mL solution Take 30 mLs by mouth every evening. 3/4/22   Historical Provider   levothyroxine (SYNTHROID) 50 MCG tablet TAKE 1 TABLET(50 MCG) BY MOUTH BEFORE BREAKFAST 9/25/21   Killian Dodd,    linaCLOtide (LINZESS) 72 mcg Cap capsule Take 2 capsules (144 mcg total) by mouth before breakfast. 11/26/21   Sharmila Pacheco MD   pantoprazole (PROTONIX) 40 MG tablet Take 1 tablet (40 mg total) by mouth once daily.  Patient taking  differently: Take 40 mg by mouth every evening. 2/15/22 2/15/23  Sharmila Pacheco MD   thiamine (VITAMIN B-1) 100 MG tablet Take 1 tablet (100 mg total) by mouth every evening. 10/11/21   Sharmila Pacheco MD   vitamin A 05974 UNIT capsule Take 10,000 Units by mouth every evening.     Historical Provider     Anticoagulants/Antiplatelets: no anticoagulation    Allergies: Review of patient's allergies indicates:  No Known Allergies  Sedation History:  no adverse reactions    Review of Systems:   Hematological: no known coagulopathies  Respiratory: no shortness of breath  Cardiovascular: no chest pain  Gastrointestinal: no abdominal pain  Genito-Urinary: no dysuria  Musculoskeletal: negative  Neurological: no TIA or stroke symptoms         OBJECTIVE:     Vital Signs (Most Recent)       Physical Exam:  ASA: 2  Mallampati: n/a    General: no acute distress  Mental Status: alert and oriented to person, place and time  HEENT: normocephalic, atraumatic  Chest: unlabored breathing  Heart: regular heart rate  Abdomen: distended  Extremity: moves all extremities    ASSESSMENT/PLAN:     Sedation Plan: local  Patient will undergo ultrasound guided paracentesis.    RENATA Marie, FNP  Interventional Radiology  [phone] [facility]

## 2022-05-13 NOTE — PLAN OF CARE
Paracentesis complete. 2600 mLs peritoneal fluid drained. Pt tolerated well. Dressing to llq clean, dry, and intact. Specimens sent per lab order. Pt refused discharge handout. Pt ambulated to lobby, gait steady.

## 2022-05-13 NOTE — PLAN OF CARE
Pt arrived to U 1 for paracentesis.  Name verified using two identifiers.  Allergies verified.  Will continue to monitor.

## 2022-05-13 NOTE — PROCEDURES
Radiology Post-Procedure Note    Pre Op Diagnosis: Ascites  Post Op Diagnosis: Same    Procedure: Ultrasound Guided Paracentesis    Procedure performed by: Fabrizio PEÑA, Cee     Written Informed Consent Obtained: Yes  Specimen Removed: YES serosanguinous  Estimated Blood Loss: Minimal    Findings:   Successful paracentesis.  Albumin administered PRN per protocol.    Patient tolerated procedure well.    Cee Dinh, APRN, FNP  Interventional Radiology  [phone] clinic

## 2022-05-15 LAB
BACTERIA BLD CULT: NORMAL
BACTERIA BLD CULT: NORMAL

## 2022-05-16 ENCOUNTER — HOSPITAL ENCOUNTER (OUTPATIENT)
Dept: RADIOLOGY | Facility: HOSPITAL | Age: 52
Discharge: HOME | End: 2022-05-16
Attending: INTERNAL MEDICINE
Payer: COMMERCIAL

## 2022-05-16 ENCOUNTER — HOSPITAL ENCOUNTER (OUTPATIENT)
Dept: INTERVENTIONAL RADIOLOGY/VASCULAR | Facility: HOSPITAL | Age: 52
Discharge: HOME | End: 2022-05-16
Attending: INTERNAL MEDICINE
Payer: COMMERCIAL

## 2022-05-16 VITALS
TEMPERATURE: 98 F | SYSTOLIC BLOOD PRESSURE: 99 MMHG | RESPIRATION RATE: 16 BRPM | HEART RATE: 85 BPM | OXYGEN SATURATION: 99 % | DIASTOLIC BLOOD PRESSURE: 58 MMHG

## 2022-05-16 DIAGNOSIS — Z76.82 ORGAN TRANSPLANT CANDIDATE: ICD-10-CM

## 2022-05-16 DIAGNOSIS — E87.6 HYPOKALEMIA: ICD-10-CM

## 2022-05-16 DIAGNOSIS — Z01.818 PRE-TRANSPLANT EVALUATION FOR LIVER TRANSPLANT: ICD-10-CM

## 2022-05-16 DIAGNOSIS — R18.8 OTHER ASCITES: ICD-10-CM

## 2022-05-16 DIAGNOSIS — E87.6 HYPOKALEMIA: Primary | ICD-10-CM

## 2022-05-16 DIAGNOSIS — R06.02 SHORTNESS OF BREATH: Primary | ICD-10-CM

## 2022-05-16 DIAGNOSIS — Z76.82 ORGAN TRANSPLANT CANDIDATE: Primary | ICD-10-CM

## 2022-05-16 DIAGNOSIS — N18.30 STAGE 3 CHRONIC KIDNEY DISEASE, UNSPECIFIED WHETHER STAGE 3A OR 3B CKD: ICD-10-CM

## 2022-05-16 PROCEDURE — 93975 VASCULAR STUDY: CPT | Mod: 26,TXP,, | Performed by: RADIOLOGY

## 2022-05-16 PROCEDURE — 93975 US ABDOMEN COMP WITH DOPPLER (XPD): ICD-10-PCS | Mod: 26,TXP,, | Performed by: RADIOLOGY

## 2022-05-16 PROCEDURE — 93975 VASCULAR STUDY: CPT | Mod: TC,TXP

## 2022-05-16 PROCEDURE — 76700 US ABDOMEN COMP WITH DOPPLER (XPD): ICD-10-PCS | Mod: 26,XS,TXP, | Performed by: RADIOLOGY

## 2022-05-16 PROCEDURE — 76705 IR US ABDOMEN LIMITED: ICD-10-PCS | Mod: 26,TXP,, | Performed by: PHYSICIAN ASSISTANT

## 2022-05-16 PROCEDURE — 76700 US EXAM ABDOM COMPLETE: CPT | Mod: 26,XS,TXP, | Performed by: RADIOLOGY

## 2022-05-16 PROCEDURE — 76705 ECHO EXAM OF ABDOMEN: CPT | Mod: TC,NTX | Performed by: RADIOLOGY

## 2022-05-16 PROCEDURE — 76705 ECHO EXAM OF ABDOMEN: CPT | Mod: 26,TXP,, | Performed by: PHYSICIAN ASSISTANT

## 2022-05-16 RX ORDER — POTASSIUM CHLORIDE 20 MEQ/1
40 TABLET, EXTENDED RELEASE ORAL ONCE
Qty: 2 TABLET | Refills: 0 | Status: SHIPPED | OUTPATIENT
Start: 2022-05-16 | End: 2022-05-16

## 2022-05-16 RX ORDER — POTASSIUM CHLORIDE 20 MEQ/1
40 TABLET, EXTENDED RELEASE ORAL ONCE
Qty: 2 TABLET | Refills: 0 | Status: CANCELLED | OUTPATIENT
Start: 2022-05-16 | End: 2022-05-16

## 2022-05-16 NOTE — PROGRESS NOTES
CATHY SANCHEZ spoke with Dr Pacheco and pt to hold lasix today no ED visit needed / pt has no fluid and will not need a paracentesis today

## 2022-05-16 NOTE — PROGRESS NOTES
Pt D/C home advised to hold lasix, report to pharmacy for K+ prescription and contact transplant coordinator as discussed

## 2022-05-16 NOTE — PROGRESS NOTES
Pt AAO w/ NAD pt informed staff that transplant coordinator called and informed her that K+ is 2.4 / pt placed to cardiac monitor and CATHY Arnold arrived and informed / call placed to Team to discuss plan of care / pt requesting not to go to ED for evaluation

## 2022-05-16 NOTE — PROCEDURES
During ultrasound evaluation, insufficient ascites identified for safe paracentesis. No paracentesis performed.     Kay Arnold PA-C  Interventional Radiology  Clinic [phone]

## 2022-05-16 NOTE — TELEPHONE ENCOUNTER
Received panic potassium of 2.4.  Discussed with Dr Pacheco - will give KDur 40meq today; hold lasix today and tomorrow; repeat K+ level tomorrow.  Patient notified and verbalized understanding.

## 2022-05-16 NOTE — PROGRESS NOTES
Discussed labs with her provider Dr. Pacheco.  Junior aware of labs.  Patient is to hold lasix x 2 d,  PO K, and await call from tx coordinator reHardy King.     Discussed sx of low K.  She is aware as she had recent admit.  Denies CP, SOB, palp, syncope. She is to report to ED if she experiences any of these.       Kay Arnold PA-C  Interventional Radiology  Clinic [phone]

## 2022-05-17 ENCOUNTER — OFFICE VISIT (OUTPATIENT)
Dept: OTOLARYNGOLOGY | Facility: CLINIC | Age: 52
End: 2022-05-17
Payer: COMMERCIAL

## 2022-05-17 ENCOUNTER — PATIENT OUTREACH (OUTPATIENT)
Dept: ADMINISTRATIVE | Facility: OTHER | Age: 52
End: 2022-05-17
Payer: COMMERCIAL

## 2022-05-17 ENCOUNTER — LAB VISIT (OUTPATIENT)
Dept: LAB | Facility: HOSPITAL | Age: 52
End: 2022-05-17
Payer: COMMERCIAL

## 2022-05-17 VITALS
TEMPERATURE: 99 F | DIASTOLIC BLOOD PRESSURE: 57 MMHG | SYSTOLIC BLOOD PRESSURE: 90 MMHG | WEIGHT: 138.31 LBS | RESPIRATION RATE: 14 BRPM | HEART RATE: 92 BPM | OXYGEN SATURATION: 96 % | HEIGHT: 63 IN | BODY MASS INDEX: 24.51 KG/M2

## 2022-05-17 DIAGNOSIS — E87.6 HYPOKALEMIA: ICD-10-CM

## 2022-05-17 DIAGNOSIS — Z76.82 ORGAN TRANSPLANT CANDIDATE: ICD-10-CM

## 2022-05-17 DIAGNOSIS — J34.89 NASAL SEPTUM PERFORATION: Primary | ICD-10-CM

## 2022-05-17 LAB — POTASSIUM SERPL-SCNC: 3 MMOL/L (ref 3.5–5.1)

## 2022-05-17 PROCEDURE — 99203 PR OFFICE/OUTPT VISIT, NEW, LEVL III, 30-44 MIN: ICD-10-PCS | Mod: S$GLB,TXP,, | Performed by: OTOLARYNGOLOGY

## 2022-05-17 PROCEDURE — 84132 ASSAY OF SERUM POTASSIUM: CPT | Mod: NTX | Performed by: INTERNAL MEDICINE

## 2022-05-17 PROCEDURE — 36415 COLL VENOUS BLD VENIPUNCTURE: CPT | Mod: NTX | Performed by: INTERNAL MEDICINE

## 2022-05-17 PROCEDURE — 99203 OFFICE O/P NEW LOW 30 MIN: CPT | Mod: S$GLB,TXP,, | Performed by: OTOLARYNGOLOGY

## 2022-05-17 NOTE — PROGRESS NOTES
Ms. Montes     Vitals:    22 1533   BP: (!) 90/57   Pulse: 92   Resp: 14   Temp: 99.1 °F (37.3 °C)       Chief Complaint:  No chief complaint on file.       HPI:   is a 52-year-old white female who presents referred by  for nasal crusting.  She states that this is been going on for approximately 10 months and she feels that she may have a hole in her septum.  She does admit to regular digital manipulation of her nose and removing crust with her fingers.  She is presently on the transplant list for liver and kidney.    SNOT22- 39 NOSE- 60    Review of Systems:  Constitutional:   weight loss or weight gain: Negative  Allergy/Immunologic:   Negative  Nasal Congestion/Obstruction:   Positive  Nosebleeds:   Positive  Sinus infections:   Negative  Headache/Facial Pain:   Negative  Snoring/[place]:   Positive for snoring and difficulty sleeping.  Throat: Infections/Pain:   Negative  Hoarseness/Speech Disturbance:   Negative  Trauma Hx:  Negative    Cardiovascular:  M/I Angina: Negative  Hypertension: Negative  Endocrine:    DM/Steroids: Negative  GI:   Dysphagia/Reflux: Negative, positive for cirrhosis.  :   GYN Pregnancy: Negative, positive for chronic kidney disease.  Renal:   Dialysis: Negative  Lymphatic:   Neck Mass/Lymphadenopathy: Negative  Muscoloskeletal:   Negative  Hematologic:   Bleeding Disorders/Anemia: Negative  Neurologic:    Cranial/Neuralgia: Negative  Pulmonary:   Asthma/SOB/Cough: Negative  Skin Disorders: Negative    Past Medical/Surgical/Family/Social History:    ENT Surgery:  Status post tonsillectomy.  Occupational Exposure: Negative   Problems: Negative  Cancer: Negative    Past Family History:   Family history of Cancer: Negative    Past Social History:   Tobacco:  1-2 cigarettes per day smoker   Alcohol:  Former alcohol abuser.      Allergies and medications: Reviewed per med card.    Physical Examination:  Ears:   External auditory canals:  Clear   Hearing:  Grossly intact   Tympanic Membranes: Clear  Nose:   External: Normal   Intranasal:  Bilateral anterior septal crusting with apparent perforation present.  Mouth:   Intraorally: Lips, teeth, and gums: Normal   Oropharynx: Normal   Mucosa: Normal   Tongue: Normal  Throat:      Palate: Normal palate with elevation   Tonsils:  Absent   Posterior Pharynx: Normal  Fiberoptic exam: Not performed  Head/Face:     Inspection: Normal and atraumatic   Palpation/Percussion: Non tender   Facial strength: Normal and symmetric   Salivary glands: Normal  Neck: Supple  Thyroid: No masses  Lymphatics: No nodes  Respiratory:   Effort: Normal  Eyes:   Ocular Mobility: Normal   Vision: Grossly intact  Neuro/Psych:   Cranial Nerves: Grossly Intact   Orientation: Normal   Mood/Affect: Normal      Assessment/Plan:  I have discussed my findings with her in detail as well as my recommendations for treatment.  I have strongly encouraged her to cease all digital manipulation as well as Kleenex tissues and Q-tips.  I have given her an epistaxis precaution sheet and reviewed all of the recommendations with her in detail.  I have given her literature on saline sinus rinses including issues with distilled water and described how this is to be used in detail as well.  She will return to clinic in 1 month for follow-up.

## 2022-05-18 ENCOUNTER — PATIENT MESSAGE (OUTPATIENT)
Dept: TRANSPLANT | Facility: CLINIC | Age: 52
End: 2022-05-18
Payer: COMMERCIAL

## 2022-05-18 ENCOUNTER — PATIENT MESSAGE (OUTPATIENT)
Dept: HEPATOLOGY | Facility: CLINIC | Age: 52
End: 2022-05-18
Payer: COMMERCIAL

## 2022-05-20 ENCOUNTER — HOSPITAL ENCOUNTER (OUTPATIENT)
Dept: INTERVENTIONAL RADIOLOGY/VASCULAR | Facility: HOSPITAL | Age: 52
Discharge: HOME | End: 2022-05-20
Attending: INTERNAL MEDICINE
Payer: COMMERCIAL

## 2022-05-20 ENCOUNTER — CLINICAL SUPPORT (OUTPATIENT)
Dept: INFECTIOUS DISEASES | Facility: CLINIC | Age: 52
End: 2022-05-20
Payer: COMMERCIAL

## 2022-05-20 DIAGNOSIS — Z76.82 ORGAN TRANSPLANT CANDIDATE: ICD-10-CM

## 2022-05-20 DIAGNOSIS — R18.8 OTHER ASCITES: ICD-10-CM

## 2022-05-20 PROCEDURE — C1769 GUIDE WIRE: HCPCS | Mod: TXP

## 2022-05-20 PROCEDURE — 90471 IMMUNIZATION ADMIN: CPT | Mod: S$GLB,TXP,, | Performed by: INTERNAL MEDICINE

## 2022-05-20 PROCEDURE — C1729 CATH, DRAINAGE: HCPCS | Mod: NTX

## 2022-05-20 PROCEDURE — 90471 PNEUMOCOCCAL POLYSACCHARIDE VACCINE 23-VALENT =>2YO SQ IM: ICD-10-PCS | Mod: S$GLB,TXP,, | Performed by: INTERNAL MEDICINE

## 2022-05-20 PROCEDURE — 90732 PPSV23 VACC 2 YRS+ SUBQ/IM: CPT | Mod: S$GLB,TXP,, | Performed by: INTERNAL MEDICINE

## 2022-05-20 PROCEDURE — 90732 PNEUMOCOCCAL POLYSACCHARIDE VACCINE 23-VALENT =>2YO SQ IM: ICD-10-PCS | Mod: S$GLB,TXP,, | Performed by: INTERNAL MEDICINE

## 2022-05-20 PROCEDURE — 49083 ABD PARACENTESIS W/IMAGING: CPT | Mod: TXP,,, | Performed by: FAMILY MEDICINE

## 2022-05-20 PROCEDURE — 49083 ABD PARACENTESIS W/IMAGING: CPT | Mod: TXP | Performed by: RADIOLOGY

## 2022-05-20 PROCEDURE — 25000003 PHARM REV CODE 250: Mod: TXP

## 2022-05-20 PROCEDURE — 49083 IR PARACENTESIS WITH IMAGING: ICD-10-PCS | Mod: TXP,,, | Performed by: FAMILY MEDICINE

## 2022-05-20 RX ORDER — LIDOCAINE HYDROCHLORIDE 10 MG/ML
INJECTION, SOLUTION EPIDURAL; INFILTRATION; INTRACAUDAL; PERINEURAL
Status: COMPLETED
Start: 2022-05-20 | End: 2022-05-20

## 2022-05-20 RX ADMIN — LIDOCAINE HYDROCHLORIDE: 10 INJECTION, SOLUTION EPIDURAL; INFILTRATION; INTRACAUDAL at 08:05

## 2022-05-20 NOTE — PLAN OF CARE
Patient AAOx3, no distress noted, respirations even and unlabored, will continue to monitor. VSS, Acceptance of education, consents signed, H/P done. Labs reviewed. Patient in MPU [place] 2 for paracentesis. Patient prepped and draped in sterile fashion. No sedation to be administered; local anesthetic only.

## 2022-05-20 NOTE — H&P
Radiology History & Physical      SUBJECTIVE:     Chief Complaint: abdominal distention    History of Present Illness:  Malu Montes is a 52 y.o. female who presents for ultrasound guided paracentesis  Past Medical History:   Diagnosis Date    ADD (attention deficit disorder)     Anxiety     Ascites of liver     Cirrhosis 2021    CKD (chronic kidney disease) stage 3, GFR 30-59 ml/min     Constipation     ETOH abuse     Hyperlipidemia     Hypothyroidism     Other ascites 2021    Pancreatitis, acute     Tobacco use     Vitamin D deficiency      Past Surgical History:   Procedure Laterality Date    AUGMENTATION OF BREAST      breast augmentation       SECTION      COLONOSCOPY N/A 2021    Procedure: COLONOSCOPY;  Surgeon: Dao Gray MD;  Location: [facility] ([facility] FLR);  Service: Endoscopy;  Laterality: N/A;  COVID test 21 General surgery clinic [facility]    CYSTOSCOPY N/A 9/10/2021    Procedure: CYSTOSCOPY;  Surgeon: Rj Sánchez Jr., MD;  [facility];  Service: Urology;  Laterality: N/A;    ESOPHAGOGASTRODUODENOSCOPY N/A 2021    Procedure: ESOPHAGOGASTRODUODENOSCOPY (EGD);  Surgeon: Dao Gray MD;  Location: [facility] (2ND FLR);  Service: Endoscopy;  Laterality: N/A;  labs current on     ESOPHAGOGASTRODUODENOSCOPY N/A 10/26/2021    Procedure: ESOPHAGOGASTRODUODENOSCOPY (EGD);  Surgeon: Dao Gray MD;  Location: [facility] (2ND FLR);  Service: Endoscopy;  Laterality: N/A;  to be done the week of 10/18/21 per Dr. Gray-BB  covid-10/23/21-[facility]-   10/25 arrival time confirmed with pt-rb    ESOPHAGOGASTRODUODENOSCOPY Left 2021    Procedure: EGD (ESOPHAGOGASTRODUODENOSCOPY);  Surgeon: Koffi Monteiro MD;  Location: [facility] (4TH FLR);  Service: Endoscopy;  Laterality: Left;  Rapid  pt requested AM appt and first available in December-  labs morning of procedure-BB   lvm to confirm appt-rb    HEMORRHOID SURGERY       PERITONEOCENTESIS Right 6/8/2021    Procedure: PARACENTESIS, ABDOMINAL;  Surgeon: Jay Torre MD;  Location: [facility] CATH LAB;  Service: Radiology;  Laterality: Right;    PERITONEOCENTESIS Right 6/25/2021    Procedure: PARACENTESIS, ABDOMINAL;  Surgeon: Jay Torre MD;  Location: [facility] CATH LAB;  Service: Radiology;  Laterality: Right;    PERITONEOCENTESIS Right 7/12/2021    Procedure: PARACENTESIS, ABDOMINAL;  Surgeon: Jay Torre MD;  Location: [facility] CATH LAB;  Service: Radiology;  Laterality: Right;    PERITONEOCENTESIS Right 7/23/2021    Procedure: PARACENTESIS, ABDOMINAL;  Surgeon: Edward De La Torre II, MD;  Location: [facility] CATH LAB;  Service: Interventional Radiology;  Laterality: Right;    PERITONEOCENTESIS Right 8/3/2021    Procedure: PARACENTESIS, ABDOMINAL;  Surgeon: Franco Cheung MD;  Location: [facility] CATH LAB;  Service: Radiology;  Laterality: Right;    PERITONEOCENTESIS Right 8/16/2021    Procedure: PARACENTESIS, ABDOMINAL;  Surgeon: Jay Torre MD;  Location: [facility] CATH LAB;  Service: Radiology;  Laterality: Right;    PERITONEOCENTESIS Right 8/23/2021    Procedure: PARACENTESIS, ABDOMINAL;  Surgeon: Jay Torre MD;  Location: [facility] CATH LAB;  Service: Radiology;  Laterality: Right;    PERITONEOCENTESIS Right 9/16/2021    Procedure: PARACENTESIS, ABDOMINAL;  Surgeon: Jay Torre MD;  Location: [facility] CATH LAB;  Service: Radiology;  Laterality: Right;    PERITONEOCENTESIS N/A 9/24/2021    Procedure: PARACENTESIS, ABDOMINAL;  Surgeon: Jay Torre MD;  Location: [facility] CATH LAB;  Service: Radiology;  Laterality: N/A;    PERITONEOCENTESIS Right 12/23/2021    Procedure: PARACENTESIS, ABDOMINAL;  Surgeon: Jay Torre MD;  Location: [facility] CATH LAB;  Service: Radiology;  Laterality: Right;    PERITONEOCENTESIS N/A 12/30/2021    Procedure: PARACENTESIS, ABDOMINAL;  Surgeon: Salas Cabrera MD;  Location: [facility] CATH LAB;  Service: Radiology;   Laterality: N/A;    RETROGRADE PYELOGRAPHY Bilateral 9/10/2021    Procedure: PYELOGRAM, RETROGRADE;  Surgeon: Rj Sánchez Jr., MD;  Location: [facility] OR [address] [place] [address];  Service: Urology;  Laterality: Bilateral;    TONSILLECTOMY         Home Meds:   Prior to Admission medications    Medication Sig Start Date End Date Taking? Authorizing Provider   allopurinoL (ZYLOPRIM) 100 MG tablet TAKE 1 TABLET(100 MG) BY MOUTH EVERY DAY 5/15/22   Rashel Cohn MD   bisacodyL (DULCOLAX) 5 mg EC tablet Take 5 mg by mouth daily as needed for Constipation.    Historical Provider   cefTRIAXone (ROCEPHIN) 2 g/50 mL PgBk IVPB Inject 50 mLs (2 g total) into the vein once daily. Stop 5/24/22 for 14 days 5/10/22 5/24/22  Leslie Umaña MD   ciprofloxacin HCl (CIPRO) 250 MG tablet Take 1 tablet (250 mg total) by mouth every other day. 4/18/22   Rashel Cohn MD   ergocalciferol, vitamin D2, (VITAMIN D ORAL) Take by mouth every evening.    Historical Provider   folic acid (FOLVITE) 1 MG tablet Take 2 tablets (2 mg total) by mouth once daily.  Patient taking differently: Take 2 mg by mouth every evening. 12/14/21 5/5/22  Uma Perry MD   furosemide (LASIX) 80 MG tablet Take 1 tablet (80 mg total) by mouth 3 (three) times daily. 4/26/22   Rashel Cohn MD   hydrOXYzine HCL (ATARAX) 25 MG tablet Take 1 tablet (25 mg total) by mouth every 6 to 8 hours as needed for Itching. 4/1/22   Sharmila Pacheco MD   lactulose (CHRONULAC) 10 gram/15 mL solution Take 30 mLs by mouth every evening. 3/4/22   Historical Provider   levothyroxine (SYNTHROID) 50 MCG tablet TAKE 1 TABLET(50 MCG) BY MOUTH BEFORE BREAKFAST 9/25/21   Killian Dodd DO   linaCLOtide (LINZESS) 72 mcg Cap capsule Take 2 capsules (144 mcg total) by mouth before breakfast. 11/26/21   Sharmila Pacheco MD   pantoprazole (PROTONIX) 40 MG tablet Take 1 tablet (40 mg total) by mouth once daily.  Patient taking differently: Take 40 mg by mouth every evening. 2/15/22  2/15/23  Sharmila Pacheco MD   thiamine (VITAMIN B-1) 100 MG tablet Take 1 tablet (100 mg total) by mouth every evening. 10/11/21   Sharmila Pacheco MD   vitamin A 71859 UNIT capsule Take 10,000 Units by mouth every evening.     Historical Provider     Anticoagulants/Antiplatelets: no anticoagulation    Allergies: Review of patient's allergies indicates:  No Known Allergies  Sedation History:  no adverse reactions    Review of Systems:   Hematological: no known coagulopathies  Respiratory: no shortness of breath  Cardiovascular: no chest pain  Gastrointestinal: no abdominal pain  Genito-Urinary: no dysuria  Musculoskeletal: negative  Neurological: no TIA or stroke symptoms         OBJECTIVE:     Vital Signs (Most Recent)       Physical Exam:  ASA: 2  Mallampati: n/a    General: no acute distress  Mental Status: alert and oriented to person, place and time  HEENT: normocephalic, atraumatic  Chest: unlabored breathing  Heart: regular heart rate  Abdomen: distended  Extremity: moves all extremities    ASSESSMENT/PLAN:     Sedation Plan: local  Patient will undergo ultrasound guided paracentesis.    RENATA Marie, DEMIP  Interventional Radiology  [phone] [facility]

## 2022-05-20 NOTE — PROCEDURES
Radiology Post-Procedure Note    Pre Op Diagnosis: Ascites  Post Op Diagnosis: Same    Procedure: Ultrasound Guided Paracentesis    Procedure performed by: Fabrizio PEÑA, Cee     Written Informed Consent Obtained: Yes  Specimen Removed: YES rola fluid  Estimated Blood Loss: Minimal    Findings:   Successful paracentesis.  Albumin administered PRN per protocol.    Patient tolerated procedure well.    Cee Dinh, APRN, FNP  Interventional Radiology  [phone] clinic

## 2022-05-20 NOTE — PROGRESS NOTES
Patient received Pneumovax vaccines in the right deltoid. Pt tolerated well. Pt asked to wait in the clinic 15 minutes after injection in the event of an allergic reaction. Pt verbalized understanding. Pt left in NAD.

## 2022-05-21 ENCOUNTER — TELEPHONE (OUTPATIENT)
Dept: TRANSPLANT | Facility: CLINIC | Age: 52
End: 2022-05-21
Payer: COMMERCIAL

## 2022-05-21 ENCOUNTER — EPISODE CHANGES (OUTPATIENT)
Dept: TRANSPLANT | Facility: CLINIC | Age: 52
End: 2022-05-21

## 2022-05-21 DIAGNOSIS — Z76.82 ORGAN TRANSPLANT CANDIDATE: Primary | ICD-10-CM

## 2022-05-21 NOTE — TELEPHONE ENCOUNTER
ON CALL NOTE    Called patient to inform her that she remains back-up for liver transplant.  No new instructions.  Instructed patient to continue medications and diet, and remain available by phone for updates.  She voiced understanding, as well as appreciation for the call.  No questions/ concerns noted at this time.

## 2022-05-21 NOTE — TELEPHONE ENCOUNTER
On Call Transplant SW: potential organ offer (liver)    Reviewed pts chart and spoke with pt directly by phone. Reviewed and confirmed all previous psychosocial information with pt. Reviewed discharge planning.    Pt Suitability for Transplant: no changes     Caregivers and Transportation - Primary caregiver remains pts mother in law who is reported to be highly independent with all ADLs and able to serve as primary caregiver and transportation. Pts spouse to serve as alternate caregiver if needed. Pts daughter Sophie to serve as alternate caregiver also.    Lodging - pt resides locally and plans to return home post transplant for recovery.    Coping - pt reports feelings of excitement and hopefulness, with high motivation to proceed with transplant. Pt to continue utilizing support system and resources for strength and ongoing support throughout hospital stay and if transplanted.    Substance Use - pt reports is nearing 11 months of sobriety and plans to remain abstinent from all substance use, will utilize resources as needed, and is aware of resources available and how to access.    Vocation: Pt currently works from home for [employer] and plans to return to work post-transplant once medically cleared. States knows what to do for paperwork, and employer is aware and supportive of pts possible transplant.    Insurance - employer group health plan through work, no changes per ot report.

## 2022-05-21 NOTE — TELEPHONE ENCOUNTER
ON CALL NOTE    Called patient to provide update for case she is back-up for.  Informed patient of the need to start fasting after 2 am, ok to take morning medications w/ a sip of water, and to report to [facility] waiting room at 8 am for covid test.  Patient voiced understanding.  Will notify TSU charge nurse of above.

## 2022-05-21 NOTE — TELEPHONE ENCOUNTER
BACK-UP ORGAN OFFER NOTE    Notified by Lyndon Terry, , of backup liver offer with donor information.  Donor and recipient information read back and verified.  Spoke with Malu Montes and identified no acute medical issues in telephone assessment.  Patient verbalized understanding that she has been called as a backup.  Patient does have a midline catheter for IV antibiotics.  Dr. Slaughter notified.     Patient was asked if they have had a positive COVID-19 test or if they have any signs or symptoms. Informed patient that they will be tested for COVID-19 upon arrival to the hospital, unless have a previous positive result. If tested and result is positive, the transplant will not be able to occur, they will be inactivated on the wait list for 28 days per protocol and required to quarantine.

## 2022-05-22 ENCOUNTER — LAB VISIT (OUTPATIENT)
Dept: TRANSPLANT | Facility: CLINIC | Age: 52
End: 2022-05-22
Payer: COMMERCIAL

## 2022-05-22 ENCOUNTER — TELEPHONE (OUTPATIENT)
Dept: TRANSPLANT | Facility: CLINIC | Age: 52
End: 2022-05-22
Payer: COMMERCIAL

## 2022-05-22 DIAGNOSIS — Z76.82 ORGAN TRANSPLANT CANDIDATE: ICD-10-CM

## 2022-05-22 LAB — SARS-COV-2 RDRP RESP QL NAA+PROBE: NEGATIVE

## 2022-05-22 PROCEDURE — U0002 COVID-19 LAB TEST NON-CDC: HCPCS | Mod: TXP | Performed by: TRANSPLANT SURGERY

## 2022-05-22 NOTE — TELEPHONE ENCOUNTER
ON CALL NOTE    @10am  Provided patient w/ update on back-up transplant case.  She voiced understanding that she remains back-up and is to continue fasting for now  -------------------------------------------------    @1245pm  Received phone call from CATHY Ridley, , that the primary center accepted organ and patient may be released as backup for liver transplant.  Patient notified.  Informed patient that she may resume her diet/ medication and resume normal activities since she is no longer on stand by.  She verbalized understanding and agrees to do so.

## 2022-05-23 ENCOUNTER — TELEPHONE (OUTPATIENT)
Dept: TRANSPLANT | Facility: CLINIC | Age: 52
End: 2022-05-23
Payer: COMMERCIAL

## 2022-05-23 ENCOUNTER — LAB VISIT (OUTPATIENT)
Dept: LAB | Facility: HOSPITAL | Age: 52
End: 2022-05-23
Attending: INTERNAL MEDICINE
Payer: COMMERCIAL

## 2022-05-23 DIAGNOSIS — F10.10 ALCOHOL ABUSE: ICD-10-CM

## 2022-05-23 DIAGNOSIS — R79.89 ELEVATED LIVER FUNCTION TESTS: ICD-10-CM

## 2022-05-23 DIAGNOSIS — Z76.82 ORGAN TRANSPLANT CANDIDATE: Primary | ICD-10-CM

## 2022-05-23 LAB
ALBUMIN SERPL BCP-MCNC: 3.1 G/DL (ref 3.5–5.2)
ALP SERPL-CCNC: 166 U/L (ref 55–135)
ALT SERPL W/O P-5'-P-CCNC: 12 U/L (ref 10–44)
ANION GAP SERPL CALC-SCNC: 11 MMOL/L (ref 8–16)
AST SERPL-CCNC: 29 U/L (ref 10–40)
BASOPHILS # BLD AUTO: 0.1 K/UL (ref 0–0.2)
BASOPHILS NFR BLD: 1.6 % (ref 0–1.9)
BILIRUB SERPL-MCNC: 0.7 MG/DL (ref 0.1–1)
BUN SERPL-MCNC: 25 MG/DL (ref 6–20)
CALCIUM SERPL-MCNC: 9 MG/DL (ref 8.7–10.5)
CHLORIDE SERPL-SCNC: 102 MMOL/L (ref 95–110)
CO2 SERPL-SCNC: 29 MMOL/L (ref 23–29)
CREAT SERPL-MCNC: 1.1 MG/DL (ref 0.5–1.4)
DIFFERENTIAL METHOD: ABNORMAL
EOSINOPHIL # BLD AUTO: 0.6 K/UL (ref 0–0.5)
EOSINOPHIL NFR BLD: 9.8 % (ref 0–8)
ERYTHROCYTE [DISTWIDTH] IN BLOOD BY AUTOMATED COUNT: 13.7 % (ref 11.5–14.5)
EST. GFR  (AFRICAN AMERICAN): >60 ML/MIN/1.73 M^2
EST. GFR  (NON AFRICAN AMERICAN): 58.3 ML/MIN/1.73 M^2
GLUCOSE SERPL-MCNC: 96 MG/DL (ref 70–110)
HCT VFR BLD AUTO: 33.5 % (ref 37–48.5)
HGB BLD-MCNC: 11 G/DL (ref 12–16)
IMM GRANULOCYTES # BLD AUTO: 0.01 K/UL (ref 0–0.04)
IMM GRANULOCYTES NFR BLD AUTO: 0.2 % (ref 0–0.5)
INR PPP: 1.2 (ref 0.8–1.2)
LYMPHOCYTES # BLD AUTO: 1.2 K/UL (ref 1–4.8)
LYMPHOCYTES NFR BLD: 19.9 % (ref 18–48)
MCH RBC QN AUTO: 32.2 PG (ref 27–31)
MCHC RBC AUTO-ENTMCNC: 32.8 G/DL (ref 32–36)
MCV RBC AUTO: 98 FL (ref 82–98)
MONOCYTES # BLD AUTO: 0.7 K/UL (ref 0.3–1)
MONOCYTES NFR BLD: 11.7 % (ref 4–15)
NEUTROPHILS # BLD AUTO: 3.5 K/UL (ref 1.8–7.7)
NEUTROPHILS NFR BLD: 56.8 % (ref 38–73)
NRBC BLD-RTO: 0 /100 WBC
PLATELET # BLD AUTO: 244 K/UL (ref 150–450)
PMV BLD AUTO: 9.8 FL (ref 9.2–12.9)
POTASSIUM SERPL-SCNC: 2.9 MMOL/L (ref 3.5–5.1)
PROT SERPL-MCNC: 5.9 G/DL (ref 6–8.4)
PROTHROMBIN TIME: 12.1 SEC (ref 9–12.5)
RBC # BLD AUTO: 3.42 M/UL (ref 4–5.4)
SODIUM SERPL-SCNC: 142 MMOL/L (ref 136–145)
WBC # BLD AUTO: 6.13 K/UL (ref 3.9–12.7)

## 2022-05-23 PROCEDURE — 85610 PROTHROMBIN TIME: CPT | Mod: TXP | Performed by: INTERNAL MEDICINE

## 2022-05-23 PROCEDURE — 36415 COLL VENOUS BLD VENIPUNCTURE: CPT | Mod: TXP | Performed by: INTERNAL MEDICINE

## 2022-05-23 PROCEDURE — 80053 COMPREHEN METABOLIC PANEL: CPT | Mod: TXP | Performed by: INTERNAL MEDICINE

## 2022-05-23 PROCEDURE — 85025 COMPLETE CBC W/AUTO DIFF WBC: CPT | Mod: TXP | Performed by: INTERNAL MEDICINE

## 2022-05-23 NOTE — TELEPHONE ENCOUNTER
BACK-UP ORGAN OFFER NOTE    Notified by Lyndon Terry, , of backup liver offer with donor information.  Donor and recipient information read back and verified.  Spoke with Malu Montes and identified no acute medical issues in telephone assessment.  Patient instructed NPO. Patient verbalized understanding that she has been called as a backup.    Patient was asked if they have had a positive COVID-19 test or if they have any signs or symptoms. Informed patient that they will be tested for COVID-19 upon arrival to the hospital, unless have a previous positive result. If tested and result is positive, the transplant will not be able to occur, they will be inactivated on the wait list for 28 days per protocol and required to quarantine.     Pt instructed to arrive at 11th floor at 1600. COVID testing not needed as she was tested an negative on 5/22/2022.

## 2022-05-24 NOTE — TELEPHONE ENCOUNTER
Per procurement, patient is released from back-up status. Pt advised to resume meds, meals, fluid intake and activities. Thanked pt for agreeing to be back-up for case. Understanding expressed. No other questions at this time.

## 2022-05-25 ENCOUNTER — INFUSION (OUTPATIENT)
Dept: INFECTIOUS DISEASES | Facility: HOSPITAL | Age: 52
End: 2022-05-25
Attending: INTERNAL MEDICINE
Payer: COMMERCIAL

## 2022-05-25 ENCOUNTER — EXTERNAL HOME HEALTH (OUTPATIENT)
Dept: HOME HEALTH SERVICES | Facility: HOSPITAL | Age: 52
End: 2022-05-25
Payer: COMMERCIAL

## 2022-05-25 ENCOUNTER — OFFICE VISIT (OUTPATIENT)
Dept: INFECTIOUS DISEASES | Facility: CLINIC | Age: 52
End: 2022-05-25
Payer: COMMERCIAL

## 2022-05-25 ENCOUNTER — TELEPHONE (OUTPATIENT)
Dept: TRANSPLANT | Facility: CLINIC | Age: 52
End: 2022-05-25
Payer: COMMERCIAL

## 2022-05-25 ENCOUNTER — LAB VISIT (OUTPATIENT)
Dept: TRANSPLANT | Facility: CLINIC | Age: 52
End: 2022-05-25
Payer: COMMERCIAL

## 2022-05-25 VITALS
WEIGHT: 135.38 LBS | TEMPERATURE: 98 F | SYSTOLIC BLOOD PRESSURE: 102 MMHG | BODY MASS INDEX: 23.99 KG/M2 | HEART RATE: 87 BPM | HEIGHT: 63 IN | DIASTOLIC BLOOD PRESSURE: 63 MMHG

## 2022-05-25 DIAGNOSIS — Z76.82 ORGAN TRANSPLANT CANDIDATE: ICD-10-CM

## 2022-05-25 DIAGNOSIS — Z76.82 ORGAN TRANSPLANT CANDIDATE: Primary | ICD-10-CM

## 2022-05-25 LAB — SARS-COV-2 RDRP RESP QL NAA+PROBE: NEGATIVE

## 2022-05-25 PROCEDURE — U0002 COVID-19 LAB TEST NON-CDC: HCPCS | Mod: TXP

## 2022-05-25 PROCEDURE — 99999 PR PBB SHADOW E&M-EST. PATIENT-LVL III: ICD-10-PCS | Mod: PBBFAC,TXP,, | Performed by: INTERNAL MEDICINE

## 2022-05-25 PROCEDURE — 99999 PR PBB SHADOW E&M-EST. PATIENT-LVL III: CPT | Mod: PBBFAC,TXP,, | Performed by: INTERNAL MEDICINE

## 2022-05-25 PROCEDURE — 99215 OFFICE O/P EST HI 40 MIN: CPT | Mod: S$GLB,TXP,, | Performed by: INTERNAL MEDICINE

## 2022-05-25 PROCEDURE — 99215 PR OFFICE/OUTPT VISIT, EST, LEVL V, 40-54 MIN: ICD-10-PCS | Mod: S$GLB,TXP,, | Performed by: INTERNAL MEDICINE

## 2022-05-25 NOTE — PROGRESS NOTES
Subjective:     Patient ID: Malu Montes is a 52 y.o. female    Chief Complaint: s. Mitis bacteremia    HPI: 52F hx TIPS c/b s. Mitis bacteremia, currently listed for liver/kidney transplant, who presents for follow up. She has completed a 14d course of ceftriaxone. She is doing well. No issues w/ abx administration or line, completed all doses as ordered.       Immunization History   Administered Date(s) Administered    COVID-19, vector-nr, rS-Ad26, PF (GoNabit) 12/27/2021    Hepatitis A / Hepatitis B 03/11/2022, 04/29/2022    Pneumococcal Conjugate - 13 Valent 03/11/2022    Pneumococcal Polysaccharide - 23 Valent 05/20/2022    Zoster Recombinant 03/11/2022        Review of Systems   Constitutional: Negative for chills, decreased appetite, fever, malaise/fatigue and night sweats.   HENT: Negative for congestion and sore throat.    Eyes: Negative for blurred vision, double vision, vision loss in left eye, vision loss in right eye and visual disturbance.   Cardiovascular: Negative for chest pain, dyspnea on exertion, irregular heartbeat and leg swelling.   Respiratory: Negative for cough, hemoptysis, shortness of breath and sputum production.    Hematologic/Lymphatic: Negative for adenopathy. Does not bruise/bleed easily.   Skin: Negative for rash and suspicious lesions.   Musculoskeletal: Negative for arthritis, joint pain, muscle weakness and myalgias.   Gastrointestinal: Negative for abdominal pain, constipation, diarrhea, heartburn, nausea and vomiting.   Genitourinary: Negative for dysuria, flank pain, frequency and genital sores.   Neurological: Negative for dizziness, focal weakness, numbness, paresthesias, sensory change, tremors and weakness.   Psychiatric/Behavioral: Negative for altered mental status and depression. The patient is not nervous/anxious.    Allergic/Immunologic: Negative for environmental allergies and persistent infections.        Past Medical History:   Diagnosis Date    ADD  (attention deficit disorder)     Anxiety     Ascites of liver     Cirrhosis 2021    CKD (chronic kidney disease) stage 3, GFR 30-59 ml/min     Constipation     ETOH abuse     Hyperlipidemia     Hypothyroidism     Other ascites 2021    Pancreatitis, acute     Tobacco use     Vitamin D deficiency      Past Surgical History:   Procedure Laterality Date    AUGMENTATION OF BREAST      breast augmentation       SECTION      COLONOSCOPY N/A 2021    Procedure: COLONOSCOPY;  Surgeon: Dao Gray MD;  Location: [facility] (2ND FLR);  Service: Endoscopy;  Laterality: N/A;  COVID test 21 General surgery clinic -     CYSTOSCOPY N/A 9/10/2021    Procedure: CYSTOSCOPY;  Surgeon: Rj Sánchez Jr., MD;  Location: [facility] OR 1ST FLR;  Service: Urology;  Laterality: N/A;    ESOPHAGOGASTRODUODENOSCOPY N/A 2021    Procedure: ESOPHAGOGASTRODUODENOSCOPY (EGD);  Surgeon: Dao Gray MD;  Location: [facility] (2ND FLR);  Service: Endoscopy;  Laterality: N/A;  labs current on     ESOPHAGOGASTRODUODENOSCOPY N/A 10/26/2021    Procedure: ESOPHAGOGASTRODUODENOSCOPY (EGD);  Surgeon: Dao Gray MD;  Location: [facility] (2ND FLR);  Service: Endoscopy;  Laterality: N/A;  to be done the week of 10/18/21 per Dr. Gray-[facility]-10/23/21-[facility]-   10/25 arrival time confirmed with pt-rb    ESOPHAGOGASTRODUODENOSCOPY Left 2021    Procedure: EGD (ESOPHAGOGASTRODUODENOSCOPY);  Surgeon: Koffi Monteiro MD;  Location: [facility] (4TH FLR);  Service: Endoscopy;  Laterality: Left;  Rapid  pt requested AM appt and first available in December-  labs morning of procedure-   lvm to confirm appt-rb    HEMORRHOID SURGERY      PERITONEOCENTESIS Right 2021    Procedure: PARACENTESIS, ABDOMINAL;  Surgeon: Jay Torre MD;  Location: [facility] CATH LAB;  Service: Radiology;  Laterality: Right;    PERITONEOCENTESIS Right 2021    Procedure: PARACENTESIS, ABDOMINAL;   Surgeon: Jay Torre MD;  Location: [facility] CATH LAB;  Service: Radiology;  Laterality: Right;    PERITONEOCENTESIS Right 7/12/2021    Procedure: PARACENTESIS, ABDOMINAL;  Surgeon: Jay Torre MD;  Location: [facility] CATH LAB;  Service: Radiology;  Laterality: Right;    PERITONEOCENTESIS Right 7/23/2021    Procedure: PARACENTESIS, ABDOMINAL;  Surgeon: Edward De La Torre II, MD;  Location: [facility] CATH LAB;  Service: Interventional Radiology;  Laterality: Right;    PERITONEOCENTESIS Right 8/3/2021    Procedure: PARACENTESIS, ABDOMINAL;  Surgeon: Franco Cheung MD;  Location: [facility] CATH LAB;  Service: Radiology;  Laterality: Right;    PERITONEOCENTESIS Right 8/16/2021    Procedure: PARACENTESIS, ABDOMINAL;  Surgeon: Jay Torre MD;  Location: [facility] CATH LAB;  Service: Radiology;  Laterality: Right;    PERITONEOCENTESIS Right 8/23/2021    Procedure: PARACENTESIS, ABDOMINAL;  Surgeon: Jay Torre MD;  Location: [facility] CATH LAB;  Service: Radiology;  Laterality: Right;    PERITONEOCENTESIS Right 9/16/2021    Procedure: PARACENTESIS, ABDOMINAL;  Surgeon: Jay Torre MD;  Location: [facility] CATH LAB;  Service: Radiology;  Laterality: Right;    PERITONEOCENTESIS N/A 9/24/2021    Procedure: PARACENTESIS, ABDOMINAL;  Surgeon: Jay Torre MD;  Location: [facility] CATH LAB;  Service: Radiology;  Laterality: N/A;    PERITONEOCENTESIS Right 12/23/2021    Procedure: PARACENTESIS, ABDOMINAL;  Surgeon: Jay Torre MD;  Location: [facility] CATH LAB;  Service: Radiology;  Laterality: Right;    PERITONEOCENTESIS N/A 12/30/2021    Procedure: PARACENTESIS, ABDOMINAL;  Surgeon: Salas Cabrera MD;  Location: [facility] CATH LAB;  Service: Radiology;  Laterality: N/A;    RETROGRADE PYELOGRAPHY Bilateral 9/10/2021    Procedure: PYELOGRAM, RETROGRADE;  Surgeon: Rj Sánchez Jr., MD;  Location: [address];  Service: Urology;  Laterality: Bilateral;    TONSILLECTOMY       Family History   Problem  Relation Age of Onset    Cancer Mother         Uterine Cancer     No Known Problems Father     No Known Problems Sister     Sleep apnea Daughter     ADD / ADHD Daughter     Heart disease Maternal Grandmother         CHF    COPD Maternal Grandfather     Heart disease Paternal Grandmother         CHF    Colon cancer Neg Hx     Inflammatory bowel disease Neg Hx     Stomach cancer Neg Hx     Breast cancer Neg Hx     Ovarian cancer Neg Hx     Learning disabilities Neg Hx      Social History     Tobacco Use    Smoking status: Current Every Day Smoker     Packs/day: 0.25     Years: 27.00     Pack years: 6.75     Types: Cigarettes    Smokeless tobacco: Never Used    Tobacco comment: two cigarettes a day    Substance Use Topics    Alcohol use: Not Currently     Comment: quit caridad 15    Drug use: No       Objective:     Physical Exam  Constitutional:       General: She is not in acute distress.     Appearance: Normal appearance. She is well-developed. She is not ill-appearing or diaphoretic.   HENT:      Head: Normocephalic and atraumatic.      Right Ear: External ear normal.      Left Ear: External ear normal.      Nose: Nose normal.   Eyes:      General: No scleral icterus.        Right eye: No discharge.         Left eye: No discharge.      Extraocular Movements: Extraocular movements intact.      Conjunctiva/sclera: Conjunctivae normal.   Pulmonary:      Effort: Pulmonary effort is normal. No respiratory distress.      Breath sounds: No stridor.   Musculoskeletal:         General: Normal range of motion.   Skin:     Findings: No erythema or rash.   Neurological:      General: No focal deficit present.      Mental Status: She is alert and oriented to person, place, and time. Mental status is at baseline.      Cranial Nerves: No cranial nerve deficit.   Psychiatric:         Mood and Affect: Mood normal.         Behavior: Behavior normal.         Thought Content: Thought content normal.         Judgment:  Judgment normal.         Data:    All data, including recent labs, radiology, and pathology, has been independently reviewed.    Labs:    Recent Labs   Lab Result Units 05/08/22  1451 05/09/22  0630 05/10/22  0715 05/16/22  0728 05/17/22  0715 05/23/22  0743   WBC K/uL 17.43*   < > 8.39 7.43  --  6.13   Hemoglobin g/dL 9.4*   < > 10.2* 10.9*  --  11.0*   Hematocrit % 28.1*   < > 30.6* 32.6*  --  33.5*   Sodium mmol/L 138   < > 138 139  --  142   Potassium mmol/L 3.4*   < > 3.4* 2.4* 3.0* 2.9*   Chloride mmol/L 109   < > 107 93*  --  102   BUN mg/dL 53*   < > 43* 23*  --  25*   Creatinine mg/dL 1.4   < > 1.4 1.2  --  1.1   AST U/L 52*   < > 31 24  --  29   ALT U/L 47*   < > 38 14  --  12   Alkaline Phosphatase U/L 144*   < > 150* 150*  --  166*   Total Bilirubin mg/dL 0.8   < > 1.3* 1.1*  --  0.7   INR   --    < > 1.2 1.2  --  1.2   HIV 1/2 Ag/Ab  Negative  --   --   --   --   --     < > = values in this interval not displayed.        Radiology:    No results found in the last 24 hours.     Assessment:    1. Organ transplant candidate  Completed 14d course of ceftriaxone  Remove line  Follow up post-transplant to complete vaccinations          Follow up as needed    The total time for evaluation and management services performed on 5/25/22 was greater than 40 minutes.     Halie Davis DO  Transplant Infectious Disease

## 2022-05-25 NOTE — TELEPHONE ENCOUNTER
DONOR WITH PHS RISK CRITERIA AND HEPATITIS C POSITIVE     Notified by Lorenzo Rubio, , of liver offer with donor information.  Donor and recipient information read back and verified.  Spoke with Malu Montes and identified no acute medical issues during telephone assessment.     Patient verbalized understanding that he/she has been called as backup recipient.  Patient instructed NPO after 4pm today.  She will remain at home until further notice.    The donor's reported social history includes PHS risk criteria.  ELY DETERMINED TO BE hepatitis c antibody positive / JACOBO negative or   IF DONOR IS DEFINITELY DETERMINED TO BE HEPATITIS C ANTIBODY POSITIVE / JACOBO NEGATIVE OR POSITIVE IN ADDITION TO THE P[facility] RISK CRITERIA:    This donor is hepatitis c antibody positive and JACOBO negative.          The risk of getting HIV or other hepatitis viruses from this donor is very small compared to the risk of dying while waiting for another liver. The risk of clinical illness from any transmitted infection is also small due to the availability of highly effective oral drugs for all three of these viruses. The risk of clinical illness from any transmitted infection is much less than this due to the availability of highly effective oral drugs for all three of these viruses. While the patient has the right to decline any organ for any reason, available scientific data do not support turning down an organ based on Hepatitis C or behavioral risk factors as the risks associated with waiting longer for the transplant are many times greater. Informed patient that Dr. Umaña thinks that this is a good liver for the patient.    Patient was asked if they have had a positive COVID-19 test or if they have any signs or symptoms. Informed patient that they will be tested for COVID-19 upon arrival to the hospital, unless they have a previous positive result. If tested and result is positive, the transplant will not be able to  "occur, they will be inactivated on the wait list for 28 days per protocol and required to quarantine.     Patient was informed that if she turned down the offer A transplant doctor will call you after we hang up".  Patient was also informed that if she turned down this donor, she would not be punished or removed from the transplant list, that she would remain on the list at her current status/MELD score. However, by waiting on the list longer rather than receiving this transplant, she will face a greater risk of death than any risk from the slight possibility of transmitted infection.    Patient was also informed that she would have the opportunity to see and talk to the surgeon when she got to the hospital and before she went down to the operating room.    Patient understands risk and agrees to proceed with transplant.  "

## 2022-05-25 NOTE — PROGRESS NOTES
Pt arrived to clinic for Covid Rapid screening for liver back up. Specimen obtained, tolerated well and sent to lab. No S/S present

## 2022-05-25 NOTE — PROGRESS NOTES
mid line removed per MD order, pt tolerated well,  2x2 gauze applied, wrapped with coflex, pt instructed to keep dressing on & dry for a few hrs then remove, monitor for s/s of infection & notify MD, pt verbalized understanding of all above & left in NAD

## 2022-05-26 ENCOUNTER — OFFICE VISIT (OUTPATIENT)
Dept: HEPATOLOGY | Facility: CLINIC | Age: 52
End: 2022-05-26
Payer: COMMERCIAL

## 2022-05-26 ENCOUNTER — LAB VISIT (OUTPATIENT)
Dept: LAB | Facility: HOSPITAL | Age: 52
End: 2022-05-26
Payer: COMMERCIAL

## 2022-05-26 VITALS
RESPIRATION RATE: 18 BRPM | HEIGHT: 63 IN | BODY MASS INDEX: 23.73 KG/M2 | SYSTOLIC BLOOD PRESSURE: 99 MMHG | WEIGHT: 133.94 LBS | HEART RATE: 77 BPM | OXYGEN SATURATION: 100 % | DIASTOLIC BLOOD PRESSURE: 55 MMHG | TEMPERATURE: 99 F

## 2022-05-26 DIAGNOSIS — K74.60 HEPATIC CIRRHOSIS, UNSPECIFIED HEPATIC CIRRHOSIS TYPE, UNSPECIFIED WHETHER ASCITES PRESENT: Primary | ICD-10-CM

## 2022-05-26 DIAGNOSIS — K85.20 ALCOHOL-INDUCED ACUTE PANCREATITIS WITHOUT INFECTION OR NECROSIS: Chronic | ICD-10-CM

## 2022-05-26 DIAGNOSIS — M25.50 ARTHRALGIA, UNSPECIFIED JOINT: ICD-10-CM

## 2022-05-26 DIAGNOSIS — K70.31 ALCOHOLIC CIRRHOSIS OF LIVER WITH ASCITES: ICD-10-CM

## 2022-05-26 DIAGNOSIS — N18.30 STAGE 3 CHRONIC KIDNEY DISEASE, UNSPECIFIED WHETHER STAGE 3A OR 3B CKD: ICD-10-CM

## 2022-05-26 DIAGNOSIS — Z01.818 PRE-TRANSPLANT EVALUATION FOR LIVER TRANSPLANT: ICD-10-CM

## 2022-05-26 DIAGNOSIS — I85.10 SECONDARY ESOPHAGEAL VARICES WITHOUT BLEEDING: ICD-10-CM

## 2022-05-26 DIAGNOSIS — Z76.82 ORGAN TRANSPLANT CANDIDATE: ICD-10-CM

## 2022-05-26 DIAGNOSIS — K65.2 SBP (SPONTANEOUS BACTERIAL PERITONITIS): Chronic | ICD-10-CM

## 2022-05-26 LAB
ANION GAP SERPL CALC-SCNC: 13 MMOL/L (ref 8–16)
BUN SERPL-MCNC: 28 MG/DL (ref 6–20)
CALCIUM SERPL-MCNC: 9.4 MG/DL (ref 8.7–10.5)
CHLORIDE SERPL-SCNC: 104 MMOL/L (ref 95–110)
CO2 SERPL-SCNC: 26 MMOL/L (ref 23–29)
CREAT SERPL-MCNC: 1 MG/DL (ref 0.5–1.4)
EST. GFR  (AFRICAN AMERICAN): >60 ML/MIN/1.73 M^2
EST. GFR  (NON AFRICAN AMERICAN): >60 ML/MIN/1.73 M^2
GLUCOSE SERPL-MCNC: 112 MG/DL (ref 70–110)
POTASSIUM SERPL-SCNC: 3 MMOL/L (ref 3.5–5.1)
SODIUM SERPL-SCNC: 143 MMOL/L (ref 136–145)

## 2022-05-26 PROCEDURE — 99999 PR PBB SHADOW E&M-EST. PATIENT-LVL V: ICD-10-PCS | Mod: PBBFAC,TXP,, | Performed by: INTERNAL MEDICINE

## 2022-05-26 PROCEDURE — 86829 HLA CLASS I/II ANTIBODY QUAL: CPT | Mod: PO,TXP | Performed by: INTERNAL MEDICINE

## 2022-05-26 PROCEDURE — 86829 HLA CLASS I/II ANTIBODY QUAL: CPT | Mod: 91,PO,TXP | Performed by: INTERNAL MEDICINE

## 2022-05-26 PROCEDURE — 99999 PR PBB SHADOW E&M-EST. PATIENT-LVL V: CPT | Mod: PBBFAC,TXP,, | Performed by: INTERNAL MEDICINE

## 2022-05-26 PROCEDURE — 80048 BASIC METABOLIC PNL TOTAL CA: CPT | Mod: TXP | Performed by: INTERNAL MEDICINE

## 2022-05-26 PROCEDURE — 99214 PR OFFICE/OUTPT VISIT, EST, LEVL IV, 30-39 MIN: ICD-10-PCS | Mod: S$GLB,TXP,, | Performed by: INTERNAL MEDICINE

## 2022-05-26 PROCEDURE — 99214 OFFICE O/P EST MOD 30 MIN: CPT | Mod: S$GLB,TXP,, | Performed by: INTERNAL MEDICINE

## 2022-05-26 PROCEDURE — 36415 COLL VENOUS BLD VENIPUNCTURE: CPT | Mod: TXP | Performed by: INTERNAL MEDICINE

## 2022-05-26 RX ORDER — POTASSIUM CHLORIDE 20 MEQ/1
40 TABLET, EXTENDED RELEASE ORAL DAILY
Qty: 60 TABLET | Refills: 5 | Status: ON HOLD | OUTPATIENT
Start: 2022-05-26 | End: 2022-06-10 | Stop reason: HOSPADM

## 2022-05-26 NOTE — PATIENT INSTRUCTIONS
Reduce lasix for now to 80 mg once or twice  Liberalized water intake  Start Kdur 40 mgeq daily- may need more  Continue weekly labs  Agree with not doing paracentesis   F/u with fr coyle  Continue lactulose; no need to rifaximin at the present time

## 2022-05-26 NOTE — PROGRESS NOTES
HEPATOLOGY FOLLOW UP    Referring Physician: Killian Dodd DO  Current Corresponding Physician: Killian Dodd DO, Rashel Cohn MD    Malu Montes is here for follow up of decompensated alcohol-induced cirrhosis    HPI  Malu Montes is a 51 y.o. female who has a history of alcohol abuse and decompensated cirrhosis now listed for a liver-kidney transplant.    Interval History  Since Malu Montes's last visit:    She is listed for liver-kidney transplant. MELD 15 (2/21/22-5/22/22). She underwent a TIPS placement on 5/6/22. Admitted for streptococcus mitis/oralis. She was treated with ceftriaxone. Since TIPS has had 2 smaller vol paracenteses:  5/13/22: 2.6 L  5/20/22: 1.7 L    Labs 5/26/22: Tbil 0.7, ALT 12, AST 29, ALKP 166, creat 1.0  AFP 2/3/22: 3.6  Bile acids 4/4/22: 37    Creatinine, elevated: due to IgA nephropathy- cystatin C 2.45  Hematologic labs: Juliette free light chains 9.12 (0.33-1.94), Lambda Free Light Chains 4.39 (0.57-2.63), Kappa/Lambda FLC Ratio: 2.08 (0.26-1.65); hematology consult- abn attributed to CKD; no futher f/u needed    Abdo US 10/1/21: Satisfactory Doppler evaluation of the liver; no hcc  MRI abdo 8/6/21: The liver is normal in size.  Hepatic parenchyma demonstrates diffuse heterogeneous enhancement on the early arterial phase that normalizes on the portal venous and delayed phases.  There is a 0.8 cm arterial hyperenhancing focus within the superior left hepatic lobe, possibly a prominent vascular structure and unchanged when compared to the previous exam; enlarged spleen    Ascites: on laisx 80 mg daily   SBP: yes; on cipro 250 mg every other day  HE: not on lactulose  CKD: ongoing; now meets criteria for simultaneous kidney transplant  Alcohol abuse: peth negative; now attending completed IOP  Pruritus: due to elevated bile acids; improved with atarax; ?improved with colestid-having +++bloating and harder to pass BMs    MELD-Na score: 9 at 5/23/2022   7:43 AM  MELD score: 9 at 5/23/2022  7:43 AM  Calculated from:  Serum Creatinine: 1.1 mg/dL at 5/23/2022  7:43 AM  Serum Sodium: 142 mmol/L (Using max of 137 mmol/L) at 5/23/2022  7:43 AM  Total Bilirubin: 0.7 mg/dL (Using min of 1 mg/dL) at 5/23/2022  7:43 AM  INR(ratio): 1.2 at 5/23/2022  7:43 AM  Age: 52 years    Outpatient Encounter Medications as of 5/26/2022   Medication Sig Dispense Refill    allopurinoL (ZYLOPRIM) 100 MG tablet TAKE 1 TABLET(100 MG) BY MOUTH EVERY DAY 90 tablet 0    bisacodyL (DULCOLAX) 5 mg EC tablet Take 5 mg by mouth daily as needed for Constipation.      ciprofloxacin HCl (CIPRO) 250 MG tablet Take 1 tablet (250 mg total) by mouth every other day. 90 tablet 0    ergocalciferol, vitamin D2, (VITAMIN D ORAL) Take by mouth every evening.      folic acid (FOLVITE) 1 MG tablet Take 2 tablets (2 mg total) by mouth once daily. (Patient taking differently: Take 2 mg by mouth every evening.) 60 tablet 11    furosemide (LASIX) 80 MG tablet Take 1 tablet (80 mg total) by mouth 3 (three) times daily. 270 tablet 1    hydrOXYzine HCL (ATARAX) 25 MG tablet Take 1 tablet (25 mg total) by mouth every 6 to 8 hours as needed for Itching. 30 tablet 2    lactulose (CHRONULAC) 10 gram/15 mL solution Take 30 mLs by mouth every evening.      levothyroxine (SYNTHROID) 50 MCG tablet TAKE 1 TABLET(50 MCG) BY MOUTH BEFORE BREAKFAST 90 tablet 3    linaCLOtide (LINZESS) 72 mcg Cap capsule Take 2 capsules (144 mcg total) by mouth before breakfast. 180 capsule 5    pantoprazole (PROTONIX) 40 MG tablet Take 1 tablet (40 mg total) by mouth once daily. (Patient taking differently: Take 40 mg by mouth every evening.) 30 tablet 11    thiamine (VITAMIN B-1) 100 MG tablet Take 1 tablet (100 mg total) by mouth every evening. 30 tablet 5    vitamin A 03935 UNIT capsule Take 10,000 Units by mouth every evening.       cefTRIAXone (ROCEPHIN) 2 g/50 mL PgBk IVPB Inject 50 mLs (2 g total) into the vein once daily. Stop  5/24/22 for 14 days       No facility-administered encounter medications on file as of 5/26/2022.     Review of patient's allergies indicates:  No Known Allergies  Past Medical History:   Diagnosis Date    ADD (attention deficit disorder)     Anxiety     Ascites of liver     Cirrhosis 9/16/2021    CKD (chronic kidney disease) stage 3, GFR 30-59 ml/min     Constipation     ETOH abuse     Hyperlipidemia     Hypothyroidism     Other ascites 6/14/2021    Pancreatitis, acute     Tobacco use     Vitamin D deficiency        Review of Systems   Constitutional: Negative.    HENT: Negative.    Eyes: Negative.    Respiratory: Negative.    Cardiovascular: Negative.    Gastrointestinal: Negative.    Genitourinary: Negative.    Musculoskeletal: Negative.    Skin: Negative.    Neurological: Negative.    Psychiatric/Behavioral: Negative.      Vitals:    05/26/22 0946   BP: (!) 99/55   Pulse: 77   Resp: 18   Temp: 98.5 °F (36.9 °C)       Physical Exam  Vitals reviewed.   Constitutional:       Appearance: She is well-developed.   HENT:      Head: Normocephalic and atraumatic.   Eyes:      General: No scleral icterus.     Conjunctiva/sclera: Conjunctivae normal.      Pupils: Pupils are equal, round, and reactive to light.   Neck:      Thyroid: No thyromegaly.   Cardiovascular:      Rate and Rhythm: Normal rate and regular rhythm.      Heart sounds: Normal heart sounds.   Pulmonary:      Effort: Pulmonary effort is normal.      Breath sounds: Normal breath sounds. No rales.   Abdominal:      General: Bowel sounds are normal. No distension     Palpations: Abdomen is soft. There is no mass.      Tenderness: There is no abdominal tenderness.   Musculoskeletal:         General: Normal range of motion.      Cervical back: Normal range of motion and neck supple.   Skin:     General: Skin is warm and dry.      Findings: No rash.   Neurological:      Mental Status: She is alert and oriented to person, place, and time.          MELD-Na score: 9 at 5/23/2022  7:43 AM  MELD score: 9 at 5/23/2022  7:43 AM  Calculated from:  Serum Creatinine: 1.1 mg/dL at 5/23/2022  7:43 AM  Serum Sodium: 142 mmol/L (Using max of 137 mmol/L) at 5/23/2022  7:43 AM  Total Bilirubin: 0.7 mg/dL (Using min of 1 mg/dL) at 5/23/2022  7:43 AM  INR(ratio): 1.2 at 5/23/2022  7:43 AM  Age: 52 years    Lab Results   Component Value Date     (H) 05/26/2022    BUN 28 (H) 05/26/2022    CREATININE 1.0 05/26/2022    CALCIUM 9.4 05/26/2022     05/26/2022    K 3.0 (L) 05/26/2022     05/26/2022    PROT 5.9 (L) 05/23/2022    CO2 26 05/26/2022    ANIONGAP 13 05/26/2022    WBC 6.13 05/23/2022    RBC 3.42 (L) 05/23/2022    HGB 11.0 (L) 05/23/2022    HCT 33.5 (L) 05/23/2022    HCT 34 (L) 12/07/2021    MCV 98 05/23/2022    MCH 32.2 (H) 05/23/2022    MCHC 32.8 05/23/2022     Lab Results   Component Value Date    RDW 13.7 05/23/2022     05/23/2022    MPV 9.8 05/23/2022    GRAN 3.5 05/23/2022    GRAN 56.8 05/23/2022    LYMPH 1.2 05/23/2022    LYMPH 19.9 05/23/2022    MONO 0.7 05/23/2022    MONO 11.7 05/23/2022    EOSINOPHIL 9.8 (H) 05/23/2022    BASOPHIL 1.6 05/23/2022    EOS 0.6 (H) 05/23/2022    EOS 1 05/13/2022    BASO 0.10 05/23/2022    BASO 1 03/03/2022    APTT 27.6 11/11/2021    GROUPTRH A POS 07/30/2021     (H) 05/10/2022    CHOL 146 12/06/2021    TRIG 68 12/06/2021    HDL 32 (L) 12/06/2021    CHOLHDL 21.9 12/06/2021    TOTALCHOLEST 4.6 12/06/2021    ALBUMIN 3.1 (L) 05/23/2022    BILIDIR 0.4 (H) 11/11/2021    AST 29 05/23/2022    ALT 12 05/23/2022    ALKPHOS 166 (H) 05/23/2022    MG 2.1 05/10/2022    LABPROT 12.1 05/23/2022    INR 1.2 05/23/2022       Assessment and Plan:    Malu Montes is a 52 y.o. female with decompensated cirrhosis secondary to alcohol., now listed for L-K transplant. Now s/p TIPS. It appears to be working based in decrease in fluid retention. May not need a liver transplant. Creatinine 1.0. will send off another  cystatin C to reassess need for kidney transplant. Current recomemndations:  1. Ascites complicated by SBP; continue cipro prophylaxis (every other day) and prn paracentesis as needed; continue lasix but lower to 80 bid;   2. Portal htn: EV s/p banding; no banding 12/21- non need to repeat since has had TIPS  3. Decompensated alcoholic liver disease: labs every week;   4. +MARIBEL: consider liver biopsy if liver enzymes increase  5. Colorectal Ca screening: repeat colonoscopy in 10 years  6. Alcohol abuse, in remission: now s/p IOP; continue AA  7. Frailty: at risk for worsening; continue whey protein supplementation; stay active;   8. CKD: possible IgA nephropathy; repeat cystatin C; f/u with nephrology  9. Elevated Kappa/Lambda FLC ratio: due to CKD; no further w/u  Needed  10. Anemia: iron infusions per hematology  11. Recent episode of confusion- not clear if was HE, but will continue lactulose  12. Pruritus with elevated bile acids: continue prn atarax; d/c colestid since she is having side effects she attributes to colestid  13. Hypopkalemia: start kdur 40 meq daily; monitor K weekly    Return 6 weeks

## 2022-05-27 ENCOUNTER — TELEPHONE (OUTPATIENT)
Dept: TRANSPLANT | Facility: CLINIC | Age: 52
End: 2022-05-27
Payer: COMMERCIAL

## 2022-05-27 NOTE — TELEPHONE ENCOUNTER
ON CALL NOTE:    Notified by Lyndon Terry, , of change in surgery times. Patient notified and instructed on continued fasting. Understanding expressed.

## 2022-05-27 NOTE — TELEPHONE ENCOUNTER
Notified by Lyndon Terry, , patient is released from backup. Patient notified and thanked for accepting this back-up organ offer. Advised to resume pre-offer medications, diet, and activity. Understanding expressed.     ----- Message from Westley Cerda sent at 5/27/2022  3:57 PM CDT -----  Regarding: speak to On Call Nurse  Patient calling to speak to Soco Delgadillo regarding follow up as patient is awaiting call back for an update on instructions as back up transplant.    Call:   [phone] (Mobile

## 2022-05-30 ENCOUNTER — TELEPHONE (OUTPATIENT)
Dept: TRANSPLANT | Facility: CLINIC | Age: 52
End: 2022-05-30
Payer: COMMERCIAL

## 2022-05-30 ENCOUNTER — LAB VISIT (OUTPATIENT)
Dept: LAB | Facility: HOSPITAL | Age: 52
End: 2022-05-30
Attending: INTERNAL MEDICINE
Payer: COMMERCIAL

## 2022-05-30 DIAGNOSIS — F10.10 ALCOHOL ABUSE: ICD-10-CM

## 2022-05-30 DIAGNOSIS — R79.89 ELEVATED LIVER FUNCTION TESTS: ICD-10-CM

## 2022-05-30 DIAGNOSIS — R18.8 OTHER ASCITES: ICD-10-CM

## 2022-05-30 DIAGNOSIS — Z76.82 ORGAN TRANSPLANT CANDIDATE: Primary | ICD-10-CM

## 2022-05-30 LAB
ALBUMIN SERPL BCP-MCNC: 3.3 G/DL (ref 3.5–5.2)
ALP SERPL-CCNC: 202 U/L (ref 55–135)
ALT SERPL W/O P-5'-P-CCNC: 15 U/L (ref 10–44)
ANION GAP SERPL CALC-SCNC: 9 MMOL/L (ref 8–16)
AST SERPL-CCNC: 29 U/L (ref 10–40)
BASOPHILS # BLD AUTO: 0.11 K/UL (ref 0–0.2)
BASOPHILS NFR BLD: 1.5 % (ref 0–1.9)
BILIRUB SERPL-MCNC: 0.7 MG/DL (ref 0.1–1)
BUN SERPL-MCNC: 23 MG/DL (ref 6–20)
CALCIUM SERPL-MCNC: 9.3 MG/DL (ref 8.7–10.5)
CHLORIDE SERPL-SCNC: 106 MMOL/L (ref 95–110)
CO2 SERPL-SCNC: 27 MMOL/L (ref 23–29)
CREAT SERPL-MCNC: 1 MG/DL (ref 0.5–1.4)
DIFFERENTIAL METHOD: ABNORMAL
EOSINOPHIL # BLD AUTO: 0.9 K/UL (ref 0–0.5)
EOSINOPHIL NFR BLD: 12 % (ref 0–8)
ERYTHROCYTE [DISTWIDTH] IN BLOOD BY AUTOMATED COUNT: 13.3 % (ref 11.5–14.5)
EST. GFR  (AFRICAN AMERICAN): >60 ML/MIN/1.73 M^2
EST. GFR  (NON AFRICAN AMERICAN): >60 ML/MIN/1.73 M^2
GLUCOSE SERPL-MCNC: 100 MG/DL (ref 70–110)
HCT VFR BLD AUTO: 35.5 % (ref 37–48.5)
HGB BLD-MCNC: 11.3 G/DL (ref 12–16)
IMM GRANULOCYTES # BLD AUTO: 0.02 K/UL (ref 0–0.04)
IMM GRANULOCYTES NFR BLD AUTO: 0.3 % (ref 0–0.5)
INR PPP: 1.2 (ref 0.8–1.2)
LYMPHOCYTES # BLD AUTO: 1.7 K/UL (ref 1–4.8)
LYMPHOCYTES NFR BLD: 23.2 % (ref 18–48)
MCH RBC QN AUTO: 32.7 PG (ref 27–31)
MCHC RBC AUTO-ENTMCNC: 31.8 G/DL (ref 32–36)
MCV RBC AUTO: 103 FL (ref 82–98)
MONOCYTES # BLD AUTO: 0.7 K/UL (ref 0.3–1)
MONOCYTES NFR BLD: 9.3 % (ref 4–15)
NEUTROPHILS # BLD AUTO: 4 K/UL (ref 1.8–7.7)
NEUTROPHILS NFR BLD: 53.7 % (ref 38–73)
NRBC BLD-RTO: 0 /100 WBC
PLATELET # BLD AUTO: 227 K/UL (ref 150–450)
PMV BLD AUTO: 10 FL (ref 9.2–12.9)
POTASSIUM SERPL-SCNC: 3.3 MMOL/L (ref 3.5–5.1)
PROT SERPL-MCNC: 6.2 G/DL (ref 6–8.4)
PROTHROMBIN TIME: 12.4 SEC (ref 9–12.5)
RBC # BLD AUTO: 3.46 M/UL (ref 4–5.4)
SODIUM SERPL-SCNC: 142 MMOL/L (ref 136–145)
WBC # BLD AUTO: 7.51 K/UL (ref 3.9–12.7)

## 2022-05-30 PROCEDURE — 36415 COLL VENOUS BLD VENIPUNCTURE: CPT | Mod: TXP | Performed by: INTERNAL MEDICINE

## 2022-05-30 PROCEDURE — 85610 PROTHROMBIN TIME: CPT | Mod: TXP | Performed by: INTERNAL MEDICINE

## 2022-05-30 PROCEDURE — 80053 COMPREHEN METABOLIC PANEL: CPT | Mod: TXP | Performed by: INTERNAL MEDICINE

## 2022-05-30 PROCEDURE — 85025 COMPLETE CBC W/AUTO DIFF WBC: CPT | Mod: TXP | Performed by: INTERNAL MEDICINE

## 2022-05-30 NOTE — TELEPHONE ENCOUNTER
BACK-UP ORGAN OFFER NOTE    Notified by Lyndon Das, , of backup liver offer with donor information.  Donor and recipient information read back and verified.  Spoke with Malu Montes and identified no acute medical issues in telephone assessment.  Patient instructed NPO. Patient verbalized understanding that she has been called as a backup.    Patient was asked if they have had a positive COVID-19 test or if they have any signs or symptoms. Informed patient that they will be tested for COVID-19 upon arrival to the hospital, unless have a previous positive result. If tested and result is positive, the transplant will not be able to occur, they will be inactivated on the wait list for 28 days per protocol and required to quarantine.

## 2022-05-31 ENCOUNTER — SOCIAL WORK (OUTPATIENT)
Dept: TRANSPLANT | Facility: HOSPITAL | Age: 52
End: 2022-05-31
Payer: COMMERCIAL

## 2022-05-31 ENCOUNTER — CLINICAL SUPPORT (OUTPATIENT)
Dept: INTERVENTIONAL RADIOLOGY/VASCULAR | Facility: CLINIC | Age: 52
End: 2022-05-31
Payer: COMMERCIAL

## 2022-05-31 DIAGNOSIS — R18.8 OTHER ASCITES: ICD-10-CM

## 2022-05-31 DIAGNOSIS — Z95.828 S/P TIPS (TRANSJUGULAR INTRAHEPATIC PORTOSYSTEMIC SHUNT): Primary | ICD-10-CM

## 2022-05-31 PROCEDURE — 99213 PR OFFICE/OUTPT VISIT, EST, LEVL III, 20-29 MIN: ICD-10-PCS | Mod: 95,NTX,, | Performed by: FAMILY MEDICINE

## 2022-05-31 PROCEDURE — 99213 OFFICE O/P EST LOW 20 MIN: CPT | Mod: 95,NTX,, | Performed by: FAMILY MEDICINE

## 2022-05-31 NOTE — PROGRESS NOTES
Subjective:       Patient ID: Malu Montes is a 52 y.o. female.    Chief Complaint: Ascites    Virtual visit with patient for follow up of refractory ascites recently treated with transjugular intrahepatic portosystemic shunt (TIPS) placement on 5/6/2022. Prior to TIPS placement, patient required large volume paracentesis twice weekly. As of today, she has not required a paracentesis since 5/20/2022, and reports she does not feel like she needs one. She endorses feeling well and reports alleviation of abdominal pain associated with ascites accumulation. She denies any hepatic encephalopathy. She had an ultrasound on 5/16/2022.     Review of Systems   Constitutional: Negative for activity change, appetite change, chills, fatigue and fever.   Respiratory: Negative for cough, shortness of breath, wheezing and stridor.    Cardiovascular: Negative for chest pain, palpitations and leg swelling.   Gastrointestinal: Negative for abdominal distention, abdominal pain, constipation, diarrhea, nausea and vomiting.         Objective:      Physical Exam  Constitutional:       General: She is not in acute distress.     Appearance: She is well-developed. She is not diaphoretic.   HENT:      Head: Normocephalic and atraumatic.   Pulmonary:      Effort: Pulmonary effort is normal. No respiratory distress.   Neurological:      Mental Status: She is alert and oriented to person, place, and time.   Psychiatric:         Behavior: Behavior normal.         Thought Content: Thought content normal.         Judgment: Judgment normal.      US 5/16/2022  Impression:     1. Hepatic cirrhosis with portal hypertension.  2. Widely patent TIPS shunt.  3. Cholelithiasis without cholecystitis.   Assessment:       Problem List Items Addressed This Visit    None     Visit Diagnoses     S/P TIPS (transjugular intrahepatic portosystemic shunt)    -  Primary    Other ascites              Plan:         The patient location is: [place]  The chief  complaint leading to consultation is: ascites    Visit type: audiovisual    Face to Face time with patient: 20 minutes  25 minutes of total time spent on the encounter, which includes face to face time and non-face to face time preparing to see the patient (eg, review of tests), Obtaining and/or reviewing separately obtained history, Documenting clinical information in the electronic or other health record, Independently interpreting results (not separately reported) and communicating results to the patient/family/caregiver, or Care coordination (not separately reported).         Each patient to whom he or she provides medical services by telemedicine is:  (1) informed of the relationship between the physician and patient and the respective role of any other health care provider with respect to management of the patient; and (2) notified that he or she may decline to receive medical services by telemedicine and may withdraw from such care at any time.    Notes:   Reviewed ultrasound with Dr. Jain. Explained to patient clinical picture supports ultrasound findings of patent TIPS. Recommendation is to continue surveillance with hepatology and obtain ultrasound at least once every 3 months. Patient verbalized understanding and agreement. RTC PRN.

## 2022-05-31 NOTE — PROGRESS NOTES
On Call Transplant     SW reviewed patients chart for psychosocial barriers or concerns as patient may have an organ offer.    No concerns/needs identified or indicated at this time. Transplant SW remains available.  If patient is transplanted, transplant social work to follow. Transplant social work team remains available.

## 2022-06-01 ENCOUNTER — PATIENT MESSAGE (OUTPATIENT)
Dept: HEPATOLOGY | Facility: CLINIC | Age: 52
End: 2022-06-01
Payer: COMMERCIAL

## 2022-06-01 ENCOUNTER — PATIENT MESSAGE (OUTPATIENT)
Dept: NEPHROLOGY | Facility: CLINIC | Age: 52
End: 2022-06-01
Payer: COMMERCIAL

## 2022-06-02 ENCOUNTER — TELEPHONE (OUTPATIENT)
Dept: TRANSPLANT | Facility: CLINIC | Age: 52
End: 2022-06-02
Payer: COMMERCIAL

## 2022-06-03 NOTE — TELEPHONE ENCOUNTER
BACK-UP ORGAN OFFER NOTE    Notified by Lorenzo Rubio, , of backup liver offer with donor information.  Donor and recipient information read back and verified.  Spoke with Malu Montes and identified no acute medical issues in telephone assessment.   Patient verbalized understanding that she has been called as a backup.    Patient was asked if they have had a positive COVID-19 test or if they have any signs or symptoms. Informed patient that they will be tested for COVID-19 upon arrival to the hospital, unless have a previous positive result. If tested and result is positive, the transplant will not be able to occur, they will be inactivated on the wait list for 28 days per protocol and required to quarantine.     No times yet received, will update pt once received orders for NPO and covid testing.

## 2022-06-04 ENCOUNTER — TELEPHONE (OUTPATIENT)
Dept: TRANSPLANT | Facility: CLINIC | Age: 52
End: 2022-06-04
Payer: COMMERCIAL

## 2022-06-04 ENCOUNTER — LAB VISIT (OUTPATIENT)
Dept: TRANSPLANT | Facility: CLINIC | Age: 52
End: 2022-06-04
Payer: COMMERCIAL

## 2022-06-04 DIAGNOSIS — Z76.82 ORGAN TRANSPLANT CANDIDATE: Primary | ICD-10-CM

## 2022-06-04 DIAGNOSIS — Z76.82 ORGAN TRANSPLANT CANDIDATE: ICD-10-CM

## 2022-06-04 LAB — SARS-COV-2 RDRP RESP QL NAA+PROBE: NEGATIVE

## 2022-06-04 PROCEDURE — U0002 COVID-19 LAB TEST NON-CDC: HCPCS | Mod: TXP | Performed by: TRANSPLANT SURGERY

## 2022-06-04 NOTE — TELEPHONE ENCOUNTER
Updated patient regarding back-up status.  Explained that she will need a COVID test.   Liver Transplant coordinator will let her know when to come and get COVID testing.  Allowed time for questions and answers.

## 2022-06-04 NOTE — TELEPHONE ENCOUNTER
ON CALL NOTE    Called patient to provide update.  Patient remains back-up for liver transplant.  Covid test needed.  Scheduled covid test for 11am, per pt's request.  Patient voiced understanding of the need to report to 11th floor for test.  Instructed patient to start fasting at 8 pm.  Ok to take half dose lactulose, for any s/s of HE.  Patient to remain at home and available by phone for further instructions.  Charge nurse of [facility] notified of appt made for covid test.

## 2022-06-05 ENCOUNTER — TELEPHONE (OUTPATIENT)
Dept: TRANSPLANT | Facility: CLINIC | Age: 52
End: 2022-06-05
Payer: COMMERCIAL

## 2022-06-05 ENCOUNTER — ANESTHESIA EVENT (OUTPATIENT)
Dept: SURGERY | Facility: HOSPITAL | Age: 52
DRG: 006 | End: 2022-06-05
Payer: COMMERCIAL

## 2022-06-05 ENCOUNTER — HOSPITAL ENCOUNTER (INPATIENT)
Facility: HOSPITAL | Age: 52
LOS: 5 days | Discharge: HOME | DRG: 006 | End: 2022-06-10
Attending: TRANSPLANT SURGERY | Admitting: TRANSPLANT SURGERY
Payer: COMMERCIAL

## 2022-06-05 ENCOUNTER — ANESTHESIA (OUTPATIENT)
Dept: SURGERY | Facility: HOSPITAL | Age: 52
DRG: 006 | End: 2022-06-05
Payer: COMMERCIAL

## 2022-06-05 DIAGNOSIS — T38.0X5A ADRENAL CORTICAL STEROIDS CAUSING ADVERSE EFFECT IN THERAPEUTIC USE: ICD-10-CM

## 2022-06-05 DIAGNOSIS — Z91.89 AT RISK FOR OPPORTUNISTIC INFECTIONS: ICD-10-CM

## 2022-06-05 DIAGNOSIS — T38.0X5A STEROID-INDUCED HYPERGLYCEMIA: ICD-10-CM

## 2022-06-05 DIAGNOSIS — Z29.89 PROPHYLACTIC IMMUNOTHERAPY: ICD-10-CM

## 2022-06-05 DIAGNOSIS — B19.20 RECEIVED LIVER TRANSPLANT FROM DONOR WITH HEPATITIS C: ICD-10-CM

## 2022-06-05 DIAGNOSIS — D63.8 ANEMIA OF CHRONIC DISEASE: ICD-10-CM

## 2022-06-05 DIAGNOSIS — E03.9 HYPOTHYROIDISM, UNSPECIFIED TYPE: ICD-10-CM

## 2022-06-05 DIAGNOSIS — N17.9 AKI (ACUTE KIDNEY INJURY): ICD-10-CM

## 2022-06-05 DIAGNOSIS — T86.43 RECEIVED LIVER TRANSPLANT FROM DONOR WITH HEPATITIS C: ICD-10-CM

## 2022-06-05 DIAGNOSIS — R73.9 STEROID-INDUCED HYPERGLYCEMIA: ICD-10-CM

## 2022-06-05 DIAGNOSIS — Z76.82 LIVER TRANSPLANT CANDIDATE: ICD-10-CM

## 2022-06-05 DIAGNOSIS — Z79.60 LONG-TERM USE OF IMMUNOSUPPRESSANT MEDICATION: ICD-10-CM

## 2022-06-05 DIAGNOSIS — Z76.82 ORGAN TRANSPLANT CANDIDATE: Primary | ICD-10-CM

## 2022-06-05 DIAGNOSIS — Z94.4 S/P LIVER TRANSPLANT: Primary | ICD-10-CM

## 2022-06-05 DIAGNOSIS — K59.00 CONSTIPATION, UNSPECIFIED CONSTIPATION TYPE: ICD-10-CM

## 2022-06-05 LAB
A1 AG RBC QL: NORMAL
ABO + RH BLD: NORMAL
ALBUMIN SERPL BCP-MCNC: 2.1 G/DL (ref 3.5–5.2)
ALBUMIN SERPL BCP-MCNC: 2.2 G/DL (ref 3.5–5.2)
ALBUMIN SERPL BCP-MCNC: 3.2 G/DL (ref 3.5–5.2)
ALBUMIN SERPL BCP-MCNC: 3.3 G/DL (ref 3.5–5.2)
ALBUMIN SERPL BCP-MCNC: 3.4 G/DL (ref 3.5–5.2)
ALLENS TEST: ABNORMAL
ALP SERPL-CCNC: 143 U/L (ref 55–135)
ALP SERPL-CCNC: 205 U/L (ref 55–135)
ALT SERPL W/O P-5'-P-CCNC: 11 U/L (ref 10–44)
ALT SERPL W/O P-5'-P-CCNC: 228 U/L (ref 10–44)
ALT SERPL W/O P-5'-P-CCNC: 246 U/L (ref 10–44)
AMYLASE SERPL-CCNC: 18 U/L (ref 20–110)
ANION GAP SERPL CALC-SCNC: 11 MMOL/L (ref 8–16)
ANION GAP SERPL CALC-SCNC: 13 MMOL/L (ref 8–16)
ANION GAP SERPL CALC-SCNC: 14 MMOL/L (ref 8–16)
APTT BLDCRRT: 29.6 SEC (ref 21–32)
APTT BLDCRRT: 30.1 SEC (ref 21–32)
APTT BLDCRRT: 36.1 SEC (ref 21–32)
APTT BLDCRRT: 62.4 SEC (ref 21–32)
AST SERPL-CCNC: 25 U/L (ref 10–40)
AST SERPL-CCNC: 568 U/L (ref 10–40)
AST SERPL-CCNC: 719 U/L (ref 10–40)
AST SERPL-CCNC: 724 U/L (ref 10–40)
BASOPHILS # BLD AUTO: 0.04 K/UL (ref 0–0.2)
BASOPHILS # BLD AUTO: 0.12 K/UL (ref 0–0.2)
BASOPHILS NFR BLD: 0.2 % (ref 0–1.9)
BASOPHILS NFR BLD: 1.4 % (ref 0–1.9)
BILIRUB DIRECT SERPL-MCNC: 0.5 MG/DL (ref 0.1–0.3)
BILIRUB SERPL-MCNC: 1.2 MG/DL (ref 0.1–1)
BILIRUB SERPL-MCNC: 1.8 MG/DL (ref 0.1–1)
BLD GP AB SCN CELLS X3 SERPL QL: NORMAL
BLOOD BANK HEPATITIS FREEZE AND HOLD: NORMAL
BLOOD BANK HEPATITIS FREEZE AND HOLD: NORMAL
BUN SERPL-MCNC: 13 MG/DL (ref 6–20)
BUN SERPL-MCNC: 33 MG/DL (ref 6–20)
BUN SERPL-MCNC: 34 MG/DL (ref 6–20)
CA-I BLDV-SCNC: 1 MMOL/L (ref 1.06–1.42)
CA-I BLDV-SCNC: 1.06 MMOL/L (ref 1.06–1.42)
CALCIUM SERPL-MCNC: 7.3 MG/DL (ref 8.7–10.5)
CALCIUM SERPL-MCNC: 9.2 MG/DL (ref 8.7–10.5)
CALCIUM SERPL-MCNC: 9.4 MG/DL (ref 8.7–10.5)
CHLORIDE SERPL-SCNC: 107 MMOL/L (ref 95–110)
CHLORIDE SERPL-SCNC: 110 MMOL/L (ref 95–110)
CHLORIDE SERPL-SCNC: 111 MMOL/L (ref 95–110)
CO2 SERPL-SCNC: 21 MMOL/L (ref 23–29)
CO2 SERPL-SCNC: 22 MMOL/L (ref 23–29)
CO2 SERPL-SCNC: 22 MMOL/L (ref 23–29)
CREAT SERPL-MCNC: 0.7 MG/DL (ref 0.5–1.4)
CREAT SERPL-MCNC: 1 MG/DL (ref 0.5–1.4)
CREAT SERPL-MCNC: 1.1 MG/DL (ref 0.5–1.4)
DIFFERENTIAL METHOD: ABNORMAL
DIFFERENTIAL METHOD: ABNORMAL
EOSINOPHIL # BLD AUTO: 0 K/UL (ref 0–0.5)
EOSINOPHIL # BLD AUTO: 0.9 K/UL (ref 0–0.5)
EOSINOPHIL NFR BLD: 0.2 % (ref 0–8)
EOSINOPHIL NFR BLD: 10.3 % (ref 0–8)
ERYTHROCYTE [DISTWIDTH] IN BLOOD BY AUTOMATED COUNT: 12.8 % (ref 11.5–14.5)
ERYTHROCYTE [DISTWIDTH] IN BLOOD BY AUTOMATED COUNT: 13.7 % (ref 11.5–14.5)
EST. GFR  (AFRICAN AMERICAN): >60 ML/MIN/1.73 M^2
EST. GFR  (NON AFRICAN AMERICAN): 58.3 ML/MIN/1.73 M^2
EST. GFR  (NON AFRICAN AMERICAN): >60 ML/MIN/1.73 M^2
EST. GFR  (NON AFRICAN AMERICAN): >60 ML/MIN/1.73 M^2
ESTIMATED AVG GLUCOSE: 108 MG/DL (ref 68–131)
FIBRINOGEN PPP-MCNC: 249 MG/DL (ref 182–400)
FIBRINOGEN PPP-MCNC: 395 MG/DL (ref 182–400)
FIBRINOGEN PPP-MCNC: 405 MG/DL (ref 182–400)
GLUCOSE SERPL-MCNC: 167 MG/DL (ref 70–110)
GLUCOSE SERPL-MCNC: 229 MG/DL (ref 70–110)
GLUCOSE SERPL-MCNC: 70 MG/DL (ref 70–110)
GLUCOSE SERPL-MCNC: 70 MG/DL (ref 70–110)
GLUCOSE SERPL-MCNC: 78 MG/DL (ref 70–110)
HBA1C MFR BLD: 5.4 % (ref 4–5.6)
HCO3 UR-SCNC: 25.1 MMOL/L (ref 24–28)
HCT VFR BLD AUTO: 29.2 % (ref 37–48.5)
HCT VFR BLD AUTO: 32.6 % (ref 37–48.5)
HCT VFR BLD AUTO: 33.1 % (ref 37–48.5)
HCT VFR BLD AUTO: 34.8 % (ref 37–48.5)
HCT VFR BLD CALC: 32 %PCV (ref 36–54)
HCT VFR BLD CALC: 36 %PCV (ref 36–54)
HGB BLD-MCNC: 11 G/DL (ref 12–16)
HGB BLD-MCNC: 11 G/DL (ref 12–16)
HGB BLD-MCNC: 11.2 G/DL (ref 12–16)
HGB BLD-MCNC: 9.3 G/DL (ref 12–16)
IMM GRANULOCYTES # BLD AUTO: 0.02 K/UL (ref 0–0.04)
IMM GRANULOCYTES # BLD AUTO: 0.11 K/UL (ref 0–0.04)
IMM GRANULOCYTES NFR BLD AUTO: 0.2 % (ref 0–0.5)
IMM GRANULOCYTES NFR BLD AUTO: 0.6 % (ref 0–0.5)
INR PPP: 1.3 (ref 0.8–1.2)
INR PPP: 1.3 (ref 0.8–1.2)
INR PPP: 1.5 (ref 0.8–1.2)
LDH SERPL L TO P-CCNC: 1.8 MMOL/L (ref 0.36–1.25)
LDH SERPL L TO P-CCNC: 1639 U/L (ref 110–260)
LYMPHOCYTES # BLD AUTO: 0.6 K/UL (ref 1–4.8)
LYMPHOCYTES # BLD AUTO: 2.1 K/UL (ref 1–4.8)
LYMPHOCYTES NFR BLD: 25.5 % (ref 18–48)
LYMPHOCYTES NFR BLD: 3.5 % (ref 18–48)
MAGNESIUM SERPL-MCNC: 1.9 MG/DL (ref 1.6–2.6)
MAGNESIUM SERPL-MCNC: 1.9 MG/DL (ref 1.6–2.6)
MAGNESIUM SERPL-MCNC: 2.1 MG/DL (ref 1.6–2.6)
MAGNESIUM SERPL-MCNC: 2.1 MG/DL (ref 1.6–2.6)
MAGNESIUM SERPL-MCNC: 2.2 MG/DL (ref 1.6–2.6)
MCH RBC QN AUTO: 31.9 PG (ref 27–31)
MCH RBC QN AUTO: 32.6 PG (ref 27–31)
MCHC RBC AUTO-ENTMCNC: 31.6 G/DL (ref 32–36)
MCHC RBC AUTO-ENTMCNC: 33.7 G/DL (ref 32–36)
MCV RBC AUTO: 101 FL (ref 82–98)
MCV RBC AUTO: 97 FL (ref 82–98)
MONOCYTES # BLD AUTO: 1 K/UL (ref 0.3–1)
MONOCYTES # BLD AUTO: 1.1 K/UL (ref 0.3–1)
MONOCYTES NFR BLD: 12.4 % (ref 4–15)
MONOCYTES NFR BLD: 6.2 % (ref 4–15)
NEUTROPHILS # BLD AUTO: 16.1 K/UL (ref 1.8–7.7)
NEUTROPHILS # BLD AUTO: 4.2 K/UL (ref 1.8–7.7)
NEUTROPHILS NFR BLD: 50.2 % (ref 38–73)
NEUTROPHILS NFR BLD: 89.3 % (ref 38–73)
NRBC BLD-RTO: 0 /100 WBC
NRBC BLD-RTO: 0 /100 WBC
PCO2 BLDA: 42.3 MMHG (ref 35–45)
PCO2 BLDA: 44.7 MMHG (ref 35–45)
PH SMN: 7.35 [PH] (ref 7.35–7.45)
PH SMN: 7.38 [PH] (ref 7.35–7.45)
PHOSPHATE SERPL-MCNC: 3.5 MG/DL (ref 2.7–4.5)
PHOSPHATE SERPL-MCNC: 4.3 MG/DL (ref 2.7–4.5)
PLATELET # BLD AUTO: 140 K/UL (ref 150–450)
PLATELET # BLD AUTO: 164 K/UL (ref 150–450)
PLATELET # BLD AUTO: 178 K/UL (ref 150–450)
PLATELET # BLD AUTO: 194 K/UL (ref 150–450)
PMV BLD AUTO: 10.2 FL (ref 9.2–12.9)
PMV BLD AUTO: 10.5 FL (ref 9.2–12.9)
PMV BLD AUTO: 10.6 FL (ref 9.2–12.9)
PMV BLD AUTO: 9.8 FL (ref 9.2–12.9)
PO2 BLDA: 126 MMHG (ref 80–100)
PO2 BLDA: 49 MMHG (ref 40–60)
POC BE: -1 MMOL/L
POC BE: 0 MMOL/L
POC IONIZED CALCIUM: 1.07 MMOL/L (ref 1.06–1.42)
POC SATURATED O2: 82 % (ref 95–100)
POC SATURATED O2: 99 % (ref 95–100)
POC TCO2: 26 MMOL/L (ref 23–27)
POCT GLUCOSE: 175 MG/DL (ref 70–110)
POCT GLUCOSE: 184 MG/DL (ref 70–110)
POTASSIUM BLD-SCNC: 3.5 MMOL/L (ref 3.5–5.1)
POTASSIUM SERPL-SCNC: 2.9 MMOL/L (ref 3.5–5.1)
POTASSIUM SERPL-SCNC: 3 MMOL/L (ref 3.5–5.1)
POTASSIUM SERPL-SCNC: 3 MMOL/L (ref 3.5–5.1)
POTASSIUM SERPL-SCNC: 3.6 MMOL/L (ref 3.5–5.1)
POTASSIUM SERPL-SCNC: 3.6 MMOL/L (ref 3.5–5.1)
PROT SERPL-MCNC: 4 G/DL (ref 6–8.4)
PROT SERPL-MCNC: 6.2 G/DL (ref 6–8.4)
PROTHROMBIN TIME: 13 SEC (ref 9–12.5)
PROTHROMBIN TIME: 13 SEC (ref 9–12.5)
PROTHROMBIN TIME: 14.8 SEC (ref 9–12.5)
PROTHROMBIN TIME: 14.8 SEC (ref 9–12.5)
PROTHROMBIN TIME: 15.1 SEC (ref 9–12.5)
RBC # BLD AUTO: 3.37 M/UL (ref 4–5.4)
RBC # BLD AUTO: 3.45 M/UL (ref 4–5.4)
SAMPLE: ABNORMAL
SITE: ABNORMAL
SODIUM BLD-SCNC: 141 MMOL/L (ref 136–145)
SODIUM SERPL-SCNC: 140 MMOL/L (ref 136–145)
SODIUM SERPL-SCNC: 141 MMOL/L (ref 136–145)
SODIUM SERPL-SCNC: 144 MMOL/L (ref 136–145)
SODIUM SERPL-SCNC: 145 MMOL/L (ref 136–145)
SODIUM SERPL-SCNC: 146 MMOL/L (ref 136–145)
WBC # BLD AUTO: 18 K/UL (ref 3.9–12.7)
WBC # BLD AUTO: 8.34 K/UL (ref 3.9–12.7)

## 2022-06-05 PROCEDURE — 36620 INSERTION CATHETER ARTERY: CPT | Mod: 59,NTX,, | Performed by: ANESTHESIOLOGY

## 2022-06-05 PROCEDURE — 82330 ASSAY OF CALCIUM: CPT | Mod: 91,NTX | Performed by: TRANSPLANT SURGERY

## 2022-06-05 PROCEDURE — 93503 INSERT/PLACE HEART CATHETER: CPT | Mod: 59,NTX,, | Performed by: ANESTHESIOLOGY

## 2022-06-05 PROCEDURE — 37000008 HC ANESTHESIA 1ST 15 MINUTES: Mod: NTX | Performed by: TRANSPLANT SURGERY

## 2022-06-05 PROCEDURE — 99900035 HC TECH TIME PER 15 MIN (STAT): Mod: NTX

## 2022-06-05 PROCEDURE — 88307 PR  SURG PATH,LEVEL V: ICD-10-PCS | Mod: 26,NTX,, | Performed by: PATHOLOGY

## 2022-06-05 PROCEDURE — 27201423 OPTIME MED/SURG SUP & DEVICES STERILE SUPPLY: Mod: NTX | Performed by: TRANSPLANT SURGERY

## 2022-06-05 PROCEDURE — D9220A PRA ANESTHESIA: ICD-10-PCS | Mod: ANES,NTX,, | Performed by: ANESTHESIOLOGY

## 2022-06-05 PROCEDURE — 94761 N-INVAS EAR/PLS OXIMETRY MLT: CPT | Mod: NTX

## 2022-06-05 PROCEDURE — D9220A PRA ANESTHESIA: Mod: CRNA,NTX,, | Performed by: NURSE ANESTHETIST, CERTIFIED REGISTERED

## 2022-06-05 PROCEDURE — 85002 BLEEDING TIME TEST: CPT | Mod: NTX

## 2022-06-05 PROCEDURE — 88313 SPECIAL STAINS GROUP 2: CPT | Mod: 26,NTX,, | Performed by: PATHOLOGY

## 2022-06-05 PROCEDURE — 84295 ASSAY OF SERUM SODIUM: CPT | Mod: 91,NTX | Performed by: TRANSPLANT SURGERY

## 2022-06-05 PROCEDURE — 86920 COMPATIBILITY TEST SPIN: CPT | Mod: NTX | Performed by: NURSE PRACTITIONER

## 2022-06-05 PROCEDURE — 82803 BLOOD GASES ANY COMBINATION: CPT | Mod: NTX

## 2022-06-05 PROCEDURE — 47135 PR TRANSPLANT LIVER,ALLOTRANSPLANT: ICD-10-PCS | Mod: 82,NTX,, | Performed by: TRANSPLANT SURGERY

## 2022-06-05 PROCEDURE — 80069 RENAL FUNCTION PANEL: CPT | Mod: NTX | Performed by: INTERNAL MEDICINE

## 2022-06-05 PROCEDURE — 87389 HIV-1 AG W/HIV-1&-2 AB AG IA: CPT | Mod: NTX | Performed by: NURSE PRACTITIONER

## 2022-06-05 PROCEDURE — 82248 BILIRUBIN DIRECT: CPT | Mod: NTX | Performed by: NURSE PRACTITIONER

## 2022-06-05 PROCEDURE — 83735 ASSAY OF MAGNESIUM: CPT | Mod: 91,NTX | Performed by: TRANSPLANT SURGERY

## 2022-06-05 PROCEDURE — 85610 PROTHROMBIN TIME: CPT | Mod: 91,NTX | Performed by: TRANSPLANT SURGERY

## 2022-06-05 PROCEDURE — 87522 HEPATITIS C REVRS TRNSCRPJ: CPT | Mod: NTX | Performed by: NURSE PRACTITIONER

## 2022-06-05 PROCEDURE — 82565 ASSAY OF CREATININE: CPT | Mod: NTX

## 2022-06-05 PROCEDURE — 84295 ASSAY OF SERUM SODIUM: CPT | Mod: NTX

## 2022-06-05 PROCEDURE — 88313 PR  SPECIAL STAINS,GROUP II: ICD-10-PCS | Mod: 26,NTX,, | Performed by: PATHOLOGY

## 2022-06-05 PROCEDURE — P9016 RBC LEUKOCYTES REDUCED: HCPCS | Mod: NTX | Performed by: NURSE PRACTITIONER

## 2022-06-05 PROCEDURE — 86803 HEPATITIS C AB TEST: CPT | Mod: NTX | Performed by: NURSE PRACTITIONER

## 2022-06-05 PROCEDURE — 85730 THROMBOPLASTIN TIME PARTIAL: CPT | Mod: 91,NTX | Performed by: TRANSPLANT SURGERY

## 2022-06-05 PROCEDURE — 27100088 HC CELL SAVER: Mod: NTX

## 2022-06-05 PROCEDURE — 83735 ASSAY OF MAGNESIUM: CPT | Mod: 91,NTX | Performed by: NURSE PRACTITIONER

## 2022-06-05 PROCEDURE — 82040 ASSAY OF SERUM ALBUMIN: CPT | Mod: 91,NTX | Performed by: TRANSPLANT SURGERY

## 2022-06-05 PROCEDURE — 27000191 HC C-V MONITORING: Mod: NTX

## 2022-06-05 PROCEDURE — 88307 TISSUE EXAM BY PATHOLOGIST: CPT | Mod: NTX | Performed by: PATHOLOGY

## 2022-06-05 PROCEDURE — 84450 TRANSFERASE (AST) (SGOT): CPT | Mod: 91,NTX | Performed by: TRANSPLANT SURGERY

## 2022-06-05 PROCEDURE — 47135 PR TRANSPLANT LIVER,ALLOTRANSPLANT: ICD-10-PCS | Mod: NTX,,, | Performed by: TRANSPLANT SURGERY

## 2022-06-05 PROCEDURE — 84460 ALANINE AMINO (ALT) (SGPT): CPT | Mod: NTX | Performed by: TRANSPLANT SURGERY

## 2022-06-05 PROCEDURE — 36000930 HC OR TIME LEV VII 1ST 15 MIN: Mod: NTX | Performed by: TRANSPLANT SURGERY

## 2022-06-05 PROCEDURE — 99000 SPECIMEN HANDLING OFFICE-LAB: CPT | Mod: NTX | Performed by: TRANSPLANT SURGERY

## 2022-06-05 PROCEDURE — 84132 ASSAY OF SERUM POTASSIUM: CPT | Mod: NTX

## 2022-06-05 PROCEDURE — 99223 PR INITIAL HOSPITAL CARE,LEVL III: ICD-10-PCS | Mod: 24,NTX,, | Performed by: STUDENT IN AN ORGANIZED HEALTH CARE EDUCATION/TRAINING PROGRAM

## 2022-06-05 PROCEDURE — 63600175 PHARM REV CODE 636 W HCPCS: Mod: NTX | Performed by: NURSE ANESTHETIST, CERTIFIED REGISTERED

## 2022-06-05 PROCEDURE — 63600175 PHARM REV CODE 636 W HCPCS: Mod: JG,NTX | Performed by: STUDENT IN AN ORGANIZED HEALTH CARE EDUCATION/TRAINING PROGRAM

## 2022-06-05 PROCEDURE — 25000003 PHARM REV CODE 250: Mod: NTX | Performed by: TRANSPLANT SURGERY

## 2022-06-05 PROCEDURE — 20000000 HC ICU ROOM: Mod: NTX

## 2022-06-05 PROCEDURE — 25000003 PHARM REV CODE 250: Mod: NTX | Performed by: NURSE ANESTHETIST, CERTIFIED REGISTERED

## 2022-06-05 PROCEDURE — 88309 TISSUE EXAM BY PATHOLOGIST: CPT | Mod: NTX | Performed by: PATHOLOGY

## 2022-06-05 PROCEDURE — P9045 ALBUMIN (HUMAN), 5%, 250 ML: HCPCS | Mod: JG,NTX | Performed by: TRANSPLANT SURGERY

## 2022-06-05 PROCEDURE — 84132 ASSAY OF SERUM POTASSIUM: CPT | Mod: NTX | Performed by: TRANSPLANT SURGERY

## 2022-06-05 PROCEDURE — D9220A PRA ANESTHESIA: Mod: ANES,NTX,, | Performed by: ANESTHESIOLOGY

## 2022-06-05 PROCEDURE — 85384 FIBRINOGEN ACTIVITY: CPT | Mod: 91,NTX | Performed by: NURSE PRACTITIONER

## 2022-06-05 PROCEDURE — 85014 HEMATOCRIT: CPT | Mod: NTX | Performed by: TRANSPLANT SURGERY

## 2022-06-05 PROCEDURE — 63600175 PHARM REV CODE 636 W HCPCS: Mod: NTX | Performed by: TRANSPLANT SURGERY

## 2022-06-05 PROCEDURE — 80053 COMPREHEN METABOLIC PANEL: CPT | Mod: 91,NTX | Performed by: NURSE PRACTITIONER

## 2022-06-05 PROCEDURE — 36000931 HC OR TIME LEV VII EA ADD 15 MIN: Mod: NTX | Performed by: TRANSPLANT SURGERY

## 2022-06-05 PROCEDURE — 88309 TISSUE EXAM BY PATHOLOGIST: CPT | Mod: 26,NTX,, | Performed by: PATHOLOGY

## 2022-06-05 PROCEDURE — 83605 ASSAY OF LACTIC ACID: CPT | Mod: NTX

## 2022-06-05 PROCEDURE — 82330 ASSAY OF CALCIUM: CPT | Mod: NTX

## 2022-06-05 PROCEDURE — 27201041 HC RESERVOIR, CARDIOTOMY: Mod: NTX

## 2022-06-05 PROCEDURE — 47135 TRANSPLANTATION OF LIVER: CPT | Mod: NTX,,, | Performed by: TRANSPLANT SURGERY

## 2022-06-05 PROCEDURE — 80100014 HC HEMODIALYSIS 1:1: Mod: NTX

## 2022-06-05 PROCEDURE — 37000009 HC ANESTHESIA EA ADD 15 MINS: Mod: NTX | Performed by: TRANSPLANT SURGERY

## 2022-06-05 PROCEDURE — 81300000 HC LIVER ACQUISITION CHARGE

## 2022-06-05 PROCEDURE — 82947 ASSAY GLUCOSE BLOOD QUANT: CPT | Mod: NTX | Performed by: TRANSPLANT SURGERY

## 2022-06-05 PROCEDURE — 88307 TISSUE EXAM BY PATHOLOGIST: CPT | Mod: 26,NTX,, | Performed by: PATHOLOGY

## 2022-06-05 PROCEDURE — D9220A PRA ANESTHESIA: ICD-10-PCS | Mod: CRNA,NTX,, | Performed by: NURSE ANESTHETIST, CERTIFIED REGISTERED

## 2022-06-05 PROCEDURE — 85520 HEPARIN ASSAY: CPT | Mod: NTX

## 2022-06-05 PROCEDURE — 47135 TRANSPLANTATION OF LIVER: CPT | Mod: 82,NTX,, | Performed by: TRANSPLANT SURGERY

## 2022-06-05 PROCEDURE — 86905 BLOOD TYPING RBC ANTIGENS: CPT | Mod: NTX | Performed by: NURSE PRACTITIONER

## 2022-06-05 PROCEDURE — 86704 HEP B CORE ANTIBODY TOTAL: CPT | Mod: NTX | Performed by: NURSE PRACTITIONER

## 2022-06-05 PROCEDURE — 83735 ASSAY OF MAGNESIUM: CPT | Mod: 91,NTX | Performed by: INTERNAL MEDICINE

## 2022-06-05 PROCEDURE — 87340 HEPATITIS B SURFACE AG IA: CPT | Mod: NTX | Performed by: NURSE PRACTITIONER

## 2022-06-05 PROCEDURE — 47143 PR TRANSPLANT,PREP DONOR LIVER, WHOLE: ICD-10-PCS | Mod: 51,NTX,, | Performed by: TRANSPLANT SURGERY

## 2022-06-05 PROCEDURE — 84100 ASSAY OF PHOSPHORUS: CPT | Mod: NTX | Performed by: TRANSPLANT SURGERY

## 2022-06-05 PROCEDURE — 86901 BLOOD TYPING SEROLOGIC RH(D): CPT | Mod: NTX | Performed by: NURSE PRACTITIONER

## 2022-06-05 PROCEDURE — 99223 1ST HOSP IP/OBS HIGH 75: CPT | Mod: 24,NTX,, | Performed by: STUDENT IN AN ORGANIZED HEALTH CARE EDUCATION/TRAINING PROGRAM

## 2022-06-05 PROCEDURE — 80053 COMPREHEN METABOLIC PANEL: CPT | Mod: NTX | Performed by: TRANSPLANT SURGERY

## 2022-06-05 PROCEDURE — 85049 AUTOMATED PLATELET COUNT: CPT | Mod: NTX | Performed by: TRANSPLANT SURGERY

## 2022-06-05 PROCEDURE — P9045 ALBUMIN (HUMAN), 5%, 250 ML: HCPCS | Mod: JG,NTX | Performed by: STUDENT IN AN ORGANIZED HEALTH CARE EDUCATION/TRAINING PROGRAM

## 2022-06-05 PROCEDURE — 36556 CENTRAL LINE: ICD-10-PCS | Mod: 59,NTX,, | Performed by: ANESTHESIOLOGY

## 2022-06-05 PROCEDURE — 85025 COMPLETE CBC W/AUTO DIFF WBC: CPT | Mod: 91,NTX | Performed by: NURSE PRACTITIONER

## 2022-06-05 PROCEDURE — 88309 PR  SURG PATH,LEVEL VI: ICD-10-PCS | Mod: 26,NTX,, | Performed by: PATHOLOGY

## 2022-06-05 PROCEDURE — 85610 PROTHROMBIN TIME: CPT | Mod: 91,NTX | Performed by: NURSE PRACTITIONER

## 2022-06-05 PROCEDURE — 83615 LACTATE (LD) (LDH) ENZYME: CPT | Mod: NTX | Performed by: TRANSPLANT SURGERY

## 2022-06-05 PROCEDURE — 36620 ARTERIAL: ICD-10-PCS | Mod: 59,NTX,, | Performed by: ANESTHESIOLOGY

## 2022-06-05 PROCEDURE — 99000 SPECIMEN HANDLING OFFICE-LAB: CPT | Mod: 91,NTX | Performed by: NURSE PRACTITIONER

## 2022-06-05 PROCEDURE — 93503 SWAN GANZ LINE: ICD-10-PCS | Mod: 59,NTX,, | Performed by: ANESTHESIOLOGY

## 2022-06-05 PROCEDURE — 86706 HEP B SURFACE ANTIBODY: CPT | Mod: NTX | Performed by: NURSE PRACTITIONER

## 2022-06-05 PROCEDURE — 81300005 HC LIVER TRANSPORT, GROUND 4-5 HOURS

## 2022-06-05 PROCEDURE — 37799 UNLISTED PX VASCULAR SURGERY: CPT | Mod: NTX

## 2022-06-05 PROCEDURE — 36415 COLL VENOUS BLD VENIPUNCTURE: CPT | Mod: NTX | Performed by: NURSE PRACTITIONER

## 2022-06-05 PROCEDURE — 82150 ASSAY OF AMYLASE: CPT | Mod: NTX | Performed by: TRANSPLANT SURGERY

## 2022-06-05 PROCEDURE — 85014 HEMATOCRIT: CPT | Mod: NTX

## 2022-06-05 PROCEDURE — 85384 FIBRINOGEN ACTIVITY: CPT | Mod: NTX | Performed by: TRANSPLANT SURGERY

## 2022-06-05 PROCEDURE — 85018 HEMOGLOBIN: CPT | Mod: 91,NTX | Performed by: TRANSPLANT SURGERY

## 2022-06-05 PROCEDURE — 85730 THROMBOPLASTIN TIME PARTIAL: CPT | Mod: 91,NTX | Performed by: NURSE PRACTITIONER

## 2022-06-05 PROCEDURE — 88313 SPECIAL STAINS GROUP 2: CPT | Mod: 59,NTX | Performed by: PATHOLOGY

## 2022-06-05 PROCEDURE — 63600175 PHARM REV CODE 636 W HCPCS: Mod: NTX | Performed by: NURSE PRACTITIONER

## 2022-06-05 PROCEDURE — 83036 HEMOGLOBIN GLYCOSYLATED A1C: CPT | Mod: NTX | Performed by: NURSE PRACTITIONER

## 2022-06-05 PROCEDURE — 82800 BLOOD PH: CPT | Mod: NTX

## 2022-06-05 PROCEDURE — C1729 CATH, DRAINAGE: HCPCS | Mod: NTX | Performed by: TRANSPLANT SURGERY

## 2022-06-05 PROCEDURE — 27200966 HC CLOSED SUCTION SYSTEM: Mod: NTX

## 2022-06-05 PROCEDURE — 36556 INSERT NON-TUNNEL CV CATH: CPT | Mod: 59,NTX,, | Performed by: ANESTHESIOLOGY

## 2022-06-05 PROCEDURE — 85025 COMPLETE CBC W/AUTO DIFF WBC: CPT | Mod: NTX | Performed by: TRANSPLANT SURGERY

## 2022-06-05 RX ORDER — HYDROMORPHONE HYDROCHLORIDE 2 MG/ML
INJECTION, SOLUTION INTRAMUSCULAR; INTRAVENOUS; SUBCUTANEOUS
Status: DISCONTINUED | OUTPATIENT
Start: 2022-06-05 | End: 2022-06-05

## 2022-06-05 RX ORDER — SODIUM CHLORIDE 9 MG/ML
INJECTION, SOLUTION INTRAVENOUS CONTINUOUS
Status: DISCONTINUED | OUTPATIENT
Start: 2022-06-06 | End: 2022-06-05

## 2022-06-05 RX ORDER — METHYLPREDNISOLONE SODIUM SUCCINATE 500 MG/8ML
500 INJECTION INTRAMUSCULAR; INTRAVENOUS
Status: DISCONTINUED | OUTPATIENT
Start: 2022-06-05 | End: 2022-06-05 | Stop reason: HOSPADM

## 2022-06-05 RX ORDER — SODIUM CHLORIDE 9 MG/ML
INJECTION, SOLUTION INTRAVENOUS
Status: DISCONTINUED | OUTPATIENT
Start: 2022-06-05 | End: 2022-06-10 | Stop reason: HOSPADM

## 2022-06-05 RX ORDER — HYDROCODONE BITARTRATE AND ACETAMINOPHEN 500; 5 MG/1; MG/1
TABLET ORAL
Status: DISCONTINUED | OUTPATIENT
Start: 2022-06-05 | End: 2022-06-06

## 2022-06-05 RX ORDER — MAGNESIUM SULFATE HEPTAHYDRATE 40 MG/ML
2 INJECTION, SOLUTION INTRAVENOUS
Status: DISCONTINUED | OUTPATIENT
Start: 2022-06-05 | End: 2022-06-06

## 2022-06-05 RX ORDER — ONDANSETRON 2 MG/ML
INJECTION INTRAMUSCULAR; INTRAVENOUS
Status: DISCONTINUED | OUTPATIENT
Start: 2022-06-05 | End: 2022-06-05

## 2022-06-05 RX ORDER — HEPARIN SODIUM 1000 [USP'U]/ML
INJECTION, SOLUTION INTRAVENOUS; SUBCUTANEOUS
Status: DISCONTINUED | OUTPATIENT
Start: 2022-06-05 | End: 2022-06-05 | Stop reason: HOSPADM

## 2022-06-05 RX ORDER — MUPIROCIN 20 MG/G
1 OINTMENT TOPICAL 2 TIMES DAILY
Status: DISCONTINUED | OUTPATIENT
Start: 2022-06-05 | End: 2022-06-10 | Stop reason: HOSPADM

## 2022-06-05 RX ORDER — PHENYLEPHRINE HYDROCHLORIDE 10 MG/ML
INJECTION INTRAVENOUS
Status: DISCONTINUED | OUTPATIENT
Start: 2022-06-05 | End: 2022-06-05

## 2022-06-05 RX ORDER — SODIUM CHLORIDE, SODIUM LACTATE, POTASSIUM CHLORIDE, CALCIUM CHLORIDE 600; 310; 30; 20 MG/100ML; MG/100ML; MG/100ML; MG/100ML
INJECTION, SOLUTION INTRAVENOUS CONTINUOUS
Status: DISCONTINUED | OUTPATIENT
Start: 2022-06-05 | End: 2022-06-05

## 2022-06-05 RX ORDER — LIDOCAINE HYDROCHLORIDE 20 MG/ML
INJECTION, SOLUTION EPIDURAL; INFILTRATION; INTRACAUDAL; PERINEURAL
Status: DISCONTINUED | OUTPATIENT
Start: 2022-06-05 | End: 2022-06-05 | Stop reason: HOSPADM

## 2022-06-05 RX ORDER — KETAMINE HCL IN 0.9 % NACL 50 MG/5 ML
SYRINGE (ML) INTRAVENOUS
Status: DISCONTINUED | OUTPATIENT
Start: 2022-06-05 | End: 2022-06-05

## 2022-06-05 RX ORDER — SULFAMETHOXAZOLE AND TRIMETHOPRIM 400; 80 MG/1; MG/1
1 TABLET ORAL EVERY MORNING
Status: DISCONTINUED | OUTPATIENT
Start: 2022-06-12 | End: 2022-06-10 | Stop reason: HOSPADM

## 2022-06-05 RX ORDER — BUPIVACAINE HYDROCHLORIDE 2.5 MG/ML
INJECTION, SOLUTION EPIDURAL; INFILTRATION; INTRACAUDAL
Status: DISCONTINUED | OUTPATIENT
Start: 2022-06-05 | End: 2022-06-05 | Stop reason: HOSPADM

## 2022-06-05 RX ORDER — NYSTATIN 100000 [USP'U]/ML
500000 SUSPENSION ORAL
Status: DISCONTINUED | OUTPATIENT
Start: 2022-06-06 | End: 2022-06-10 | Stop reason: HOSPADM

## 2022-06-05 RX ORDER — DEXTROSE MONOHYDRATE AND SODIUM CHLORIDE 5; .9 G/100ML; G/100ML
INJECTION, SOLUTION INTRAVENOUS CONTINUOUS
Status: DISCONTINUED | OUTPATIENT
Start: 2022-06-06 | End: 2022-06-07

## 2022-06-05 RX ORDER — HYDROMORPHONE HYDROCHLORIDE 1 MG/ML
0.5 INJECTION, SOLUTION INTRAMUSCULAR; INTRAVENOUS; SUBCUTANEOUS EVERY 4 HOURS PRN
Status: DISCONTINUED | OUTPATIENT
Start: 2022-06-05 | End: 2022-06-06

## 2022-06-05 RX ORDER — HEPARIN SODIUM 1000 [USP'U]/ML
INJECTION, SOLUTION INTRAVENOUS; SUBCUTANEOUS
Status: DISCONTINUED | OUTPATIENT
Start: 2022-06-05 | End: 2022-06-05

## 2022-06-05 RX ORDER — NOREPINEPHRINE BITARTRATE 1 MG/ML
INJECTION, SOLUTION INTRAVENOUS
Status: DISCONTINUED | OUTPATIENT
Start: 2022-06-05 | End: 2022-06-05

## 2022-06-05 RX ORDER — VASOPRESSIN 20 [USP'U]/ML
INJECTION, SOLUTION INTRAMUSCULAR; SUBCUTANEOUS
Status: DISCONTINUED | OUTPATIENT
Start: 2022-06-05 | End: 2022-06-05

## 2022-06-05 RX ORDER — LIDOCAINE HCL/PF 100 MG/5ML
SYRINGE (ML) INTRAVENOUS
Status: DISCONTINUED | OUTPATIENT
Start: 2022-06-05 | End: 2022-06-05

## 2022-06-05 RX ORDER — MUPIROCIN 20 MG/G
OINTMENT TOPICAL
Status: DISCONTINUED | OUTPATIENT
Start: 2022-06-05 | End: 2022-06-05 | Stop reason: HOSPADM

## 2022-06-05 RX ORDER — MANNITOL 250 MG/ML
INJECTION, SOLUTION INTRAVENOUS
Status: DISCONTINUED | OUTPATIENT
Start: 2022-06-05 | End: 2022-06-05

## 2022-06-05 RX ORDER — FUROSEMIDE 10 MG/ML
INJECTION INTRAMUSCULAR; INTRAVENOUS
Status: DISCONTINUED | OUTPATIENT
Start: 2022-06-05 | End: 2022-06-05

## 2022-06-05 RX ORDER — ALBUMIN HUMAN 50 G/1000ML
SOLUTION INTRAVENOUS
Status: COMPLETED | OUTPATIENT
Start: 2022-06-05 | End: 2022-06-05

## 2022-06-05 RX ORDER — HYDROCODONE BITARTRATE AND ACETAMINOPHEN 500; 5 MG/1; MG/1
TABLET ORAL CONTINUOUS
Status: DISCONTINUED | OUTPATIENT
Start: 2022-06-05 | End: 2022-06-06

## 2022-06-05 RX ORDER — VALGANCICLOVIR 450 MG/1
450 TABLET, FILM COATED ORAL DAILY
Status: DISCONTINUED | OUTPATIENT
Start: 2022-06-15 | End: 2022-06-10 | Stop reason: HOSPADM

## 2022-06-05 RX ORDER — HYDROMORPHONE HYDROCHLORIDE 1 MG/ML
1 INJECTION, SOLUTION INTRAMUSCULAR; INTRAVENOUS; SUBCUTANEOUS EVERY 4 HOURS PRN
Status: DISCONTINUED | OUTPATIENT
Start: 2022-06-05 | End: 2022-06-06

## 2022-06-05 RX ORDER — FAMOTIDINE 20 MG/1
20 TABLET, FILM COATED ORAL EVERY 12 HOURS
Status: DISCONTINUED | OUTPATIENT
Start: 2022-06-05 | End: 2022-06-06

## 2022-06-05 RX ORDER — HEPARIN SODIUM 5000 [USP'U]/ML
5000 INJECTION, SOLUTION INTRAVENOUS; SUBCUTANEOUS EVERY 8 HOURS
Status: DISCONTINUED | OUTPATIENT
Start: 2022-06-06 | End: 2022-06-10 | Stop reason: HOSPADM

## 2022-06-05 RX ORDER — PROPOFOL 10 MG/ML
VIAL (ML) INTRAVENOUS
Status: DISCONTINUED | OUTPATIENT
Start: 2022-06-05 | End: 2022-06-05

## 2022-06-05 RX ORDER — METHYLPREDNISOLONE SOD SUCC 125 MG
160 VIAL (EA) INJECTION DAILY
Status: COMPLETED | OUTPATIENT
Start: 2022-06-07 | End: 2022-06-07

## 2022-06-05 RX ORDER — METHYLPREDNISOLONE SOD SUCC 125 MG
120 VIAL (EA) INJECTION DAILY
Status: COMPLETED | OUTPATIENT
Start: 2022-06-08 | End: 2022-06-08

## 2022-06-05 RX ORDER — PREDNISONE 20 MG/1
20 TABLET ORAL DAILY
Status: DISCONTINUED | OUTPATIENT
Start: 2022-06-11 | End: 2022-06-10 | Stop reason: HOSPADM

## 2022-06-05 RX ORDER — ALBUMIN HUMAN 50 G/1000ML
25 SOLUTION INTRAVENOUS ONCE
Status: COMPLETED | OUTPATIENT
Start: 2022-06-05 | End: 2022-06-05

## 2022-06-05 RX ORDER — ROCURONIUM BROMIDE 10 MG/ML
INJECTION, SOLUTION INTRAVENOUS
Status: DISCONTINUED | OUTPATIENT
Start: 2022-06-05 | End: 2022-06-05

## 2022-06-05 RX ORDER — POTASSIUM CHLORIDE 29.8 MG/ML
40 INJECTION INTRAVENOUS ONCE
Status: COMPLETED | OUTPATIENT
Start: 2022-06-06 | End: 2022-06-06

## 2022-06-05 RX ORDER — MIDAZOLAM HYDROCHLORIDE 1 MG/ML
INJECTION INTRAMUSCULAR; INTRAVENOUS
Status: DISCONTINUED | OUTPATIENT
Start: 2022-06-05 | End: 2022-06-05

## 2022-06-05 RX ORDER — MYCOPHENOLATE MOFETIL 200 MG/ML
1000 POWDER, FOR SUSPENSION ORAL 2 TIMES DAILY
Status: DISCONTINUED | OUTPATIENT
Start: 2022-06-05 | End: 2022-06-06

## 2022-06-05 RX ORDER — METHYLPREDNISOLONE SOD SUCC 125 MG
200 VIAL (EA) INJECTION DAILY
Status: COMPLETED | OUTPATIENT
Start: 2022-06-06 | End: 2022-06-06

## 2022-06-05 RX ORDER — FENTANYL CITRATE 50 UG/ML
INJECTION, SOLUTION INTRAMUSCULAR; INTRAVENOUS
Status: DISCONTINUED | OUTPATIENT
Start: 2022-06-05 | End: 2022-06-05

## 2022-06-05 RX ORDER — SODIUM CHLORIDE 0.9 % (FLUSH) 0.9 %
10 SYRINGE (ML) INJECTION
Status: DISCONTINUED | OUTPATIENT
Start: 2022-06-05 | End: 2022-06-10 | Stop reason: HOSPADM

## 2022-06-05 RX ADMIN — Medication 30 MG: at 03:06

## 2022-06-05 RX ADMIN — ROCURONIUM BROMIDE 30 MG: 10 INJECTION, SOLUTION INTRAVENOUS at 06:06

## 2022-06-05 RX ADMIN — FENTANYL CITRATE 100 MCG: 50 INJECTION, SOLUTION INTRAMUSCULAR; INTRAVENOUS at 03:06

## 2022-06-05 RX ADMIN — MIDAZOLAM HYDROCHLORIDE 2 MG: 1 INJECTION, SOLUTION INTRAMUSCULAR; INTRAVENOUS at 03:06

## 2022-06-05 RX ADMIN — MUPIROCIN 1 G: 20 OINTMENT TOPICAL at 11:06

## 2022-06-05 RX ADMIN — FUROSEMIDE 60 MG: 10 INJECTION, SOLUTION INTRAMUSCULAR; INTRAVENOUS at 05:06

## 2022-06-05 RX ADMIN — NOREPINEPHRINE BITARTRATE 16 MCG: 1 INJECTION, SOLUTION, CONCENTRATE INTRAVENOUS at 06:06

## 2022-06-05 RX ADMIN — CALCIUM CHLORIDE 500 MG: 100 INJECTION, SOLUTION INTRAVENOUS at 06:06

## 2022-06-05 RX ADMIN — NOREPINEPHRINE BITARTRATE 32 MCG: 1 INJECTION, SOLUTION, CONCENTRATE INTRAVENOUS at 06:06

## 2022-06-05 RX ADMIN — PROPOFOL 110 MG: 10 INJECTION, EMULSION INTRAVENOUS at 03:06

## 2022-06-05 RX ADMIN — SODIUM CHLORIDE, SODIUM GLUCONATE, SODIUM ACETATE, POTASSIUM CHLORIDE, MAGNESIUM CHLORIDE, SODIUM PHOSPHATE, DIBASIC, AND POTASSIUM PHOSPHATE: .53; .5; .37; .037; .03; .012; .00082 INJECTION, SOLUTION INTRAVENOUS at 09:06

## 2022-06-05 RX ADMIN — HYDROMORPHONE HYDROCHLORIDE 0.4 MG: 2 INJECTION INTRAMUSCULAR; INTRAVENOUS; SUBCUTANEOUS at 09:06

## 2022-06-05 RX ADMIN — SODIUM CHLORIDE: 0.9 INJECTION, SOLUTION INTRAVENOUS at 10:06

## 2022-06-05 RX ADMIN — PHENYLEPHRINE HYDROCHLORIDE 200 MCG: 10 INJECTION INTRAVENOUS at 03:06

## 2022-06-05 RX ADMIN — FUROSEMIDE 60 MG: 10 INJECTION, SOLUTION INTRAMUSCULAR; INTRAVENOUS at 06:06

## 2022-06-05 RX ADMIN — SODIUM CHLORIDE, SODIUM GLUCONATE, SODIUM ACETATE, POTASSIUM CHLORIDE, MAGNESIUM CHLORIDE, SODIUM PHOSPHATE, DIBASIC, AND POTASSIUM PHOSPHATE: .53; .5; .37; .037; .03; .012; .00082 INJECTION, SOLUTION INTRAVENOUS at 03:06

## 2022-06-05 RX ADMIN — ROCURONIUM BROMIDE 30 MG: 10 INJECTION, SOLUTION INTRAVENOUS at 04:06

## 2022-06-05 RX ADMIN — LIDOCAINE HYDROCHLORIDE 100 MG: 20 INJECTION, SOLUTION INTRAVENOUS at 03:06

## 2022-06-05 RX ADMIN — FENTANYL CITRATE 100 MCG: 50 INJECTION, SOLUTION INTRAMUSCULAR; INTRAVENOUS at 04:06

## 2022-06-05 RX ADMIN — HYDROMORPHONE HYDROCHLORIDE 0.6 MG: 2 INJECTION INTRAMUSCULAR; INTRAVENOUS; SUBCUTANEOUS at 09:06

## 2022-06-05 RX ADMIN — PHENYLEPHRINE HYDROCHLORIDE 200 MCG: 10 INJECTION INTRAVENOUS at 04:06

## 2022-06-05 RX ADMIN — FENTANYL CITRATE 100 MCG: 50 INJECTION, SOLUTION INTRAMUSCULAR; INTRAVENOUS at 09:06

## 2022-06-05 RX ADMIN — AMPICILLIN SODIUM AND SULBACTAM SODIUM 3 G: 2; 1 INJECTION, POWDER, FOR SOLUTION INTRAMUSCULAR; INTRAVENOUS at 07:06

## 2022-06-05 RX ADMIN — ROCURONIUM BROMIDE 30 MG: 10 INJECTION, SOLUTION INTRAVENOUS at 07:06

## 2022-06-05 RX ADMIN — MANNITOL 60 ML: 250 INJECTION, SOLUTION INTRAVENOUS at 06:06

## 2022-06-05 RX ADMIN — AMPICILLIN SODIUM AND SULBACTAM SODIUM 3 G: 2; 1 INJECTION, POWDER, FOR SOLUTION INTRAMUSCULAR; INTRAVENOUS at 04:06

## 2022-06-05 RX ADMIN — VASOPRESSIN 0.04 UNITS/MIN: 20 INJECTION INTRAVENOUS at 05:06

## 2022-06-05 RX ADMIN — METHYLPREDNISOLONE SODIUM SUCCINATE 500 MG: 500 INJECTION, POWDER, FOR SOLUTION INTRAMUSCULAR; INTRAVENOUS at 05:06

## 2022-06-05 RX ADMIN — CALCIUM CHLORIDE 500 MG: 100 INJECTION, SOLUTION INTRAVENOUS at 07:06

## 2022-06-05 RX ADMIN — INSULIN HUMAN 2 UNITS/HR: 1 INJECTION, SOLUTION INTRAVENOUS at 07:06

## 2022-06-05 RX ADMIN — SODIUM CHLORIDE: 0.9 INJECTION, SOLUTION INTRAVENOUS at 03:06

## 2022-06-05 RX ADMIN — ONDANSETRON HYDROCHLORIDE 4 MG: 2 INJECTION INTRAMUSCULAR; INTRAVENOUS at 09:06

## 2022-06-05 RX ADMIN — ALBUMIN (HUMAN) 25 G: 12.5 SOLUTION INTRAVENOUS at 10:06

## 2022-06-05 RX ADMIN — HEPARIN SODIUM 2000 UNITS: 1000 INJECTION, SOLUTION INTRAVENOUS; SUBCUTANEOUS at 06:06

## 2022-06-05 RX ADMIN — VASOPRESSIN 1 UNITS: 20 INJECTION, SOLUTION INTRAMUSCULAR; SUBCUTANEOUS at 06:06

## 2022-06-05 RX ADMIN — ROCURONIUM BROMIDE 70 MG: 10 INJECTION, SOLUTION INTRAVENOUS at 03:06

## 2022-06-05 RX ADMIN — NOREPINEPHRINE BITARTRATE 0.04 MCG/KG/MIN: 1 INJECTION, SOLUTION, CONCENTRATE INTRAVENOUS at 05:06

## 2022-06-05 RX ADMIN — PHYTONADIONE 10 MG: 10 INJECTION, EMULSION INTRAMUSCULAR; INTRAVENOUS; SUBCUTANEOUS at 10:06

## 2022-06-05 RX ADMIN — MANNITOL 60 ML: 250 INJECTION, SOLUTION INTRAVENOUS at 05:06

## 2022-06-05 RX ADMIN — DEXTROSE AND SODIUM CHLORIDE: 5; .9 INJECTION, SOLUTION INTRAVENOUS at 11:06

## 2022-06-05 NOTE — H&P
[facility] - [facility] (2nd Fl)  Liver Transplant  History & Physical    Patient Name: Malu Montes  MRN: [id number]  Admission Date: 2022  Code Status: Full Code  Primary Care Provider: Killian Dodd DO    Subjective:     History of Present Illness:  Ms. 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 Liver disease has been complicated by HE, ascites requiring paracentesis twice a week. She underwent a TIPS placement on 22. Admitted for streptococcus mitis/oralis. She completed a 14d course of ceftriaxone.  Now patient presents for a Liver transplant.  She reports in usual state of health- Patient denies any recent hospitalizations or ED visits.   The primary surgeon will be Dr. Slaughter.  Patient is NPO.  All pre-op labs and imaging have been ordered prior to surgery. Consents  signed prior to transplant.           Past Medical History:   Diagnosis Date    ADD (attention deficit disorder)     Anxiety     Ascites of liver     Cirrhosis 2021    CKD (chronic kidney disease) stage 3, GFR 30-59 ml/min     Constipation     ETOH abuse     Hyperlipidemia     Hypothyroidism     Other ascites 2021    Pancreatitis, acute     Tobacco use     Vitamin D deficiency        Past Surgical History:   Procedure Laterality Date    AUGMENTATION OF BREAST      breast augmentation       SECTION      COLONOSCOPY N/A 2021    Procedure: COLONOSCOPY;  Surgeon: Dao Gray MD;  Location: [facility] ([address] [place] [place] [address]);  Service: Endoscopy;  Laterality: N/A;  COVID test 21 General surgery clinic -     CYSTOSCOPY N/A 9/10/2021    Procedure: CYSTOSCOPY;  Surgeon: Rj Sánchez Jr., MD;  Location: [address];  Service: Urology;  Laterality: N/A;    ESOPHAGOGASTRODUODENOSCOPY N/A 2021    Procedure: ESOPHAGOGASTRODUODENOSCOPY (EGD);  Surgeon: Dao rGay MD;  Location: [facility] ([address] [place] [address]);  Service: Endoscopy;   Laterality: N/A;  labs current on 9/7    ESOPHAGOGASTRODUODENOSCOPY N/A 10/26/2021    Procedure: ESOPHAGOGASTRODUODENOSCOPY (EGD);  Surgeon: Dao Gray MD;  Location: [facility] (2ND FLR);  Service: Endoscopy;  Laterality: N/A;  to be done the week of 10/18/21 per Dr. Gray-  covid-10/23/21-[facility]   10/25 arrival time confirmed with pt-rb    ESOPHAGOGASTRODUODENOSCOPY Left 12/21/2021    Procedure: EGD (ESOPHAGOGASTRODUODENOSCOPY);  Surgeon: Koffi Monterio MD;  Location: [facility] (4TH FLR);  Service: Endoscopy;  Laterality: Left;  Rapid  pt requested AM appt and first available in December-  labs morning of procedure-  12/14 lvm to confirm appt-rb    HEMORRHOID SURGERY      PERITONEOCENTESIS Right 6/8/2021    Procedure: PARACENTESIS, ABDOMINAL;  Surgeon: Jay Torre MD;  Location: [facility] CATH LAB;  Service: Radiology;  Laterality: Right;    PERITONEOCENTESIS Right 6/25/2021    Procedure: PARACENTESIS, ABDOMINAL;  Surgeon: Jay Torre MD;  Location: [facility] CATH LAB;  Service: Radiology;  Laterality: Right;    PERITONEOCENTESIS Right 7/12/2021    Procedure: PARACENTESIS, ABDOMINAL;  Surgeon: Jay Torre MD;  Location: [facility] CATH LAB;  Service: Radiology;  Laterality: Right;    PERITONEOCENTESIS Right 7/23/2021    Procedure: PARACENTESIS, ABDOMINAL;  Surgeon: Edward De La Torre II, MD;  Location: [facility] CATH LAB;  Service: Interventional Radiology;  Laterality: Right;    PERITONEOCENTESIS Right 8/3/2021    Procedure: PARACENTESIS, ABDOMINAL;  Surgeon: Franco Cheung MD;  Location: [facility] CATH LAB;  Service: Radiology;  Laterality: Right;    PERITONEOCENTESIS Right 8/16/2021    Procedure: PARACENTESIS, ABDOMINAL;  Surgeon: Jay Torre MD;  Location: [facility] CATH LAB;  Service: Radiology;  Laterality: Right;    PERITONEOCENTESIS Right 8/23/2021    Procedure: PARACENTESIS, ABDOMINAL;  Surgeon: Jay Torre MD;  Location: [facility] CATH LAB;  Service: Radiology;  Laterality:  Right;    PERITONEOCENTESIS Right 9/16/2021    Procedure: PARACENTESIS, ABDOMINAL;  Surgeon: Jay Torre MD;  Location: [facility] CATH LAB;  Service: Radiology;  Laterality: Right;    PERITONEOCENTESIS N/A 9/24/2021    Procedure: PARACENTESIS, ABDOMINAL;  Surgeon: Jay Torre MD;  Location: [facility] CATH LAB;  Service: Radiology;  Laterality: N/A;    PERITONEOCENTESIS Right 12/23/2021    Procedure: PARACENTESIS, ABDOMINAL;  Surgeon: Jay Torre MD;  Location: [facility] CATH LAB;  Service: Radiology;  Laterality: Right;    PERITONEOCENTESIS N/A 12/30/2021    Procedure: PARACENTESIS, ABDOMINAL;  Surgeon: Salas Cabrera MD;  Location: [facility] CATH LAB;  Service: Radiology;  Laterality: N/A;    RETROGRADE PYELOGRAPHY Bilateral 9/10/2021    Procedure: PYELOGRAM, RETROGRADE;  Surgeon: Rj Sánchez Jr., MD;  Location: [address];  Service: Urology;  Laterality: Bilateral;    TONSILLECTOMY         Review of patient's allergies indicates:  No Known Allergies    Family History       Problem Relation (Age of Onset)    ADD / ADHD Daughter    COPD Maternal Grandfather    Cancer Mother    Heart disease Maternal Grandmother, Paternal Grandmother    No Known Problems Father, Sister    Sleep apnea Daughter          Tobacco Use    Smoking status: Current Every Day Smoker     Packs/day: 0.25     Years: 27.00     Pack years: 6.75     Types: Cigarettes    Smokeless tobacco: Never Used    Tobacco comment: two cigarettes a day    Substance and Sexual Activity    Alcohol use: Not Currently     Comment: quit caridad 15    Drug use: No    Sexual activity: Yes     Partners: Male       PTA Medications   Medication Sig    allopurinoL (ZYLOPRIM) 100 MG tablet TAKE 1 TABLET(100 MG) BY MOUTH EVERY DAY    bisacodyL (DULCOLAX) 5 mg EC tablet Take 5 mg by mouth daily as needed for Constipation.    cefTRIAXone (ROCEPHIN) 2 g/50 mL PgBk IVPB Inject 50 mLs (2 g total) into the vein once daily. Stop 5/24/22 for 14 days     ciprofloxacin HCl (CIPRO) 250 MG tablet Take 1 tablet (250 mg total) by mouth every other day.    ergocalciferol, vitamin D2, (VITAMIN D ORAL) Take by mouth every evening.    folic acid (FOLVITE) 1 MG tablet Take 2 tablets (2 mg total) by mouth every evening.    furosemide (LASIX) 80 MG tablet Take 1 tablet (80 mg total) by mouth 3 (three) times daily.    hydrOXYzine HCL (ATARAX) 25 MG tablet Take 1 tablet (25 mg total) by mouth every 6 to 8 hours as needed for Itching.    lactulose (CHRONULAC) 10 gram/15 mL solution Take 30 mLs by mouth every evening.    levothyroxine (SYNTHROID) 50 MCG tablet TAKE 1 TABLET(50 MCG) BY MOUTH BEFORE BREAKFAST    linaCLOtide (LINZESS) 72 mcg Cap capsule Take 2 capsules (144 mcg total) by mouth before breakfast.    pantoprazole (PROTONIX) 40 MG tablet Take 1 tablet (40 mg total) by mouth once daily. (Patient taking differently: Take 40 mg by mouth every evening.)    potassium chloride SA (K-DUR,KLOR-CON) 20 MEQ tablet Take 2 tablets (40 mEq total) by mouth once daily.    thiamine (VITAMIN B-1) 100 MG tablet Take 1 tablet (100 mg total) by mouth every evening.    vitamin A 23664 UNIT capsule Take 10,000 Units by mouth every evening.        Review of Systems   Constitutional:  Negative for activity change and appetite change.   HENT: Negative.     Eyes:  Negative for visual disturbance.   Respiratory:  Negative for shortness of breath.    Cardiovascular:  Negative for chest pain, palpitations and leg swelling.   Gastrointestinal:  Negative for abdominal distention, abdominal pain, diarrhea, nausea and vomiting.   Genitourinary:  Negative for difficulty urinating.   Musculoskeletal:  Negative for arthralgias, back pain and joint swelling.   Skin:  Positive for wound.   Allergic/Immunologic: Negative for immunocompromised state.   Neurological:  Negative for dizziness and facial asymmetry.   Hematological:  Negative for adenopathy. Does not bruise/bleed easily.    Psychiatric/Behavioral:  Negative for agitation and behavioral problems.    All other systems reviewed and are negative.  Objective:     Vital Signs (Most Recent):    Vital Signs (24h Range):           There is no height or weight on file to calculate BMI.    No intake or output data in the 24 hours ending 06/05/22 1633    Physical Exam  Vitals and nursing note reviewed.   Constitutional:       Appearance: She is well-developed.   HENT:      Head: Normocephalic.   Eyes:      Conjunctiva/sclera: Conjunctivae normal.   Cardiovascular:      Rate and Rhythm: Normal rate and regular rhythm.      Heart sounds: No murmur heard.  Pulmonary:      Effort: Pulmonary effort is normal.      Breath sounds: Normal breath sounds.   Abdominal:      General: Bowel sounds are normal. There is no distension.      Palpations: Abdomen is soft.      Tenderness: There is no abdominal tenderness.   Musculoskeletal:         General: Normal range of motion.      Cervical back: Normal range of motion.   Skin:     General: Skin is warm and dry.      Capillary Refill: Capillary refill takes less than 2 seconds.   Neurological:      Mental Status: She is alert and oriented to person, place, and time.   Psychiatric:         Behavior: Behavior normal.         Thought Content: Thought content normal.         Judgment: Judgment normal.       Laboratory:  CBC:   Recent Labs   Lab 05/30/22  0848 06/05/22  1550   WBC 7.51  --    RBC 3.46*  --    HGB 11.3* 11.2*   HCT 35.5* 33.1*    194   *  --    MCH 32.7*  --    MCHC 31.8*  --      BMP:   Recent Labs   Lab 05/30/22  0848 06/05/22  1550    78    144   K 3.3* 2.9*     --    CO2 27  --    BUN 23*  --    CREATININE 1.0  --    CALCIUM 9.3  --      CMP:   Recent Labs   Lab 05/30/22  0848 06/05/22  1550    78   CALCIUM 9.3  --    ALBUMIN 3.3* 3.2*   PROT 6.2  --     144   K 3.3* 2.9*   CO2 27  --      --    BUN 23*  --    CREATININE 1.0  --    ALKPHOS 202*   --    ALT 15  --    AST 29  --    BILITOT 0.7  --        Diagnostic Results:  XR Chest: Results for orders placed during the hospital encounter of 05/08/22    X-Ray Chest 1 View    Narrative  EXAMINATION:  XR CHEST 1 VIEW    CLINICAL HISTORY:  shortness of breath;    TECHNIQUE:  Single frontal view of the chest was performed.    COMPARISON:  02/28/2022    FINDINGS:  Bilateral breast augmentation.The lungs are clear, with normal appearance of pulmonary vasculature and no pleural effusion or pneumothorax.    The cardiac silhouette is normal in size. The hilar and mediastinal contours are unremarkable.    Bones are intact.    Impression  No acute abnormality.      Electronically signed by: Esvin Doyle  Date:    05/08/2022  Time:    15:18    Assessment/Plan:     Acute kidney injury superimposed on chronic kidney disease          The patient presents for liver transplant.  There are no apparent contraindications to proceeding with the planned transplant.  The patient understands that the transplant could potentially be cancelled pending detailed assessment of the donor organ.    MELD-Na score: 8 at 5/30/2022  8:48 AM  MELD score: 8 at 5/30/2022  8:48 AM  Calculated from:  Serum Creatinine: 1.0 mg/dL at 5/30/2022  8:48 AM  Serum Sodium: 142 mmol/L (Using max of 137 mmol/L) at 5/30/2022  8:48 AM  Total Bilirubin: 0.7 mg/dL (Using min of 1 mg/dL) at 5/30/2022  8:48 AM  INR(ratio): 1.2 at 5/30/2022  8:48 AM  Age: 52 years    She will receive IV steroids pulse induction.    A complete discussion of the transplant procedure, including risks, complications, and alternatives, as well as any donor-specific risk factors requiring specific disclosure, will be carried out by the responsible staff surgeon prior to the procedure.     Discharge Planning:  No Patient Care Coordination Note on file.      Kim Escobar, PRIYA  Liver Transplant  [facility] - Surgery (2nd Fl)

## 2022-06-05 NOTE — ANESTHESIA PROCEDURE NOTES
Intubation    Date/Time: 6/5/2022 3:27 PM  Performed by: Brayden Nieto CRNA  Authorized by: Caleb Salcido MD     Intubation:     Induction:  Intravenous    Intubated:  Postinduction    Mask Ventilation:  Easy mask    Attempts:  1    Attempted By:  CRNA    Method of Intubation:  Direct    Blade:  Hill 2    Laryngeal View Grade: Grade I - full view of cords      Difficult Airway Encountered?: No      Complications:  None    Airway Device:  Oral endotracheal tube    Airway Device Size:  7.5    Style/Cuff Inflation:  Cuffed (inflated to minimal occlusive pressure)    Inflation Amount (mL):  6    Tube secured:  21    Secured at:  The lips    Placement Verified By:  Capnometry    Complicating Factors:  None    Findings Post-Intubation:  BS equal bilateral and atraumatic/condition of teeth unchanged

## 2022-06-05 NOTE — PROGRESS NOTES
Arrived at OR with cart & computer.    RO already connected.  Turned on and RO leaking and conductivity alarm.  OR room used is 13.    Changed out RO.     Discussed at office visit today

## 2022-06-05 NOTE — SUBJECTIVE & OBJECTIVE
Past Medical History:   Diagnosis Date    ADD (attention deficit disorder)     Anxiety     Ascites of liver     Cirrhosis 2021    CKD (chronic kidney disease) stage 3, GFR 30-59 ml/min     Constipation     ETOH abuse     Hyperlipidemia     Hypothyroidism     Other ascites 2021    Pancreatitis, acute     Tobacco use     Vitamin D deficiency        Past Surgical History:   Procedure Laterality Date    AUGMENTATION OF BREAST      breast augmentation       SECTION      COLONOSCOPY N/A 2021    Procedure: COLONOSCOPY;  Surgeon: Dao Gray MD;  Location: [facility] (2ND FLR);  Service: Endoscopy;  Laterality: N/A;  COVID test 21 General surgery clinic [facility]    CYSTOSCOPY N/A 9/10/2021    Procedure: CYSTOSCOPY;  Surgeon: Rj Sánchez Jr., MD;  Location: [facility] OR 1ST FLR;  Service: Urology;  Laterality: N/A;    ESOPHAGOGASTRODUODENOSCOPY N/A 2021    Procedure: ESOPHAGOGASTRODUODENOSCOPY (EGD);  Surgeon: Dao Gray MD;  Location: [facility] (2ND FLR);  Service: Endoscopy;  Laterality: N/A;  labs current on     ESOPHAGOGASTRODUODENOSCOPY N/A 10/26/2021    Procedure: ESOPHAGOGASTRODUODENOSCOPY (EGD);  Surgeon: Dao Gray MD;  Location: [facility] (2ND FLR);  Service: Endoscopy;  Laterality: N/A;  to be done the week of 10/18/21 per Dr. Gray-BB  covid-10/23/21-[facility]-   10/25 arrival time confirmed with pt-rb    ESOPHAGOGASTRODUODENOSCOPY Left 2021    Procedure: EGD (ESOPHAGOGASTRODUODENOSCOPY);  Surgeon: Koffi Monteiro MD;  Location: [facility] (4TH FLR);  Service: Endoscopy;  Laterality: Left;  Rapid  pt requested AM appt and first available in December-  labs morning of procedure-   lvm to confirm appt-rb    HEMORRHOID SURGERY      PERITONEOCENTESIS Right 2021    Procedure: PARACENTESIS, ABDOMINAL;  Surgeon: Jay Torre MD;  Location: [facility] CATH LAB;  Service: Radiology;  Laterality: Right;    PERITONEOCENTESIS Right 2021    Procedure:  PARACENTESIS, ABDOMINAL;  Surgeon: Jay Torre MD;  Location: [facility] [place] CATH LAB;  Service: Radiology;  Laterality: Right;    PERITONEOCENTESIS Right 7/12/2021    Procedure: PARACENTESIS, ABDOMINAL;  Surgeon: Jay Torre MD;  Location: [facility] [place] CATH LAB;  Service: Radiology;  Laterality: Right;    PERITONEOCENTESIS Right 7/23/2021    Procedure: PARACENTESIS, ABDOMINAL;  Surgeon: Edward De La Torre II, MD;  Location: [facility] [place] CATH LAB;  Service: Interventional Radiology;  Laterality: Right;    PERITONEOCENTESIS Right 8/3/2021    Procedure: PARACENTESIS, ABDOMINAL;  Surgeon: Franco Cheung MD;  Location: [facility] [place] CATH LAB;  Service: Radiology;  Laterality: Right;    PERITONEOCENTESIS Right 8/16/2021    Procedure: PARACENTESIS, ABDOMINAL;  Surgeon: Jay Torre MD;  Location: [facility] [place] CATH LAB;  Service: Radiology;  Laterality: Right;    PERITONEOCENTESIS Right 8/23/2021    Procedure: PARACENTESIS, ABDOMINAL;  Surgeon: Jay Torre MD;  Location: [facility] [place] CATH LAB;  Service: Radiology;  Laterality: Right;    PERITONEOCENTESIS Right 9/16/2021    Procedure: PARACENTESIS, ABDOMINAL;  Surgeon: Jay Torre MD;  Location: [facility] [place] CATH LAB;  Service: Radiology;  Laterality: Right;    PERITONEOCENTESIS N/A 9/24/2021    Procedure: PARACENTESIS, ABDOMINAL;  Surgeon: Jay Torre MD;  Location: [facility] [place] CATH LAB;  Service: Radiology;  Laterality: N/A;    PERITONEOCENTESIS Right 12/23/2021    Procedure: PARACENTESIS, ABDOMINAL;  Surgeon: Jay Torre MD;  Location: [facility] [place] CATH LAB;  Service: Radiology;  Laterality: Right;    PERITONEOCENTESIS N/A 12/30/2021    Procedure: PARACENTESIS, ABDOMINAL;  Surgeon: Salas Cabrera MD;  Location: [facility] [place] CATH LAB;  Service: Radiology;  Laterality: N/A;    RETROGRADE PYELOGRAPHY Bilateral 9/10/2021    Procedure: PYELOGRAM, RETROGRADE;  Surgeon: Rj Sánchez Jr., MD;  Location: [address];  Service: Urology;  Laterality: Bilateral;    TONSILLECTOMY         Review of  patient's allergies indicates:  No Known Allergies    Family History       Problem Relation (Age of Onset)    ADD / ADHD Daughter    COPD Maternal Grandfather    Cancer Mother    Heart disease Maternal Grandmother, Paternal Grandmother    No Known Problems Father, Sister    Sleep apnea Daughter          Tobacco Use    Smoking status: Current Every Day Smoker     Packs/day: 0.25     Years: 27.00     Pack years: 6.75     Types: Cigarettes    Smokeless tobacco: Never Used    Tobacco comment: two cigarettes a day    Substance and Sexual Activity    Alcohol use: Not Currently     Comment: quit caridad 16    Drug use: No    Sexual activity: Yes     Partners: Male       PTA Medications   Medication Sig    allopurinoL (ZYLOPRIM) 100 MG tablet TAKE 1 TABLET(100 MG) BY MOUTH EVERY DAY    bisacodyL (DULCOLAX) 5 mg EC tablet Take 5 mg by mouth daily as needed for Constipation.    cefTRIAXone (ROCEPHIN) 2 g/50 mL PgBk IVPB Inject 50 mLs (2 g total) into the vein once daily. Stop 5/24/22 for 14 days    ciprofloxacin HCl (CIPRO) 250 MG tablet Take 1 tablet (250 mg total) by mouth every other day.    ergocalciferol, vitamin D2, (VITAMIN D ORAL) Take by mouth every evening.    folic acid (FOLVITE) 1 MG tablet Take 2 tablets (2 mg total) by mouth every evening.    furosemide (LASIX) 80 MG tablet Take 1 tablet (80 mg total) by mouth 3 (three) times daily.    hydrOXYzine HCL (ATARAX) 25 MG tablet Take 1 tablet (25 mg total) by mouth every 6 to 8 hours as needed for Itching.    lactulose (CHRONULAC) 10 gram/15 mL solution Take 30 mLs by mouth every evening.    levothyroxine (SYNTHROID) 50 MCG tablet TAKE 1 TABLET(50 MCG) BY MOUTH BEFORE BREAKFAST    linaCLOtide (LINZESS) 72 mcg Cap capsule Take 2 capsules (144 mcg total) by mouth before breakfast.    pantoprazole (PROTONIX) 40 MG tablet Take 1 tablet (40 mg total) by mouth once daily. (Patient taking differently: Take 40 mg by mouth every evening.)    potassium chloride SA (K-DUR,KLOR-CON)  20 MEQ tablet Take 2 tablets (40 mEq total) by mouth once daily.    thiamine (VITAMIN B-1) 100 MG tablet Take 1 tablet (100 mg total) by mouth every evening.    vitamin A 39969 UNIT capsule Take 10,000 Units by mouth every evening.        Review of Systems   Constitutional:  Negative for activity change and appetite change.   HENT: Negative.     Eyes:  Negative for visual disturbance.   Respiratory:  Negative for shortness of breath.    Cardiovascular:  Negative for chest pain, palpitations and leg swelling.   Gastrointestinal:  Negative for abdominal distention, abdominal pain, diarrhea, nausea and vomiting.   Genitourinary:  Negative for difficulty urinating.   Musculoskeletal:  Negative for arthralgias, back pain and joint swelling.   Skin:  Positive for wound.   Allergic/Immunologic: Negative for immunocompromised state.   Neurological:  Negative for dizziness and facial asymmetry.   Hematological:  Negative for adenopathy. Does not bruise/bleed easily.   Psychiatric/Behavioral:  Negative for agitation and behavioral problems.    All other systems reviewed and are negative.  Objective:     Vital Signs (Most Recent):    Vital Signs (24h Range):           There is no height or weight on file to calculate BMI.    No intake or output data in the 24 hours ending 06/05/22 1633    Physical Exam  Vitals and nursing note reviewed.   Constitutional:       Appearance: She is well-developed.   HENT:      Head: Normocephalic.   Eyes:      Conjunctiva/sclera: Conjunctivae normal.   Cardiovascular:      Rate and Rhythm: Normal rate and regular rhythm.      Heart sounds: No murmur heard.  Pulmonary:      Effort: Pulmonary effort is normal.      Breath sounds: Normal breath sounds.   Abdominal:      General: Bowel sounds are normal. There is no distension.      Palpations: Abdomen is soft.      Tenderness: There is no abdominal tenderness.   Musculoskeletal:         General: Normal range of motion.      Cervical back: Normal  range of motion.   Skin:     General: Skin is warm and dry.      Capillary Refill: Capillary refill takes less than 2 seconds.   Neurological:      Mental Status: She is alert and oriented to person, place, and time.   Psychiatric:         Behavior: Behavior normal.         Thought Content: Thought content normal.         Judgment: Judgment normal.       Laboratory:  CBC:   Recent Labs   Lab 05/30/22  0848 06/05/22  1550   WBC 7.51  --    RBC 3.46*  --    HGB 11.3* 11.2*   HCT 35.5* 33.1*    194   *  --    MCH 32.7*  --    MCHC 31.8*  --      BMP:   Recent Labs   Lab 05/30/22  0848 06/05/22  1550    78    144   K 3.3* 2.9*     --    CO2 27  --    BUN 23*  --    CREATININE 1.0  --    CALCIUM 9.3  --      CMP:   Recent Labs   Lab 05/30/22  0848 06/05/22  1550    78   CALCIUM 9.3  --    ALBUMIN 3.3* 3.2*   PROT 6.2  --     144   K 3.3* 2.9*   CO2 27  --      --    BUN 23*  --    CREATININE 1.0  --    ALKPHOS 202*  --    ALT 15  --    AST 29  --    BILITOT 0.7  --        Diagnostic Results:  XR Chest: Results for orders placed during the hospital encounter of 05/08/22    X-Ray Chest 1 View    Narrative  EXAMINATION:  XR CHEST 1 VIEW    CLINICAL HISTORY:  shortness of breath;    TECHNIQUE:  Single frontal view of the chest was performed.    COMPARISON:  02/28/2022    FINDINGS:  Bilateral breast augmentation.The lungs are clear, with normal appearance of pulmonary vasculature and no pleural effusion or pneumothorax.    The cardiac silhouette is normal in size. The hilar and mediastinal contours are unremarkable.    Bones are intact.    Impression  No acute abnormality.      Electronically signed by: Esvin Doyle  Date:    05/08/2022  Time:    15:18

## 2022-06-05 NOTE — ANESTHESIA PROCEDURE NOTES
Central Line    Diagnosis: esld  Patient location during procedure: done in OR    Staffing  Authorizing Provider: Caleb Salcido MD  Performing Provider: Caleb Salcido MD    Staffing  Performed: anesthesiologist   Anesthesiologist: Caleb Salcido MD  Anesthesiologist was present at the time of the procedure.  Preanesthetic Checklist  Completed: patient identified, IV checked, site marked, risks and benefits discussed, surgical consent, monitors and equipment checked, pre-op evaluation, timeout performed and anesthesia consent given  Indication   Indication: vascular access, hemodialysis     Anesthesia   general anesthesia    Central Line   Skin Prep: skin prepped with ChloraPrep, skin prep agent completely dried prior to procedure  Sterile Barriers Followed: Yes    All five maximal barriers used- gloves, gown, cap, mask, and large sterile sheet    hand hygiene performed prior to central venous catheter insertion  Location: right internal jugular.   Catheter type: dialysis  Catheter Size: 12 Fr  Inserted Catheter Length: 15 cm  Ultrasound: vascular probe with ultrasound   Vessel Caliber: medium, patent  Vascular Doppler:  not done, compressibility normal  Needle advanced into vessel with real time Ultrasound guidance.  Guidewire confirmed in vessel.  sterile gel and probe cover used in ultrasound-guided central venous catheter insertion   Manometry: Venous cannualation confirmed by visual estimation of blood vessel pressure using manometry.  Insertion Attempts: 1   Securement:chlorhexidine patch, line sutured, sterile dressing applied and blood return through all ports    Post-Procedure    Adverse Events:none      Guidewire Guidewire removed intact.

## 2022-06-05 NOTE — HPI
Ms. 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 Liver disease has been complicated by HE, ascites requiring paracentesis twice a week. She underwent a TIPS placement on 5/6/22. Admitted for streptococcus mitis/oralis. She completed a 14d course of ceftriaxone.  Now patient presents for a Liver transplant.  She reports in usual state of health- Patient denies any recent hospitalizations or ED visits.   The primary surgeon will be Dr. Slaughter.  Patient is NPO.  All pre-op labs and imaging have been ordered prior to surgery. Consents  signed prior to transplant.

## 2022-06-05 NOTE — OP NOTE
Operative Report    Date of Procedure: 6/5/2022    Surgeon: Dario Jernigan MD  First Assistant: Sisi Bourgeois MD    Pre-operative Diagnosis: Allograft liver for transplantation  Post-operative Diagnosis: Same    Procedure(s) Performed:   1. Back Table Preparation of Liver with needle biopsy.    Anesthesia: Not applicable  Estimated Blood Loss: Not applicable  Fluids Administered: Not applicable    Findings: Estimated steatosis 0-10%, normal vascular anatomy.  Drains: Not applicable  Specimens: Core biopsy of allograft liver      Preamble  Indications: This report describes only the backbench preparation of the liver prior to transplantation.  The transplant operation itself is described in a separate report.    ABO Confirmation: Immediately following arrival of the donor organ and prior to implantation, a formal ABO confirmation was done according to hospital and [employer] policies.  I confirmed the [employer] ID number of the donor organ and the donor and recipient ABO types, directly verifying these data by comparison with the [employer] [facility] report.  This confirmation was personally done by an attending surgeon and circulating nurse, and is officially documented elsewhere.    Time-Out: A complete time out was carried out prior to the procedure, with confirmation of patient identity, correct procedure, correct operative site, appropriate antibiotic prophylaxis, review of any known allergies, and presence of all needed equipment.    Procedure in Detail  The liver was recovered from the transport cooler and inspected for vascular anatomy and overall suitability for transplantation, with findings as noted above.  The remnant of the diaphragm was dissected away.  The phrenic veins and adrenal vein were identified and ligated or sutured as appropriate.  The portal vein and hepatic artery were mobilized toward the hilum of the liver, and extrahepatic branches were ligated.  No vascular reconstruction was required.  The vessels were  tested for leaks.  A needle biopsy was obtained for permanent section histology using a spring-loaded biopsy device. The liver was kept in ice temperature organ preservation solution until the time of implantation.

## 2022-06-05 NOTE — TELEPHONE ENCOUNTER
ON CALL NOTE    Received phone call from Lyndon Ace, , that patient has been released for [employer] ID# [id number].  Called patient to inform her, but no answer.  Message left on VM.  Will attempt to reach patient later.    -------------------------------------------------------------  @810am  Attempted to reach patient again.  No answer.  Left message on VM that she has been released as back-up for liver transplant.    -------------------------------------------------------------  @835am  Received phone call from  w/ patient on the line.  Informed patient that she has been released as back-up for this case, but remains back-up for a second case.  Understanding voiced.

## 2022-06-05 NOTE — SUBJECTIVE & OBJECTIVE
Follow-up For: Procedure(s) (LRB):  TRANSPLANT, LIVER (N/A)    Post-Operative Day: Day of Surgery     Past Medical History:   Diagnosis Date    ADD (attention deficit disorder)     Anxiety     Ascites of liver     Cirrhosis 2021    CKD (chronic kidney disease) stage 3, GFR 30-59 ml/min     Constipation     ETOH abuse     Hyperlipidemia     Hypothyroidism     Other ascites 2021    Pancreatitis, acute     Tobacco use     Vitamin D deficiency        Past Surgical History:   Procedure Laterality Date    AUGMENTATION OF BREAST      breast augmentation       SECTION      COLONOSCOPY N/A 2021    Procedure: COLONOSCOPY;  Surgeon: Dao Gray MD;  Location: [facility] (2ND FLR);  Service: Endoscopy;  Laterality: N/A;  COVID test 21 General surgery clinic -     CYSTOSCOPY N/A 9/10/2021    Procedure: CYSTOSCOPY;  Surgeon: Rj Sánchez Jr., MD;  [facility];  Service: Urology;  Laterality: N/A;    ESOPHAGOGASTRODUODENOSCOPY N/A 2021    Procedure: ESOPHAGOGASTRODUODENOSCOPY (EGD);  Surgeon: Dao Gray MD;  Location: [facility] (2ND FLR);  Service: Endoscopy;  Laterality: N/A;  labs current on     ESOPHAGOGASTRODUODENOSCOPY N/A 10/26/2021    Procedure: ESOPHAGOGASTRODUODENOSCOPY (EGD);  Surgeon: Dao Gray MD;  Location: [facility] (2ND FLR);  Service: Endoscopy;  Laterality: N/A;  to be done the week of 10/18/21 per Dr. Gray-  covid-10/23/21-[facility]-   10/25 arrival time confirmed with pt-rb    ESOPHAGOGASTRODUODENOSCOPY Left 2021    Procedure: EGD (ESOPHAGOGASTRODUODENOSCOPY);  Surgeon: Koffi Monteiro MD;  Location: [facility] (4TH FLR);  Service: Endoscopy;  Laterality: Left;  Rapid  pt requested AM appt and first available in December-  labs morning of procedure-   lvm to confirm appt-rb    HEMORRHOID SURGERY      PERITONEOCENTESIS Right 2021    Procedure: PARACENTESIS, ABDOMINAL;  Surgeon: Jay Torre MD;  Location: [facility]  CATH LAB;  Service: Radiology;  Laterality: Right;    PERITONEOCENTESIS Right 6/25/2021    Procedure: PARACENTESIS, ABDOMINAL;  Surgeon: Jay Torre MD;  Location: [facility] CATH LAB;  Service: Radiology;  Laterality: Right;    PERITONEOCENTESIS Right 7/12/2021    Procedure: PARACENTESIS, ABDOMINAL;  Surgeon: Jay Torre MD;  Location: [facility] CATH LAB;  Service: Radiology;  Laterality: Right;    PERITONEOCENTESIS Right 7/23/2021    Procedure: PARACENTESIS, ABDOMINAL;  Surgeon: Edward De La Torre II, MD;  Location: [facility] CATH LAB;  Service: Interventional Radiology;  Laterality: Right;    PERITONEOCENTESIS Right 8/3/2021    Procedure: PARACENTESIS, ABDOMINAL;  Surgeon: Franco Cheung MD;  Location: [facility] CATH LAB;  Service: Radiology;  Laterality: Right;    PERITONEOCENTESIS Right 8/16/2021    Procedure: PARACENTESIS, ABDOMINAL;  Surgeon: Jay Torre MD;  Location: [facility] CATH LAB;  Service: Radiology;  Laterality: Right;    PERITONEOCENTESIS Right 8/23/2021    Procedure: PARACENTESIS, ABDOMINAL;  Surgeon: Jay Torre MD;  Location: [facility] CATH LAB;  Service: Radiology;  Laterality: Right;    PERITONEOCENTESIS Right 9/16/2021    Procedure: PARACENTESIS, ABDOMINAL;  Surgeon: Jay Torre MD;  Location: [facility] CATH LAB;  Service: Radiology;  Laterality: Right;    PERITONEOCENTESIS N/A 9/24/2021    Procedure: PARACENTESIS, ABDOMINAL;  Surgeon: Jay Torre MD;  Location: [facility] CATH LAB;  Service: Radiology;  Laterality: N/A;    PERITONEOCENTESIS Right 12/23/2021    Procedure: PARACENTESIS, ABDOMINAL;  Surgeon: Jay Torre MD;  Location: [facility] CATH LAB;  Service: Radiology;  Laterality: Right;    PERITONEOCENTESIS N/A 12/30/2021    Procedure: PARACENTESIS, ABDOMINAL;  Surgeon: Salas Cabrera MD;  Location: [facility] CATH LAB;  Service: Radiology;  Laterality: N/A;    RETROGRADE PYELOGRAPHY Bilateral 9/10/2021    Procedure: PYELOGRAM, RETROGRADE;  Surgeon: Rj Sánchez Jr., MD;   Location: [facility] OR [address] [place] [address];  Service: Urology;  Laterality: Bilateral;    TONSILLECTOMY         Review of patient's allergies indicates:  No Known Allergies    Family History       Problem Relation (Age of Onset)    ADD / ADHD Daughter    COPD Maternal Grandfather    Cancer Mother    Heart disease Maternal Grandmother, Paternal Grandmother    No Known Problems Father, Sister    Sleep apnea Daughter          Tobacco Use    Smoking status: Current Every Day Smoker     Packs/day: 0.25     Years: 27.00     Pack years: 6.75     Types: Cigarettes    Smokeless tobacco: Never Used    Tobacco comment: two cigarettes a day    Substance and Sexual Activity    Alcohol use: Not Currently     Comment: quit caridad 15    Drug use: No    Sexual activity: Yes     Partners: Male      Review of Systems   Unable to perform ROS: Other -- lethargic from anesthetic  Objective:     Vital Signs (Most Recent):  Temp: 98.8 °F (37.1 °C) (06/05/22 2215)  Pulse: 109 (06/05/22 2215)  Resp: 18 (06/05/22 2215)  SpO2: 100 % (06/05/22 2215) Vital Signs (24h Range):  Temp:  [98.8 °F (37.1 °C)] 98.8 °F (37.1 °C)  Pulse:  [109] 109  Resp:  [18] 18  SpO2:  [100 %] 100 %  Arterial Line BP: (121-123)/(58-63) 121/58        There is no height or weight on file to calculate BMI.      Intake/Output Summary (Last 24 hours) at 6/5/2022 2249  Last data filed at 6/5/2022 2133  Gross per 24 hour   Intake 6000 ml   Output 1249 ml   Net 4751 ml       Physical Exam  Vitals reviewed.   Constitutional:       Comments: Lethargic, arousable   HENT:      Head: Normocephalic and atraumatic.      Nose: Nose normal.      Mouth/Throat:      Mouth: Mucous membranes are moist.      Comments: Simple face mask  Neck:      Comments: RIJ introducer  Cardiovascular:      Rate and Rhythm: Normal rate and regular rhythm.      Pulses: Normal pulses.   Pulmonary:      Effort: Pulmonary effort is normal. No respiratory distress.   Abdominal:      Comments: Chevron incision with island  dressing in place   JENSEN drains in place with serosanguinous output  Abdomen soft, non-distended   Genitourinary:     Comments: armenta  Musculoskeletal:      Cervical back: Normal range of motion.      Right lower leg: Edema present.      Left lower leg: Edema present.      Comments: R femoral arterial, venous catheters   Skin:     General: Skin is warm and dry.       Vents:       Lines/Drains/Airways       Central Venous Catheter Line  Duration                  Hemodialysis Catheter 06/05/22 1713 <1 day    Percutaneous Central Line Insertion/Assessment - Double Lumen  06/05/22 1708 right femoral vein <1 day    Pulmonary Artery Catheter Assessment  06/05/22 1716 <1 day              Drain  Duration                  Closed/Suction Drain 06/05/22 2109 Right Abdomen Bulb 19 Fr. <1 day         Closed/Suction Drain 06/05/22 2110 Right Abdomen Bulb 19 Fr. <1 day         Urethral Catheter 06/05/22 1619 Straight-tip;Non-latex 16 Fr. <1 day              Arterial Line  Duration             Arterial Line 06/05/22 1540 Left Radial <1 day    Arterial Line 06/05/22 1705 <1 day              Peripheral Intravenous Line  Duration                  Peripheral IV - Single Lumen 05/09/22 1922 22 G Anterior;Right Forearm 27 days         Peripheral IV - Single Lumen 06/05/22 1532 18 G Right Hand <1 day         PILI 06/05/22 1600 Left Antecubital <1 day                    Significant Labs:    CBC/Anemia Profile:  Recent Labs   Lab 06/05/22 1628 06/05/22 1956 06/05/22 2214 06/05/22 2216 06/05/22 2224   WBC 8.34  --   --  18.00*  --    HGB 11.0* 9.3*  --  11.0*  --    HCT 34.8* 29.2* 32* 32.6* 36    164  --  140*  --    *  --   --  97  --    RDW 12.8  --   --  13.7  --         Chemistries:  Recent Labs   Lab 06/05/22 1628 06/05/22  1635 06/05/22 1956    146* 141   K 3.0* 3.0* 3.6    111*  --    CO2 22* 21*  --    BUN 34* 33*  --    CREATININE 1.0 1.1  --    CALCIUM 9.2 9.4  --    ALBUMIN 3.3* 3.4* 2.2*   PROT 6.2   --   --    BILITOT 1.2*  --   --    ALKPHOS 205*  --   --    ALT 11  --  228*   AST 25  --  568*   MG 2.1 2.1 1.9   PHOS  --  4.3  --        All pertinent labs within the past 24 hours have been reviewed.    Significant Imaging: I have reviewed all pertinent imaging results/findings within the past 24 hours.

## 2022-06-05 NOTE — CONSULTS
51 yo F with h/o etOH alcoholinduced cirrhosis admitted for elective Liver transplant.    Patient is in pre-admission currently.   Nephrology consulted for intra-operative CRRT.    Orders for CRRT placed.   CRRT to be managed by anesthesia during the operation.       Edson Bunn MD.  Nephrology fellow

## 2022-06-05 NOTE — TELEPHONE ENCOUNTER
DONOR WITH PHS RISK CRITERIA AND HEPATITIS C POSITIVE     @1140a  Notified by Lyndon Das, , of liver offer with donor information.  UNOS Donor ID [id number]. Donor and recipient information read back and verified.  Spoke with Malu Montes and identified no acute medical issues during telephone assessment.  Patient now backup for two cases and already given instructions for NPO for prior case.    Patient verbalized understanding that he/she has been called as backup recipient.    The donor's reported social history includes PHS risk criteria.  ELY DETERMINED TO BE hepatitis c antibody positive / JACOBO negative or   IF DONOR IS DEFINITELY DETERMINED TO BE HEPATITIS C ANTIBODY POSITIVE / JACOBO NEGATIVE OR POSITIVE IN ADDITION TO THE PHS RISK CRITERIA:    This donor is hepatitis c antibody positive and JACOBO negative.          The risk of getting HIV or other hepatitis viruses from this donor is very small compared to the risk of dying while waiting for another liver. The risk of clinical illness from any transmitted infection is also small due to the availability of highly effective oral drugs for all three of these viruses. The risk of clinical illness from any transmitted infection is much less than this due to the availability of highly effective oral drugs for all three of these viruses. While the patient has the right to decline any organ for any reason, available scientific data do not support turning down an organ based on Hepatitis C or behavioral risk factors as the risks associated with waiting longer for the transplant are many times greater. Informed patient that Dr. Slaughter thinks that this is a good liver for the patient.    Patient was asked if they have had a positive COVID-19 test or if they have any signs or symptoms. Informed patient that they will be tested for COVID-19 upon arrival to the hospital, unless they have a previous positive result. If tested and result is positive, the  "transplant will not be able to occur, they will be inactivated on the wait list for 28 days per protocol and required to quarantine.     Patient was informed that if she turned down the offer A transplant doctor will call you after we hang up".  Patient was also informed that if she turned down this donor, she would not be punished or removed from the transplant list, that she would remain on the list at her current status/MELD score. However, by waiting on the list longer rather than receiving this transplant, she will face a greater risk of death than any risk from the slight possibility of transmitted infection.      Patient was also informed that she would have the opportunity to see and talk to the surgeon when she got to the hospital and before she went down to the operating room.    Patient understands risk and agrees to proceed with transplant.  Patient voiced understanding that she is now backup for two cases.    -------------------------------------------------------------------    @8am  Received phone call from  that patient will need to come on campus for this back up offer at room time for surgery (1030am).  Patient notified.  Understanding voiced.    ---------------------------------------------------------------    @108p  Received phone call from CATHY Terry, , that patient needs to be admitted.  She remains backup, but the likelihood for her becoming primary has increased.    Patient, as well as SUSIE, notified.  Reservation requested (after 5 attempts to reach staff).    ---------------------------------------------------------------  @  135p  Received phone call from CATHY Terry , that patient is now PRIMARY for liver transplant.  All appropriate staff notified.    "

## 2022-06-05 NOTE — ANESTHESIA PROCEDURE NOTES
Arterial    Diagnosis: Organ transplant candidate [Z76.82]    Patient location during procedure: done in OR  Procedure start time: 6/5/2022 3:40 PM  Timeout: 6/5/2022 3:39 PM  Procedure end time: 6/5/2022 3:42 PM    Staffing  Authorizing Provider: Caleb Salicdo MD  Performing Provider: Brayden Nieto CRNA    Anesthesiologist was present at the time of the procedure.    Preanesthetic Checklist  Completed: patient identified, IV checked, site marked, risks and benefits discussed, surgical consent, monitors and equipment checked, pre-op evaluation, timeout performed and anesthesia consent givenArterial  Skin Prep: chlorhexidine gluconate  Local Infiltration: none  Orientation: left  Location: radial    Catheter Size: 20 G  Catheter placement by Anatomical landmarks. Heme positive aspiration all ports. Insertion Attempts: 1  Assessment  Dressing: secured with tape and tegaderm  Patient: Tolerated well

## 2022-06-05 NOTE — ANESTHESIA PROCEDURE NOTES
Central Line    Diagnosis: esld  Patient location during procedure: done in OR    Staffing  Authorizing Provider: Caleb Salcido MD  Performing Provider: Caleb Salcido MD    Staffing  Performed: anesthesiologist   Anesthesiologist: Caleb Salcido MD  Anesthesiologist was present at the time of the procedure.  Preanesthetic Checklist  Completed: patient identified, IV checked, site marked, risks and benefits discussed, surgical consent, monitors and equipment checked, pre-op evaluation, timeout performed and anesthesia consent given  Indication   Indication: hemodialysis     Anesthesia   general anesthesia    Central Line   Skin Prep: skin prepped with ChloraPrep, skin prep agent completely dried prior to procedure  Sterile Barriers Followed: Yes    All five maximal barriers used- gloves, gown, cap, mask, and large sterile sheet    hand hygiene performed prior to central venous catheter insertion  Location: right femoral vein.   Catheter type: double lumen  Catheter Size: 12 Fr  Ultrasound: vascular probe with ultrasound   Vessel Caliber: small, patent  Vascular Doppler:  not done  Needle advanced into vessel with real time Ultrasound guidance.  Guidewire confirmed in vessel.  sterile gel and probe cover used in ultrasound-guided central venous catheter insertion   Manometry: Venous cannualation confirmed by visual estimation of blood vessel pressure using manometry.  Insertion Attempts: 1   Securement:line sutured, chlorhexidine patch, sterile dressing applied and blood return through all ports    Post-Procedure        Guidewire Guidewire removed intact.

## 2022-06-05 NOTE — ANESTHESIA PROCEDURE NOTES
[place] [facility]    Diagnosis: esld  Patient location during procedure: done in OR    Staffing  Authorizing Provider: Caleb Salcido MD  Performing Provider: Caleb Salcido MD    Anesthesiologist was present at the time of the procedure.  Preanesthetic Checklist  Completed: patient identified, IV checked, site marked, risks and benefits discussed, surgical consent, monitors and equipment checked, pre-op evaluation, timeout performed and anesthesia consent given  [place] [facility]  Skin Prep: chlorhexidine gluconate and isopropyl alcohol  Location: right,  internal jugular vein  Vessel Caliber: medium, patent, compressibility normal  Vascular Doppler:  not done  Introducer: 9 Fr single lumen, manometry used.  Device: CCO/Oximetric Catheter   Catheter Size: 8 Fr  Catheter placement by yes. Heme positive aspiration all ports.Sterile sheath usedInsertion Attempts: 1  Indication: hemodynamic monitoring  Ultrasound Guidance  Needle advanced into vessel with real time Ultrasound guidance.  Guidewire confirmed in vessel.  Sterile sheath used.  Assessment  Central Line Bundle Protocol followed. Hand hygiene before procedure, surgical cap worn, surgical mask worn, sterile surgical gloves worn, large sterile drape used.  Verification: ultrasound, blood return and pulsatile blood flow  Dressing: sutured in place and taped and tegaderm  Patient: Tolerated Well

## 2022-06-05 NOTE — ASSESSMENT & PLAN NOTE
Malu Montes is a 52yoF with a history of alcoholic cirrhosis s/p TIPS procedure 5/2022 who underwent liver transplant on 6/5/22. She is admitted to the SICU post-operatively.     Neuro:  - off sedation  - dilaudid for pain  - PO oxycodone when more alert    CV:  - MAP goal > 65  - Drips: off vasopressor support    Pulm:  - extubated on arrival  - ABG within normal limits  - wean supplemental O2 as tolerated  - DuoNebs    FEN/GI:  - NPO  - ok for sips, clears when more alert  - Trend CMP  - Lyte replacement prn    Renal:  - Thomas in place  - Monitor UOP  - Trend BUN/Cr  - received intra-operative dialysis  - made 1.2L urine output intra-operatively    Heme/Onc:  - Received a total of 1u pRBC intra-operatively  - stable H/H post-operatively  - Trend H/H with daily labs  - JENSEN drains with serosanguinous output  - DVT ppx    ID:  - AF  - Trend WBC  - Immunosuppressives: methylprednisolone, tacrolimus, mycophenolate  - Ppx: unasyn, bactrim, valganciclovir    Endo:  - Insulin gtt    PPx:  - Protonix, TEDs/SCDs    Dispo: continue ICU care

## 2022-06-05 NOTE — ANESTHESIA PREPROCEDURE EVALUATION
[facility]  Anesthesia Pre-Operative Evaluation         Patient Name: Malu Montes  YOB: 1970  MRN: [id number]    SUBJECTIVE:     Pre-operative evaluation for Procedure(s) (LRB):  TRANSPLANT, LIVER (N/A)     06/05/2022    Malu Montes is a 52 y.o. female w/ a significant PMHx of tobacco abuse and decompensated alcohol-induced cirrhosis with portal hypertension listed for liver-kidney transplant. She is s/p TIPS on 5/6/22.     Last ate at 10 PM evening prior. Patient now presents for the above procedure(s).     MELD-Na score: 8 at 5/30/2022  8:48 AM  MELD score: 8 at 5/30/2022  8:48 AM  Calculated from:  Serum Creatinine: 1.0 mg/dL at 5/30/2022  8:48 AM  Serum Sodium: 142 mmol/L (Using max of 137 mmol/L) at 5/30/2022  8:48 AM  Total Bilirubin: 0.7 mg/dL (Using min of 1 mg/dL) at 5/30/2022  8:48 AM  INR(ratio): 1.2 at 5/30/2022  8:48 AM  Age: 52 years      Bubble Study Summary 5/8/22:  · There are bubbles present 3 beats from LA opacification suggestive intracardiac shunt.     Stress Echo Summary 7/29/21:  · The patient reached the end of the protocol.  · The left ventricle is normal in size with normal systolic function.  · The estimated ejection fraction is 58%.  · Normal left ventricular diastolic function.  · Normal right ventricular size with normal right ventricular systolic function.  · There is mild aortic valve stenosis.  · Aortic valve area is 1.68 cm2; peak velocity is 1.63 m/s; mean gradient is 6 mmHg.  · Normal central venous pressure (3 mmHg).  · The estimated PA systolic pressure is 27 mmHg.  · Posterior pericardial effusion.  · There is evidence of ascites present.  · The ECG portion of this study is negative for myocardial ischemia.  · During stress, the following significant arrhythmias were observed: rare PVCs.  · The stress echo portion of this study is negative for myocardial ischemia.      Prev airway:   Date/Time: 5/6/2022 5:04 PM  Performed by:  Niyah Fernandez CRNA  Authorized by: Aakash Cooper MD      Intubation:     Induction:  Intravenous    Intubated:  Postinduction    Mask Ventilation:  N/a    Attempts:  1    Attempted By:  CRNA    Method of Intubation:  Direct    Blade:  Carrera 3    Laryngeal View Grade: Grade I - full view of cords      Difficult Airway Encountered?: No      Complications:  None    Airway Device:  Oral endotracheal tube    Airway Device Size:  7.0    Style/Cuff Inflation:  Cuffed    Tube secured:  22    Secured at:  The lips    Placement Verified By:  Capnometry    Complicating Factors:  None    Findings Post-Intubation:  BS equal bilateral and atraumatic/condition of teeth unchanged    LDA:        Peripheral IV - Single Lumen 05/09/22 1922 22 G Anterior;Right Forearm (Active)   Site Assessment Clean;Dry;Intact 05/10/22 0855   Number of days: 26       Drips:    sodium chloride 0.9%         Patient Active Problem List   Diagnosis    Anal fissure    Internal hemorrhoids with other complication    Ptosis of breast    Impaired fasting glucose    Vitamin D deficiency    Nicotine use disorder    Ketoacidosis    Alcohol-induced acute pancreatitis    Folate deficiency    Alcohol use disorder, severe, in early remission    Hypokalemia    Portal hypertension    Hypothyroidism    CKD (chronic kidney disease) stage 3, GFR 30-59 ml/min    Alcohol induced fatty liver    SBP (spontaneous bacterial peritonitis)    Microscopic hematuria    Alcoholic cirrhosis of liver with ascites    Secondary esophageal varices    Hyperlipidemia    Anemia of chronic disease    IgA nephropathy associated with liver disease    Acute kidney injury superimposed on chronic kidney disease    Chronic low blood pressure    Iron deficiency anemia    CKD (chronic kidney disease) stage 4, GFR 15-29 ml/min    Metabolic acidosis    Secondary hyperparathyroidism of renal origin    Hyperuricemia    S/P abdominal paracentesis    Chronic  constipation    Infection due to Streptococcus mitis group       Review of patient's allergies indicates:  No Known Allergies    Current Inpatient Medications:      No current facility-administered medications on file prior to encounter.     Current Outpatient Medications on File Prior to Encounter   Medication Sig Dispense Refill    allopurinoL (ZYLOPRIM) 100 MG tablet TAKE 1 TABLET(100 MG) BY MOUTH EVERY DAY 90 tablet 0    bisacodyL (DULCOLAX) 5 mg EC tablet Take 5 mg by mouth daily as needed for Constipation.      cefTRIAXone (ROCEPHIN) 2 g/50 mL PgBk IVPB Inject 50 mLs (2 g total) into the vein once daily. Stop 5/24/22 for 14 days      ciprofloxacin HCl (CIPRO) 250 MG tablet Take 1 tablet (250 mg total) by mouth every other day. 90 tablet 0    ergocalciferol, vitamin D2, (VITAMIN D ORAL) Take by mouth every evening.      folic acid (FOLVITE) 1 MG tablet Take 2 tablets (2 mg total) by mouth every evening. 60 tablet 5    furosemide (LASIX) 80 MG tablet Take 1 tablet (80 mg total) by mouth 3 (three) times daily. 270 tablet 1    hydrOXYzine HCL (ATARAX) 25 MG tablet Take 1 tablet (25 mg total) by mouth every 6 to 8 hours as needed for Itching. 30 tablet 2    lactulose (CHRONULAC) 10 gram/15 mL solution Take 30 mLs by mouth every evening.      levothyroxine (SYNTHROID) 50 MCG tablet TAKE 1 TABLET(50 MCG) BY MOUTH BEFORE BREAKFAST 90 tablet 3    linaCLOtide (LINZESS) 72 mcg Cap capsule Take 2 capsules (144 mcg total) by mouth before breakfast. 180 capsule 5    pantoprazole (PROTONIX) 40 MG tablet Take 1 tablet (40 mg total) by mouth once daily. (Patient taking differently: Take 40 mg by mouth every evening.) 30 tablet 11    potassium chloride SA (K-DUR,KLOR-CON) 20 MEQ tablet Take 2 tablets (40 mEq total) by mouth once daily. 60 tablet 5    thiamine (VITAMIN B-1) 100 MG tablet Take 1 tablet (100 mg total) by mouth every evening. 30 tablet 5    vitamin A 81877 UNIT capsule Take 10,000 Units by mouth  every evening.          Past Surgical History:   Procedure Laterality Date    AUGMENTATION OF BREAST      breast augmentation       SECTION      COLONOSCOPY N/A 2021    Procedure: COLONOSCOPY;  Surgeon: Dao Gray MD;  Location: [facility] ENDO (2ND FLR);  Service: Endoscopy;  Laterality: N/A;  COVID test 21 General surgery clinic [facility]    CYSTOSCOPY N/A 9/10/2021    Procedure: CYSTOSCOPY;  Surgeon: Rj Sánchez Jr., MD;  Location: [facility] OR 1ST FLR;  Service: Urology;  Laterality: N/A;    ESOPHAGOGASTRODUODENOSCOPY N/A 2021    Procedure: ESOPHAGOGASTRODUODENOSCOPY (EGD);  Surgeon: Dao Gray MD;  Location: [facility] (2ND FLR);  Service: Endoscopy;  Laterality: N/A;  labs current on     ESOPHAGOGASTRODUODENOSCOPY N/A 10/26/2021    Procedure: ESOPHAGOGASTRODUODENOSCOPY (EGD);  Surgeon: Dao Gray MD;  Location: [facility] (2ND FLR);  Service: Endoscopy;  Laterality: N/A;  to be done the week of 10/18/21 per Dr. Gray-  covid-10/23/21-[facility]-   10/25 arrival time confirmed with pt-rb    ESOPHAGOGASTRODUODENOSCOPY Left 2021    Procedure: EGD (ESOPHAGOGASTRODUODENOSCOPY);  Surgeon: Koffi Monteiro MD;  Location: [facility] (4TH FLR);  Service: Endoscopy;  Laterality: Left;  Rapid  pt requested AM appt and first available in December-  labs morning of procedure-   lvm to confirm appt-rb    HEMORRHOID SURGERY      PERITONEOCENTESIS Right 2021    Procedure: PARACENTESIS, ABDOMINAL;  Surgeon: Jay Torre MD;  Location: [facility] CATH LAB;  Service: Radiology;  Laterality: Right;    PERITONEOCENTESIS Right 2021    Procedure: PARACENTESIS, ABDOMINAL;  Surgeon: Jay Torre MD;  Location: [facility] CATH LAB;  Service: Radiology;  Laterality: Right;    PERITONEOCENTESIS Right 2021    Procedure: PARACENTESIS, ABDOMINAL;  Surgeon: Jay Torre MD;  Location: [facility] CATH LAB;  Service: Radiology;  Laterality: Right;     PERITONEOCENTESIS Right 7/23/2021    Procedure: PARACENTESIS, ABDOMINAL;  Surgeon: Edward De La Torre II, MD;  Location: [facility] CATH LAB;  Service: Interventional Radiology;  Laterality: Right;    PERITONEOCENTESIS Right 8/3/2021    Procedure: PARACENTESIS, ABDOMINAL;  Surgeon: Franco Cheung MD;  Location: [facility] CATH LAB;  Service: Radiology;  Laterality: Right;    PERITONEOCENTESIS Right 8/16/2021    Procedure: PARACENTESIS, ABDOMINAL;  Surgeon: Jay Torre MD;  Location: [facility] CATH LAB;  Service: Radiology;  Laterality: Right;    PERITONEOCENTESIS Right 8/23/2021    Procedure: PARACENTESIS, ABDOMINAL;  Surgeon: Jay Torre MD;  Location: [facility] CATH LAB;  Service: Radiology;  Laterality: Right;    PERITONEOCENTESIS Right 9/16/2021    Procedure: PARACENTESIS, ABDOMINAL;  Surgeon: Jay Torre MD;  Location: [facility] CATH LAB;  Service: Radiology;  Laterality: Right;    PERITONEOCENTESIS N/A 9/24/2021    Procedure: PARACENTESIS, ABDOMINAL;  Surgeon: Jay Torre MD;  Location: [facility] CATH LAB;  Service: Radiology;  Laterality: N/A;    PERITONEOCENTESIS Right 12/23/2021    Procedure: PARACENTESIS, ABDOMINAL;  Surgeon: Jay Torre MD;  Location: [facility] CATH LAB;  Service: Radiology;  Laterality: Right;    PERITONEOCENTESIS N/A 12/30/2021    Procedure: PARACENTESIS, ABDOMINAL;  Surgeon: Salas Cabrera MD;  Location: [facility] CATH LAB;  Service: Radiology;  Laterality: N/A;    RETROGRADE PYELOGRAPHY Bilateral 9/10/2021    Procedure: PYELOGRAM, RETROGRADE;  Surgeon: Rj Sánchez Jr., MD;  Location: [address];  Service: Urology;  Laterality: Bilateral;    TONSILLECTOMY         Social History:  Tobacco Use: High Risk    Smoking Tobacco Use: Current Every Day Smoker    Smokeless Tobacco Use: Never Used      Alcohol Use: Not At Risk    Frequency of Alcohol Consumption: Never    Average Number of Drinks: Patient refused    Frequency of Binge Drinking: Never        OBJECTIVE:      Vital Signs Range (Last 24H):         Significant Labs:  Lab Results   Component Value Date    WBC 7.51 05/30/2022    HGB 11.3 (L) 05/30/2022    HCT 35.5 (L) 05/30/2022     05/30/2022    CHOL 146 12/06/2021    TRIG 68 12/06/2021    HDL 32 (L) 12/06/2021    ALT 15 05/30/2022    AST 29 05/30/2022     05/30/2022    K 3.3 (L) 05/30/2022     05/30/2022    CREATININE 1.0 05/30/2022    BUN 23 (H) 05/30/2022    CO2 27 05/30/2022    TSH 2.550 05/06/2022    INR 1.2 05/30/2022    HGBA1C 4.6 07/15/2021       Diagnostic Studies: No relevant studies.    EKG:   Results for orders placed or performed during the hospital encounter of 05/08/22   EKG 12-lead    Collection Time: 05/08/22  4:39 PM    Narrative    Test Reason : R06.02,    Vent. Rate : 104 BPM     Atrial Rate : 104 BPM     P-R Int : 126 ms          QRS Dur : 072 ms      QT Int : 362 ms       P-R-T Axes : 072 071 063 degrees     QTc Int : 476 ms    Sinus tachycardia  Otherwise normal ECG  When compared with ECG of 28-FEB-2022 15:37,  Criteria for Septal infarct are no longer Present  Confirmed by Kiko Agrawal MD ([id number]) on 5/9/2022 10:18:06 AM    Referred By: [email or web address]   SELF           Confirmed By:Kiko Agrawal MD       2D ECHO:  TTE:  Results for orders placed or performed during the hospital encounter of 05/08/22   Echo Saline Bubble? Yes   Result Value Ref Range    BSA 1.69 m2    Narrative    · There are bubbles present 3 beats from LA opacification suggestive   intracardiac shunt.          SHILO:  No results found for this or any previous visit.    ASSESSMENT/PLAN:           Pre-op Assessment    I have reviewed the Patient Summary Reports.     I have reviewed the Nursing Notes. I have reviewed the NPO Status.   I have reviewed the Medications.     Review of Systems  Anesthesia Hx:  No problems with previous Anesthesia  Denies Family Hx of Anesthesia complications.   Denies Personal Hx of Anesthesia complications.   Hematology/Oncology:  Hematology  Normal   Oncology Normal     EENT/Dental:EENT/Dental Normal   Cardiovascular:  Cardiovascular Normal     Pulmonary:  Pulmonary Normal    Renal/:   Chronic Renal Disease    Hepatic/GI:   Liver Disease,    Musculoskeletal:  Musculoskeletal Normal    Neurological:  Neurology Normal    Endocrine:   Hypothyroidism    Dermatological:  Skin Normal    Psych:  Psychiatric Normal           Physical Exam  General: Well nourished    Airway:  Mallampati: II   Mouth Opening: Normal  TM Distance: Normal  Tongue: Normal  Neck ROM: Normal ROM    Dental:  Intact    Chest/Lungs:  Clear to auscultation, Normal Respiratory Rate    Heart:  Rate: Normal  Rhythm: Regular Rhythm  Sounds: Normal    Abdomen:  Normal, Nontender        Anesthesia Plan  Type of Anesthesia, risks & benefits discussed:    Anesthesia Type: Gen ETT  Intra-op Monitoring Plan: Standard ASA Monitors, Art Line and Central Line  Post Op Pain Control Plan: multimodal analgesia and IV/PO Opioids PRN  Induction:  IV  Airway Plan: Direct, Post-Induction  Informed Consent: Informed consent signed with the Patient and all parties understand the risks and agree with anesthesia plan.  All questions answered. Patient consented to blood products? Yes  ASA Score: 4 Emergent  Day of Surgery Review of History & Physical: H&P Update referred to the surgeon/provider.    Ready For Surgery From Anesthesia Perspective.     .

## 2022-06-05 NOTE — OP NOTE
Certification of Assistant at Surgery       Surgery Date: 6/5/2022     Participating Surgeons:  Surgeon(s) and Role:     * Franco Slaughter MD - Primary     * Dario Jernigan MD - Assisting     * Sisi Bourgeois MD - Fellow    Procedures:  Procedure(s) (LRB):  TRANSPLANT, LIVER (N/A)    Assistant Surgeon's Certification of Necessity:  I understand that section 1842 (b) (6) (d) of the Social Security Act generally prohibits Medicare Part B reasonable charge payment for the services of assistants at surgery in teaching hospitals when qualified residents are available to furnish such services. I certify that the services for which payment is claimed were medically necessary, and that no qualified resident was available to perform the services. I further understand that these services are subject to post-payment review by the Medicare carrier.      Dario Jernigan MD    06/05/2022  6:52 PM

## 2022-06-05 NOTE — ANESTHESIA PROCEDURE NOTES
Arterial    Diagnosis: esld    Patient location during procedure: done in OR    Staffing  Authorizing Provider: Caleb Salcido MD  Performing Provider: Caleb Salcido MD    Anesthesiologist was present at the time of the procedure.    Preanesthetic Checklist  Completed: patient identified, IV checked, site marked, risks and benefits discussed, surgical consent, monitors and equipment checked, pre-op evaluation, timeout performed and anesthesia consent givenArterial  Skin Prep: chlorhexidine gluconate and isopropyl alcohol  Local Infiltration: none    Catheter Size: 20 G  Catheter placement by Ultrasound guidance. Heme positive aspiration all ports.   Vessel Caliber: small, patent, compressibility normal  Vascular Doppler:  not done  Needle advanced into vessel with real time Ultrasound guidance.  Guidewire confirmed in vessel.  Sterile sheath used.Insertion Attempts: 1  Assessment  Dressing: sutured in place and taped and tegaderm  Patient: Tolerated well

## 2022-06-05 NOTE — HPI
Ms. 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 Liver disease has been complicated by HE, ascites requiring paracentesis twice a week. Status post TIPS procedure 5/6/22. She presented to the hospital on 6/5 for elective DCD orthotopic whole liver transplant. The case proceeded without difficulty. Intra-operatively, the patient received 5L crystalloid, 1u pRBCs. She had an estimated 1L blood loss. She underwent intra-operative dialysis and had 1.2L UOP intra-operatively. She arrives to the unit extubated, sedated off vasopressor support. JENSEN drains in place with serosanguinous output. She is admitted to the SICU for post-operative care.

## 2022-06-06 PROBLEM — R73.9 STEROID-INDUCED HYPERGLYCEMIA: Status: ACTIVE | Noted: 2022-06-06

## 2022-06-06 PROBLEM — T38.0X5A STEROID-INDUCED HYPERGLYCEMIA: Status: ACTIVE | Noted: 2022-06-06

## 2022-06-06 PROBLEM — Z29.89 PROPHYLACTIC IMMUNOTHERAPY: Status: ACTIVE | Noted: 2022-06-06

## 2022-06-06 PROBLEM — T38.0X5A ADRENAL CORTICAL STEROIDS CAUSING ADVERSE EFFECT IN THERAPEUTIC USE: Status: ACTIVE | Noted: 2022-06-06

## 2022-06-06 PROBLEM — Z94.4 S/P LIVER TRANSPLANT: Status: ACTIVE | Noted: 2022-06-06

## 2022-06-06 LAB
ALBUMIN SERPL BCP-MCNC: 3.5 G/DL (ref 3.5–5.2)
ALBUMIN SERPL BCP-MCNC: 3.5 G/DL (ref 3.5–5.2)
ALLENS TEST: ABNORMAL
ALLENS TEST: NORMAL
ALP SERPL-CCNC: 115 U/L (ref 55–135)
ALT SERPL W/O P-5'-P-CCNC: 199 U/L (ref 10–44)
ANION GAP SERPL CALC-SCNC: 10 MMOL/L (ref 8–16)
ANION GAP SERPL CALC-SCNC: 11 MMOL/L (ref 8–16)
ANION GAP SERPL CALC-SCNC: 12 MMOL/L (ref 8–16)
ANISOCYTOSIS BLD QL SMEAR: SLIGHT
APTT BLDCRRT: 29.7 SEC (ref 21–32)
AST SERPL-CCNC: 226 U/L (ref 10–40)
AST SERPL-CCNC: 493 U/L (ref 10–40)
AST SERPL-CCNC: 539 U/L (ref 10–40)
AST SERPL-CCNC: 652 U/L (ref 10–40)
BASOPHILS # BLD AUTO: 0.02 K/UL (ref 0–0.2)
BASOPHILS # BLD AUTO: 0.08 K/UL (ref 0–0.2)
BASOPHILS NFR BLD: 0.1 % (ref 0–1.9)
BASOPHILS NFR BLD: 0.2 % (ref 0–1.9)
BASOPHILS NFR BLD: 0.2 % (ref 0–1.9)
BASOPHILS NFR BLD: 0.3 % (ref 0–1.9)
BILIRUB DIRECT SERPL-MCNC: 1.3 MG/DL (ref 0.1–0.3)
BILIRUB SERPL-MCNC: 2 MG/DL (ref 0.1–1)
BUN SERPL-MCNC: 18 MG/DL (ref 6–20)
BUN SERPL-MCNC: 18 MG/DL (ref 6–20)
BUN SERPL-MCNC: 26 MG/DL (ref 6–20)
CALCIUM SERPL-MCNC: 8.5 MG/DL (ref 8.7–10.5)
CALCIUM SERPL-MCNC: 8.5 MG/DL (ref 8.7–10.5)
CALCIUM SERPL-MCNC: 8.6 MG/DL (ref 8.7–10.5)
CHLORIDE SERPL-SCNC: 106 MMOL/L (ref 95–110)
CHLORIDE SERPL-SCNC: 107 MMOL/L (ref 95–110)
CHLORIDE SERPL-SCNC: 110 MMOL/L (ref 95–110)
CO2 SERPL-SCNC: 19 MMOL/L (ref 23–29)
CO2 SERPL-SCNC: 20 MMOL/L (ref 23–29)
CO2 SERPL-SCNC: 21 MMOL/L (ref 23–29)
CREAT SERPL-MCNC: 1 MG/DL (ref 0.5–1.4)
CREAT SERPL-MCNC: 1.1 MG/DL (ref 0.5–1.4)
CREAT SERPL-MCNC: 1.2 MG/DL (ref 0.5–1.4)
DIFFERENTIAL METHOD: ABNORMAL
EOSINOPHIL # BLD AUTO: 0 K/UL (ref 0–0.5)
EOSINOPHIL NFR BLD: 0 % (ref 0–8)
EOSINOPHIL NFR BLD: 0.1 % (ref 0–8)
ERYTHROCYTE [DISTWIDTH] IN BLOOD BY AUTOMATED COUNT: 13.8 % (ref 11.5–14.5)
ERYTHROCYTE [DISTWIDTH] IN BLOOD BY AUTOMATED COUNT: 13.9 % (ref 11.5–14.5)
ERYTHROCYTE [DISTWIDTH] IN BLOOD BY AUTOMATED COUNT: 14 % (ref 11.5–14.5)
ERYTHROCYTE [DISTWIDTH] IN BLOOD BY AUTOMATED COUNT: 14.3 % (ref 11.5–14.5)
EST. GFR  (AFRICAN AMERICAN): >60 ML/MIN/1.73 M^2
EST. GFR  (NON AFRICAN AMERICAN): 52.5 ML/MIN/1.73 M^2
EST. GFR  (NON AFRICAN AMERICAN): 58.3 ML/MIN/1.73 M^2
EST. GFR  (NON AFRICAN AMERICAN): >60 ML/MIN/1.73 M^2
GLUCOSE SERPL-MCNC: 151 MG/DL (ref 70–110)
GLUCOSE SERPL-MCNC: 151 MG/DL (ref 70–110)
GLUCOSE SERPL-MCNC: 159 MG/DL (ref 70–110)
GLUCOSE SERPL-MCNC: 181 MG/DL (ref 70–110)
GLUCOSE SERPL-MCNC: 192 MG/DL (ref 70–110)
GLUCOSE SERPL-MCNC: 238 MG/DL (ref 70–110)
GLUCOSE SERPL-MCNC: 79 MG/DL (ref 70–110)
GLUCOSE SERPL-MCNC: 93 MG/DL (ref 70–110)
GLUCOSE SERPL-MCNC: 94 MG/DL (ref 70–110)
HBV CORE AB SERPL QL IA: NEGATIVE
HBV SURFACE AB SER-ACNC: POSITIVE M[IU]/ML
HBV SURFACE AG SERPL QL IA: NEGATIVE
HCO3 UR-SCNC: 23.3 MMOL/L (ref 24–28)
HCO3 UR-SCNC: 24.1 MMOL/L (ref 24–28)
HCO3 UR-SCNC: 24.3 MMOL/L (ref 24–28)
HCO3 UR-SCNC: 24.5 MMOL/L (ref 24–28)
HCO3 UR-SCNC: 24.7 MMOL/L (ref 24–28)
HCO3 UR-SCNC: 26.5 MMOL/L (ref 24–28)
HCT VFR BLD AUTO: 21.4 % (ref 37–48.5)
HCT VFR BLD AUTO: 31.1 % (ref 37–48.5)
HCT VFR BLD AUTO: 31.5 % (ref 37–48.5)
HCT VFR BLD AUTO: 32.2 % (ref 37–48.5)
HCT VFR BLD CALC: 23 %PCV (ref 36–54)
HCT VFR BLD CALC: 27 %PCV (ref 36–54)
HCT VFR BLD CALC: 31 %PCV (ref 36–54)
HCT VFR BLD CALC: 32 %PCV (ref 36–54)
HCT VFR BLD CALC: 32 %PCV (ref 36–54)
HCT VFR BLD CALC: 35 %PCV (ref 36–54)
HCT VFR BLD CALC: 36 %PCV (ref 36–54)
HCT VFR BLD CALC: 37 %PCV (ref 36–54)
HCV AB SERPL QL IA: NEGATIVE
HGB BLD-MCNC: 10.2 G/DL (ref 12–16)
HGB BLD-MCNC: 10.7 G/DL (ref 12–16)
HGB BLD-MCNC: 10.9 G/DL (ref 12–16)
HGB BLD-MCNC: 7.2 G/DL (ref 12–16)
HIV 1+2 AB+HIV1 P24 AG SERPL QL IA: NEGATIVE
HYPOCHROMIA BLD QL SMEAR: ABNORMAL
IMM GRANULOCYTES # BLD AUTO: 0.05 K/UL (ref 0–0.04)
IMM GRANULOCYTES # BLD AUTO: 0.05 K/UL (ref 0–0.04)
IMM GRANULOCYTES # BLD AUTO: 0.06 K/UL (ref 0–0.04)
IMM GRANULOCYTES # BLD AUTO: 0.11 K/UL (ref 0–0.04)
IMM GRANULOCYTES NFR BLD AUTO: 0.3 % (ref 0–0.5)
IMM GRANULOCYTES NFR BLD AUTO: 0.4 % (ref 0–0.5)
IMM GRANULOCYTES NFR BLD AUTO: 0.5 % (ref 0–0.5)
IMM GRANULOCYTES NFR BLD AUTO: 0.5 % (ref 0–0.5)
INR PPP: 1.3 (ref 0.8–1.2)
INR PPP: 1.4 (ref 0.8–1.2)
INR PPP: 1.4 (ref 0.8–1.2)
LDH SERPL L TO P-CCNC: 1.11 MMOL/L (ref 0.36–1.25)
LDH SERPL L TO P-CCNC: 2.78 MMOL/L (ref 0.36–1.25)
LYMPHOCYTES # BLD AUTO: 0.4 K/UL (ref 1–4.8)
LYMPHOCYTES # BLD AUTO: 0.5 K/UL (ref 1–4.8)
LYMPHOCYTES # BLD AUTO: 0.5 K/UL (ref 1–4.8)
LYMPHOCYTES # BLD AUTO: 0.7 K/UL (ref 1–4.8)
LYMPHOCYTES NFR BLD: 3.1 % (ref 18–48)
LYMPHOCYTES NFR BLD: 3.4 % (ref 18–48)
LYMPHOCYTES NFR BLD: 3.4 % (ref 18–48)
LYMPHOCYTES NFR BLD: 4.1 % (ref 18–48)
MAGNESIUM SERPL-MCNC: 2 MG/DL (ref 1.6–2.6)
MCH RBC QN AUTO: 31.9 PG (ref 27–31)
MCH RBC QN AUTO: 32 PG (ref 27–31)
MCH RBC QN AUTO: 32.3 PG (ref 27–31)
MCH RBC QN AUTO: 32.4 PG (ref 27–31)
MCHC RBC AUTO-ENTMCNC: 32.8 G/DL (ref 32–36)
MCHC RBC AUTO-ENTMCNC: 33.6 G/DL (ref 32–36)
MCHC RBC AUTO-ENTMCNC: 33.9 G/DL (ref 32–36)
MCHC RBC AUTO-ENTMCNC: 34 G/DL (ref 32–36)
MCV RBC AUTO: 94 FL (ref 82–98)
MCV RBC AUTO: 96 FL (ref 82–98)
MCV RBC AUTO: 96 FL (ref 82–98)
MCV RBC AUTO: 98 FL (ref 82–98)
MONOCYTES # BLD AUTO: 0.3 K/UL (ref 0.3–1)
MONOCYTES # BLD AUTO: 0.3 K/UL (ref 0.3–1)
MONOCYTES # BLD AUTO: 0.5 K/UL (ref 0.3–1)
MONOCYTES # BLD AUTO: 0.5 K/UL (ref 0.3–1)
MONOCYTES NFR BLD: 2 % (ref 4–15)
MONOCYTES NFR BLD: 2.1 % (ref 4–15)
MONOCYTES NFR BLD: 2.1 % (ref 4–15)
MONOCYTES NFR BLD: 3.5 % (ref 4–15)
NEUTROPHILS # BLD AUTO: 12 K/UL (ref 1.8–7.7)
NEUTROPHILS # BLD AUTO: 12.2 K/UL (ref 1.8–7.7)
NEUTROPHILS # BLD AUTO: 14.7 K/UL (ref 1.8–7.7)
NEUTROPHILS # BLD AUTO: 22.1 K/UL (ref 1.8–7.7)
NEUTROPHILS NFR BLD: 91.7 % (ref 38–73)
NEUTROPHILS NFR BLD: 93.7 % (ref 38–73)
NEUTROPHILS NFR BLD: 94 % (ref 38–73)
NEUTROPHILS NFR BLD: 94.1 % (ref 38–73)
NRBC BLD-RTO: 0 /100 WBC
OVALOCYTES BLD QL SMEAR: ABNORMAL
PCO2 BLDA: 32.9 MMHG (ref 35–45)
PCO2 BLDA: 33.5 MMHG (ref 35–45)
PCO2 BLDA: 38 MMHG (ref 35–45)
PCO2 BLDA: 40.5 MMHG (ref 35–45)
PCO2 BLDA: 41.1 MMHG (ref 35–45)
PCO2 BLDA: 43.4 MMHG (ref 35–45)
PCO2 BLDA: 45.8 MMHG (ref 35–45)
PCO2 BLDA: 46.5 MMHG (ref 35–45)
PH SMN: 7.32 [PH] (ref 7.35–7.45)
PH SMN: 7.33 [PH] (ref 7.35–7.45)
PH SMN: 7.36 [PH] (ref 7.35–7.45)
PH SMN: 7.38 [PH] (ref 7.35–7.45)
PH SMN: 7.39 [PH] (ref 7.35–7.45)
PH SMN: 7.41 [PH] (ref 7.35–7.45)
PH SMN: 7.47 [PH] (ref 7.35–7.45)
PH SMN: 7.51 [PH] (ref 7.35–7.45)
PHOSPHATE SERPL-MCNC: 3.9 MG/DL (ref 2.7–4.5)
PHOSPHATE SERPL-MCNC: 4.8 MG/DL (ref 2.7–4.5)
PLATELET # BLD AUTO: 105 K/UL (ref 150–450)
PLATELET # BLD AUTO: 112 K/UL (ref 150–450)
PLATELET # BLD AUTO: 116 K/UL (ref 150–450)
PLATELET # BLD AUTO: 74 K/UL (ref 150–450)
PLATELET BLD QL SMEAR: ABNORMAL
PMV BLD AUTO: 10.1 FL (ref 9.2–12.9)
PMV BLD AUTO: 10.6 FL (ref 9.2–12.9)
PMV BLD AUTO: 10.8 FL (ref 9.2–12.9)
PMV BLD AUTO: 10.9 FL (ref 9.2–12.9)
PO2 BLDA: 107 MMHG (ref 80–100)
PO2 BLDA: 117 MMHG (ref 80–100)
PO2 BLDA: 173 MMHG (ref 80–100)
PO2 BLDA: 175 MMHG (ref 80–100)
PO2 BLDA: 202 MMHG (ref 80–100)
PO2 BLDA: 203 MMHG (ref 80–100)
PO2 BLDA: 53 MMHG (ref 40–60)
PO2 BLDA: 57 MMHG (ref 40–60)
POC BE: -1 MMOL/L
POC BE: -2 MMOL/L
POC BE: -3 MMOL/L
POC BE: 0 MMOL/L
POC BE: 1 MMOL/L
POC BE: 4 MMOL/L
POC IONIZED CALCIUM: 0.97 MMOL/L (ref 1.06–1.42)
POC IONIZED CALCIUM: 1.04 MMOL/L (ref 1.06–1.42)
POC IONIZED CALCIUM: 1.05 MMOL/L (ref 1.06–1.42)
POC IONIZED CALCIUM: 1.12 MMOL/L (ref 1.06–1.42)
POC IONIZED CALCIUM: 1.15 MMOL/L (ref 1.06–1.42)
POC IONIZED CALCIUM: 1.16 MMOL/L (ref 1.06–1.42)
POC IONIZED CALCIUM: 1.19 MMOL/L (ref 1.06–1.42)
POC IONIZED CALCIUM: 1.24 MMOL/L (ref 1.06–1.42)
POC SATURATED O2: 100 % (ref 95–100)
POC SATURATED O2: 100 % (ref 95–100)
POC SATURATED O2: 90 % (ref 95–100)
POC SATURATED O2: 91 % (ref 95–100)
POC SATURATED O2: 98 % (ref 95–100)
POC SATURATED O2: 98 % (ref 95–100)
POC SATURATED O2: 99 % (ref 95–100)
POC SATURATED O2: 99 % (ref 95–100)
POC TCO2: 25 MMOL/L (ref 23–27)
POC TCO2: 25 MMOL/L (ref 23–27)
POC TCO2: 25 MMOL/L (ref 24–29)
POC TCO2: 26 MMOL/L (ref 23–27)
POC TCO2: 27 MMOL/L (ref 24–29)
POCT GLUCOSE: 129 MG/DL (ref 70–110)
POCT GLUCOSE: 150 MG/DL (ref 70–110)
POCT GLUCOSE: 158 MG/DL (ref 70–110)
POCT GLUCOSE: 160 MG/DL (ref 70–110)
POCT GLUCOSE: 173 MG/DL (ref 70–110)
POCT GLUCOSE: 173 MG/DL (ref 70–110)
POCT GLUCOSE: 177 MG/DL (ref 70–110)
POCT GLUCOSE: 180 MG/DL (ref 70–110)
POCT GLUCOSE: 187 MG/DL (ref 70–110)
POCT GLUCOSE: 203 MG/DL (ref 70–110)
POCT GLUCOSE: 206 MG/DL (ref 70–110)
POCT GLUCOSE: 217 MG/DL (ref 70–110)
POIKILOCYTOSIS BLD QL SMEAR: SLIGHT
POLYCHROMASIA BLD QL SMEAR: ABNORMAL
POTASSIUM BLD-SCNC: 2.9 MMOL/L (ref 3.5–5.1)
POTASSIUM BLD-SCNC: 3.1 MMOL/L (ref 3.5–5.1)
POTASSIUM BLD-SCNC: 3.1 MMOL/L (ref 3.5–5.1)
POTASSIUM BLD-SCNC: 3.5 MMOL/L (ref 3.5–5.1)
POTASSIUM BLD-SCNC: 4 MMOL/L (ref 3.5–5.1)
POTASSIUM BLD-SCNC: 4.4 MMOL/L (ref 3.5–5.1)
POTASSIUM SERPL-SCNC: 4.1 MMOL/L (ref 3.5–5.1)
POTASSIUM SERPL-SCNC: 4.3 MMOL/L (ref 3.5–5.1)
POTASSIUM SERPL-SCNC: 4.3 MMOL/L (ref 3.5–5.1)
PROT SERPL-MCNC: 5.3 G/DL (ref 6–8.4)
PROTHROMBIN TIME: 13.3 SEC (ref 9–12.5)
PROTHROMBIN TIME: 13.9 SEC (ref 9–12.5)
PROTHROMBIN TIME: 14.8 SEC (ref 9–12.5)
RBC # BLD AUTO: 2.23 M/UL (ref 4–5.4)
RBC # BLD AUTO: 3.19 M/UL (ref 4–5.4)
RBC # BLD AUTO: 3.35 M/UL (ref 4–5.4)
RBC # BLD AUTO: 3.36 M/UL (ref 4–5.4)
SAMPLE: ABNORMAL
SAMPLE: NORMAL
SITE: ABNORMAL
SITE: NORMAL
SODIUM BLD-SCNC: 139 MMOL/L (ref 136–145)
SODIUM BLD-SCNC: 140 MMOL/L (ref 136–145)
SODIUM BLD-SCNC: 140 MMOL/L (ref 136–145)
SODIUM BLD-SCNC: 141 MMOL/L (ref 136–145)
SODIUM BLD-SCNC: 141 MMOL/L (ref 136–145)
SODIUM BLD-SCNC: 143 MMOL/L (ref 136–145)
SODIUM BLD-SCNC: 144 MMOL/L (ref 136–145)
SODIUM BLD-SCNC: 145 MMOL/L (ref 136–145)
SODIUM SERPL-SCNC: 137 MMOL/L (ref 136–145)
SODIUM SERPL-SCNC: 138 MMOL/L (ref 136–145)
SODIUM SERPL-SCNC: 141 MMOL/L (ref 136–145)
SPHEROCYTES BLD QL SMEAR: ABNORMAL
TACROLIMUS BLD-MCNC: <2 NG/ML (ref 5–15)
WBC # BLD AUTO: 13.04 K/UL (ref 3.9–12.7)
WBC # BLD AUTO: 13.04 K/UL (ref 3.9–12.7)
WBC # BLD AUTO: 15.62 K/UL (ref 3.9–12.7)
WBC # BLD AUTO: 23.54 K/UL (ref 3.9–12.7)

## 2022-06-06 PROCEDURE — 20600001 HC STEP DOWN PRIVATE ROOM: Mod: NTX

## 2022-06-06 PROCEDURE — 63600175 PHARM REV CODE 636 W HCPCS: Mod: NTX | Performed by: STUDENT IN AN ORGANIZED HEALTH CARE EDUCATION/TRAINING PROGRAM

## 2022-06-06 PROCEDURE — 84295 ASSAY OF SERUM SODIUM: CPT | Mod: NTX

## 2022-06-06 PROCEDURE — 85610 PROTHROMBIN TIME: CPT | Mod: 91,NTX | Performed by: TRANSPLANT SURGERY

## 2022-06-06 PROCEDURE — 25000003 PHARM REV CODE 250: Mod: NTX | Performed by: TRANSPLANT SURGERY

## 2022-06-06 PROCEDURE — 82803 BLOOD GASES ANY COMBINATION: CPT | Mod: NTX

## 2022-06-06 PROCEDURE — 84132 ASSAY OF SERUM POTASSIUM: CPT | Mod: NTX

## 2022-06-06 PROCEDURE — 85730 THROMBOPLASTIN TIME PARTIAL: CPT | Mod: NTX | Performed by: TRANSPLANT SURGERY

## 2022-06-06 PROCEDURE — 25000003 PHARM REV CODE 250: Mod: NTX | Performed by: STUDENT IN AN ORGANIZED HEALTH CARE EDUCATION/TRAINING PROGRAM

## 2022-06-06 PROCEDURE — P9045 ALBUMIN (HUMAN), 5%, 250 ML: HCPCS | Mod: JG,NTX | Performed by: STUDENT IN AN ORGANIZED HEALTH CARE EDUCATION/TRAINING PROGRAM

## 2022-06-06 PROCEDURE — 85025 COMPLETE CBC W/AUTO DIFF WBC: CPT | Mod: 91,NTX

## 2022-06-06 PROCEDURE — 82565 ASSAY OF CREATININE: CPT | Mod: NTX

## 2022-06-06 PROCEDURE — 84450 TRANSFERASE (AST) (SGOT): CPT | Mod: 91,NTX | Performed by: TRANSPLANT SURGERY

## 2022-06-06 PROCEDURE — 85014 HEMATOCRIT: CPT | Mod: NTX

## 2022-06-06 PROCEDURE — 85025 COMPLETE CBC W/AUTO DIFF WBC: CPT | Mod: 91,NTX | Performed by: TRANSPLANT SURGERY

## 2022-06-06 PROCEDURE — 83735 ASSAY OF MAGNESIUM: CPT | Mod: NTX | Performed by: INTERNAL MEDICINE

## 2022-06-06 PROCEDURE — 83605 ASSAY OF LACTIC ACID: CPT | Mod: NTX

## 2022-06-06 PROCEDURE — 82248 BILIRUBIN DIRECT: CPT | Mod: NTX | Performed by: TRANSPLANT SURGERY

## 2022-06-06 PROCEDURE — 94761 N-INVAS EAR/PLS OXIMETRY MLT: CPT | Mod: NTX

## 2022-06-06 PROCEDURE — 27200966 HC CLOSED SUCTION SYSTEM: Mod: NTX

## 2022-06-06 PROCEDURE — 80197 ASSAY OF TACROLIMUS: CPT | Mod: NTX | Performed by: TRANSPLANT SURGERY

## 2022-06-06 PROCEDURE — 63600175 PHARM REV CODE 636 W HCPCS: Mod: JG,NTX | Performed by: STUDENT IN AN ORGANIZED HEALTH CARE EDUCATION/TRAINING PROGRAM

## 2022-06-06 PROCEDURE — 63600175 PHARM REV CODE 636 W HCPCS: Mod: NTX | Performed by: TRANSPLANT SURGERY

## 2022-06-06 PROCEDURE — 80048 BASIC METABOLIC PNL TOTAL CA: CPT | Mod: NTX,XB | Performed by: TRANSPLANT SURGERY

## 2022-06-06 PROCEDURE — 99223 PR INITIAL HOSPITAL CARE,LEVL III: ICD-10-PCS | Mod: NTX,,, | Performed by: NURSE PRACTITIONER

## 2022-06-06 PROCEDURE — 80053 COMPREHEN METABOLIC PANEL: CPT | Mod: NTX | Performed by: TRANSPLANT SURGERY

## 2022-06-06 PROCEDURE — 82330 ASSAY OF CALCIUM: CPT | Mod: NTX

## 2022-06-06 PROCEDURE — 37799 UNLISTED PX VASCULAR SURGERY: CPT | Mod: NTX

## 2022-06-06 PROCEDURE — 99223 1ST HOSP IP/OBS HIGH 75: CPT | Mod: NTX,,, | Performed by: NURSE PRACTITIONER

## 2022-06-06 PROCEDURE — 27000221 HC OXYGEN, UP TO 24 HOURS: Mod: NTX

## 2022-06-06 PROCEDURE — 84100 ASSAY OF PHOSPHORUS: CPT | Mod: NTX | Performed by: TRANSPLANT SURGERY

## 2022-06-06 PROCEDURE — 82800 BLOOD PH: CPT | Mod: NTX

## 2022-06-06 PROCEDURE — 25000003 PHARM REV CODE 250: Mod: NTX | Performed by: NURSE PRACTITIONER

## 2022-06-06 PROCEDURE — 80069 RENAL FUNCTION PANEL: CPT | Mod: NTX | Performed by: INTERNAL MEDICINE

## 2022-06-06 PROCEDURE — 63600175 PHARM REV CODE 636 W HCPCS: Mod: NTX | Performed by: INTERNAL MEDICINE

## 2022-06-06 PROCEDURE — 99900035 HC TECH TIME PER 15 MIN (STAT): Mod: NTX

## 2022-06-06 RX ORDER — LEVOTHYROXINE SODIUM 50 UG/1
50 TABLET ORAL
Status: DISCONTINUED | OUTPATIENT
Start: 2022-06-06 | End: 2022-06-10 | Stop reason: HOSPADM

## 2022-06-06 RX ORDER — VALGANCICLOVIR 450 MG/1
450 TABLET, FILM COATED ORAL DAILY
Qty: 60 TABLET | Refills: 2 | Status: SHIPPED | OUTPATIENT
Start: 2022-06-06 | End: 2022-08-08 | Stop reason: SINTOL

## 2022-06-06 RX ORDER — TACROLIMUS 1 MG/1
2 CAPSULE ORAL 2 TIMES DAILY
Status: DISCONTINUED | OUTPATIENT
Start: 2022-06-06 | End: 2022-06-08

## 2022-06-06 RX ORDER — MYCOPHENOLATE MOFETIL 250 MG/1
1000 CAPSULE ORAL 2 TIMES DAILY
Status: DISCONTINUED | OUTPATIENT
Start: 2022-06-06 | End: 2022-06-10 | Stop reason: HOSPADM

## 2022-06-06 RX ORDER — ALBUMIN HUMAN 50 G/1000ML
25 SOLUTION INTRAVENOUS ONCE
Status: COMPLETED | OUTPATIENT
Start: 2022-06-06 | End: 2022-06-06

## 2022-06-06 RX ORDER — FAMOTIDINE 20 MG/1
20 TABLET, FILM COATED ORAL NIGHTLY
Status: DISCONTINUED | OUTPATIENT
Start: 2022-06-06 | End: 2022-06-10 | Stop reason: HOSPADM

## 2022-06-06 RX ORDER — NYSTATIN 100000 [USP'U]/ML
5 SUSPENSION ORAL
Qty: 210 ML | Refills: 0 | Status: SHIPPED | OUTPATIENT
Start: 2022-06-06 | End: 2022-06-24

## 2022-06-06 RX ORDER — GLUCAGON 1 MG
1 KIT INJECTION
Status: DISCONTINUED | OUTPATIENT
Start: 2022-06-06 | End: 2022-06-08

## 2022-06-06 RX ORDER — FAMOTIDINE 10 MG/ML
20 INJECTION INTRAVENOUS 2 TIMES DAILY
Status: DISCONTINUED | OUTPATIENT
Start: 2022-06-06 | End: 2022-06-06

## 2022-06-06 RX ORDER — TACROLIMUS 1 MG/1
6 CAPSULE ORAL EVERY 12 HOURS
Qty: 360 CAPSULE | Refills: 11 | Status: SHIPPED | OUTPATIENT
Start: 2022-06-06 | End: 2022-06-10 | Stop reason: SDUPTHER

## 2022-06-06 RX ORDER — OXYCODONE HYDROCHLORIDE 10 MG/1
10 TABLET ORAL EVERY 6 HOURS PRN
Status: DISCONTINUED | OUTPATIENT
Start: 2022-06-06 | End: 2022-06-10 | Stop reason: HOSPADM

## 2022-06-06 RX ORDER — PREDNISONE 5 MG/1
TABLET ORAL
Qty: 70 TABLET | Refills: 0 | Status: SHIPPED | OUTPATIENT
Start: 2022-06-06 | End: 2022-06-10 | Stop reason: SDUPTHER

## 2022-06-06 RX ORDER — HYDROMORPHONE HYDROCHLORIDE 1 MG/ML
0.5 INJECTION, SOLUTION INTRAMUSCULAR; INTRAVENOUS; SUBCUTANEOUS EVERY 4 HOURS PRN
Status: DISCONTINUED | OUTPATIENT
Start: 2022-06-06 | End: 2022-06-08

## 2022-06-06 RX ORDER — IBUPROFEN 200 MG
16 TABLET ORAL
Status: DISCONTINUED | OUTPATIENT
Start: 2022-06-06 | End: 2022-06-08

## 2022-06-06 RX ORDER — MYCOPHENOLATE MOFETIL 250 MG/1
1000 CAPSULE ORAL 2 TIMES DAILY
Qty: 240 CAPSULE | Refills: 2 | Status: SHIPPED | OUTPATIENT
Start: 2022-06-06 | End: 2022-07-25

## 2022-06-06 RX ORDER — INSULIN ASPART 100 [IU]/ML
0-10 INJECTION, SOLUTION INTRAVENOUS; SUBCUTANEOUS
Status: DISCONTINUED | OUTPATIENT
Start: 2022-06-06 | End: 2022-06-08

## 2022-06-06 RX ORDER — OXYCODONE HYDROCHLORIDE 5 MG/1
5 TABLET ORAL EVERY 6 HOURS PRN
Status: DISCONTINUED | OUTPATIENT
Start: 2022-06-06 | End: 2022-06-10 | Stop reason: HOSPADM

## 2022-06-06 RX ORDER — IBUPROFEN 200 MG
24 TABLET ORAL
Status: DISCONTINUED | OUTPATIENT
Start: 2022-06-06 | End: 2022-06-08

## 2022-06-06 RX ORDER — SULFAMETHOXAZOLE AND TRIMETHOPRIM 400; 80 MG/1; MG/1
1 TABLET ORAL DAILY
Qty: 30 TABLET | Refills: 5 | Status: SHIPPED | OUTPATIENT
Start: 2022-06-06 | End: 2022-08-08

## 2022-06-06 RX ADMIN — TACROLIMUS 2 MG: 1 CAPSULE ORAL at 05:06

## 2022-06-06 RX ADMIN — NYSTATIN 500000 UNITS: 500000 SUSPENSION ORAL at 05:06

## 2022-06-06 RX ADMIN — AMPICILLIN AND SULBACTAM 3 G: 2; 1 INJECTION, POWDER, FOR SOLUTION INTRAMUSCULAR; INTRAVENOUS at 12:06

## 2022-06-06 RX ADMIN — AMPICILLIN AND SULBACTAM 3 G: 2; 1 INJECTION, POWDER, FOR SOLUTION INTRAMUSCULAR; INTRAVENOUS at 07:06

## 2022-06-06 RX ADMIN — MYCOPHENOLATE MOFETIL 1000 MG: 250 CAPSULE ORAL at 09:06

## 2022-06-06 RX ADMIN — DEXTROSE AND SODIUM CHLORIDE: 5; .9 INJECTION, SOLUTION INTRAVENOUS at 09:06

## 2022-06-06 RX ADMIN — NYSTATIN 500000 UNITS: 500000 SUSPENSION ORAL at 09:06

## 2022-06-06 RX ADMIN — HEPARIN SODIUM 5000 UNITS: 5000 INJECTION INTRAVENOUS; SUBCUTANEOUS at 08:06

## 2022-06-06 RX ADMIN — PHYTONADIONE 10 MG: 10 INJECTION, EMULSION INTRAMUSCULAR; INTRAVENOUS; SUBCUTANEOUS at 06:06

## 2022-06-06 RX ADMIN — MAGNESIUM SULFATE 2 G: 2 INJECTION INTRAVENOUS at 04:06

## 2022-06-06 RX ADMIN — LINACLOTIDE 145 MCG: 145 CAPSULE, GELATIN COATED ORAL at 09:06

## 2022-06-06 RX ADMIN — HYDROMORPHONE HYDROCHLORIDE 0.5 MG: 1 INJECTION, SOLUTION INTRAMUSCULAR; INTRAVENOUS; SUBCUTANEOUS at 03:06

## 2022-06-06 RX ADMIN — HYDROMORPHONE HYDROCHLORIDE 0.5 MG: 1 INJECTION, SOLUTION INTRAMUSCULAR; INTRAVENOUS; SUBCUTANEOUS at 01:06

## 2022-06-06 RX ADMIN — FAMOTIDINE 20 MG: 20 TABLET ORAL at 08:06

## 2022-06-06 RX ADMIN — HYDROMORPHONE HYDROCHLORIDE 1 MG: 1 INJECTION, SOLUTION INTRAMUSCULAR; INTRAVENOUS; SUBCUTANEOUS at 08:06

## 2022-06-06 RX ADMIN — MUPIROCIN 1 G: 20 OINTMENT TOPICAL at 08:06

## 2022-06-06 RX ADMIN — INSULIN HUMAN 1.1 UNITS/HR: 1 INJECTION, SOLUTION INTRAVENOUS at 08:06

## 2022-06-06 RX ADMIN — HYDROMORPHONE HYDROCHLORIDE 1 MG: 1 INJECTION, SOLUTION INTRAMUSCULAR; INTRAVENOUS; SUBCUTANEOUS at 04:06

## 2022-06-06 RX ADMIN — HYDROMORPHONE HYDROCHLORIDE 0.5 MG: 1 INJECTION, SOLUTION INTRAMUSCULAR; INTRAVENOUS; SUBCUTANEOUS at 12:06

## 2022-06-06 RX ADMIN — TACROLIMUS 1 MG: 1 CAPSULE, GELATIN COATED ORAL at 08:06

## 2022-06-06 RX ADMIN — AMPICILLIN AND SULBACTAM 3 G: 2; 1 INJECTION, POWDER, FOR SOLUTION INTRAMUSCULAR; INTRAVENOUS at 01:06

## 2022-06-06 RX ADMIN — LEVOTHYROXINE SODIUM 50 MCG: 50 TABLET ORAL at 09:06

## 2022-06-06 RX ADMIN — ALBUMIN (HUMAN) 25 G: 12.5 SOLUTION INTRAVENOUS at 02:06

## 2022-06-06 RX ADMIN — OXYCODONE HYDROCHLORIDE 10 MG: 10 TABLET ORAL at 08:06

## 2022-06-06 RX ADMIN — MYCOPHENOLATE MOFETIL 1000 MG: 500 INJECTION, POWDER, LYOPHILIZED, FOR SOLUTION INTRAVENOUS at 12:06

## 2022-06-06 RX ADMIN — NYSTATIN 500000 UNITS: 500000 SUSPENSION ORAL at 01:06

## 2022-06-06 RX ADMIN — METHYLPREDNISOLONE SODIUM SUCCINATE 200 MG: 125 INJECTION, POWDER, FOR SOLUTION INTRAMUSCULAR; INTRAVENOUS at 08:06

## 2022-06-06 RX ADMIN — PHYTONADIONE 10 MG: 10 INJECTION, EMULSION INTRAMUSCULAR; INTRAVENOUS; SUBCUTANEOUS at 01:06

## 2022-06-06 RX ADMIN — FAMOTIDINE 20 MG: 10 INJECTION INTRAVENOUS at 08:06

## 2022-06-06 RX ADMIN — POTASSIUM CHLORIDE 40 MEQ: 400 INJECTION, SOLUTION INTRAVENOUS at 12:06

## 2022-06-06 RX ADMIN — FAMOTIDINE 20 MG: 10 INJECTION INTRAVENOUS at 12:06

## 2022-06-06 RX ADMIN — HEPARIN SODIUM 5000 UNITS: 5000 INJECTION INTRAVENOUS; SUBCUTANEOUS at 05:06

## 2022-06-06 RX ADMIN — HEPARIN SODIUM 5000 UNITS: 5000 INJECTION INTRAVENOUS; SUBCUTANEOUS at 01:06

## 2022-06-06 RX ADMIN — OXYCODONE HYDROCHLORIDE 10 MG: 10 TABLET ORAL at 01:06

## 2022-06-06 RX ADMIN — MYCOPHENOLATE MOFETIL 1000 MG: 250 CAPSULE ORAL at 08:06

## 2022-06-06 NOTE — PROGRESS NOTES
TRANSPLANT NOTE:    Admit Date: 6/5/2022    ORGAN:   LIVER  Disease Etiology: Alcoholic Cirrhosis  Donor CMV Status: Positive  Donor HCV Status: Positive  Donor HBcAb: Negative  Donor HBV JACOBO: Negative  Donor HCV JACOBO: Negative  Whole or Partial: Whole Liver  Biliary Anastomosis: End to End  Arterial Anatomy: Standard    Malu Montes is a 52 y.o. female s/p    Donation after Brain Death liver transplant on 6/5/2022 (Liver) for Alcoholic Cirrhosis.  This patient will follow the Steroid Induction protocol.  This patients immunosuppression will include a steroid taper over 5 weeks, Cellcept for 3 months and Prograf maintenance.  Opportunistic infection prophylaxis will include Valcyte for 3 months (CMV D+ R+), Bactrim for 6 months, and NYSTATIN.  Osteoporosis risk assessment identifies DEXA shows no osteopenia or osteoporosis; Oscal D @ dc; received one month of ergo in Dec, would recheck vit d level as outpt, therapy will include Oscal D at dc.  I have reviewed the pre-op medications and those have been restarted those, as appropriate.

## 2022-06-06 NOTE — TRANSFER OF CARE
Anesthesia Transfer of Care Note    Patient: Malu Montes    Procedure(s) Performed: Procedure(s) (LRB):  TRANSPLANT, LIVER (N/A)    Patient location: ICU    Anesthesia Type: general    Transport from OR: Transported from OR on 6-10 L/min O2 by face mask with adequate spontaneous ventilation. Continuous ECG monitoring in transport. Continuos invasive BP monitoring in transport. Continuous SpO2 monitoring in transport    Post pain: adequate analgesia    Post assessment: no apparent anesthetic complications and tolerated procedure well    Post vital signs: stable    Level of consciousness: awake    Nausea/Vomiting: no nausea/vomiting    Complications: none    Transfer of care protocol was followed      Last vitals: There were no vitals taken for this visit.

## 2022-06-06 NOTE — NURSING TRANSFER
Nursing Transfer Note      6/6/2022     Reason patient is being transferred: Step down    Transfer To: [id number]    Transfer via bed    Transfer with cardiac monitoring    Transported by RN and nurse tech    Medicines sent: continuous IVF and insulin    Any special needs or follow-up needed: Accuchecks    Chart send with patient: Yes    Notified: spouse    Patient reassessed at:  (06/06/22, 1400)    Upon arrival to floor: cardiac monitor applied, patient oriented to room, call bell in reach and bed in lowest position

## 2022-06-06 NOTE — PLAN OF CARE
Pre-operative Discussion Note  Liver Transplant Surgery    Malu Montes is a 52 y.o. female admitted for liver transplant.  I discussed the planned procedure in detail, including expected hospital course and outcomes, benefits, risks, and potential complications.  Complications discussed included death, graft failure, bleeding, infection, rejection, and neurologic problems.  I discussed the risks of anesthesia, as well as the potential need for re-operation.  The possibility of other complications not specifically mentioned was also discussed.  Also, I discussed the need for lifelong immunosuppression and the possibility of serious complications from immunosuppressive drugs.    The discussion included the risks that the patient will incur if she elects to not have the proposed procedure.    Relevant donor-specific risk factors were disclosed and discussed with the patient, including:   PHS: I discussed the use of organs from donors with PHS risk criteria, including the testing protocols utilized, as well as data from the literature regarding the likelihood of transmission of hepatitis or HIV.  The patient is willing to consider such grafts.  HCV Non-viremic recipient: I discussed the use of HCV-positive organs in naive recipients, including the risk of viral transmission to the patients or others, potential insurance barriers for antiviral medication coverage, risk for fibrosing cholestatic hepatitis, death or graft loss. The potential advantage to the recipient is the possibility of receiving a transplant sooner with decreased mortality risk by accepting such an organ. The patient is willing to consider such grafts.    Specific PHS donor risk criteria for the organ donor include:  Sex (i.e., any method of sexual contact, including vaginal, anal, and oral) with a person known or suspected to have HIV, HBV, or HCV infection  Drug injection for nonmedical reasons  Sex with a person who injected drugs for  nonmedical reasons        Hepatocellular Carcinoma Recurrence: Not applicable.    The patient was SARS-CoV-2 /COVID-19 tested with negative results.    COVID-19: I discussed the possibility of COVID-19 transmission with the patient. Although JACOBO testing is available for the virus using a technique which in theory should be very accurate, there is no data yet regarding the likelihood of a  false-negative test leading to virus transmission. Based on accuracy of testing for other viruses, it is expected that this risk is extremely small.     I also discussed that transplant immunosuppression will increase susceptibility to COVID-19 and other viruses, and that although we use stringent precautions to protect patients from infection, it is possible for a transplant recipient to contract this infection. If COVID-19 infection should occur, it would be a serious matter with a significant risk of death.    All questions were answered.  The patient and available family members voice understanding and agree to proceed with the transplant.    UNOS Patient Status  Note on scores:  ICU = 10 = total assistance  TSU = 20-30 = partial assistance  Outpatient admitted for transplant requiring medical care in last year = 40-50 = partial assistance  Scores 60 or higher indicate no assistance, meaning no need for medical care in last year. This would be very unusual for a transplant candidate.    Functional Status: 50% - Requires considerable assistance and frequent medical care  Physical Capacity: No Limitations

## 2022-06-06 NOTE — ASSESSMENT & PLAN NOTE
Managed per primary.   Continue levothyroxine 50 mcg daily.   Lab Results   Component Value Date    TSH 2.550 05/06/2022    FREET4 0.94 07/15/2021

## 2022-06-06 NOTE — PROGRESS NOTES
Discussed kidney transplant listing with EBENEZER Mendes RN and MARCELA Rdz RN.  Patient to be made inactive on the kidney transplant list at this time.  Dr. Lulu SINHA to inform patient /family of kidney list inactivation.

## 2022-06-06 NOTE — PLAN OF CARE
Progress Note  Transplant Surgery    Admit Date: 6/5/2022  Post-operative Day: 1  Hospital Day: 2    ORGAN:   LIVER  Disease Etiology: Alcoholic Cirrhosis  Donor Type:   Donation after Brain Death  CDC High Risk:   Yes  Donor CMV Status:   Donor CMV Status: Positive  Donor HBcAB:   Negative  Donor HBV JACOBO: Negative  Donor HCV JACOBO: Negative  Donor HCV Status:   Positive  Whole or Partial: Whole Liver  Biliary Anastomosis: End to End  Arterial Anatomy: Standard         Follow-up For: Procedure(s) (LRB):  TRANSPLANT, LIVER (N/A)      ASSESSMENT/PLAN:     I conducted multidisciplinary rounds in conjunction with the ICU/Critical Care attending staff, fellows, and residents, with involvement of ancillary services as appropriate. The patient's general condition was reviewed, specifically including allograft function, immunosuppressive management, dietary and nutritional status, pharmacy concerns, and status of expected transition to transplant stepdown care.    For details see the critical care note.

## 2022-06-06 NOTE — OP NOTE
Operative Report    Date of Procedure: 6/5/2022  Date of Transplant (UNOS): 6/5/22    Surgeons:  Surgeon(s) and Role:     * Franco Slaughter MD - Primary     * Dario Jernigan MD - Assisting     * Sisi Bourgeois MD - Fellow    First Assistant Attestation:  The presence of an additional attending surgeon functioning as first assistant was required due to the complexity of the procedure relative to any available residents. I certify that no resident was available who was qualified to serve as first assistant. Duties performed by the assistant included assisting the primary surgeon.    Pre-operative Diagnosis (UNOS): End Stage Liver Disease due to Alcoholic Cirrhosis,  and Chronic hepatic failure without coma K72.10    Post-operative Diagnosis: Same; portal vein status:  patent    Principal Procedure Performed: Orthotopic Liver Transplant  (whole liver, DBD donor, biliary reconstruction end to end donor common bile duct to recipient common bile duct  )  Additional Procedures Performed:   None    Anesthesia: General endotracheal  Findings: cirrhosis, portal hypertension and adhesions    Preamble  Indications and Patient Counseling: The patient is a 52 y.o. year-old female with Alcoholic Cirrhosis,  (UNOS terminology) who has been evaluated for a liver transplant.  The procedure was thoroughly discussed with the patient, including potential risks, complications, and alternatives. Specific complications mentioned included death, graft non-function, bleeding, infection, rejection, renal failure, respiratory failure, and neurologic deficits, as well as the possibility of other complications not specifically mentioned.    Donor Risk Factors:  Prior to the operation, the patient was advised of any donor-specific risk factors requiring specific disclosure. Factors in this case included PHS risk criteria  or donor HCV+.      Specific PHS Donor Risk criteria for the organ donor include:  Sex (i.e., any method of sexual contact,  including vaginal, anal, and oral) with a person known or suspected to have HIV, HBV, or HCV infection  Drug injection for nonmedical reasons  Sex with a person who injected drugs for nonmedical reasons    All questions were answered, the patient voiced appropriate understanding, and she agreed to proceed with the planned procedure.    ABO Confirmation: Immediately following arrival of the donor organ and prior to implantation, a formal ABO confirmation was done according to hospital and UNOS policies.  I confirmed the UNOS ID number of the donor organ ([id number]) and the donor and recipient ABO types, directly verifying these data by comparison with the UNOS Match Run report ([id number].  This confirmation was personally done by an attending surgeon and circulating nurse, and is officially documented elsewhere.    Time-Out: A complete time out was carried out prior to incision, with confirmation of patient identity, correct procedure, correct operative site, appropriate antibiotic prophylaxis, review of any known allergies, and presence of all needed equipment.    Procedure in Detail  Following the induction of general endotracheal anesthesia, appropriate arterial and venous lines were placed by the anesthesia team.  Care was taken to pad all pressure points and avoid any potential traction injuries from positioning. The urinary bladder was catheterized.  Sequential compression boots and Ronald Huggers were used. The chest, abdomen, and upper thighs were sterilely prepped and draped.     The abdomen was entered via a wide bilateral subcostal incision. 0 mL of ascites was removed. The round ligament was ligated and divided. The falciform, left triangular, and gastrohepatic ligaments were divided using the electrocautery, with 2-0 silk ties as needed. The Moss self-retaining retractor was placed to provide exposure. Adhesions between the abdominal viscera and the abdominal wall and the undersurface of the liver were  divided as needed using cautery dissection and silk ties or suture ligatures. Hilar dissection was then carried out, with ligation of the right and left hepatic arteries as well as the cystic duct and the common hepatic duct. The recipient arterial anatomy was found to be: replaced right hepatic from sma. The portal vein was exposed circumferentially from its bifurcation down to the superior border of the pancreas. A TIPS was in place and the stent was palpated to approximately 4cm superior to the pancreas. The portal vein was found to be patent.  Attention was next directed to the right side of the liver where the right triangular ligament was taken down, exposing the bare area of the liver, and the IVC. The infrahepatic IVC was isolated, followed by mobilization of the retrohepatic cava, division of the right adrenal vein, and isolation of the suprahepatic IVC. The patient was given 2000 units of heparin prior to caval cross-clamping. . After assuring adequate stability after cross-clamping, the native liver was excised.  The TIPS stent was removed in its entirety while excising the liver.  The allograft liver was brought to the operative field.  The liver was perfused with cold 5% albumin during implantation to displace the organ preservation solution.  IVC reconstruction technique consisted of end to end ivc using 3-0 and 4-0 polypropylene as appropriate.  The donor portal vein was then anastomosed to the recipient portal inflow site (end portal vein) with 5-0 polypropylene. Once this was completed, anesthesia was notified and the liver was re-perfused. Copious irrigation with warm saline and temporary finger occlusion of portal inflow were used as needed to avoid any problems with hypothermia. Temporary packing, electrocautery, and polypropylene suture ligatures were used as appropriate to provide hemostasis. Once this was satisfactory, attention was directed to the arterial reconstruction. This liver did not  come from a DCD donor. Arterial inflow was provided to the graft by anastomosing the recipient replaced right hepatic from sma to the donor  common hepatic artery using 6-0 polypropylene. The liver was assessed for adequacy of perfusion, which was satisfactory. The gallbladder was then removed from the allograft liver, and a temporary packing period was carried out to help assure hemostatis.  Attention was next directed toward the bile duct. The donor bile duct was prepared and assessed for adequate vascular supply. Biliary reconstruction was then performed by anastomosing the recipient common bile duct to the donor duct using 6-0 PDS sutures.  The biliary stent used was: none.  A final check for hemostasis was made. 2 19f Ra drains were placed through separate incisions below the transverse abdominal incision and placed in the usual positions. The cavity was irrigated with saline. The abdomen was closed in layers using running #1 PDS suture. The incision was irrigated and the skin was closed with staples. At the end of the case all instrument, needle, and sponge counts were correct. The patient was taken to the ICU in stable condition.     Fluids Administered:   Crystalloid (mL) 5,000   RBC (Units) 1   Cell Saver (CCs) 281      Specimens: Explant liver  Drains: 19f Ra drains x 2    Additional Findings:    Estimated Blood Loss: 1,000 mL  Ascites Evacuated: 0 mL    Ischemic Times:  Time of portal reperfusion: 6/5/2022  6:50 PM  Time of arterial reperfusion: 6/5/2022  7:30 PM  Anastomosis (warm ischemia) time: 33 minutes  Cold ischemia time: 397 minutes    Surgical Data:  Graft type (whole vs. partial): whole liver  IVC reconstruction: end to end ivc  Portal vein status: patent  Portal graft performed? no  Donor arterial anatomy: standard  Donor arterial inflow: common hepatic artery  Recipient arterial anatomy: replaced right hepatic from sma  Recipient arterial inflow: replaced right hepatic from sma  Arterial  graft performed? no  Biliary reconstruction: end to end (donor common bile duct to recipient common bile duct)  Biliary stent: none  Disposition of Vessels from donor of transplanted organ: discarded  Damage during procurement: no  Procurement damage comments: None    Donor Factors:  UNOS ID: )[id number]  UNOS Match Run: [id number]  Donor type: donation after brain death  Donor with reported PHS increased risk criteria: yes  Donor CMV serologic status: Positive  Donor with known cancer: no  Donor HCV serologic status: Positive  Donor HBcAb: Negative  Donor HBV JACOBO: Negative  Donor HCV JACOBO: Negative  Liver weight: 1568 grams

## 2022-06-06 NOTE — PROGRESS NOTES
Patient arrived to SICU [id number] s/p liver transplant. Patient transported by anesthesia team. 10 L simple facemask. Insulin infusing. VSS.   Dr. Bustillo at bedside placing orders.

## 2022-06-06 NOTE — PROGRESS NOTES
[facility]  Transplant Surgery  Progress Note    Hospital Day: 1  Post-Op Day: 1 Day Post-Op      ORGAN:  LIVER  Disease Etiology:Alcoholic Cirrhosis  Donor Type:  Donation after Brain Death  CDC High Risk:  Yes  Donor CMV Status:  Positive  Donor HBcAB:  Negative  Donor HCV Status:  Positive  Whole or Partial:Whole Liver  Biliary Anastomosis:End to End  Arterial Anatomy:Standard    Subjective:     Interval History:   Hemodynamically stable post op.  Resting comfortably prior to exam this morning. Once awoken she was only oriented to self and began moaning in pain.  UOP 75ml/hr  Drain output serosangenous      Medications:  Continuous Infusions:   sodium chloride 0.9%      dextrose 5 % and 0.9 % NaCl 50 mL/hr at 06/06/22 0500    insulin regular 1 units/mL infusion orderable (DKA) 1.6 Units/hr (06/06/22 0500)     Scheduled Meds:   ampicillin-sulbactim (UNASYN) IVPB  3 g Intravenous Q6H    famotidine (PF)  20 mg Intravenous BID    heparin (porcine)  5,000 Units Subcutaneous Q8H    methylPREDNISolone sodium succinate injection  200 mg Intravenous Daily    Followed by    [START ON 6/7/2022] methylPREDNISolone sodium succinate injection  160 mg Intravenous Daily    Followed by    [START ON 6/8/2022] methylPREDNISolone sodium succinate injection  120 mg Intravenous Daily    Followed by    [START ON 6/9/2022] methylPREDNISolone sodium succinate injection  80 mg Intravenous Daily    Followed by    [START ON 6/10/2022] methylPREDNISolone sodium succinate injection  40 mg Intravenous Daily    Followed by    [START ON 6/11/2022] predniSONE  20 mg Oral Daily    mupirocin  1 g Nasal BID    mycophenolate (CELLCEPT) IVPB  1,000 mg Intravenous Q12H    nystatin  500,000 Units Mouth/Throat TID PC    phytonadione ((AQUA-MEPHYTON) IVPB  10 mg Intravenous Q8H    [START ON 6/12/2022] sulfamethoxazole-trimethoprim 400-80mg  1 tablet Oral Daily AM    tacrolimus  1 mg Oral BID    [START ON 6/15/2022]  valGANciclovir  450 mg Oral Daily     PRN Meds:sodium chloride, sodium chloride, sodium chloride 0.9%, dextrose 10%, dextrose 10%, dextrose 10%, dextrose 10%, HYDROmorphone, HYDROmorphone, magnesium sulfate IVPB, sodium chloride 0.9%     Objective:   Vital Signs (Most Recent):  Temp: 98.2 °F (36.8 °C) (06/06/22 0300)  Pulse: 86 (06/06/22 0500)  Resp: (!) 9 (06/06/22 0500)  BP: 135/68 (06/06/22 0500)  SpO2: 99 % (06/06/22 0500)    Ventilator Data (Last 24H):          Hemodynamic Parameters (Last 24H):  PAP: (31-47)/(16-23) 37/19  PAP (Mean):  [21 mmHg-35 mmHg] 28 mmHg  CO:  [4.9 L/min-9.1 L/min] 4.9 L/min  CI:  [4.4 L/min/m2-8 L/min/m2] 8 L/min/m2    Physical Exam:  General: NAD  Neuro: AAOx1  HEENT: Normocephalic  Cardio: S1 and S2, RRR  Resp: Moving air appropriately, breathing even and unlabored  Abd: Soft, NT, ND  Ext: Warm and well perfused      Lines/Drains:       Hemodialysis Catheter 06/05/22 1713 right femoral (Active)   Verification by X-ray No 06/06/22 0305   Site Assessment No drainage;No redness;No swelling;No warmth 06/06/22 0305   Line Securement Device Secured with sutures 06/06/22 0305   Dressing Type Central line dressing;Biopatch in place 06/06/22 0305   Dressing Status Clean;Dry;Intact 06/06/22 0305   Dressing Intervention Integrity maintained 06/06/22 0305   Date on Dressing 06/05/22 06/06/22 0305   Dressing Due to be Changed 06/12/22 06/06/22 0305   Venous Patency/Care flushed w/o difficulty;blood return present;normal saline locked 06/06/22 0305   Arterial Patency/Care flushed w/o difficulty;blood return present;normal saline locked 06/06/22 0305   Waveform Not being transduced 06/06/22 0305   Line Necessity Review CRRT/HD 06/06/22 0305   Number of days: 0       Introducer 06/05/22 1716 right internal jugular (Active)   Site Assessment No drainage;No redness;No swelling;No warmth 06/06/22 0305   Line Securement Device Secured with sutures 06/06/22 0305   Dressing Type Biopatch in place;Central  line dressing 06/06/22 0305   Dressing Status Clean;Dry;Intact 06/06/22 0305   Dressing Intervention Sterile dressing change 06/06/22 0305   Date on Dressing 06/06/22 06/06/22 0305   Dressing Due to be Changed 06/13/22 06/06/22 0305   Line Necessity Review Hemodynamic instability 06/06/22 0305   Number of days: 0       Pulmonary Artery Catheter Assessment  06/05/22 1716 right internal jugular (Active)   Verification by X-ray Yes 06/05/22 2230   Site Assessment No drainage;No redness;No swelling;No warmth 06/06/22 0305   Line Securement Device Secured with sutures 06/06/22 0305   Dressing Type Biopatch in place;Central line dressing 06/06/22 0305   Dressing Status Clean;Dry;Intact 06/06/22 0305   Dressing Intervention Sterile dressing change 06/06/22 0305   Date on Dressing 06/06/22 06/06/22 0305   Dressing Due to be Changed 06/13/22 06/06/22 0305   Balloon Patency/Care other (see comments) 06/06/22 0305   Thermistor Patency/Care infusing 06/06/22 0305   Proximal Patency/Care infusing 06/06/22 0305   Distal Patency/Care infusing 06/06/22 0305   Extra Patency/Care blood return present;flushed w/o difficulty 06/06/22 0305   Current Insertion Depth (cm) 40 cm 06/06/22 0305   Waveform Normal 06/06/22 0305   Line Necessity Review Hemodynamic instability 06/06/22 0305   Number of days: 0       Trialysis (Dialysis) Catheter 06/05/22 1716 right internal jugular (Active)   Verification by X-ray Yes 06/06/22 0305   Site Assessment No drainage;No swelling;No redness;No warmth 06/06/22 0305   Line Securement Device Secured with sutures 06/06/22 0305   Dressing Type Biopatch in place;Central line dressing 06/06/22 0305   Dressing Status Dry;Clean;Intact 06/06/22 0305   Dressing Intervention Sterile dressing change 06/06/22 0305   Date on Dressing 06/06/22 06/06/22 0305   Dressing Due to be Changed 06/13/22 06/06/22 0305   Venous Patency/Care flushed w/o difficulty;blood return present;infusing 06/06/22 4517   Arterial  Patency/Care flushed w/o difficulty;blood return present;normal saline locked 06/06/22 0305   Distal Patency/Care flushed w/o difficulty;blood return present;infusing 06/06/22 0305   Waveform Not being transduced 06/06/22 0305   Line Necessity Review CRRT/HD 06/06/22 0305   Number of days: 0            Peripheral IV - Single Lumen 06/05/22 1532 18 G Right Hand (Active)   Site Assessment Clean;Dry;Intact;No redness;No swelling 06/06/22 0305   Extremity Assessment Distal to IV No abnormal discoloration;No redness;No swelling;No warmth 06/06/22 0305   Line Status Blood return noted;Flushed;Infusing 06/06/22 0305   Dressing Status Clean;Dry;Intact 06/06/22 0305   Dressing Intervention Integrity maintained 06/06/22 0305   Dressing Change Due 06/09/22 06/06/22 0305   Site Change Due 06/09/22 06/06/22 0305   Reason Not Rotated Not due 06/06/22 0305   Number of days: 0            Peripheral IV - Single Lumen 06/05/22 1700 20 G Left Hand (Active)   Site Assessment Clean;Dry;Intact;No redness;No swelling 06/06/22 0305   Extremity Assessment Distal to IV No abnormal discoloration;No redness;No swelling;No warmth 06/06/22 0305   Line Status Flushed;Saline locked 06/06/22 0305   Dressing Status Clean;Dry;Intact 06/06/22 0305   Dressing Intervention Integrity maintained 06/06/22 0305   Dressing Change Due 06/09/22 06/06/22 0305   Site Change Due 06/09/22 06/06/22 0305   Reason Not Rotated Not due 06/06/22 0305   Number of days: 0            PILI 06/05/22 1600 Left Antecubital (Active)   Site Assessment Clean;Dry;Intact;No swelling;No redness 06/06/22 0305   Line Status Flushed;Saline locked;Blood return noted 06/06/22 0305   Dressing Status Clean;Dry;Intact 06/06/22 0305   Dressing Intervention Integrity maintained 06/06/22 0305   Dressing Change Due 06/09/22 06/06/22 0305   Site Change Due 06/09/22 06/06/22 0305   Reason Not Rotated Not due 06/06/22 0305   Number of days: 0       Arterial Line 06/05/22 1705 Right Femoral (Active)    Site Assessment Clean;Dry;Intact;No redness;No swelling 06/06/22 0305   Line Status Pulsatile blood flow 06/06/22 0305   Art Line Waveform Appropriate;Square wave test performed 06/06/22 0305   Arterial Line Interventions Zeroed and calibrated;Leveled;Connections checked and tightened;Flushed per protocol 06/06/22 0305   Color/Movement/Sensation Capillary refill less than 3 sec 06/06/22 0305   Dressing Type Central line dressing 06/06/22 0305   Dressing Status Clean;Dry;Intact 06/06/22 0305   Dressing Intervention Integrity maintained 06/06/22 0305   Dressing Change Due 06/09/22 06/06/22 0305   Tubing Change Due 06/09/22 06/06/22 0305   Number of days: 0       Arterial Line 06/05/22 1540 Left Radial (Active)   Site Assessment Clean;Dry;Intact;No redness;No swelling 06/06/22 0305   Line Status Pulsatile blood flow 06/06/22 0305   Art Line Waveform Not being transduced 06/06/22 0305   Arterial Line Interventions Leveled;Connections checked and tightened;Flushed per protocol 06/06/22 0305   Color/Movement/Sensation Capillary refill less than 3 sec 06/06/22 0305   Dressing Type Central line dressing 06/06/22 0305   Dressing Status Clean;Dry;Intact 06/06/22 0305   Dressing Intervention Integrity maintained 06/06/22 0305   Dressing Change Due 06/09/22 06/06/22 0305   Tubing Change Due 06/09/22 06/06/22 0305   Number of days: 0            Closed/Suction Drain 06/05/22 2109 Right Abdomen Bulb 19 Fr. (Active)   Site Description Healing 06/06/22 0305   Dressing Type Gauze 06/06/22 0305   Dressing Status Clean;Dry;Intact 06/06/22 0305   Dressing Intervention Integrity maintained 06/06/22 0305   Drainage Sanguineous 06/06/22 0305   Status To bulb suction 06/06/22 0305   Output (mL) 10 mL 06/06/22 0400   Number of days: 0            Closed/Suction Drain 06/05/22 2110 Right Abdomen Bulb 19 Fr. (Active)   Site Description Healing 06/06/22 0305   Dressing Type Gauze 06/06/22 0305   Dressing Status Clean;Dry;Intact 06/06/22 0305  "  Dressing Intervention Integrity maintained 06/06/22 0305   Drainage Sanguineous 06/06/22 0305   Status To bulb suction 06/06/22 0305   Output (mL) 10 mL 06/06/22 0400   Number of days: 0            Urethral Catheter 06/05/22 1619 Straight-tip;Non-latex 16 Fr. (Active)   Site Assessment Clean;Intact 06/06/22 0305   Collection Container Urimeter 06/06/22 0305   Securement Method secured to top of thigh w/ adhesive device 06/06/22 0305   Catheter Care Performed yes 06/06/22 0305   Reason for Continuing Urinary Catheterization Critically ill in ICU and requiring hourly monitoring of intake/output;Post operative 06/06/22 0305   CAUTI Prevention Bundle Securement Device in place with 1" slack;Intact seal between catheter & drainage tubing;Sheeting clip in use;Drainage bag/urimeter off the floor;No dependent loops or kinks;Drainage bag/urimeter not overfilled (<2/3 full);Drainage bag/urimeter below bladder 06/06/22 0305   Output (mL) 45 mL 06/06/22 0400   Number of days: 0       Laboratory:  CBC:   Recent Labs   Lab 06/06/22 0200 06/06/22 0215   WBC 15.62*  --    RBC 3.36*  --    HGB 10.9*  --    HCT 32.2* 32*   *  --    MCV 96  --    MCH 32.4*  --    MCHC 33.9  --        CMP:   Recent Labs   Lab 06/06/22 0400   *  151*   CALCIUM 8.5*  8.5*   ALBUMIN 3.5  3.5   PROT 5.3*     137   K 4.3  4.1   CO2 19*  20*     107   BUN 18  18   CREATININE 1.1  1.0   ALKPHOS 115   *   *   BILITOT 2.0*       Coagulation:   Recent Labs   Lab 06/06/22 0200 06/06/22  0400   INR 1.4*  --    APTT  --  29.7       Cardiac markers: No results for input(s): CKMB, CPKMB, TROPONINT, TROPONINI, MYOGLOBIN in the last 168 hours.    ABGs:   Recent Labs   Lab 06/06/22 0215   PH 7.376   PCO2 41.1   PO2 117*   HCO3 24.1   POCSATURATED 98   BE -1         ASSESSMENT/PLAN:   Malu Jensen Montes is a 52 y.o. female with history of alcoholic cirrhosis s/p TIPS procedure 5/2022 who underwent liver transplant " on 6/5/22.         Neuro/Psych:   -- Pain: Dilaudid prn             Cards:   -- HDS  -- Continue continuous cardiac monitoring      Pulm:   -- Goal O2 sat > 90%  -- Wean as able to room air      Renal:  -- Keep armenta for strict I/O  -- BUN/Cr 18/1.1      FEN / GI:   -- Net +5.6L  -- Replace lytes as needed  -- Nutrition: NPO  -- GI ppx: pepcid  -- Bowel reg: None      ID:   -- Tm: afebrile; WBC 15.6  -- Perioperative unasyn      Heme/Onc:   -- H/H stable 11/32  -- Daily CBC  -- Vit k  -- Immunosuppression: solumedrol taper, cellcept, nystatin, bactrim, prograf, valcyte      Endo:   -- Endocrine consulted, appreciate recs  -- BG goal 110-140      PPx:   Feeding: NPO  Analgesia/Sedation: Dilaudid / N/A  Thromboembolic prevention: hep TID  HOB >30: yes  Stress Ulcer ppx: pepcid  Glucose control: Critical care goal 110-140 g/dl, ISS    Lines/Drains/Airway: R IJ CVL, R swan, L Radial art, R femoral art, R femoral vasc cath, JPx2, William      Dispo/Code Status/Palliative:   -- SICU / Full Code    Lia Kervin  Transplant Surgery HO2

## 2022-06-06 NOTE — H&P
[facility] - Surgical Intensive [facility]  Critical Care - Surgery  History & Physical    Patient Name: Malu Montes  MRN: [id number]  Admission Date: 2022  Code Status: Full Code  Attending Physician: Mathew Luong MD   Primary Care Provider: Killian Dodd DO   Principal Problem: Liver transplant candidate    Subjective:     HPI:  Ms. 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 Liver disease has been complicated by HE, ascites requiring paracentesis twice a week. Status post TIPS procedure 22. She presented to the hospital on  for elective DCD orthotopic whole liver transplant. The case proceeded without difficulty. Intra-operatively, the patient received 5L crystalloid, 1u pRBCs. She had an estimated 1L blood loss. She underwent intra-operative dialysis and had 1.2L UOP intra-operatively. She arrives to the unit extubated, sedated off vasopressor support. JENSEN drains in place with serosanguinous output. She is admitted to the SICU for post-operative care.         Hospital/ICU Course:  No notes on file    Follow-up For: Procedure(s) (LRB):  TRANSPLANT, LIVER (N/A)    Post-Operative Day: Day of Surgery     Past Medical History:   Diagnosis Date    ADD (attention deficit disorder)     Anxiety     Ascites of liver     Cirrhosis 2021    CKD (chronic kidney disease) stage 3, GFR 30-59 ml/min     Constipation     ETOH abuse     Hyperlipidemia     Hypothyroidism     Other ascites 2021    Pancreatitis, acute     Tobacco use     Vitamin D deficiency        Past Surgical History:   Procedure Laterality Date    AUGMENTATION OF BREAST      breast augmentation       SECTION      COLONOSCOPY N/A 2021    Procedure: COLONOSCOPY;  Surgeon: Dao Gray MD;  Location: [facility] ([address] [place] [address]);  Service: Endoscopy;  Laterality: N/A;  COVID test 21 General surgery clinic -     CYSTOSCOPY N/A 9/10/2021    Procedure:  CYSTOSCOPY;  Surgeon: Rj Sánchez Jr., MD;  Location: [facility] OR 1ST FLR;  Service: Urology;  Laterality: N/A;    ESOPHAGOGASTRODUODENOSCOPY N/A 9/17/2021    Procedure: ESOPHAGOGASTRODUODENOSCOPY (EGD);  Surgeon: Dao Gray MD;  Location: [facility] (2ND FLR);  Service: Endoscopy;  Laterality: N/A;  labs current on 9/7    ESOPHAGOGASTRODUODENOSCOPY N/A 10/26/2021    Procedure: ESOPHAGOGASTRODUODENOSCOPY (EGD);  Surgeon: Dao Gray MD;  Location: [facility] (2ND FLR);  Service: Endoscopy;  Laterality: N/A;  to be done the week of 10/18/21 per Dr. [facility]-10/23/21-[facility]   10/25 arrival time confirmed with pt-rb    ESOPHAGOGASTRODUODENOSCOPY Left 12/21/2021    Procedure: EGD (ESOPHAGOGASTRODUODENOSCOPY);  Surgeon: Koffi Monteiro MD;  Location: [facility] (4TH FLR);  Service: Endoscopy;  Laterality: Left;  Rapid  pt requested AM appt and first available in December-BB  labs morning of procedure-  12/14 lvm to confirm appt-rb    HEMORRHOID SURGERY      PERITONEOCENTESIS Right 6/8/2021    Procedure: PARACENTESIS, ABDOMINAL;  Surgeon: Jay Torre MD;  Location: [facility] CATH LAB;  Service: Radiology;  Laterality: Right;    PERITONEOCENTESIS Right 6/25/2021    Procedure: PARACENTESIS, ABDOMINAL;  Surgeon: Jay Torre MD;  Location: [facility] CATH LAB;  Service: Radiology;  Laterality: Right;    PERITONEOCENTESIS Right 7/12/2021    Procedure: PARACENTESIS, ABDOMINAL;  Surgeon: Jay Torre MD;  Location: [facility] CATH LAB;  Service: Radiology;  Laterality: Right;    PERITONEOCENTESIS Right 7/23/2021    Procedure: PARACENTESIS, ABDOMINAL;  Surgeon: Edward De La Torre II, MD;  Location: [facility] CATH LAB;  Service: Interventional Radiology;  Laterality: Right;    PERITONEOCENTESIS Right 8/3/2021    Procedure: PARACENTESIS, ABDOMINAL;  Surgeon: Franco Cheung MD;  Location: [facility] CATH LAB;  Service: Radiology;  Laterality: Right;    PERITONEOCENTESIS Right 8/16/2021    Procedure:  PARACENTESIS, ABDOMINAL;  Surgeon: Jay Torre MD;  Location: [facility] CATH LAB;  Service: Radiology;  Laterality: Right;    PERITONEOCENTESIS Right 8/23/2021    Procedure: PARACENTESIS, ABDOMINAL;  Surgeon: Jay Torre MD;  Location: [facility] CATH LAB;  Service: Radiology;  Laterality: Right;    PERITONEOCENTESIS Right 9/16/2021    Procedure: PARACENTESIS, ABDOMINAL;  Surgeon: Jay Torre MD;  Location: [facility] CATH LAB;  Service: Radiology;  Laterality: Right;    PERITONEOCENTESIS N/A 9/24/2021    Procedure: PARACENTESIS, ABDOMINAL;  Surgeon: Jay Torre MD;  Location: [facility] CATH LAB;  Service: Radiology;  Laterality: N/A;    PERITONEOCENTESIS Right 12/23/2021    Procedure: PARACENTESIS, ABDOMINAL;  Surgeon: Jay Torre MD;  Location: [facility] CATH LAB;  Service: Radiology;  Laterality: Right;    PERITONEOCENTESIS N/A 12/30/2021    Procedure: PARACENTESIS, ABDOMINAL;  Surgeon: Salas Cabrera MD;  Location: [facility] CATH LAB;  Service: Radiology;  Laterality: N/A;    RETROGRADE PYELOGRAPHY Bilateral 9/10/2021    Procedure: PYELOGRAM, RETROGRADE;  Surgeon: Rj Sánchez Jr., MD;  Location: [address];  Service: Urology;  Laterality: Bilateral;    TONSILLECTOMY         Review of patient's allergies indicates:  No Known Allergies    Family History       Problem Relation (Age of Onset)    ADD / ADHD Daughter    COPD Maternal Grandfather    Cancer Mother    Heart disease Maternal Grandmother, Paternal Grandmother    No Known Problems Father, Sister    Sleep apnea Daughter          Tobacco Use    Smoking status: Current Every Day Smoker     Packs/day: 0.25     Years: 27.00     Pack years: 6.75     Types: Cigarettes    Smokeless tobacco: Never Used    Tobacco comment: two cigarettes a day    Substance and Sexual Activity    Alcohol use: Not Currently     Comment: quit caridad 15    Drug use: No    Sexual activity: Yes     Partners: Male      Review of Systems   Unable to perform ROS:  Other -- lethargic from anesthetic  Objective:     Vital Signs (Most Recent):  Temp: 98.8 °F (37.1 °C) (06/05/22 2215)  Pulse: 109 (06/05/22 2215)  Resp: 18 (06/05/22 2215)  SpO2: 100 % (06/05/22 2215) Vital Signs (24h Range):  Temp:  [98.8 °F (37.1 °C)] 98.8 °F (37.1 °C)  Pulse:  [109] 109  Resp:  [18] 18  SpO2:  [100 %] 100 %  Arterial Line BP: (121-123)/(58-63) 121/58        There is no height or weight on file to calculate BMI.      Intake/Output Summary (Last 24 hours) at 6/5/2022 2249  Last data filed at 6/5/2022 2133  Gross per 24 hour   Intake 6000 ml   Output 1249 ml   Net 4751 ml       Physical Exam  Vitals reviewed.   Constitutional:       Comments: Lethargic, arousable   HENT:      Head: Normocephalic and atraumatic.      Nose: Nose normal.      Mouth/Throat:      Mouth: Mucous membranes are moist.      Comments: Simple face mask  Neck:      Comments: RIJ introducer  Cardiovascular:      Rate and Rhythm: Normal rate and regular rhythm.      Pulses: Normal pulses.   Pulmonary:      Effort: Pulmonary effort is normal. No respiratory distress.   Abdominal:      Comments: Chevron incision with island dressing in place   JENSEN drains in place with serosanguinous output  Abdomen soft, non-distended   Genitourinary:     Comments: armenta  Musculoskeletal:      Cervical back: Normal range of motion.      Right lower leg: Edema present.      Left lower leg: Edema present.      Comments: R femoral arterial, venous catheters   Skin:     General: Skin is warm and dry.       Vents:       Lines/Drains/Airways       Central Venous Catheter Line  Duration                  Hemodialysis Catheter 06/05/22 1713 <1 day    Percutaneous Central Line Insertion/Assessment - Double Lumen  06/05/22 1708 right femoral vein <1 day    Pulmonary Artery Catheter Assessment  06/05/22 1716 <1 day              Drain  Duration                  Closed/Suction Drain 06/05/22 2109 Right Abdomen Bulb 19 Fr. <1 day         Closed/Suction Drain  06/05/22 2110 Right Abdomen Bulb 19 Fr. <1 day         Urethral Catheter 06/05/22 1619 Straight-tip;Non-latex 16 Fr. <1 day              Arterial Line  Duration             Arterial Line 06/05/22 1540 Left Radial <1 day    Arterial Line 06/05/22 1705 <1 day              Peripheral Intravenous Line  Duration                  Peripheral IV - Single Lumen 05/09/22 1922 22 G Anterior;Right Forearm 27 days         Peripheral IV - Single Lumen 06/05/22 1532 18 G Right Hand <1 day         PILI 06/05/22 1600 Left Antecubital <1 day                    Significant Labs:    CBC/Anemia Profile:  Recent Labs   Lab 06/05/22  1628 06/05/22 1956 06/05/22 2214 06/05/22 2216 06/05/22 2224   WBC 8.34  --   --  18.00*  --    HGB 11.0* 9.3*  --  11.0*  --    HCT 34.8* 29.2* 32* 32.6* 36    164  --  140*  --    *  --   --  97  --    RDW 12.8  --   --  13.7  --         Chemistries:  Recent Labs   Lab 06/05/22  1628 06/05/22  1635 06/05/22 1956    146* 141   K 3.0* 3.0* 3.6    111*  --    CO2 22* 21*  --    BUN 34* 33*  --    CREATININE 1.0 1.1  --    CALCIUM 9.2 9.4  --    ALBUMIN 3.3* 3.4* 2.2*   PROT 6.2  --   --    BILITOT 1.2*  --   --    ALKPHOS 205*  --   --    ALT 11  --  228*   AST 25  --  568*   MG 2.1 2.1 1.9   PHOS  --  4.3  --        All pertinent labs within the past 24 hours have been reviewed.    Significant Imaging: I have reviewed all pertinent imaging results/findings within the past 24 hours.    Assessment/Plan:     * Liver transplant candidate  Malu Montes is a 52yoF with a history of alcoholic cirrhosis s/p TIPS procedure 5/2022 who underwent liver transplant on 6/5/22. She is admitted to the SICU post-operatively.     Neuro:  - off sedation  - dilaudid for pain  - PO oxycodone when more alert    CV:  - MAP goal > 65  - Drips: off vasopressor support    Pulm:  - extubated on arrival  - ABG within normal limits  - wean supplemental O2 as tolerated  - Toby    FEN/GI:  - NPO  - ok for  sips, clears when more alert  - Trend CMP  - Lyte replacement prn    Renal:  - Thomas in place  - Monitor UOP  - Trend BUN/Cr  - received intra-operative dialysis  - made 1.2L urine output intra-operatively    Heme/Onc:  - Received a total of 1u pRBC intra-operatively  - stable H/H post-operatively  - Trend H/H with daily labs  - JENSEN drains with serosanguinous output  - DVT ppx    ID:  - AF  - Trend WBC  - Immunosuppressives: methylprednisolone, tacrolimus, mycophenolate  - Ppx: unasyn, bactrim, valganciclovir    Endo:  - Insulin gtt    PPx:  - Protonix, TEDs/SCDs    Dispo: continue ICU care           Edward Bustillo MD  Critical Care - Surgery  [facility]

## 2022-06-06 NOTE — SUBJECTIVE & OBJECTIVE
"Interval HPI:   Overnight events: Received in ICU. SUSANEON. BG at or above goal on IV insulin infusion rates 2-4.6 u/hr. Solumedrol  200 mg; standard  steroids per primary. 1 Day Post-Op  Eating: Diet Clear liquid (no sugar)/Bariatric  Nausea: No  Hypoglycemia and intervention: No  Fever: No  TPN and/or TF: No      PMH, PSH, FH, SH reviewed     Review of Systems  Constitutional: Negative for weight changes.  Eyes: Negative for visual disturbance.  Respiratory: Negative for cough.   Cardiovascular: Negative for chest pain.  Gastrointestinal: Negative for nausea.  Endocrine: Negative for polyuria, polydipsia.  Musculoskeletal: Negative for back pain.  Skin: Negative for rash.  Neurological: Negative for syncope.  Psychiatric/Behavioral: Negative for depression.      Current Medications and/or Treatments Impacting Glycemic Control  Immunotherapy:    Immunosuppressants           Stop Route Frequency     tacrolimus capsule 2 mg         -- Oral 2 times daily     mycophenolate capsule 1,000 mg         -- Oral 2 times daily          Steroids:   Hormones (From admission, onward)                Start     Stop Route Frequency Ordered    06/11/22 0900  predniSONE tablet 20 mg  (methylprednisolone taper panel)        "Followed by" Linked Group Details    -- Oral Daily 06/05/22 2210    06/10/22 0900  methylPREDNISolone sodium succinate injection 40 mg  (methylprednisolone taper panel)        "Followed by" Linked Group Details    06/11 0859 IV Daily 06/05/22 2210 06/09/22 0900  methylPREDNISolone sodium succinate injection 80 mg  (methylprednisolone taper panel)        "Followed by" Linked Group Details    06/10 0859 IV Daily 06/05/22 2210 06/08/22 0900  methylPREDNISolone sodium succinate injection 120 mg  (methylprednisolone taper panel)        "Followed by" Linked Group Details    06/09 0859 IV Daily 06/05/22 2210 06/07/22 0900  methylPREDNISolone sodium succinate injection 160 mg  (methylprednisolone taper panel)      " "  "Followed by" Linked Group Details    06/08 0859 IV Daily 06/05/22 2210          Pressors:    Autonomic Drugs (From admission, onward)                None          Hyperglycemia/Diabetes Medications:   Antihyperglycemics (From admission, onward)                Start     Stop Route Frequency Ordered    06/06/22 1130  insulin regular in 0.9 % NaCl 100 unit/100 mL (1 unit/mL) infusion        Question:  Enter initial dose (Units/hr):  Answer:  2.2    -- IV Continuous 06/06/22 1019    06/06/22 1117  insulin aspart U-100 pen 0-10 Units         -- SubQ As needed (PRN) 06/06/22 1019             PHYSICAL EXAMINATION:  Vitals:    06/06/22 1030   BP:    Pulse: 79   Resp: (!) 22   Temp:      Body mass index is 27.34 kg/m².    Physical Exam  Constitutional: Well developed, well nourished, NAD.  ENT: External ears no masses with nose patent; normal hearing.  Neck: Supple; trachea midline.  Cardiovascular: Normal heart sounds, no LE edema. DP +2 bilaterally.  Lungs: Normal effort; lungs anterior bilaterally clear to auscultation.  Abdomen: Soft, no masses, no hernias.  MS: No clubbing or cyanosis of nails noted;  unable to assess gait.  Skin: No rashes, lesions, or ulcers; no nodules. Chevron abdominal incision with dressing; JENSEN x2.   Psychiatric: Good judgement and insight; normal mood and affect.  Neurological: Cranial nerves are grossly intact. Normal vibration sense in the bilateral lower extremities.  Foot: nails in good condition, no amputations noted.    "

## 2022-06-06 NOTE — PLAN OF CARE
Pt is AAOx4, VSS, and 2 person assist. Pt transferred from SICU to TSU. D5 1/2 NS infusing 50 ml/hr. Blood glucose monitoring performed and treated as ordered- Insulin infusing 1.1 unit/hr.  JENSEN drains to bulb suction. VISI and telemetry monitoring in place. Pt's family at bedside. Bed alarm set and RN instructed pt to call for assistance to ambulate- pt verbalized understanding. Pt's bed lowest position, call bell within reach, nonskid socks on, SCDs applied to pt, and RN encouraged pt to call for any assistance needed. RN rounded on pt throughout shift. RN will continue to monitor.

## 2022-06-06 NOTE — PLAN OF CARE
Significant events: Admitted to SICU s/p Liver transplant. 1 L albumin administered. Patient drowsy, few verbal responses provided. Bedside swallow study not attempted due to lethargy.     Vitals/Respiratory:  1 L NC. O2: >97%.   HR: 's, sinus/sinus tachycardia.  SBP: 100-160.  CVP: 5-13. Temperature max: 98.7 F.   Gtts:  MIVF and Insulin.   UOP:  45 -105 cc/hr from armenta catheter.       Last Bowel Movement: LEONORA from patient.   JENSEN drains: 415 cc total serosanguinous output.   Neuro: Pupils equal and reactive. Patient oriented to self only. Follows commands to all four extremities.   Diet:  NPO.   Labs/Accuchecks: CBC, INR, AST Q4 hrs. Renal and mag Q8 hrs. BMP and phos Q8 hrs. Accuchecks Q1 hrs.        Plan:   Liver US today. Trend lab values. Continue ICU care. Plan of care reviewed with patient and family over the phone, all questions answered.     Skin:  Turned Q2 hrs. No skin breakdown noted. Waffle mattress, sacral foam, and heel boots in use.

## 2022-06-06 NOTE — PLAN OF CARE
Notified by Geena Bautista that patient will be inactive on the kidney transplant list. I went to bedside to notify the patient directly. She was more awake and alert than this morning and vocalized understanding.

## 2022-06-06 NOTE — PLAN OF CARE
Recommendations    1. When medically able, ADAT to Regular diet      2. If PO intake < 50%, add Boost glucose control BID      3. RD to monitor and follow    Goals: Meet % EEN, EPN by RD f/u date  Nutrition Goal Status: new  Communication of RD Recs:  (POC)    Tamica DIAZ-RADHAN

## 2022-06-06 NOTE — CONSULTS
"[facility]  Endocrinology  Diabetes Consult Note    Consult Requested by: Leslie Umaña MD   Reason for admit: S/P liver transplant    HISTORY OF PRESENT ILLNESS:  Reason for Consult: Management of  Hyperglycemia     Surgical Procedure and Date: liver transplant 6/6/22        HPI:   Patient is a 52 y.o. female with a diagnosis of HLD, hypothyroidism, and ESLD. No personal history of DM. Patient admitted for liver transplant. Endocrinology consulted for BG management.       Lab Results   Component Value Date    HGBA1C 5.4 06/05/2022           Interval HPI:   Overnight events: Received in ICU. NAEON. BG at or above goal on IV insulin infusion rates 2-4.6 u/hr. Solumedrol  200 mg; standard  steroids per primary. 1 Day Post-Op  Eating: Diet Clear liquid (no sugar)/Bariatric  Nausea: No  Hypoglycemia and intervention: No  Fever: No  TPN and/or TF: No      PMH, PSH, FH, SH reviewed     Review of Systems  Constitutional: Negative for weight changes.  Eyes: Negative for visual disturbance.  Respiratory: Negative for cough.   Cardiovascular: Negative for chest pain.  Gastrointestinal: Negative for nausea.  Endocrine: Negative for polyuria, polydipsia.  Musculoskeletal: Negative for back pain.  Skin: Negative for rash.  Neurological: Negative for syncope.  Psychiatric/Behavioral: Negative for depression.      Current Medications and/or Treatments Impacting Glycemic Control  Immunotherapy:    Immunosuppressants           Stop Route Frequency     tacrolimus capsule 2 mg         -- Oral 2 times daily     mycophenolate capsule 1,000 mg         -- Oral 2 times daily          Steroids:   Hormones (From admission, onward)                Start     Stop Route Frequency Ordered    06/11/22 0900  predniSONE tablet 20 mg  (methylprednisolone taper panel)        "Followed by" Linked Group Details    -- Oral Daily 06/05/22 2210    06/10/22 0900  methylPREDNISolone sodium succinate injection 40 mg  " "(methylprednisolone taper panel)        "Followed by" Linked Group Details    06/11 0859 IV Daily 06/05/22 2210 06/09/22 0900  methylPREDNISolone sodium succinate injection 80 mg  (methylprednisolone taper panel)        "Followed by" Linked Group Details    06/10 0859 IV Daily 06/05/22 2210 06/08/22 0900  methylPREDNISolone sodium succinate injection 120 mg  (methylprednisolone taper panel)        "Followed by" Linked Group Details    06/09 0859 IV Daily 06/05/22 2210 06/07/22 0900  methylPREDNISolone sodium succinate injection 160 mg  (methylprednisolone taper panel)        "Followed by" Linked Group Details    06/08 0859 IV Daily 06/05/22 2210          Pressors:    Autonomic Drugs (From admission, onward)                None          Hyperglycemia/Diabetes Medications:   Antihyperglycemics (From admission, onward)                Start     Stop Route Frequency Ordered    06/06/22 1130  insulin regular in 0.9 % NaCl 100 unit/100 mL (1 unit/mL) infusion        Question:  Enter initial dose (Units/hr):  Answer:  2.2    -- IV Continuous 06/06/22 1019    06/06/22 1117  insulin aspart U-100 pen 0-10 Units         -- SubQ As needed (PRN) 06/06/22 1019             PHYSICAL EXAMINATION:  Vitals:    06/06/22 1030   BP:    Pulse: 79   Resp: (!) 22   Temp:      Body mass index is 27.34 kg/m².    Physical Exam  Constitutional: Well developed, well nourished, NAD.  ENT: External ears no masses with nose patent; normal hearing.  Neck: Supple; trachea midline.  Cardiovascular: Normal heart sounds, no LE edema. DP +2 bilaterally.  Lungs: Normal effort; lungs anterior bilaterally clear to auscultation.  Abdomen: Soft, no masses, no hernias.  MS: No clubbing or cyanosis of nails noted;  unable to assess gait.  Skin: No rashes, lesions, or ulcers; no nodules. Chevron abdominal incision with dressing; JENSEN x2.   Psychiatric: Good judgement and insight; normal mood and affect.  Neurological: Cranial nerves are grossly intact. " Normal vibration sense in the bilateral lower extremities.  Foot: nails in good condition, no amputations noted.        Labs Reviewed and Include   Recent Labs   Lab 06/06/22  0400 06/06/22  0610 06/06/22  1006   *  151*  --   --    CALCIUM 8.5*  8.5*  --   --    ALBUMIN 3.5  3.5  --   --    PROT 5.3*  --   --      137  --   --    K 4.3  4.1  --   --    CO2 19*  20*  --   --      107  --   --    BUN 18  18  --   --    CREATININE 1.1  1.0  --   --    ALKPHOS 115  --   --    *  --   --    *   < > 226*   BILITOT 2.0*  --   --     < > = values in this interval not displayed.     Lab Results   Component Value Date    WBC 13.04 (H) 06/06/2022    HGB 10.2 (L) 06/06/2022    HCT 31.1 (L) 06/06/2022    MCV 98 06/06/2022     (L) 06/06/2022     No results for input(s): TSH, FREET4 in the last 168 hours.  Lab Results   Component Value Date    HGBA1C 5.4 06/05/2022       Nutritional status:   Body mass index is 27.34 kg/m².  Lab Results   Component Value Date    ALBUMIN 3.5 06/06/2022    ALBUMIN 3.5 06/06/2022    ALBUMIN 2.1 (L) 06/05/2022     No results found for: PREALBUMIN    Estimated Creatinine Clearance: 62.4 mL/min (based on SCr of 1 mg/dL).    Accu-Checks  Recent Labs     06/05/22  2303 06/06/22  0012 06/06/22  0104 06/06/22  0201 06/06/22  0306 06/06/22  0405 06/06/22  0506 06/06/22  0554 06/06/22  0721 06/06/22  0807   POCTGLUCOSE 184* 217* 203* 206* 177* 150* 158* 160* 187* 180*        ASSESSMENT and PLAN    * S/P liver transplant  avoid hypoglycemia        Steroid-induced hyperglycemia  BG goal 140 - 180     Change to transition insulin infusion at 1.1 u/hr.   BG monitoring ac/hs/0200 and moderate dose correction scale.       Liver transplant candidate  Managed per primary.       Hypothyroidism  Managed per primary.   Continue levothyroxine 50 mcg daily.   Lab Results   Component Value Date    TSH 2.550 05/06/2022    FREET4 0.94 07/15/2021           Adrenal cortical  steroids causing adverse effect in therapeutic use  Glucocorticoids markedly increase prandial glucoses. Expect the steroid taper will help glucose control.         Prophylactic immunotherapy  May increase insulin resistance.             Plan discussed with patient and RN at bedside.     Leslie Dave NP  Endocrinology  [facility]

## 2022-06-06 NOTE — PROGRESS NOTES
06/05/22 2115   Pre-Hemodialysis Assessment   Treatment Status Completed   During Hemodialysis Assessment   Blood Flow Rate (mL/min) 150 mL/min   Dialysate Flow Rate (mL/min) 200 ml/min   Ultrafiltration Rate (mL/Hr) 10 mL/Hr   Arteriovenous Lines Secure Yes   Arterial Pressure (mmHg) -100 mmHg   Venous Pressure (mmHg) 60   Blood Volume Processed (Liters) 45.1 L   UF Removed (mL) 49 mL   TMP 10   Venous Line in Air Detector Yes   Intake (mL) 300 mL   Intra-Hemodialysis Comments Intra-op treatment completed.   Post-Hemodialysis Assessment   Rinseback Volume (mL) 400 mL   Blood Volume Processed (Liters) 45.1 L   Dialyzer Clearance Clear   Duration of Treatment 4.08 minutes   Additional Fluid Intake (mL) 550 mL   Total UF (mL) 49 mL   Net Fluid Removal 501   Patient Response to Treatment INTRA-OP   Post-Hemodialysis Comments Intra-op completed.

## 2022-06-06 NOTE — PROGRESS NOTES
Met with patient in the ICU.  Gave her My New Journey: Living Smart After My Liver Transplant.  Asked patient to read the book in preparation for education.  Allowed time for questions and answers.

## 2022-06-06 NOTE — HPI
Reason for Consult: Management of  Hyperglycemia     Surgical Procedure and Date: liver transplant 6/6/22        HPI:   Patient is a 52 y.o. female with a diagnosis of HLD, hypothyroidism, and ESLD. No personal history of DM. Patient admitted for liver transplant. Endocrinology consulted for BG management.       Lab Results   Component Value Date    HGBA1C 5.4 06/05/2022

## 2022-06-06 NOTE — CONSULTS
[facility]  Adult Nutrition  Consult Note    SUMMARY     Recommendations    1. When medically able, ADAT to Regular diet      2. If PO intake < 50%, add Boost glucose control BID      3. RD to monitor and follow    Goals: Meet % EEN, EPN by RD f/u date  Nutrition Goal Status: new  Communication of RD Recs:  (POC)    Assessment and Plan    Nutrition Problem  Increased nutrient needs (protein, energy)    Related to (etiology):   Increased physiological demands    Signs and Symptoms (as evidenced by):   S/p Liver transplant (6/6)    Interventions/Recommendations (treatment strategy):  Collaboration with other providers  Nutrition education    Nutrition Diagnosis Status:   New     Reason for Assessment    Reason For Assessment: consult  Diagnosis: transplant/postoperative complications  Relevant Medical History: s/p liver tx (6/6), hypothyroidism, CKD stage 4, HLD, Iron deficiency anemia  Interdisciplinary Rounds: did not attend    General Information Comments:   S/p DBD Liver transplant (6/6). Intra-operative dialysis. Pt was sleeping during RD visits. Unable to talk to pt at this time. Noted wt gain of 21 lb (15%) in two week per chart - could be fluid related. 1+ generalized edema per chart. NFPE not warranted. Post tx education material left at bedside. Full assessment and post tx nutrition education to provide tomorrow.     Nutrition Discharge Planning: Post transplant nutrition education to provide during f/u    Anthropometrics    Temp: 98.9 °F (37.2 °C)  Weight Method: Bed Scale  Weight: 70 kg (154 lb 5.2 oz)  Weight (lb): 154.32 lb  BMI (Calculated): 27.3     Lab/Procedures/Meds    Pertinent Labs Reviewed: reviewed  Pertinent Labs Comments: Glucose 151, , , eGFR 58.3  Pertinent Medications Reviewed: reviewed  Pertinent Medications Comments: prednisone, methyprednisolone, famotidine, tacrolimus, vit k, insulin regular, D5    Estimated/Assessed Needs    Weight Used For  Calorie Calculations: 70 kg (154 lb 5.2 oz)  Energy Calorie Requirements (kcal): 6764-8521 kcal  Energy Need Method: Kcal/kg (25-30kcal/kg)  Protein Requirements: 84-105g (1.2-1.5g/kg)  Weight Used For Protein Calculations: 70 kg (154 lb 5.2 oz)  Fluid Requirements (mL): 1ml/kcal or per MD  Estimated Fluid Requirement Method: RDA Method  RDA Method (mL): 1750     Nutrition Prescription Ordered    Current Diet Order: CLD    Evaluation of Received Nutrient/Fluid Intake    I/O: + 5.5 L since admit  Energy Calories Required: not meeting needs  Protein Required: not meeting needs    Nutrition Risk    Level of Risk/Frequency of Follow-up: low     Monitor and Evaluation    Food and Nutrient Intake: food and beverage intake, energy intake  Food and Nutrient Adminstration: diet order  Knowledge/Beliefs/Attitudes: food and nutrition knowledge/skill, beliefs and attitudes  Physical Activity and Function: nutrition-related ADLs and IADLs  Anthropometric Measurements: height/length, weight, weight change, body mass index  Biochemical Data, Medical Tests and Procedures: electrolyte and renal panel, gastrointestinal profile, glucose/endocrine profile, inflammatory profile, lipid profile  Nutrition-Focused Physical Findings: overall appearance     Nutrition Follow-Up    RD Follow-up?: Yes     Tamica HASSAN

## 2022-06-06 NOTE — PROGRESS NOTES
Admit/Transplant Note     Met with patient to assess needs. Patient is a 52 y.o.  female, admitted for liver transplant, pt transplanted on 6/5/22.  Patient is on the wait list for liver and kidney transplant.     Patient admitted on 6/5/2022 .  At this time, patient presents as alert and oriented x 4 and cooperative, yet still somewhat groggy due to just having surgery.  At this time, patients caregiver was not present.    Household/Family Systems     Patient resides with patient's  and mother in law, at:   [address] [place] [address].      Support system includes her 26 year old daughter, Sophie Oakes, who lives in [place]. Patient had a 2nd daughter, Rosita, who passed away in March 2021 at 24 years old. Patient has a sister, Mora, who lives in [place]. Patient reports having uncles, aunts and cousins and good relationship with her family members. Patient has a mother-in-law who will be her primary caregiver. Patient does not have dependents that are need of being cared for.     Patients primary caregiver is Soco Mera, patients mother in law, phone number [phone].  Confirmed patients contact information is [phone] (home);   Telephone Information:   Mobile [phone]       During admission, patient's caregiver plans to stay at home.  Confirmed patient and patients caregivers do have access to reliable transportation.    Cognitive Status/Learning     Patient reports reading ability as 12th grade and states patient does not have difficulty with reading, seeing, hearing and memory.  Patient reports patient learns best by written information.   Needed: No.   Highest education level: High School (9-12) or GED    Vocation/Disability     Working for Income: yes  If yes, working activity level: Working Full Time  Patient is employed as  at [employer]. Patient is able to work from home and will utilize STD during transplant. Pt also has a lot of time she  can use for time off.    Adherence     Patient reports a high level of adherence to patients health care regimen.  Adherence counseling and education provided. Patient verbalizes understanding.    Substance Use    Patient reports the following substance usage.    Tobacco: Patient reports smoking cigarettes. She states she has weaned herself off of it and now she will only smoke about 1 cigarette per day and usually doesn't finish it.  Patient plans to quit, with aid of smoking cessation or patches.   Alcohol: none, patient denies any use. 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 Patient reports last alcohol use was Lynsey 15, 2021. Patient states she quit alcohol use shortly after learning of liver disease. Patient reports heavy drinking daily prior to quitting. Pt reports quitting on her on and not attending any program or AA.  Illicit Drugs/Non-prescribed Medications: none, patient denies any use.  Patient states clear understanding of the potential impact of substance use.  Substance abstinence/cessation counseling, education and resources provided and reviewed.     Services Utilizing/ADLS    Infusion Service: Prior to admission, patient utilizing? no  Home Health: Prior to admission, patient utilizing? no  DME: Prior to admission, no  Pulmonary/Cardiac Rehab: Prior to admission, no  Dialysis:  Prior to admission, no  Transplant Specialty Pharmacy:  Prior to admission, no.    Prior to admission, patient reports patient was independent with ADLS and was driving.  Patient reports patient is not able to care for self at this time due to compromised medical condition (as documented in medical record) and physical weakness.  Patient indicates a willingness to care for self once medically cleared to do so.    Insurance/Medications    Insured by   Payer/Plan Subscr  Sex Relation Sub. Ins. ID Effective Group Num   1. AET[employer] - AETNA* LISSA DIAZ* 1970 Female Self [id number]                           "         [address]      Primary Insurance (for UNOS reporting): Private Insurance  Secondary Insurance (for UNOS reporting): None    Patient reports patient is able to obtain and afford medications at this time and at time of discharge.    Living Will/Healthcare Power of     Patient states patient does not have a LW and/or HCPA.   provided education regarding LW and HCPA and the completion of forms.    Coping/Mental Health    Patient is coping adequately with the aid of  family members and friends. Patient denies any issues with depression. Pt lost her 24 year old daughter in March of 2021.  Patient denies mental health difficulties at this time. Pt tearful today, likely from medication, and reported "it is overwhelming". SW provided emotional support to the patient.     Discharge Planning    At time of discharge, patient plans to return to patient's home under the care of her  and mother in law.  Patients  will transport patient.  Per rounds today, expected discharge date has not been medically determined at this time. Patient and patients caregiver  verbalize understanding and are involved in treatment planning and discharge process.    Additional Concerns    Patient is being followed for needs, education, resources, information, emotional support, supportive counseling, and for supportive and skilled discharge plan of care.  providing ongoing psychosocial support, education, resources and d/c planning as needed.  SW remains available. Patient denies additional needs and/or concerns at this time. Patient verbalizes understanding and agreement with information reviewed, social work availability, and how to access available resources as needed.  "

## 2022-06-06 NOTE — PROGRESS NOTES
Autotransfusion/Rapid Infusion Record:      06/05/2022  Autotransfusionist:  Lakhwinder Arvizu    Surgeon(s) and Role:     * Franco Slaughter MD - Primary     * Dario Jernigan MD - Assisting     * Sisi Bourgeois MD - Fellow  Anesthesiologist:  Caleb Salcido MD    Past Medical History:   Diagnosis Date    ADD (attention deficit disorder)     Anxiety     Ascites of liver     Cirrhosis 9/16/2021    CKD (chronic kidney disease) stage 3, GFR 30-59 ml/min     Constipation     ETOH abuse     Hyperlipidemia     Hypothyroidism     Other ascites 6/14/2021    Pancreatitis, acute     Tobacco use     Vitamin D deficiency        Procedure(s) (LRB):  TRANSPLANT, LIVER (N/A)     10:13 PM    Equipment:    Cell Saver     R.I.S.  : [employer] Model: CATSmart or CATSplus : [place]   Model: [id number]     Serial number [id number]   Serial number:    Disposable lot #:    Disposable lot #:      Were extra cardiotomies used for cell saver?  YES  if yes, #:  01    Solutions:  Anticoagulant: ACD-A   Expiration date: 08/2023 Volume used: 2000ml   Wash solution: 0.9% NaCl   Expiration date: 03/2025 Volume used: 1815ml     Cell saver checklist  Time completed:           []   Circuit assembled correctly     []   Cell saver powered and operational     []   Vacuum connected, functional, adjust to max -150mmHg     []   Anticoagulant drip rate adjusted     []   Transfer bag properly labeled with patient name, expiration time, volume,       anticoagulant, OR number, and initials     []   Cell saver disinfected after use (completed at end of case)       Cell Saver volumes:    Total volume processed:     4675 mL     Total volume pRBCs recovered     876 mL     Volume pRBCs infused     876 mL         RIS checklist   Time completed:  []   RIS circuit assembled correctly     []   RIS power and operational     []   RIS disinfected after use (completed at end of case)       RIS volumes:    Total volume infused:    (see  anesthesia record for blood       product information)    mL       Additional comments:

## 2022-06-06 NOTE — ASSESSMENT & PLAN NOTE
BG goal 140 - 180     Change to transition insulin infusion at 1.1 u/hr.   BG monitoring ac/hs/0200 and moderate dose correction scale.

## 2022-06-06 NOTE — PROGRESS NOTES
Patient's pupils equal and reactive. Patient able to follow commands in all four extremities. Patient unable to answer orientation questions due to drowsiness. Unable to perform bedside swallow study at this time.  Dr. Bustillo notified, orders received to administer famotidine and cellcept as IV medications.

## 2022-06-07 PROBLEM — Z76.82 LIVER TRANSPLANT CANDIDATE: Status: RESOLVED | Noted: 2022-06-05 | Resolved: 2022-06-07

## 2022-06-07 PROBLEM — Z79.60 LONG-TERM USE OF IMMUNOSUPPRESSANT MEDICATION: Status: ACTIVE | Noted: 2022-06-07

## 2022-06-07 PROBLEM — T86.43 RECEIVED LIVER TRANSPLANT FROM DONOR WITH HEPATITIS C: Status: ACTIVE | Noted: 2022-06-07

## 2022-06-07 PROBLEM — Z91.89 AT RISK FOR OPPORTUNISTIC INFECTIONS: Status: ACTIVE | Noted: 2022-06-07

## 2022-06-07 PROBLEM — B19.20 RECEIVED LIVER TRANSPLANT FROM DONOR WITH HEPATITIS C: Status: ACTIVE | Noted: 2022-06-07

## 2022-06-07 LAB
ALBUMIN SERPL BCP-MCNC: 3 G/DL (ref 3.5–5.2)
ALP SERPL-CCNC: 93 U/L (ref 55–135)
ALT SERPL W/O P-5'-P-CCNC: 115 U/L (ref 10–44)
ANION GAP SERPL CALC-SCNC: 8 MMOL/L (ref 8–16)
AST SERPL-CCNC: 143 U/L (ref 10–40)
BASOPHILS # BLD AUTO: 0.08 K/UL (ref 0–0.2)
BASOPHILS NFR BLD: 0.4 % (ref 0–1.9)
BILIRUB SERPL-MCNC: 0.6 MG/DL (ref 0.1–1)
BUN SERPL-MCNC: 37 MG/DL (ref 6–20)
CALCIUM SERPL-MCNC: 8 MG/DL (ref 8.7–10.5)
CHLORIDE SERPL-SCNC: 107 MMOL/L (ref 95–110)
CO2 SERPL-SCNC: 23 MMOL/L (ref 23–29)
CREAT SERPL-MCNC: 1.4 MG/DL (ref 0.5–1.4)
DIFFERENTIAL METHOD: ABNORMAL
EOSINOPHIL # BLD AUTO: 0.4 K/UL (ref 0–0.5)
EOSINOPHIL NFR BLD: 2.3 % (ref 0–8)
ERYTHROCYTE [DISTWIDTH] IN BLOOD BY AUTOMATED COUNT: 13.5 % (ref 11.5–14.5)
EST. GFR  (AFRICAN AMERICAN): 50.2 ML/MIN/1.73 M^2
EST. GFR  (NON AFRICAN AMERICAN): 43.5 ML/MIN/1.73 M^2
GLUCOSE SERPL-MCNC: 109 MG/DL (ref 70–110)
HCT VFR BLD AUTO: 28.7 % (ref 37–48.5)
HGB BLD-MCNC: 9.5 G/DL (ref 12–16)
HPRA INTERPRETATION: NORMAL
IMM GRANULOCYTES # BLD AUTO: 0.09 K/UL (ref 0–0.04)
IMM GRANULOCYTES NFR BLD AUTO: 0.5 % (ref 0–0.5)
INR PPP: 1 (ref 0.8–1.2)
LYMPHOCYTES # BLD AUTO: 0.9 K/UL (ref 1–4.8)
LYMPHOCYTES NFR BLD: 5.1 % (ref 18–48)
MAGNESIUM SERPL-MCNC: 2.4 MG/DL (ref 1.6–2.6)
MCH RBC QN AUTO: 32.3 PG (ref 27–31)
MCHC RBC AUTO-ENTMCNC: 33.1 G/DL (ref 32–36)
MCV RBC AUTO: 98 FL (ref 82–98)
MONOCYTES # BLD AUTO: 1 K/UL (ref 0.3–1)
MONOCYTES NFR BLD: 5.3 % (ref 4–15)
NEUTROPHILS # BLD AUTO: 15.9 K/UL (ref 1.8–7.7)
NEUTROPHILS NFR BLD: 86.4 % (ref 38–73)
NRBC BLD-RTO: 0 /100 WBC
PHOSPHATE SERPL-MCNC: 4.9 MG/DL (ref 2.7–4.5)
PLATELET # BLD AUTO: 105 K/UL (ref 150–450)
PMV BLD AUTO: 11 FL (ref 9.2–12.9)
POCT GLUCOSE: 118 MG/DL (ref 70–110)
POCT GLUCOSE: 153 MG/DL (ref 70–110)
POCT GLUCOSE: 156 MG/DL (ref 70–110)
POCT GLUCOSE: 157 MG/DL (ref 70–110)
POCT GLUCOSE: 163 MG/DL (ref 70–110)
POTASSIUM SERPL-SCNC: 4.1 MMOL/L (ref 3.5–5.1)
PROT SERPL-MCNC: 4.7 G/DL (ref 6–8.4)
PROTHROMBIN TIME: 10.9 SEC (ref 9–12.5)
RBC # BLD AUTO: 2.94 M/UL (ref 4–5.4)
SODIUM SERPL-SCNC: 138 MMOL/L (ref 136–145)
TACROLIMUS BLD-MCNC: 4.5 NG/ML (ref 5–15)
WBC # BLD AUTO: 18.35 K/UL (ref 3.9–12.7)

## 2022-06-07 PROCEDURE — 94761 N-INVAS EAR/PLS OXIMETRY MLT: CPT | Mod: NTX

## 2022-06-07 PROCEDURE — 63600175 PHARM REV CODE 636 W HCPCS: Mod: NTX | Performed by: STUDENT IN AN ORGANIZED HEALTH CARE EDUCATION/TRAINING PROGRAM

## 2022-06-07 PROCEDURE — 83735 ASSAY OF MAGNESIUM: CPT | Mod: NTX

## 2022-06-07 PROCEDURE — 97116 GAIT TRAINING THERAPY: CPT | Mod: NTX

## 2022-06-07 PROCEDURE — 20600001 HC STEP DOWN PRIVATE ROOM: Mod: NTX

## 2022-06-07 PROCEDURE — 99233 SBSQ HOSP IP/OBS HIGH 50: CPT | Mod: NTX,,,

## 2022-06-07 PROCEDURE — 80053 COMPREHEN METABOLIC PANEL: CPT | Mod: NTX | Performed by: TRANSPLANT SURGERY

## 2022-06-07 PROCEDURE — 85025 COMPLETE CBC W/AUTO DIFF WBC: CPT | Mod: NTX

## 2022-06-07 PROCEDURE — 94664 DEMO&/EVAL PT USE INHALER: CPT | Mod: NTX

## 2022-06-07 PROCEDURE — 99900035 HC TECH TIME PER 15 MIN (STAT): Mod: NTX

## 2022-06-07 PROCEDURE — 80197 ASSAY OF TACROLIMUS: CPT | Mod: NTX | Performed by: TRANSPLANT SURGERY

## 2022-06-07 PROCEDURE — 97161 PT EVAL LOW COMPLEX 20 MIN: CPT | Mod: NTX

## 2022-06-07 PROCEDURE — 99232 SBSQ HOSP IP/OBS MODERATE 35: CPT | Mod: NTX,,, | Performed by: NURSE PRACTITIONER

## 2022-06-07 PROCEDURE — 84100 ASSAY OF PHOSPHORUS: CPT | Mod: NTX

## 2022-06-07 PROCEDURE — 25000003 PHARM REV CODE 250: Mod: NTX

## 2022-06-07 PROCEDURE — 63600175 PHARM REV CODE 636 W HCPCS: Mod: NTX | Performed by: TRANSPLANT SURGERY

## 2022-06-07 PROCEDURE — 99232 PR SUBSEQUENT HOSPITAL CARE,LEVL II: ICD-10-PCS | Mod: NTX,,, | Performed by: NURSE PRACTITIONER

## 2022-06-07 PROCEDURE — 25000003 PHARM REV CODE 250: Mod: NTX | Performed by: TRANSPLANT SURGERY

## 2022-06-07 PROCEDURE — 25000003 PHARM REV CODE 250: Mod: NTX | Performed by: NURSE PRACTITIONER

## 2022-06-07 PROCEDURE — 25000003 PHARM REV CODE 250: Mod: NTX | Performed by: STUDENT IN AN ORGANIZED HEALTH CARE EDUCATION/TRAINING PROGRAM

## 2022-06-07 PROCEDURE — 99233 PR SUBSEQUENT HOSPITAL CARE,LEVL III: ICD-10-PCS | Mod: NTX,,,

## 2022-06-07 PROCEDURE — 85610 PROTHROMBIN TIME: CPT | Mod: NTX | Performed by: TRANSPLANT SURGERY

## 2022-06-07 PROCEDURE — 94799 UNLISTED PULMONARY SVC/PX: CPT | Mod: NTX

## 2022-06-07 RX ORDER — POLYETHYLENE GLYCOL 3350 17 G/17G
17 POWDER, FOR SOLUTION ORAL 2 TIMES DAILY
Status: DISCONTINUED | OUTPATIENT
Start: 2022-06-07 | End: 2022-06-10 | Stop reason: HOSPADM

## 2022-06-07 RX ORDER — LIDOCAINE HYDROCHLORIDE 10 MG/ML
1 INJECTION INFILTRATION; PERINEURAL ONCE
Status: COMPLETED | OUTPATIENT
Start: 2022-06-07 | End: 2022-06-07

## 2022-06-07 RX ORDER — BISACODYL 10 MG
10 SUPPOSITORY, RECTAL RECTAL DAILY PRN
Status: DISCONTINUED | OUTPATIENT
Start: 2022-06-07 | End: 2022-06-08

## 2022-06-07 RX ORDER — SIMETHICONE 80 MG
1 TABLET,CHEWABLE ORAL 3 TIMES DAILY PRN
Status: DISCONTINUED | OUTPATIENT
Start: 2022-06-07 | End: 2022-06-10 | Stop reason: HOSPADM

## 2022-06-07 RX ADMIN — MYCOPHENOLATE MOFETIL 1000 MG: 250 CAPSULE ORAL at 08:06

## 2022-06-07 RX ADMIN — OXYCODONE HYDROCHLORIDE 10 MG: 10 TABLET ORAL at 07:06

## 2022-06-07 RX ADMIN — NYSTATIN 500000 UNITS: 500000 SUSPENSION ORAL at 02:06

## 2022-06-07 RX ADMIN — METHYLPREDNISOLONE SODIUM SUCCINATE 160 MG: 125 INJECTION, POWDER, FOR SOLUTION INTRAMUSCULAR; INTRAVENOUS at 08:06

## 2022-06-07 RX ADMIN — NYSTATIN 500000 UNITS: 500000 SUSPENSION ORAL at 08:06

## 2022-06-07 RX ADMIN — TACROLIMUS 2 MG: 1 CAPSULE ORAL at 06:06

## 2022-06-07 RX ADMIN — HEPARIN SODIUM 5000 UNITS: 5000 INJECTION INTRAVENOUS; SUBCUTANEOUS at 04:06

## 2022-06-07 RX ADMIN — LEVOTHYROXINE SODIUM 50 MCG: 50 TABLET ORAL at 05:06

## 2022-06-07 RX ADMIN — OXYCODONE HYDROCHLORIDE 10 MG: 10 TABLET ORAL at 02:06

## 2022-06-07 RX ADMIN — NYSTATIN 500000 UNITS: 500000 SUSPENSION ORAL at 06:06

## 2022-06-07 RX ADMIN — OXYCODONE HYDROCHLORIDE 10 MG: 10 TABLET ORAL at 10:06

## 2022-06-07 RX ADMIN — LINACLOTIDE 145 MCG: 145 CAPSULE, GELATIN COATED ORAL at 05:06

## 2022-06-07 RX ADMIN — HEPARIN SODIUM 5000 UNITS: 5000 INJECTION INTRAVENOUS; SUBCUTANEOUS at 05:06

## 2022-06-07 RX ADMIN — HYDROMORPHONE HYDROCHLORIDE 0.5 MG: 1 INJECTION, SOLUTION INTRAMUSCULAR; INTRAVENOUS; SUBCUTANEOUS at 06:06

## 2022-06-07 RX ADMIN — HYDROMORPHONE HYDROCHLORIDE 0.5 MG: 1 INJECTION, SOLUTION INTRAMUSCULAR; INTRAVENOUS; SUBCUTANEOUS at 09:06

## 2022-06-07 RX ADMIN — FAMOTIDINE 20 MG: 20 TABLET ORAL at 08:06

## 2022-06-07 RX ADMIN — HYDROMORPHONE HYDROCHLORIDE 0.5 MG: 1 INJECTION, SOLUTION INTRAMUSCULAR; INTRAVENOUS; SUBCUTANEOUS at 01:06

## 2022-06-07 RX ADMIN — LIDOCAINE HYDROCHLORIDE 1 ML: 10 INJECTION, SOLUTION INFILTRATION; PERINEURAL at 03:06

## 2022-06-07 RX ADMIN — SIMETHICONE 80 MG: 80 TABLET, CHEWABLE ORAL at 07:06

## 2022-06-07 RX ADMIN — MUPIROCIN 1 G: 20 OINTMENT TOPICAL at 08:06

## 2022-06-07 RX ADMIN — TACROLIMUS 2 MG: 1 CAPSULE ORAL at 08:06

## 2022-06-07 RX ADMIN — POLYETHYLENE GLYCOL 3350 17 G: 17 POWDER, FOR SOLUTION ORAL at 08:06

## 2022-06-07 RX ADMIN — HEPARIN SODIUM 5000 UNITS: 5000 INJECTION INTRAVENOUS; SUBCUTANEOUS at 08:06

## 2022-06-07 NOTE — PT/OT/SLP EVAL
Physical Therapy Evaluation    Patient Name:  Malu Montes   MRN:  [id number]    Recommendations:     Discharge Recommendations:  home   Discharge Equipment Recommendations:  (TBD)   Barriers to discharge: None    Assessment:     Malu Montes is a 52 y.o. female admitted with a medical diagnosis of S/P liver transplant.  She presents with the following impairments/functional limitations:  weakness, impaired endurance, impaired self care skills, impaired functional mobilty, gait instability, impaired balance, pain Pt. cooperative and tolerated treatment well. Pt. progressing with mobility despite abd. discomfort.    Rehab Prognosis: Good; patient would benefit from acute skilled PT services to address these deficits and reach maximum level of function.    Recent Surgery: Procedure(s) (LRB):  TRANSPLANT, LIVER (N/A) 2 Days Post-Op    Plan:     During this hospitalization, patient to be seen 4 x/week to address the identified rehab impairments via gait training, therapeutic activities, therapeutic exercises and progress toward the following goals:    · Plan of Care Expires:  07/07/22    Subjective     Chief Complaint: abd. discomfort with mobility  Patient/Family Comments/goals: pt. Agreeable to PT  Pain/Comfort:  · Pain Rating 1:  (pt. did not rate)  · Location - Orientation 1: generalized  · Location 1: abdomen  · Pain Addressed 1: Pre-medicate for activity, Reposition, Distraction    Patients cultural, spiritual, Jewish conflicts given the current situation: no    Living Environment:  Pt. Lives with spouse and mother-in-law in 2-story home with no BRANDON. Pt.'s bedroom/bathroom is on first floor.  Prior to admission, patients level of function was indep.  Equipment used at home: none.  Upon discharge, patient will have assistance from family.    Objective:     Communicated with nursing prior to session.  Patient found supine with JENSEN drain, peripheral IV, SCD  upon PT entry to room.    General  Precautions: Standard, fall   Orthopedic Precautions:N/A   Braces: N/A  Respiratory Status: Room air    Exams:  · RLE ROM: WFL  · RLE Strength: WFL  · LLE ROM: WFL  · LLE Strength: WFL    Functional Mobility:  · Bed Mobility:     · Rolling Left:  minimum assistance  · Scooting: minimum assistance  · Supine to Sit: minimum assistance  · Transfers:     · Sit to Stand:  contact guard assistance and minimum assistance with rolling walker  · Gait: 15' and 100' with RW and CGA with decreased step length/rolanda  · Balance: fair    Therapeutic Activities and Exercises:   Discussed therapy needs, goals, and POC. Assisted pt. to/from bathroom and on/off toilet.    AM-PAC 6 CLICK MOBILITY  Total Score:18     Patient left up in chair with all lines intact and call button in reach.    GOALS:   Multidisciplinary Problems     Physical Therapy Goals        Problem: Physical Therapy    Goal Priority Disciplines Outcome Goal Variances Interventions   Physical Therapy Goal     PT, PT/OT Ongoing, Progressing     Description: Goals to be met by: 2022     Patient will increase functional independence with mobility by performin. Supine to sit with Set-up [place]  2. Sit to supine with Set-up [place]  3. Sit to stand transfer with Supervision  4. Bed to chair transfer with Supervision using No Assistive Device  5. Gait  x 200 feet with Supervision using LRAD.   6. Lower extremity exercise program x15 reps per handout, with supervision                     History:     Past Medical History:   Diagnosis Date    ADD (attention deficit disorder)     Anxiety     Ascites of liver     Cirrhosis 2021    CKD (chronic kidney disease) stage 3, GFR 30-59 ml/min     Constipation     ETOH abuse     Hyperlipidemia     Hypothyroidism     Liver transplant candidate 2022    Other ascites 2021    Pancreatitis, acute     Tobacco use     Vitamin D deficiency        Past Surgical History:   Procedure Laterality  Date    AUGMENTATION OF BREAST      breast augmentation       SECTION      COLONOSCOPY N/A 2021    Procedure: COLONOSCOPY;  Surgeon: Dao Gray MD;  Location: [facility] ENDO (2ND FLR);  Service: Endoscopy;  Laterality: N/A;  COVID test 21 General surgery clinic -     CYSTOSCOPY N/A 9/10/2021    Procedure: CYSTOSCOPY;  Surgeon: Rj Sánchez Jr., MD;  Location: [facility] OR 1ST FLR;  Service: Urology;  Laterality: N/A;    ESOPHAGOGASTRODUODENOSCOPY N/A 2021    Procedure: ESOPHAGOGASTRODUODENOSCOPY (EGD);  Surgeon: Dao Gray MD;  Location: [facility] (2ND FLR);  Service: Endoscopy;  Laterality: N/A;  labs current on     ESOPHAGOGASTRODUODENOSCOPY N/A 10/26/2021    Procedure: ESOPHAGOGASTRODUODENOSCOPY (EGD);  Surgeon: Dao Gray MD;  Location: [facility] ENDO (2ND FLR);  Service: Endoscopy;  Laterality: N/A;  to be done the week of 10/18/21 per Dr. Gray-  covid-10/23/21-[facility]-   10/25 arrival time confirmed with pt-rb    ESOPHAGOGASTRODUODENOSCOPY Left 2021    Procedure: EGD (ESOPHAGOGASTRODUODENOSCOPY);  Surgeon: Koffi Monteiro MD;  Location: [facility] (4TH FLR);  Service: Endoscopy;  Laterality: Left;  Rapid  pt requested AM appt and first available in December-  labs morning of procedure-   lvm to confirm appt-rb    HEMORRHOID SURGERY      LIVER TRANSPLANT N/A 2022    Procedure: TRANSPLANT, LIVER;  Surgeon: Franco Slaughter MD;  Location: [facility] OR 2ND FLR;  Service: Transplant;  Laterality: N/A;    PERITONEOCENTESIS Right 2021    Procedure: PARACENTESIS, ABDOMINAL;  Surgeon: Jya Torre MD;  Location: [facility] CATH LAB;  Service: Radiology;  Laterality: Right;    PERITONEOCENTESIS Right 2021    Procedure: PARACENTESIS, ABDOMINAL;  Surgeon: Jay Torre MD;  Location: [facility] CATH LAB;  Service: Radiology;  Laterality: Right;    PERITONEOCENTESIS Right 2021    Procedure: PARACENTESIS, ABDOMINAL;  Surgeon: Jay CARR  MD Nicho;  Location: [facility] CATH LAB;  Service: Radiology;  Laterality: Right;    PERITONEOCENTESIS Right 7/23/2021    Procedure: PARACENTESIS, ABDOMINAL;  Surgeon: Edward De La Torre II, MD;  Location: [facility] CATH LAB;  Service: Interventional Radiology;  Laterality: Right;    PERITONEOCENTESIS Right 8/3/2021    Procedure: PARACENTESIS, ABDOMINAL;  Surgeon: Franco Cheung MD;  Location: [facility] CATH LAB;  Service: Radiology;  Laterality: Right;    PERITONEOCENTESIS Right 8/16/2021    Procedure: PARACENTESIS, ABDOMINAL;  Surgeon: Jay Torre MD;  Location: [facility] CATH LAB;  Service: Radiology;  Laterality: Right;    PERITONEOCENTESIS Right 8/23/2021    Procedure: PARACENTESIS, ABDOMINAL;  Surgeon: Jay Torre MD;  Location: [facility] CATH LAB;  Service: Radiology;  Laterality: Right;    PERITONEOCENTESIS Right 9/16/2021    Procedure: PARACENTESIS, ABDOMINAL;  Surgeon: Jay Torre MD;  Location: [facility] CATH LAB;  Service: Radiology;  Laterality: Right;    PERITONEOCENTESIS N/A 9/24/2021    Procedure: PARACENTESIS, ABDOMINAL;  Surgeon: Jay Torre MD;  Location: [facility] CATH LAB;  Service: Radiology;  Laterality: N/A;    PERITONEOCENTESIS Right 12/23/2021    Procedure: PARACENTESIS, ABDOMINAL;  Surgeon: Jay Torre MD;  Location: [facility] CATH LAB;  Service: Radiology;  Laterality: Right;    PERITONEOCENTESIS N/A 12/30/2021    Procedure: PARACENTESIS, ABDOMINAL;  Surgeon: Salas Cabrera MD;  Location: [facility] CATH LAB;  Service: Radiology;  Laterality: N/A;    RETROGRADE PYELOGRAPHY Bilateral 9/10/2021    Procedure: PYELOGRAM, RETROGRADE;  Surgeon: Rj Sánchez Jr., MD;  Location: [address];  Service: Urology;  Laterality: Bilateral;    TONSILLECTOMY         Time Tracking:     PT Received On: 06/07/22  PT Start Time: 1110     PT Stop Time: 1136  PT Total Time (min): 26 min     Billable Minutes: Evaluation 11 and Gait Training 15      06/07/2022

## 2022-06-07 NOTE — HOSPITAL COURSE
Now s/p DBD OLTX 6/5 for ETOH cirrhosis (donor HCVab+/JACOBO-). Had intra-op HD, originally listed as liver-kidney transplant. Surgery went without complication. Was extubated in the OR and went to ICU with 2 drains and a armenta. Stepped down to TSU 6/6. POD1 US on 6/6 stable and reveiwed by surgeon. Armenta and lateral drain removed 6/7 without issue.     Interval History   NAEON. Patient feeling well this morning. Small BM with PO bowel regimen and suppository yesterday. Passing gas. Enema ordered. Lfts trending down. Drains out. Good UOP. Patient eating and drinking well. Plan for weekly US tomorrow morning. Tentative d/c tomorrow. Cont to monitor

## 2022-06-07 NOTE — ASSESSMENT & PLAN NOTE
- prograf, cellcept, pred taper   - monitor tacro levels for therapeutic levels and adverse effects

## 2022-06-07 NOTE — PLAN OF CARE
52 year-old female admitted 6/5/22 for liver txp secondary to ETOH cirrhosis. Pt has hx of CKD stage 3, HLD, hypothyroidism, anxiety, ADHD  -AAOx4, ambulates with 1 person assistance  -vida with staples LUNA  -RLQ JENSEN drains, 1 to be removed this shift  -armenta removed at 1100, pt has voided post removal  -Rt IJ trialysis  -20 G Lt hand   -Transitional Insulin @ 1.1u/hr  -Methylprednisone IVP 160mg  -D5NS discontinued  -Telemetry monitoring NSR HR 70-80's  -Accuchecks AC/HS, slides at 181  -PT today, ambulated 100ft  -PRN Oxy 10 mg Q6/Dilaudid 0.5 for breakthrough pain Q4  -bowel regimen added/pt passing flatulence   -diet advanced to diabetic Diet  -blue card/medication teaching when caretakers arrive  -bed alarm in place, pt lying in bed, bed in lowest position, wheels locked, non-skid socks in place, call light within reach

## 2022-06-07 NOTE — ASSESSMENT & PLAN NOTE
Endocrinology consulted for BG management.   BG goal 140-180    - Transition drip at 1.1 units/hr with step-down parameters.   - Novolog (aspart) insulin [facility] SSI (150/25) Units SQ prn for BG excursions.   - BG checks AC/HS/0200  - Hypoglycemia protocol in place  - If blood glucose greater than 300, please ask patient not to eat food or drink anything other than water until correctional insulin has brought it back below 250    ** Please notify Endocrine for any change and/or advance in diet**  ** Please call Endocrine for any BG related issues **    Discharge Planning:   TBD. Please notify endocrinology prior to discharge.

## 2022-06-07 NOTE — SUBJECTIVE & OBJECTIVE
"Interval HPI:   Overnight events: No acute events overnight. Patient on the TSU in room 08037/26039 A. Blood glucose stable. BG at goal on current insulin regimen (Transition Insulin Drip). Steroid use- Methylprednisolone  160 mg. 2 Days Post-Op  Renal function- Normal   Vasopressors-  None       Diet diabetic [facility];  Calorie     Eatin%  Nausea: No  Hypoglycemia and intervention: No  Fever: No  TPN and/or TF: No    BP (!) 160/85 (Patient Position: Sitting)   Pulse 79   Temp 98 °F (36.7 °C) (Oral)   Resp 18   Ht 5' 3" (1.6 m)   Wt 70.5 kg (155 lb 6.8 oz)   SpO2 97%   BMI 27.53 kg/m²     Labs Reviewed and Include    Recent Labs   Lab 22  0550      CALCIUM 8.0*   ALBUMIN 3.0*   PROT 4.7*      K 4.1   CO2 23      BUN 37*   CREATININE 1.4   ALKPHOS 93   *   *   BILITOT 0.6     Lab Results   Component Value Date    WBC 18.35 (H) 2022    HGB 9.5 (L) 2022    HCT 28.7 (L) 2022    MCV 98 2022     (L) 2022     No results for input(s): TSH, FREET4 in the last 168 hours.  Lab Results   Component Value Date    HGBA1C 5.4 2022       Nutritional status:   Body mass index is 27.53 kg/m².  Lab Results   Component Value Date    ALBUMIN 3.0 (L) 2022    ALBUMIN 3.5 2022    ALBUMIN 3.5 2022     No results found for: PREALBUMIN    Estimated Creatinine Clearance: 44.7 mL/min (based on SCr of 1.4 mg/dL).    Accu-Checks  Recent Labs     22  0506 22  0554 22  0721 22  0807 22  1009 22  1623 22  2037 22  0223 22  0825 22  1259   POCTGLUCOSE 158* 160* 187* 180* 129* 173* 173* 157* 118* 163*       Current Medications and/or Treatments Impacting Glycemic Control  Immunotherapy:    Immunosuppressants           Stop Route Frequency     tacrolimus capsule 2 mg         -- Oral 2 times daily     mycophenolate capsule 1,000 mg         -- Oral 2 times daily      " "    Steroids:   Hormones (From admission, onward)                Start     Stop Route Frequency Ordered    06/11/22 0900  predniSONE tablet 20 mg  (methylprednisolone taper panel)        "Followed by" Linked Group Details    -- Oral Daily 06/05/22 2210    06/10/22 0900  methylPREDNISolone sodium succinate injection 40 mg  (methylprednisolone taper panel)        "Followed by" Linked Group Details    06/11 0859 IV Daily 06/05/22 2210 06/09/22 0900  methylPREDNISolone sodium succinate injection 80 mg  (methylprednisolone taper panel)        "Followed by" Linked Group Details    06/10 0859 IV Daily 06/05/22 2210 06/08/22 0900  methylPREDNISolone sodium succinate injection 120 mg  (methylprednisolone taper panel)        "Followed by" Linked Group Details    06/09 0859 IV Daily 06/05/22 2210          Pressors:    Autonomic Drugs (From admission, onward)                None          Hyperglycemia/Diabetes Medications:   Antihyperglycemics (From admission, onward)                Start     Stop Route Frequency Ordered    06/06/22 1130  insulin regular in 0.9 % NaCl 100 unit/100 mL (1 unit/mL) infusion        Question:  Enter initial dose (Units/hr):  Answer:  2.2    -- IV Continuous 06/06/22 1019    06/06/22 1117  insulin aspart U-100 pen 0-10 Units         -- SubQ As needed (PRN) 06/06/22 1019          "

## 2022-06-07 NOTE — PROGRESS NOTES
"[facility] - Transplant Stepdown  Adult Nutrition  Progress Note    SUMMARY       Recommendations    1. Consider liberalize diet to Regular diet     2. If PO intake < 50%, add Boost glucose control BID      3. RD to monitor and follow    Goals: Meet % EEN, EPN by RD f/u date  Nutrition Goal Status: progressing towards goal  Communication of RD Recs:  (POC)    Assessment and Plan    Nutrition Problem  Increased nutrient needs (protein, energy)     Related to (etiology):   Increased physiological demands     Signs and Symptoms (as evidenced by):   S/p Liver transplant (6/6)     Interventions/Recommendations (treatment strategy):  Collaboration with other providers  Nutrition education     Nutrition Diagnosis Status:   Continues    Malnutrition Assessment  Orbital Region (Subcutaneous Fat Loss): well nourished  Upper Arm Region (Subcutaneous Fat Loss): well nourished   Islam Region (Muscle Loss): well nourished  Clavicle Bone Region (Muscle Loss): well nourished  Clavicle and Acromion Bone Region (Muscle Loss): well nourished  Dorsal Hand (Muscle Loss): well nourished  Anterior Thigh Region (Muscle Loss): well nourished     Reason for Assessment    Reason For Assessment: consult  Diagnosis: transplant/postoperative complications  Relevant Medical History: s/p liver tx (6/6), hypothyroidism, CKD stage 4, HLD, Iron deficiency anemia  Interdisciplinary Rounds: did not attend    General Information Comments:   S/p DBD Liver transplant (6/6). Intra-operative dialysis. Noted wt gain of 21 lb (15%) in two week per chart - could be fluid related. 1+ generalized edema per chart. Pt reports good appetite PTA, was following low Na diet. Stated UBW of 130lb. Was taking protein supplement "Muscle feast" at home. No BM and gas yet. Educated pt on post tx diet with handout. Discussed food safety and food-drug interaction. Pt verbalize understanding. NFPE completed today, pt appears nourished, no s/s of malnutrition at this " "time.    Nutrition Discharge Planning: Post transplant nutrition education provided on 6/7. Food safety/drug interactions emphasized. General healthy/low salt diet recommended. Education material left at bedside. No other needs identified.    Nutrition Risk Screen    Nutrition Risk Screen: no indicators present    Nutrition/Diet History    Spiritual, Cultural Beliefs, Mormonism Practices, Values that Affect Care: no    Anthropometrics    Temp: 98 °F (36.7 °C)  Height: 5' 3" (160 cm)  Height (inches): 63 in  Weight Method: Standard Scale  Weight: 70.5 kg (155 lb 6.8 oz)  Weight (lb): 155.43 lb  Ideal Body Weight (IBW), Female: 115 lb  % Ideal Body Weight, Female (lb): 134.19 %  BMI (Calculated): 27.5     Lab/Procedures/Meds    Pertinent Labs Reviewed: reviewed  Pertinent Labs Comments: Glucose 151, , , eGFR 58.3  Pertinent Medications Reviewed: reviewed  Pertinent Medications Comments: prednisone, methyprednisolone, famotidine, tacrolimus, vit k, insulin regular, D5    Estimated/Assessed Needs    Weight Used For Calorie Calculations: 70 kg (154 lb 5.2 oz)  Energy Calorie Requirements (kcal): 3628-7676 kcal  Energy Need Method: Kcal/kg (25-30kcal/kg)  Protein Requirements: 84-105g (1.2-1.5g/kg)  Weight Used For Protein Calculations: 70 kg (154 lb 5.2 oz)  Fluid Requirements (mL): 1ml/kcal or per MD  Estimated Fluid Requirement Method: RDA Method  RDA Method (mL): 1750     Nutrition Prescription Ordered    Current Diet Order: Diabetic diet    Evaluation of Received Nutrient/Fluid Intake    I/O: + 5.5 L since admit  Energy Calories Required: not meeting needs  Protein Required: not meeting needs  Comments: LBM 6/5  Tolerance: tolerating    Nutrition Risk    Level of Risk/Frequency of Follow-up: low     Monitor and Evaluation    Food and Nutrient Intake: food and beverage intake, energy intake  Food and Nutrient Adminstration: diet order  Knowledge/Beliefs/Attitudes: food and nutrition knowledge/skill, " beliefs and attitudes  Physical Activity and Function: nutrition-related ADLs and IADLs  Anthropometric Measurements: height/length, weight, weight change, body mass index  Biochemical Data, Medical Tests and Procedures: electrolyte and renal panel, gastrointestinal profile, glucose/endocrine profile, inflammatory profile, lipid profile  Nutrition-Focused Physical Findings: overall appearance     Nutrition Follow-Up    RD Follow-up?: Yes     Tamica DIAZ-MAZIN

## 2022-06-07 NOTE — SUBJECTIVE & OBJECTIVE
Scheduled Meds:   famotidine  20 mg Oral QHS    heparin (porcine)  5,000 Units Subcutaneous Q8H    levothyroxine  50 mcg Oral Before breakfast    LIDOcaine HCL 10 mg/ml (1%)  1 mL Other Once    linaCLOtide  145 mcg Oral Before breakfast    [START ON 6/8/2022] methylPREDNISolone sodium succinate injection  120 mg Intravenous Daily    Followed by    [START ON 6/9/2022] methylPREDNISolone sodium succinate injection  80 mg Intravenous Daily    Followed by    [START ON 6/10/2022] methylPREDNISolone sodium succinate injection  40 mg Intravenous Daily    Followed by    [START ON 6/11/2022] predniSONE  20 mg Oral Daily    mupirocin  1 g Nasal BID    mycophenolate  1,000 mg Oral BID    nystatin  500,000 Units Mouth/Throat TID PC    [START ON 6/12/2022] sulfamethoxazole-trimethoprim 400-80mg  1 tablet Oral Daily AM    tacrolimus  2 mg Oral BID    [START ON 6/15/2022] valGANciclovir  450 mg Oral Daily     Continuous Infusions:   insulin regular 1 units/mL infusion orderable (TRANSFER) 1.1 Units/hr (06/06/22 2034)     PRN Meds:sodium chloride 0.9%, dextrose 10%, dextrose 10%, glucagon (human recombinant), glucose, glucose, HYDROmorphone, insulin aspart U-100, oxyCODONE, oxyCODONE, sodium chloride 0.9%    Review of Systems   Constitutional:  Positive for activity change. Negative for appetite change, chills and fever.   HENT:  Negative for sore throat.    Respiratory:  Negative for cough, shortness of breath and wheezing.    Cardiovascular:  Negative for chest pain, palpitations and leg swelling.   Gastrointestinal:  Positive for abdominal pain. Negative for diarrhea, nausea and vomiting.   Genitourinary:  Negative for decreased urine volume, dysuria and frequency.   Musculoskeletal:  Negative for arthralgias and back pain.   Skin:  Negative for wound.   Allergic/Immunologic: Positive for immunocompromised state.   Neurological:  Negative for dizziness and weakness.   Psychiatric/Behavioral:  The patient is not nervous/anxious.     Objective:     Vital Signs (Most Recent):  Temp: 98 °F (36.7 °C) (06/07/22 1154)  Pulse: 76 (06/07/22 1154)  Resp: 18 (06/07/22 1330)  BP: (!) 160/85 (06/07/22 1154)  SpO2: 97 % (06/07/22 0825)   Vital Signs (24h Range):  Temp:  [97.6 °F (36.4 °C)-99 °F (37.2 °C)] 98 °F (36.7 °C)  Pulse:  [] 76  Resp:  [11-20] 18  SpO2:  [93 %-98 %] 97 %  BP: (130-160)/(79-92) 160/85     Weight: 70.5 kg (155 lb 6.8 oz)  Body mass index is 27.53 kg/m².    Intake/Output - Last 3 Shifts         06/05 0700  06/06 0659 06/06 0700  06/07 0659 06/07 0700  06/08 0659    P.O.  720 360    I.V. (mL/kg) 4889.2 (69.8) 1158.8 (16.4)     Blood 950      Other 550      IV Piggyback 1467.5 147.5     Total Intake(mL/kg) 7856.7 (112.2) 2026.3 (28.7) 360 (5.1)    Urine (mL/kg/hr) 1880 1470 (0.9) 350 (0.7)    Drains 415 573 150    Other 49      Total Output 2344 2043 500    Net +5512.7 -16.7 -140           Urine Occurrence   1 x            Physical Exam  Constitutional:       General: She is not in acute distress.  HENT:      Head: Normocephalic.      Mouth/Throat:      Mouth: Mucous membranes are moist.   Eyes:      Conjunctiva/sclera: Conjunctivae normal.   Cardiovascular:      Rate and Rhythm: Normal rate and regular rhythm.      Pulses: Normal pulses.      Heart sounds: Normal heart sounds.   Pulmonary:      Effort: Pulmonary effort is normal.      Breath sounds: Normal breath sounds.   Abdominal:      General: Bowel sounds are normal. There is no distension.      Palpations: Abdomen is soft.   Musculoskeletal:         General: Normal range of motion.      Cervical back: Normal range of motion and neck supple.      Right lower leg: No edema.      Left lower leg: No edema.   Skin:     General: Skin is warm and dry.   Neurological:      Mental Status: She is alert and oriented to person, place, and time.      Motor: No weakness.   Psychiatric:         Mood and Affect: Mood normal.         Behavior: Behavior normal.         Thought Content:  Thought content normal.         Judgment: Judgment normal.       Laboratory:  Immunosuppressants           Stop Route Frequency     tacrolimus capsule 2 mg         -- Oral 2 times daily     mycophenolate capsule 1,000 mg         -- Oral 2 times daily          CBC:   Recent Labs   Lab 06/07/22  0550   WBC 18.35*   RBC 2.94*   HGB 9.5*   HCT 28.7*   *   MCV 98   MCH 32.3*   MCHC 33.1     CMP:   Recent Labs   Lab 06/07/22  0550      CALCIUM 8.0*   ALBUMIN 3.0*   PROT 4.7*      K 4.1   CO2 23      BUN 37*   CREATININE 1.4   ALKPHOS 93   *   *   BILITOT 0.6     Labs within the past 24 hours have been reviewed.    Diagnostic Results:  US Liver Transplant Post:  Results for orders placed during the hospital encounter of 06/05/22    US Doppler Liver Transplant Post (xpd)    Narrative  EXAMINATION:  US DOPPLER LIVER TRANSPLANT POST (XPD)    CLINICAL HISTORY:  Assess perfusion following liver transplant;    TECHNIQUE:  Limited abdominal ultrasound of the transplant liver with Doppler evaluation.  Color and spectral Doppler were performed.    COMPARISON:  None.    FINDINGS:  Patient is 1 day status post orthotopic liver transplant.  The liver demonstrates homogeneous echotexture.  No focal hepatic lesions are seen.  3.3 x 0.8 x 3.3 cm collection adjacent the right hepatic lobe.    The common duct is not dilated, measuring 2 mm.  No dilated intrahepatic radicles are seen.    Color flow and spectral waveform analysis was performed.  The main portal vein, right portal vein, left portal vein, middle hepatic vein, right hepatic vein, left hepatic vein, and IVC are patent with proper directional flow.    Anastomosis site of the main hepatic artery demonstrates a peak systolic velocity 157 cm/sec.    Main hepatic artery demonstrates resistive index 0.90 with normal waveform.    Left hepatic artery shows resistive index 0.80 with normal waveform.    Anterior branch of the right hepatic artery  demonstrates resistive index 0.80 with normal waveform.    Posterior branch of the right hepatic artery demonstrates resistive index 0.78 with normal waveform.    Impression  Elevated intraparenchymal resistive indices, likely edema in the immediate postoperative setting.  Correlation and continued follow-up advised.    Peritransplant collection adjacent the right hepatic lobe, likely seroma.  Attention on follow-up recommended.    Beltran Jones MD discussed findings Leslie Umaña MD with on 06/06/2022 at 09:30.    Electronically signed by resident: Dereck Brizuela MD  Date:    06/06/2022  Time:    09:04    Electronically signed by: Beltran Jones MD  Date:    06/06/2022  Time:    09:30    I have personally reviewed all pertinent imaging studies.    Debility/Functional status: No debility noted.

## 2022-06-07 NOTE — PLAN OF CARE
Problem: Physical Therapy  Goal: Physical Therapy Goal  Description: Goals to be met by: 2022     Patient will increase functional independence with mobility by performin. Supine to sit with Set-up Edmondson  2. Sit to supine with Set-up Edmondson  3. Sit to stand transfer with Supervision  4. Bed to chair transfer with Supervision using No Assistive Device  5. Gait  x 200 feet with Supervision using LRAD.   6. Lower extremity exercise program x15 reps per handout, with supervision    Outcome: Ongoing, Progressing

## 2022-06-07 NOTE — CARE UPDATE
Drain removed:  Site cleaned with alcohol, lidocaine 1% used to numb area to place prolene 3.0 suture to site.  Drain intact at time of removal.  Patient tolerated procedure well.  Will continue to monitor for any complications.

## 2022-06-07 NOTE — ASSESSMENT & PLAN NOTE
- surgery was uncomplicated per op note  - patient stepped down to TSU earlier than typical   - LFTs trending down nicely   - armenta out 6/7  - lateral drain removed 6/7

## 2022-06-07 NOTE — PROGRESS NOTES
[facility]  Liver Transplant  Progress Note    Patient Name: Malu Montes  MRN: [id number]  Admission Date: 6/5/2022  Hospital Length of Stay: 2 days  Code Status: Full Code  Primary Care Provider: Killian Dodd DO  Post-Operative Day: 2    ORGAN:   LIVER  Disease Etiology: Alcoholic Cirrhosis  Donor Type:   Donation after Brain Death  CDC High Risk:   Yes  Donor CMV Status:   Donor CMV Status: Positive  Donor HBcAB:   Negative  Donor HCV Status:   Positive  Donor HBV JACOBO: Negative  Donor HCV JACOBO: Negative  Whole or Partial: Whole Liver  Biliary Anastomosis: End to End  Arterial Anatomy: Standard  Subjective:     History of Present Illness:  Ms. 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 Liver disease has been complicated by HE, ascites requiring paracentesis twice a week. She underwent a TIPS placement on 5/6/22. Admitted for streptococcus mitis/oralis. She completed a 14d course of ceftriaxone.  Now patient presents for a Liver transplant.  She reports in usual state of health- Patient denies any recent hospitalizations or ED visits.   The primary surgeon will be Dr. Slaughter.  Patient is NPO.  All pre-op labs and imaging have been ordered prior to surgery. Consents  signed prior to transplant.           Hospital Course:  Now s/p DBD OLTX 6/5 for ETOH cirrhosis (donor HCVab+/JACOBO-). Had intra-op HD, originally listed as liver-kidney transplant. Surgery went without complication. Was extubated in the OR and went to ICU with 2 drains and a armenta. Stepped down to TSU 6/6.     Interval History   NAEON. Patient feeling well this morning, c/o soreness but ready to get up and move. Lfts trending down beautifully, POD1 US on 6/6 stable and reveiwed by surgeon. Armenta and lateral drain removed 6/7 without issue. Cr trending up slowly after intra-op hd, will leave central line in and cont to monitor. Good UOP. Patient eating and drinking  well, no flatus/BM yet. VSS. Cont to monitor         Scheduled Meds:   famotidine  20 mg Oral QHS    heparin (porcine)  5,000 Units Subcutaneous Q8H    levothyroxine  50 mcg Oral Before breakfast    LIDOcaine HCL 10 mg/ml (1%)  1 mL Other Once    linaCLOtide  145 mcg Oral Before breakfast    [START ON 6/8/2022] methylPREDNISolone sodium succinate injection  120 mg Intravenous Daily    Followed by    [START ON 6/9/2022] methylPREDNISolone sodium succinate injection  80 mg Intravenous Daily    Followed by    [START ON 6/10/2022] methylPREDNISolone sodium succinate injection  40 mg Intravenous Daily    Followed by    [START ON 6/11/2022] predniSONE  20 mg Oral Daily    mupirocin  1 g Nasal BID    mycophenolate  1,000 mg Oral BID    nystatin  500,000 Units Mouth/Throat TID PC    [START ON 6/12/2022] sulfamethoxazole-trimethoprim 400-80mg  1 tablet Oral Daily AM    tacrolimus  2 mg Oral BID    [START ON 6/15/2022] valGANciclovir  450 mg Oral Daily     Continuous Infusions:   insulin regular 1 units/mL infusion orderable (TRANSFER) 1.1 Units/hr (06/06/22 2034)     PRN Meds:sodium chloride 0.9%, dextrose 10%, dextrose 10%, glucagon (human recombinant), glucose, glucose, HYDROmorphone, insulin aspart U-100, oxyCODONE, oxyCODONE, sodium chloride 0.9%    Review of Systems   Constitutional:  Positive for activity change. Negative for appetite change, chills and fever.   HENT:  Negative for sore throat.    Respiratory:  Negative for cough, shortness of breath and wheezing.    Cardiovascular:  Negative for chest pain, palpitations and leg swelling.   Gastrointestinal:  Positive for abdominal pain. Negative for diarrhea, nausea and vomiting.   Genitourinary:  Negative for decreased urine volume, dysuria and frequency.   Musculoskeletal:  Negative for arthralgias and back pain.   Skin:  Negative for wound.   Allergic/Immunologic: Positive for immunocompromised state.   Neurological:  Negative for dizziness and  weakness.   Psychiatric/Behavioral:  The patient is not nervous/anxious.    Objective:     Vital Signs (Most Recent):  Temp: 98 °F (36.7 °C) (06/07/22 1154)  Pulse: 76 (06/07/22 1154)  Resp: 18 (06/07/22 1330)  BP: (!) 160/85 (06/07/22 1154)  SpO2: 97 % (06/07/22 0825)   Vital Signs (24h Range):  Temp:  [97.6 °F (36.4 °C)-99 °F (37.2 °C)] 98 °F (36.7 °C)  Pulse:  [] 76  Resp:  [11-20] 18  SpO2:  [93 %-98 %] 97 %  BP: (130-160)/(79-92) 160/85     Weight: 70.5 kg (155 lb 6.8 oz)  Body mass index is 27.53 kg/m².    Intake/Output - Last 3 Shifts         06/05 0700  06/06 0659 06/06 0700  06/07 0659 06/07 0700  06/08 0659    P.O.  720 360    I.V. (mL/kg) 4889.2 (69.8) 1158.8 (16.4)     Blood 950      Other 550      IV Piggyback 1467.5 147.5     Total Intake(mL/kg) 7856.7 (112.2) 2026.3 (28.7) 360 (5.1)    Urine (mL/kg/hr) 1880 1470 (0.9) 350 (0.7)    Drains 415 573 150    Other 49      Total Output 2344 2043 500    Net +5512.7 -16.7 -140           Urine Occurrence   1 x            Physical Exam  Constitutional:       General: She is not in acute distress.  HENT:      Head: Normocephalic.      Mouth/Throat:      Mouth: Mucous membranes are moist.   Eyes:      Conjunctiva/sclera: Conjunctivae normal.   Cardiovascular:      Rate and Rhythm: Normal rate and regular rhythm.      Pulses: Normal pulses.      Heart sounds: Normal heart sounds.   Pulmonary:      Effort: Pulmonary effort is normal.      Breath sounds: Normal breath sounds.   Abdominal:      General: Bowel sounds are normal. There is no distension.      Palpations: Abdomen is soft.   Musculoskeletal:         General: Normal range of motion.      Cervical back: Normal range of motion and neck supple.      Right lower leg: No edema.      Left lower leg: No edema.   Skin:     General: Skin is warm and dry.   Neurological:      Mental Status: She is alert and oriented to person, place, and time.      Motor: No weakness.   Psychiatric:         Mood and Affect:  Mood normal.         Behavior: Behavior normal.         Thought Content: Thought content normal.         Judgment: Judgment normal.       Laboratory:  Immunosuppressants           Stop Route Frequency     tacrolimus capsule 2 mg         -- Oral 2 times daily     mycophenolate capsule 1,000 mg         -- Oral 2 times daily          CBC:   Recent Labs   Lab 06/07/22  0550   WBC 18.35*   RBC 2.94*   HGB 9.5*   HCT 28.7*   *   MCV 98   MCH 32.3*   MCHC 33.1     CMP:   Recent Labs   Lab 06/07/22  0550      CALCIUM 8.0*   ALBUMIN 3.0*   PROT 4.7*      K 4.1   CO2 23      BUN 37*   CREATININE 1.4   ALKPHOS 93   *   *   BILITOT 0.6     Labs within the past 24 hours have been reviewed.    Diagnostic Results:  US Liver Transplant Post:  Results for orders placed during the hospital encounter of 06/05/22    US Doppler Liver Transplant Post (xpd)    Narrative  EXAMINATION:  US DOPPLER LIVER TRANSPLANT POST (XPD)    CLINICAL HISTORY:  Assess perfusion following liver transplant;    TECHNIQUE:  Limited abdominal ultrasound of the transplant liver with Doppler evaluation.  Color and spectral Doppler were performed.    COMPARISON:  None.    FINDINGS:  Patient is 1 day status post orthotopic liver transplant.  The liver demonstrates homogeneous echotexture.  No focal hepatic lesions are seen.  3.3 x 0.8 x 3.3 cm collection adjacent the right hepatic lobe.    The common duct is not dilated, measuring 2 mm.  No dilated intrahepatic radicles are seen.    Color flow and spectral waveform analysis was performed.  The main portal vein, right portal vein, left portal vein, middle hepatic vein, right hepatic vein, left hepatic vein, and IVC are patent with proper directional flow.    Anastomosis site of the main hepatic artery demonstrates a peak systolic velocity 157 cm/sec.    Main hepatic artery demonstrates resistive index 0.90 with normal waveform.    Left hepatic artery shows resistive index  0.80 with normal waveform.    Anterior branch of the right hepatic artery demonstrates resistive index 0.80 with normal waveform.    Posterior branch of the right hepatic artery demonstrates resistive index 0.78 with normal waveform.    Impression  Elevated intraparenchymal resistive indices, likely edema in the immediate postoperative setting.  Correlation and continued follow-up advised.    Peritransplant collection adjacent the right hepatic lobe, likely seroma.  Attention on follow-up recommended.    Beltran Jones MD discussed findings Leslie Umaña MD with on 06/06/2022 at 09:30.    Electronically signed by resident: Dereck Brizuela MD  Date:    06/06/2022  Time:    09:04    Electronically signed by: Beltran Jones MD  Date:    06/06/2022  Time:    09:30    I have personally reviewed all pertinent imaging studies.    Debility/Functional status: No debility noted.    Assessment/Plan:     * S/P liver transplant  - surgery was uncomplicated per op note  - patient stepped down to TSU earlier than typical   - LFTs trending down nicely   - armenta out 6/7  - lateral drain removed 6/7        At risk for opportunistic infections  - nystatin for thrush, bactrim for PJP, valcyte for CMV       Long-term use of immunosuppressant medication  - see prophylactic immunotherapy      Adrenal cortical steroids causing adverse effect in therapeutic use        Prophylactic immunotherapy  - prograf, cellcept, pred taper   - monitor tacro levels for therapeutic levels and adverse effects       Steroid-induced hyperglycemia  - endocrine consulted, appreciate help      ARLIN (acute kidney injury)  - educated patient on hydrating   - central line remains in place in case of HD needs   - strict Is and Os    Anemia of chronic disease  - h/h stable   - cont to monitor with daily CBC       Hypothyroidism  - on synthroid           VTE Risk Mitigation (From admission, onward)         Ordered     heparin (porcine) injection 5,000 Units   Every 8 hours         06/05/22 2210     Place sequential compression device  Until discontinued         06/05/22 2210     IP VTE HIGH RISK PATIENT  Once         06/05/22 2210                The patients clinical status was discussed at multidisplinary rounds, involving transplant surgery, transplant medicine, pharmacy, nursing, nutrition, and social work        Isidra Thompson PA-C  Liver Transplant  [facility]

## 2022-06-07 NOTE — PROGRESS NOTES
"[facility]  Endocrinology  Progress Note    Admit Date: 2022     Reason for Consult: Management of  Hyperglycemia     Surgical Procedure and Date: liver transplant 22        HPI:   Patient is a 52 y.o. female with a diagnosis of HLD, hypothyroidism, and ESLD. No personal history of DM. Patient admitted for liver transplant. Endocrinology consulted for BG management.       Lab Results   Component Value Date    HGBA1C 5.4 2022           Interval HPI:   Overnight events: No acute events overnight. Patient on the TSU in room 12823/38766 A. Blood glucose stable. BG at goal on current insulin regimen (Transition Insulin Drip). Steroid use- Methylprednisolone  160 mg. 2 Days Post-Op  Renal function- Normal   Vasopressors-  None       Diet diabetic [facility];  Calorie     Eatin%  Nausea: No  Hypoglycemia and intervention: No  Fever: No  TPN and/or TF: No    BP (!) 160/85 (Patient Position: Sitting)   Pulse 79   Temp 98 °F (36.7 °C) (Oral)   Resp 18   Ht 5' 3" (1.6 m)   Wt 70.5 kg (155 lb 6.8 oz)   SpO2 97%   BMI 27.53 kg/m²     Labs Reviewed and Include    Recent Labs   Lab 22  0550      CALCIUM 8.0*   ALBUMIN 3.0*   PROT 4.7*      K 4.1   CO2 23      BUN 37*   CREATININE 1.4   ALKPHOS 93   *   *   BILITOT 0.6     Lab Results   Component Value Date    WBC 18.35 (H) 2022    HGB 9.5 (L) 2022    HCT 28.7 (L) 2022    MCV 98 2022     (L) 2022     No results for input(s): TSH, FREET4 in the last 168 hours.  Lab Results   Component Value Date    HGBA1C 5.4 2022       Nutritional status:   Body mass index is 27.53 kg/m².  Lab Results   Component Value Date    ALBUMIN 3.0 (L) 2022    ALBUMIN 3.5 2022    ALBUMIN 3.5 2022     No results found for: PREALBUMIN    Estimated Creatinine Clearance: 44.7 mL/min (based on SCr of 1.4 mg/dL).    Accu-Checks  Recent Labs     " "06/06/22  0506 06/06/22  0554 06/06/22  0721 06/06/22  0807 06/06/22  1009 06/06/22  1623 06/06/22  2037 06/07/22  0223 06/07/22  0825 06/07/22  1259   POCTGLUCOSE 158* 160* 187* 180* 129* 173* 173* 157* 118* 163*       Current Medications and/or Treatments Impacting Glycemic Control  Immunotherapy:    Immunosuppressants           Stop Route Frequency     tacrolimus capsule 2 mg         -- Oral 2 times daily     mycophenolate capsule 1,000 mg         -- Oral 2 times daily          Steroids:   Hormones (From admission, onward)                Start     Stop Route Frequency Ordered    06/11/22 0900  predniSONE tablet 20 mg  (methylprednisolone taper panel)        "Followed by" Linked Group Details    -- Oral Daily 06/05/22 2210    06/10/22 0900  methylPREDNISolone sodium succinate injection 40 mg  (methylprednisolone taper panel)        "Followed by" Linked Group Details    06/11 0859 IV Daily 06/05/22 2210 06/09/22 0900  methylPREDNISolone sodium succinate injection 80 mg  (methylprednisolone taper panel)        "Followed by" Linked Group Details    06/10 0859 IV Daily 06/05/22 2210 06/08/22 0900  methylPREDNISolone sodium succinate injection 120 mg  (methylprednisolone taper panel)        "Followed by" Linked Group Details    06/09 0859 IV Daily 06/05/22 2210          Pressors:    Autonomic Drugs (From admission, onward)                None          Hyperglycemia/Diabetes Medications:   Antihyperglycemics (From admission, onward)                Start     Stop Route Frequency Ordered    06/06/22 1130  insulin regular in 0.9 % NaCl 100 unit/100 mL (1 unit/mL) infusion        Question:  Enter initial dose (Units/hr):  Answer:  2.2    -- IV Continuous 06/06/22 1019    06/06/22 1117  insulin aspart U-100 pen 0-10 Units         -- SubQ As needed (PRN) 06/06/22 1019            ASSESSMENT and PLAN    * S/P liver transplant  avoid hypoglycemia        Steroid-induced hyperglycemia  Endocrinology consulted for BG " management.   BG goal 140-180    - Transition drip at 1.1 units/hr with step-down parameters.   - Novolog (aspart) insulin MDC SSI (150/25) Units SQ prn for BG excursions.   - BG checks /HS/0200  - Hypoglycemia protocol in place  - If blood glucose greater than 300, please ask patient not to eat food or drink anything other than water until correctional insulin has brought it back below 250    ** Please notify Endocrine for any change and/or advance in diet**  ** Please call Endocrine for any BG related issues **    Discharge Planning:   TBD. Please notify endocrinology prior to discharge.        Hypothyroidism  Managed per primary.   Continue levothyroxine 50 mcg daily.   Lab Results   Component Value Date    TSH 2.550 05/06/2022    FREET4 0.94 07/15/2021           Adrenal cortical steroids causing adverse effect in therapeutic use  Glucocorticoids markedly increase prandial glucoses. Expect the steroid taper will help glucose control.              Edward Deshpande, DNP, FNP  Endocrinology  [facility]

## 2022-06-07 NOTE — ASSESSMENT & PLAN NOTE
- educated patient on hydrating   - central line remains in place in case of HD needs   - strict Is and Os

## 2022-06-07 NOTE — PLAN OF CARE
Pt AAOx4 and able to answer all orientation questions but forgetful and emotional. VSS, afebrile. Telemetry monitoring continued, showing NSR. R IJ trialysis CDI. Transitional insulin gtt infusing @ 1.1u/hr, BG monitored [id number]. D5NS infusing @ 50ml/hr. Chevron incision LUNA with staples CDI, educated pt on painting with betadine. R JPx2 with SS output. Pain controlled with PRN oxycodone and PRN IV dilaudid. Thomas CDI and adhered to top of thigh with StatLock.  Drainage bag below bladder and off the floor, no dependent loops or kinks. Draining clear yellow urine. Self meds made and ready for teaching in AM. Fall risk precautions maintained. Pt repositions independently, weight shift assistance given when needed. Pt in lowest bed position setting, lighting adjusted, pt to wear nonskid socks when ambulating, side rails up x2. Bed alarm set. Pt remain free from falls during shift. Call light within reach. Will continue to monitor.

## 2022-06-07 NOTE — ANESTHESIA POSTPROCEDURE EVALUATION
Anesthesia Post Evaluation    Patient: Malu Montes    Procedure(s) Performed: Procedure(s) (LRB):  TRANSPLANT, LIVER (N/A)    Final Anesthesia Type: general      Patient location during evaluation: ICU  Level of consciousness: awake  Post-procedure vital signs: reviewed and stable  Pain management: adequate  Airway patency: patent    PONV status at discharge: No PONV  Anesthetic complications: no      Cardiovascular status: hemodynamically stable  Respiratory status: spontaneous ventilation and face mask  Hydration status: euvolemic  Follow-up not needed.          Vitals Value Taken Time   /81 06/07/22 0722   Temp 37.2 °C (99 °F) 06/06/22 1945   Pulse 76 06/07/22 0722   Resp 13 06/07/22 0722   SpO2 97 % 06/07/22 0722   Vitals shown include unvalidated device data.      No case tracking events are documented in the log.      Pain/Herminia Score: Pain Rating Prior to Med Admin: 8 (6/7/2022  6:43 AM)  Pain Rating Post Med Admin: 2 (6/6/2022  4:00 PM)

## 2022-06-08 PROBLEM — K59.00 CONSTIPATION: Status: ACTIVE | Noted: 2022-05-09

## 2022-06-08 LAB
ALBUMIN SERPL BCP-MCNC: 3.1 G/DL (ref 3.5–5.2)
ALP SERPL-CCNC: 127 U/L (ref 55–135)
ALT SERPL W/O P-5'-P-CCNC: 91 U/L (ref 10–44)
ANION GAP SERPL CALC-SCNC: 8 MMOL/L (ref 8–16)
AST SERPL-CCNC: 65 U/L (ref 10–40)
BASOPHILS # BLD AUTO: 0.04 K/UL (ref 0–0.2)
BASOPHILS NFR BLD: 0.2 % (ref 0–1.9)
BILIRUB SERPL-MCNC: 0.5 MG/DL (ref 0.1–1)
BLD PROD TYP BPU: NORMAL
BLOOD UNIT EXPIRATION DATE: NORMAL
BLOOD UNIT TYPE CODE: 600
BLOOD UNIT TYPE CODE: 6200
BLOOD UNIT TYPE: NORMAL
BUN SERPL-MCNC: 37 MG/DL (ref 6–20)
CALCIUM SERPL-MCNC: 8 MG/DL (ref 8.7–10.5)
CHLORIDE SERPL-SCNC: 107 MMOL/L (ref 95–110)
CO2 SERPL-SCNC: 23 MMOL/L (ref 23–29)
CODING SYSTEM: NORMAL
CREAT SERPL-MCNC: 1.3 MG/DL (ref 0.5–1.4)
DIFFERENTIAL METHOD: ABNORMAL
DISPENSE STATUS: NORMAL
EOSINOPHIL # BLD AUTO: 1.4 K/UL (ref 0–0.5)
EOSINOPHIL NFR BLD: 8.6 % (ref 0–8)
ERYTHROCYTE [DISTWIDTH] IN BLOOD BY AUTOMATED COUNT: 13.4 % (ref 11.5–14.5)
EST. GFR  (AFRICAN AMERICAN): 54.9 ML/MIN/1.73 M^2
EST. GFR  (NON AFRICAN AMERICAN): 47.6 ML/MIN/1.73 M^2
GLUCOSE SERPL-MCNC: 88 MG/DL (ref 70–110)
HCT VFR BLD AUTO: 31 % (ref 37–48.5)
HCV RNA SERPL NAA+PROBE-ACNC: NORMAL IU/ML
HGB BLD-MCNC: 10.3 G/DL (ref 12–16)
IMM GRANULOCYTES # BLD AUTO: 0.09 K/UL (ref 0–0.04)
IMM GRANULOCYTES NFR BLD AUTO: 0.5 % (ref 0–0.5)
INR PPP: 1 (ref 0.8–1.2)
LYMPHOCYTES # BLD AUTO: 0.8 K/UL (ref 1–4.8)
LYMPHOCYTES NFR BLD: 4.6 % (ref 18–48)
MAGNESIUM SERPL-MCNC: 2.4 MG/DL (ref 1.6–2.6)
MCH RBC QN AUTO: 32.7 PG (ref 27–31)
MCHC RBC AUTO-ENTMCNC: 33.2 G/DL (ref 32–36)
MCV RBC AUTO: 98 FL (ref 82–98)
MONOCYTES # BLD AUTO: 1 K/UL (ref 0.3–1)
MONOCYTES NFR BLD: 5.7 % (ref 4–15)
NEUTROPHILS # BLD AUTO: 13.4 K/UL (ref 1.8–7.7)
NEUTROPHILS NFR BLD: 80.4 % (ref 38–73)
NRBC BLD-RTO: 0 /100 WBC
NUM UNITS TRANS FFP: NORMAL
PHOSPHATE SERPL-MCNC: 3.7 MG/DL (ref 2.7–4.5)
PLATELET # BLD AUTO: 131 K/UL (ref 150–450)
PMV BLD AUTO: 10.8 FL (ref 9.2–12.9)
POCT GLUCOSE: 111 MG/DL (ref 70–110)
POCT GLUCOSE: 163 MG/DL (ref 70–110)
POCT GLUCOSE: 217 MG/DL (ref 70–110)
POCT GLUCOSE: 218 MG/DL (ref 70–110)
POCT GLUCOSE: 96 MG/DL (ref 70–110)
POTASSIUM SERPL-SCNC: 4 MMOL/L (ref 3.5–5.1)
PROT SERPL-MCNC: 5.1 G/DL (ref 6–8.4)
PROTHROMBIN TIME: 10 SEC (ref 9–12.5)
RBC # BLD AUTO: 3.15 M/UL (ref 4–5.4)
SODIUM SERPL-SCNC: 138 MMOL/L (ref 136–145)
TACROLIMUS BLD-MCNC: 3.6 NG/ML (ref 5–15)
WBC # BLD AUTO: 16.7 K/UL (ref 3.9–12.7)

## 2022-06-08 PROCEDURE — 94760 N-INVAS EAR/PLS OXIMETRY 1: CPT | Mod: NTX

## 2022-06-08 PROCEDURE — 25000003 PHARM REV CODE 250: Mod: NTX | Performed by: TRANSPLANT SURGERY

## 2022-06-08 PROCEDURE — 84100 ASSAY OF PHOSPHORUS: CPT | Mod: NTX

## 2022-06-08 PROCEDURE — 99232 PR SUBSEQUENT HOSPITAL CARE,LEVL II: ICD-10-PCS | Mod: NTX,,, | Performed by: NURSE PRACTITIONER

## 2022-06-08 PROCEDURE — 94799 UNLISTED PULMONARY SVC/PX: CPT | Mod: NTX

## 2022-06-08 PROCEDURE — 25000003 PHARM REV CODE 250: Mod: NTX | Performed by: NURSE PRACTITIONER

## 2022-06-08 PROCEDURE — 63600175 PHARM REV CODE 636 W HCPCS: Mod: NTX | Performed by: TRANSPLANT SURGERY

## 2022-06-08 PROCEDURE — 99233 PR SUBSEQUENT HOSPITAL CARE,LEVL III: ICD-10-PCS | Mod: NTX,,,

## 2022-06-08 PROCEDURE — 63600175 PHARM REV CODE 636 W HCPCS: Mod: NTX | Performed by: NURSE PRACTITIONER

## 2022-06-08 PROCEDURE — 97116 GAIT TRAINING THERAPY: CPT | Mod: NTX

## 2022-06-08 PROCEDURE — 63600175 PHARM REV CODE 636 W HCPCS: Mod: NTX

## 2022-06-08 PROCEDURE — 85610 PROTHROMBIN TIME: CPT | Mod: NTX | Performed by: TRANSPLANT SURGERY

## 2022-06-08 PROCEDURE — 80197 ASSAY OF TACROLIMUS: CPT | Mod: NTX | Performed by: TRANSPLANT SURGERY

## 2022-06-08 PROCEDURE — 63600175 PHARM REV CODE 636 W HCPCS: Mod: NTX | Performed by: STUDENT IN AN ORGANIZED HEALTH CARE EDUCATION/TRAINING PROGRAM

## 2022-06-08 PROCEDURE — 25000003 PHARM REV CODE 250: Mod: NTX | Performed by: STUDENT IN AN ORGANIZED HEALTH CARE EDUCATION/TRAINING PROGRAM

## 2022-06-08 PROCEDURE — 85025 COMPLETE CBC W/AUTO DIFF WBC: CPT | Mod: NTX

## 2022-06-08 PROCEDURE — 80053 COMPREHEN METABOLIC PANEL: CPT | Mod: NTX | Performed by: TRANSPLANT SURGERY

## 2022-06-08 PROCEDURE — 99900035 HC TECH TIME PER 15 MIN (STAT): Mod: NTX

## 2022-06-08 PROCEDURE — 25000003 PHARM REV CODE 250: Mod: NTX

## 2022-06-08 PROCEDURE — 63600175 PHARM REV CODE 636 W HCPCS: Mod: NTX | Performed by: NURSE ANESTHETIST, CERTIFIED REGISTERED

## 2022-06-08 PROCEDURE — 99232 SBSQ HOSP IP/OBS MODERATE 35: CPT | Mod: NTX,,, | Performed by: NURSE PRACTITIONER

## 2022-06-08 PROCEDURE — 20600001 HC STEP DOWN PRIVATE ROOM: Mod: NTX

## 2022-06-08 PROCEDURE — 83735 ASSAY OF MAGNESIUM: CPT | Mod: NTX

## 2022-06-08 PROCEDURE — 99233 SBSQ HOSP IP/OBS HIGH 50: CPT | Mod: NTX,,,

## 2022-06-08 RX ORDER — LIDOCAINE HYDROCHLORIDE 10 MG/ML
10 INJECTION INFILTRATION; PERINEURAL ONCE
Status: DISCONTINUED | OUTPATIENT
Start: 2022-06-08 | End: 2022-06-10 | Stop reason: HOSPADM

## 2022-06-08 RX ORDER — IBUPROFEN 200 MG
24 TABLET ORAL
Status: DISCONTINUED | OUTPATIENT
Start: 2022-06-08 | End: 2022-06-10 | Stop reason: HOSPADM

## 2022-06-08 RX ORDER — GLUCAGON 1 MG
1 KIT INJECTION
Status: DISCONTINUED | OUTPATIENT
Start: 2022-06-08 | End: 2022-06-10 | Stop reason: HOSPADM

## 2022-06-08 RX ORDER — INSULIN ASPART 100 [IU]/ML
1-10 INJECTION, SOLUTION INTRAVENOUS; SUBCUTANEOUS
Status: DISCONTINUED | OUTPATIENT
Start: 2022-06-08 | End: 2022-06-10 | Stop reason: HOSPADM

## 2022-06-08 RX ORDER — BISACODYL 10 MG
10 SUPPOSITORY, RECTAL RECTAL DAILY
Status: DISCONTINUED | OUTPATIENT
Start: 2022-06-08 | End: 2022-06-10 | Stop reason: HOSPADM

## 2022-06-08 RX ORDER — TACROLIMUS 1 MG/1
3 CAPSULE ORAL 2 TIMES DAILY
Status: DISCONTINUED | OUTPATIENT
Start: 2022-06-08 | End: 2022-06-09

## 2022-06-08 RX ORDER — BISACODYL 10 MG
10 SUPPOSITORY, RECTAL RECTAL DAILY PRN
Status: DISCONTINUED | OUTPATIENT
Start: 2022-06-08 | End: 2022-06-08

## 2022-06-08 RX ORDER — TACROLIMUS 1 MG/1
1 CAPSULE ORAL ONCE
Status: COMPLETED | OUTPATIENT
Start: 2022-06-08 | End: 2022-06-08

## 2022-06-08 RX ORDER — IBUPROFEN 200 MG
16 TABLET ORAL
Status: DISCONTINUED | OUTPATIENT
Start: 2022-06-08 | End: 2022-06-10 | Stop reason: HOSPADM

## 2022-06-08 RX ADMIN — HEPARIN SODIUM 5000 UNITS: 5000 INJECTION INTRAVENOUS; SUBCUTANEOUS at 02:06

## 2022-06-08 RX ADMIN — FAMOTIDINE 20 MG: 20 TABLET ORAL at 08:06

## 2022-06-08 RX ADMIN — TACROLIMUS 3 MG: 1 CAPSULE ORAL at 05:06

## 2022-06-08 RX ADMIN — METHYLPREDNISOLONE SODIUM SUCCINATE 120 MG: 125 INJECTION, POWDER, FOR SOLUTION INTRAMUSCULAR; INTRAVENOUS at 08:06

## 2022-06-08 RX ADMIN — LINACLOTIDE 145 MCG: 145 CAPSULE, GELATIN COATED ORAL at 05:06

## 2022-06-08 RX ADMIN — NYSTATIN 500000 UNITS: 500000 SUSPENSION ORAL at 02:06

## 2022-06-08 RX ADMIN — POLYETHYLENE GLYCOL 3350 17 G: 17 POWDER, FOR SOLUTION ORAL at 08:06

## 2022-06-08 RX ADMIN — POLYETHYLENE GLYCOL 3350 17 G: 17 POWDER, FOR SOLUTION ORAL at 09:06

## 2022-06-08 RX ADMIN — MUPIROCIN 1 G: 20 OINTMENT TOPICAL at 08:06

## 2022-06-08 RX ADMIN — MYCOPHENOLATE MOFETIL 1000 MG: 250 CAPSULE ORAL at 08:06

## 2022-06-08 RX ADMIN — HEPARIN SODIUM 5000 UNITS: 5000 INJECTION INTRAVENOUS; SUBCUTANEOUS at 05:06

## 2022-06-08 RX ADMIN — NYSTATIN 500000 UNITS: 500000 SUSPENSION ORAL at 05:06

## 2022-06-08 RX ADMIN — BISACODYL 10 MG: 10 SUPPOSITORY RECTAL at 12:06

## 2022-06-08 RX ADMIN — OXYCODONE HYDROCHLORIDE 10 MG: 10 TABLET ORAL at 07:06

## 2022-06-08 RX ADMIN — LEVOTHYROXINE SODIUM 50 MCG: 50 TABLET ORAL at 05:06

## 2022-06-08 RX ADMIN — INSULIN ASPART 4 UNITS: 100 INJECTION, SOLUTION INTRAVENOUS; SUBCUTANEOUS at 05:06

## 2022-06-08 RX ADMIN — NYSTATIN 500000 UNITS: 500000 SUSPENSION ORAL at 08:06

## 2022-06-08 RX ADMIN — TACROLIMUS 1 MG: 1 CAPSULE ORAL at 12:06

## 2022-06-08 RX ADMIN — SIMETHICONE 80 MG: 80 TABLET, CHEWABLE ORAL at 07:06

## 2022-06-08 RX ADMIN — TACROLIMUS 2 MG: 1 CAPSULE ORAL at 08:06

## 2022-06-08 RX ADMIN — OXYCODONE HYDROCHLORIDE 10 MG: 10 TABLET ORAL at 02:06

## 2022-06-08 RX ADMIN — OXYCODONE HYDROCHLORIDE 10 MG: 10 TABLET ORAL at 08:06

## 2022-06-08 RX ADMIN — HEPARIN SODIUM 5000 UNITS: 5000 INJECTION INTRAVENOUS; SUBCUTANEOUS at 08:06

## 2022-06-08 NOTE — PLAN OF CARE
Malu Montes tolerated treatment well today. She was sitting up in her chair with no family present upon my entry to room, agreeable to treatment. She reports feeling very well, stating that she has been walking to/from bathroom today without walker or assistance. Primarily with c/o generalized back pain post-op. Able to stand from chair with close stand-by assistance x 2 trials. Ambulates 270 ft in hallways (wearing mask) on room air with stand-by assistance, no assistive device utilized; gait is minimally unsteady but no losses of balance warranting therapist intervention to avoid fall. Discussed PT role, POC (3x/week), goals and recommendations (Home with family, no DME needs) with patient; verbalized understanding. Malu Montes will continue to benefit from acute PT services to promote mobility during this admission and improve return to PLOF.    Problem: Physical Therapy  Goal: Physical Therapy Goal  Description: Goals to be met by: 2022     Patient will increase functional independence with mobility by performin. Supine to sit with Supervision - Not met  2. Sit to supine with Supervision - Not met  3. Sit to stand transfer with Supervision - Not met  4. Bed to chair transfer with Supervision using No Assistive Device - Not met  5. Gait  x 200 feet with Supervision using LRAD - Not met  6. Lower extremity exercise program x15 reps per handout, with supervision - Not met    Outcome: Ongoing, Progressing    Bridger Lorenz, PT  2022

## 2022-06-08 NOTE — PROGRESS NOTES
Transplant 2nd Note (Late Entry)     presented to the patient's room for follow up and continuity of care. Patient observed sitting in chair and presents alert and oriented x4, calm and communicative. Patient's caregivers are not at bedside at this time. Patient reports they have been coming back and forth and live only 10 minutes away. Patient's  and mother-in-law will be a part of patient's caregiver support once patient returns home. At this time, PT recommendations are for patient to return home with no needs for home health or DME. Patient denies any mental health difficulties at this time and reports adequate coping. Patient is thankful for transplant and notes she was not expecting this as she was called in as a backup. Patient reports she has been progressing well with her recovery. Patient denied any concerns or needs at this time. SW remains available and will continue to follow, providing psychosocial support, education and assistance as needed.   .

## 2022-06-08 NOTE — ASSESSMENT & PLAN NOTE
- educated patient on hydrating   - central line remains in place in case of HD needs   - strict Is and Os  - Cr improving 6/8

## 2022-06-08 NOTE — ASSESSMENT & PLAN NOTE
Endocrinology consulted for BG management.   BG goal 140-180    - D/C transition drip  - Novolog (aspart) insulin [facility] SSI (150/25) Units SQ prn for BG excursions.   - BG checks AC/HS  - Hypoglycemia protocol in place  - If blood glucose greater than 300, please ask patient not to eat food or drink anything other than water until correctional insulin has brought it back below 250    ** Please notify Endocrine for any change and/or advance in diet**  ** Please call Endocrine for any BG related issues **    Discharge Planning:   TBD. Please notify endocrinology prior to discharge.       yes

## 2022-06-08 NOTE — SUBJECTIVE & OBJECTIVE
"Interval HPI:   Overnight events: No acute events overnight. Patient on the TSU in room 25408/25441 A. Blood glucose stable. BG at goal on current insulin regimen (Transition Insulin Drip). Steroid use- Methylprednisolone  120 mg. 3 Days Post-Op  Renal function- Normal   Vasopressors-  None       Diet diabetic [facility];  Calorie     Eatin%  Nausea: No  Hypoglycemia and intervention: No  Fever: No  TPN and/or TF: No    /80   Pulse 86   Temp 97.9 °F (36.6 °C) (Oral)   Resp 17   Ht 5' 3" (1.6 m)   Wt 70 kg (154 lb 5.2 oz)   SpO2 98%   BMI 27.34 kg/m²     Labs Reviewed and Include    Recent Labs   Lab 22   GLU 88   CALCIUM 8.0*   ALBUMIN 3.1*   PROT 5.1*      K 4.0   CO2 23      BUN 37*   CREATININE 1.3   ALKPHOS 127   ALT 91*   AST 65*   BILITOT 0.5     Lab Results   Component Value Date    WBC 16.70 (H) 2022    HGB 10.3 (L) 2022    HCT 31.0 (L) 2022    MCV 98 2022     (L) 2022     No results for input(s): TSH, FREET4 in the last 168 hours.  Lab Results   Component Value Date    HGBA1C 5.4 2022       Nutritional status:   Body mass index is 27.34 kg/m².  Lab Results   Component Value Date    ALBUMIN 3.1 (L) 2022    ALBUMIN 3.0 (L) 2022    ALBUMIN 3.5 2022    ALBUMIN 3.5 2022     No results found for: PREALBUMIN    Estimated Creatinine Clearance: 48 mL/min (based on SCr of 1.3 mg/dL).    Accu-Checks  Recent Labs     22  0807 22  1009 22  1623 22  2037 22  0223 22  0825 22  1259 22  1648 22  2153 224   POCTGLUCOSE 180* 129* 173* 173* 157* 118* 163* 156* 153* 111*       Current Medications and/or Treatments Impacting Glycemic Control  Immunotherapy:    Immunosuppressants           Stop Route Frequency     tacrolimus capsule 2 mg         -- Oral 2 times daily     mycophenolate capsule 1,000 mg         -- Oral 2 times daily      " "    Steroids:   Hormones (From admission, onward)                Start     Stop Route Frequency Ordered    06/11/22 0900  predniSONE tablet 20 mg  (methylprednisolone taper panel)        "Followed by" Linked Group Details    -- Oral Daily 06/05/22 2210    06/10/22 0900  methylPREDNISolone sodium succinate injection 40 mg  (methylprednisolone taper panel)        "Followed by" Linked Group Details    06/11 0859 IV Daily 06/05/22 2210 06/09/22 0900  methylPREDNISolone sodium succinate injection 80 mg  (methylprednisolone taper panel)        "Followed by" Linked Group Details    06/10 0859 IV Daily 06/05/22 2210          Pressors:    Autonomic Drugs (From admission, onward)                None          Hyperglycemia/Diabetes Medications:   Antihyperglycemics (From admission, onward)                Start     Stop Route Frequency Ordered    06/08/22 0930  insulin aspart U-100 pen 1-10 Units         -- SubQ Before meals & nightly PRN 06/08/22 0830          "

## 2022-06-08 NOTE — PROGRESS NOTES
"[facility] - [facility]plant [facility]down  Endocrinology  Progress Note    Admit Date: 2022     Reason for Consult: Management of  Hyperglycemia     Surgical Procedure and Date: liver transplant 22        HPI:   Patient is a 52 y.o. female with a diagnosis of HLD, hypothyroidism, and ESLD. No personal history of DM. Patient admitted for liver transplant. Endocrinology consulted for BG management.       Lab Results   Component Value Date    HGBA1C 5.4 2022           Interval HPI:   Overnight events: No acute events overnight. Patient on the TSU in room 04388/45143 A. Blood glucose stable. BG at goal on current insulin regimen (Transition Insulin Drip). Steroid use- Methylprednisolone  120 mg. 3 Days Post-Op  Renal function- Normal   Vasopressors-  None       Diet diabetic [facility];  Calorie     Eatin%  Nausea: No  Hypoglycemia and intervention: No  Fever: No  TPN and/or TF: No    /80   Pulse 86   Temp 97.9 °F (36.6 °C) (Oral)   Resp 17   Ht 5' 3" (1.6 m)   Wt 70 kg (154 lb 5.2 oz)   SpO2 98%   BMI 27.34 kg/m²     Labs Reviewed and Include    Recent Labs   Lab 22  05   GLU 88   CALCIUM 8.0*   ALBUMIN 3.1*   PROT 5.1*      K 4.0   CO2 23      BUN 37*   CREATININE 1.3   ALKPHOS 127   ALT 91*   AST 65*   BILITOT 0.5     Lab Results   Component Value Date    WBC 16.70 (H) 2022    HGB 10.3 (L) 2022    HCT 31.0 (L) 2022    MCV 98 2022     (L) 2022     No results for input(s): TSH, FREET4 in the last 168 hours.  Lab Results   Component Value Date    HGBA1C 5.4 2022       Nutritional status:   Body mass index is 27.34 kg/m².  Lab Results   Component Value Date    ALBUMIN 3.1 (L) 2022    ALBUMIN 3.0 (L) 2022    ALBUMIN 3.5 2022    ALBUMIN 3.5 2022     No results found for: PREALBUMIN    Estimated Creatinine Clearance: 48 mL/min (based on SCr of 1.3 mg/dL).    Accu-Checks  Recent Labs     22  0807 " "06/06/22  1009 06/06/22  1623 06/06/22  2037 06/07/22  0223 06/07/22  0825 06/07/22  1259 06/07/22  1648 06/07/22  2153 06/08/22  0224   POCTGLUCOSE 180* 129* 173* 173* 157* 118* 163* 156* 153* 111*       Current Medications and/or Treatments Impacting Glycemic Control  Immunotherapy:    Immunosuppressants           Stop Route Frequency     tacrolimus capsule 2 mg         -- Oral 2 times daily     mycophenolate capsule 1,000 mg         -- Oral 2 times daily          Steroids:   Hormones (From admission, onward)                Start     Stop Route Frequency Ordered    06/11/22 0900  predniSONE tablet 20 mg  (methylprednisolone taper panel)        "Followed by" Linked Group Details    -- Oral Daily 06/05/22 2210    06/10/22 0900  methylPREDNISolone sodium succinate injection 40 mg  (methylprednisolone taper panel)        "Followed by" Linked Group Details    06/11 0859 IV Daily 06/05/22 2210 06/09/22 0900  methylPREDNISolone sodium succinate injection 80 mg  (methylprednisolone taper panel)        "Followed by" Linked Group Details    06/10 0859 IV Daily 06/05/22 2210          Pressors:    Autonomic Drugs (From admission, onward)                None          Hyperglycemia/Diabetes Medications:   Antihyperglycemics (From admission, onward)                Start     Stop Route Frequency Ordered    06/08/22 0930  insulin aspart U-100 pen 1-10 Units         -- SubQ Before meals & nightly PRN 06/08/22 0830            ASSESSMENT and PLAN    * S/P liver transplant  avoid hypoglycemia        Steroid-induced hyperglycemia  Endocrinology consulted for BG management.   BG goal 140-180    - D/C transition drip  - Novolog (aspart) insulin [facility] SSI (150/25) Units SQ prn for BG excursions.   - BG checks AC/HS  - Hypoglycemia protocol in place  - If blood glucose greater than 300, please ask patient not to eat food or drink anything other than water until correctional insulin has brought it back below 250    ** Please notify " Endocrine for any change and/or advance in diet**  ** Please call Endocrine for any BG related issues **    Discharge Planning:   TBD. Please notify endocrinology prior to discharge.        Hypothyroidism  Managed per primary.   Continue levothyroxine 50 mcg daily.   Lab Results   Component Value Date    TSH 2.550 05/06/2022    FREET4 0.94 07/15/2021           Adrenal cortical steroids causing adverse effect in therapeutic use  Glucocorticoids markedly increase prandial glucoses. Expect the steroid taper will help glucose control.              Edward Deshpande, DNP, FNP  Endocrinology  [facility] - Transplant Stepdown

## 2022-06-08 NOTE — PROGRESS NOTES
[facility] - Transplant Stepdown  Liver Transplant  Progress Note    Patient Name: Malu Montes  MRN: [id number]  Admission Date: 6/5/2022  Hospital Length of Stay: 3 days  Code Status: Full Code  Primary Care Provider: Killian Dodd DO  Post-Operative Day: 3    ORGAN:   LIVER  Disease Etiology: Alcoholic Cirrhosis  Donor Type:   Donation after Brain Death  CDC High Risk:   Yes  Donor CMV Status:   Donor CMV Status: Positive  Donor HBcAB:   Negative  Donor HCV Status:   Positive  Donor HBV JACOBO: Negative  Donor HCV JACOBO: Negative  Whole or Partial: Whole Liver  Biliary Anastomosis: End to End  Arterial Anatomy: Standard  Subjective:     History of Present Illness:  Ms. 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 Liver disease has been complicated by HE, ascites requiring paracentesis twice a week. She underwent a TIPS placement on 5/6/22. Admitted for streptococcus mitis/oralis. She completed a 14d course of ceftriaxone.  Now patient presents for a Liver transplant.  She reports in usual state of health- Patient denies any recent hospitalizations or ED visits.   The primary surgeon will be Dr. Slaughter.  Patient is NPO.  All pre-op labs and imaging have been ordered prior to surgery. Consents  signed prior to transplant.         Hospital Course:  Now s/p DBD OLTX 6/5 for ETOH cirrhosis (donor HCVab+/JACOBO-). Had intra-op HD, originally listed as liver-kidney transplant. Surgery went without complication. Was extubated in the OR and went to ICU with 2 drains and a armenta. Stepped down to TSU 6/6. POD1 US on 6/6 stable and reveiwed by surgeon. Armenta and lateral drain removed 6/7 without issue.     Interval History   NAEON. Patient feeling well this morning, c/o bloating/constipation. Will give suppository. Lfts trending down beautifully, Cr starting to improve, will leave central line in and cont to monitor. Medial drain to be removed today. Good UOP. Patient  eating and drinking well, passing flatus. Encouraged patient to walk around today. Labs stable. VSS. Cont to monitor         Scheduled Meds:   famotidine  20 mg Oral QHS    heparin (porcine)  5,000 Units Subcutaneous Q8H    levothyroxine  50 mcg Oral Before breakfast    linaCLOtide  145 mcg Oral Before breakfast    [START ON 6/9/2022] methylPREDNISolone sodium succinate injection  80 mg Intravenous Daily    Followed by    [START ON 6/10/2022] methylPREDNISolone sodium succinate injection  40 mg Intravenous Daily    Followed by    [START ON 6/11/2022] predniSONE  20 mg Oral Daily    mupirocin  1 g Nasal BID    mycophenolate  1,000 mg Oral BID    nystatin  500,000 Units Mouth/Throat TID PC    polyethylene glycol  17 g Oral BID    [START ON 6/12/2022] sulfamethoxazole-trimethoprim 400-80mg  1 tablet Oral Daily AM    tacrolimus  2 mg Oral BID    [START ON 6/15/2022] valGANciclovir  450 mg Oral Daily     Continuous Infusions:  PRN Meds:sodium chloride 0.9%, bisacodyL, dextrose 10%, dextrose 10%, glucagon (human recombinant), glucose, glucose, insulin aspart U-100, oxyCODONE, oxyCODONE, simethicone, sodium chloride 0.9%    Review of Systems   Constitutional:  Positive for appetite change. Negative for chills and fever.   HENT:  Negative for sore throat.    Respiratory:  Negative for cough, shortness of breath and wheezing.    Cardiovascular:  Positive for leg swelling. Negative for chest pain and palpitations.   Gastrointestinal:  Positive for abdominal pain and constipation. Negative for diarrhea, nausea and vomiting.   Genitourinary:  Negative for decreased urine volume, dysuria and frequency.   Musculoskeletal:  Negative for arthralgias and back pain.   Skin:  Negative for wound.   Allergic/Immunologic: Positive for immunocompromised state.   Neurological:  Negative for dizziness and weakness.   Psychiatric/Behavioral:  The patient is not nervous/anxious.    Objective:     Vital Signs (Most  Recent):  Temp: 97.9 °F (36.6 °C) (06/08/22 0730)  Pulse: 87 (06/08/22 1139)  Resp: 17 (06/08/22 0845)  BP: 132/80 (06/08/22 0845)  SpO2: 98 % (06/08/22 0845) Vital Signs (24h Range):  Temp:  [97.9 °F (36.6 °C)-98.3 °F (36.8 °C)] 97.9 °F (36.6 °C)  Pulse:  [73-87] 87  Resp:  [16-20] 17  SpO2:  [96 %-100 %] 98 %  BP: (132-162)/(80-94) 132/80     Weight: 70 kg (154 lb 5.2 oz)  Body mass index is 27.34 kg/m².    Intake/Output - Last 3 Shifts         06/06 0700  06/07 0659 06/07 0700  06/08 0659 06/08 0700  06/09 0659    P.O. 720 1080     I.V. (mL/kg) 1158.8 (16.4) 23.7 (0.3)     Blood       Other       IV Piggyback 147.5      Total Intake(mL/kg) 2026.3 (28.7) 1103.7 (15.8)     Urine (mL/kg/hr) 1470 (0.9) 1850 (1.1) 200 (0.6)    Emesis/NG output  0     Drains 573 340     Other       Stool  0     Total Output 2043 2190 200    Net -16.7 -1086.3 -200           Urine Occurrence  1 x     Stool Occurrence  0 x     Emesis Occurrence  0 x             Physical Exam  Vitals and nursing note reviewed.   Constitutional:       General: She is not in acute distress.  HENT:      Head: Normocephalic.      Mouth/Throat:      Mouth: Mucous membranes are moist.   Eyes:      Conjunctiva/sclera: Conjunctivae normal.   Cardiovascular:      Rate and Rhythm: Normal rate and regular rhythm.      Pulses: Normal pulses.      Heart sounds: Normal heart sounds.   Pulmonary:      Effort: Pulmonary effort is normal.      Breath sounds: Normal breath sounds.   Abdominal:      General: Bowel sounds are normal. There is no distension.      Palpations: Abdomen is soft.       Musculoskeletal:         General: Normal range of motion.      Cervical back: Normal range of motion.      Right lower leg: Edema present.      Left lower leg: Edema present.   Skin:     General: Skin is warm and dry.   Neurological:      Mental Status: She is alert and oriented to person, place, and time.      Motor: No weakness.   Psychiatric:         Mood and Affect: Mood normal.          Behavior: Behavior normal.         Thought Content: Thought content normal.         Judgment: Judgment normal.       Laboratory:  Immunosuppressants           Stop Route Frequency     tacrolimus capsule 2 mg         -- Oral 2 times daily     mycophenolate capsule 1,000 mg         -- Oral 2 times daily          CBC:   Recent Labs   Lab 06/08/22  0522   WBC 16.70*   RBC 3.15*   HGB 10.3*   HCT 31.0*   *   MCV 98   MCH 32.7*   MCHC 33.2     CMP:   Recent Labs   Lab 06/08/22  0522   GLU 88   CALCIUM 8.0*   ALBUMIN 3.1*   PROT 5.1*      K 4.0   CO2 23      BUN 37*   CREATININE 1.3   ALKPHOS 127   ALT 91*   AST 65*   BILITOT 0.5     Labs within the past 24 hours have been reviewed.    Diagnostic Results:  I have personally reviewed all pertinent imaging studies.    Debility/Functional status: No debility noted.    Assessment/Plan:     * S/P liver transplant  - surgery was uncomplicated per op note  - patient stepped down to TSU earlier than typical   - LFTs trending down nicely   - armenta out 6/7  - lateral drain removed 6/7  - medial drain removed 6/8         At risk for opportunistic infections  - nystatin for thrush, bactrim for PJP, valcyte for CMV       Long-term use of immunosuppressant medication  - see prophylactic immunotherapy      Adrenal cortical steroids causing adverse effect in therapeutic use        Prophylactic immunotherapy  - prograf, cellcept, pred taper   - monitor tacro levels for therapeutic levels and adverse effects       Steroid-induced hyperglycemia  - endocrine consulted, appreciate help      Constipation  - acute on chronic   - made worse from transplant surgery   - on bowel regimen including suppository       ARLIN (acute kidney injury)  - educated patient on hydrating   - central line remains in place in case of HD needs   - strict Is and Os  - Cr improving 6/8     Anemia of chronic disease  - h/h stable   - cont to monitor with daily CBC       Hypothyroidism  - on  synthroid           VTE Risk Mitigation (From admission, onward)         Ordered     heparin (porcine) injection 5,000 Units  Every 8 hours         06/05/22 2210     Place sequential compression device  Until discontinued         06/05/22 2210     IP VTE HIGH RISK PATIENT  Once         06/05/22 2210                The patients clinical status was discussed at multidisplinary rounds, involving transplant surgery, transplant medicine, pharmacy, nursing, nutrition, and social work    Discharge Planning:  Hopeful to dc on POD 5, Friday     Isidra Thompson PA-C  Liver Transplant  [facility] - Transplant [facility]down

## 2022-06-08 NOTE — SUBJECTIVE & OBJECTIVE
Scheduled Meds:   famotidine  20 mg Oral QHS    heparin (porcine)  5,000 Units Subcutaneous Q8H    levothyroxine  50 mcg Oral Before breakfast    linaCLOtide  145 mcg Oral Before breakfast    [START ON 6/9/2022] methylPREDNISolone sodium succinate injection  80 mg Intravenous Daily    Followed by    [START ON 6/10/2022] methylPREDNISolone sodium succinate injection  40 mg Intravenous Daily    Followed by    [START ON 6/11/2022] predniSONE  20 mg Oral Daily    mupirocin  1 g Nasal BID    mycophenolate  1,000 mg Oral BID    nystatin  500,000 Units Mouth/Throat TID PC    polyethylene glycol  17 g Oral BID    [START ON 6/12/2022] sulfamethoxazole-trimethoprim 400-80mg  1 tablet Oral Daily AM    tacrolimus  2 mg Oral BID    [START ON 6/15/2022] valGANciclovir  450 mg Oral Daily     Continuous Infusions:  PRN Meds:sodium chloride 0.9%, bisacodyL, dextrose 10%, dextrose 10%, glucagon (human recombinant), glucose, glucose, insulin aspart U-100, oxyCODONE, oxyCODONE, simethicone, sodium chloride 0.9%    Review of Systems   Constitutional:  Positive for appetite change. Negative for chills and fever.   HENT:  Negative for sore throat.    Respiratory:  Negative for cough, shortness of breath and wheezing.    Cardiovascular:  Positive for leg swelling. Negative for chest pain and palpitations.   Gastrointestinal:  Positive for abdominal pain and constipation. Negative for diarrhea, nausea and vomiting.   Genitourinary:  Negative for decreased urine volume, dysuria and frequency.   Musculoskeletal:  Negative for arthralgias and back pain.   Skin:  Negative for wound.   Allergic/Immunologic: Positive for immunocompromised state.   Neurological:  Negative for dizziness and weakness.   Psychiatric/Behavioral:  The patient is not nervous/anxious.    Objective:     Vital Signs (Most Recent):  Temp: 97.9 °F (36.6 °C) (06/08/22 0730)  Pulse: 87 (06/08/22 1139)  Resp: 17 (06/08/22 0845)  BP: 132/80 (06/08/22 0845)  SpO2: 98 % (06/08/22  0845) Vital Signs (24h Range):  Temp:  [97.9 °F (36.6 °C)-98.3 °F (36.8 °C)] 97.9 °F (36.6 °C)  Pulse:  [73-87] 87  Resp:  [16-20] 17  SpO2:  [96 %-100 %] 98 %  BP: (132-162)/(80-94) 132/80     Weight: 70 kg (154 lb 5.2 oz)  Body mass index is 27.34 kg/m².    Intake/Output - Last 3 Shifts         06/06 0700  06/07 0659 06/07 0700  06/08 0659 06/08 0700  06/09 0659    P.O. 720 1080     I.V. (mL/kg) 1158.8 (16.4) 23.7 (0.3)     Blood       Other       IV Piggyback 147.5      Total Intake(mL/kg) 2026.3 (28.7) 1103.7 (15.8)     Urine (mL/kg/hr) 1470 (0.9) 1850 (1.1) 200 (0.6)    Emesis/NG output  0     Drains 573 340     Other       Stool  0     Total Output 2043 2190 200    Net -16.7 -1086.3 -200           Urine Occurrence  1 x     Stool Occurrence  0 x     Emesis Occurrence  0 x             Physical Exam  Vitals and nursing note reviewed.   Constitutional:       General: She is not in acute distress.  HENT:      Head: Normocephalic.      Mouth/Throat:      Mouth: Mucous membranes are moist.   Eyes:      Conjunctiva/sclera: Conjunctivae normal.   Cardiovascular:      Rate and Rhythm: Normal rate and regular rhythm.      Pulses: Normal pulses.      Heart sounds: Normal heart sounds.   Pulmonary:      Effort: Pulmonary effort is normal.      Breath sounds: Normal breath sounds.   Abdominal:      General: Bowel sounds are normal. There is no distension.      Palpations: Abdomen is soft.       Musculoskeletal:         General: Normal range of motion.      Cervical back: Normal range of motion.      Right lower leg: Edema present.      Left lower leg: Edema present.   Skin:     General: Skin is warm and dry.   Neurological:      Mental Status: She is alert and oriented to person, place, and time.      Motor: No weakness.   Psychiatric:         Mood and Affect: Mood normal.         Behavior: Behavior normal.         Thought Content: Thought content normal.         Judgment: Judgment normal.        Laboratory:  Immunosuppressants           Stop Route Frequency     tacrolimus capsule 2 mg         -- Oral 2 times daily     mycophenolate capsule 1,000 mg         -- Oral 2 times daily          CBC:   Recent Labs   Lab 06/08/22  0522   WBC 16.70*   RBC 3.15*   HGB 10.3*   HCT 31.0*   *   MCV 98   MCH 32.7*   MCHC 33.2     CMP:   Recent Labs   Lab 06/08/22 0522   GLU 88   CALCIUM 8.0*   ALBUMIN 3.1*   PROT 5.1*      K 4.0   CO2 23      BUN 37*   CREATININE 1.3   ALKPHOS 127   ALT 91*   AST 65*   BILITOT 0.5     Labs within the past 24 hours have been reviewed.    Diagnostic Results:  I have personally reviewed all pertinent imaging studies.    Debility/Functional status: No debility noted.

## 2022-06-08 NOTE — PLAN OF CARE
Patient POD #3 - progressing well. Dulcolax suppository given x1 with results obtained. OOB to chair and ambulating in room independently. Ambulated in hallway with PT. LFTs trending down. Insulin gtt d/c'd - SSI given as needed. 100% on self-meds. Coordinator teaching done at bedside with patient's family today.

## 2022-06-08 NOTE — PT/OT/SLP PROGRESS
Physical Therapy  Treatment    Malu Montes   [id number]    Time Tracking:     PT Received On: 06/08/22   PT Start Time: 1350   PT Stop Time: 1408   PT Total Time (min): 18 min    Billable Minutes: Gait Training 12 and Therapeutic Activity 6 minutes      Recommendations:     Discharge recommendations: Home with family     Equipment recommendations: None    Barriers to Discharge: None    Patient Information:     Recent Surgery: Procedure(s) (LRB):  TRANSPLANT, LIVER (N/A) 3 Days Post-Op    Diagnosis: S/P liver transplant    Length of Stay: 3 days    General Precautions: Standard, fall  Orthopedic Precautions: None  Brace: None    Assessment:     Malu Montes tolerated treatment well today. She was sitting up in her chair with no family present upon my entry to room, agreeable to treatment. She reports feeling very well, stating that she has been walking to/from bathroom today without walker or assistance. Primarily with c/o generalized back pain post-op. Able to stand from chair with close stand-by assistance x 2 trials. Ambulates 270 ft in hallways (wearing mask) on room air with stand-by assistance, no assistive device utilized; gait is minimally unsteady but no losses of balance warranting therapist intervention to avoid fall. Discussed PT role, POC (3x/week), goals and recommendations (Home with family, no DME needs) with patient; verbalized understanding. Malu Montes will continue to benefit from acute PT services to promote mobility during this admission and improve return to PLOF.    Problem List: weakness, decreased endurance, impaired self-care skills, impaired mobility, decreased sitting or standing balance, gait instability, impaired cardiopulmonary response to activity and pain    Rehab Prognosis: Good; patient would benefit from acute skilled PT services to address these deficits and reach maximum level of function.    Plan:     Alter POC (Decrease freq) for patient to be seen 3 x/week  "to address the above listed problems via gait training, therapeutic activities, therapeutic exercises    Plan of Care Expires: 07/07/22  Plan of Care reviewed with: patient    Subjective:     Communicated with JB Singh prior to treatment, appropriate to see for treatment.    Pt found sitting up in bedside chair upon PT entry to room, agreeable to treatment.    Patient commenting: "I've been walking today without the walker."    Does this patient have any cultural, spiritual, Buddhism conflicts given the current situation? Patient/family has no barriers to learning. Patient/family verbalizes understanding of his/her program and goals and demonstrates them correctly. No cultural, spiritual, or educational needs identified.    Objective:     Patient found with: telemetry, JENSEN drain    Pain:  Pain Rating 1: 6/10 at generalized back  Pain Rating Post-Intervention 1: 6/10 (same, see above)    Functional Mobility:    · Bed Mobility:  · NT; OOBTC before and after session    · Transfers:  · Sit to Stand: stand-by assistance from bedside chair with no AD x 2 trial(s)    · Gait:  · 270 feet in hallways (wearing mask) on room air with stand-by assistance, no assistive device utilized; gait is minimally unsteady but no losses of balance warranting therapist intervention to avoid fall  · Mostly limited by generalized back pain, good upright posture in standing/walking    · Assist level: Stand-By Assist  · Device: no AD    · Balance:  · Static Sit: Independent at edge of chair    · Static Stand: Supervision with no AD    Additional Therapeutic Activity/Exercises:     1. Discussed PT role, POC, goals and recommendations (Home with family) with patient; verbalized understanding.    2. Whiteboard was updated.    AM-PAC 6 CLICK MOBILITY  Turning over in bed (including adjusting bedclothes, sheets and blankets)?: 4  Sitting down on and standing up from a chair with arms (e.g., wheelchair, bedside commode, etc.): 3  Moving from lying on " back to sitting on the side of the bed?: 4  Moving to and from a bed to a chair (including a wheelchair)?: 4  Need to walk in hospital room?: 4  Climbing 3-5 steps with a railing?: 3  Basic Mobility Total Score: 22    Patient was left sitting up in bedside chair with all lines intact, call button in reach and RN present.    GOALS:   Multidisciplinary Problems     Physical Therapy Goals        Problem: Physical Therapy    Goal Priority Disciplines Outcome Goal Variances Interventions   Physical Therapy Goal     PT, PT/OT Ongoing, Progressing     Description: Goals to be met by: 2022     Patient will increase functional independence with mobility by performin. Supine to sit with Supervision - Not met  2. Sit to supine with Supervision - Not met  3. Sit to stand transfer with Supervision - Not met  4. Bed to chair transfer with Supervision using No Assistive Device - Not met  5. Gait  x 200 feet with Supervision using LRAD - Not met  6. Lower extremity exercise program x15 reps per handout, with supervision - Not met                   Bridger Lorenz, PT   2022

## 2022-06-08 NOTE — PLAN OF CARE
AAO x 4. VSS, afebrile, SpO2>95% on RA. Telemetry monitoring SR. BG monitoring AC/HS/0200. Transitional insulin gtt @ 0.8 unit/hr continuous. [employer] incision LUNA with teresita. R JENSEN with serosanguinous output. PRN oxycodone and dilaudid for pain control. Pt pulled 100% self meds. Fall precautions maintained and pt repositions self. See flowsheet for assessment findings.

## 2022-06-08 NOTE — PROGRESS NOTES
Met with patient,  and mother in law  for  discharge teaching.  Reviewed My New Journey: Living Smart After My Liver Transplant.  Sections reviewed were: First Steps, Appointments and Prevention.  Medication section will be reviewed by Pharm D. Allowed time for questions and answers.  Asked patient to complete the review questions in the discharge book.

## 2022-06-08 NOTE — ASSESSMENT & PLAN NOTE
- acute on chronic   - made worse from transplant surgery   - on bowel regimen including suppository

## 2022-06-08 NOTE — ASSESSMENT & PLAN NOTE
- surgery was uncomplicated per op note  - patient stepped down to TSU earlier than typical   - LFTs trending down nicely   - armenta out 6/7  - lateral drain removed 6/7  - medial drain removed 6/8

## 2022-06-09 LAB
ALBUMIN SERPL BCP-MCNC: 2.8 G/DL (ref 3.5–5.2)
ALP SERPL-CCNC: 263 U/L (ref 55–135)
ALT SERPL W/O P-5'-P-CCNC: 76 U/L (ref 10–44)
ANION GAP SERPL CALC-SCNC: 9 MMOL/L (ref 8–16)
AST SERPL-CCNC: 50 U/L (ref 10–40)
BASOPHILS # BLD AUTO: 0.02 K/UL (ref 0–0.2)
BASOPHILS NFR BLD: 0.2 % (ref 0–1.9)
BILIRUB SERPL-MCNC: 0.6 MG/DL (ref 0.1–1)
BLD PROD TYP BPU: NORMAL
BLOOD UNIT EXPIRATION DATE: NORMAL
BLOOD UNIT TYPE CODE: 6200
BLOOD UNIT TYPE: NORMAL
BUN SERPL-MCNC: 24 MG/DL (ref 6–20)
CALCIUM SERPL-MCNC: 7.8 MG/DL (ref 8.7–10.5)
CHLORIDE SERPL-SCNC: 102 MMOL/L (ref 95–110)
CO2 SERPL-SCNC: 23 MMOL/L (ref 23–29)
CODING SYSTEM: NORMAL
CREAT SERPL-MCNC: 0.9 MG/DL (ref 0.5–1.4)
DIFFERENTIAL METHOD: ABNORMAL
DISPENSE STATUS: NORMAL
EOSINOPHIL # BLD AUTO: 1.4 K/UL (ref 0–0.5)
EOSINOPHIL NFR BLD: 16.3 % (ref 0–8)
ERYTHROCYTE [DISTWIDTH] IN BLOOD BY AUTOMATED COUNT: 12.8 % (ref 11.5–14.5)
EST. GFR  (AFRICAN AMERICAN): >60 ML/MIN/1.73 M^2
EST. GFR  (NON AFRICAN AMERICAN): >60 ML/MIN/1.73 M^2
FINAL PATHOLOGIC DIAGNOSIS: NORMAL
GLUCOSE SERPL-MCNC: 108 MG/DL (ref 70–110)
GROSS: NORMAL
HCT VFR BLD AUTO: 28 % (ref 37–48.5)
HGB BLD-MCNC: 9.4 G/DL (ref 12–16)
IMM GRANULOCYTES # BLD AUTO: 0.03 K/UL (ref 0–0.04)
IMM GRANULOCYTES NFR BLD AUTO: 0.3 % (ref 0–0.5)
LYMPHOCYTES # BLD AUTO: 0.6 K/UL (ref 1–4.8)
LYMPHOCYTES NFR BLD: 6.8 % (ref 18–48)
Lab: NORMAL
MAGNESIUM SERPL-MCNC: 2.4 MG/DL (ref 1.6–2.6)
MCH RBC QN AUTO: 32.3 PG (ref 27–31)
MCHC RBC AUTO-ENTMCNC: 33.6 G/DL (ref 32–36)
MCV RBC AUTO: 96 FL (ref 82–98)
MONOCYTES # BLD AUTO: 0.6 K/UL (ref 0.3–1)
MONOCYTES NFR BLD: 6.7 % (ref 4–15)
NEUTROPHILS # BLD AUTO: 6.1 K/UL (ref 1.8–7.7)
NEUTROPHILS NFR BLD: 69.7 % (ref 38–73)
NRBC BLD-RTO: 0 /100 WBC
NUM UNITS TRANS PACKED RBC: NORMAL
PHOSPHATE SERPL-MCNC: 2.9 MG/DL (ref 2.7–4.5)
PLATELET # BLD AUTO: 117 K/UL (ref 150–450)
PMV BLD AUTO: 10.8 FL (ref 9.2–12.9)
POCT GLUCOSE: 123 MG/DL (ref 70–110)
POCT GLUCOSE: 233 MG/DL (ref 70–110)
POCT GLUCOSE: 97 MG/DL (ref 70–110)
POTASSIUM SERPL-SCNC: 3.8 MMOL/L (ref 3.5–5.1)
PROT SERPL-MCNC: 4.8 G/DL (ref 6–8.4)
RBC # BLD AUTO: 2.91 M/UL (ref 4–5.4)
SODIUM SERPL-SCNC: 134 MMOL/L (ref 136–145)
TACROLIMUS BLD-MCNC: 3.1 NG/ML (ref 5–15)
WBC # BLD AUTO: 8.81 K/UL (ref 3.9–12.7)

## 2022-06-09 PROCEDURE — 25000003 PHARM REV CODE 250: Mod: NTX | Performed by: TRANSPLANT SURGERY

## 2022-06-09 PROCEDURE — 25000003 PHARM REV CODE 250: Mod: NTX | Performed by: NURSE PRACTITIONER

## 2022-06-09 PROCEDURE — 99233 PR SUBSEQUENT HOSPITAL CARE,LEVL III: ICD-10-PCS | Mod: 24,NTX,, | Performed by: PHYSICIAN ASSISTANT

## 2022-06-09 PROCEDURE — 83735 ASSAY OF MAGNESIUM: CPT | Mod: NTX

## 2022-06-09 PROCEDURE — 99233 SBSQ HOSP IP/OBS HIGH 50: CPT | Mod: 24,NTX,, | Performed by: PHYSICIAN ASSISTANT

## 2022-06-09 PROCEDURE — 20600001 HC STEP DOWN PRIVATE ROOM: Mod: NTX

## 2022-06-09 PROCEDURE — 99232 SBSQ HOSP IP/OBS MODERATE 35: CPT | Mod: NTX,,, | Performed by: NURSE PRACTITIONER

## 2022-06-09 PROCEDURE — 25000003 PHARM REV CODE 250: Mod: NTX | Performed by: STUDENT IN AN ORGANIZED HEALTH CARE EDUCATION/TRAINING PROGRAM

## 2022-06-09 PROCEDURE — 63600175 PHARM REV CODE 636 W HCPCS: Mod: NTX

## 2022-06-09 PROCEDURE — 25000003 PHARM REV CODE 250: Mod: NTX | Performed by: PHYSICIAN ASSISTANT

## 2022-06-09 PROCEDURE — 85025 COMPLETE CBC W/AUTO DIFF WBC: CPT | Mod: NTX

## 2022-06-09 PROCEDURE — 63600175 PHARM REV CODE 636 W HCPCS: Mod: NTX | Performed by: PHYSICIAN ASSISTANT

## 2022-06-09 PROCEDURE — 80053 COMPREHEN METABOLIC PANEL: CPT | Mod: NTX | Performed by: TRANSPLANT SURGERY

## 2022-06-09 PROCEDURE — 84100 ASSAY OF PHOSPHORUS: CPT | Mod: NTX

## 2022-06-09 PROCEDURE — 25000003 PHARM REV CODE 250: Mod: NTX

## 2022-06-09 PROCEDURE — 80197 ASSAY OF TACROLIMUS: CPT | Mod: NTX | Performed by: TRANSPLANT SURGERY

## 2022-06-09 PROCEDURE — 63600175 PHARM REV CODE 636 W HCPCS: Mod: NTX | Performed by: TRANSPLANT SURGERY

## 2022-06-09 PROCEDURE — 63600175 PHARM REV CODE 636 W HCPCS: Mod: NTX | Performed by: STUDENT IN AN ORGANIZED HEALTH CARE EDUCATION/TRAINING PROGRAM

## 2022-06-09 PROCEDURE — 99232 PR SUBSEQUENT HOSPITAL CARE,LEVL II: ICD-10-PCS | Mod: NTX,,, | Performed by: NURSE PRACTITIONER

## 2022-06-09 RX ORDER — PSEUDOEPHEDRINE/ACETAMINOPHEN 30MG-500MG
100 TABLET ORAL
Status: COMPLETED | OUTPATIENT
Start: 2022-06-09 | End: 2022-06-09

## 2022-06-09 RX ORDER — SYRING-NEEDL,DISP,INSUL,0.3 ML 29 G X1/2"
296 SYRINGE, EMPTY DISPOSABLE MISCELLANEOUS
Status: COMPLETED | OUTPATIENT
Start: 2022-06-09 | End: 2022-06-09

## 2022-06-09 RX ORDER — FUROSEMIDE 10 MG/ML
40 INJECTION INTRAMUSCULAR; INTRAVENOUS ONCE
Status: COMPLETED | OUTPATIENT
Start: 2022-06-09 | End: 2022-06-09

## 2022-06-09 RX ORDER — TACROLIMUS 1 MG/1
2 CAPSULE ORAL ONCE
Status: COMPLETED | OUTPATIENT
Start: 2022-06-09 | End: 2022-06-09

## 2022-06-09 RX ORDER — ASPIRIN 81 MG/1
81 TABLET ORAL DAILY
Status: DISCONTINUED | OUTPATIENT
Start: 2022-06-09 | End: 2022-06-10 | Stop reason: HOSPADM

## 2022-06-09 RX ORDER — TACROLIMUS 1 MG/1
5 CAPSULE ORAL 2 TIMES DAILY
Status: DISCONTINUED | OUTPATIENT
Start: 2022-06-09 | End: 2022-06-10 | Stop reason: HOSPADM

## 2022-06-09 RX ADMIN — HEPARIN SODIUM 5000 UNITS: 5000 INJECTION INTRAVENOUS; SUBCUTANEOUS at 02:06

## 2022-06-09 RX ADMIN — FAMOTIDINE 20 MG: 20 TABLET ORAL at 08:06

## 2022-06-09 RX ADMIN — TACROLIMUS 3 MG: 1 CAPSULE ORAL at 08:06

## 2022-06-09 RX ADMIN — FUROSEMIDE 40 MG: 10 INJECTION, SOLUTION INTRAMUSCULAR; INTRAVENOUS at 02:06

## 2022-06-09 RX ADMIN — BISACODYL 10 MG: 10 SUPPOSITORY RECTAL at 03:06

## 2022-06-09 RX ADMIN — HEPARIN SODIUM 5000 UNITS: 5000 INJECTION INTRAVENOUS; SUBCUTANEOUS at 10:06

## 2022-06-09 RX ADMIN — MAGNESIUM CITRATE 296 ML: 1.75 LIQUID ORAL at 12:06

## 2022-06-09 RX ADMIN — POLYETHYLENE GLYCOL 3350 17 G: 17 POWDER, FOR SOLUTION ORAL at 08:06

## 2022-06-09 RX ADMIN — MUPIROCIN 1 G: 20 OINTMENT TOPICAL at 08:06

## 2022-06-09 RX ADMIN — OXYCODONE HYDROCHLORIDE 10 MG: 10 TABLET ORAL at 04:06

## 2022-06-09 RX ADMIN — MYCOPHENOLATE MOFETIL 1000 MG: 250 CAPSULE ORAL at 08:06

## 2022-06-09 RX ADMIN — SIMETHICONE 80 MG: 80 TABLET, CHEWABLE ORAL at 05:06

## 2022-06-09 RX ADMIN — LINACLOTIDE 145 MCG: 145 CAPSULE, GELATIN COATED ORAL at 05:06

## 2022-06-09 RX ADMIN — SODIUM CHLORIDE 500 ML: 0.9 INJECTION, SOLUTION INTRAVENOUS at 11:06

## 2022-06-09 RX ADMIN — OXYCODONE HYDROCHLORIDE 10 MG: 10 TABLET ORAL at 11:06

## 2022-06-09 RX ADMIN — NYSTATIN 500000 UNITS: 500000 SUSPENSION ORAL at 02:06

## 2022-06-09 RX ADMIN — NYSTATIN 500000 UNITS: 500000 SUSPENSION ORAL at 05:06

## 2022-06-09 RX ADMIN — LEVOTHYROXINE SODIUM 50 MCG: 50 TABLET ORAL at 05:06

## 2022-06-09 RX ADMIN — NYSTATIN 500000 UNITS: 500000 SUSPENSION ORAL at 08:06

## 2022-06-09 RX ADMIN — OXYCODONE HYDROCHLORIDE 10 MG: 10 TABLET ORAL at 05:06

## 2022-06-09 RX ADMIN — OXYCODONE HYDROCHLORIDE 10 MG: 10 TABLET ORAL at 09:06

## 2022-06-09 RX ADMIN — ASPIRIN 81 MG: 81 TABLET, COATED ORAL at 02:06

## 2022-06-09 RX ADMIN — HEPARIN SODIUM 5000 UNITS: 5000 INJECTION INTRAVENOUS; SUBCUTANEOUS at 05:06

## 2022-06-09 RX ADMIN — METHYLPREDNISOLONE SODIUM SUCCINATE 80 MG: 40 INJECTION, POWDER, FOR SOLUTION INTRAMUSCULAR; INTRAVENOUS at 08:06

## 2022-06-09 RX ADMIN — TACROLIMUS 5 MG: 1 CAPSULE ORAL at 05:06

## 2022-06-09 RX ADMIN — TACROLIMUS 2 MG: 1 CAPSULE ORAL at 11:06

## 2022-06-09 RX ADMIN — Medication 100 ML: at 12:06

## 2022-06-09 RX ADMIN — BISACODYL 10 MG: 10 SUPPOSITORY RECTAL at 08:06

## 2022-06-09 NOTE — ASSESSMENT & PLAN NOTE
- surgery was uncomplicated per op note  - patient stepped down to TSU earlier than typical   - LFTs trending down nicely   - armenta and all drains removed  - Plan for repeat liver US tomorrow

## 2022-06-09 NOTE — PLAN OF CARE
Patient POD #4 - progressing well. RIJ Trialysis d/c'd - pressure dressing CDI. Patient painting incision with betadine and pulling self-meds 100%. LFTs trending down, creatinine 0.9 today. Up ad rodriguez in room. Pain controlled with prn oxycodone. Pharmacy teaching completed with patient and mother-in-law per Ph.D. Possible d/c home tomorrow.

## 2022-06-09 NOTE — PROGRESS NOTES
Patient's  - patient already started eating dinner. Lunch eaten late as well at 1400. Will recheck prior to bedtime and administer insulin as needed at that time.

## 2022-06-09 NOTE — PROGRESS NOTES
"[facility] - Transplant Stepdown  Endocrinology  Progress Note    Admit Date: 2022     Reason for Consult: Management of  Hyperglycemia     Surgical Procedure and Date: liver transplant 22        HPI:   Patient is a 52 y.o. female with a diagnosis of HLD, hypothyroidism, and ESLD. No personal history of DM. Patient admitted for liver transplant. Endocrinology consulted for BG management.       Lab Results   Component Value Date    HGBA1C 5.4 2022           Interval HPI:   Overnight events: No acute events overnight. Patient on the TSU in room 19176/13033 A. Blood glucose stable. BG at goal on current insulin regimen (SSI ). Steroid use- Methylprednisolone  80 mg. 4 Days Post-Op  Renal function- Normal   Vasopressors-  None       Diet diabetic [facility];  Calorie     Eatin%  Nausea: No  Hypoglycemia and intervention: No  Fever: No  TPN and/or TF: No    BP (!) 144/86   Pulse 78   Temp 98 °F (36.7 °C) (Oral)   Resp 18   Ht 5' 3" (1.6 m)   Wt 66.1 kg (145 lb 11.6 oz)   SpO2 99%   BMI 25.81 kg/m²     Labs Reviewed and Include    Recent Labs   Lab 22  0545      CALCIUM 7.8*   ALBUMIN 2.8*   PROT 4.8*   *   K 3.8   CO2 23      BUN 24*   CREATININE 0.9   ALKPHOS 263*   ALT 76*   AST 50*   BILITOT 0.6     Lab Results   Component Value Date    WBC 8.81 2022    HGB 9.4 (L) 2022    HCT 28.0 (L) 2022    MCV 96 2022     (L) 2022     No results for input(s): TSH, FREET4 in the last 168 hours.  Lab Results   Component Value Date    HGBA1C 5.4 2022       Nutritional status:   Body mass index is 25.81 kg/m².  Lab Results   Component Value Date    ALBUMIN 2.8 (L) 2022    ALBUMIN 3.1 (L) 2022    ALBUMIN 3.0 (L) 2022     No results found for: PREALBUMIN    Estimated Creatinine Clearance: 67.6 mL/min (based on SCr of 0.9 mg/dL).    Accu-Checks  Recent Labs     22  1259 22  1648 22  1003 " "06/08/22  0224 06/08/22  0736 06/08/22  0836 06/08/22  1408 06/08/22  1718 06/08/22  2144 06/09/22  0746   POCTGLUCOSE 163* 156* 153* 111* 97 96 218* 217* 163* 123*       Current Medications and/or Treatments Impacting Glycemic Control  Immunotherapy:    Immunosuppressants           Stop Route Frequency     tacrolimus capsule 5 mg         -- Oral 2 times daily     tacrolimus capsule 2 mg         -- Oral Once     mycophenolate capsule 1,000 mg         -- Oral 2 times daily          Steroids:   Hormones (From admission, onward)                Start     Stop Route Frequency Ordered    06/11/22 0900  predniSONE tablet 20 mg  (methylprednisolone taper panel)        "Followed by" Linked Group Details    -- Oral Daily 06/05/22 2210    06/10/22 0900  methylPREDNISolone sodium succinate injection 40 mg  (methylprednisolone taper panel)        "Followed by" Linked Group Details    06/11 0859 IV Daily 06/05/22 2210          Pressors:    Autonomic Drugs (From admission, onward)                None          Hyperglycemia/Diabetes Medications:   Antihyperglycemics (From admission, onward)                Start     Stop Route Frequency Ordered    06/08/22 0930  insulin aspart U-100 pen 1-10 Units         -- SubQ Before meals & nightly PRN 06/08/22 0830            ASSESSMENT and PLAN    * S/P liver transplant  avoid hypoglycemia        Steroid-induced hyperglycemia  Endocrinology consulted for BG management.   BG goal 140-180    - Novolog (aspart) insulin MDC SSI (150/25) Units SQ prn for BG excursions.   - BG checks AC/HS  - Hypoglycemia protocol in place  - If blood glucose greater than 300, please ask patient not to eat food or drink anything other than water until correctional insulin has brought it back below 250    ** Please notify Endocrine for any change and/or advance in diet**  ** Please call Endocrine for any BG related issues **    Discharge Planning:   TBD. Please notify endocrinology prior to " discharge.        Hypothyroidism  Managed per primary.   Continue levothyroxine 50 mcg daily.   Lab Results   Component Value Date    TSH 2.550 05/06/2022    FREET4 0.94 07/15/2021           Adrenal cortical steroids causing adverse effect in therapeutic use  Glucocorticoids markedly increase prandial glucoses. Expect the steroid taper will help glucose control.              Edward Deshpande, DNP, FNP  Endocrinology  [facility] - Transplant Stepdown

## 2022-06-09 NOTE — PROGRESS NOTES
[facility] - Transplant Stepdown  Liver Transplant  Progress Note    Patient Name: Malu Montes  MRN: [id number]  Admission Date: 6/5/2022  Hospital Length of Stay: 4 days  Code Status: Full Code  Primary Care Provider: Killian Dodd DO  Post-Operative Day: 4    ORGAN:   LIVER  Disease Etiology: Alcoholic Cirrhosis  Donor Type:   Donation after Brain Death  CDC High Risk:   Yes  Donor CMV Status:   Donor CMV Status: Positive  Donor HBcAB:   Negative  Donor HCV Status:   Positive  Donor HBV JACOBO: Negative  Donor HCV JACOBO: Negative  Whole or Partial: Whole Liver  Biliary Anastomosis: End to End  Arterial Anatomy: Standard  Subjective:     History of Present Illness:  Ms. 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 Liver disease has been complicated by HE, ascites requiring paracentesis twice a week. She underwent a TIPS placement on 5/6/22. Admitted for streptococcus mitis/oralis. She completed a 14d course of ceftriaxone.  Now patient presents for a Liver transplant.  She reports in usual state of health- Patient denies any recent hospitalizations or ED visits.   The primary surgeon will be Dr. Slaughter.  Patient is NPO.  All pre-op labs and imaging have been ordered prior to surgery. Consents  signed prior to transplant.           Hospital Course:  Now s/p DBD OLTX 6/5 for ETOH cirrhosis (donor HCVab+/JACOBO-). Had intra-op HD, originally listed as liver-kidney transplant. Surgery went without complication. Was extubated in the OR and went to ICU with 2 drains and a armenta. Stepped down to TSU 6/6. POD1 US on 6/6 stable and reveiwed by surgeon. Armenta and lateral drain removed 6/7 without issue.     Interval History   NAEON. Patient feeling well this morning. Small BM with PO bowel regimen and suppository yesterday. Passing gas. Enema ordered. Lfts trending down. Drains out. Good UOP. Patient eating and drinking well. Plan for weekly US tomorrow morning.  Tentative d/c tomorrow. Cont to monitor         Scheduled Meds:   bisacodyL  10 mg Rectal Daily    famotidine  20 mg Oral QHS    furosemide (LASIX) injection  40 mg Intravenous Once    glycerin 99.5%  100 mL Rectal ED 1 Time    And    magnesium citrate  296 mL Rectal ED 1 Time    And    sodium chloride 0.9%  500 mL Rectal ED 1 Time    heparin (porcine)  5,000 Units Subcutaneous Q8H    levothyroxine  50 mcg Oral Before breakfast    LIDOcaine HCL 10 mg/ml (1%)  10 mL Intradermal Once    linaCLOtide  145 mcg Oral Before breakfast    [START ON 6/10/2022] methylPREDNISolone sodium succinate injection  40 mg Intravenous Daily    Followed by    [START ON 6/11/2022] predniSONE  20 mg Oral Daily    mupirocin  1 g Nasal BID    mycophenolate  1,000 mg Oral BID    nystatin  500,000 Units Mouth/Throat TID PC    polyethylene glycol  17 g Oral BID    [START ON 6/12/2022] sulfamethoxazole-trimethoprim 400-80mg  1 tablet Oral Daily AM    tacrolimus  2 mg Oral Once    tacrolimus  5 mg Oral BID    [START ON 6/15/2022] valGANciclovir  450 mg Oral Daily     Continuous Infusions:  PRN Meds:sodium chloride 0.9%, dextrose 10%, dextrose 10%, glucagon (human recombinant), glucose, glucose, insulin aspart U-100, oxyCODONE, oxyCODONE, simethicone, sodium chloride 0.9%    Review of Systems   Constitutional:  Positive for appetite change. Negative for chills and fever.   HENT:  Negative for sore throat.    Respiratory:  Negative for cough, shortness of breath and wheezing.    Cardiovascular:  Positive for leg swelling. Negative for chest pain and palpitations.   Gastrointestinal:  Positive for abdominal pain and constipation. Negative for diarrhea, nausea and vomiting.   Genitourinary:  Negative for decreased urine volume, dysuria and frequency.   Musculoskeletal:  Negative for arthralgias and back pain.   Skin:  Negative for wound.   Allergic/Immunologic: Positive for immunocompromised state.   Neurological:  Negative for  dizziness and weakness.   Psychiatric/Behavioral:  The patient is not nervous/anxious.    Objective:     Vital Signs (Most Recent):  Temp: 98 °F (36.7 °C) (06/09/22 0743)  Pulse: 78 (06/09/22 0743)  Resp: 18 (06/09/22 0942)  BP: (!) 144/86 (06/09/22 0743)  SpO2: 99 % (06/09/22 0743)   Vital Signs (24h Range):  Temp:  [98 °F (36.7 °C)-98.9 °F (37.2 °C)] 98 °F (36.7 °C)  Pulse:  [78-93] 78  Resp:  [15-23] 18  SpO2:  [95 %-99 %] 99 %  BP: (124-156)/(75-89) 144/86     Weight: 66.1 kg (145 lb 11.6 oz)  Body mass index is 25.81 kg/m².    Intake/Output - Last 3 Shifts         06/07 0700  06/08 0659 06/08 0700  06/09 0659 06/09 0700  06/10 0659    P.O. 1080 1260     I.V. (mL/kg) 23.7 (0.3)      IV Piggyback       Total Intake(mL/kg) 1103.7 (15.8) 1260 (19.1)     Urine (mL/kg/hr) 1850 (1.1) 650 (0.4)     Emesis/NG output 0      Drains 340      Stool 0      Total Output 2190 650     Net -1086.3 +610            Urine Occurrence 1 x 3 x     Stool Occurrence 0 x 3 x     Emesis Occurrence 0 x 0 x             Physical Exam  Vitals and nursing note reviewed.   Constitutional:       General: She is not in acute distress.  HENT:      Head: Normocephalic.      Mouth/Throat:      Mouth: Mucous membranes are moist.   Eyes:      Conjunctiva/sclera: Conjunctivae normal.   Cardiovascular:      Rate and Rhythm: Normal rate and regular rhythm.      Pulses: Normal pulses.      Heart sounds: Normal heart sounds.   Pulmonary:      Effort: Pulmonary effort is normal.      Breath sounds: Normal breath sounds.   Abdominal:      General: Bowel sounds are normal. There is no distension.      Palpations: Abdomen is soft.       Musculoskeletal:         General: Normal range of motion.      Cervical back: Normal range of motion.      Right lower leg: Edema present.      Left lower leg: Edema present.   Skin:     General: Skin is warm and dry.   Neurological:      Mental Status: She is alert and oriented to person, place, and time.      Motor: No  weakness.   Psychiatric:         Mood and Affect: Mood normal.         Behavior: Behavior normal.         Thought Content: Thought content normal.         Judgment: Judgment normal.       Laboratory:  Immunosuppressants           Stop Route Frequency     tacrolimus capsule 5 mg         -- Oral 2 times daily     tacrolimus capsule 2 mg         -- Oral Once     mycophenolate capsule 1,000 mg         -- Oral 2 times daily          CBC:   Recent Labs   Lab 06/09/22  0545   WBC 8.81   RBC 2.91*   HGB 9.4*   HCT 28.0*   *   MCV 96   MCH 32.3*   MCHC 33.6     CMP:   Recent Labs   Lab 06/09/22  0545      CALCIUM 7.8*   ALBUMIN 2.8*   PROT 4.8*   *   K 3.8   CO2 23      BUN 24*   CREATININE 0.9   ALKPHOS 263*   ALT 76*   AST 50*   BILITOT 0.6     Labs within the past 24 hours have been reviewed.    Diagnostic Results:  None    Debility/Functional status: No debility noted.    Assessment/Plan:     * S/P liver transplant  - surgery was uncomplicated per op note  - patient stepped down to TSU earlier than typical   - LFTs trending down nicely   - armenta and all drains removed  - Plan for repeat liver US tomorrow        At risk for opportunistic infections  - nystatin for thrush, bactrim for PJP, valcyte for CMV       Long-term use of immunosuppressant medication  - see prophylactic immunotherapy      Adrenal cortical steroids causing adverse effect in therapeutic use        Prophylactic immunotherapy  - prograf, cellcept, pred taper   - monitor tacro levels for therapeutic levels and adverse effects       Steroid-induced hyperglycemia  - endocrine consulted, appreciate help      Constipation  - acute on chronic   - made worse from transplant surgery   - on bowel regimen including suppository with minimal response  - Plan for enema today. Passing gas      ARLIN (acute kidney injury)  - educated patient on hydrating   - strict Is and Os  - Cr improving 6/8   - CVC to be removed    Anemia of chronic  disease  - h/h stable   - cont to monitor with daily CBC       Hypothyroidism  - on synthroid           VTE Risk Mitigation (From admission, onward)         Ordered     heparin (porcine) injection 5,000 Units  Every 8 hours         06/05/22 2210     Place sequential compression device  Until discontinued         06/05/22 2210     IP VTE HIGH RISK PATIENT  Once         06/05/22 2210                The patients clinical status was discussed at multidisplinary rounds, involving transplant surgery, transplant medicine, pharmacy, nursing, nutrition, and social work    Discharge Planning: tentative d/c tomorrow  No Patient Care Coordination Note on file.      Iliana Torre PA-C  Liver Transplant  [facility] - Transplant Stepdown

## 2022-06-09 NOTE — SUBJECTIVE & OBJECTIVE
"Interval HPI:   Overnight events: No acute events overnight. Patient on the TSU in room 50711/76241 A. Blood glucose stable. BG at goal on current insulin regimen (SSI ). Steroid use- Methylprednisolone  80 mg. 4 Days Post-Op  Renal function- Normal   Vasopressors-  None       Diet diabetic [facility];  Calorie     Eatin%  Nausea: No  Hypoglycemia and intervention: No  Fever: No  TPN and/or TF: No    BP (!) 144/86   Pulse 78   Temp 98 °F (36.7 °C) (Oral)   Resp 18   Ht 5' 3" (1.6 m)   Wt 66.1 kg (145 lb 11.6 oz)   SpO2 99%   BMI 25.81 kg/m²     Labs Reviewed and Include    Recent Labs   Lab 22  0545      CALCIUM 7.8*   ALBUMIN 2.8*   PROT 4.8*   *   K 3.8   CO2 23      BUN 24*   CREATININE 0.9   ALKPHOS 263*   ALT 76*   AST 50*   BILITOT 0.6     Lab Results   Component Value Date    WBC 8.81 2022    HGB 9.4 (L) 2022    HCT 28.0 (L) 2022    MCV 96 2022     (L) 2022     No results for input(s): TSH, FREET4 in the last 168 hours.  Lab Results   Component Value Date    HGBA1C 5.4 2022       Nutritional status:   Body mass index is 25.81 kg/m².  Lab Results   Component Value Date    ALBUMIN 2.8 (L) 2022    ALBUMIN 3.1 (L) 2022    ALBUMIN 3.0 (L) 2022     No results found for: PREALBUMIN    Estimated Creatinine Clearance: 67.6 mL/min (based on SCr of 0.9 mg/dL).    Accu-Checks  Recent Labs     22  1259 22  1648 22  2153 22  0224 22  0736 22  0836 22  1408 22  1718 22  2144 22  0746   POCTGLUCOSE 163* 156* 153* 111* 97 96 218* 217* 163* 123*       Current Medications and/or Treatments Impacting Glycemic Control  Immunotherapy:    Immunosuppressants           Stop Route Frequency     tacrolimus capsule 5 mg         -- Oral 2 times daily     tacrolimus capsule 2 mg         -- Oral Once     mycophenolate capsule 1,000 mg         -- Oral 2 times daily      " "    Steroids:   Hormones (From admission, onward)                Start     Stop Route Frequency Ordered    06/11/22 0900  predniSONE tablet 20 mg  (methylprednisolone taper panel)        "Followed by" Linked Group Details    -- Oral Daily 06/05/22 2210    06/10/22 0900  methylPREDNISolone sodium succinate injection 40 mg  (methylprednisolone taper panel)        "Followed by" Linked Group Details    06/11 0859 IV Daily 06/05/22 2210          Pressors:    Autonomic Drugs (From admission, onward)                None          Hyperglycemia/Diabetes Medications:   Antihyperglycemics (From admission, onward)                Start     Stop Route Frequency Ordered    06/08/22 0930  insulin aspart U-100 pen 1-10 Units         -- SubQ Before meals & nightly PRN 06/08/22 0830          "

## 2022-06-09 NOTE — PROGRESS NOTES
VAN Trialysis d/c'd per MD order. Catheter tip visualized intact. Pressure applied to site, hemostasis achieved. Vaseline gauze and 4x4 applied to site. Patient instructed to lie flat for 30 minutes and to leave to leave dressing in place x24 hours.

## 2022-06-09 NOTE — PLAN OF CARE
AAO x 4. VSS, afebrile, SpO2>95% on RA. Telemetry monitoring SR. BG monitoring AC/HS. [employer] incision LUNA with staples-painted with betadine per pt. PRN oxycodone for pain control. Pt pulled 100% self meds. Fall precautions maintained and pt repositions self. See flowsheet for assessment findings.

## 2022-06-09 NOTE — ASSESSMENT & PLAN NOTE
Endocrinology consulted for BG management.   BG goal 140-180    - Novolog (aspart) insulin [facility] SSI (150/25) Units SQ prn for BG excursions.   - BG checks AC/HS  - Hypoglycemia protocol in place  - If blood glucose greater than 300, please ask patient not to eat food or drink anything other than water until correctional insulin has brought it back below 250    ** Please notify Endocrine for any change and/or advance in diet**  ** Please call Endocrine for any BG related issues **    Discharge Planning:   TBD. Please notify endocrinology prior to discharge.

## 2022-06-09 NOTE — PROGRESS NOTES
EDUCATION NOTE:    Met with Malu Montes and her caregivers to provide teaching re: immunosuppressant medications.  Reviewed medication section of the Liver Transplant Education book that was provided.  Emphasized the importance of compliance, role of the blue medication card, concerns for drug interactions, and process of obtaining refills.  Counseled regarding Prograf, Cellcept , prednisone, including directions for use, monitoring, how to handle missed doses, and side effects.  Patient and mother in law verbalized understanding and had the opportunity to ask questions.

## 2022-06-09 NOTE — SUBJECTIVE & OBJECTIVE
Scheduled Meds:   bisacodyL  10 mg Rectal Daily    famotidine  20 mg Oral QHS    furosemide (LASIX) injection  40 mg Intravenous Once    glycerin 99.5%  100 mL Rectal ED 1 Time    And    magnesium citrate  296 mL Rectal ED 1 Time    And    sodium chloride 0.9%  500 mL Rectal ED 1 Time    heparin (porcine)  5,000 Units Subcutaneous Q8H    levothyroxine  50 mcg Oral Before breakfast    LIDOcaine HCL 10 mg/ml (1%)  10 mL Intradermal Once    linaCLOtide  145 mcg Oral Before breakfast    [START ON 6/10/2022] methylPREDNISolone sodium succinate injection  40 mg Intravenous Daily    Followed by    [START ON 6/11/2022] predniSONE  20 mg Oral Daily    mupirocin  1 g Nasal BID    mycophenolate  1,000 mg Oral BID    nystatin  500,000 Units Mouth/Throat TID PC    polyethylene glycol  17 g Oral BID    [START ON 6/12/2022] sulfamethoxazole-trimethoprim 400-80mg  1 tablet Oral Daily AM    tacrolimus  2 mg Oral Once    tacrolimus  5 mg Oral BID    [START ON 6/15/2022] valGANciclovir  450 mg Oral Daily     Continuous Infusions:  PRN Meds:sodium chloride 0.9%, dextrose 10%, dextrose 10%, glucagon (human recombinant), glucose, glucose, insulin aspart U-100, oxyCODONE, oxyCODONE, simethicone, sodium chloride 0.9%    Review of Systems   Constitutional:  Positive for appetite change. Negative for chills and fever.   HENT:  Negative for sore throat.    Respiratory:  Negative for cough, shortness of breath and wheezing.    Cardiovascular:  Positive for leg swelling. Negative for chest pain and palpitations.   Gastrointestinal:  Positive for abdominal pain and constipation. Negative for diarrhea, nausea and vomiting.   Genitourinary:  Negative for decreased urine volume, dysuria and frequency.   Musculoskeletal:  Negative for arthralgias and back pain.   Skin:  Negative for wound.   Allergic/Immunologic: Positive for immunocompromised state.   Neurological:  Negative for dizziness and weakness.   Psychiatric/Behavioral:  The patient is not  nervous/anxious.    Objective:     Vital Signs (Most Recent):  Temp: 98 °F (36.7 °C) (06/09/22 0743)  Pulse: 78 (06/09/22 0743)  Resp: 18 (06/09/22 0942)  BP: (!) 144/86 (06/09/22 0743)  SpO2: 99 % (06/09/22 0743)   Vital Signs (24h Range):  Temp:  [98 °F (36.7 °C)-98.9 °F (37.2 °C)] 98 °F (36.7 °C)  Pulse:  [78-93] 78  Resp:  [15-23] 18  SpO2:  [95 %-99 %] 99 %  BP: (124-156)/(75-89) 144/86     Weight: 66.1 kg (145 lb 11.6 oz)  Body mass index is 25.81 kg/m².    Intake/Output - Last 3 Shifts         06/07 0700  06/08 0659 06/08 0700  06/09 0659 06/09 0700  06/10 0659    P.O. 1080 1260     I.V. (mL/kg) 23.7 (0.3)      IV Piggyback       Total Intake(mL/kg) 1103.7 (15.8) 1260 (19.1)     Urine (mL/kg/hr) 1850 (1.1) 650 (0.4)     Emesis/NG output 0      Drains 340      Stool 0      Total Output 2190 650     Net -1086.3 +610            Urine Occurrence 1 x 3 x     Stool Occurrence 0 x 3 x     Emesis Occurrence 0 x 0 x             Physical Exam  Vitals and nursing note reviewed.   Constitutional:       General: She is not in acute distress.  HENT:      Head: Normocephalic.      Mouth/Throat:      Mouth: Mucous membranes are moist.   Eyes:      Conjunctiva/sclera: Conjunctivae normal.   Cardiovascular:      Rate and Rhythm: Normal rate and regular rhythm.      Pulses: Normal pulses.      Heart sounds: Normal heart sounds.   Pulmonary:      Effort: Pulmonary effort is normal.      Breath sounds: Normal breath sounds.   Abdominal:      General: Bowel sounds are normal. There is no distension.      Palpations: Abdomen is soft.       Musculoskeletal:         General: Normal range of motion.      Cervical back: Normal range of motion.      Right lower leg: Edema present.      Left lower leg: Edema present.   Skin:     General: Skin is warm and dry.   Neurological:      Mental Status: She is alert and oriented to person, place, and time.      Motor: No weakness.   Psychiatric:         Mood and Affect: Mood normal.          Behavior: Behavior normal.         Thought Content: Thought content normal.         Judgment: Judgment normal.       Laboratory:  Immunosuppressants           Stop Route Frequency     tacrolimus capsule 5 mg         -- Oral 2 times daily     tacrolimus capsule 2 mg         -- Oral Once     mycophenolate capsule 1,000 mg         -- Oral 2 times daily          CBC:   Recent Labs   Lab 06/09/22  0545   WBC 8.81   RBC 2.91*   HGB 9.4*   HCT 28.0*   *   MCV 96   MCH 32.3*   MCHC 33.6     CMP:   Recent Labs   Lab 06/09/22  0545      CALCIUM 7.8*   ALBUMIN 2.8*   PROT 4.8*   *   K 3.8   CO2 23      BUN 24*   CREATININE 0.9   ALKPHOS 263*   ALT 76*   AST 50*   BILITOT 0.6     Labs within the past 24 hours have been reviewed.    Diagnostic Results:  None    Debility/Functional status: No debility noted.

## 2022-06-09 NOTE — ADDENDUM NOTE
Addendum  created 06/09/22 1333 by Brayden Nieto CRNA    Intraprocedure Event edited, Intraprocedure Staff edited

## 2022-06-09 NOTE — ASSESSMENT & PLAN NOTE
- acute on chronic   - made worse from transplant surgery   - on bowel regimen including suppository with minimal response  - Plan for enema today. Passing gas

## 2022-06-10 VITALS
HEART RATE: 83 BPM | DIASTOLIC BLOOD PRESSURE: 76 MMHG | SYSTOLIC BLOOD PRESSURE: 140 MMHG | TEMPERATURE: 98 F | OXYGEN SATURATION: 94 % | HEIGHT: 63 IN | BODY MASS INDEX: 26.45 KG/M2 | RESPIRATION RATE: 16 BRPM | WEIGHT: 149.25 LBS

## 2022-06-10 DIAGNOSIS — Z94.4 LIVER REPLACED BY TRANSPLANT: Primary | ICD-10-CM

## 2022-06-10 PROBLEM — N17.9 AKI (ACUTE KIDNEY INJURY): Status: RESOLVED | Noted: 2021-12-10 | Resolved: 2022-06-10

## 2022-06-10 LAB
ALBUMIN SERPL BCP-MCNC: 2.8 G/DL (ref 3.5–5.2)
ALP SERPL-CCNC: 303 U/L (ref 55–135)
ALT SERPL W/O P-5'-P-CCNC: 79 U/L (ref 10–44)
ANION GAP SERPL CALC-SCNC: 9 MMOL/L (ref 8–16)
AST SERPL-CCNC: 45 U/L (ref 10–40)
BASOPHILS # BLD AUTO: 0.01 K/UL (ref 0–0.2)
BASOPHILS NFR BLD: 0.1 % (ref 0–1.9)
BILIRUB SERPL-MCNC: 0.6 MG/DL (ref 0.1–1)
BUN SERPL-MCNC: 21 MG/DL (ref 6–20)
CALCIUM SERPL-MCNC: 8.4 MG/DL (ref 8.7–10.5)
CHLORIDE SERPL-SCNC: 102 MMOL/L (ref 95–110)
CO2 SERPL-SCNC: 22 MMOL/L (ref 23–29)
CREAT SERPL-MCNC: 0.9 MG/DL (ref 0.5–1.4)
DIFFERENTIAL METHOD: ABNORMAL
EOSINOPHIL # BLD AUTO: 1.6 K/UL (ref 0–0.5)
EOSINOPHIL NFR BLD: 18.6 % (ref 0–8)
ERYTHROCYTE [DISTWIDTH] IN BLOOD BY AUTOMATED COUNT: 12.5 % (ref 11.5–14.5)
EST. GFR  (AFRICAN AMERICAN): >60 ML/MIN/1.73 M^2
EST. GFR  (NON AFRICAN AMERICAN): >60 ML/MIN/1.73 M^2
GLUCOSE SERPL-MCNC: 94 MG/DL (ref 70–110)
HCT VFR BLD AUTO: 27.6 % (ref 37–48.5)
HGB BLD-MCNC: 9.4 G/DL (ref 12–16)
IMM GRANULOCYTES # BLD AUTO: 0.03 K/UL (ref 0–0.04)
IMM GRANULOCYTES NFR BLD AUTO: 0.4 % (ref 0–0.5)
LYMPHOCYTES # BLD AUTO: 0.5 K/UL (ref 1–4.8)
LYMPHOCYTES NFR BLD: 6.4 % (ref 18–48)
MAGNESIUM SERPL-MCNC: 2.1 MG/DL (ref 1.6–2.6)
MCH RBC QN AUTO: 32.5 PG (ref 27–31)
MCHC RBC AUTO-ENTMCNC: 34.1 G/DL (ref 32–36)
MCV RBC AUTO: 96 FL (ref 82–98)
MONOCYTES # BLD AUTO: 0.5 K/UL (ref 0.3–1)
MONOCYTES NFR BLD: 6 % (ref 4–15)
NEUTROPHILS # BLD AUTO: 5.7 K/UL (ref 1.8–7.7)
NEUTROPHILS NFR BLD: 68.5 % (ref 38–73)
NRBC BLD-RTO: 0 /100 WBC
PHOSPHATE SERPL-MCNC: 2.9 MG/DL (ref 2.7–4.5)
PLATELET # BLD AUTO: 162 K/UL (ref 150–450)
PMV BLD AUTO: 11 FL (ref 9.2–12.9)
POCT GLUCOSE: 103 MG/DL (ref 70–110)
POCT GLUCOSE: 143 MG/DL (ref 70–110)
POCT GLUCOSE: 156 MG/DL (ref 70–110)
POTASSIUM SERPL-SCNC: 3.7 MMOL/L (ref 3.5–5.1)
PROT SERPL-MCNC: 5.3 G/DL (ref 6–8.4)
RBC # BLD AUTO: 2.89 M/UL (ref 4–5.4)
SODIUM SERPL-SCNC: 133 MMOL/L (ref 136–145)
TACROLIMUS BLD-MCNC: 4 NG/ML (ref 5–15)
WBC # BLD AUTO: 8.32 K/UL (ref 3.9–12.7)

## 2022-06-10 PROCEDURE — 80197 ASSAY OF TACROLIMUS: CPT | Mod: NTX | Performed by: TRANSPLANT SURGERY

## 2022-06-10 PROCEDURE — 63600175 PHARM REV CODE 636 W HCPCS: Mod: NTX | Performed by: TRANSPLANT SURGERY

## 2022-06-10 PROCEDURE — 99232 PR SUBSEQUENT HOSPITAL CARE,LEVL II: ICD-10-PCS | Mod: NTX,,, | Performed by: NURSE PRACTITIONER

## 2022-06-10 PROCEDURE — 25000003 PHARM REV CODE 250: Mod: NTX | Performed by: TRANSPLANT SURGERY

## 2022-06-10 PROCEDURE — 85025 COMPLETE CBC W/AUTO DIFF WBC: CPT | Mod: NTX

## 2022-06-10 PROCEDURE — 83735 ASSAY OF MAGNESIUM: CPT | Mod: NTX

## 2022-06-10 PROCEDURE — 99239 HOSP IP/OBS DSCHRG MGMT >30: CPT | Mod: 24,NTX,, | Performed by: PHYSICIAN ASSISTANT

## 2022-06-10 PROCEDURE — 84100 ASSAY OF PHOSPHORUS: CPT | Mod: NTX

## 2022-06-10 PROCEDURE — 99232 SBSQ HOSP IP/OBS MODERATE 35: CPT | Mod: NTX,,, | Performed by: NURSE PRACTITIONER

## 2022-06-10 PROCEDURE — 25000003 PHARM REV CODE 250: Mod: NTX

## 2022-06-10 PROCEDURE — 36415 COLL VENOUS BLD VENIPUNCTURE: CPT | Mod: NTX | Performed by: TRANSPLANT SURGERY

## 2022-06-10 PROCEDURE — 63600175 PHARM REV CODE 636 W HCPCS: Mod: NTX | Performed by: PHYSICIAN ASSISTANT

## 2022-06-10 PROCEDURE — 25000003 PHARM REV CODE 250: Mod: NTX | Performed by: STUDENT IN AN ORGANIZED HEALTH CARE EDUCATION/TRAINING PROGRAM

## 2022-06-10 PROCEDURE — 80053 COMPREHEN METABOLIC PANEL: CPT | Mod: NTX | Performed by: TRANSPLANT SURGERY

## 2022-06-10 PROCEDURE — 99239 PR HOSPITAL DISCHARGE DAY,>30 MIN: ICD-10-PCS | Mod: 24,NTX,, | Performed by: PHYSICIAN ASSISTANT

## 2022-06-10 PROCEDURE — 25000003 PHARM REV CODE 250: Mod: NTX | Performed by: PHYSICIAN ASSISTANT

## 2022-06-10 PROCEDURE — 63600175 PHARM REV CODE 636 W HCPCS: Mod: NTX | Performed by: STUDENT IN AN ORGANIZED HEALTH CARE EDUCATION/TRAINING PROGRAM

## 2022-06-10 RX ORDER — TACROLIMUS 1 MG/1
6 CAPSULE ORAL EVERY 12 HOURS
Qty: 360 CAPSULE | Refills: 11 | Status: SHIPPED | OUTPATIENT
Start: 2022-06-10 | End: 2022-06-20

## 2022-06-10 RX ORDER — CALCIUM CARBONATE 500(1250)
1 TABLET ORAL DAILY
Qty: 30 TABLET | Refills: 11 | Status: SHIPPED | OUTPATIENT
Start: 2022-06-10 | End: 2022-06-10 | Stop reason: HOSPADM

## 2022-06-10 RX ORDER — FAMOTIDINE 20 MG/1
20 TABLET, FILM COATED ORAL NIGHTLY
Qty: 30 TABLET | Refills: 11 | Status: SHIPPED | OUTPATIENT
Start: 2022-06-10 | End: 2023-05-19 | Stop reason: SDUPTHER

## 2022-06-10 RX ORDER — FUROSEMIDE 40 MG/1
40 TABLET ORAL DAILY
Qty: 5 TABLET | Refills: 0 | Status: SHIPPED | OUTPATIENT
Start: 2022-06-10 | End: 2022-06-28 | Stop reason: ALTCHOICE

## 2022-06-10 RX ORDER — ACETAMINOPHEN 500 MG
1 TABLET ORAL DAILY
Qty: 30 TABLET | Refills: 11 | Status: SHIPPED | OUTPATIENT
Start: 2022-06-10 | End: 2023-12-18

## 2022-06-10 RX ORDER — PREDNISONE 5 MG/1
TABLET ORAL
Qty: 70 TABLET | Refills: 0 | Status: SHIPPED | OUTPATIENT
Start: 2022-06-10 | End: 2022-07-12 | Stop reason: ALTCHOICE

## 2022-06-10 RX ORDER — FUROSEMIDE 10 MG/ML
40 INJECTION INTRAMUSCULAR; INTRAVENOUS ONCE
Status: COMPLETED | OUTPATIENT
Start: 2022-06-10 | End: 2022-06-10

## 2022-06-10 RX ORDER — TACROLIMUS 1 MG/1
1 CAPSULE ORAL ONCE
Status: COMPLETED | OUTPATIENT
Start: 2022-06-10 | End: 2022-06-10

## 2022-06-10 RX ORDER — ASPIRIN 81 MG/1
81 TABLET ORAL DAILY
Qty: 30 TABLET | Refills: 11 | Status: SHIPPED | OUTPATIENT
Start: 2022-06-11 | End: 2023-05-19

## 2022-06-10 RX ORDER — OXYCODONE HYDROCHLORIDE 10 MG/1
10 TABLET ORAL EVERY 8 HOURS PRN
Qty: 21 TABLET | Refills: 0 | Status: SHIPPED | OUTPATIENT
Start: 2022-06-10 | End: 2022-06-14 | Stop reason: SDUPTHER

## 2022-06-10 RX ORDER — BISACODYL 5 MG
10 TABLET, DELAYED RELEASE (ENTERIC COATED) ORAL ONCE
Status: COMPLETED | OUTPATIENT
Start: 2022-06-10 | End: 2022-06-10

## 2022-06-10 RX ADMIN — BISACODYL 10 MG: 10 SUPPOSITORY RECTAL at 08:06

## 2022-06-10 RX ADMIN — FUROSEMIDE 40 MG: 10 INJECTION, SOLUTION INTRAMUSCULAR; INTRAVENOUS at 08:06

## 2022-06-10 RX ADMIN — HEPARIN SODIUM 5000 UNITS: 5000 INJECTION INTRAVENOUS; SUBCUTANEOUS at 05:06

## 2022-06-10 RX ADMIN — LEVOTHYROXINE SODIUM 50 MCG: 50 TABLET ORAL at 05:06

## 2022-06-10 RX ADMIN — TACROLIMUS 5 MG: 1 CAPSULE ORAL at 08:06

## 2022-06-10 RX ADMIN — ASPIRIN 81 MG: 81 TABLET, COATED ORAL at 08:06

## 2022-06-10 RX ADMIN — OXYCODONE HYDROCHLORIDE 10 MG: 10 TABLET ORAL at 05:06

## 2022-06-10 RX ADMIN — NYSTATIN 500000 UNITS: 500000 SUSPENSION ORAL at 08:06

## 2022-06-10 RX ADMIN — METHYLPREDNISOLONE SODIUM SUCCINATE 40 MG: 40 INJECTION, POWDER, FOR SOLUTION INTRAMUSCULAR; INTRAVENOUS at 08:06

## 2022-06-10 RX ADMIN — LINACLOTIDE 145 MCG: 145 CAPSULE, GELATIN COATED ORAL at 05:06

## 2022-06-10 RX ADMIN — BISACODYL 10 MG: 5 TABLET, COATED ORAL at 10:06

## 2022-06-10 RX ADMIN — OXYCODONE HYDROCHLORIDE 10 MG: 10 TABLET ORAL at 11:06

## 2022-06-10 RX ADMIN — POLYETHYLENE GLYCOL 3350 17 G: 17 POWDER, FOR SOLUTION ORAL at 08:06

## 2022-06-10 RX ADMIN — MYCOPHENOLATE MOFETIL 1000 MG: 250 CAPSULE ORAL at 08:06

## 2022-06-10 RX ADMIN — TACROLIMUS 1 MG: 1 CAPSULE ORAL at 11:06

## 2022-06-10 RX ADMIN — MUPIROCIN 1 G: 20 OINTMENT TOPICAL at 08:06

## 2022-06-10 NOTE — SUBJECTIVE & OBJECTIVE
"Interval HPI:   Overnight events: No acute events overnight. Patient on the TSU in room 25713/32943 A. Blood glucose stable. BG at goal on current insulin regimen (SSI ). Steroid use- Methylprednisolone  40 mg. 5 Days Post-Op  Renal function- Normal   Vasopressors-  None       Diet diabetic [facility]; 2000 Calorie  Diet Adult Regular     Eatin%  Nausea: No  Hypoglycemia and intervention: No  Fever: No  TPN and/or TF: No    /68 (BP Location: Right arm, Patient Position: Sitting)   Pulse 80   Temp 98.1 °F (36.7 °C) (Oral)   Resp 14   Ht 5' 3" (1.6 m)   Wt 67.7 kg (149 lb 4 oz)   SpO2 97%   BMI 26.44 kg/m²     Labs Reviewed and Include    Recent Labs   Lab 06/10/22  0639   GLU 94   CALCIUM 8.4*   ALBUMIN 2.8*   PROT 5.3*   *   K 3.7   CO2 22*      BUN 21*   CREATININE 0.9   ALKPHOS 303*   ALT 79*   AST 45*   BILITOT 0.6     Lab Results   Component Value Date    WBC 8.32 06/10/2022    HGB 9.4 (L) 06/10/2022    HCT 27.6 (L) 06/10/2022    MCV 96 06/10/2022     06/10/2022     No results for input(s): TSH, FREET4 in the last 168 hours.  Lab Results   Component Value Date    HGBA1C 5.4 2022       Nutritional status:   Body mass index is 26.44 kg/m².  Lab Results   Component Value Date    ALBUMIN 2.8 (L) 06/10/2022    ALBUMIN 2.8 (L) 2022    ALBUMIN 3.1 (L) 2022     No results found for: PREALBUMIN    Estimated Creatinine Clearance: 68.3 mL/min (based on SCr of 0.9 mg/dL).    Accu-Checks  Recent Labs     22  0224 22  0736 22  0836 22  1408 22  1718 22  2144 22  0746 22  1725 22  2220 06/10/22  0813   POCTGLUCOSE 111* 97 96 218* 217* 163* 123* 233* 156* 103       Current Medications and/or Treatments Impacting Glycemic Control  Immunotherapy:    Immunosuppressants           Stop Route Frequency     tacrolimus capsule 5 mg         -- Oral 2 times daily     mycophenolate capsule 1,000 mg         -- Oral 2 times " "daily          Steroids:   Hormones (From admission, onward)                Start     Stop Route Frequency Ordered    06/11/22 0900  predniSONE tablet 20 mg  (methylprednisolone taper panel)        "Followed by" Linked Group Details    -- Oral Daily 06/05/22 2210          Pressors:    Autonomic Drugs (From admission, onward)                None          Hyperglycemia/Diabetes Medications:   Antihyperglycemics (From admission, onward)                Start     Stop Route Frequency Ordered    06/08/22 0930  insulin aspart U-100 pen 1-10 Units         -- SubQ Before meals & nightly PRN 06/08/22 0830          "

## 2022-06-10 NOTE — DISCHARGE SUMMARY
[facility]  Liver Transplant  Discharge Summary      Patient Name: Malu Montes  MRN: [id number]  Admission Date: 6/5/2022  Hospital Length of Stay: 5 days  Discharge Date and Time:  06/10/2022 12:07 PM  Attending Physician: Leslie Umaña MD   Discharging Provider: Iliana Torre PA-C  Primary Care Provider: Killian Dodd DO  Post-Operative Day: 5     ORGAN:   LIVER  Disease Etiology: Alcoholic Cirrhosis  Donor Type:   Donation after Brain Death  CDC High Risk:   Yes  Donor CMV Status:   Donor CMV Status: Positive  Donor HBcAB:   Negative  Donor HCV Status:   Positive  Whole or Partial: Whole Liver  Biliary Anastomosis: End to End  Arterial Anatomy: Standard    HPI:   Ms. 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 Liver disease has been complicated by HE, ascites requiring paracentesis twice a week. She underwent a TIPS placement on 5/6/22. Admitted for streptococcus mitis/oralis. She completed a 14d course of ceftriaxone.  Now patient presents for a Liver transplant.  She reports in usual state of health- Patient denies any recent hospitalizations or ED visits.   The primary surgeon will be Dr. Slaughter.  Patient is NPO.  All pre-op labs and imaging have been ordered prior to surgery. Consents  signed prior to transplant.           Procedure(s) (LRB):  TRANSPLANT, LIVER (N/A)     Hospital Course:    Now s/p DBD OLTX 6/5 for ETOH cirrhosis (donor HCVab+/JACOBO-). Had intra-op HD, originally listed as liver-kidney transplant. Surgery went without complication. Stepped down to TSU 6/6. POD1 US on 6/6 stable and reveiwed by surgeon. LFTs trending down. 1 week US performed on POD 5 prior to discharge- showing multiple small fluid collections, elevated velocity in hepatic artery with decreased RIs. Plan for close follow-up with repeat US in 1 week. Images reviewed by surgeon. Has chronic constipation, small BMs since surgery.  Overall she is progressing nicely. Ambulating without issue and tolerating PO intake. Kidney function has improved. Remains with LE edema- plan to discharge with 5 days PO Lasix 40mg daily.    Pt is stable and ready for discharge. She has no complaints. She has met with PharmD and received education. Pt expressed understanding of discharge instructions and importance of follow-up.         Goals of Care Treatment Preferences:  Code Status: Full Code      Final Active Diagnoses:    Diagnosis Date Noted POA    PRINCIPAL PROBLEM:  S/P liver transplant [Z94.4] 06/06/2022 Not Applicable    Long-term use of immunosuppressant medication [Z79.899] 06/07/2022 Not Applicable    At risk for opportunistic infections [Z91.89] 06/07/2022 No    Received liver transplant from donor with hepatitis C [T86.43, B19.20] 06/07/2022 No    Steroid-induced hyperglycemia [R73.9, T38.0X5A] 06/06/2022 No    Prophylactic immunotherapy [Z29.8] 06/06/2022 Not Applicable    Adrenal cortical steroids causing adverse effect in therapeutic use [T38.0X5A] 06/06/2022 No    Constipation [K59.00] 05/09/2022 Yes    Anemia of chronic disease [D63.8] 12/08/2021 Yes    Hypothyroidism [E03.9] 07/22/2021 Yes      Problems Resolved During this Admission:    Diagnosis Date Noted Date Resolved POA    Liver transplant candidate [Z76.82] 06/05/2022 06/07/2022 Not Applicable    ARLIN (acute kidney injury) [N17.9] 12/10/2021 06/10/2022 No       Consults (From admission, onward)        Status Ordering Provider     Inpatient consult to Endocrinology  Once        Provider:  (Not yet assigned)    Completed JW BROWN     Inpatient consult to Registered Dietitian/Nutritionist  Once        Provider:  (Not yet assigned)    Completed JW BROWN     Inpatient consult to Nephrology  Once        Provider:  (Not yet assigned)    Completed WINNIE CRUZ          Pending Diagnostic Studies:     None        Significant Diagnostic Studies: Labs:   CMP   Recent  Labs   Lab 06/09/22  0545 06/10/22  0639   * 133*   K 3.8 3.7    102   CO2 23 22*    94   BUN 24* 21*   CREATININE 0.9 0.9   CALCIUM 7.8* 8.4*   PROT 4.8* 5.3*   ALBUMIN 2.8* 2.8*   BILITOT 0.6 0.6   ALKPHOS 263* 303*   AST 50* 45*   ALT 76* 79*   ANIONGAP 9 9   ESTGFRAFRICA >60.0 >60.0   EGFRNONAA >60.0 >60.0   , CBC   Recent Labs   Lab 06/09/22  0545 06/10/22  0639   WBC 8.81 8.32   HGB 9.4* 9.4*   HCT 28.0* 27.6*   * 162    and All labs within the past 24 hours have been reviewed    The patients clinical status was discussed at multidisplinary rounds, involving transplant surgery, transplant medicine, pharmacy, nursing, nutrition, and social work    Discharged Condition: good    Disposition: Home or Self Care    Follow Up:    Patient Instructions:      Diet Adult Regular     Notify your health care provider if you experience any of the following:  temperature >100.4     Notify your health care provider if you experience any of the following:  persistent nausea and vomiting or diarrhea     Notify your health care provider if you experience any of the following:  severe uncontrolled pain     Notify your health care provider if you experience any of the following:  redness, tenderness, or signs of infection (pain, swelling, redness, odor or green/yellow discharge around incision site)     Notify your health care provider if you experience any of the following:  difficulty breathing or increased cough     Notify your health care provider if you experience any of the following:  severe persistent headache     Notify your health care provider if you experience any of the following:  worsening rash     Notify your health care provider if you experience any of the following:  persistent dizziness, light-headedness, or visual disturbances     Notify your health care provider if you experience any of the following:  increased confusion or weakness     Activity as tolerated      Medications:  Reconciled Home Medications:      Medication List      START taking these medications    aspirin 81 MG EC tablet  Commonly known as: ECOTRIN  Take 1 tablet (81 mg total) by mouth once daily.  Start taking on: June 11, 2022     calcium carbonate-vitamin D3 600 mg-20 mcg (800 unit) Tab  Take 1 tablet by mouth once daily.     famotidine 20 MG tablet  Commonly known as: PEPCID  Take 1 tablet (20 mg total) by mouth every evening.     mycophenolate 250 mg Cap  Commonly known as: CELLCEPT  Take 4 capsules (1,000 mg total) by mouth 2 (two) times daily.     nystatin 100,000 unit/mL suspension  Commonly known as: MYCOSTATIN  Take 5 mLs (500,000 Units total) by mouth 3 (three) times daily after meals. for 14 days     oxyCODONE 10 mg Tab immediate release tablet  Commonly known as: ROXICODONE  Take 1 tablet (10 mg total) by mouth every 8 (eight) hours as needed for Pain.     predniSONE 5 MG tablet  Commonly known as: DELTASONE  Take by mouth daily: 6/11 to 6/17 20mg ,   6/18 to 6/24 15mg,   6/25 to 7/1 10mg,  7/2 to 7/8 5mg, the STOP 7/9/22.     sulfamethoxazole-trimethoprim 400-80mg 400-80 mg per tablet  Commonly known as: BACTRIM,SEPTRA  Take 1 tablet by mouth once daily. Stop 12/6/2022     tacrolimus 1 MG Cap  Commonly known as: PROGRAF  Take 6 capsules (6 mg total) by mouth every 12 (twelve) hours.     valGANciclovir 450 mg Tab  Commonly known as: VALCYTE  Take 1 tablet (450 mg total) by mouth once daily. Stop 9/6/2022        CHANGE how you take these medications    furosemide 40 MG tablet  Commonly known as: LASIX  Take 1 tablet (40 mg total) by mouth once daily.  What changed:   · medication strength  · how much to take  · when to take this        CONTINUE taking these medications    allopurinoL 100 MG tablet  Commonly known as: ZYLOPRIM  TAKE 1 TABLET(100 MG) BY MOUTH EVERY DAY     levothyroxine 50 MCG tablet  Commonly known as: SYNTHROID  TAKE 1 TABLET(50 MCG) BY MOUTH BEFORE BREAKFAST     linaCLOtide  72 mcg Cap capsule  Commonly known as: LINZESS  Take 2 capsules (144 mcg total) by mouth before breakfast.        STOP taking these medications    bisacodyL 5 mg EC tablet  Commonly known as: DULCOLAX     cefTRIAXone 2 g/50 mL Pgbk IVPB  Commonly known as: ROCEPHIN     ciprofloxacin HCl 250 MG tablet  Commonly known as: CIPRO     folic acid 1 MG tablet  Commonly known as: FOLVITE     hydrOXYzine HCL 25 MG tablet  Commonly known as: ATARAX     lactulose 10 gram/15 mL solution  Commonly known as: CHRONULAC     pantoprazole 40 MG tablet  Commonly known as: PROTONIX     potassium chloride SA 20 MEQ tablet  Commonly known as: K-DUR,KLOR-CON     thiamine 100 MG tablet  Commonly known as: VITAMIN B-1     vitamin A 70586 UNIT capsule     VITAMIN D ORAL          Time spent caring for patient (Greater than 1/2 spent in direct face-to-face contact): > 30 minutes    Iliana Torre PA-C  Liver Transplant  [facility]

## 2022-06-10 NOTE — ASSESSMENT & PLAN NOTE
Endocrinology consulted for BG management.   BG goal 140-180    - Novolog (aspart) insulin [facility] SSI (150/25) Units SQ prn for BG excursions.   - BG checks AC/HS  - Hypoglycemia protocol in place  - If blood glucose greater than 300, please ask patient not to eat food or drink anything other than water until correctional insulin has brought it back below 250    ** Please notify Endocrine for any change and/or advance in diet**  ** Please call Endocrine for any BG related issues **    Discharge Planning:   TBD. Please notify endocrinology prior to discharge.

## 2022-06-10 NOTE — PROGRESS NOTES
[facility]  Adult Nutrition  Progress Note    SUMMARY       Recommendations    1. Continue diabetic diet    2. If PO intake < 50%, add Boost glucose control BID      3. RD to monitor and follow    Goals: Meet % EEN, EPN by RD f/u date  Nutrition Goal Status: progressing towards goal  Communication of RD Recs:  (POC)    Assessment and Plan    Nutrition Problem  Increased nutrient needs (protein, energy)     Related to (etiology):   Increased physiological demands     Signs and Symptoms (as evidenced by):   S/p Liver transplant (6/6)     Interventions/Recommendations (treatment strategy):  Collaboration with other providers  Nutrition education     Nutrition Diagnosis Status:   Continues    Malnutrition Assessment  Orbital Region (Subcutaneous Fat Loss): well nourished  Upper Arm Region (Subcutaneous Fat Loss): well nourished   Judaism Region (Muscle Loss): well nourished  Clavicle Bone Region (Muscle Loss): well nourished  Clavicle and Acromion Bone Region (Muscle Loss): well nourished  Dorsal Hand (Muscle Loss): well nourished  Anterior Thigh Region (Muscle Loss): well nourished     Reason for Assessment    Reason For Assessment: RD follow-up  Diagnosis: transplant/postoperative complications  Relevant Medical History: s/p liver tx (6/6), hypothyroidism, CKD stage 4, HLD, Iron deficiency anemia  Interdisciplinary Rounds: did not attend    General Information Comments:   S/p DBD Liver transplant (6/6). Intra-operative dialysis. Noted wt gain of 21 lb (15%) in two week per chart - could be fluid related. Pt reports good appetite, wanted to follow low Na, low Carb diet after discharge. No additional question about post tx diet. NFPE completed on 6/7, pt appears nourished, no s/s of malnutrition at this time.    Nutrition Discharge Planning: Post transplant nutrition education provided on 6/7. Food safety/drug interactions emphasized. General healthy/low salt diet recommended. Education material  "left at bedside. No other needs identified.    Nutrition Risk Screen    Nutrition Risk Screen: no indicators present    Nutrition/Diet History    Spiritual, Cultural Beliefs, Confucianist Practices, Values that Affect Care: no    Anthropometrics    Temp: 98.1 °F (36.7 °C)  Height: 5' 3" (160 cm)  Height (inches): 63 in  Weight Method: Bed Scale  Weight: 67.7 kg (149 lb 4 oz)  Weight (lb): 149.25 lb  Ideal Body Weight (IBW), Female: 115 lb  % Ideal Body Weight, Female (lb): 134.19 %  BMI (Calculated): 26.4       Lab/Procedures/Meds    Pertinent Labs Reviewed: reviewed  Pertinent Labs Comments: Na 133, BUN 21, , AST 45, ALT 79  Pertinent Medications Reviewed: reviewed  Pertinent Medications Comments: prednisone, tacrolimus, bisacodyl, polyethylene glycol, famotidine, mycophenolate, heparin    Estimated/Assessed Needs    Weight Used For Calorie Calculations: 70 kg (154 lb 5.2 oz)  Energy Calorie Requirements (kcal): 2488-6167 kcal  Energy Need Method: Kcal/kg (25-30kcal/kg)  Protein Requirements: 84-105g (1.2-1.5g/kg)  Weight Used For Protein Calculations: 70 kg (154 lb 5.2 oz)  Fluid Requirements (mL): 1ml/kcal or per MD  Estimated Fluid Requirement Method: RDA Method  RDA Method (mL): 1750     Nutrition Prescription Ordered    Current Diet Order: Diabetic diet    Evaluation of Received Nutrient/Fluid Intake    I/O: + 6.2 L since admit  Energy Calories Required: meeting needs  Protein Required:  meeting needs  Comments: LBM 6/9  Tolerance: tolerating  % Intake of Estimated Energy Needs: 75%  % Meal Intake: 75%    Nutrition Risk    Level of Risk/Frequency of Follow-up: low     Monitor and Evaluation    Food and Nutrient Intake: food and beverage intake, energy intake  Food and Nutrient Adminstration: diet order  Knowledge/Beliefs/Attitudes: food and nutrition knowledge/skill, beliefs and attitudes  Physical Activity and Function: nutrition-related ADLs and IADLs  Anthropometric Measurements: height/length, weight, " weight change, body mass index  Biochemical Data, Medical Tests and Procedures: electrolyte and renal panel, gastrointestinal profile, glucose/endocrine profile, inflammatory profile, lipid profile  Nutrition-Focused Physical Findings: overall appearance     Nutrition Follow-Up    RD Follow-up?: Yes    Tamica HASSAN

## 2022-06-10 NOTE — PROGRESS NOTES
Discharge Note:     presented to patient bedside to discuss discharge plans, as well as assess any concerns or needs. Patient was alert and oriented x4, calm and communicative. Patient's caregiver is not at bedside at this time. Patient reports her mother-in-law is coming to the hospital right now. Patient will discharge to her home today with mother-in-law transporting. Patient has no needs for home health or DME. Patient reports adequate coping with support from family and denies any mental health concerns at this time. Patient reports agreeing with the discharge plan and has no following psychosocial concerns. Patient verbalizes understanding and is involved in treatment planning and discharge process. SW remains available and will continue to follow, providing psychosocial support, education and assistance as needed.

## 2022-06-10 NOTE — PROGRESS NOTES
Met with patient in preparation for discharge today.  Reviewed the answers to the questions in My [facility].  Patient missed one question.  Rationale for correct answer given.  These questions cover: postop care, medication management, infection prevention and emergency contacts.  Outpatient coordinator assigned.  Outpatient appointments reviewed.  Allowed time for questions and answers.

## 2022-06-10 NOTE — NURSING
Iv d/bi with cath tip intact. She met with transplant pharmacist who reviewed med card and meds with her. She met with transplant coordinator who reviewed follow up appts with her. AVS reviewed with her. She voiced understanding of meds, follow up appts, incisional care, what to call for and who to call. She will met her  downstairs escorted by transport when he arrives.

## 2022-06-10 NOTE — PROGRESS NOTES
"[facility]  Endocrinology  Progress Note    Admit Date: 2022     Reason for Consult: Management of  Hyperglycemia     Surgical Procedure and Date: liver transplant 22        HPI:   Patient is a 52 y.o. female with a diagnosis of HLD, hypothyroidism, and ESLD. No personal history of DM. Patient admitted for liver transplant. Endocrinology consulted for BG management.       Lab Results   Component Value Date    HGBA1C 5.4 2022           Interval HPI:   Overnight events: No acute events overnight. Patient on the TSU in room 49016/20500 A. Blood glucose stable. BG at goal on current insulin regimen (SSI ). Steroid use- Methylprednisolone  40 mg. 5 Days Post-Op  Renal function- Normal   Vasopressors-  None       Diet diabetic [facility]; 2000 Calorie  Diet Adult Regular     Eatin%  Nausea: No  Hypoglycemia and intervention: No  Fever: No  TPN and/or TF: No    /68 (BP Location: Right arm, Patient Position: Sitting)   Pulse 80   Temp 98.1 °F (36.7 °C) (Oral)   Resp 14   Ht 5' 3" (1.6 m)   Wt 67.7 kg (149 lb 4 oz)   SpO2 97%   BMI 26.44 kg/m²     Labs Reviewed and Include    Recent Labs   Lab 06/10/22  0639   GLU 94   CALCIUM 8.4*   ALBUMIN 2.8*   PROT 5.3*   *   K 3.7   CO2 22*      BUN 21*   CREATININE 0.9   ALKPHOS 303*   ALT 79*   AST 45*   BILITOT 0.6     Lab Results   Component Value Date    WBC 8.32 06/10/2022    HGB 9.4 (L) 06/10/2022    HCT 27.6 (L) 06/10/2022    MCV 96 06/10/2022     06/10/2022     No results for input(s): TSH, FREET4 in the last 168 hours.  Lab Results   Component Value Date    HGBA1C 5.4 2022       Nutritional status:   Body mass index is 26.44 kg/m².  Lab Results   Component Value Date    ALBUMIN 2.8 (L) 06/10/2022    ALBUMIN 2.8 (L) 2022    ALBUMIN 3.1 (L) 2022     No results found for: PREALBUMIN    Estimated Creatinine Clearance: 68.3 mL/min (based on SCr of 0.9 mg/dL).    Accu-Checks  Recent " "Labs     06/08/22  0224 06/08/22  0736 06/08/22  0836 06/08/22  1408 06/08/22  1718 06/08/22  2144 06/09/22  0746 06/09/22  1725 06/09/22  2220 06/10/22  0813   POCTGLUCOSE 111* 97 96 218* 217* 163* 123* 233* 156* 103       Current Medications and/or Treatments Impacting Glycemic Control  Immunotherapy:    Immunosuppressants           Stop Route Frequency     tacrolimus capsule 5 mg         -- Oral 2 times daily     mycophenolate capsule 1,000 mg         -- Oral 2 times daily          Steroids:   Hormones (From admission, onward)                Start     Stop Route Frequency Ordered    06/11/22 0900  predniSONE tablet 20 mg  (methylprednisolone taper panel)        "Followed by" Linked Group Details    -- Oral Daily 06/05/22 2210          Pressors:    Autonomic Drugs (From admission, onward)                None          Hyperglycemia/Diabetes Medications:   Antihyperglycemics (From admission, onward)                Start     Stop Route Frequency Ordered    06/08/22 0930  insulin aspart U-100 pen 1-10 Units         -- SubQ Before meals & nightly PRN 06/08/22 0830            ASSESSMENT and PLAN    * S/P liver transplant  avoid hypoglycemia        Steroid-induced hyperglycemia  Endocrinology consulted for BG management.   BG goal 140-180    - Novolog (aspart) insulin [facility] SSI (150/25) Units SQ prn for BG excursions.   - BG checks AC/HS  - Hypoglycemia protocol in place  - If blood glucose greater than 300, please ask patient not to eat food or drink anything other than water until correctional insulin has brought it back below 250    ** Please notify Endocrine for any change and/or advance in diet**  ** Please call Endocrine for any BG related issues **    Discharge Planning:   TBD. Please notify endocrinology prior to discharge.        Hypothyroidism  Managed per primary.   Continue levothyroxine 50 mcg daily.   Lab Results   Component Value Date    TSH 2.550 05/06/2022    FREET4 0.94 07/15/2021           Adrenal " cortical steroids causing adverse effect in therapeutic use  Glucocorticoids markedly increase prandial glucoses. Expect the steroid taper will help glucose control.              Edward Deshpande, DNP, FNP  Endocrinology  [facility]

## 2022-06-10 NOTE — PLAN OF CARE
"Plan of care reviewed with the patient at the beginning of the shift. Pt s/p liver transplant on 06/05. [place] incision with staples is clean dry and well approximated. Pain managed with oxy. Pt is 100% on self meds. Pt has had several small BMs with medical interventions but feels like "there's a lot still there". Fall precautions maintained. She is up independently without difficulty. Pt remained free from falls and injury this shift. Bed locked in lowest position, side rails up x2, call light within reach. Instructed pt to call for assistance as needed. Pt verbalized understanding. Vitals stable. Pt afebrile overnight. No acute issues overnight. Will continue to monitor.       "

## 2022-06-11 ENCOUNTER — LAB VISIT (OUTPATIENT)
Dept: LAB | Facility: HOSPITAL | Age: 52
End: 2022-06-11
Attending: SURGERY
Payer: COMMERCIAL

## 2022-06-11 ENCOUNTER — TELEPHONE (OUTPATIENT)
Dept: TRANSPLANT | Facility: CLINIC | Age: 52
End: 2022-06-11
Payer: COMMERCIAL

## 2022-06-11 DIAGNOSIS — Z94.4 LIVER REPLACED BY TRANSPLANT: ICD-10-CM

## 2022-06-11 LAB
ALBUMIN SERPL BCP-MCNC: 3.1 G/DL (ref 3.5–5.2)
ALP SERPL-CCNC: 341 U/L (ref 55–135)
ALT SERPL W/O P-5'-P-CCNC: 85 U/L (ref 10–44)
ANION GAP SERPL CALC-SCNC: 13 MMOL/L (ref 8–16)
ANISOCYTOSIS BLD QL SMEAR: SLIGHT
AST SERPL-CCNC: 34 U/L (ref 10–40)
BASOPHILS # BLD AUTO: 0.01 K/UL (ref 0–0.2)
BASOPHILS NFR BLD: 0.1 % (ref 0–1.9)
BILIRUB DIRECT SERPL-MCNC: 0.3 MG/DL (ref 0.1–0.3)
BILIRUB SERPL-MCNC: 0.6 MG/DL (ref 0.1–1)
BUN SERPL-MCNC: 18 MG/DL (ref 6–20)
CALCIUM SERPL-MCNC: 8.7 MG/DL (ref 8.7–10.5)
CHLORIDE SERPL-SCNC: 98 MMOL/L (ref 95–110)
CO2 SERPL-SCNC: 22 MMOL/L (ref 23–29)
CREAT SERPL-MCNC: 0.9 MG/DL (ref 0.5–1.4)
DIFFERENTIAL METHOD: ABNORMAL
EOSINOPHIL # BLD AUTO: 2.4 K/UL (ref 0–0.5)
EOSINOPHIL NFR BLD: 20.5 % (ref 0–8)
ERYTHROCYTE [DISTWIDTH] IN BLOOD BY AUTOMATED COUNT: 12.8 % (ref 11.5–14.5)
EST. GFR  (AFRICAN AMERICAN): >60 ML/MIN/1.73 M^2
EST. GFR  (NON AFRICAN AMERICAN): >60 ML/MIN/1.73 M^2
GLUCOSE SERPL-MCNC: 108 MG/DL (ref 70–110)
HCT VFR BLD AUTO: 29.9 % (ref 37–48.5)
HGB BLD-MCNC: 9.9 G/DL (ref 12–16)
IMM GRANULOCYTES # BLD AUTO: 0.05 K/UL (ref 0–0.04)
IMM GRANULOCYTES NFR BLD AUTO: 0.4 % (ref 0–0.5)
LYMPHOCYTES # BLD AUTO: 0.6 K/UL (ref 1–4.8)
LYMPHOCYTES NFR BLD: 4.6 % (ref 18–48)
MCH RBC QN AUTO: 32.8 PG (ref 27–31)
MCHC RBC AUTO-ENTMCNC: 33.1 G/DL (ref 32–36)
MCV RBC AUTO: 99 FL (ref 82–98)
MONOCYTES # BLD AUTO: 0.8 K/UL (ref 0.3–1)
MONOCYTES NFR BLD: 6.3 % (ref 4–15)
NEUTROPHILS # BLD AUTO: 8.1 K/UL (ref 1.8–7.7)
NEUTROPHILS NFR BLD: 68.1 % (ref 38–73)
NRBC BLD-RTO: 0 /100 WBC
OVALOCYTES BLD QL SMEAR: ABNORMAL
PLATELET # BLD AUTO: 266 K/UL (ref 150–450)
PMV BLD AUTO: 10.8 FL (ref 9.2–12.9)
POIKILOCYTOSIS BLD QL SMEAR: SLIGHT
POLYCHROMASIA BLD QL SMEAR: ABNORMAL
POTASSIUM SERPL-SCNC: 3.6 MMOL/L (ref 3.5–5.1)
PROT SERPL-MCNC: 5.8 G/DL (ref 6–8.4)
RBC # BLD AUTO: 3.02 M/UL (ref 4–5.4)
SODIUM SERPL-SCNC: 133 MMOL/L (ref 136–145)
TACROLIMUS BLD-MCNC: 5.6 NG/ML (ref 5–15)
WBC # BLD AUTO: 11.9 K/UL (ref 3.9–12.7)

## 2022-06-11 PROCEDURE — 82248 BILIRUBIN DIRECT: CPT | Mod: NTX | Performed by: SURGERY

## 2022-06-11 PROCEDURE — 36415 COLL VENOUS BLD VENIPUNCTURE: CPT | Mod: NTX | Performed by: SURGERY

## 2022-06-11 PROCEDURE — 85025 COMPLETE CBC W/AUTO DIFF WBC: CPT | Mod: NTX | Performed by: SURGERY

## 2022-06-11 PROCEDURE — 80053 COMPREHEN METABOLIC PANEL: CPT | Mod: TXP | Performed by: SURGERY

## 2022-06-11 PROCEDURE — 80197 ASSAY OF TACROLIMUS: CPT | Mod: NTX | Performed by: SURGERY

## 2022-06-11 NOTE — TELEPHONE ENCOUNTER
Labs reviewed with Dr. Richmond.   All labs stable, LFT's continue to trend down, Creatinine 0.6.   Tacrolimus level increasing. Dr. Richmond gave no orders to change the dose.     Patient called and informed of labs. Instructed to continue current dose of Tacrolimus of 6 mg twice daily  ( 6 pills twice daily).  Patient verbalized out loud her dose to take.   Instructed no labs tomorrow per Dr. Richmond.   Patient verbalized understanding.   Patient will be seen on Monday. She verbalized understanding.

## 2022-06-13 ENCOUNTER — LAB VISIT (OUTPATIENT)
Dept: LAB | Facility: HOSPITAL | Age: 52
End: 2022-06-13
Attending: SURGERY
Payer: COMMERCIAL

## 2022-06-13 ENCOUNTER — PATIENT MESSAGE (OUTPATIENT)
Dept: TRANSPLANT | Facility: CLINIC | Age: 52
End: 2022-06-13

## 2022-06-13 ENCOUNTER — CLINICAL SUPPORT (OUTPATIENT)
Dept: TRANSPLANT | Facility: CLINIC | Age: 52
End: 2022-06-13
Payer: COMMERCIAL

## 2022-06-13 ENCOUNTER — TELEPHONE (OUTPATIENT)
Dept: TRANSPLANT | Facility: CLINIC | Age: 52
End: 2022-06-13

## 2022-06-13 VITALS
BODY MASS INDEX: 25.04 KG/M2 | TEMPERATURE: 97 F | DIASTOLIC BLOOD PRESSURE: 73 MMHG | RESPIRATION RATE: 16 BRPM | HEART RATE: 101 BPM | DIASTOLIC BLOOD PRESSURE: 73 MMHG | SYSTOLIC BLOOD PRESSURE: 137 MMHG | OXYGEN SATURATION: 95 % | WEIGHT: 141.31 LBS | HEART RATE: 101 BPM | RESPIRATION RATE: 16 BRPM | TEMPERATURE: 97 F | HEIGHT: 63 IN | WEIGHT: 141.31 LBS | BODY MASS INDEX: 25.04 KG/M2 | SYSTOLIC BLOOD PRESSURE: 137 MMHG | HEIGHT: 63 IN | OXYGEN SATURATION: 95 %

## 2022-06-13 DIAGNOSIS — Z94.4 LIVER REPLACED BY TRANSPLANT: Primary | ICD-10-CM

## 2022-06-13 DIAGNOSIS — Z94.4 LIVER REPLACED BY TRANSPLANT: ICD-10-CM

## 2022-06-13 LAB
ALBUMIN SERPL BCP-MCNC: 3 G/DL (ref 3.5–5.2)
ALP SERPL-CCNC: 207 U/L (ref 55–135)
ALT SERPL W/O P-5'-P-CCNC: 41 U/L (ref 10–44)
ANION GAP SERPL CALC-SCNC: 11 MMOL/L (ref 8–16)
AST SERPL-CCNC: 14 U/L (ref 10–40)
BASOPHILS # BLD AUTO: 0.03 K/UL (ref 0–0.2)
BASOPHILS NFR BLD: 0.3 % (ref 0–1.9)
BILIRUB DIRECT SERPL-MCNC: 0.3 MG/DL (ref 0.1–0.3)
BILIRUB SERPL-MCNC: 0.5 MG/DL (ref 0.1–1)
BUN SERPL-MCNC: 11 MG/DL (ref 6–20)
CALCIUM SERPL-MCNC: 8.5 MG/DL (ref 8.7–10.5)
CHLORIDE SERPL-SCNC: 101 MMOL/L (ref 95–110)
CO2 SERPL-SCNC: 20 MMOL/L (ref 23–29)
CREAT SERPL-MCNC: 0.8 MG/DL (ref 0.5–1.4)
DIFFERENTIAL METHOD: ABNORMAL
EOSINOPHIL # BLD AUTO: 2.5 K/UL (ref 0–0.5)
EOSINOPHIL NFR BLD: 23.8 % (ref 0–8)
ERYTHROCYTE [DISTWIDTH] IN BLOOD BY AUTOMATED COUNT: 13.2 % (ref 11.5–14.5)
EST. GFR  (AFRICAN AMERICAN): >60 ML/MIN/1.73 M^2
EST. GFR  (NON AFRICAN AMERICAN): >60 ML/MIN/1.73 M^2
GLUCOSE SERPL-MCNC: 108 MG/DL (ref 70–110)
HCT VFR BLD AUTO: 30.8 % (ref 37–48.5)
HCV AB SERPL QL IA: POSITIVE
HGB BLD-MCNC: 10.1 G/DL (ref 12–16)
IMM GRANULOCYTES # BLD AUTO: 0.08 K/UL (ref 0–0.04)
IMM GRANULOCYTES NFR BLD AUTO: 0.8 % (ref 0–0.5)
LYMPHOCYTES # BLD AUTO: 0.9 K/UL (ref 1–4.8)
LYMPHOCYTES NFR BLD: 8.6 % (ref 18–48)
MCH RBC QN AUTO: 32.5 PG (ref 27–31)
MCHC RBC AUTO-ENTMCNC: 32.8 G/DL (ref 32–36)
MCV RBC AUTO: 99 FL (ref 82–98)
MONOCYTES # BLD AUTO: 1 K/UL (ref 0.3–1)
MONOCYTES NFR BLD: 9.8 % (ref 4–15)
NEUTROPHILS # BLD AUTO: 5.9 K/UL (ref 1.8–7.7)
NEUTROPHILS NFR BLD: 56.7 % (ref 38–73)
NRBC BLD-RTO: 0 /100 WBC
PLATELET # BLD AUTO: 363 K/UL (ref 150–450)
PLATELET BLD QL SMEAR: ABNORMAL
PMV BLD AUTO: 9.9 FL (ref 9.2–12.9)
POTASSIUM SERPL-SCNC: 3.4 MMOL/L (ref 3.5–5.1)
PROT SERPL-MCNC: 5.7 G/DL (ref 6–8.4)
RBC # BLD AUTO: 3.11 M/UL (ref 4–5.4)
SODIUM SERPL-SCNC: 132 MMOL/L (ref 136–145)
TACROLIMUS BLD-MCNC: 9.5 NG/ML (ref 5–15)
WBC # BLD AUTO: 10.47 K/UL (ref 3.9–12.7)

## 2022-06-13 PROCEDURE — 86803 HEPATITIS C AB TEST: CPT | Mod: TXP | Performed by: INTERNAL MEDICINE

## 2022-06-13 PROCEDURE — 80053 COMPREHEN METABOLIC PANEL: CPT | Mod: TXP | Performed by: SURGERY

## 2022-06-13 PROCEDURE — 82248 BILIRUBIN DIRECT: CPT | Mod: TXP | Performed by: SURGERY

## 2022-06-13 PROCEDURE — 99999 PR PBB SHADOW E&M-EST. PATIENT-LVL III: ICD-10-PCS | Mod: PBBFAC,TXP,,

## 2022-06-13 PROCEDURE — 99999 PR PBB SHADOW E&M-EST. PATIENT-LVL III: CPT | Mod: PBBFAC,TXP,,

## 2022-06-13 PROCEDURE — 36415 COLL VENOUS BLD VENIPUNCTURE: CPT | Mod: TXP | Performed by: INTERNAL MEDICINE

## 2022-06-13 PROCEDURE — 87522 HEPATITIS C REVRS TRNSCRPJ: CPT | Mod: NTX | Performed by: INTERNAL MEDICINE

## 2022-06-13 PROCEDURE — 80197 ASSAY OF TACROLIMUS: CPT | Mod: TXP | Performed by: SURGERY

## 2022-06-13 PROCEDURE — 85025 COMPLETE CBC W/AUTO DIFF WBC: CPT | Mod: NTX | Performed by: SURGERY

## 2022-06-13 RX ORDER — TIZANIDINE 4 MG/1
4 TABLET ORAL EVERY 8 HOURS PRN
Qty: 30 TABLET | Refills: 1 | Status: SHIPPED | OUTPATIENT
Start: 2022-06-13 | End: 2022-09-21 | Stop reason: ALTCHOICE

## 2022-06-13 NOTE — PROGRESS NOTES
1ST NURSING VISIT POST DISCHARGE NOTE    1st RN appointment with Malu Montes post discharge 06/10/22 s/p liver transplant 6/5/22.  Patient's mother accompanied her.  Upon assessment, patient complains of swelling lower body.  Incision intact with staples.  Patient that she is able to explain daily incision care and showering instructions.  Medication list and rationale were reviewed.  Patient states  did bring blue medication card and medication bottles for review.  Self-assessment reviewed with patient and mother; time allowed for questions.  Patient expressed understanding of daily care including BID VS and medications.  Patient aware that nurse will review today's labs with a transplant physician and call with any does changes indicated.  Next labs and first post-operative transplant team appointment with labs scheduled for 06/14/22.

## 2022-06-13 NOTE — TELEPHONE ENCOUNTER
Reviewed labs with Dr Segundo, no changes will get labs again on Thursday.  Called patient to let her know.

## 2022-06-13 NOTE — PROGRESS NOTES
Clinic Note: First Return to Clinic Post-  Liver Transplant    Malu Montes  is a 52 y.o. female  S/p   LIVER transplant on 6/5/2022 (Liver) for Alcoholic Cirrhosis.      Discharge Course (Issues/Concerns): Ms Montes is s/p liver transplant from 6/5/22.  Post transplant course uncomplicated.  Today patient presents to clinic with back pain and continued edema (discharged with lasix).      Objective:   Vitals:    06/13/22 0902   BP: 137/73   Pulse: 101   Resp: 16   Temp: 97.3 °F (36.3 °C)       Met with patient and her caregiver in the clinic to review current medication list.     Current Outpatient Medications   Medication Sig Dispense Refill    aspirin (ECOTRIN) 81 MG EC tablet Take 1 tablet (81 mg total) by mouth once daily. 30 tablet 11    calcium carbonate-vitamin D3 600 mg-20 mcg (800 unit) Tab Take 1 tablet by mouth once daily. 30 tablet 11    famotidine (PEPCID) 20 MG tablet Take 1 tablet (20 mg total) by mouth every evening. 30 tablet 11    furosemide (LASIX) 40 MG tablet Take 1 tablet (40 mg total) by mouth once daily. 5 tablet 0    levothyroxine (SYNTHROID) 50 MCG tablet TAKE 1 TABLET(50 MCG) BY MOUTH BEFORE BREAKFAST 90 tablet 3    linaCLOtide (LINZESS) 72 mcg Cap capsule Take 2 capsules (144 mcg total) by mouth before breakfast. 180 capsule 5    mycophenolate (CELLCEPT) 250 mg Cap Take 4 capsules (1,000 mg total) by mouth 2 (two) times daily. 240 capsule 2    nystatin (MYCOSTATIN) 100,000 unit/mL suspension Take 5 mLs (500,000 Units total) by mouth 3 (three) times daily after meals. for 14 days 210 mL 0    oxyCODONE (ROXICODONE) 10 mg Tab immediate release tablet Take 1 tablet (10 mg total) by mouth every 8 (eight) hours as needed for Pain. 21 tablet 0    predniSONE (DELTASONE) 5 MG tablet Take by mouth daily: 6/11 to 6/17 20mg ,   6/18 to 6/24 15mg,   6/25 to 7/1 10mg,  7/2 to 7/8 5mg, the STOP 7/9/22. 70 tablet 0    sulfamethoxazole-trimethoprim 400-80mg (BACTRIM,SEPTRA) 400-80 mg per tablet  Take 1 tablet by mouth once daily. Stop 12/6/2022 30 tablet 5    tacrolimus (PROGRAF) 1 MG Cap Take 6 capsules (6 mg total) by mouth every 12 (twelve) hours. 360 capsule 11    valGANciclovir (VALCYTE) 450 mg Tab Take 1 tablet (450 mg total) by mouth once daily. Stop 9/6/2022 60 tablet 2    tiZANidine (ZANAFLEX) 4 MG tablet Take 1 tablet (4 mg total) by mouth every 8 (eight) hours as needed (muscle spasms). 30 tablet 1     No current facility-administered medications for this visit.       Pharmacy Interventions/Recommendations:     1) Graft Function & Immunosuppression Issues:  LFTs wnl, steroid induction, FK 6/6, mmf, and prednisone taper    2) Opportunistic Infection prophylaxis:   PCP ppx: bactrim STOP 12/6/22  CMV ppx: valcyte STOP 9/6/22  Fungal ppx: nystatin STOP 6/25/22    3) Donor Serologies & Monitoring:     Donor CMV Status: Positive  Donor HCV Status: Positive  Donor HBcAb: Negative  Donor HBV JACOBO: Negative  Donor HCV JACOBO: Negative      4) Pain Management & Bowel Regimen: pt taking oxycodone PRN, still complains of intermittent pain, particularly back pain - sent rx for muscle relaxer (tizanadine) and recommended using scheduled APAP.  Pt with chronic constipation, taking home linzess and having bowel movement with PRN OTC bowel meds    5) Blood Pressure Management: Home SBP 140s- no changed    6) Blood Sugar Management & Follow-up: no concerns, not on insulin     7) Electrolyte Management: K 3.4 today - recommend to increase dietary K today    8) Osteoporosis Prevention Strategy (Liver Transplant): ca/vit D daily    8) OTHER medication follow-up (patient assistance, held medications, etc):   - Pt with edema - currently on lasix 40mg x5 days, will reassess in surgery clinic tomorrow  - Pt with back pain - will start tizanadine PRN  - Pt not sleeping well - likely related to pain/pt being uncomfortable, will try muscle relaxer first before starting additional meds    9) Sent an EUA Evusheld request for  this patient as it is being offered to all freshly transplanted patients.  Hans FAQ provided to patient and answered their questions about the monoclonal antibody.      10) Reinforced medication education conducted in the hospital, including medication indications, dosing, administration, side effects, monitoring-- including timing of immunosuppressant levels.     Patient received their FIRST fill of medications from [facility].  Discussed the process for obtaining refills of medications, including verifying the dose and mailing address to have medications delivered.     Malu and her caregivers verbalized understanding and had the opportunity to ask questions.

## 2022-06-14 ENCOUNTER — PATIENT MESSAGE (OUTPATIENT)
Dept: TRANSPLANT | Facility: CLINIC | Age: 52
End: 2022-06-14

## 2022-06-14 ENCOUNTER — OFFICE VISIT (OUTPATIENT)
Dept: TRANSPLANT | Facility: CLINIC | Age: 52
End: 2022-06-14
Payer: COMMERCIAL

## 2022-06-14 VITALS
HEIGHT: 63 IN | WEIGHT: 139.13 LBS | TEMPERATURE: 97 F | HEART RATE: 108 BPM | DIASTOLIC BLOOD PRESSURE: 76 MMHG | RESPIRATION RATE: 16 BRPM | SYSTOLIC BLOOD PRESSURE: 149 MMHG | OXYGEN SATURATION: 94 % | BODY MASS INDEX: 24.65 KG/M2

## 2022-06-14 DIAGNOSIS — Z51.81 ENCOUNTER FOR THERAPEUTIC DRUG MONITORING: Primary | ICD-10-CM

## 2022-06-14 DIAGNOSIS — Z94.4 LIVER REPLACED BY TRANSPLANT: ICD-10-CM

## 2022-06-14 DIAGNOSIS — Z79.899 ENCOUNTER FOR LONG-TERM (CURRENT) USE OF OTHER MEDICATIONS: ICD-10-CM

## 2022-06-14 LAB — HCV RNA SERPL NAA+PROBE-ACNC: NORMAL IU/ML

## 2022-06-14 PROCEDURE — 99213 OFFICE O/P EST LOW 20 MIN: CPT | Mod: 24,NTX,S$GLB, | Performed by: TRANSPLANT SURGERY

## 2022-06-14 PROCEDURE — 99999 PR PBB SHADOW E&M-EST. PATIENT-LVL IV: ICD-10-PCS | Mod: PBBFAC,TXP,,

## 2022-06-14 PROCEDURE — 99999 PR PBB SHADOW E&M-EST. PATIENT-LVL IV: CPT | Mod: PBBFAC,TXP,,

## 2022-06-14 PROCEDURE — 99213 PR OFFICE/OUTPT VISIT, EST, LEVL III, 20-29 MIN: ICD-10-PCS | Mod: 24,NTX,S$GLB, | Performed by: TRANSPLANT SURGERY

## 2022-06-14 RX ORDER — OXYCODONE HYDROCHLORIDE 10 MG/1
10 TABLET ORAL EVERY 8 HOURS PRN
Qty: 21 TABLET | Refills: 0 | Status: CANCELLED | OUTPATIENT
Start: 2022-06-14

## 2022-06-14 RX ORDER — OXYCODONE HYDROCHLORIDE 10 MG/1
10 TABLET ORAL EVERY 8 HOURS PRN
Qty: 21 TABLET | Refills: 0 | Status: SHIPPED | OUTPATIENT
Start: 2022-06-14 | End: 2022-06-21

## 2022-06-14 NOTE — PROGRESS NOTES
Transplant Surgery  Liver Transplant Recipient Follow-up    Original Referring Physician: Sharmila Burnett Bzowej  Current Corresponding Physician: Killian Dodd    Chief Complaint: Malu is here for follow up of her liver transplant performed 6/5/2022 for the primary diagnosis (UNOS) of Alcoholic Cirrhosis    ORGAN: LIVER  Whole or Partial: whole liver  Donor Type: donation after brain death  PHS Increased Risk: yes  Donor CMV Status: Positive  Donor HCV Status: Positive  Donor HBcAb: Negative  Donor HBV JACOBO: Negative  Donor HCV JACOBO: Negative    Biliary Anastomosis: end to end  Arterial Anatomy: standard  IVC reconstruction: end to end ivc  Portal vein status: patent    Subjective:     History of Present Illness: She has had the following complications since transplant: none.      Interval History: Currently, she is doing well.  Current complaints include none.  Malu is here for management of her immunosuppression medication.    External provider notes reviewed: No    Review of Systems  Objective:     Physical Exam  Lab Results   Component Value Date    BILITOT 0.5 06/13/2022    AST 14 06/13/2022    ALT 41 06/13/2022    ALKPHOS 207 (H) 06/13/2022    CREATININE 0.8 06/13/2022    ALBUMIN 3.0 (L) 06/13/2022     Lab Results   Component Value Date    WBC 10.47 06/13/2022    HGB 10.1 (L) 06/13/2022    HCT 30.8 (L) 06/13/2022    HCT 31 (L) 06/06/2022     06/13/2022     Lab Results   Component Value Date    TACROLIMUS 9.5 06/13/2022       Diagnostics:  The following labs have been reviewed: CBC  CMP  INR  TACROLIMUS LEVEL  The following radiology images have been independently reviewed and interpreted: Liver US    Assessment/Plan:          · S/P liver transplant.  · Chronic immunosuppressive medications for rejection prophylaxis at target.  Plan: no adjustment needed.  · Continue monitoring symptoms, labs and drug levels for drug-related toxicity and side effects.  · Incision: staples in  place; wound clean, dry, and intact  · Femoral arterial line site: no complications evident    Additional testing to be completed according to Written Order Guidelines for Post-Liver and Combined Liver/Kidney Transplant Follow-up (LI-09)    Interpretation of tests and discussion of patient management involves all members of the multidisciplinary transplant team  Patient advised that it is recommended that all transplanted patients, and their close contacts and household members receive Covid vaccination.  Monty Segundo MD       [facility] Patient Status  Functional Status: 60% - Requires occasional assistance but is able to care for needs  Physical Capacity: No Limitations

## 2022-06-15 ENCOUNTER — PATIENT MESSAGE (OUTPATIENT)
Dept: TRANSPLANT | Facility: CLINIC | Age: 52
End: 2022-06-15
Payer: COMMERCIAL

## 2022-06-16 ENCOUNTER — CONFERENCE (OUTPATIENT)
Dept: TRANSPLANT | Facility: CLINIC | Age: 52
End: 2022-06-16
Payer: COMMERCIAL

## 2022-06-16 ENCOUNTER — PATIENT MESSAGE (OUTPATIENT)
Dept: TRANSPLANT | Facility: CLINIC | Age: 52
End: 2022-06-16
Payer: COMMERCIAL

## 2022-06-16 ENCOUNTER — LAB VISIT (OUTPATIENT)
Dept: LAB | Facility: HOSPITAL | Age: 52
End: 2022-06-16
Attending: SURGERY
Payer: COMMERCIAL

## 2022-06-16 ENCOUNTER — PATIENT MESSAGE (OUTPATIENT)
Dept: PHARMACY | Facility: CLINIC | Age: 52
End: 2022-06-16
Payer: COMMERCIAL

## 2022-06-16 ENCOUNTER — TELEPHONE (OUTPATIENT)
Dept: TRANSPLANT | Facility: CLINIC | Age: 52
End: 2022-06-16
Payer: COMMERCIAL

## 2022-06-16 ENCOUNTER — TELEPHONE (OUTPATIENT)
Dept: PHARMACY | Facility: CLINIC | Age: 52
End: 2022-06-16
Payer: COMMERCIAL

## 2022-06-16 DIAGNOSIS — Z94.4 LIVER REPLACED BY TRANSPLANT: ICD-10-CM

## 2022-06-16 LAB
ALBUMIN SERPL BCP-MCNC: 3.3 G/DL (ref 3.5–5.2)
ALP SERPL-CCNC: 145 U/L (ref 55–135)
ALT SERPL W/O P-5'-P-CCNC: 20 U/L (ref 10–44)
ANION GAP SERPL CALC-SCNC: 11 MMOL/L (ref 8–16)
AST SERPL-CCNC: 10 U/L (ref 10–40)
BASOPHILS # BLD AUTO: 0.06 K/UL (ref 0–0.2)
BASOPHILS NFR BLD: 0.6 % (ref 0–1.9)
BILIRUB DIRECT SERPL-MCNC: 0.2 MG/DL (ref 0.1–0.3)
BILIRUB SERPL-MCNC: 0.4 MG/DL (ref 0.1–1)
BUN SERPL-MCNC: 16 MG/DL (ref 6–20)
CALCIUM SERPL-MCNC: 9 MG/DL (ref 8.7–10.5)
CHLORIDE SERPL-SCNC: 103 MMOL/L (ref 95–110)
CO2 SERPL-SCNC: 21 MMOL/L (ref 23–29)
CREAT SERPL-MCNC: 1.1 MG/DL (ref 0.5–1.4)
DIFFERENTIAL METHOD: ABNORMAL
EOSINOPHIL # BLD AUTO: 0.9 K/UL (ref 0–0.5)
EOSINOPHIL NFR BLD: 9.6 % (ref 0–8)
ERYTHROCYTE [DISTWIDTH] IN BLOOD BY AUTOMATED COUNT: 13.5 % (ref 11.5–14.5)
EST. GFR  (AFRICAN AMERICAN): >60 ML/MIN/1.73 M^2
EST. GFR  (NON AFRICAN AMERICAN): 58.3 ML/MIN/1.73 M^2
GLUCOSE SERPL-MCNC: 93 MG/DL (ref 70–110)
HCT VFR BLD AUTO: 29.5 % (ref 37–48.5)
HGB BLD-MCNC: 9.8 G/DL (ref 12–16)
IMM GRANULOCYTES # BLD AUTO: 0.1 K/UL (ref 0–0.04)
IMM GRANULOCYTES NFR BLD AUTO: 1.1 % (ref 0–0.5)
LYMPHOCYTES # BLD AUTO: 1.3 K/UL (ref 1–4.8)
LYMPHOCYTES NFR BLD: 13.7 % (ref 18–48)
MCH RBC QN AUTO: 32.5 PG (ref 27–31)
MCHC RBC AUTO-ENTMCNC: 33.2 G/DL (ref 32–36)
MCV RBC AUTO: 98 FL (ref 82–98)
MONOCYTES # BLD AUTO: 1 K/UL (ref 0.3–1)
MONOCYTES NFR BLD: 10.4 % (ref 4–15)
NEUTROPHILS # BLD AUTO: 6.1 K/UL (ref 1.8–7.7)
NEUTROPHILS NFR BLD: 64.6 % (ref 38–73)
NRBC BLD-RTO: 0 /100 WBC
PLATELET # BLD AUTO: 490 K/UL (ref 150–450)
PMV BLD AUTO: 9.4 FL (ref 9.2–12.9)
POTASSIUM SERPL-SCNC: 3.6 MMOL/L (ref 3.5–5.1)
PROT SERPL-MCNC: 6.1 G/DL (ref 6–8.4)
RBC # BLD AUTO: 3.02 M/UL (ref 4–5.4)
SODIUM SERPL-SCNC: 135 MMOL/L (ref 136–145)
TACROLIMUS BLD-MCNC: 8.6 NG/ML (ref 5–15)
WBC # BLD AUTO: 9.48 K/UL (ref 3.9–12.7)

## 2022-06-16 PROCEDURE — 82248 BILIRUBIN DIRECT: CPT | Mod: NTX | Performed by: SURGERY

## 2022-06-16 PROCEDURE — 80197 ASSAY OF TACROLIMUS: CPT | Mod: NTX | Performed by: SURGERY

## 2022-06-16 PROCEDURE — 85025 COMPLETE CBC W/AUTO DIFF WBC: CPT | Mod: NTX | Performed by: SURGERY

## 2022-06-16 PROCEDURE — 80053 COMPREHEN METABOLIC PANEL: CPT | Mod: NTX | Performed by: SURGERY

## 2022-06-16 PROCEDURE — 36415 COLL VENOUS BLD VENIPUNCTURE: CPT | Mod: TXP | Performed by: SURGERY

## 2022-06-16 NOTE — TELEPHONE ENCOUNTER
Sent a message to patient to let her know    ----- Message from Franco Slaughter MD sent at 6/16/2022 11:58 AM CDT -----  OK, they sent it to me and I signed it.  I left the return portion blank, but that sounds fine to me.     Franco  ----- Message -----  From: Marii Ruiz RN  Sent: 6/16/2022   9:43 AM CDT  To: Franco Slaughter MD    I think they sent you her paperwork for her job.  She wanted to go back in 1 month but she said she could do her work from home.    Marii

## 2022-06-17 ENCOUNTER — PATIENT MESSAGE (OUTPATIENT)
Dept: TRANSPLANT | Facility: CLINIC | Age: 52
End: 2022-06-17
Payer: COMMERCIAL

## 2022-06-17 ENCOUNTER — TELEPHONE (OUTPATIENT)
Dept: TRANSPLANT | Facility: CLINIC | Age: 52
End: 2022-06-17
Payer: COMMERCIAL

## 2022-06-18 ENCOUNTER — HOSPITAL ENCOUNTER (OUTPATIENT)
Dept: RADIOLOGY | Facility: HOSPITAL | Age: 52
Discharge: HOME | End: 2022-06-18
Attending: SURGERY
Payer: COMMERCIAL

## 2022-06-18 DIAGNOSIS — Z94.4 LIVER REPLACED BY TRANSPLANT: ICD-10-CM

## 2022-06-18 PROCEDURE — 93976 US DOPPLER LIVER TRANSPLANT POST (XPD): ICD-10-PCS | Mod: 26,NTX,, | Performed by: INTERNAL MEDICINE

## 2022-06-18 PROCEDURE — 93976 VASCULAR STUDY: CPT | Mod: 26,NTX,, | Performed by: INTERNAL MEDICINE

## 2022-06-18 PROCEDURE — 76705 ECHO EXAM OF ABDOMEN: CPT | Mod: TC,TXP

## 2022-06-18 PROCEDURE — 93976 VASCULAR STUDY: CPT | Mod: TC,NTX

## 2022-06-18 PROCEDURE — 76705 US DOPPLER LIVER TRANSPLANT POST (XPD): ICD-10-PCS | Mod: 26,59,NTX, | Performed by: INTERNAL MEDICINE

## 2022-06-18 PROCEDURE — 76705 ECHO EXAM OF ABDOMEN: CPT | Mod: 26,59,NTX, | Performed by: INTERNAL MEDICINE

## 2022-06-20 ENCOUNTER — PATIENT MESSAGE (OUTPATIENT)
Dept: TRANSPLANT | Facility: CLINIC | Age: 52
End: 2022-06-20
Payer: COMMERCIAL

## 2022-06-20 ENCOUNTER — LAB VISIT (OUTPATIENT)
Dept: LAB | Facility: HOSPITAL | Age: 52
End: 2022-06-20
Attending: SURGERY
Payer: COMMERCIAL

## 2022-06-20 DIAGNOSIS — Z94.4 LIVER REPLACED BY TRANSPLANT: ICD-10-CM

## 2022-06-20 DIAGNOSIS — Z94.4 LIVER REPLACED BY TRANSPLANT: Primary | ICD-10-CM

## 2022-06-20 LAB
ALBUMIN SERPL BCP-MCNC: 3.4 G/DL (ref 3.5–5.2)
ALP SERPL-CCNC: 116 U/L (ref 55–135)
ALT SERPL W/O P-5'-P-CCNC: 11 U/L (ref 10–44)
ANION GAP SERPL CALC-SCNC: 9 MMOL/L (ref 8–16)
AST SERPL-CCNC: 9 U/L (ref 10–40)
BASOPHILS # BLD AUTO: 0.1 K/UL (ref 0–0.2)
BASOPHILS NFR BLD: 0.9 % (ref 0–1.9)
BILIRUB DIRECT SERPL-MCNC: 0.2 MG/DL (ref 0.1–0.3)
BILIRUB SERPL-MCNC: 0.4 MG/DL (ref 0.1–1)
BUN SERPL-MCNC: 13 MG/DL (ref 6–20)
CALCIUM SERPL-MCNC: 9.3 MG/DL (ref 8.7–10.5)
CHLORIDE SERPL-SCNC: 106 MMOL/L (ref 95–110)
CO2 SERPL-SCNC: 21 MMOL/L (ref 23–29)
CREAT SERPL-MCNC: 1 MG/DL (ref 0.5–1.4)
DIFFERENTIAL METHOD: ABNORMAL
EOSINOPHIL # BLD AUTO: 0.7 K/UL (ref 0–0.5)
EOSINOPHIL NFR BLD: 5.9 % (ref 0–8)
ERYTHROCYTE [DISTWIDTH] IN BLOOD BY AUTOMATED COUNT: 13.7 % (ref 11.5–14.5)
EST. GFR  (AFRICAN AMERICAN): >60 ML/MIN/1.73 M^2
EST. GFR  (NON AFRICAN AMERICAN): >60 ML/MIN/1.73 M^2
GLUCOSE SERPL-MCNC: 94 MG/DL (ref 70–110)
HCT VFR BLD AUTO: 30 % (ref 37–48.5)
HGB BLD-MCNC: 10.1 G/DL (ref 12–16)
IMM GRANULOCYTES # BLD AUTO: 0.06 K/UL (ref 0–0.04)
IMM GRANULOCYTES NFR BLD AUTO: 0.5 % (ref 0–0.5)
LYMPHOCYTES # BLD AUTO: 1.5 K/UL (ref 1–4.8)
LYMPHOCYTES NFR BLD: 12.6 % (ref 18–48)
MCH RBC QN AUTO: 32.9 PG (ref 27–31)
MCHC RBC AUTO-ENTMCNC: 33.7 G/DL (ref 32–36)
MCV RBC AUTO: 98 FL (ref 82–98)
MONOCYTES # BLD AUTO: 0.7 K/UL (ref 0.3–1)
MONOCYTES NFR BLD: 5.7 % (ref 4–15)
NEUTROPHILS # BLD AUTO: 8.6 K/UL (ref 1.8–7.7)
NEUTROPHILS NFR BLD: 74.4 % (ref 38–73)
NRBC BLD-RTO: 0 /100 WBC
PLATELET # BLD AUTO: 634 K/UL (ref 150–450)
PMV BLD AUTO: 8.7 FL (ref 9.2–12.9)
POTASSIUM SERPL-SCNC: 4.2 MMOL/L (ref 3.5–5.1)
PROT SERPL-MCNC: 6.5 G/DL (ref 6–8.4)
RBC # BLD AUTO: 3.07 M/UL (ref 4–5.4)
SODIUM SERPL-SCNC: 136 MMOL/L (ref 136–145)
TACROLIMUS BLD-MCNC: 10.2 NG/ML (ref 5–15)
WBC # BLD AUTO: 11.54 K/UL (ref 3.9–12.7)

## 2022-06-20 PROCEDURE — 85025 COMPLETE CBC W/AUTO DIFF WBC: CPT | Mod: NTX | Performed by: SURGERY

## 2022-06-20 PROCEDURE — 80053 COMPREHEN METABOLIC PANEL: CPT | Mod: TXP | Performed by: SURGERY

## 2022-06-20 PROCEDURE — 36415 COLL VENOUS BLD VENIPUNCTURE: CPT | Mod: TXP | Performed by: SURGERY

## 2022-06-20 PROCEDURE — 80197 ASSAY OF TACROLIMUS: CPT | Mod: TXP | Performed by: SURGERY

## 2022-06-20 PROCEDURE — 82248 BILIRUBIN DIRECT: CPT | Mod: TXP | Performed by: SURGERY

## 2022-06-20 NOTE — TELEPHONE ENCOUNTER
Sent message to let her know about medication change    ----- Message from Henry Richmond MD sent at 6/20/2022  2:05 PM CDT -----  Fk 5 bid

## 2022-06-21 ENCOUNTER — OFFICE VISIT (OUTPATIENT)
Dept: TRANSPLANT | Facility: CLINIC | Age: 52
End: 2022-06-21
Payer: COMMERCIAL

## 2022-06-21 VITALS
HEIGHT: 63 IN | WEIGHT: 132.5 LBS | HEART RATE: 101 BPM | DIASTOLIC BLOOD PRESSURE: 70 MMHG | RESPIRATION RATE: 18 BRPM | SYSTOLIC BLOOD PRESSURE: 139 MMHG | BODY MASS INDEX: 23.48 KG/M2 | TEMPERATURE: 98 F | OXYGEN SATURATION: 93 %

## 2022-06-21 DIAGNOSIS — Z79.60 LONG-TERM USE OF IMMUNOSUPPRESSANT MEDICATION: ICD-10-CM

## 2022-06-21 DIAGNOSIS — Z91.89 AT RISK FOR OPPORTUNISTIC INFECTIONS: Primary | ICD-10-CM

## 2022-06-21 DIAGNOSIS — Z94.4 S/P LIVER TRANSPLANT: ICD-10-CM

## 2022-06-21 DIAGNOSIS — Z51.81 ENCOUNTER FOR THERAPEUTIC DRUG MONITORING: ICD-10-CM

## 2022-06-21 DIAGNOSIS — Z79.899 ENCOUNTER FOR LONG-TERM (CURRENT) USE OF OTHER MEDICATIONS: ICD-10-CM

## 2022-06-21 DIAGNOSIS — Z94.4 LIVER REPLACED BY TRANSPLANT: ICD-10-CM

## 2022-06-21 DIAGNOSIS — Z29.89 PROPHYLACTIC IMMUNOTHERAPY: ICD-10-CM

## 2022-06-21 PROCEDURE — 99215 PR OFFICE/OUTPT VISIT, EST, LEVL V, 40-54 MIN: ICD-10-PCS | Mod: 24,NTX,S$GLB, | Performed by: TRANSPLANT SURGERY

## 2022-06-21 PROCEDURE — 99215 OFFICE O/P EST HI 40 MIN: CPT | Mod: 24,NTX,S$GLB, | Performed by: TRANSPLANT SURGERY

## 2022-06-21 PROCEDURE — 99999 PR PBB SHADOW E&M-EST. PATIENT-LVL III: ICD-10-PCS | Mod: PBBFAC,TXP,,

## 2022-06-21 PROCEDURE — 99999 PR PBB SHADOW E&M-EST. PATIENT-LVL III: CPT | Mod: PBBFAC,TXP,,

## 2022-06-21 RX ORDER — OXYCODONE HYDROCHLORIDE 10 MG/1
10 TABLET ORAL EVERY 8 HOURS PRN
Qty: 21 TABLET | Refills: 0 | Status: SHIPPED | OUTPATIENT
Start: 2022-06-21 | End: 2022-07-25 | Stop reason: ALTCHOICE

## 2022-06-21 RX ORDER — TACROLIMUS 1 MG/1
5 CAPSULE ORAL EVERY 12 HOURS
Qty: 300 CAPSULE | Refills: 11 | Status: SHIPPED | OUTPATIENT
Start: 2022-06-21 | End: 2022-07-11

## 2022-06-21 NOTE — PROGRESS NOTES
Transplant Surgery  Liver Transplant Recipient Follow-up    Original Referring Physician: Sharmila Burnett Bzowej  Current Corresponding Physician: Killian Dodd    Chief Complaint: Malu is here for follow up of her liver transplant performed 6/5/2022 for the primary diagnosis (UNOS) of Alcoholic Cirrhosis    ORGAN: LIVER  Whole or Partial: whole liver  Donor Type: donation after brain death  PHS Increased Risk: yes  Donor CMV Status: Positive  Donor HCV Status: Positive  Donor HBcAb: Negative  Donor HBV JACOBO: Negative  Donor HCV JACOBO: Negative    Biliary Anastomosis: end to end  Arterial Anatomy: standard  IVC reconstruction: end to end ivc  Portal vein status: patent    Subjective:     History of Present Illness: She has had the following complications since transplant: none.  The noted complications are well controlled.    Interval History: Currently, she is doing well.  Current complaints include none.  Malu is here for management of her immunosuppression medication.    External provider notes reviewed: Yes    Review of Systems   Constitutional: Negative for activity change, appetite change, chills, fatigue and fever.   HENT: Negative for sore throat and trouble swallowing.    Eyes: Negative for visual disturbance.   Respiratory: Negative for cough, chest tightness and shortness of breath.    Cardiovascular: Negative for chest pain, palpitations and leg swelling.   Gastrointestinal: Negative for abdominal distention, abdominal pain, blood in stool, constipation, diarrhea, nausea and vomiting.   Endocrine: Negative for polyuria.   Genitourinary: Negative for decreased urine volume, difficulty urinating, dysuria, flank pain, frequency and hematuria.   Musculoskeletal: Negative for gait problem, myalgias and neck stiffness.   Skin: Negative for rash and wound.   Neurological: Negative for dizziness, tremors, seizures, weakness, light-headedness and headaches.   Hematological: Negative  for adenopathy.   Psychiatric/Behavioral: Negative for agitation, confusion and sleep disturbance.     Objective:     Physical Exam  Vitals reviewed.   Constitutional:       General: She is not in acute distress.     Appearance: She is well-developed.   HENT:      Head: Normocephalic.   Eyes:      General: No scleral icterus.     Conjunctiva/sclera: Conjunctivae normal.      Pupils: Pupils are equal, round, and reactive to light.   Neck:      Thyroid: No thyromegaly.      Trachea: No tracheal deviation.   Cardiovascular:      Rate and Rhythm: Normal rate and regular rhythm.      Heart sounds: Normal heart sounds. No murmur heard.  Pulmonary:      Effort: Pulmonary effort is normal. No respiratory distress.      Breath sounds: Normal breath sounds.   Abdominal:      General: Bowel sounds are normal. There is no distension.      Palpations: Abdomen is soft. There is no mass.      Tenderness: There is no abdominal tenderness. There is no guarding or rebound.      Comments: No inguinal adenopathy   Musculoskeletal:         General: Normal range of motion.      Cervical back: Normal range of motion and neck supple.      Comments: No clubbing or cyanosis   Lymphadenopathy:      Cervical: No cervical adenopathy.   Skin:     General: Skin is warm and dry.      Findings: No erythema or rash.   Neurological:      Mental Status: She is alert and oriented to person, place, and time.   Psychiatric:         Behavior: Behavior normal.       Lab Results   Component Value Date    BILITOT 0.4 06/20/2022    AST 9 (L) 06/20/2022    ALT 11 06/20/2022    ALKPHOS 116 06/20/2022    CREATININE 1.0 06/20/2022    ALBUMIN 3.4 (L) 06/20/2022     Lab Results   Component Value Date    WBC 11.54 06/20/2022    HGB 10.1 (L) 06/20/2022    HCT 30.0 (L) 06/20/2022    HCT 31 (L) 06/06/2022     (H) 06/20/2022     Lab Results   Component Value Date    TACROLIMUS 10.2 06/20/2022       Diagnostics:  The following labs have been reviewed:  CBC  CMP  PT  INR  TACROLIMUS LEVEL  The following radiology images have been independently reviewed and interpreted: Liver US    Assessment/Plan:          · S/P liver transplant.  · Chronic immunosuppressive medications for rejection prophylaxis at target.  Plan: no adjustment needed.  · Continue monitoring symptoms, labs and drug levels for drug-related toxicity and side effects.  · Incision: staples in place; wound clean, dry, and intact  · Femoral arterial line site: no complications evident    Additional testing to be completed according to Written Order Guidelines for Post-Liver and Combined Liver/Kidney Transplant Follow-up (LI-09)    Interpretation of tests and discussion of patient management involves all members of the multidisciplinary transplant team  Patient advised that it is recommended that all transplanted patients, and their close contacts and household members receive Covid vaccination.  Leslie Umaña MD       [facility] Patient Status  Functional Status: 60% - Requires occasional assistance but is able to care for needs  Physical Capacity: No Limitations

## 2022-06-22 ENCOUNTER — DOCUMENT SCAN (OUTPATIENT)
Dept: HOME HEALTH SERVICES | Facility: HOSPITAL | Age: 52
End: 2022-06-22
Payer: COMMERCIAL

## 2022-06-23 ENCOUNTER — PATIENT MESSAGE (OUTPATIENT)
Dept: TRANSPLANT | Facility: CLINIC | Age: 52
End: 2022-06-23
Payer: COMMERCIAL

## 2022-06-23 ENCOUNTER — LAB VISIT (OUTPATIENT)
Dept: LAB | Facility: HOSPITAL | Age: 52
End: 2022-06-23
Attending: SURGERY
Payer: COMMERCIAL

## 2022-06-23 ENCOUNTER — TELEPHONE (OUTPATIENT)
Dept: TRANSPLANT | Facility: CLINIC | Age: 52
End: 2022-06-23
Payer: COMMERCIAL

## 2022-06-23 DIAGNOSIS — Z94.4 LIVER REPLACED BY TRANSPLANT: ICD-10-CM

## 2022-06-23 LAB
ALBUMIN SERPL BCP-MCNC: 3.7 G/DL (ref 3.5–5.2)
ALP SERPL-CCNC: 113 U/L (ref 55–135)
ALT SERPL W/O P-5'-P-CCNC: 6 U/L (ref 10–44)
ANION GAP SERPL CALC-SCNC: 11 MMOL/L (ref 8–16)
AST SERPL-CCNC: 9 U/L (ref 10–40)
BASOPHILS # BLD AUTO: 0.14 K/UL (ref 0–0.2)
BASOPHILS NFR BLD: 1.6 % (ref 0–1.9)
BILIRUB DIRECT SERPL-MCNC: 0.1 MG/DL (ref 0.1–0.3)
BILIRUB SERPL-MCNC: 0.2 MG/DL (ref 0.1–1)
BUN SERPL-MCNC: 17 MG/DL (ref 6–20)
CALCIUM SERPL-MCNC: 9.4 MG/DL (ref 8.7–10.5)
CHLORIDE SERPL-SCNC: 106 MMOL/L (ref 95–110)
CO2 SERPL-SCNC: 19 MMOL/L (ref 23–29)
CREAT SERPL-MCNC: 1 MG/DL (ref 0.5–1.4)
DIFFERENTIAL METHOD: ABNORMAL
EOSINOPHIL # BLD AUTO: 0.8 K/UL (ref 0–0.5)
EOSINOPHIL NFR BLD: 8.8 % (ref 0–8)
ERYTHROCYTE [DISTWIDTH] IN BLOOD BY AUTOMATED COUNT: 13.5 % (ref 11.5–14.5)
EST. GFR  (AFRICAN AMERICAN): >60 ML/MIN/1.73 M^2
EST. GFR  (NON AFRICAN AMERICAN): >60 ML/MIN/1.73 M^2
GLUCOSE SERPL-MCNC: 96 MG/DL (ref 70–110)
HCT VFR BLD AUTO: 32.9 % (ref 37–48.5)
HGB BLD-MCNC: 10.4 G/DL (ref 12–16)
IMM GRANULOCYTES # BLD AUTO: 0.03 K/UL (ref 0–0.04)
IMM GRANULOCYTES NFR BLD AUTO: 0.3 % (ref 0–0.5)
LYMPHOCYTES # BLD AUTO: 1.5 K/UL (ref 1–4.8)
LYMPHOCYTES NFR BLD: 17.1 % (ref 18–48)
MCH RBC QN AUTO: 31.5 PG (ref 27–31)
MCHC RBC AUTO-ENTMCNC: 31.6 G/DL (ref 32–36)
MCV RBC AUTO: 100 FL (ref 82–98)
MONOCYTES # BLD AUTO: 0.6 K/UL (ref 0.3–1)
MONOCYTES NFR BLD: 6.4 % (ref 4–15)
NEUTROPHILS # BLD AUTO: 5.6 K/UL (ref 1.8–7.7)
NEUTROPHILS NFR BLD: 65.8 % (ref 38–73)
NRBC BLD-RTO: 0 /100 WBC
PLATELET # BLD AUTO: 737 K/UL (ref 150–450)
PMV BLD AUTO: 8.6 FL (ref 9.2–12.9)
POTASSIUM SERPL-SCNC: 4 MMOL/L (ref 3.5–5.1)
PROT SERPL-MCNC: 6.9 G/DL (ref 6–8.4)
RBC # BLD AUTO: 3.3 M/UL (ref 4–5.4)
SODIUM SERPL-SCNC: 136 MMOL/L (ref 136–145)
TACROLIMUS BLD-MCNC: 9.2 NG/ML (ref 5–15)
WBC # BLD AUTO: 8.59 K/UL (ref 3.9–12.7)

## 2022-06-23 PROCEDURE — 80053 COMPREHEN METABOLIC PANEL: CPT | Mod: TXP | Performed by: SURGERY

## 2022-06-23 PROCEDURE — 36415 COLL VENOUS BLD VENIPUNCTURE: CPT | Mod: NTX | Performed by: SURGERY

## 2022-06-23 PROCEDURE — 85025 COMPLETE CBC W/AUTO DIFF WBC: CPT | Mod: TXP | Performed by: SURGERY

## 2022-06-23 PROCEDURE — 82248 BILIRUBIN DIRECT: CPT | Mod: NTX | Performed by: SURGERY

## 2022-06-23 PROCEDURE — 80197 ASSAY OF TACROLIMUS: CPT | Mod: TXP | Performed by: SURGERY

## 2022-06-23 NOTE — TELEPHONE ENCOUNTER
Sent patient message via portal to let her know labs are stable.  No medication changes, next labs due on 06/27/22      ----- Message from Leslie Umaña MD sent at 6/23/2022  2:33 PM CDT -----  Results ok. No action needed.

## 2022-06-24 ENCOUNTER — PATIENT MESSAGE (OUTPATIENT)
Dept: TRANSPLANT | Facility: CLINIC | Age: 52
End: 2022-06-24
Payer: COMMERCIAL

## 2022-06-27 ENCOUNTER — TELEPHONE (OUTPATIENT)
Dept: TRANSPLANT | Facility: CLINIC | Age: 52
End: 2022-06-27
Payer: COMMERCIAL

## 2022-06-27 ENCOUNTER — LAB VISIT (OUTPATIENT)
Dept: LAB | Facility: HOSPITAL | Age: 52
End: 2022-06-27
Attending: SURGERY
Payer: COMMERCIAL

## 2022-06-27 DIAGNOSIS — Z94.4 LIVER REPLACED BY TRANSPLANT: ICD-10-CM

## 2022-06-27 LAB
ALBUMIN SERPL BCP-MCNC: 3.8 G/DL (ref 3.5–5.2)
ALP SERPL-CCNC: 109 U/L (ref 55–135)
ALT SERPL W/O P-5'-P-CCNC: 6 U/L (ref 10–44)
ANION GAP SERPL CALC-SCNC: 9 MMOL/L (ref 8–16)
AST SERPL-CCNC: 9 U/L (ref 10–40)
BASOPHILS # BLD AUTO: 0.11 K/UL (ref 0–0.2)
BASOPHILS NFR BLD: 1.4 % (ref 0–1.9)
BILIRUB DIRECT SERPL-MCNC: 0.2 MG/DL (ref 0.1–0.3)
BILIRUB SERPL-MCNC: 0.3 MG/DL (ref 0.1–1)
BUN SERPL-MCNC: 18 MG/DL (ref 6–20)
CALCIUM SERPL-MCNC: 9.4 MG/DL (ref 8.7–10.5)
CHLORIDE SERPL-SCNC: 107 MMOL/L (ref 95–110)
CO2 SERPL-SCNC: 20 MMOL/L (ref 23–29)
CREAT SERPL-MCNC: 0.9 MG/DL (ref 0.5–1.4)
DIFFERENTIAL METHOD: ABNORMAL
EOSINOPHIL # BLD AUTO: 0.4 K/UL (ref 0–0.5)
EOSINOPHIL NFR BLD: 5.2 % (ref 0–8)
ERYTHROCYTE [DISTWIDTH] IN BLOOD BY AUTOMATED COUNT: 13.3 % (ref 11.5–14.5)
EST. GFR  (AFRICAN AMERICAN): >60 ML/MIN/1.73 M^2
EST. GFR  (NON AFRICAN AMERICAN): >60 ML/MIN/1.73 M^2
GLUCOSE SERPL-MCNC: 95 MG/DL (ref 70–110)
HCT VFR BLD AUTO: 31.4 % (ref 37–48.5)
HGB BLD-MCNC: 10.4 G/DL (ref 12–16)
IMM GRANULOCYTES # BLD AUTO: 0.01 K/UL (ref 0–0.04)
IMM GRANULOCYTES NFR BLD AUTO: 0.1 % (ref 0–0.5)
LYMPHOCYTES # BLD AUTO: 1.6 K/UL (ref 1–4.8)
LYMPHOCYTES NFR BLD: 20.7 % (ref 18–48)
MCH RBC QN AUTO: 31.2 PG (ref 27–31)
MCHC RBC AUTO-ENTMCNC: 33.1 G/DL (ref 32–36)
MCV RBC AUTO: 94 FL (ref 82–98)
MONOCYTES # BLD AUTO: 0.4 K/UL (ref 0.3–1)
MONOCYTES NFR BLD: 5.6 % (ref 4–15)
NEUTROPHILS # BLD AUTO: 5.2 K/UL (ref 1.8–7.7)
NEUTROPHILS NFR BLD: 67 % (ref 38–73)
NRBC BLD-RTO: 0 /100 WBC
PLATELET # BLD AUTO: 662 K/UL (ref 150–450)
PMV BLD AUTO: 8.9 FL (ref 9.2–12.9)
POTASSIUM SERPL-SCNC: 4.4 MMOL/L (ref 3.5–5.1)
PROT SERPL-MCNC: 6.4 G/DL (ref 6–8.4)
RBC # BLD AUTO: 3.33 M/UL (ref 4–5.4)
SODIUM SERPL-SCNC: 136 MMOL/L (ref 136–145)
TACROLIMUS BLD-MCNC: 7 NG/ML (ref 5–15)
WBC # BLD AUTO: 7.69 K/UL (ref 3.9–12.7)

## 2022-06-27 PROCEDURE — 85025 COMPLETE CBC W/AUTO DIFF WBC: CPT | Mod: TXP | Performed by: SURGERY

## 2022-06-27 PROCEDURE — 36415 COLL VENOUS BLD VENIPUNCTURE: CPT | Mod: NTX | Performed by: SURGERY

## 2022-06-27 PROCEDURE — 82248 BILIRUBIN DIRECT: CPT | Mod: TXP | Performed by: SURGERY

## 2022-06-27 PROCEDURE — 80053 COMPREHEN METABOLIC PANEL: CPT | Mod: TXP | Performed by: SURGERY

## 2022-06-27 PROCEDURE — 80197 ASSAY OF TACROLIMUS: CPT | Mod: NTX | Performed by: SURGERY

## 2022-06-28 ENCOUNTER — INFUSION (OUTPATIENT)
Dept: INFUSION THERAPY | Facility: HOSPITAL | Age: 52
End: 2022-06-28
Attending: SURGERY
Payer: COMMERCIAL

## 2022-06-28 ENCOUNTER — OFFICE VISIT (OUTPATIENT)
Dept: TRANSPLANT | Facility: CLINIC | Age: 52
End: 2022-06-28
Payer: COMMERCIAL

## 2022-06-28 VITALS
TEMPERATURE: 98 F | HEART RATE: 95 BPM | HEIGHT: 63 IN | DIASTOLIC BLOOD PRESSURE: 74 MMHG | TEMPERATURE: 99 F | DIASTOLIC BLOOD PRESSURE: 69 MMHG | SYSTOLIC BLOOD PRESSURE: 141 MMHG | OXYGEN SATURATION: 99 % | HEART RATE: 81 BPM | BODY MASS INDEX: 23.04 KG/M2 | OXYGEN SATURATION: 97 % | RESPIRATION RATE: 19 BRPM | SYSTOLIC BLOOD PRESSURE: 134 MMHG | RESPIRATION RATE: 16 BRPM | WEIGHT: 130.06 LBS

## 2022-06-28 DIAGNOSIS — Z79.60 LONG-TERM USE OF IMMUNOSUPPRESSANT MEDICATION: ICD-10-CM

## 2022-06-28 DIAGNOSIS — Z94.4 S/P LIVER TRANSPLANT: Primary | ICD-10-CM

## 2022-06-28 DIAGNOSIS — Z94.4 LIVER REPLACED BY TRANSPLANT: Primary | ICD-10-CM

## 2022-06-28 DIAGNOSIS — Z51.81 ENCOUNTER FOR THERAPEUTIC DRUG MONITORING: ICD-10-CM

## 2022-06-28 PROCEDURE — 99999 PR PBB SHADOW E&M-EST. PATIENT-LVL III: ICD-10-PCS | Mod: PBBFAC,TXP,,

## 2022-06-28 PROCEDURE — 99215 PR OFFICE/OUTPT VISIT, EST, LEVL V, 40-54 MIN: ICD-10-PCS | Mod: 24,NTX,S$GLB, | Performed by: TRANSPLANT SURGERY

## 2022-06-28 PROCEDURE — M0220 HC INJECTION ADMIN, TIXAGEVIMAB-CILGAVIMAB, INCL POST ADMIN MONIT: HCPCS | Performed by: INTERNAL MEDICINE

## 2022-06-28 PROCEDURE — 99215 OFFICE O/P EST HI 40 MIN: CPT | Mod: 24,NTX,S$GLB, | Performed by: TRANSPLANT SURGERY

## 2022-06-28 PROCEDURE — 63600175 PHARM REV CODE 636 W HCPCS: Performed by: INTERNAL MEDICINE

## 2022-06-28 PROCEDURE — 99999 PR PBB SHADOW E&M-EST. PATIENT-LVL III: CPT | Mod: PBBFAC,TXP,,

## 2022-06-28 RX ORDER — DIPHENHYDRAMINE HCL 25 MG
25 CAPSULE ORAL ONCE
Status: CANCELLED | OUTPATIENT
Start: 2022-06-28 | End: 2022-06-28

## 2022-06-28 RX ORDER — ONDANSETRON 4 MG/1
4 TABLET, ORALLY DISINTEGRATING ORAL ONCE
Status: CANCELLED | OUTPATIENT
Start: 2022-06-28 | End: 2022-06-28

## 2022-06-28 RX ORDER — PREDNISONE 20 MG/1
40 TABLET ORAL ONCE
OUTPATIENT
Start: 2022-06-28 | End: 2022-06-28

## 2022-06-28 RX ORDER — ALBUTEROL SULFATE 90 UG/1
2 AEROSOL, METERED RESPIRATORY (INHALATION) ONCE AS NEEDED
Status: CANCELLED | OUTPATIENT
Start: 2022-06-28

## 2022-06-28 RX ORDER — DIPHENHYDRAMINE HCL 25 MG
25 CAPSULE ORAL ONCE
OUTPATIENT
Start: 2022-06-28 | End: 2022-06-28

## 2022-06-28 RX ORDER — ALBUTEROL SULFATE 90 UG/1
2 AEROSOL, METERED RESPIRATORY (INHALATION) ONCE AS NEEDED
OUTPATIENT
Start: 2022-06-28

## 2022-06-28 RX ORDER — EPINEPHRINE 0.3 MG/.3ML
0.3 INJECTION SUBCUTANEOUS
Status: CANCELLED | OUTPATIENT
Start: 2022-06-28

## 2022-06-28 RX ORDER — GABAPENTIN 100 MG/1
300 CAPSULE ORAL 2 TIMES DAILY
Qty: 180 CAPSULE | Refills: 11 | Status: SHIPPED | OUTPATIENT
Start: 2022-06-28 | End: 2022-09-21

## 2022-06-28 RX ORDER — ONDANSETRON 4 MG/1
4 TABLET, ORALLY DISINTEGRATING ORAL ONCE
OUTPATIENT
Start: 2022-06-28 | End: 2022-06-28

## 2022-06-28 RX ORDER — ACETAMINOPHEN 325 MG/1
650 TABLET ORAL ONCE
Status: CANCELLED | OUTPATIENT
Start: 2022-06-28 | End: 2022-06-28

## 2022-06-28 RX ORDER — EPINEPHRINE 0.3 MG/.3ML
0.3 INJECTION SUBCUTANEOUS
OUTPATIENT
Start: 2022-06-28

## 2022-06-28 RX ORDER — ACETAMINOPHEN 325 MG/1
650 TABLET ORAL ONCE
OUTPATIENT
Start: 2022-06-28 | End: 2022-06-28

## 2022-06-28 RX ORDER — PREDNISONE 20 MG/1
40 TABLET ORAL ONCE
Status: CANCELLED | OUTPATIENT
Start: 2022-06-28 | End: 2022-06-28

## 2022-06-28 RX ADMIN — Medication 300 MG: at 11:06

## 2022-06-28 NOTE — PROGRESS NOTES
STAPLE REMOVAL NOTE    Staples removed from liver transplant incision, steri-strips applied with Benzoin per Dr. Carlson's order.  Patient tolerated procedure well.  Skin dry and intact.  Patient instructed to shower with back to water spray and to pat dry incision and to let the steri-strips wear off on their own.  Patient instructed to report any redness, warmth, or drainage from incision to transplant coordinators.  All questions answered.

## 2022-06-28 NOTE — PROGRESS NOTES
Transplant Surgery  Liver Transplant Recipient Follow-up    Original Referring Physician: Sharmila Burnett Bzowej  Current Corresponding Physician: Killian Dodd    Chief Complaint: Malu is here for follow up of her liver transplant performed 6/5/2022 for the primary diagnosis (UNOS) of Alcoholic Cirrhosis    ORGAN: LIVER  Whole or Partial: whole liver  Donor Type: donation after brain death  PHS Increased Risk: yes  Donor CMV Status: Positive  Donor HCV Status: Positive  Donor HBcAb: Negative  Donor HBV JACOBO: Negative  Donor HCV JACOBO: Negative    Biliary Anastomosis: end to end  Arterial Anatomy: standard  IVC reconstruction: end to end ivc  Portal vein status: patent    Subjective:     History of Present Illness: She has had the following complications since transplant: none.  The noted complications are well controlled.    Interval History: Currently, she is doing well.  Current complaints include abdominal pain and , but burning under incision, neuropathic type pain.  Malu is here for management of her immunosuppression medication.    External provider notes reviewed: Yes    Review of Systems  Objective:     Physical Exam  Constitutional:       Appearance: She is well-developed.   HENT:      Head: Normocephalic and atraumatic.   Eyes:      Pupils: Pupils are equal, round, and reactive to light.   Cardiovascular:      Rate and Rhythm: Normal rate and regular rhythm.   Pulmonary:      Effort: Pulmonary effort is normal.   Abdominal:      General: There is no distension.      Palpations: Abdomen is soft.      Tenderness: There is no abdominal tenderness. There is no guarding.      Comments: Incision c/d/i w staples     Musculoskeletal:         General: Normal range of motion.   Skin:     General: Skin is warm and dry.   Neurological:      Mental Status: She is alert and oriented to person, place, and time.   Psychiatric:         Behavior: Behavior normal.       Lab Results   Component  Value Date    BILITOT 0.3 06/27/2022    AST 9 (L) 06/27/2022    ALT 6 (L) 06/27/2022    ALKPHOS 109 06/27/2022    CREATININE 0.9 06/27/2022    ALBUMIN 3.8 06/27/2022     Lab Results   Component Value Date    WBC 7.69 06/27/2022    HGB 10.4 (L) 06/27/2022    HCT 31.4 (L) 06/27/2022    HCT 31 (L) 06/06/2022     (H) 06/27/2022     Lab Results   Component Value Date    TACROLIMUS 7.0 06/27/2022       Diagnostics:  The following labs have been reviewed: CBC  CMP  The following radiology images have been independently reviewed and interpreted: Liver US    Assessment/Plan:          · S/P liver transplant.  · Chronic immunosuppressive medications for rejection prophylaxis at target.  Plan: no adjustment needed.  · Continue monitoring symptoms, labs and drug levels for drug-related toxicity and side effects.  · Incision: staples in place; wound clean, dry, and intact  · Femoral arterial line site: no complications evident    Additional testing to be completed according to Written Order Guidelines for Post-Liver and Combined Liver/Kidney Transplant Follow-up (LI-09)    Interpretation of tests and discussion of patient management involves all members of the multidisciplinary transplant team  Patient advised that it is recommended that all transplanted patients, and their close contacts and household members receive Covid vaccination.  Solitario Carlson Jr, MD       [facility] Patient Status  Functional Status: 60% - Requires occasional assistance but is able to care for needs  Physical Capacity: No Limitations

## 2022-07-01 ENCOUNTER — PATIENT MESSAGE (OUTPATIENT)
Dept: TRANSPLANT | Facility: CLINIC | Age: 52
End: 2022-07-01
Payer: COMMERCIAL

## 2022-07-01 ENCOUNTER — DOCUMENT SCAN (OUTPATIENT)
Dept: HOME HEALTH SERVICES | Facility: HOSPITAL | Age: 52
End: 2022-07-01
Payer: COMMERCIAL

## 2022-07-01 DIAGNOSIS — Z94.4 LIVER REPLACED BY TRANSPLANT: ICD-10-CM

## 2022-07-01 RX ORDER — OXYCODONE HYDROCHLORIDE 10 MG/1
10 TABLET ORAL EVERY 8 HOURS PRN
Qty: 21 TABLET | Refills: 0 | OUTPATIENT
Start: 2022-07-01

## 2022-07-02 DIAGNOSIS — Z94.4 LIVER REPLACED BY TRANSPLANT: ICD-10-CM

## 2022-07-03 ENCOUNTER — NURSE TRIAGE (OUTPATIENT)
Dept: ADMINISTRATIVE | Facility: CLINIC | Age: 52
End: 2022-07-03
Payer: COMMERCIAL

## 2022-07-03 ENCOUNTER — PATIENT MESSAGE (OUTPATIENT)
Dept: TRANSPLANT | Facility: CLINIC | Age: 52
End: 2022-07-03
Payer: COMMERCIAL

## 2022-07-03 NOTE — TELEPHONE ENCOUNTER
Pt called with c/o pain in incision and she said that it burns on the left side and thinks it had a hematoma but nothing documented on MD's note. She said that she was given a prescription for pain meds but only 21 and she said that it was 1 week supply pt told that the clinic is closed and triaged and care advice  isto reach out to MD but med refill for pain said that they wont refill medication unless seen in the clinic. Pt advised to try gabapentin and to go to the ED if any worsening but will route message to MD 's office for tues.   Reason for Disposition   [1] Post-op pain AND [2] not controlled with pain medications    Additional Information   Negative: [1] Major abdominal surgical incision AND [2] wound gaping open AND [3] visible internal organs   Negative: Sounds like a life-threatening emergency to the triager   Negative: [1] Bleeding from incision AND [2] won't stop after 10 minutes of direct pressure   Negative: [1] Widespread rash AND [2] bright red, sunburn-like   Negative: Severe pain in the incision   Negative: [1] Incision gaping open AND [2] < 48 hours since wound re-opened   Negative: [1] Incision gaping open AND [2] length of opening > 2 inches (5 cm)   Negative: Patient sounds very sick or weak to the triager   Negative: Sounds like a serious complication to the triager   Negative: Fever > 100.4 F (38.0 C)   Negative: [1] Incision looks infected (spreading redness, pain) AND [2] large red area (> 2 in. or 5 cm)   Negative: [1] Incision looks infected (spreading redness, pain) AND [2] face wound   Negative: [1] Red streak runs from the incision AND [2] longer than 1 inch (2.5 cm)   Negative: [1] Incision looks infected (spreading redness, pain) AND [2] fever > 99.5 F (37.5 C)   Negative: [1] Pus or bad-smelling fluid draining from incision AND [2] no fever    Protocols used: POST-OP INCISION SYMPTOMS AND [id number]

## 2022-07-05 ENCOUNTER — TELEPHONE (OUTPATIENT)
Dept: TRANSPLANT | Facility: CLINIC | Age: 52
End: 2022-07-05
Payer: COMMERCIAL

## 2022-07-05 ENCOUNTER — PATIENT MESSAGE (OUTPATIENT)
Dept: TRANSPLANT | Facility: CLINIC | Age: 52
End: 2022-07-05
Payer: COMMERCIAL

## 2022-07-05 ENCOUNTER — LAB VISIT (OUTPATIENT)
Dept: LAB | Facility: HOSPITAL | Age: 52
End: 2022-07-05
Attending: INTERNAL MEDICINE
Payer: COMMERCIAL

## 2022-07-05 DIAGNOSIS — Z94.4 LIVER REPLACED BY TRANSPLANT: ICD-10-CM

## 2022-07-05 LAB
ALBUMIN SERPL BCP-MCNC: 4.2 G/DL (ref 3.5–5.2)
ALP SERPL-CCNC: 102 U/L (ref 55–135)
ALT SERPL W/O P-5'-P-CCNC: 9 U/L (ref 10–44)
ANION GAP SERPL CALC-SCNC: 9 MMOL/L (ref 8–16)
AST SERPL-CCNC: 12 U/L (ref 10–40)
BASOPHILS # BLD AUTO: 0.13 K/UL (ref 0–0.2)
BASOPHILS NFR BLD: 1.7 % (ref 0–1.9)
BILIRUB DIRECT SERPL-MCNC: 0.1 MG/DL (ref 0.1–0.3)
BILIRUB SERPL-MCNC: 0.2 MG/DL (ref 0.1–1)
BUN SERPL-MCNC: 19 MG/DL (ref 6–20)
CALCIUM SERPL-MCNC: 9.5 MG/DL (ref 8.7–10.5)
CHLORIDE SERPL-SCNC: 109 MMOL/L (ref 95–110)
CO2 SERPL-SCNC: 19 MMOL/L (ref 23–29)
CREAT SERPL-MCNC: 1.1 MG/DL (ref 0.5–1.4)
DIFFERENTIAL METHOD: ABNORMAL
EOSINOPHIL # BLD AUTO: 0.6 K/UL (ref 0–0.5)
EOSINOPHIL NFR BLD: 7.6 % (ref 0–8)
ERYTHROCYTE [DISTWIDTH] IN BLOOD BY AUTOMATED COUNT: 13.9 % (ref 11.5–14.5)
EST. GFR  (AFRICAN AMERICAN): >60 ML/MIN/1.73 M^2
EST. GFR  (NON AFRICAN AMERICAN): 58.3 ML/MIN/1.73 M^2
GLUCOSE SERPL-MCNC: 109 MG/DL (ref 70–110)
HCT VFR BLD AUTO: 36.8 % (ref 37–48.5)
HCV AB SERPL QL IA: POSITIVE
HGB BLD-MCNC: 11.9 G/DL (ref 12–16)
IMM GRANULOCYTES # BLD AUTO: 0.03 K/UL (ref 0–0.04)
IMM GRANULOCYTES NFR BLD AUTO: 0.4 % (ref 0–0.5)
LYMPHOCYTES # BLD AUTO: 1.5 K/UL (ref 1–4.8)
LYMPHOCYTES NFR BLD: 18.8 % (ref 18–48)
MCH RBC QN AUTO: 31.5 PG (ref 27–31)
MCHC RBC AUTO-ENTMCNC: 32.3 G/DL (ref 32–36)
MCV RBC AUTO: 97 FL (ref 82–98)
MONOCYTES # BLD AUTO: 0.4 K/UL (ref 0.3–1)
MONOCYTES NFR BLD: 5 % (ref 4–15)
NEUTROPHILS # BLD AUTO: 5.1 K/UL (ref 1.8–7.7)
NEUTROPHILS NFR BLD: 66.5 % (ref 38–73)
NRBC BLD-RTO: 0 /100 WBC
PLATELET # BLD AUTO: 517 K/UL (ref 150–450)
PMV BLD AUTO: 9.1 FL (ref 9.2–12.9)
POTASSIUM SERPL-SCNC: 4.1 MMOL/L (ref 3.5–5.1)
PROT SERPL-MCNC: 7 G/DL (ref 6–8.4)
RBC # BLD AUTO: 3.78 M/UL (ref 4–5.4)
SODIUM SERPL-SCNC: 137 MMOL/L (ref 136–145)
TACROLIMUS BLD-MCNC: 8.7 NG/ML (ref 5–15)
WBC # BLD AUTO: 7.73 K/UL (ref 3.9–12.7)

## 2022-07-05 PROCEDURE — 87536 HIV-1 QUANT&REVRSE TRNSCRPJ: CPT | Mod: NTX | Performed by: SURGERY

## 2022-07-05 PROCEDURE — 86803 HEPATITIS C AB TEST: CPT | Mod: NTX | Performed by: INTERNAL MEDICINE

## 2022-07-05 PROCEDURE — 82248 BILIRUBIN DIRECT: CPT | Mod: NTX | Performed by: SURGERY

## 2022-07-05 PROCEDURE — 85025 COMPLETE CBC W/AUTO DIFF WBC: CPT | Mod: NTX | Performed by: SURGERY

## 2022-07-05 PROCEDURE — 87517 HEPATITIS B DNA QUANT: CPT | Mod: TXP | Performed by: SURGERY

## 2022-07-05 PROCEDURE — 87522 HEPATITIS C REVRS TRNSCRPJ: CPT | Mod: TXP | Performed by: INTERNAL MEDICINE

## 2022-07-05 PROCEDURE — 80053 COMPREHEN METABOLIC PANEL: CPT | Mod: TXP | Performed by: SURGERY

## 2022-07-05 PROCEDURE — 36415 COLL VENOUS BLD VENIPUNCTURE: CPT | Mod: TXP | Performed by: SURGERY

## 2022-07-05 PROCEDURE — 80197 ASSAY OF TACROLIMUS: CPT | Mod: NTX | Performed by: SURGERY

## 2022-07-05 RX ORDER — OXYCODONE HYDROCHLORIDE 10 MG/1
10 TABLET ORAL EVERY 8 HOURS PRN
Qty: 21 TABLET | Refills: 0 | OUTPATIENT
Start: 2022-07-05

## 2022-07-06 ENCOUNTER — TELEPHONE (OUTPATIENT)
Dept: TRANSPLANT | Facility: CLINIC | Age: 52
End: 2022-07-06
Payer: COMMERCIAL

## 2022-07-06 LAB
HCV RNA SERPL NAA+PROBE-ACNC: NORMAL IU/ML
HIV1 RNA # PLAS NAA DL=20: NORMAL COPIES/ML

## 2022-07-06 NOTE — TELEPHONE ENCOUNTER
----- Message from Henry Richmond MD sent at 7/6/2022  2:15 PM CDT -----  Results ok. No action needed

## 2022-07-07 LAB
HBV DNA SERPL NAA+PROBE-ACNC: <10 IU/ML
HBV DNA SERPL NAA+PROBE-LOG IU: <1 LOG (10) IU/ML
HBV DNA SERPL QL NAA+PROBE: NOT DETECTED

## 2022-07-11 ENCOUNTER — LAB VISIT (OUTPATIENT)
Dept: LAB | Facility: HOSPITAL | Age: 52
End: 2022-07-11
Attending: SURGERY
Payer: COMMERCIAL

## 2022-07-11 ENCOUNTER — PATIENT MESSAGE (OUTPATIENT)
Dept: TRANSPLANT | Facility: CLINIC | Age: 52
End: 2022-07-11
Payer: COMMERCIAL

## 2022-07-11 DIAGNOSIS — Z94.4 LIVER REPLACED BY TRANSPLANT: ICD-10-CM

## 2022-07-11 LAB
ALBUMIN SERPL BCP-MCNC: 4.6 G/DL (ref 3.5–5.2)
ALP SERPL-CCNC: 109 U/L (ref 55–135)
ALT SERPL W/O P-5'-P-CCNC: 13 U/L (ref 10–44)
ANION GAP SERPL CALC-SCNC: 11 MMOL/L (ref 8–16)
AST SERPL-CCNC: 15 U/L (ref 10–40)
BASOPHILS # BLD AUTO: 0.12 K/UL (ref 0–0.2)
BASOPHILS NFR BLD: 2 % (ref 0–1.9)
BILIRUB DIRECT SERPL-MCNC: 0.1 MG/DL (ref 0.1–0.3)
BILIRUB SERPL-MCNC: 0.3 MG/DL (ref 0.1–1)
BUN SERPL-MCNC: 21 MG/DL (ref 6–20)
CALCIUM SERPL-MCNC: 9.7 MG/DL (ref 8.7–10.5)
CHLORIDE SERPL-SCNC: 107 MMOL/L (ref 95–110)
CO2 SERPL-SCNC: 18 MMOL/L (ref 23–29)
CREAT SERPL-MCNC: 1.2 MG/DL (ref 0.5–1.4)
DIFFERENTIAL METHOD: ABNORMAL
EOSINOPHIL # BLD AUTO: 0.6 K/UL (ref 0–0.5)
EOSINOPHIL NFR BLD: 10.2 % (ref 0–8)
ERYTHROCYTE [DISTWIDTH] IN BLOOD BY AUTOMATED COUNT: 13.9 % (ref 11.5–14.5)
EST. GFR  (AFRICAN AMERICAN): >60 ML/MIN/1.73 M^2
EST. GFR  (NON AFRICAN AMERICAN): 52.5 ML/MIN/1.73 M^2
GLUCOSE SERPL-MCNC: 107 MG/DL (ref 70–110)
HCT VFR BLD AUTO: 38.9 % (ref 37–48.5)
HGB BLD-MCNC: 12.8 G/DL (ref 12–16)
IMM GRANULOCYTES # BLD AUTO: 0.02 K/UL (ref 0–0.04)
IMM GRANULOCYTES NFR BLD AUTO: 0.3 % (ref 0–0.5)
LYMPHOCYTES # BLD AUTO: 1.2 K/UL (ref 1–4.8)
LYMPHOCYTES NFR BLD: 19.8 % (ref 18–48)
MCH RBC QN AUTO: 31.8 PG (ref 27–31)
MCHC RBC AUTO-ENTMCNC: 32.9 G/DL (ref 32–36)
MCV RBC AUTO: 97 FL (ref 82–98)
MONOCYTES # BLD AUTO: 0.4 K/UL (ref 0.3–1)
MONOCYTES NFR BLD: 5.9 % (ref 4–15)
NEUTROPHILS # BLD AUTO: 3.8 K/UL (ref 1.8–7.7)
NEUTROPHILS NFR BLD: 61.8 % (ref 38–73)
NRBC BLD-RTO: 0 /100 WBC
PLATELET # BLD AUTO: 405 K/UL (ref 150–450)
PMV BLD AUTO: 9.1 FL (ref 9.2–12.9)
POTASSIUM SERPL-SCNC: 4.3 MMOL/L (ref 3.5–5.1)
PROT SERPL-MCNC: 7.4 G/DL (ref 6–8.4)
RBC # BLD AUTO: 4.02 M/UL (ref 4–5.4)
SODIUM SERPL-SCNC: 136 MMOL/L (ref 136–145)
TACROLIMUS BLD-MCNC: 9.2 NG/ML (ref 5–15)
WBC # BLD AUTO: 6.1 K/UL (ref 3.9–12.7)

## 2022-07-11 PROCEDURE — 80053 COMPREHEN METABOLIC PANEL: CPT | Mod: NTX | Performed by: SURGERY

## 2022-07-11 PROCEDURE — 82248 BILIRUBIN DIRECT: CPT | Mod: NTX | Performed by: SURGERY

## 2022-07-11 PROCEDURE — 36415 COLL VENOUS BLD VENIPUNCTURE: CPT | Mod: TXP | Performed by: SURGERY

## 2022-07-11 PROCEDURE — 85025 COMPLETE CBC W/AUTO DIFF WBC: CPT | Mod: TXP | Performed by: SURGERY

## 2022-07-11 PROCEDURE — 80197 ASSAY OF TACROLIMUS: CPT | Mod: TXP | Performed by: SURGERY

## 2022-07-11 NOTE — TELEPHONE ENCOUNTER
Sent message to let patient know to decrease Tacrolimus to 4 mg bid and labs on Thursday.    ----- Message from Henry Richmond MD sent at 7/11/2022  2:24 PM CDT -----  Results ok. No action needed

## 2022-07-12 ENCOUNTER — OFFICE VISIT (OUTPATIENT)
Dept: TRANSPLANT | Facility: CLINIC | Age: 52
End: 2022-07-12
Payer: COMMERCIAL

## 2022-07-12 VITALS
SYSTOLIC BLOOD PRESSURE: 142 MMHG | HEIGHT: 63 IN | WEIGHT: 124.13 LBS | RESPIRATION RATE: 16 BRPM | TEMPERATURE: 98 F | HEART RATE: 111 BPM | DIASTOLIC BLOOD PRESSURE: 88 MMHG | BODY MASS INDEX: 21.99 KG/M2 | OXYGEN SATURATION: 95 %

## 2022-07-12 DIAGNOSIS — Z94.4 LIVER REPLACED BY TRANSPLANT: ICD-10-CM

## 2022-07-12 DIAGNOSIS — Z51.81 ENCOUNTER FOR THERAPEUTIC DRUG MONITORING: Primary | ICD-10-CM

## 2022-07-12 DIAGNOSIS — Z79.899 ENCOUNTER FOR LONG-TERM (CURRENT) USE OF OTHER MEDICATIONS: ICD-10-CM

## 2022-07-12 PROCEDURE — 99999 PR PBB SHADOW E&M-EST. PATIENT-LVL IV: ICD-10-PCS | Mod: PBBFAC,TXP,,

## 2022-07-12 PROCEDURE — 99999 PR PBB SHADOW E&M-EST. PATIENT-LVL IV: CPT | Mod: PBBFAC,TXP,,

## 2022-07-12 PROCEDURE — 99215 OFFICE O/P EST HI 40 MIN: CPT | Mod: 24,NTX,S$GLB, | Performed by: TRANSPLANT SURGERY

## 2022-07-12 PROCEDURE — 99215 PR OFFICE/OUTPT VISIT, EST, LEVL V, 40-54 MIN: ICD-10-PCS | Mod: 24,NTX,S$GLB, | Performed by: TRANSPLANT SURGERY

## 2022-07-12 RX ORDER — TACROLIMUS 1 MG/1
4 CAPSULE ORAL EVERY 12 HOURS
Qty: 240 CAPSULE | Refills: 11 | Status: SHIPPED | OUTPATIENT
Start: 2022-07-12 | End: 2022-07-28

## 2022-07-12 RX ORDER — TACROLIMUS 1 MG/1
4 CAPSULE ORAL EVERY 12 HOURS
Qty: 240 CAPSULE | Refills: 11 | Status: SHIPPED | OUTPATIENT
Start: 2022-07-12 | End: 2022-07-12

## 2022-07-12 NOTE — PROGRESS NOTES
Transplant Surgery  Liver Transplant Recipient Follow-up    Original Referring Physician: Sharmila Burnett Bzowej  Current Corresponding Physician: Killian Dodd    Chief Complaint: Malu is here for follow up of her liver transplant performed 6/5/2022 for the primary diagnosis (UNOS) of Alcoholic Cirrhosis    ORGAN: LIVER  Whole or Partial: whole liver  Donor Type: donation after brain death  PHS Increased Risk: yes  Donor CMV Status: Positive  Donor HCV Status: Positive  Donor HBcAb: Negative  Donor HBV JACOBO: Negative  Donor HCV JACOBO: Negative    Biliary Anastomosis: end to end  Arterial Anatomy: standard  IVC reconstruction: end to end ivc  Portal vein status: patent    Subjective:     History of Present Illness: She has had the following complications since transplant: none.  The noted complications are well controlled.    Interval History: Currently, she is doing well.  Current complaints include none.  Malu is here for management of her immunosuppression medication.    External provider notes reviewed: Yes    Review of Systems   Constitutional: Negative for activity change and chills.   HENT: Negative for congestion and sore throat.    Eyes: Negative for discharge and redness.   Respiratory: Negative for cough and shortness of breath.    Cardiovascular: Negative for chest pain and palpitations.   Gastrointestinal: Negative for nausea and vomiting.   Endocrine: Negative for polydipsia and polyuria.   Genitourinary: Negative for dysuria and frequency.   Musculoskeletal: Negative for arthralgias and gait problem.   Skin: Negative for pallor and rash.   Neurological: Negative for dizziness and light-headedness.   Hematological: Negative for adenopathy. Does not bruise/bleed easily.   Psychiatric/Behavioral: Negative for agitation and suicidal ideas.     Objective:     Physical Exam  Constitutional:       General: She is not in acute distress.     Appearance: She is not diaphoretic.    Neck:      Vascular: No JVD.   Cardiovascular:      Rate and Rhythm: Normal rate and regular rhythm.   Pulmonary:      Effort: Pulmonary effort is normal. No respiratory distress.      Breath sounds: No wheezing.   Abdominal:      Palpations: Abdomen is soft.          Comments: Mild incisional tenderness   Skin:     General: Skin is warm and dry.   Neurological:      Mental Status: She is alert and oriented to person, place, and time.       Lab Results   Component Value Date    BILITOT 0.3 07/11/2022    AST 15 07/11/2022    ALT 13 07/11/2022    ALKPHOS 109 07/11/2022    CREATININE 1.2 07/11/2022    ALBUMIN 4.6 07/11/2022     Lab Results   Component Value Date    WBC 6.10 07/11/2022    HGB 12.8 07/11/2022    HCT 38.9 07/11/2022    HCT 31 (L) 06/06/2022     07/11/2022     Lab Results   Component Value Date    TACROLIMUS 9.2 07/11/2022       Diagnostics:  The following labs have been reviewed: CBC  CMP  The following radiology images have been independently reviewed and interpreted:     Assessment/Plan:          · S/P liver transplant.  · Chronic immunosuppressive medications for rejection prophylaxis at target.  Plan: no adjustment needed.  · Continue monitoring symptoms, labs and drug levels for drug-related toxicity and side effects.  · Incision: staples out, wound well-healed, no evidence of hernia  · Femoral arterial line site: no complications evident    Additional testing to be completed according to Written Order Guidelines for Post-Liver and Combined Liver/Kidney Transplant Follow-up (LI-09)    Interpretation of tests and discussion of patient management involves all members of the multidisciplinary transplant team  Patient advised that it is recommended that all transplanted patients, and their close contacts and household members receive Covid vaccination.  Mathew Luong MD       [facility] Patient Status  Functional Status: 90% - Able to carry on normal activity: minor symptoms of disease  Physical  Capacity: No Limitations

## 2022-07-14 ENCOUNTER — LAB VISIT (OUTPATIENT)
Dept: LAB | Facility: HOSPITAL | Age: 52
End: 2022-07-14
Attending: SURGERY
Payer: COMMERCIAL

## 2022-07-14 ENCOUNTER — TELEPHONE (OUTPATIENT)
Dept: TRANSPLANT | Facility: CLINIC | Age: 52
End: 2022-07-14
Payer: COMMERCIAL

## 2022-07-14 ENCOUNTER — PATIENT MESSAGE (OUTPATIENT)
Dept: TRANSPLANT | Facility: CLINIC | Age: 52
End: 2022-07-14
Payer: COMMERCIAL

## 2022-07-14 DIAGNOSIS — Z94.4 LIVER REPLACED BY TRANSPLANT: ICD-10-CM

## 2022-07-14 LAB
ALBUMIN SERPL BCP-MCNC: 4.2 G/DL (ref 3.5–5.2)
ALP SERPL-CCNC: 111 U/L (ref 55–135)
ALT SERPL W/O P-5'-P-CCNC: 13 U/L (ref 10–44)
ANION GAP SERPL CALC-SCNC: 9 MMOL/L (ref 8–16)
AST SERPL-CCNC: 15 U/L (ref 10–40)
BASOPHILS # BLD AUTO: 0.12 K/UL (ref 0–0.2)
BASOPHILS NFR BLD: 1.7 % (ref 0–1.9)
BILIRUB DIRECT SERPL-MCNC: 0.1 MG/DL (ref 0.1–0.3)
BILIRUB SERPL-MCNC: 0.2 MG/DL (ref 0.1–1)
BUN SERPL-MCNC: 24 MG/DL (ref 6–20)
CALCIUM SERPL-MCNC: 9 MG/DL (ref 8.7–10.5)
CHLORIDE SERPL-SCNC: 110 MMOL/L (ref 95–110)
CO2 SERPL-SCNC: 18 MMOL/L (ref 23–29)
CREAT SERPL-MCNC: 1.1 MG/DL (ref 0.5–1.4)
DIFFERENTIAL METHOD: ABNORMAL
EOSINOPHIL # BLD AUTO: 0.7 K/UL (ref 0–0.5)
EOSINOPHIL NFR BLD: 9.3 % (ref 0–8)
ERYTHROCYTE [DISTWIDTH] IN BLOOD BY AUTOMATED COUNT: 14 % (ref 11.5–14.5)
EST. GFR  (AFRICAN AMERICAN): >60 ML/MIN/1.73 M^2
EST. GFR  (NON AFRICAN AMERICAN): 58.3 ML/MIN/1.73 M^2
GLUCOSE SERPL-MCNC: 112 MG/DL (ref 70–110)
HCT VFR BLD AUTO: 36.4 % (ref 37–48.5)
HGB BLD-MCNC: 11.9 G/DL (ref 12–16)
IMM GRANULOCYTES # BLD AUTO: 0.03 K/UL (ref 0–0.04)
IMM GRANULOCYTES NFR BLD AUTO: 0.4 % (ref 0–0.5)
LYMPHOCYTES # BLD AUTO: 1 K/UL (ref 1–4.8)
LYMPHOCYTES NFR BLD: 14.8 % (ref 18–48)
MCH RBC QN AUTO: 31.4 PG (ref 27–31)
MCHC RBC AUTO-ENTMCNC: 32.7 G/DL (ref 32–36)
MCV RBC AUTO: 96 FL (ref 82–98)
MONOCYTES # BLD AUTO: 0.4 K/UL (ref 0.3–1)
MONOCYTES NFR BLD: 5.4 % (ref 4–15)
NEUTROPHILS # BLD AUTO: 4.8 K/UL (ref 1.8–7.7)
NEUTROPHILS NFR BLD: 68.4 % (ref 38–73)
NRBC BLD-RTO: 0 /100 WBC
PLATELET # BLD AUTO: 372 K/UL (ref 150–450)
PMV BLD AUTO: 9.5 FL (ref 9.2–12.9)
POTASSIUM SERPL-SCNC: 4.2 MMOL/L (ref 3.5–5.1)
PROT SERPL-MCNC: 7 G/DL (ref 6–8.4)
RBC # BLD AUTO: 3.79 M/UL (ref 4–5.4)
SODIUM SERPL-SCNC: 137 MMOL/L (ref 136–145)
TACROLIMUS BLD-MCNC: 8.7 NG/ML (ref 5–15)
WBC # BLD AUTO: 6.98 K/UL (ref 3.9–12.7)

## 2022-07-14 PROCEDURE — 36415 COLL VENOUS BLD VENIPUNCTURE: CPT | Mod: NTX | Performed by: SURGERY

## 2022-07-14 PROCEDURE — 80053 COMPREHEN METABOLIC PANEL: CPT | Mod: NTX | Performed by: SURGERY

## 2022-07-14 PROCEDURE — 82248 BILIRUBIN DIRECT: CPT | Mod: TXP | Performed by: SURGERY

## 2022-07-14 PROCEDURE — 80197 ASSAY OF TACROLIMUS: CPT | Mod: NTX | Performed by: SURGERY

## 2022-07-14 PROCEDURE — 85025 COMPLETE CBC W/AUTO DIFF WBC: CPT | Mod: TXP | Performed by: SURGERY

## 2022-07-14 NOTE — TELEPHONE ENCOUNTER
Reviewed labs with Dr Carlson, labs are stable.  No changes to medication will repeat labs on Monday, message sent.

## 2022-07-15 ENCOUNTER — PATIENT MESSAGE (OUTPATIENT)
Dept: TRANSPLANT | Facility: CLINIC | Age: 52
End: 2022-07-15
Payer: COMMERCIAL

## 2022-07-15 DIAGNOSIS — Z94.4 LIVER REPLACED BY TRANSPLANT: Primary | ICD-10-CM

## 2022-07-18 ENCOUNTER — LAB VISIT (OUTPATIENT)
Dept: LAB | Facility: HOSPITAL | Age: 52
End: 2022-07-18
Attending: SURGERY
Payer: COMMERCIAL

## 2022-07-18 ENCOUNTER — TELEPHONE (OUTPATIENT)
Dept: TRANSPLANT | Facility: CLINIC | Age: 52
End: 2022-07-18
Payer: COMMERCIAL

## 2022-07-18 ENCOUNTER — PATIENT MESSAGE (OUTPATIENT)
Dept: TRANSPLANT | Facility: CLINIC | Age: 52
End: 2022-07-18
Payer: COMMERCIAL

## 2022-07-18 DIAGNOSIS — Z94.4 LIVER REPLACED BY TRANSPLANT: ICD-10-CM

## 2022-07-18 LAB
ALBUMIN SERPL BCP-MCNC: 4.4 G/DL (ref 3.5–5.2)
ALP SERPL-CCNC: 101 U/L (ref 55–135)
ALT SERPL W/O P-5'-P-CCNC: 22 U/L (ref 10–44)
ANION GAP SERPL CALC-SCNC: 8 MMOL/L (ref 8–16)
AST SERPL-CCNC: 28 U/L (ref 10–40)
BASOPHILS # BLD AUTO: 0.14 K/UL (ref 0–0.2)
BASOPHILS NFR BLD: 2 % (ref 0–1.9)
BILIRUB DIRECT SERPL-MCNC: 0.1 MG/DL (ref 0.1–0.3)
BILIRUB SERPL-MCNC: 0.3 MG/DL (ref 0.1–1)
BUN SERPL-MCNC: 27 MG/DL (ref 6–20)
CALCIUM SERPL-MCNC: 9.8 MG/DL (ref 8.7–10.5)
CHLORIDE SERPL-SCNC: 107 MMOL/L (ref 95–110)
CO2 SERPL-SCNC: 22 MMOL/L (ref 23–29)
CREAT SERPL-MCNC: 1.2 MG/DL (ref 0.5–1.4)
DIFFERENTIAL METHOD: ABNORMAL
EOSINOPHIL # BLD AUTO: 0.7 K/UL (ref 0–0.5)
EOSINOPHIL NFR BLD: 9.9 % (ref 0–8)
ERYTHROCYTE [DISTWIDTH] IN BLOOD BY AUTOMATED COUNT: 13.9 % (ref 11.5–14.5)
EST. GFR  (AFRICAN AMERICAN): >60 ML/MIN/1.73 M^2
EST. GFR  (NON AFRICAN AMERICAN): 52.5 ML/MIN/1.73 M^2
GLUCOSE SERPL-MCNC: 107 MG/DL (ref 70–110)
HCT VFR BLD AUTO: 38.1 % (ref 37–48.5)
HGB BLD-MCNC: 12.1 G/DL (ref 12–16)
IMM GRANULOCYTES # BLD AUTO: 0.05 K/UL (ref 0–0.04)
IMM GRANULOCYTES NFR BLD AUTO: 0.7 % (ref 0–0.5)
LYMPHOCYTES # BLD AUTO: 1 K/UL (ref 1–4.8)
LYMPHOCYTES NFR BLD: 14.4 % (ref 18–48)
MCH RBC QN AUTO: 31 PG (ref 27–31)
MCHC RBC AUTO-ENTMCNC: 31.8 G/DL (ref 32–36)
MCV RBC AUTO: 98 FL (ref 82–98)
MONOCYTES # BLD AUTO: 0.4 K/UL (ref 0.3–1)
MONOCYTES NFR BLD: 5.9 % (ref 4–15)
NEUTROPHILS # BLD AUTO: 4.7 K/UL (ref 1.8–7.7)
NEUTROPHILS NFR BLD: 67.1 % (ref 38–73)
NRBC BLD-RTO: 0 /100 WBC
PLATELET # BLD AUTO: 365 K/UL (ref 150–450)
PMV BLD AUTO: 9.9 FL (ref 9.2–12.9)
POTASSIUM SERPL-SCNC: 4.7 MMOL/L (ref 3.5–5.1)
PROT SERPL-MCNC: 7.3 G/DL (ref 6–8.4)
RBC # BLD AUTO: 3.9 M/UL (ref 4–5.4)
SODIUM SERPL-SCNC: 137 MMOL/L (ref 136–145)
TACROLIMUS BLD-MCNC: 5.4 NG/ML (ref 5–15)
WBC # BLD AUTO: 6.95 K/UL (ref 3.9–12.7)

## 2022-07-18 PROCEDURE — 80053 COMPREHEN METABOLIC PANEL: CPT | Mod: TXP | Performed by: SURGERY

## 2022-07-18 PROCEDURE — 85025 COMPLETE CBC W/AUTO DIFF WBC: CPT | Mod: NTX | Performed by: SURGERY

## 2022-07-18 PROCEDURE — 82248 BILIRUBIN DIRECT: CPT | Mod: TXP | Performed by: SURGERY

## 2022-07-18 PROCEDURE — 80197 ASSAY OF TACROLIMUS: CPT | Mod: NTX | Performed by: SURGERY

## 2022-07-18 PROCEDURE — 36415 COLL VENOUS BLD VENIPUNCTURE: CPT | Mod: TXP | Performed by: SURGERY

## 2022-07-18 NOTE — TELEPHONE ENCOUNTER
Sent patient message via portal to let her know labs are stable.  No medication changes, next labs due on 07/25/22      ----- Message from Henry Richmond MD sent at 7/18/2022  1:45 PM CDT -----  Results ok. No action needed

## 2022-07-20 ENCOUNTER — PATIENT MESSAGE (OUTPATIENT)
Dept: TRANSPLANT | Facility: CLINIC | Age: 52
End: 2022-07-20
Payer: COMMERCIAL

## 2022-07-25 ENCOUNTER — PATIENT MESSAGE (OUTPATIENT)
Dept: TRANSPLANT | Facility: CLINIC | Age: 52
End: 2022-07-25
Payer: COMMERCIAL

## 2022-07-25 ENCOUNTER — LAB VISIT (OUTPATIENT)
Dept: LAB | Facility: HOSPITAL | Age: 52
End: 2022-07-25
Attending: SURGERY
Payer: COMMERCIAL

## 2022-07-25 DIAGNOSIS — Z94.4 LIVER REPLACED BY TRANSPLANT: Primary | ICD-10-CM

## 2022-07-25 DIAGNOSIS — Z94.4 LIVER REPLACED BY TRANSPLANT: ICD-10-CM

## 2022-07-25 LAB
ALBUMIN SERPL BCP-MCNC: 4.1 G/DL (ref 3.5–5.2)
ALP SERPL-CCNC: 105 U/L (ref 55–135)
ALT SERPL W/O P-5'-P-CCNC: 30 U/L (ref 10–44)
ANION GAP SERPL CALC-SCNC: 6 MMOL/L (ref 8–16)
AST SERPL-CCNC: 26 U/L (ref 10–40)
BASOPHILS # BLD AUTO: 0.16 K/UL (ref 0–0.2)
BASOPHILS NFR BLD: 4.2 % (ref 0–1.9)
BILIRUB DIRECT SERPL-MCNC: 0.1 MG/DL (ref 0.1–0.3)
BILIRUB SERPL-MCNC: 0.3 MG/DL (ref 0.1–1)
BUN SERPL-MCNC: 27 MG/DL (ref 6–20)
CALCIUM SERPL-MCNC: 9.4 MG/DL (ref 8.7–10.5)
CHLORIDE SERPL-SCNC: 109 MMOL/L (ref 95–110)
CO2 SERPL-SCNC: 21 MMOL/L (ref 23–29)
CREAT SERPL-MCNC: 1.1 MG/DL (ref 0.5–1.4)
DIFFERENTIAL METHOD: ABNORMAL
EOSINOPHIL # BLD AUTO: 0.6 K/UL (ref 0–0.5)
EOSINOPHIL NFR BLD: 14.6 % (ref 0–8)
ERYTHROCYTE [DISTWIDTH] IN BLOOD BY AUTOMATED COUNT: 13.8 % (ref 11.5–14.5)
EST. GFR  (AFRICAN AMERICAN): >60 ML/MIN/1.73 M^2
EST. GFR  (NON AFRICAN AMERICAN): 58.3 ML/MIN/1.73 M^2
GLUCOSE SERPL-MCNC: 102 MG/DL (ref 70–110)
HCT VFR BLD AUTO: 37.1 % (ref 37–48.5)
HGB BLD-MCNC: 12 G/DL (ref 12–16)
IMM GRANULOCYTES # BLD AUTO: 0.04 K/UL (ref 0–0.04)
IMM GRANULOCYTES NFR BLD AUTO: 1.1 % (ref 0–0.5)
LYMPHOCYTES # BLD AUTO: 1.2 K/UL (ref 1–4.8)
LYMPHOCYTES NFR BLD: 31.5 % (ref 18–48)
MCH RBC QN AUTO: 30.9 PG (ref 27–31)
MCHC RBC AUTO-ENTMCNC: 32.3 G/DL (ref 32–36)
MCV RBC AUTO: 96 FL (ref 82–98)
MONOCYTES # BLD AUTO: 0.3 K/UL (ref 0.3–1)
MONOCYTES NFR BLD: 9 % (ref 4–15)
NEUTROPHILS # BLD AUTO: 1.5 K/UL (ref 1.8–7.7)
NEUTROPHILS NFR BLD: 39.6 % (ref 38–73)
NRBC BLD-RTO: 0 /100 WBC
PLATELET # BLD AUTO: 350 K/UL (ref 150–450)
PMV BLD AUTO: 9.4 FL (ref 9.2–12.9)
POTASSIUM SERPL-SCNC: 4.3 MMOL/L (ref 3.5–5.1)
PROT SERPL-MCNC: 6.9 G/DL (ref 6–8.4)
RBC # BLD AUTO: 3.88 M/UL (ref 4–5.4)
SODIUM SERPL-SCNC: 136 MMOL/L (ref 136–145)
TACROLIMUS BLD-MCNC: 6.7 NG/ML (ref 5–15)
WBC # BLD AUTO: 3.78 K/UL (ref 3.9–12.7)

## 2022-07-25 PROCEDURE — 36415 COLL VENOUS BLD VENIPUNCTURE: CPT | Mod: TXP | Performed by: SURGERY

## 2022-07-25 PROCEDURE — 80053 COMPREHEN METABOLIC PANEL: CPT | Mod: TXP | Performed by: SURGERY

## 2022-07-25 PROCEDURE — 85025 COMPLETE CBC W/AUTO DIFF WBC: CPT | Mod: TXP | Performed by: SURGERY

## 2022-07-25 PROCEDURE — 80197 ASSAY OF TACROLIMUS: CPT | Mod: TXP | Performed by: SURGERY

## 2022-07-25 PROCEDURE — 82248 BILIRUBIN DIRECT: CPT | Mod: NTX | Performed by: SURGERY

## 2022-07-25 RX ORDER — MYCOPHENOLATE MOFETIL 250 MG/1
500 CAPSULE ORAL 2 TIMES DAILY
Qty: 240 CAPSULE | Refills: 2 | Status: SHIPPED | OUTPATIENT
Start: 2022-07-25 | End: 2022-08-01 | Stop reason: SINTOL

## 2022-07-25 NOTE — TELEPHONE ENCOUNTER
Reviewed labs with Dr Partida, white blood cell count is trending down.  Will decrease Cellcept to 500 mg bid and repeat labs on Thursday.  Sent message to patient to let her know.   Nsaids Pregnancy And Lactation Text: These medications are considered safe up to 30 weeks gestation. It is excreted in breast milk.

## 2022-07-27 ENCOUNTER — HOSPITAL ENCOUNTER (OUTPATIENT)
Dept: RADIOLOGY | Facility: HOSPITAL | Age: 52
Discharge: HOME | End: 2022-07-27
Attending: SURGERY
Payer: COMMERCIAL

## 2022-07-27 DIAGNOSIS — Z94.4 LIVER REPLACED BY TRANSPLANT: ICD-10-CM

## 2022-07-27 PROCEDURE — 76705 ECHO EXAM OF ABDOMEN: CPT | Mod: TC,TXP

## 2022-07-27 PROCEDURE — 76705 US DOPPLER LIVER TRANSPLANT POST (XPD): ICD-10-PCS | Mod: 26,XS,NTX, | Performed by: RADIOLOGY

## 2022-07-27 PROCEDURE — 93976 VASCULAR STUDY: CPT | Mod: 26,NTX,, | Performed by: RADIOLOGY

## 2022-07-27 PROCEDURE — 93976 US DOPPLER LIVER TRANSPLANT POST (XPD): ICD-10-PCS | Mod: 26,NTX,, | Performed by: RADIOLOGY

## 2022-07-27 PROCEDURE — 76705 ECHO EXAM OF ABDOMEN: CPT | Mod: 26,XS,NTX, | Performed by: RADIOLOGY

## 2022-07-27 PROCEDURE — 93976 VASCULAR STUDY: CPT | Mod: TC,NTX

## 2022-07-28 ENCOUNTER — LAB VISIT (OUTPATIENT)
Dept: LAB | Facility: HOSPITAL | Age: 52
End: 2022-07-28
Attending: SURGERY
Payer: COMMERCIAL

## 2022-07-28 ENCOUNTER — PATIENT MESSAGE (OUTPATIENT)
Dept: TRANSPLANT | Facility: CLINIC | Age: 52
End: 2022-07-28
Payer: COMMERCIAL

## 2022-07-28 DIAGNOSIS — Z94.4 LIVER REPLACED BY TRANSPLANT: ICD-10-CM

## 2022-07-28 DIAGNOSIS — Z94.4 LIVER REPLACED BY TRANSPLANT: Primary | ICD-10-CM

## 2022-07-28 LAB
ALBUMIN SERPL BCP-MCNC: 4.2 G/DL (ref 3.5–5.2)
ALP SERPL-CCNC: 107 U/L (ref 55–135)
ALT SERPL W/O P-5'-P-CCNC: 33 U/L (ref 10–44)
ANION GAP SERPL CALC-SCNC: 9 MMOL/L (ref 8–16)
AST SERPL-CCNC: 29 U/L (ref 10–40)
BASOPHILS # BLD AUTO: 0.13 K/UL (ref 0–0.2)
BASOPHILS NFR BLD: 4.1 % (ref 0–1.9)
BILIRUB DIRECT SERPL-MCNC: 0.2 MG/DL (ref 0.1–0.3)
BILIRUB SERPL-MCNC: 0.3 MG/DL (ref 0.1–1)
BUN SERPL-MCNC: 24 MG/DL (ref 6–20)
CALCIUM SERPL-MCNC: 9.5 MG/DL (ref 8.7–10.5)
CHLORIDE SERPL-SCNC: 110 MMOL/L (ref 95–110)
CO2 SERPL-SCNC: 20 MMOL/L (ref 23–29)
CREAT SERPL-MCNC: 1.1 MG/DL (ref 0.5–1.4)
DIFFERENTIAL METHOD: ABNORMAL
EOSINOPHIL # BLD AUTO: 0.5 K/UL (ref 0–0.5)
EOSINOPHIL NFR BLD: 15.2 % (ref 0–8)
ERYTHROCYTE [DISTWIDTH] IN BLOOD BY AUTOMATED COUNT: 13.6 % (ref 11.5–14.5)
EST. GFR  (AFRICAN AMERICAN): >60 ML/MIN/1.73 M^2
EST. GFR  (NON AFRICAN AMERICAN): 58.3 ML/MIN/1.73 M^2
GLUCOSE SERPL-MCNC: 110 MG/DL (ref 70–110)
HCT VFR BLD AUTO: 37.5 % (ref 37–48.5)
HGB BLD-MCNC: 12.4 G/DL (ref 12–16)
IMM GRANULOCYTES # BLD AUTO: 0.02 K/UL (ref 0–0.04)
IMM GRANULOCYTES NFR BLD AUTO: 0.6 % (ref 0–0.5)
LYMPHOCYTES # BLD AUTO: 1 K/UL (ref 1–4.8)
LYMPHOCYTES NFR BLD: 30.4 % (ref 18–48)
MCH RBC QN AUTO: 30.7 PG (ref 27–31)
MCHC RBC AUTO-ENTMCNC: 33.1 G/DL (ref 32–36)
MCV RBC AUTO: 93 FL (ref 82–98)
MONOCYTES # BLD AUTO: 0.4 K/UL (ref 0.3–1)
MONOCYTES NFR BLD: 11.7 % (ref 4–15)
NEUTROPHILS # BLD AUTO: 1.2 K/UL (ref 1.8–7.7)
NEUTROPHILS NFR BLD: 38 % (ref 38–73)
NRBC BLD-RTO: 0 /100 WBC
PLATELET # BLD AUTO: 348 K/UL (ref 150–450)
PMV BLD AUTO: 9.3 FL (ref 9.2–12.9)
POTASSIUM SERPL-SCNC: 4.3 MMOL/L (ref 3.5–5.1)
PROT SERPL-MCNC: 7.1 G/DL (ref 6–8.4)
RBC # BLD AUTO: 4.04 M/UL (ref 4–5.4)
SODIUM SERPL-SCNC: 139 MMOL/L (ref 136–145)
TACROLIMUS BLD-MCNC: 3.7 NG/ML (ref 5–15)
WBC # BLD AUTO: 3.16 K/UL (ref 3.9–12.7)

## 2022-07-28 PROCEDURE — 85025 COMPLETE CBC W/AUTO DIFF WBC: CPT | Mod: NTX | Performed by: SURGERY

## 2022-07-28 PROCEDURE — 80197 ASSAY OF TACROLIMUS: CPT | Mod: TXP | Performed by: SURGERY

## 2022-07-28 PROCEDURE — 82248 BILIRUBIN DIRECT: CPT | Mod: TXP | Performed by: SURGERY

## 2022-07-28 PROCEDURE — 36415 COLL VENOUS BLD VENIPUNCTURE: CPT | Mod: NTX | Performed by: SURGERY

## 2022-07-28 PROCEDURE — 80053 COMPREHEN METABOLIC PANEL: CPT | Mod: NTX | Performed by: SURGERY

## 2022-07-28 NOTE — TELEPHONE ENCOUNTER
Sent message to patient with medication change and to get labs on Monday    ----- Message from Henry Richmond MD sent at 7/28/2022  1:00 PM CDT -----  Fk 5 bid

## 2022-07-29 RX ORDER — TACROLIMUS 1 MG/1
5 CAPSULE ORAL EVERY 12 HOURS
Qty: 300 CAPSULE | Refills: 11 | Status: SHIPPED | OUTPATIENT
Start: 2022-07-29 | End: 2022-08-08

## 2022-08-01 ENCOUNTER — LAB VISIT (OUTPATIENT)
Dept: LAB | Facility: HOSPITAL | Age: 52
End: 2022-08-01
Attending: SURGERY
Payer: COMMERCIAL

## 2022-08-01 ENCOUNTER — TELEPHONE (OUTPATIENT)
Dept: TRANSPLANT | Facility: CLINIC | Age: 52
End: 2022-08-01
Payer: COMMERCIAL

## 2022-08-01 ENCOUNTER — PATIENT MESSAGE (OUTPATIENT)
Dept: TRANSPLANT | Facility: CLINIC | Age: 52
End: 2022-08-01
Payer: COMMERCIAL

## 2022-08-01 DIAGNOSIS — Z94.4 LIVER REPLACED BY TRANSPLANT: ICD-10-CM

## 2022-08-01 LAB
ALBUMIN SERPL BCP-MCNC: 4.3 G/DL (ref 3.5–5.2)
ALP SERPL-CCNC: 109 U/L (ref 55–135)
ALT SERPL W/O P-5'-P-CCNC: 36 U/L (ref 10–44)
ANION GAP SERPL CALC-SCNC: 7 MMOL/L (ref 8–16)
AST SERPL-CCNC: 26 U/L (ref 10–40)
BASOPHILS # BLD AUTO: 0.13 K/UL (ref 0–0.2)
BASOPHILS NFR BLD: 4.6 % (ref 0–1.9)
BILIRUB DIRECT SERPL-MCNC: 0.1 MG/DL (ref 0.1–0.3)
BILIRUB SERPL-MCNC: 0.2 MG/DL (ref 0.1–1)
BUN SERPL-MCNC: 25 MG/DL (ref 6–20)
CALCIUM SERPL-MCNC: 9.6 MG/DL (ref 8.7–10.5)
CHLORIDE SERPL-SCNC: 109 MMOL/L (ref 95–110)
CO2 SERPL-SCNC: 22 MMOL/L (ref 23–29)
CREAT SERPL-MCNC: 1.2 MG/DL (ref 0.5–1.4)
DIFFERENTIAL METHOD: ABNORMAL
EOSINOPHIL # BLD AUTO: 0.4 K/UL (ref 0–0.5)
EOSINOPHIL NFR BLD: 14.4 % (ref 0–8)
ERYTHROCYTE [DISTWIDTH] IN BLOOD BY AUTOMATED COUNT: 13.5 % (ref 11.5–14.5)
EST. GFR  (NO RACE VARIABLE): 54.8 ML/MIN/1.73 M^2
GLUCOSE SERPL-MCNC: 107 MG/DL (ref 70–110)
HCT VFR BLD AUTO: 37.8 % (ref 37–48.5)
HGB BLD-MCNC: 12.3 G/DL (ref 12–16)
HYPOCHROMIA BLD QL SMEAR: ABNORMAL
IMM GRANULOCYTES # BLD AUTO: 0.05 K/UL (ref 0–0.04)
IMM GRANULOCYTES NFR BLD AUTO: 1.8 % (ref 0–0.5)
LYMPHOCYTES # BLD AUTO: 1.1 K/UL (ref 1–4.8)
LYMPHOCYTES NFR BLD: 38.4 % (ref 18–48)
MCH RBC QN AUTO: 31 PG (ref 27–31)
MCHC RBC AUTO-ENTMCNC: 32.5 G/DL (ref 32–36)
MCV RBC AUTO: 95 FL (ref 82–98)
MONOCYTES # BLD AUTO: 0.4 K/UL (ref 0.3–1)
MONOCYTES NFR BLD: 12.3 % (ref 4–15)
NEUTROPHILS # BLD AUTO: 0.8 K/UL (ref 1.8–7.7)
NEUTROPHILS NFR BLD: 28.5 % (ref 38–73)
NRBC BLD-RTO: 0 /100 WBC
PLATELET # BLD AUTO: 377 K/UL (ref 150–450)
PLATELET BLD QL SMEAR: ABNORMAL
PMV BLD AUTO: 9.3 FL (ref 9.2–12.9)
POTASSIUM SERPL-SCNC: 4 MMOL/L (ref 3.5–5.1)
PROT SERPL-MCNC: 7.1 G/DL (ref 6–8.4)
RBC # BLD AUTO: 3.97 M/UL (ref 4–5.4)
SODIUM SERPL-SCNC: 138 MMOL/L (ref 136–145)
TACROLIMUS BLD-MCNC: 9.4 NG/ML (ref 5–15)
WBC # BLD AUTO: 2.84 K/UL (ref 3.9–12.7)

## 2022-08-01 PROCEDURE — 80197 ASSAY OF TACROLIMUS: CPT | Mod: NTX | Performed by: SURGERY

## 2022-08-01 PROCEDURE — 82248 BILIRUBIN DIRECT: CPT | Mod: NTX | Performed by: SURGERY

## 2022-08-01 PROCEDURE — 85025 COMPLETE CBC W/AUTO DIFF WBC: CPT | Mod: TXP | Performed by: SURGERY

## 2022-08-01 PROCEDURE — 36415 COLL VENOUS BLD VENIPUNCTURE: CPT | Mod: NTX | Performed by: SURGERY

## 2022-08-01 PROCEDURE — 86803 HEPATITIS C AB TEST: CPT | Mod: TXP | Performed by: SURGERY

## 2022-08-01 PROCEDURE — 87522 HEPATITIS C REVRS TRNSCRPJ: CPT | Mod: TXP | Performed by: SURGERY

## 2022-08-01 PROCEDURE — 80053 COMPREHEN METABOLIC PANEL: CPT | Mod: NTX | Performed by: SURGERY

## 2022-08-01 NOTE — TELEPHONE ENCOUNTER
Sent message for patient to stop Cellcept and get labs on Thursday  ----- Message from Henry Richmond MD sent at 8/1/2022  2:14 PM CDT -----  Stop mmf

## 2022-08-02 LAB — HCV RNA SERPL NAA+PROBE-ACNC: NORMAL IU/ML

## 2022-08-03 LAB — HCV AB SERPL QL IA: POSITIVE

## 2022-08-04 ENCOUNTER — CLINICAL SUPPORT (OUTPATIENT)
Dept: TRANSPLANT | Facility: CLINIC | Age: 52
End: 2022-08-04
Payer: COMMERCIAL

## 2022-08-04 ENCOUNTER — PATIENT MESSAGE (OUTPATIENT)
Dept: TRANSPLANT | Facility: CLINIC | Age: 52
End: 2022-08-04

## 2022-08-04 ENCOUNTER — LAB VISIT (OUTPATIENT)
Dept: LAB | Facility: HOSPITAL | Age: 52
End: 2022-08-04
Attending: SURGERY
Payer: COMMERCIAL

## 2022-08-04 DIAGNOSIS — D70.2 DRUG-INDUCED NEUTROPENIA: ICD-10-CM

## 2022-08-04 DIAGNOSIS — D70.2 OTHER DRUG-INDUCED NEUTROPENIA: Primary | ICD-10-CM

## 2022-08-04 DIAGNOSIS — Z94.4 LIVER REPLACED BY TRANSPLANT: ICD-10-CM

## 2022-08-04 DIAGNOSIS — D70.2 DRUG-INDUCED NEUTROPENIA: Primary | ICD-10-CM

## 2022-08-04 LAB
ALBUMIN SERPL BCP-MCNC: 4.2 G/DL (ref 3.5–5.2)
ALP SERPL-CCNC: 101 U/L (ref 55–135)
ALT SERPL W/O P-5'-P-CCNC: 37 U/L (ref 10–44)
ANION GAP SERPL CALC-SCNC: 9 MMOL/L (ref 8–16)
ANISOCYTOSIS BLD QL SMEAR: SLIGHT
AST SERPL-CCNC: 29 U/L (ref 10–40)
BASOPHILS # BLD AUTO: 0.11 K/UL (ref 0–0.2)
BASOPHILS NFR BLD: 4.1 % (ref 0–1.9)
BILIRUB DIRECT SERPL-MCNC: 0.1 MG/DL (ref 0.1–0.3)
BILIRUB SERPL-MCNC: 0.3 MG/DL (ref 0.1–1)
BUN SERPL-MCNC: 31 MG/DL (ref 6–20)
CALCIUM SERPL-MCNC: 9.4 MG/DL (ref 8.7–10.5)
CHLORIDE SERPL-SCNC: 109 MMOL/L (ref 95–110)
CO2 SERPL-SCNC: 20 MMOL/L (ref 23–29)
CREAT SERPL-MCNC: 1.3 MG/DL (ref 0.5–1.4)
DIFFERENTIAL METHOD: ABNORMAL
EOSINOPHIL # BLD AUTO: 0.4 K/UL (ref 0–0.5)
EOSINOPHIL NFR BLD: 15.8 % (ref 0–8)
ERYTHROCYTE [DISTWIDTH] IN BLOOD BY AUTOMATED COUNT: 13.6 % (ref 11.5–14.5)
EST. GFR  (NO RACE VARIABLE): 49.8 ML/MIN/1.73 M^2
GLUCOSE SERPL-MCNC: 115 MG/DL (ref 70–110)
HCT VFR BLD AUTO: 36.2 % (ref 37–48.5)
HGB BLD-MCNC: 12.3 G/DL (ref 12–16)
HYPOCHROMIA BLD QL SMEAR: ABNORMAL
IMM GRANULOCYTES # BLD AUTO: 0.1 K/UL (ref 0–0.04)
IMM GRANULOCYTES NFR BLD AUTO: 3.8 % (ref 0–0.5)
LYMPHOCYTES # BLD AUTO: 1 K/UL (ref 1–4.8)
LYMPHOCYTES NFR BLD: 38.3 % (ref 18–48)
MCH RBC QN AUTO: 31.5 PG (ref 27–31)
MCHC RBC AUTO-ENTMCNC: 34 G/DL (ref 32–36)
MCV RBC AUTO: 93 FL (ref 82–98)
MONOCYTES # BLD AUTO: 0.4 K/UL (ref 0.3–1)
MONOCYTES NFR BLD: 14.3 % (ref 4–15)
NEUTROPHILS # BLD AUTO: 0.6 K/UL (ref 1.8–7.7)
NEUTROPHILS NFR BLD: 23.7 % (ref 38–73)
NRBC BLD-RTO: 0 /100 WBC
OVALOCYTES BLD QL SMEAR: ABNORMAL
PLATELET # BLD AUTO: 298 K/UL (ref 150–450)
PMV BLD AUTO: 9 FL (ref 9.2–12.9)
POIKILOCYTOSIS BLD QL SMEAR: SLIGHT
POLYCHROMASIA BLD QL SMEAR: ABNORMAL
POTASSIUM SERPL-SCNC: 4.6 MMOL/L (ref 3.5–5.1)
PROT SERPL-MCNC: 7 G/DL (ref 6–8.4)
RBC # BLD AUTO: 3.9 M/UL (ref 4–5.4)
SODIUM SERPL-SCNC: 138 MMOL/L (ref 136–145)
SPHEROCYTES BLD QL SMEAR: ABNORMAL
TACROLIMUS BLD-MCNC: 7.1 NG/ML (ref 5–15)
WBC # BLD AUTO: 2.66 K/UL (ref 3.9–12.7)

## 2022-08-04 PROCEDURE — 36415 COLL VENOUS BLD VENIPUNCTURE: CPT | Mod: TXP | Performed by: SURGERY

## 2022-08-04 PROCEDURE — 80197 ASSAY OF TACROLIMUS: CPT | Mod: TXP | Performed by: SURGERY

## 2022-08-04 PROCEDURE — 85025 COMPLETE CBC W/AUTO DIFF WBC: CPT | Mod: TXP | Performed by: SURGERY

## 2022-08-04 PROCEDURE — 82248 BILIRUBIN DIRECT: CPT | Mod: TXP | Performed by: SURGERY

## 2022-08-04 PROCEDURE — 80053 COMPREHEN METABOLIC PANEL: CPT | Mod: NTX | Performed by: SURGERY

## 2022-08-04 NOTE — PROGRESS NOTES
Zarxio 300 mcg given per MD order to right posterior arm. Patient tolerated with no complaints. Patient instructed on possible side effects of this medication including pain. Verbalized understanding.

## 2022-08-05 ENCOUNTER — PATIENT MESSAGE (OUTPATIENT)
Dept: TRANSPLANT | Facility: CLINIC | Age: 52
End: 2022-08-05
Payer: COMMERCIAL

## 2022-08-05 ENCOUNTER — TELEPHONE (OUTPATIENT)
Dept: TRANSPLANT | Facility: CLINIC | Age: 52
End: 2022-08-05
Payer: COMMERCIAL

## 2022-08-05 NOTE — TELEPHONE ENCOUNTER
reviewed, post neupogen on 8/4/22.  CMV is pending.  Will repeat labs 8/8/22 to see what we need to do about bactrim.  Recommendations from pharmacy is to keep valcyte untiil the CMV is resulted.----- Message from Henry Richmond MD sent at 8/5/2022  2:09 PM CDT -----  Results ok. No action needed

## 2022-08-08 ENCOUNTER — TELEPHONE (OUTPATIENT)
Dept: TRANSPLANT | Facility: CLINIC | Age: 52
End: 2022-08-08
Payer: COMMERCIAL

## 2022-08-08 ENCOUNTER — PATIENT MESSAGE (OUTPATIENT)
Dept: TRANSPLANT | Facility: CLINIC | Age: 52
End: 2022-08-08

## 2022-08-08 ENCOUNTER — CLINICAL SUPPORT (OUTPATIENT)
Dept: TRANSPLANT | Facility: CLINIC | Age: 52
End: 2022-08-08
Payer: COMMERCIAL

## 2022-08-08 VITALS
HEART RATE: 94 BPM | SYSTOLIC BLOOD PRESSURE: 126 MMHG | BODY MASS INDEX: 22.77 KG/M2 | TEMPERATURE: 97 F | DIASTOLIC BLOOD PRESSURE: 70 MMHG | RESPIRATION RATE: 16 BRPM | OXYGEN SATURATION: 97 % | WEIGHT: 128.5 LBS | HEIGHT: 63 IN

## 2022-08-08 DIAGNOSIS — Z94.4 LIVER REPLACED BY TRANSPLANT: Primary | ICD-10-CM

## 2022-08-08 DIAGNOSIS — Z94.4 LIVER REPLACED BY TRANSPLANT: ICD-10-CM

## 2022-08-08 DIAGNOSIS — D70.2 DRUG-INDUCED NEUTROPENIA: Primary | ICD-10-CM

## 2022-08-08 PROCEDURE — 99999 PR PBB SHADOW E&M-EST. PATIENT-LVL III: ICD-10-PCS | Mod: PBBFAC,TXP,,

## 2022-08-08 PROCEDURE — 99999 PR PBB SHADOW E&M-EST. PATIENT-LVL III: CPT | Mod: PBBFAC,TXP,,

## 2022-08-08 NOTE — TELEPHONE ENCOUNTER
Portal message sent, neupogen in transplant clinic today, repeat labs tomorrow and decrease Prograf to 4 mg every 12 hours.  Cellcept, valcyte and bactrim have all been stopped due to neutropenia.  Testing weekly CMV with labs.  ----- Message from Henry Richmond MD sent at 8/8/2022  2:20 PM CDT -----  Neupogen

## 2022-08-08 NOTE — TELEPHONE ENCOUNTER
Notified patient of low wbc and need for neupogen injection today.  Injection has been scheduled.  Dr. Richmond has stopped the Valcyte and bactrim

## 2022-08-09 ENCOUNTER — TELEPHONE (OUTPATIENT)
Dept: TRANSPLANT | Facility: CLINIC | Age: 52
End: 2022-08-09
Payer: COMMERCIAL

## 2022-08-09 ENCOUNTER — PATIENT MESSAGE (OUTPATIENT)
Dept: TRANSPLANT | Facility: CLINIC | Age: 52
End: 2022-08-09
Payer: COMMERCIAL

## 2022-08-09 RX ORDER — TACROLIMUS 1 MG/1
4 CAPSULE ORAL EVERY 12 HOURS
Qty: 240 CAPSULE | Refills: 11 | Status: SHIPPED | OUTPATIENT
Start: 2022-08-09 | End: 2022-08-22

## 2022-08-09 NOTE — TELEPHONE ENCOUNTER
Sent message to let patient know wbc improving, need to repeat on Friday.    ----- Message from Gabriela Nolasco MA sent at 8/8/2022  2:13 PM CDT -----

## 2022-08-10 ENCOUNTER — CONFERENCE (OUTPATIENT)
Dept: TRANSPLANT | Facility: CLINIC | Age: 52
End: 2022-08-10
Payer: COMMERCIAL

## 2022-08-10 NOTE — TELEPHONE ENCOUNTER
Pt's case discussed in the liver committee meeting. Pt listed for a liver/kidney transplant, she received a liver only on 6/5/22 and kidney episode is inactive. Pt's chart reviewed and per committee ok to delist from Kidney waitlist.

## 2022-08-11 ENCOUNTER — PATIENT MESSAGE (OUTPATIENT)
Dept: TRANSPLANT | Facility: CLINIC | Age: 52
End: 2022-08-11
Payer: COMMERCIAL

## 2022-08-11 DIAGNOSIS — Z94.4 LIVER REPLACED BY TRANSPLANT: Primary | ICD-10-CM

## 2022-08-11 RX ORDER — DAPSONE 100 MG/1
100 TABLET ORAL DAILY
Qty: 30 TABLET | Refills: 3 | Status: SHIPPED | OUTPATIENT
Start: 2022-08-11 | End: 2023-01-30 | Stop reason: ALTCHOICE

## 2022-08-11 NOTE — TELEPHONE ENCOUNTER
----- Message from Alize Dallas PharmD sent at 8/11/2022  2:21 PM CDT -----  Yes, Dapsone 100 mg daily.  Dapsone is more associated with anemia and not leukopenia.     Alize  ----- Message -----  From: Marii Ruiz RN  Sent: 8/11/2022   1:55 PM CDT  To: Abdominal Transplant Pharmacists    Would it be appropriate to start Dapsone, Bactrim help for Leukopenia?.  G6PD done.

## 2022-08-12 ENCOUNTER — LAB VISIT (OUTPATIENT)
Dept: LAB | Facility: HOSPITAL | Age: 52
End: 2022-08-12
Attending: SURGERY
Payer: COMMERCIAL

## 2022-08-12 ENCOUNTER — OFFICE VISIT (OUTPATIENT)
Dept: TRANSPLANT | Facility: CLINIC | Age: 52
End: 2022-08-12
Payer: COMMERCIAL

## 2022-08-12 ENCOUNTER — TELEPHONE (OUTPATIENT)
Dept: TRANSPLANT | Facility: CLINIC | Age: 52
End: 2022-08-12

## 2022-08-12 VITALS
WEIGHT: 127.75 LBS | RESPIRATION RATE: 17 BRPM | SYSTOLIC BLOOD PRESSURE: 136 MMHG | HEIGHT: 63 IN | HEART RATE: 102 BPM | TEMPERATURE: 98 F | OXYGEN SATURATION: 98 % | DIASTOLIC BLOOD PRESSURE: 85 MMHG | BODY MASS INDEX: 22.64 KG/M2

## 2022-08-12 DIAGNOSIS — Z94.4 LIVER REPLACED BY TRANSPLANT: ICD-10-CM

## 2022-08-12 DIAGNOSIS — T86.43 RECEIVED LIVER TRANSPLANT FROM DONOR WITH HEPATITIS C: ICD-10-CM

## 2022-08-12 DIAGNOSIS — B19.20 RECEIVED LIVER TRANSPLANT FROM DONOR WITH HEPATITIS C: ICD-10-CM

## 2022-08-12 DIAGNOSIS — Z79.60 LONG-TERM USE OF IMMUNOSUPPRESSANT MEDICATION: Primary | ICD-10-CM

## 2022-08-12 DIAGNOSIS — Z94.4 S/P LIVER TRANSPLANT: ICD-10-CM

## 2022-08-12 DIAGNOSIS — Z94.4 LIVER REPLACED BY TRANSPLANT: Primary | ICD-10-CM

## 2022-08-12 DIAGNOSIS — Z29.89 PROPHYLACTIC IMMUNOTHERAPY: ICD-10-CM

## 2022-08-12 PROBLEM — T38.0X5A ADRENAL CORTICAL STEROIDS CAUSING ADVERSE EFFECT IN THERAPEUTIC USE: Status: RESOLVED | Noted: 2022-06-06 | Resolved: 2022-08-12

## 2022-08-12 PROBLEM — K70.0 ALCOHOL INDUCED FATTY LIVER: Status: RESOLVED | Noted: 2021-07-24 | Resolved: 2022-08-12

## 2022-08-12 PROBLEM — E79.0 HYPERURICEMIA: Status: RESOLVED | Noted: 2022-04-24 | Resolved: 2022-08-12

## 2022-08-12 PROBLEM — Z98.890 S/P ABDOMINAL PARACENTESIS: Status: RESOLVED | Noted: 2022-05-06 | Resolved: 2022-08-12

## 2022-08-12 PROBLEM — K85.20 ALCOHOL-INDUCED ACUTE PANCREATITIS: Chronic | Status: RESOLVED | Noted: 2021-01-25 | Resolved: 2022-08-12

## 2022-08-12 PROBLEM — K70.31 ALCOHOLIC CIRRHOSIS OF LIVER WITH ASCITES: Status: RESOLVED | Noted: 2021-09-16 | Resolved: 2022-08-12

## 2022-08-12 PROBLEM — K76.6 PORTAL HYPERTENSION: Status: RESOLVED | Noted: 2021-06-14 | Resolved: 2022-08-12

## 2022-08-12 PROBLEM — K65.2 SBP (SPONTANEOUS BACTERIAL PERITONITIS): Chronic | Status: RESOLVED | Noted: 2021-08-06 | Resolved: 2022-08-12

## 2022-08-12 PROBLEM — I85.10 SECONDARY ESOPHAGEAL VARICES: Status: RESOLVED | Noted: 2021-10-26 | Resolved: 2022-08-12

## 2022-08-12 LAB
ALBUMIN SERPL BCP-MCNC: 4 G/DL (ref 3.5–5.2)
ALP SERPL-CCNC: 106 U/L (ref 55–135)
ALT SERPL W/O P-5'-P-CCNC: 39 U/L (ref 10–44)
ANION GAP SERPL CALC-SCNC: 6 MMOL/L (ref 8–16)
ANISOCYTOSIS BLD QL SMEAR: SLIGHT
AST SERPL-CCNC: 31 U/L (ref 10–40)
BASOPHILS # BLD AUTO: 0.16 K/UL (ref 0–0.2)
BASOPHILS NFR BLD: 2.5 % (ref 0–1.9)
BILIRUB DIRECT SERPL-MCNC: 0.1 MG/DL (ref 0.1–0.3)
BILIRUB SERPL-MCNC: 0.3 MG/DL (ref 0.1–1)
BUN SERPL-MCNC: 22 MG/DL (ref 6–20)
CALCIUM SERPL-MCNC: 9.3 MG/DL (ref 8.7–10.5)
CHLORIDE SERPL-SCNC: 106 MMOL/L (ref 95–110)
CO2 SERPL-SCNC: 25 MMOL/L (ref 23–29)
CREAT SERPL-MCNC: 1.2 MG/DL (ref 0.5–1.4)
DIFFERENTIAL METHOD: ABNORMAL
EOSINOPHIL # BLD AUTO: 0.5 K/UL (ref 0–0.5)
EOSINOPHIL NFR BLD: 8.4 % (ref 0–8)
ERYTHROCYTE [DISTWIDTH] IN BLOOD BY AUTOMATED COUNT: 13.4 % (ref 11.5–14.5)
EST. GFR  (NO RACE VARIABLE): 54.8 ML/MIN/1.73 M^2
GLUCOSE SERPL-MCNC: 99 MG/DL (ref 70–110)
HCT VFR BLD AUTO: 37.7 % (ref 37–48.5)
HGB BLD-MCNC: 12.3 G/DL (ref 12–16)
IMM GRANULOCYTES # BLD AUTO: 0.13 K/UL (ref 0–0.04)
IMM GRANULOCYTES NFR BLD AUTO: 2.1 % (ref 0–0.5)
LYMPHOCYTES # BLD AUTO: 1.8 K/UL (ref 1–4.8)
LYMPHOCYTES NFR BLD: 28.9 % (ref 18–48)
MCH RBC QN AUTO: 30.3 PG (ref 27–31)
MCHC RBC AUTO-ENTMCNC: 32.6 G/DL (ref 32–36)
MCV RBC AUTO: 93 FL (ref 82–98)
MONOCYTES # BLD AUTO: 0.8 K/UL (ref 0.3–1)
MONOCYTES NFR BLD: 11.8 % (ref 4–15)
NEUTROPHILS # BLD AUTO: 2.9 K/UL (ref 1.8–7.7)
NEUTROPHILS NFR BLD: 46.3 % (ref 38–73)
NRBC BLD-RTO: 0 /100 WBC
OVALOCYTES BLD QL SMEAR: ABNORMAL
PLATELET # BLD AUTO: 284 K/UL (ref 150–450)
PMV BLD AUTO: 9.2 FL (ref 9.2–12.9)
POIKILOCYTOSIS BLD QL SMEAR: SLIGHT
POLYCHROMASIA BLD QL SMEAR: ABNORMAL
POTASSIUM SERPL-SCNC: 4.7 MMOL/L (ref 3.5–5.1)
PROT SERPL-MCNC: 6.9 G/DL (ref 6–8.4)
RBC # BLD AUTO: 4.06 M/UL (ref 4–5.4)
SODIUM SERPL-SCNC: 137 MMOL/L (ref 136–145)
TACROLIMUS BLD-MCNC: 5.9 NG/ML (ref 5–15)
WBC # BLD AUTO: 6.33 K/UL (ref 3.9–12.7)

## 2022-08-12 PROCEDURE — 85025 COMPLETE CBC W/AUTO DIFF WBC: CPT | Performed by: SURGERY

## 2022-08-12 PROCEDURE — 99214 OFFICE O/P EST MOD 30 MIN: CPT | Mod: 24,S$GLB,, | Performed by: INTERNAL MEDICINE

## 2022-08-12 PROCEDURE — 99999 PR PBB SHADOW E&M-EST. PATIENT-LVL V: CPT | Mod: PBBFAC,,, | Performed by: INTERNAL MEDICINE

## 2022-08-12 PROCEDURE — 80053 COMPREHEN METABOLIC PANEL: CPT | Performed by: SURGERY

## 2022-08-12 PROCEDURE — 36415 COLL VENOUS BLD VENIPUNCTURE: CPT | Performed by: SURGERY

## 2022-08-12 PROCEDURE — 99999 PR PBB SHADOW E&M-EST. PATIENT-LVL V: ICD-10-PCS | Mod: PBBFAC,,, | Performed by: INTERNAL MEDICINE

## 2022-08-12 PROCEDURE — 80197 ASSAY OF TACROLIMUS: CPT | Performed by: SURGERY

## 2022-08-12 PROCEDURE — 99214 PR OFFICE/OUTPT VISIT, EST, LEVL IV, 30-39 MIN: ICD-10-PCS | Mod: 24,S$GLB,, | Performed by: INTERNAL MEDICINE

## 2022-08-12 PROCEDURE — 82248 BILIRUBIN DIRECT: CPT | Performed by: SURGERY

## 2022-08-12 RX ORDER — MYCOPHENOLATE MOFETIL 250 MG/1
500 CAPSULE ORAL 2 TIMES DAILY
Qty: 120 CAPSULE | Refills: 11 | Status: SHIPPED | OUTPATIENT
Start: 2022-08-12 | End: 2022-12-28 | Stop reason: SDUPTHER

## 2022-08-12 NOTE — PATIENT INSTRUCTIONS
Restart cellcept 500/500 since liver tests have more than doubled -may need prednisone instead of cellcept  Monitor labs weekly  Weekly cmv pcr  Start dapsone  US per protocol  Return 4 weeks and then 3 months after that

## 2022-08-12 NOTE — LETTER
August 12, 2022        Killian Dodd  [address]  [place] [address]  Phone: [phone]  Fax: [phone]             [facility] - Liver Transplant  [address] [address]  Phone: [phone]  Fax: [phone]   Patient: Malu Montes   MR Number: [id number]   YOB: 1970   Date of Visit: 8/12/2022       Dear Dr. Killian Dodd    Thank you for referring Malu Montes to me for evaluation. Attached you will find relevant portions of my assessment and plan of care.    If you have questions, please do not hesitate to call me. I look forward to following Malu Montes along with you.    Sincerely,    Sharmila Pacheco MD    Enclosure    If you would like to receive this communication electronically, please contact [email or web address] or [phone] to request [employer] Link access.    [employer]io[employer] Link is a tool which provides read-only access to select patient information with whom you have a relationship. Its easy to use and provides real time access to review your patients record including encounter summaries, notes, results, and demographic information.    If you feel you have received this communication in error or would no longer like to receive these types of communications, please e-mail [email or web address]

## 2022-08-12 NOTE — PROGRESS NOTES
Transplant Hepatology  Liver Transplant Recipient Follow-up    Transplant Date: 6/5/2022  UNOS Native Liver Dx: Alcoholic Cirrhosis    Malu is here for follow up of her liver transplant.    ORGAN: LIVER  Whole or Partial: whole liver  Donor Type: donation after brain death  [facility] High Risk: yes  Donor CMV Status: Positive  Donor HCV Status: Positive  Donor HBcAb: Negative  Donor HBV JACOBO: Negative  Donor HCV JACOBO: Negative  Biliary Anastomosis: end to end  Arterial Anatomy: standard  IVC reconstruction: end to end ivc  Portal vein status: patent    She has had the following complications since transplant: leukopenia. The noted complications is well controlled.    Subjective:     Interval History: Malu is now 2 months post liver transplant. Currently, she is doing well. She has had leukopenia and is off cellcept, bactrim and valcyte. Received neupogen. Current complaints include none..    Now s/p DBD OLTX 6/5 for ETOH cirrhosis (donor HCVab+/JACOBO-). Had intra-op HD, originally listed as liver-kidney transplant. Kidney function has improved and she is now delisted for kidney tx. Surgery went without complication.      Allograft function 8/12/22: ALT 39, AST 31, ALKP 106, Tbil 0.3, creat 1.2 (liver tests were in the teens-now have doubled)  IS: prograf monotherapy  HCV Ab+, JACOBO- donor: HCV RNA-ve 8/1/22    Abdo US 7/27/22: satisfactory doppler    Immunoprophylaxis:   PCP PROPHYLAXIS: bactrim STOP 8/8/22- now on dapsone until 12/6/22  CMV PROPHYLAXIS: valcyte STOP 8/8/22 neutropenia - check CMV pcr weekly until 9/6/22  FUNGAL PROPHYLAXIS: nystatin STOPPED 8/8/22  Aspirin: 81mg daily     Key Complications:   Bile Leak - NO  HAT / HAS-NO  Back to OR- NO    Review of Systems   Constitutional: Negative.    HENT: Negative.    Eyes: Negative.    Respiratory: Negative.    Cardiovascular: Negative.    Gastrointestinal: Negative.    Genitourinary: Negative.    Musculoskeletal: Negative.    Skin: Negative.    Neurological:  Negative.    Psychiatric/Behavioral: Negative.        Objective:     Vitals:    08/12/22 0933   BP: 136/85   Pulse: 102   Resp: 17   Temp: 98.1 °F (36.7 °C)     Physical Exam  Vitals reviewed.   Constitutional:       Appearance: She is well-developed.   HENT:      Head: Normocephalic and atraumatic.   Eyes:      General: No scleral icterus.     Conjunctiva/sclera: Conjunctivae normal.      Pupils: Pupils are equal, round, and reactive to light.   Neck:      Thyroid: No thyromegaly.   Cardiovascular:      Rate and Rhythm: Normal rate and regular rhythm.      Heart sounds: Normal heart sounds.   Pulmonary:      Effort: Pulmonary effort is normal.      Breath sounds: Normal breath sounds. No rales.   Abdominal:      General: Bowel sounds are normal. There is no distension.      Palpations: Abdomen is soft. There is no mass.      Tenderness: There is no abdominal tenderness.   Musculoskeletal:         General: Normal range of motion.      Cervical back: Normal range of motion and neck supple.   Skin:     General: Skin is warm and dry.      Findings: No rash.   Neurological:      Mental Status: She is alert and oriented to person, place, and time.       Lab Results   Component Value Date    BILITOT 0.3 08/12/2022    AST 31 08/12/2022    ALT 39 08/12/2022    ALKPHOS 106 08/12/2022    CREATININE 1.2 08/12/2022    ALBUMIN 4.0 08/12/2022     Lab Results   Component Value Date    WBC 6.33 08/12/2022    HGB 12.3 08/12/2022    HCT 37.7 08/12/2022    HCT 31 (L) 06/06/2022     08/12/2022     Lab Results   Component Value Date    TACROLIMUS 8.9 08/09/2022       Assessment/Plan:   The patient is now 2 months post liver transplant. Current recommendations:  1. Allograft function and control of IS: liver tests have doubled (were in the teens); continue prograf with target of only 5-6 due to renal insufficiency; add back cellcept 500 mg bid (pt deferred prednisone); check labs weekly; abdo US per protocol  2. Renal  insufficiency: kidneys have improved; now delisted for kidney tx; monitor; minimize prograf; f/u with nephtology  3. Immunoprophylaxis:   PCP PROPHYLAXIS: bactrim STOP 8/8/22- now on dapsone until 12/6/22  CMV PROPHYLAXIS: valcyte STOP 8/8/22 neutropenia - check CMV pcr weekly until 9/6/22  FUNGAL PROPHYLAXIS: nystatin STOPPED 8/8/22  Aspirin: 81mg daily  4. HCV Ab+/JACOBO- donor: check HCV per protocol  5. Hx alcohol abuse: periodic peth tests to be drawn     Return in 4 weeks and 6 months post liver transplatn    Patient advised that it is recommended that all transplant candidates, and their close contacts and household members receive Covid vaccination.        Sharmila Pacheco MD           [facility] Patient Status  Functional Status: 90% - Able to carry on normal activity: minor symptoms of disease  Physical Capacity: No Limitations  Working for income: yes  New diabetes onset between last follow-up to the current follow-up: No  Did patient have any acute rejection episodes during the follow-up period: No  Post transplant malignancy: No

## 2022-08-15 ENCOUNTER — LAB VISIT (OUTPATIENT)
Dept: LAB | Facility: HOSPITAL | Age: 52
End: 2022-08-15
Attending: INTERNAL MEDICINE
Payer: COMMERCIAL

## 2022-08-15 DIAGNOSIS — Z94.4 LIVER REPLACED BY TRANSPLANT: ICD-10-CM

## 2022-08-15 LAB
ALBUMIN SERPL BCP-MCNC: 4.1 G/DL (ref 3.5–5.2)
ALP SERPL-CCNC: 101 U/L (ref 55–135)
ALT SERPL W/O P-5'-P-CCNC: 36 U/L (ref 10–44)
ANION GAP SERPL CALC-SCNC: 9 MMOL/L (ref 8–16)
AST SERPL-CCNC: 31 U/L (ref 10–40)
BASOPHILS # BLD AUTO: 0.15 K/UL (ref 0–0.2)
BASOPHILS NFR BLD: 2.2 % (ref 0–1.9)
BILIRUB SERPL-MCNC: 0.4 MG/DL (ref 0.1–1)
BUN SERPL-MCNC: 29 MG/DL (ref 6–20)
CALCIUM SERPL-MCNC: 9.4 MG/DL (ref 8.7–10.5)
CHLORIDE SERPL-SCNC: 107 MMOL/L (ref 95–110)
CO2 SERPL-SCNC: 23 MMOL/L (ref 23–29)
CREAT SERPL-MCNC: 1.2 MG/DL (ref 0.5–1.4)
DIFFERENTIAL METHOD: ABNORMAL
EOSINOPHIL # BLD AUTO: 0.5 K/UL (ref 0–0.5)
EOSINOPHIL NFR BLD: 6.8 % (ref 0–8)
ERYTHROCYTE [DISTWIDTH] IN BLOOD BY AUTOMATED COUNT: 13.9 % (ref 11.5–14.5)
EST. GFR  (NO RACE VARIABLE): 54.8 ML/MIN/1.73 M^2
GLUCOSE SERPL-MCNC: 106 MG/DL (ref 70–110)
HCT VFR BLD AUTO: 36.6 % (ref 37–48.5)
HGB BLD-MCNC: 12 G/DL (ref 12–16)
IMM GRANULOCYTES # BLD AUTO: 0.07 K/UL (ref 0–0.04)
IMM GRANULOCYTES NFR BLD AUTO: 1 % (ref 0–0.5)
LYMPHOCYTES # BLD AUTO: 1.7 K/UL (ref 1–4.8)
LYMPHOCYTES NFR BLD: 25.1 % (ref 18–48)
MCH RBC QN AUTO: 29.9 PG (ref 27–31)
MCHC RBC AUTO-ENTMCNC: 32.8 G/DL (ref 32–36)
MCV RBC AUTO: 91 FL (ref 82–98)
MONOCYTES # BLD AUTO: 0.7 K/UL (ref 0.3–1)
MONOCYTES NFR BLD: 9.4 % (ref 4–15)
NEUTROPHILS # BLD AUTO: 3.8 K/UL (ref 1.8–7.7)
NEUTROPHILS NFR BLD: 55.5 % (ref 38–73)
NRBC BLD-RTO: 0 /100 WBC
PLATELET # BLD AUTO: 312 K/UL (ref 150–450)
PMV BLD AUTO: 9.1 FL (ref 9.2–12.9)
POTASSIUM SERPL-SCNC: 4.2 MMOL/L (ref 3.5–5.1)
PROT SERPL-MCNC: 7 G/DL (ref 6–8.4)
RBC # BLD AUTO: 4.01 M/UL (ref 4–5.4)
SODIUM SERPL-SCNC: 139 MMOL/L (ref 136–145)
TACROLIMUS BLD-MCNC: 7.2 NG/ML (ref 5–15)
WBC # BLD AUTO: 6.88 K/UL (ref 3.9–12.7)

## 2022-08-15 PROCEDURE — 85025 COMPLETE CBC W/AUTO DIFF WBC: CPT | Performed by: INTERNAL MEDICINE

## 2022-08-15 PROCEDURE — 36415 COLL VENOUS BLD VENIPUNCTURE: CPT | Performed by: INTERNAL MEDICINE

## 2022-08-15 PROCEDURE — 80197 ASSAY OF TACROLIMUS: CPT | Performed by: INTERNAL MEDICINE

## 2022-08-15 PROCEDURE — 80053 COMPREHEN METABOLIC PANEL: CPT | Performed by: INTERNAL MEDICINE

## 2022-08-16 ENCOUNTER — TELEPHONE (OUTPATIENT)
Dept: TRANSPLANT | Facility: CLINIC | Age: 52
End: 2022-08-16
Payer: COMMERCIAL

## 2022-08-16 NOTE — TELEPHONE ENCOUNTER
Sent message to patient to let her know    ----- Message from Sharmila Pacheco MD sent at 8/15/2022  7:29 PM CDT -----  The Labs are stable/slightly better; labs in one week - please let patient know.

## 2022-08-17 ENCOUNTER — TELEPHONE (OUTPATIENT)
Dept: TRANSPLANT | Facility: CLINIC | Age: 52
End: 2022-08-17
Payer: COMMERCIAL

## 2022-08-17 LAB
CMV DNA SPEC QL NAA+PROBE: NOT DETECTED
CYTOMEGALOVIRUS LOG (IU/ML): NOT DETECTED LOGIU/ML
CYTOMEGALOVIRUS PCR, QUANT: NOT DETECTED IU/ML

## 2022-08-17 NOTE — TELEPHONE ENCOUNTER
Pt notified of the committee's decision to remove her from the kidney transplant list as kidneys has improved. Dr Pacheco also dicussed with pt. Understanding stated.

## 2022-08-17 NOTE — LETTER
August 17, 2022    Malu Simon  [address] [place] [address]    Dear Malu Montes:    MRN:  [id number]    It is the duty of the [facility] Kid[facility]plant Selection Committee to determine which patients are candidates for a transplant.  For this reason, our committee has the difficult task of evaluating patients to determine which ones have the greatest chance of having a successful transplant.  We are aware of the magnitude of this responsibility, and we approach it with reverence and humility.    Your current health status was reviewed at a recent selection committee meeting.  It is with regret I inform you that you are no longer a suitable transplant candidate because kidneys have improved.  Your name has been removed from the wait list effective 8/10/2022.    The [facility] Liver Selection Committee carefully considers each patient's transplant candidacy and determines whether it is safe to proceed with transplantation on a case-by-case basis using established selection criteria.  In the past, you were considered to be a suitable transplant candidate.  At present, the risk of proceeding with an elective transplant surgery has become too high.                                                                                                 Although the selection committee believes you are not a suitable transplant candidate, you have the option to be evaluated at other transplant centers.  You may request your [facility] records be sent to any center of your choice by contacting our Medical Records Department at [phone].                                                                               Attached is a letter from the [employer] Network for Organ Sharing (UNOS).  It describes the services and information offered to patients by UNOS and the Organ Procurement and Transplant Network.                                                                                                                                       The [facility] Liver Selection Committee sincerely wishes you the best and remains available to answer any questions.  Please do not hesitate to contact our pre-transplant office if we can assist you in any other way.                                                                               Sincerely,    Henry Richmond MD, MSc, [facility], FACS  Abdominal Transplant Surgeon   Medical Director, Multi-Organ Transplant [place]  Head, Abdominal Transplant Surgery    AJ:    Enclosure    c:  Killian Dodd DO    [facility] [place] [address] * [place]  [address] * [phone]      The Organ Procurement and Transplantation Network   Toll-free patient services line:  Your resource for organ transplant information      If you have a question regarding your own medical care, you always should call your transplant hospital first. However, for general organ transplant-related information, you can call the Organ Procurement and Transplantation Network (OPTN) toll-free patient services line at 1-582.314.3328.     Anyone, including potential transplant candidates, candidates, recipients, family members, friends, living donors, and donor family members, can call this number to:     · Talk about organ donation, living donation, the transplant process, the donation process, and transplant policies.   · Get a free patient information kit with helpful booklets, waiting list and transplant information, and a list of all transplant hospitals.   · Ask questions about the OPTN website (https://optn.transplant.hrsa.gov/), the [employer] for Organ Sharings (UNOS) website (https://unos.org/), or the UNOS website for living donors and transplant recipients. (https://www.transplantliving.org/).   · Learn how the OPTN can help you.   · Talk about any concerns that you may have with a transplant hospital.     The nations transplant system, the OPTN, is managed under federal  contract by the [employer] for Organ Sharing (UNOS), which is a non-profit charitable organization. The OPTN helps create and define organ sharing policies that make the best use of donated organs. This process continuously evaluating new advances and discoveries so policies can be adapted to best serve patients waiting for transplants. To do so, the OPTN works closely with transplant professionals, transplant patients, transplant candidates, donor families, living donors, and the public. All transplant programs and organ procurement organizations throughout the country are OPTN members and are obligated to follow the policies the OPTN creates for allocating organs.     The OPTN also is responsible for:   · Providing educational material for patients, the public, and professionals.   · Raising awareness of the need for donated organs and tissue.   · Coordinating organ procurement, matching, and placement.   · Collecting information about every organ transplant and donation that occurs in the [place].     Remember, you should contact your transplant hospital directly if you have questions or concerns about your own medical care including medical records, work-up progress, and test results.     We are not your transplant hospital, and our staff will not be able to answer questions about your case, so please keep your transplant hospitals phone number handy.   However, while you research your transplant needs and learn as much as you can about transplantation and donation, we welcome your call to our toll-free patient services line at [phone].

## 2022-08-22 ENCOUNTER — LAB VISIT (OUTPATIENT)
Dept: LAB | Facility: HOSPITAL | Age: 52
End: 2022-08-22
Attending: SURGERY
Payer: COMMERCIAL

## 2022-08-22 ENCOUNTER — PATIENT MESSAGE (OUTPATIENT)
Dept: TRANSPLANT | Facility: CLINIC | Age: 52
End: 2022-08-22
Payer: COMMERCIAL

## 2022-08-22 DIAGNOSIS — Z94.4 LIVER REPLACED BY TRANSPLANT: ICD-10-CM

## 2022-08-22 DIAGNOSIS — Z94.4 LIVER REPLACED BY TRANSPLANT: Primary | ICD-10-CM

## 2022-08-22 LAB
ALBUMIN SERPL BCP-MCNC: 4.1 G/DL (ref 3.5–5.2)
ALP SERPL-CCNC: 90 U/L (ref 55–135)
ALT SERPL W/O P-5'-P-CCNC: 39 U/L (ref 10–44)
ANION GAP SERPL CALC-SCNC: 8 MMOL/L (ref 8–16)
AST SERPL-CCNC: 31 U/L (ref 10–40)
BASOPHILS # BLD AUTO: 0.22 K/UL (ref 0–0.2)
BASOPHILS NFR BLD: 3 % (ref 0–1.9)
BILIRUB SERPL-MCNC: 0.4 MG/DL (ref 0.1–1)
BUN SERPL-MCNC: 23 MG/DL (ref 6–20)
CALCIUM SERPL-MCNC: 9.3 MG/DL (ref 8.7–10.5)
CHLORIDE SERPL-SCNC: 103 MMOL/L (ref 95–110)
CO2 SERPL-SCNC: 23 MMOL/L (ref 23–29)
CREAT SERPL-MCNC: 1.2 MG/DL (ref 0.5–1.4)
DIFFERENTIAL METHOD: ABNORMAL
EOSINOPHIL # BLD AUTO: 0.4 K/UL (ref 0–0.5)
EOSINOPHIL NFR BLD: 5.7 % (ref 0–8)
ERYTHROCYTE [DISTWIDTH] IN BLOOD BY AUTOMATED COUNT: 14 % (ref 11.5–14.5)
EST. GFR  (NO RACE VARIABLE): 54.8 ML/MIN/1.73 M^2
GLUCOSE SERPL-MCNC: 105 MG/DL (ref 70–110)
HCT VFR BLD AUTO: 35.6 % (ref 37–48.5)
HGB BLD-MCNC: 11.7 G/DL (ref 12–16)
IMM GRANULOCYTES # BLD AUTO: 0.02 K/UL (ref 0–0.04)
IMM GRANULOCYTES NFR BLD AUTO: 0.3 % (ref 0–0.5)
LYMPHOCYTES # BLD AUTO: 1.6 K/UL (ref 1–4.8)
LYMPHOCYTES NFR BLD: 21.5 % (ref 18–48)
MCH RBC QN AUTO: 30.6 PG (ref 27–31)
MCHC RBC AUTO-ENTMCNC: 32.9 G/DL (ref 32–36)
MCV RBC AUTO: 93 FL (ref 82–98)
MONOCYTES # BLD AUTO: 0.4 K/UL (ref 0.3–1)
MONOCYTES NFR BLD: 6 % (ref 4–15)
NEUTROPHILS # BLD AUTO: 4.7 K/UL (ref 1.8–7.7)
NEUTROPHILS NFR BLD: 63.5 % (ref 38–73)
NRBC BLD-RTO: 0 /100 WBC
PLATELET # BLD AUTO: 386 K/UL (ref 150–450)
PMV BLD AUTO: 8.9 FL (ref 9.2–12.9)
POTASSIUM SERPL-SCNC: 4 MMOL/L (ref 3.5–5.1)
PROT SERPL-MCNC: 6.8 G/DL (ref 6–8.4)
RBC # BLD AUTO: 3.82 M/UL (ref 4–5.4)
SODIUM SERPL-SCNC: 134 MMOL/L (ref 136–145)
TACROLIMUS BLD-MCNC: 8.5 NG/ML (ref 5–15)
WBC # BLD AUTO: 7.34 K/UL (ref 3.9–12.7)

## 2022-08-22 PROCEDURE — 80197 ASSAY OF TACROLIMUS: CPT | Performed by: INTERNAL MEDICINE

## 2022-08-22 PROCEDURE — 36415 COLL VENOUS BLD VENIPUNCTURE: CPT | Performed by: INTERNAL MEDICINE

## 2022-08-22 PROCEDURE — 80053 COMPREHEN METABOLIC PANEL: CPT | Performed by: INTERNAL MEDICINE

## 2022-08-22 PROCEDURE — 85025 COMPLETE CBC W/AUTO DIFF WBC: CPT | Performed by: INTERNAL MEDICINE

## 2022-08-22 RX ORDER — VALGANCICLOVIR 450 MG/1
450 TABLET, FILM COATED ORAL DAILY
Qty: 15 TABLET | Refills: 0 | Status: SHIPPED | OUTPATIENT
Start: 2022-08-22 | End: 2022-09-21 | Stop reason: ALTCHOICE

## 2022-08-22 RX ORDER — TACROLIMUS 1 MG/1
CAPSULE ORAL
Qty: 210 CAPSULE | Refills: 11 | Status: SHIPPED | OUTPATIENT
Start: 2022-08-22 | End: 2022-09-06

## 2022-08-22 NOTE — TELEPHONE ENCOUNTER
Sent message to patient with change  ----- Message from Sharmila Pacheco MD sent at 8/22/2022  2:44 PM CDT -----  The Labs are stable; decrease prograf to 4/3 from 4/4 due to hx renal insufficiency and repaet labs in 1 week- please let patient know.

## 2022-08-22 NOTE — TELEPHONE ENCOUNTER
Sent message to let patient know    ----- Message from Sharmila Pacheco MD sent at 8/22/2022 11:20 AM CDT -----  sure  ----- Message -----  From: Marii Ruiz RN  Sent: 8/22/2022   9:18 AM CDT  To: Sharmila Pacheco MD    The patient never stopped the Valcyte as we told her too.  WBC have been ok, so should she just go ahead and continue until she is supposed to stop on 9/6?      Marii

## 2022-08-24 ENCOUNTER — PATIENT MESSAGE (OUTPATIENT)
Dept: ADMINISTRATIVE | Facility: HOSPITAL | Age: 52
End: 2022-08-24
Payer: COMMERCIAL

## 2022-08-29 ENCOUNTER — PATIENT MESSAGE (OUTPATIENT)
Dept: TRANSPLANT | Facility: CLINIC | Age: 52
End: 2022-08-29
Payer: COMMERCIAL

## 2022-08-29 ENCOUNTER — LAB VISIT (OUTPATIENT)
Dept: LAB | Facility: HOSPITAL | Age: 52
End: 2022-08-29
Attending: INTERNAL MEDICINE
Payer: COMMERCIAL

## 2022-08-29 DIAGNOSIS — Z94.4 LIVER REPLACED BY TRANSPLANT: ICD-10-CM

## 2022-08-29 LAB
ALBUMIN SERPL BCP-MCNC: 4.1 G/DL (ref 3.5–5.2)
ALP SERPL-CCNC: 93 U/L (ref 55–135)
ALT SERPL W/O P-5'-P-CCNC: 37 U/L (ref 10–44)
ANION GAP SERPL CALC-SCNC: 10 MMOL/L (ref 8–16)
AST SERPL-CCNC: 29 U/L (ref 10–40)
BASOPHILS # BLD AUTO: 0.18 K/UL (ref 0–0.2)
BASOPHILS NFR BLD: 2.7 % (ref 0–1.9)
BILIRUB SERPL-MCNC: 0.3 MG/DL (ref 0.1–1)
BUN SERPL-MCNC: 31 MG/DL (ref 6–20)
CALCIUM SERPL-MCNC: 9.4 MG/DL (ref 8.7–10.5)
CHLORIDE SERPL-SCNC: 106 MMOL/L (ref 95–110)
CO2 SERPL-SCNC: 23 MMOL/L (ref 23–29)
CREAT SERPL-MCNC: 1.3 MG/DL (ref 0.5–1.4)
DIFFERENTIAL METHOD: ABNORMAL
EOSINOPHIL # BLD AUTO: 0.7 K/UL (ref 0–0.5)
EOSINOPHIL NFR BLD: 10.4 % (ref 0–8)
ERYTHROCYTE [DISTWIDTH] IN BLOOD BY AUTOMATED COUNT: 14.1 % (ref 11.5–14.5)
EST. GFR  (NO RACE VARIABLE): 49.8 ML/MIN/1.73 M^2
GLUCOSE SERPL-MCNC: 96 MG/DL (ref 70–110)
HCT VFR BLD AUTO: 36.8 % (ref 37–48.5)
HGB BLD-MCNC: 11.6 G/DL (ref 12–16)
IMM GRANULOCYTES # BLD AUTO: 0.04 K/UL (ref 0–0.04)
IMM GRANULOCYTES NFR BLD AUTO: 0.6 % (ref 0–0.5)
LYMPHOCYTES # BLD AUTO: 1.5 K/UL (ref 1–4.8)
LYMPHOCYTES NFR BLD: 21.4 % (ref 18–48)
MCH RBC QN AUTO: 30.4 PG (ref 27–31)
MCHC RBC AUTO-ENTMCNC: 31.5 G/DL (ref 32–36)
MCV RBC AUTO: 97 FL (ref 82–98)
MONOCYTES # BLD AUTO: 0.4 K/UL (ref 0.3–1)
MONOCYTES NFR BLD: 5.3 % (ref 4–15)
NEUTROPHILS # BLD AUTO: 4 K/UL (ref 1.8–7.7)
NEUTROPHILS NFR BLD: 59.6 % (ref 38–73)
NRBC BLD-RTO: 0 /100 WBC
PLATELET # BLD AUTO: 415 K/UL (ref 150–450)
PMV BLD AUTO: 9.1 FL (ref 9.2–12.9)
POTASSIUM SERPL-SCNC: 4.7 MMOL/L (ref 3.5–5.1)
PROT SERPL-MCNC: 7.1 G/DL (ref 6–8.4)
RBC # BLD AUTO: 3.81 M/UL (ref 4–5.4)
SODIUM SERPL-SCNC: 139 MMOL/L (ref 136–145)
TACROLIMUS BLD-MCNC: 8.6 NG/ML (ref 5–15)
WBC # BLD AUTO: 6.76 K/UL (ref 3.9–12.7)

## 2022-08-29 PROCEDURE — 85025 COMPLETE CBC W/AUTO DIFF WBC: CPT | Performed by: INTERNAL MEDICINE

## 2022-08-29 PROCEDURE — 80053 COMPREHEN METABOLIC PANEL: CPT | Performed by: INTERNAL MEDICINE

## 2022-08-29 PROCEDURE — 36415 COLL VENOUS BLD VENIPUNCTURE: CPT | Performed by: INTERNAL MEDICINE

## 2022-08-29 PROCEDURE — 80197 ASSAY OF TACROLIMUS: CPT | Performed by: INTERNAL MEDICINE

## 2022-08-30 ENCOUNTER — PATIENT MESSAGE (OUTPATIENT)
Dept: TRANSPLANT | Facility: CLINIC | Age: 52
End: 2022-08-30
Payer: COMMERCIAL

## 2022-08-30 ENCOUNTER — TELEPHONE (OUTPATIENT)
Dept: TRANSPLANT | Facility: CLINIC | Age: 52
End: 2022-08-30
Payer: COMMERCIAL

## 2022-08-30 DIAGNOSIS — Z94.4 LIVER REPLACED BY TRANSPLANT: Primary | ICD-10-CM

## 2022-08-30 NOTE — TELEPHONE ENCOUNTER
Sent patient message via portal to let her know labs are stable.  No medication changes, next labs due on 09/06/22      ----- Message from Sharmila Pacheco MD sent at 8/29/2022  7:08 PM CDT -----  The Labs are stable -no longer in the teens; will not lower prograf for now- please let patient know.

## 2022-09-06 ENCOUNTER — PATIENT MESSAGE (OUTPATIENT)
Dept: TRANSPLANT | Facility: CLINIC | Age: 52
End: 2022-09-06
Payer: COMMERCIAL

## 2022-09-06 ENCOUNTER — LAB VISIT (OUTPATIENT)
Dept: LAB | Facility: HOSPITAL | Age: 52
End: 2022-09-06
Attending: INTERNAL MEDICINE
Payer: COMMERCIAL

## 2022-09-06 DIAGNOSIS — Z94.4 LIVER REPLACED BY TRANSPLANT: ICD-10-CM

## 2022-09-06 LAB
ALBUMIN SERPL BCP-MCNC: 4.3 G/DL (ref 3.5–5.2)
ALP SERPL-CCNC: 92 U/L (ref 55–135)
ALT SERPL W/O P-5'-P-CCNC: 34 U/L (ref 10–44)
ANION GAP SERPL CALC-SCNC: 5 MMOL/L (ref 8–16)
AST SERPL-CCNC: 28 U/L (ref 10–40)
BASOPHILS # BLD AUTO: 0.18 K/UL (ref 0–0.2)
BASOPHILS NFR BLD: 2.5 % (ref 0–1.9)
BILIRUB SERPL-MCNC: 0.4 MG/DL (ref 0.1–1)
BUN SERPL-MCNC: 25 MG/DL (ref 6–20)
CALCIUM SERPL-MCNC: 9.6 MG/DL (ref 8.7–10.5)
CHLORIDE SERPL-SCNC: 104 MMOL/L (ref 95–110)
CO2 SERPL-SCNC: 25 MMOL/L (ref 23–29)
CREAT SERPL-MCNC: 1.3 MG/DL (ref 0.5–1.4)
DIFFERENTIAL METHOD: ABNORMAL
EOSINOPHIL # BLD AUTO: 0.8 K/UL (ref 0–0.5)
EOSINOPHIL NFR BLD: 11 % (ref 0–8)
ERYTHROCYTE [DISTWIDTH] IN BLOOD BY AUTOMATED COUNT: 14.6 % (ref 11.5–14.5)
EST. GFR  (NO RACE VARIABLE): 49.8 ML/MIN/1.73 M^2
GLUCOSE SERPL-MCNC: 109 MG/DL (ref 70–110)
HCT VFR BLD AUTO: 37.7 % (ref 37–48.5)
HGB BLD-MCNC: 12.2 G/DL (ref 12–16)
IMM GRANULOCYTES # BLD AUTO: 0.02 K/UL (ref 0–0.04)
IMM GRANULOCYTES NFR BLD AUTO: 0.3 % (ref 0–0.5)
LYMPHOCYTES # BLD AUTO: 1.6 K/UL (ref 1–4.8)
LYMPHOCYTES NFR BLD: 22.7 % (ref 18–48)
MCH RBC QN AUTO: 30.9 PG (ref 27–31)
MCHC RBC AUTO-ENTMCNC: 32.4 G/DL (ref 32–36)
MCV RBC AUTO: 95 FL (ref 82–98)
MONOCYTES # BLD AUTO: 0.5 K/UL (ref 0.3–1)
MONOCYTES NFR BLD: 6.3 % (ref 4–15)
NEUTROPHILS # BLD AUTO: 4.1 K/UL (ref 1.8–7.7)
NEUTROPHILS NFR BLD: 57.2 % (ref 38–73)
NRBC BLD-RTO: 0 /100 WBC
PLATELET # BLD AUTO: 345 K/UL (ref 150–450)
PMV BLD AUTO: 9.4 FL (ref 9.2–12.9)
POTASSIUM SERPL-SCNC: 4.4 MMOL/L (ref 3.5–5.1)
PROT SERPL-MCNC: 7.3 G/DL (ref 6–8.4)
RBC # BLD AUTO: 3.95 M/UL (ref 4–5.4)
SODIUM SERPL-SCNC: 134 MMOL/L (ref 136–145)
TACROLIMUS BLD-MCNC: 8.7 NG/ML (ref 5–15)
WBC # BLD AUTO: 7.18 K/UL (ref 3.9–12.7)

## 2022-09-06 PROCEDURE — 80053 COMPREHEN METABOLIC PANEL: CPT | Performed by: INTERNAL MEDICINE

## 2022-09-06 PROCEDURE — 36415 COLL VENOUS BLD VENIPUNCTURE: CPT | Performed by: INTERNAL MEDICINE

## 2022-09-06 PROCEDURE — 85025 COMPLETE CBC W/AUTO DIFF WBC: CPT | Performed by: INTERNAL MEDICINE

## 2022-09-06 PROCEDURE — 80197 ASSAY OF TACROLIMUS: CPT | Performed by: INTERNAL MEDICINE

## 2022-09-06 RX ORDER — TACROLIMUS 1 MG/1
3 CAPSULE ORAL EVERY 12 HOURS
Qty: 180 CAPSULE | Refills: 11 | Status: SHIPPED | OUTPATIENT
Start: 2022-09-06 | End: 2022-11-01

## 2022-09-06 NOTE — TELEPHONE ENCOUNTER
Sent message to patient with changes.    ----- Message from Sharmila Pacheco MD sent at 9/6/2022 12:36 PM CDT -----  The Labs are stable; decrease prograf to 3/3 from 4/3 and repeat labs in 2 weeks- please let patient know.

## 2022-09-09 ENCOUNTER — PATIENT MESSAGE (OUTPATIENT)
Dept: TRANSPLANT | Facility: CLINIC | Age: 52
End: 2022-09-09
Payer: COMMERCIAL

## 2022-09-09 ENCOUNTER — OFFICE VISIT (OUTPATIENT)
Dept: TRANSPLANT | Facility: CLINIC | Age: 52
End: 2022-09-09
Payer: COMMERCIAL

## 2022-09-09 VITALS
BODY MASS INDEX: 22.76 KG/M2 | SYSTOLIC BLOOD PRESSURE: 152 MMHG | HEIGHT: 63 IN | HEART RATE: 92 BPM | TEMPERATURE: 98 F | WEIGHT: 128.44 LBS | RESPIRATION RATE: 17 BRPM | DIASTOLIC BLOOD PRESSURE: 82 MMHG | OXYGEN SATURATION: 97 %

## 2022-09-09 DIAGNOSIS — Z94.4 S/P LIVER TRANSPLANT: ICD-10-CM

## 2022-09-09 DIAGNOSIS — N02.B9 IGA NEPHROPATHY ASSOCIATED WITH LIVER DISEASE: Primary | ICD-10-CM

## 2022-09-09 DIAGNOSIS — Z29.89 PROPHYLACTIC IMMUNOTHERAPY: ICD-10-CM

## 2022-09-09 DIAGNOSIS — Z79.60 LONG-TERM USE OF IMMUNOSUPPRESSANT MEDICATION: ICD-10-CM

## 2022-09-09 DIAGNOSIS — K76.9 IGA NEPHROPATHY ASSOCIATED WITH LIVER DISEASE: Primary | ICD-10-CM

## 2022-09-09 PROCEDURE — 99214 OFFICE O/P EST MOD 30 MIN: CPT | Mod: S$GLB,,, | Performed by: INTERNAL MEDICINE

## 2022-09-09 PROCEDURE — 99999 PR PBB SHADOW E&M-EST. PATIENT-LVL V: CPT | Mod: PBBFAC,,, | Performed by: INTERNAL MEDICINE

## 2022-09-09 PROCEDURE — 99214 PR OFFICE/OUTPT VISIT, EST, LEVL IV, 30-39 MIN: ICD-10-PCS | Mod: S$GLB,,, | Performed by: INTERNAL MEDICINE

## 2022-09-09 PROCEDURE — 99999 PR PBB SHADOW E&M-EST. PATIENT-LVL V: ICD-10-PCS | Mod: PBBFAC,,, | Performed by: INTERNAL MEDICINE

## 2022-09-09 NOTE — PROGRESS NOTES
Transplant Hepatology  Liver Transplant Recipient Follow-up    Transplant Date: 6/5/2022  UNOS Native Liver Dx: Alcoholic Cirrhosis    Malu is here for follow up of her liver transplant.    ORGAN: LIVER  Whole or Partial: whole liver  Donor Type: donation after brain death  [facility] High Risk: yes  Donor CMV Status: Positive  Donor HCV Status: Positive  Donor HBcAb: Negative  Donor HBV JACOBO: Negative  Donor HCV JACOBO: Negative  Biliary Anastomosis: end to end  Arterial Anatomy: standard  IVC reconstruction: end to end ivc  Portal vein status: patent    She has had the following complications since transplant:  leukopenia . The noted complications is well controlled.    Subjective:     Interval History: Malu is now 3 months post liver transplant. Currently, she is doing well. She has had leukopenia and is off cellcept, bactrim and valcyte. Received neupogen. Current complaints include none..    Now s/p DBD OLTX 6/5 for ETOH cirrhosis (donor HCVab+/JACOBO-). Had intra-op HD, originally listed as liver-kidney transplant. Kidney function has improved and she is now delisted for kidney tx. Surgery went without complication.      Allograft function 9/6/22: ALT 34, AST 28, ALKP 92, Tbil 0.4, creat 1.3, prograf level 8.7  IS: prograf, cellcept 500 mg bid  HCV Ab+, JACOBO- donor: HCV RNA-ve 8/1/22    Abdo  7/27/22: satisfactory doppler    Immunoprophylaxis:   PCP PROPHYLAXIS: bactrim STOPPED 8/8/22- now on dapsone until 12/6/22  CMV PROPHYLAXIS: valcyte STOP 8/8/22 neutropenia - checked CMV pcr weekly until 9/6/22; now completed  FUNGAL PROPHYLAXIS: nystatin STOPPED 8/8/22  Aspirin: 81mg daily     Key Complications:   Bile Leak - NO  HAT / HAS-NO  Back to OR- NO    Review of Systems   Constitutional: Negative.    HENT: Negative.     Eyes: Negative.    Respiratory: Negative.     Cardiovascular: Negative.    Gastrointestinal: Negative.    Genitourinary: Negative.    Musculoskeletal: Negative.    Skin: Negative.    Neurological:  Negative.    Psychiatric/Behavioral: Negative.       Objective:     Vitals:    09/09/22 0955   BP: (!) 152/82   Pulse: 92   Resp: 17   Temp: 97.9 °F (36.6 °C)        Physical Exam  Vitals reviewed.   Constitutional:       Appearance: She is well-developed.   HENT:      Head: Normocephalic and atraumatic.   Eyes:      General: No scleral icterus.     Conjunctiva/sclera: Conjunctivae normal.      Pupils: Pupils are equal, round, and reactive to light.   Neck:      Thyroid: No thyromegaly.   Cardiovascular:      Rate and Rhythm: Normal rate and regular rhythm.      Heart sounds: Normal heart sounds.   Pulmonary:      Effort: Pulmonary effort is normal.      Breath sounds: Normal breath sounds. No rales.   Abdominal:      General: Bowel sounds are normal. There is no distension.      Palpations: Abdomen is soft. There is no mass.      Tenderness: There is no abdominal tenderness.   Musculoskeletal:         General: Normal range of motion.      Cervical back: Normal range of motion and neck supple.   Skin:     General: Skin is warm and dry.      Findings: No rash.   Neurological:      Mental Status: She is alert and oriented to person, place, and time.     Lab Results   Component Value Date    BILITOT 0.4 09/06/2022    AST 28 09/06/2022    ALT 34 09/06/2022    ALKPHOS 92 09/06/2022    CREATININE 1.3 09/06/2022    ALBUMIN 4.3 09/06/2022     Lab Results   Component Value Date    WBC 7.18 09/06/2022    HGB 12.2 09/06/2022    HCT 37.7 09/06/2022    HCT 31 (L) 06/06/2022     09/06/2022     Lab Results   Component Value Date    TACROLIMUS 8.7 09/06/2022       Assessment/Plan:   The patient is now 3 months post liver transplant. Current recommendations:  1. Allograft function and control of IS: liver tests have doubled (were in the teens); continue prograf with target of only 3-5 due to renal insufficiency; add back cellcept 500 mg bid (pt deferred prednisone); check labs weekly; abdo US per protocol (due this month)  2.  Renal insufficiency: kidneys have improved; now delisted for kidney tx; monitor; minimize prograf; f/u with nephtology since has a diagnosis of IgA nephropathy  3. Immunoprophylaxis:   PCP PROPHYLAXIS: bactrim STOPPED 8/8/22- now on dapsone until 12/6/22  CMV PROPHYLAXIS: valcyte STOP 8/8/22 neutropenia - checked CMV pcr weekly until 9/6/22; now completed  FUNGAL PROPHYLAXIS: nystatin STOPPED 8/8/22  Aspirin: 81mg daily  4. HCV Ab+/JACOBO- donor: check HCV per protocol  5. Hx alcohol abuse: periodic peth tests to be drawn     Return at 6 months post liver transplant    Patient advised that it is recommended that all transplant candidates, and their close contacts and household members receive Covid vaccination.        Sharmila Pacheco MD           [facility] Patient Status  Functional Status: 90% - Able to carry on normal activity: minor symptoms of disease  Physical Capacity: No Limitations  Working for income: yes  New diabetes onset between last follow-up to the current follow-up: No  Did patient have any acute rejection episodes during the follow-up period: No  Post transplant malignancy: No

## 2022-09-09 NOTE — PATIENT INSTRUCTIONS
Labs and abdo US per protocol  Return 3 months  To see nephrology  Due for Mammogram; check on pap

## 2022-09-09 NOTE — LETTER
September 9, 2022        Killian Dodd  [address]  [place] [address]  Phone: [phone]  Fax: [phone]             [facility] - Liver Transplant  [address] [address]  Phone: [phone]  Fax: [phone]   Patient: Malu Montes   MR Number: [id number]   YOB: 1970   Date of Visit: 9/9/2022       Dear Dr. Killian Dodd    Thank you for referring Malu Montes to me for evaluation. Attached you will find relevant portions of my assessment and plan of care.    If you have questions, please do not hesitate to call me. I look forward to following Malu Montes along with you.    Sincerely,    Sharmila Pacheco MD    Enclosure    If you would like to receive this communication electronically, please contact [email or web address] or [phone] to request [employer] Link access.    [employer] is a tool which provides read-only access to select patient information with whom you have a relationship. Its easy to use and provides real time access to review your patients record including encounter summaries, notes, results, and demographic information.    If you feel you have received this communication in error or would no longer like to receive these types of communications, please e-mail [email or web address]

## 2022-09-12 ENCOUNTER — TELEPHONE (OUTPATIENT)
Dept: TRANSPLANT | Facility: CLINIC | Age: 52
End: 2022-09-12
Payer: COMMERCIAL

## 2022-09-12 ENCOUNTER — OFFICE VISIT (OUTPATIENT)
Dept: OPTOMETRY | Facility: CLINIC | Age: 52
End: 2022-09-12
Payer: COMMERCIAL

## 2022-09-12 ENCOUNTER — PATIENT MESSAGE (OUTPATIENT)
Dept: TRANSPLANT | Facility: CLINIC | Age: 52
End: 2022-09-12
Payer: COMMERCIAL

## 2022-09-12 DIAGNOSIS — H52.4 ASTIGMATISM OF BOTH EYES WITH PRESBYOPIA: Primary | ICD-10-CM

## 2022-09-12 DIAGNOSIS — H52.203 ASTIGMATISM OF BOTH EYES WITH PRESBYOPIA: Primary | ICD-10-CM

## 2022-09-12 DIAGNOSIS — Z13.5 SCREENING FOR EYE CONDITION: ICD-10-CM

## 2022-09-12 PROCEDURE — 99999 PR PBB SHADOW E&M-EST. PATIENT-LVL III: CPT | Mod: PBBFAC,,, | Performed by: OPTOMETRIST

## 2022-09-12 PROCEDURE — 92004 PR EYE EXAM, NEW PATIENT,COMPREHESV: ICD-10-PCS | Mod: S$GLB,,, | Performed by: OPTOMETRIST

## 2022-09-12 PROCEDURE — 92015 PR REFRACTION: ICD-10-PCS | Mod: S$GLB,,, | Performed by: OPTOMETRIST

## 2022-09-12 PROCEDURE — 92015 DETERMINE REFRACTIVE STATE: CPT | Mod: S$GLB,,, | Performed by: OPTOMETRIST

## 2022-09-12 PROCEDURE — 99999 PR PBB SHADOW E&M-EST. PATIENT-LVL III: ICD-10-PCS | Mod: PBBFAC,,, | Performed by: OPTOMETRIST

## 2022-09-12 PROCEDURE — 92004 COMPRE OPH EXAM NEW PT 1/>: CPT | Mod: S$GLB,,, | Performed by: OPTOMETRIST

## 2022-09-12 NOTE — TELEPHONE ENCOUNTER
Send message to let patient know    ----- Message from Edward Quinn PharmD sent at 9/12/2022  8:50 AM CDT -----  Marii,    I see no issues with the patient using ACV gummies.    Thank you,  Edward Quinn, PharmSYED. [facility], [facility]    ----- Message -----  From: Marii Ruiz RN  Sent: 9/9/2022   3:44 PM CDT  To: Abdominal Transplant Pharmacists    Also Dr REYES told me to ask you to ask the pharmacist if it's ok for me to ACV gummies...apple cider vinegar.

## 2022-09-12 NOTE — PROGRESS NOTES
HPI    51 Y/o female is here for routine eye exam with C/o Pt is 3 months Post op   liver transplant, pt states that her vision is blurry.   Pt denies pain and discomfort   No f/f    Eye med: Lumify OU QD   Last edited by Rachel Tellez MA on 9/12/2022  7:42 AM.        ROS    Negative for: Constitutional, Gastrointestinal, Neurological, Skin,   Genitourinary, Musculoskeletal, HENT, Endocrine, Cardiovascular, Eyes,   Respiratory, Psychiatric, Allergic/Imm, Heme/Lymph  Last edited by Killian Flores, OD on 9/12/2022  7:51 AM.        Assessment /Plan     For exam results, see Encounter Report.    Astigmatism of both eyes with presbyopia    Screening for eye condition      Astig/presby--wrote bifocal Rx.  Discussed PALs/crizal for glare.  Discussed ADAPTATION w first time wear  Liver transplant patient--reassured pt eyes are clear    PLAN:    Rtc 1 yr

## 2022-09-15 DIAGNOSIS — Z94.4 S/P LIVER TRANSPLANT: Primary | ICD-10-CM

## 2022-09-16 ENCOUNTER — APPOINTMENT (OUTPATIENT)
Dept: RADIOLOGY | Facility: OTHER | Age: 52
End: 2022-09-16
Attending: STUDENT IN AN ORGANIZED HEALTH CARE EDUCATION/TRAINING PROGRAM
Payer: COMMERCIAL

## 2022-09-16 ENCOUNTER — PATIENT MESSAGE (OUTPATIENT)
Dept: NEPHROLOGY | Facility: CLINIC | Age: 52
End: 2022-09-16
Payer: COMMERCIAL

## 2022-09-16 DIAGNOSIS — Z12.31 ENCOUNTER FOR SCREENING MAMMOGRAM FOR BREAST CANCER: ICD-10-CM

## 2022-09-16 PROCEDURE — 77063 BREAST TOMOSYNTHESIS BI: CPT | Mod: 26,,, | Performed by: RADIOLOGY

## 2022-09-16 PROCEDURE — 77067 MAMMO DIGITAL SCREENING BILAT WITH TOMO: ICD-10-PCS | Mod: 26,,, | Performed by: RADIOLOGY

## 2022-09-16 PROCEDURE — 77063 MAMMO DIGITAL SCREENING BILAT WITH TOMO: ICD-10-PCS | Mod: 26,,, | Performed by: RADIOLOGY

## 2022-09-16 PROCEDURE — 77063 BREAST TOMOSYNTHESIS BI: CPT | Mod: TC,PN

## 2022-09-16 PROCEDURE — 77067 SCR MAMMO BI INCL CAD: CPT | Mod: 26,,, | Performed by: RADIOLOGY

## 2022-09-19 ENCOUNTER — PATIENT MESSAGE (OUTPATIENT)
Dept: TRANSPLANT | Facility: CLINIC | Age: 52
End: 2022-09-19
Payer: COMMERCIAL

## 2022-09-19 ENCOUNTER — LAB VISIT (OUTPATIENT)
Dept: LAB | Facility: HOSPITAL | Age: 52
End: 2022-09-19
Attending: INTERNAL MEDICINE
Payer: COMMERCIAL

## 2022-09-19 DIAGNOSIS — Z94.4 LIVER REPLACED BY TRANSPLANT: ICD-10-CM

## 2022-09-19 DIAGNOSIS — Z94.4 S/P LIVER TRANSPLANT: ICD-10-CM

## 2022-09-19 LAB
ALBUMIN SERPL BCP-MCNC: 4.3 G/DL (ref 3.5–5.2)
ALP SERPL-CCNC: 97 U/L (ref 55–135)
ALT SERPL W/O P-5'-P-CCNC: 23 U/L (ref 10–44)
ANION GAP SERPL CALC-SCNC: 8 MMOL/L (ref 8–16)
AST SERPL-CCNC: 22 U/L (ref 10–40)
BASOPHILS # BLD AUTO: 0.09 K/UL (ref 0–0.2)
BASOPHILS NFR BLD: 1.5 % (ref 0–1.9)
BILIRUB SERPL-MCNC: 0.4 MG/DL (ref 0.1–1)
BUN SERPL-MCNC: 24 MG/DL (ref 6–20)
CALCIUM SERPL-MCNC: 9.6 MG/DL (ref 8.7–10.5)
CHLORIDE SERPL-SCNC: 104 MMOL/L (ref 95–110)
CO2 SERPL-SCNC: 23 MMOL/L (ref 23–29)
CREAT SERPL-MCNC: 1.2 MG/DL (ref 0.5–1.4)
DIFFERENTIAL METHOD: ABNORMAL
EOSINOPHIL # BLD AUTO: 0.5 K/UL (ref 0–0.5)
EOSINOPHIL NFR BLD: 8.2 % (ref 0–8)
ERYTHROCYTE [DISTWIDTH] IN BLOOD BY AUTOMATED COUNT: 14.7 % (ref 11.5–14.5)
EST. GFR  (NO RACE VARIABLE): 54.8 ML/MIN/1.73 M^2
GLUCOSE SERPL-MCNC: 103 MG/DL (ref 70–110)
HCT VFR BLD AUTO: 36.6 % (ref 37–48.5)
HCV AB SERPL QL IA: REACTIVE
HGB BLD-MCNC: 11.8 G/DL (ref 12–16)
IMM GRANULOCYTES # BLD AUTO: 0.02 K/UL (ref 0–0.04)
IMM GRANULOCYTES NFR BLD AUTO: 0.3 % (ref 0–0.5)
LYMPHOCYTES # BLD AUTO: 1.5 K/UL (ref 1–4.8)
LYMPHOCYTES NFR BLD: 24.8 % (ref 18–48)
MCH RBC QN AUTO: 30 PG (ref 27–31)
MCHC RBC AUTO-ENTMCNC: 32.2 G/DL (ref 32–36)
MCV RBC AUTO: 93 FL (ref 82–98)
MONOCYTES # BLD AUTO: 0.8 K/UL (ref 0.3–1)
MONOCYTES NFR BLD: 13.3 % (ref 4–15)
NEUTROPHILS # BLD AUTO: 3 K/UL (ref 1.8–7.7)
NEUTROPHILS NFR BLD: 51.9 % (ref 38–73)
NRBC BLD-RTO: 0 /100 WBC
PLATELET # BLD AUTO: 319 K/UL (ref 150–450)
PMV BLD AUTO: 8.7 FL (ref 9.2–12.9)
POTASSIUM SERPL-SCNC: 4.4 MMOL/L (ref 3.5–5.1)
PROT SERPL-MCNC: 7.4 G/DL (ref 6–8.4)
RBC # BLD AUTO: 3.93 M/UL (ref 4–5.4)
SODIUM SERPL-SCNC: 135 MMOL/L (ref 136–145)
TACROLIMUS BLD-MCNC: 7.1 NG/ML (ref 5–15)
WBC # BLD AUTO: 5.85 K/UL (ref 3.9–12.7)

## 2022-09-19 PROCEDURE — 80321 ALCOHOLS BIOMARKERS 1OR 2: CPT | Performed by: INTERNAL MEDICINE

## 2022-09-19 PROCEDURE — 86803 HEPATITIS C AB TEST: CPT | Performed by: INTERNAL MEDICINE

## 2022-09-19 PROCEDURE — 87522 HEPATITIS C REVRS TRNSCRPJ: CPT | Performed by: INTERNAL MEDICINE

## 2022-09-19 PROCEDURE — 80053 COMPREHEN METABOLIC PANEL: CPT | Performed by: INTERNAL MEDICINE

## 2022-09-19 PROCEDURE — 80197 ASSAY OF TACROLIMUS: CPT | Performed by: INTERNAL MEDICINE

## 2022-09-19 PROCEDURE — 85025 COMPLETE CBC W/AUTO DIFF WBC: CPT | Performed by: INTERNAL MEDICINE

## 2022-09-20 ENCOUNTER — PATIENT MESSAGE (OUTPATIENT)
Dept: OPTOMETRY | Facility: CLINIC | Age: 52
End: 2022-09-20
Payer: COMMERCIAL

## 2022-09-20 ENCOUNTER — PATIENT MESSAGE (OUTPATIENT)
Dept: TRANSPLANT | Facility: CLINIC | Age: 52
End: 2022-09-20
Payer: COMMERCIAL

## 2022-09-20 LAB — MAYO MISCELLANEOUS RESULT (REF): NORMAL

## 2022-09-21 ENCOUNTER — LAB VISIT (OUTPATIENT)
Dept: LAB | Facility: HOSPITAL | Age: 52
End: 2022-09-21
Attending: INTERNAL MEDICINE
Payer: COMMERCIAL

## 2022-09-21 ENCOUNTER — OFFICE VISIT (OUTPATIENT)
Dept: NEPHROLOGY | Facility: CLINIC | Age: 52
End: 2022-09-21
Payer: COMMERCIAL

## 2022-09-21 ENCOUNTER — PATIENT MESSAGE (OUTPATIENT)
Dept: NEPHROLOGY | Facility: CLINIC | Age: 52
End: 2022-09-21

## 2022-09-21 VITALS
HEART RATE: 92 BPM | HEIGHT: 63 IN | SYSTOLIC BLOOD PRESSURE: 120 MMHG | BODY MASS INDEX: 22.77 KG/M2 | WEIGHT: 128.5 LBS | OXYGEN SATURATION: 93 % | DIASTOLIC BLOOD PRESSURE: 80 MMHG

## 2022-09-21 DIAGNOSIS — Z94.4 S/P LIVER TRANSPLANT: ICD-10-CM

## 2022-09-21 DIAGNOSIS — D84.821 IMMUNOSUPPRESSION DUE TO DRUG THERAPY: ICD-10-CM

## 2022-09-21 DIAGNOSIS — Z79.899 IMMUNOSUPPRESSION DUE TO DRUG THERAPY: ICD-10-CM

## 2022-09-21 DIAGNOSIS — N18.31 STAGE 3A CHRONIC KIDNEY DISEASE: ICD-10-CM

## 2022-09-21 DIAGNOSIS — N18.31 STAGE 3A CHRONIC KIDNEY DISEASE: Primary | ICD-10-CM

## 2022-09-21 DIAGNOSIS — N25.81 SECONDARY HYPERPARATHYROIDISM OF RENAL ORIGIN: ICD-10-CM

## 2022-09-21 LAB
BACTERIA #/AREA URNS AUTO: ABNORMAL /HPF
BILIRUB UR QL STRIP: NEGATIVE
CLARITY UR REFRACT.AUTO: ABNORMAL
COLOR UR AUTO: YELLOW
CREAT UR-MCNC: 173 MG/DL (ref 15–325)
GLUCOSE UR QL STRIP: NEGATIVE
HGB UR QL STRIP: ABNORMAL
KETONES UR QL STRIP: NEGATIVE
LEUKOCYTE ESTERASE UR QL STRIP: ABNORMAL
MICROSCOPIC COMMENT: ABNORMAL
NITRITE UR QL STRIP: NEGATIVE
PETH 16:0/18.1 (POPETH): <10 NG/ML
PETH 16:0/18.2 (PLPETH): <10 NG/ML
PH UR STRIP: 5 [PH] (ref 5–8)
PROT UR QL STRIP: ABNORMAL
PROT UR-MCNC: 16 MG/DL (ref 0–15)
PROT/CREAT UR: 0.09 MG/G{CREAT} (ref 0–0.2)
RBC #/AREA URNS AUTO: 3 /HPF (ref 0–4)
SP GR UR STRIP: 1.02 (ref 1–1.03)
SQUAMOUS #/AREA URNS AUTO: 16 /HPF
URN SPEC COLLECT METH UR: ABNORMAL
WBC #/AREA URNS AUTO: 15 /HPF (ref 0–5)
WBC CLUMPS UR QL AUTO: ABNORMAL

## 2022-09-21 PROCEDURE — 99999 PR PBB SHADOW E&M-EST. PATIENT-LVL IV: CPT | Mod: PBBFAC,,, | Performed by: INTERNAL MEDICINE

## 2022-09-21 PROCEDURE — 81001 URINALYSIS AUTO W/SCOPE: CPT | Performed by: INTERNAL MEDICINE

## 2022-09-21 PROCEDURE — 99214 OFFICE O/P EST MOD 30 MIN: CPT | Mod: S$GLB,,, | Performed by: INTERNAL MEDICINE

## 2022-09-21 PROCEDURE — 99999 PR PBB SHADOW E&M-EST. PATIENT-LVL IV: ICD-10-PCS | Mod: PBBFAC,,, | Performed by: INTERNAL MEDICINE

## 2022-09-21 PROCEDURE — 99214 PR OFFICE/OUTPT VISIT, EST, LEVL IV, 30-39 MIN: ICD-10-PCS | Mod: S$GLB,,, | Performed by: INTERNAL MEDICINE

## 2022-09-21 PROCEDURE — 84156 ASSAY OF PROTEIN URINE: CPT | Performed by: INTERNAL MEDICINE

## 2022-09-21 RX ORDER — GABAPENTIN 100 MG/1
200 CAPSULE ORAL DAILY PRN
Qty: 90 CAPSULE | Refills: 11
Start: 2022-09-21 | End: 2023-09-21

## 2022-09-21 NOTE — PROGRESS NOTES
REASON FOR CONSULT/CHIEF COMPLAIN: Acute kidney injury    REFERRING PHYSICIAN: Killian Dodd DO    HISTORY OF PRESENT ILLNESS: 52 y.o. female  patient was referred here for abnormal renal function.   No chronic NSAID use, no known exposure to lithium, lead. No family history of Lupus, kidney disease or deafness. No ingestion of licorice. One kidney stone at age 25 after her first baby, needed lithotripsy. No smoking.   Had an episode of pancreatitis in 2021. Cirrhosis diagnosed 2021, blamed on alcohol. Has been needing initially once every two weeks, later once week. Kidney function deteriorated and then improved after TIPS procedure. Now s/p liver transplant on . Post op has been uncomplicated so far.   Denied any new issues with urination including dysuria, hematuria, urgency, hesitancy, nocturia, incomplete voiding. Denied chest pain, nausea, vomiting, abd pain, nausea, vomiting, diarrhea, shortness of breath, pedal edema, Orthopnea, PND    ROS:  General: negative for chills, or fatigue  Respiratory: No cough, shortness of breath, or wheezing  Cardiovascular: No chest pain or dyspnea   Gastrointestinal: No abdominal pain, change in bowel habits  Neurological: No new focal weakness, no numbness  Dermatological: No rash or ulcers.    PAST MEDICAL HISTORY:  Past Medical History:   Diagnosis Date    ADD (attention deficit disorder)     Anxiety     Ascites of liver     Cirrhosis 2021    CKD (chronic kidney disease) stage 3, GFR 30-59 ml/min     Constipation     ETOH abuse     Hyperlipidemia     Hypothyroidism     Liver transplant candidate 2022    Other ascites 2021    Pancreatitis, acute     Tobacco use     Vitamin D deficiency        PAST SURGICAL HISTORY:  Past Surgical History:   Procedure Laterality Date    AUGMENTATION OF BREAST      second set    breast augmentation       SECTION      COLONOSCOPY N/A 2021    Procedure: COLONOSCOPY;  Surgeon: Dao CARR  MD Isaac;  Location: [facility] ENDO (2ND FLR);  Service: Endoscopy;  Laterality: N/A;  COVID test 9/14/21 General surgery clinic -     CYSTOSCOPY N/A 09/10/2021    Procedure: CYSTOSCOPY;  Surgeon: Rj Sánchez Jr., MD;  Location: [facility] OR 1ST FLR;  Service: Urology;  Laterality: N/A;    ESOPHAGOGASTRODUODENOSCOPY N/A 09/17/2021    Procedure: ESOPHAGOGASTRODUODENOSCOPY (EGD);  Surgeon: Dao Gray MD;  Location: [facility] ENDO (2ND FLR);  Service: Endoscopy;  Laterality: N/A;  labs current on 9/7    ESOPHAGOGASTRODUODENOSCOPY N/A 10/26/2021    Procedure: ESOPHAGOGASTRODUODENOSCOPY (EGD);  Surgeon: Dao Gray MD;  Location: [facility] ENDO (2ND FLR);  Service: Endoscopy;  Laterality: N/A;  to be done the week of 10/18/21 per Dr. Gray-[facility]-10/23/21-[facility]   10/25 arrival time confirmed with pt-rb    ESOPHAGOGASTRODUODENOSCOPY Left 12/21/2021    Procedure: EGD (ESOPHAGOGASTRODUODENOSCOPY);  Surgeon: Koffi Monteiro MD;  Location: [facility] (4TH FLR);  Service: Endoscopy;  Laterality: Left;  Rapid  pt requested AM appt and first available in December-  labs morning of procedure-  12/14 lvm to confirm appt-rb    HEMORRHOID SURGERY      LIVER TRANSPLANT N/A 06/05/2022    Procedure: TRANSPLANT, LIVER;  Surgeon: Franco Slaughter MD;  Location: [facility] OR 2ND FLR;  Service: Transplant;  Laterality: N/A;    PERITONEOCENTESIS Right 06/08/2021    Procedure: PARACENTESIS, ABDOMINAL;  Surgeon: Jay Torre MD;  Location: [facility] CATH LAB;  Service: Radiology;  Laterality: Right;    PERITONEOCENTESIS Right 06/25/2021    Procedure: PARACENTESIS, ABDOMINAL;  Surgeon: Jay Torre MD;  Location: [facility] CATH LAB;  Service: Radiology;  Laterality: Right;    PERITONEOCENTESIS Right 07/12/2021    Procedure: PARACENTESIS, ABDOMINAL;  Surgeon: Jay Torre MD;  Location: [facility] CATH LAB;  Service: Radiology;  Laterality: Right;    PERITONEOCENTESIS Right 07/23/2021    Procedure: PARACENTESIS, ABDOMINAL;  Surgeon:  Edward De La Torre II, MD;  Location: [facility] CATH LAB;  Service: Interventional Radiology;  Laterality: Right;    PERITONEOCENTESIS Right 08/03/2021    Procedure: PARACENTESIS, ABDOMINAL;  Surgeon: Franco Cheung MD;  Location: [facility] CATH LAB;  Service: Radiology;  Laterality: Right;    PERITONEOCENTESIS Right 08/16/2021    Procedure: PARACENTESIS, ABDOMINAL;  Surgeon: Jay Torre MD;  Location: [facility] CATH LAB;  Service: Radiology;  Laterality: Right;    PERITONEOCENTESIS Right 08/23/2021    Procedure: PARACENTESIS, ABDOMINAL;  Surgeon: Jay Torre MD;  Location: [facility] CATH LAB;  Service: Radiology;  Laterality: Right;    PERITONEOCENTESIS Right 09/16/2021    Procedure: PARACENTESIS, ABDOMINAL;  Surgeon: Jay Torre MD;  Location: [facility] CATH LAB;  Service: Radiology;  Laterality: Right;    PERITONEOCENTESIS N/A 09/24/2021    Procedure: PARACENTESIS, ABDOMINAL;  Surgeon: Jay Torre MD;  Location: [facility] CATH LAB;  Service: Radiology;  Laterality: N/A;    PERITONEOCENTESIS Right 12/23/2021    Procedure: PARACENTESIS, ABDOMINAL;  Surgeon: Jay Torre MD;  Location: [facility] CATH LAB;  Service: Radiology;  Laterality: Right;    PERITONEOCENTESIS N/A 12/30/2021    Procedure: PARACENTESIS, ABDOMINAL;  Surgeon: Salas Cabrera MD;  Location: [facility] CATH LAB;  Service: Radiology;  Laterality: N/A;    RETROGRADE PYELOGRAPHY Bilateral 09/10/2021    Procedure: PYELOGRAM, RETROGRADE;  Surgeon: Rj Sánchez Jr., MD;  Location: [address];  Service: Urology;  Laterality: Bilateral;    TONSILLECTOMY         FAMILY HISTORY:   Family History   Problem Relation Age of Onset    Cancer Mother         Uterine Cancer     No Known Problems Father     No Known Problems Sister     Sleep apnea Daughter     ADD / ADHD Daughter     Heart disease Maternal Grandmother         CHF    COPD Maternal Grandfather     Heart disease Paternal Grandmother         CHF    Colon cancer Neg Hx     Inflammatory bowel disease  Neg Hx     Stomach cancer Neg Hx     Breast cancer Neg Hx     Ovarian cancer Neg Hx     Learning disabilities Neg Hx        SOCIAL HISTORY:  Social History     Socioeconomic History    Marital status:     Number of children: 1   Occupational History    Occupation: Assistant   Tobacco Use    Smoking status: Every Day     Packs/day: 0.25     Years: 27.00     Pack years: 6.75     Types: Cigarettes    Smokeless tobacco: Never    Tobacco comments:     two cigarettes a day    Substance and Sexual Activity    Alcohol use: Not Currently     Comment: quit caridad 15    Drug use: No    Sexual activity: Yes     Partners: Male   Social History Narrative    They live in [place], [place]      Social Determinants of Health     Financial Resource Strain: Low Risk     Difficulty of Paying Living Expenses: Not hard at all   Food Insecurity: No Food Insecurity    Worried About Running Out of Food in the Last Year: Never true    Ran Out of Food in the Last Year: Never true   Transportation Needs: No Transportation Needs    Lack of Transportation (Medical): No    Lack of Transportation (Non-Medical): No   Physical Activity: Unknown    Days of Exercise per Week: 1 day   Stress: No Stress Concern Present    Feeling of Stress : Only a little   Social Connections: Unknown    Frequency of Communication with Friends and Family: More than three times a week    Frequency of Social Gatherings with Friends and Family: Twice a week    Active Member of Clubs or Organizations: No    Attends Club or Organization Meetings: Never    Marital Status:    Housing Stability: Low Risk     Unable to Pay for Housing in the Last Year: No    Number of Places Lived in the Last Year: 1    Unstable Housing in the Last Year: No       ALLERGIES:  Review of patient's allergies indicates:  No Known Allergies    MEDICATIONS:    Current Outpatient Medications:     aspirin (ECOTRIN) 81 MG EC tablet, Take 1 tablet (81 mg total) by mouth once daily., Disp: 30 tablet,  Rfl: 11    calcium carbonate-vitamin D3 600 mg-20 mcg (800 unit) Tab, Take 1 tablet by mouth once daily., Disp: 30 tablet, Rfl: 11    dapsone 100 MG Tab, Take 1 tablet (100 mg total) by mouth once daily., Disp: 30 tablet, Rfl: 3    famotidine (PEPCID) 20 MG tablet, Take 1 tablet (20 mg total) by mouth every evening., Disp: 30 tablet, Rfl: 11    levothyroxine (SYNTHROID) 50 MCG tablet, Take 1 tablet (50 mcg total) by mouth before breakfast., Disp: 90 tablet, Rfl: 3    linaCLOtide (LINZESS) 72 mcg Cap capsule, Take 2 capsules (144 mcg total) by mouth before breakfast., Disp: 180 capsule, Rfl: 5    mycophenolate (CELLCEPT) 250 mg Cap, Take 2 capsules (500 mg total) by mouth 2 (two) times daily., Disp: 120 capsule, Rfl: 11    tacrolimus (PROGRAF) 1 MG Cap, Take 3 capsules (3 mg total) by mouth every 12 (twelve) hours., Disp: 180 capsule, Rfl: 11    gabapentin (NEURONTIN) 100 MG capsule, Take 2 capsules (200 mg total) by mouth daily as needed (Sleep)., Disp: 90 capsule, Rfl: 11   Medication List with Changes/Refills   Current Medications    ASPIRIN (ECOTRIN) 81 MG EC TABLET    Take 1 tablet (81 mg total) by mouth once daily.    CALCIUM CARBONATE-VITAMIN D3 600 MG-20 MCG (800 UNIT) TAB    Take 1 tablet by mouth once daily.    DAPSONE 100 MG TAB    Take 1 tablet (100 mg total) by mouth once daily.    FAMOTIDINE (PEPCID) 20 MG TABLET    Take 1 tablet (20 mg total) by mouth every evening.    LEVOTHYROXINE (SYNTHROID) 50 MCG TABLET    Take 1 tablet (50 mcg total) by mouth before breakfast.    LINACLOTIDE (LINZESS) 72 MCG CAP CAPSULE    Take 2 capsules (144 mcg total) by mouth before breakfast.    MYCOPHENOLATE (CELLCEPT) 250 MG CAP    Take 2 capsules (500 mg total) by mouth 2 (two) times daily.    TACROLIMUS (PROGRAF) 1 MG CAP    Take 3 capsules (3 mg total) by mouth every 12 (twelve) hours.   Changed and/or Refilled Medications    Modified Medication Previous Medication    GABAPENTIN (NEURONTIN) 100 MG CAPSULE gabapentin  "(NEURONTIN) 100 MG capsule       Take 2 capsules (200 mg total) by mouth daily as needed (Sleep).    Take 3 capsules (300 mg total) by mouth 2 (two) times daily.   Discontinued Medications    LACTULOSE (CHRONULAC) 10 GRAM/15 ML SOLUTION    Take 30 mLs (20 g total) by mouth 3 (three) times daily.    TIZANIDINE (ZANAFLEX) 4 MG TABLET    Take 1 tablet (4 mg total) by mouth every 8 (eight) hours as needed (muscle spasms).    VALGANCICLOVIR (VALCYTE) 450 MG TAB    Take 1 tablet (450 mg total) by mouth once daily. for 15 days        PHYSICAL EXAM:  /80   Pulse 92   Ht 5' 3" (1.6 m)   Wt 58.3 kg (128 lb 8.5 oz)   SpO2 (!) 93%   BMI 22.77 kg/m²     General: No distress, No fever or chills  Neck: No adenopathy,no carotid bruit,no JVD  Lungs:Clear to auscultation bilaterally, No Crackles  Heart: Regular rate and rhythm, no murmur, gallops or rubs  Abdomen: Ascites.  Extremities: Atraumatic, no edema in LE  Skin: Turgor normal. No rashes  Neurologic: No focal weakness, oriented.  Dialysis Access: Non applicable        LABS:  Lab Results   Component Value Date    HGB 11.8 (L) 09/19/2022        Lab Results   Component Value Date    CREATININE 1.2 09/19/2022       Prot/Creat Ratio, Urine   Date Value Ref Range Status   09/21/2022 0.09 0.00 - 0.20 Final   11/11/2021 0.06 0.00 - 0.20 Final       Lab Results   Component Value Date     (L) 09/19/2022    K 4.4 09/19/2022    CO2 23 09/19/2022       last PTH   Lab Results   Component Value Date    .2 (H) 04/25/2022    CALCIUM 9.6 09/19/2022    CAION 1.00 (L) 06/05/2022    PHOS 2.9 06/10/2022       Lab Results   Component Value Date    HGBA1C 5.4 06/05/2022       Lab Results   Component Value Date    LDLCALC 100.4 12/06/2021         Creatinine, Urine   Date Value Ref Range Status   09/21/2022 173.0 15.0 - 325.0 mg/dL Final   11/11/2021 188.0 15.0 - 325.0 mg/dL Final   07/29/2021 253.0 15.0 - 325.0 mg/dL Final       Protein, Urine   Date Value Ref Range Status "   10/18/2021 11 0 - 15 mg/dL Final   07/30/2021 28 (H) 0 - 15 mg/dL Final     Protein, Urine Random   Date Value Ref Range Status   09/21/2022 16 (H) 0 - 15 mg/dL Final   11/11/2021 12 0 - 15 mg/dL Final       Prot/Creat Ratio, Urine   Date Value Ref Range Status   09/21/2022 0.09 0.00 - 0.20 Final   11/11/2021 0.06 0.00 - 0.20 Final         ASSESSMENT:    1) Chronic Kidney disease stage 3a  2) Metabolic acidosis - Resolved  3) Anemia due to iron deficiency  4) S/P liver transplant on Prograf and Cellcept  5) Secondary hyperparathyroidism   Renal ultrasound showed 9.6 and 11.3 cm kidneys (no known issues from child rosa that would explain the reason for her asymmetric kidney sizes). Patients baseline creatinine is less than 1 till June 2021, Prior to liver transplant her creatinine has been above 2 with significant acanthocytes. She has had cystoscopy done in the past with no significant detected abnormalities.Serological work up including ANCA, GBM, MARIEBL, Cryoglobulin are all negative. Since liver transplant the creatinine trended back down to 1.2 mg/dl, urine microscopy today did not show any acanthocytes likely due to resolution of secondary IgA. She will likely settle down at CKD stage 3.   Electrolytes, in acceptable range. s/p IV Iron infusion for iron def anemia prior to transplant. Will trend vitamin D and PTH in subsequent visits.    - Drink to thirst  - Avoid NSAIDs intake  - No dark color sodas.    RTC in 4 months    Diagnosis and plan of care explained to the patient.  Pt Verbalized understanding. Answered all questions.  Thanks for allowing me to participate in the care of this patient.     1:19 PM    Rashel Cohn MD  Nephrology & Critical Care

## 2022-09-21 NOTE — PATIENT INSTRUCTIONS
- Please donot take NSAID's (Motrin, Advil, Aleve, Ibuprofen, BC powder, Goody Powder, Diclofenac, Naproxen, Meloxicam/MOBIC, Celebrex etc), Ok to take baby aspirin.  - Please eat low phosphate diet (no dark colored sodas).  - Please limit the amout of animal protein (atleast cut down pork and beef).  - Follow up in 4 months or early if needed

## 2022-09-22 ENCOUNTER — PATIENT MESSAGE (OUTPATIENT)
Dept: TRANSPLANT | Facility: CLINIC | Age: 52
End: 2022-09-22
Payer: COMMERCIAL

## 2022-09-23 ENCOUNTER — TELEPHONE (OUTPATIENT)
Dept: TRANSPLANT | Facility: CLINIC | Age: 52
End: 2022-09-23
Payer: COMMERCIAL

## 2022-09-23 ENCOUNTER — LAB VISIT (OUTPATIENT)
Dept: LAB | Facility: HOSPITAL | Age: 52
End: 2022-09-23
Attending: INTERNAL MEDICINE
Payer: COMMERCIAL

## 2022-09-23 DIAGNOSIS — N18.31 STAGE 3A CHRONIC KIDNEY DISEASE: Primary | ICD-10-CM

## 2022-09-23 DIAGNOSIS — N18.31 STAGE 3A CHRONIC KIDNEY DISEASE: ICD-10-CM

## 2022-09-23 PROCEDURE — 87086 URINE CULTURE/COLONY COUNT: CPT | Performed by: INTERNAL MEDICINE

## 2022-09-23 NOTE — TELEPHONE ENCOUNTER
Sent message to let patient know    ----- Message from Edward Quinn PharmD sent at 9/23/2022  8:19 AM CDT -----  Marii,    There are no issues with the patient using either of these products.    Thank you,  Edward Quinn PharmD. [facility], [facility]    ----- Message -----  From: Marii Ruiz RN  Sent: 9/23/2022   7:44 AM CDT  To: Abdominal Transplant Pharmacists    Is it ok for patient to take Cranberry pills and Lumify eye drops?

## 2022-09-24 LAB — BACTERIA UR CULT: NO GROWTH

## 2022-09-26 ENCOUNTER — PATIENT MESSAGE (OUTPATIENT)
Dept: TRANSPLANT | Facility: CLINIC | Age: 52
End: 2022-09-26
Payer: COMMERCIAL

## 2022-09-29 ENCOUNTER — OFFICE VISIT (OUTPATIENT)
Dept: INTERNAL MEDICINE | Facility: CLINIC | Age: 52
End: 2022-09-29
Payer: COMMERCIAL

## 2022-09-29 VITALS
OXYGEN SATURATION: 98 % | TEMPERATURE: 98 F | WEIGHT: 127.88 LBS | SYSTOLIC BLOOD PRESSURE: 100 MMHG | RESPIRATION RATE: 18 BRPM | HEIGHT: 63 IN | DIASTOLIC BLOOD PRESSURE: 70 MMHG | HEART RATE: 97 BPM | BODY MASS INDEX: 22.66 KG/M2

## 2022-09-29 DIAGNOSIS — T86.43 RECEIVED LIVER TRANSPLANT FROM DONOR WITH HEPATITIS C: ICD-10-CM

## 2022-09-29 DIAGNOSIS — K76.9 IGA NEPHROPATHY ASSOCIATED WITH LIVER DISEASE: ICD-10-CM

## 2022-09-29 DIAGNOSIS — Z79.60 LONG-TERM USE OF IMMUNOSUPPRESSANT MEDICATION: ICD-10-CM

## 2022-09-29 DIAGNOSIS — F10.21 ALCOHOL USE DISORDER, SEVERE, IN EARLY REMISSION: Chronic | ICD-10-CM

## 2022-09-29 DIAGNOSIS — D84.821 IMMUNOSUPPRESSION DUE TO DRUG THERAPY: ICD-10-CM

## 2022-09-29 DIAGNOSIS — N25.81 SECONDARY HYPERPARATHYROIDISM OF RENAL ORIGIN: ICD-10-CM

## 2022-09-29 DIAGNOSIS — E78.5 HYPERLIPIDEMIA, UNSPECIFIED HYPERLIPIDEMIA TYPE: ICD-10-CM

## 2022-09-29 DIAGNOSIS — Z00.00 ANNUAL PHYSICAL EXAM: Primary | ICD-10-CM

## 2022-09-29 DIAGNOSIS — N18.30 STAGE 3 CHRONIC KIDNEY DISEASE, UNSPECIFIED WHETHER STAGE 3A OR 3B CKD: ICD-10-CM

## 2022-09-29 DIAGNOSIS — D63.8 ANEMIA OF CHRONIC DISEASE: ICD-10-CM

## 2022-09-29 DIAGNOSIS — F17.200 NICOTINE USE DISORDER: ICD-10-CM

## 2022-09-29 DIAGNOSIS — N02.B9 IGA NEPHROPATHY ASSOCIATED WITH LIVER DISEASE: ICD-10-CM

## 2022-09-29 DIAGNOSIS — Z79.899 IMMUNOSUPPRESSION DUE TO DRUG THERAPY: ICD-10-CM

## 2022-09-29 DIAGNOSIS — E03.9 HYPOTHYROIDISM, UNSPECIFIED TYPE: ICD-10-CM

## 2022-09-29 DIAGNOSIS — B19.20 RECEIVED LIVER TRANSPLANT FROM DONOR WITH HEPATITIS C: ICD-10-CM

## 2022-09-29 PROBLEM — N18.4 CKD (CHRONIC KIDNEY DISEASE) STAGE 4, GFR 15-29 ML/MIN: Status: RESOLVED | Noted: 2022-02-04 | Resolved: 2022-09-29

## 2022-09-29 PROCEDURE — 99999 PR PBB SHADOW E&M-EST. PATIENT-LVL IV: ICD-10-PCS | Mod: PBBFAC,,, | Performed by: INTERNAL MEDICINE

## 2022-09-29 PROCEDURE — 99396 PR PREVENTIVE VISIT,EST,40-64: ICD-10-PCS | Mod: S$GLB,,, | Performed by: INTERNAL MEDICINE

## 2022-09-29 PROCEDURE — 99396 PREV VISIT EST AGE 40-64: CPT | Mod: S$GLB,,, | Performed by: INTERNAL MEDICINE

## 2022-09-29 PROCEDURE — 99999 PR PBB SHADOW E&M-EST. PATIENT-LVL IV: CPT | Mod: PBBFAC,,, | Performed by: INTERNAL MEDICINE

## 2022-09-29 NOTE — PROGRESS NOTES
Subjective:       Patient ID: Malu Montes is a 52 y.o. female.    Chief Complaint: Follow-up    HPI  52 y.o. Female here for annual exam.     Vaccines: Influenza (declined); Tetanus (declined); Prevnar 13 (); Pneumococcal 23 ()  Eye exam:   Mammogram:   Gyn exam:   Colonoscopy:      Exercise: no  Diet: regular     Past Medical History:  No date: ADD (attention deficit disorder)  No date: Anxiety  No date: Ascites of liver  2021: Cirrhosis  No date: CKD (chronic kidney disease) stage 3, GFR 30-59 ml/min  No date: Constipation  No date: ETOH abuse  No date: Hyperlipidemia  No date: Hypothyroidism  2022: Liver transplant candidate  2021: Other ascites  No date: Pancreatitis, acute  No date: Tobacco use  No date: Vitamin D deficiency  Past Surgical History:  No date: AUGMENTATION OF BREAST      Comment:  second set  No date: breast augmentation  No date:  SECTION  2021: COLONOSCOPY; N/A      Comment:  Procedure: COLONOSCOPY;  Surgeon: Dao Gray MD;  Location: [facility] ([address] [place] [address]);  Service: Endoscopy;                Laterality: N/A;  COVID test 21 General surgery                clinic [facility]  09/10/2021: CYSTOSCOPY; N/A      Comment:  Procedure: CYSTOSCOPY;  Surgeon: Rj Sánchez Jr., MD;  Location: [facility] OR [address] [place] [address];  Service: Urology;                 Laterality: N/A;  2021: ESOPHAGOGASTRODUODENOSCOPY; N/A      Comment:  Procedure: ESOPHAGOGASTRODUODENOSCOPY (EGD);  Surgeon:                Dao Gray MD;  Location: [address]);                 Service: Endoscopy;  Laterality: N/A;  labs current on                9/7  10/26/2021: ESOPHAGOGASTRODUODENOSCOPY; N/A      Comment:  Procedure: ESOPHAGOGASTRODUODENOSCOPY (EGD);  Surgeon:                Dao Gray MD;  Location: [facility] ([address] [place] [address]);                 Service: Endoscopy;  Laterality: N/A;  to be done the                week of  10/18/21 per Dr. Gray-BBcovid-10/23/21-[facility]-BB 10/25 arrival time                confirmed with pt-rb  12/21/2021: ESOPHAGOGASTRODUODENOSCOPY; Left      Comment:  Procedure: EGD (ESOPHAGOGASTRODUODENOSCOPY);  Surgeon:                Koffi Monteiro MD;  Location: [facility] (4TH FLR);                 Service: Endoscopy;  Laterality: Left;  Rapidpt                requested AM appt and first available in                December-BBlabs morning of procedure-BB12/14 lvm to                confirm appt-rb  No date: HEMORRHOID SURGERY  06/05/2022: LIVER TRANSPLANT; N/A      Comment:  Procedure: TRANSPLANT, LIVER;  Surgeon: Franco Slaughter MD;  Location: [facility] OR 2ND FLR;  Service: Transplant;                 Laterality: N/A;  06/08/2021: PERITONEOCENTESIS; Right      Comment:  Procedure: PARACENTESIS, ABDOMINAL;  Surgeon: Jay Torre MD;  Location: [facility] CATH LAB;  Service:                Radiology;  Laterality: Right;  06/25/2021: PERITONEOCENTESIS; Right      Comment:  Procedure: PARACENTESIS, ABDOMINAL;  Surgeon: Jay Torre MD;  Location: [facility] CATH LAB;  Service:                Radiology;  Laterality: Right;  07/12/2021: PERITONEOCENTESIS; Right      Comment:  Procedure: PARACENTESIS, ABDOMINAL;  Surgeon: Jay Torre MD;  Location: [facility] CATH LAB;  Service:                Radiology;  Laterality: Right;  07/23/2021: PERITONEOCENTESIS; Right      Comment:  Procedure: PARACENTESIS, ABDOMINAL;  Surgeon: Edward De La Torre II, MD;  Location: [facility] CATH LAB;  Service:                Interventional Radiology;  Laterality: Right;  08/03/2021: PERITONEOCENTESIS; Right      Comment:  Procedure: PARACENTESIS, ABDOMINAL;  Surgeon: rFanco Cheung MD;  Location: [facility] CATH LAB;  Service:                Radiology;  Laterality: Right;  08/16/2021: PERITONEOCENTESIS; Right      Comment:  Procedure: PARACENTESIS,  ABDOMINAL;  Surgeon: Jay Torre MD;  Location: [facility] CATH LAB;  Service:                Radiology;  Laterality: Right;  08/23/2021: PERITONEOCENTESIS; Right      Comment:  Procedure: PARACENTESIS, ABDOMINAL;  Surgeon: aJy Torre MD;  Location: [facility] CATH LAB;  Service:                Radiology;  Laterality: Right;  09/16/2021: PERITONEOCENTESIS; Right      Comment:  Procedure: PARACENTESIS, ABDOMINAL;  Surgeon: Jay Torre MD;  Location: [facility] CATH LAB;  Service:                Radiology;  Laterality: Right;  09/24/2021: PERITONEOCENTESIS; N/A      Comment:  Procedure: PARACENTESIS, ABDOMINAL;  Surgeon: Jay Torre MD;  Location: [facility] CATH LAB;  Service:                Radiology;  Laterality: N/A;  12/23/2021: PERITONEOCENTESIS; Right      Comment:  Procedure: PARACENTESIS, ABDOMINAL;  Surgeon: Jay Torre MD;  Location: [facility] CATH LAB;  Service:                Radiology;  Laterality: Right;  12/30/2021: PERITONEOCENTESIS; N/A      Comment:  Procedure: PARACENTESIS, ABDOMINAL;  Surgeon: Salas Cabrera MD;  Location: [facility] CATH LAB;  Service:                Radiology;  Laterality: N/A;  09/10/2021: RETROGRADE PYELOGRAPHY; Bilateral      Comment:  Procedure: PYELOGRAM, RETROGRADE;  Surgeon: Rj Sánchez Jr., MD;  Location: [address];  Service:                Urology;  Laterality: Bilateral;  No date: TONSILLECTOMY  Social History    Socioeconomic History      Marital status:       Number of children: 1    Occupational History      Occupation: Assistant    Tobacco Use      Smoking status: Some Days        Packs/day: 0.25        Years: 27.00        Pack years: 6.75        Types: Cigarettes      Smokeless tobacco: Never      Tobacco comments: two cigarettes a day     Substance and Sexual Activity      Alcohol use: Not Currently        Comment: quit caridad 15      Drug use:  No      Sexual activity: Yes        Partners: Male    Social History Narrative      They live in [place], [place]     Social [facility]  Financial Resource Strain: Low Risk       Difficulty of Paying Living Expenses: Not hard at all  Food Insecurity: No Food Insecurity      Worried About Running Out of Food in the Last Year: Never true      Ran Out of Food in the Last Year: Never true  Transportation Needs: No Transportation Needs      Lack of Transportation (Medical): No      Lack of Transportation (Non-Medical): No  Physical Activity: Unknown      Days of Exercise per Week: 1 day  Stress: No Stress Concern Present      Feeling of Stress : Only a little  Social Connections: Unknown      Frequency of Communication with Friends and Family: More than three times a week      Frequency of Social Gatherings with Friends and Family: Twice a week      Active Member of Clubs or Organizations: No      Attends Club or Organization Meetings: Never      Marital Status:   Housing Stability: Low Risk       Unable to Pay for Housing in the Last Year: No      Number of Places Lived in the Last Year: 1      Unstable Housing in the Last Year: No  Review of patient's allergies indicates:  No Known Allergies  Malu Montes had no medications administered during this visit.    Review of Systems   Constitutional:  Negative for activity change, appetite change, chills, diaphoresis, fatigue, fever and unexpected weight change.   HENT:  Negative for nasal congestion, mouth sores, postnasal drip, rhinorrhea, sinus pressure/congestion, sneezing, sore throat, trouble swallowing and voice change.    Eyes:  Negative for pain, discharge and visual disturbance.   Respiratory:  Negative for cough, shortness of breath and wheezing.    Cardiovascular:  Negative for chest pain, palpitations and leg swelling.   Gastrointestinal:  Negative for abdominal pain, blood in stool, constipation, diarrhea, nausea and vomiting.   Endocrine:  Negative for cold intolerance and heat intolerance.   Genitourinary:  Negative for difficulty urinating, dysuria, frequency, hematuria and urgency.   Musculoskeletal:  Negative for arthralgias and myalgias.   Integumentary:  Negative for rash and wound.   Allergic/Immunologic: Negative for environmental allergies and food allergies.   Neurological:  Negative for dizziness, tremors, seizures, syncope, weakness, light-headedness and headaches.   Hematological:  Negative for adenopathy. Does not bruise/bleed easily.   Psychiatric/Behavioral:  Negative for confusion and sleep disturbance. The patient is not nervous/anxious.        Objective:      Physical Exam  Vitals and nursing note reviewed.   Constitutional:       General: She is not in acute distress.     Appearance: Normal appearance. She is well-developed. She is not diaphoretic.   HENT:      Head: Normocephalic and atraumatic.      Right Ear: External ear normal.      Left Ear: External ear normal.      Nose: Nose normal.      Mouth/Throat:      Pharynx: No oropharyngeal exudate.   Eyes:      General: No scleral icterus.        Right eye: No discharge.         Left eye: No discharge.      Conjunctiva/sclera: Conjunctivae normal.      Pupils: Pupils are equal, round, and reactive to light.   Neck:      Thyroid: No thyromegaly.      Vascular: No JVD.   Cardiovascular:      Rate and Rhythm: Normal rate and regular rhythm.      Pulses: Normal pulses.      Heart sounds: Normal heart sounds. No murmur heard.  Pulmonary:      Effort: Pulmonary effort is normal. No respiratory distress.      Breath sounds: Normal breath sounds. No wheezing, rhonchi or rales.   Chest:      Chest wall: No tenderness.   Abdominal:      General: Bowel sounds are normal. There is no distension.      Palpations: Abdomen is soft.      Tenderness: There is no abdominal tenderness. There is no guarding or rebound.   Musculoskeletal:      Cervical back: Neck supple.      Right lower leg: No  edema.      Left lower leg: No edema.   Lymphadenopathy:      Cervical: No cervical adenopathy.   Skin:     General: Skin is warm and dry.      Coloration: Skin is not pale.      Findings: No rash.   Neurological:      General: No focal deficit present.      Mental Status: She is alert and oriented to person, place, and time.      Gait: Gait normal.   Psychiatric:         Behavior: Behavior normal.         Thought Content: Thought content normal.         Judgment: Judgment normal.       Assessment:       Problem List Items Addressed This Visit          Psychiatric    Alcohol use disorder, severe, in early remission (Chronic)       Cardiac/Vascular    Hyperlipidemia       Renal/    IgA nephropathy associated with liver disease    CKD (chronic kidney disease) stage 3, GFR 30-59 ml/min       Immunology/Multi System    Immunosuppression due to drug therapy       Oncology    Anemia of chronic disease       Endocrine    Secondary hyperparathyroidism of renal origin    Hypothyroidism       GI    Received liver transplant from donor with hepatitis C       Palliative Care    Long-term use of immunosuppressant medication       Other    Nicotine use disorder     Other Visit Diagnoses       Annual physical exam    -  Primary    Relevant Orders    TSH (Completed)    Lipid Panel (Completed)            Plan:   Blood work reviewed with pt, TSH/Lipids ordered      Alcoholic fatty liver with ascites/portal HTN- s/p liver transplant   -followed by Hepatology      ETOH abuse- pt has not been drinking lately     Tobacco use- cessation advised     Hypothyroidism- Rx Synthroid 50 mcg daily     HLD- proper diet stressed, will not initiate statin therapy for now      CKD III/IgA Nephropathy- stable      2nd Hyperparathyroidism- stable       Anemia of chronic disease- stable

## 2022-10-03 ENCOUNTER — LAB VISIT (OUTPATIENT)
Dept: LAB | Facility: HOSPITAL | Age: 52
End: 2022-10-03
Attending: INTERNAL MEDICINE
Payer: COMMERCIAL

## 2022-10-03 DIAGNOSIS — Z94.4 LIVER REPLACED BY TRANSPLANT: ICD-10-CM

## 2022-10-03 LAB
ALBUMIN SERPL BCP-MCNC: 4.3 G/DL (ref 3.5–5.2)
ALP SERPL-CCNC: 91 U/L (ref 55–135)
ALT SERPL W/O P-5'-P-CCNC: 15 U/L (ref 10–44)
ANION GAP SERPL CALC-SCNC: 9 MMOL/L (ref 8–16)
AST SERPL-CCNC: 18 U/L (ref 10–40)
BASOPHILS # BLD AUTO: 0.12 K/UL (ref 0–0.2)
BASOPHILS NFR BLD: 1.5 % (ref 0–1.9)
BILIRUB SERPL-MCNC: 0.6 MG/DL (ref 0.1–1)
BUN SERPL-MCNC: 29 MG/DL (ref 6–20)
CALCIUM SERPL-MCNC: 9.3 MG/DL (ref 8.7–10.5)
CHLORIDE SERPL-SCNC: 107 MMOL/L (ref 95–110)
CO2 SERPL-SCNC: 21 MMOL/L (ref 23–29)
CREAT SERPL-MCNC: 1.3 MG/DL (ref 0.5–1.4)
DIFFERENTIAL METHOD: ABNORMAL
EOSINOPHIL # BLD AUTO: 0.5 K/UL (ref 0–0.5)
EOSINOPHIL NFR BLD: 5.8 % (ref 0–8)
ERYTHROCYTE [DISTWIDTH] IN BLOOD BY AUTOMATED COUNT: 14.3 % (ref 11.5–14.5)
EST. GFR  (NO RACE VARIABLE): 49.8 ML/MIN/1.73 M^2
GLUCOSE SERPL-MCNC: 91 MG/DL (ref 70–110)
HCT VFR BLD AUTO: 34.5 % (ref 37–48.5)
HGB BLD-MCNC: 10.8 G/DL (ref 12–16)
IMM GRANULOCYTES # BLD AUTO: 0.02 K/UL (ref 0–0.04)
IMM GRANULOCYTES NFR BLD AUTO: 0.3 % (ref 0–0.5)
LYMPHOCYTES # BLD AUTO: 2.1 K/UL (ref 1–4.8)
LYMPHOCYTES NFR BLD: 26.3 % (ref 18–48)
MCH RBC QN AUTO: 30.9 PG (ref 27–31)
MCHC RBC AUTO-ENTMCNC: 31.3 G/DL (ref 32–36)
MCV RBC AUTO: 99 FL (ref 82–98)
MONOCYTES # BLD AUTO: 0.8 K/UL (ref 0.3–1)
MONOCYTES NFR BLD: 10 % (ref 4–15)
NEUTROPHILS # BLD AUTO: 4.4 K/UL (ref 1.8–7.7)
NEUTROPHILS NFR BLD: 56.1 % (ref 38–73)
NRBC BLD-RTO: 0 /100 WBC
PLATELET # BLD AUTO: 366 K/UL (ref 150–450)
PMV BLD AUTO: 9.4 FL (ref 9.2–12.9)
POTASSIUM SERPL-SCNC: 4.3 MMOL/L (ref 3.5–5.1)
PROT SERPL-MCNC: 7 G/DL (ref 6–8.4)
RBC # BLD AUTO: 3.5 M/UL (ref 4–5.4)
SODIUM SERPL-SCNC: 137 MMOL/L (ref 136–145)
TACROLIMUS BLD-MCNC: 5.4 NG/ML (ref 5–15)
WBC # BLD AUTO: 7.82 K/UL (ref 3.9–12.7)

## 2022-10-03 PROCEDURE — 85025 COMPLETE CBC W/AUTO DIFF WBC: CPT | Performed by: INTERNAL MEDICINE

## 2022-10-03 PROCEDURE — 80197 ASSAY OF TACROLIMUS: CPT | Performed by: INTERNAL MEDICINE

## 2022-10-03 PROCEDURE — 80053 COMPREHEN METABOLIC PANEL: CPT | Performed by: INTERNAL MEDICINE

## 2022-10-05 ENCOUNTER — TELEPHONE (OUTPATIENT)
Dept: TRANSPLANT | Facility: CLINIC | Age: 52
End: 2022-10-05
Payer: COMMERCIAL

## 2022-10-05 ENCOUNTER — PATIENT MESSAGE (OUTPATIENT)
Dept: TRANSPLANT | Facility: CLINIC | Age: 52
End: 2022-10-05
Payer: COMMERCIAL

## 2022-10-05 DIAGNOSIS — Z94.4 S/P LIVER TRANSPLANT: Primary | ICD-10-CM

## 2022-10-05 NOTE — TELEPHONE ENCOUNTER
Sent patient message via portal to let her know labs are stable.  No medication changes, next labs due on 10/17/22      ----- Message from Sharmila Pacheco MD sent at 10/5/2022  8:26 AM CDT -----  The Labs are stable - please let patient know.

## 2022-10-10 ENCOUNTER — PATIENT MESSAGE (OUTPATIENT)
Dept: INTERNAL MEDICINE | Facility: CLINIC | Age: 52
End: 2022-10-10
Payer: COMMERCIAL

## 2022-10-10 ENCOUNTER — PATIENT MESSAGE (OUTPATIENT)
Dept: TRANSPLANT | Facility: CLINIC | Age: 52
End: 2022-10-10
Payer: COMMERCIAL

## 2022-10-10 DIAGNOSIS — R53.83 FATIGUE, UNSPECIFIED TYPE: Primary | ICD-10-CM

## 2022-10-11 ENCOUNTER — PATIENT MESSAGE (OUTPATIENT)
Dept: TRANSPLANT | Facility: CLINIC | Age: 52
End: 2022-10-11
Payer: COMMERCIAL

## 2022-10-11 ENCOUNTER — TELEPHONE (OUTPATIENT)
Dept: INTERNAL MEDICINE | Facility: CLINIC | Age: 52
End: 2022-10-11
Payer: COMMERCIAL

## 2022-10-11 NOTE — TELEPHONE ENCOUNTER
----- Message from Killian Dodd DO sent at 10/11/2022 12:50 PM CDT -----  Normal thyroid function  Cholesterol is elevated, will hold off on treatment until cleared by your liver specialist

## 2022-10-12 NOTE — TELEPHONE ENCOUNTER
Contacted pt to reschedule appt due to provider being out of office, pt accepted new date and time.  
Detail Level: Detailed

## 2022-10-14 ENCOUNTER — PATIENT MESSAGE (OUTPATIENT)
Dept: INTERNAL MEDICINE | Facility: CLINIC | Age: 52
End: 2022-10-14
Payer: COMMERCIAL

## 2022-10-14 RX ORDER — BIMATOPROST 3 UG/ML
SOLUTION TOPICAL
Qty: 5 ML | Refills: 12 | Status: SHIPPED | OUTPATIENT
Start: 2022-10-14 | End: 2023-11-28 | Stop reason: SDUPTHER

## 2022-10-17 ENCOUNTER — PATIENT MESSAGE (OUTPATIENT)
Dept: INTERNAL MEDICINE | Facility: CLINIC | Age: 52
End: 2022-10-17
Payer: COMMERCIAL

## 2022-10-17 ENCOUNTER — HOSPITAL ENCOUNTER (OUTPATIENT)
Dept: RADIOLOGY | Facility: HOSPITAL | Age: 52
Discharge: HOME | End: 2022-10-17
Attending: INTERNAL MEDICINE
Payer: COMMERCIAL

## 2022-10-17 DIAGNOSIS — Z94.4 S/P LIVER TRANSPLANT: ICD-10-CM

## 2022-10-17 PROCEDURE — 76705 ECHO EXAM OF ABDOMEN: CPT | Mod: TC,59

## 2022-10-17 PROCEDURE — 93976 VASCULAR STUDY: CPT | Mod: 26,,, | Performed by: RADIOLOGY

## 2022-10-17 PROCEDURE — 93976 VASCULAR STUDY: CPT | Mod: TC

## 2022-10-17 PROCEDURE — 93976 US DOPPLER LIVER TRANSPLANT POST (XPD): ICD-10-PCS | Mod: 26,,, | Performed by: RADIOLOGY

## 2022-10-17 PROCEDURE — 76705 ECHO EXAM OF ABDOMEN: CPT | Mod: 26,59,, | Performed by: RADIOLOGY

## 2022-10-17 PROCEDURE — 76705 US DOPPLER LIVER TRANSPLANT POST (XPD): ICD-10-PCS | Mod: 26,59,, | Performed by: RADIOLOGY

## 2022-10-17 RX ORDER — VALACYCLOVIR HYDROCHLORIDE 1 G/1
1000 TABLET, FILM COATED ORAL 2 TIMES DAILY
Qty: 60 TABLET | Refills: 11 | Status: SHIPPED | OUTPATIENT
Start: 2022-10-17 | End: 2022-12-28

## 2022-10-18 ENCOUNTER — PATIENT MESSAGE (OUTPATIENT)
Dept: TRANSPLANT | Facility: CLINIC | Age: 52
End: 2022-10-18
Payer: COMMERCIAL

## 2022-10-18 ENCOUNTER — TELEPHONE (OUTPATIENT)
Dept: TRANSPLANT | Facility: CLINIC | Age: 52
End: 2022-10-18
Payer: COMMERCIAL

## 2022-10-18 NOTE — TELEPHONE ENCOUNTER
Sent patient message via portal to let her know labs are stable.  No medication changes, next labs due on 10/31/22      ----- Message from Sharmila Pacheco MD sent at 10/17/2022  4:40 PM CDT -----  The Labs are stable - please let patient know.

## 2022-10-19 ENCOUNTER — PATIENT MESSAGE (OUTPATIENT)
Dept: INTERNAL MEDICINE | Facility: CLINIC | Age: 52
End: 2022-10-19
Payer: COMMERCIAL

## 2022-10-22 ENCOUNTER — TELEPHONE (OUTPATIENT)
Dept: INTERNAL MEDICINE | Facility: CLINIC | Age: 52
End: 2022-10-22
Payer: COMMERCIAL

## 2022-10-22 DIAGNOSIS — G62.9 NEUROPATHY: Primary | ICD-10-CM

## 2022-10-22 DIAGNOSIS — D64.9 ANEMIA, UNSPECIFIED TYPE: ICD-10-CM

## 2022-10-22 NOTE — TELEPHONE ENCOUNTER
----- Message from Danielrichard Jimmie sent at 10/21/2022  3:27 PM CDT -----  Regarding: Specimen Issue  There was an issue with the Thiamine test ordered on this patient from 10/17/22.    Unfortunately, the reference lab received a sample that was insufficient in volume (Quantity Not Sufficient;QNS) for testing. The order has been cancelled. You will need to reorder and contact the patient for recollection if clinically indicated.    If there are any questions, please call the Sendout lab at [phone] ext. [phone].  Anyone in the Sendout lab will be able to assist you.

## 2022-10-31 ENCOUNTER — LAB VISIT (OUTPATIENT)
Dept: LAB | Facility: HOSPITAL | Age: 52
End: 2022-10-31
Attending: INTERNAL MEDICINE
Payer: COMMERCIAL

## 2022-10-31 DIAGNOSIS — G62.9 NEUROPATHY: ICD-10-CM

## 2022-10-31 DIAGNOSIS — D64.9 ANEMIA, UNSPECIFIED TYPE: ICD-10-CM

## 2022-10-31 DIAGNOSIS — Z94.4 LIVER REPLACED BY TRANSPLANT: ICD-10-CM

## 2022-10-31 LAB
ALBUMIN SERPL BCP-MCNC: 4.2 G/DL (ref 3.5–5.2)
ALP SERPL-CCNC: 88 U/L (ref 55–135)
ALT SERPL W/O P-5'-P-CCNC: 19 U/L (ref 10–44)
ANION GAP SERPL CALC-SCNC: 9 MMOL/L (ref 8–16)
AST SERPL-CCNC: 22 U/L (ref 10–40)
BASOPHILS # BLD AUTO: 0.1 K/UL (ref 0–0.2)
BASOPHILS NFR BLD: 1.3 % (ref 0–1.9)
BILIRUB SERPL-MCNC: 0.3 MG/DL (ref 0.1–1)
BUN SERPL-MCNC: 18 MG/DL (ref 6–20)
CALCIUM SERPL-MCNC: 9.2 MG/DL (ref 8.7–10.5)
CHLORIDE SERPL-SCNC: 108 MMOL/L (ref 95–110)
CO2 SERPL-SCNC: 20 MMOL/L (ref 23–29)
CREAT SERPL-MCNC: 1.1 MG/DL (ref 0.5–1.4)
DIFFERENTIAL METHOD: ABNORMAL
EOSINOPHIL # BLD AUTO: 0.6 K/UL (ref 0–0.5)
EOSINOPHIL NFR BLD: 7.4 % (ref 0–8)
ERYTHROCYTE [DISTWIDTH] IN BLOOD BY AUTOMATED COUNT: 14.3 % (ref 11.5–14.5)
EST. GFR  (NO RACE VARIABLE): >60 ML/MIN/1.73 M^2
GLUCOSE SERPL-MCNC: 99 MG/DL (ref 70–110)
HCT VFR BLD AUTO: 35.3 % (ref 37–48.5)
HGB BLD-MCNC: 11.2 G/DL (ref 12–16)
IMM GRANULOCYTES # BLD AUTO: 0.01 K/UL (ref 0–0.04)
IMM GRANULOCYTES NFR BLD AUTO: 0.1 % (ref 0–0.5)
LYMPHOCYTES # BLD AUTO: 1.8 K/UL (ref 1–4.8)
LYMPHOCYTES NFR BLD: 22.8 % (ref 18–48)
MCH RBC QN AUTO: 32.7 PG (ref 27–31)
MCHC RBC AUTO-ENTMCNC: 31.7 G/DL (ref 32–36)
MCV RBC AUTO: 103 FL (ref 82–98)
MONOCYTES # BLD AUTO: 0.7 K/UL (ref 0.3–1)
MONOCYTES NFR BLD: 8.4 % (ref 4–15)
NEUTROPHILS # BLD AUTO: 4.7 K/UL (ref 1.8–7.7)
NEUTROPHILS NFR BLD: 60 % (ref 38–73)
NRBC BLD-RTO: 0 /100 WBC
PLATELET # BLD AUTO: 317 K/UL (ref 150–450)
PMV BLD AUTO: 9.5 FL (ref 9.2–12.9)
POTASSIUM SERPL-SCNC: 3.9 MMOL/L (ref 3.5–5.1)
PROT SERPL-MCNC: 7 G/DL (ref 6–8.4)
RBC # BLD AUTO: 3.42 M/UL (ref 4–5.4)
RETICS/RBC NFR AUTO: 2.8 % (ref 0.5–2.5)
SODIUM SERPL-SCNC: 137 MMOL/L (ref 136–145)
TACROLIMUS BLD-MCNC: 6.7 NG/ML (ref 5–15)
WBC # BLD AUTO: 7.88 K/UL (ref 3.9–12.7)

## 2022-10-31 PROCEDURE — 80197 ASSAY OF TACROLIMUS: CPT | Performed by: INTERNAL MEDICINE

## 2022-10-31 PROCEDURE — 80053 COMPREHEN METABOLIC PANEL: CPT | Performed by: INTERNAL MEDICINE

## 2022-10-31 PROCEDURE — 84425 ASSAY OF VITAMIN B-1: CPT | Performed by: INTERNAL MEDICINE

## 2022-10-31 PROCEDURE — 85045 AUTOMATED RETICULOCYTE COUNT: CPT | Performed by: INTERNAL MEDICINE

## 2022-10-31 PROCEDURE — 36415 COLL VENOUS BLD VENIPUNCTURE: CPT | Performed by: INTERNAL MEDICINE

## 2022-10-31 PROCEDURE — 85025 COMPLETE CBC W/AUTO DIFF WBC: CPT | Performed by: INTERNAL MEDICINE

## 2022-11-01 ENCOUNTER — PATIENT MESSAGE (OUTPATIENT)
Dept: TRANSPLANT | Facility: CLINIC | Age: 52
End: 2022-11-01
Payer: COMMERCIAL

## 2022-11-01 DIAGNOSIS — Z94.4 LIVER REPLACED BY TRANSPLANT: ICD-10-CM

## 2022-11-01 RX ORDER — TACROLIMUS 1 MG/1
CAPSULE ORAL
Qty: 150 CAPSULE | Refills: 11 | Status: SHIPPED | OUTPATIENT
Start: 2022-11-01 | End: 2022-12-28 | Stop reason: SDUPTHER

## 2022-11-01 NOTE — TELEPHONE ENCOUNTER
Sent message to let patient know about medication change and to get labs on 1114/22    ----- Message from Sharmila Pacheco MD sent at 10/31/2022  8:54 PM CDT -----  The Labs are stable; decrease prograf to 3/2 from 3/3 and repeat labs in 2 weeks - please let patient know.

## 2022-11-03 LAB — VIT B1 BLD-MCNC: 92 UG/L (ref 38–122)

## 2022-11-05 ENCOUNTER — PATIENT MESSAGE (OUTPATIENT)
Dept: TRANSPLANT | Facility: CLINIC | Age: 52
End: 2022-11-05
Payer: COMMERCIAL

## 2022-11-07 ENCOUNTER — PATIENT MESSAGE (OUTPATIENT)
Dept: TRANSPLANT | Facility: CLINIC | Age: 52
End: 2022-11-07
Payer: COMMERCIAL

## 2022-11-07 ENCOUNTER — TELEPHONE (OUTPATIENT)
Dept: TRANSPLANT | Facility: CLINIC | Age: 52
End: 2022-11-07
Payer: COMMERCIAL

## 2022-11-07 NOTE — TELEPHONE ENCOUNTER
Sent a message to the patient she states that in January she will need to get Tacrolimus and Cellcept from [facility].  I am waiting for her to get back to me to see if she wants me to send the new prescriptions now or wait until December.    ----- Message from Talha Olivares sent at 11/7/2022  2:17 PM CST -----  Regarding: Medications  [facility] called in requesting to speak with nurse regarding patient's prescriptions. Prescriptions not specified. Requesting a call back to assist further.         Contact: [phone]

## 2022-11-11 DIAGNOSIS — Z94.4 LIVER REPLACED BY TRANSPLANT: Primary | ICD-10-CM

## 2022-11-11 RX ORDER — MYCOPHENOLATE MOFETIL 250 MG/1
500 CAPSULE ORAL 2 TIMES DAILY
Qty: 120 CAPSULE | Refills: 11 | Status: SHIPPED | OUTPATIENT
Start: 2022-11-11 | End: 2022-11-27 | Stop reason: SDUPTHER

## 2022-11-11 RX ORDER — TACROLIMUS 1 MG/1
CAPSULE ORAL
Qty: 150 CAPSULE | Refills: 11 | Status: SHIPPED | OUTPATIENT
Start: 2022-11-11 | End: 2022-11-27 | Stop reason: SDUPTHER

## 2022-11-11 NOTE — TELEPHONE ENCOUNTER
----- Message from Talha Olivares sent at 11/11/2022 12:16 PM CST -----  Regarding: Refill Request  [facility] is requesting a new script for patient med Mycophenolate. Either e-script or fax. Fax provided below.      Fax: [phone]

## 2022-11-14 ENCOUNTER — LAB VISIT (OUTPATIENT)
Dept: LAB | Facility: HOSPITAL | Age: 52
End: 2022-11-14
Attending: INTERNAL MEDICINE
Payer: COMMERCIAL

## 2022-11-14 DIAGNOSIS — Z94.4 LIVER REPLACED BY TRANSPLANT: ICD-10-CM

## 2022-11-14 LAB
ALBUMIN SERPL BCP-MCNC: 4.2 G/DL (ref 3.5–5.2)
ALP SERPL-CCNC: 78 U/L (ref 55–135)
ALT SERPL W/O P-5'-P-CCNC: 17 U/L (ref 10–44)
ANION GAP SERPL CALC-SCNC: 9 MMOL/L (ref 8–16)
AST SERPL-CCNC: 18 U/L (ref 10–40)
BASOPHILS # BLD AUTO: 0.09 K/UL (ref 0–0.2)
BASOPHILS NFR BLD: 1 % (ref 0–1.9)
BILIRUB SERPL-MCNC: 0.3 MG/DL (ref 0.1–1)
BUN SERPL-MCNC: 20 MG/DL (ref 6–20)
CALCIUM SERPL-MCNC: 9.2 MG/DL (ref 8.7–10.5)
CHLORIDE SERPL-SCNC: 107 MMOL/L (ref 95–110)
CO2 SERPL-SCNC: 20 MMOL/L (ref 23–29)
CREAT SERPL-MCNC: 1.2 MG/DL (ref 0.5–1.4)
DIFFERENTIAL METHOD: ABNORMAL
EOSINOPHIL # BLD AUTO: 0.4 K/UL (ref 0–0.5)
EOSINOPHIL NFR BLD: 5.1 % (ref 0–8)
ERYTHROCYTE [DISTWIDTH] IN BLOOD BY AUTOMATED COUNT: 14.2 % (ref 11.5–14.5)
EST. GFR  (NO RACE VARIABLE): 54.8 ML/MIN/1.73 M^2
GLUCOSE SERPL-MCNC: 95 MG/DL (ref 70–110)
HCT VFR BLD AUTO: 35.6 % (ref 37–48.5)
HGB BLD-MCNC: 11.3 G/DL (ref 12–16)
IMM GRANULOCYTES # BLD AUTO: 0.02 K/UL (ref 0–0.04)
IMM GRANULOCYTES NFR BLD AUTO: 0.2 % (ref 0–0.5)
LYMPHOCYTES # BLD AUTO: 1.8 K/UL (ref 1–4.8)
LYMPHOCYTES NFR BLD: 20.3 % (ref 18–48)
MCH RBC QN AUTO: 33.2 PG (ref 27–31)
MCHC RBC AUTO-ENTMCNC: 31.7 G/DL (ref 32–36)
MCV RBC AUTO: 105 FL (ref 82–98)
MONOCYTES # BLD AUTO: 0.6 K/UL (ref 0.3–1)
MONOCYTES NFR BLD: 7.3 % (ref 4–15)
NEUTROPHILS # BLD AUTO: 5.7 K/UL (ref 1.8–7.7)
NEUTROPHILS NFR BLD: 66.1 % (ref 38–73)
NRBC BLD-RTO: 0 /100 WBC
PLATELET # BLD AUTO: 333 K/UL (ref 150–450)
PMV BLD AUTO: 9.6 FL (ref 9.2–12.9)
POTASSIUM SERPL-SCNC: 4.2 MMOL/L (ref 3.5–5.1)
PROT SERPL-MCNC: 6.9 G/DL (ref 6–8.4)
RBC # BLD AUTO: 3.4 M/UL (ref 4–5.4)
SODIUM SERPL-SCNC: 136 MMOL/L (ref 136–145)
TACROLIMUS BLD-MCNC: 4.1 NG/ML (ref 5–15)
WBC # BLD AUTO: 8.66 K/UL (ref 3.9–12.7)

## 2022-11-14 PROCEDURE — 85025 COMPLETE CBC W/AUTO DIFF WBC: CPT | Performed by: INTERNAL MEDICINE

## 2022-11-14 PROCEDURE — 80197 ASSAY OF TACROLIMUS: CPT | Performed by: INTERNAL MEDICINE

## 2022-11-14 PROCEDURE — 80053 COMPREHEN METABOLIC PANEL: CPT | Performed by: INTERNAL MEDICINE

## 2022-11-14 PROCEDURE — 36415 COLL VENOUS BLD VENIPUNCTURE: CPT | Performed by: INTERNAL MEDICINE

## 2022-11-16 ENCOUNTER — TELEPHONE (OUTPATIENT)
Dept: TRANSPLANT | Facility: CLINIC | Age: 52
End: 2022-11-16
Payer: COMMERCIAL

## 2022-11-16 ENCOUNTER — PATIENT MESSAGE (OUTPATIENT)
Dept: TRANSPLANT | Facility: CLINIC | Age: 52
End: 2022-11-16
Payer: COMMERCIAL

## 2022-11-16 DIAGNOSIS — Z94.4 LIVER REPLACED BY TRANSPLANT: Primary | ICD-10-CM

## 2022-11-16 NOTE — TELEPHONE ENCOUNTER
Sent patient message via portal to let her know labs are stable.  No medication changes, next labs due on 12/5/22      ----- Message from Sharmila Pacheco MD sent at 11/14/2022  9:31 PM CST -----  The Labs are stable - please let patient know.

## 2022-11-18 ENCOUNTER — OFFICE VISIT (OUTPATIENT)
Dept: OBSTETRICS AND GYNECOLOGY | Facility: CLINIC | Age: 52
End: 2022-11-18
Attending: STUDENT IN AN ORGANIZED HEALTH CARE EDUCATION/TRAINING PROGRAM
Payer: COMMERCIAL

## 2022-11-18 VITALS — HEIGHT: 63 IN | WEIGHT: 131.38 LBS | BODY MASS INDEX: 23.28 KG/M2

## 2022-11-18 DIAGNOSIS — Z01.419 WELL WOMAN EXAM: Primary | ICD-10-CM

## 2022-11-18 DIAGNOSIS — Z11.51 SCREENING FOR HPV (HUMAN PAPILLOMAVIRUS): ICD-10-CM

## 2022-11-18 DIAGNOSIS — Z12.4 SCREENING FOR CERVICAL CANCER: ICD-10-CM

## 2022-11-18 PROCEDURE — 88175 CYTOPATH C/V AUTO FLUID REDO: CPT | Performed by: STUDENT IN AN ORGANIZED HEALTH CARE EDUCATION/TRAINING PROGRAM

## 2022-11-18 PROCEDURE — 87624 HPV HI-RISK TYP POOLED RSLT: CPT | Performed by: STUDENT IN AN ORGANIZED HEALTH CARE EDUCATION/TRAINING PROGRAM

## 2022-11-18 PROCEDURE — 99999 PR PBB SHADOW E&M-EST. PATIENT-LVL III: ICD-10-PCS | Mod: PBBFAC,,, | Performed by: STUDENT IN AN ORGANIZED HEALTH CARE EDUCATION/TRAINING PROGRAM

## 2022-11-18 PROCEDURE — 99396 PREV VISIT EST AGE 40-64: CPT | Mod: S$GLB,,, | Performed by: STUDENT IN AN ORGANIZED HEALTH CARE EDUCATION/TRAINING PROGRAM

## 2022-11-18 PROCEDURE — 99396 PR PREVENTIVE VISIT,EST,40-64: ICD-10-PCS | Mod: S$GLB,,, | Performed by: STUDENT IN AN ORGANIZED HEALTH CARE EDUCATION/TRAINING PROGRAM

## 2022-11-18 PROCEDURE — 99999 PR PBB SHADOW E&M-EST. PATIENT-LVL III: CPT | Mod: PBBFAC,,, | Performed by: STUDENT IN AN ORGANIZED HEALTH CARE EDUCATION/TRAINING PROGRAM

## 2022-11-18 NOTE — PROGRESS NOTES
Chief Complaint: Well Woman Exam     HPI:      Malu Montes is a 52 y.o.  who presents today for well woman exam.  LMP: Patient's last menstrual period was 10/20/2022 (approximate).  No issues, problems, or complaints. Specifically, patient denies abnormal vaginal bleeding, discharge, pelvic pain, urinary problems, or changes in appetite. Ms. Montes is not currently sexually active. Is current using condoms for contraception.  Underwent liver transplant and is doing well.      Previous Pap:  NEM, HPV neg  Previous Mammogram: 2021 Negative    OB History          4    Para   4    Term   2            AB        Living   2         SAB        IAB        Ectopic        Multiple        Live Births   2               Past Medical History:   Diagnosis Date    ADD (attention deficit disorder)     Anxiety     Ascites of liver     Cirrhosis 2021    CKD (chronic kidney disease) stage 3, GFR 30-59 ml/min     Constipation     ETOH abuse     Hyperlipidemia     Hypothyroidism     Liver transplant candidate 2022    Other ascites 2021    Pancreatitis, acute     Tobacco use     Vitamin D deficiency      Past Surgical History:   Procedure Laterality Date    AUGMENTATION OF BREAST      second set    breast augmentation       SECTION      COLONOSCOPY N/A 2021    Procedure: COLONOSCOPY;  Surgeon: Dao Gray MD;  Location: [facility] ([address] [place] [address]);  Service: Endoscopy;  Laterality: N/A;  COVID test 21 General surgery clinic -     CYSTOSCOPY N/A 09/10/2021    Procedure: CYSTOSCOPY;  Surgeon: Rj Sánchez Jr., MD;  Location: [address];  Service: Urology;  Laterality: N/A;    ESOPHAGOGASTRODUODENOSCOPY N/A 2021    Procedure: ESOPHAGOGASTRODUODENOSCOPY (EGD);  Surgeon: Dao Gray MD;  Location: [facility] ([address] [place] [address]);  Service: Endoscopy;  Laterality: N/A;  labs current on     ESOPHAGOGASTRODUODENOSCOPY N/A 10/26/2021    Procedure:  ESOPHAGOGASTRODUODENOSCOPY (EGD);  Surgeon: Dao Gray MD;  Location: [facility] (2ND FLR);  Service: Endoscopy;  Laterality: N/A;  to be done the week of 10/18/21 per Dr. Gray-  covid-10/23/21-[facility]   10/25 arrival time confirmed with pt-rb    ESOPHAGOGASTRODUODENOSCOPY Left 12/21/2021    Procedure: EGD (ESOPHAGOGASTRODUODENOSCOPY);  Surgeon: Koffi Monteiro MD;  Location: [facility] ENDO (4TH FLR);  Service: Endoscopy;  Laterality: Left;  Rapid  pt requested AM appt and first available in December-BB  labs morning of procedure-  12/14 lvm to confirm appt-rb    HEMORRHOID SURGERY      LIVER TRANSPLANT N/A 06/05/2022    Procedure: TRANSPLANT, LIVER;  Surgeon: Franco Slaughter MD;  Location: [facility] OR 2ND FLR;  Service: Transplant;  Laterality: N/A;    PERITONEOCENTESIS Right 06/08/2021    Procedure: PARACENTESIS, ABDOMINAL;  Surgeon: Jay Torre MD;  Location: [facility] CATH LAB;  Service: Radiology;  Laterality: Right;    PERITONEOCENTESIS Right 06/25/2021    Procedure: PARACENTESIS, ABDOMINAL;  Surgeon: Jay Torre MD;  Location: [facility] CATH LAB;  Service: Radiology;  Laterality: Right;    PERITONEOCENTESIS Right 07/12/2021    Procedure: PARACENTESIS, ABDOMINAL;  Surgeon: Jay Torre MD;  Location: [facility] CATH LAB;  Service: Radiology;  Laterality: Right;    PERITONEOCENTESIS Right 07/23/2021    Procedure: PARACENTESIS, ABDOMINAL;  Surgeon: Edward De La Torre II, MD;  Location: [facility] CATH LAB;  Service: Interventional Radiology;  Laterality: Right;    PERITONEOCENTESIS Right 08/03/2021    Procedure: PARACENTESIS, ABDOMINAL;  Surgeon: Franco Cheung MD;  Location: [facility] CATH LAB;  Service: Radiology;  Laterality: Right;    PERITONEOCENTESIS Right 08/16/2021    Procedure: PARACENTESIS, ABDOMINAL;  Surgeon: Jay Torre MD;  Location: [facility] CATH LAB;  Service: Radiology;  Laterality: Right;    PERITONEOCENTESIS Right 08/23/2021    Procedure: PARACENTESIS, ABDOMINAL;  Surgeon: Jay CARR  MD Nicho;  Location: [facility] CATH LAB;  Service: Radiology;  Laterality: Right;    PERITONEOCENTESIS Right 09/16/2021    Procedure: PARACENTESIS, ABDOMINAL;  Surgeon: Jay Torre MD;  Location: [facility] CATH LAB;  Service: Radiology;  Laterality: Right;    PERITONEOCENTESIS N/A 09/24/2021    Procedure: PARACENTESIS, ABDOMINAL;  Surgeon: Jay Torre MD;  Location: [facility] CATH LAB;  Service: Radiology;  Laterality: N/A;    PERITONEOCENTESIS Right 12/23/2021    Procedure: PARACENTESIS, ABDOMINAL;  Surgeon: Jay Torre MD;  Location: [facility] CATH LAB;  Service: Radiology;  Laterality: Right;    PERITONEOCENTESIS N/A 12/30/2021    Procedure: PARACENTESIS, ABDOMINAL;  Surgeon: Salas Cabrera MD;  Location: [facility] CATH LAB;  Service: Radiology;  Laterality: N/A;    RETROGRADE PYELOGRAPHY Bilateral 09/10/2021    Procedure: PYELOGRAM, RETROGRADE;  Surgeon: Rj Sánchez Jr., MD;  Location: [address];  Service: Urology;  Laterality: Bilateral;    TONSILLECTOMY       Social History     Socioeconomic History    Marital status:     Number of children: 1   Occupational History    Occupation: Assistant   Tobacco Use    Smoking status: Some Days     Packs/day: 0.25     Years: 27.00     Pack years: 6.75     Types: Cigarettes    Smokeless tobacco: Never    Tobacco comments:     two cigarettes a day    Substance and Sexual Activity    Alcohol use: Not Currently     Comment: quit caridad 15    Drug use: No    Sexual activity: Yes     Partners: Male   Social History Narrative    They live in [place], [place]      Social Determinants of Health     Financial Resource Strain: Low Risk     Difficulty of Paying Living Expenses: Not hard at all   Food Insecurity: No Food Insecurity    Worried About Running Out of Food in the Last Year: Never true    Ran Out of Food in the Last Year: Never true   Transportation Needs: No Transportation Needs    Lack of Transportation (Medical): No    Lack of Transportation (Non-Medical):  No   Physical Activity: Unknown    Days of Exercise per Week: 1 day   Stress: No Stress Concern Present    Feeling of Stress : Only a little   Social Connections: Unknown    Frequency of Communication with Friends and Family: More than three times a week    Frequency of Social Gatherings with Friends and Family: Twice a week    Active Member of Clubs or Organizations: No    Attends Club or Organization Meetings: Never    Marital Status:    Housing Stability: Low Risk     Unable to Pay for Housing in the Last Year: No    Number of Places Lived in the Last Year: 1    Unstable Housing in the Last Year: No     Family History   Problem Relation Age of Onset    Cancer Mother         Uterine Cancer     No Known Problems Father     No Known Problems Sister     Sleep apnea Daughter     ADD / ADHD Daughter     Heart disease Maternal Grandmother         CHF    COPD Maternal Grandfather     Heart disease Paternal Grandmother         CHF    Colon cancer Neg Hx     Inflammatory bowel disease Neg Hx     Stomach cancer Neg Hx     Breast cancer Neg Hx     Ovarian cancer Neg Hx     Learning disabilities Neg Hx        Current Outpatient Medications:     aspirin (ECOTRIN) 81 MG EC tablet, Take 1 tablet (81 mg total) by mouth once daily., Disp: 30 tablet, Rfl: 11    bimatoprost (LATISSE) 0.03 % ophthalmic solution, Place one drop on applicator and apply evenly along the skin of the upper eyelid at base of eyelashes once daily at bedtime; repeat procedure for second eye (use a clean applicator)., Disp: 5 mL, Rfl: 12    calcium carbonate-vitamin D3 600 mg-20 mcg (800 unit) Tab, Take 1 tablet by mouth once daily., Disp: 30 tablet, Rfl: 11    dapsone 100 MG Tab, Take 1 tablet (100 mg total) by mouth once daily., Disp: 30 tablet, Rfl: 3    famotidine (PEPCID) 20 MG tablet, Take 1 tablet (20 mg total) by mouth every evening., Disp: 30 tablet, Rfl: 11    levothyroxine (SYNTHROID) 50 MCG tablet, Take 1 tablet (50 mcg total) by mouth  "before breakfast., Disp: 90 tablet, Rfl: 3    linaCLOtide (LINZESS) 72 mcg Cap capsule, Take 2 capsules (144 mcg total) by mouth before breakfast., Disp: 180 capsule, Rfl: 5    mycophenolate (CELLCEPT) 250 mg Cap, Take 2 capsules (500 mg total) by mouth 2 (two) times daily., Disp: 120 capsule, Rfl: 11    mycophenolate (CELLCEPT) 250 mg Cap, Take 2 capsules (500 mg total) by mouth 2 (two) times daily., Disp: 120 capsule, Rfl: 11    tacrolimus (PROGRAF) 1 MG Cap, Take 3 capsules (3 mg total) by mouth every morning AND 2 capsules (2 mg total) every evening., Disp: 150 capsule, Rfl: 11    tacrolimus (PROGRAF) 1 MG Cap, Take 3 capsules (3 mg total) by mouth every morning AND 2 capsules (2 mg total) every evening., Disp: 150 capsule, Rfl: 11    gabapentin (NEURONTIN) 100 MG capsule, Take 2 capsules (200 mg total) by mouth daily as needed (Sleep). (Patient not taking: Reported on 11/18/2022), Disp: 90 capsule, Rfl: 11    valACYclovir (VALTREX) 1000 MG tablet, Take 1 tablet (1,000 mg total) by mouth 2 (two) times daily. (Patient not taking: Reported on 11/18/2022), Disp: 60 tablet, Rfl: 11    ROS:     GENERAL: Denies unintentional weight gain or weight loss. Feeling well overall.   SKIN: Denies rash or lesions.   HEENT: Denies headaches, or vision changes.   CARDIOVASCULAR: Denies palpitations or chest pain.   RESPIRATORY: Denies shortness of breath or dyspnea on exertion.  BREASTS: Denies pain, lumps, or nipple discharge.   ABDOMEN: Denies abdominal pain, constipation, diarrhea, nausea, vomiting, change in appetite.  URINARY: Denies frequency, dysuria, hematuria.  NEUROLOGIC: Denies syncope or weakness.   PSYCHIATRIC: Denies depression, anxiety or mood swings.    Physical Exam:      PHYSICAL EXAM:  Ht 5' 3" (1.6 m)   Wt 59.6 kg (131 lb 6.3 oz)   LMP 10/20/2022 (Approximate)   BMI 23.28 kg/m²   Body mass index is 23.28 kg/m².     APPEARANCE: Well nourished, well developed, in no acute distress.  PSYCH: Appropriate mood " and affect.  SKIN: No acne or hirsutism.  NECK: Neck symmetric without masses or thyromegaly.  NODES: No inguinal, axillary, or supraclavicular lymph node enlargement.  CHEST: Normal respiratory effort.    CARDIOVASCULAR:  Regular rate and rhythm.  BREASTS: Symmetrical, no skin changes or visible lesions.  No palpable masses or nipple discharge bilaterally.  ABDOMEN: Soft.  Mildly distended.  No tenderness or masses.    PELVIC: Normal external genitalia without lesions.  Normal hair distribution.  Adequate perineal body, normal urethral meatus.  Vagina moist and well rugated without lesions or discharge.  Cervix pink, without lesions, discharge or tenderness.  No significant cystocele or rectocele.  Bimanual exam shows uterus to be normal size, regular, mobile and nontender.  Adnexa without masses or tenderness.    EXTREMITIES: No edema.  No tenderness to palpation.      Assessment/Plan:     52 y.o.     Well woman exam    Screening for cervical cancer  -     Liquid-Based Pap Smear, Screening    Screening for HPV (human papillomavirus)  -     HPV High Risk Genotypes, PCR          Counselin.  Annual exam performed today without difficulty.  Patient was counseled today on current ASCCP pap guidelines, the recommendation for yearly pelvic exams, healthy diet and exercise routines, annual mammograms.  Pap smear collected.  All questions answered.    2.  Contraception:  Declines.  3.  Follow up with PCP for other health maintenance.  4.  Follow up in about 1 year (around 2023).      Use of the [employer] Patient Portal discussed and encouraged during today's visit.

## 2022-11-23 LAB
FINAL PATHOLOGIC DIAGNOSIS: NORMAL
Lab: NORMAL

## 2022-11-30 ENCOUNTER — PATIENT MESSAGE (OUTPATIENT)
Dept: OBSTETRICS AND GYNECOLOGY | Facility: CLINIC | Age: 52
End: 2022-11-30
Payer: COMMERCIAL

## 2022-12-02 ENCOUNTER — OFFICE VISIT (OUTPATIENT)
Dept: TRANSPLANT | Facility: CLINIC | Age: 52
End: 2022-12-02
Payer: COMMERCIAL

## 2022-12-02 VITALS
DIASTOLIC BLOOD PRESSURE: 75 MMHG | RESPIRATION RATE: 18 BRPM | HEIGHT: 63 IN | SYSTOLIC BLOOD PRESSURE: 126 MMHG | HEART RATE: 86 BPM | BODY MASS INDEX: 23.6 KG/M2 | WEIGHT: 133.19 LBS | OXYGEN SATURATION: 97 % | TEMPERATURE: 99 F

## 2022-12-02 DIAGNOSIS — Z94.4 S/P LIVER TRANSPLANT: Primary | ICD-10-CM

## 2022-12-02 DIAGNOSIS — Z79.60 LONG-TERM USE OF IMMUNOSUPPRESSANT MEDICATION: ICD-10-CM

## 2022-12-02 PROCEDURE — 99214 OFFICE O/P EST MOD 30 MIN: CPT | Mod: S$GLB,,, | Performed by: INTERNAL MEDICINE

## 2022-12-02 PROCEDURE — 99999 PR PBB SHADOW E&M-EST. PATIENT-LVL V: CPT | Mod: PBBFAC,,, | Performed by: INTERNAL MEDICINE

## 2022-12-02 PROCEDURE — 99214 PR OFFICE/OUTPT VISIT, EST, LEVL IV, 30-39 MIN: ICD-10-PCS | Mod: S$GLB,,, | Performed by: INTERNAL MEDICINE

## 2022-12-02 PROCEDURE — 99999 PR PBB SHADOW E&M-EST. PATIENT-LVL V: ICD-10-PCS | Mod: PBBFAC,,, | Performed by: INTERNAL MEDICINE

## 2022-12-02 NOTE — PROGRESS NOTES
Transplant Hepatology  Liver Transplant Recipient Follow-up    Transplant Date: 6/5/2022  UNOS Native Liver Dx: Alcoholic Cirrhosis    Malu is here for follow up of her liver transplant.    ORGAN: LIVER  Whole or Partial: whole liver  Donor Type: donation after brain death  [facility] High Risk: yes  Donor CMV Status: Positive  Donor HCV Status: Positive  Donor HBcAb: Negative  Donor HBV JACOBO: Negative  Donor HCV JACOBO: Negative  Biliary Anastomosis: end to end  Arterial Anatomy: standard  IVC reconstruction: end to end ivc  Portal vein status: patent    She has had the following complications since transplant:  leukopenia . The noted complications is well controlled.    Subjective:     Interval History: Malu is now 6 months post liver transplant. Currently, she is doing well. Is not smoking cigarettes but is occasionally vaping. Current complaints include none..    Now s/p DBD OLTX 6/5 for ETOH cirrhosis (donor HCVab+/JACOBO-). Had intra-op HD, originally listed as liver-kidney transplant. Kidney function improved and she was delisted for kidney tx. Surgery went without complication.      Allograft function 11/14/22: ALT 17, AST 18, ALKP 78, Tbil 0.3, creat 1.2, prograf level 4.1  IS: prograf, cellcept 500 mg bid  HCV Ab+, JACOBO- donor: HCV RNA-ve 8/1/22; did not acquire the infection    Abdo US 10/17/22: satisfactory doppler    Immunoprophylaxis:   PCP PROPHYLAXIS: bactrim STOPPED 8/8/22- now on dapsone until 12/6/22  CMV PROPHYLAXIS: completed  FUNGAL PROPHYLAXIS: completed  Aspirin: 81mg daily     Key Complications:   Bile Leak - NO  HAT / HAS-NO  Back to OR- NO    Review of Systems   Constitutional: Negative.    HENT: Negative.     Eyes: Negative.    Respiratory: Negative.     Cardiovascular: Negative.    Gastrointestinal: Negative.    Genitourinary: Negative.    Musculoskeletal: Negative.    Skin: Negative.    Neurological: Negative.    Psychiatric/Behavioral: Negative.       Objective:     Vitals:    12/02/22 0935    BP: 126/75   Pulse: 86   Resp: 18   Temp: 98.9 °F (37.2 °C)        Physical Exam  Vitals reviewed.   Constitutional:       Appearance: She is well-developed.   HENT:      Head: Normocephalic and atraumatic.   Eyes:      General: No scleral icterus.     Conjunctiva/sclera: Conjunctivae normal.      Pupils: Pupils are equal, round, and reactive to light.   Neck:      Thyroid: No thyromegaly.   Cardiovascular:      Rate and Rhythm: Normal rate and regular rhythm.      Heart sounds: Normal heart sounds.   Pulmonary:      Effort: Pulmonary effort is normal.      Breath sounds: Normal breath sounds. No rales.   Abdominal:      General: Bowel sounds are normal. There is no distension.      Palpations: Abdomen is soft. There is no mass.      Tenderness: There is no abdominal tenderness.   Musculoskeletal:         General: Normal range of motion.      Cervical back: Normal range of motion and neck supple.   Skin:     General: Skin is warm and dry.      Findings: No rash.   Neurological:      Mental Status: She is alert and oriented to person, place, and time.     Lab Results   Component Value Date    BILITOT 0.3 11/14/2022    AST 18 11/14/2022    ALT 17 11/14/2022    ALKPHOS 78 11/14/2022    CREATININE 1.2 11/14/2022    ALBUMIN 4.2 11/14/2022     Lab Results   Component Value Date    WBC 8.66 11/14/2022    HGB 11.3 (L) 11/14/2022    HCT 35.6 (L) 11/14/2022    HCT 31 (L) 06/06/2022     11/14/2022     Lab Results   Component Value Date    TACROLIMUS 4.1 (L) 11/14/2022       Assessment/Plan:   The patient is now 6 months post liver transplant. Current recommendations:  1. Allograft function and control of IS: continue prograf with target of only 3-5 due to renal insufficiency; continue cellcept 500 mg bid; labs per protocol; abdo US per protocol   2. Renal insufficiency: kidneys have improved; now delisted for kidney tx; monitor; minimize prograf; continue to f/u with nephtology since has a diagnosis of IgA  nephropathy  3. Immunoprophylaxis:   PCP PROPHYLAXIS: dapsone until 12/6/22; continue Aspirin: 81mg daily lifelong  4. HCV Ab+/JACOBO- donor: did not acquire HCV  5. Hx alcohol abuse: periodic peth tests to be drawn  6. Nicotine use disorder: counseled pt to stop the occasional vaping     Return at 9 months post liver transplant    Patient advised that it is recommended that all transplant candidates, and their close contacts and household members receive Covid vaccination.        Sharmila Pacheco MD           [facility] Patient Status  Functional Status: 90% - Able to carry on normal activity: minor symptoms of disease  Physical Capacity: No Limitations  Working for income: yes  New diabetes onset between last follow-up to the current follow-up: No  Did patient have any acute rejection episodes during the follow-up period: No  Post transplant malignancy: No

## 2022-12-02 NOTE — LETTER
December 2, 2022        Killian Dodd  [address]  [place] [address]  Phone: [phone]  Fax: [phone]             [facility] - Liver Transplant  [address] [address]  Phone: [phone]  Fax: [phone]   Patient: Malu Montes   MR Number: [id number]   YOB: 1970   Date of Visit: 12/2/2022       Dear Dr. Killian Dodd    Thank you for referring Malu Montes to me for evaluation. Attached you will find relevant portions of my assessment and plan of care.    If you have questions, please do not hesitate to call me. I look forward to following Malu Montes along with you.    Sincerely,    Sharmila Pacheco MD    Enclosure    If you would like to receive this communication electronically, please contact [email or web address] or [phone] to request [facility] access.    [facility] is a tool which provides read-only access to select patient information with whom you have a relationship. Its easy to use and provides real time access to review your patients record including encounter summaries, notes, results, and demographic information.    If you feel you have received this communication in error or would no longer like to receive these types of communications, please e-mail [email or web address]

## 2022-12-03 LAB
HPV HR 12 DNA SPEC QL NAA+PROBE: NEGATIVE
HPV16 AG SPEC QL: NEGATIVE
HPV18 DNA SPEC QL NAA+PROBE: NEGATIVE

## 2022-12-05 ENCOUNTER — LAB VISIT (OUTPATIENT)
Dept: LAB | Facility: HOSPITAL | Age: 52
End: 2022-12-05
Attending: INTERNAL MEDICINE
Payer: COMMERCIAL

## 2022-12-05 DIAGNOSIS — Z94.4 LIVER REPLACED BY TRANSPLANT: ICD-10-CM

## 2022-12-05 LAB
ALBUMIN SERPL BCP-MCNC: 4.2 G/DL (ref 3.5–5.2)
ALP SERPL-CCNC: 77 U/L (ref 55–135)
ALT SERPL W/O P-5'-P-CCNC: 19 U/L (ref 10–44)
ANION GAP SERPL CALC-SCNC: 11 MMOL/L (ref 8–16)
AST SERPL-CCNC: 22 U/L (ref 10–40)
BASOPHILS # BLD AUTO: 0.11 K/UL (ref 0–0.2)
BASOPHILS NFR BLD: 1.1 % (ref 0–1.9)
BILIRUB SERPL-MCNC: 0.6 MG/DL (ref 0.1–1)
BUN SERPL-MCNC: 22 MG/DL (ref 6–20)
CALCIUM SERPL-MCNC: 9 MG/DL (ref 8.7–10.5)
CHLORIDE SERPL-SCNC: 108 MMOL/L (ref 95–110)
CO2 SERPL-SCNC: 21 MMOL/L (ref 23–29)
CREAT SERPL-MCNC: 1 MG/DL (ref 0.5–1.4)
DIFFERENTIAL METHOD: ABNORMAL
EOSINOPHIL # BLD AUTO: 0.4 K/UL (ref 0–0.5)
EOSINOPHIL NFR BLD: 4.1 % (ref 0–8)
ERYTHROCYTE [DISTWIDTH] IN BLOOD BY AUTOMATED COUNT: 14 % (ref 11.5–14.5)
EST. GFR  (NO RACE VARIABLE): >60 ML/MIN/1.73 M^2
GLUCOSE SERPL-MCNC: 89 MG/DL (ref 70–110)
HCT VFR BLD AUTO: 36 % (ref 37–48.5)
HCV RNA SERPL QL NAA+PROBE: NOT DETECTED
HCV RNA SPEC NAA+PROBE-ACNC: <12 IU/ML
HGB BLD-MCNC: 11.4 G/DL (ref 12–16)
IMM GRANULOCYTES # BLD AUTO: 0.04 K/UL (ref 0–0.04)
IMM GRANULOCYTES NFR BLD AUTO: 0.4 % (ref 0–0.5)
LYMPHOCYTES # BLD AUTO: 1.9 K/UL (ref 1–4.8)
LYMPHOCYTES NFR BLD: 18.7 % (ref 18–48)
MCH RBC QN AUTO: 32.5 PG (ref 27–31)
MCHC RBC AUTO-ENTMCNC: 31.7 G/DL (ref 32–36)
MCV RBC AUTO: 103 FL (ref 82–98)
MONOCYTES # BLD AUTO: 0.8 K/UL (ref 0.3–1)
MONOCYTES NFR BLD: 7.4 % (ref 4–15)
NEUTROPHILS # BLD AUTO: 7 K/UL (ref 1.8–7.7)
NEUTROPHILS NFR BLD: 68.3 % (ref 38–73)
NRBC BLD-RTO: 0 /100 WBC
PLATELET # BLD AUTO: 321 K/UL (ref 150–450)
PMV BLD AUTO: 9.9 FL (ref 9.2–12.9)
POTASSIUM SERPL-SCNC: 4.2 MMOL/L (ref 3.5–5.1)
PROT SERPL-MCNC: 6.7 G/DL (ref 6–8.4)
RBC # BLD AUTO: 3.51 M/UL (ref 4–5.4)
SODIUM SERPL-SCNC: 140 MMOL/L (ref 136–145)
TACROLIMUS BLD-MCNC: 3.6 NG/ML (ref 5–15)
WBC # BLD AUTO: 10.29 K/UL (ref 3.9–12.7)

## 2022-12-05 PROCEDURE — 36415 COLL VENOUS BLD VENIPUNCTURE: CPT | Performed by: INTERNAL MEDICINE

## 2022-12-05 PROCEDURE — 80321 ALCOHOLS BIOMARKERS 1OR 2: CPT | Performed by: INTERNAL MEDICINE

## 2022-12-05 PROCEDURE — 87522 HEPATITIS C REVRS TRNSCRPJ: CPT | Performed by: INTERNAL MEDICINE

## 2022-12-05 PROCEDURE — 80197 ASSAY OF TACROLIMUS: CPT | Performed by: INTERNAL MEDICINE

## 2022-12-05 PROCEDURE — 80053 COMPREHEN METABOLIC PANEL: CPT | Performed by: INTERNAL MEDICINE

## 2022-12-05 PROCEDURE — 85025 COMPLETE CBC W/AUTO DIFF WBC: CPT | Performed by: INTERNAL MEDICINE

## 2022-12-07 ENCOUNTER — PATIENT MESSAGE (OUTPATIENT)
Dept: TRANSPLANT | Facility: CLINIC | Age: 52
End: 2022-12-07
Payer: COMMERCIAL

## 2022-12-07 ENCOUNTER — TELEPHONE (OUTPATIENT)
Dept: TRANSPLANT | Facility: CLINIC | Age: 52
End: 2022-12-07
Payer: COMMERCIAL

## 2022-12-07 LAB
CLINICAL BIOCHEMIST REVIEW: NORMAL
PLPETH BLD-MCNC: <10 NG/ML
POPETH BLD-MCNC: <10 NG/ML

## 2022-12-07 NOTE — TELEPHONE ENCOUNTER
Sent patient message via portal to let her know labs are stable.  No medication changes, next labs due on 12/27/22      ----- Message from Sharmila Pacheco MD sent at 12/5/2022  4:50 PM CST -----  The Labs are stable - please let patient know.

## 2022-12-14 ENCOUNTER — HOSPITAL ENCOUNTER (OUTPATIENT)
Dept: RADIOLOGY | Facility: HOSPITAL | Age: 52
Discharge: HOME | End: 2022-12-14
Attending: INTERNAL MEDICINE
Payer: COMMERCIAL

## 2022-12-14 DIAGNOSIS — Z94.4 LIVER REPLACED BY TRANSPLANT: ICD-10-CM

## 2022-12-14 PROCEDURE — 76705 ECHO EXAM OF ABDOMEN: CPT | Mod: 26,XS,, | Performed by: INTERNAL MEDICINE

## 2022-12-14 PROCEDURE — 93976 VASCULAR STUDY: CPT | Mod: 26,,, | Performed by: INTERNAL MEDICINE

## 2022-12-14 PROCEDURE — 76705 ECHO EXAM OF ABDOMEN: CPT | Mod: TC,59

## 2022-12-14 PROCEDURE — 76705 US DOPPLER LIVER TRANSPLANT POST (XPD): ICD-10-PCS | Mod: 26,XS,, | Performed by: INTERNAL MEDICINE

## 2022-12-14 PROCEDURE — 93976 US DOPPLER LIVER TRANSPLANT POST (XPD): ICD-10-PCS | Mod: 26,,, | Performed by: INTERNAL MEDICINE

## 2022-12-14 PROCEDURE — 93976 VASCULAR STUDY: CPT | Mod: TC

## 2022-12-15 ENCOUNTER — PATIENT MESSAGE (OUTPATIENT)
Dept: TRANSPLANT | Facility: CLINIC | Age: 52
End: 2022-12-15
Payer: COMMERCIAL

## 2022-12-15 ENCOUNTER — TELEPHONE (OUTPATIENT)
Dept: TRANSPLANT | Facility: CLINIC | Age: 52
End: 2022-12-15
Payer: COMMERCIAL

## 2022-12-15 NOTE — TELEPHONE ENCOUNTER
Sent message to let patient know    ----- Message from Sharmila Pacheco MD sent at 12/14/2022  4:08 PM CST -----  US stable - please let patient know.

## 2022-12-22 ENCOUNTER — PATIENT MESSAGE (OUTPATIENT)
Dept: TRANSPLANT | Facility: CLINIC | Age: 52
End: 2022-12-22
Payer: COMMERCIAL

## 2022-12-27 ENCOUNTER — LAB VISIT (OUTPATIENT)
Dept: LAB | Facility: HOSPITAL | Age: 52
End: 2022-12-27
Attending: INTERNAL MEDICINE
Payer: COMMERCIAL

## 2022-12-27 ENCOUNTER — PATIENT MESSAGE (OUTPATIENT)
Dept: TRANSPLANT | Facility: CLINIC | Age: 52
End: 2022-12-27
Payer: COMMERCIAL

## 2022-12-27 ENCOUNTER — TELEPHONE (OUTPATIENT)
Dept: TRANSPLANT | Facility: CLINIC | Age: 52
End: 2022-12-27
Payer: COMMERCIAL

## 2022-12-27 DIAGNOSIS — Z94.4 LIVER REPLACED BY TRANSPLANT: ICD-10-CM

## 2022-12-27 LAB
ALBUMIN SERPL BCP-MCNC: 4.2 G/DL (ref 3.5–5.2)
ALP SERPL-CCNC: 81 U/L (ref 55–135)
ALT SERPL W/O P-5'-P-CCNC: 24 U/L (ref 10–44)
ANION GAP SERPL CALC-SCNC: 12 MMOL/L (ref 8–16)
AST SERPL-CCNC: 21 U/L (ref 10–40)
BASOPHILS # BLD AUTO: 0.12 K/UL (ref 0–0.2)
BASOPHILS NFR BLD: 1.4 % (ref 0–1.9)
BILIRUB SERPL-MCNC: 0.3 MG/DL (ref 0.1–1)
BUN SERPL-MCNC: 25 MG/DL (ref 6–20)
CALCIUM SERPL-MCNC: 9 MG/DL (ref 8.7–10.5)
CHLORIDE SERPL-SCNC: 106 MMOL/L (ref 95–110)
CO2 SERPL-SCNC: 23 MMOL/L (ref 23–29)
CREAT SERPL-MCNC: 1.2 MG/DL (ref 0.5–1.4)
DIFFERENTIAL METHOD: ABNORMAL
EOSINOPHIL # BLD AUTO: 0.5 K/UL (ref 0–0.5)
EOSINOPHIL NFR BLD: 5.4 % (ref 0–8)
ERYTHROCYTE [DISTWIDTH] IN BLOOD BY AUTOMATED COUNT: 12.6 % (ref 11.5–14.5)
EST. GFR  (NO RACE VARIABLE): 54.8 ML/MIN/1.73 M^2
GLUCOSE SERPL-MCNC: 99 MG/DL (ref 70–110)
HCT VFR BLD AUTO: 40.6 % (ref 37–48.5)
HGB BLD-MCNC: 12.9 G/DL (ref 12–16)
IMM GRANULOCYTES # BLD AUTO: 0.02 K/UL (ref 0–0.04)
IMM GRANULOCYTES NFR BLD AUTO: 0.2 % (ref 0–0.5)
LYMPHOCYTES # BLD AUTO: 2.3 K/UL (ref 1–4.8)
LYMPHOCYTES NFR BLD: 27.8 % (ref 18–48)
MCH RBC QN AUTO: 32 PG (ref 27–31)
MCHC RBC AUTO-ENTMCNC: 31.8 G/DL (ref 32–36)
MCV RBC AUTO: 101 FL (ref 82–98)
MONOCYTES # BLD AUTO: 0.6 K/UL (ref 0.3–1)
MONOCYTES NFR BLD: 7.1 % (ref 4–15)
NEUTROPHILS # BLD AUTO: 4.8 K/UL (ref 1.8–7.7)
NEUTROPHILS NFR BLD: 58.1 % (ref 38–73)
NRBC BLD-RTO: 0 /100 WBC
PLATELET # BLD AUTO: 337 K/UL (ref 150–450)
PMV BLD AUTO: 10.1 FL (ref 9.2–12.9)
POTASSIUM SERPL-SCNC: 4.5 MMOL/L (ref 3.5–5.1)
PROT SERPL-MCNC: 7 G/DL (ref 6–8.4)
RBC # BLD AUTO: 4.03 M/UL (ref 4–5.4)
SODIUM SERPL-SCNC: 141 MMOL/L (ref 136–145)
TACROLIMUS BLD-MCNC: 5.7 NG/ML (ref 5–15)
WBC # BLD AUTO: 8.3 K/UL (ref 3.9–12.7)

## 2022-12-27 PROCEDURE — 36415 COLL VENOUS BLD VENIPUNCTURE: CPT | Performed by: INTERNAL MEDICINE

## 2022-12-27 PROCEDURE — 80197 ASSAY OF TACROLIMUS: CPT | Performed by: INTERNAL MEDICINE

## 2022-12-27 PROCEDURE — 80053 COMPREHEN METABOLIC PANEL: CPT | Performed by: INTERNAL MEDICINE

## 2022-12-27 PROCEDURE — 85025 COMPLETE CBC W/AUTO DIFF WBC: CPT | Performed by: INTERNAL MEDICINE

## 2022-12-27 NOTE — TELEPHONE ENCOUNTER
Sent patient message via portal to let her know labs are stable.  No medication changes, next labs due on 01/23/23      ----- Message from Sharmila Pacheco MD sent at 12/27/2022 11:52 AM CST -----  The Labs are stable - prograf target 3-5- will not make a change; will wait for repeat labs- please let patient know.

## 2022-12-28 DIAGNOSIS — Z94.4 S/P LIVER TRANSPLANT: Primary | ICD-10-CM

## 2022-12-28 RX ORDER — TACROLIMUS 1 MG/1
CAPSULE ORAL
Qty: 150 CAPSULE | Refills: 11 | Status: SHIPPED | OUTPATIENT
Start: 2022-12-28 | End: 2023-01-25

## 2022-12-28 RX ORDER — MYCOPHENOLATE MOFETIL 250 MG/1
500 CAPSULE ORAL 2 TIMES DAILY
Qty: 120 CAPSULE | Refills: 11 | Status: SHIPPED | OUTPATIENT
Start: 2022-12-28 | End: 2023-06-04

## 2023-01-20 ENCOUNTER — TELEPHONE (OUTPATIENT)
Dept: INTERNAL MEDICINE | Facility: CLINIC | Age: 53
End: 2023-01-20
Payer: COMMERCIAL

## 2023-01-20 RX ORDER — PROMETHAZINE HYDROCHLORIDE AND DEXTROMETHORPHAN HYDROBROMIDE 6.25; 15 MG/5ML; MG/5ML
5 SYRUP ORAL EVERY 6 HOURS PRN
Qty: 240 ML | Refills: 0 | Status: SHIPPED | OUTPATIENT
Start: 2023-01-20 | End: 2023-01-27 | Stop reason: SDUPTHER

## 2023-01-20 RX ORDER — AZITHROMYCIN 250 MG/1
TABLET, FILM COATED ORAL
Qty: 6 TABLET | Refills: 0 | Status: SHIPPED | OUTPATIENT
Start: 2023-01-20 | End: 2023-01-25

## 2023-01-20 NOTE — TELEPHONE ENCOUNTER
----- Message from Viviane Rosado sent at 1/20/2023  4:07 PM CST -----  Contact: Self/[phone]  1MEDICALADVICE     Patient is calling for Medical Advice regarding:Cough/Congestion/Mucous / Sinus headache     How long has patient had these symptoms:3 days     [id number] [place] [facility] - [address] AT [facility] [address] [place]  [address]  [place] [address]  Phone: [phone] Fax: [phone]          Would like response via [facility]-[employer]:  No     Comments: pt is asking if she can have a rx for something sent in to the pharmacy

## 2023-01-23 ENCOUNTER — LAB VISIT (OUTPATIENT)
Dept: LAB | Facility: HOSPITAL | Age: 53
End: 2023-01-23
Attending: INTERNAL MEDICINE
Payer: COMMERCIAL

## 2023-01-23 DIAGNOSIS — Z94.4 LIVER REPLACED BY TRANSPLANT: ICD-10-CM

## 2023-01-23 LAB
ALBUMIN SERPL BCP-MCNC: 4 G/DL (ref 3.5–5.2)
ALP SERPL-CCNC: 76 U/L (ref 55–135)
ALT SERPL W/O P-5'-P-CCNC: 28 U/L (ref 10–44)
ANION GAP SERPL CALC-SCNC: 12 MMOL/L (ref 8–16)
AST SERPL-CCNC: 23 U/L (ref 10–40)
BASOPHILS # BLD AUTO: 0.08 K/UL (ref 0–0.2)
BASOPHILS NFR BLD: 0.9 % (ref 0–1.9)
BILIRUB SERPL-MCNC: 0.2 MG/DL (ref 0.1–1)
BUN SERPL-MCNC: 30 MG/DL (ref 6–20)
CALCIUM SERPL-MCNC: 9.4 MG/DL (ref 8.7–10.5)
CHLORIDE SERPL-SCNC: 106 MMOL/L (ref 95–110)
CO2 SERPL-SCNC: 20 MMOL/L (ref 23–29)
CREAT SERPL-MCNC: 1.1 MG/DL (ref 0.5–1.4)
DIFFERENTIAL METHOD: ABNORMAL
EOSINOPHIL # BLD AUTO: 0.6 K/UL (ref 0–0.5)
EOSINOPHIL NFR BLD: 6.6 % (ref 0–8)
ERYTHROCYTE [DISTWIDTH] IN BLOOD BY AUTOMATED COUNT: 12 % (ref 11.5–14.5)
EST. GFR  (NO RACE VARIABLE): >60 ML/MIN/1.73 M^2
GLUCOSE SERPL-MCNC: 118 MG/DL (ref 70–110)
HCT VFR BLD AUTO: 40.1 % (ref 37–48.5)
HGB BLD-MCNC: 12.9 G/DL (ref 12–16)
IMM GRANULOCYTES # BLD AUTO: 0.02 K/UL (ref 0–0.04)
IMM GRANULOCYTES NFR BLD AUTO: 0.2 % (ref 0–0.5)
LYMPHOCYTES # BLD AUTO: 2.4 K/UL (ref 1–4.8)
LYMPHOCYTES NFR BLD: 26.5 % (ref 18–48)
MCH RBC QN AUTO: 30.6 PG (ref 27–31)
MCHC RBC AUTO-ENTMCNC: 32.2 G/DL (ref 32–36)
MCV RBC AUTO: 95 FL (ref 82–98)
MONOCYTES # BLD AUTO: 0.6 K/UL (ref 0.3–1)
MONOCYTES NFR BLD: 7.1 % (ref 4–15)
NEUTROPHILS # BLD AUTO: 5.2 K/UL (ref 1.8–7.7)
NEUTROPHILS NFR BLD: 58.7 % (ref 38–73)
NRBC BLD-RTO: 0 /100 WBC
PLATELET # BLD AUTO: 323 K/UL (ref 150–450)
PMV BLD AUTO: 9.8 FL (ref 9.2–12.9)
POTASSIUM SERPL-SCNC: 4 MMOL/L (ref 3.5–5.1)
PROT SERPL-MCNC: 7.2 G/DL (ref 6–8.4)
RBC # BLD AUTO: 4.21 M/UL (ref 4–5.4)
SODIUM SERPL-SCNC: 138 MMOL/L (ref 136–145)
TACROLIMUS BLD-MCNC: 6.5 NG/ML (ref 5–15)
WBC # BLD AUTO: 8.88 K/UL (ref 3.9–12.7)

## 2023-01-23 PROCEDURE — 80197 ASSAY OF TACROLIMUS: CPT | Performed by: INTERNAL MEDICINE

## 2023-01-23 PROCEDURE — 80053 COMPREHEN METABOLIC PANEL: CPT | Performed by: INTERNAL MEDICINE

## 2023-01-23 PROCEDURE — 85025 COMPLETE CBC W/AUTO DIFF WBC: CPT | Performed by: INTERNAL MEDICINE

## 2023-01-23 PROCEDURE — 36415 COLL VENOUS BLD VENIPUNCTURE: CPT | Performed by: INTERNAL MEDICINE

## 2023-01-25 ENCOUNTER — PATIENT MESSAGE (OUTPATIENT)
Dept: TRANSPLANT | Facility: CLINIC | Age: 53
End: 2023-01-25
Payer: COMMERCIAL

## 2023-01-25 DIAGNOSIS — Z94.4 S/P LIVER TRANSPLANT: ICD-10-CM

## 2023-01-25 RX ORDER — TACROLIMUS 1 MG/1
2 CAPSULE ORAL EVERY 12 HOURS
Qty: 120 CAPSULE | Refills: 11 | Status: SHIPPED | OUTPATIENT
Start: 2023-01-25 | End: 2023-01-25

## 2023-01-25 RX ORDER — TACROLIMUS 1 MG/1
2 CAPSULE ORAL EVERY 12 HOURS
Qty: 360 CAPSULE | Refills: 3 | Status: SHIPPED | OUTPATIENT
Start: 2023-01-25 | End: 2023-03-31

## 2023-01-25 NOTE — TELEPHONE ENCOUNTER
Sent message to patient with change and to get labs on 2/20/23    ----- Message from Sharmila Pacheco MD sent at 1/25/2023  1:49 PM CST -----  The Labs are stable; lower prograf to 2/2 from 3/2 and repeat labs in 4 weeks - please let patient know.

## 2023-01-26 ENCOUNTER — PATIENT MESSAGE (OUTPATIENT)
Dept: INTERNAL MEDICINE | Facility: CLINIC | Age: 53
End: 2023-01-26
Payer: COMMERCIAL

## 2023-01-27 RX ORDER — PROMETHAZINE HYDROCHLORIDE AND DEXTROMETHORPHAN HYDROBROMIDE 6.25; 15 MG/5ML; MG/5ML
5 SYRUP ORAL EVERY 6 HOURS PRN
Qty: 240 ML | Refills: 0 | Status: SHIPPED | OUTPATIENT
Start: 2023-01-27 | End: 2023-01-30 | Stop reason: SDUPTHER

## 2023-01-30 ENCOUNTER — OFFICE VISIT (OUTPATIENT)
Dept: NEPHROLOGY | Facility: CLINIC | Age: 53
End: 2023-01-30
Payer: COMMERCIAL

## 2023-01-30 ENCOUNTER — LAB VISIT (OUTPATIENT)
Dept: LAB | Facility: HOSPITAL | Age: 53
End: 2023-01-30
Attending: INTERNAL MEDICINE
Payer: COMMERCIAL

## 2023-01-30 VITALS
HEIGHT: 63 IN | OXYGEN SATURATION: 98 % | WEIGHT: 135.38 LBS | DIASTOLIC BLOOD PRESSURE: 84 MMHG | BODY MASS INDEX: 23.99 KG/M2 | HEART RATE: 97 BPM | SYSTOLIC BLOOD PRESSURE: 122 MMHG

## 2023-01-30 DIAGNOSIS — R05.9 COUGH, UNSPECIFIED TYPE: ICD-10-CM

## 2023-01-30 DIAGNOSIS — Z79.899 IMMUNOSUPPRESSION DUE TO DRUG THERAPY: ICD-10-CM

## 2023-01-30 DIAGNOSIS — N18.2 CHRONIC KIDNEY DISEASE (CKD), ACTIVE MEDICAL MANAGEMENT WITHOUT DIALYSIS, STAGE 2 (MILD): ICD-10-CM

## 2023-01-30 DIAGNOSIS — N18.2 CHRONIC KIDNEY DISEASE (CKD), ACTIVE MEDICAL MANAGEMENT WITHOUT DIALYSIS, STAGE 2 (MILD): Primary | ICD-10-CM

## 2023-01-30 DIAGNOSIS — N25.81 SECONDARY HYPERPARATHYROIDISM OF RENAL ORIGIN: ICD-10-CM

## 2023-01-30 DIAGNOSIS — D84.821 IMMUNOSUPPRESSION DUE TO DRUG THERAPY: ICD-10-CM

## 2023-01-30 LAB
BACTERIA #/AREA URNS AUTO: ABNORMAL /HPF
BILIRUB UR QL STRIP: NEGATIVE
CLARITY UR REFRACT.AUTO: ABNORMAL
COLOR UR AUTO: ABNORMAL
CREAT UR-MCNC: 110 MG/DL (ref 15–325)
GLUCOSE UR QL STRIP: NEGATIVE
HGB UR QL STRIP: NEGATIVE
HYALINE CASTS UR QL AUTO: 1 /LPF
KETONES UR QL STRIP: ABNORMAL
LEUKOCYTE ESTERASE UR QL STRIP: NEGATIVE
MICROSCOPIC COMMENT: ABNORMAL
NITRITE UR QL STRIP: NEGATIVE
PH UR STRIP: 5 [PH] (ref 5–8)
PROT UR QL STRIP: NEGATIVE
PROT UR-MCNC: 11 MG/DL (ref 0–15)
PROT/CREAT UR: 0.1 MG/G{CREAT} (ref 0–0.2)
RBC #/AREA URNS AUTO: 2 /HPF (ref 0–4)
SP GR UR STRIP: 1.02 (ref 1–1.03)
SQUAMOUS #/AREA URNS AUTO: 4 /HPF
URN SPEC COLLECT METH UR: ABNORMAL
WBC #/AREA URNS AUTO: 7 /HPF (ref 0–5)

## 2023-01-30 PROCEDURE — 99999 PR PBB SHADOW E&M-EST. PATIENT-LVL IV: ICD-10-PCS | Mod: PBBFAC,,, | Performed by: INTERNAL MEDICINE

## 2023-01-30 PROCEDURE — 99999 PR PBB SHADOW E&M-EST. PATIENT-LVL IV: CPT | Mod: PBBFAC,,, | Performed by: INTERNAL MEDICINE

## 2023-01-30 PROCEDURE — 99215 PR OFFICE/OUTPT VISIT, EST, LEVL V, 40-54 MIN: ICD-10-PCS | Mod: S$GLB,,, | Performed by: INTERNAL MEDICINE

## 2023-01-30 PROCEDURE — 84156 ASSAY OF PROTEIN URINE: CPT | Performed by: INTERNAL MEDICINE

## 2023-01-30 PROCEDURE — 81001 URINALYSIS AUTO W/SCOPE: CPT | Performed by: INTERNAL MEDICINE

## 2023-01-30 PROCEDURE — 99215 OFFICE O/P EST HI 40 MIN: CPT | Mod: S$GLB,,, | Performed by: INTERNAL MEDICINE

## 2023-01-30 RX ORDER — PROMETHAZINE HYDROCHLORIDE AND DEXTROMETHORPHAN HYDROBROMIDE 6.25; 15 MG/5ML; MG/5ML
5 SYRUP ORAL EVERY 6 HOURS PRN
Qty: 240 ML | Refills: 0 | Status: SHIPPED | OUTPATIENT
Start: 2023-01-30 | End: 2023-02-09

## 2023-01-30 NOTE — PROGRESS NOTES
REASON FOR CONSULT/CHIEF COMPLAIN: Acute kidney injury    REFERRING PHYSICIAN: Killian Dodd DO    HISTORY OF PRESENT ILLNESS: 52 y.o. female  patient was referred here for abnormal renal function.   No chronic NSAID use, no known exposure to lithium, lead. No family history of Lupus, kidney disease or deafness. No ingestion of licorice. One kidney stone at age 25 after her first baby, needed lithotripsy. No smoking.   Had an episode of pancreatitis in 2021. Cirrhosis diagnosed 2021, blamed on alcohol. Has needed initially once every two weeks, later once week paracentesis. Kidney function deteriorated and then improved after TIPS procedure. Now s/p liver transplant on 2021. Post op has been uncomplicated so far.   Denied any new issues with urination including dysuria, hematuria, urgency, hesitancy, nocturia, incomplete voiding. Denied chest pain, nausea, vomiting, abd pain, nausea, vomiting, diarrhea, shortness of breath, pedal edema, Orthopnea, PND    ROS:  General: negative for chills, or fatigue  Respiratory: No cough, shortness of breath, or wheezing  Cardiovascular: No chest pain or dyspnea   Gastrointestinal: No abdominal pain, change in bowel habits  Neurological: No new focal weakness, no numbness  Dermatological: No rash or ulcers.    PAST MEDICAL HISTORY:  Past Medical History:   Diagnosis Date    ADD (attention deficit disorder)     Anxiety     Ascites of liver     Cirrhosis 2021    CKD (chronic kidney disease) stage 3, GFR 30-59 ml/min     Constipation     ETOH abuse     Hyperlipidemia     Hypothyroidism     Liver transplant candidate 2022    Other ascites 2021    Pancreatitis, acute     Tobacco use     Vitamin D deficiency        PAST SURGICAL HISTORY:  Past Surgical History:   Procedure Laterality Date    AUGMENTATION OF BREAST      second set    breast augmentation       SECTION      COLONOSCOPY N/A 2021    Procedure: COLONOSCOPY;  Surgeon:  Dao Gray MD;  Location: [facility] (2ND FLR);  Service: Endoscopy;  Laterality: N/A;  COVID test 9/14/21 General surgery clinic -     CYSTOSCOPY N/A 09/10/2021    Procedure: CYSTOSCOPY;  Surgeon: Rj Sánchez Jr., MD;  Location: [facility] OR 1ST FLR;  Service: Urology;  Laterality: N/A;    ESOPHAGOGASTRODUODENOSCOPY N/A 09/17/2021    Procedure: ESOPHAGOGASTRODUODENOSCOPY (EGD);  Surgeon: Dao Gray MD;  Location: [facility] ENDO (2ND FLR);  Service: Endoscopy;  Laterality: N/A;  labs current on 9/7    ESOPHAGOGASTRODUODENOSCOPY N/A 10/26/2021    Procedure: ESOPHAGOGASTRODUODENOSCOPY (EGD);  Surgeon: Dao Gray MD;  Location: [facility] ENDO (2ND FLR);  Service: Endoscopy;  Laterality: N/A;  to be done the week of 10/18/21 per Dr. Gray-  covid-10/23/21-[facility]-   10/25 arrival time confirmed with pt-rb    ESOPHAGOGASTRODUODENOSCOPY Left 12/21/2021    Procedure: EGD (ESOPHAGOGASTRODUODENOSCOPY);  Surgeon: Koffi Monteiro MD;  Location: [facility] (4TH FLR);  Service: Endoscopy;  Laterality: Left;  Rapid  pt requested AM appt and first available in December-  labs morning of procedure-  12/14 lvm to confirm appt-rb    HEMORRHOID SURGERY      LIVER TRANSPLANT N/A 06/05/2022    Procedure: TRANSPLANT, LIVER;  Surgeon: Franco Slaughter MD;  Location: [facility] OR 2ND FLR;  Service: Transplant;  Laterality: N/A;    PERITONEOCENTESIS Right 06/08/2021    Procedure: PARACENTESIS, ABDOMINAL;  Surgeon: Jay Torre MD;  Location: [facility] CATH LAB;  Service: Radiology;  Laterality: Right;    PERITONEOCENTESIS Right 06/25/2021    Procedure: PARACENTESIS, ABDOMINAL;  Surgeon: Jay Torre MD;  Location: [facility] CATH LAB;  Service: Radiology;  Laterality: Right;    PERITONEOCENTESIS Right 07/12/2021    Procedure: PARACENTESIS, ABDOMINAL;  Surgeon: Jay Torre MD;  Location: [facility] CATH LAB;  Service: Radiology;  Laterality: Right;    PERITONEOCENTESIS Right 07/23/2021    Procedure: PARACENTESIS, ABDOMINAL;   Surgeon: Edward De La Torre II, MD;  Location: [facility] CATH LAB;  Service: Interventional Radiology;  Laterality: Right;    PERITONEOCENTESIS Right 08/03/2021    Procedure: PARACENTESIS, ABDOMINAL;  Surgeon: Franco Cheung MD;  Location: [facility] CATH LAB;  Service: Radiology;  Laterality: Right;    PERITONEOCENTESIS Right 08/16/2021    Procedure: PARACENTESIS, ABDOMINAL;  Surgeon: Jay Torre MD;  Location: [facility] CATH LAB;  Service: Radiology;  Laterality: Right;    PERITONEOCENTESIS Right 08/23/2021    Procedure: PARACENTESIS, ABDOMINAL;  Surgeon: Jay Torre MD;  Location: [facility] CATH LAB;  Service: Radiology;  Laterality: Right;    PERITONEOCENTESIS Right 09/16/2021    Procedure: PARACENTESIS, ABDOMINAL;  Surgeon: Jay Torre MD;  Location: [facility] CATH LAB;  Service: Radiology;  Laterality: Right;    PERITONEOCENTESIS N/A 09/24/2021    Procedure: PARACENTESIS, ABDOMINAL;  Surgeon: Jay Torre MD;  Location: [facility] CATH LAB;  Service: Radiology;  Laterality: N/A;    PERITONEOCENTESIS Right 12/23/2021    Procedure: PARACENTESIS, ABDOMINAL;  Surgeon: Jay Torre MD;  Location: [facility] CATH LAB;  Service: Radiology;  Laterality: Right;    PERITONEOCENTESIS N/A 12/30/2021    Procedure: PARACENTESIS, ABDOMINAL;  Surgeon: Salas Cabrera MD;  Location: [facility] CATH LAB;  Service: Radiology;  Laterality: N/A;    RETROGRADE PYELOGRAPHY Bilateral 09/10/2021    Procedure: PYELOGRAM, RETROGRADE;  Surgeon: Rj Sánchez Jr., MD;  Location: [address];  Service: Urology;  Laterality: Bilateral;    TONSILLECTOMY         FAMILY HISTORY:   Family History   Problem Relation Age of Onset    Cancer Mother         Uterine Cancer     No Known Problems Father     No Known Problems Sister     Sleep apnea Daughter     ADD / ADHD Daughter     Heart disease Maternal Grandmother         CHF    COPD Maternal Grandfather     Heart disease Paternal Grandmother         CHF    Colon cancer Neg Hx     Inflammatory  bowel disease Neg Hx     Stomach cancer Neg Hx     Breast cancer Neg Hx     Ovarian cancer Neg Hx     Learning disabilities Neg Hx        SOCIAL HISTORY:  Social History     Socioeconomic History    Marital status:     Number of children: 1   Occupational History    Occupation: Assistant   Tobacco Use    Smoking status: Some Days     Packs/day: 0.25     Years: 27.00     Pack years: 6.75     Types: Cigarettes    Smokeless tobacco: Never    Tobacco comments:     two cigarettes a day    Substance and Sexual Activity    Alcohol use: Not Currently     Comment: quit caridad 15    Drug use: No    Sexual activity: Yes     Partners: Male   Social History Narrative    They live in [place], [place]        ALLERGIES:  Review of patient's allergies indicates:  No Known Allergies    MEDICATIONS:    Current Outpatient Medications:     aspirin (ECOTRIN) 81 MG EC tablet, Take 1 tablet (81 mg total) by mouth once daily., Disp: 30 tablet, Rfl: 11    bimatoprost (LATISSE) 0.03 % ophthalmic solution, Place one drop on applicator and apply evenly along the skin of the upper eyelid at base of eyelashes once daily at bedtime; repeat procedure for second eye (use a clean applicator)., Disp: 5 mL, Rfl: 12    calcium carbonate-vitamin D3 600 mg-20 mcg (800 unit) Tab, Take 1 tablet by mouth once daily., Disp: 30 tablet, Rfl: 11    famotidine (PEPCID) 20 MG tablet, Take 1 tablet (20 mg total) by mouth every evening., Disp: 30 tablet, Rfl: 11    levothyroxine (SYNTHROID) 50 MCG tablet, Take 1 tablet (50 mcg total) by mouth before breakfast., Disp: 90 tablet, Rfl: 3    linaCLOtide (LINZESS) 72 mcg Cap capsule, Take 2 capsules (144 mcg total) by mouth before breakfast., Disp: 180 capsule, Rfl: 5    mycophenolate (CELLCEPT) 250 mg Cap, Take 2 capsules (500 mg total) by mouth 2 (two) times daily., Disp: 120 capsule, Rfl: 11    tacrolimus (PROGRAF) 1 MG Cap, Take 2 capsules (2 mg total) by mouth every 12 (twelve) hours., Disp: 360 capsule, Rfl: 3     "gabapentin (NEURONTIN) 100 MG capsule, Take 2 capsules (200 mg total) by mouth daily as needed (Sleep). (Patient not taking: Reported on 11/18/2022), Disp: 90 capsule, Rfl: 11    promethazine-dextromethorphan (PROMETHAZINE-DM) 6.25-15 mg/5 mL Syrp, Take 5 mLs by mouth every 6 (six) hours as needed (cough)., Disp: 240 mL, Rfl: 0   Medication List with Changes/Refills   Current Medications    ASPIRIN (ECOTRIN) 81 MG EC TABLET    Take 1 tablet (81 mg total) by mouth once daily.    BIMATOPROST (LATISSE) 0.03 % OPHTHALMIC SOLUTION    Place one drop on applicator and apply evenly along the skin of the upper eyelid at base of eyelashes once daily at bedtime; repeat procedure for second eye (use a clean applicator).    CALCIUM CARBONATE-VITAMIN D3 600 MG-20 MCG (800 UNIT) TAB    Take 1 tablet by mouth once daily.    FAMOTIDINE (PEPCID) 20 MG TABLET    Take 1 tablet (20 mg total) by mouth every evening.    GABAPENTIN (NEURONTIN) 100 MG CAPSULE    Take 2 capsules (200 mg total) by mouth daily as needed (Sleep).    LEVOTHYROXINE (SYNTHROID) 50 MCG TABLET    Take 1 tablet (50 mcg total) by mouth before breakfast.    LINACLOTIDE (LINZESS) 72 MCG CAP CAPSULE    Take 2 capsules (144 mcg total) by mouth before breakfast.    MYCOPHENOLATE (CELLCEPT) 250 MG CAP    Take 2 capsules (500 mg total) by mouth 2 (two) times daily.    TACROLIMUS (PROGRAF) 1 MG CAP    Take 2 capsules (2 mg total) by mouth every 12 (twelve) hours.   Changed and/or Refilled Medications    Modified Medication Previous Medication    PROMETHAZINE-DEXTROMETHORPHAN (PROMETHAZINE-DM) 6.25-15 MG/5 ML SYRP promethazine-dextromethorphan (PROMETHAZINE-DM) 6.25-15 mg/5 mL Syrp       Take 5 mLs by mouth every 6 (six) hours as needed (cough).    Take 5 mLs by mouth every 6 (six) hours as needed (cough).   Discontinued Medications    DAPSONE 100 MG TAB    Take 1 tablet (100 mg total) by mouth once daily.        PHYSICAL EXAM:  /84   Pulse 97   Ht 5' 3" (1.6 m)   Wt " 61.4 kg (135 lb 5.8 oz)   SpO2 98%   BMI 23.98 kg/m²     General: No distress, No fever or chills  Neck: No adenopathy,no carotid bruit,no JVD  Lungs:Clear to auscultation bilaterally, No Crackles  Heart: Regular rate and rhythm, no murmur, gallops or rubs  Abdomen: Soft, non distended  Extremities: Atraumatic, no edema in LE  Skin: Turgor normal. No rashes  Neurologic: No focal weakness, oriented.  Dialysis Access: Non applicable        LABS:  Lab Results   Component Value Date    HGB 12.9 01/23/2023        Lab Results   Component Value Date    CREATININE 1.1 01/23/2023       Prot/Creat Ratio, Urine   Date Value Ref Range Status   09/21/2022 0.09 0.00 - 0.20 Final   11/11/2021 0.06 0.00 - 0.20 Final       Lab Results   Component Value Date     01/23/2023    K 4.0 01/23/2023    CO2 20 (L) 01/23/2023       last PTH   Lab Results   Component Value Date    .2 (H) 04/25/2022    CALCIUM 9.4 01/23/2023    CAION 1.00 (L) 06/05/2022    PHOS 2.9 06/10/2022       Lab Results   Component Value Date    HGBA1C 5.4 06/05/2022       Lab Results   Component Value Date    LDLCALC 188.8 (H) 10/03/2022         Creatinine, Urine   Date Value Ref Range Status   09/21/2022 173.0 15.0 - 325.0 mg/dL Final   11/11/2021 188.0 15.0 - 325.0 mg/dL Final   07/29/2021 253.0 15.0 - 325.0 mg/dL Final       Protein, Urine   Date Value Ref Range Status   10/18/2021 11 0 - 15 mg/dL Final   07/30/2021 28 (H) 0 - 15 mg/dL Final     Protein, Urine Random   Date Value Ref Range Status   09/21/2022 16 (H) 0 - 15 mg/dL Final   11/11/2021 12 0 - 15 mg/dL Final       Prot/Creat Ratio, Urine   Date Value Ref Range Status   09/21/2022 0.09 0.00 - 0.20 Final   11/11/2021 0.06 0.00 - 0.20 Final         ASSESSMENT:    1) Chronic Kidney disease stage 3a  2) Metabolic acidosis - Resolved  3) Anemia due to iron deficiency pre transplant - Resolved now.  4) S/P liver transplant on Prograf and Cellcept  5) Secondary hyperparathyroidism   Renal ultrasound  10/21 showed 9.6 and 11.3 cm kidneys (no known issues from child rosa that would explain the reason for her asymmetric kidney sizes). Patients baseline creatinine is less than 1 till June 2021, Prior to liver transplant her creatinine has been above 2 with significant acanthocytes. She has had cystoscopy done in the past with no significant detected abnormalities.Serological work up including ANCA, GBM, MARIBEL, Cryoglobulin are all negative. Since liver transplant the creatinine trended back down to 1.2 mg/dl, urine microscopy post transplant (including today) did not show any acanthocytes likely due to resolution of secondary IgA. She will likely settle down at CKD stage 2-3a.   Electrolytes, in acceptable range. s/p IV Iron infusion for iron def anemia prior to transplant. PTH high before transplant on vitamin D.    - Drink to thirst  - Avoid NSAIDs intake  - No dark color sodas.    RTC in 4 months    Diagnosis and plan of care explained to the patient.  Pt Verbalized understanding. Answered all questions.  Thanks for allowing me to participate in the care of this patient.     1:19 PM    Rashel Cohn MD  Nephrology & Critical Care

## 2023-02-17 ENCOUNTER — PATIENT MESSAGE (OUTPATIENT)
Dept: TRANSPLANT | Facility: CLINIC | Age: 53
End: 2023-02-17
Payer: COMMERCIAL

## 2023-02-20 ENCOUNTER — TELEPHONE (OUTPATIENT)
Dept: TRANSPLANT | Facility: CLINIC | Age: 53
End: 2023-02-20
Payer: COMMERCIAL

## 2023-02-20 ENCOUNTER — PATIENT MESSAGE (OUTPATIENT)
Dept: TRANSPLANT | Facility: CLINIC | Age: 53
End: 2023-02-20
Payer: COMMERCIAL

## 2023-02-20 ENCOUNTER — LAB VISIT (OUTPATIENT)
Dept: LAB | Facility: HOSPITAL | Age: 53
End: 2023-02-20
Attending: INTERNAL MEDICINE
Payer: COMMERCIAL

## 2023-02-20 DIAGNOSIS — Z94.4 LIVER REPLACED BY TRANSPLANT: ICD-10-CM

## 2023-02-20 DIAGNOSIS — N18.2 CHRONIC KIDNEY DISEASE (CKD), ACTIVE MEDICAL MANAGEMENT WITHOUT DIALYSIS, STAGE 2 (MILD): ICD-10-CM

## 2023-02-20 LAB
25(OH)D3+25(OH)D2 SERPL-MCNC: 41 NG/ML (ref 30–96)
ALBUMIN SERPL BCP-MCNC: 4.2 G/DL (ref 3.5–5.2)
ALP SERPL-CCNC: 77 U/L (ref 55–135)
ALT SERPL W/O P-5'-P-CCNC: 34 U/L (ref 10–44)
ANION GAP SERPL CALC-SCNC: 9 MMOL/L (ref 8–16)
AST SERPL-CCNC: 32 U/L (ref 10–40)
BASOPHILS # BLD AUTO: 0.1 K/UL (ref 0–0.2)
BASOPHILS NFR BLD: 1.2 % (ref 0–1.9)
BILIRUB SERPL-MCNC: 0.3 MG/DL (ref 0.1–1)
BUN SERPL-MCNC: 26 MG/DL (ref 6–20)
CALCIUM SERPL-MCNC: 9.8 MG/DL (ref 8.7–10.5)
CHLORIDE SERPL-SCNC: 105 MMOL/L (ref 95–110)
CO2 SERPL-SCNC: 23 MMOL/L (ref 23–29)
CREAT SERPL-MCNC: 1.1 MG/DL (ref 0.5–1.4)
DIFFERENTIAL METHOD: ABNORMAL
EOSINOPHIL # BLD AUTO: 0.6 K/UL (ref 0–0.5)
EOSINOPHIL NFR BLD: 7.1 % (ref 0–8)
ERYTHROCYTE [DISTWIDTH] IN BLOOD BY AUTOMATED COUNT: 12.8 % (ref 11.5–14.5)
EST. GFR  (NO RACE VARIABLE): >60 ML/MIN/1.73 M^2
GLUCOSE SERPL-MCNC: 104 MG/DL (ref 70–110)
HCT VFR BLD AUTO: 41.3 % (ref 37–48.5)
HGB BLD-MCNC: 13.3 G/DL (ref 12–16)
IMM GRANULOCYTES # BLD AUTO: 0.02 K/UL (ref 0–0.04)
IMM GRANULOCYTES NFR BLD AUTO: 0.2 % (ref 0–0.5)
LYMPHOCYTES # BLD AUTO: 1.8 K/UL (ref 1–4.8)
LYMPHOCYTES NFR BLD: 22.1 % (ref 18–48)
MCH RBC QN AUTO: 29.6 PG (ref 27–31)
MCHC RBC AUTO-ENTMCNC: 32.2 G/DL (ref 32–36)
MCV RBC AUTO: 92 FL (ref 82–98)
MONOCYTES # BLD AUTO: 0.7 K/UL (ref 0.3–1)
MONOCYTES NFR BLD: 8.1 % (ref 4–15)
NEUTROPHILS # BLD AUTO: 4.9 K/UL (ref 1.8–7.7)
NEUTROPHILS NFR BLD: 61.3 % (ref 38–73)
NRBC BLD-RTO: 0 /100 WBC
PLATELET # BLD AUTO: 308 K/UL (ref 150–450)
PMV BLD AUTO: 10 FL (ref 9.2–12.9)
POTASSIUM SERPL-SCNC: 4.3 MMOL/L (ref 3.5–5.1)
PROT SERPL-MCNC: 7.2 G/DL (ref 6–8.4)
PTH-INTACT SERPL-MCNC: 45.3 PG/ML (ref 9–77)
RBC # BLD AUTO: 4.49 M/UL (ref 4–5.4)
SODIUM SERPL-SCNC: 137 MMOL/L (ref 136–145)
TACROLIMUS BLD-MCNC: 4 NG/ML (ref 5–15)
WBC # BLD AUTO: 8.02 K/UL (ref 3.9–12.7)

## 2023-02-20 PROCEDURE — 83970 ASSAY OF PARATHORMONE: CPT | Performed by: INTERNAL MEDICINE

## 2023-02-20 PROCEDURE — 80053 COMPREHEN METABOLIC PANEL: CPT | Performed by: INTERNAL MEDICINE

## 2023-02-20 PROCEDURE — 80197 ASSAY OF TACROLIMUS: CPT | Performed by: INTERNAL MEDICINE

## 2023-02-20 PROCEDURE — 85025 COMPLETE CBC W/AUTO DIFF WBC: CPT | Performed by: INTERNAL MEDICINE

## 2023-02-20 PROCEDURE — 82306 VITAMIN D 25 HYDROXY: CPT | Performed by: INTERNAL MEDICINE

## 2023-02-20 PROCEDURE — 36415 COLL VENOUS BLD VENIPUNCTURE: CPT | Performed by: INTERNAL MEDICINE

## 2023-02-20 NOTE — TELEPHONE ENCOUNTER
Sent patient message via portal to let her know labs are stable.  No medication changes, next labs due on 3/20/23      ----- Message from Sharmila Pacheco MD sent at 2/20/2023 11:39 AM CST -----  The Labs are stable - please let patient know.

## 2023-03-05 ENCOUNTER — PATIENT MESSAGE (OUTPATIENT)
Dept: TRANSPLANT | Facility: CLINIC | Age: 53
End: 2023-03-05
Payer: COMMERCIAL

## 2023-03-20 ENCOUNTER — LAB VISIT (OUTPATIENT)
Dept: LAB | Facility: HOSPITAL | Age: 53
End: 2023-03-20
Attending: INTERNAL MEDICINE
Payer: COMMERCIAL

## 2023-03-20 DIAGNOSIS — Z94.4 LIVER REPLACED BY TRANSPLANT: ICD-10-CM

## 2023-03-20 LAB
ALBUMIN SERPL BCP-MCNC: 4.4 G/DL (ref 3.5–5.2)
ALP SERPL-CCNC: 80 U/L (ref 55–135)
ALT SERPL W/O P-5'-P-CCNC: 29 U/L (ref 10–44)
ANION GAP SERPL CALC-SCNC: 11 MMOL/L (ref 8–16)
AST SERPL-CCNC: 27 U/L (ref 10–40)
BASOPHILS # BLD AUTO: 0.1 K/UL (ref 0–0.2)
BASOPHILS NFR BLD: 1 % (ref 0–1.9)
BILIRUB SERPL-MCNC: 0.3 MG/DL (ref 0.1–1)
BUN SERPL-MCNC: 29 MG/DL (ref 6–20)
CALCIUM SERPL-MCNC: 9.8 MG/DL (ref 8.7–10.5)
CHLORIDE SERPL-SCNC: 105 MMOL/L (ref 95–110)
CO2 SERPL-SCNC: 24 MMOL/L (ref 23–29)
CREAT SERPL-MCNC: 1.1 MG/DL (ref 0.5–1.4)
DIFFERENTIAL METHOD: ABNORMAL
EOSINOPHIL # BLD AUTO: 0.6 K/UL (ref 0–0.5)
EOSINOPHIL NFR BLD: 5.8 % (ref 0–8)
ERYTHROCYTE [DISTWIDTH] IN BLOOD BY AUTOMATED COUNT: 13.4 % (ref 11.5–14.5)
EST. GFR  (NO RACE VARIABLE): >60 ML/MIN/1.73 M^2
GLUCOSE SERPL-MCNC: 116 MG/DL (ref 70–110)
HCT VFR BLD AUTO: 43.5 % (ref 37–48.5)
HGB BLD-MCNC: 14 G/DL (ref 12–16)
IMM GRANULOCYTES # BLD AUTO: 0.03 K/UL (ref 0–0.04)
IMM GRANULOCYTES NFR BLD AUTO: 0.3 % (ref 0–0.5)
LYMPHOCYTES # BLD AUTO: 2.2 K/UL (ref 1–4.8)
LYMPHOCYTES NFR BLD: 20.8 % (ref 18–48)
MCH RBC QN AUTO: 29.5 PG (ref 27–31)
MCHC RBC AUTO-ENTMCNC: 32.2 G/DL (ref 32–36)
MCV RBC AUTO: 92 FL (ref 82–98)
MONOCYTES # BLD AUTO: 0.8 K/UL (ref 0.3–1)
MONOCYTES NFR BLD: 7.4 % (ref 4–15)
NEUTROPHILS # BLD AUTO: 6.8 K/UL (ref 1.8–7.7)
NEUTROPHILS NFR BLD: 64.7 % (ref 38–73)
NRBC BLD-RTO: 0 /100 WBC
PLATELET # BLD AUTO: 308 K/UL (ref 150–450)
PMV BLD AUTO: 9.6 FL (ref 9.2–12.9)
POTASSIUM SERPL-SCNC: 4.4 MMOL/L (ref 3.5–5.1)
PROT SERPL-MCNC: 7.5 G/DL (ref 6–8.4)
RBC # BLD AUTO: 4.74 M/UL (ref 4–5.4)
SODIUM SERPL-SCNC: 140 MMOL/L (ref 136–145)
TACROLIMUS BLD-MCNC: 5.9 NG/ML (ref 5–15)
WBC # BLD AUTO: 10.47 K/UL (ref 3.9–12.7)

## 2023-03-20 PROCEDURE — 80053 COMPREHEN METABOLIC PANEL: CPT | Performed by: INTERNAL MEDICINE

## 2023-03-20 PROCEDURE — 36415 COLL VENOUS BLD VENIPUNCTURE: CPT | Performed by: INTERNAL MEDICINE

## 2023-03-20 PROCEDURE — 85025 COMPLETE CBC W/AUTO DIFF WBC: CPT | Performed by: INTERNAL MEDICINE

## 2023-03-20 PROCEDURE — 80197 ASSAY OF TACROLIMUS: CPT | Performed by: INTERNAL MEDICINE

## 2023-03-21 ENCOUNTER — TELEPHONE (OUTPATIENT)
Dept: TRANSPLANT | Facility: CLINIC | Age: 53
End: 2023-03-21
Payer: COMMERCIAL

## 2023-03-21 DIAGNOSIS — Z94.4 S/P LIVER TRANSPLANT: Primary | ICD-10-CM

## 2023-03-21 NOTE — TELEPHONE ENCOUNTER
//Sent patient message via portal to let her know labs are stable.  No medication changes, next labs due on 4/17/23      ----- Message from Sharmila Pacheco MD sent at 3/20/2023 10:13 PM CDT -----  The Labs are stable - please let patient know.

## 2023-03-21 NOTE — TELEPHONE ENCOUNTER
----- Message from Sharmila Pacheco MD sent at 3/20/2023 10:13 PM CDT -----  The Labs are stable - please let patient know.

## 2023-03-31 ENCOUNTER — OFFICE VISIT (OUTPATIENT)
Dept: TRANSPLANT | Facility: CLINIC | Age: 53
End: 2023-03-31
Payer: COMMERCIAL

## 2023-03-31 VITALS
HEIGHT: 63 IN | SYSTOLIC BLOOD PRESSURE: 122 MMHG | HEART RATE: 87 BPM | WEIGHT: 138.56 LBS | DIASTOLIC BLOOD PRESSURE: 75 MMHG | BODY MASS INDEX: 24.55 KG/M2 | OXYGEN SATURATION: 98 %

## 2023-03-31 DIAGNOSIS — Z94.4 S/P LIVER TRANSPLANT: Primary | ICD-10-CM

## 2023-03-31 DIAGNOSIS — F17.200 NICOTINE USE DISORDER: ICD-10-CM

## 2023-03-31 DIAGNOSIS — N02.B9 IGA NEPHROPATHY ASSOCIATED WITH LIVER DISEASE: ICD-10-CM

## 2023-03-31 DIAGNOSIS — Z79.60 LONG-TERM USE OF IMMUNOSUPPRESSANT MEDICATION: ICD-10-CM

## 2023-03-31 DIAGNOSIS — Z29.89 PROPHYLACTIC IMMUNOTHERAPY: ICD-10-CM

## 2023-03-31 DIAGNOSIS — K76.9 IGA NEPHROPATHY ASSOCIATED WITH LIVER DISEASE: ICD-10-CM

## 2023-03-31 PROCEDURE — 99999 PR PBB SHADOW E&M-EST. PATIENT-LVL III: CPT | Mod: PBBFAC,,, | Performed by: INTERNAL MEDICINE

## 2023-03-31 PROCEDURE — 99214 OFFICE O/P EST MOD 30 MIN: CPT | Mod: S$GLB,,, | Performed by: INTERNAL MEDICINE

## 2023-03-31 PROCEDURE — 99214 PR OFFICE/OUTPT VISIT, EST, LEVL IV, 30-39 MIN: ICD-10-PCS | Mod: S$GLB,,, | Performed by: INTERNAL MEDICINE

## 2023-03-31 PROCEDURE — 99999 PR PBB SHADOW E&M-EST. PATIENT-LVL III: ICD-10-PCS | Mod: PBBFAC,,, | Performed by: INTERNAL MEDICINE

## 2023-03-31 RX ORDER — TACROLIMUS 1 MG/1
CAPSULE ORAL
Qty: 450 CAPSULE | Refills: 3 | Status: SHIPPED | OUTPATIENT
Start: 2023-03-31 | End: 2023-04-17

## 2023-03-31 NOTE — PATIENT INSTRUCTIONS
Trial of an increase in prograf to see if labs go back down to the teens  Labs monthly   Return 2 months

## 2023-03-31 NOTE — LETTER
March 31, 2023        Killian Dodd  [address]  [place] [address]  Phone: [phone]  Fax: [phone]             [facility] - Liver Transplant  [address] [address]  Phone: [phone]  Fax: [phone]   Patient: Malu Montes   MR Number: [id number]   YOB: 1970   Date of Visit: 3/31/2023       Dear Dr. Killian Dodd    Thank you for referring Malu Montes to me for evaluation. Attached you will find relevant portions of my assessment and plan of care.    If you have questions, please do not hesitate to call me. I look forward to following Malu Montes along with you.    Sincerely,    Sharmila Pacheco MD    Enclosure    If you would like to receive this communication electronically, please contact [email or web address] or [phone] to request [employer] access.    [employer] is a tool which provides read-only access to select patient information with whom you have a relationship. Its easy to use and provides real time access to review your patients record including encounter summaries, notes, results, and demographic information.    If you feel you have received this communication in error or would no longer like to receive these types of communications, please e-mail [email or web address]

## 2023-04-07 ENCOUNTER — PATIENT MESSAGE (OUTPATIENT)
Dept: TRANSPLANT | Facility: CLINIC | Age: 53
End: 2023-04-07
Payer: COMMERCIAL

## 2023-04-12 ENCOUNTER — PATIENT MESSAGE (OUTPATIENT)
Dept: TRANSPLANT | Facility: CLINIC | Age: 53
End: 2023-04-12
Payer: COMMERCIAL

## 2023-04-12 ENCOUNTER — PATIENT MESSAGE (OUTPATIENT)
Dept: INTERNAL MEDICINE | Facility: CLINIC | Age: 53
End: 2023-04-12
Payer: COMMERCIAL

## 2023-04-14 ENCOUNTER — PATIENT MESSAGE (OUTPATIENT)
Dept: TRANSPLANT | Facility: CLINIC | Age: 53
End: 2023-04-14
Payer: COMMERCIAL

## 2023-04-14 RX ORDER — HYDROXYZINE HYDROCHLORIDE 25 MG/1
TABLET, FILM COATED ORAL
Qty: 60 TABLET | Refills: 1 | Status: SHIPPED | OUTPATIENT
Start: 2023-04-14 | End: 2023-06-13 | Stop reason: SDUPTHER

## 2023-04-17 ENCOUNTER — PATIENT MESSAGE (OUTPATIENT)
Dept: TRANSPLANT | Facility: CLINIC | Age: 53
End: 2023-04-17
Payer: COMMERCIAL

## 2023-04-17 ENCOUNTER — LAB VISIT (OUTPATIENT)
Dept: LAB | Facility: HOSPITAL | Age: 53
End: 2023-04-17
Attending: INTERNAL MEDICINE
Payer: COMMERCIAL

## 2023-04-17 ENCOUNTER — TELEPHONE (OUTPATIENT)
Dept: TRANSPLANT | Facility: CLINIC | Age: 53
End: 2023-04-17
Payer: COMMERCIAL

## 2023-04-17 DIAGNOSIS — Z94.4 S/P LIVER TRANSPLANT: ICD-10-CM

## 2023-04-17 DIAGNOSIS — Z94.4 S/P LIVER TRANSPLANT: Primary | ICD-10-CM

## 2023-04-17 DIAGNOSIS — Z94.4 LIVER REPLACED BY TRANSPLANT: ICD-10-CM

## 2023-04-17 LAB
ALBUMIN SERPL BCP-MCNC: 4.1 G/DL (ref 3.5–5.2)
ALP SERPL-CCNC: 68 U/L (ref 55–135)
ALT SERPL W/O P-5'-P-CCNC: 28 U/L (ref 10–44)
ANION GAP SERPL CALC-SCNC: 8 MMOL/L (ref 8–16)
AST SERPL-CCNC: 23 U/L (ref 10–40)
BASOPHILS # BLD AUTO: 0.08 K/UL (ref 0–0.2)
BASOPHILS NFR BLD: 0.7 % (ref 0–1.9)
BILIRUB SERPL-MCNC: 0.3 MG/DL (ref 0.1–1)
BUN SERPL-MCNC: 28 MG/DL (ref 6–20)
CALCIUM SERPL-MCNC: 9.6 MG/DL (ref 8.7–10.5)
CHLORIDE SERPL-SCNC: 111 MMOL/L (ref 95–110)
CO2 SERPL-SCNC: 22 MMOL/L (ref 23–29)
CREAT SERPL-MCNC: 1 MG/DL (ref 0.5–1.4)
DIFFERENTIAL METHOD: ABNORMAL
EOSINOPHIL # BLD AUTO: 0.4 K/UL (ref 0–0.5)
EOSINOPHIL NFR BLD: 3.1 % (ref 0–8)
ERYTHROCYTE [DISTWIDTH] IN BLOOD BY AUTOMATED COUNT: 14.8 % (ref 11.5–14.5)
EST. GFR  (NO RACE VARIABLE): >60 ML/MIN/1.73 M^2
GLUCOSE SERPL-MCNC: 96 MG/DL (ref 70–110)
HCT VFR BLD AUTO: 40.9 % (ref 37–48.5)
HGB BLD-MCNC: 13.2 G/DL (ref 12–16)
IMM GRANULOCYTES # BLD AUTO: 0.04 K/UL (ref 0–0.04)
IMM GRANULOCYTES NFR BLD AUTO: 0.3 % (ref 0–0.5)
LYMPHOCYTES # BLD AUTO: 2 K/UL (ref 1–4.8)
LYMPHOCYTES NFR BLD: 17 % (ref 18–48)
MCH RBC QN AUTO: 29.9 PG (ref 27–31)
MCHC RBC AUTO-ENTMCNC: 32.3 G/DL (ref 32–36)
MCV RBC AUTO: 93 FL (ref 82–98)
MONOCYTES # BLD AUTO: 0.8 K/UL (ref 0.3–1)
MONOCYTES NFR BLD: 6.9 % (ref 4–15)
NEUTROPHILS # BLD AUTO: 8.4 K/UL (ref 1.8–7.7)
NEUTROPHILS NFR BLD: 72 % (ref 38–73)
NRBC BLD-RTO: 0 /100 WBC
PLATELET # BLD AUTO: 318 K/UL (ref 150–450)
PMV BLD AUTO: 10 FL (ref 9.2–12.9)
POTASSIUM SERPL-SCNC: 4.5 MMOL/L (ref 3.5–5.1)
PROT SERPL-MCNC: 7 G/DL (ref 6–8.4)
RBC # BLD AUTO: 4.42 M/UL (ref 4–5.4)
SODIUM SERPL-SCNC: 141 MMOL/L (ref 136–145)
TACROLIMUS BLD-MCNC: 6.6 NG/ML (ref 5–15)
WBC # BLD AUTO: 11.67 K/UL (ref 3.9–12.7)

## 2023-04-17 PROCEDURE — 36415 COLL VENOUS BLD VENIPUNCTURE: CPT | Performed by: INTERNAL MEDICINE

## 2023-04-17 PROCEDURE — 80053 COMPREHEN METABOLIC PANEL: CPT | Performed by: INTERNAL MEDICINE

## 2023-04-17 PROCEDURE — 80197 ASSAY OF TACROLIMUS: CPT | Performed by: INTERNAL MEDICINE

## 2023-04-17 PROCEDURE — 80321 ALCOHOLS BIOMARKERS 1OR 2: CPT | Performed by: INTERNAL MEDICINE

## 2023-04-17 PROCEDURE — 85025 COMPLETE CBC W/AUTO DIFF WBC: CPT | Performed by: INTERNAL MEDICINE

## 2023-04-17 RX ORDER — TACROLIMUS 1 MG/1
3 CAPSULE ORAL EVERY 12 HOURS
Qty: 540 CAPSULE | Refills: 3 | Status: SHIPPED | OUTPATIENT
Start: 2023-04-17 | End: 2023-07-11

## 2023-04-17 NOTE — TELEPHONE ENCOUNTER
Sent message to let patient know.    ----- Message from Rosemary Baig PharmD sent at 4/17/2023  9:33 AM CDT -----  She does not have to - it looks like her last Vit D level was good, her last Dexa was normal, and she is off steroids.     If she wishes to continue it - she can also take a arabella/vit D supplement OTC for overall bone heatlh or follow the recommendations of her PCP.  But from our standpoint, she is finished!    Also - not sure if she takes a once a day multivitamin - a lot of them (especially those targeted towards women) have some calcium and vit D in them - that would also be sufficient if she wanted something without taking an additional supplement.     ----- Message -----  From: Marii Ruiz RN  Sent: 4/17/2023   8:07 AM CDT  To: Abdominal Transplant Pharmacists    She had a recent Vitamin D level. Does she still need to be on the supplement?

## 2023-04-17 NOTE — TELEPHONE ENCOUNTER
Sent message to patient with change and to get labs on 5/15/23    ----- Message from Sharmila Pacheco MD sent at 4/17/2023 11:51 AM CDT -----  The Labs are stable but not back in the teens. Increase prograf to 3/3 from 3/2 and repeat labs in 4 weeks - please let patient know.

## 2023-04-19 LAB
CLINICAL BIOCHEMIST REVIEW: NORMAL
PLPETH BLD-MCNC: <10 NG/ML
POPETH BLD-MCNC: <10 NG/ML

## 2023-04-27 ENCOUNTER — PATIENT MESSAGE (OUTPATIENT)
Dept: TRANSPLANT | Facility: CLINIC | Age: 53
End: 2023-04-27
Payer: COMMERCIAL

## 2023-05-01 ENCOUNTER — TELEPHONE (OUTPATIENT)
Dept: NEPHROLOGY | Facility: CLINIC | Age: 53
End: 2023-05-01
Payer: COMMERCIAL

## 2023-05-15 ENCOUNTER — LAB VISIT (OUTPATIENT)
Dept: LAB | Facility: HOSPITAL | Age: 53
End: 2023-05-15
Attending: INTERNAL MEDICINE
Payer: COMMERCIAL

## 2023-05-15 DIAGNOSIS — Z94.4 S/P LIVER TRANSPLANT: ICD-10-CM

## 2023-05-15 LAB
ALBUMIN SERPL BCP-MCNC: 4.1 G/DL (ref 3.5–5.2)
ALP SERPL-CCNC: 69 U/L (ref 55–135)
ALT SERPL W/O P-5'-P-CCNC: 27 U/L (ref 10–44)
ANION GAP SERPL CALC-SCNC: 11 MMOL/L (ref 8–16)
AST SERPL-CCNC: 23 U/L (ref 10–40)
BASOPHILS # BLD AUTO: 0.12 K/UL (ref 0–0.2)
BASOPHILS NFR BLD: 1.3 % (ref 0–1.9)
BILIRUB SERPL-MCNC: 0.2 MG/DL (ref 0.1–1)
BUN SERPL-MCNC: 32 MG/DL (ref 6–20)
CALCIUM SERPL-MCNC: 9.2 MG/DL (ref 8.7–10.5)
CHLORIDE SERPL-SCNC: 110 MMOL/L (ref 95–110)
CO2 SERPL-SCNC: 19 MMOL/L (ref 23–29)
CREAT SERPL-MCNC: 1.1 MG/DL (ref 0.5–1.4)
DIFFERENTIAL METHOD: ABNORMAL
EOSINOPHIL # BLD AUTO: 0.5 K/UL (ref 0–0.5)
EOSINOPHIL NFR BLD: 4.9 % (ref 0–8)
ERYTHROCYTE [DISTWIDTH] IN BLOOD BY AUTOMATED COUNT: 14.7 % (ref 11.5–14.5)
EST. GFR  (NO RACE VARIABLE): >60 ML/MIN/1.73 M^2
GLUCOSE SERPL-MCNC: 107 MG/DL (ref 70–110)
HCT VFR BLD AUTO: 42.7 % (ref 37–48.5)
HGB BLD-MCNC: 13.6 G/DL (ref 12–16)
IMM GRANULOCYTES # BLD AUTO: 0.04 K/UL (ref 0–0.04)
IMM GRANULOCYTES NFR BLD AUTO: 0.4 % (ref 0–0.5)
LYMPHOCYTES # BLD AUTO: 2.1 K/UL (ref 1–4.8)
LYMPHOCYTES NFR BLD: 22.3 % (ref 18–48)
MCH RBC QN AUTO: 29.4 PG (ref 27–31)
MCHC RBC AUTO-ENTMCNC: 31.9 G/DL (ref 32–36)
MCV RBC AUTO: 92 FL (ref 82–98)
MONOCYTES # BLD AUTO: 0.8 K/UL (ref 0.3–1)
MONOCYTES NFR BLD: 8.3 % (ref 4–15)
NEUTROPHILS # BLD AUTO: 5.9 K/UL (ref 1.8–7.7)
NEUTROPHILS NFR BLD: 62.8 % (ref 38–73)
NRBC BLD-RTO: 0 /100 WBC
PLATELET # BLD AUTO: 315 K/UL (ref 150–450)
PMV BLD AUTO: 9.8 FL (ref 9.2–12.9)
POTASSIUM SERPL-SCNC: 4.2 MMOL/L (ref 3.5–5.1)
PROT SERPL-MCNC: 7.2 G/DL (ref 6–8.4)
RBC # BLD AUTO: 4.63 M/UL (ref 4–5.4)
SODIUM SERPL-SCNC: 140 MMOL/L (ref 136–145)
TACROLIMUS BLD-MCNC: 8.2 NG/ML (ref 5–15)
WBC # BLD AUTO: 9.4 K/UL (ref 3.9–12.7)

## 2023-05-15 PROCEDURE — 80053 COMPREHEN METABOLIC PANEL: CPT | Performed by: INTERNAL MEDICINE

## 2023-05-15 PROCEDURE — 85025 COMPLETE CBC W/AUTO DIFF WBC: CPT | Performed by: INTERNAL MEDICINE

## 2023-05-15 PROCEDURE — 80197 ASSAY OF TACROLIMUS: CPT | Performed by: INTERNAL MEDICINE

## 2023-05-15 PROCEDURE — 36415 COLL VENOUS BLD VENIPUNCTURE: CPT | Performed by: INTERNAL MEDICINE

## 2023-05-16 ENCOUNTER — TELEPHONE (OUTPATIENT)
Dept: TRANSPLANT | Facility: CLINIC | Age: 53
End: 2023-05-16
Payer: COMMERCIAL

## 2023-05-16 ENCOUNTER — PATIENT MESSAGE (OUTPATIENT)
Dept: TRANSPLANT | Facility: CLINIC | Age: 53
End: 2023-05-16
Payer: COMMERCIAL

## 2023-05-16 DIAGNOSIS — Z94.4 LIVER REPLACED BY TRANSPLANT: Primary | ICD-10-CM

## 2023-05-16 NOTE — TELEPHONE ENCOUNTER
----- Message from Sharmila Pacheco MD sent at 5/15/2023  8:00 PM CDT -----  The Labs are stable - please let patient know.

## 2023-05-16 NOTE — TELEPHONE ENCOUNTER
Sent patient message via portal to let her know labs are stable.  No medication changes, next labs due on 6/12/23

## 2023-05-19 ENCOUNTER — PATIENT MESSAGE (OUTPATIENT)
Dept: TRANSPLANT | Facility: CLINIC | Age: 53
End: 2023-05-19
Payer: COMMERCIAL

## 2023-05-19 RX ORDER — ASPIRIN 81 MG/1
81 TABLET ORAL DAILY
COMMUNITY

## 2023-05-19 RX ORDER — FAMOTIDINE 20 MG/1
20 TABLET, FILM COATED ORAL NIGHTLY
Qty: 30 TABLET | Refills: 11 | Status: SHIPPED | OUTPATIENT
Start: 2023-05-19 | End: 2024-05-18

## 2023-06-02 ENCOUNTER — PATIENT MESSAGE (OUTPATIENT)
Dept: INTERNAL MEDICINE | Facility: CLINIC | Age: 53
End: 2023-06-02
Payer: COMMERCIAL

## 2023-06-02 ENCOUNTER — OFFICE VISIT (OUTPATIENT)
Dept: TRANSPLANT | Facility: CLINIC | Age: 53
End: 2023-06-02
Payer: COMMERCIAL

## 2023-06-02 VITALS
DIASTOLIC BLOOD PRESSURE: 86 MMHG | WEIGHT: 144.75 LBS | OXYGEN SATURATION: 99 % | BODY MASS INDEX: 25.65 KG/M2 | RESPIRATION RATE: 18 BRPM | HEART RATE: 75 BPM | HEIGHT: 63 IN | SYSTOLIC BLOOD PRESSURE: 148 MMHG | TEMPERATURE: 99 F

## 2023-06-02 DIAGNOSIS — F98.8 ATTENTION DEFICIT DISORDER, UNSPECIFIED HYPERACTIVITY PRESENCE: Primary | ICD-10-CM

## 2023-06-02 DIAGNOSIS — Z78.0 POST-MENOPAUSAL: ICD-10-CM

## 2023-06-02 DIAGNOSIS — Z29.89 PROPHYLACTIC IMMUNOTHERAPY: Primary | ICD-10-CM

## 2023-06-02 DIAGNOSIS — E66.3 OVERWEIGHT (BMI 25.0-29.9): ICD-10-CM

## 2023-06-02 DIAGNOSIS — Z94.4 S/P LIVER TRANSPLANT: ICD-10-CM

## 2023-06-02 DIAGNOSIS — Z79.60 LONG-TERM USE OF IMMUNOSUPPRESSANT MEDICATION: ICD-10-CM

## 2023-06-02 DIAGNOSIS — E03.9 HYPOTHYROIDISM, UNSPECIFIED TYPE: ICD-10-CM

## 2023-06-02 PROCEDURE — 99215 PR OFFICE/OUTPT VISIT, EST, LEVL V, 40-54 MIN: ICD-10-PCS | Mod: S$GLB,,, | Performed by: INTERNAL MEDICINE

## 2023-06-02 PROCEDURE — 99215 OFFICE O/P EST HI 40 MIN: CPT | Mod: S$GLB,,, | Performed by: INTERNAL MEDICINE

## 2023-06-02 PROCEDURE — 99999 PR PBB SHADOW E&M-EST. PATIENT-LVL V: CPT | Mod: PBBFAC,,, | Performed by: INTERNAL MEDICINE

## 2023-06-02 PROCEDURE — 99999 PR PBB SHADOW E&M-EST. PATIENT-LVL V: ICD-10-PCS | Mod: PBBFAC,,, | Performed by: INTERNAL MEDICINE

## 2023-06-02 NOTE — PROGRESS NOTES
Transplant Hepatology  Liver Transplant Recipient Follow-up    Transplant Date: 6/5/2022  UNOS Native Liver Dx: Alcoholic Cirrhosis    Malu is here for follow up of her liver transplant.    ORGAN: LIVER  Whole or Partial: whole liver  Donor Type: donation after brain death  [facility] High Risk: yes  Donor CMV Status: Positive  Donor HCV Status: Positive  Donor HBcAb: Negative  Donor HBV JACOBO: Negative  Donor HCV JACOBO: Negative  Biliary Anastomosis: end to end  Arterial Anatomy: standard  IVC reconstruction: end to end ivc  Portal vein status: patent    She has had the following complications since transplant:  leukopenia . The noted complications is well controlled.    Subjective:     Interval History: Malu is now 1 year post liver transplant. Currently, she is doing well. Is not smoking cigarettes but is occasionally vaping.     Now s/p DBD OLTX 6/5 for ETOH cirrhosis (donor HCVab+/JACOBO-). Had intra-op HD, originally listed as liver-kidney transplant. Kidney function improved and she was delisted for kidney tx. Surgery went without complication.     Allograft function 5/15/23: ALT 27, AST 23, ALKP 69, Tbil 0.2, creat 1.1, prograf level 8.2  IS: prograf, cellcept 500 mg bid  HCV Ab+, JACOBO- donor: HCV RNA-ve 8/1/22; did not acquire the infection    Abdo US 12/14/22: satisfactory doppler    Immunoprophylaxis:   PCP PROPHYLAXIS: completedCMV PROPHYLAXIS: completed  FUNGAL PROPHYLAXIS: completed  Aspirin: 81mg daily     Key Complications:   Bile Leak - NO  HAT / HAS-NO  Back to OR- NO    Review of Systems   Constitutional: Negative.    HENT: Negative.     Eyes: Negative.    Respiratory: Negative.     Cardiovascular: Negative.    Gastrointestinal: Negative.    Genitourinary: Negative.    Musculoskeletal: Negative.    Skin: Negative.    Neurological: Negative.    Psychiatric/Behavioral: Negative.       Objective:     Vitals:    06/02/23 0904   BP: (!) 148/86   Pulse: 75   Resp: 18   Temp: 98.6 °F (37 °C)           Physical  Exam  Vitals reviewed.   Constitutional:       Appearance: She is well-developed.   HENT:      Head: Normocephalic and atraumatic.   Eyes:      General: No scleral icterus.     Conjunctiva/sclera: Conjunctivae normal.      Pupils: Pupils are equal, round, and reactive to light.   Neck:      Thyroid: No thyromegaly.   Cardiovascular:      Rate and Rhythm: Normal rate and regular rhythm.      Heart sounds: Normal heart sounds.   Pulmonary:      Effort: Pulmonary effort is normal.      Breath sounds: Normal breath sounds. No rales.   Abdominal:      General: Bowel sounds are normal. There is no distension.      Palpations: Abdomen is soft. There is no mass.      Tenderness: There is no abdominal tenderness.   Musculoskeletal:         General: Normal range of motion.      Cervical back: Normal range of motion and neck supple.   Skin:     General: Skin is warm and dry.      Findings: No rash.   Neurological:      Mental Status: She is alert and oriented to person, place, and time.     Lab Results   Component Value Date    BILITOT 0.2 05/15/2023    AST 23 05/15/2023    ALT 27 05/15/2023    ALKPHOS 69 05/15/2023    CREATININE 1.1 05/15/2023    ALBUMIN 4.1 05/15/2023     Lab Results   Component Value Date    WBC 9.40 05/15/2023    HGB 13.6 05/15/2023    HCT 42.7 05/15/2023    HCT 31 (L) 06/06/2022     05/15/2023     Lab Results   Component Value Date    TACROLIMUS 8.2 05/15/2023       Assessment/Plan:   The patient is now 1 year post liver transplant. Current recommendations:  1. Allograft function and control of IS: continue prograf, target 6-8. Stop cellcept; labs monthly x 6 months; abdo US per protocol   2. Renal insufficiency: kidneys have improved; now delisted for kidney tx; monitor; continue to f/u with nephtology since has a diagnosis of IgA nephropathy; will lower prograf target if nephrology suggests this  3. HCV Ab+/JACOBO- donor: did not acquire HCV  4. Hx alcohol abuse: periodic peth tests to be drawn  5.  Nicotine use disorder: counseled pt to stop the occasional vaping  6. R/O osteoporosis. I am recommending a bone density to r/o osteoporosis.   7.  Health maintenance: the patient should see a dermatologist annually to screen for skin cancer (scheduled), perform regular colonoscopies (due 2031), pap tests (last done 11/22) and mammograms (9/23)      Return 6 months    Patient advised that it is recommended that all transplant candidates, and their close contacts and household members receive Covid vaccination.    Sharmila Pacheco MD           [facility] Patient Status  Functional Status: 90% - Able to carry on normal activity: minor symptoms of disease  Physical Capacity: No Limitations  Working for income: yes  New diabetes onset between last follow-up to the current follow-up: No  Did patient have any acute rejection episodes during the follow-up period: No  Post transplant malignancy: No

## 2023-06-02 NOTE — PATIENT INSTRUCTIONS
Stop cellcept  Continue prograf with target 6-8 unless nephrology would prefer lower  Monthly labs x 6 months  Bone density  Dermatology as scheduled  Mammogram- keep up to date (09/23)  Pap test 11/22- ?every 3 years  Colonoscopy 2031  Return 6 months

## 2023-06-02 NOTE — LETTER
June 4, 2023        Killian Dodd  [address]  [place] [address]  Phone: [phone]  Fax: [phone]             [facility] - Liver Transplant  [address] [address]  Phone: [phone]  Fax: [phone]   Patient: Malu Montes   MR Number: [id number]   YOB: 1970   Date of Visit: 6/2/2023       Dear Dr. Killian Dodd    Thank you for referring Malu Montes to me for evaluation. Attached you will find relevant portions of my assessment and plan of care.    If you have questions, please do not hesitate to call me. I look forward to following Malu Montes along with you.    Sincerely,    Sharmila Pacheco MD    Enclosure    If you would like to receive this communication electronically, please contact [email or web address] or [phone] to request [employer] access.    [employer] is a tool which provides read-only access to select patient information with whom you have a relationship. Its easy to use and provides real time access to review your patients record including encounter summaries, notes, results, and demographic information.    If you feel you have received this communication in error or would no longer like to receive these types of communications, please e-mail [email or web address]

## 2023-06-03 ENCOUNTER — PATIENT MESSAGE (OUTPATIENT)
Dept: TRANSPLANT | Facility: CLINIC | Age: 53
End: 2023-06-03
Payer: COMMERCIAL

## 2023-06-05 NOTE — TELEPHONE ENCOUNTER
Recommend scheduling an appt with Psyc for management of ADD, referral in   I do not prescribe weight loss meds

## 2023-06-08 ENCOUNTER — PATIENT MESSAGE (OUTPATIENT)
Dept: TRANSPLANT | Facility: CLINIC | Age: 53
End: 2023-06-08
Payer: COMMERCIAL

## 2023-06-08 ENCOUNTER — TELEPHONE (OUTPATIENT)
Dept: INTERNAL MEDICINE | Facility: CLINIC | Age: 53
End: 2023-06-08
Payer: COMMERCIAL

## 2023-06-08 ENCOUNTER — HOSPITAL ENCOUNTER (OUTPATIENT)
Dept: RADIOLOGY | Facility: HOSPITAL | Age: 53
Discharge: HOME | End: 2023-06-08
Attending: INTERNAL MEDICINE
Payer: COMMERCIAL

## 2023-06-08 DIAGNOSIS — Z94.4 LIVER REPLACED BY TRANSPLANT: ICD-10-CM

## 2023-06-08 PROCEDURE — 93976 VASCULAR STUDY: CPT | Mod: 26,,, | Performed by: RADIOLOGY

## 2023-06-08 PROCEDURE — 93976 US DOPPLER LIVER TRANSPLANT POST (XPD): ICD-10-PCS | Mod: 26,,, | Performed by: RADIOLOGY

## 2023-06-08 PROCEDURE — 76705 US DOPPLER LIVER TRANSPLANT POST (XPD): ICD-10-PCS | Mod: 26,59,, | Performed by: RADIOLOGY

## 2023-06-08 PROCEDURE — 93976 VASCULAR STUDY: CPT | Mod: TC

## 2023-06-08 PROCEDURE — 76705 ECHO EXAM OF ABDOMEN: CPT | Mod: 26,59,, | Performed by: RADIOLOGY

## 2023-06-08 NOTE — TELEPHONE ENCOUNTER
Pt states she received botoxs yesterday at a [facility] and now has a headache and one eye is blood shot red.

## 2023-06-08 NOTE — TELEPHONE ENCOUNTER
----- Message from Pina Cerda sent at 6/8/2023 11:37 AM CDT -----  Contact: Self [phone]  Would like to receive medical advice.    Pharmacy name/number (copy/paste from chart):      [id number] - [place]  [address] AT [facility]  [address] [address]  Phone: [phone] Fax: [phone]    Would they like a call back or a response via [facility]:  callback    Additional information:  Calling to speak with the nurse regarding advice or pt will need a visit. Pt states she received botoxs on yesterday and not pt now have a headache and one eye is blood shot red. Pt states botox was performed at a [facility].

## 2023-06-09 ENCOUNTER — PATIENT MESSAGE (OUTPATIENT)
Dept: DERMATOLOGY | Facility: CLINIC | Age: 53
End: 2023-06-09

## 2023-06-09 ENCOUNTER — OFFICE VISIT (OUTPATIENT)
Dept: DERMATOLOGY | Facility: CLINIC | Age: 53
End: 2023-06-09
Payer: COMMERCIAL

## 2023-06-09 DIAGNOSIS — Z12.83 SCREENING EXAM FOR SKIN CANCER: Primary | ICD-10-CM

## 2023-06-09 DIAGNOSIS — D22.9 MULTIPLE BENIGN NEVI: ICD-10-CM

## 2023-06-09 DIAGNOSIS — L81.4 LENTIGO: ICD-10-CM

## 2023-06-09 DIAGNOSIS — L82.1 SEBORRHEIC KERATOSES: ICD-10-CM

## 2023-06-09 DIAGNOSIS — D18.01 CHERRY ANGIOMA: ICD-10-CM

## 2023-06-09 PROCEDURE — 99999 PR PBB SHADOW E&M-EST. PATIENT-LVL III: ICD-10-PCS | Mod: PBBFAC,,, | Performed by: STUDENT IN AN ORGANIZED HEALTH CARE EDUCATION/TRAINING PROGRAM

## 2023-06-09 PROCEDURE — 99203 OFFICE O/P NEW LOW 30 MIN: CPT | Mod: S$GLB,,, | Performed by: STUDENT IN AN ORGANIZED HEALTH CARE EDUCATION/TRAINING PROGRAM

## 2023-06-09 PROCEDURE — 99203 PR OFFICE/OUTPT VISIT, NEW, LEVL III, 30-44 MIN: ICD-10-PCS | Mod: S$GLB,,, | Performed by: STUDENT IN AN ORGANIZED HEALTH CARE EDUCATION/TRAINING PROGRAM

## 2023-06-09 PROCEDURE — 99999 PR PBB SHADOW E&M-EST. PATIENT-LVL III: CPT | Mod: PBBFAC,,, | Performed by: STUDENT IN AN ORGANIZED HEALTH CARE EDUCATION/TRAINING PROGRAM

## 2023-06-09 NOTE — PROGRESS NOTES
Subjective:      Patient ID:  Malu Montes is a 53 y.o. female who presents for   Chief Complaint   Patient presents with    Skin Check     History of Present Illness: The patient presents to [facility] care. Hx liver transplant 1year ago.    No hx skin cancer.     Other skin complaints: right arm      Liver transplant 6/5/2022. On prograf    Review of Systems   Hematologic/Lymphatic: Bruises/bleeds easily (aspirin).     Objective:   Physical Exam   Constitutional: She appears well-developed and well-nourished. No distress.   Neurological: She is alert and oriented to person, place, and time. She is not disoriented.   Psychiatric: She has a normal mood and affect.   Skin:   Areas Examined (abnormalities noted in diagram):   Scalp / Hair Palpated and Inspected  Head / Face Inspection Performed  Neck Inspection Performed  Chest / Axilla Inspection Performed  Abdomen Inspection Performed  Genitals / Buttocks / Groin Inspection Performed  Back Inspection Performed  RUE Inspected  LUE Inspection Performed  RLE Inspected  LLE Inspection Performed  Nails and Digits Inspection Performed          Diagram Legend     Erythematous scaling macule/papule c/w actinic keratosis       Vascular papule c/w angioma      Pigmented verrucoid papule/plaque c/w seborrheic keratosis      Yellow umbilicated papule c/w sebaceous hyperplasia      Irregularly shaped tan macule c/w lentigo     1-2 mm smooth white papules consistent with Milia      Movable subcutaneous cyst with punctum c/w epidermal inclusion cyst      Subcutaneous movable cyst c/w pilar cyst      Firm pink to brown papule c/w dermatofibroma      Pedunculated fleshy papule(s) c/w skin tag(s)      Evenly pigmented macule c/w junctional nevus     Mildly variegated pigmented, slightly irregular-bordered macule c/w mildly atypical nevus      Flesh colored to evenly pigmented papule c/w intradermal nevus       Pink pearly papule/plaque c/w basal cell carcinoma       Erythematous hyperkeratotic cursted plaque c/w SCC      Surgical scar with no sign of skin cancer recurrence      Open and closed comedones      Inflammatory papules and pustules      Verrucoid papule consistent consistent with wart     Erythematous eczematous patches and plaques     Dystrophic onycholytic nail with subungual debris c/w onychomycosis     Umbilicated papule    Erythematous-base heme-crusted tan verrucoid plaque consistent with inflamed seborrheic keratosis     Erythematous Silvery Scaling Plaque c/w Psoriasis     See annotation      Assessment / Plan:      History of liver transplant  Screening exam for skin cancer  Total body skin examination performed today including at least 12 points as noted in physical examination. No lesions suspicious for malignancy noted.    Recommend daily sun protection/avoidance, use of at least SPF 30, broad spectrum sunscreen (OTC drug), skin self examinations, and routine physician surveillance to optimize early detection    Lentigo  Seborrheic keratoses  Cherry angioma  Multiple benign nevi  Reassurance given to patient. No treatment is necessary.   Treatment of benign, asymptomatic lesions may be considered cosmetic.         Follow up in about 1 year (around 6/9/2024) for TBSE.

## 2023-06-14 RX ORDER — HYDROXYZINE HYDROCHLORIDE 25 MG/1
TABLET, FILM COATED ORAL
Qty: 60 TABLET | Refills: 1 | Status: SHIPPED | OUTPATIENT
Start: 2023-06-14 | End: 2023-07-10

## 2023-06-15 ENCOUNTER — LAB VISIT (OUTPATIENT)
Dept: LAB | Facility: HOSPITAL | Age: 53
End: 2023-06-15
Attending: INTERNAL MEDICINE
Payer: COMMERCIAL

## 2023-06-15 ENCOUNTER — PATIENT MESSAGE (OUTPATIENT)
Dept: NEPHROLOGY | Facility: CLINIC | Age: 53
End: 2023-06-15
Payer: COMMERCIAL

## 2023-06-15 DIAGNOSIS — Z94.4 LIVER REPLACED BY TRANSPLANT: ICD-10-CM

## 2023-06-15 DIAGNOSIS — E03.9 HYPOTHYROIDISM, UNSPECIFIED TYPE: ICD-10-CM

## 2023-06-15 DIAGNOSIS — K59.00 CONSTIPATION, UNSPECIFIED CONSTIPATION TYPE: ICD-10-CM

## 2023-06-15 DIAGNOSIS — Z94.4 S/P LIVER TRANSPLANT: ICD-10-CM

## 2023-06-15 LAB
ALBUMIN SERPL BCP-MCNC: 4.1 G/DL (ref 3.5–5.2)
ALP SERPL-CCNC: 71 U/L (ref 55–135)
ALT SERPL W/O P-5'-P-CCNC: 27 U/L (ref 10–44)
ANION GAP SERPL CALC-SCNC: 9 MMOL/L (ref 8–16)
AST SERPL-CCNC: 24 U/L (ref 10–40)
BASOPHILS # BLD AUTO: 0.11 K/UL (ref 0–0.2)
BASOPHILS NFR BLD: 1.2 % (ref 0–1.9)
BILIRUB SERPL-MCNC: 0.3 MG/DL (ref 0.1–1)
BUN SERPL-MCNC: 29 MG/DL (ref 6–20)
CALCIUM SERPL-MCNC: 9.9 MG/DL (ref 8.7–10.5)
CHLORIDE SERPL-SCNC: 107 MMOL/L (ref 95–110)
CO2 SERPL-SCNC: 24 MMOL/L (ref 23–29)
CREAT SERPL-MCNC: 1.3 MG/DL (ref 0.5–1.4)
DIFFERENTIAL METHOD: NORMAL
EOSINOPHIL # BLD AUTO: 0.5 K/UL (ref 0–0.5)
EOSINOPHIL NFR BLD: 5.3 % (ref 0–8)
ERYTHROCYTE [DISTWIDTH] IN BLOOD BY AUTOMATED COUNT: 13 % (ref 11.5–14.5)
EST. GFR  (NO RACE VARIABLE): 49.5 ML/MIN/1.73 M^2
GLUCOSE SERPL-MCNC: 105 MG/DL (ref 70–110)
HCT VFR BLD AUTO: 44.3 % (ref 37–48.5)
HCV AB SERPL QL IA: NORMAL
HCV RNA SERPL QL NAA+PROBE: NOT DETECTED
HCV RNA SPEC NAA+PROBE-ACNC: <12 IU/ML
HGB BLD-MCNC: 14.3 G/DL (ref 12–16)
IMM GRANULOCYTES # BLD AUTO: 0.02 K/UL (ref 0–0.04)
IMM GRANULOCYTES NFR BLD AUTO: 0.2 % (ref 0–0.5)
LYMPHOCYTES # BLD AUTO: 2.6 K/UL (ref 1–4.8)
LYMPHOCYTES NFR BLD: 28.6 % (ref 18–48)
MCH RBC QN AUTO: 30.1 PG (ref 27–31)
MCHC RBC AUTO-ENTMCNC: 32.3 G/DL (ref 32–36)
MCV RBC AUTO: 93 FL (ref 82–98)
MONOCYTES # BLD AUTO: 0.8 K/UL (ref 0.3–1)
MONOCYTES NFR BLD: 8.3 % (ref 4–15)
NEUTROPHILS # BLD AUTO: 5.1 K/UL (ref 1.8–7.7)
NEUTROPHILS NFR BLD: 56.4 % (ref 38–73)
NRBC BLD-RTO: 0 /100 WBC
PLATELET # BLD AUTO: 284 K/UL (ref 150–450)
PMV BLD AUTO: 10 FL (ref 9.2–12.9)
POTASSIUM SERPL-SCNC: 4 MMOL/L (ref 3.5–5.1)
PROT SERPL-MCNC: 7.5 G/DL (ref 6–8.4)
RBC # BLD AUTO: 4.75 M/UL (ref 4–5.4)
SODIUM SERPL-SCNC: 140 MMOL/L (ref 136–145)
TACROLIMUS BLD-MCNC: 7.8 NG/ML (ref 5–15)
TSH SERPL DL<=0.005 MIU/L-ACNC: 3.71 UIU/ML (ref 0.4–4)
WBC # BLD AUTO: 9.01 K/UL (ref 3.9–12.7)

## 2023-06-15 PROCEDURE — 80053 COMPREHEN METABOLIC PANEL: CPT | Performed by: INTERNAL MEDICINE

## 2023-06-15 PROCEDURE — 80321 ALCOHOLS BIOMARKERS 1OR 2: CPT | Performed by: INTERNAL MEDICINE

## 2023-06-15 PROCEDURE — 84443 ASSAY THYROID STIM HORMONE: CPT | Performed by: INTERNAL MEDICINE

## 2023-06-15 PROCEDURE — 87517 HEPATITIS B DNA QUANT: CPT | Performed by: INTERNAL MEDICINE

## 2023-06-15 PROCEDURE — 87522 HEPATITIS C REVRS TRNSCRPJ: CPT | Performed by: INTERNAL MEDICINE

## 2023-06-15 PROCEDURE — 85025 COMPLETE CBC W/AUTO DIFF WBC: CPT | Performed by: INTERNAL MEDICINE

## 2023-06-15 PROCEDURE — 86803 HEPATITIS C AB TEST: CPT | Performed by: INTERNAL MEDICINE

## 2023-06-15 PROCEDURE — 80197 ASSAY OF TACROLIMUS: CPT | Performed by: INTERNAL MEDICINE

## 2023-06-15 RX ORDER — LEVOTHYROXINE SODIUM 50 UG/1
50 TABLET ORAL
Qty: 90 TABLET | Refills: 3 | Status: SHIPPED | OUTPATIENT
Start: 2023-06-15

## 2023-06-15 NOTE — TELEPHONE ENCOUNTER
No care due was identified.  [facility] Embedded Care Due Messages. Reference number: [id number].   6/15/2023 10:41:45 AM CDT

## 2023-06-19 LAB
CLINICAL BIOCHEMIST REVIEW: NORMAL
PLPETH BLD-MCNC: <10 NG/ML
POPETH BLD-MCNC: <10 NG/ML

## 2023-06-21 ENCOUNTER — TELEPHONE (OUTPATIENT)
Dept: TRANSPLANT | Facility: CLINIC | Age: 53
End: 2023-06-21
Payer: COMMERCIAL

## 2023-06-21 ENCOUNTER — PATIENT MESSAGE (OUTPATIENT)
Dept: TRANSPLANT | Facility: CLINIC | Age: 53
End: 2023-06-21
Payer: COMMERCIAL

## 2023-06-21 NOTE — TELEPHONE ENCOUNTER
Labs reviewed via portal and are stable.  Patient will repeat labs on 7/24/23 per protocol.      ----- Message from Sharmila Pacheco MD sent at 6/16/2023 11:52 AM CDT -----  The Labs are stable; off cellcept - please let patient know.

## 2023-07-05 ENCOUNTER — PATIENT MESSAGE (OUTPATIENT)
Dept: TRANSPLANT | Facility: CLINIC | Age: 53
End: 2023-07-05
Payer: COMMERCIAL

## 2023-07-06 DIAGNOSIS — Z94.4 S/P LIVER TRANSPLANT: Primary | ICD-10-CM

## 2023-07-10 ENCOUNTER — LAB VISIT (OUTPATIENT)
Dept: LAB | Facility: HOSPITAL | Age: 53
End: 2023-07-10
Attending: INTERNAL MEDICINE
Payer: COMMERCIAL

## 2023-07-10 ENCOUNTER — OFFICE VISIT (OUTPATIENT)
Dept: NEPHROLOGY | Facility: CLINIC | Age: 53
End: 2023-07-10
Payer: COMMERCIAL

## 2023-07-10 VITALS
BODY MASS INDEX: 25.55 KG/M2 | OXYGEN SATURATION: 99 % | HEIGHT: 63 IN | WEIGHT: 144.19 LBS | SYSTOLIC BLOOD PRESSURE: 110 MMHG | DIASTOLIC BLOOD PRESSURE: 80 MMHG | HEART RATE: 75 BPM

## 2023-07-10 DIAGNOSIS — Z94.4 S/P LIVER TRANSPLANT: ICD-10-CM

## 2023-07-10 DIAGNOSIS — Z79.899 IMMUNOSUPPRESSION DUE TO DRUG THERAPY: ICD-10-CM

## 2023-07-10 DIAGNOSIS — D84.821 IMMUNOSUPPRESSION DUE TO DRUG THERAPY: ICD-10-CM

## 2023-07-10 DIAGNOSIS — N18.31 CHRONIC KIDNEY DISEASE, STAGE 3A: Primary | ICD-10-CM

## 2023-07-10 LAB
ALBUMIN SERPL BCP-MCNC: 4 G/DL (ref 3.5–5.2)
ALP SERPL-CCNC: 73 U/L (ref 55–135)
ALT SERPL W/O P-5'-P-CCNC: 18 U/L (ref 10–44)
ANION GAP SERPL CALC-SCNC: 7 MMOL/L (ref 8–16)
AST SERPL-CCNC: 19 U/L (ref 10–40)
BASOPHILS # BLD AUTO: 0.1 K/UL (ref 0–0.2)
BASOPHILS NFR BLD: 1.2 % (ref 0–1.9)
BILIRUB SERPL-MCNC: 0.3 MG/DL (ref 0.1–1)
BUN SERPL-MCNC: 26 MG/DL (ref 6–20)
CALCIUM SERPL-MCNC: 9.4 MG/DL (ref 8.7–10.5)
CHLORIDE SERPL-SCNC: 106 MMOL/L (ref 95–110)
CO2 SERPL-SCNC: 25 MMOL/L (ref 23–29)
CREAT SERPL-MCNC: 1.2 MG/DL (ref 0.5–1.4)
DIFFERENTIAL METHOD: NORMAL
EOSINOPHIL # BLD AUTO: 0.4 K/UL (ref 0–0.5)
EOSINOPHIL NFR BLD: 5.1 % (ref 0–8)
ERYTHROCYTE [DISTWIDTH] IN BLOOD BY AUTOMATED COUNT: 12.4 % (ref 11.5–14.5)
EST. GFR  (NO RACE VARIABLE): 54.5 ML/MIN/1.73 M^2
GLUCOSE SERPL-MCNC: 91 MG/DL (ref 70–110)
HCT VFR BLD AUTO: 43.7 % (ref 37–48.5)
HGB BLD-MCNC: 14.2 G/DL (ref 12–16)
IMM GRANULOCYTES # BLD AUTO: 0.03 K/UL (ref 0–0.04)
IMM GRANULOCYTES NFR BLD AUTO: 0.4 % (ref 0–0.5)
LYMPHOCYTES # BLD AUTO: 2.1 K/UL (ref 1–4.8)
LYMPHOCYTES NFR BLD: 25.5 % (ref 18–48)
MCH RBC QN AUTO: 29.9 PG (ref 27–31)
MCHC RBC AUTO-ENTMCNC: 32.5 G/DL (ref 32–36)
MCV RBC AUTO: 92 FL (ref 82–98)
MONOCYTES # BLD AUTO: 0.7 K/UL (ref 0.3–1)
MONOCYTES NFR BLD: 7.8 % (ref 4–15)
NEUTROPHILS # BLD AUTO: 5 K/UL (ref 1.8–7.7)
NEUTROPHILS NFR BLD: 60 % (ref 38–73)
NRBC BLD-RTO: 0 /100 WBC
PLATELET # BLD AUTO: 274 K/UL (ref 150–450)
PMV BLD AUTO: 9.5 FL (ref 9.2–12.9)
POTASSIUM SERPL-SCNC: 4.6 MMOL/L (ref 3.5–5.1)
PROT SERPL-MCNC: 7.1 G/DL (ref 6–8.4)
RBC # BLD AUTO: 4.75 M/UL (ref 4–5.4)
SODIUM SERPL-SCNC: 138 MMOL/L (ref 136–145)
TACROLIMUS BLD-MCNC: 12.4 NG/ML (ref 5–15)
WBC # BLD AUTO: 8.35 K/UL (ref 3.9–12.7)

## 2023-07-10 PROCEDURE — 99215 OFFICE O/P EST HI 40 MIN: CPT | Mod: S$GLB,,, | Performed by: INTERNAL MEDICINE

## 2023-07-10 PROCEDURE — 99999 PR PBB SHADOW E&M-EST. PATIENT-LVL III: CPT | Mod: PBBFAC,,, | Performed by: INTERNAL MEDICINE

## 2023-07-10 PROCEDURE — 99999 PR PBB SHADOW E&M-EST. PATIENT-LVL III: ICD-10-PCS | Mod: PBBFAC,,, | Performed by: INTERNAL MEDICINE

## 2023-07-10 PROCEDURE — 80321 ALCOHOLS BIOMARKERS 1OR 2: CPT | Performed by: INTERNAL MEDICINE

## 2023-07-10 PROCEDURE — 80053 COMPREHEN METABOLIC PANEL: CPT | Performed by: INTERNAL MEDICINE

## 2023-07-10 PROCEDURE — 85025 COMPLETE CBC W/AUTO DIFF WBC: CPT | Performed by: INTERNAL MEDICINE

## 2023-07-10 PROCEDURE — 99215 PR OFFICE/OUTPT VISIT, EST, LEVL V, 40-54 MIN: ICD-10-PCS | Mod: S$GLB,,, | Performed by: INTERNAL MEDICINE

## 2023-07-10 PROCEDURE — 80197 ASSAY OF TACROLIMUS: CPT | Performed by: INTERNAL MEDICINE

## 2023-07-10 RX ORDER — HYDROXYZINE HYDROCHLORIDE 25 MG/1
TABLET, FILM COATED ORAL
Qty: 60 TABLET | Refills: 1 | Status: SHIPPED | OUTPATIENT
Start: 2023-07-10 | End: 2023-10-21 | Stop reason: SDUPTHER

## 2023-07-10 NOTE — TELEPHONE ENCOUNTER
Refill Routing Note   Medication(s) are not appropriate for processing by [facility] for the following reason(s):      Medication outside of protocol    [facility] action(s):  Route None identified          Appointments  past 12m or future 3m with PCP    Date Provider   Last Visit   4/28/2015 Brayden Chaney MD   Next Visit   Visit date not found Brayden Chaney MD   ED visits in past 90 days: 0        Note composed:9:39 AM 07/10/2023

## 2023-07-10 NOTE — PROGRESS NOTES
REASON FOR CONSULT/CHIEF COMPLAIN: Acute kidney injury    REFERRING PHYSICIAN: Killian Dodd DO    HISTORY OF PRESENT ILLNESS: 53 y.o. female  patient was referred here for abnormal renal function.   No chronic NSAID use, no known exposure to lithium, lead. No family history of Lupus, kidney disease or deafness. No ingestion of licorice. One kidney stone at age 25 after her first baby, needed lithotripsy. No smoking.   Had an episode of pancreatitis in 2021. Cirrhosis diagnosed 2021, blamed on alcohol. Has needed initially once every two weeks, later once week paracentesis. Kidney function deteriorated and then improved after TIPS procedure. Now s/p liver transplant on 2021. Post op has been uncomplicated so far.   Denied any new issues with urination including dysuria, hematuria, urgency, hesitancy, nocturia, incomplete voiding. Denied chest pain, nausea, vomiting, abd pain, nausea, vomiting, diarrhea, shortness of breath, pedal edema, Orthopnea, PND    ROS:  General: negative for chills, or fatigue  Respiratory: No cough, shortness of breath, or wheezing  Cardiovascular: No chest pain or dyspnea   Gastrointestinal: No abdominal pain, change in bowel habits    PAST MEDICAL HISTORY:  Past Medical History:   Diagnosis Date    ADD (attention deficit disorder)     Anxiety     Ascites of liver     Cirrhosis 2021    CKD (chronic kidney disease) stage 3, GFR 30-59 ml/min     Constipation     ETOH abuse     Hyperlipidemia     Hypothyroidism     Liver transplant candidate 2022    Other ascites 2021    Pancreatitis, acute     Tobacco use     Vitamin D deficiency        PAST SURGICAL HISTORY:  Past Surgical History:   Procedure Laterality Date    AUGMENTATION OF BREAST      second set    breast augmentation       SECTION      COLONOSCOPY N/A 2021    Procedure: COLONOSCOPY;  Surgeon: Dao Gray MD;  Location: [facility] ([address] [place] [address]);  Service: Endoscopy;  Laterality:  N/A;  COVID test 9/14/21 General surgery clinic -     CYSTOSCOPY N/A 09/10/2021    Procedure: CYSTOSCOPY;  Surgeon: Rj Sánchez Jr., MD;  Location: [facility] OR 1ST FLR;  Service: Urology;  Laterality: N/A;    ESOPHAGOGASTRODUODENOSCOPY N/A 09/17/2021    Procedure: ESOPHAGOGASTRODUODENOSCOPY (EGD);  Surgeon: Dao Gray MD;  Location: [facility] ENDO (2ND FLR);  Service: Endoscopy;  Laterality: N/A;  labs current on 9/7    ESOPHAGOGASTRODUODENOSCOPY N/A 10/26/2021    Procedure: ESOPHAGOGASTRODUODENOSCOPY (EGD);  Surgeon: Dao Gray MD;  Location: [facility] ENDO (2ND FLR);  Service: Endoscopy;  Laterality: N/A;  to be done the week of 10/18/21 per Dr. Gray-[facility]-10/23/21-[facility]-   10/25 arrival time confirmed with pt-rb    ESOPHAGOGASTRODUODENOSCOPY Left 12/21/2021    Procedure: EGD (ESOPHAGOGASTRODUODENOSCOPY);  Surgeon: Koffi Monteiro MD;  Location: [facility] ENDO (4TH FLR);  Service: Endoscopy;  Laterality: Left;  Rapid  pt requested AM appt and first available in December-  labs morning of procedure-  12/14 lvm to confirm appt-rb    HEMORRHOID SURGERY      LIVER TRANSPLANT N/A 06/05/2022    Procedure: TRANSPLANT, LIVER;  Surgeon: Franco Slaughter MD;  Location: [facility] OR 2ND FLR;  Service: Transplant;  Laterality: N/A;    PERITONEOCENTESIS Right 06/08/2021    Procedure: PARACENTESIS, ABDOMINAL;  Surgeon: Jay Torre MD;  Location: [facility] CATH LAB;  Service: Radiology;  Laterality: Right;    PERITONEOCENTESIS Right 06/25/2021    Procedure: PARACENTESIS, ABDOMINAL;  Surgeon: Jay Torre MD;  Location: [facility] CATH LAB;  Service: Radiology;  Laterality: Right;    PERITONEOCENTESIS Right 07/12/2021    Procedure: PARACENTESIS, ABDOMINAL;  Surgeon: Jay Torre MD;  Location: [facility] CATH LAB;  Service: Radiology;  Laterality: Right;    PERITONEOCENTESIS Right 07/23/2021    Procedure: PARACENTESIS, ABDOMINAL;  Surgeon: Edward De La Torre II, MD;  Location: [facility] CATH LAB;  Service: Interventional  Radiology;  Laterality: Right;    PERITONEOCENTESIS Right 08/03/2021    Procedure: PARACENTESIS, ABDOMINAL;  Surgeon: Franco Cheung MD;  Location: [facility] CATH LAB;  Service: Radiology;  Laterality: Right;    PERITONEOCENTESIS Right 08/16/2021    Procedure: PARACENTESIS, ABDOMINAL;  Surgeon: Jay Torre MD;  Location: [facility] CATH LAB;  Service: Radiology;  Laterality: Right;    PERITONEOCENTESIS Right 08/23/2021    Procedure: PARACENTESIS, ABDOMINAL;  Surgeon: Jay Torre MD;  Location: [facility] CATH LAB;  Service: Radiology;  Laterality: Right;    PERITONEOCENTESIS Right 09/16/2021    Procedure: PARACENTESIS, ABDOMINAL;  Surgeon: Jay Torre MD;  Location: [facility] CATH LAB;  Service: Radiology;  Laterality: Right;    PERITONEOCENTESIS N/A 09/24/2021    Procedure: PARACENTESIS, ABDOMINAL;  Surgeon: Jay Torre MD;  Location: [facility] CATH LAB;  Service: Radiology;  Laterality: N/A;    PERITONEOCENTESIS Right 12/23/2021    Procedure: PARACENTESIS, ABDOMINAL;  Surgeon: Jay Torre MD;  Location: [facility] CATH LAB;  Service: Radiology;  Laterality: Right;    PERITONEOCENTESIS N/A 12/30/2021    Procedure: PARACENTESIS, ABDOMINAL;  Surgeon: Salas Cabrera MD;  Location: [facility] CATH LAB;  Service: Radiology;  Laterality: N/A;    RETROGRADE PYELOGRAPHY Bilateral 09/10/2021    Procedure: PYELOGRAM, RETROGRADE;  Surgeon: Rj Sánchez Jr., MD;  Location: [address];  Service: Urology;  Laterality: Bilateral;    TONSILLECTOMY         FAMILY HISTORY:   Family History   Problem Relation Age of Onset    Cancer Mother         Uterine Cancer     No Known Problems Father     No Known Problems Sister     Sleep apnea Daughter     ADD / ADHD Daughter     Heart disease Maternal Grandmother         CHF    COPD Maternal Grandfather     Heart disease Paternal Grandmother         CHF    Colon cancer Neg Hx     Inflammatory bowel disease Neg Hx     Stomach cancer Neg Hx     Breast cancer Neg Hx     Ovarian cancer  Neg Hx     Learning disabilities Neg Hx        SOCIAL HISTORY:  Social History     Socioeconomic History    Marital status:     Number of children: 1   Occupational History    Occupation: Assistant   Tobacco Use    Smoking status: Some Days     Packs/day: 0.25     Years: 27.00     Pack years: 6.75     Types: Cigarettes    Smokeless tobacco: Never    Tobacco comments:     two cigarettes a day    Substance and Sexual Activity    Alcohol use: Not Currently     Comment: quit caridad 15    Drug use: No    Sexual activity: Yes     Partners: Male   Social History Narrative    They live in [place], [place]        ALLERGIES:  Review of patient's allergies indicates:  No Known Allergies    MEDICATIONS:    Current Outpatient Medications:     aspirin (ECOTRIN) 81 MG EC tablet, Take 81 mg by mouth once daily., Disp: , Rfl:     bimatoprost (LATISSE) 0.03 % ophthalmic solution, Place one drop on applicator and apply evenly along the skin of the upper eyelid at base of eyelashes once daily at bedtime; repeat procedure for second eye (use a clean applicator)., Disp: 5 mL, Rfl: 12    calcium carbonate-vitamin D3 600 mg-20 mcg (800 unit) Tab, Take 1 tablet by mouth once daily., Disp: 30 tablet, Rfl: 11    famotidine (PEPCID) 20 MG tablet, Take 1 tablet (20 mg total) by mouth every evening., Disp: 30 tablet, Rfl: 11    gabapentin (NEURONTIN) 100 MG capsule, Take 2 capsules (200 mg total) by mouth daily as needed (Sleep)., Disp: 90 capsule, Rfl: 11    hydrOXYzine HCL (ATARAX) 25 MG tablet, 1-2 tabs PO qHS PRN insomnia, Disp: 60 tablet, Rfl: 1    levothyroxine (SYNTHROID) 50 MCG tablet, Take 1 tablet (50 mcg total) by mouth before breakfast., Disp: 90 tablet, Rfl: 3    linaCLOtide (LINZESS) 72 mcg Cap capsule, Take 2 capsules (144 mcg total) by mouth before breakfast., Disp: 180 capsule, Rfl: 5    tacrolimus (PROGRAF) 1 MG Cap, Take 3 capsules (3 mg total) by mouth every 12 (twelve) hours., Disp: 540 capsule, Rfl: 3   Medication List  "with Changes/Refills   Current Medications    ASPIRIN (ECOTRIN) 81 MG EC TABLET    Take 81 mg by mouth once daily.    BIMATOPROST (LATISSE) 0.03 % OPHTHALMIC SOLUTION    Place one drop on applicator and apply evenly along the skin of the upper eyelid at base of eyelashes once daily at bedtime; repeat procedure for second eye (use a clean applicator).    CALCIUM CARBONATE-VITAMIN D3 600 MG-20 MCG (800 UNIT) TAB    Take 1 tablet by mouth once daily.    FAMOTIDINE (PEPCID) 20 MG TABLET    Take 1 tablet (20 mg total) by mouth every evening.    GABAPENTIN (NEURONTIN) 100 MG CAPSULE    Take 2 capsules (200 mg total) by mouth daily as needed (Sleep).    HYDROXYZINE HCL (ATARAX) 25 MG TABLET    1-2 tabs PO qHS PRN insomnia    LEVOTHYROXINE (SYNTHROID) 50 MCG TABLET    Take 1 tablet (50 mcg total) by mouth before breakfast.    LINACLOTIDE (LINZESS) 72 MCG CAP CAPSULE    Take 2 capsules (144 mcg total) by mouth before breakfast.    TACROLIMUS (PROGRAF) 1 MG CAP    Take 3 capsules (3 mg total) by mouth every 12 (twelve) hours.        PHYSICAL EXAM:  /80   Pulse 75   Ht 5' 3" (1.6 m)   Wt 65.4 kg (144 lb 2.9 oz)   SpO2 99%   BMI 25.54 kg/m²     General: No distress, No fever or chills  Neck: No adenopathy,no carotid bruit,no JVD  Lungs:Clear to auscultation bilaterally, No Crackles  Heart: Regular rate and rhythm, no murmur, gallops or rubs  Abdomen: Soft, non distended  Extremities: Atraumatic, no edema in LE  Skin: Turgor normal. No rashes  Neurologic: No focal weakness, oriented.  Dialysis Access: Non applicable        LABS:  Lab Results   Component Value Date    HGB 14.2 07/10/2023        Lab Results   Component Value Date    CREATININE 1.2 07/10/2023       Prot/Creat Ratio, Urine   Date Value Ref Range Status   01/30/2023 0.10 0.00 - 0.20 Final   09/21/2022 0.09 0.00 - 0.20 Final   11/11/2021 0.06 0.00 - 0.20 Final       Lab Results   Component Value Date     07/10/2023    K 4.6 07/10/2023    CO2 25 " 07/10/2023       last PTH   Lab Results   Component Value Date    PTH 45.3 02/20/2023    CALCIUM 9.4 07/10/2023    CAION 1.00 (L) 06/05/2022    PHOS 2.9 06/10/2022       Lab Results   Component Value Date    HGBA1C 5.4 06/05/2022       Lab Results   Component Value Date    LDLCALC 188.8 (H) 10/03/2022         Creatinine, Urine   Date Value Ref Range Status   01/30/2023 110.0 15.0 - 325.0 mg/dL Final   09/21/2022 173.0 15.0 - 325.0 mg/dL Final   11/11/2021 188.0 15.0 - 325.0 mg/dL Final       Protein, Urine   Date Value Ref Range Status   10/18/2021 11 0 - 15 mg/dL Final   07/30/2021 28 (H) 0 - 15 mg/dL Final     Protein, Urine Random   Date Value Ref Range Status   01/30/2023 11 0 - 15 mg/dL Final   09/21/2022 16 (H) 0 - 15 mg/dL Final   11/11/2021 12 0 - 15 mg/dL Final       Prot/Creat Ratio, Urine   Date Value Ref Range Status   01/30/2023 0.10 0.00 - 0.20 Final   09/21/2022 0.09 0.00 - 0.20 Final   11/11/2021 0.06 0.00 - 0.20 Final         ASSESSMENT:    1) Chronic Kidney disease stage 3a  2) Metabolic acidosis - Resolved  3) Anemia due to iron deficiency pre transplant - Resolved now.  4) S/P liver transplant on Prograf alone now.  5) Secondary hyperparathyroidism - improved   Renal ultrasound 10/21 showed 9.6 and 11.3 cm kidneys (no known issues from child rosa that would explain the reason for her asymmetric kidney sizes). Patients baseline creatinine is less than 1 till June 2021, Prior to liver transplant her creatinine has been above 2 with significant acanthocytes. She has had cystoscopy done in the past with no significant detected abnormalities.Serological work up including ANCA, GBM, MARIBEL, Cryoglobulin are all negative. Since liver transplant the creatinine trended back down to 1.2 mg/dl, urine microscopy post transplant (including today) did not show any acanthocytes likely due to resolution of secondary IgA. She will likely settle down at CKD stage 2-3a.   Electrolytes, in acceptable range. s/p IV Iron  infusion for iron def anemia prior to transplant. PTH high before transplant on vitamin D.    - Due to stage 3a may be good for long term to use low dose Cellcept and low therapeutic goal Prograf?  - Drink atleast 1200 ml water per day. Discussed risk of pre renal ARLIN from ozempic  - Avoid NSAIDs intake  - Limit dark color sodas, limit animal protein. Calorie count.  - Educated about CKD and the progression.    RTC in 6 months    Diagnosis and plan of care explained to the patient.  Pt Verbalized understanding. Answered all questions.  Thanks for allowing me to participate in the care of this patient.     1:19 PM    Rashel Cohn MD  Nephrology & Critical Care

## 2023-07-11 ENCOUNTER — PATIENT MESSAGE (OUTPATIENT)
Dept: TRANSPLANT | Facility: CLINIC | Age: 53
End: 2023-07-11
Payer: COMMERCIAL

## 2023-07-11 DIAGNOSIS — Z94.4 S/P LIVER TRANSPLANT: ICD-10-CM

## 2023-07-11 RX ORDER — TACROLIMUS 1 MG/1
CAPSULE ORAL
Qty: 450 CAPSULE | Refills: 3 | Status: SHIPPED | OUTPATIENT
Start: 2023-07-11 | End: 2023-08-11

## 2023-07-11 NOTE — TELEPHONE ENCOUNTER
Sent patient message with change and to get labs on 8/07/23    ----- Message from Sharmila Pacheco MD sent at 7/10/2023 10:56 PM CDT -----  The Labs are stable; lower prograf tp 3/2 from 3/3 and repeat labs in  4 weeks- please let patient know.

## 2023-07-12 LAB
CLINICAL BIOCHEMIST REVIEW: NORMAL
PLPETH BLD-MCNC: <10 NG/ML
POPETH BLD-MCNC: <10 NG/ML

## 2023-07-16 ENCOUNTER — NURSE TRIAGE (OUTPATIENT)
Dept: ADMINISTRATIVE | Facility: CLINIC | Age: 53
End: 2023-07-16
Payer: COMMERCIAL

## 2023-07-16 ENCOUNTER — HOSPITAL ENCOUNTER (EMERGENCY)
Facility: HOSPITAL | Age: 53
Discharge: HOME | End: 2023-07-16
Attending: EMERGENCY MEDICINE
Payer: COMMERCIAL

## 2023-07-16 VITALS
HEART RATE: 87 BPM | HEIGHT: 63 IN | DIASTOLIC BLOOD PRESSURE: 82 MMHG | BODY MASS INDEX: 25.16 KG/M2 | SYSTOLIC BLOOD PRESSURE: 125 MMHG | TEMPERATURE: 100 F | WEIGHT: 142 LBS | RESPIRATION RATE: 18 BRPM | OXYGEN SATURATION: 97 %

## 2023-07-16 DIAGNOSIS — U07.1 COVID: Primary | ICD-10-CM

## 2023-07-16 DIAGNOSIS — R50.9 FEBRILE: ICD-10-CM

## 2023-07-16 LAB
ALBUMIN SERPL BCP-MCNC: 4.1 G/DL (ref 3.5–5.2)
ALP SERPL-CCNC: 72 U/L (ref 55–135)
ALT SERPL W/O P-5'-P-CCNC: 17 U/L (ref 10–44)
ANION GAP SERPL CALC-SCNC: 12 MMOL/L (ref 8–16)
AST SERPL-CCNC: 20 U/L (ref 10–40)
BACTERIA #/AREA URNS AUTO: NORMAL /HPF
BASOPHILS # BLD AUTO: 0.08 K/UL (ref 0–0.2)
BASOPHILS NFR BLD: 0.8 % (ref 0–1.9)
BILIRUB SERPL-MCNC: 0.2 MG/DL (ref 0.1–1)
BILIRUB UR QL STRIP: NEGATIVE
BUN SERPL-MCNC: 17 MG/DL (ref 6–20)
CALCIUM SERPL-MCNC: 9.7 MG/DL (ref 8.7–10.5)
CHLORIDE SERPL-SCNC: 103 MMOL/L (ref 95–110)
CLARITY UR REFRACT.AUTO: ABNORMAL
CO2 SERPL-SCNC: 20 MMOL/L (ref 23–29)
COLOR UR AUTO: YELLOW
CREAT SERPL-MCNC: 1.2 MG/DL (ref 0.5–1.4)
DIFFERENTIAL METHOD: ABNORMAL
EOSINOPHIL # BLD AUTO: 0.3 K/UL (ref 0–0.5)
EOSINOPHIL NFR BLD: 2.5 % (ref 0–8)
ERYTHROCYTE [DISTWIDTH] IN BLOOD BY AUTOMATED COUNT: 12.4 % (ref 11.5–14.5)
EST. GFR  (NO RACE VARIABLE): 54.5 ML/MIN/1.73 M^2
GLUCOSE SERPL-MCNC: 105 MG/DL (ref 70–110)
GLUCOSE UR QL STRIP: NEGATIVE
GROUP A STREP, MOLECULAR: NEGATIVE
HCT VFR BLD AUTO: 41.1 % (ref 37–48.5)
HGB BLD-MCNC: 13.8 G/DL (ref 12–16)
HGB UR QL STRIP: ABNORMAL
HIV 1+2 AB+HIV1 P24 AG SERPL QL IA: NORMAL
HYALINE CASTS UR QL AUTO: 0 /LPF
IMM GRANULOCYTES # BLD AUTO: 0.02 K/UL (ref 0–0.04)
IMM GRANULOCYTES NFR BLD AUTO: 0.2 % (ref 0–0.5)
INFLUENZA A, MOLECULAR: NEGATIVE
INFLUENZA B, MOLECULAR: NEGATIVE
KETONES UR QL STRIP: NEGATIVE
LACTATE SERPL-SCNC: 0.6 MMOL/L (ref 0.5–2.2)
LEUKOCYTE ESTERASE UR QL STRIP: NEGATIVE
LYMPHOCYTES # BLD AUTO: 1.3 K/UL (ref 1–4.8)
LYMPHOCYTES NFR BLD: 12.7 % (ref 18–48)
MCH RBC QN AUTO: 30.5 PG (ref 27–31)
MCHC RBC AUTO-ENTMCNC: 33.6 G/DL (ref 32–36)
MCV RBC AUTO: 91 FL (ref 82–98)
MICROSCOPIC COMMENT: NORMAL
MONOCYTES # BLD AUTO: 1.1 K/UL (ref 0.3–1)
MONOCYTES NFR BLD: 10.5 % (ref 4–15)
NEUTROPHILS # BLD AUTO: 7.4 K/UL (ref 1.8–7.7)
NEUTROPHILS NFR BLD: 73.3 % (ref 38–73)
NITRITE UR QL STRIP: NEGATIVE
NRBC BLD-RTO: 0 /100 WBC
PH UR STRIP: 6 [PH] (ref 5–8)
PLATELET # BLD AUTO: 215 K/UL (ref 150–450)
PMV BLD AUTO: 9.7 FL (ref 9.2–12.9)
POTASSIUM SERPL-SCNC: 4.3 MMOL/L (ref 3.5–5.1)
PROT SERPL-MCNC: 7.5 G/DL (ref 6–8.4)
PROT UR QL STRIP: ABNORMAL
RBC # BLD AUTO: 4.53 M/UL (ref 4–5.4)
RBC #/AREA URNS AUTO: 3 /HPF (ref 0–4)
SARS-COV-2 RDRP RESP QL NAA+PROBE: POSITIVE
SODIUM SERPL-SCNC: 135 MMOL/L (ref 136–145)
SP GR UR STRIP: 1.01 (ref 1–1.03)
SPECIMEN SOURCE: NORMAL
SQUAMOUS #/AREA URNS AUTO: 25 /HPF
URN SPEC COLLECT METH UR: ABNORMAL
WBC # BLD AUTO: 10.14 K/UL (ref 3.9–12.7)
WBC #/AREA URNS AUTO: 0 /HPF (ref 0–5)

## 2023-07-16 PROCEDURE — 99285 EMERGENCY DEPT VISIT HI MDM: CPT | Mod: 25

## 2023-07-16 PROCEDURE — 25000003 PHARM REV CODE 250

## 2023-07-16 PROCEDURE — 87651 STREP A DNA AMP PROBE: CPT

## 2023-07-16 PROCEDURE — 96360 HYDRATION IV INFUSION INIT: CPT

## 2023-07-16 PROCEDURE — 93010 EKG 12-LEAD: ICD-10-PCS | Mod: ,,, | Performed by: INTERNAL MEDICINE

## 2023-07-16 PROCEDURE — 87040 BLOOD CULTURE FOR BACTERIA: CPT

## 2023-07-16 PROCEDURE — 93010 ELECTROCARDIOGRAM REPORT: CPT | Mod: ,,, | Performed by: INTERNAL MEDICINE

## 2023-07-16 PROCEDURE — 81001 URINALYSIS AUTO W/SCOPE: CPT

## 2023-07-16 PROCEDURE — 87502 INFLUENZA DNA AMP PROBE: CPT

## 2023-07-16 PROCEDURE — 83605 ASSAY OF LACTIC ACID: CPT

## 2023-07-16 PROCEDURE — 85025 COMPLETE CBC W/AUTO DIFF WBC: CPT

## 2023-07-16 PROCEDURE — 80053 COMPREHEN METABOLIC PANEL: CPT

## 2023-07-16 PROCEDURE — 93005 ELECTROCARDIOGRAM TRACING: CPT

## 2023-07-16 PROCEDURE — 87389 HIV-1 AG W/HIV-1&-2 AB AG IA: CPT | Performed by: PHYSICIAN ASSISTANT

## 2023-07-16 PROCEDURE — U0002 COVID-19 LAB TEST NON-CDC: HCPCS

## 2023-07-16 RX ADMIN — SODIUM CHLORIDE 1000 ML: 9 INJECTION, SOLUTION INTRAVENOUS at 11:07

## 2023-07-16 NOTE — TELEPHONE ENCOUNTER
Reason for Disposition   Patient sounds very sick or weak to the triager     Liver transplant 06/05/2022.  Fever, chills, cough, severe HA, body aches.    Additional Information   Negative: SEVERE difficulty breathing (e.g., struggling for each breath, speaks in single words)   Negative: Difficult to awaken or acting confused (e.g., disoriented, slurred speech)   Negative: Bluish (or gray) lips or face now   Negative: Shock suspected (e.g., cold/pale/clammy skin, too weak to stand, low BP, rapid pulse)   Negative: Sounds like a life-threatening emergency to the triager   Negative: SEVERE or constant chest pain or pressure  (Exception: Mild central chest pain, present only when coughing.)   Negative: MODERATE difficulty breathing (e.g., speaks in phrases, SOB even at rest, pulse 100-120)   Negative: [1] Headache AND [2] stiff neck (can't touch chin to chest)   Negative: Oxygen level (e.g., pulse oximetry) 90 percent or lower   Negative: Chest pain or pressure  (Exception: MILD central chest pain, present only when coughing.)   Negative: [1] Drinking very little AND [2] dehydration suspected (e.g., no urine > 12 hours, very dry mouth, very lightheaded)    Protocols used: Coronavirus (COVID-19) Diagnosed or [id number]    Angela called to say she thinks she may have sinus infection. Severe pressure in face. Temp 100.6 this morning, temp was 101.2 yesterday.  She took tylenol twice yesterday, once again this morning. Cough, yellow phlegm, body aches, chills, .and severe headache.  She states her  is also feeling badly, similar symptoms. She said she traveled to [place] and [place] last month for week long work assignment. She did have liver transplant 06/05/2022.  Screening done for Covid 19 symptoms.  Per [facility] transplant triage protocol, recommend she go to [facility] ED now for evaluation.  States she will do so. She is also encouraged to call transplant coordinator as soon as transplant clinic opens  tomorrow for follow up.  Recommend she call with fever / symptoms at any time in future prior to medicating fever.  She states understanding.  Message to post-liver transplant team.  Please contact caller directly with any additional care advice.

## 2023-07-16 NOTE — ED PROVIDER NOTES
Encounter Date: 2023       History     Chief Complaint   Patient presents with    Generalized Body Aches     Liver tansplant 2022    Headache    Fever     53-year-old female with history of liver transplant (2022), HLD, alcohol use disorder, hypothyroidism, and CKD stage 3 who presents to the ED for chief complaint of generalized body aches, headache, and fever of 3 days.  Patient states that her symptoms started on Friday and she had a T-max up to approximately 102° F.  Patient has been taking Tylenol which has provided some relief.  She also endorses a productive cough, myalgias, sore throat, sinus pressure, headache, and intermittent nausea.  Patient denies any chest pain, SOB, emesis, or abdominal pain.  She states that she took 1 g of Tylenol prior to coming to the emergency department.  Patient notes that her  has also not been feeling well.    The history is provided by the patient. No  was used.   Review of patient's allergies indicates:  No Known Allergies  Past Medical History:   Diagnosis Date    ADD (attention deficit disorder)     Anxiety     Ascites of liver     Cirrhosis 2021    CKD (chronic kidney disease) stage 3, GFR 30-59 ml/min     Constipation     ETOH abuse     Hyperlipidemia     Hypothyroidism     Liver transplant candidate 2022    Other ascites 2021    Pancreatitis, acute     Tobacco use     Vitamin D deficiency      Past Surgical History:   Procedure Laterality Date    AUGMENTATION OF BREAST      second set    breast augmentation       SECTION      COLONOSCOPY N/A 2021    Procedure: COLONOSCOPY;  Surgeon: Dao Gray MD;  Location: [facility] ([address] [place] [address]);  Service: Endoscopy;  Laterality: N/A;  COVID test 21 General surgery clinic [facility]    CYSTOSCOPY N/A 09/10/2021    Procedure: CYSTOSCOPY;  Surgeon: Rj Sánchez Jr., MD;  Location: [address];  Service: Urology;  Laterality: N/A;     ESOPHAGOGASTRODUODENOSCOPY N/A 09/17/2021    Procedure: ESOPHAGOGASTRODUODENOSCOPY (EGD);  Surgeon: Dao Gray MD;  Location: [facility] ENDO (2ND FLR);  Service: Endoscopy;  Laterality: N/A;  labs current on 9/7    ESOPHAGOGASTRODUODENOSCOPY N/A 10/26/2021    Procedure: ESOPHAGOGASTRODUODENOSCOPY (EGD);  Surgeon: Dao Gray MD;  Location: [facility] ENDO (2ND FLR);  Service: Endoscopy;  Laterality: N/A;  to be done the week of 10/18/21 per Dr. Gray-  covid-10/23/21-[facility]-   10/25 arrival time confirmed with pt-rb    ESOPHAGOGASTRODUODENOSCOPY Left 12/21/2021    Procedure: EGD (ESOPHAGOGASTRODUODENOSCOPY);  Surgeon: Koffi Monteiro MD;  Location: [facility] ENDO (4TH FLR);  Service: Endoscopy;  Laterality: Left;  Rapid  pt requested AM appt and first available in December-  labs morning of procedure-  12/14 lvm to confirm appt-rb    HEMORRHOID SURGERY      LIVER TRANSPLANT N/A 06/05/2022    Procedure: TRANSPLANT, LIVER;  Surgeon: Franco Slaughter MD;  Location: [facility] OR 2ND FLR;  Service: Transplant;  Laterality: N/A;    PERITONEOCENTESIS Right 06/08/2021    Procedure: PARACENTESIS, ABDOMINAL;  Surgeon: Jay Torre MD;  Location: [facility] CATH LAB;  Service: Radiology;  Laterality: Right;    PERITONEOCENTESIS Right 06/25/2021    Procedure: PARACENTESIS, ABDOMINAL;  Surgeon: Jay Torre MD;  Location: [facility] CATH LAB;  Service: Radiology;  Laterality: Right;    PERITONEOCENTESIS Right 07/12/2021    Procedure: PARACENTESIS, ABDOMINAL;  Surgeon: Jay Torre MD;  Location: [facility] CATH LAB;  Service: Radiology;  Laterality: Right;    PERITONEOCENTESIS Right 07/23/2021    Procedure: PARACENTESIS, ABDOMINAL;  Surgeon: Edward De La Torre II, MD;  Location: [facility] CATH LAB;  Service: Interventional Radiology;  Laterality: Right;    PERITONEOCENTESIS Right 08/03/2021    Procedure: PARACENTESIS, ABDOMINAL;  Surgeon: Franco Cheung MD;  Location: [facility] CATH LAB;  Service: Radiology;  Laterality: Right;     PERITONEOCENTESIS Right 08/16/2021    Procedure: PARACENTESIS, ABDOMINAL;  Surgeon: Jay Torre MD;  Location: [facility] CATH LAB;  Service: Radiology;  Laterality: Right;    PERITONEOCENTESIS Right 08/23/2021    Procedure: PARACENTESIS, ABDOMINAL;  Surgeon: Jay Torre MD;  Location: [facility] CATH LAB;  Service: Radiology;  Laterality: Right;    PERITONEOCENTESIS Right 09/16/2021    Procedure: PARACENTESIS, ABDOMINAL;  Surgeon: Jay Torre MD;  Location: [facility] CATH LAB;  Service: Radiology;  Laterality: Right;    PERITONEOCENTESIS N/A 09/24/2021    Procedure: PARACENTESIS, ABDOMINAL;  Surgeon: Jay Torre MD;  Location: [facility] CATH LAB;  Service: Radiology;  Laterality: N/A;    PERITONEOCENTESIS Right 12/23/2021    Procedure: PARACENTESIS, ABDOMINAL;  Surgeon: Jay Torre MD;  Location: [facility] CATH LAB;  Service: Radiology;  Laterality: Right;    PERITONEOCENTESIS N/A 12/30/2021    Procedure: PARACENTESIS, ABDOMINAL;  Surgeon: Salas Cabrera MD;  Location: [facility] CATH LAB;  Service: Radiology;  Laterality: N/A;    RETROGRADE PYELOGRAPHY Bilateral 09/10/2021    Procedure: PYELOGRAM, RETROGRADE;  Surgeon: Rj Sánchez Jr., MD;  Location: [address];  Service: Urology;  Laterality: Bilateral;    TONSILLECTOMY       Family History   Problem Relation Age of Onset    Cancer Mother         Uterine Cancer     No Known Problems Father     No Known Problems Sister     Sleep apnea Daughter     ADD / ADHD Daughter     Heart disease Maternal Grandmother         CHF    COPD Maternal Grandfather     Heart disease Paternal Grandmother         CHF    Colon cancer Neg Hx     Inflammatory bowel disease Neg Hx     Stomach cancer Neg Hx     Breast cancer Neg Hx     Ovarian cancer Neg Hx     Learning disabilities Neg Hx      Social History     Tobacco Use    Smoking status: Some Days     Packs/day: 0.25     Years: 27.00     Pack years: 6.75     Types: Cigarettes    Smokeless tobacco: Never    Tobacco  comments:     two cigarettes a day    Substance Use Topics    Alcohol use: Not Currently     Comment: quit caridad 15    Drug use: No     Review of Systems   Constitutional:  Positive for fever.   HENT:  Positive for congestion and sore throat.    Respiratory:  Positive for cough. Negative for shortness of breath.    Cardiovascular:  Negative for chest pain.   Gastrointestinal:  Positive for nausea. Negative for abdominal pain, diarrhea and vomiting.   Genitourinary:  Negative for difficulty urinating.   Musculoskeletal:  Positive for myalgias.   Skin:  Negative for rash.   Neurological:  Positive for headaches.     Physical Exam     Initial Vitals [07/16/23 1010]   BP Pulse Resp Temp SpO2   (!) 151/89 (!) 112 20 100.2 °F (37.9 °C) 96 %      MAP       --         Physical Exam    Nursing note and vitals reviewed.  Constitutional: No distress.   HENT:   Head: Normocephalic.   Mouth/Throat: Oropharynx is clear and moist. No oropharyngeal exudate.   No tenderness to facial palpation.   Eyes: Conjunctivae are normal. No scleral icterus.   Neck:   Normal range of motion.  Cardiovascular:  Regular rhythm and normal heart sounds.           Tachycardic   Pulmonary/Chest: Breath sounds normal. No respiratory distress. She has no wheezes. She has no rhonchi. She has no rales.   Abdominal: Abdomen is soft. She exhibits no distension. There is no abdominal tenderness.   Well-healed surgical incision. There is no rebound and no guarding.   Musculoskeletal:         General: Normal range of motion.      Cervical back: Normal range of motion.     Neurological: She is alert and oriented to person, place, and time.   Skin: Skin is warm. Capillary refill takes less than 2 seconds.   Psychiatric: She has a normal mood and affect.       ED Course   Procedures  Labs Reviewed   CBC W/ AUTO DIFFERENTIAL - Abnormal; Notable for the following components:       Result Value    Mono # 1.1 (*)     Gran % 73.3 (*)     Lymph % 12.7 (*)     All other  components within normal limits   COMPREHENSIVE METABOLIC PANEL - Abnormal; Notable for the following components:    Sodium 135 (*)     CO2 20 (*)     eGFR 54.5 (*)     All other components within normal limits   SARS-COV-2 RNA AMPLIFICATION, QUAL - Abnormal; Notable for the following components:    SARS-CoV-2 RNA, Amplification, Qual Positive (*)     All other components within normal limits   URINALYSIS, REFLEX TO URINE CULTURE - Abnormal; Notable for the following components:    Appearance, UA Hazy (*)     Protein, UA 1+ (*)     Occult Blood UA 1+ (*)     All other components within normal limits    Narrative:     Specimen Source->Urine   INFLUENZA A & B BY MOLECULAR   GROUP A STREP, MOLECULAR   CULTURE, BLOOD   CULTURE, BLOOD   HIV 1 / 2 ANTIBODY    Narrative:     Release to patient->Immediate   LACTIC ACID, PLASMA   URINALYSIS MICROSCOPIC    Narrative:     Specimen Source->Urine     EKG Readings: (Independently Interpreted)   EKG shows normal sinus rhythm, rate of 78 beats per minute, normal axis, and no ST elevation.   ECG Results              EKG 12-lead (Final result)  Result time 07/16/23 13:45:19      Final result by Interface, Lab In [facility] (07/16/23 13:45:19)                   Narrative:    Test Reason : R50.9,    Vent. Rate : 078 BPM     Atrial Rate : 078 BPM     P-R Int : 132 ms          QRS Dur : 076 ms      QT Int : 368 ms       P-R-T Axes : 068 070 075 degrees     QTc Int : 419 ms    Normal sinus rhythm with sinus arrhythmia  Normal ECG  When compared with ECG of 08-MAY-2022 16:39,  QT has shortened  Confirmed by Kiko Agrawal MD ([id number]) on 7/16/2023 1:45:11 PM    Referred By: [email or web address]   SELF           Confirmed By:Kiko Agrawal MD                                  Imaging Results              X-Ray Chest AP Portable (Final result)  Result time 07/16/23 11:50:55      Final result by Louis Kim MD (07/16/23 11:50:55)                   Impression:      Unremarkable examination.      Electronically  signed by: Louis Kim MD  Date:    07/16/2023  Time:    11:50               Narrative:    EXAMINATION:  XR CHEST AP PORTABLE    CLINICAL HISTORY:  cough, febrile;    TECHNIQUE:  Single frontal view of the chest was performed.    COMPARISON:  06/07/2022    FINDINGS:  No consolidation, pleural effusion or pneumothorax.  Cardiomediastinal silhouette is unremarkable.                                       Medications   sodium chloride 0.9% bolus 1,000 mL 1,000 mL (0 mLs Intravenous Stopped 7/16/23 1240)     Medical Decision Making:   Initial Assessment:   53-year-old female who presents with fever, myalgias, and headache.  She is laying in the bed in no apparent distress.  She is febrile, tachycardic, hypertensive, and is saturating well on room air.  Differential Diagnosis:   COVID, influenza, URI, PNA, strep throat, UTI  Clinical Tests:   Lab Tests: Ordered and Reviewed  Radiological Study: Ordered and Reviewed  Medical Tests: Ordered and Reviewed  ED Management:  Patient is immunocompromised and presents with fever, myalgias, and headache.  Given her history and symptoms, I suspect that patient likely has a viral or bacterial infection.  Patient is A&O x3 and has no pain when bending her neck, doubt meningitis or encephalitis.  Please see above for physical exam findings.  Patient took 1 g of Tylenol prior to arrival.  Administered 1 L of sodium chloride.  Obtained labs and CXR to evaluate for infectious etiology.  Patient reassessed and tachycardia resolved.  CXR shows no abnormalities.  Patient is positive for COVID.    Discussed with patient to quarantine for at least 5 days and discussed treatment.  Discussed referral and follow-up with COVID EUA for treatment and follow up with primary care provider as needed.  I answered the patient's questions.  Patient discharged to home.          Attending Attestation:   Physician Attestation Statement for Resident:  As the supervising MD   Physician Attestation  Statement: I have personally seen and examined this patient.   I agree with the above history.  -:   As the supervising MD I agree with the above PE.     As the supervising MD I agree with the above treatment, course, plan, and disposition.                  ED Course as of 07/16/23 1438   Nancy Jul 16, 2023   1158 Patient does not have leukocytosis. [MD]   1159 CXR shows no cardiopulmonary abnormalities. [MD]   1306 SARS-CoV-2 RNA, Amplification, Qual(!): Positive [MD]      ED Course User Index  [MD] Valerio Dotson MD                   Clinical Impression:   Final diagnoses:  [R50.9] Febrile  [U07.1] COVID (Primary)        ED Disposition Condition    Discharge Stable          ED Prescriptions    None       Follow-up Information       Follow up With Specialties Details Why Contact Info Additional Information    Killian Dodd, DO Internal Medicine In 1 week As needed [address] [facility] [address]  [phone]       [facility] Infectious Diseases Schedule an appointment as soon as possible for a visit in 2 days  [address] [facility] [address] [phone] Entrance to the unit is in front of the [facility] very close to [facility]. Enter the driveway in front of the hospital close to the Emergency Department and drive past the front of the hospital and drive past the [facility]. At the last stop sign, you will see signage for the EUA and parking spots are available specifically for our unit. Upon arrival, call [phone] or walk up the ramp and ring the doorbell for entry. Appointment times are approximately 3 hours.  DONALD Dotson MD  Resident  07/16/23 1438       Marino Toussaint MD  07/16/23 2044

## 2023-07-16 NOTE — DISCHARGE INSTRUCTIONS
Diagnosis:   1. COVID    2. Febrile      Home Care Instructions:  - Quarantine at home for 5 days    Follow-Up Plan:  - A referral has been made for COVID EUA treatment and you will receive a call to make an appointment.  - Follow-up with: Primary care provider as needed    Return to the Emergency Department for symptoms including but not limited to: worsening symptoms, persistent fever of 100.4° or higher, shortness of breath or chest pain, vomiting with inability to hold down fluids, or other concerning symptoms.

## 2023-07-16 NOTE — ED TRIAGE NOTES
Patient presents to the ER via private transport with complaints of fatigue and generalized weakness since Friday. Yesterday started with headache, fever, and congestion. Pt denies shortness of breath or chest pain. Endorses nausea after coughing. History of liver tansplant June 2022.

## 2023-07-17 DIAGNOSIS — U07.1 COVID-19: Primary | ICD-10-CM

## 2023-07-19 ENCOUNTER — OFFICE VISIT (OUTPATIENT)
Dept: PSYCHIATRY | Facility: CLINIC | Age: 53
End: 2023-07-19
Payer: COMMERCIAL

## 2023-07-19 DIAGNOSIS — F98.8 ATTENTION DEFICIT DISORDER, UNSPECIFIED HYPERACTIVITY PRESENCE: ICD-10-CM

## 2023-07-19 PROCEDURE — 99214 PR OFFICE/OUTPT VISIT, EST, LEVL IV, 30-39 MIN: ICD-10-PCS | Mod: 95,,, | Performed by: PSYCHIATRY & NEUROLOGY

## 2023-07-19 PROCEDURE — 99214 OFFICE O/P EST MOD 30 MIN: CPT | Mod: 95,,, | Performed by: PSYCHIATRY & NEUROLOGY

## 2023-07-19 RX ORDER — DEXTROAMPHETAMINE SACCHARATE, AMPHETAMINE ASPARTATE, DEXTROAMPHETAMINE SULFATE AND AMPHETAMINE SULFATE 5; 5; 5; 5 MG/1; MG/1; MG/1; MG/1
1 TABLET ORAL 2 TIMES DAILY
Qty: 60 TABLET | Refills: 0 | Status: SHIPPED | OUTPATIENT
Start: 2023-07-19 | End: 2023-10-05 | Stop reason: SDUPTHER

## 2023-07-19 NOTE — PROGRESS NOTES
The patient location is: [place], [place]  The chief complaint leading to consultation is: ADHD    Visit type: audiovisual    Face to Face time with patient: 20 min  20 minutes of total time spent on the encounter, which includes face to face time and non-face to face time preparing to see the patient (eg, review of tests), Obtaining and/or reviewing separately obtained history, Documenting clinical information in the electronic or other health record, Independently interpreting results (not separately reported) and communicating results to the patient/family/caregiver, or Care coordination (not separately reported).     Each patient to whom he or she provides medical services by telemedicine is:  (1) informed of the relationship between the physician and patient and the respective role of any other health care provider with respect to management of the patient; and (2) notified that he or she may decline to receive medical services by telemedicine and may withdraw from such care at any time.    Notes:      ESTABLISHED OUTPATIENT VISIT   E/M LEVEL 4: [id number]    ENCOUNTER DATE: 7/19/2023  SITE: [facility], [address]    HISTORY    CHIEF COMPLAINT   Malu Montes is a 53 y.o. female who presents for follow up of grief    HPI     Difficulty focusing. Would like to start taking Adderall. Busy at work.    S/p liver transplant.    Twin granddaughters due in November.    Abstinent from alcohol    Psychiatric Review Of Systems - Is patient experiencing or having changes in:  Sleep: adequate  appetite: adequate  weight: no  energy/anergy: no  interest/pleasure/anhedonia: no  somatic symptoms: no  libido: no  anxiety/panic: no  guilty/hopelessness: no  concentration: no  S.I.B.s/risky behavior: no  Irritability: no  Racing thoughts: no  Impulsive behaviors: no  Paranoia:no  AVH:no    Recent alcohol: no  Recent drug: no    Medical ROS    General ROS: sciatica remains troublesome   ENT ROS:  negative  Cardiovascular ROS: negative  Respiratory ROS: negative  Gastrointestinal ROS: negative  Neurological ROS: negative  Dermatologicl ROS: negative  Endocrine ROS: negative    PAST MEDICAL, FAMILY AND SOCIAL HISTORY: The patient's past medical, family and social history have been reviewed and updated as appropriate within the electronic medical record - see encounter notes.    PSYCHOTROPIC MEDICATIONS   Hyroxyzine 25 mg at bedtime prn    Scheduled and PRN Medications     Current Outpatient Medications:     aspirin (ECOTRIN) 81 MG EC tablet, Take 81 mg by mouth once daily., Disp: , Rfl:     bimatoprost (LATISSE) 0.03 % ophthalmic solution, Place one drop on applicator and apply evenly along the skin of the upper eyelid at base of eyelashes once daily at bedtime; repeat procedure for second eye (use a clean applicator)., Disp: 5 mL, Rfl: 12    calcium carbonate-vitamin D3 600 mg-20 mcg (800 unit) Tab, Take 1 tablet by mouth once daily., Disp: 30 tablet, Rfl: 11    famotidine (PEPCID) 20 MG tablet, Take 1 tablet (20 mg total) by mouth every evening., Disp: 30 tablet, Rfl: 11    gabapentin (NEURONTIN) 100 MG capsule, Take 2 capsules (200 mg total) by mouth daily as needed (Sleep)., Disp: 90 capsule, Rfl: 11    hydrOXYzine HCL (ATARAX) 25 MG tablet, TAKE 1 TO 2 TABLETS BY MOUTH EVERY NIGHT AT BEDTIME AS NEEDED FOR INSOMNIA, Disp: 60 tablet, Rfl: 1    levothyroxine (SYNTHROID) 50 MCG tablet, Take 1 tablet (50 mcg total) by mouth before breakfast., Disp: 90 tablet, Rfl: 3    linaCLOtide (LINZESS) 72 mcg Cap capsule, Take 2 capsules (144 mcg total) by mouth before breakfast., Disp: 180 capsule, Rfl: 5    molnupiravir 200 mg capsule (EUA), Take 4 capsules (800 mg total) by mouth every 12 (twelve) hours. for 5 days, Disp: 40 capsule, Rfl: 0    tacrolimus (PROGRAF) 1 MG Cap, Take 3 capsules (3 mg total) by mouth every morning AND 2 capsules (2 mg total) every evening., Disp: 450 capsule, Rfl: 3    EXAMINATION  Wt  Readings from Last 3 Encounters:   07/16/23 64.4 kg (142 lb)   07/10/23 65.4 kg (144 lb 2.9 oz)   06/02/23 65.6 kg (144 lb 11.7 oz)     Temp Readings from Last 3 Encounters:   07/16/23 100.1 °F (37.8 °C) (Oral)   06/02/23 98.6 °F (37 °C) (Oral)   12/02/22 98.9 °F (37.2 °C) (Oral)     BP Readings from Last 3 Encounters:   07/16/23 125/82   07/10/23 110/80   06/02/23 (!) 148/86     Pulse Readings from Last 3 Encounters:   07/16/23 87   07/10/23 75   06/02/23 75       CONSTITUTIONAL  General Appearance: well nourished    MUSCULOSKELETAL  Muscle Strength and Tone: normal strength and tone  Abnormal Involuntary Movements: no abnormal movement noted  Gait and Station: normal gait    PSYCHIATRIC   Level of Consciousness: alert  Orientation: oriented to person, place and time  Grooming: well groomed  Psychomotor Behavior: no restlessness/agitation  Speech: normal in rate, rhythm and volume  Language: normal vocabulary  Mood: grieving  Affect: full range and appropriate  Thought Process: logical and goal directed  Associations: intact associations  Thought Content: no SI/HI  Memory: grossly intact  Attention: intact to content of interview  Fund of Knowledge: appears adequate  Insight: good  Judgement: good    MEDICAL DECISION MAKING    DIAGNOSES  ADHD    PROBLEM LIST AND MANAGEMENT PLANS  - re-start Adderall 20 mg bid  - rtc 3 months    Total time, including chart review and time with patient: 20 min    LABORATORY DATA    Admission on 07/16/2023, Discharged on 07/16/2023   Component Date Value Ref Range Status    HIV 1/2 Ag/Ab 07/16/2023 Non-reactive  Non-reactive Final    WBC 07/16/2023 10.14  3.90 - 12.70 K/uL Final    RBC 07/16/2023 4.53  4.00 - 5.40 M/uL Final    Hemoglobin 07/16/2023 13.8  12.0 - 16.0 g/dL Final    Hematocrit 07/16/2023 41.1  37.0 - 48.5 % Final    MCV 07/16/2023 91  82 - 98 fL Final    MCH 07/16/2023 30.5  27.0 - 31.0 pg Final    MCHC 07/16/2023 33.6  32.0 - 36.0 g/dL Final    RDW 07/16/2023 12.4   11.5 - 14.5 % Final    Platelets 07/16/2023 215  150 - 450 K/uL Final    MPV 07/16/2023 9.7  9.2 - 12.9 fL Final    Immature Granulocytes 07/16/2023 0.2  0.0 - 0.5 % Final    Gran # (ANC) 07/16/2023 7.4  1.8 - 7.7 K/uL Final    Immature Grans (Abs) 07/16/2023 0.02  0.00 - 0.04 K/uL Final    Comment: Mild elevation in immature granulocytes is non specific and   can be seen in a variety of conditions including stress response,   acute inflammation, trauma and pregnancy. Correlation with other   laboratory and clinical findings is essential.      Lymph # 07/16/2023 1.3  1.0 - 4.8 K/uL Final    Mono # 07/16/2023 1.1 (H)  0.3 - 1.0 K/uL Final    Eos # 07/16/2023 0.3  0.0 - 0.5 K/uL Final    Baso # 07/16/2023 0.08  0.00 - 0.20 K/uL Final    nRBC 07/16/2023 0  0 /100 WBC Final    Gran % 07/16/2023 73.3 (H)  38.0 - 73.0 % Final    Lymph % 07/16/2023 12.7 (L)  18.0 - 48.0 % Final    Mono % 07/16/2023 10.5  4.0 - 15.0 % Final    Eosinophil % 07/16/2023 2.5  0.0 - 8.0 % Final    Basophil % 07/16/2023 0.8  0.0 - 1.9 % Final    Differential Method 07/16/2023 Automated   Final    Sodium 07/16/2023 135 (L)  136 - 145 mmol/L Final    Potassium 07/16/2023 4.3  3.5 - 5.1 mmol/L Final    Chloride 07/16/2023 103  95 - 110 mmol/L Final    CO2 07/16/2023 20 (L)  23 - 29 mmol/L Final    Glucose 07/16/2023 105  70 - 110 mg/dL Final    BUN 07/16/2023 17  6 - 20 mg/dL Final    Creatinine 07/16/2023 1.2  0.5 - 1.4 mg/dL Final    Calcium 07/16/2023 9.7  8.7 - 10.5 mg/dL Final    Total Protein 07/16/2023 7.5  6.0 - 8.4 g/dL Final    Albumin 07/16/2023 4.1  3.5 - 5.2 g/dL Final    Total Bilirubin 07/16/2023 0.2  0.1 - 1.0 mg/dL Final    Comment: For infants and newborns, interpretation of results should be based  on gestational age, weight and in agreement with clinical  observations.    Premature Infant recommended reference ranges:  Up to 24 hours.............<8.0 mg/dL  Up to 48 hours............<12.0 mg/dL  3-5 days..................<15.0  mg/dL  6-29 days.................<15.0 mg/dL      Alkaline Phosphatase 07/16/2023 72  55 - 135 U/L Final    AST 07/16/2023 20  10 - 40 U/L Final    ALT 07/16/2023 17  10 - 44 U/L Final    eGFR 07/16/2023 54.5 (A)  >60 mL/min/1.73 m^2 Final    Anion Gap 07/16/2023 12  8 - 16 mmol/L Final    SARS-CoV-2 RNA, Amplification, Qual 07/16/2023 Positive (A)  Negative Final    Comment: This test utilizes isothermal nucleic acid amplification technology   to   detect the SARS-CoV-2 RdRp nucleic acid segment. The analytical   sensitivity   (limit of detection) is 500 copies/swab.     A POSITIVE result is indicative of the presence of SARS-CoV-2 RNA;   clinical   correlation with patient history and other diagnostic information is   necessary to determine patient infection status.    A NEGATIVE result means that SARS-CoV-2 nucleic acids are not present   above   the limit of detection. A NEGATIVE result should be treated as   presumptive.   It does not rule out the possibility of COVID-19 and should not be   the sole   basis for treatment decisions. If COVID-19 is strongly suspected   based on   clinical and exposure history, re-testing using an alternate   molecular assay   should be considered.     This test is only for use under the Food and Drug Administration s   Emergency   Use Authorization (EUA).     Commercial kits are provided by [email or web address]. Performanc                           e   characteristics of the EUA have been independently verified by   [facility] Department of Pathology and Laboratory Medicine.   _________________________________________________________________   The authorized Fact Sheet for Healthcare Providers and the authorized   Fact   Sheet for Patients of the ID NOW COVID-19 are available on the FDA   website:   https://www.fda.gov/media/484262/download   https://www.fda.gov/media/122503/download      Influenza A, Molecular 07/16/2023 Negative  Negative Final    Influenza B,  Molecular 07/16/2023 Negative  Negative Final    Flu A & B Source 07/16/2023 Nasal swab   Final    Specimen UA 07/16/2023 Urine, Clean Catch   Final    Color, UA 07/16/2023 Yellow  Yellow, Straw, Marie Final    Appearance, UA 07/16/2023 Hazy (A)  Clear Final    pH, UA 07/16/2023 6.0  5.0 - 8.0 Final    Specific Gravity, UA 07/16/2023 1.015  1.005 - 1.030 Final    Protein, UA 07/16/2023 1+ (A)  Negative Final    Comment: Recommend a 24 hour urine protein or a urine   protein/creatinine ratio if globulin induced proteinuria is  clinically suspected.      Glucose, UA 07/16/2023 Negative  Negative Final    Ketones, UA 07/16/2023 Negative  Negative Final    Bilirubin (UA) 07/16/2023 Negative  Negative Final    Occult Blood UA 07/16/2023 1+ (A)  Negative Final    Nitrite, UA 07/16/2023 Negative  Negative Final    Leukocytes, UA 07/16/2023 Negative  Negative Final    Blood Culture, Routine 07/16/2023 No Growth to date   Preliminary    Blood Culture, Routine 07/16/2023 No Growth to date   Preliminary    Blood Culture, Routine 07/16/2023 No Growth to date   Preliminary    Blood Culture, Routine 07/16/2023 No Growth to date   Preliminary    Blood Culture, Routine 07/16/2023 No Growth to date   Preliminary    Blood Culture, Routine 07/16/2023 No Growth to date   Preliminary    Lactate (Lactic Acid) 07/16/2023 0.6  0.5 - 2.2 mmol/L Final    Comment: Falsely low lactic acid results can be found in samples   containing >=13.0 mg/dL total bilirubin and/or >=3.5 mg/dL   direct bilirubin.      RBC, UA 07/16/2023 3  0 - 4 /hpf Final    WBC, UA 07/16/2023 0  0 - 5 /hpf Final    Bacteria 07/16/2023 None  None-Occ /hpf Final    Squam Epithel, UA 07/16/2023 25  /hpf Final    Hyaline Casts, UA 07/16/2023 0  0-1/lpf /lpf Final    Microscopic Comment 07/16/2023 SEE COMMENT   Final    Comment: Other formed elements not mentioned in the report are not   present in the microscopic examination.       Group A Strep, Molecular 07/16/2023 Negative   Negative Final    Comment: Arcanobacterium haemolyticum and Beta Streptococcus group C   and G will not be detected by this test method.  Please order   Throat Culture ([id number]) if suspected.     Lab Visit on 07/10/2023   Component Date Value Ref Range Status    Specimen UA 07/10/2023 Urine, Clean Catch   Final    Color, UA 07/10/2023 Yellow  Yellow, Straw, Marie Final    Appearance, UA 07/10/2023 Hazy (A)  Clear Final    pH, UA 07/10/2023 6.0  5.0 - 8.0 Final    Specific Gravity, UA 07/10/2023 1.020  1.005 - 1.030 Final    Protein, UA 07/10/2023 Trace (A)  Negative Final    Comment: Recommend a 24 hour urine protein or a urine   protein/creatinine ratio if globulin induced proteinuria is  clinically suspected.      Glucose, UA 07/10/2023 Negative  Negative Final    Ketones, UA 07/10/2023 Negative  Negative Final    Bilirubin (UA) 07/10/2023 Negative  Negative Final    Occult Blood UA 07/10/2023 Negative  Negative Final    Nitrite, UA 07/10/2023 Negative  Negative Final    Leukocytes, UA 07/10/2023 Negative  Negative Final    Protein, Urine Random 07/10/2023 15  0 - 15 mg/dL Final    Creatinine, Urine 07/10/2023 167.0  15.0 - 325.0 mg/dL Final    Prot/Creat Ratio, Urine 07/10/2023 0.09  0.00 - 0.20 Final   Lab Visit on 07/10/2023   Component Date Value Ref Range Status    WBC 07/10/2023 8.35  3.90 - 12.70 K/uL Final    RBC 07/10/2023 4.75  4.00 - 5.40 M/uL Final    Hemoglobin 07/10/2023 14.2  12.0 - 16.0 g/dL Final    Hematocrit 07/10/2023 43.7  37.0 - 48.5 % Final    MCV 07/10/2023 92  82 - 98 fL Final    MCH 07/10/2023 29.9  27.0 - 31.0 pg Final    MCHC 07/10/2023 32.5  32.0 - 36.0 g/dL Final    RDW 07/10/2023 12.4  11.5 - 14.5 % Final    Platelets 07/10/2023 274  150 - 450 K/uL Final    MPV 07/10/2023 9.5  9.2 - 12.9 fL Final    Immature Granulocytes 07/10/2023 0.4  0.0 - 0.5 % Final    Gran # (ANC) 07/10/2023 5.0  1.8 - 7.7 K/uL Final    Immature Grans (Abs) 07/10/2023 0.03  0.00 - 0.04 K/uL Final    Comment:  Mild elevation in immature granulocytes is non specific and   can be seen in a variety of conditions including stress response,   acute inflammation, trauma and pregnancy. Correlation with other   laboratory and clinical findings is essential.      Lymph # 07/10/2023 2.1  1.0 - 4.8 K/uL Final    Mono # 07/10/2023 0.7  0.3 - 1.0 K/uL Final    Eos # 07/10/2023 0.4  0.0 - 0.5 K/uL Final    Baso # 07/10/2023 0.10  0.00 - 0.20 K/uL Final    nRBC 07/10/2023 0  0 /100 WBC Final    Gran % 07/10/2023 60.0  38.0 - 73.0 % Final    Lymph % 07/10/2023 25.5  18.0 - 48.0 % Final    Mono % 07/10/2023 7.8  4.0 - 15.0 % Final    Eosinophil % 07/10/2023 5.1  0.0 - 8.0 % Final    Basophil % 07/10/2023 1.2  0.0 - 1.9 % Final    Differential Method 07/10/2023 Automated   Final    Sodium 07/10/2023 138  136 - 145 mmol/L Final    Potassium 07/10/2023 4.6  3.5 - 5.1 mmol/L Final    Chloride 07/10/2023 106  95 - 110 mmol/L Final    CO2 07/10/2023 25  23 - 29 mmol/L Final    Glucose 07/10/2023 91  70 - 110 mg/dL Final    BUN 07/10/2023 26 (H)  6 - 20 mg/dL Final    Creatinine 07/10/2023 1.2  0.5 - 1.4 mg/dL Final    Calcium 07/10/2023 9.4  8.7 - 10.5 mg/dL Final    Total Protein 07/10/2023 7.1  6.0 - 8.4 g/dL Final    Albumin 07/10/2023 4.0  3.5 - 5.2 g/dL Final    Total Bilirubin 07/10/2023 0.3  0.1 - 1.0 mg/dL Final    Comment: For infants and newborns, interpretation of results should be based  on gestational age, weight and in agreement with clinical  observations.    Premature Infant recommended reference ranges:  Up to 24 hours.............<8.0 mg/dL  Up to 48 hours............<12.0 mg/dL  3-5 days..................<15.0 mg/dL  6-29 days.................<15.0 mg/dL      Alkaline Phosphatase 07/10/2023 73  55 - 135 U/L Final    AST 07/10/2023 19  10 - 40 U/L Final    ALT 07/10/2023 18  10 - 44 U/L Final    eGFR 07/10/2023 54.5 (A)  >60 mL/min/1.73 m^2 Final    Anion Gap 07/10/2023 7 (L)  8 - 16 mmol/L Final    Tacrolimus Lvl 07/10/2023  12.4  5.0 - 15.0 ng/mL Final    Comment: Testing performed by a chemiluminescent microparticle   immunoassay on the [employer] i System.    CAUTION: No firm therapeutic range exists for tacrolimus in whole   blood. The   complexity of the clinical state, individual differences in   sensitivity to   immunosuppressive and nephrotoxic effects of tacrolimus,   co-administration   of other immunosuppressants, type of transplant, time post-transplant   and a   number of other factors contribute to different requirements for   optimal   blood levels of tacrolimus. Therefore, individual tacrolimus values   cannot   be used as the sole indicator for making changes in treatment regimen   and   each patient should be thoroughly evaluated clinically before changes   in   treatment regimens are made. Each user must establish his or her own   ranges   based on clinical experience.  Therapeutic ranges vary according to the commercial test used, and   therefore   should be established for each commercial test. Values obtained with   diff                           erent assay methods cannot be used interchangeably due to   differences in   assay methods and cross-reactivity with metabolites, nor should   correction   factors be applied. Therefore, consistent use of one assay for   individual   patients is recommended.      PEth 16:0/18.1 (POPEth) 07/10/2023 <10  Cutoff: 10 ng/mL Final    Comment: Phosphatidylethanol (PEth) homologues result interpretation    PEth 16:0/18:1 (POPEth)  Less than 10 ng/mL: Not detected  10 - 19 ng/mL: Abstinence or light alcohol consumption  (<2 drinks per day for several days a week)  20 - 200 ng/mL: Moderate alcohol consumption  (up to 4 drinks per day for several days a week)  Greater than 200 ng/mL: Heavy alcohol consumption or   chronic alcohol use (at least 4 drinks per day several days   a week)    (Reference: ANA Villafana and GUCCI Lomax 2018 J. Forensic Sci)      PEth 16:0/18.2 (PLPEth)  07/10/2023 <10  Cutoff: 10 ng/mL Final    Comment: PEth 16:0/18:2 (PLPEth)  Reference ranges are not well established      PETH INTERPRETATION 07/10/2023 Negative.   Final    Comment: -------------------ADDITIONAL INFORMATION-------------------  This report is intended for use in clinical monitoring and   management of patients.  It is not intended for use in   employment-related testing.  This test was developed and its performance characteristics   determined by [facility] in a manner consistent with CLIA   requirements. This test has not been cleared or approved by   the U.S. Food and Drug Administration.    Test Performed by:  [facility]  [address] [place] [address]  : Radames Lambert M.D. Ph.D.; CLIA# [id number]     Lab Visit on 06/15/2023   Component Date Value Ref Range Status    WBC 06/15/2023 9.01  3.90 - 12.70 K/uL Final    RBC 06/15/2023 4.75  4.00 - 5.40 M/uL Final    Hemoglobin 06/15/2023 14.3  12.0 - 16.0 g/dL Final    Hematocrit 06/15/2023 44.3  37.0 - 48.5 % Final    MCV 06/15/2023 93  82 - 98 fL Final    MCH 06/15/2023 30.1  27.0 - 31.0 pg Final    MCHC 06/15/2023 32.3  32.0 - 36.0 g/dL Final    RDW 06/15/2023 13.0  11.5 - 14.5 % Final    Platelets 06/15/2023 284  150 - 450 K/uL Final    MPV 06/15/2023 10.0  9.2 - 12.9 fL Final    Immature Granulocytes 06/15/2023 0.2  0.0 - 0.5 % Final    Gran # (ANC) 06/15/2023 5.1  1.8 - 7.7 K/uL Final    Immature Grans (Abs) 06/15/2023 0.02  0.00 - 0.04 K/uL Final    Comment: Mild elevation in immature granulocytes is non specific and   can be seen in a variety of conditions including stress response,   acute inflammation, trauma and pregnancy. Correlation with other   laboratory and clinical findings is essential.      Lymph # 06/15/2023 2.6  1.0 - 4.8 K/uL Final    Mono # 06/15/2023 0.8  0.3 - 1.0 K/uL Final    Eos # 06/15/2023 0.5  0.0 - 0.5 K/uL Final    Baso # 06/15/2023 0.11  0.00 -  0.20 K/uL Final    nRBC 06/15/2023 0  0 /100 WBC Final    Gran % 06/15/2023 56.4  38.0 - 73.0 % Final    Lymph % 06/15/2023 28.6  18.0 - 48.0 % Final    Mono % 06/15/2023 8.3  4.0 - 15.0 % Final    Eosinophil % 06/15/2023 5.3  0.0 - 8.0 % Final    Basophil % 06/15/2023 1.2  0.0 - 1.9 % Final    Differential Method 06/15/2023 Automated   Final    Sodium 06/15/2023 140  136 - 145 mmol/L Final    Potassium 06/15/2023 4.0  3.5 - 5.1 mmol/L Final    Chloride 06/15/2023 107  95 - 110 mmol/L Final    CO2 06/15/2023 24  23 - 29 mmol/L Final    Glucose 06/15/2023 105  70 - 110 mg/dL Final    BUN 06/15/2023 29 (H)  6 - 20 mg/dL Final    Creatinine 06/15/2023 1.3  0.5 - 1.4 mg/dL Final    Calcium 06/15/2023 9.9  8.7 - 10.5 mg/dL Final    Total Protein 06/15/2023 7.5  6.0 - 8.4 g/dL Final    Albumin 06/15/2023 4.1  3.5 - 5.2 g/dL Final    Total Bilirubin 06/15/2023 0.3  0.1 - 1.0 mg/dL Final    Comment: For infants and newborns, interpretation of results should be based  on gestational age, weight and in agreement with clinical  observations.    Premature Infant recommended reference ranges:  Up to 24 hours.............<8.0 mg/dL  Up to 48 hours............<12.0 mg/dL  3-5 days..................<15.0 mg/dL  6-29 days.................<15.0 mg/dL      Alkaline Phosphatase 06/15/2023 71  55 - 135 U/L Final    AST 06/15/2023 24  10 - 40 U/L Final    ALT 06/15/2023 27  10 - 44 U/L Final    Anion Gap 06/15/2023 9  8 - 16 mmol/L Final    eGFR 06/15/2023 49.5 (A)  >60 mL/min/1.73 m^2 Final    Tacrolimus Lvl 06/15/2023 7.8  5.0 - 15.0 ng/mL Final    Comment: Testing performed by a chemiluminescent microparticle   immunoassay on the [employer] i System.    CAUTION: No firm therapeutic range exists for tacrolimus in whole   blood. The   complexity of the clinical state, individual differences in   sensitivity to   immunosuppressive and nephrotoxic effects of tacrolimus,   co-administration   of other immunosuppressants, type of  transplant, time post-transplant   and a   number of other factors contribute to different requirements for   optimal   blood levels of tacrolimus. Therefore, individual tacrolimus values   cannot   be used as the sole indicator for making changes in treatment regimen   and   each patient should be thoroughly evaluated clinically before changes   in   treatment regimens are made. Each user must establish his or her own   ranges   based on clinical experience.  Therapeutic ranges vary according to the commercial test used, and   therefore   should be established for each commercial test. Values obtained with   diff                           erent assay methods cannot be used interchangeably due to   differences in   assay methods and cross-reactivity with metabolites, nor should   correction   factors be applied. Therefore, consistent use of one assay for   individual   patients is recommended.      PEth 16:0/18.1 (POPEth) 06/15/2023 <10  Cutoff: 10 ng/mL Final    Comment: Phosphatidylethanol (PEth) homologues result interpretation    PEth 16:0/18:1 (POPEth)  Less than 10 ng/mL: Not detected  10 - 19 ng/mL: Abstinence or light alcohol consumption  (<2 drinks per day for several days a week)  20 - 200 ng/mL: Moderate alcohol consumption  (up to 4 drinks per day for several days a week)  Greater than 200 ng/mL: Heavy alcohol consumption or   chronic alcohol use (at least 4 drinks per day several days   a week)    (Reference: ANA Villafana and GUCCI Lomax 2018 J. Forensic Sci)      PEth 16:0/18.2 (PLPEth) 06/15/2023 <10  Cutoff: 10 ng/mL Final    Comment: PEth 16:0/18:2 (PLPEth)  Reference ranges are not well established      PETH INTERPRETATION 06/15/2023 Negative.   Final    Comment: -------------------ADDITIONAL INFORMATION-------------------  This report is intended for use in clinical monitoring and   management of patients.  It is not intended for use in   employment-related testing.  This test was developed and its  performance characteristics   determined by [facility] in a manner consistent with CLIA   requirements. This test has not been cleared or approved by   the U.S. Food and Drug Administration.    Test Performed by:  [facility]  [address] [place] [address]  : Radames Lambert M.D. Ph.D.; CLIA# [id number]      Hep B Viral DNA IU/ML 06/15/2023 <10  <10 IU/mL Final    Log HBV IU/mL 06/15/2023 <1.00  <1.00 Log (10) IU/mL Final    Comment: This procedure utilizes a real-time polymerase chain  reaction test from [employer].  The amplification   target is a conserved region in the terminal third of  the hepatitis B surface antigen gene. The lower limit of   quantitation is 10 IU/mL (1.00 Log IU/mL) and the uppper  limit of quantitation is 1 billion IU/mL (9.00 Log IU/mL).  The qualitative limit of detection is 10 IU/mL   (1.00 Log IU/mL). A  Not detected  result does not rule   out infection.    Test performed at [facility],  [facility] [place] [address]  [address]     [phone]  Maryan Fonseca MD, PhD - Medical Director      Hepatitis B Virus DNA 06/15/2023 Not detected  Not detected Final    HCV Quantitative Result 06/15/2023 <12  <12 IU/mL Final    HCV, Qualitative 06/15/2023 Not Detected  Not Detected Final    Comment: This procedure utilizes a real-time polymerase chain reaction test  from [employer]. The amplification target is a conserved region   of the HCV genome. The lower limit of quantitation is <12 IU/mL   (<1.08 Log IU/mL)and the upper limit of quantitation is 30 million   IU/mL (7.48 Log IU/mL).     Specimens reported as Detected but <12 IU/mL contain detectable  levels of Hepatitis C RNA but the viral load is below the limit of   quantitation. A Not Detected result does not rule out HCV infection,   clinical correlation is recommended.      Hepatitis C Ab 06/15/2023 Non-reactive  Non-reactive Final    TSH  06/15/2023 3.710  0.400 - 4.000 uIU/mL Final   Lab Visit on 05/15/2023   Component Date Value Ref Range Status    WBC 05/15/2023 9.40  3.90 - 12.70 K/uL Final    RBC 05/15/2023 4.63  4.00 - 5.40 M/uL Final    Hemoglobin 05/15/2023 13.6  12.0 - 16.0 g/dL Final    Hematocrit 05/15/2023 42.7  37.0 - 48.5 % Final    MCV 05/15/2023 92  82 - 98 fL Final    MCH 05/15/2023 29.4  27.0 - 31.0 pg Final    MCHC 05/15/2023 31.9 (L)  32.0 - 36.0 g/dL Final    RDW 05/15/2023 14.7 (H)  11.5 - 14.5 % Final    Platelets 05/15/2023 315  150 - 450 K/uL Final    MPV 05/15/2023 9.8  9.2 - 12.9 fL Final    Immature Granulocytes 05/15/2023 0.4  0.0 - 0.5 % Final    Gran # (ANC) 05/15/2023 5.9  1.8 - 7.7 K/uL Final    Immature Grans (Abs) 05/15/2023 0.04  0.00 - 0.04 K/uL Final    Comment: Mild elevation in immature granulocytes is non specific and   can be seen in a variety of conditions including stress response,   acute inflammation, trauma and pregnancy. Correlation with other   laboratory and clinical findings is essential.      Lymph # 05/15/2023 2.1  1.0 - 4.8 K/uL Final    Mono # 05/15/2023 0.8  0.3 - 1.0 K/uL Final    Eos # 05/15/2023 0.5  0.0 - 0.5 K/uL Final    Baso # 05/15/2023 0.12  0.00 - 0.20 K/uL Final    nRBC 05/15/2023 0  0 /100 WBC Final    Gran % 05/15/2023 62.8  38.0 - 73.0 % Final    Lymph % 05/15/2023 22.3  18.0 - 48.0 % Final    Mono % 05/15/2023 8.3  4.0 - 15.0 % Final    Eosinophil % 05/15/2023 4.9  0.0 - 8.0 % Final    Basophil % 05/15/2023 1.3  0.0 - 1.9 % Final    Differential Method 05/15/2023 Automated   Final    Sodium 05/15/2023 140  136 - 145 mmol/L Final    Potassium 05/15/2023 4.2  3.5 - 5.1 mmol/L Final    Chloride 05/15/2023 110  95 - 110 mmol/L Final    CO2 05/15/2023 19 (L)  23 - 29 mmol/L Final    Glucose 05/15/2023 107  70 - 110 mg/dL Final    BUN 05/15/2023 32 (H)  6 - 20 mg/dL Final    Creatinine 05/15/2023 1.1  0.5 - 1.4 mg/dL Final    Calcium 05/15/2023 9.2  8.7 - 10.5 mg/dL Final    Total  Protein 05/15/2023 7.2  6.0 - 8.4 g/dL Final    Albumin 05/15/2023 4.1  3.5 - 5.2 g/dL Final    Total Bilirubin 05/15/2023 0.2  0.1 - 1.0 mg/dL Final    Comment: For infants and newborns, interpretation of results should be based  on gestational age, weight and in agreement with clinical  observations.    Premature Infant recommended reference ranges:  Up to 24 hours.............<8.0 mg/dL  Up to 48 hours............<12.0 mg/dL  3-5 days..................<15.0 mg/dL  6-29 days.................<15.0 mg/dL      Alkaline Phosphatase 05/15/2023 69  55 - 135 U/L Final    AST 05/15/2023 23  10 - 40 U/L Final    ALT 05/15/2023 27  10 - 44 U/L Final    Anion Gap 05/15/2023 11  8 - 16 mmol/L Final    eGFR 05/15/2023 >60.0  >60 mL/min/1.73 m^2 Final    Tacrolimus Lvl 05/15/2023 8.2  5.0 - 15.0 ng/mL Final    Comment: Testing performed by a chemiluminescent microparticle   immunoassay on the [employer] i System.    CAUTION: No firm therapeutic range exists for tacrolimus in whole   blood. The   complexity of the clinical state, individual differences in   sensitivity to   immunosuppressive and nephrotoxic effects of tacrolimus,   co-administration   of other immunosuppressants, type of transplant, time post-transplant   and a   number of other factors contribute to different requirements for   optimal   blood levels of tacrolimus. Therefore, individual tacrolimus values   cannot   be used as the sole indicator for making changes in treatment regimen   and   each patient should be thoroughly evaluated clinically before changes   in   treatment regimens are made. Each user must establish his or her own   ranges   based on clinical experience.  Therapeutic ranges vary according to the commercial test used, and   therefore   should be established for each commercial test. Values obtained with   diff                           erent assay methods cannot be used interchangeably due to   differences in   assay methods and  cross-reactivity with metabolites, nor should   correction   factors be applied. Therefore, consistent use of one assay for   individual   patients is recommended.             Romero Bloom

## 2023-07-21 LAB
BACTERIA BLD CULT: NORMAL
BACTERIA BLD CULT: NORMAL

## 2023-08-04 ENCOUNTER — APPOINTMENT (OUTPATIENT)
Dept: RADIOLOGY | Facility: CLINIC | Age: 53
End: 2023-08-04
Attending: INTERNAL MEDICINE
Payer: COMMERCIAL

## 2023-08-04 DIAGNOSIS — Z78.0 POST-MENOPAUSAL: ICD-10-CM

## 2023-08-04 PROCEDURE — 77080 DXA BONE DENSITY AXIAL SKELETON 1 OR MORE SITES: ICD-10-PCS | Mod: 26,,, | Performed by: INTERNAL MEDICINE

## 2023-08-04 PROCEDURE — 77080 DXA BONE DENSITY AXIAL: CPT | Mod: TC,PO

## 2023-08-04 PROCEDURE — 77080 DXA BONE DENSITY AXIAL: CPT | Mod: 26,,, | Performed by: INTERNAL MEDICINE

## 2023-08-07 ENCOUNTER — LAB VISIT (OUTPATIENT)
Dept: LAB | Facility: HOSPITAL | Age: 53
End: 2023-08-07
Attending: INTERNAL MEDICINE
Payer: COMMERCIAL

## 2023-08-07 DIAGNOSIS — Z94.4 S/P LIVER TRANSPLANT: ICD-10-CM

## 2023-08-07 LAB
ALBUMIN SERPL BCP-MCNC: 3.9 G/DL (ref 3.5–5.2)
ALP SERPL-CCNC: 72 U/L (ref 55–135)
ALT SERPL W/O P-5'-P-CCNC: 22 U/L (ref 10–44)
ANION GAP SERPL CALC-SCNC: 9 MMOL/L (ref 8–16)
AST SERPL-CCNC: 19 U/L (ref 10–40)
BASOPHILS # BLD AUTO: 0.12 K/UL (ref 0–0.2)
BASOPHILS NFR BLD: 1.4 % (ref 0–1.9)
BILIRUB SERPL-MCNC: 0.3 MG/DL (ref 0.1–1)
BUN SERPL-MCNC: 23 MG/DL (ref 6–20)
CALCIUM SERPL-MCNC: 9.4 MG/DL (ref 8.7–10.5)
CHLORIDE SERPL-SCNC: 107 MMOL/L (ref 95–110)
CO2 SERPL-SCNC: 24 MMOL/L (ref 23–29)
CREAT SERPL-MCNC: 1.3 MG/DL (ref 0.5–1.4)
DIFFERENTIAL METHOD: NORMAL
EOSINOPHIL # BLD AUTO: 0.5 K/UL (ref 0–0.5)
EOSINOPHIL NFR BLD: 5.8 % (ref 0–8)
ERYTHROCYTE [DISTWIDTH] IN BLOOD BY AUTOMATED COUNT: 13 % (ref 11.5–14.5)
EST. GFR  (NO RACE VARIABLE): 49.5 ML/MIN/1.73 M^2
GLUCOSE SERPL-MCNC: 99 MG/DL (ref 70–110)
HCT VFR BLD AUTO: 42.1 % (ref 37–48.5)
HGB BLD-MCNC: 13.8 G/DL (ref 12–16)
IMM GRANULOCYTES # BLD AUTO: 0.02 K/UL (ref 0–0.04)
IMM GRANULOCYTES NFR BLD AUTO: 0.2 % (ref 0–0.5)
LYMPHOCYTES # BLD AUTO: 2.7 K/UL (ref 1–4.8)
LYMPHOCYTES NFR BLD: 30.7 % (ref 18–48)
MCH RBC QN AUTO: 29.9 PG (ref 27–31)
MCHC RBC AUTO-ENTMCNC: 32.8 G/DL (ref 32–36)
MCV RBC AUTO: 91 FL (ref 82–98)
MONOCYTES # BLD AUTO: 0.7 K/UL (ref 0.3–1)
MONOCYTES NFR BLD: 8.1 % (ref 4–15)
NEUTROPHILS # BLD AUTO: 4.7 K/UL (ref 1.8–7.7)
NEUTROPHILS NFR BLD: 53.8 % (ref 38–73)
NRBC BLD-RTO: 0 /100 WBC
PLATELET # BLD AUTO: 290 K/UL (ref 150–450)
PMV BLD AUTO: 10 FL (ref 9.2–12.9)
POTASSIUM SERPL-SCNC: 4.3 MMOL/L (ref 3.5–5.1)
PROT SERPL-MCNC: 7.2 G/DL (ref 6–8.4)
RBC # BLD AUTO: 4.62 M/UL (ref 4–5.4)
SODIUM SERPL-SCNC: 140 MMOL/L (ref 136–145)
TACROLIMUS BLD-MCNC: 6.9 NG/ML (ref 5–15)
WBC # BLD AUTO: 8.76 K/UL (ref 3.9–12.7)

## 2023-08-07 PROCEDURE — 80053 COMPREHEN METABOLIC PANEL: CPT | Performed by: INTERNAL MEDICINE

## 2023-08-07 PROCEDURE — 85025 COMPLETE CBC W/AUTO DIFF WBC: CPT | Performed by: INTERNAL MEDICINE

## 2023-08-07 PROCEDURE — 36415 COLL VENOUS BLD VENIPUNCTURE: CPT | Performed by: INTERNAL MEDICINE

## 2023-08-07 PROCEDURE — 80197 ASSAY OF TACROLIMUS: CPT | Performed by: INTERNAL MEDICINE

## 2023-08-08 ENCOUNTER — TELEPHONE (OUTPATIENT)
Dept: TRANSPLANT | Facility: CLINIC | Age: 53
End: 2023-08-08
Payer: COMMERCIAL

## 2023-08-08 NOTE — TELEPHONE ENCOUNTER
Sent patient message via portal to let her know labs are stable.  No medication changes, next labs due on 9/11/23      ----- Message from Sharmila Pacheco MD sent at 8/7/2023 11:06 PM CDT -----  The Labs are stable - please let patient know.

## 2023-08-10 ENCOUNTER — TELEPHONE (OUTPATIENT)
Dept: TRANSPLANT | Facility: CLINIC | Age: 53
End: 2023-08-10
Payer: COMMERCIAL

## 2023-08-10 ENCOUNTER — PATIENT MESSAGE (OUTPATIENT)
Dept: TRANSPLANT | Facility: CLINIC | Age: 53
End: 2023-08-10
Payer: COMMERCIAL

## 2023-08-10 DIAGNOSIS — M85.80 OSTEOPENIA, UNSPECIFIED LOCATION: Primary | ICD-10-CM

## 2023-08-10 NOTE — TELEPHONE ENCOUNTER
Sent message to let patient know    ----- Message from Sharmila Pacheco MD sent at 8/9/2023  8:27 PM CDT -----  Osteopenia- needs to see endocrine- please let patient know.

## 2023-08-11 ENCOUNTER — OFFICE VISIT (OUTPATIENT)
Dept: ENDOCRINOLOGY | Facility: CLINIC | Age: 53
End: 2023-08-11
Payer: COMMERCIAL

## 2023-08-11 ENCOUNTER — PATIENT MESSAGE (OUTPATIENT)
Dept: TRANSPLANT | Facility: CLINIC | Age: 53
End: 2023-08-11
Payer: COMMERCIAL

## 2023-08-11 ENCOUNTER — PATIENT MESSAGE (OUTPATIENT)
Dept: ENDOCRINOLOGY | Facility: CLINIC | Age: 53
End: 2023-08-11

## 2023-08-11 DIAGNOSIS — E55.9 VITAMIN D DEFICIENCY: ICD-10-CM

## 2023-08-11 DIAGNOSIS — M85.852 OSTEOPENIA OF LEFT HIP: ICD-10-CM

## 2023-08-11 DIAGNOSIS — M85.80 OSTEOPENIA, UNSPECIFIED LOCATION: ICD-10-CM

## 2023-08-11 DIAGNOSIS — Z94.4 S/P LIVER TRANSPLANT: ICD-10-CM

## 2023-08-11 DIAGNOSIS — E03.9 HYPOTHYROIDISM, UNSPECIFIED TYPE: ICD-10-CM

## 2023-08-11 PROCEDURE — 99214 OFFICE O/P EST MOD 30 MIN: CPT | Mod: 95,,, | Performed by: NURSE PRACTITIONER

## 2023-08-11 PROCEDURE — 99214 PR OFFICE/OUTPT VISIT, EST, LEVL IV, 30-39 MIN: ICD-10-PCS | Mod: 95,,, | Performed by: NURSE PRACTITIONER

## 2023-08-11 RX ORDER — TACROLIMUS 1 MG/1
CAPSULE ORAL
Qty: 270 CAPSULE | Refills: 3 | Status: SHIPPED | OUTPATIENT
Start: 2023-08-11 | End: 2023-09-05

## 2023-08-11 RX ORDER — MYCOPHENOLATE MOFETIL 250 MG/1
500 CAPSULE ORAL 2 TIMES DAILY
Qty: 360 CAPSULE | Refills: 3 | Status: SHIPPED | OUTPATIENT
Start: 2023-08-11 | End: 2023-10-27

## 2023-08-11 NOTE — PROGRESS NOTES
Subjective:      Patient ID: Malu Montes is a 53 y.o. female.    Chief Complaint:  No chief complaint on file.    History of Present Illness  Malu Montes with HLD, hypothyroidism, and ESLD who presents today for follow up of new diagnosis of osteoporosis. Previous patient of . Last seen in. This is her his first visit with me.     The patient location is: at home   The chief complaint leading to consultation is: osteopenia    Visit type: audiovisual    Face to Face time with patient: 17 minutes  25 minutes of total time spent on the encounter, which includes face to face time and non-face to face time preparing to see the patient (eg, review of tests), Obtaining and/or reviewing separately obtained history, Documenting clinical information in the electronic or other health record, Independently interpreting results (not separately reported) and communicating results to the patient/family/caregiver, or Care coordination (not separately reported).    Each patient to whom he or she provides medical services by telemedicine is:  (1) informed of the relationship between the physician and patient and the respective role of any other health care provider with respect to management of the patient; and (2) notified that he or she may decline to receive medical services by telemedicine and may withdraw from such care at any time.    We first met patient during June 2022 admission for HLD, hypothyroidism, and ESLD. No personal history of DM. Patient admitted for liver transplant. Endocrinology consulted for BG management    With regards to osteopenia,     Family history: none    Still having menses  LMP still spotting- states last was 3 weeks ago    current medication: none    She was taken off of calcium around 5 months ago  Calcium intake- has calcium in diet  Vit D intake- 50 mcg daily     Weight bearing exercise- plans on starting; just joined gym  Has done boot camp recently  Recent falls- none  Dental  work- does not need    No recent fractures   No significant height loss (>2 inches)    tob use - rarely; past use quit around 2022  etoh use-  denies    No current diarrhea or h/o malabsorption    Denies chronic exposure to steroid therapy, anticoagulants, proton pump inhibitors or antiseizure medications.     Denies GERD, indigestion, or gastric bypass surgery.     + history of thyroid disease,   Denies anemia  + kidney stones   + kidney disease.     Denies active malignancy, history of malignancy the involved the bone, prior radiation treatment, or unexplained elevations of alk phos on labs     Last BMD dated 8/4/2023  FINDINGS:   Lumbar spine (L1-L4):               T-score is -1.1, and Z-score is -0.2.     Femoral neck:                          T-score is -1.2, and Z-score is 0.3.     Total hip:                                T-score is -1.2, and Z-score is -0.7.     Distal 1/3 radius:                      Not applicable        Fracture Risk (FRAX)     5.1% risk of a major osteoporotic fracture in the next 10 years.     0.3% risk of hip fracture in the next 10 years.     Impression:     *Low bone mass (Osteopenia); FRAX calculations do not support treatment as osteoporosis.     RECOMMENDATIONS:  *Daily calcium intake 7891-7072 mg, dietary sources preferred; Vitamin D 7806-1354 IU daily.  *Weight bearing exercise and fall precautions.  *No additional pharmacologic therapy recommended at this time.  *Repeat BMD in 2 years..     Latest Reference Range & Units 02/20/23 07:09   Vit D, 25-Hydroxy 30 - 96 ng/mL 41     With regards to hypothyroidism,     Managed by PCP   On LT4 50 mcg daily   Denies missing doses.  Not taking correctly but plans on starting.    Lab Results   Component Value Date    TSH 3.710 06/15/2023    FREET4 0.94 07/15/2021     Review of Systems   Constitutional:  Negative for fatigue and unexpected weight change.   Eyes:  Negative for visual disturbance.   Endocrine: Negative for polydipsia,  polyphagia and polyuria.   Musculoskeletal:  Negative for arthralgias, back pain and gait problem.     Lab Review:   Lab Results   Component Value Date    HGBA1C 5.4 06/05/2022    HGBA1C 4.6 07/15/2021    HGBA1C 5.3 04/23/2015      Lab Results   Component Value Date    CHOL 258 (H) 10/03/2022    HDL 32 (L) 10/03/2022    LDLCALC 188.8 (H) 10/03/2022    TRIG 186 (H) 10/03/2022    CHOLHDL 12.4 (L) 10/03/2022     Lab Results   Component Value Date     08/07/2023    K 4.3 08/07/2023     08/07/2023    CO2 24 08/07/2023    GLU 99 08/07/2023    BUN 23 (H) 08/07/2023    CREATININE 1.3 08/07/2023    CALCIUM 9.4 08/07/2023    PROT 7.2 08/07/2023    ALBUMIN 3.9 08/07/2023    BILITOT 0.3 08/07/2023    ALKPHOS 72 08/07/2023    AST 19 08/07/2023    ALT 22 08/07/2023    ANIONGAP 9 08/07/2023    ESTGFRAFRICA >60.0 07/28/2022    EGFRNONAA 58.3 (A) 07/28/2022    TSH 3.710 06/15/2023     Vit D, 25-Hydroxy   Date Value Ref Range Status   02/20/2023 41 30 - 96 ng/mL Final     Comment:     Vitamin D deficiency.........<10 ng/mL                              Vitamin D insufficiency......10-29 ng/mL       Vitamin D sufficiency........> or equal to 30 ng/mL  Vitamin D toxicity............>100 ng/mL       Assessment and Plan     1. Osteopenia, unspecified location  Ambulatory referral/consult to Endocrinology      2. Osteopenia of left hip        3. Vitamin D deficiency        4. Hypothyroidism, unspecified type            Osteopenia of left hip  No indication for treatment for osteoporosis at this time  Encouraged weight bearing exercise  Avoid alcohol and tobacco     Continue vitamin D 2000 IU daily   Continue calcium in diet. RDA of calcium is 5702-2401 mg daily, preferably from food     Check bone density in 2 years     Vitamin D deficiency  Continue Vitamin D 2000 IU daily    Hypothyroidism  Managed by PCP   On LT4 50 mcg daily   Will start taking correctly  Last TSH at goal       No follow-ups on file.

## 2023-08-11 NOTE — PATIENT INSTRUCTIONS
Osteopenia    No indication for treatment for osteoporosis at this time  Encouraged weight bearing exercise  Avoid alcohol or tobacco     Continue vitamin D 2000 IU daily   Continue calcium in diet. RDA of calcium is 3952-0586 mg daily, preferably from food     Estimated Calcium Content of Foods:  Produce  Serving Size Estimated Calcium*    Shay greens, frozen 8 oz 360 mg   Broccoli christina 8 oz 200 mg   Kale, frozen 8 oz 180 mg   Soy Beans, green, boiled 8 oz 175 mg   Bok Jeb, cooked, boiled 8 oz 160 mg   Figs, dried 2 figs 65 mg   Broccoli, fresh, cooked 8 oz 60 mg   Oranges 1 whole 55 mg   Seafood Serving Size Estimated Calcium*    Sardines, canned with bones 3 oz 325 mg   [place], canned with bones 3 oz 180 mg   Shrimp, canned 3 oz 125 mg   Dairy Serving Size Estimated Calcium*    Ricotta, part-skim 4 oz 335 mg   Yogurt, plain, low-fat 6 oz 310 mg   Milk, skim, low-fat, whole 8 oz 300 mg   Yogurt with fruit, low-fat 6 oz 260 mg   Mozzarella, part-skim 1 oz 210 mg   Cheddar 1 oz 205 mg   Yogurt, Greek 6 oz 200 mg   American Cheese 1 oz 195 mg   Feta Cheese 4 oz 140 mg   Cottage Cheese, 2% 4 oz 105 mg   Frozen yogurt, vanilla 8 oz 105 mg   Ice Cream, vanilla 8 oz 85 mg   Parmesan 1 tbsp 55 mg   Fortified Food Serving Size Estimated Calcium*   [place] milk, rice milk or soy milk, fortified 8 oz 300 mg   Orange juice and other fruit juices, fortified 8 oz 300 mg   Tofu, prepared with calcium 4 oz 205 mg   Waffle, frozen, fortified 2 pieces 200 mg   Oatmeal, fortified 1 packet 140 mg   English muffin, fortified 1 muffin 100 mg   Cereal, fortified 8 oz 100-1,000 mg   Other Serving Size Estimated Calcium*   Mac & cheese, frozen 1 package 325 mg   Pizza, cheese, frozen 1 serving 115 mg   Pudding, chocolate, prepared with 2% milk 4 oz 160 mg   Beans, baked, canned 4 oz 160 mg   *The calcium content listed for most foods is estimated and can vary due to multiple factors. Check the food label to determine how much calcium  is in a particular product.  If you read the nutrition label for a food source, it lists the % calcium in that food.  For an 8 oz glass of milk, for example, the label states calcium 30%.  This is equivalent to 300 mg of calcium (multiply the listed number by 10).   **Table from the National [employer]opor[employer]

## 2023-08-11 NOTE — TELEPHONE ENCOUNTER
Sent message to patient with changes and to get labs on 08/21/23    ----- Message from Sharmila Pacheco MD sent at 8/11/2023  2:50 PM CDT -----  Lower prograf to 2/1 from 3/2 and start cellcept 500 mg bid - weekly labs x 4. New prograf target 3-4  ----- Message -----  From: Marii Ruiz RN  Sent: 8/11/2023   2:19 PM CDT  To: Sharmila Pacheco MD    You want me to change her dose to what is below and get do labs?  ----- Message -----  From: Sharmila Pacheco MD  Sent: 8/11/2023   1:51 PM CDT  To: Marii Ruiz RN; Rashel Cohn MD    Sounds reasonable I will make the change. I guess I was overly optimistic re her kidneys...  ----- Message -----  From: Rashel Cohn MD  Sent: 8/11/2023  12:59 PM CDT  To: Sharmila Pacheco MD; Marii Ruiz RN    With her baseline CKD prefer to use low dose prograf and Cellcept (in place of therapeutic dose prograf) if that is ok from liver perspective.    Example - May be, Cellcept 500 mg BID + Prograf 1/1? (Not an expert in this though)    Thank you.    ----- Message -----  From: Sharmila Pacheco MD  Sent: 8/10/2023  12:27 PM CDT  To: Marii Ruiz RN; MD Rashel Porter, do you want her back on cellcept? NB  ----- Message -----  From: Marii Ruiz RN  Sent: 8/10/2023   9:23 AM CDT  To: Sharmila Pacheco MD    FYI from patient per her Nephrologist    Dr. Moreno was concerned that we stopped my CellCept. I noticed that my GFR has gone down twice in the past two months since we stopped it. Went from a 60 down to a 54 and then down this week to a 49.5.

## 2023-08-11 NOTE — ASSESSMENT & PLAN NOTE
No indication for treatment for osteoporosis at this time  Encouraged weight bearing exercise  Avoid alcohol and tobacco     Continue vitamin D 2000 IU daily   Continue calcium in diet. RDA of calcium is 5933-3896 mg daily, preferably from food     Check bone density in 2 years

## 2023-08-15 ENCOUNTER — TELEPHONE (OUTPATIENT)
Dept: ENDOCRINOLOGY | Facility: CLINIC | Age: 53
End: 2023-08-15
Payer: COMMERCIAL

## 2023-08-21 ENCOUNTER — LAB VISIT (OUTPATIENT)
Dept: LAB | Facility: HOSPITAL | Age: 53
End: 2023-08-21
Attending: INTERNAL MEDICINE
Payer: COMMERCIAL

## 2023-08-21 DIAGNOSIS — Z94.4 S/P LIVER TRANSPLANT: ICD-10-CM

## 2023-08-21 LAB
ALBUMIN SERPL BCP-MCNC: 4.1 G/DL (ref 3.5–5.2)
ALP SERPL-CCNC: 74 U/L (ref 55–135)
ALT SERPL W/O P-5'-P-CCNC: 18 U/L (ref 10–44)
ANION GAP SERPL CALC-SCNC: 8 MMOL/L (ref 8–16)
AST SERPL-CCNC: 22 U/L (ref 10–40)
BASOPHILS # BLD AUTO: 0.11 K/UL (ref 0–0.2)
BASOPHILS NFR BLD: 1.2 % (ref 0–1.9)
BILIRUB SERPL-MCNC: 0.4 MG/DL (ref 0.1–1)
BUN SERPL-MCNC: 22 MG/DL (ref 6–20)
CALCIUM SERPL-MCNC: 9.6 MG/DL (ref 8.7–10.5)
CHLORIDE SERPL-SCNC: 106 MMOL/L (ref 95–110)
CO2 SERPL-SCNC: 25 MMOL/L (ref 23–29)
CREAT SERPL-MCNC: 1.2 MG/DL (ref 0.5–1.4)
DIFFERENTIAL METHOD: NORMAL
EOSINOPHIL # BLD AUTO: 0.5 K/UL (ref 0–0.5)
EOSINOPHIL NFR BLD: 5.2 % (ref 0–8)
ERYTHROCYTE [DISTWIDTH] IN BLOOD BY AUTOMATED COUNT: 13 % (ref 11.5–14.5)
EST. GFR  (NO RACE VARIABLE): 54.5 ML/MIN/1.73 M^2
GLUCOSE SERPL-MCNC: 92 MG/DL (ref 70–110)
HCT VFR BLD AUTO: 43.1 % (ref 37–48.5)
HGB BLD-MCNC: 14.3 G/DL (ref 12–16)
IMM GRANULOCYTES # BLD AUTO: 0.03 K/UL (ref 0–0.04)
IMM GRANULOCYTES NFR BLD AUTO: 0.3 % (ref 0–0.5)
LYMPHOCYTES # BLD AUTO: 2.2 K/UL (ref 1–4.8)
LYMPHOCYTES NFR BLD: 23.4 % (ref 18–48)
MCH RBC QN AUTO: 30.1 PG (ref 27–31)
MCHC RBC AUTO-ENTMCNC: 33.2 G/DL (ref 32–36)
MCV RBC AUTO: 91 FL (ref 82–98)
MONOCYTES # BLD AUTO: 0.7 K/UL (ref 0.3–1)
MONOCYTES NFR BLD: 7.5 % (ref 4–15)
NEUTROPHILS # BLD AUTO: 5.9 K/UL (ref 1.8–7.7)
NEUTROPHILS NFR BLD: 62.4 % (ref 38–73)
NRBC BLD-RTO: 0 /100 WBC
PLATELET # BLD AUTO: 340 K/UL (ref 150–450)
PMV BLD AUTO: 9.7 FL (ref 9.2–12.9)
POTASSIUM SERPL-SCNC: 3.9 MMOL/L (ref 3.5–5.1)
PROT SERPL-MCNC: 7.4 G/DL (ref 6–8.4)
RBC # BLD AUTO: 4.75 M/UL (ref 4–5.4)
SODIUM SERPL-SCNC: 139 MMOL/L (ref 136–145)
TACROLIMUS BLD-MCNC: 3.7 NG/ML (ref 5–15)
WBC # BLD AUTO: 9.5 K/UL (ref 3.9–12.7)

## 2023-08-21 PROCEDURE — 85025 COMPLETE CBC W/AUTO DIFF WBC: CPT | Performed by: INTERNAL MEDICINE

## 2023-08-21 PROCEDURE — 80197 ASSAY OF TACROLIMUS: CPT | Performed by: INTERNAL MEDICINE

## 2023-08-21 PROCEDURE — 36415 COLL VENOUS BLD VENIPUNCTURE: CPT | Performed by: INTERNAL MEDICINE

## 2023-08-21 PROCEDURE — 80053 COMPREHEN METABOLIC PANEL: CPT | Performed by: INTERNAL MEDICINE

## 2023-08-29 ENCOUNTER — LAB VISIT (OUTPATIENT)
Dept: LAB | Facility: HOSPITAL | Age: 53
End: 2023-08-29
Attending: INTERNAL MEDICINE
Payer: COMMERCIAL

## 2023-08-29 DIAGNOSIS — Z94.4 S/P LIVER TRANSPLANT: ICD-10-CM

## 2023-08-29 LAB
ALBUMIN SERPL BCP-MCNC: 3.9 G/DL (ref 3.5–5.2)
ALP SERPL-CCNC: 68 U/L (ref 55–135)
ALT SERPL W/O P-5'-P-CCNC: 18 U/L (ref 10–44)
ANION GAP SERPL CALC-SCNC: 8 MMOL/L (ref 8–16)
AST SERPL-CCNC: 20 U/L (ref 10–40)
BASOPHILS # BLD AUTO: 0.09 K/UL (ref 0–0.2)
BASOPHILS NFR BLD: 1 % (ref 0–1.9)
BILIRUB SERPL-MCNC: 0.3 MG/DL (ref 0.1–1)
BUN SERPL-MCNC: 25 MG/DL (ref 6–20)
CALCIUM SERPL-MCNC: 9.6 MG/DL (ref 8.7–10.5)
CHLORIDE SERPL-SCNC: 110 MMOL/L (ref 95–110)
CO2 SERPL-SCNC: 25 MMOL/L (ref 23–29)
CREAT SERPL-MCNC: 1.2 MG/DL (ref 0.5–1.4)
DIFFERENTIAL METHOD: NORMAL
EOSINOPHIL # BLD AUTO: 0.5 K/UL (ref 0–0.5)
EOSINOPHIL NFR BLD: 4.8 % (ref 0–8)
ERYTHROCYTE [DISTWIDTH] IN BLOOD BY AUTOMATED COUNT: 13.3 % (ref 11.5–14.5)
EST. GFR  (NO RACE VARIABLE): 54.5 ML/MIN/1.73 M^2
GLUCOSE SERPL-MCNC: 92 MG/DL (ref 70–110)
HCT VFR BLD AUTO: 40.4 % (ref 37–48.5)
HGB BLD-MCNC: 13.1 G/DL (ref 12–16)
IMM GRANULOCYTES # BLD AUTO: 0.01 K/UL (ref 0–0.04)
IMM GRANULOCYTES NFR BLD AUTO: 0.1 % (ref 0–0.5)
LYMPHOCYTES # BLD AUTO: 2.2 K/UL (ref 1–4.8)
LYMPHOCYTES NFR BLD: 23.6 % (ref 18–48)
MCH RBC QN AUTO: 29.2 PG (ref 27–31)
MCHC RBC AUTO-ENTMCNC: 32.4 G/DL (ref 32–36)
MCV RBC AUTO: 90 FL (ref 82–98)
MONOCYTES # BLD AUTO: 0.8 K/UL (ref 0.3–1)
MONOCYTES NFR BLD: 8.3 % (ref 4–15)
NEUTROPHILS # BLD AUTO: 5.8 K/UL (ref 1.8–7.7)
NEUTROPHILS NFR BLD: 62.2 % (ref 38–73)
NRBC BLD-RTO: 0 /100 WBC
PLATELET # BLD AUTO: 307 K/UL (ref 150–450)
PMV BLD AUTO: 9.6 FL (ref 9.2–12.9)
POTASSIUM SERPL-SCNC: 4.3 MMOL/L (ref 3.5–5.1)
PROT SERPL-MCNC: 7.1 G/DL (ref 6–8.4)
RBC # BLD AUTO: 4.48 M/UL (ref 4–5.4)
SODIUM SERPL-SCNC: 143 MMOL/L (ref 136–145)
TACROLIMUS BLD-MCNC: 3.3 NG/ML (ref 5–15)
WBC # BLD AUTO: 9.28 K/UL (ref 3.9–12.7)

## 2023-08-29 PROCEDURE — 85025 COMPLETE CBC W/AUTO DIFF WBC: CPT | Performed by: INTERNAL MEDICINE

## 2023-08-29 PROCEDURE — 80197 ASSAY OF TACROLIMUS: CPT | Performed by: INTERNAL MEDICINE

## 2023-08-29 PROCEDURE — 36415 COLL VENOUS BLD VENIPUNCTURE: CPT | Performed by: INTERNAL MEDICINE

## 2023-08-29 PROCEDURE — 80053 COMPREHEN METABOLIC PANEL: CPT | Performed by: INTERNAL MEDICINE

## 2023-08-30 ENCOUNTER — TELEPHONE (OUTPATIENT)
Dept: TRANSPLANT | Facility: CLINIC | Age: 53
End: 2023-08-30
Payer: COMMERCIAL

## 2023-08-30 ENCOUNTER — PATIENT MESSAGE (OUTPATIENT)
Dept: TRANSPLANT | Facility: CLINIC | Age: 53
End: 2023-08-30
Payer: COMMERCIAL

## 2023-08-30 NOTE — TELEPHONE ENCOUNTER
Sent patient message via portal to let her know labs are stable.  No medication changes, next labs due on 9/05/23    ----- Message from Sharmila Pacheco MD sent at 8/30/2023  1:29 PM CDT -----  The Labs are stable - please let patient know.

## 2023-09-05 ENCOUNTER — LAB VISIT (OUTPATIENT)
Dept: LAB | Facility: HOSPITAL | Age: 53
End: 2023-09-05
Attending: INTERNAL MEDICINE
Payer: COMMERCIAL

## 2023-09-05 ENCOUNTER — PATIENT MESSAGE (OUTPATIENT)
Dept: TRANSPLANT | Facility: CLINIC | Age: 53
End: 2023-09-05
Payer: COMMERCIAL

## 2023-09-05 DIAGNOSIS — Z94.4 S/P LIVER TRANSPLANT: ICD-10-CM

## 2023-09-05 LAB
ALBUMIN SERPL BCP-MCNC: 3.9 G/DL (ref 3.5–5.2)
ALP SERPL-CCNC: 65 U/L (ref 55–135)
ALT SERPL W/O P-5'-P-CCNC: 15 U/L (ref 10–44)
ANION GAP SERPL CALC-SCNC: 10 MMOL/L (ref 8–16)
AST SERPL-CCNC: 17 U/L (ref 10–40)
BASOPHILS # BLD AUTO: 0.09 K/UL (ref 0–0.2)
BASOPHILS NFR BLD: 1 % (ref 0–1.9)
BILIRUB SERPL-MCNC: 0.2 MG/DL (ref 0.1–1)
BUN SERPL-MCNC: 23 MG/DL (ref 6–20)
CALCIUM SERPL-MCNC: 9.7 MG/DL (ref 8.7–10.5)
CHLORIDE SERPL-SCNC: 104 MMOL/L (ref 95–110)
CO2 SERPL-SCNC: 25 MMOL/L (ref 23–29)
CREAT SERPL-MCNC: 1.4 MG/DL (ref 0.5–1.4)
DIFFERENTIAL METHOD: NORMAL
EOSINOPHIL # BLD AUTO: 0.5 K/UL (ref 0–0.5)
EOSINOPHIL NFR BLD: 4.8 % (ref 0–8)
ERYTHROCYTE [DISTWIDTH] IN BLOOD BY AUTOMATED COUNT: 13.2 % (ref 11.5–14.5)
EST. GFR  (NO RACE VARIABLE): 45.3 ML/MIN/1.73 M^2
GLUCOSE SERPL-MCNC: 102 MG/DL (ref 70–110)
HCT VFR BLD AUTO: 40.9 % (ref 37–48.5)
HGB BLD-MCNC: 13.1 G/DL (ref 12–16)
IMM GRANULOCYTES # BLD AUTO: 0.02 K/UL (ref 0–0.04)
IMM GRANULOCYTES NFR BLD AUTO: 0.2 % (ref 0–0.5)
LYMPHOCYTES # BLD AUTO: 2.2 K/UL (ref 1–4.8)
LYMPHOCYTES NFR BLD: 23.2 % (ref 18–48)
MCH RBC QN AUTO: 29.1 PG (ref 27–31)
MCHC RBC AUTO-ENTMCNC: 32 G/DL (ref 32–36)
MCV RBC AUTO: 91 FL (ref 82–98)
MONOCYTES # BLD AUTO: 0.7 K/UL (ref 0.3–1)
MONOCYTES NFR BLD: 7.2 % (ref 4–15)
NEUTROPHILS # BLD AUTO: 6 K/UL (ref 1.8–7.7)
NEUTROPHILS NFR BLD: 63.6 % (ref 38–73)
NRBC BLD-RTO: 0 /100 WBC
PLATELET # BLD AUTO: 316 K/UL (ref 150–450)
PMV BLD AUTO: 9.7 FL (ref 9.2–12.9)
POTASSIUM SERPL-SCNC: 4.3 MMOL/L (ref 3.5–5.1)
PROT SERPL-MCNC: 7.1 G/DL (ref 6–8.4)
RBC # BLD AUTO: 4.5 M/UL (ref 4–5.4)
SODIUM SERPL-SCNC: 139 MMOL/L (ref 136–145)
TACROLIMUS BLD-MCNC: 5.6 NG/ML (ref 5–15)
WBC # BLD AUTO: 9.42 K/UL (ref 3.9–12.7)

## 2023-09-05 PROCEDURE — 80197 ASSAY OF TACROLIMUS: CPT | Performed by: INTERNAL MEDICINE

## 2023-09-05 PROCEDURE — 80053 COMPREHEN METABOLIC PANEL: CPT | Performed by: INTERNAL MEDICINE

## 2023-09-05 PROCEDURE — 85025 COMPLETE CBC W/AUTO DIFF WBC: CPT | Performed by: INTERNAL MEDICINE

## 2023-09-05 PROCEDURE — 36415 COLL VENOUS BLD VENIPUNCTURE: CPT | Performed by: INTERNAL MEDICINE

## 2023-09-05 RX ORDER — TACROLIMUS 1 MG/1
1 CAPSULE ORAL EVERY 12 HOURS
Qty: 180 CAPSULE | Refills: 3 | Status: SHIPPED | OUTPATIENT
Start: 2023-09-05 | End: 2023-10-27

## 2023-09-05 NOTE — TELEPHONE ENCOUNTER
Sent message to patient and to get labs on 9/18/23    ----- Message from Sharmila Pcaheco MD sent at 9/5/2023 10:59 AM CDT -----  The Labs are stable; lower prograf to 1/1 from 2.1 and repeat labs in 2 weeks - please let patient know.

## 2023-09-18 ENCOUNTER — LAB VISIT (OUTPATIENT)
Dept: LAB | Facility: HOSPITAL | Age: 53
End: 2023-09-18
Attending: INTERNAL MEDICINE
Payer: COMMERCIAL

## 2023-09-18 DIAGNOSIS — Z94.4 S/P LIVER TRANSPLANT: ICD-10-CM

## 2023-09-18 LAB
ALBUMIN SERPL BCP-MCNC: 4 G/DL (ref 3.5–5.2)
ALP SERPL-CCNC: 71 U/L (ref 55–135)
ALT SERPL W/O P-5'-P-CCNC: 21 U/L (ref 10–44)
ANION GAP SERPL CALC-SCNC: 8 MMOL/L (ref 8–16)
AST SERPL-CCNC: 19 U/L (ref 10–40)
BASOPHILS # BLD AUTO: 0.1 K/UL (ref 0–0.2)
BASOPHILS NFR BLD: 1.1 % (ref 0–1.9)
BILIRUB SERPL-MCNC: 0.3 MG/DL (ref 0.1–1)
BUN SERPL-MCNC: 19 MG/DL (ref 6–20)
CALCIUM SERPL-MCNC: 9.6 MG/DL (ref 8.7–10.5)
CHLORIDE SERPL-SCNC: 107 MMOL/L (ref 95–110)
CO2 SERPL-SCNC: 26 MMOL/L (ref 23–29)
CREAT SERPL-MCNC: 1.1 MG/DL (ref 0.5–1.4)
DIFFERENTIAL METHOD: ABNORMAL
EOSINOPHIL # BLD AUTO: 0.5 K/UL (ref 0–0.5)
EOSINOPHIL NFR BLD: 5.6 % (ref 0–8)
ERYTHROCYTE [DISTWIDTH] IN BLOOD BY AUTOMATED COUNT: 13.2 % (ref 11.5–14.5)
EST. GFR  (NO RACE VARIABLE): >60 ML/MIN/1.73 M^2
GLUCOSE SERPL-MCNC: 101 MG/DL (ref 70–110)
HCT VFR BLD AUTO: 42.7 % (ref 37–48.5)
HGB BLD-MCNC: 13.5 G/DL (ref 12–16)
IMM GRANULOCYTES # BLD AUTO: 0.01 K/UL (ref 0–0.04)
IMM GRANULOCYTES NFR BLD AUTO: 0.1 % (ref 0–0.5)
LYMPHOCYTES # BLD AUTO: 1.9 K/UL (ref 1–4.8)
LYMPHOCYTES NFR BLD: 20.6 % (ref 18–48)
MCH RBC QN AUTO: 29.3 PG (ref 27–31)
MCHC RBC AUTO-ENTMCNC: 31.6 G/DL (ref 32–36)
MCV RBC AUTO: 93 FL (ref 82–98)
MONOCYTES # BLD AUTO: 0.7 K/UL (ref 0.3–1)
MONOCYTES NFR BLD: 7.3 % (ref 4–15)
NEUTROPHILS # BLD AUTO: 6.2 K/UL (ref 1.8–7.7)
NEUTROPHILS NFR BLD: 65.3 % (ref 38–73)
NRBC BLD-RTO: 0 /100 WBC
PLATELET # BLD AUTO: 328 K/UL (ref 150–450)
PMV BLD AUTO: 9.6 FL (ref 9.2–12.9)
POTASSIUM SERPL-SCNC: 4 MMOL/L (ref 3.5–5.1)
PROT SERPL-MCNC: 7.3 G/DL (ref 6–8.4)
RBC # BLD AUTO: 4.61 M/UL (ref 4–5.4)
SODIUM SERPL-SCNC: 141 MMOL/L (ref 136–145)
TACROLIMUS BLD-MCNC: 2.8 NG/ML (ref 5–15)
WBC # BLD AUTO: 9.44 K/UL (ref 3.9–12.7)

## 2023-09-18 PROCEDURE — 80197 ASSAY OF TACROLIMUS: CPT | Performed by: INTERNAL MEDICINE

## 2023-09-18 PROCEDURE — 85025 COMPLETE CBC W/AUTO DIFF WBC: CPT | Performed by: INTERNAL MEDICINE

## 2023-09-18 PROCEDURE — 80053 COMPREHEN METABOLIC PANEL: CPT | Performed by: INTERNAL MEDICINE

## 2023-09-18 PROCEDURE — 36415 COLL VENOUS BLD VENIPUNCTURE: CPT | Performed by: INTERNAL MEDICINE

## 2023-09-19 ENCOUNTER — TELEPHONE (OUTPATIENT)
Dept: TRANSPLANT | Facility: CLINIC | Age: 53
End: 2023-09-19
Payer: COMMERCIAL

## 2023-09-19 ENCOUNTER — PATIENT MESSAGE (OUTPATIENT)
Dept: TRANSPLANT | Facility: CLINIC | Age: 53
End: 2023-09-19
Payer: COMMERCIAL

## 2023-09-19 DIAGNOSIS — Z94.4 S/P LIVER TRANSPLANT: Primary | ICD-10-CM

## 2023-09-19 NOTE — TELEPHONE ENCOUNTER
Sent patient message via portal to let her know labs are stable.  No medication changes, next labs due on 10/23/23      ----- Message from Sharmila Pacheco MD sent at 9/19/2023  8:08 AM CDT -----  The Labs are stable; repeat labs in 4 weeks - please let patient know.

## 2023-09-27 DIAGNOSIS — Z12.31 OTHER SCREENING MAMMOGRAM: ICD-10-CM

## 2023-10-23 ENCOUNTER — LAB VISIT (OUTPATIENT)
Dept: LAB | Facility: HOSPITAL | Age: 53
End: 2023-10-23
Attending: INTERNAL MEDICINE
Payer: COMMERCIAL

## 2023-10-23 ENCOUNTER — PATIENT MESSAGE (OUTPATIENT)
Dept: TRANSPLANT | Facility: CLINIC | Age: 53
End: 2023-10-23
Payer: COMMERCIAL

## 2023-10-23 DIAGNOSIS — Z94.4 S/P LIVER TRANSPLANT: Primary | ICD-10-CM

## 2023-10-23 DIAGNOSIS — Z94.4 S/P LIVER TRANSPLANT: ICD-10-CM

## 2023-10-23 LAB
ALBUMIN SERPL BCP-MCNC: 4 G/DL (ref 3.5–5.2)
ALP SERPL-CCNC: 90 U/L (ref 55–135)
ALT SERPL W/O P-5'-P-CCNC: 105 U/L (ref 10–44)
ANION GAP SERPL CALC-SCNC: 10 MMOL/L (ref 8–16)
AST SERPL-CCNC: 69 U/L (ref 10–40)
BASOPHILS # BLD AUTO: 0.07 K/UL (ref 0–0.2)
BASOPHILS NFR BLD: 0.8 % (ref 0–1.9)
BILIRUB SERPL-MCNC: 0.4 MG/DL (ref 0.1–1)
BUN SERPL-MCNC: 21 MG/DL (ref 6–20)
CALCIUM SERPL-MCNC: 9.8 MG/DL (ref 8.7–10.5)
CHLORIDE SERPL-SCNC: 107 MMOL/L (ref 95–110)
CO2 SERPL-SCNC: 23 MMOL/L (ref 23–29)
CREAT SERPL-MCNC: 0.9 MG/DL (ref 0.5–1.4)
DIFFERENTIAL METHOD: ABNORMAL
EOSINOPHIL # BLD AUTO: 0.6 K/UL (ref 0–0.5)
EOSINOPHIL NFR BLD: 6.2 % (ref 0–8)
ERYTHROCYTE [DISTWIDTH] IN BLOOD BY AUTOMATED COUNT: 13.6 % (ref 11.5–14.5)
EST. GFR  (NO RACE VARIABLE): >60 ML/MIN/1.73 M^2
GLUCOSE SERPL-MCNC: 112 MG/DL (ref 70–110)
HCT VFR BLD AUTO: 41.6 % (ref 37–48.5)
HGB BLD-MCNC: 13.7 G/DL (ref 12–16)
IMM GRANULOCYTES # BLD AUTO: 0.03 K/UL (ref 0–0.04)
IMM GRANULOCYTES NFR BLD AUTO: 0.3 % (ref 0–0.5)
LYMPHOCYTES # BLD AUTO: 1.8 K/UL (ref 1–4.8)
LYMPHOCYTES NFR BLD: 20.5 % (ref 18–48)
MCH RBC QN AUTO: 30.2 PG (ref 27–31)
MCHC RBC AUTO-ENTMCNC: 32.9 G/DL (ref 32–36)
MCV RBC AUTO: 92 FL (ref 82–98)
MONOCYTES # BLD AUTO: 0.8 K/UL (ref 0.3–1)
MONOCYTES NFR BLD: 8.7 % (ref 4–15)
NEUTROPHILS # BLD AUTO: 5.7 K/UL (ref 1.8–7.7)
NEUTROPHILS NFR BLD: 63.5 % (ref 38–73)
NRBC BLD-RTO: 0 /100 WBC
PLATELET # BLD AUTO: 288 K/UL (ref 150–450)
PMV BLD AUTO: 10.6 FL (ref 9.2–12.9)
POTASSIUM SERPL-SCNC: 4.3 MMOL/L (ref 3.5–5.1)
PROT SERPL-MCNC: 7.3 G/DL (ref 6–8.4)
RBC # BLD AUTO: 4.53 M/UL (ref 4–5.4)
SODIUM SERPL-SCNC: 140 MMOL/L (ref 136–145)
TACROLIMUS BLD-MCNC: 2.8 NG/ML (ref 5–15)
WBC # BLD AUTO: 8.94 K/UL (ref 3.9–12.7)

## 2023-10-23 PROCEDURE — 85025 COMPLETE CBC W/AUTO DIFF WBC: CPT | Performed by: INTERNAL MEDICINE

## 2023-10-23 PROCEDURE — 80053 COMPREHEN METABOLIC PANEL: CPT | Performed by: INTERNAL MEDICINE

## 2023-10-23 PROCEDURE — 36415 COLL VENOUS BLD VENIPUNCTURE: CPT | Performed by: INTERNAL MEDICINE

## 2023-10-23 PROCEDURE — 80197 ASSAY OF TACROLIMUS: CPT | Performed by: INTERNAL MEDICINE

## 2023-10-23 PROCEDURE — 80321 ALCOHOLS BIOMARKERS 1OR 2: CPT | Performed by: INTERNAL MEDICINE

## 2023-10-23 RX ORDER — HYDROXYZINE HYDROCHLORIDE 25 MG/1
TABLET, FILM COATED ORAL
Qty: 60 TABLET | Refills: 1 | Status: SHIPPED | OUTPATIENT
Start: 2023-10-23 | End: 2023-12-13

## 2023-10-23 RX ORDER — PREDNISONE 10 MG/1
20 TABLET ORAL DAILY
Qty: 60 TABLET | Refills: 1 | Status: SHIPPED | OUTPATIENT
Start: 2023-10-23 | End: 2023-11-14 | Stop reason: SDUPTHER

## 2023-10-23 NOTE — TELEPHONE ENCOUNTER
Last filled 60 x 1  7/10/23    Lov 9/22/22 krystal oscar not booked yet.    She's had several refill request this year and don't see reminder to her to book appt.    Needs?

## 2023-10-23 NOTE — TELEPHONE ENCOUNTER
Sent message to patient with change and to get labs on Wednesday.    ----- Message from Sharmila Pacheco MD sent at 10/23/2023  9:05 AM CDT -----  The Labs are up; start pred 20 mg today - repeat on wed; check peth; await prograf level - please let patient know.

## 2023-10-25 ENCOUNTER — LAB VISIT (OUTPATIENT)
Dept: LAB | Facility: HOSPITAL | Age: 53
End: 2023-10-25
Attending: INTERNAL MEDICINE
Payer: COMMERCIAL

## 2023-10-25 DIAGNOSIS — Z94.4 S/P LIVER TRANSPLANT: ICD-10-CM

## 2023-10-25 LAB
ALBUMIN SERPL BCP-MCNC: 4 G/DL (ref 3.5–5.2)
ALP SERPL-CCNC: 82 U/L (ref 55–135)
ALT SERPL W/O P-5'-P-CCNC: 80 U/L (ref 10–44)
ANION GAP SERPL CALC-SCNC: 9 MMOL/L (ref 8–16)
AST SERPL-CCNC: 41 U/L (ref 10–40)
BASOPHILS # BLD AUTO: 0.07 K/UL (ref 0–0.2)
BASOPHILS NFR BLD: 0.6 % (ref 0–1.9)
BILIRUB SERPL-MCNC: 0.2 MG/DL (ref 0.1–1)
BUN SERPL-MCNC: 26 MG/DL (ref 6–20)
CALCIUM SERPL-MCNC: 9.6 MG/DL (ref 8.7–10.5)
CHLORIDE SERPL-SCNC: 109 MMOL/L (ref 95–110)
CO2 SERPL-SCNC: 23 MMOL/L (ref 23–29)
CREAT SERPL-MCNC: 1.1 MG/DL (ref 0.5–1.4)
DIFFERENTIAL METHOD: ABNORMAL
EOSINOPHIL # BLD AUTO: 0.4 K/UL (ref 0–0.5)
EOSINOPHIL NFR BLD: 3.2 % (ref 0–8)
ERYTHROCYTE [DISTWIDTH] IN BLOOD BY AUTOMATED COUNT: 13.8 % (ref 11.5–14.5)
EST. GFR  (NO RACE VARIABLE): >60 ML/MIN/1.73 M^2
GLUCOSE SERPL-MCNC: 88 MG/DL (ref 70–110)
HCT VFR BLD AUTO: 40.5 % (ref 37–48.5)
HGB BLD-MCNC: 13 G/DL (ref 12–16)
IMM GRANULOCYTES # BLD AUTO: 0.06 K/UL (ref 0–0.04)
IMM GRANULOCYTES NFR BLD AUTO: 0.5 % (ref 0–0.5)
LYMPHOCYTES # BLD AUTO: 3.1 K/UL (ref 1–4.8)
LYMPHOCYTES NFR BLD: 24.2 % (ref 18–48)
MCH RBC QN AUTO: 29.9 PG (ref 27–31)
MCHC RBC AUTO-ENTMCNC: 32.1 G/DL (ref 32–36)
MCV RBC AUTO: 93 FL (ref 82–98)
MONOCYTES # BLD AUTO: 0.9 K/UL (ref 0.3–1)
MONOCYTES NFR BLD: 6.9 % (ref 4–15)
NEUTROPHILS # BLD AUTO: 8.2 K/UL (ref 1.8–7.7)
NEUTROPHILS NFR BLD: 64.6 % (ref 38–73)
NRBC BLD-RTO: 0 /100 WBC
PLATELET # BLD AUTO: 309 K/UL (ref 150–450)
PMV BLD AUTO: 10.4 FL (ref 9.2–12.9)
POTASSIUM SERPL-SCNC: 3.9 MMOL/L (ref 3.5–5.1)
PROT SERPL-MCNC: 7.3 G/DL (ref 6–8.4)
RBC # BLD AUTO: 4.35 M/UL (ref 4–5.4)
SODIUM SERPL-SCNC: 141 MMOL/L (ref 136–145)
TACROLIMUS BLD-MCNC: 2.1 NG/ML (ref 5–15)
WBC # BLD AUTO: 12.64 K/UL (ref 3.9–12.7)

## 2023-10-25 PROCEDURE — 36415 COLL VENOUS BLD VENIPUNCTURE: CPT | Performed by: INTERNAL MEDICINE

## 2023-10-25 PROCEDURE — 80321 ALCOHOLS BIOMARKERS 1OR 2: CPT | Performed by: INTERNAL MEDICINE

## 2023-10-25 PROCEDURE — 85025 COMPLETE CBC W/AUTO DIFF WBC: CPT | Performed by: INTERNAL MEDICINE

## 2023-10-25 PROCEDURE — 80197 ASSAY OF TACROLIMUS: CPT | Performed by: INTERNAL MEDICINE

## 2023-10-25 PROCEDURE — 80053 COMPREHEN METABOLIC PANEL: CPT | Performed by: INTERNAL MEDICINE

## 2023-10-26 LAB
CLINICAL BIOCHEMIST REVIEW: NORMAL
PLPETH BLD-MCNC: <10 NG/ML
POPETH BLD-MCNC: <10 NG/ML

## 2023-10-27 ENCOUNTER — HOSPITAL ENCOUNTER (OUTPATIENT)
Dept: RADIOLOGY | Facility: HOSPITAL | Age: 53
Discharge: HOME | End: 2023-10-27
Attending: INTERNAL MEDICINE
Payer: COMMERCIAL

## 2023-10-27 ENCOUNTER — PATIENT MESSAGE (OUTPATIENT)
Dept: TRANSPLANT | Facility: CLINIC | Age: 53
End: 2023-10-27
Payer: COMMERCIAL

## 2023-10-27 VITALS — BODY MASS INDEX: 25.16 KG/M2 | WEIGHT: 142 LBS | HEIGHT: 63 IN

## 2023-10-27 DIAGNOSIS — Z12.31 OTHER SCREENING MAMMOGRAM: ICD-10-CM

## 2023-10-27 DIAGNOSIS — Z94.4 S/P LIVER TRANSPLANT: Primary | ICD-10-CM

## 2023-10-27 PROCEDURE — 77067 MAMMO DIGITAL SCREENING BILAT WITH TOMO: ICD-10-PCS | Mod: 26,,, | Performed by: RADIOLOGY

## 2023-10-27 PROCEDURE — 77067 SCR MAMMO BI INCL CAD: CPT | Mod: 26,,, | Performed by: RADIOLOGY

## 2023-10-27 PROCEDURE — 77063 BREAST TOMOSYNTHESIS BI: CPT | Mod: 26,,, | Performed by: RADIOLOGY

## 2023-10-27 PROCEDURE — 77067 SCR MAMMO BI INCL CAD: CPT | Mod: TC

## 2023-10-27 PROCEDURE — 77063 MAMMO DIGITAL SCREENING BILAT WITH TOMO: ICD-10-PCS | Mod: 26,,, | Performed by: RADIOLOGY

## 2023-10-27 RX ORDER — TACROLIMUS 1 MG/1
CAPSULE ORAL
Qty: 180 CAPSULE | Refills: 3 | Status: SHIPPED | OUTPATIENT
Start: 2023-10-27 | End: 2023-10-31

## 2023-10-27 RX ORDER — MYCOPHENOLATE MOFETIL 250 MG/1
1000 CAPSULE ORAL 2 TIMES DAILY
Qty: 720 CAPSULE | Refills: 3 | Status: SHIPPED | OUTPATIENT
Start: 2023-10-27 | End: 2023-12-08 | Stop reason: SDUPTHER

## 2023-10-27 NOTE — TELEPHONE ENCOUNTER
Sent message to patient with changes and to get labs on Monday.    ----- Message from Sharmila Pacheco MD sent at 10/27/2023  9:08 AM CDT -----  Increase prograf to 2/1 from 1/a and increase cellecpet to 1000/1000 from 500/500 and continue pred-repeat labs monday - please let patient know.

## 2023-10-28 LAB
CLINICAL BIOCHEMIST REVIEW: NORMAL
PLPETH BLD-MCNC: <10 NG/ML
POPETH BLD-MCNC: <10 NG/ML

## 2023-10-30 ENCOUNTER — LAB VISIT (OUTPATIENT)
Dept: LAB | Facility: HOSPITAL | Age: 53
End: 2023-10-30
Attending: INTERNAL MEDICINE
Payer: COMMERCIAL

## 2023-10-30 DIAGNOSIS — Z94.4 S/P LIVER TRANSPLANT: ICD-10-CM

## 2023-10-30 LAB
ALBUMIN SERPL BCP-MCNC: 4.2 G/DL (ref 3.5–5.2)
ALP SERPL-CCNC: 76 U/L (ref 55–135)
ALT SERPL W/O P-5'-P-CCNC: 68 U/L (ref 10–44)
ANION GAP SERPL CALC-SCNC: 12 MMOL/L (ref 8–16)
AST SERPL-CCNC: 35 U/L (ref 10–40)
BASOPHILS # BLD AUTO: 0.06 K/UL (ref 0–0.2)
BASOPHILS NFR BLD: 0.4 % (ref 0–1.9)
BILIRUB SERPL-MCNC: 0.3 MG/DL (ref 0.1–1)
BUN SERPL-MCNC: 25 MG/DL (ref 6–20)
CALCIUM SERPL-MCNC: 10 MG/DL (ref 8.7–10.5)
CHLORIDE SERPL-SCNC: 106 MMOL/L (ref 95–110)
CO2 SERPL-SCNC: 22 MMOL/L (ref 23–29)
CREAT SERPL-MCNC: 1.1 MG/DL (ref 0.5–1.4)
DIFFERENTIAL METHOD: ABNORMAL
EOSINOPHIL # BLD AUTO: 0.2 K/UL (ref 0–0.5)
EOSINOPHIL NFR BLD: 1.3 % (ref 0–8)
ERYTHROCYTE [DISTWIDTH] IN BLOOD BY AUTOMATED COUNT: 13.8 % (ref 11.5–14.5)
EST. GFR  (NO RACE VARIABLE): >60 ML/MIN/1.73 M^2
GLUCOSE SERPL-MCNC: 82 MG/DL (ref 70–110)
HCT VFR BLD AUTO: 41.7 % (ref 37–48.5)
HGB BLD-MCNC: 13.1 G/DL (ref 12–16)
IMM GRANULOCYTES # BLD AUTO: 0.09 K/UL (ref 0–0.04)
IMM GRANULOCYTES NFR BLD AUTO: 0.6 % (ref 0–0.5)
LYMPHOCYTES # BLD AUTO: 3.4 K/UL (ref 1–4.8)
LYMPHOCYTES NFR BLD: 22.5 % (ref 18–48)
MCH RBC QN AUTO: 29.8 PG (ref 27–31)
MCHC RBC AUTO-ENTMCNC: 31.4 G/DL (ref 32–36)
MCV RBC AUTO: 95 FL (ref 82–98)
MONOCYTES # BLD AUTO: 1.1 K/UL (ref 0.3–1)
MONOCYTES NFR BLD: 7.1 % (ref 4–15)
NEUTROPHILS # BLD AUTO: 10.3 K/UL (ref 1.8–7.7)
NEUTROPHILS NFR BLD: 68.1 % (ref 38–73)
NRBC BLD-RTO: 0 /100 WBC
PLATELET # BLD AUTO: 355 K/UL (ref 150–450)
PMV BLD AUTO: 10.2 FL (ref 9.2–12.9)
POTASSIUM SERPL-SCNC: 4.2 MMOL/L (ref 3.5–5.1)
PROT SERPL-MCNC: 7.3 G/DL (ref 6–8.4)
RBC # BLD AUTO: 4.4 M/UL (ref 4–5.4)
SODIUM SERPL-SCNC: 140 MMOL/L (ref 136–145)
WBC # BLD AUTO: 15.12 K/UL (ref 3.9–12.7)

## 2023-10-30 PROCEDURE — 85025 COMPLETE CBC W/AUTO DIFF WBC: CPT | Performed by: INTERNAL MEDICINE

## 2023-10-30 PROCEDURE — 36415 COLL VENOUS BLD VENIPUNCTURE: CPT | Performed by: INTERNAL MEDICINE

## 2023-10-30 PROCEDURE — 80197 ASSAY OF TACROLIMUS: CPT | Performed by: INTERNAL MEDICINE

## 2023-10-30 PROCEDURE — 80053 COMPREHEN METABOLIC PANEL: CPT | Performed by: INTERNAL MEDICINE

## 2023-10-31 ENCOUNTER — TELEPHONE (OUTPATIENT)
Dept: TRANSPLANT | Facility: CLINIC | Age: 53
End: 2023-10-31
Payer: COMMERCIAL

## 2023-10-31 ENCOUNTER — PATIENT MESSAGE (OUTPATIENT)
Dept: TRANSPLANT | Facility: CLINIC | Age: 53
End: 2023-10-31
Payer: COMMERCIAL

## 2023-10-31 DIAGNOSIS — Z94.4 S/P LIVER TRANSPLANT: ICD-10-CM

## 2023-10-31 LAB — TACROLIMUS BLD-MCNC: 2.6 NG/ML (ref 5–15)

## 2023-10-31 RX ORDER — TACROLIMUS 1 MG/1
CAPSULE ORAL
Qty: 450 CAPSULE | Refills: 3 | Status: SHIPPED | OUTPATIENT
Start: 2023-10-31 | End: 2023-11-03 | Stop reason: SDUPTHER

## 2023-10-31 NOTE — TELEPHONE ENCOUNTER
Sent message with change and do labs as preciously scheduled.  ----- Message from Sharmila Pacheco MD sent at 10/31/2023 12:14 PM CDT -----  The Labs are stable - please let patient know.  Increase prograf to 3/2 from 2/1 and repeat labs thursday

## 2023-10-31 NOTE — TELEPHONE ENCOUNTER
Sent a message to let her know about getting labs on Thursday, and are still waiting for Tacrolimus level.  ----- Message from Sharmila Pacheco MD sent at 10/30/2023  5:38 PM CDT -----  The Labs are improving; await prograf level; WBC likely elevated from steroids; repeat labs on thursday - please let patient know.

## 2023-11-02 ENCOUNTER — LAB VISIT (OUTPATIENT)
Dept: LAB | Facility: HOSPITAL | Age: 53
End: 2023-11-02
Attending: INTERNAL MEDICINE
Payer: COMMERCIAL

## 2023-11-02 DIAGNOSIS — Z94.4 S/P LIVER TRANSPLANT: ICD-10-CM

## 2023-11-02 LAB
ALBUMIN SERPL BCP-MCNC: 4.1 G/DL (ref 3.5–5.2)
ALP SERPL-CCNC: 80 U/L (ref 55–135)
ALT SERPL W/O P-5'-P-CCNC: 59 U/L (ref 10–44)
ANION GAP SERPL CALC-SCNC: 11 MMOL/L (ref 8–16)
AST SERPL-CCNC: 29 U/L (ref 10–40)
BASOPHILS # BLD AUTO: 0.07 K/UL (ref 0–0.2)
BASOPHILS NFR BLD: 0.4 % (ref 0–1.9)
BILIRUB SERPL-MCNC: 0.2 MG/DL (ref 0.1–1)
BUN SERPL-MCNC: 23 MG/DL (ref 6–20)
CALCIUM SERPL-MCNC: 9.7 MG/DL (ref 8.7–10.5)
CHLORIDE SERPL-SCNC: 105 MMOL/L (ref 95–110)
CO2 SERPL-SCNC: 21 MMOL/L (ref 23–29)
CREAT SERPL-MCNC: 1.1 MG/DL (ref 0.5–1.4)
DIFFERENTIAL METHOD: ABNORMAL
EOSINOPHIL # BLD AUTO: 0.2 K/UL (ref 0–0.5)
EOSINOPHIL NFR BLD: 1.1 % (ref 0–8)
ERYTHROCYTE [DISTWIDTH] IN BLOOD BY AUTOMATED COUNT: 14 % (ref 11.5–14.5)
EST. GFR  (NO RACE VARIABLE): >60 ML/MIN/1.73 M^2
GLUCOSE SERPL-MCNC: 84 MG/DL (ref 70–110)
HCT VFR BLD AUTO: 43.2 % (ref 37–48.5)
HGB BLD-MCNC: 13.6 G/DL (ref 12–16)
IMM GRANULOCYTES # BLD AUTO: 0.16 K/UL (ref 0–0.04)
IMM GRANULOCYTES NFR BLD AUTO: 0.9 % (ref 0–0.5)
LYMPHOCYTES # BLD AUTO: 3.4 K/UL (ref 1–4.8)
LYMPHOCYTES NFR BLD: 18.8 % (ref 18–48)
MCH RBC QN AUTO: 29.9 PG (ref 27–31)
MCHC RBC AUTO-ENTMCNC: 31.5 G/DL (ref 32–36)
MCV RBC AUTO: 95 FL (ref 82–98)
MONOCYTES # BLD AUTO: 1.3 K/UL (ref 0.3–1)
MONOCYTES NFR BLD: 7.3 % (ref 4–15)
NEUTROPHILS # BLD AUTO: 13 K/UL (ref 1.8–7.7)
NEUTROPHILS NFR BLD: 71.5 % (ref 38–73)
NRBC BLD-RTO: 0 /100 WBC
PLATELET # BLD AUTO: 375 K/UL (ref 150–450)
PMV BLD AUTO: 10.1 FL (ref 9.2–12.9)
POTASSIUM SERPL-SCNC: 4.1 MMOL/L (ref 3.5–5.1)
PROT SERPL-MCNC: 7.5 G/DL (ref 6–8.4)
RBC # BLD AUTO: 4.55 M/UL (ref 4–5.4)
SODIUM SERPL-SCNC: 137 MMOL/L (ref 136–145)
WBC # BLD AUTO: 18.21 K/UL (ref 3.9–12.7)

## 2023-11-02 PROCEDURE — 80053 COMPREHEN METABOLIC PANEL: CPT | Performed by: INTERNAL MEDICINE

## 2023-11-02 PROCEDURE — 85025 COMPLETE CBC W/AUTO DIFF WBC: CPT | Performed by: INTERNAL MEDICINE

## 2023-11-02 PROCEDURE — 80197 ASSAY OF TACROLIMUS: CPT | Performed by: INTERNAL MEDICINE

## 2023-11-02 PROCEDURE — 36415 COLL VENOUS BLD VENIPUNCTURE: CPT | Performed by: INTERNAL MEDICINE

## 2023-11-03 DIAGNOSIS — Z94.4 S/P LIVER TRANSPLANT: ICD-10-CM

## 2023-11-03 LAB — TACROLIMUS BLD-MCNC: 5.7 NG/ML (ref 5–15)

## 2023-11-03 NOTE — TELEPHONE ENCOUNTER
Sent Dr Pacheco message from patient:    A pretty steady splitting headache and Im feeling very fatigued. I feel rundown. And my stomach feels bad on and off. And a little nausea every now and then. Im still eating fine and working and doing my every day things but I just dont feel well at all.   Dr Pacheco wants to decrease Tacrolimus by 1 mg per day and do labs on Monday 11/06/23

## 2023-11-04 RX ORDER — TACROLIMUS 1 MG/1
2 CAPSULE ORAL EVERY 12 HOURS
Qty: 360 CAPSULE | Refills: 3 | Status: SHIPPED | OUTPATIENT
Start: 2023-11-04 | End: 2023-12-08 | Stop reason: SDUPTHER

## 2023-11-06 ENCOUNTER — LAB VISIT (OUTPATIENT)
Dept: LAB | Facility: HOSPITAL | Age: 53
End: 2023-11-06
Attending: INTERNAL MEDICINE
Payer: COMMERCIAL

## 2023-11-06 DIAGNOSIS — Z94.4 S/P LIVER TRANSPLANT: ICD-10-CM

## 2023-11-06 LAB
ALBUMIN SERPL BCP-MCNC: 3.8 G/DL (ref 3.5–5.2)
ALP SERPL-CCNC: 73 U/L (ref 55–135)
ALT SERPL W/O P-5'-P-CCNC: 61 U/L (ref 10–44)
ANION GAP SERPL CALC-SCNC: 9 MMOL/L (ref 8–16)
AST SERPL-CCNC: 41 U/L (ref 10–40)
BASOPHILS # BLD AUTO: 0.09 K/UL (ref 0–0.2)
BASOPHILS NFR BLD: 0.7 % (ref 0–1.9)
BILIRUB SERPL-MCNC: 0.3 MG/DL (ref 0.1–1)
BUN SERPL-MCNC: 21 MG/DL (ref 6–20)
CALCIUM SERPL-MCNC: 9.6 MG/DL (ref 8.7–10.5)
CHLORIDE SERPL-SCNC: 109 MMOL/L (ref 95–110)
CO2 SERPL-SCNC: 24 MMOL/L (ref 23–29)
CREAT SERPL-MCNC: 1.2 MG/DL (ref 0.5–1.4)
DIFFERENTIAL METHOD: ABNORMAL
EOSINOPHIL # BLD AUTO: 0.4 K/UL (ref 0–0.5)
EOSINOPHIL NFR BLD: 3.1 % (ref 0–8)
ERYTHROCYTE [DISTWIDTH] IN BLOOD BY AUTOMATED COUNT: 13.8 % (ref 11.5–14.5)
EST. GFR  (NO RACE VARIABLE): 54.5 ML/MIN/1.73 M^2
GLUCOSE SERPL-MCNC: 96 MG/DL (ref 70–110)
HCT VFR BLD AUTO: 41.7 % (ref 37–48.5)
HGB BLD-MCNC: 13.1 G/DL (ref 12–16)
IMM GRANULOCYTES # BLD AUTO: 0.06 K/UL (ref 0–0.04)
IMM GRANULOCYTES NFR BLD AUTO: 0.5 % (ref 0–0.5)
LYMPHOCYTES # BLD AUTO: 3 K/UL (ref 1–4.8)
LYMPHOCYTES NFR BLD: 23.6 % (ref 18–48)
MCH RBC QN AUTO: 29.9 PG (ref 27–31)
MCHC RBC AUTO-ENTMCNC: 31.4 G/DL (ref 32–36)
MCV RBC AUTO: 95 FL (ref 82–98)
MONOCYTES # BLD AUTO: 1.2 K/UL (ref 0.3–1)
MONOCYTES NFR BLD: 9.4 % (ref 4–15)
NEUTROPHILS # BLD AUTO: 7.9 K/UL (ref 1.8–7.7)
NEUTROPHILS NFR BLD: 62.7 % (ref 38–73)
NRBC BLD-RTO: 0 /100 WBC
PLATELET # BLD AUTO: 327 K/UL (ref 150–450)
PMV BLD AUTO: 10.1 FL (ref 9.2–12.9)
POTASSIUM SERPL-SCNC: 4.5 MMOL/L (ref 3.5–5.1)
PROT SERPL-MCNC: 7 G/DL (ref 6–8.4)
RBC # BLD AUTO: 4.38 M/UL (ref 4–5.4)
SODIUM SERPL-SCNC: 142 MMOL/L (ref 136–145)
WBC # BLD AUTO: 12.52 K/UL (ref 3.9–12.7)

## 2023-11-06 PROCEDURE — 80197 ASSAY OF TACROLIMUS: CPT | Performed by: INTERNAL MEDICINE

## 2023-11-06 PROCEDURE — 36415 COLL VENOUS BLD VENIPUNCTURE: CPT | Performed by: INTERNAL MEDICINE

## 2023-11-06 PROCEDURE — 85025 COMPLETE CBC W/AUTO DIFF WBC: CPT | Performed by: INTERNAL MEDICINE

## 2023-11-06 PROCEDURE — 80053 COMPREHEN METABOLIC PANEL: CPT | Performed by: INTERNAL MEDICINE

## 2023-11-07 LAB — TACROLIMUS BLD-MCNC: 5.6 NG/ML (ref 5–15)

## 2023-11-09 ENCOUNTER — TELEPHONE (OUTPATIENT)
Dept: TRANSPLANT | Facility: CLINIC | Age: 53
End: 2023-11-09
Payer: COMMERCIAL

## 2023-11-09 ENCOUNTER — TELEPHONE (OUTPATIENT)
Dept: INTERVENTIONAL RADIOLOGY/VASCULAR | Facility: CLINIC | Age: 53
End: 2023-11-09
Payer: COMMERCIAL

## 2023-11-09 ENCOUNTER — PATIENT MESSAGE (OUTPATIENT)
Dept: TRANSPLANT | Facility: CLINIC | Age: 53
End: 2023-11-09
Payer: COMMERCIAL

## 2023-11-09 DIAGNOSIS — Z94.4 S/P LIVER TRANSPLANT: Primary | ICD-10-CM

## 2023-11-09 NOTE — TELEPHONE ENCOUNTER
Spoke to pt on phone, Pt is scheduled on 11/17/2023 with arrival time of 7am for IR procedure at [facility] location.  Preop instructions given (NPO after midnight, MUST have a ride home, Nurse will call 1-2  days before to go over instructions and medications),pt verbally understood. Pt aware and confirmed, Thanks

## 2023-11-09 NOTE — TELEPHONE ENCOUNTER
Sent message to Dr Pacheco about patient's labs from this week, they are up more.  Dr Pacheco wants to get a biopsy and do labs on Monday.  I sent message to patient with plan.

## 2023-11-13 ENCOUNTER — LAB VISIT (OUTPATIENT)
Dept: LAB | Facility: HOSPITAL | Age: 53
End: 2023-11-13
Attending: INTERNAL MEDICINE
Payer: COMMERCIAL

## 2023-11-13 DIAGNOSIS — Z94.4 S/P LIVER TRANSPLANT: ICD-10-CM

## 2023-11-13 LAB
ALBUMIN SERPL BCP-MCNC: 4 G/DL (ref 3.5–5.2)
ALP SERPL-CCNC: 80 U/L (ref 55–135)
ALT SERPL W/O P-5'-P-CCNC: 34 U/L (ref 10–44)
ANION GAP SERPL CALC-SCNC: 11 MMOL/L (ref 8–16)
AST SERPL-CCNC: 21 U/L (ref 10–40)
BASOPHILS # BLD AUTO: 0.03 K/UL (ref 0–0.2)
BASOPHILS NFR BLD: 0.2 % (ref 0–1.9)
BILIRUB SERPL-MCNC: 0.4 MG/DL (ref 0.1–1)
BUN SERPL-MCNC: 24 MG/DL (ref 6–20)
CALCIUM SERPL-MCNC: 9.8 MG/DL (ref 8.7–10.5)
CHLORIDE SERPL-SCNC: 105 MMOL/L (ref 95–110)
CO2 SERPL-SCNC: 24 MMOL/L (ref 23–29)
CREAT SERPL-MCNC: 1.1 MG/DL (ref 0.5–1.4)
DIFFERENTIAL METHOD: ABNORMAL
EOSINOPHIL # BLD AUTO: 0 K/UL (ref 0–0.5)
EOSINOPHIL NFR BLD: 0.3 % (ref 0–8)
ERYTHROCYTE [DISTWIDTH] IN BLOOD BY AUTOMATED COUNT: 13.4 % (ref 11.5–14.5)
EST. GFR  (NO RACE VARIABLE): >60 ML/MIN/1.73 M^2
GLUCOSE SERPL-MCNC: 172 MG/DL (ref 70–110)
HCT VFR BLD AUTO: 41.3 % (ref 37–48.5)
HGB BLD-MCNC: 13 G/DL (ref 12–16)
IMM GRANULOCYTES # BLD AUTO: 0.04 K/UL (ref 0–0.04)
IMM GRANULOCYTES NFR BLD AUTO: 0.3 % (ref 0–0.5)
INR PPP: 0.9 (ref 0.8–1.2)
LYMPHOCYTES # BLD AUTO: 1.5 K/UL (ref 1–4.8)
LYMPHOCYTES NFR BLD: 10.2 % (ref 18–48)
MCH RBC QN AUTO: 29.7 PG (ref 27–31)
MCHC RBC AUTO-ENTMCNC: 31.5 G/DL (ref 32–36)
MCV RBC AUTO: 95 FL (ref 82–98)
MONOCYTES # BLD AUTO: 0.5 K/UL (ref 0.3–1)
MONOCYTES NFR BLD: 3.2 % (ref 4–15)
NEUTROPHILS # BLD AUTO: 12.3 K/UL (ref 1.8–7.7)
NEUTROPHILS NFR BLD: 85.8 % (ref 38–73)
NRBC BLD-RTO: 0 /100 WBC
PLATELET # BLD AUTO: 326 K/UL (ref 150–450)
PMV BLD AUTO: 10 FL (ref 9.2–12.9)
POTASSIUM SERPL-SCNC: 4.6 MMOL/L (ref 3.5–5.1)
PROT SERPL-MCNC: 7.2 G/DL (ref 6–8.4)
PROTHROMBIN TIME: 10.1 SEC (ref 9–12.5)
RBC # BLD AUTO: 4.37 M/UL (ref 4–5.4)
SODIUM SERPL-SCNC: 140 MMOL/L (ref 136–145)
TACROLIMUS BLD-MCNC: 7 NG/ML (ref 5–15)
WBC # BLD AUTO: 14.28 K/UL (ref 3.9–12.7)

## 2023-11-13 PROCEDURE — 85610 PROTHROMBIN TIME: CPT | Performed by: INTERNAL MEDICINE

## 2023-11-13 PROCEDURE — 36415 COLL VENOUS BLD VENIPUNCTURE: CPT | Performed by: INTERNAL MEDICINE

## 2023-11-13 PROCEDURE — 80053 COMPREHEN METABOLIC PANEL: CPT | Performed by: INTERNAL MEDICINE

## 2023-11-13 PROCEDURE — 80197 ASSAY OF TACROLIMUS: CPT | Performed by: INTERNAL MEDICINE

## 2023-11-13 PROCEDURE — 85025 COMPLETE CBC W/AUTO DIFF WBC: CPT | Performed by: INTERNAL MEDICINE

## 2023-11-14 ENCOUNTER — PATIENT MESSAGE (OUTPATIENT)
Dept: TRANSPLANT | Facility: CLINIC | Age: 53
End: 2023-11-14
Payer: COMMERCIAL

## 2023-11-14 DIAGNOSIS — Z94.4 S/P LIVER TRANSPLANT: ICD-10-CM

## 2023-11-14 RX ORDER — PREDNISONE 10 MG/1
TABLET ORAL
Qty: 18 TABLET | Refills: 0 | Status: SHIPPED | OUTPATIENT
Start: 2023-11-14 | End: 2023-11-28

## 2023-11-14 NOTE — TELEPHONE ENCOUNTER
Sent message to Dr Pacheco to see if patient still needed the biopsy, since her labs have improved.  Orders per Dr Pacheco:  cancel biopsy. GFR and creatinine normal. will keep prograf at this dose for now. taper prednisone to 15 mg x 1 week then 10 mg daily and taper off completely, weekly labs.  Sent message to patient to let her know and to get labs on 11/20/23

## 2023-11-20 ENCOUNTER — LAB VISIT (OUTPATIENT)
Dept: LAB | Facility: HOSPITAL | Age: 53
End: 2023-11-20
Attending: INTERNAL MEDICINE
Payer: COMMERCIAL

## 2023-11-20 DIAGNOSIS — Z94.4 S/P LIVER TRANSPLANT: ICD-10-CM

## 2023-11-20 LAB
ALBUMIN SERPL BCP-MCNC: 3.9 G/DL (ref 3.5–5.2)
ALP SERPL-CCNC: 73 U/L (ref 55–135)
ALT SERPL W/O P-5'-P-CCNC: 26 U/L (ref 10–44)
ANION GAP SERPL CALC-SCNC: 8 MMOL/L (ref 8–16)
AST SERPL-CCNC: 17 U/L (ref 10–40)
BASOPHILS # BLD AUTO: 0.04 K/UL (ref 0–0.2)
BASOPHILS NFR BLD: 0.3 % (ref 0–1.9)
BILIRUB SERPL-MCNC: 0.3 MG/DL (ref 0.1–1)
BUN SERPL-MCNC: 28 MG/DL (ref 6–20)
CALCIUM SERPL-MCNC: 10 MG/DL (ref 8.7–10.5)
CHLORIDE SERPL-SCNC: 107 MMOL/L (ref 95–110)
CO2 SERPL-SCNC: 24 MMOL/L (ref 23–29)
CREAT SERPL-MCNC: 1.2 MG/DL (ref 0.5–1.4)
DIFFERENTIAL METHOD: ABNORMAL
EOSINOPHIL # BLD AUTO: 0.2 K/UL (ref 0–0.5)
EOSINOPHIL NFR BLD: 1.3 % (ref 0–8)
ERYTHROCYTE [DISTWIDTH] IN BLOOD BY AUTOMATED COUNT: 13.7 % (ref 11.5–14.5)
EST. GFR  (NO RACE VARIABLE): 54.5 ML/MIN/1.73 M^2
GLUCOSE SERPL-MCNC: 102 MG/DL (ref 70–110)
HCT VFR BLD AUTO: 40.1 % (ref 37–48.5)
HGB BLD-MCNC: 13 G/DL (ref 12–16)
IMM GRANULOCYTES # BLD AUTO: 0.11 K/UL (ref 0–0.04)
IMM GRANULOCYTES NFR BLD AUTO: 0.7 % (ref 0–0.5)
LYMPHOCYTES # BLD AUTO: 2.6 K/UL (ref 1–4.8)
LYMPHOCYTES NFR BLD: 17.3 % (ref 18–48)
MCH RBC QN AUTO: 30 PG (ref 27–31)
MCHC RBC AUTO-ENTMCNC: 32.4 G/DL (ref 32–36)
MCV RBC AUTO: 93 FL (ref 82–98)
MONOCYTES # BLD AUTO: 0.9 K/UL (ref 0.3–1)
MONOCYTES NFR BLD: 5.8 % (ref 4–15)
NEUTROPHILS # BLD AUTO: 11.3 K/UL (ref 1.8–7.7)
NEUTROPHILS NFR BLD: 74.6 % (ref 38–73)
NRBC BLD-RTO: 0 /100 WBC
PLATELET # BLD AUTO: 341 K/UL (ref 150–450)
PMV BLD AUTO: 9.7 FL (ref 9.2–12.9)
POTASSIUM SERPL-SCNC: 4.1 MMOL/L (ref 3.5–5.1)
PROT SERPL-MCNC: 7 G/DL (ref 6–8.4)
RBC # BLD AUTO: 4.33 M/UL (ref 4–5.4)
SODIUM SERPL-SCNC: 139 MMOL/L (ref 136–145)
TACROLIMUS BLD-MCNC: 6.5 NG/ML (ref 5–15)
WBC # BLD AUTO: 15.12 K/UL (ref 3.9–12.7)

## 2023-11-20 PROCEDURE — 36415 COLL VENOUS BLD VENIPUNCTURE: CPT | Performed by: INTERNAL MEDICINE

## 2023-11-20 PROCEDURE — 80053 COMPREHEN METABOLIC PANEL: CPT | Performed by: INTERNAL MEDICINE

## 2023-11-20 PROCEDURE — 80197 ASSAY OF TACROLIMUS: CPT | Performed by: INTERNAL MEDICINE

## 2023-11-20 PROCEDURE — 85025 COMPLETE CBC W/AUTO DIFF WBC: CPT | Performed by: INTERNAL MEDICINE

## 2023-11-27 ENCOUNTER — TELEPHONE (OUTPATIENT)
Dept: TRANSPLANT | Facility: CLINIC | Age: 53
End: 2023-11-27
Payer: COMMERCIAL

## 2023-11-27 DIAGNOSIS — Z94.4 S/P LIVER TRANSPLANT: Primary | ICD-10-CM

## 2023-11-27 NOTE — TELEPHONE ENCOUNTER
Sent message to patient to have labs on 11/28/23    ----- Message from Sharmila Pacheco MD sent at 11/27/2023  8:06 AM CST -----  The Labs are stable; repeat labs this week - please let patient know.

## 2023-11-28 RX ORDER — BIMATOPROST 3 UG/ML
SOLUTION TOPICAL
Qty: 5 ML | Refills: 12 | Status: SHIPPED | OUTPATIENT
Start: 2023-11-28

## 2023-11-28 NOTE — TELEPHONE ENCOUNTER
Refill Routing Note   Medication(s) are not appropriate for processing by [facility] for the following reason(s):        Outside of protocol    [facility] action(s):  Route             Appointments  past 12m or future 3m with PCP    Date Provider   Last Visit   9/29/2022 Killian Dodd, DO   Next Visit   1/18/2024 Killian Dodd, DO   ED visits in past 90 days: 0        Note composed:10:01 AM 11/28/2023

## 2023-11-29 ENCOUNTER — LAB VISIT (OUTPATIENT)
Dept: LAB | Facility: HOSPITAL | Age: 53
End: 2023-11-29
Attending: INTERNAL MEDICINE
Payer: COMMERCIAL

## 2023-11-29 DIAGNOSIS — Z94.4 S/P LIVER TRANSPLANT: ICD-10-CM

## 2023-11-29 LAB
ALBUMIN SERPL BCP-MCNC: 3.9 G/DL (ref 3.5–5.2)
ALP SERPL-CCNC: 77 U/L (ref 55–135)
ALT SERPL W/O P-5'-P-CCNC: 31 U/L (ref 10–44)
ANION GAP SERPL CALC-SCNC: 11 MMOL/L (ref 8–16)
AST SERPL-CCNC: 25 U/L (ref 10–40)
BASOPHILS # BLD AUTO: 0.1 K/UL (ref 0–0.2)
BASOPHILS NFR BLD: 0.9 % (ref 0–1.9)
BILIRUB SERPL-MCNC: 0.2 MG/DL (ref 0.1–1)
BUN SERPL-MCNC: 26 MG/DL (ref 6–20)
CALCIUM SERPL-MCNC: 9.8 MG/DL (ref 8.7–10.5)
CHLORIDE SERPL-SCNC: 107 MMOL/L (ref 95–110)
CO2 SERPL-SCNC: 22 MMOL/L (ref 23–29)
CREAT SERPL-MCNC: 1.2 MG/DL (ref 0.5–1.4)
DIFFERENTIAL METHOD: ABNORMAL
EOSINOPHIL # BLD AUTO: 0.3 K/UL (ref 0–0.5)
EOSINOPHIL NFR BLD: 2.8 % (ref 0–8)
ERYTHROCYTE [DISTWIDTH] IN BLOOD BY AUTOMATED COUNT: 13.9 % (ref 11.5–14.5)
EST. GFR  (NO RACE VARIABLE): 54.5 ML/MIN/1.73 M^2
GLUCOSE SERPL-MCNC: 112 MG/DL (ref 70–110)
HCT VFR BLD AUTO: 43.4 % (ref 37–48.5)
HCV AB SERPL QL IA: NORMAL
HGB BLD-MCNC: 13.7 G/DL (ref 12–16)
IMM GRANULOCYTES # BLD AUTO: 0.06 K/UL (ref 0–0.04)
IMM GRANULOCYTES NFR BLD AUTO: 0.5 % (ref 0–0.5)
LYMPHOCYTES # BLD AUTO: 3.1 K/UL (ref 1–4.8)
LYMPHOCYTES NFR BLD: 26.5 % (ref 18–48)
MCH RBC QN AUTO: 29.7 PG (ref 27–31)
MCHC RBC AUTO-ENTMCNC: 31.6 G/DL (ref 32–36)
MCV RBC AUTO: 94 FL (ref 82–98)
MONOCYTES # BLD AUTO: 0.9 K/UL (ref 0.3–1)
MONOCYTES NFR BLD: 7.9 % (ref 4–15)
NEUTROPHILS # BLD AUTO: 7.1 K/UL (ref 1.8–7.7)
NEUTROPHILS NFR BLD: 61.4 % (ref 38–73)
NRBC BLD-RTO: 0 /100 WBC
PLATELET # BLD AUTO: 327 K/UL (ref 150–450)
PMV BLD AUTO: 10.3 FL (ref 9.2–12.9)
POTASSIUM SERPL-SCNC: 4.7 MMOL/L (ref 3.5–5.1)
PROT SERPL-MCNC: 6.9 G/DL (ref 6–8.4)
RBC # BLD AUTO: 4.61 M/UL (ref 4–5.4)
SODIUM SERPL-SCNC: 140 MMOL/L (ref 136–145)
WBC # BLD AUTO: 11.49 K/UL (ref 3.9–12.7)

## 2023-11-29 PROCEDURE — 80197 ASSAY OF TACROLIMUS: CPT | Performed by: INTERNAL MEDICINE

## 2023-11-29 PROCEDURE — 36415 COLL VENOUS BLD VENIPUNCTURE: CPT | Performed by: INTERNAL MEDICINE

## 2023-11-29 PROCEDURE — 85025 COMPLETE CBC W/AUTO DIFF WBC: CPT | Performed by: INTERNAL MEDICINE

## 2023-11-29 PROCEDURE — 87522 HEPATITIS C REVRS TRNSCRPJ: CPT | Performed by: INTERNAL MEDICINE

## 2023-11-29 PROCEDURE — 80053 COMPREHEN METABOLIC PANEL: CPT | Performed by: INTERNAL MEDICINE

## 2023-11-29 PROCEDURE — 86803 HEPATITIS C AB TEST: CPT | Performed by: INTERNAL MEDICINE

## 2023-11-30 LAB
HCV RNA SERPL QL NAA+PROBE: NOT DETECTED
HCV RNA SPEC NAA+PROBE-ACNC: NOT DETECTED IU/ML
TACROLIMUS BLD-MCNC: 6.5 NG/ML (ref 5–15)

## 2023-12-04 ENCOUNTER — TELEPHONE (OUTPATIENT)
Dept: TRANSPLANT | Facility: CLINIC | Age: 53
End: 2023-12-04
Payer: COMMERCIAL

## 2023-12-04 ENCOUNTER — PATIENT MESSAGE (OUTPATIENT)
Dept: TRANSPLANT | Facility: CLINIC | Age: 53
End: 2023-12-04
Payer: COMMERCIAL

## 2023-12-04 DIAGNOSIS — Z94.4 S/P LIVER TRANSPLANT: Primary | ICD-10-CM

## 2023-12-04 NOTE — TELEPHONE ENCOUNTER
Sent message to let patient know to have labs on 12/05/23    ----- Message from Sharmila Pacheco MD sent at 12/4/2023 12:35 AM CST -----  The Labs are stable - please let patient know.

## 2023-12-05 ENCOUNTER — LAB VISIT (OUTPATIENT)
Dept: LAB | Facility: HOSPITAL | Age: 53
End: 2023-12-05
Attending: INTERNAL MEDICINE
Payer: COMMERCIAL

## 2023-12-05 DIAGNOSIS — Z94.4 S/P LIVER TRANSPLANT: ICD-10-CM

## 2023-12-05 LAB
ALBUMIN SERPL BCP-MCNC: 3.9 G/DL (ref 3.5–5.2)
ALP SERPL-CCNC: 75 U/L (ref 55–135)
ALT SERPL W/O P-5'-P-CCNC: 29 U/L (ref 10–44)
ANION GAP SERPL CALC-SCNC: 11 MMOL/L (ref 8–16)
AST SERPL-CCNC: 26 U/L (ref 10–40)
BASOPHILS # BLD AUTO: 0.08 K/UL (ref 0–0.2)
BASOPHILS NFR BLD: 0.8 % (ref 0–1.9)
BILIRUB SERPL-MCNC: 0.4 MG/DL (ref 0.1–1)
BUN SERPL-MCNC: 22 MG/DL (ref 6–20)
CALCIUM SERPL-MCNC: 10.3 MG/DL (ref 8.7–10.5)
CHLORIDE SERPL-SCNC: 107 MMOL/L (ref 95–110)
CO2 SERPL-SCNC: 22 MMOL/L (ref 23–29)
CREAT SERPL-MCNC: 1.1 MG/DL (ref 0.5–1.4)
DIFFERENTIAL METHOD: NORMAL
EOSINOPHIL # BLD AUTO: 0.3 K/UL (ref 0–0.5)
EOSINOPHIL NFR BLD: 3.3 % (ref 0–8)
ERYTHROCYTE [DISTWIDTH] IN BLOOD BY AUTOMATED COUNT: 13.3 % (ref 11.5–14.5)
EST. GFR  (NO RACE VARIABLE): >60 ML/MIN/1.73 M^2
GLUCOSE SERPL-MCNC: 111 MG/DL (ref 70–110)
HCT VFR BLD AUTO: 41.6 % (ref 37–48.5)
HGB BLD-MCNC: 13.9 G/DL (ref 12–16)
IMM GRANULOCYTES # BLD AUTO: 0.03 K/UL (ref 0–0.04)
IMM GRANULOCYTES NFR BLD AUTO: 0.3 % (ref 0–0.5)
LYMPHOCYTES # BLD AUTO: 2.2 K/UL (ref 1–4.8)
LYMPHOCYTES NFR BLD: 21.2 % (ref 18–48)
MCH RBC QN AUTO: 29.6 PG (ref 27–31)
MCHC RBC AUTO-ENTMCNC: 33.4 G/DL (ref 32–36)
MCV RBC AUTO: 89 FL (ref 82–98)
MONOCYTES # BLD AUTO: 1 K/UL (ref 0.3–1)
MONOCYTES NFR BLD: 10.2 % (ref 4–15)
NEUTROPHILS # BLD AUTO: 6.5 K/UL (ref 1.8–7.7)
NEUTROPHILS NFR BLD: 64.2 % (ref 38–73)
NRBC BLD-RTO: 0 /100 WBC
PLATELET # BLD AUTO: 283 K/UL (ref 150–450)
PMV BLD AUTO: 9.9 FL (ref 9.2–12.9)
POTASSIUM SERPL-SCNC: 4.5 MMOL/L (ref 3.5–5.1)
PROT SERPL-MCNC: 7.3 G/DL (ref 6–8.4)
RBC # BLD AUTO: 4.7 M/UL (ref 4–5.4)
SODIUM SERPL-SCNC: 140 MMOL/L (ref 136–145)
WBC # BLD AUTO: 10.15 K/UL (ref 3.9–12.7)

## 2023-12-05 PROCEDURE — 85025 COMPLETE CBC W/AUTO DIFF WBC: CPT | Performed by: INTERNAL MEDICINE

## 2023-12-05 PROCEDURE — 80197 ASSAY OF TACROLIMUS: CPT | Performed by: INTERNAL MEDICINE

## 2023-12-05 PROCEDURE — 36415 COLL VENOUS BLD VENIPUNCTURE: CPT | Performed by: INTERNAL MEDICINE

## 2023-12-05 PROCEDURE — 80053 COMPREHEN METABOLIC PANEL: CPT | Performed by: INTERNAL MEDICINE

## 2023-12-06 LAB — TACROLIMUS BLD-MCNC: 8.5 NG/ML (ref 5–15)

## 2023-12-08 ENCOUNTER — PATIENT MESSAGE (OUTPATIENT)
Dept: TRANSPLANT | Facility: CLINIC | Age: 53
End: 2023-12-08
Payer: COMMERCIAL

## 2023-12-08 DIAGNOSIS — Z94.4 S/P LIVER TRANSPLANT: ICD-10-CM

## 2023-12-08 RX ORDER — TACROLIMUS 1 MG/1
CAPSULE ORAL
Qty: 270 CAPSULE | Refills: 3 | Status: SHIPPED | OUTPATIENT
Start: 2023-12-08 | End: 2023-12-27

## 2023-12-08 RX ORDER — MYCOPHENOLATE MOFETIL 250 MG/1
500 CAPSULE ORAL 2 TIMES DAILY
Qty: 360 CAPSULE | Refills: 3 | Status: SHIPPED | OUTPATIENT
Start: 2023-12-08 | End: 2024-12-07

## 2023-12-08 NOTE — TELEPHONE ENCOUNTER
Sent message changes with patient and to get labs on 12/18/23    ----- Message from Sharmila Pacheco MD sent at 12/7/2023 10:58 PM CST -----  The Labs are stable; lower cellept to 500 mg bid from 1000 mg bid and lower prograf to 2/1 from 2/2 and repeat labs in 2 weeks- please let patient know.

## 2023-12-14 NOTE — PROGRESS NOTES
Subjective:      Patient ID: Malu Montes is a 53 y.o. female.    Chief Complaint:  No chief complaint on file.    History of Present Illness  Malu Montes with HLD, hypothyroidism, and ESLD who presents today for follow up of new diagnosis of osteoporosis. Previous patient of mine. Last seen in Aug 2023.      The patient location is: at home   The chief complaint leading to consultation is: osteopenia    Visit type: audiovisual    Face to Face time with patient: 20 minutes  30 minutes of total time spent on the encounter, which includes face to face time and non-face to face time preparing to see the patient (eg, review of tests), Obtaining and/or reviewing separately obtained history, Documenting clinical information in the electronic or other health record, Independently interpreting results (not separately reported) and communicating results to the patient/family/caregiver, or Care coordination (not separately reported).    Each patient to whom he or she provides medical services by telemedicine is:  (1) informed of the relationship between the physician and patient and the respective role of any other health care provider with respect to management of the patient; and (2) notified that he or she may decline to receive medical services by telemedicine and may withdraw from such care at any time.    We first met patient during June 2022 admission for HLD, hypothyroidism, and ESLD. No personal history of DM. Patient admitted for liver transplant. Endocrinology consulted for BG management.    She is here for interim visit to discuss labs and osteopenia.     Has 2 new grand babies and has been caring for them as her daughter has flu.   She has been suffering with more joint pains from repetitive motions     With regards to hyperglycemia,     She is no longer on prednisone (was on briefly earlier this year 2023). Reviewed labs and noted BG of 172 with repeat a month later 112. Will check A1c.     Denies  FH of diabetes  Plans on starting to exercise.   She is trying to follow low carb diet. Does report she has a sweet tooth.     Lab Results   Component Value Date    HGBA1C 5.4 06/05/2022     With regards to osteopenia,     Family history: none    Last menses in Aug 2023- has been having more hot flashes     current medication: none    She was taken off of calcium around in 2023  Calcium intake- has calcium in diet  Vit D intake- 50 mcg daily     Weight bearing exercise- plans on starting; just joined gym  Has done boot camp recently  Recent falls- none  Dental work- does not need    No recent fractures   No significant height loss (>2 inches)    tob use - rarely; past use quit around 2022  etoh use-  denies    No current diarrhea or h/o malabsorption    Denies chronic exposure to steroid therapy, anticoagulants, proton pump inhibitors or antiseizure medications.     Denies GERD, indigestion, or gastric bypass surgery.     + history of thyroid disease,   Denies anemia  + kidney stones   + kidney disease.     Denies active malignancy, history of malignancy the involved the bone, prior radiation treatment, or unexplained elevations of alk phos on labs     Last BMD dated 8/4/2023  FINDINGS:   Lumbar spine (L1-L4):               T-score is -1.1, and Z-score is -0.2.     Femoral neck:                          T-score is -1.2, and Z-score is 0.3.     Total hip:                                T-score is -1.2, and Z-score is -0.7.     Distal 1/3 radius:                      Not applicable  Fracture Risk (FRAX)  5.1% risk of a major osteoporotic fracture in the next 10 years.  0.3% risk of hip fracture in the next 10 years.     Impression:  *Low bone mass (Osteopenia); FRAX calculations do not support treatment as osteoporosis.  RECOMMENDATIONS:  *Daily calcium intake 0199-3226 mg, dietary sources preferred; Vitamin D 1089-1937 IU daily.  *Weight bearing exercise and fall precautions.  *No additional pharmacologic therapy  recommended at this time.  *Repeat BMD in 2 years..     Latest Reference Range & Units 02/20/23 07:09   Vit D, 25-Hydroxy 30 - 96 ng/mL 41     With regards to hypothyroidism,     Managed by PCP   On LT4 50 mcg daily   Denies missing doses.  Not taking correctly but plans on starting.    Lab Results   Component Value Date    TSH 3.710 06/15/2023    FREET4 0.94 07/15/2021     Review of Systems   Constitutional:  Negative for fatigue and unexpected weight change.   Eyes:  Negative for visual disturbance.   Endocrine: Negative for polydipsia, polyphagia and polyuria.   Musculoskeletal:  Positive for arthralgias and back pain. Negative for gait problem.     Lab Review:   Lab Results   Component Value Date    HGBA1C 5.4 06/05/2022    HGBA1C 4.6 07/15/2021    HGBA1C 5.3 04/23/2015      Lab Results   Component Value Date    CHOL 258 (H) 10/03/2022    HDL 32 (L) 10/03/2022    LDLCALC 188.8 (H) 10/03/2022    TRIG 186 (H) 10/03/2022    CHOLHDL 12.4 (L) 10/03/2022     Lab Results   Component Value Date     12/05/2023    K 4.5 12/05/2023     12/05/2023    CO2 22 (L) 12/05/2023     (H) 12/05/2023    BUN 22 (H) 12/05/2023    CREATININE 1.1 12/05/2023    CALCIUM 10.3 12/05/2023    PROT 7.3 12/05/2023    ALBUMIN 3.9 12/05/2023    BILITOT 0.4 12/05/2023    ALKPHOS 75 12/05/2023    AST 26 12/05/2023    ALT 29 12/05/2023    ANIONGAP 11 12/05/2023    ESTGFRAFRICA >60.0 07/28/2022    EGFRNONAA 58.3 (A) 07/28/2022    TSH 3.710 06/15/2023     Vit D, 25-Hydroxy   Date Value Ref Range Status   02/20/2023 41 30 - 96 ng/mL Final     Comment:     Vitamin D deficiency.........<10 ng/mL                              Vitamin D insufficiency......10-29 ng/mL       Vitamin D sufficiency........> or equal to 30 ng/mL  Vitamin D toxicity............>100 ng/mL       Assessment and Plan     1. Hypothyroidism, unspecified type  Lipid Panel      2. Hyperglycemia  Hemoglobin A1C      3. Steroid-induced hyperglycemia        4. Vitamin D  deficiency        5. Secondary hyperparathyroidism of renal origin        6. Hyperlipidemia, unspecified hyperlipidemia type        7. Osteopenia of left hip          Steroid-induced hyperglycemia  She is no longer on prednisone (was on briefly on in Nov 2023). Reviewed labs and noted BG of 172 with repeat a month later 112. Will check A1c.     Vitamin D deficiency  Continue vitamin D    Secondary hyperparathyroidism of renal origin  Managed by nephrology       Hypothyroidism  Managed by PCP   On LT4 50 mcg daily   Last TSH at goal     Hyperlipidemia  Not on medications   Recheck lipid panel    Osteopenia of left hip  No indication for treatment for osteoporosis at this time  Encouraged weight bearing exercise  Avoid alcohol and tobacco     Continue vitamin D 2000 IU daily   Continue calcium in diet. RDA of calcium is 3388-1250 mg daily, preferably from food     Check bone density next year    Follow up in about 1 year (around 12/18/2024).

## 2023-12-18 ENCOUNTER — OFFICE VISIT (OUTPATIENT)
Dept: ENDOCRINOLOGY | Facility: CLINIC | Age: 53
End: 2023-12-18
Payer: COMMERCIAL

## 2023-12-18 DIAGNOSIS — N25.81 SECONDARY HYPERPARATHYROIDISM OF RENAL ORIGIN: ICD-10-CM

## 2023-12-18 DIAGNOSIS — R73.9 HYPERGLYCEMIA: ICD-10-CM

## 2023-12-18 DIAGNOSIS — E03.9 HYPOTHYROIDISM, UNSPECIFIED TYPE: Primary | ICD-10-CM

## 2023-12-18 DIAGNOSIS — T38.0X5A STEROID-INDUCED HYPERGLYCEMIA: ICD-10-CM

## 2023-12-18 DIAGNOSIS — R73.9 STEROID-INDUCED HYPERGLYCEMIA: ICD-10-CM

## 2023-12-18 DIAGNOSIS — E78.5 HYPERLIPIDEMIA, UNSPECIFIED HYPERLIPIDEMIA TYPE: ICD-10-CM

## 2023-12-18 DIAGNOSIS — M85.852 OSTEOPENIA OF LEFT HIP: ICD-10-CM

## 2023-12-18 DIAGNOSIS — E55.9 VITAMIN D DEFICIENCY: ICD-10-CM

## 2023-12-18 PROCEDURE — 99214 OFFICE O/P EST MOD 30 MIN: CPT | Mod: 95,,, | Performed by: NURSE PRACTITIONER

## 2023-12-18 PROCEDURE — 99214 PR OFFICE/OUTPT VISIT, EST, LEVL IV, 30-39 MIN: ICD-10-PCS | Mod: 95,,, | Performed by: NURSE PRACTITIONER

## 2023-12-18 RX ORDER — DEXTROAMPHETAMINE SACCHARATE, AMPHETAMINE ASPARTATE, DEXTROAMPHETAMINE SULFATE AND AMPHETAMINE SULFATE 5; 5; 5; 5 MG/1; MG/1; MG/1; MG/1
1 TABLET ORAL 2 TIMES DAILY
Qty: 60 TABLET | Refills: 0 | Status: SHIPPED | OUTPATIENT
Start: 2023-12-18 | End: 2024-01-16 | Stop reason: SDUPTHER

## 2023-12-18 NOTE — ASSESSMENT & PLAN NOTE
No indication for treatment for osteoporosis at this time  Encouraged weight bearing exercise  Avoid alcohol and tobacco     Continue vitamin D 2000 IU daily   Continue calcium in diet. RDA of calcium is 1867-9890 mg daily, preferably from food     Check bone density next year

## 2023-12-18 NOTE — ASSESSMENT & PLAN NOTE
She is no longer on prednisone (was on briefly on in Nov 2023). Reviewed labs and noted BG of 172 with repeat a month later 112. Will check A1c.

## 2023-12-18 NOTE — PATIENT INSTRUCTIONS
Hyperglycemia    She is no longer on prednisone (was on briefly earlier this year 2023). Reviewed labs and noted BG of 172 with repeat a month later 112. Will check A1c.     Osteopenia    Off steroids    Check bone density in 2024   Avoid calcium supplementation but continue calcium from diet   Continue vitamin D

## 2023-12-19 ENCOUNTER — LAB VISIT (OUTPATIENT)
Dept: LAB | Facility: HOSPITAL | Age: 53
End: 2023-12-19
Attending: INTERNAL MEDICINE
Payer: COMMERCIAL

## 2023-12-19 ENCOUNTER — PATIENT MESSAGE (OUTPATIENT)
Dept: TRANSPLANT | Facility: CLINIC | Age: 53
End: 2023-12-19
Payer: COMMERCIAL

## 2023-12-19 ENCOUNTER — PATIENT MESSAGE (OUTPATIENT)
Dept: NEPHROLOGY | Facility: CLINIC | Age: 53
End: 2023-12-19
Payer: COMMERCIAL

## 2023-12-19 ENCOUNTER — PATIENT MESSAGE (OUTPATIENT)
Dept: ENDOCRINOLOGY | Facility: CLINIC | Age: 53
End: 2023-12-19
Payer: COMMERCIAL

## 2023-12-19 DIAGNOSIS — E03.9 HYPOTHYROIDISM, UNSPECIFIED TYPE: Primary | ICD-10-CM

## 2023-12-19 DIAGNOSIS — Z94.4 S/P LIVER TRANSPLANT: ICD-10-CM

## 2023-12-19 DIAGNOSIS — E78.5 HYPERLIPIDEMIA, UNSPECIFIED HYPERLIPIDEMIA TYPE: ICD-10-CM

## 2023-12-19 DIAGNOSIS — R73.9 HYPERGLYCEMIA: ICD-10-CM

## 2023-12-19 LAB
ALBUMIN SERPL BCP-MCNC: 3.8 G/DL (ref 3.5–5.2)
ALP SERPL-CCNC: 71 U/L (ref 55–135)
ALT SERPL W/O P-5'-P-CCNC: 31 U/L (ref 10–44)
ANION GAP SERPL CALC-SCNC: 6 MMOL/L (ref 8–16)
AST SERPL-CCNC: 29 U/L (ref 10–40)
BASOPHILS # BLD AUTO: 0.1 K/UL (ref 0–0.2)
BASOPHILS NFR BLD: 1.2 % (ref 0–1.9)
BILIRUB SERPL-MCNC: 0.2 MG/DL (ref 0.1–1)
BUN SERPL-MCNC: 22 MG/DL (ref 6–20)
CALCIUM SERPL-MCNC: 9.4 MG/DL (ref 8.7–10.5)
CHLORIDE SERPL-SCNC: 110 MMOL/L (ref 95–110)
CO2 SERPL-SCNC: 24 MMOL/L (ref 23–29)
CREAT SERPL-MCNC: 1.2 MG/DL (ref 0.5–1.4)
DIFFERENTIAL METHOD: NORMAL
EOSINOPHIL # BLD AUTO: 0.4 K/UL (ref 0–0.5)
EOSINOPHIL NFR BLD: 4.9 % (ref 0–8)
ERYTHROCYTE [DISTWIDTH] IN BLOOD BY AUTOMATED COUNT: 13 % (ref 11.5–14.5)
EST. GFR  (NO RACE VARIABLE): 54.5 ML/MIN/1.73 M^2
GLUCOSE SERPL-MCNC: 109 MG/DL (ref 70–110)
HCT VFR BLD AUTO: 42.8 % (ref 37–48.5)
HGB BLD-MCNC: 13.9 G/DL (ref 12–16)
IMM GRANULOCYTES # BLD AUTO: 0.01 K/UL (ref 0–0.04)
IMM GRANULOCYTES NFR BLD AUTO: 0.1 % (ref 0–0.5)
LYMPHOCYTES # BLD AUTO: 2.3 K/UL (ref 1–4.8)
LYMPHOCYTES NFR BLD: 28 % (ref 18–48)
MCH RBC QN AUTO: 30.3 PG (ref 27–31)
MCHC RBC AUTO-ENTMCNC: 32.5 G/DL (ref 32–36)
MCV RBC AUTO: 93 FL (ref 82–98)
MONOCYTES # BLD AUTO: 0.7 K/UL (ref 0.3–1)
MONOCYTES NFR BLD: 8.7 % (ref 4–15)
NEUTROPHILS # BLD AUTO: 4.7 K/UL (ref 1.8–7.7)
NEUTROPHILS NFR BLD: 57.1 % (ref 38–73)
NRBC BLD-RTO: 0 /100 WBC
PLATELET # BLD AUTO: 344 K/UL (ref 150–450)
PMV BLD AUTO: 10.6 FL (ref 9.2–12.9)
POTASSIUM SERPL-SCNC: 5 MMOL/L (ref 3.5–5.1)
PROT SERPL-MCNC: 7 G/DL (ref 6–8.4)
RBC # BLD AUTO: 4.58 M/UL (ref 4–5.4)
SODIUM SERPL-SCNC: 140 MMOL/L (ref 136–145)
WBC # BLD AUTO: 8.24 K/UL (ref 3.9–12.7)

## 2023-12-19 PROCEDURE — 80197 ASSAY OF TACROLIMUS: CPT | Performed by: INTERNAL MEDICINE

## 2023-12-19 PROCEDURE — 80053 COMPREHEN METABOLIC PANEL: CPT | Performed by: INTERNAL MEDICINE

## 2023-12-19 PROCEDURE — 85025 COMPLETE CBC W/AUTO DIFF WBC: CPT | Performed by: INTERNAL MEDICINE

## 2023-12-19 RX ORDER — ATORVASTATIN CALCIUM 40 MG/1
40 TABLET, FILM COATED ORAL DAILY
Qty: 90 TABLET | Refills: 3 | Status: SHIPPED | OUTPATIENT
Start: 2023-12-19 | End: 2024-12-18

## 2023-12-19 RX ORDER — ATORVASTATIN CALCIUM 40 MG/1
40 TABLET, FILM COATED ORAL DAILY
Qty: 90 TABLET | Refills: 3 | Status: SHIPPED | OUTPATIENT
Start: 2023-12-19 | End: 2023-12-19 | Stop reason: SDUPTHER

## 2023-12-20 DIAGNOSIS — R73.9 HYPERGLYCEMIA: Primary | ICD-10-CM

## 2023-12-20 LAB — TACROLIMUS BLD-MCNC: 6.4 NG/ML (ref 5–15)

## 2023-12-20 RX ORDER — METFORMIN HYDROCHLORIDE 500 MG/1
500 TABLET, EXTENDED RELEASE ORAL 2 TIMES DAILY WITH MEALS
Qty: 180 TABLET | Refills: 3 | Status: SHIPPED | OUTPATIENT
Start: 2023-12-20 | End: 2024-12-19

## 2023-12-27 ENCOUNTER — PATIENT MESSAGE (OUTPATIENT)
Dept: TRANSPLANT | Facility: CLINIC | Age: 53
End: 2023-12-27
Payer: COMMERCIAL

## 2023-12-27 DIAGNOSIS — Z94.4 S/P LIVER TRANSPLANT: ICD-10-CM

## 2023-12-27 DIAGNOSIS — Z94.4 LIVER REPLACED BY TRANSPLANT: Primary | ICD-10-CM

## 2023-12-27 RX ORDER — TACROLIMUS 1 MG/1
1 CAPSULE ORAL EVERY 12 HOURS
Qty: 180 CAPSULE | Refills: 3 | Status: SHIPPED | OUTPATIENT
Start: 2023-12-27 | End: 2024-01-16

## 2023-12-27 NOTE — TELEPHONE ENCOUNTER
Portal message sent with readback, repeat labs 1/8/24----- Message from Sharmila Pacheco MD sent at 12/27/2023 11:06 AM CST -----  The Labs are stable - please let patient know.  Will try lowering prograf dose -decrease prograf to 1/1 from 2/1 and repeat labs in 2 weeks

## 2024-01-02 ENCOUNTER — PATIENT MESSAGE (OUTPATIENT)
Dept: DERMATOLOGY | Facility: CLINIC | Age: 54
End: 2024-01-02
Payer: COMMERCIAL

## 2024-01-02 ENCOUNTER — PATIENT MESSAGE (OUTPATIENT)
Dept: TRANSPLANT | Facility: CLINIC | Age: 54
End: 2024-01-02
Payer: COMMERCIAL

## 2024-01-03 ENCOUNTER — TELEPHONE (OUTPATIENT)
Dept: TRANSPLANT | Facility: CLINIC | Age: 54
End: 2024-01-03
Payer: COMMERCIAL

## 2024-01-03 ENCOUNTER — PATIENT MESSAGE (OUTPATIENT)
Dept: TRANSPLANT | Facility: CLINIC | Age: 54
End: 2024-01-03
Payer: COMMERCIAL

## 2024-01-03 DIAGNOSIS — Z94.4 LIVER REPLACED BY TRANSPLANT: Primary | ICD-10-CM

## 2024-01-03 NOTE — TELEPHONE ENCOUNTER
Sent message to patient to let her know    ----- Message from Sharmila Pacheco MD sent at 1/3/2024  9:41 AM CST -----  yes  ----- Message -----  From: Marii Ruiz RN  Sent: 1/3/2024   8:06 AM CST  To: Sharmila Pacheco MD    Patient wants to know if I can add a magnesium to her labs on 1/08/24?

## 2024-01-04 ENCOUNTER — PATIENT MESSAGE (OUTPATIENT)
Dept: OPTOMETRY | Facility: CLINIC | Age: 54
End: 2024-01-04
Payer: COMMERCIAL

## 2024-01-04 ENCOUNTER — ON-DEMAND VIRTUAL (OUTPATIENT)
Dept: URGENT CARE | Facility: CLINIC | Age: 54
End: 2024-01-04
Payer: COMMERCIAL

## 2024-01-04 DIAGNOSIS — H02.9 EYELID ABNORMALITY: Primary | ICD-10-CM

## 2024-01-04 PROCEDURE — 99212 OFFICE O/P EST SF 10 MIN: CPT | Mod: 95,,, | Performed by: PHYSICIAN ASSISTANT

## 2024-01-05 ENCOUNTER — LAB VISIT (OUTPATIENT)
Dept: LAB | Facility: HOSPITAL | Age: 54
End: 2024-01-05
Attending: INTERNAL MEDICINE
Payer: COMMERCIAL

## 2024-01-05 ENCOUNTER — TELEPHONE (OUTPATIENT)
Dept: OPTOMETRY | Facility: CLINIC | Age: 54
End: 2024-01-05
Payer: COMMERCIAL

## 2024-01-05 ENCOUNTER — OFFICE VISIT (OUTPATIENT)
Dept: NEPHROLOGY | Facility: CLINIC | Age: 54
End: 2024-01-05
Payer: COMMERCIAL

## 2024-01-05 VITALS
HEIGHT: 63 IN | OXYGEN SATURATION: 98 % | DIASTOLIC BLOOD PRESSURE: 80 MMHG | SYSTOLIC BLOOD PRESSURE: 110 MMHG | HEART RATE: 99 BPM | WEIGHT: 145.75 LBS | BODY MASS INDEX: 25.82 KG/M2

## 2024-01-05 DIAGNOSIS — N18.31 STAGE 3A CHRONIC KIDNEY DISEASE: Primary | ICD-10-CM

## 2024-01-05 DIAGNOSIS — Z94.4 S/P LIVER TRANSPLANT: ICD-10-CM

## 2024-01-05 DIAGNOSIS — D84.821 IMMUNOSUPPRESSION DUE TO DRUG THERAPY: ICD-10-CM

## 2024-01-05 DIAGNOSIS — N25.81 SECONDARY HYPERPARATHYROIDISM OF RENAL ORIGIN: ICD-10-CM

## 2024-01-05 DIAGNOSIS — N18.31 STAGE 3A CHRONIC KIDNEY DISEASE: ICD-10-CM

## 2024-01-05 DIAGNOSIS — Z79.899 IMMUNOSUPPRESSION DUE TO DRUG THERAPY: ICD-10-CM

## 2024-01-05 LAB
BACTERIA #/AREA URNS AUTO: NORMAL /HPF
BILIRUB UR QL STRIP: NEGATIVE
CLARITY UR REFRACT.AUTO: ABNORMAL
COLOR UR AUTO: YELLOW
CREAT UR-MCNC: 106 MG/DL (ref 15–325)
GLUCOSE UR QL STRIP: NEGATIVE
HGB UR QL STRIP: NEGATIVE
KETONES UR QL STRIP: NEGATIVE
LEUKOCYTE ESTERASE UR QL STRIP: ABNORMAL
MICROSCOPIC COMMENT: NORMAL
NITRITE UR QL STRIP: NEGATIVE
PH UR STRIP: 5 [PH] (ref 5–8)
PROT UR QL STRIP: NEGATIVE
PROT UR-MCNC: 13 MG/DL (ref 0–15)
PROT/CREAT UR: 0.12 MG/G{CREAT} (ref 0–0.2)
RBC #/AREA URNS AUTO: 1 /HPF (ref 0–4)
SP GR UR STRIP: 1.02 (ref 1–1.03)
SQUAMOUS #/AREA URNS AUTO: 5 /HPF
URN SPEC COLLECT METH UR: ABNORMAL
WBC #/AREA URNS AUTO: 2 /HPF (ref 0–5)

## 2024-01-05 PROCEDURE — 81001 URINALYSIS AUTO W/SCOPE: CPT | Performed by: INTERNAL MEDICINE

## 2024-01-05 PROCEDURE — 82570 ASSAY OF URINE CREATININE: CPT | Performed by: INTERNAL MEDICINE

## 2024-01-05 PROCEDURE — 99215 OFFICE O/P EST HI 40 MIN: CPT | Mod: S$GLB,,, | Performed by: INTERNAL MEDICINE

## 2024-01-05 PROCEDURE — 99999 PR PBB SHADOW E&M-EST. PATIENT-LVL IV: CPT | Mod: PBBFAC,,, | Performed by: INTERNAL MEDICINE

## 2024-01-05 RX ORDER — CHOLECALCIFEROL (VITAMIN D3) 25 MCG
2000 TABLET ORAL DAILY
COMMUNITY

## 2024-01-05 NOTE — PROGRESS NOTES
REASON FOR CONSULT/CHIEF COMPLAIN: Acute kidney injury    REFERRING PHYSICIAN: Killian Dodd DO    HISTORY OF PRESENT ILLNESS: 53 y.o. female  patient was referred here for abnormal renal function.   No chronic NSAID use, no known exposure to lithium, lead. No family history of Lupus, kidney disease or deafness. No ingestion of licorice. One kidney stone at age 25 after her first baby, needed lithotripsy. No smoking.   Had an episode of pancreatitis in 2021. Cirrhosis diagnosed 2021, blamed on alcohol. Has needed initially once every two weeks, later once week paracentesis. Kidney function deteriorated and then improved after TIPS procedure. Now s/p liver transplant on 2021. Post op has been uncomplicated so far.   Denied any new issues with urination including dysuria, hematuria, urgency, hesitancy, nocturia, incomplete voiding. Denied chest pain, nausea, vomiting, abd pain, nausea, vomiting, diarrhea, shortness of breath, pedal edema, Orthopnea, PND    ROS:  General: negative for chills, or fatigue  Respiratory: No cough, shortness of breath, or wheezing  Cardiovascular: No chest pain or dyspnea   Gastrointestinal: No abdominal pain, change in bowel habits    PAST MEDICAL HISTORY:  Past Medical History:   Diagnosis Date    ADD (attention deficit disorder)     Anxiety     Ascites of liver     Cirrhosis 2021    CKD (chronic kidney disease) stage 3, GFR 30-59 ml/min     Constipation     ETOH abuse     Hyperlipidemia     Hypothyroidism     Liver transplant candidate 2022    Other ascites 2021    Pancreatitis, acute     Tobacco use     Vitamin D deficiency        PAST SURGICAL HISTORY:  Past Surgical History:   Procedure Laterality Date    AUGMENTATION OF BREAST      second set    breast augmentation       SECTION      COLONOSCOPY N/A 2021    Procedure: COLONOSCOPY;  Surgeon: Dao Gray MD;  Location: [facility] ([address] [place] [address]);  Service: Endoscopy;   Laterality: N/A;  COVID test 9/14/21 General surgery clinic -     CYSTOSCOPY N/A 09/10/2021    Procedure: CYSTOSCOPY;  Surgeon: Rj Sánchez Jr., MD;  Location: [facility] OR 1ST FLR;  Service: Urology;  Laterality: N/A;    ESOPHAGOGASTRODUODENOSCOPY N/A 09/17/2021    Procedure: ESOPHAGOGASTRODUODENOSCOPY (EGD);  Surgeon: Dao Gray MD;  Location: [facility] ENDO (2ND FLR);  Service: Endoscopy;  Laterality: N/A;  labs current on 9/7    ESOPHAGOGASTRODUODENOSCOPY N/A 10/26/2021    Procedure: ESOPHAGOGASTRODUODENOSCOPY (EGD);  Surgeon: Dao Gray MD;  Location: [facility] (2ND FLR);  Service: Endoscopy;  Laterality: N/A;  to be done the week of 10/18/21 per Dr. Gray-[facility]-10/23/21-[facility]-   10/25 arrival time confirmed with pt-rb    ESOPHAGOGASTRODUODENOSCOPY Left 12/21/2021    Procedure: EGD (ESOPHAGOGASTRODUODENOSCOPY);  Surgeon: Koffi Monteiro MD;  Location: [facility] (4TH FLR);  Service: Endoscopy;  Laterality: Left;  Rapid  pt requested AM appt and first available in December-  labs morning of procedure-  12/14 lvm to confirm appt-rb    HEMORRHOID SURGERY      LIVER TRANSPLANT N/A 06/05/2022    Procedure: TRANSPLANT, LIVER;  Surgeon: Franco Slaughter MD;  Location: [facility] OR 2ND FLR;  Service: Transplant;  Laterality: N/A;    PERITONEOCENTESIS Right 06/08/2021    Procedure: PARACENTESIS, ABDOMINAL;  Surgeon: Jay Torre MD;  Location: [facility] CATH LAB;  Service: Radiology;  Laterality: Right;    PERITONEOCENTESIS Right 06/25/2021    Procedure: PARACENTESIS, ABDOMINAL;  Surgeon: Jay Torre MD;  Location: [facility] CATH LAB;  Service: Radiology;  Laterality: Right;    PERITONEOCENTESIS Right 07/12/2021    Procedure: PARACENTESIS, ABDOMINAL;  Surgeon: Jay Torre MD;  Location: [facility] CATH LAB;  Service: Radiology;  Laterality: Right;    PERITONEOCENTESIS Right 07/23/2021    Procedure: PARACENTESIS, ABDOMINAL;  Surgeon: Edward De La Torre II, MD;  Location: [facility] CATH LAB;  Service:  Interventional Radiology;  Laterality: Right;    PERITONEOCENTESIS Right 08/03/2021    Procedure: PARACENTESIS, ABDOMINAL;  Surgeon: Franco Cheung MD;  Location: [facility] CATH LAB;  Service: Radiology;  Laterality: Right;    PERITONEOCENTESIS Right 08/16/2021    Procedure: PARACENTESIS, ABDOMINAL;  Surgeon: Jay Torre MD;  Location: [facility] CATH LAB;  Service: Radiology;  Laterality: Right;    PERITONEOCENTESIS Right 08/23/2021    Procedure: PARACENTESIS, ABDOMINAL;  Surgeon: Jay Torre MD;  Location: [facility] CATH LAB;  Service: Radiology;  Laterality: Right;    PERITONEOCENTESIS Right 09/16/2021    Procedure: PARACENTESIS, ABDOMINAL;  Surgeon: Jay Torre MD;  Location: [facility] CATH LAB;  Service: Radiology;  Laterality: Right;    PERITONEOCENTESIS N/A 09/24/2021    Procedure: PARACENTESIS, ABDOMINAL;  Surgeon: Jay Torre MD;  Location: [facility] CATH LAB;  Service: Radiology;  Laterality: N/A;    PERITONEOCENTESIS Right 12/23/2021    Procedure: PARACENTESIS, ABDOMINAL;  Surgeon: Jay Torre MD;  Location: [facility] CATH LAB;  Service: Radiology;  Laterality: Right;    PERITONEOCENTESIS N/A 12/30/2021    Procedure: PARACENTESIS, ABDOMINAL;  Surgeon: Salas Cabrera MD;  Location: [facility] CATH LAB;  Service: Radiology;  Laterality: N/A;    RETROGRADE PYELOGRAPHY Bilateral 09/10/2021    Procedure: PYELOGRAM, RETROGRADE;  Surgeon: Rj Sánchez Jr., MD;  Location: [address];  Service: Urology;  Laterality: Bilateral;    TONSILLECTOMY         FAMILY HISTORY:   Family History   Problem Relation Age of Onset    Cancer Mother         Uterine Cancer     No Known Problems Father     No Known Problems Sister     Sleep apnea Daughter     ADD / ADHD Daughter     Heart disease Maternal Grandmother         CHF    COPD Maternal Grandfather     Heart disease Paternal Grandmother         CHF    Colon cancer Neg Hx     Inflammatory bowel disease Neg Hx     Stomach cancer Neg Hx     Breast cancer Neg Hx      Ovarian cancer Neg Hx     Learning disabilities Neg Hx        SOCIAL HISTORY:  Social History     Socioeconomic History    Marital status:     Number of children: 1   Occupational History    Occupation: Assistant   Tobacco Use    Smoking status: Some Days     Current packs/day: 0.25     Average packs/day: 0.3 packs/day for 27.0 years (6.8 ttl pk-yrs)     Types: Cigarettes    Smokeless tobacco: Never    Tobacco comments:     two cigarettes a day    Substance and Sexual Activity    Alcohol use: Not Currently     Comment: quit caridad 15    Drug use: No    Sexual activity: Yes     Partners: Male   Social History Narrative    They live in [place], [place]      Social Determinants of Health     Financial Resource Strain: Low Risk  (7/19/2023)    Overall Financial Resource Strain (CARDIA)     Difficulty of Paying Living Expenses: Not hard at all   Food Insecurity: No Food Insecurity (7/19/2023)    Hunger Vital Sign     Worried About Running Out of Food in the Last Year: Never true     Ran Out of Food in the Last Year: Never true   Transportation Needs: No Transportation Needs (7/19/2023)    PRAPARE - Transportation     Lack of Transportation (Medical): No     Lack of Transportation (Non-Medical): No   Physical Activity: Insufficiently Active (7/19/2023)    Exercise Vital Sign     Days of Exercise per Week: 3 days     Minutes of Exercise per Session: 30 min   Stress: No Stress Concern Present (7/19/2023)    [employer] - Occupational Stress Questionnaire     Feeling of Stress : Only a little   Social Connections: Unknown (7/19/2023)    Social Connection and Isolation Panel [NHANES]     Frequency of Communication with Friends and Family: More than three times a week     Frequency of Social Gatherings with Friends and Family: Three times a week     Active Member of Clubs or Organizations: No     Attends Club or Organization Meetings: Never     Marital Status:    Housing Stability: Unknown  (7/19/2023)    Housing Stability Vital Sign     Unable to Pay for Housing in the Last Year: No     Unstable Housing in the Last Year: No       ALLERGIES:  Review of patient's allergies indicates:  No Known Allergies    MEDICATIONS:    Current Outpatient Medications:     aspirin (ECOTRIN) 81 MG EC tablet, Take 81 mg by mouth once daily., Disp: , Rfl:     atorvastatin (LIPITOR) 40 MG tablet, Take 1 tablet (40 mg total) by mouth once daily., Disp: 90 tablet, Rfl: 3    bimatoprost (LATISSE) 0.03 % ophthalmic solution, Place one drop on applicator and apply evenly along the skin of the upper eyelid at base of eyelashes once daily at bedtime; repeat procedure for second eye (use a clean applicator)., Disp: 5 mL, Rfl: 12    dextroamphetamine-amphetamine (ADDERALL) 20 mg tablet, Take 1 tablet by mouth 2 (two) times a day., Disp: 60 tablet, Rfl: 0    famotidine (PEPCID) 20 MG tablet, Take 1 tablet (20 mg total) by mouth every evening., Disp: 30 tablet, Rfl: 11    hydrOXYzine HCL (ATARAX) 25 MG tablet, TAKE 1 TO 2 TABLETS BY MOUTH EVERY NIGHT AT BEDTIME AS NEEDED FOR INSOMNIA, Disp: 60 tablet, Rfl: 1    levothyroxine (SYNTHROID) 50 MCG tablet, Take 1 tablet (50 mcg total) by mouth before breakfast., Disp: 90 tablet, Rfl: 3    linaCLOtide (LINZESS) 72 mcg Cap capsule, Take 2 capsules (144 mcg total) by mouth before breakfast., Disp: 180 capsule, Rfl: 5    metFORMIN (GLUCOPHAGE-XR) 500 MG ER 24hr tablet, Take 1 tablet (500 mg total) by mouth 2 (two) times daily with meals., Disp: 180 tablet, Rfl: 3    mycophenolate (CELLCEPT) 250 mg Cap, Take 2 capsules (500 mg total) by mouth 2 (two) times daily., Disp: 360 capsule, Rfl: 3    tacrolimus (PROGRAF) 1 MG Cap, Take 1 capsule (1 mg total) by mouth every 12 (twelve) hours., Disp: 180 capsule, Rfl: 3    gabapentin (NEURONTIN) 100 MG capsule, Take 2 capsules (200 mg total) by mouth daily as needed (Sleep)., Disp: 90 capsule, Rfl: 11   Medication List with Changes/Refills   Current  "Medications    ASPIRIN (ECOTRIN) 81 MG EC TABLET    Take 81 mg by mouth once daily.    ATORVASTATIN (LIPITOR) 40 MG TABLET    Take 1 tablet (40 mg total) by mouth once daily.    BIMATOPROST (LATISSE) 0.03 % OPHTHALMIC SOLUTION    Place one drop on applicator and apply evenly along the skin of the upper eyelid at base of eyelashes once daily at bedtime; repeat procedure for second eye (use a clean applicator).    DEXTROAMPHETAMINE-AMPHETAMINE (ADDERALL) 20 MG TABLET    Take 1 tablet by mouth 2 (two) times a day.    FAMOTIDINE (PEPCID) 20 MG TABLET    Take 1 tablet (20 mg total) by mouth every evening.    GABAPENTIN (NEURONTIN) 100 MG CAPSULE    Take 2 capsules (200 mg total) by mouth daily as needed (Sleep).    HYDROXYZINE HCL (ATARAX) 25 MG TABLET    TAKE 1 TO 2 TABLETS BY MOUTH EVERY NIGHT AT BEDTIME AS NEEDED FOR INSOMNIA    LEVOTHYROXINE (SYNTHROID) 50 MCG TABLET    Take 1 tablet (50 mcg total) by mouth before breakfast.    LINACLOTIDE (LINZESS) 72 MCG CAP CAPSULE    Take 2 capsules (144 mcg total) by mouth before breakfast.    METFORMIN (GLUCOPHAGE-XR) 500 MG ER 24HR TABLET    Take 1 tablet (500 mg total) by mouth 2 (two) times daily with meals.    MYCOPHENOLATE (CELLCEPT) 250 MG CAP    Take 2 capsules (500 mg total) by mouth 2 (two) times daily.    TACROLIMUS (PROGRAF) 1 MG CAP    Take 1 capsule (1 mg total) by mouth every 12 (twelve) hours.        PHYSICAL EXAM:  /80   Pulse 99   Ht 5' 3" (1.6 m)   Wt 66.1 kg (145 lb 11.6 oz)   SpO2 98%   BMI 25.81 kg/m²     General: No distress, No fever or chills  Neck: No adenopathy,no carotid bruit,no JVD  Lungs:Clear to auscultation bilaterally, No Crackles  Heart: Regular rate and rhythm, no murmur, gallops or rubs  Abdomen: Soft, non distended  Extremities: Atraumatic, no edema in LE  Skin: Turgor normal. No rashes  Neurologic: No focal weakness, oriented.  Dialysis Access: Non applicable        LABS:  Lab Results   Component Value Date    HGB 13.9 12/19/2023 "        Lab Results   Component Value Date    CREATININE 1.2 12/19/2023       Prot/Creat Ratio, Urine   Date Value Ref Range Status   07/10/2023 0.09 0.00 - 0.20 Final   01/30/2023 0.10 0.00 - 0.20 Final   09/21/2022 0.09 0.00 - 0.20 Final       Lab Results   Component Value Date     12/19/2023    K 5.0 12/19/2023    CO2 24 12/19/2023       last PTH   Lab Results   Component Value Date    PTH 45.3 02/20/2023    CALCIUM 9.4 12/19/2023    CAION 1.00 (L) 06/05/2022    PHOS 2.9 06/10/2022       Lab Results   Component Value Date    HGBA1C 6.4 (H) 12/19/2023       Lab Results   Component Value Date    LDLCALC 183.2 (H) 12/19/2023         Creatinine, Urine   Date Value Ref Range Status   07/10/2023 167.0 15.0 - 325.0 mg/dL Final   01/30/2023 110.0 15.0 - 325.0 mg/dL Final   09/21/2022 173.0 15.0 - 325.0 mg/dL Final       Protein, Urine   Date Value Ref Range Status   10/18/2021 11 0 - 15 mg/dL Final   07/30/2021 28 (H) 0 - 15 mg/dL Final     Protein, Urine Random   Date Value Ref Range Status   07/10/2023 15 0 - 15 mg/dL Final   01/30/2023 11 0 - 15 mg/dL Final   09/21/2022 16 (H) 0 - 15 mg/dL Final       Prot/Creat Ratio, Urine   Date Value Ref Range Status   07/10/2023 0.09 0.00 - 0.20 Final   01/30/2023 0.10 0.00 - 0.20 Final   09/21/2022 0.09 0.00 - 0.20 Final         ASSESSMENT:    1) Chronic Kidney disease stage 3a  2) Metabolic acidosis - Resolved  3) Anemia due to iron deficiency pre transplant - Resolved now.  4) S/P liver transplant  5) Secondary hyperparathyroidism - improved   Renal ultrasound 10/21 showed 9.6 and 11.3 cm kidneys (no known issues from child rosa that would explain the reason for her asymmetric kidney sizes). Patients baseline creatinine is less than 1 till June 2021, Prior to liver transplant her creatinine has been above 2 with significant acanthocytes. She has had cystoscopy done in the past with no significant detected abnormalities.Serological work up including ANCA, GBM, MARIBEL,  Cryoglobulin are all negative. Since liver transplant the creatinine trended back down to 1.2 mg/dl, urine microscopy post transplant (x 2) did not show any acanthocytes likely due to resolution of secondary IgA. She  settled down at CKD stage 2-3a.   Electrolytes, in acceptable range. s/p IV Iron infusion for iron def anemia prior to transplant. PTH high before transplant on vitamin D.    - Drink atleast 2996-1774 ml water per day.  - Avoid NSAIDs intake  - Limit dark color sodas, limit animal protein. Calorie count.  - Educated about CKD and the progression.    RTC in 6 months    Diagnosis and plan of care explained to the patient.  Pt Verbalized understanding. Answered all questions.  Thanks for allowing me to participate in the care of this patient.     1:19 PM    Rashel Cohn MD  Nephrology & Critical Care

## 2024-01-05 NOTE — PROGRESS NOTES
Subjective:      Patient ID: Malu Montes is a 53 y.o. female.    Vitals:  vitals were not taken for this visit.     Chief Complaint: Eye Problem      Visit Type: TELE AUDIOVISUAL    Present with the patient at the time of consultation: TELEMED PRESENT WITH PATIENT: None    Past Medical History:   Diagnosis Date    ADD (attention deficit disorder)     Anxiety     Ascites of liver     Cirrhosis 2021    CKD (chronic kidney disease) stage 3, GFR 30-59 ml/min     Constipation     ETOH abuse     Hyperlipidemia     Hypothyroidism     Liver transplant candidate 2022    Other ascites 2021    Pancreatitis, acute     Tobacco use     Vitamin D deficiency      Past Surgical History:   Procedure Laterality Date    AUGMENTATION OF BREAST      second set    breast augmentation       SECTION      COLONOSCOPY N/A 2021    Procedure: COLONOSCOPY;  Surgeon: Dao Gray MD;  Location: [facility] (2ND FLR);  Service: Endoscopy;  Laterality: N/A;  COVID test 21 General surgery clinic [facility]    CYSTOSCOPY N/A 09/10/2021    Procedure: CYSTOSCOPY;  Surgeon: Rj Sánchez Jr., MD;  Location: [facility] OR 1ST FLR;  Service: Urology;  Laterality: N/A;    ESOPHAGOGASTRODUODENOSCOPY N/A 2021    Procedure: ESOPHAGOGASTRODUODENOSCOPY (EGD);  Surgeon: Dao Gray MD;  Location: [facility] (2ND FLR);  Service: Endoscopy;  Laterality: N/A;  labs current on     ESOPHAGOGASTRODUODENOSCOPY N/A 10/26/2021    Procedure: ESOPHAGOGASTRODUODENOSCOPY (EGD);  Surgeon: Dao Gray MD;  Location: [facility] (2ND FLR);  Service: Endoscopy;  Laterality: N/A;  to be done the week of 10/18/21 per Dr. Gray-BB  covid-10/23/21-[facility]   10/25 arrival time confirmed with pt-rb    ESOPHAGOGASTRODUODENOSCOPY Left 2021    Procedure: EGD (ESOPHAGOGASTRODUODENOSCOPY);  Surgeon: Koffi Monteiro MD;  Location: [facility] (4TH FLR);  Service: Endoscopy;  Laterality: Left;  Rapid  pt requested AM appt and first  available in December-  labs morning of procedure-BB  12/14 lvm to confirm appt-rb    HEMORRHOID SURGERY      LIVER TRANSPLANT N/A 06/05/2022    Procedure: TRANSPLANT, LIVER;  Surgeon: Franco Slaughter MD;  Location: [address];  Service: Transplant;  Laterality: N/A;    PERITONEOCENTESIS Right 06/08/2021    Procedure: PARACENTESIS, ABDOMINAL;  Surgeon: Jay Torre MD;  Location: [facility] CATH LAB;  Service: Radiology;  Laterality: Right;    PERITONEOCENTESIS Right 06/25/2021    Procedure: PARACENTESIS, ABDOMINAL;  Surgeon: Jay Torre MD;  Location: [facility] CATH LAB;  Service: Radiology;  Laterality: Right;    PERITONEOCENTESIS Right 07/12/2021    Procedure: PARACENTESIS, ABDOMINAL;  Surgeon: Jay Torre MD;  Location: [facility] CATH LAB;  Service: Radiology;  Laterality: Right;    PERITONEOCENTESIS Right 07/23/2021    Procedure: PARACENTESIS, ABDOMINAL;  Surgeon: Edward De La Torre II, MD;  Location: [facility] CATH LAB;  Service: Interventional Radiology;  Laterality: Right;    PERITONEOCENTESIS Right 08/03/2021    Procedure: PARACENTESIS, ABDOMINAL;  Surgeon: Franco Cheung MD;  Location: [facility] CATH LAB;  Service: Radiology;  Laterality: Right;    PERITONEOCENTESIS Right 08/16/2021    Procedure: PARACENTESIS, ABDOMINAL;  Surgeon: Jay Torre MD;  Location: [facility] CATH LAB;  Service: Radiology;  Laterality: Right;    PERITONEOCENTESIS Right 08/23/2021    Procedure: PARACENTESIS, ABDOMINAL;  Surgeon: Jay Torre MD;  Location: [facility] CATH LAB;  Service: Radiology;  Laterality: Right;    PERITONEOCENTESIS Right 09/16/2021    Procedure: PARACENTESIS, ABDOMINAL;  Surgeon: Jay Torre MD;  Location: [facility] CATH LAB;  Service: Radiology;  Laterality: Right;    PERITONEOCENTESIS N/A 09/24/2021    Procedure: PARACENTESIS, ABDOMINAL;  Surgeon: Jay Torre MD;  Location: [facility] CATH LAB;  Service: Radiology;  Laterality: N/A;    PERITONEOCENTESIS Right 12/23/2021    Procedure:  PARACENTESIS, ABDOMINAL;  Surgeon: Jay oTrre MD;  Location: [facility] CATH LAB;  Service: Radiology;  Laterality: Right;    PERITONEOCENTESIS N/A 12/30/2021    Procedure: PARACENTESIS, ABDOMINAL;  Surgeon: Salas Cabrera MD;  Location: [facility] CATH LAB;  Service: Radiology;  Laterality: N/A;    RETROGRADE PYELOGRAPHY Bilateral 09/10/2021    Procedure: PYELOGRAM, RETROGRADE;  Surgeon: Rj Sánchez Jr., MD;  Location: [address];  Service: Urology;  Laterality: Bilateral;    TONSILLECTOMY       Review of patient's allergies indicates:  No Known Allergies  Current Outpatient Medications on File Prior to Visit   Medication Sig Dispense Refill    aspirin (ECOTRIN) 81 MG EC tablet Take 81 mg by mouth once daily.      atorvastatin (LIPITOR) 40 MG tablet Take 1 tablet (40 mg total) by mouth once daily. 90 tablet 3    bimatoprost (LATISSE) 0.03 % ophthalmic solution Place one drop on applicator and apply evenly along the skin of the upper eyelid at base of eyelashes once daily at bedtime; repeat procedure for second eye (use a clean applicator). 5 mL 12    dextroamphetamine-amphetamine (ADDERALL) 20 mg tablet Take 1 tablet by mouth 2 (two) times a day. 60 tablet 0    famotidine (PEPCID) 20 MG tablet Take 1 tablet (20 mg total) by mouth every evening. 30 tablet 11    gabapentin (NEURONTIN) 100 MG capsule Take 2 capsules (200 mg total) by mouth daily as needed (Sleep). 90 capsule 11    hydrOXYzine HCL (ATARAX) 25 MG tablet TAKE 1 TO 2 TABLETS BY MOUTH EVERY NIGHT AT BEDTIME AS NEEDED FOR INSOMNIA 60 tablet 1    levothyroxine (SYNTHROID) 50 MCG tablet Take 1 tablet (50 mcg total) by mouth before breakfast. 90 tablet 3    linaCLOtide (LINZESS) 72 mcg Cap capsule Take 2 capsules (144 mcg total) by mouth before breakfast. 180 capsule 5    metFORMIN (GLUCOPHAGE-XR) 500 MG ER 24hr tablet Take 1 tablet (500 mg total) by mouth 2 (two) times daily with meals. 180 tablet 3    mycophenolate (CELLCEPT) 250 mg Cap Take 2 capsules  (500 mg total) by mouth 2 (two) times daily. 360 capsule 3    tacrolimus (PROGRAF) 1 MG Cap Take 1 capsule (1 mg total) by mouth every 12 (twelve) hours. 180 capsule 3    [DISCONTINUED] calcium carbonate (OS-SUN) 500 mg calcium (1,250 mg) tablet Take 1 tablet (500 mg total) by mouth once daily. 30 tablet 11    [DISCONTINUED] folic acid (FOLVITE) 1 MG tablet Take 2 tablets (2 mg total) by mouth every evening. 60 tablet 5    [DISCONTINUED] pantoprazole (PROTONIX) 40 MG tablet Take 1 tablet (40 mg total) by mouth once daily. (Patient taking differently: Take 40 mg by mouth every evening.) 30 tablet 11     No current facility-administered medications on file prior to visit.     Family History   Problem Relation Age of Onset    Cancer Mother         Uterine Cancer     No Known Problems Father     No Known Problems Sister     Sleep apnea Daughter     ADD / ADHD Daughter     Heart disease Maternal Grandmother         CHF    COPD Maternal Grandfather     Heart disease Paternal Grandmother         CHF    Colon cancer Neg Hx     Inflammatory bowel disease Neg Hx     Stomach cancer Neg Hx     Breast cancer Neg Hx     Ovarian cancer Neg Hx     Learning disabilities Neg Hx            Ohs Peq Odvv Intake    1/4/2024  6:01 PM CST - Filed by Patient   Describe your reason for todays visit The skin around my eyes is irritated and dry and I have what looks like a blister or a bubble over my left tear duct   What is your current physical address in the event of a medical emergency? [address] [place] [address]   Are you able to take your vital signs? No   Please attach any relevant images or files          HPI  54yo female s/p transplant presents with c/o dry skin irritation around her eyes for several days but about two hours ago noticed small bubble/blister just in front of right tear duct on right eye. Not painful or itchy, just there. No fluid drainage. No eye discharge, redness, pain, vision changes, light sensitivity.  Does have healing cold sore on lower lip otherwise no other race. Denies fevers or other systemic symptoms.          Constitution: Negative for chills, fatigue and fever.   HENT:  Negative for ear pain, congestion and sore throat.    Eyes:  Negative for eye trauma, eye discharge, eye itching, eye pain, eye redness, photophobia, vision loss, double vision, blurred vision and eyelid swelling.        See HPI   Neurological:  Negative for headaches.        Objective:   The physical exam was conducted virtually.  Physical Exam   Constitutional: She is oriented to person, place, and time.  Non-toxic appearance. She does not appear ill. No distress.   HENT:   Head: Normocephalic and atraumatic.   Eyes: Lids are normal. Right eye exhibits no chemosis, no discharge, no exudate and no hordeolum. Left eye exhibits no chemosis, no discharge, no exudate and no hordeolum. Right conjunctiva is not injected. Left conjunctiva is not injected. Extraocular movement intact      Comments: No periorbital redness, swelling, ttp. Unable to appreciate bump over video.    Neck: Neck supple.   Pulmonary/Chest: Effort normal. No respiratory distress.   Abdominal: Normal appearance.   Neurological: She is alert and oriented to person, place, and time. Coordination normal.   Skin: Skin is dry, not diaphoretic, not pale and no rash.   Psychiatric: Her behavior is normal. Judgment and thought content normal.       Assessment:     1. Eyelid abnormality        Plan:       Eyelid abnormality    1. Stop latisse use. Apply warm compresses over that area every few hours for 10 minutes. Follow up tomorrow in person with primary care provider or local urgent care for further evaluation. If develop any eye symptoms - eye pain, redness, vision changes, light sensitivity additional blisters/rash on eye  or face go directly to emergency room.  2.  You must understand that you've received a Telehealth Urgent Care treatment only and that you may be released  before all your medical problems are known or treated. You, the patient, will arrange for follow up care as instructed.    Patient voiced understanding and agrees to plan.

## 2024-01-05 NOTE — PATIENT INSTRUCTIONS
1. Stop latisse use. Apply warm compresses over that area every few hours for 10 minutes. Follow up tomorrow in person with primary care provider or local urgent care for further evaluation. If develop any eye symptoms - eye pain, redness, vision changes, light sensitivity additional blisters/rash on eye  or face go directly to emergency room.  2.  You must understand that you've received a Telehealth Urgent Care treatment only and that you may be released before all your medical problems are known or treated. You, the patient, will arrange for follow up care as instructed.

## 2024-01-08 ENCOUNTER — LAB VISIT (OUTPATIENT)
Dept: LAB | Facility: HOSPITAL | Age: 54
End: 2024-01-08
Attending: INTERNAL MEDICINE
Payer: COMMERCIAL

## 2024-01-08 DIAGNOSIS — Z94.4 S/P LIVER TRANSPLANT: ICD-10-CM

## 2024-01-08 DIAGNOSIS — Z94.4 LIVER REPLACED BY TRANSPLANT: ICD-10-CM

## 2024-01-08 LAB
ALBUMIN SERPL BCP-MCNC: 4.3 G/DL (ref 3.5–5.2)
ALP SERPL-CCNC: 68 U/L (ref 55–135)
ALT SERPL W/O P-5'-P-CCNC: 38 U/L (ref 10–44)
ANION GAP SERPL CALC-SCNC: 11 MMOL/L (ref 8–16)
AST SERPL-CCNC: 33 U/L (ref 10–40)
BASOPHILS # BLD AUTO: 0.13 K/UL (ref 0–0.2)
BASOPHILS NFR BLD: 1.2 % (ref 0–1.9)
BILIRUB SERPL-MCNC: 0.3 MG/DL (ref 0.1–1)
BUN SERPL-MCNC: 27 MG/DL (ref 6–20)
CALCIUM SERPL-MCNC: 9.8 MG/DL (ref 8.7–10.5)
CHLORIDE SERPL-SCNC: 105 MMOL/L (ref 95–110)
CO2 SERPL-SCNC: 23 MMOL/L (ref 23–29)
CREAT SERPL-MCNC: 1.2 MG/DL (ref 0.5–1.4)
DIFFERENTIAL METHOD BLD: ABNORMAL
EOSINOPHIL # BLD AUTO: 0.7 K/UL (ref 0–0.5)
EOSINOPHIL NFR BLD: 6.2 % (ref 0–8)
ERYTHROCYTE [DISTWIDTH] IN BLOOD BY AUTOMATED COUNT: 12.9 % (ref 11.5–14.5)
EST. GFR  (NO RACE VARIABLE): 54.5 ML/MIN/1.73 M^2
GLUCOSE SERPL-MCNC: 93 MG/DL (ref 70–110)
HCT VFR BLD AUTO: 43.4 % (ref 37–48.5)
HGB BLD-MCNC: 13.8 G/DL (ref 12–16)
IMM GRANULOCYTES # BLD AUTO: 0.02 K/UL (ref 0–0.04)
IMM GRANULOCYTES NFR BLD AUTO: 0.2 % (ref 0–0.5)
LYMPHOCYTES # BLD AUTO: 2.3 K/UL (ref 1–4.8)
LYMPHOCYTES NFR BLD: 20.5 % (ref 18–48)
MAGNESIUM SERPL-MCNC: 1.7 MG/DL (ref 1.6–2.6)
MCH RBC QN AUTO: 29.4 PG (ref 27–31)
MCHC RBC AUTO-ENTMCNC: 31.8 G/DL (ref 32–36)
MCV RBC AUTO: 92 FL (ref 82–98)
MONOCYTES # BLD AUTO: 0.8 K/UL (ref 0.3–1)
MONOCYTES NFR BLD: 7.6 % (ref 4–15)
NEUTROPHILS # BLD AUTO: 7.1 K/UL (ref 1.8–7.7)
NEUTROPHILS NFR BLD: 64.3 % (ref 38–73)
NRBC BLD-RTO: 0 /100 WBC
PLATELET # BLD AUTO: 359 K/UL (ref 150–450)
PMV BLD AUTO: 10.4 FL (ref 9.2–12.9)
POTASSIUM SERPL-SCNC: 4.2 MMOL/L (ref 3.5–5.1)
PROT SERPL-MCNC: 7.3 G/DL (ref 6–8.4)
RBC # BLD AUTO: 4.7 M/UL (ref 4–5.4)
SODIUM SERPL-SCNC: 139 MMOL/L (ref 136–145)
WBC # BLD AUTO: 10.98 K/UL (ref 3.9–12.7)

## 2024-01-08 PROCEDURE — 83735 ASSAY OF MAGNESIUM: CPT | Performed by: INTERNAL MEDICINE

## 2024-01-08 PROCEDURE — 36415 COLL VENOUS BLD VENIPUNCTURE: CPT | Performed by: INTERNAL MEDICINE

## 2024-01-08 PROCEDURE — 80197 ASSAY OF TACROLIMUS: CPT | Performed by: INTERNAL MEDICINE

## 2024-01-08 PROCEDURE — 80321 ALCOHOLS BIOMARKERS 1OR 2: CPT | Performed by: INTERNAL MEDICINE

## 2024-01-08 PROCEDURE — 80053 COMPREHEN METABOLIC PANEL: CPT | Performed by: INTERNAL MEDICINE

## 2024-01-08 PROCEDURE — 85025 COMPLETE CBC W/AUTO DIFF WBC: CPT | Performed by: INTERNAL MEDICINE

## 2024-01-09 ENCOUNTER — TELEPHONE (OUTPATIENT)
Dept: TRANSPLANT | Facility: CLINIC | Age: 54
End: 2024-01-09
Payer: COMMERCIAL

## 2024-01-09 ENCOUNTER — OFFICE VISIT (OUTPATIENT)
Dept: PSYCHIATRY | Facility: CLINIC | Age: 54
End: 2024-01-09
Payer: COMMERCIAL

## 2024-01-09 ENCOUNTER — PATIENT MESSAGE (OUTPATIENT)
Dept: TRANSPLANT | Facility: CLINIC | Age: 54
End: 2024-01-09
Payer: COMMERCIAL

## 2024-01-09 DIAGNOSIS — F98.8 ATTENTION DEFICIT DISORDER, UNSPECIFIED HYPERACTIVITY PRESENCE: Primary | ICD-10-CM

## 2024-01-09 LAB — TACROLIMUS BLD-MCNC: 3.8 NG/ML (ref 5–15)

## 2024-01-09 PROCEDURE — 99214 OFFICE O/P EST MOD 30 MIN: CPT | Mod: S$GLB,,, | Performed by: PSYCHIATRY & NEUROLOGY

## 2024-01-09 PROCEDURE — 99999 PR PBB SHADOW E&M-EST. PATIENT-LVL I: CPT | Mod: PBBFAC,,, | Performed by: PSYCHIATRY & NEUROLOGY

## 2024-01-09 RX ORDER — DEXTROAMPHETAMINE SACCHARATE, AMPHETAMINE ASPARTATE, DEXTROAMPHETAMINE SULFATE AND AMPHETAMINE SULFATE 5; 5; 5; 5 MG/1; MG/1; MG/1; MG/1
TABLET ORAL
Qty: 30 TABLET | Refills: 0 | Status: SHIPPED | OUTPATIENT
Start: 2024-01-09

## 2024-01-09 RX ORDER — DEXTROAMPHETAMINE SACCHARATE, AMPHETAMINE ASPARTATE, DEXTROAMPHETAMINE SULFATE AND AMPHETAMINE SULFATE 7.5; 7.5; 7.5; 7.5 MG/1; MG/1; MG/1; MG/1
30 TABLET ORAL EVERY MORNING
Qty: 30 TABLET | Refills: 0 | Status: SHIPPED | OUTPATIENT
Start: 2024-01-09 | End: 2024-02-19 | Stop reason: SDUPTHER

## 2024-01-09 RX ORDER — TRAZODONE HYDROCHLORIDE 50 MG/1
TABLET ORAL
Qty: 60 TABLET | Refills: 1 | Status: SHIPPED | OUTPATIENT
Start: 2024-01-09

## 2024-01-09 RX ORDER — HYDROXYZINE HYDROCHLORIDE 25 MG/1
TABLET, FILM COATED ORAL
Qty: 60 TABLET | Refills: 1 | Status: SHIPPED | OUTPATIENT
Start: 2024-01-09

## 2024-01-09 NOTE — PROGRESS NOTES
ESTABLISHED OUTPATIENT VISIT   E/M LEVEL 4: [id number]    ENCOUNTER DATE: 1/9/2024  SITE: [facility], [address]    HISTORY    CHIEF COMPLAINT   Malu Montes is a 53 y.o. female who presents for follow up of grief    HPI     Enjoying work.    Hydroxyzine less effective than previously.    Morning Adderall less effective than previously.    Continues to grieve daughter's death.    Psychiatric Review Of Systems - Is patient experiencing or having changes in:  Sleep: taking Hydroxyzine  appetite: adequate  weight: no  energy/anergy: no  interest/pleasure/anhedonia: no  somatic symptoms: no  libido: no  anxiety/panic: no  guilty/hopelessness: no  concentration: no  S.I.B.s/risky behavior: no  Irritability: no  Racing thoughts: no  Impulsive behaviors: no  Paranoia:no  AVH:no    Recent alcohol: no  Recent drug: no    Medical ROS    General ROS: sciatica remains troublesome   ENT ROS: negative  Cardiovascular ROS: negative  Respiratory ROS: negative  Gastrointestinal ROS: negative  Neurological ROS: negative  Dermatologicl ROS: negative  Endocrine ROS: negative    PAST MEDICAL, FAMILY AND SOCIAL HISTORY: The patient's past medical, family and social history have been reviewed and updated as appropriate within the electronic medical record - see encounter notes.    PSYCHOTROPIC MEDICATIONS   Hyroxyzine 50 mg at bedtime prn    Scheduled and PRN Medications     Current Outpatient Medications:     aspirin (ECOTRIN) 81 MG EC tablet, Take 81 mg by mouth once daily., Disp: , Rfl:     atorvastatin (LIPITOR) 40 MG tablet, Take 1 tablet (40 mg total) by mouth once daily., Disp: 90 tablet, Rfl: 3    bimatoprost (LATISSE) 0.03 % ophthalmic solution, Place one drop on applicator and apply evenly along the skin of the upper eyelid at base of eyelashes once daily at bedtime; repeat procedure for second eye (use a clean applicator)., Disp: 5 mL, Rfl: 12    dextroamphetamine-amphetamine (ADDERALL) 20 mg tablet, Take 1  tablet by mouth 2 (two) times a day., Disp: 60 tablet, Rfl: 0    famotidine (PEPCID) 20 MG tablet, Take 1 tablet (20 mg total) by mouth every evening., Disp: 30 tablet, Rfl: 11    gabapentin (NEURONTIN) 100 MG capsule, Take 2 capsules (200 mg total) by mouth daily as needed (Sleep)., Disp: 90 capsule, Rfl: 11    hydrOXYzine HCL (ATARAX) 25 MG tablet, TAKE 1 TO 2 TABLETS BY MOUTH EVERY NIGHT AT BEDTIME AS NEEDED FOR INSOMNIA, Disp: 60 tablet, Rfl: 1    levothyroxine (SYNTHROID) 50 MCG tablet, Take 1 tablet (50 mcg total) by mouth before breakfast., Disp: 90 tablet, Rfl: 3    linaCLOtide (LINZESS) 72 mcg Cap capsule, Take 2 capsules (144 mcg total) by mouth before breakfast., Disp: 180 capsule, Rfl: 5    metFORMIN (GLUCOPHAGE-XR) 500 MG ER 24hr tablet, Take 1 tablet (500 mg total) by mouth 2 (two) times daily with meals., Disp: 180 tablet, Rfl: 3    mycophenolate (CELLCEPT) 250 mg Cap, Take 2 capsules (500 mg total) by mouth 2 (two) times daily., Disp: 360 capsule, Rfl: 3    tacrolimus (PROGRAF) 1 MG Cap, Take 1 capsule (1 mg total) by mouth every 12 (twelve) hours., Disp: 180 capsule, Rfl: 3    vitamin D (VITAMIN D3) 1000 units Tab, Take 2,000 Units by mouth once daily., Disp: , Rfl:     EXAMINATION  Wt Readings from Last 3 Encounters:   01/05/24 66.1 kg (145 lb 11.6 oz)   10/27/23 64.4 kg (142 lb)   07/16/23 64.4 kg (142 lb)     Temp Readings from Last 3 Encounters:   07/16/23 100.1 °F (37.8 °C) (Oral)   06/02/23 98.6 °F (37 °C) (Oral)   12/02/22 98.9 °F (37.2 °C) (Oral)     BP Readings from Last 3 Encounters:   01/05/24 110/80   07/16/23 125/82   07/10/23 110/80     Pulse Readings from Last 3 Encounters:   01/05/24 99   07/16/23 87   07/10/23 75       CONSTITUTIONAL  General Appearance: well nourished    MUSCULOSKELETAL  Muscle Strength and Tone: normal strength and tone  Abnormal Involuntary Movements: no abnormal movement noted  Gait and Station: normal gait    PSYCHIATRIC   Level of Consciousness:  alert  Orientation: oriented to person, place and time  Grooming: well groomed  Psychomotor Behavior: no restlessness/agitation  Speech: normal in rate, rhythm and volume  Language: normal vocabulary  Mood: steady  Affect: full range and appropriate  Thought Process: logical and goal directed  Associations: intact associations  Thought Content: no SI/HI  Memory: grossly intact  Attention: intact to content of interview  Fund of Knowledge: appears adequate  Insight: good  Judgement: good    MEDICAL DECISION MAKING    DIAGNOSES  ADHD  Insomnia    PROBLEM LIST AND MANAGEMENT PLANS  - Adderall 30 mg qam, 20 mg in the early afternoon prn  - Trazodone  mg at bedtime prn for sleep  - Hydroxyzine 50 mg at bedtime prn for sleep  - rtc 3 months    Total time, including chart review and time with patient: 20 min    LABORATORY DATA    Lab Visit on 01/08/2024   Component Date Value Ref Range Status    WBC 01/08/2024 10.98  3.90 - 12.70 K/uL Final    RBC 01/08/2024 4.70  4.00 - 5.40 M/uL Final    Hemoglobin 01/08/2024 13.8  12.0 - 16.0 g/dL Final    Hematocrit 01/08/2024 43.4  37.0 - 48.5 % Final    MCV 01/08/2024 92  82 - 98 fL Final    MCH 01/08/2024 29.4  27.0 - 31.0 pg Final    MCHC 01/08/2024 31.8 (L)  32.0 - 36.0 g/dL Final    RDW 01/08/2024 12.9  11.5 - 14.5 % Final    Platelets 01/08/2024 359  150 - 450 K/uL Final    MPV 01/08/2024 10.4  9.2 - 12.9 fL Final    Immature Granulocytes 01/08/2024 0.2  0.0 - 0.5 % Final    Gran # (ANC) 01/08/2024 7.1  1.8 - 7.7 K/uL Final    Immature Grans (Abs) 01/08/2024 0.02  0.00 - 0.04 K/uL Final    Comment: Mild elevation in immature granulocytes is non specific and   can be seen in a variety of conditions including stress response,   acute inflammation, trauma and pregnancy. Correlation with other   laboratory and clinical findings is essential.      Lymph # 01/08/2024 2.3  1.0 - 4.8 K/uL Final    Mono # 01/08/2024 0.8  0.3 - 1.0 K/uL Final    Eos # 01/08/2024 0.7 (H)  0.0 - 0.5  K/uL Final    Baso # 01/08/2024 0.13  0.00 - 0.20 K/uL Final    nRBC 01/08/2024 0  0 /100 WBC Final    Gran % 01/08/2024 64.3  38.0 - 73.0 % Final    Lymph % 01/08/2024 20.5  18.0 - 48.0 % Final    Mono % 01/08/2024 7.6  4.0 - 15.0 % Final    Eosinophil % 01/08/2024 6.2  0.0 - 8.0 % Final    Basophil % 01/08/2024 1.2  0.0 - 1.9 % Final    Differential Method 01/08/2024 Automated   Final    Sodium 01/08/2024 139  136 - 145 mmol/L Final    Potassium 01/08/2024 4.2  3.5 - 5.1 mmol/L Final    Chloride 01/08/2024 105  95 - 110 mmol/L Final    CO2 01/08/2024 23  23 - 29 mmol/L Final    Glucose 01/08/2024 93  70 - 110 mg/dL Final    BUN 01/08/2024 27 (H)  6 - 20 mg/dL Final    Creatinine 01/08/2024 1.2  0.5 - 1.4 mg/dL Final    Calcium 01/08/2024 9.8  8.7 - 10.5 mg/dL Final    Total Protein 01/08/2024 7.3  6.0 - 8.4 g/dL Final    Albumin 01/08/2024 4.3  3.5 - 5.2 g/dL Final    Total Bilirubin 01/08/2024 0.3  0.1 - 1.0 mg/dL Final    Comment: For infants and newborns, interpretation of results should be based  on gestational age, weight and in agreement with clinical  observations.    Premature Infant recommended reference ranges:  Up to 24 hours.............<8.0 mg/dL  Up to 48 hours............<12.0 mg/dL  3-5 days..................<15.0 mg/dL  6-29 days.................<15.0 mg/dL      Alkaline Phosphatase 01/08/2024 68  55 - 135 U/L Final    AST 01/08/2024 33  10 - 40 U/L Final    ALT 01/08/2024 38  10 - 44 U/L Final    eGFR 01/08/2024 54.5 (A)  >60 mL/min/1.73 m^2 Final    Anion Gap 01/08/2024 11  8 - 16 mmol/L Final    Tacrolimus Lvl 01/08/2024 3.8 (L)  5.0 - 15.0 ng/mL Final    Comment: Testing performed by a chemiluminescent microparticle   immunoassay on the [employer] System.    CAUTION: No firm therapeutic range exists for tacrolimus in whole   blood. The   complexity of the clinical state, individual differences in   sensitivity to   immunosuppressive and nephrotoxic effects of tacrolimus,    co-administration   of other immunosuppressants, type of transplant, time post-transplant   and a   number of other factors contribute to different requirements for   optimal   blood levels of tacrolimus. Therefore, individual tacrolimus values   cannot   be used as the sole indicator for making changes in treatment regimen   and   each patient should be thoroughly evaluated clinically before changes   in   treatment regimens are made. Each user must establish his or her own   ranges   based on clinical experience.  Therapeutic ranges vary according to the commercial test used, and   therefore   should be established for each commercial test. Values obtained with   diff                           erent assay methods cannot be used interchangeably due to   differences in   assay methods and cross-reactivity with metabolites, nor should   correction   factors be applied. Therefore, consistent use of one assay for   individual   patients is recommended.      Magnesium 01/08/2024 1.7  1.6 - 2.6 mg/dL Final   Lab Visit on 01/05/2024   Component Date Value Ref Range Status    Specimen UA 01/05/2024 Urine, Clean Catch   Final    Color, UA 01/05/2024 Yellow  Yellow, Straw, Marie Final    Appearance, UA 01/05/2024 Hazy (A)  Clear Final    pH, UA 01/05/2024 5.0  5.0 - 8.0 Final    Specific Gravity, UA 01/05/2024 1.025  1.005 - 1.030 Final    Protein, UA 01/05/2024 Negative  Negative Final    Comment: Recommend a 24 hour urine protein or a urine   protein/creatinine ratio if globulin induced proteinuria is  clinically suspected.      Glucose, UA 01/05/2024 Negative  Negative Final    Ketones, UA 01/05/2024 Negative  Negative Final    Bilirubin (UA) 01/05/2024 Negative  Negative Final    Occult Blood UA 01/05/2024 Negative  Negative Final    Nitrite, UA 01/05/2024 Negative  Negative Final    Leukocytes, UA 01/05/2024 Trace (A)  Negative Final    Protein, Urine Random 01/05/2024 13  0 - 15 mg/dL Final    Creatinine, Urine  01/05/2024 106.0  15.0 - 325.0 mg/dL Final    Prot/Creat Ratio, Urine 01/05/2024 0.12  0.00 - 0.20 Final    RBC, UA 01/05/2024 1  0 - 4 /hpf Final    WBC, UA 01/05/2024 2  0 - 5 /hpf Final    Bacteria 01/05/2024 Rare  None-Occ /hpf Final    Squam Epithel, UA 01/05/2024 5  /hpf Final    Microscopic Comment 01/05/2024 SEE COMMENT   Final    Comment: Other formed elements not mentioned in the report are not   present in the microscopic examination.      Lab Visit on 12/19/2023   Component Date Value Ref Range Status    WBC 12/19/2023 8.24  3.90 - 12.70 K/uL Final    RBC 12/19/2023 4.58  4.00 - 5.40 M/uL Final    Hemoglobin 12/19/2023 13.9  12.0 - 16.0 g/dL Final    Hematocrit 12/19/2023 42.8  37.0 - 48.5 % Final    MCV 12/19/2023 93  82 - 98 fL Final    MCH 12/19/2023 30.3  27.0 - 31.0 pg Final    MCHC 12/19/2023 32.5  32.0 - 36.0 g/dL Final    RDW 12/19/2023 13.0  11.5 - 14.5 % Final    Platelets 12/19/2023 344  150 - 450 K/uL Final    MPV 12/19/2023 10.6  9.2 - 12.9 fL Final    Immature Granulocytes 12/19/2023 0.1  0.0 - 0.5 % Final    Gran # (ANC) 12/19/2023 4.7  1.8 - 7.7 K/uL Final    Immature Grans (Abs) 12/19/2023 0.01  0.00 - 0.04 K/uL Final    Comment: Mild elevation in immature granulocytes is non specific and   can be seen in a variety of conditions including stress response,   acute inflammation, trauma and pregnancy. Correlation with other   laboratory and clinical findings is essential.      Lymph # 12/19/2023 2.3  1.0 - 4.8 K/uL Final    Mono # 12/19/2023 0.7  0.3 - 1.0 K/uL Final    Eos # 12/19/2023 0.4  0.0 - 0.5 K/uL Final    Baso # 12/19/2023 0.10  0.00 - 0.20 K/uL Final    nRBC 12/19/2023 0  0 /100 WBC Final    Gran % 12/19/2023 57.1  38.0 - 73.0 % Final    Lymph % 12/19/2023 28.0  18.0 - 48.0 % Final    Mono % 12/19/2023 8.7  4.0 - 15.0 % Final    Eosinophil % 12/19/2023 4.9  0.0 - 8.0 % Final    Basophil % 12/19/2023 1.2  0.0 - 1.9 % Final    Differential Method 12/19/2023 Automated   Final     Sodium 12/19/2023 140  136 - 145 mmol/L Final    Potassium 12/19/2023 5.0  3.5 - 5.1 mmol/L Final    Chloride 12/19/2023 110  95 - 110 mmol/L Final    CO2 12/19/2023 24  23 - 29 mmol/L Final    Glucose 12/19/2023 109  70 - 110 mg/dL Final    BUN 12/19/2023 22 (H)  6 - 20 mg/dL Final    Creatinine 12/19/2023 1.2  0.5 - 1.4 mg/dL Final    Calcium 12/19/2023 9.4  8.7 - 10.5 mg/dL Final    Total Protein 12/19/2023 7.0  6.0 - 8.4 g/dL Final    Albumin 12/19/2023 3.8  3.5 - 5.2 g/dL Final    Total Bilirubin 12/19/2023 0.2  0.1 - 1.0 mg/dL Final    Comment: For infants and newborns, interpretation of results should be based  on gestational age, weight and in agreement with clinical  observations.    Premature Infant recommended reference ranges:  Up to 24 hours.............<8.0 mg/dL  Up to 48 hours............<12.0 mg/dL  3-5 days..................<15.0 mg/dL  6-29 days.................<15.0 mg/dL      Alkaline Phosphatase 12/19/2023 71  55 - 135 U/L Final    AST 12/19/2023 29  10 - 40 U/L Final    ALT 12/19/2023 31  10 - 44 U/L Final    eGFR 12/19/2023 54.5 (A)  >60 mL/min/1.73 m^2 Final    Anion Gap 12/19/2023 6 (L)  8 - 16 mmol/L Final    Tacrolimus Lvl 12/19/2023 6.4  5.0 - 15.0 ng/mL Final    Comment: Testing performed by a chemiluminescent microparticle   immunoassay on the [employer] System.    CAUTION: No firm therapeutic range exists for tacrolimus in whole   blood. The   complexity of the clinical state, individual differences in   sensitivity to   immunosuppressive and nephrotoxic effects of tacrolimus,   co-administration   of other immunosuppressants, type of transplant, time post-transplant   and a   number of other factors contribute to different requirements for   optimal   blood levels of tacrolimus. Therefore, individual tacrolimus values   cannot   be used as the sole indicator for making changes in treatment regimen   and   each patient should be thoroughly evaluated clinically before changes    in   treatment regimens are made. Each user must establish his or her own   ranges   based on clinical experience.  Therapeutic ranges vary according to the commercial test used, and   therefore   should be established for each commercial test. Values obtained with   diff                           erent assay methods cannot be used interchangeably due to   differences in   assay methods and cross-reactivity with metabolites, nor should   correction   factors be applied. Therefore, consistent use of one assay for   individual   patients is recommended.     Lab Visit on 12/19/2023   Component Date Value Ref Range Status    Hemoglobin A1C 12/19/2023 6.4 (H)  4.0 - 5.6 % Final    Comment: ADA Screening Guidelines:  5.7-6.4%  Consistent with prediabetes  >or=6.5%  Consistent with diabetes    High levels of fetal hemoglobin interfere with the HbA1C  assay. Heterozygous hemoglobin variants (HbS, HgC, etc)do  not significantly interfere with this assay.   However, presence of multiple variants may affect accuracy.      Estimated Avg Glucose 12/19/2023 137 (H)  68 - 131 mg/dL Final    Cholesterol 12/19/2023 269 (H)  120 - 199 mg/dL Final    Comment: The National Cholesterol Education Program (NCEP) has set the  following guidelines (reference ranges) for Cholesterol:  Optimal.....................<200 mg/dL  Borderline High.............200-239 mg/dL  High........................> or = 240 mg/dL      Triglycerides 12/19/2023 194 (H)  30 - 150 mg/dL Final    Comment: The National Cholesterol Education Program (NCEP) has set the  following guidelines (reference values) for triglycerides:  Normal......................<150 mg/dL  Borderline High.............150-199 mg/dL  High........................200-499 mg/dL      HDL 12/19/2023 47  40 - 75 mg/dL Final    Comment: The National Cholesterol Education Program (NCEP) has set the  following guidelines (reference values) for HDL Cholesterol:  Low...............<40  mg/dL  Optimal...........>60 mg/dL      LDL Cholesterol 12/19/2023 183.2 (H)  63.0 - 159.0 mg/dL Final    Comment: The National Cholesterol Education Program (NCEP) has set the  following guidelines (reference values) for LDL Cholesterol:  Optimal.......................<130 mg/dL  Borderline High...............130-159 mg/dL  High..........................160-189 mg/dL  Very High.....................>190 mg/dL      HDL/Cholesterol Ratio 12/19/2023 17.5 (L)  20.0 - 50.0 % Final    Total Cholesterol/HDL Ratio 12/19/2023 5.7 (H)  2.0 - 5.0 Final    Non-HDL Cholesterol 12/19/2023 222  mg/dL Final    Comment: Risk category and Non-HDL cholesterol goals:  Coronary heart disease (CHD)or equivalent (10-year risk of CHD >20%):  Non-HDL cholesterol goal     <130 mg/dL  Two or more CHD risk factors and 10-year risk of CHD <= 20%:  Non-HDL cholesterol goal     <160 mg/dL  0 to 1 CHD risk factor:  Non-HDL cholesterol goal     <190 mg/dL     Lab Visit on 12/05/2023   Component Date Value Ref Range Status    WBC 12/05/2023 10.15  3.90 - 12.70 K/uL Final    RBC 12/05/2023 4.70  4.00 - 5.40 M/uL Final    Hemoglobin 12/05/2023 13.9  12.0 - 16.0 g/dL Final    Hematocrit 12/05/2023 41.6  37.0 - 48.5 % Final    MCV 12/05/2023 89  82 - 98 fL Final    MCH 12/05/2023 29.6  27.0 - 31.0 pg Final    MCHC 12/05/2023 33.4  32.0 - 36.0 g/dL Final    RDW 12/05/2023 13.3  11.5 - 14.5 % Final    Platelets 12/05/2023 283  150 - 450 K/uL Final    MPV 12/05/2023 9.9  9.2 - 12.9 fL Final    Immature Granulocytes 12/05/2023 0.3  0.0 - 0.5 % Final    Gran # (ANC) 12/05/2023 6.5  1.8 - 7.7 K/uL Final    Immature Grans (Abs) 12/05/2023 0.03  0.00 - 0.04 K/uL Final    Comment: Mild elevation in immature granulocytes is non specific and   can be seen in a variety of conditions including stress response,   acute inflammation, trauma and pregnancy. Correlation with other   laboratory and clinical findings is essential.      Lymph # 12/05/2023 2.2  1.0 - 4.8  K/uL Final    Mono # 12/05/2023 1.0  0.3 - 1.0 K/uL Final    Eos # 12/05/2023 0.3  0.0 - 0.5 K/uL Final    Baso # 12/05/2023 0.08  0.00 - 0.20 K/uL Final    nRBC 12/05/2023 0  0 /100 WBC Final    Gran % 12/05/2023 64.2  38.0 - 73.0 % Final    Lymph % 12/05/2023 21.2  18.0 - 48.0 % Final    Mono % 12/05/2023 10.2  4.0 - 15.0 % Final    Eosinophil % 12/05/2023 3.3  0.0 - 8.0 % Final    Basophil % 12/05/2023 0.8  0.0 - 1.9 % Final    Differential Method 12/05/2023 Automated   Final    Sodium 12/05/2023 140  136 - 145 mmol/L Final    Potassium 12/05/2023 4.5  3.5 - 5.1 mmol/L Final    Chloride 12/05/2023 107  95 - 110 mmol/L Final    CO2 12/05/2023 22 (L)  23 - 29 mmol/L Final    Glucose 12/05/2023 111 (H)  70 - 110 mg/dL Final    BUN 12/05/2023 22 (H)  6 - 20 mg/dL Final    Creatinine 12/05/2023 1.1  0.5 - 1.4 mg/dL Final    Calcium 12/05/2023 10.3  8.7 - 10.5 mg/dL Final    Total Protein 12/05/2023 7.3  6.0 - 8.4 g/dL Final    Albumin 12/05/2023 3.9  3.5 - 5.2 g/dL Final    Total Bilirubin 12/05/2023 0.4  0.1 - 1.0 mg/dL Final    Comment: For infants and newborns, interpretation of results should be based  on gestational age, weight and in agreement with clinical  observations.    Premature Infant recommended reference ranges:  Up to 24 hours.............<8.0 mg/dL  Up to 48 hours............<12.0 mg/dL  3-5 days..................<15.0 mg/dL  6-29 days.................<15.0 mg/dL      Alkaline Phosphatase 12/05/2023 75  55 - 135 U/L Final    AST 12/05/2023 26  10 - 40 U/L Final    ALT 12/05/2023 29  10 - 44 U/L Final    eGFR 12/05/2023 >60.0  >60 mL/min/1.73 m^2 Final    Anion Gap 12/05/2023 11  8 - 16 mmol/L Final    Tacrolimus Lvl 12/05/2023 8.5  5.0 - 15.0 ng/mL Final    Comment: Testing performed by a chemiluminescent microparticle   immunoassay on the [employer] System.    CAUTION: No firm therapeutic range exists for tacrolimus in whole   blood. The   complexity of the clinical state, individual  differences in   sensitivity to   immunosuppressive and nephrotoxic effects of tacrolimus,   co-administration   of other immunosuppressants, type of transplant, time post-transplant   and a   number of other factors contribute to different requirements for   optimal   blood levels of tacrolimus. Therefore, individual tacrolimus values   cannot   be used as the sole indicator for making changes in treatment regimen   and   each patient should be thoroughly evaluated clinically before changes   in   treatment regimens are made. Each user must establish his or her own   ranges   based on clinical experience.  Therapeutic ranges vary according to the commercial test used, and   therefore   should be established for each commercial test. Values obtained with   diff                           erent assay methods cannot be used interchangeably due to   differences in   assay methods and cross-reactivity with metabolites, nor should   correction   factors be applied. Therefore, consistent use of one assay for   individual   patients is recommended.     Lab Visit on 11/29/2023   Component Date Value Ref Range Status    WBC 11/29/2023 11.49  3.90 - 12.70 K/uL Final    RBC 11/29/2023 4.61  4.00 - 5.40 M/uL Final    Hemoglobin 11/29/2023 13.7  12.0 - 16.0 g/dL Final    Hematocrit 11/29/2023 43.4  37.0 - 48.5 % Final    MCV 11/29/2023 94  82 - 98 fL Final    MCH 11/29/2023 29.7  27.0 - 31.0 pg Final    MCHC 11/29/2023 31.6 (L)  32.0 - 36.0 g/dL Final    RDW 11/29/2023 13.9  11.5 - 14.5 % Final    Platelets 11/29/2023 327  150 - 450 K/uL Final    MPV 11/29/2023 10.3  9.2 - 12.9 fL Final    Immature Granulocytes 11/29/2023 0.5  0.0 - 0.5 % Final    Gran # (ANC) 11/29/2023 7.1  1.8 - 7.7 K/uL Final    Immature Grans (Abs) 11/29/2023 0.06 (H)  0.00 - 0.04 K/uL Final    Comment: Mild elevation in immature granulocytes is non specific and   can be seen in a variety of conditions including stress response,   acute inflammation, trauma  and pregnancy. Correlation with other   laboratory and clinical findings is essential.      Lymph # 11/29/2023 3.1  1.0 - 4.8 K/uL Final    Mono # 11/29/2023 0.9  0.3 - 1.0 K/uL Final    Eos # 11/29/2023 0.3  0.0 - 0.5 K/uL Final    Baso # 11/29/2023 0.10  0.00 - 0.20 K/uL Final    nRBC 11/29/2023 0  0 /100 WBC Final    Gran % 11/29/2023 61.4  38.0 - 73.0 % Final    Lymph % 11/29/2023 26.5  18.0 - 48.0 % Final    Mono % 11/29/2023 7.9  4.0 - 15.0 % Final    Eosinophil % 11/29/2023 2.8  0.0 - 8.0 % Final    Basophil % 11/29/2023 0.9  0.0 - 1.9 % Final    Differential Method 11/29/2023 Automated   Final    Sodium 11/29/2023 140  136 - 145 mmol/L Final    Potassium 11/29/2023 4.7  3.5 - 5.1 mmol/L Final    Chloride 11/29/2023 107  95 - 110 mmol/L Final    CO2 11/29/2023 22 (L)  23 - 29 mmol/L Final    Glucose 11/29/2023 112 (H)  70 - 110 mg/dL Final    BUN 11/29/2023 26 (H)  6 - 20 mg/dL Final    Creatinine 11/29/2023 1.2  0.5 - 1.4 mg/dL Final    Calcium 11/29/2023 9.8  8.7 - 10.5 mg/dL Final    Total Protein 11/29/2023 6.9  6.0 - 8.4 g/dL Final    Albumin 11/29/2023 3.9  3.5 - 5.2 g/dL Final    Total Bilirubin 11/29/2023 0.2  0.1 - 1.0 mg/dL Final    Comment: For infants and newborns, interpretation of results should be based  on gestational age, weight and in agreement with clinical  observations.    Premature Infant recommended reference ranges:  Up to 24 hours.............<8.0 mg/dL  Up to 48 hours............<12.0 mg/dL  3-5 days..................<15.0 mg/dL  6-29 days.................<15.0 mg/dL      Alkaline Phosphatase 11/29/2023 77  55 - 135 U/L Final    AST 11/29/2023 25  10 - 40 U/L Final    ALT 11/29/2023 31  10 - 44 U/L Final    eGFR 11/29/2023 54.5 (A)  >60 mL/min/1.73 m^2 Final    Anion Gap 11/29/2023 11  8 - 16 mmol/L Final    Tacrolimus Lvl 11/29/2023 6.5  5.0 - 15.0 ng/mL Final    Comment: Testing performed by a chemiluminescent microparticle   immunoassay on the [employer].    CAUTION:  No firm therapeutic range exists for tacrolimus in whole   blood. The   complexity of the clinical state, individual differences in   sensitivity to   immunosuppressive and nephrotoxic effects of tacrolimus,   co-administration   of other immunosuppressants, type of transplant, time post-transplant   and a   number of other factors contribute to different requirements for   optimal   blood levels of tacrolimus. Therefore, individual tacrolimus values   cannot   be used as the sole indicator for making changes in treatment regimen   and   each patient should be thoroughly evaluated clinically before changes   in   treatment regimens are made. Each user must establish his or her own   ranges   based on clinical experience.  Therapeutic ranges vary according to the commercial test used, and   therefore   should be established for each commercial test. Values obtained with   diff                           erent assay methods cannot be used interchangeably due to   differences in   assay methods and cross-reactivity with metabolites, nor should   correction   factors be applied. Therefore, consistent use of one assay for   individual   patients is recommended.      HCV Quantitative Result 11/29/2023 Not Detected  <12 IU/mL Final    HCV, Qualitative 11/29/2023 Not Detected  Not Detected Final    Comment: This procedure utilizes a real-time polymerase chain reaction test  from [employer]. The amplification target is a conserved region   of the HCV genome. The lower limit of quantitation is <12 IU/mL   (<1.08 Log IU/mL)and the upper limit of quantitation is 30 million   IU/mL (7.48 Log IU/mL).     Specimens reported as Detected but <12 IU/mL contain detectable  levels of Hepatitis C RNA but the viral load is below the limit of   quantitation. A Not Detected result does not rule out HCV infection,   clinical correlation is recommended.      Hepatitis C Ab 11/29/2023 Non-reactive  Non-reactive Final   Lab Visit on  11/20/2023   Component Date Value Ref Range Status    WBC 11/20/2023 15.12 (H)  3.90 - 12.70 K/uL Final    RBC 11/20/2023 4.33  4.00 - 5.40 M/uL Final    Hemoglobin 11/20/2023 13.0  12.0 - 16.0 g/dL Final    Hematocrit 11/20/2023 40.1  37.0 - 48.5 % Final    MCV 11/20/2023 93  82 - 98 fL Final    MCH 11/20/2023 30.0  27.0 - 31.0 pg Final    MCHC 11/20/2023 32.4  32.0 - 36.0 g/dL Final    RDW 11/20/2023 13.7  11.5 - 14.5 % Final    Platelets 11/20/2023 341  150 - 450 K/uL Final    MPV 11/20/2023 9.7  9.2 - 12.9 fL Final    Immature Granulocytes 11/20/2023 0.7 (H)  0.0 - 0.5 % Final    Gran # (ANC) 11/20/2023 11.3 (H)  1.8 - 7.7 K/uL Final    Immature Grans (Abs) 11/20/2023 0.11 (H)  0.00 - 0.04 K/uL Final    Comment: Mild elevation in immature granulocytes is non specific and   can be seen in a variety of conditions including stress response,   acute inflammation, trauma and pregnancy. Correlation with other   laboratory and clinical findings is essential.      Lymph # 11/20/2023 2.6  1.0 - 4.8 K/uL Final    Mono # 11/20/2023 0.9  0.3 - 1.0 K/uL Final    Eos # 11/20/2023 0.2  0.0 - 0.5 K/uL Final    Baso # 11/20/2023 0.04  0.00 - 0.20 K/uL Final    nRBC 11/20/2023 0  0 /100 WBC Final    Gran % 11/20/2023 74.6 (H)  38.0 - 73.0 % Final    Lymph % 11/20/2023 17.3 (L)  18.0 - 48.0 % Final    Mono % 11/20/2023 5.8  4.0 - 15.0 % Final    Eosinophil % 11/20/2023 1.3  0.0 - 8.0 % Final    Basophil % 11/20/2023 0.3  0.0 - 1.9 % Final    Differential Method 11/20/2023 Automated   Final    Sodium 11/20/2023 139  136 - 145 mmol/L Final    Potassium 11/20/2023 4.1  3.5 - 5.1 mmol/L Final    Chloride 11/20/2023 107  95 - 110 mmol/L Final    CO2 11/20/2023 24  23 - 29 mmol/L Final    Glucose 11/20/2023 102  70 - 110 mg/dL Final    BUN 11/20/2023 28 (H)  6 - 20 mg/dL Final    Creatinine 11/20/2023 1.2  0.5 - 1.4 mg/dL Final    Calcium 11/20/2023 10.0  8.7 - 10.5 mg/dL Final    Total Protein 11/20/2023 7.0  6.0 - 8.4 g/dL Final     Albumin 11/20/2023 3.9  3.5 - 5.2 g/dL Final    Total Bilirubin 11/20/2023 0.3  0.1 - 1.0 mg/dL Final    Comment: For infants and newborns, interpretation of results should be based  on gestational age, weight and in agreement with clinical  observations.    Premature Infant recommended reference ranges:  Up to 24 hours.............<8.0 mg/dL  Up to 48 hours............<12.0 mg/dL  3-5 days..................<15.0 mg/dL  6-29 days.................<15.0 mg/dL      Alkaline Phosphatase 11/20/2023 73  55 - 135 U/L Final    AST 11/20/2023 17  10 - 40 U/L Final    ALT 11/20/2023 26  10 - 44 U/L Final    eGFR 11/20/2023 54.5 (A)  >60 mL/min/1.73 m^2 Final    Anion Gap 11/20/2023 8  8 - 16 mmol/L Final    Tacrolimus Lvl 11/20/2023 6.5  5.0 - 15.0 ng/mL Final    Comment: Testing performed by a chemiluminescent microparticle   immunoassay on the [employer] System.    CAUTION: No firm therapeutic range exists for tacrolimus in whole   blood. The   complexity of the clinical state, individual differences in   sensitivity to   immunosuppressive and nephrotoxic effects of tacrolimus,   co-administration   of other immunosuppressants, type of transplant, time post-transplant   and a   number of other factors contribute to different requirements for   optimal   blood levels of tacrolimus. Therefore, individual tacrolimus values   cannot   be used as the sole indicator for making changes in treatment regimen   and   each patient should be thoroughly evaluated clinically before changes   in   treatment regimens are made. Each user must establish his or her own   ranges   based on clinical experience.  Therapeutic ranges vary according to the commercial test used, and   therefore   should be established for each commercial test. Values obtained with   diff                           erent assay methods cannot be used interchangeably due to   differences in   assay methods and cross-reactivity with metabolites, nor should    correction   factors be applied. Therefore, consistent use of one assay for   individual   patients is recommended.     Lab Visit on 11/13/2023   Component Date Value Ref Range Status    WBC 11/13/2023 14.28 (H)  3.90 - 12.70 K/uL Final    RBC 11/13/2023 4.37  4.00 - 5.40 M/uL Final    Hemoglobin 11/13/2023 13.0  12.0 - 16.0 g/dL Final    Hematocrit 11/13/2023 41.3  37.0 - 48.5 % Final    MCV 11/13/2023 95  82 - 98 fL Final    MCH 11/13/2023 29.7  27.0 - 31.0 pg Final    MCHC 11/13/2023 31.5 (L)  32.0 - 36.0 g/dL Final    RDW 11/13/2023 13.4  11.5 - 14.5 % Final    Platelets 11/13/2023 326  150 - 450 K/uL Final    MPV 11/13/2023 10.0  9.2 - 12.9 fL Final    Immature Granulocytes 11/13/2023 0.3  0.0 - 0.5 % Final    Gran # (ANC) 11/13/2023 12.3 (H)  1.8 - 7.7 K/uL Final    Immature Grans (Abs) 11/13/2023 0.04  0.00 - 0.04 K/uL Final    Comment: Mild elevation in immature granulocytes is non specific and   can be seen in a variety of conditions including stress response,   acute inflammation, trauma and pregnancy. Correlation with other   laboratory and clinical findings is essential.      Lymph # 11/13/2023 1.5  1.0 - 4.8 K/uL Final    Mono # 11/13/2023 0.5  0.3 - 1.0 K/uL Final    Eos # 11/13/2023 0.0  0.0 - 0.5 K/uL Final    Baso # 11/13/2023 0.03  0.00 - 0.20 K/uL Final    nRBC 11/13/2023 0  0 /100 WBC Final    Gran % 11/13/2023 85.8 (H)  38.0 - 73.0 % Final    Lymph % 11/13/2023 10.2 (L)  18.0 - 48.0 % Final    Mono % 11/13/2023 3.2 (L)  4.0 - 15.0 % Final    Eosinophil % 11/13/2023 0.3  0.0 - 8.0 % Final    Basophil % 11/13/2023 0.2  0.0 - 1.9 % Final    Differential Method 11/13/2023 Automated   Final    Sodium 11/13/2023 140  136 - 145 mmol/L Final    Potassium 11/13/2023 4.6  3.5 - 5.1 mmol/L Final    Chloride 11/13/2023 105  95 - 110 mmol/L Final    CO2 11/13/2023 24  23 - 29 mmol/L Final    Glucose 11/13/2023 172 (H)  70 - 110 mg/dL Final    BUN 11/13/2023 24 (H)  6 - 20 mg/dL Final    Creatinine  11/13/2023 1.1  0.5 - 1.4 mg/dL Final    Calcium 11/13/2023 9.8  8.7 - 10.5 mg/dL Final    Total Protein 11/13/2023 7.2  6.0 - 8.4 g/dL Final    Albumin 11/13/2023 4.0  3.5 - 5.2 g/dL Final    Total Bilirubin 11/13/2023 0.4  0.1 - 1.0 mg/dL Final    Comment: For infants and newborns, interpretation of results should be based  on gestational age, weight and in agreement with clinical  observations.    Premature Infant recommended reference ranges:  Up to 24 hours.............<8.0 mg/dL  Up to 48 hours............<12.0 mg/dL  3-5 days..................<15.0 mg/dL  6-29 days.................<15.0 mg/dL      Alkaline Phosphatase 11/13/2023 80  55 - 135 U/L Final    AST 11/13/2023 21  10 - 40 U/L Final    ALT 11/13/2023 34  10 - 44 U/L Final    eGFR 11/13/2023 >60.0  >60 mL/min/1.73 m^2 Final    Anion Gap 11/13/2023 11  8 - 16 mmol/L Final    Tacrolimus Lvl 11/13/2023 7.0  5.0 - 15.0 ng/mL Final    Comment: Testing performed by a chemiluminescent microparticle   immunoassay on the [employer] System.    CAUTION: No firm therapeutic range exists for tacrolimus in whole   blood. The   complexity of the clinical state, individual differences in   sensitivity to   immunosuppressive and nephrotoxic effects of tacrolimus,   co-administration   of other immunosuppressants, type of transplant, time post-transplant   and a   number of other factors contribute to different requirements for   optimal   blood levels of tacrolimus. Therefore, individual tacrolimus values   cannot   be used as the sole indicator for making changes in treatment regimen   and   each patient should be thoroughly evaluated clinically before changes   in   treatment regimens are made. Each user must establish his or her own   ranges   based on clinical experience.  Therapeutic ranges vary according to the commercial test used, and   therefore   should be established for each commercial test. Values obtained with   diff                           erent  assay methods cannot be used interchangeably due to   differences in   assay methods and cross-reactivity with metabolites, nor should   correction   factors be applied. Therefore, consistent use of one assay for   individual   patients is recommended.      Prothrombin Time 11/13/2023 10.1  9.0 - 12.5 sec Final    INR 11/13/2023 0.9  0.8 - 1.2 Final    Comment: Coumadin Therapy:  2.0 - 3.0 for INR for all indicators except mechanical heart valves  and antiphospholipid syndromes which should use 2.5 - 3.5.     Lab Visit on 11/06/2023   Component Date Value Ref Range Status    WBC 11/06/2023 12.52  3.90 - 12.70 K/uL Final    RBC 11/06/2023 4.38  4.00 - 5.40 M/uL Final    Hemoglobin 11/06/2023 13.1  12.0 - 16.0 g/dL Final    Hematocrit 11/06/2023 41.7  37.0 - 48.5 % Final    MCV 11/06/2023 95  82 - 98 fL Final    MCH 11/06/2023 29.9  27.0 - 31.0 pg Final    MCHC 11/06/2023 31.4 (L)  32.0 - 36.0 g/dL Final    RDW 11/06/2023 13.8  11.5 - 14.5 % Final    Platelets 11/06/2023 327  150 - 450 K/uL Final    MPV 11/06/2023 10.1  9.2 - 12.9 fL Final    Immature Granulocytes 11/06/2023 0.5  0.0 - 0.5 % Final    Gran # (ANC) 11/06/2023 7.9 (H)  1.8 - 7.7 K/uL Final    Immature Grans (Abs) 11/06/2023 0.06 (H)  0.00 - 0.04 K/uL Final    Comment: Mild elevation in immature granulocytes is non specific and   can be seen in a variety of conditions including stress response,   acute inflammation, trauma and pregnancy. Correlation with other   laboratory and clinical findings is essential.      Lymph # 11/06/2023 3.0  1.0 - 4.8 K/uL Final    Mono # 11/06/2023 1.2 (H)  0.3 - 1.0 K/uL Final    Eos # 11/06/2023 0.4  0.0 - 0.5 K/uL Final    Baso # 11/06/2023 0.09  0.00 - 0.20 K/uL Final    nRBC 11/06/2023 0  0 /100 WBC Final    Gran % 11/06/2023 62.7  38.0 - 73.0 % Final    Lymph % 11/06/2023 23.6  18.0 - 48.0 % Final    Mono % 11/06/2023 9.4  4.0 - 15.0 % Final    Eosinophil % 11/06/2023 3.1  0.0 - 8.0 % Final    Basophil % 11/06/2023 0.7   0.0 - 1.9 % Final    Differential Method 11/06/2023 Automated   Final    Sodium 11/06/2023 142  136 - 145 mmol/L Final    Potassium 11/06/2023 4.5  3.5 - 5.1 mmol/L Final    Chloride 11/06/2023 109  95 - 110 mmol/L Final    CO2 11/06/2023 24  23 - 29 mmol/L Final    Glucose 11/06/2023 96  70 - 110 mg/dL Final    BUN 11/06/2023 21 (H)  6 - 20 mg/dL Final    Creatinine 11/06/2023 1.2  0.5 - 1.4 mg/dL Final    Calcium 11/06/2023 9.6  8.7 - 10.5 mg/dL Final    Total Protein 11/06/2023 7.0  6.0 - 8.4 g/dL Final    Albumin 11/06/2023 3.8  3.5 - 5.2 g/dL Final    Total Bilirubin 11/06/2023 0.3  0.1 - 1.0 mg/dL Final    Comment: For infants and newborns, interpretation of results should be based  on gestational age, weight and in agreement with clinical  observations.    Premature Infant recommended reference ranges:  Up to 24 hours.............<8.0 mg/dL  Up to 48 hours............<12.0 mg/dL  3-5 days..................<15.0 mg/dL  6-29 days.................<15.0 mg/dL      Alkaline Phosphatase 11/06/2023 73  55 - 135 U/L Final    AST 11/06/2023 41 (H)  10 - 40 U/L Final    ALT 11/06/2023 61 (H)  10 - 44 U/L Final    eGFR 11/06/2023 54.5 (A)  >60 mL/min/1.73 m^2 Final    Anion Gap 11/06/2023 9  8 - 16 mmol/L Final    Tacrolimus Lvl 11/06/2023 5.6  5.0 - 15.0 ng/mL Final    Comment: Testing performed by a chemiluminescent microparticle   immunoassay on the [employer] System.    CAUTION: No firm therapeutic range exists for tacrolimus in whole   blood. The   complexity of the clinical state, individual differences in   sensitivity to   immunosuppressive and nephrotoxic effects of tacrolimus,   co-administration   of other immunosuppressants, type of transplant, time post-transplant   and a   number of other factors contribute to different requirements for   optimal   blood levels of tacrolimus. Therefore, individual tacrolimus values   cannot   be used as the sole indicator for making changes in treatment regimen   and    each patient should be thoroughly evaluated clinically before changes   in   treatment regimens are made. Each user must establish his or her own   ranges   based on clinical experience.  Therapeutic ranges vary according to the commercial test used, and   therefore   should be established for each commercial test. Values obtained with   diff                           erent assay methods cannot be used interchangeably due to   differences in   assay methods and cross-reactivity with metabolites, nor should   correction   factors be applied. Therefore, consistent use of one assay for   individual   patients is recommended.     Lab Visit on 11/02/2023   Component Date Value Ref Range Status    WBC 11/02/2023 18.21 (H)  3.90 - 12.70 K/uL Final    RBC 11/02/2023 4.55  4.00 - 5.40 M/uL Final    Hemoglobin 11/02/2023 13.6  12.0 - 16.0 g/dL Final    Hematocrit 11/02/2023 43.2  37.0 - 48.5 % Final    MCV 11/02/2023 95  82 - 98 fL Final    MCH 11/02/2023 29.9  27.0 - 31.0 pg Final    MCHC 11/02/2023 31.5 (L)  32.0 - 36.0 g/dL Final    RDW 11/02/2023 14.0  11.5 - 14.5 % Final    Platelets 11/02/2023 375  150 - 450 K/uL Final    MPV 11/02/2023 10.1  9.2 - 12.9 fL Final    Immature Granulocytes 11/02/2023 0.9 (H)  0.0 - 0.5 % Final    Gran # (ANC) 11/02/2023 13.0 (H)  1.8 - 7.7 K/uL Final    Immature Grans (Abs) 11/02/2023 0.16 (H)  0.00 - 0.04 K/uL Final    Comment: Mild elevation in immature granulocytes is non specific and   can be seen in a variety of conditions including stress response,   acute inflammation, trauma and pregnancy. Correlation with other   laboratory and clinical findings is essential.      Lymph # 11/02/2023 3.4  1.0 - 4.8 K/uL Final    Mono # 11/02/2023 1.3 (H)  0.3 - 1.0 K/uL Final    Eos # 11/02/2023 0.2  0.0 - 0.5 K/uL Final    Baso # 11/02/2023 0.07  0.00 - 0.20 K/uL Final    nRBC 11/02/2023 0  0 /100 WBC Final    Gran % 11/02/2023 71.5  38.0 - 73.0 % Final    Lymph % 11/02/2023 18.8  18.0 - 48.0 %  Final    Mono % 11/02/2023 7.3  4.0 - 15.0 % Final    Eosinophil % 11/02/2023 1.1  0.0 - 8.0 % Final    Basophil % 11/02/2023 0.4  0.0 - 1.9 % Final    Differential Method 11/02/2023 Automated   Final    Sodium 11/02/2023 137  136 - 145 mmol/L Final    Potassium 11/02/2023 4.1  3.5 - 5.1 mmol/L Final    Chloride 11/02/2023 105  95 - 110 mmol/L Final    CO2 11/02/2023 21 (L)  23 - 29 mmol/L Final    Glucose 11/02/2023 84  70 - 110 mg/dL Final    BUN 11/02/2023 23 (H)  6 - 20 mg/dL Final    Creatinine 11/02/2023 1.1  0.5 - 1.4 mg/dL Final    Calcium 11/02/2023 9.7  8.7 - 10.5 mg/dL Final    Total Protein 11/02/2023 7.5  6.0 - 8.4 g/dL Final    Albumin 11/02/2023 4.1  3.5 - 5.2 g/dL Final    Total Bilirubin 11/02/2023 0.2  0.1 - 1.0 mg/dL Final    Comment: For infants and newborns, interpretation of results should be based  on gestational age, weight and in agreement with clinical  observations.    Premature Infant recommended reference ranges:  Up to 24 hours.............<8.0 mg/dL  Up to 48 hours............<12.0 mg/dL  3-5 days..................<15.0 mg/dL  6-29 days.................<15.0 mg/dL      Alkaline Phosphatase 11/02/2023 80  55 - 135 U/L Final    AST 11/02/2023 29  10 - 40 U/L Final    ALT 11/02/2023 59 (H)  10 - 44 U/L Final    eGFR 11/02/2023 >60.0  >60 mL/min/1.73 m^2 Final    Anion Gap 11/02/2023 11  8 - 16 mmol/L Final    Tacrolimus Lvl 11/02/2023 5.7  5.0 - 15.0 ng/mL Final    Comment: Testing performed by a chemiluminescent microparticle   immunoassay on the [email or web address] System.    CAUTION: No firm therapeutic range exists for tacrolimus in whole   blood. The   complexity of the clinical state, individual differences in   sensitivity to   immunosuppressive and nephrotoxic effects of tacrolimus,   co-administration   of other immunosuppressants, type of transplant, time post-transplant   and a   number of other factors contribute to different requirements for   optimal   blood levels of  tacrolimus. Therefore, individual tacrolimus values   cannot   be used as the sole indicator for making changes in treatment regimen   and   each patient should be thoroughly evaluated clinically before changes   in   treatment regimens are made. Each user must establish his or her own   ranges   based on clinical experience.  Therapeutic ranges vary according to the commercial test used, and   therefore   should be established for each commercial test. Values obtained with   diff                           erent assay methods cannot be used interchangeably due to   differences in   assay methods and cross-reactivity with metabolites, nor should   correction   factors be applied. Therefore, consistent use of one assay for   individual   patients is recommended.     There may be more visits with results that are not included.           Rmoero Bloom

## 2024-01-09 NOTE — TELEPHONE ENCOUNTER
Sent change to patient and to get labs on 1/22/24.    ----- Message from Sharmila Pacheco MD sent at 1/9/2024 10:48 AM CST -----  Prograf level low- good for kideny but I think the liver likes more prograf- increase to 2/1 from 1/1 and repeat labs in 2 weeks- please let patient know.

## 2024-01-11 LAB
CLINICAL BIOCHEMIST REVIEW: NORMAL
PLPETH BLD-MCNC: <10 NG/ML
POPETH BLD-MCNC: <10 NG/ML

## 2024-01-12 ENCOUNTER — PATIENT MESSAGE (OUTPATIENT)
Dept: DERMATOLOGY | Facility: CLINIC | Age: 54
End: 2024-01-12
Payer: COMMERCIAL

## 2024-01-16 DIAGNOSIS — Z94.4 S/P LIVER TRANSPLANT: ICD-10-CM

## 2024-01-16 RX ORDER — DEXTROAMPHETAMINE SACCHARATE, AMPHETAMINE ASPARTATE, DEXTROAMPHETAMINE SULFATE AND AMPHETAMINE SULFATE 5; 5; 5; 5 MG/1; MG/1; MG/1; MG/1
1 TABLET ORAL 2 TIMES DAILY
Qty: 60 TABLET | Refills: 0 | Status: SHIPPED | OUTPATIENT
Start: 2024-01-16

## 2024-01-16 RX ORDER — TACROLIMUS 1 MG/1
CAPSULE ORAL
Qty: 180 CAPSULE | Refills: 3 | Status: SHIPPED | OUTPATIENT
Start: 2024-01-16 | End: 2024-03-05

## 2024-01-17 ENCOUNTER — OFFICE VISIT (OUTPATIENT)
Dept: DERMATOLOGY | Facility: CLINIC | Age: 54
End: 2024-01-17
Payer: COMMERCIAL

## 2024-01-17 DIAGNOSIS — R23.3 PETECHIAE: ICD-10-CM

## 2024-01-17 DIAGNOSIS — D23.9 HIDROCYSTOMA: Primary | ICD-10-CM

## 2024-01-17 DIAGNOSIS — L85.3 XEROSIS CUTIS: ICD-10-CM

## 2024-01-17 DIAGNOSIS — L81.1 MELASMA: ICD-10-CM

## 2024-01-17 PROCEDURE — 99214 OFFICE O/P EST MOD 30 MIN: CPT | Mod: S$GLB,,, | Performed by: STUDENT IN AN ORGANIZED HEALTH CARE EDUCATION/TRAINING PROGRAM

## 2024-01-17 PROCEDURE — 99999 PR PBB SHADOW E&M-EST. PATIENT-LVL III: CPT | Mod: PBBFAC,,, | Performed by: STUDENT IN AN ORGANIZED HEALTH CARE EDUCATION/TRAINING PROGRAM

## 2024-01-17 RX ORDER — TRETINOIN 0.5 MG/G
CREAM TOPICAL
Qty: 30 G | Refills: 5 | Status: SHIPPED | OUTPATIENT
Start: 2024-01-17

## 2024-01-17 NOTE — PROGRESS NOTES
Subjective:      Patient ID:  Malu Montes is a 53 y.o. female who presents for   Chief Complaint   Patient presents with    Dry Skin     Periocular      Pt presents with c/o dry, sensitive skin around eyes. Previously tx with OTC creams, have not provided relief.     Pt also c/o discoloration of face and neck, no current tx.     Dry Skin - Initial  Affected locations: right eye and left eye      Has h/o liver transplant on prograf. Last TBSE 6/2023    She feels that the skin around her eyes is dry, very sensitive, and bruises very easily    She did vomit yesterday and anytime she strains she gets petechiae on eyelids    She has several other concerns including discoloration on her cheeks    Review of Systems   Skin:  Positive for dry skin. Negative for daily sunscreen use, activity-related sunscreen use and wears hat.       Objective:   Physical Exam   Constitutional: She appears well-developed and well-nourished. No distress.   Neurological: She is alert and oriented to person, place, and time. She is not disoriented.   Psychiatric: She has a normal mood and affect.   Skin:   Areas Examined (abnormalities noted in diagram):   Head / Face Inspection Performed            Diagram Legend     Erythematous scaling macule/papule c/w actinic keratosis       Vascular papule c/w angioma      Pigmented verrucoid papule/plaque c/w seborrheic keratosis      Yellow umbilicated papule c/w sebaceous hyperplasia      Irregularly shaped tan macule c/w lentigo     1-2 mm smooth white papules consistent with Milia      Movable subcutaneous cyst with punctum c/w epidermal inclusion cyst      Subcutaneous movable cyst c/w pilar cyst      Firm pink to brown papule c/w dermatofibroma      Pedunculated fleshy papule(s) c/w skin tag(s)      Evenly pigmented macule c/w junctional nevus     Mildly variegated pigmented, slightly irregular-bordered macule c/w mildly atypical nevus      Flesh colored to evenly pigmented papule c/w  intradermal nevus       Pink pearly papule/plaque c/w basal cell carcinoma      Erythematous hyperkeratotic cursted plaque c/w SCC      Surgical scar with no sign of skin cancer recurrence      Open and closed comedones      Inflammatory papules and pustules      Verrucoid papule consistent consistent with wart     Erythematous eczematous patches and plaques     Dystrophic onycholytic nail with subungual debris c/w onychomycosis     Umbilicated papule    Erythematous-base heme-crusted tan verrucoid plaque consistent with inflamed seborrheic keratosis     Erythematous Silvery Scaling Plaque c/w Psoriasis     See annotation      Assessment / Plan:        Hidrocystoma vs BCC  --pearly papule on left lower eyelid margin. Recommend she see ophthalmology to eval for hidrocystoma vs BCC. She has an ophthalmologist and will make an appointment    Petechiae  - happens frequently after she valsalvas / vomits. Normal platelets. No pinch purpura or macroglossia to suggest systemic cause such as amyloid  - reassured    Melasma  - Benign nature of diagnosis reviewed  - Reviewed importance of photoprotection with UVA/UVB photoprotection and physical blockers as well as a large-brimmed hat, educated regarding broad spectrum tinted sunscreen- UVA/UVB, zinc oxide + titanium dioxide   - Start combination topical therapy with below, r/b/se of topical therapies including paradoxical ochronosis due to hydroquinone reviewed  -moisture as needed   - Anticipatory guidance provided    Can use tretinoin when taking a break from combination lightening cream    -     hydroquinone 8 % Emul; Compound hydroquinone 8% / tretinoin 0.025% / kojic acid 1% / niacinamide 4% / fluocinolone 0.025% cream. Apply a pea-sized amount to face qhs. Do not use for longer than 12 weeks in a row then take a 1 month break. Use only to dark spots  Dispense: 30 g; Refill: 1  -     tretinoin (RETIN-A) 0.05 % cream; Compound tretinoin 0.05% / niacinamide 2% cream. Start  2-3 times weekly then increase up to every night after 2-3 weeks if skin is not too dry. Apply pea sized amount to entire face  Dispense: 30 g; Refill: 5    Xerosis cutis  - frequent bland emollients--handout provided           Follow up if symptoms worsen or fail to improve.

## 2024-01-17 NOTE — PATIENT INSTRUCTIONS
XEROSIS (DRY SKIN)        Definition    Xerosis is the term for dry skin.  We all have a natural oil coating over our skin produced by the skin oil glands.  If this oil is removed, the skin becomes dry which can lead to cracking, which can lead to inflammation.  Xerosis is usually a long-term problem that recurs often, especially in the winter.    Cause    Long hot baths or showers can remove our natural oil and lead to xerosis.  One should never take more than one bath or shower a day and for no longer than ten minutes.  Use of harsh soaps such as Zest, Dial, and Ivory can worsen and cause xerosis.  Cold winter weather worsens xerosis because the amount of moisture contained in cold air is much less than the amount of moisture in warm air.    Treatment    Treatment is intended to restore the natural oil to your skin.  Keep the skin lubricated.    Do not take more than one bath or shower a day.  Use lukewarm water, not hot.  Hot water dries out the skin.    Use a gentle moisturizing soap such as Cetaphil soap, Oil of Olay, Dove, Basis, Ivory moisture care, Restoraderm cleanser.    When toweling dry, dont rub.  Blot the skin so there is still some water left on the skin.  You should apply a moisturizing cream to all of the skin such as Cerave cream, Cetaphil cream, Lipikar [place] AP+ Intense Repair Moisturizing Cream or Restoraderm or Eucerin advanced repair cream.   Alpha hydroxyacid lotions, i.e., AmLactin, also work very well for preventing dry skin, but may burn when used on inflamed or reddened skin.    If you like to swim during the winter months, you should not use soap when getting out of the pool.  When you have finished swimming, rinse off the chlorine with cool to warm water.  If this will be the only shower of the day, then you may use Cetaphil or another mild soap to cleanse your skin.  After the shower, apply a moisturizing cream to all of the skin as above.        [address] [facility]  [address]/ [phone] FAX/ [email or web address]     ______________  Facial moisturizers:    Cerave PM facial moisturizer  Cerave renewing night cream  First aid beauty advanced repair cream

## 2024-01-18 ENCOUNTER — OFFICE VISIT (OUTPATIENT)
Dept: INTERNAL MEDICINE | Facility: CLINIC | Age: 54
End: 2024-01-18
Payer: COMMERCIAL

## 2024-01-18 VITALS
BODY MASS INDEX: 25.5 KG/M2 | DIASTOLIC BLOOD PRESSURE: 80 MMHG | TEMPERATURE: 97 F | HEART RATE: 99 BPM | OXYGEN SATURATION: 98 % | SYSTOLIC BLOOD PRESSURE: 116 MMHG | WEIGHT: 143.94 LBS | HEIGHT: 63 IN

## 2024-01-18 DIAGNOSIS — N18.31 STAGE 3A CHRONIC KIDNEY DISEASE: ICD-10-CM

## 2024-01-18 DIAGNOSIS — N25.81 SECONDARY HYPERPARATHYROIDISM OF RENAL ORIGIN: ICD-10-CM

## 2024-01-18 DIAGNOSIS — N02.B9 IGA NEPHROPATHY ASSOCIATED WITH LIVER DISEASE: ICD-10-CM

## 2024-01-18 DIAGNOSIS — E78.5 HYPERLIPIDEMIA, UNSPECIFIED HYPERLIPIDEMIA TYPE: ICD-10-CM

## 2024-01-18 DIAGNOSIS — F17.200 NICOTINE USE DISORDER: ICD-10-CM

## 2024-01-18 DIAGNOSIS — K76.9 IGA NEPHROPATHY ASSOCIATED WITH LIVER DISEASE: ICD-10-CM

## 2024-01-18 DIAGNOSIS — D50.8 OTHER IRON DEFICIENCY ANEMIA: ICD-10-CM

## 2024-01-18 DIAGNOSIS — F10.21 ALCOHOL USE DISORDER, SEVERE, IN EARLY REMISSION: Primary | Chronic | ICD-10-CM

## 2024-01-18 DIAGNOSIS — D63.8 ANEMIA OF CHRONIC DISEASE: ICD-10-CM

## 2024-01-18 DIAGNOSIS — E03.9 HYPOTHYROIDISM, UNSPECIFIED TYPE: ICD-10-CM

## 2024-01-18 PROCEDURE — 99999 PR PBB SHADOW E&M-EST. PATIENT-LVL IV: CPT | Mod: PBBFAC,,, | Performed by: INTERNAL MEDICINE

## 2024-01-18 PROCEDURE — 99396 PREV VISIT EST AGE 40-64: CPT | Mod: S$GLB,,, | Performed by: INTERNAL MEDICINE

## 2024-01-18 NOTE — PROGRESS NOTES
Subjective     Patient ID: Malu Montes is a 53 y.o. female.    Chief Complaint: Annual Exam    HPI  53 y.o. Female here for annual exam.      Vaccines: Influenza (declined); Tetanus (declined); Prevnar 13 (); Pneumococcal 23 ()  Eye exam:   Mammogram: 10/23  Gyn exam:   Colonoscopy:      Exercise: no  Diet: regular      Past Medical History:  No date: ADD (attention deficit disorder)  No date: Anxiety  No date: Ascites of liver  2021: Cirrhosis  No date: CKD (chronic kidney disease) stage 3, GFR 30-59 ml/min  No date: Constipation  No date: ETOH abuse  No date: Hyperlipidemia  No date: Hypothyroidism  2022: Liver transplant candidate  2021: Other ascites  No date: Pancreatitis, acute  No date: Tobacco use  No date: Vitamin D deficiency  Past Surgical History:  No date: AUGMENTATION OF BREAST      Comment:  second set  No date: breast augmentation  No date:  SECTION  2021: COLONOSCOPY; N/A      Comment:  Procedure: COLONOSCOPY;  Surgeon: Dao Gray MD;  Location: [address]);  Service: Endoscopy;                Laterality: N/A;  COVID test 21 General surgery                clinic [facility]  09/10/2021: CYSTOSCOPY; N/A      Comment:  Procedure: CYSTOSCOPY;  Surgeon: Rj Sánchez Jr., MD;  Location: [facility] OR [address] [place] [address];  Service: Urology;                 Laterality: N/A;  2021: ESOPHAGOGASTRODUODENOSCOPY; N/A      Comment:  Procedure: ESOPHAGOGASTRODUODENOSCOPY (EGD);  Surgeon:                Dao Gray MD;  Location: [address]);                 Service: Endoscopy;  Laterality: N/A;  labs current on                9/7  10/26/2021: ESOPHAGOGASTRODUODENOSCOPY; N/A      Comment:  Procedure: ESOPHAGOGASTRODUODENOSCOPY (EGD);  Surgeon:                Dao Gray MD;  Location: [facility] ([address] [place] [address]);                 Service: Endoscopy;  Laterality: N/A;  to be done the                week of  10/18/21 per Dr. Gray-BBcovid-10/23/21-[facility]-BB 10/25 arrival time                confirmed with pt-rb  12/21/2021: ESOPHAGOGASTRODUODENOSCOPY; Left      Comment:  Procedure: EGD (ESOPHAGOGASTRODUODENOSCOPY);  Surgeon:                Koffi Monteiro MD;  Location: [facility] (4TH FLR);                 Service: Endoscopy;  Laterality: Left;  Rapidpt                requested AM appt and first available in                December-BBlabs morning of procedure-BB12/14 lvm to                confirm appt-rb  No date: HEMORRHOID SURGERY  06/05/2022: LIVER TRANSPLANT; N/A      Comment:  Procedure: TRANSPLANT, LIVER;  Surgeon: Franco Slaughter MD;  Location: [facility] OR 2ND FLR;  Service: Transplant;                 Laterality: N/A;  06/08/2021: PERITONEOCENTESIS; Right      Comment:  Procedure: PARACENTESIS, ABDOMINAL;  Surgeon: Jay Torre MD;  Location: [facility] CATH LAB;  Service:                Radiology;  Laterality: Right;  06/25/2021: PERITONEOCENTESIS; Right      Comment:  Procedure: PARACENTESIS, ABDOMINAL;  Surgeon: Jay Torre MD;  Location: [facility] CATH LAB;  Service:                Radiology;  Laterality: Right;  07/12/2021: PERITONEOCENTESIS; Right      Comment:  Procedure: PARACENTESIS, ABDOMINAL;  Surgeon: Jay Torre MD;  Location: [facility] CATH LAB;  Service:                Radiology;  Laterality: Right;  07/23/2021: PERITONEOCENTESIS; Right      Comment:  Procedure: PARACENTESIS, ABDOMINAL;  Surgeon: Edward De La Torre II, MD;  Location: [facility] CATH LAB;  Service:                Interventional Radiology;  Laterality: Right;  08/03/2021: PERITONEOCENTESIS; Right      Comment:  Procedure: PARACENTESIS, ABDOMINAL;  Surgeon: Franco Cheung MD;  Location: [facility] CATH LAB;  Service:                Radiology;  Laterality: Right;  08/16/2021: PERITONEOCENTESIS; Right      Comment:  Procedure: PARACENTESIS,  ABDOMINAL;  Surgeon: Jay Torre MD;  Location: [facility] CATH LAB;  Service:                Radiology;  Laterality: Right;  08/23/2021: PERITONEOCENTESIS; Right      Comment:  Procedure: PARACENTESIS, ABDOMINAL;  Surgeon: Jay Torre MD;  Location: [facility] CATH LAB;  Service:                Radiology;  Laterality: Right;  09/16/2021: PERITONEOCENTESIS; Right      Comment:  Procedure: PARACENTESIS, ABDOMINAL;  Surgeon: Jay Torre MD;  Location: [facility] CATH LAB;  Service:                Radiology;  Laterality: Right;  09/24/2021: PERITONEOCENTESIS; N/A      Comment:  Procedure: PARACENTESIS, ABDOMINAL;  Surgeon: Jay Torre MD;  Location: [facility] CATH LAB;  Service:                Radiology;  Laterality: N/A;  12/23/2021: PERITONEOCENTESIS; Right      Comment:  Procedure: PARACENTESIS, ABDOMINAL;  Surgeon: Jay Torre MD;  Location: [facility] CATH LAB;  Service:                Radiology;  Laterality: Right;  12/30/2021: PERITONEOCENTESIS; N/A      Comment:  Procedure: PARACENTESIS, ABDOMINAL;  Surgeon: Salas Cabrera MD;  Location: [facility] CATH LAB;  Service:                Radiology;  Laterality: N/A;  09/10/2021: RETROGRADE PYELOGRAPHY; Bilateral      Comment:  Procedure: PYELOGRAM, RETROGRADE;  Surgeon: Rj Sánchez Jr., MD;  Location: [address];  Service:                Urology;  Laterality: Bilateral;  No date: TONSILLECTOMY  Social History    Socioeconomic History      Marital status:       Number of children: 1    Occupational History      Occupation: Assistant    Tobacco Use      Smoking status: Some Days        Packs/day: 0.25        Years: 27.00        Pack years: 6.75        Types: Cigarettes      Smokeless tobacco: Never      Tobacco comments: two cigarettes a day     Substance and Sexual Activity      Alcohol use: Not Currently        Comment: quit caridad 15      Drug use:  No      Sexual activity: Yes        Partners: Male    Social History Narrative      They live in [place], [place]      Social Determinants of Health  Financial Resource Strain: Low Risk       Difficulty of Paying Living Expenses: Not hard at all  Food Insecurity: No Food Insecurity      Worried About Running Out of Food in the Last Year: Never true      Ran Out of Food in the Last Year: Never true  Transportation Needs: No Transportation Needs      Lack of Transportation (Medical): No      Lack of Transportation (Non-Medical): No  Physical Activity: Unknown      Days of Exercise per Week: 1 day  Stress: No Stress Concern Present      Feeling of Stress : Only a little  Social Connections: Unknown      Frequency of Communication with Friends and Family: More than three times a week      Frequency of Social Gatherings with Friends and Family: Twice a week      Active Member of Clubs or Organizations: No      Attends Club or Organization Meetings: Never      Marital Status:   Housing Stability: Low Risk       Unable to Pay for Housing in the Last Year: No      Number of Places Lived in the Last Year: 1      Unstable Housing in the Last Year: No  Review of patient's allergies indicates:  No Known Allergies  Review of Systems   Constitutional:  Negative for activity change, appetite change, chills, diaphoresis, fatigue, fever and unexpected weight change.   HENT:  Negative for nasal congestion, mouth sores, postnasal drip, rhinorrhea, sinus pressure/congestion, sneezing, sore throat, trouble swallowing and voice change.    Eyes:  Negative for pain, discharge and visual disturbance.   Respiratory:  Negative for cough, shortness of breath and wheezing.    Cardiovascular:  Negative for chest pain, palpitations and leg swelling.   Gastrointestinal:  Negative for abdominal pain, blood in stool, constipation, diarrhea, nausea and vomiting.   Endocrine: Negative for cold intolerance and heat intolerance.   Genitourinary:   Negative for difficulty urinating, dysuria, frequency, hematuria and urgency.   Musculoskeletal:  Negative for arthralgias and myalgias.   Integumentary:  Negative for rash and wound.   Allergic/Immunologic: Negative for environmental allergies and food allergies.   Neurological:  Negative for dizziness, tremors, seizures, syncope, weakness, light-headedness and headaches.   Hematological:  Negative for adenopathy. Does not bruise/bleed easily.   Psychiatric/Behavioral:  Negative for confusion and sleep disturbance. The patient is not nervous/anxious.           Objective     Physical Exam  Vitals and nursing note reviewed.   Constitutional:       General: She is not in acute distress.     Appearance: Normal appearance. She is well-developed. She is not diaphoretic.   HENT:      Head: Normocephalic and atraumatic.      Right Ear: External ear normal.      Left Ear: External ear normal.      Nose: Nose normal.      Mouth/Throat:      Pharynx: No oropharyngeal exudate.   Eyes:      General: No scleral icterus.        Right eye: No discharge.         Left eye: No discharge.      Conjunctiva/sclera: Conjunctivae normal.      Pupils: Pupils are equal, round, and reactive to light.   Neck:      Thyroid: No thyromegaly.      Vascular: No JVD.   Cardiovascular:      Rate and Rhythm: Normal rate and regular rhythm.      Pulses: Normal pulses.      Heart sounds: Normal heart sounds. No murmur heard.  Pulmonary:      Effort: Pulmonary effort is normal. No respiratory distress.      Breath sounds: Normal breath sounds. No wheezing, rhonchi or rales.   Chest:      Chest wall: No tenderness.   Abdominal:      General: Bowel sounds are normal. There is no distension.      Palpations: Abdomen is soft.      Tenderness: There is no abdominal tenderness. There is no guarding or rebound.   Musculoskeletal:      Cervical back: Neck supple.      Right lower leg: No edema.      Left lower leg: No edema.   Lymphadenopathy:      Cervical: No  cervical adenopathy.   Skin:     General: Skin is warm and dry.      Coloration: Skin is not pale.      Findings: No rash.   Neurological:      General: No focal deficit present.      Mental Status: She is alert and oriented to person, place, and time.      Gait: Gait normal.   Psychiatric:         Behavior: Behavior normal.         Thought Content: Thought content normal.         Judgment: Judgment normal.            Assessment and Plan     1. Alcohol use disorder, severe, in early remission    2. Hyperlipidemia, unspecified hyperlipidemia type    3. Stage 3a chronic kidney disease    4. IgA nephropathy associated with liver disease    5. Anemia of chronic disease    6. Other iron deficiency anemia    7. Hypothyroidism, unspecified type    8. Secondary hyperparathyroidism of renal origin    9. Nicotine use disorder        Recent blood work reviewed with pt      Alcoholic fatty liver with ascites/portal HTN- s/p liver transplant   -followed by Hepatology      ETOH abuse- pt stopped back in June 2021      Nicotine use- cessation advised      Hypothyroidism- stable on Synthroid 50 mcg daily      HLD- stable on statin      CKD III/IgA Nephropathy- stable      2nd Hyperparathyroidism- stable       Anemia of chronic disease- stable     F/u in 1 yr

## 2024-01-24 ENCOUNTER — LAB VISIT (OUTPATIENT)
Dept: LAB | Facility: HOSPITAL | Age: 54
End: 2024-01-24
Attending: INTERNAL MEDICINE
Payer: COMMERCIAL

## 2024-01-24 DIAGNOSIS — Z94.4 S/P LIVER TRANSPLANT: ICD-10-CM

## 2024-01-24 LAB
ALBUMIN SERPL BCP-MCNC: 4.1 G/DL (ref 3.5–5.2)
ALP SERPL-CCNC: 70 U/L (ref 55–135)
ALT SERPL W/O P-5'-P-CCNC: 39 U/L (ref 10–44)
ANION GAP SERPL CALC-SCNC: 8 MMOL/L (ref 8–16)
AST SERPL-CCNC: 32 U/L (ref 10–40)
BASOPHILS # BLD AUTO: 0.12 K/UL (ref 0–0.2)
BASOPHILS NFR BLD: 1.3 % (ref 0–1.9)
BILIRUB SERPL-MCNC: 0.2 MG/DL (ref 0.1–1)
BUN SERPL-MCNC: 21 MG/DL (ref 6–20)
CALCIUM SERPL-MCNC: 9.9 MG/DL (ref 8.7–10.5)
CHLORIDE SERPL-SCNC: 108 MMOL/L (ref 95–110)
CO2 SERPL-SCNC: 23 MMOL/L (ref 23–29)
CREAT SERPL-MCNC: 1.2 MG/DL (ref 0.5–1.4)
DIFFERENTIAL METHOD BLD: ABNORMAL
EOSINOPHIL # BLD AUTO: 0.6 K/UL (ref 0–0.5)
EOSINOPHIL NFR BLD: 6.6 % (ref 0–8)
ERYTHROCYTE [DISTWIDTH] IN BLOOD BY AUTOMATED COUNT: 13.2 % (ref 11.5–14.5)
EST. GFR  (NO RACE VARIABLE): 54.5 ML/MIN/1.73 M^2
GLUCOSE SERPL-MCNC: 97 MG/DL (ref 70–110)
HCT VFR BLD AUTO: 41.8 % (ref 37–48.5)
HGB BLD-MCNC: 13.4 G/DL (ref 12–16)
IMM GRANULOCYTES # BLD AUTO: 0.02 K/UL (ref 0–0.04)
IMM GRANULOCYTES NFR BLD AUTO: 0.2 % (ref 0–0.5)
LYMPHOCYTES # BLD AUTO: 2.5 K/UL (ref 1–4.8)
LYMPHOCYTES NFR BLD: 27.2 % (ref 18–48)
MCH RBC QN AUTO: 29.5 PG (ref 27–31)
MCHC RBC AUTO-ENTMCNC: 32.1 G/DL (ref 32–36)
MCV RBC AUTO: 92 FL (ref 82–98)
MONOCYTES # BLD AUTO: 0.7 K/UL (ref 0.3–1)
MONOCYTES NFR BLD: 7.6 % (ref 4–15)
NEUTROPHILS # BLD AUTO: 5.3 K/UL (ref 1.8–7.7)
NEUTROPHILS NFR BLD: 57.1 % (ref 38–73)
NRBC BLD-RTO: 0 /100 WBC
PLATELET # BLD AUTO: 316 K/UL (ref 150–450)
PMV BLD AUTO: 10.6 FL (ref 9.2–12.9)
POTASSIUM SERPL-SCNC: 3.7 MMOL/L (ref 3.5–5.1)
PROT SERPL-MCNC: 7.1 G/DL (ref 6–8.4)
RBC # BLD AUTO: 4.54 M/UL (ref 4–5.4)
SODIUM SERPL-SCNC: 139 MMOL/L (ref 136–145)
WBC # BLD AUTO: 9.23 K/UL (ref 3.9–12.7)

## 2024-01-24 PROCEDURE — 80197 ASSAY OF TACROLIMUS: CPT | Performed by: INTERNAL MEDICINE

## 2024-01-24 PROCEDURE — 85025 COMPLETE CBC W/AUTO DIFF WBC: CPT | Performed by: INTERNAL MEDICINE

## 2024-01-24 PROCEDURE — 80053 COMPREHEN METABOLIC PANEL: CPT | Performed by: INTERNAL MEDICINE

## 2024-01-25 ENCOUNTER — PATIENT MESSAGE (OUTPATIENT)
Dept: ENDOCRINOLOGY | Facility: CLINIC | Age: 54
End: 2024-01-25
Payer: COMMERCIAL

## 2024-01-25 DIAGNOSIS — R73.03 PREDIABETES: ICD-10-CM

## 2024-01-25 DIAGNOSIS — R73.9 HYPERGLYCEMIA: Primary | ICD-10-CM

## 2024-01-25 LAB — TACROLIMUS BLD-MCNC: 4.6 NG/ML (ref 5–15)

## 2024-01-25 RX ORDER — TIRZEPATIDE 2.5 MG/.5ML
2.5 INJECTION, SOLUTION SUBCUTANEOUS
Qty: 4 PEN | Refills: 0 | Status: SHIPPED | OUTPATIENT
Start: 2024-01-25 | End: 2024-02-26 | Stop reason: SDUPTHER

## 2024-01-30 ENCOUNTER — PATIENT MESSAGE (OUTPATIENT)
Dept: TRANSPLANT | Facility: CLINIC | Age: 54
End: 2024-01-30
Payer: COMMERCIAL

## 2024-01-30 ENCOUNTER — TELEPHONE (OUTPATIENT)
Dept: TRANSPLANT | Facility: CLINIC | Age: 54
End: 2024-01-30
Payer: COMMERCIAL

## 2024-01-30 NOTE — TELEPHONE ENCOUNTER
Sent patient message via portal to let her know labs are stable.  No medication changes, next labs due on 3/25/24      ----- Message from Sharmila Pacheco MD sent at 1/29/2024 11:08 PM CST -----  The Labs are stable - please let patient know.

## 2024-02-02 ENCOUNTER — PATIENT MESSAGE (OUTPATIENT)
Dept: TRANSPLANT | Facility: CLINIC | Age: 54
End: 2024-02-02

## 2024-02-02 ENCOUNTER — OFFICE VISIT (OUTPATIENT)
Dept: TRANSPLANT | Facility: CLINIC | Age: 54
End: 2024-02-02
Payer: COMMERCIAL

## 2024-02-02 ENCOUNTER — OFFICE VISIT (OUTPATIENT)
Dept: OPTOMETRY | Facility: CLINIC | Age: 54
End: 2024-02-02
Payer: COMMERCIAL

## 2024-02-02 VITALS
WEIGHT: 142.88 LBS | SYSTOLIC BLOOD PRESSURE: 108 MMHG | OXYGEN SATURATION: 97 % | DIASTOLIC BLOOD PRESSURE: 73 MMHG | HEIGHT: 63 IN | HEART RATE: 114 BPM | BODY MASS INDEX: 25.32 KG/M2 | RESPIRATION RATE: 18 BRPM

## 2024-02-02 DIAGNOSIS — Z91.89 AT RISK FOR OPPORTUNISTIC INFECTIONS: ICD-10-CM

## 2024-02-02 DIAGNOSIS — Z94.4 S/P LIVER TRANSPLANT: ICD-10-CM

## 2024-02-02 DIAGNOSIS — N02.B9 IGA NEPHROPATHY ASSOCIATED WITH LIVER DISEASE: Primary | ICD-10-CM

## 2024-02-02 DIAGNOSIS — K76.9 IGA NEPHROPATHY ASSOCIATED WITH LIVER DISEASE: Primary | ICD-10-CM

## 2024-02-02 DIAGNOSIS — Z79.60 LONG-TERM USE OF IMMUNOSUPPRESSANT MEDICATION: ICD-10-CM

## 2024-02-02 DIAGNOSIS — M85.852 OSTEOPENIA OF LEFT HIP: ICD-10-CM

## 2024-02-02 DIAGNOSIS — H02.826 EYELID CYST, LEFT: Primary | ICD-10-CM

## 2024-02-02 DIAGNOSIS — Z29.89 PROPHYLACTIC IMMUNOTHERAPY: ICD-10-CM

## 2024-02-02 DIAGNOSIS — N18.31 STAGE 3A CHRONIC KIDNEY DISEASE: ICD-10-CM

## 2024-02-02 PROCEDURE — 99215 OFFICE O/P EST HI 40 MIN: CPT | Mod: S$GLB,,, | Performed by: INTERNAL MEDICINE

## 2024-02-02 PROCEDURE — 99213 OFFICE O/P EST LOW 20 MIN: CPT | Mod: S$GLB,,, | Performed by: OPTOMETRIST

## 2024-02-02 PROCEDURE — 99999 PR PBB SHADOW E&M-EST. PATIENT-LVL V: CPT | Mod: PBBFAC,,, | Performed by: INTERNAL MEDICINE

## 2024-02-02 PROCEDURE — 99999 PR PBB SHADOW E&M-EST. PATIENT-LVL III: CPT | Mod: PBBFAC,,, | Performed by: OPTOMETRIST

## 2024-02-02 NOTE — Clinical Note
1. Will start low dose rapa to allow lowering of prograf- new target will be 2-3 and rapa 3-5 2. Check prot/creat, lipids- will check with nephrologist to see if can go on rapa 3. Will continue cellcept 4. Dermatology annually 5. Annual mammogram 6. Pap per gyn 7. Bone density per endocrinology 8. Colonsocopy 2031

## 2024-02-02 NOTE — PROGRESS NOTES
Transplant Hepatology  Liver Transplant Recipient Follow-up    Transplant Date: 6/5/2022  UNOS Native Liver Dx: Alcoholic Cirrhosis    Malu is here for follow up of her liver transplant.    ORGAN: LIVER  Whole or Partial: whole liver  Donor Type: donation after brain death  [facility] High Risk: yes  Donor CMV Status: Positive  Donor HCV Status: Positive  Donor HBcAb: Negative  Donor HBV JACOBO:   Donor HCV JACOBO: Negative  Biliary Anastomosis: end to end  Arterial Anatomy: standard  IVC reconstruction: end to end ivc  Portal vein status: patent    She has had the following complications since transplant:  leukopenia . The noted complications is well controlled.    Subjective:     Interval History: Malu is now 1 year and 7 months post liver transplant. Currently, she is doing well. Is not smoking cigarettes and has stopped vaping.     Now s/p DBD OLTX 6/5 for ETOH cirrhosis (donor HCVab+/JACOBO-). Had intra-op HD, originally listed as liver-kidney transplant. Kidney function improved and she was delisted for kidney tx. Surgery went without complication.     Allograft function 1/24/24: ALT 39, AST 32, ALKP 70, Tbil 0.2, creat 1.2, prograf level 4.6  IS: prograf, cellcept 500 mg bid  HCV Ab+, JACOBO- donor: HCV RNA-ve 8/1/22; did not acquire the infection    Abdo US 6/8/23: satisfactory doppler    Immunoprophylaxis:   Aspirin: 81mg daily     Key Complications:   Bile Leak - NO  HAT / HAS-NO  Back to OR- NO    Review of Systems   Constitutional: Negative.    HENT: Negative.     Eyes: Negative.    Respiratory: Negative.     Cardiovascular: Negative.    Gastrointestinal: Negative.    Genitourinary: Negative.    Musculoskeletal: Negative.    Skin: Negative.    Neurological: Negative.    Psychiatric/Behavioral: Negative.         Objective:     Vitals:    02/02/24 1109   BP: 108/73   Pulse: (!) 114   Resp: 18           Physical Exam  Vitals reviewed.   Constitutional:       Appearance: She is well-developed.   HENT:      Head:  Normocephalic and atraumatic.   Eyes:      General: No scleral icterus.     Conjunctiva/sclera: Conjunctivae normal.      Pupils: Pupils are equal, round, and reactive to light.   Neck:      Thyroid: No thyromegaly.   Cardiovascular:      Rate and Rhythm: Normal rate and regular rhythm.      Heart sounds: Normal heart sounds.   Pulmonary:      Effort: Pulmonary effort is normal.      Breath sounds: Normal breath sounds. No rales.   Abdominal:      General: Bowel sounds are normal. There is no distension.      Palpations: Abdomen is soft. There is no mass.      Tenderness: There is no abdominal tenderness.   Musculoskeletal:         General: Normal range of motion.      Cervical back: Normal range of motion and neck supple.   Skin:     General: Skin is warm and dry.      Findings: No rash.   Neurological:      Mental Status: She is alert and oriented to person, place, and time.       Lab Results   Component Value Date    BILITOT 0.2 01/24/2024    AST 32 01/24/2024    ALT 39 01/24/2024    ALKPHOS 70 01/24/2024    CREATININE 1.2 01/24/2024    ALBUMIN 4.1 01/24/2024     Lab Results   Component Value Date    WBC 9.23 01/24/2024    HGB 13.4 01/24/2024    HCT 41.8 01/24/2024    HCT 31 (L) 06/06/2022     01/24/2024     Lab Results   Component Value Date    TACROLIMUS 4.6 (L) 01/24/2024       Assessment/Plan:   The patient is now 1 year and 7 months post liver transplant. Current recommendations:  1. Allograft function and control of IS: continue prograf, target 6-8. Start low dose rapa (target 3-5) and prograf (target 2-3); abdo US per protocol; continue cellcept  2. Renal insufficiency: kidneys have improved; now delisted for kidney tx; monitor; continue to f/u with nephtology since has a diagnosis of IgA nephropathy; will lower prograf   3. HCV Ab+/JACOBO- donor: did not acquire HCV  4. Hx alcohol abuse: periodic peth tests to be drawn  5. Nicotine use disorder: continue off  6. R/O osteoporosis. I am recommending a  bone density to r/o osteoporosis.   7.  Health maintenance: the patient should see a dermatologist annually to screen for skin cancer (scheduled), perform regular colonoscopies (due 2031), pap tests (last done 11/22) and mammograms (9/23)      Return 6 months  A total of 60 minutes was spent reviewing the patient's chart, examining the patient, reviewing labs and imaging and coordinating care with the patient's care team.        Patient advised that it is recommended that all transplant candidates, and their close contacts and household members receive Covid vaccination.    Sharmila Pacheco MD           [facility] Patient Status  Functional Status: 90% - Able to carry on normal activity: minor symptoms of disease  Physical Capacity: No Limitations  Working for income: yes  New diabetes onset between last follow-up to the current follow-up: No  Did patient have any acute rejection episodes during the follow-up period: No  Post transplant malignancy: No

## 2024-02-02 NOTE — LETTER
March 8, 2024        Killian Dodd  [address]  [place] [address]  Phone: [phone]  Fax: [phone]             [facility]  [address] [address]  Phone: [phone]  Fax: [phone]   Patient: Malu Montes   MR Number: [id number]   YOB: 1970   Date of Visit: 2/2/2024       Dear Dr. Killian Dodd    Thank you for referring Malu Montes to me for evaluation. Attached you will find relevant portions of my assessment and plan of care.    If you have questions, please do not hesitate to call me. I look forward to following Malu Montes along with you.    Sincerely,    Sharmila Pacheco MD    Enclosure    If you would like to receive this communication electronically, please contact [email or web address] or [phone] to request [employer] access.    [employer] is a tool which provides read-only access to select patient information with whom you have a relationship. Its easy to use and provides real time access to review your patients record including encounter summaries, notes, results, and demographic information.    If you feel you have received this communication in error or would no longer like to receive these types of communications, please e-mail [email or web address]

## 2024-02-02 NOTE — PROGRESS NOTES
HPI    54 Y/o female is here for routine eye exam with C/o pt is concern and a   small bump on OS lower eyelid margin pt theres no discharge, or pain, she   just noticed when she was looking in the mirror. Pt wears Rx glasses but   does not have them present today. Pt states at today's visit she just   wants focus on bump, does not want a full eye exam.  Pt denies pain and discomfort   No f/f     Eye med: Lumify OU PRN   Last edited by Killian Flores, OD on 2/2/2024  1:03 PM.            Assessment /Plan     For exam results, see Encounter Report.    Eyelid cyst, left      Small eyelid cyst OS.  Not bothersome to pt.  Advised YASSINE HEAT MASK 5 min QID.  Discussed excision if heat does not resolve  Pt refused eye exam and drops today--just wanted eyelid checked    PLAN:    Rtc for routine

## 2024-02-02 NOTE — PATIENT INSTRUCTIONS
Will start low dose rapa to allow lowering of prograf- new target will be 2-3 and rapa 3-5  Check prot/creat, lipids- will check with nephrologist to see if can go on rapa  Will continue cellcept  Dermatology annually  Annual mammogram  Pap per gyn  Bone density per endocrinology  Colonsocopy 2031  return 6 months

## 2024-02-02 NOTE — Clinical Note
1. Will start low dose rapa to allow lowering of prograf- new target will be 2-3 and rapa 3-5 2. Check prot/creat, lipids- will check with nephrologist to see if can go on rapa 3. Will continue cellcept 4. Dermatology annually 5. Annual mammogram 6. Pap per gyn 7. Bone density per endocrinology 8. Colonsocopy 2031 9. return 6 months

## 2024-02-05 ENCOUNTER — PATIENT MESSAGE (OUTPATIENT)
Dept: TRANSPLANT | Facility: CLINIC | Age: 54
End: 2024-02-05
Payer: COMMERCIAL

## 2024-02-05 ENCOUNTER — TELEPHONE (OUTPATIENT)
Dept: TRANSPLANT | Facility: CLINIC | Age: 54
End: 2024-02-05
Payer: COMMERCIAL

## 2024-02-05 DIAGNOSIS — Z94.4 S/P LIVER TRANSPLANT: ICD-10-CM

## 2024-02-05 DIAGNOSIS — Z94.4 S/P LIVER TRANSPLANT: Primary | ICD-10-CM

## 2024-02-05 DIAGNOSIS — N18.31 STAGE 3A CHRONIC KIDNEY DISEASE: Primary | ICD-10-CM

## 2024-02-05 NOTE — TELEPHONE ENCOUNTER
Sent message to patient to let her know and to get labs tomorrow or Wednesday.    ----- Message from Sharmila Pacheco MD sent at 2/5/2024  2:16 PM CST -----  Hmmm. Recheck labs now. Add lipase  ----- Message -----  From: Marii Ruiz RN  Sent: 2/5/2024  11:25 AM CST  To: Sharmila Pacheco MD    This is from the patient    Also, I woke up feeling really bad last Wednesday morning with nausea, pain and tenderness in my abdomen. It felt Like it was really inflamed and hurt to touch. I didn't mention it Friday at my visit because it had started feeling a little better. And I thought it was just constipation because I forgot to take my laxative. However It lasted through this weekend and I didn't start feeling better until today. No pain today and I'm not really feeling nauseous. It crossed my mind this weekend that this is how I felt when I had pancreatitis in the past. Can you check to see if I should do some labs sooner in case it was?    I advised that pancreatitis usually does not just go away like that.  I told her it was probably constipation.  You want me to get labs before 3/25/24, which is per protocol?

## 2024-02-06 ENCOUNTER — LAB VISIT (OUTPATIENT)
Dept: LAB | Facility: HOSPITAL | Age: 54
End: 2024-02-06
Attending: INTERNAL MEDICINE
Payer: COMMERCIAL

## 2024-02-06 DIAGNOSIS — Z94.4 S/P LIVER TRANSPLANT: ICD-10-CM

## 2024-02-06 DIAGNOSIS — N18.31 STAGE 3A CHRONIC KIDNEY DISEASE: ICD-10-CM

## 2024-02-06 LAB
ALBUMIN SERPL BCP-MCNC: 4.1 G/DL (ref 3.5–5.2)
ALP SERPL-CCNC: 69 U/L (ref 55–135)
ALT SERPL W/O P-5'-P-CCNC: 28 U/L (ref 10–44)
ANION GAP SERPL CALC-SCNC: 13 MMOL/L (ref 8–16)
AST SERPL-CCNC: 24 U/L (ref 10–40)
BASOPHILS # BLD AUTO: 0.1 K/UL (ref 0–0.2)
BASOPHILS NFR BLD: 1.1 % (ref 0–1.9)
BILIRUB SERPL-MCNC: 0.3 MG/DL (ref 0.1–1)
BUN SERPL-MCNC: 22 MG/DL (ref 6–20)
CALCIUM SERPL-MCNC: 9.4 MG/DL (ref 8.7–10.5)
CHLORIDE SERPL-SCNC: 103 MMOL/L (ref 95–110)
CHOLEST SERPL-MCNC: 146 MG/DL (ref 120–199)
CHOLEST/HDLC SERPL: 4.1 {RATIO} (ref 2–5)
CO2 SERPL-SCNC: 23 MMOL/L (ref 23–29)
CREAT SERPL-MCNC: 1 MG/DL (ref 0.5–1.4)
CREAT UR-MCNC: 164 MG/DL (ref 15–325)
DIFFERENTIAL METHOD BLD: NORMAL
EOSINOPHIL # BLD AUTO: 0.5 K/UL (ref 0–0.5)
EOSINOPHIL NFR BLD: 5.9 % (ref 0–8)
ERYTHROCYTE [DISTWIDTH] IN BLOOD BY AUTOMATED COUNT: 13.3 % (ref 11.5–14.5)
EST. GFR  (NO RACE VARIABLE): >60 ML/MIN/1.73 M^2
GLUCOSE SERPL-MCNC: 91 MG/DL (ref 70–110)
HCT VFR BLD AUTO: 40.3 % (ref 37–48.5)
HDLC SERPL-MCNC: 36 MG/DL (ref 40–75)
HDLC SERPL: 24.7 % (ref 20–50)
HGB BLD-MCNC: 13 G/DL (ref 12–16)
IMM GRANULOCYTES # BLD AUTO: 0.01 K/UL (ref 0–0.04)
IMM GRANULOCYTES NFR BLD AUTO: 0.1 % (ref 0–0.5)
LDLC SERPL CALC-MCNC: 78.8 MG/DL (ref 63–159)
LIPASE SERPL-CCNC: 10 U/L (ref 4–60)
LYMPHOCYTES # BLD AUTO: 2.4 K/UL (ref 1–4.8)
LYMPHOCYTES NFR BLD: 26.7 % (ref 18–48)
MCH RBC QN AUTO: 29.4 PG (ref 27–31)
MCHC RBC AUTO-ENTMCNC: 32.3 G/DL (ref 32–36)
MCV RBC AUTO: 91 FL (ref 82–98)
MONOCYTES # BLD AUTO: 0.6 K/UL (ref 0.3–1)
MONOCYTES NFR BLD: 6.7 % (ref 4–15)
NEUTROPHILS # BLD AUTO: 5.4 K/UL (ref 1.8–7.7)
NEUTROPHILS NFR BLD: 59.5 % (ref 38–73)
NONHDLC SERPL-MCNC: 110 MG/DL
NRBC BLD-RTO: 0 /100 WBC
PLATELET # BLD AUTO: 349 K/UL (ref 150–450)
PMV BLD AUTO: 10.2 FL (ref 9.2–12.9)
POTASSIUM SERPL-SCNC: 3.6 MMOL/L (ref 3.5–5.1)
PROT SERPL-MCNC: 7.1 G/DL (ref 6–8.4)
PROT UR-MCNC: 16 MG/DL (ref 0–15)
PROT/CREAT UR: 0.1 MG/G{CREAT} (ref 0–0.2)
RBC # BLD AUTO: 4.42 M/UL (ref 4–5.4)
SODIUM SERPL-SCNC: 139 MMOL/L (ref 136–145)
TRIGL SERPL-MCNC: 156 MG/DL (ref 30–150)
WBC # BLD AUTO: 9 K/UL (ref 3.9–12.7)

## 2024-02-06 PROCEDURE — 85025 COMPLETE CBC W/AUTO DIFF WBC: CPT | Performed by: INTERNAL MEDICINE

## 2024-02-06 PROCEDURE — 83690 ASSAY OF LIPASE: CPT | Performed by: INTERNAL MEDICINE

## 2024-02-06 PROCEDURE — 80061 LIPID PANEL: CPT | Performed by: INTERNAL MEDICINE

## 2024-02-06 PROCEDURE — 80053 COMPREHEN METABOLIC PANEL: CPT | Performed by: INTERNAL MEDICINE

## 2024-02-06 PROCEDURE — 80197 ASSAY OF TACROLIMUS: CPT | Performed by: INTERNAL MEDICINE

## 2024-02-06 PROCEDURE — 36415 COLL VENOUS BLD VENIPUNCTURE: CPT | Performed by: INTERNAL MEDICINE

## 2024-02-06 PROCEDURE — 84156 ASSAY OF PROTEIN URINE: CPT | Performed by: INTERNAL MEDICINE

## 2024-02-07 ENCOUNTER — PATIENT MESSAGE (OUTPATIENT)
Dept: TRANSPLANT | Facility: CLINIC | Age: 54
End: 2024-02-07
Payer: COMMERCIAL

## 2024-02-07 LAB — TACROLIMUS BLD-MCNC: 5.7 NG/ML (ref 5–15)

## 2024-02-08 ENCOUNTER — TELEPHONE (OUTPATIENT)
Dept: TRANSPLANT | Facility: CLINIC | Age: 54
End: 2024-02-08
Payer: COMMERCIAL

## 2024-02-08 ENCOUNTER — PATIENT MESSAGE (OUTPATIENT)
Dept: TRANSPLANT | Facility: CLINIC | Age: 54
End: 2024-02-08
Payer: COMMERCIAL

## 2024-02-08 NOTE — TELEPHONE ENCOUNTER
Patient send a message to Dr Pacheco she is reluctant to start another medication.  Dr Pacheco was ok but will have to do monthly labs.  Sent message next labs due on 3/04/24  ----- Message from Sharmila Pacheco MD sent at 2/7/2024 10:20 PM CST -----  The Labs are stable; start rapa 2 mg daily - labs in one week - please let patient know.

## 2024-02-12 ENCOUNTER — PATIENT MESSAGE (OUTPATIENT)
Dept: INTERNAL MEDICINE | Facility: CLINIC | Age: 54
End: 2024-02-12
Payer: COMMERCIAL

## 2024-02-12 ENCOUNTER — PATIENT MESSAGE (OUTPATIENT)
Dept: TRANSPLANT | Facility: CLINIC | Age: 54
End: 2024-02-12
Payer: COMMERCIAL

## 2024-02-19 ENCOUNTER — PATIENT MESSAGE (OUTPATIENT)
Dept: PSYCHIATRY | Facility: CLINIC | Age: 54
End: 2024-02-19
Payer: COMMERCIAL

## 2024-02-19 RX ORDER — DEXTROAMPHETAMINE SACCHARATE, AMPHETAMINE ASPARTATE, DEXTROAMPHETAMINE SULFATE AND AMPHETAMINE SULFATE 7.5; 7.5; 7.5; 7.5 MG/1; MG/1; MG/1; MG/1
30 TABLET ORAL EVERY MORNING
Qty: 30 TABLET | Refills: 0 | Status: SHIPPED | OUTPATIENT
Start: 2024-02-19 | End: 2024-03-22 | Stop reason: SDUPTHER

## 2024-02-26 ENCOUNTER — PATIENT MESSAGE (OUTPATIENT)
Dept: TRANSPLANT | Facility: CLINIC | Age: 54
End: 2024-02-26
Payer: COMMERCIAL

## 2024-02-26 ENCOUNTER — PATIENT MESSAGE (OUTPATIENT)
Dept: ENDOCRINOLOGY | Facility: CLINIC | Age: 54
End: 2024-02-26
Payer: COMMERCIAL

## 2024-02-26 DIAGNOSIS — R73.03 PREDIABETES: ICD-10-CM

## 2024-02-26 DIAGNOSIS — R73.9 HYPERGLYCEMIA: ICD-10-CM

## 2024-02-26 RX ORDER — TIRZEPATIDE 2.5 MG/.5ML
2.5 INJECTION, SOLUTION SUBCUTANEOUS
Qty: 4 PEN | Refills: 0 | Status: SHIPPED | OUTPATIENT
Start: 2024-02-26 | End: 2024-03-22 | Stop reason: SDUPTHER

## 2024-03-04 ENCOUNTER — LAB VISIT (OUTPATIENT)
Dept: LAB | Facility: HOSPITAL | Age: 54
End: 2024-03-04
Attending: INTERNAL MEDICINE
Payer: COMMERCIAL

## 2024-03-04 DIAGNOSIS — Z94.4 S/P LIVER TRANSPLANT: ICD-10-CM

## 2024-03-04 LAB
ALBUMIN SERPL BCP-MCNC: 4.3 G/DL (ref 3.5–5.2)
ALP SERPL-CCNC: 78 U/L (ref 55–135)
ALT SERPL W/O P-5'-P-CCNC: 38 U/L (ref 10–44)
ANION GAP SERPL CALC-SCNC: 12 MMOL/L (ref 8–16)
AST SERPL-CCNC: 32 U/L (ref 10–40)
BASOPHILS # BLD AUTO: 0.09 K/UL (ref 0–0.2)
BASOPHILS NFR BLD: 0.8 % (ref 0–1.9)
BILIRUB SERPL-MCNC: 0.3 MG/DL (ref 0.1–1)
BUN SERPL-MCNC: 21 MG/DL (ref 6–20)
CALCIUM SERPL-MCNC: 9.7 MG/DL (ref 8.7–10.5)
CHLORIDE SERPL-SCNC: 104 MMOL/L (ref 95–110)
CO2 SERPL-SCNC: 23 MMOL/L (ref 23–29)
CREAT SERPL-MCNC: 1.2 MG/DL (ref 0.5–1.4)
DIFFERENTIAL METHOD BLD: ABNORMAL
EOSINOPHIL # BLD AUTO: 0.6 K/UL (ref 0–0.5)
EOSINOPHIL NFR BLD: 5.4 % (ref 0–8)
ERYTHROCYTE [DISTWIDTH] IN BLOOD BY AUTOMATED COUNT: 13.5 % (ref 11.5–14.5)
EST. GFR  (NO RACE VARIABLE): 54.5 ML/MIN/1.73 M^2
GLUCOSE SERPL-MCNC: 92 MG/DL (ref 70–110)
HCT VFR BLD AUTO: 42.5 % (ref 37–48.5)
HGB BLD-MCNC: 13.7 G/DL (ref 12–16)
IMM GRANULOCYTES # BLD AUTO: 0.12 K/UL (ref 0–0.04)
IMM GRANULOCYTES NFR BLD AUTO: 1 % (ref 0–0.5)
LYMPHOCYTES # BLD AUTO: 2.4 K/UL (ref 1–4.8)
LYMPHOCYTES NFR BLD: 20.9 % (ref 18–48)
MCH RBC QN AUTO: 29.8 PG (ref 27–31)
MCHC RBC AUTO-ENTMCNC: 32.2 G/DL (ref 32–36)
MCV RBC AUTO: 93 FL (ref 82–98)
MONOCYTES # BLD AUTO: 0.7 K/UL (ref 0.3–1)
MONOCYTES NFR BLD: 5.7 % (ref 4–15)
NEUTROPHILS # BLD AUTO: 7.7 K/UL (ref 1.8–7.7)
NEUTROPHILS NFR BLD: 66.2 % (ref 38–73)
NRBC BLD-RTO: 0 /100 WBC
PLATELET # BLD AUTO: 331 K/UL (ref 150–450)
PMV BLD AUTO: 10.5 FL (ref 9.2–12.9)
POTASSIUM SERPL-SCNC: 4.5 MMOL/L (ref 3.5–5.1)
PROT SERPL-MCNC: 7.3 G/DL (ref 6–8.4)
RBC # BLD AUTO: 4.59 M/UL (ref 4–5.4)
SODIUM SERPL-SCNC: 139 MMOL/L (ref 136–145)
WBC # BLD AUTO: 11.55 K/UL (ref 3.9–12.7)

## 2024-03-04 PROCEDURE — 80197 ASSAY OF TACROLIMUS: CPT | Performed by: INTERNAL MEDICINE

## 2024-03-04 PROCEDURE — 80053 COMPREHEN METABOLIC PANEL: CPT | Performed by: INTERNAL MEDICINE

## 2024-03-04 PROCEDURE — 36415 COLL VENOUS BLD VENIPUNCTURE: CPT | Performed by: INTERNAL MEDICINE

## 2024-03-04 PROCEDURE — 85025 COMPLETE CBC W/AUTO DIFF WBC: CPT | Performed by: INTERNAL MEDICINE

## 2024-03-05 ENCOUNTER — PATIENT MESSAGE (OUTPATIENT)
Dept: TRANSPLANT | Facility: CLINIC | Age: 54
End: 2024-03-05
Payer: COMMERCIAL

## 2024-03-05 DIAGNOSIS — Z94.4 S/P LIVER TRANSPLANT: ICD-10-CM

## 2024-03-05 LAB — TACROLIMUS BLD-MCNC: 6.8 NG/ML (ref 5–15)

## 2024-03-05 RX ORDER — TACROLIMUS 1 MG/1
1 CAPSULE ORAL EVERY 12 HOURS
Qty: 180 CAPSULE | Refills: 3 | Status: SHIPPED | OUTPATIENT
Start: 2024-03-05 | End: 2024-04-25

## 2024-03-05 NOTE — TELEPHONE ENCOUNTER
Sent message with change and to get labs on 3/18/24    ----- Message from Sharmila Pacheco MD sent at 3/5/2024 12:30 PM CST -----  The Labs are stable; lower prograf to 1/1 from 2/1 and repeat labs in 2 weeks - please let patient know.

## 2024-03-12 ENCOUNTER — PATIENT MESSAGE (OUTPATIENT)
Dept: ENDOCRINOLOGY | Facility: CLINIC | Age: 54
End: 2024-03-12
Payer: COMMERCIAL

## 2024-03-12 ENCOUNTER — PATIENT MESSAGE (OUTPATIENT)
Dept: PSYCHIATRY | Facility: CLINIC | Age: 54
End: 2024-03-12
Payer: COMMERCIAL

## 2024-03-12 RX ORDER — HYDROXYZINE HYDROCHLORIDE 25 MG/1
TABLET, FILM COATED ORAL
Qty: 60 TABLET | Refills: 1 | Status: SHIPPED | OUTPATIENT
Start: 2024-03-12

## 2024-03-19 ENCOUNTER — LAB VISIT (OUTPATIENT)
Dept: LAB | Facility: HOSPITAL | Age: 54
End: 2024-03-19
Attending: INTERNAL MEDICINE
Payer: COMMERCIAL

## 2024-03-19 DIAGNOSIS — R73.9 HYPERGLYCEMIA: ICD-10-CM

## 2024-03-19 DIAGNOSIS — Z94.4 S/P LIVER TRANSPLANT: ICD-10-CM

## 2024-03-19 LAB
ALBUMIN SERPL BCP-MCNC: 4 G/DL (ref 3.5–5.2)
ALP SERPL-CCNC: 72 U/L (ref 55–135)
ALT SERPL W/O P-5'-P-CCNC: 29 U/L (ref 10–44)
ANION GAP SERPL CALC-SCNC: 9 MMOL/L (ref 8–16)
AST SERPL-CCNC: 25 U/L (ref 10–40)
BASOPHILS # BLD AUTO: 0.09 K/UL (ref 0–0.2)
BASOPHILS NFR BLD: 1.1 % (ref 0–1.9)
BILIRUB SERPL-MCNC: 0.3 MG/DL (ref 0.1–1)
BUN SERPL-MCNC: 21 MG/DL (ref 6–20)
CALCIUM SERPL-MCNC: 9.7 MG/DL (ref 8.7–10.5)
CHLORIDE SERPL-SCNC: 107 MMOL/L (ref 95–110)
CO2 SERPL-SCNC: 24 MMOL/L (ref 23–29)
CREAT SERPL-MCNC: 1.2 MG/DL (ref 0.5–1.4)
DIFFERENTIAL METHOD BLD: ABNORMAL
EOSINOPHIL # BLD AUTO: 0.6 K/UL (ref 0–0.5)
EOSINOPHIL NFR BLD: 6.8 % (ref 0–8)
ERYTHROCYTE [DISTWIDTH] IN BLOOD BY AUTOMATED COUNT: 13.5 % (ref 11.5–14.5)
EST. GFR  (NO RACE VARIABLE): 54.5 ML/MIN/1.73 M^2
ESTIMATED AVG GLUCOSE: 114 MG/DL (ref 68–131)
GLUCOSE SERPL-MCNC: 97 MG/DL (ref 70–110)
HBA1C MFR BLD: 5.6 % (ref 4–5.6)
HCT VFR BLD AUTO: 40.4 % (ref 37–48.5)
HGB BLD-MCNC: 12.8 G/DL (ref 12–16)
IMM GRANULOCYTES # BLD AUTO: 0.03 K/UL (ref 0–0.04)
IMM GRANULOCYTES NFR BLD AUTO: 0.4 % (ref 0–0.5)
LYMPHOCYTES # BLD AUTO: 2.4 K/UL (ref 1–4.8)
LYMPHOCYTES NFR BLD: 29.1 % (ref 18–48)
MCH RBC QN AUTO: 29.4 PG (ref 27–31)
MCHC RBC AUTO-ENTMCNC: 31.7 G/DL (ref 32–36)
MCV RBC AUTO: 93 FL (ref 82–98)
MONOCYTES # BLD AUTO: 0.6 K/UL (ref 0.3–1)
MONOCYTES NFR BLD: 7.5 % (ref 4–15)
NEUTROPHILS # BLD AUTO: 4.5 K/UL (ref 1.8–7.7)
NEUTROPHILS NFR BLD: 55.1 % (ref 38–73)
NRBC BLD-RTO: 0 /100 WBC
PLATELET # BLD AUTO: 321 K/UL (ref 150–450)
PMV BLD AUTO: 10.2 FL (ref 9.2–12.9)
POTASSIUM SERPL-SCNC: 3.6 MMOL/L (ref 3.5–5.1)
PROT SERPL-MCNC: 6.9 G/DL (ref 6–8.4)
RBC # BLD AUTO: 4.35 M/UL (ref 4–5.4)
SODIUM SERPL-SCNC: 140 MMOL/L (ref 136–145)
WBC # BLD AUTO: 8.22 K/UL (ref 3.9–12.7)

## 2024-03-19 PROCEDURE — 36415 COLL VENOUS BLD VENIPUNCTURE: CPT | Performed by: INTERNAL MEDICINE

## 2024-03-19 PROCEDURE — 80197 ASSAY OF TACROLIMUS: CPT | Performed by: INTERNAL MEDICINE

## 2024-03-19 PROCEDURE — 85025 COMPLETE CBC W/AUTO DIFF WBC: CPT | Performed by: INTERNAL MEDICINE

## 2024-03-19 PROCEDURE — 80053 COMPREHEN METABOLIC PANEL: CPT | Performed by: INTERNAL MEDICINE

## 2024-03-19 PROCEDURE — 83036 HEMOGLOBIN GLYCOSYLATED A1C: CPT | Performed by: NURSE PRACTITIONER

## 2024-03-20 ENCOUNTER — PATIENT MESSAGE (OUTPATIENT)
Dept: TRANSPLANT | Facility: CLINIC | Age: 54
End: 2024-03-20
Payer: COMMERCIAL

## 2024-03-20 ENCOUNTER — TELEPHONE (OUTPATIENT)
Dept: TRANSPLANT | Facility: CLINIC | Age: 54
End: 2024-03-20
Payer: COMMERCIAL

## 2024-03-20 DIAGNOSIS — Z94.4 S/P LIVER TRANSPLANT: Primary | ICD-10-CM

## 2024-03-20 LAB — TACROLIMUS BLD-MCNC: 4.7 NG/ML (ref 5–15)

## 2024-03-20 NOTE — TELEPHONE ENCOUNTER
Sent patient message via portal to let her know labs are stable.  No medication changes, next labs due on 4/15/24    ----- Message from Sharmila Pacheco MD sent at 3/20/2024  8:01 AM CDT -----  The Labs are stable - please let patient know.

## 2024-03-22 DIAGNOSIS — R73.9 HYPERGLYCEMIA: ICD-10-CM

## 2024-03-22 DIAGNOSIS — R73.03 PREDIABETES: ICD-10-CM

## 2024-03-22 RX ORDER — TIRZEPATIDE 2.5 MG/.5ML
2.5 INJECTION, SOLUTION SUBCUTANEOUS
Qty: 4 PEN | Refills: 0 | Status: SHIPPED | OUTPATIENT
Start: 2024-03-22 | End: 2024-04-24 | Stop reason: SDUPTHER

## 2024-03-22 RX ORDER — DEXTROAMPHETAMINE SACCHARATE, AMPHETAMINE ASPARTATE, DEXTROAMPHETAMINE SULFATE AND AMPHETAMINE SULFATE 7.5; 7.5; 7.5; 7.5 MG/1; MG/1; MG/1; MG/1
30 TABLET ORAL EVERY MORNING
Qty: 30 TABLET | Refills: 0 | Status: SHIPPED | OUTPATIENT
Start: 2024-03-22 | End: 2024-04-25 | Stop reason: SDUPTHER

## 2024-03-25 ENCOUNTER — PATIENT MESSAGE (OUTPATIENT)
Dept: PSYCHIATRY | Facility: CLINIC | Age: 54
End: 2024-03-25
Payer: COMMERCIAL

## 2024-03-25 RX ORDER — DEXTROAMPHETAMINE SACCHARATE, AMPHETAMINE ASPARTATE, DEXTROAMPHETAMINE SULFATE AND AMPHETAMINE SULFATE 5; 5; 5; 5 MG/1; MG/1; MG/1; MG/1
TABLET ORAL
Qty: 30 TABLET | Refills: 0 | Status: SHIPPED | OUTPATIENT
Start: 2024-03-25 | End: 2024-04-25 | Stop reason: SDUPTHER

## 2024-04-09 DIAGNOSIS — M85.80 OSTEOPENIA, UNSPECIFIED LOCATION: Primary | ICD-10-CM

## 2024-04-09 DIAGNOSIS — M85.852 OSTEOPENIA OF LEFT HIP: ICD-10-CM

## 2024-04-16 ENCOUNTER — LAB VISIT (OUTPATIENT)
Dept: LAB | Facility: HOSPITAL | Age: 54
End: 2024-04-16
Attending: INTERNAL MEDICINE
Payer: COMMERCIAL

## 2024-04-16 DIAGNOSIS — Z94.4 S/P LIVER TRANSPLANT: ICD-10-CM

## 2024-04-16 LAB
ALBUMIN SERPL BCP-MCNC: 4.2 G/DL (ref 3.5–5.2)
ALP SERPL-CCNC: 70 U/L (ref 55–135)
ALT SERPL W/O P-5'-P-CCNC: 20 U/L (ref 10–44)
ANION GAP SERPL CALC-SCNC: 10 MMOL/L (ref 8–16)
AST SERPL-CCNC: 21 U/L (ref 10–40)
BASOPHILS # BLD AUTO: 0.07 K/UL (ref 0–0.2)
BASOPHILS NFR BLD: 0.7 % (ref 0–1.9)
BILIRUB SERPL-MCNC: 0.3 MG/DL (ref 0.1–1)
BUN SERPL-MCNC: 14 MG/DL (ref 6–20)
CALCIUM SERPL-MCNC: 9.8 MG/DL (ref 8.7–10.5)
CHLORIDE SERPL-SCNC: 106 MMOL/L (ref 95–110)
CO2 SERPL-SCNC: 25 MMOL/L (ref 23–29)
CREAT SERPL-MCNC: 1.2 MG/DL (ref 0.5–1.4)
DIFFERENTIAL METHOD BLD: ABNORMAL
EOSINOPHIL # BLD AUTO: 0.8 K/UL (ref 0–0.5)
EOSINOPHIL NFR BLD: 8.8 % (ref 0–8)
ERYTHROCYTE [DISTWIDTH] IN BLOOD BY AUTOMATED COUNT: 13.2 % (ref 11.5–14.5)
EST. GFR  (NO RACE VARIABLE): 54.5 ML/MIN/1.73 M^2
GLUCOSE SERPL-MCNC: 95 MG/DL (ref 70–110)
HCT VFR BLD AUTO: 39.5 % (ref 37–48.5)
HGB BLD-MCNC: 12.3 G/DL (ref 12–16)
IMM GRANULOCYTES # BLD AUTO: 0.02 K/UL (ref 0–0.04)
IMM GRANULOCYTES NFR BLD AUTO: 0.2 % (ref 0–0.5)
LYMPHOCYTES # BLD AUTO: 2.5 K/UL (ref 1–4.8)
LYMPHOCYTES NFR BLD: 26.2 % (ref 18–48)
MCH RBC QN AUTO: 29.5 PG (ref 27–31)
MCHC RBC AUTO-ENTMCNC: 31.1 G/DL (ref 32–36)
MCV RBC AUTO: 95 FL (ref 82–98)
MONOCYTES # BLD AUTO: 0.7 K/UL (ref 0.3–1)
MONOCYTES NFR BLD: 7.4 % (ref 4–15)
NEUTROPHILS # BLD AUTO: 5.4 K/UL (ref 1.8–7.7)
NEUTROPHILS NFR BLD: 56.7 % (ref 38–73)
NRBC BLD-RTO: 0 /100 WBC
PLATELET # BLD AUTO: 338 K/UL (ref 150–450)
PMV BLD AUTO: 10.1 FL (ref 9.2–12.9)
POTASSIUM SERPL-SCNC: 4.4 MMOL/L (ref 3.5–5.1)
PROT SERPL-MCNC: 7.1 G/DL (ref 6–8.4)
RBC # BLD AUTO: 4.17 M/UL (ref 4–5.4)
SODIUM SERPL-SCNC: 141 MMOL/L (ref 136–145)
WBC # BLD AUTO: 9.48 K/UL (ref 3.9–12.7)

## 2024-04-16 PROCEDURE — 80197 ASSAY OF TACROLIMUS: CPT | Performed by: INTERNAL MEDICINE

## 2024-04-16 PROCEDURE — 80321 ALCOHOLS BIOMARKERS 1OR 2: CPT | Performed by: INTERNAL MEDICINE

## 2024-04-16 PROCEDURE — 85025 COMPLETE CBC W/AUTO DIFF WBC: CPT | Performed by: INTERNAL MEDICINE

## 2024-04-16 PROCEDURE — 80053 COMPREHEN METABOLIC PANEL: CPT | Performed by: INTERNAL MEDICINE

## 2024-04-17 LAB — TACROLIMUS BLD-MCNC: 4.3 NG/ML (ref 5–15)

## 2024-04-22 LAB
CLINICAL BIOCHEMIST REVIEW: NORMAL
PLPETH BLD-MCNC: <10 NG/ML
POPETH BLD-MCNC: <10 NG/ML

## 2024-04-24 ENCOUNTER — PATIENT MESSAGE (OUTPATIENT)
Dept: ENDOCRINOLOGY | Facility: CLINIC | Age: 54
End: 2024-04-24
Payer: COMMERCIAL

## 2024-04-24 DIAGNOSIS — R73.03 PREDIABETES: ICD-10-CM

## 2024-04-24 DIAGNOSIS — T38.0X5A STEROID-INDUCED HYPERGLYCEMIA: ICD-10-CM

## 2024-04-24 DIAGNOSIS — R73.9 HYPERGLYCEMIA: Primary | ICD-10-CM

## 2024-04-24 DIAGNOSIS — R73.9 HYPERGLYCEMIA: ICD-10-CM

## 2024-04-24 DIAGNOSIS — R73.9 STEROID-INDUCED HYPERGLYCEMIA: ICD-10-CM

## 2024-04-24 RX ORDER — TIRZEPATIDE 2.5 MG/.5ML
2.5 INJECTION, SOLUTION SUBCUTANEOUS
Qty: 4 PEN | Refills: 0 | Status: SHIPPED | OUTPATIENT
Start: 2024-04-24 | End: 2024-04-24

## 2024-04-24 RX ORDER — TIRZEPATIDE 5 MG/.5ML
5 INJECTION, SOLUTION SUBCUTANEOUS
Qty: 4 PEN | Refills: 3 | Status: SHIPPED | OUTPATIENT
Start: 2024-04-24 | End: 2024-04-25

## 2024-04-25 ENCOUNTER — PATIENT MESSAGE (OUTPATIENT)
Dept: TRANSPLANT | Facility: CLINIC | Age: 54
End: 2024-04-25
Payer: COMMERCIAL

## 2024-04-25 DIAGNOSIS — Z94.4 S/P LIVER TRANSPLANT: ICD-10-CM

## 2024-04-25 DIAGNOSIS — R73.9 HYPERGLYCEMIA: Primary | ICD-10-CM

## 2024-04-25 DIAGNOSIS — R73.03 PREDIABETES: ICD-10-CM

## 2024-04-25 RX ORDER — DEXTROAMPHETAMINE SACCHARATE, AMPHETAMINE ASPARTATE, DEXTROAMPHETAMINE SULFATE AND AMPHETAMINE SULFATE 7.5; 7.5; 7.5; 7.5 MG/1; MG/1; MG/1; MG/1
30 TABLET ORAL EVERY MORNING
Qty: 30 TABLET | Refills: 0 | Status: SHIPPED | OUTPATIENT
Start: 2024-04-25 | End: 2024-06-03 | Stop reason: SDUPTHER

## 2024-04-25 RX ORDER — DEXTROAMPHETAMINE SACCHARATE, AMPHETAMINE ASPARTATE, DEXTROAMPHETAMINE SULFATE AND AMPHETAMINE SULFATE 5; 5; 5; 5 MG/1; MG/1; MG/1; MG/1
TABLET ORAL
Qty: 30 TABLET | Refills: 0 | Status: SHIPPED | OUTPATIENT
Start: 2024-04-25 | End: 2024-06-03 | Stop reason: SDUPTHER

## 2024-04-25 RX ORDER — TACROLIMUS 1 MG/1
CAPSULE ORAL
Qty: 270 CAPSULE | Refills: 3 | Status: SHIPPED | OUTPATIENT
Start: 2024-04-25

## 2024-04-25 RX ORDER — TIRZEPATIDE 2.5 MG/.5ML
2.5 INJECTION, SOLUTION SUBCUTANEOUS
Qty: 4 PEN | Refills: 3 | Status: SHIPPED | OUTPATIENT
Start: 2024-04-25

## 2024-04-25 NOTE — TELEPHONE ENCOUNTER
Sent message to have patient increase Tacrolimus dose to 2 mg in the morning and 1 mg in the evening.  Labs again on 5/20/24    ----- Message from Sharmila Pacheco MD sent at 4/25/2024 10:10 AM CDT -----  Not sure. I was very tired last night when I was reviewing. I fear it may have been for someone else.... forget mri for now  ----- Message -----  From: Marii Ruiz RN  Sent: 4/25/2024   7:29 AM CDT  To: Sharmila Pacheco MD    Can you tell me why we are doing MRI so I can let patient know?  ----- Message -----  From: Sharmila Pacheco MD  Sent: 4/24/2024  11:51 PM CDT  To: [facility]    The Labs are stable; increase prograf  - please let patient know. MRi now w wo eovist

## 2024-04-30 DIAGNOSIS — Z94.4 S/P LIVER TRANSPLANT: Primary | ICD-10-CM

## 2024-05-01 ENCOUNTER — OFFICE VISIT (OUTPATIENT)
Dept: PSYCHIATRY | Facility: CLINIC | Age: 54
End: 2024-05-01
Payer: COMMERCIAL

## 2024-05-01 VITALS
BODY MASS INDEX: 22.22 KG/M2 | WEIGHT: 125.44 LBS | SYSTOLIC BLOOD PRESSURE: 123 MMHG | HEART RATE: 97 BPM | DIASTOLIC BLOOD PRESSURE: 73 MMHG

## 2024-05-01 DIAGNOSIS — F98.8 ATTENTION DEFICIT DISORDER, UNSPECIFIED HYPERACTIVITY PRESENCE: Primary | ICD-10-CM

## 2024-05-01 PROCEDURE — 99999 PR PBB SHADOW E&M-EST. PATIENT-LVL II: CPT | Mod: PBBFAC,,, | Performed by: PSYCHIATRY & NEUROLOGY

## 2024-05-01 PROCEDURE — 99214 OFFICE O/P EST MOD 30 MIN: CPT | Mod: S$GLB,,, | Performed by: PSYCHIATRY & NEUROLOGY

## 2024-05-01 NOTE — PROGRESS NOTES
ESTABLISHED OUTPATIENT VISIT   E/M LEVEL 4: [id number]    ENCOUNTER DATE: 5/1/2024  SITE: [facility], [address]    HISTORY    CHIEF COMPLAINT   Malu Montes is a 54 y.o. female who presents for follow up of grief    HPI     Overall appears to be doing reasonably well psychiatrically.    Psychiatric Review Of Systems - Is patient experiencing or having changes in:  Sleep: taking Hydroxyzine  appetite: adequate  weight: no  energy/anergy: no  interest/pleasure/anhedonia: no  somatic symptoms: no  libido: no  anxiety/panic: no  guilty/hopelessness: no  concentration: no  S.I.B.s/risky behavior: no  Irritability: no  Racing thoughts: no  Impulsive behaviors: no  Paranoia:no  AVH:no    Recent alcohol: no  Recent drug: no    Medical ROS    General ROS: fatigue at times  ENT ROS: negative  Cardiovascular ROS: negative  Respiratory ROS: negative  Gastrointestinal ROS: negative  Neurological ROS: negative  Dermatologicl ROS: negative  Endocrine ROS: negative    PAST MEDICAL, FAMILY AND SOCIAL HISTORY: The patient's past medical, family and social history have been reviewed and updated as appropriate within the electronic medical record - see encounter notes.    PSYCHOTROPIC MEDICATIONS   Adderall 30 mg qam and 20 mg prn in the early afternoon, Hyroxyzine 50 mg at bedtime     Scheduled and PRN Medications     Current Outpatient Medications:     aspirin (ECOTRIN) 81 MG EC tablet, Take 81 mg by mouth once daily., Disp: , Rfl:     atorvastatin (LIPITOR) 40 MG tablet, Take 1 tablet (40 mg total) by mouth once daily., Disp: 90 tablet, Rfl: 3    bimatoprost (LATISSE) 0.03 % ophthalmic solution, Place one drop on applicator and apply evenly along the skin of the upper eyelid at base of eyelashes once daily at bedtime; repeat procedure for second eye (use a clean applicator)., Disp: 5 mL, Rfl: 12    dextroamphetamine-amphetamine (ADDERALL) 20 mg tablet, Take 1 tablet by mouth 2 (two) times a day., Disp: 60  tablet, Rfl: 0    dextroamphetamine-amphetamine (ADDERALL) 20 mg tablet, Take 20 mg in the early afternoon as needed., Disp: 30 tablet, Rfl: 0    dextroamphetamine-amphetamine 30 mg Tab, Take 1 tablet (30 mg total) by mouth every morning., Disp: 30 tablet, Rfl: 0    famotidine (PEPCID) 20 MG tablet, Take 1 tablet (20 mg total) by mouth every evening., Disp: 30 tablet, Rfl: 11    gabapentin (NEURONTIN) 100 MG capsule, Take 2 capsules (200 mg total) by mouth daily as needed (Sleep). (Patient not taking: Reported on 2/2/2024), Disp: 90 capsule, Rfl: 11    hydroquinone 8 % Emul, Compound hydroquinone 8% / tretinoin 0.025% / kojic acid 1% / niacinamide 4% / fluocinolone 0.025% cream. Apply a pea-sized amount to face qhs. Do not use for longer than 12 weeks in a row then take a 1 month break. Use only to dark spots, Disp: 30 g, Rfl: 1    hydrOXYzine HCL (ATARAX) 25 MG tablet, TAKE 1 TO 2 TABLETS BY MOUTH EVERY NIGHT AT BEDTIME AS NEEDED FOR INSOMNIA, Disp: 60 tablet, Rfl: 1    levothyroxine (SYNTHROID) 50 MCG tablet, Take 1 tablet (50 mcg total) by mouth before breakfast., Disp: 90 tablet, Rfl: 3    linaCLOtide (LINZESS) 72 mcg Cap capsule, Take 2 capsules (144 mcg total) by mouth before breakfast., Disp: 180 capsule, Rfl: 5    metFORMIN (GLUCOPHAGE-XR) 500 MG ER 24hr tablet, Take 1 tablet (500 mg total) by mouth 2 (two) times daily with meals., Disp: 180 tablet, Rfl: 3    mycophenolate (CELLCEPT) 250 mg Cap, Take 2 capsules (500 mg total) by mouth 2 (two) times daily., Disp: 360 capsule, Rfl: 3    tacrolimus (PROGRAF) 1 MG Cap, Take 2 capsules (2 mg total) by mouth every morning AND 1 capsule (1 mg total) every evening., Disp: 270 capsule, Rfl: 3    tirzepatide (MOUNJARO) 2.5 mg/0.5 mL PnIj, Inject 2.5 mg into the skin every 7 days., Disp: 4 Pen, Rfl: 3    traZODone (DESYREL) 50 MG tablet, Take  mg at bedtime as needed for sleep, Disp: 60 tablet, Rfl: 1    tretinoin (RETIN-A) 0.05 % cream, Compound tretinoin 0.05%  / niacinamide 2% cream. Start 2-3 times weekly then increase up to every night after 2-3 weeks if skin is not too dry. Apply pea sized amount to entire face, Disp: 30 g, Rfl: 5    vitamin D (VITAMIN D3) 1000 units Tab, Take 2,000 Units by mouth once daily., Disp: , Rfl:     EXAMINATION  Wt Readings from Last 3 Encounters:   05/01/24 56.9 kg (125 lb 7.1 oz)   02/02/24 64.8 kg (142 lb 13.7 oz)   01/18/24 65.3 kg (143 lb 15.4 oz)     Temp Readings from Last 3 Encounters:   01/18/24 96.8 °F (36 °C)   07/16/23 100.1 °F (37.8 °C) (Oral)   06/02/23 98.6 °F (37 °C) (Oral)     BP Readings from Last 3 Encounters:   05/01/24 123/73   02/02/24 108/73   01/18/24 116/80     Pulse Readings from Last 3 Encounters:   05/01/24 97   02/02/24 (!) 114   01/18/24 99       CONSTITUTIONAL  General Appearance: well nourished    MUSCULOSKELETAL  Muscle Strength and Tone: normal strength and tone  Abnormal Involuntary Movements: no abnormal movement noted  Gait and Station: normal gait    PSYCHIATRIC   Level of Consciousness: alert  Orientation: oriented to person, place and time  Grooming: well groomed  Psychomotor Behavior: no restlessness/agitation  Speech: normal in rate, rhythm and volume  Language: normal vocabulary  Mood: steady  Affect: full range and appropriate  Thought Process: logical and goal directed  Associations: intact associations  Thought Content: no SI/HI  Memory: grossly intact  Attention: intact to content of interview  Fund of Knowledge: appears adequate  Insight: good  Judgement: good    MEDICAL DECISION MAKING    DIAGNOSES  ADHD  Insomnia    PROBLEM LIST AND MANAGEMENT PLANS  - Adderall 30 mg qam, 20 mg in the early afternoon prn  - Hydroxyzine 50 mg at bedtime for sleep  - rtc 3 months    Total time, including chart review and time with patient: 20 min    LABORATORY DATA    Lab Visit on 04/16/2024   Component Date Value Ref Range Status    WBC 04/16/2024 9.48  3.90 - 12.70 K/uL Final    RBC 04/16/2024 4.17  4.00 -  5.40 M/uL Final    Hemoglobin 04/16/2024 12.3  12.0 - 16.0 g/dL Final    Hematocrit 04/16/2024 39.5  37.0 - 48.5 % Final    MCV 04/16/2024 95  82 - 98 fL Final    MCH 04/16/2024 29.5  27.0 - 31.0 pg Final    MCHC 04/16/2024 31.1 (L)  32.0 - 36.0 g/dL Final    RDW 04/16/2024 13.2  11.5 - 14.5 % Final    Platelets 04/16/2024 338  150 - 450 K/uL Final    MPV 04/16/2024 10.1  9.2 - 12.9 fL Final    Immature Granulocytes 04/16/2024 0.2  0.0 - 0.5 % Final    Gran # (ANC) 04/16/2024 5.4  1.8 - 7.7 K/uL Final    Immature Grans (Abs) 04/16/2024 0.02  0.00 - 0.04 K/uL Final    Comment: Mild elevation in immature granulocytes is non specific and   can be seen in a variety of conditions including stress response,   acute inflammation, trauma and pregnancy. Correlation with other   laboratory and clinical findings is essential.      Lymph # 04/16/2024 2.5  1.0 - 4.8 K/uL Final    Mono # 04/16/2024 0.7  0.3 - 1.0 K/uL Final    Eos # 04/16/2024 0.8 (H)  0.0 - 0.5 K/uL Final    Baso # 04/16/2024 0.07  0.00 - 0.20 K/uL Final    nRBC 04/16/2024 0  0 /100 WBC Final    Gran % 04/16/2024 56.7  38.0 - 73.0 % Final    Lymph % 04/16/2024 26.2  18.0 - 48.0 % Final    Mono % 04/16/2024 7.4  4.0 - 15.0 % Final    Eosinophil % 04/16/2024 8.8 (H)  0.0 - 8.0 % Final    Basophil % 04/16/2024 0.7  0.0 - 1.9 % Final    Differential Method 04/16/2024 Automated   Final    Sodium 04/16/2024 141  136 - 145 mmol/L Final    Potassium 04/16/2024 4.4  3.5 - 5.1 mmol/L Final    Chloride 04/16/2024 106  95 - 110 mmol/L Final    CO2 04/16/2024 25  23 - 29 mmol/L Final    Glucose 04/16/2024 95  70 - 110 mg/dL Final    BUN 04/16/2024 14  6 - 20 mg/dL Final    Creatinine 04/16/2024 1.2  0.5 - 1.4 mg/dL Final    Calcium 04/16/2024 9.8  8.7 - 10.5 mg/dL Final    Total Protein 04/16/2024 7.1  6.0 - 8.4 g/dL Final    Albumin 04/16/2024 4.2  3.5 - 5.2 g/dL Final    Total Bilirubin 04/16/2024 0.3  0.1 - 1.0 mg/dL Final    Comment: For infants and newborns,  interpretation of results should be based  on gestational age, weight and in agreement with clinical  observations.    Premature Infant recommended reference ranges:  Up to 24 hours.............<8.0 mg/dL  Up to 48 hours............<12.0 mg/dL  3-5 days..................<15.0 mg/dL  6-29 days.................<15.0 mg/dL      Alkaline Phosphatase 04/16/2024 70  55 - 135 U/L Final    AST 04/16/2024 21  10 - 40 U/L Final    ALT 04/16/2024 20  10 - 44 U/L Final    eGFR 04/16/2024 54.5 (A)  >60 mL/min/1.73 m^2 Final    Anion Gap 04/16/2024 10  8 - 16 mmol/L Final    Tacrolimus Lvl 04/16/2024 4.3 (L)  5.0 - 15.0 ng/mL Final    Comment: Testing performed by a chemiluminescent microparticle   immunoassay on the [employer] i System.    CAUTION: No firm therapeutic range exists for tacrolimus in whole   blood. The   complexity of the clinical state, individual differences in   sensitivity to   immunosuppressive and nephrotoxic effects of tacrolimus,   co-administration   of other immunosuppressants, type of transplant, time post-transplant   and a   number of other factors contribute to different requirements for   optimal   blood levels of tacrolimus. Therefore, individual tacrolimus values   cannot   be used as the sole indicator for making changes in treatment regimen   and   each patient should be thoroughly evaluated clinically before changes   in   treatment regimens are made. Each user must establish his or her own   ranges   based on clinical experience.  Therapeutic ranges vary according to the commercial test used, and   therefore   should be established for each commercial test. Values obtained with   diff                           erent assay methods cannot be used interchangeably due to   differences in   assay methods and cross-reactivity with metabolites, nor should   correction   factors be applied. Therefore, consistent use of one assay for   individual   patients is recommended.      [facility] 16:0/18.1  (POPEth) 04/16/2024 <10  Cutoff: 10 ng/mL Final    Comment: Phosphatidylethanol (PEth) homologues result interpretation    PEth 16:0/18:1 (POPEth)  Less than 10 ng/mL: Not detected  10 - 19 ng/mL: Abstinence or light alcohol consumption  (<2 drinks per day for several days a week)  20 - 200 ng/mL: Moderate alcohol consumption  (up to 4 drinks per day for several days a week)  Greater than 200 ng/mL: Heavy alcohol consumption or   chronic alcohol use (at least 4 drinks per day several days   a week)    (Reference: ANA Villafana and GUCCI Lomax 2018 J. Forensic Sci)      PEth 16:0/18.2 (PLPEth) 04/16/2024 <10  Cutoff: 10 ng/mL Final    Comment: PEth 16:0/18:2 (PLPEth)  Reference ranges are not well established      PETH INTERPRETATION 04/16/2024 Negative.   Final    Comment: -------------------ADDITIONAL INFORMATION-------------------  This report is intended for use in clinical monitoring and   management of patients.  It is not intended for use in   employment-related testing.  This test was developed and its performance characteristics   determined by [facility] in a manner consistent with CLIA   requirements. This test has not been cleared or approved by   the U.[place]. Food and Drug Administration.    Test Performed by:  [place] [id number]  : Radames Lambert M.D. Ph.D.; CLIA# [id number]     Lab Visit on 03/19/2024   Component Date Value Ref Range Status    WBC 03/19/2024 8.22  3.90 - 12.70 K/uL Final    RBC 03/19/2024 4.35  4.00 - 5.40 M/uL Final    Hemoglobin 03/19/2024 12.8  12.0 - 16.0 g/dL Final    Hematocrit 03/19/2024 40.4  37.0 - 48.5 % Final    MCV 03/19/2024 93  82 - 98 fL Final    MCH 03/19/2024 29.4  27.0 - 31.0 pg Final    MCHC 03/19/2024 31.7 (L)  32.0 - 36.0 g/dL Final    RDW 03/19/2024 13.5  11.5 - 14.5 % Final    Platelets 03/19/2024 321  150 - 450 K/uL Final    MPV 03/19/2024 10.2  9.2 - 12.9 fL Final    Immature  Granulocytes 03/19/2024 0.4  0.0 - 0.5 % Final    Gran # (ANC) 03/19/2024 4.5  1.8 - 7.7 K/uL Final    Immature Grans (Abs) 03/19/2024 0.03  0.00 - 0.04 K/uL Final    Comment: Mild elevation in immature granulocytes is non specific and   can be seen in a variety of conditions including stress response,   acute inflammation, trauma and pregnancy. Correlation with other   laboratory and clinical findings is essential.      Lymph # 03/19/2024 2.4  1.0 - 4.8 K/uL Final    Mono # 03/19/2024 0.6  0.3 - 1.0 K/uL Final    Eos # 03/19/2024 0.6 (H)  0.0 - 0.5 K/uL Final    Baso # 03/19/2024 0.09  0.00 - 0.20 K/uL Final    nRBC 03/19/2024 0  0 /100 WBC Final    Gran % 03/19/2024 55.1  38.0 - 73.0 % Final    Lymph % 03/19/2024 29.1  18.0 - 48.0 % Final    Mono % 03/19/2024 7.5  4.0 - 15.0 % Final    Eosinophil % 03/19/2024 6.8  0.0 - 8.0 % Final    Basophil % 03/19/2024 1.1  0.0 - 1.9 % Final    Differential Method 03/19/2024 Automated   Final    Sodium 03/19/2024 140  136 - 145 mmol/L Final    Potassium 03/19/2024 3.6  3.5 - 5.1 mmol/L Final    Chloride 03/19/2024 107  95 - 110 mmol/L Final    CO2 03/19/2024 24  23 - 29 mmol/L Final    Glucose 03/19/2024 97  70 - 110 mg/dL Final    BUN 03/19/2024 21 (H)  6 - 20 mg/dL Final    Creatinine 03/19/2024 1.2  0.5 - 1.4 mg/dL Final    Calcium 03/19/2024 9.7  8.7 - 10.5 mg/dL Final    Total Protein 03/19/2024 6.9  6.0 - 8.4 g/dL Final    Albumin 03/19/2024 4.0  3.5 - 5.2 g/dL Final    Total Bilirubin 03/19/2024 0.3  0.1 - 1.0 mg/dL Final    Comment: For infants and newborns, interpretation of results should be based  on gestational age, weight and in agreement with clinical  observations.    Premature Infant recommended reference ranges:  Up to 24 hours.............<8.0 mg/dL  Up to 48 hours............<12.0 mg/dL  3-5 days..................<15.0 mg/dL  6-29 days.................<15.0 mg/dL      Alkaline Phosphatase 03/19/2024 72  55 - 135 U/L Final    AST 03/19/2024 25  10 - 40 U/L  Final    ALT 03/19/2024 29  10 - 44 U/L Final    eGFR 03/19/2024 54.5 (A)  >60 mL/min/1.73 m^2 Final    Anion Gap 03/19/2024 9  8 - 16 mmol/L Final    Tacrolimus Lvl 03/19/2024 4.7 (L)  5.0 - 15.0 ng/mL Final    Comment: Testing performed by a chemiluminescent microparticle   immunoassay on the [employer] i System.    CAUTION: No firm therapeutic range exists for tacrolimus in whole   blood. The   complexity of the clinical state, individual differences in   sensitivity to   immunosuppressive and nephrotoxic effects of tacrolimus,   co-administration   of other immunosuppressants, type of transplant, time post-transplant   and a   number of other factors contribute to different requirements for   optimal   blood levels of tacrolimus. Therefore, individual tacrolimus values   cannot   be used as the sole indicator for making changes in treatment regimen   and   each patient should be thoroughly evaluated clinically before changes   in   treatment regimens are made. Each user must establish his or her own   ranges   based on clinical experience.  Therapeutic ranges vary according to the commercial test used, and   therefore   should be established for each commercial test. Values obtained with   diff                           erent assay methods cannot be used interchangeably due to   differences in   assay methods and cross-reactivity with metabolites, nor should   correction   factors be applied. Therefore, consistent use of one assay for   individual   patients is recommended.      Hemoglobin A1C 03/19/2024 5.6  4.0 - 5.6 % Final    Comment: ADA Screening Guidelines:  5.7-6.4%  Consistent with prediabetes  >or=6.5%  Consistent with diabetes    High levels of fetal hemoglobin interfere with the HbA1C  assay. Heterozygous hemoglobin variants (HbS, HgC, etc)do  not significantly interfere with this assay.   However, presence of multiple variants may affect accuracy.      Estimated Avg Glucose 03/19/2024 114  68 -  131 mg/dL Final   Lab Visit on 03/04/2024   Component Date Value Ref Range Status    WBC 03/04/2024 11.55  3.90 - 12.70 K/uL Final    RBC 03/04/2024 4.59  4.00 - 5.40 M/uL Final    Hemoglobin 03/04/2024 13.7  12.0 - 16.0 g/dL Final    Hematocrit 03/04/2024 42.5  37.0 - 48.5 % Final    MCV 03/04/2024 93  82 - 98 fL Final    MCH 03/04/2024 29.8  27.0 - 31.0 pg Final    MCHC 03/04/2024 32.2  32.0 - 36.0 g/dL Final    RDW 03/04/2024 13.5  11.5 - 14.5 % Final    Platelets 03/04/2024 331  150 - 450 K/uL Final    MPV 03/04/2024 10.5  9.2 - 12.9 fL Final    Immature Granulocytes 03/04/2024 1.0 (H)  0.0 - 0.5 % Final    Gran # (ANC) 03/04/2024 7.7  1.8 - 7.7 K/uL Final    Immature Grans (Abs) 03/04/2024 0.12 (H)  0.00 - 0.04 K/uL Final    Comment: Mild elevation in immature granulocytes is non specific and   can be seen in a variety of conditions including stress response,   acute inflammation, trauma and pregnancy. Correlation with other   laboratory and clinical findings is essential.      Lymph # 03/04/2024 2.4  1.0 - 4.8 K/uL Final    Mono # 03/04/2024 0.7  0.3 - 1.0 K/uL Final    Eos # 03/04/2024 0.6 (H)  0.0 - 0.5 K/uL Final    Baso # 03/04/2024 0.09  0.00 - 0.20 K/uL Final    nRBC 03/04/2024 0  0 /100 WBC Final    Gran % 03/04/2024 66.2  38.0 - 73.0 % Final    Lymph % 03/04/2024 20.9  18.0 - 48.0 % Final    Mono % 03/04/2024 5.7  4.0 - 15.0 % Final    Eosinophil % 03/04/2024 5.4  0.0 - 8.0 % Final    Basophil % 03/04/2024 0.8  0.0 - 1.9 % Final    Differential Method 03/04/2024 Automated   Final    Sodium 03/04/2024 139  136 - 145 mmol/L Final    Potassium 03/04/2024 4.5  3.5 - 5.1 mmol/L Final    Chloride 03/04/2024 104  95 - 110 mmol/L Final    CO2 03/04/2024 23  23 - 29 mmol/L Final    Glucose 03/04/2024 92  70 - 110 mg/dL Final    BUN 03/04/2024 21 (H)  6 - 20 mg/dL Final    Creatinine 03/04/2024 1.2  0.5 - 1.4 mg/dL Final    Calcium 03/04/2024 9.7  8.7 - 10.5 mg/dL Final    Total Protein 03/04/2024 7.3  6.0 - 8.4  g/dL Final    Albumin 03/04/2024 4.3  3.5 - 5.2 g/dL Final    Total Bilirubin 03/04/2024 0.3  0.1 - 1.0 mg/dL Final    Comment: For infants and newborns, interpretation of results should be based  on gestational age, weight and in agreement with clinical  observations.    Premature Infant recommended reference ranges:  Up to 24 hours.............<8.0 mg/dL  Up to 48 hours............<12.0 mg/dL  3-5 days..................<15.0 mg/dL  6-29 days.................<15.0 mg/dL      Alkaline Phosphatase 03/04/2024 78  55 - 135 U/L Final    AST 03/04/2024 32  10 - 40 U/L Final    ALT 03/04/2024 38  10 - 44 U/L Final    eGFR 03/04/2024 54.5 (A)  >60 mL/min/1.73 m^2 Final    Anion Gap 03/04/2024 12  8 - 16 mmol/L Final    Tacrolimus Lvl 03/04/2024 6.8  5.0 - 15.0 ng/mL Final    Comment: Testing performed by a chemiluminescent microparticle   immunoassay on the [employer] i System.    CAUTION: No firm therapeutic range exists for tacrolimus in whole   blood. The   complexity of the clinical state, individual differences in   sensitivity to   immunosuppressive and nephrotoxic effects of tacrolimus,   co-administration   of other immunosuppressants, type of transplant, time post-transplant   and a   number of other factors contribute to different requirements for   optimal   blood levels of tacrolimus. Therefore, individual tacrolimus values   cannot   be used as the sole indicator for making changes in treatment regimen   and   each patient should be thoroughly evaluated clinically before changes   in   treatment regimens are made. Each user must establish his or her own   ranges   based on clinical experience.  Therapeutic ranges vary according to the commercial test used, and   therefore   should be established for each commercial test. Values obtained with   diff                           erent assay methods cannot be used interchangeably due to   differences in   assay methods and cross-reactivity with metabolites, nor  should   correction   factors be applied. Therefore, consistent use of one assay for   individual   patients is recommended.     Lab Visit on 02/06/2024   Component Date Value Ref Range Status    WBC 02/06/2024 9.00  3.90 - 12.70 K/uL Final    RBC 02/06/2024 4.42  4.00 - 5.40 M/uL Final    Hemoglobin 02/06/2024 13.0  12.0 - 16.0 g/dL Final    Hematocrit 02/06/2024 40.3  37.0 - 48.5 % Final    MCV 02/06/2024 91  82 - 98 fL Final    MCH 02/06/2024 29.4  27.0 - 31.0 pg Final    MCHC 02/06/2024 32.3  32.0 - 36.0 g/dL Final    RDW 02/06/2024 13.3  11.5 - 14.5 % Final    Platelets 02/06/2024 349  150 - 450 K/uL Final    MPV 02/06/2024 10.2  9.2 - 12.9 fL Final    Immature Granulocytes 02/06/2024 0.1  0.0 - 0.5 % Final    Gran # (ANC) 02/06/2024 5.4  1.8 - 7.7 K/uL Final    Immature Grans (Abs) 02/06/2024 0.01  0.00 - 0.04 K/uL Final    Comment: Mild elevation in immature granulocytes is non specific and   can be seen in a variety of conditions including stress response,   acute inflammation, trauma and pregnancy. Correlation with other   laboratory and clinical findings is essential.      Lymph # 02/06/2024 2.4  1.0 - 4.8 K/uL Final    Mono # 02/06/2024 0.6  0.3 - 1.0 K/uL Final    Eos # 02/06/2024 0.5  0.0 - 0.5 K/uL Final    Baso # 02/06/2024 0.10  0.00 - 0.20 K/uL Final    nRBC 02/06/2024 0  0 /100 WBC Final    Gran % 02/06/2024 59.5  38.0 - 73.0 % Final    Lymph % 02/06/2024 26.7  18.0 - 48.0 % Final    Mono % 02/06/2024 6.7  4.0 - 15.0 % Final    Eosinophil % 02/06/2024 5.9  0.0 - 8.0 % Final    Basophil % 02/06/2024 1.1  0.0 - 1.9 % Final    Differential Method 02/06/2024 Automated   Final    Sodium 02/06/2024 139  136 - 145 mmol/L Final    Potassium 02/06/2024 3.6  3.5 - 5.1 mmol/L Final    Chloride 02/06/2024 103  95 - 110 mmol/L Final    CO2 02/06/2024 23  23 - 29 mmol/L Final    Glucose 02/06/2024 91  70 - 110 mg/dL Final    BUN 02/06/2024 22 (H)  6 - 20 mg/dL Final    Creatinine 02/06/2024 1.0  0.5 - 1.4 mg/dL  Final    Calcium 02/06/2024 9.4  8.7 - 10.5 mg/dL Final    Total Protein 02/06/2024 7.1  6.0 - 8.4 g/dL Final    Albumin 02/06/2024 4.1  3.5 - 5.2 g/dL Final    Total Bilirubin 02/06/2024 0.3  0.1 - 1.0 mg/dL Final    Comment: For infants and newborns, interpretation of results should be based  on gestational age, weight and in agreement with clinical  observations.    Premature Infant recommended reference ranges:  Up to 24 hours.............<8.0 mg/dL  Up to 48 hours............<12.0 mg/dL  3-5 days..................<15.0 mg/dL  6-29 days.................<15.0 mg/dL      Alkaline Phosphatase 02/06/2024 69  55 - 135 U/L Final    AST 02/06/2024 24  10 - 40 U/L Final    ALT 02/06/2024 28  10 - 44 U/L Final    eGFR 02/06/2024 >60.0  >60 mL/min/1.73 m^2 Final    Anion Gap 02/06/2024 13  8 - 16 mmol/L Final    Tacrolimus Lvl 02/06/2024 5.7  5.0 - 15.0 ng/mL Final    Comment: Testing performed by a chemiluminescent microparticle   immunoassay on the [employer] i System.    CAUTION: No firm therapeutic range exists for tacrolimus in whole   blood. The   complexity of the clinical state, individual differences in   sensitivity to   immunosuppressive and nephrotoxic effects of tacrolimus,   co-administration   of other immunosuppressants, type of transplant, time post-transplant   and a   number of other factors contribute to different requirements for   optimal   blood levels of tacrolimus. Therefore, individual tacrolimus values   cannot   be used as the sole indicator for making changes in treatment regimen   and   each patient should be thoroughly evaluated clinically before changes   in   treatment regimens are made. Each user must establish his or her own   ranges   based on clinical experience.  Therapeutic ranges vary according to the commercial test used, and   therefore   should be established for each commercial test. Values obtained with   diff                           erent assay methods cannot be used  interchangeably due to   differences in   assay methods and cross-reactivity with metabolites, nor should   correction   factors be applied. Therefore, consistent use of one assay for   individual   patients is recommended.      Cholesterol 02/06/2024 146  120 - 199 mg/dL Final    Comment: The National Cholesterol Education Program (NCEP) has set the  following guidelines (reference ranges) for Cholesterol:  Optimal.....................<200 mg/dL  Borderline High.............200-239 mg/dL  High........................> or = 240 mg/dL      Triglycerides 02/06/2024 156 (H)  30 - 150 mg/dL Final    Comment: The National Cholesterol Education Program (NCEP) has set the  following guidelines (reference values) for triglycerides:  Normal......................<150 mg/dL  Borderline High.............150-199 mg/dL  High........................200-499 mg/dL      HDL 02/06/2024 36 (L)  40 - 75 mg/dL Final    Comment: The National Cholesterol Education Program (NCEP) has set the  following guidelines (reference values) for HDL Cholesterol:  Low...............<40 mg/dL  Optimal...........>60 mg/dL      LDL Cholesterol 02/06/2024 78.8  63.0 - 159.0 mg/dL Final    Comment: The National Cholesterol Education Program (NCEP) has set the  following guidelines (reference values) for LDL Cholesterol:  Optimal.......................<130 mg/dL  Borderline High...............130-159 mg/dL  High..........................160-189 mg/dL  Very High.....................>190 mg/dL      HDL/Cholesterol Ratio 02/06/2024 24.7  20.0 - 50.0 % Final    Total Cholesterol/HDL Ratio 02/06/2024 4.1  2.0 - 5.0 Final    Non-HDL Cholesterol 02/06/2024 110  mg/dL Final    Comment: Risk category and Non-HDL cholesterol goals:  Coronary heart disease (CHD)or equivalent (10-year risk of CHD >20%):  Non-HDL cholesterol goal     <130 mg/dL  Two or more CHD risk factors and 10-year risk of CHD <= 20%:  Non-HDL cholesterol goal     <160 mg/dL  0 to 1 CHD risk  factor:  Non-HDL cholesterol goal     <190 mg/dL      Protein, Urine Random 02/06/2024 16 (H)  0 - 15 mg/dL Final    Creatinine, Urine 02/06/2024 164.0  15.0 - 325.0 mg/dL Final    Prot/Creat Ratio, Urine 02/06/2024 0.10  0.00 - 0.20 Final    Lipase 02/06/2024 10  4 - 60 U/L Final           Romero Bloom

## 2024-05-20 ENCOUNTER — LAB VISIT (OUTPATIENT)
Dept: LAB | Facility: HOSPITAL | Age: 54
End: 2024-05-20
Attending: INTERNAL MEDICINE
Payer: COMMERCIAL

## 2024-05-20 DIAGNOSIS — Z94.4 S/P LIVER TRANSPLANT: ICD-10-CM

## 2024-05-20 LAB
ALBUMIN SERPL BCP-MCNC: 4 G/DL (ref 3.5–5.2)
ALP SERPL-CCNC: 70 U/L (ref 55–135)
ALT SERPL W/O P-5'-P-CCNC: 17 U/L (ref 10–44)
ANION GAP SERPL CALC-SCNC: 10 MMOL/L (ref 8–16)
AST SERPL-CCNC: 17 U/L (ref 10–40)
BASOPHILS # BLD AUTO: 0.09 K/UL (ref 0–0.2)
BASOPHILS NFR BLD: 1 % (ref 0–1.9)
BILIRUB SERPL-MCNC: 0.2 MG/DL (ref 0.1–1)
BUN SERPL-MCNC: 19 MG/DL (ref 6–20)
CALCIUM SERPL-MCNC: 9.4 MG/DL (ref 8.7–10.5)
CHLORIDE SERPL-SCNC: 108 MMOL/L (ref 95–110)
CO2 SERPL-SCNC: 22 MMOL/L (ref 23–29)
CREAT SERPL-MCNC: 1.2 MG/DL (ref 0.5–1.4)
DIFFERENTIAL METHOD BLD: ABNORMAL
EOSINOPHIL # BLD AUTO: 0.6 K/UL (ref 0–0.5)
EOSINOPHIL NFR BLD: 7.4 % (ref 0–8)
ERYTHROCYTE [DISTWIDTH] IN BLOOD BY AUTOMATED COUNT: 13.2 % (ref 11.5–14.5)
EST. GFR  (NO RACE VARIABLE): 54.1 ML/MIN/1.73 M^2
GLUCOSE SERPL-MCNC: 106 MG/DL (ref 70–110)
HCT VFR BLD AUTO: 38.3 % (ref 37–48.5)
HGB BLD-MCNC: 12.1 G/DL (ref 12–16)
IMM GRANULOCYTES # BLD AUTO: 0.02 K/UL (ref 0–0.04)
IMM GRANULOCYTES NFR BLD AUTO: 0.2 % (ref 0–0.5)
LYMPHOCYTES # BLD AUTO: 3 K/UL (ref 1–4.8)
LYMPHOCYTES NFR BLD: 34.6 % (ref 18–48)
MCH RBC QN AUTO: 29.7 PG (ref 27–31)
MCHC RBC AUTO-ENTMCNC: 31.6 G/DL (ref 32–36)
MCV RBC AUTO: 94 FL (ref 82–98)
MONOCYTES # BLD AUTO: 0.7 K/UL (ref 0.3–1)
MONOCYTES NFR BLD: 7.9 % (ref 4–15)
NEUTROPHILS # BLD AUTO: 4.2 K/UL (ref 1.8–7.7)
NEUTROPHILS NFR BLD: 48.9 % (ref 38–73)
NRBC BLD-RTO: 0 /100 WBC
PLATELET # BLD AUTO: 317 K/UL (ref 150–450)
PMV BLD AUTO: 10.1 FL (ref 9.2–12.9)
POTASSIUM SERPL-SCNC: 3.9 MMOL/L (ref 3.5–5.1)
PROT SERPL-MCNC: 6.7 G/DL (ref 6–8.4)
RBC # BLD AUTO: 4.07 M/UL (ref 4–5.4)
SODIUM SERPL-SCNC: 140 MMOL/L (ref 136–145)
WBC # BLD AUTO: 8.65 K/UL (ref 3.9–12.7)

## 2024-05-20 PROCEDURE — 85025 COMPLETE CBC W/AUTO DIFF WBC: CPT | Performed by: INTERNAL MEDICINE

## 2024-05-20 PROCEDURE — 36415 COLL VENOUS BLD VENIPUNCTURE: CPT | Performed by: INTERNAL MEDICINE

## 2024-05-20 PROCEDURE — 80053 COMPREHEN METABOLIC PANEL: CPT | Performed by: INTERNAL MEDICINE

## 2024-05-20 PROCEDURE — 80197 ASSAY OF TACROLIMUS: CPT | Performed by: INTERNAL MEDICINE

## 2024-05-21 LAB — TACROLIMUS BLD-MCNC: 4.8 NG/ML (ref 5–15)

## 2024-05-23 ENCOUNTER — PATIENT MESSAGE (OUTPATIENT)
Dept: TRANSPLANT | Facility: CLINIC | Age: 54
End: 2024-05-23
Payer: COMMERCIAL

## 2024-05-23 ENCOUNTER — TELEPHONE (OUTPATIENT)
Dept: TRANSPLANT | Facility: CLINIC | Age: 54
End: 2024-05-23
Payer: COMMERCIAL

## 2024-05-23 NOTE — TELEPHONE ENCOUNTER
Sent message to patient to let her know to get labs on 6/17/24    ----- Message from Sharmila Pacheco MD sent at 5/23/2024 12:26 PM CDT -----  Ok then no increase in prograf. Repeat labs in one month  ----- Message -----  From: Marii Ruiz RN  Sent: 5/23/2024  10:31 AM CDT  To: Sharmila Pacheco MD    Yes she is.  You had tried to put her on Sirolimus but she did not want to start it.  ----- Message -----  From: Sharmila Pacheco MD  Sent: 5/22/2024  11:12 PM CDT  To: [facility] Post-Liver Transplant Clinical    Is she on both cellcept and prograf? - please let patient know.

## 2024-06-03 ENCOUNTER — OFFICE VISIT (OUTPATIENT)
Dept: NEPHROLOGY | Facility: CLINIC | Age: 54
End: 2024-06-03
Payer: COMMERCIAL

## 2024-06-03 VITALS
HEIGHT: 63 IN | SYSTOLIC BLOOD PRESSURE: 130 MMHG | WEIGHT: 125.69 LBS | BODY MASS INDEX: 22.27 KG/M2 | OXYGEN SATURATION: 98 % | DIASTOLIC BLOOD PRESSURE: 84 MMHG | HEART RATE: 97 BPM

## 2024-06-03 DIAGNOSIS — N18.31 STAGE 3A CHRONIC KIDNEY DISEASE: Primary | ICD-10-CM

## 2024-06-03 DIAGNOSIS — N25.81 SECONDARY HYPERPARATHYROIDISM OF RENAL ORIGIN: ICD-10-CM

## 2024-06-03 DIAGNOSIS — D84.821 IMMUNOSUPPRESSION DUE TO DRUG THERAPY: ICD-10-CM

## 2024-06-03 DIAGNOSIS — Z94.4 S/P LIVER TRANSPLANT: ICD-10-CM

## 2024-06-03 DIAGNOSIS — Z79.899 IMMUNOSUPPRESSION DUE TO DRUG THERAPY: ICD-10-CM

## 2024-06-03 PROCEDURE — 99214 OFFICE O/P EST MOD 30 MIN: CPT | Mod: S$GLB,,, | Performed by: INTERNAL MEDICINE

## 2024-06-03 PROCEDURE — 99999 PR PBB SHADOW E&M-EST. PATIENT-LVL V: CPT | Mod: PBBFAC,,, | Performed by: INTERNAL MEDICINE

## 2024-06-03 RX ORDER — DEXTROAMPHETAMINE SACCHARATE, AMPHETAMINE ASPARTATE, DEXTROAMPHETAMINE SULFATE AND AMPHETAMINE SULFATE 5; 5; 5; 5 MG/1; MG/1; MG/1; MG/1
TABLET ORAL
Qty: 30 TABLET | Refills: 0 | Status: SHIPPED | OUTPATIENT
Start: 2024-06-03

## 2024-06-03 RX ORDER — DEXTROAMPHETAMINE SACCHARATE, AMPHETAMINE ASPARTATE, DEXTROAMPHETAMINE SULFATE AND AMPHETAMINE SULFATE 7.5; 7.5; 7.5; 7.5 MG/1; MG/1; MG/1; MG/1
30 TABLET ORAL EVERY MORNING
Qty: 30 TABLET | Refills: 0 | Status: SHIPPED | OUTPATIENT
Start: 2024-06-03

## 2024-06-03 NOTE — PROGRESS NOTES
REASON FOR CONSULT/CHIEF COMPLAIN: Acute kidney injury    REFERRING PHYSICIAN: Killian Dodd DO    HISTORY OF PRESENT ILLNESS: 54 y.o. female  patient was referred here for abnormal renal function.   No chronic NSAID use, no known exposure to lithium, lead. No family history of Lupus, kidney disease or deafness. No ingestion of licorice. One kidney stone at age 25 after her first baby, needed lithotripsy. No smoking.   Had an episode of pancreatitis in 2021. Cirrhosis diagnosed 2021, blamed on alcohol. Has needed initially once every two weeks, later once week paracentesis. Kidney function deteriorated and then improved after TIPS procedure. Now s/p liver transplant on 2021. Post op one episode of rejection in .   Denied any new issues with urination including dysuria, hematuria, urgency, hesitancy, nocturia, incomplete voiding. Denied chest pain, nausea, vomiting, abd pain, nausea, vomiting, diarrhea, shortness of breath, pedal edema, Orthopnea, PND    ROS:  General: negative for chills, or fatigue  Respiratory: No cough, shortness of breath, or wheezing  Cardiovascular: No chest pain or dyspnea   Gastrointestinal: No abdominal pain, change in bowel habits    PAST MEDICAL HISTORY:  Past Medical History:   Diagnosis Date    ADD (attention deficit disorder)     Anxiety     Ascites of liver     Cirrhosis 2021    CKD (chronic kidney disease) stage 3, GFR 30-59 ml/min     Constipation     ETOH abuse     Hyperlipidemia     Hypothyroidism     Liver transplant candidate 2022    Other ascites 2021    Pancreatitis, acute     Tobacco use     Vitamin D deficiency        PAST SURGICAL HISTORY:  Past Surgical History:   Procedure Laterality Date    AUGMENTATION OF BREAST      second set    breast augmentation       SECTION      COLONOSCOPY N/A 2021    Procedure: COLONOSCOPY;  Surgeon: Dao Gray MD;  Location: [facility] ([address] [place] [address]);  Service: Endoscopy;   Laterality: N/A;  COVID test 9/14/21 General surgery clinic -     CYSTOSCOPY N/A 09/10/2021    Procedure: CYSTOSCOPY;  Surgeon: Rj Sánchez Jr., MD;  Location: [facility] OR 1ST FLR;  Service: Urology;  Laterality: N/A;    ESOPHAGOGASTRODUODENOSCOPY N/A 09/17/2021    Procedure: ESOPHAGOGASTRODUODENOSCOPY (EGD);  Surgeon: Dao Gray MD;  Location: [facility] ENDO (2ND FLR);  Service: Endoscopy;  Laterality: N/A;  labs current on 9/7    ESOPHAGOGASTRODUODENOSCOPY N/A 10/26/2021    Procedure: ESOPHAGOGASTRODUODENOSCOPY (EGD);  Surgeon: Dao Gray MD;  Location: [facility] (2ND FLR);  Service: Endoscopy;  Laterality: N/A;  to be done the week of 10/18/21 per Dr. Gray-[facility]-10/23/21-[facility]-   10/25 arrival time confirmed with pt-rb    ESOPHAGOGASTRODUODENOSCOPY Left 12/21/2021    Procedure: EGD (ESOPHAGOGASTRODUODENOSCOPY);  Surgeon: Koffi Monteiro MD;  Location: [facility] (4TH FLR);  Service: Endoscopy;  Laterality: Left;  Rapid  pt requested AM appt and first available in December-  labs morning of procedure-  12/14 lvm to confirm appt-rb    HEMORRHOID SURGERY      LIVER TRANSPLANT N/A 06/05/2022    Procedure: TRANSPLANT, LIVER;  Surgeon: Franco Slaughter MD;  Location: [facility] OR 2ND FLR;  Service: Transplant;  Laterality: N/A;    PERITONEOCENTESIS Right 06/08/2021    Procedure: PARACENTESIS, ABDOMINAL;  Surgeon: Jay Torre MD;  Location: [facility] CATH LAB;  Service: Radiology;  Laterality: Right;    PERITONEOCENTESIS Right 06/25/2021    Procedure: PARACENTESIS, ABDOMINAL;  Surgeon: Jay Torre MD;  Location: [facility] CATH LAB;  Service: Radiology;  Laterality: Right;    PERITONEOCENTESIS Right 07/12/2021    Procedure: PARACENTESIS, ABDOMINAL;  Surgeon: Jay Torre MD;  Location: [facility] CATH LAB;  Service: Radiology;  Laterality: Right;    PERITONEOCENTESIS Right 07/23/2021    Procedure: PARACENTESIS, ABDOMINAL;  Surgeon: Edward De La Torre II, MD;  Location: [facility] CATH LAB;  Service:  Interventional Radiology;  Laterality: Right;    PERITONEOCENTESIS Right 08/03/2021    Procedure: PARACENTESIS, ABDOMINAL;  Surgeon: Franco Cheung MD;  Location: [facility] CATH LAB;  Service: Radiology;  Laterality: Right;    PERITONEOCENTESIS Right 08/16/2021    Procedure: PARACENTESIS, ABDOMINAL;  Surgeon: Jay Torre MD;  Location: [facility] CATH LAB;  Service: Radiology;  Laterality: Right;    PERITONEOCENTESIS Right 08/23/2021    Procedure: PARACENTESIS, ABDOMINAL;  Surgeon: Jay Torre MD;  Location: [facility] CATH LAB;  Service: Radiology;  Laterality: Right;    PERITONEOCENTESIS Right 09/16/2021    Procedure: PARACENTESIS, ABDOMINAL;  Surgeon: Jay Torre MD;  Location: [facility] CATH LAB;  Service: Radiology;  Laterality: Right;    PERITONEOCENTESIS N/A 09/24/2021    Procedure: PARACENTESIS, ABDOMINAL;  Surgeon: Jay Torre MD;  Location: [facility] CATH LAB;  Service: Radiology;  Laterality: N/A;    PERITONEOCENTESIS Right 12/23/2021    Procedure: PARACENTESIS, ABDOMINAL;  Surgeon: Jay Torre MD;  Location: [facility] CATH LAB;  Service: Radiology;  Laterality: Right;    PERITONEOCENTESIS N/A 12/30/2021    Procedure: PARACENTESIS, ABDOMINAL;  Surgeon: Salas Cabrera MD;  Location: [facility] CATH LAB;  Service: Radiology;  Laterality: N/A;    RETROGRADE PYELOGRAPHY Bilateral 09/10/2021    Procedure: PYELOGRAM, RETROGRADE;  Surgeon: Rj Sánchez Jr., MD;  Location: [address];  Service: Urology;  Laterality: Bilateral;    TONSILLECTOMY         FAMILY HISTORY:   Family History   Problem Relation Name Age of Onset    Cancer Mother          Uterine Cancer     No Known Problems Father      No Known Problems Sister      Sleep apnea Daughter      ADD / ADHD Daughter      Heart disease Maternal Grandmother          CHF    COPD Maternal Grandfather      Heart disease Paternal Grandmother          CHF    Colon cancer Neg Hx      Inflammatory bowel disease Neg Hx      Stomach cancer Neg Hx      Breast  cancer Neg Hx      Ovarian cancer Neg Hx      Learning disabilities Neg Hx         SOCIAL HISTORY:  Social History     Socioeconomic History    Marital status:     Number of children: 1   Occupational History    Occupation: Assistant   Tobacco Use    Smoking status: Some Days     Current packs/day: 0.25     Average packs/day: 0.3 packs/day for 27.0 years (6.8 ttl pk-yrs)     Types: Cigarettes    Smokeless tobacco: Never    Tobacco comments:     two cigarettes a day    Substance and Sexual Activity    Alcohol use: Not Currently     Comment: quit caridad 15    Drug use: No    Sexual activity: Yes     Partners: Male   Social History Narrative    They live in [place], [place]      Social Determinants of Health     Financial Resource Strain: Low Risk  (7/19/2023)    Overall Financial Resource Strain (CARDIA)     Difficulty of Paying Living Expenses: Not hard at all   Food Insecurity: No Food Insecurity (7/19/2023)    Hunger Vital Sign     Worried About Running Out of Food in the Last Year: Never true     Ran Out of Food in the Last Year: Never true   Transportation Needs: No Transportation Needs (7/19/2023)    PRAPARE - Transportation     Lack of Transportation (Medical): No     Lack of Transportation (Non-Medical): No   Physical Activity: Insufficiently Active (7/19/2023)    Exercise Vital Sign     Days of Exercise per Week: 3 days     Minutes of Exercise per Session: 30 min   Stress: No Stress Concern Present (7/19/2023)    [employer] [place] [employer] - Occupational Stress Questionnaire     Feeling of Stress : Only a little   Housing Stability: Unknown (7/19/2023)    Housing Stability Vital Sign     Unable to Pay for Housing in the Last Year: No     Unstable Housing in the Last Year: No       ALLERGIES:  Review of patient's allergies indicates:  No Known Allergies    MEDICATIONS:    Current Outpatient Medications:     aspirin (ECOTRIN) 81 MG EC tablet, Take 81 mg by mouth once daily., Disp: , Rfl:      atorvastatin (LIPITOR) 40 MG tablet, Take 1 tablet (40 mg total) by mouth once daily., Disp: 90 tablet, Rfl: 3    bimatoprost (LATISSE) 0.03 % ophthalmic solution, Place one drop on applicator and apply evenly along the skin of the upper eyelid at base of eyelashes once daily at bedtime; repeat procedure for second eye (use a clean applicator)., Disp: 5 mL, Rfl: 12    dextroamphetamine-amphetamine (ADDERALL) 20 mg tablet, Take 1 tablet by mouth 2 (two) times a day., Disp: 60 tablet, Rfl: 0    dextroamphetamine-amphetamine (ADDERALL) 20 mg tablet, Take 20 mg in the early afternoon as needed., Disp: 30 tablet, Rfl: 0    dextroamphetamine-amphetamine 30 mg Tab, Take 1 tablet (30 mg total) by mouth every morning., Disp: 30 tablet, Rfl: 0    hydroquinone 8 % Emul, Compound hydroquinone 8% / tretinoin 0.025% / kojic acid 1% / niacinamide 4% / fluocinolone 0.025% cream. Apply a pea-sized amount to face qhs. Do not use for longer than 12 weeks in a row then take a 1 month break. Use only to dark spots, Disp: 30 g, Rfl: 1    hydrOXYzine HCL (ATARAX) 25 MG tablet, TAKE 1 TO 2 TABLETS BY MOUTH EVERY NIGHT AT BEDTIME AS NEEDED FOR INSOMNIA, Disp: 60 tablet, Rfl: 1    levothyroxine (SYNTHROID) 50 MCG tablet, Take 1 tablet (50 mcg total) by mouth before breakfast., Disp: 90 tablet, Rfl: 3    linaCLOtide (LINZESS) 72 mcg Cap capsule, Take 2 capsules (144 mcg total) by mouth before breakfast., Disp: 180 capsule, Rfl: 5    metFORMIN (GLUCOPHAGE-XR) 500 MG ER 24hr tablet, Take 1 tablet (500 mg total) by mouth 2 (two) times daily with meals., Disp: 180 tablet, Rfl: 3    mycophenolate (CELLCEPT) 250 mg Cap, Take 2 capsules (500 mg total) by mouth 2 (two) times daily., Disp: 360 capsule, Rfl: 3    tacrolimus (PROGRAF) 1 MG Cap, Take 2 capsules (2 mg total) by mouth every morning AND 1 capsule (1 mg total) every evening., Disp: 270 capsule, Rfl: 3    tirzepatide (MOUNJARO) 2.5 mg/0.5 mL PnIj, Inject 2.5 mg into the skin every 7 days.,  Disp: 4 Pen, Rfl: 3    traZODone (DESYREL) 50 MG tablet, Take  mg at bedtime as needed for sleep, Disp: 60 tablet, Rfl: 1    tretinoin (RETIN-A) 0.05 % cream, Compound tretinoin 0.05% / niacinamide 2% cream. Start 2-3 times weekly then increase up to every night after 2-3 weeks if skin is not too dry. Apply pea sized amount to entire face, Disp: 30 g, Rfl: 5    vitamin D (VITAMIN D3) 1000 units Tab, Take 2,000 Units by mouth once daily., Disp: , Rfl:     famotidine (PEPCID) 20 MG tablet, Take 1 tablet (20 mg total) by mouth every evening., Disp: 30 tablet, Rfl: 11   Medication List with Changes/Refills   Current Medications    ASPIRIN (ECOTRIN) 81 MG EC TABLET    Take 81 mg by mouth once daily.    ATORVASTATIN (LIPITOR) 40 MG TABLET    Take 1 tablet (40 mg total) by mouth once daily.    BIMATOPROST (LATISSE) 0.03 % OPHTHALMIC SOLUTION    Place one drop on applicator and apply evenly along the skin of the upper eyelid at base of eyelashes once daily at bedtime; repeat procedure for second eye (use a clean applicator).    DEXTROAMPHETAMINE-AMPHETAMINE (ADDERALL) 20 MG TABLET    Take 1 tablet by mouth 2 (two) times a day.    DEXTROAMPHETAMINE-AMPHETAMINE (ADDERALL) 20 MG TABLET    Take 20 mg in the early afternoon as needed.    DEXTROAMPHETAMINE-AMPHETAMINE 30 MG TAB    Take 1 tablet (30 mg total) by mouth every morning.    FAMOTIDINE (PEPCID) 20 MG TABLET    Take 1 tablet (20 mg total) by mouth every evening.    HYDROQUINONE 8 % EMUL    Compound hydroquinone 8% / tretinoin 0.025% / kojic acid 1% / niacinamide 4% / fluocinolone 0.025% cream. Apply a pea-sized amount to face qhs. Do not use for longer than 12 weeks in a row then take a 1 month break. Use only to dark spots    HYDROXYZINE HCL (ATARAX) 25 MG TABLET    TAKE 1 TO 2 TABLETS BY MOUTH EVERY NIGHT AT BEDTIME AS NEEDED FOR INSOMNIA    LEVOTHYROXINE (SYNTHROID) 50 MCG TABLET    Take 1 tablet (50 mcg total) by mouth before breakfast.    LINACLOTIDE (LINZESS)  "72 MCG CAP CAPSULE    Take 2 capsules (144 mcg total) by mouth before breakfast.    METFORMIN (GLUCOPHAGE-XR) 500 MG ER 24HR TABLET    Take 1 tablet (500 mg total) by mouth 2 (two) times daily with meals.    MYCOPHENOLATE (CELLCEPT) 250 MG CAP    Take 2 capsules (500 mg total) by mouth 2 (two) times daily.    TACROLIMUS (PROGRAF) 1 MG CAP    Take 2 capsules (2 mg total) by mouth every morning AND 1 capsule (1 mg total) every evening.    TIRZEPATIDE (MOUNJARO) 2.5 MG/0.5 ML PNIJ    Inject 2.5 mg into the skin every 7 days.    TRAZODONE (DESYREL) 50 MG TABLET    Take  mg at bedtime as needed for sleep    TRETINOIN (RETIN-A) 0.05 % CREAM    Compound tretinoin 0.05% / niacinamide 2% cream. Start 2-3 times weekly then increase up to every night after 2-3 weeks if skin is not too dry. Apply pea sized amount to entire face    VITAMIN D (VITAMIN D3) 1000 UNITS TAB    Take 2,000 Units by mouth once daily.   Discontinued Medications    GABAPENTIN (NEURONTIN) 100 MG CAPSULE    Take 2 capsules (200 mg total) by mouth daily as needed (Sleep).    PANTOPRAZOLE (PROTONIX) 40 MG TABLET    Take 1 tablet (40 mg total) by mouth once daily.        PHYSICAL EXAM:  /84   Pulse 97   Ht 5' 3" (1.6 m)   Wt 57 kg (125 lb 10.6 oz)   SpO2 98%   BMI 22.26 kg/m²     General: No distress, No fever or chills  Neck: No adenopathy,no carotid bruit,no JVD  Lungs:Clear to auscultation bilaterally, No Crackles  Heart: Regular rate and rhythm, no murmur, gallops or rubs  Abdomen: Soft, non distended  Extremities: Atraumatic, no edema in LE  Skin: Turgor normal. No rashes  Neurologic: No focal weakness, oriented.  Dialysis Access: Non applicable        LABS:  Lab Results   Component Value Date    HGB 12.1 05/20/2024        Lab Results   Component Value Date    CREATININE 1.2 05/20/2024       Prot/Creat Ratio, Urine   Date Value Ref Range Status   02/06/2024 0.10 0.00 - 0.20 Final   01/05/2024 0.12 0.00 - 0.20 Final   07/10/2023 0.09 0.00 " - 0.20 Final       Lab Results   Component Value Date     05/20/2024    K 3.9 05/20/2024    CO2 22 (L) 05/20/2024       last PTH   Lab Results   Component Value Date    PTH 45.3 02/20/2023    CALCIUM 9.4 05/20/2024    CAION 1.00 (L) 06/05/2022    PHOS 2.9 06/10/2022       Lab Results   Component Value Date    HGBA1C 5.6 03/19/2024       Lab Results   Component Value Date    LDLCALC 78.8 02/06/2024         Creatinine, Urine   Date Value Ref Range Status   02/06/2024 164.0 15.0 - 325.0 mg/dL Final   01/05/2024 106.0 15.0 - 325.0 mg/dL Final   07/10/2023 167.0 15.0 - 325.0 mg/dL Final       Protein, Urine   Date Value Ref Range Status   10/18/2021 11 0 - 15 mg/dL Final   07/30/2021 28 (H) 0 - 15 mg/dL Final     Protein, Urine Random   Date Value Ref Range Status   02/06/2024 16 (H) 0 - 15 mg/dL Final   01/05/2024 13 0 - 15 mg/dL Final   07/10/2023 15 0 - 15 mg/dL Final       Prot/Creat Ratio, Urine   Date Value Ref Range Status   02/06/2024 0.10 0.00 - 0.20 Final   01/05/2024 0.12 0.00 - 0.20 Final   07/10/2023 0.09 0.00 - 0.20 Final         ASSESSMENT:    1) Chronic Kidney disease stage 3a  2) Metabolic acidosis - Resolved  3) Anemia due to iron deficiency pre transplant - Resolved now.  4) S/P liver transplant  5) Secondary hyperparathyroidism - improved   Renal ultrasound 10/21 showed 9.6 and 11.3 cm kidneys (no known issues from child rosa that would explain the reason for her asymmetric kidney sizes). Patients baseline creatinine is less than 1 till June 2021, Prior to liver transplant her creatinine has been above 2 with significant acanthocytes. She has had cystoscopy done in the past with no significant detected abnormalities.Serological work up including ANCA, GBM, MARIBEL, Cryoglobulin are all negative. Since liver transplant the creatinine trended back down to 1.2 mg/dl, urine microscopy post transplant (x 2) did not show any acanthocytes likely due to resolution of secondary IgA. She  settled down at CKD  stage 2-3a.   Electrolytes, in acceptable range. s/p IV Iron infusion for iron def anemia prior to transplant. PTH high before transplant on vitamin D.    - Drink atleast 4403-0659 ml water per day.  - Avoid NSAIDs intake  - Limit dark color sodas, limit animal protein. Calorie count.  - Educated about CKD and the progression.    RTC in 6 months    Diagnosis and plan of care explained to the patient.  Pt Verbalized understanding. Answered all questions.  Thanks for allowing me to participate in the care of this patient.     1:19 PM    Rashel Cohn MD  Nephrology & Critical Care

## 2024-06-14 RX ORDER — FAMOTIDINE 20 MG/1
20 TABLET, FILM COATED ORAL NIGHTLY
Qty: 30 TABLET | Refills: 11 | Status: SHIPPED | OUTPATIENT
Start: 2024-06-14

## 2024-06-17 ENCOUNTER — LAB VISIT (OUTPATIENT)
Dept: LAB | Facility: HOSPITAL | Age: 54
End: 2024-06-17
Attending: INTERNAL MEDICINE
Payer: COMMERCIAL

## 2024-06-17 DIAGNOSIS — N18.31 STAGE 3A CHRONIC KIDNEY DISEASE: ICD-10-CM

## 2024-06-17 DIAGNOSIS — Z94.4 S/P LIVER TRANSPLANT: ICD-10-CM

## 2024-06-17 LAB
ALBUMIN SERPL BCP-MCNC: 4.1 G/DL (ref 3.5–5.2)
ALP SERPL-CCNC: 74 U/L (ref 55–135)
ALT SERPL W/O P-5'-P-CCNC: 19 U/L (ref 10–44)
ANION GAP SERPL CALC-SCNC: 9 MMOL/L (ref 8–16)
AST SERPL-CCNC: 22 U/L (ref 10–40)
BASOPHILS # BLD AUTO: 0.11 K/UL (ref 0–0.2)
BASOPHILS NFR BLD: 1.1 % (ref 0–1.9)
BILIRUB SERPL-MCNC: 0.4 MG/DL (ref 0.1–1)
BUN SERPL-MCNC: 22 MG/DL (ref 6–20)
CALCIUM SERPL-MCNC: 9.8 MG/DL (ref 8.7–10.5)
CHLORIDE SERPL-SCNC: 106 MMOL/L (ref 95–110)
CO2 SERPL-SCNC: 26 MMOL/L (ref 23–29)
CREAT SERPL-MCNC: 1.4 MG/DL (ref 0.5–1.4)
DIFFERENTIAL METHOD BLD: ABNORMAL
EOSINOPHIL # BLD AUTO: 0.6 K/UL (ref 0–0.5)
EOSINOPHIL NFR BLD: 5.9 % (ref 0–8)
ERYTHROCYTE [DISTWIDTH] IN BLOOD BY AUTOMATED COUNT: 12.9 % (ref 11.5–14.5)
EST. GFR  (NO RACE VARIABLE): 45 ML/MIN/1.73 M^2
GLUCOSE SERPL-MCNC: 95 MG/DL (ref 70–110)
HCT VFR BLD AUTO: 38.2 % (ref 37–48.5)
HGB BLD-MCNC: 12.5 G/DL (ref 12–16)
IMM GRANULOCYTES # BLD AUTO: 0.03 K/UL (ref 0–0.04)
IMM GRANULOCYTES NFR BLD AUTO: 0.3 % (ref 0–0.5)
LYMPHOCYTES # BLD AUTO: 3 K/UL (ref 1–4.8)
LYMPHOCYTES NFR BLD: 29.5 % (ref 18–48)
MAGNESIUM SERPL-MCNC: 1.4 MG/DL (ref 1.6–2.6)
MCH RBC QN AUTO: 30.3 PG (ref 27–31)
MCHC RBC AUTO-ENTMCNC: 32.7 G/DL (ref 32–36)
MCV RBC AUTO: 93 FL (ref 82–98)
MONOCYTES # BLD AUTO: 0.7 K/UL (ref 0.3–1)
MONOCYTES NFR BLD: 6.5 % (ref 4–15)
NEUTROPHILS # BLD AUTO: 5.8 K/UL (ref 1.8–7.7)
NEUTROPHILS NFR BLD: 56.7 % (ref 38–73)
NRBC BLD-RTO: 0 /100 WBC
PHOSPHATE SERPL-MCNC: 3.9 MG/DL (ref 2.7–4.5)
PLATELET # BLD AUTO: 333 K/UL (ref 150–450)
PMV BLD AUTO: 10.7 FL (ref 9.2–12.9)
POTASSIUM SERPL-SCNC: 4.4 MMOL/L (ref 3.5–5.1)
PROT SERPL-MCNC: 7 G/DL (ref 6–8.4)
PTH-INTACT SERPL-MCNC: 39.2 PG/ML (ref 9–77)
RBC # BLD AUTO: 4.13 M/UL (ref 4–5.4)
SODIUM SERPL-SCNC: 141 MMOL/L (ref 136–145)
WBC # BLD AUTO: 10.23 K/UL (ref 3.9–12.7)

## 2024-06-17 PROCEDURE — 83970 ASSAY OF PARATHORMONE: CPT | Performed by: INTERNAL MEDICINE

## 2024-06-17 PROCEDURE — 80053 COMPREHEN METABOLIC PANEL: CPT | Performed by: INTERNAL MEDICINE

## 2024-06-17 PROCEDURE — 36415 COLL VENOUS BLD VENIPUNCTURE: CPT | Performed by: INTERNAL MEDICINE

## 2024-06-17 PROCEDURE — 80197 ASSAY OF TACROLIMUS: CPT | Performed by: INTERNAL MEDICINE

## 2024-06-17 PROCEDURE — 84100 ASSAY OF PHOSPHORUS: CPT | Performed by: INTERNAL MEDICINE

## 2024-06-17 PROCEDURE — 83735 ASSAY OF MAGNESIUM: CPT | Performed by: INTERNAL MEDICINE

## 2024-06-17 PROCEDURE — 85025 COMPLETE CBC W/AUTO DIFF WBC: CPT | Performed by: INTERNAL MEDICINE

## 2024-06-18 ENCOUNTER — TELEPHONE (OUTPATIENT)
Dept: TRANSPLANT | Facility: CLINIC | Age: 54
End: 2024-06-18
Payer: COMMERCIAL

## 2024-06-18 LAB — TACROLIMUS BLD-MCNC: 7.2 NG/ML (ref 5–15)

## 2024-06-18 NOTE — TELEPHONE ENCOUNTER
Sent message to let patient know.    ----- Message from Sharmila Pacheco MD sent at 6/18/2024 12:14 PM CDT -----  [facility] Plts are well preserved. And has new liver. So should not be  ----- Message -----  From: Marii Ruiz RN  Sent: 6/18/2024  11:13 AM CDT  To: Shramila Pacheco MD    She wanted me to ask you see below    Here's my question: I just noticed a few days ago that I have a lot of scattered petechiae concentrated mostly around my trx incision, but also extending upward and downward. They seem to have just popped off recently and I'm not sure why.     Can you ask Dr. REYES if this is liver related?

## 2024-06-19 ENCOUNTER — PATIENT MESSAGE (OUTPATIENT)
Dept: NEPHROLOGY | Facility: CLINIC | Age: 54
End: 2024-06-19
Payer: COMMERCIAL

## 2024-06-19 ENCOUNTER — PATIENT MESSAGE (OUTPATIENT)
Dept: INTERNAL MEDICINE | Facility: CLINIC | Age: 54
End: 2024-06-19
Payer: COMMERCIAL

## 2024-06-19 ENCOUNTER — LAB VISIT (OUTPATIENT)
Dept: LAB | Facility: HOSPITAL | Age: 54
End: 2024-06-19
Attending: INTERNAL MEDICINE
Payer: COMMERCIAL

## 2024-06-19 DIAGNOSIS — Z94.4 S/P LIVER TRANSPLANT: ICD-10-CM

## 2024-06-19 LAB
ALBUMIN SERPL BCP-MCNC: 4 G/DL (ref 3.5–5.2)
ALP SERPL-CCNC: 66 U/L (ref 55–135)
ALT SERPL W/O P-5'-P-CCNC: 15 U/L (ref 10–44)
ANION GAP SERPL CALC-SCNC: 8 MMOL/L (ref 8–16)
AST SERPL-CCNC: 17 U/L (ref 10–40)
BASOPHILS # BLD AUTO: 0.09 K/UL (ref 0–0.2)
BASOPHILS NFR BLD: 1.1 % (ref 0–1.9)
BILIRUB SERPL-MCNC: 0.2 MG/DL (ref 0.1–1)
BUN SERPL-MCNC: 21 MG/DL (ref 6–20)
CALCIUM SERPL-MCNC: 9.8 MG/DL (ref 8.7–10.5)
CHLORIDE SERPL-SCNC: 107 MMOL/L (ref 95–110)
CO2 SERPL-SCNC: 24 MMOL/L (ref 23–29)
CREAT SERPL-MCNC: 1.3 MG/DL (ref 0.5–1.4)
DIFFERENTIAL METHOD BLD: NORMAL
EOSINOPHIL # BLD AUTO: 0.5 K/UL (ref 0–0.5)
EOSINOPHIL NFR BLD: 5.7 % (ref 0–8)
ERYTHROCYTE [DISTWIDTH] IN BLOOD BY AUTOMATED COUNT: 12.9 % (ref 11.5–14.5)
EST. GFR  (NO RACE VARIABLE): 49.2 ML/MIN/1.73 M^2
GLUCOSE SERPL-MCNC: 95 MG/DL (ref 70–110)
HCT VFR BLD AUTO: 37.8 % (ref 37–48.5)
HGB BLD-MCNC: 12.2 G/DL (ref 12–16)
IMM GRANULOCYTES # BLD AUTO: 0.02 K/UL (ref 0–0.04)
IMM GRANULOCYTES NFR BLD AUTO: 0.2 % (ref 0–0.5)
LYMPHOCYTES # BLD AUTO: 2.6 K/UL (ref 1–4.8)
LYMPHOCYTES NFR BLD: 31.1 % (ref 18–48)
MCH RBC QN AUTO: 30.3 PG (ref 27–31)
MCHC RBC AUTO-ENTMCNC: 32.3 G/DL (ref 32–36)
MCV RBC AUTO: 94 FL (ref 82–98)
MONOCYTES # BLD AUTO: 0.6 K/UL (ref 0.3–1)
MONOCYTES NFR BLD: 6.7 % (ref 4–15)
NEUTROPHILS # BLD AUTO: 4.6 K/UL (ref 1.8–7.7)
NEUTROPHILS NFR BLD: 55.2 % (ref 38–73)
NRBC BLD-RTO: 0 /100 WBC
PLATELET # BLD AUTO: 311 K/UL (ref 150–450)
PMV BLD AUTO: 10.5 FL (ref 9.2–12.9)
POTASSIUM SERPL-SCNC: 4.6 MMOL/L (ref 3.5–5.1)
PROT SERPL-MCNC: 6.8 G/DL (ref 6–8.4)
RBC # BLD AUTO: 4.03 M/UL (ref 4–5.4)
SODIUM SERPL-SCNC: 139 MMOL/L (ref 136–145)
WBC # BLD AUTO: 8.4 K/UL (ref 3.9–12.7)

## 2024-06-19 PROCEDURE — 80053 COMPREHEN METABOLIC PANEL: CPT | Performed by: INTERNAL MEDICINE

## 2024-06-19 PROCEDURE — 85025 COMPLETE CBC W/AUTO DIFF WBC: CPT | Performed by: INTERNAL MEDICINE

## 2024-06-19 PROCEDURE — 36415 COLL VENOUS BLD VENIPUNCTURE: CPT | Performed by: INTERNAL MEDICINE

## 2024-06-19 PROCEDURE — 80197 ASSAY OF TACROLIMUS: CPT | Performed by: INTERNAL MEDICINE

## 2024-06-20 LAB — TACROLIMUS BLD-MCNC: 7.3 NG/ML (ref 5–15)

## 2024-06-24 ENCOUNTER — TELEPHONE (OUTPATIENT)
Dept: TRANSPLANT | Facility: CLINIC | Age: 54
End: 2024-06-24
Payer: COMMERCIAL

## 2024-06-24 DIAGNOSIS — Z94.4 S/P LIVER TRANSPLANT: Primary | ICD-10-CM

## 2024-06-24 DIAGNOSIS — E03.9 HYPOTHYROIDISM, UNSPECIFIED TYPE: ICD-10-CM

## 2024-06-24 RX ORDER — LEVOTHYROXINE SODIUM 50 UG/1
50 TABLET ORAL
Qty: 90 TABLET | Refills: 1 | Status: SHIPPED | OUTPATIENT
Start: 2024-06-24

## 2024-06-24 NOTE — TELEPHONE ENCOUNTER
No care due was identified.  [facility] Embedded Care Due Messages. Reference number: [id number].   6/24/2024 1:19:53 PM CDT

## 2024-06-24 NOTE — TELEPHONE ENCOUNTER
Sent patient message via portal to let her know labs are stable.  No medication changes, next labs due on 7/29/24      ----- Message from Sharmila Pacheco MD sent at 6/23/2024  3:43 PM CDT -----  The Labs are stable - please let patient know.

## 2024-06-25 NOTE — TELEPHONE ENCOUNTER
- Concentrated urine with calcium oxalate crystals.  - She is not taking any supplements that has vitamin C in it or drinking fortified juices.  - One kidney stone 29 years ago.    Plan -    - Drink more water per day, at-least 4211-8648 ml per day.  - No need limit oxalate intake as of now.      Discussed with patient.

## 2024-06-25 NOTE — TELEPHONE ENCOUNTER
Refill Decision Note   Malu Simon  is requesting a refill authorization.  Brief Assessment and Rationale for Refill:  Approve     Medication Therapy Plan:         Comments:     Note composed:8:46 PM 06/24/2024

## 2024-07-02 RX ORDER — DEXTROAMPHETAMINE SACCHARATE, AMPHETAMINE ASPARTATE, DEXTROAMPHETAMINE SULFATE AND AMPHETAMINE SULFATE 7.5; 7.5; 7.5; 7.5 MG/1; MG/1; MG/1; MG/1
30 TABLET ORAL EVERY MORNING
Qty: 30 TABLET | Refills: 0 | Status: SHIPPED | OUTPATIENT
Start: 2024-07-02

## 2024-07-02 RX ORDER — DEXTROAMPHETAMINE SACCHARATE, AMPHETAMINE ASPARTATE, DEXTROAMPHETAMINE SULFATE AND AMPHETAMINE SULFATE 5; 5; 5; 5 MG/1; MG/1; MG/1; MG/1
TABLET ORAL
Qty: 30 TABLET | Refills: 0 | Status: SHIPPED | OUTPATIENT
Start: 2024-07-02

## 2024-07-07 RX ORDER — HYDROXYZINE HYDROCHLORIDE 25 MG/1
TABLET, FILM COATED ORAL
Qty: 60 TABLET | Refills: 1 | Status: SHIPPED | OUTPATIENT
Start: 2024-07-07

## 2024-07-15 ENCOUNTER — PATIENT MESSAGE (OUTPATIENT)
Dept: PSYCHIATRY | Facility: CLINIC | Age: 54
End: 2024-07-15
Payer: COMMERCIAL

## 2024-07-30 ENCOUNTER — LAB VISIT (OUTPATIENT)
Dept: LAB | Facility: HOSPITAL | Age: 54
End: 2024-07-30
Attending: INTERNAL MEDICINE
Payer: COMMERCIAL

## 2024-07-30 DIAGNOSIS — Z94.4 S/P LIVER TRANSPLANT: ICD-10-CM

## 2024-07-30 LAB
ALBUMIN SERPL BCP-MCNC: 4.4 G/DL (ref 3.5–5.2)
ALP SERPL-CCNC: 73 U/L (ref 55–135)
ALT SERPL W/O P-5'-P-CCNC: 16 U/L (ref 10–44)
ANION GAP SERPL CALC-SCNC: 9 MMOL/L (ref 8–16)
AST SERPL-CCNC: 23 U/L (ref 10–40)
BASOPHILS # BLD AUTO: 0.08 K/UL (ref 0–0.2)
BASOPHILS NFR BLD: 0.8 % (ref 0–1.9)
BILIRUB SERPL-MCNC: 0.5 MG/DL (ref 0.1–1)
BUN SERPL-MCNC: 22 MG/DL (ref 6–20)
CALCIUM SERPL-MCNC: 9.8 MG/DL (ref 8.7–10.5)
CHLORIDE SERPL-SCNC: 109 MMOL/L (ref 95–110)
CO2 SERPL-SCNC: 21 MMOL/L (ref 23–29)
CREAT SERPL-MCNC: 1.2 MG/DL (ref 0.5–1.4)
DIFFERENTIAL METHOD BLD: ABNORMAL
EOSINOPHIL # BLD AUTO: 0.5 K/UL (ref 0–0.5)
EOSINOPHIL NFR BLD: 5.2 % (ref 0–8)
ERYTHROCYTE [DISTWIDTH] IN BLOOD BY AUTOMATED COUNT: 13.3 % (ref 11.5–14.5)
EST. GFR  (NO RACE VARIABLE): 54.1 ML/MIN/1.73 M^2
GLUCOSE SERPL-MCNC: 101 MG/DL (ref 70–110)
HCT VFR BLD AUTO: 42.9 % (ref 37–48.5)
HGB BLD-MCNC: 13.4 G/DL (ref 12–16)
IMM GRANULOCYTES # BLD AUTO: 0.03 K/UL (ref 0–0.04)
IMM GRANULOCYTES NFR BLD AUTO: 0.3 % (ref 0–0.5)
LYMPHOCYTES # BLD AUTO: 2.2 K/UL (ref 1–4.8)
LYMPHOCYTES NFR BLD: 21.3 % (ref 18–48)
MCH RBC QN AUTO: 29.1 PG (ref 27–31)
MCHC RBC AUTO-ENTMCNC: 31.2 G/DL (ref 32–36)
MCV RBC AUTO: 93 FL (ref 82–98)
MONOCYTES # BLD AUTO: 0.6 K/UL (ref 0.3–1)
MONOCYTES NFR BLD: 6 % (ref 4–15)
NEUTROPHILS # BLD AUTO: 6.8 K/UL (ref 1.8–7.7)
NEUTROPHILS NFR BLD: 66.4 % (ref 38–73)
NRBC BLD-RTO: 0 /100 WBC
PLATELET # BLD AUTO: 370 K/UL (ref 150–450)
PMV BLD AUTO: 10.2 FL (ref 9.2–12.9)
POTASSIUM SERPL-SCNC: 4.6 MMOL/L (ref 3.5–5.1)
PROT SERPL-MCNC: 7.6 G/DL (ref 6–8.4)
RBC # BLD AUTO: 4.6 M/UL (ref 4–5.4)
SODIUM SERPL-SCNC: 139 MMOL/L (ref 136–145)
WBC # BLD AUTO: 10.28 K/UL (ref 3.9–12.7)

## 2024-07-30 PROCEDURE — 80053 COMPREHEN METABOLIC PANEL: CPT | Performed by: INTERNAL MEDICINE

## 2024-07-30 PROCEDURE — 80197 ASSAY OF TACROLIMUS: CPT | Performed by: INTERNAL MEDICINE

## 2024-07-30 PROCEDURE — 36415 COLL VENOUS BLD VENIPUNCTURE: CPT | Performed by: INTERNAL MEDICINE

## 2024-07-30 PROCEDURE — 85025 COMPLETE CBC W/AUTO DIFF WBC: CPT | Performed by: INTERNAL MEDICINE

## 2024-07-31 ENCOUNTER — PATIENT MESSAGE (OUTPATIENT)
Dept: TRANSPLANT | Facility: CLINIC | Age: 54
End: 2024-07-31
Payer: COMMERCIAL

## 2024-07-31 ENCOUNTER — PATIENT MESSAGE (OUTPATIENT)
Dept: PSYCHIATRY | Facility: CLINIC | Age: 54
End: 2024-07-31
Payer: COMMERCIAL

## 2024-07-31 LAB — TACROLIMUS BLD-MCNC: 4.7 NG/ML (ref 5–15)

## 2024-08-01 ENCOUNTER — TELEPHONE (OUTPATIENT)
Dept: TRANSPLANT | Facility: CLINIC | Age: 54
End: 2024-08-01
Payer: COMMERCIAL

## 2024-08-01 NOTE — TELEPHONE ENCOUNTER
Sent patient message via portal to let her know labs are stable.  No medication changes, next labs due on 10/21/24      ----- Message from Marjan Mccord MD sent at 7/31/2024  9:58 PM CDT -----  Tac reviewed. No changes.

## 2024-08-08 ENCOUNTER — HOSPITAL ENCOUNTER (OUTPATIENT)
Dept: RADIOLOGY | Facility: CLINIC | Age: 54
Discharge: HOME | End: 2024-08-08
Attending: NURSE PRACTITIONER
Payer: COMMERCIAL

## 2024-08-08 DIAGNOSIS — M85.852 OSTEOPENIA OF LEFT HIP: ICD-10-CM

## 2024-08-08 PROCEDURE — 77080 DXA BONE DENSITY AXIAL: CPT | Mod: TC

## 2024-08-08 RX ORDER — DEXTROAMPHETAMINE SACCHARATE, AMPHETAMINE ASPARTATE, DEXTROAMPHETAMINE SULFATE AND AMPHETAMINE SULFATE 5; 5; 5; 5 MG/1; MG/1; MG/1; MG/1
TABLET ORAL
Qty: 30 TABLET | Refills: 0 | Status: SHIPPED | OUTPATIENT
Start: 2024-08-08

## 2024-08-08 RX ORDER — DEXTROAMPHETAMINE SACCHARATE, AMPHETAMINE ASPARTATE, DEXTROAMPHETAMINE SULFATE AND AMPHETAMINE SULFATE 7.5; 7.5; 7.5; 7.5 MG/1; MG/1; MG/1; MG/1
30 TABLET ORAL EVERY MORNING
Qty: 30 TABLET | Refills: 0 | Status: SHIPPED | OUTPATIENT
Start: 2024-08-08

## 2024-08-16 ENCOUNTER — PATIENT MESSAGE (OUTPATIENT)
Dept: TRANSPLANT | Facility: CLINIC | Age: 54
End: 2024-08-16
Payer: COMMERCIAL

## 2024-08-16 ENCOUNTER — OFFICE VISIT (OUTPATIENT)
Dept: TRANSPLANT | Facility: CLINIC | Age: 54
End: 2024-08-16
Payer: COMMERCIAL

## 2024-08-16 ENCOUNTER — PATIENT MESSAGE (OUTPATIENT)
Dept: TRANSPLANT | Facility: CLINIC | Age: 54
End: 2024-08-16

## 2024-08-16 ENCOUNTER — TELEPHONE (OUTPATIENT)
Dept: TRANSPLANT | Facility: CLINIC | Age: 54
End: 2024-08-16
Payer: COMMERCIAL

## 2024-08-16 DIAGNOSIS — R79.0 LOW MAGNESIUM LEVEL: ICD-10-CM

## 2024-08-16 DIAGNOSIS — Z29.89 PROPHYLACTIC IMMUNOTHERAPY: ICD-10-CM

## 2024-08-16 DIAGNOSIS — K21.9 GASTROESOPHAGEAL REFLUX DISEASE, UNSPECIFIED WHETHER ESOPHAGITIS PRESENT: ICD-10-CM

## 2024-08-16 DIAGNOSIS — Z79.899 IMMUNOSUPPRESSION DUE TO DRUG THERAPY: ICD-10-CM

## 2024-08-16 DIAGNOSIS — E83.42 HYPOMAGNESEMIA: Primary | ICD-10-CM

## 2024-08-16 DIAGNOSIS — Z91.89 AT RISK FOR OPPORTUNISTIC INFECTIONS: Primary | ICD-10-CM

## 2024-08-16 DIAGNOSIS — D84.821 IMMUNOSUPPRESSION DUE TO DRUG THERAPY: ICD-10-CM

## 2024-08-16 PROCEDURE — 99213 OFFICE O/P EST LOW 20 MIN: CPT | Mod: 95,,, | Performed by: INTERNAL MEDICINE

## 2024-08-16 RX ORDER — PANTOPRAZOLE SODIUM 40 MG/1
40 TABLET, DELAYED RELEASE ORAL DAILY
Qty: 90 TABLET | Refills: 3 | Status: SHIPPED | OUTPATIENT
Start: 2024-08-16 | End: 2025-08-16

## 2024-08-16 RX ORDER — CALCIUM CARBONATE 300MG(750)
400 TABLET,CHEWABLE ORAL DAILY
Qty: 30 TABLET | Refills: 11 | Status: SHIPPED | OUTPATIENT
Start: 2024-08-16

## 2024-08-16 NOTE — Clinical Note
Kathleen, wanted to double check -this pt is post liver tx and is 54 on prograf- at increased risk of bone thinning. You did not recommend Ca with vit D. Should she take this? Rashel, is it ok with her kidneys?

## 2024-08-16 NOTE — LETTER
August 16, 2024        Killian Dodd  [address]  [place] [address]  Phone: [phone]  Fax: [phone]             [facility]  [address] [address]  Phone: [phone]  Fax: [phone]     Patient: Malu Montes   MR Number: [id number]   YOB: 1970   Date of Visit: 8/16/2024       Dear Dr. Killian Dodd    Thank you for referring Malu Montes to me for evaluation. Attached you will find relevant portions of my assessment and plan of care.    If you have questions, please do not hesitate to call me. I look forward to following Malu Montes along with you.    Sincerely,    Sharmila Pacheco MD    Enclosure    If you would like to receive this communication electronically, please contact [email or web address] or [phone] to request [employer] access.    [employer] is a tool which provides read-only access to select patient information with whom you have a relationship. Its easy to use and provides real time access to review your patients record including encounter summaries, notes, results, and demographic information.    If you feel you have received this communication in error or would no longer like to receive these types of communications, please e-mail [email or web address]

## 2024-08-16 NOTE — PATIENT INSTRUCTIONS
Recheck labs now since last prograf level may not be accurate  Add Mg oxide 400 mg daily - allow 4 hours before take synthroid  Osteopenia- repeat bone density 2025; endocrinology did not recommend treatment for osteopenia  Mammogram-10/24 and pap per gyn  Overdue for gyn appt-pt will schedule  Overdue for dermatology- needs annual skin check-pt will schedule  PCP annual appt 01/25  Nephrology appts every 6 months  Change pepcid to protonix for GERD symptoms  Return 6 months

## 2024-08-16 NOTE — Clinical Note
1. Recheck labs now since last prograf level may not be accurate 2. Will consider adding Mg oxide 400 mg bid after specialty pharmacy gets back to me since there is a DDI with adderall 3. Osteopenia- repeat bone density 2025; endocrinology did not recommend treatment for osteopenia 4. Mammogram-10/24 and pap per gyn 5. Overdue for gyn appt-pt will schedule 6. Overdue for dermatology- needs annual skin check-pt will schedule 7. PCP annual appt 01/25 8. Nephrology appts every 6 months 9. Return 6 months

## 2024-08-16 NOTE — TELEPHONE ENCOUNTER
Sent message to let patient know    ----- Message from Sharmila Pacheco MD sent at 8/16/2024  2:04 PM CDT -----  Thanks. Will have the coord let her know  ----- Message -----  From: Rashel Cohn MD  Sent: 8/16/2024   1:12 PM CDT  To: Sharmila Pacheco MD; Kathleen Pace NP; #    Ok with vitamin D 2000 units per day + Calcium 600 mg per day.  ----- Message -----  From: Sharmila Pacheco MD  Sent: 8/16/2024   9:29 AM CDT  To: Kathleen Pace NP; Rashel Cohn MD; #    Kathleen, wanted to double check -this pt is post liver tx and is 54 on prograf- at increased risk of bone thinning. You did not recommend Ca with vit D. Should she take this? Rashel, is it ok with her kidneys?

## 2024-08-16 NOTE — PROGRESS NOTES
Transplant Hepatology  Liver Transplant Recipient Follow-up    Transplant Date: 6/5/2022  UNOS Native Liver Dx: Alcoholic Cirrhosis    Malu is here for follow up of her liver transplant.    ORGAN: LIVER  Whole or Partial: whole liver  Donor Type: donation after brain death  [facility] High Risk: yes  Donor CMV Status: Positive  Donor HCV Status: Positive  Donor HBcAb: Negative  Donor HBV JACOBO:   Donor HCV JACOBO: Negative  Biliary Anastomosis: end to end  Arterial Anatomy: standard  IVC reconstruction: end to end ivc  Portal vein status: patent    She has had the following complications since transplant:  leukopenia . The noted complications is well controlled.    Subjective:     Interval History: Malu is now 2 years and 2 months post liver transplant. Currently, she is doing well. Is not smoking cigarettes and has stopped vaping.     Now s/p DBD OLTX 6/5 for ETOH cirrhosis (donor HCVab+/JACOBO-). Had intra-op HD, originally listed as liver-kidney transplant. Kidney function improved and she was delisted for kidney tx. Surgery went without complication.     Allograft function 7/30/24: ALT 14, AST 18, ALKP 77, Tbil 0.2, creat 1.5, prograf level 6.3  IS: prograf, cellcept 500 mg bid  HCV Ab+, JACOBO- donor: HCV RNA-ve 8/1/22; did not acquire the infection    Abdo US 6/8/23: satisfactory doppler    Prophylaxis:   Aspirin: 81mg daily     Key Complications:   Bile Leak - NO  HAT / HAS-NO  Back to OR- NO    Health maintenance  Mammogram    Review of Systems   Constitutional: Negative.    HENT: Negative.     Eyes: Negative.    Respiratory: Negative.     Cardiovascular: Negative.    Gastrointestinal: Negative.    Genitourinary: Negative.    Musculoskeletal: Negative.    Skin: Negative.    Neurological: Negative.    Psychiatric/Behavioral: Negative.         Objective:     There were no vitals filed for this visit.        Lab Results   Component Value Date    BILITOT 0.5 07/30/2024    AST 23 07/30/2024    ALT 16 07/30/2024    ALKPHOS  73 07/30/2024    CREATININE 1.2 07/30/2024    ALBUMIN 4.4 07/30/2024     Lab Results   Component Value Date    WBC 10.28 07/30/2024    HGB 13.4 07/30/2024    HCT 42.9 07/30/2024    HCT 31 (L) 06/06/2022     07/30/2024     Lab Results   Component Value Date    TACROLIMUS 4.7 (L) 07/30/2024       Assessment/Plan:   The patient is now 2 years and 2 months post liver transplant. Current recommendations:  1. Allograft function and control of IS: continue prograf (target 3-5); cellcept 500 mg bid, abdo US per protocol; continue cellcept  2. Renal insufficiency: kidneys have improved; now delisted for kidney tx; monitor; continue to f/u with nephtology since has a diagnosis of IgA nephropathy; will lower prograf   3. HCV Ab+/JACOBO- donor: did not acquire HCV  4. Hx alcohol abuse: periodic peth tests to be drawn  5. Nicotine use disorder: continue off  6. R/O osteoporosis. I am recommending a repeat bone density 2025  7.  Health maintenance: the patient should see a dermatologist annually to screen for skin cancer (scheduled), perform regular colonoscopies (due 2031), pap tests (last done 1/25) and mammograms (9/23)      Return 6 months  A total of 60 minutes was spent reviewing the patient's chart, examining the patient, reviewing labs and imaging and coordinating care with the patient's care team.        Patient advised that it is recommended that all transplant candidates, and their close contacts and household members receive Covid vaccination.    Sharmila Pacheco MD           [facility] Patient Status  Functional Status: 90% - Able to carry on normal activity: minor symptoms of disease  Physical Capacity: No Limitations  Working for income: yes  New diabetes onset between last follow-up to the current follow-up: No  Did patient have any acute rejection episodes during the follow-up period: No  Post transplant malignancy: No

## 2024-08-22 ENCOUNTER — LAB VISIT (OUTPATIENT)
Dept: LAB | Facility: HOSPITAL | Age: 54
End: 2024-08-22
Attending: INTERNAL MEDICINE
Payer: COMMERCIAL

## 2024-08-22 DIAGNOSIS — Z94.4 S/P LIVER TRANSPLANT: ICD-10-CM

## 2024-08-22 DIAGNOSIS — E83.42 HYPOMAGNESEMIA: ICD-10-CM

## 2024-08-22 LAB
ALBUMIN SERPL BCP-MCNC: 4.1 G/DL (ref 3.5–5.2)
ALP SERPL-CCNC: 77 U/L (ref 55–135)
ALT SERPL W/O P-5'-P-CCNC: 14 U/L (ref 10–44)
ANION GAP SERPL CALC-SCNC: 10 MMOL/L (ref 8–16)
AST SERPL-CCNC: 18 U/L (ref 10–40)
BASOPHILS # BLD AUTO: 0.09 K/UL (ref 0–0.2)
BASOPHILS NFR BLD: 0.9 % (ref 0–1.9)
BILIRUB SERPL-MCNC: 0.2 MG/DL (ref 0.1–1)
BUN SERPL-MCNC: 21 MG/DL (ref 6–20)
CALCIUM SERPL-MCNC: 9.7 MG/DL (ref 8.7–10.5)
CHLORIDE SERPL-SCNC: 105 MMOL/L (ref 95–110)
CO2 SERPL-SCNC: 23 MMOL/L (ref 23–29)
CREAT SERPL-MCNC: 1.5 MG/DL (ref 0.5–1.4)
DIFFERENTIAL METHOD BLD: ABNORMAL
EOSINOPHIL # BLD AUTO: 0.6 K/UL (ref 0–0.5)
EOSINOPHIL NFR BLD: 6.4 % (ref 0–8)
ERYTHROCYTE [DISTWIDTH] IN BLOOD BY AUTOMATED COUNT: 12.9 % (ref 11.5–14.5)
EST. GFR  (NO RACE VARIABLE): 41.4 ML/MIN/1.73 M^2
GLUCOSE SERPL-MCNC: 96 MG/DL (ref 70–110)
HCT VFR BLD AUTO: 39.2 % (ref 37–48.5)
HGB BLD-MCNC: 12.5 G/DL (ref 12–16)
IMM GRANULOCYTES # BLD AUTO: 0.02 K/UL (ref 0–0.04)
IMM GRANULOCYTES NFR BLD AUTO: 0.2 % (ref 0–0.5)
LYMPHOCYTES # BLD AUTO: 2.8 K/UL (ref 1–4.8)
LYMPHOCYTES NFR BLD: 29.7 % (ref 18–48)
MAGNESIUM SERPL-MCNC: 1.7 MG/DL (ref 1.6–2.6)
MCH RBC QN AUTO: 29.4 PG (ref 27–31)
MCHC RBC AUTO-ENTMCNC: 31.9 G/DL (ref 32–36)
MCV RBC AUTO: 92 FL (ref 82–98)
MONOCYTES # BLD AUTO: 0.7 K/UL (ref 0.3–1)
MONOCYTES NFR BLD: 7.2 % (ref 4–15)
NEUTROPHILS # BLD AUTO: 5.3 K/UL (ref 1.8–7.7)
NEUTROPHILS NFR BLD: 55.6 % (ref 38–73)
NRBC BLD-RTO: 0 /100 WBC
PLATELET # BLD AUTO: 318 K/UL (ref 150–450)
PMV BLD AUTO: 10.2 FL (ref 9.2–12.9)
POTASSIUM SERPL-SCNC: 4.4 MMOL/L (ref 3.5–5.1)
PROT SERPL-MCNC: 7 G/DL (ref 6–8.4)
RBC # BLD AUTO: 4.25 M/UL (ref 4–5.4)
SODIUM SERPL-SCNC: 138 MMOL/L (ref 136–145)
WBC # BLD AUTO: 9.5 K/UL (ref 3.9–12.7)

## 2024-08-22 PROCEDURE — 80053 COMPREHEN METABOLIC PANEL: CPT | Performed by: INTERNAL MEDICINE

## 2024-08-22 PROCEDURE — 36415 COLL VENOUS BLD VENIPUNCTURE: CPT | Performed by: INTERNAL MEDICINE

## 2024-08-22 PROCEDURE — 80197 ASSAY OF TACROLIMUS: CPT | Performed by: INTERNAL MEDICINE

## 2024-08-22 PROCEDURE — 85025 COMPLETE CBC W/AUTO DIFF WBC: CPT | Performed by: INTERNAL MEDICINE

## 2024-08-22 PROCEDURE — 83735 ASSAY OF MAGNESIUM: CPT | Performed by: INTERNAL MEDICINE

## 2024-08-23 ENCOUNTER — PATIENT MESSAGE (OUTPATIENT)
Dept: ADMINISTRATIVE | Facility: HOSPITAL | Age: 54
End: 2024-08-23
Payer: COMMERCIAL

## 2024-08-23 DIAGNOSIS — Z94.4 S/P LIVER TRANSPLANT: ICD-10-CM

## 2024-08-23 LAB — TACROLIMUS BLD-MCNC: 6.3 NG/ML (ref 5–15)

## 2024-08-23 RX ORDER — TACROLIMUS 1 MG/1
1 CAPSULE ORAL EVERY 12 HOURS
Qty: 180 CAPSULE | Refills: 3 | Status: SHIPPED | OUTPATIENT
Start: 2024-08-23

## 2024-08-23 NOTE — TELEPHONE ENCOUNTER
Sent message to patient with change and to get labs on 9/03/24    ----- Message from Sharmila Pacheco MD sent at 8/23/2024  9:23 AM CDT -----  Decrease prograf to 1/1 from 2/1 and repeat labs in 1 weeks; continue cellcept - please let patient know.

## 2024-09-02 DIAGNOSIS — R73.03 PREDIABETES: ICD-10-CM

## 2024-09-02 DIAGNOSIS — R73.9 HYPERGLYCEMIA: ICD-10-CM

## 2024-09-03 ENCOUNTER — PATIENT MESSAGE (OUTPATIENT)
Dept: ENDOCRINOLOGY | Facility: CLINIC | Age: 54
End: 2024-09-03
Payer: COMMERCIAL

## 2024-09-03 RX ORDER — TIRZEPATIDE 2.5 MG/.5ML
2.5 INJECTION, SOLUTION SUBCUTANEOUS
Qty: 4 PEN | Refills: 3 | Status: SHIPPED | OUTPATIENT
Start: 2024-09-03

## 2024-09-04 ENCOUNTER — PATIENT MESSAGE (OUTPATIENT)
Dept: ADMINISTRATIVE | Facility: HOSPITAL | Age: 54
End: 2024-09-04
Payer: COMMERCIAL

## 2024-09-04 ENCOUNTER — PATIENT OUTREACH (OUTPATIENT)
Dept: ADMINISTRATIVE | Facility: HOSPITAL | Age: 54
End: 2024-09-04
Payer: COMMERCIAL

## 2024-09-04 DIAGNOSIS — Z12.39 ENCOUNTER FOR SCREENING FOR MALIGNANT NEOPLASM OF BREAST, UNSPECIFIED SCREENING MODALITY: Primary | ICD-10-CM

## 2024-09-04 NOTE — PROGRESS NOTES
Chart review done.  updated. Immunizations reviewed & updated. Care Everywhere updated. Mammogram scheduled.

## 2024-09-09 ENCOUNTER — LAB VISIT (OUTPATIENT)
Dept: LAB | Facility: HOSPITAL | Age: 54
End: 2024-09-09
Attending: INTERNAL MEDICINE
Payer: COMMERCIAL

## 2024-09-09 DIAGNOSIS — Z94.4 S/P LIVER TRANSPLANT: ICD-10-CM

## 2024-09-09 DIAGNOSIS — Z94.4 S/P LIVER TRANSPLANT: Primary | ICD-10-CM

## 2024-09-09 DIAGNOSIS — E83.42 HYPOMAGNESEMIA: ICD-10-CM

## 2024-09-09 LAB
ALBUMIN SERPL BCP-MCNC: 4 G/DL (ref 3.5–5.2)
ALP SERPL-CCNC: 67 U/L (ref 55–135)
ALT SERPL W/O P-5'-P-CCNC: 16 U/L (ref 10–44)
ANION GAP SERPL CALC-SCNC: 9 MMOL/L (ref 8–16)
AST SERPL-CCNC: 19 U/L (ref 10–40)
BASOPHILS # BLD AUTO: 0.08 K/UL (ref 0–0.2)
BASOPHILS NFR BLD: 1.1 % (ref 0–1.9)
BILIRUB SERPL-MCNC: 0.3 MG/DL (ref 0.1–1)
BUN SERPL-MCNC: 20 MG/DL (ref 6–20)
CALCIUM SERPL-MCNC: 10 MG/DL (ref 8.7–10.5)
CHLORIDE SERPL-SCNC: 107 MMOL/L (ref 95–110)
CO2 SERPL-SCNC: 24 MMOL/L (ref 23–29)
CREAT SERPL-MCNC: 1.2 MG/DL (ref 0.5–1.4)
DIFFERENTIAL METHOD BLD: ABNORMAL
EOSINOPHIL # BLD AUTO: 0.5 K/UL (ref 0–0.5)
EOSINOPHIL NFR BLD: 6.1 % (ref 0–8)
ERYTHROCYTE [DISTWIDTH] IN BLOOD BY AUTOMATED COUNT: 13 % (ref 11.5–14.5)
EST. GFR  (NO RACE VARIABLE): 54.1 ML/MIN/1.73 M^2
GLUCOSE SERPL-MCNC: 102 MG/DL (ref 70–110)
HCT VFR BLD AUTO: 35.5 % (ref 37–48.5)
HGB BLD-MCNC: 11.4 G/DL (ref 12–16)
IMM GRANULOCYTES # BLD AUTO: 0.01 K/UL (ref 0–0.04)
IMM GRANULOCYTES NFR BLD AUTO: 0.1 % (ref 0–0.5)
LYMPHOCYTES # BLD AUTO: 2.2 K/UL (ref 1–4.8)
LYMPHOCYTES NFR BLD: 29.6 % (ref 18–48)
MAGNESIUM SERPL-MCNC: 1.8 MG/DL (ref 1.6–2.6)
MCH RBC QN AUTO: 29.3 PG (ref 27–31)
MCHC RBC AUTO-ENTMCNC: 32.1 G/DL (ref 32–36)
MCV RBC AUTO: 91 FL (ref 82–98)
MONOCYTES # BLD AUTO: 0.5 K/UL (ref 0.3–1)
MONOCYTES NFR BLD: 6.6 % (ref 4–15)
NEUTROPHILS # BLD AUTO: 4.2 K/UL (ref 1.8–7.7)
NEUTROPHILS NFR BLD: 56.5 % (ref 38–73)
NRBC BLD-RTO: 0 /100 WBC
PLATELET # BLD AUTO: 312 K/UL (ref 150–450)
PMV BLD AUTO: 10 FL (ref 9.2–12.9)
POTASSIUM SERPL-SCNC: 4.6 MMOL/L (ref 3.5–5.1)
PROT SERPL-MCNC: 6.7 G/DL (ref 6–8.4)
RBC # BLD AUTO: 3.89 M/UL (ref 4–5.4)
SODIUM SERPL-SCNC: 140 MMOL/L (ref 136–145)
WBC # BLD AUTO: 7.43 K/UL (ref 3.9–12.7)

## 2024-09-09 PROCEDURE — 36415 COLL VENOUS BLD VENIPUNCTURE: CPT | Performed by: INTERNAL MEDICINE

## 2024-09-09 PROCEDURE — 80197 ASSAY OF TACROLIMUS: CPT | Performed by: INTERNAL MEDICINE

## 2024-09-09 PROCEDURE — 83735 ASSAY OF MAGNESIUM: CPT | Performed by: INTERNAL MEDICINE

## 2024-09-09 PROCEDURE — 80053 COMPREHEN METABOLIC PANEL: CPT | Performed by: INTERNAL MEDICINE

## 2024-09-09 PROCEDURE — 80321 ALCOHOLS BIOMARKERS 1OR 2: CPT | Performed by: INTERNAL MEDICINE

## 2024-09-09 PROCEDURE — 85025 COMPLETE CBC W/AUTO DIFF WBC: CPT | Performed by: INTERNAL MEDICINE

## 2024-09-09 RX ORDER — DEXTROAMPHETAMINE SACCHARATE, AMPHETAMINE ASPARTATE, DEXTROAMPHETAMINE SULFATE AND AMPHETAMINE SULFATE 5; 5; 5; 5 MG/1; MG/1; MG/1; MG/1
TABLET ORAL
Qty: 30 TABLET | Refills: 0 | Status: SHIPPED | OUTPATIENT
Start: 2024-09-09

## 2024-09-09 RX ORDER — DEXTROAMPHETAMINE SACCHARATE, AMPHETAMINE ASPARTATE, DEXTROAMPHETAMINE SULFATE AND AMPHETAMINE SULFATE 7.5; 7.5; 7.5; 7.5 MG/1; MG/1; MG/1; MG/1
30 TABLET ORAL EVERY MORNING
Qty: 30 TABLET | Refills: 0 | Status: SHIPPED | OUTPATIENT
Start: 2024-09-09

## 2024-09-10 LAB — TACROLIMUS BLD-MCNC: 2.5 NG/ML (ref 5–15)

## 2024-09-11 LAB
CLINICAL BIOCHEMIST REVIEW: NORMAL
PLPETH BLD-MCNC: <10 NG/ML
POPETH BLD-MCNC: <10 NG/ML

## 2024-09-12 ENCOUNTER — TELEPHONE (OUTPATIENT)
Dept: TRANSPLANT | Facility: CLINIC | Age: 54
End: 2024-09-12
Payer: COMMERCIAL

## 2024-09-12 NOTE — TELEPHONE ENCOUNTER
Sent message for patient to get labs on Monday    ----- Message from Sharmila Pacheco MD sent at 9/12/2024  1:43 PM CDT -----  Repeat labs Monday if prograf still this low, will increase- please let patient know.

## 2024-09-13 RX ORDER — HYDROXYZINE HYDROCHLORIDE 25 MG/1
TABLET, FILM COATED ORAL
Qty: 60 TABLET | Refills: 1 | Status: SHIPPED | OUTPATIENT
Start: 2024-09-13

## 2024-09-18 ENCOUNTER — LAB VISIT (OUTPATIENT)
Dept: LAB | Facility: HOSPITAL | Age: 54
End: 2024-09-18
Attending: INTERNAL MEDICINE
Payer: COMMERCIAL

## 2024-09-18 DIAGNOSIS — E83.42 HYPOMAGNESEMIA: ICD-10-CM

## 2024-09-18 DIAGNOSIS — Z94.4 S/P LIVER TRANSPLANT: ICD-10-CM

## 2024-09-18 LAB
ALBUMIN SERPL BCP-MCNC: 4 G/DL (ref 3.5–5.2)
ALP SERPL-CCNC: 79 U/L (ref 55–135)
ALT SERPL W/O P-5'-P-CCNC: 14 U/L (ref 10–44)
ANION GAP SERPL CALC-SCNC: 7 MMOL/L (ref 8–16)
AST SERPL-CCNC: 21 U/L (ref 10–40)
BASOPHILS # BLD AUTO: 0.09 K/UL (ref 0–0.2)
BASOPHILS NFR BLD: 1 % (ref 0–1.9)
BILIRUB SERPL-MCNC: 0.2 MG/DL (ref 0.1–1)
BUN SERPL-MCNC: 28 MG/DL (ref 6–20)
CALCIUM SERPL-MCNC: 9.6 MG/DL (ref 8.7–10.5)
CHLORIDE SERPL-SCNC: 105 MMOL/L (ref 95–110)
CO2 SERPL-SCNC: 24 MMOL/L (ref 23–29)
CREAT SERPL-MCNC: 1.4 MG/DL (ref 0.5–1.4)
DIFFERENTIAL METHOD BLD: ABNORMAL
EOSINOPHIL # BLD AUTO: 0.5 K/UL (ref 0–0.5)
EOSINOPHIL NFR BLD: 5.2 % (ref 0–8)
ERYTHROCYTE [DISTWIDTH] IN BLOOD BY AUTOMATED COUNT: 12.8 % (ref 11.5–14.5)
EST. GFR  (NO RACE VARIABLE): 45 ML/MIN/1.73 M^2
GLUCOSE SERPL-MCNC: 82 MG/DL (ref 70–110)
HCT VFR BLD AUTO: 38.7 % (ref 37–48.5)
HGB BLD-MCNC: 12.3 G/DL (ref 12–16)
IMM GRANULOCYTES # BLD AUTO: 0.03 K/UL (ref 0–0.04)
IMM GRANULOCYTES NFR BLD AUTO: 0.3 % (ref 0–0.5)
LYMPHOCYTES # BLD AUTO: 2.5 K/UL (ref 1–4.8)
LYMPHOCYTES NFR BLD: 26.4 % (ref 18–48)
MAGNESIUM SERPL-MCNC: 1.8 MG/DL (ref 1.6–2.6)
MCH RBC QN AUTO: 29.6 PG (ref 27–31)
MCHC RBC AUTO-ENTMCNC: 31.8 G/DL (ref 32–36)
MCV RBC AUTO: 93 FL (ref 82–98)
MONOCYTES # BLD AUTO: 0.7 K/UL (ref 0.3–1)
MONOCYTES NFR BLD: 7.4 % (ref 4–15)
NEUTROPHILS # BLD AUTO: 5.6 K/UL (ref 1.8–7.7)
NEUTROPHILS NFR BLD: 59.7 % (ref 38–73)
NRBC BLD-RTO: 0 /100 WBC
PLATELET # BLD AUTO: 319 K/UL (ref 150–450)
PMV BLD AUTO: 10 FL (ref 9.2–12.9)
POTASSIUM SERPL-SCNC: 4.5 MMOL/L (ref 3.5–5.1)
PROT SERPL-MCNC: 7.1 G/DL (ref 6–8.4)
RBC # BLD AUTO: 4.16 M/UL (ref 4–5.4)
SODIUM SERPL-SCNC: 136 MMOL/L (ref 136–145)
WBC # BLD AUTO: 9.37 K/UL (ref 3.9–12.7)

## 2024-09-18 PROCEDURE — 83735 ASSAY OF MAGNESIUM: CPT | Performed by: INTERNAL MEDICINE

## 2024-09-18 PROCEDURE — 85025 COMPLETE CBC W/AUTO DIFF WBC: CPT | Performed by: INTERNAL MEDICINE

## 2024-09-18 PROCEDURE — 80197 ASSAY OF TACROLIMUS: CPT | Performed by: INTERNAL MEDICINE

## 2024-09-18 PROCEDURE — 36415 COLL VENOUS BLD VENIPUNCTURE: CPT | Performed by: INTERNAL MEDICINE

## 2024-09-18 PROCEDURE — 80053 COMPREHEN METABOLIC PANEL: CPT | Performed by: INTERNAL MEDICINE

## 2024-09-19 ENCOUNTER — TELEPHONE (OUTPATIENT)
Dept: TRANSPLANT | Facility: CLINIC | Age: 54
End: 2024-09-19
Payer: COMMERCIAL

## 2024-09-19 LAB — TACROLIMUS BLD-MCNC: 3.7 NG/ML (ref 5–15)

## 2024-09-19 NOTE — TELEPHONE ENCOUNTER
Sent patient message via portal to let her know labs are stable.  No medication changes, next labs due on 12/09/24      ----- Message from Sharmila Pacheco MD sent at 9/19/2024  9:03 AM CDT -----  No change in prograf- please let patient know.

## 2024-09-24 ENCOUNTER — PATIENT MESSAGE (OUTPATIENT)
Dept: TRANSPLANT | Facility: CLINIC | Age: 54
End: 2024-09-24
Payer: COMMERCIAL

## 2024-09-24 DIAGNOSIS — K59.00 CONSTIPATION, UNSPECIFIED CONSTIPATION TYPE: ICD-10-CM

## 2024-10-01 ENCOUNTER — PATIENT MESSAGE (OUTPATIENT)
Dept: INTERNAL MEDICINE | Facility: CLINIC | Age: 54
End: 2024-10-01
Payer: COMMERCIAL

## 2024-10-08 ENCOUNTER — PATIENT MESSAGE (OUTPATIENT)
Dept: PSYCHIATRY | Facility: CLINIC | Age: 54
End: 2024-10-08
Payer: COMMERCIAL

## 2024-10-08 RX ORDER — DEXTROAMPHETAMINE SACCHARATE, AMPHETAMINE ASPARTATE, DEXTROAMPHETAMINE SULFATE AND AMPHETAMINE SULFATE 7.5; 7.5; 7.5; 7.5 MG/1; MG/1; MG/1; MG/1
30 TABLET ORAL EVERY MORNING
Qty: 30 TABLET | Refills: 0 | Status: SHIPPED | OUTPATIENT
Start: 2024-10-08

## 2024-10-08 RX ORDER — DEXTROAMPHETAMINE SACCHARATE, AMPHETAMINE ASPARTATE, DEXTROAMPHETAMINE SULFATE AND AMPHETAMINE SULFATE 5; 5; 5; 5 MG/1; MG/1; MG/1; MG/1
TABLET ORAL
Qty: 30 TABLET | Refills: 0 | Status: SHIPPED | OUTPATIENT
Start: 2024-10-08

## 2024-10-21 ENCOUNTER — LAB VISIT (OUTPATIENT)
Dept: LAB | Facility: HOSPITAL | Age: 54
End: 2024-10-21
Attending: INTERNAL MEDICINE
Payer: COMMERCIAL

## 2024-10-21 DIAGNOSIS — Z94.4 S/P LIVER TRANSPLANT: ICD-10-CM

## 2024-10-21 LAB
ALBUMIN SERPL BCP-MCNC: 3.8 G/DL (ref 3.5–5.2)
ALP SERPL-CCNC: 72 U/L (ref 40–150)
ALT SERPL W/O P-5'-P-CCNC: 16 U/L (ref 10–44)
ANION GAP SERPL CALC-SCNC: 9 MMOL/L (ref 8–16)
AST SERPL-CCNC: 21 U/L (ref 10–40)
BASOPHILS # BLD AUTO: 0.11 K/UL (ref 0–0.2)
BASOPHILS NFR BLD: 1.4 % (ref 0–1.9)
BILIRUB SERPL-MCNC: 0.2 MG/DL (ref 0.1–1)
BUN SERPL-MCNC: 22 MG/DL (ref 6–20)
CALCIUM SERPL-MCNC: 9.3 MG/DL (ref 8.7–10.5)
CHLORIDE SERPL-SCNC: 111 MMOL/L (ref 95–110)
CO2 SERPL-SCNC: 20 MMOL/L (ref 23–29)
CREAT SERPL-MCNC: 1.3 MG/DL (ref 0.5–1.4)
DIFFERENTIAL METHOD BLD: ABNORMAL
EOSINOPHIL # BLD AUTO: 0.6 K/UL (ref 0–0.5)
EOSINOPHIL NFR BLD: 8.2 % (ref 0–8)
ERYTHROCYTE [DISTWIDTH] IN BLOOD BY AUTOMATED COUNT: 12.6 % (ref 11.5–14.5)
EST. GFR  (NO RACE VARIABLE): 49.2 ML/MIN/1.73 M^2
GLUCOSE SERPL-MCNC: 98 MG/DL (ref 70–110)
HCT VFR BLD AUTO: 36.2 % (ref 37–48.5)
HGB BLD-MCNC: 11.1 G/DL (ref 12–16)
IMM GRANULOCYTES # BLD AUTO: 0.02 K/UL (ref 0–0.04)
IMM GRANULOCYTES NFR BLD AUTO: 0.3 % (ref 0–0.5)
LYMPHOCYTES # BLD AUTO: 2.1 K/UL (ref 1–4.8)
LYMPHOCYTES NFR BLD: 27.9 % (ref 18–48)
MCH RBC QN AUTO: 28.8 PG (ref 27–31)
MCHC RBC AUTO-ENTMCNC: 30.7 G/DL (ref 32–36)
MCV RBC AUTO: 94 FL (ref 82–98)
MONOCYTES # BLD AUTO: 0.6 K/UL (ref 0.3–1)
MONOCYTES NFR BLD: 7.6 % (ref 4–15)
NEUTROPHILS # BLD AUTO: 4.1 K/UL (ref 1.8–7.7)
NEUTROPHILS NFR BLD: 54.6 % (ref 38–73)
NRBC BLD-RTO: 0 /100 WBC
PLATELET # BLD AUTO: 286 K/UL (ref 150–450)
PMV BLD AUTO: 10.7 FL (ref 9.2–12.9)
POTASSIUM SERPL-SCNC: 4.1 MMOL/L (ref 3.5–5.1)
PROT SERPL-MCNC: 6.7 G/DL (ref 6–8.4)
RBC # BLD AUTO: 3.86 M/UL (ref 4–5.4)
SODIUM SERPL-SCNC: 140 MMOL/L (ref 136–145)
WBC # BLD AUTO: 7.59 K/UL (ref 3.9–12.7)

## 2024-10-21 PROCEDURE — 80197 ASSAY OF TACROLIMUS: CPT | Performed by: INTERNAL MEDICINE

## 2024-10-21 PROCEDURE — 36415 COLL VENOUS BLD VENIPUNCTURE: CPT | Performed by: INTERNAL MEDICINE

## 2024-10-21 PROCEDURE — 80053 COMPREHEN METABOLIC PANEL: CPT | Performed by: INTERNAL MEDICINE

## 2024-10-21 PROCEDURE — 85025 COMPLETE CBC W/AUTO DIFF WBC: CPT | Performed by: INTERNAL MEDICINE

## 2024-10-22 LAB — TACROLIMUS BLD-MCNC: 2.7 NG/ML (ref 5–15)

## 2024-10-23 ENCOUNTER — TELEPHONE (OUTPATIENT)
Dept: TRANSPLANT | Facility: CLINIC | Age: 54
End: 2024-10-23
Payer: COMMERCIAL

## 2024-10-23 DIAGNOSIS — Z94.4 S/P LIVER TRANSPLANT: Primary | ICD-10-CM

## 2024-10-23 NOTE — TELEPHONE ENCOUNTER
Sent patient message via portal to let her know labs are stable.  No medication changes, next labs due on 11/18/24      ----- Message from Sharmila Pacheco MD sent at 10/22/2024 10:57 PM CDT -----  The Labs are stable; repeat labs in 4 weeks - please let patient know.

## 2024-10-24 ENCOUNTER — PATIENT MESSAGE (OUTPATIENT)
Dept: INTERNAL MEDICINE | Facility: CLINIC | Age: 54
End: 2024-10-24
Payer: COMMERCIAL

## 2024-10-24 DIAGNOSIS — N64.4 BREAST PAIN: Primary | ICD-10-CM

## 2024-10-26 ENCOUNTER — OFFICE VISIT (OUTPATIENT)
Dept: INTERNAL MEDICINE | Facility: CLINIC | Age: 54
End: 2024-10-26
Payer: COMMERCIAL

## 2024-10-26 VITALS
DIASTOLIC BLOOD PRESSURE: 84 MMHG | HEIGHT: 63 IN | SYSTOLIC BLOOD PRESSURE: 116 MMHG | TEMPERATURE: 98 F | WEIGHT: 123 LBS | HEART RATE: 115 BPM | OXYGEN SATURATION: 99 % | RESPIRATION RATE: 18 BRPM | BODY MASS INDEX: 21.79 KG/M2

## 2024-10-26 DIAGNOSIS — N64.4 BREAST PAIN, RIGHT: Primary | ICD-10-CM

## 2024-10-26 PROCEDURE — 99999 PR PBB SHADOW E&M-EST. PATIENT-LVL V: CPT | Mod: PBBFAC,,, | Performed by: NURSE PRACTITIONER

## 2024-10-26 PROCEDURE — 99213 OFFICE O/P EST LOW 20 MIN: CPT | Mod: S$GLB,,, | Performed by: NURSE PRACTITIONER

## 2024-10-26 NOTE — PROGRESS NOTES
Subjective:       Patient ID: Malu Montes is a 54 y.o. female.    Chief Complaint: Breast Pain (Right breast; tender to touch)    History of Present Illness    CHIEF COMPLAINT:  Ms. Montes presents today for breast implant concerns.    BREAST IMPLANT CONCERNS:  She reports pain and tenderness in her breast, along with changes in breast consistency and nipple position. She expresses concern about a possible implant rupture. She experiences pain when lifting her arm, noting a pulling sensation. Her breasts have a different shape and feel compared to before, with one breast being larger than the other. She also mentions a rippling sensation under the implant. The affected breast feels inflamed, although the pain has decreased somewhat. She denies severe pain, having only taken two doses of Tylenol for discomfort.    MEDICAL HISTORY:  She has a history of liver transplant two years ago, accompanied by rapid weight loss of approximately 50 lbs. She previously had saline breast implants placed over the muscle, with one implant rupturing in the past.       Review of patient's allergies indicates:  No Known Allergies    Medication List with Changes/Refills   Current Medications    ASPIRIN (ECOTRIN) 81 MG EC TABLET    Take 81 mg by mouth once daily.    ATORVASTATIN (LIPITOR) 40 MG TABLET    Take 1 tablet (40 mg total) by mouth once daily.    BIMATOPROST (LATISSE) 0.03 % OPHTHALMIC SOLUTION    Place one drop on applicator and apply evenly along the skin of the upper eyelid at base of eyelashes once daily at bedtime; repeat procedure for second eye (use a clean applicator).    DEXTROAMPHETAMINE-AMPHETAMINE (ADDERALL) 20 MG TABLET    Take 1 tablet by mouth 2 (two) times a day.    DEXTROAMPHETAMINE-AMPHETAMINE (ADDERALL) 20 MG TABLET    Take 20 mg in the early afternoon as needed.    DEXTROAMPHETAMINE-AMPHETAMINE 30 MG TAB    Take 1 tablet (30 mg total) by mouth every morning.    HYDROQUINONE 8 % EMUL    Compound  "hydroquinone 8% / tretinoin 0.025% / kojic acid 1% / niacinamide 4% / fluocinolone 0.025% cream. Apply a pea-sized amount to face qhs. Do not use for longer than 12 weeks in a row then take a 1 month break. Use only to dark spots    HYDROXYZINE HCL (ATARAX) 25 MG TABLET    TAKE 1 TO 2 TABLETS BY MOUTH EVERY NIGHT AT BEDTIME AS NEEDED FOR INSOMNIA    LEVOTHYROXINE (SYNTHROID) 50 MCG TABLET    Take 1 tablet (50 mcg total) by mouth before breakfast.    LINACLOTIDE (LINZESS) 72 MCG CAP CAPSULE    Take 2 capsules (144 mcg total) by mouth before breakfast.    MAGNESIUM OXIDE 400 MG MAGNESIUM TAB    Take 400 mg by mouth once daily.    METFORMIN (GLUCOPHAGE-XR) 500 MG ER 24HR TABLET    Take 1 tablet (500 mg total) by mouth 2 (two) times daily with meals.    MYCOPHENOLATE (CELLCEPT) 250 MG CAP    Take 2 capsules (500 mg total) by mouth 2 (two) times daily.    PANTOPRAZOLE (PROTONIX) 40 MG TABLET    Take 1 tablet (40 mg total) by mouth once daily.    TACROLIMUS (PROGRAF) 1 MG CAP    Take 1 capsule (1 mg total) by mouth every 12 (twelve) hours.    TIRZEPATIDE (MOUNJARO) 2.5 MG/0.5 ML PNIJ    Inject 2.5 mg into the skin every 7 days.    TRAZODONE (DESYREL) 50 MG TABLET    Take  mg at bedtime as needed for sleep    TRETINOIN (RETIN-A) 0.05 % CREAM    Compound tretinoin 0.05% / niacinamide 2% cream. Start 2-3 times weekly then increase up to every night after 2-3 weeks if skin is not too dry. Apply pea sized amount to entire face       Review of Systems   Integumentary:  Positive for breast tenderness.   Breast: Positive for tenderness.     Objective:   /84 (BP Location: Right arm, Patient Position: Sitting)   Pulse (!) 115   Temp 97.7 °F (36.5 °C) (Temporal)   Resp 18   Ht 5' 3" (1.6 m)   Wt 55.8 kg (123 lb 0.3 oz)   SpO2 99%   BMI 21.79 kg/m²     Physical Exam  Vitals reviewed.   Constitutional:       Appearance: Normal appearance.   Pulmonary:      Effort: Pulmonary effort is normal.   Chest:   Breasts:     " Breasts are asymmetrical.      Right: Tenderness present. No swelling, inverted nipple or skin change.      Left: No swelling, inverted nipple, skin change or tenderness.   Skin:     General: Skin is warm and dry.   Neurological:      Mental Status: She is alert and oriented to person, place, and time.         Assessment and Plan:     Assessed breast pain and changes in implant shape/consistency  Considered possibility of implant rupture or trauma-induced inflammation  Ruled out cellulitis due to absence of redness or warmth  Determined breast MRI necessary to evaluate potential implant rupture  Postponed scheduled screening mammogram pending MRI results  Continued Tylenol as needed for breast pain.  Consider ER visit if condition significantly deteriorates before MRI.    1. Breast pain, right (Primary)    - MRI Breast W/WO Contrast, W/CAD Unilateral; Future      This note was generated with the assistance of ambient listening technology. Verbal consent was obtained by the patient and accompanying visitor(s) for the recording of patient appointment to facilitate this note. I attest to having reviewed and edited the generated note for accuracy, though some syntax or spelling errors may persist. Please contact the author of this note for any clarification.

## 2024-10-29 ENCOUNTER — HOSPITAL ENCOUNTER (OUTPATIENT)
Dept: RADIOLOGY | Facility: HOSPITAL | Age: 54
Discharge: HOME | End: 2024-10-29
Attending: NURSE PRACTITIONER
Payer: COMMERCIAL

## 2024-10-29 DIAGNOSIS — N64.4 BREAST PAIN, RIGHT: ICD-10-CM

## 2024-10-29 PROCEDURE — 25500020 PHARM REV CODE 255: Performed by: NURSE PRACTITIONER

## 2024-10-29 PROCEDURE — 77049 MRI BREAST C-+ W/CAD BI: CPT | Mod: TC

## 2024-10-29 PROCEDURE — A9577 INJ MULTIHANCE: HCPCS | Performed by: NURSE PRACTITIONER

## 2024-10-29 PROCEDURE — 77049 MRI BREAST C-+ W/CAD BI: CPT | Mod: 26,,, | Performed by: RADIOLOGY

## 2024-10-29 RX ADMIN — GADOBENATE DIMEGLUMINE 11 ML: 529 INJECTION, SOLUTION INTRAVENOUS at 05:10

## 2024-11-01 ENCOUNTER — PATIENT MESSAGE (OUTPATIENT)
Dept: INTERNAL MEDICINE | Facility: CLINIC | Age: 54
End: 2024-11-01
Payer: COMMERCIAL

## 2024-11-01 DIAGNOSIS — T85.43XA BREAST IMPLANT RUPTURE, INITIAL ENCOUNTER: Primary | ICD-10-CM

## 2024-11-08 ENCOUNTER — PATIENT MESSAGE (OUTPATIENT)
Dept: PLASTIC SURGERY | Facility: CLINIC | Age: 54
End: 2024-11-08
Payer: COMMERCIAL

## 2024-11-11 RX ORDER — HYDROXYZINE HYDROCHLORIDE 25 MG/1
TABLET, FILM COATED ORAL
Qty: 60 TABLET | Refills: 1 | Status: SHIPPED | OUTPATIENT
Start: 2024-11-11

## 2024-11-13 ENCOUNTER — OFFICE VISIT (OUTPATIENT)
Dept: OBSTETRICS AND GYNECOLOGY | Facility: CLINIC | Age: 54
End: 2024-11-13
Payer: COMMERCIAL

## 2024-11-13 VITALS
WEIGHT: 123.13 LBS | SYSTOLIC BLOOD PRESSURE: 128 MMHG | DIASTOLIC BLOOD PRESSURE: 80 MMHG | HEIGHT: 63 IN | BODY MASS INDEX: 21.82 KG/M2

## 2024-11-13 DIAGNOSIS — F90.9 ATTENTION DEFICIT HYPERACTIVITY DISORDER (ADHD), UNSPECIFIED ADHD TYPE: Primary | ICD-10-CM

## 2024-11-13 DIAGNOSIS — Z78.0 MENOPAUSE: ICD-10-CM

## 2024-11-13 DIAGNOSIS — Z01.419 ENCOUNTER FOR WELL WOMAN EXAM WITH ROUTINE GYNECOLOGICAL EXAM: Primary | ICD-10-CM

## 2024-11-13 PROCEDURE — 99396 PREV VISIT EST AGE 40-64: CPT | Mod: S$GLB,,,

## 2024-11-13 PROCEDURE — 99999 PR PBB SHADOW E&M-EST. PATIENT-LVL IV: CPT | Mod: PBBFAC,,,

## 2024-11-13 RX ORDER — DEXTROAMPHETAMINE SACCHARATE, AMPHETAMINE ASPARTATE, DEXTROAMPHETAMINE SULFATE AND AMPHETAMINE SULFATE 7.5; 7.5; 7.5; 7.5 MG/1; MG/1; MG/1; MG/1
30 TABLET ORAL EVERY MORNING
Qty: 30 TABLET | Refills: 0 | Status: SHIPPED | OUTPATIENT
Start: 2024-11-13

## 2024-11-13 RX ORDER — DEXTROAMPHETAMINE SACCHARATE, AMPHETAMINE ASPARTATE, DEXTROAMPHETAMINE SULFATE AND AMPHETAMINE SULFATE 5; 5; 5; 5 MG/1; MG/1; MG/1; MG/1
TABLET ORAL
Qty: 30 TABLET | Refills: 0 | Status: SHIPPED | OUTPATIENT
Start: 2024-11-13

## 2024-11-13 NOTE — PROGRESS NOTES
"Chief Complaint: Well Woman Exam     HPI:      Malu Montes is a 54 y.o.  who presents today for well woman exam.  LMP: No LMP recorded. Patient is perimenopausal.  Thinks she hasn't had a period in over a year.  Having hot flashes.  Would like to check with labs. Has ruptured right breast implant - has appointment to see plastic surgery on Friday to discuss.  Hx of liver transplant 2022, doing well. No other issues, problems, or complaints. Specifically, patient denies abnormal vaginal bleeding, discharge, pelvic pain, urinary problems, or changes in appetite. Ms. Montes is currently sexually active with a single male partner.     Previous Pap: NILM, HPV negative (2022)  Previous Mammogram: BIRADS 1, T-C Score 5.05% (2023), recent MRI showing intracapsular rupture of right breast implant (10/2024)  Most Recent Dexa: Osteopenia (2024)  Most Recent Colonoscopy: WNL (2021), Repeat in     Ms. Montes confirms that she wears her seatbelt when riding in the car and does not text while driving.     OB History          4    Para   4    Term   2            AB        Living   2         SAB        IAB        Ectopic        Multiple        Live Births   2                 ROS:     GENERAL: Denies unintentional weight gain or weight loss. Feeling well overall.   SKIN: Denies rash or lesions.   HEENT: Denies headaches, or vision changes.   CARDIOVASCULAR: Denies palpitations or chest pain.   RESPIRATORY: Denies shortness of breath or dyspnea on exertion.  BREASTS: Denies lumps or nipple discharge.   ABDOMEN: Denies constipation, diarrhea, nausea, vomiting, change in appetite.  URINARY: Denies frequency, dysuria.  NEUROLOGIC: Denies syncope or weakness.   PSYCHIATRIC: Denies uncontrolled depression or anxiety.    Physical Exam:      PHYSICAL EXAM:  /80   Ht 5' 3" (1.6 m)   Wt 55.8 kg (123 lb 2 oz)   BMI 21.81 kg/m²   Body mass index is 21.81 kg/m².     APPEARANCE: Well " nourished, well developed, in no acute distress.  PSYCH: Appropriate mood and affect.  SKIN: No acne or hirsutism  NECK: Neck symmetric without masses  NODES: No inguinal, axillary, or supraclavicular lymph node enlargement  ABDOMEN: Soft.  No tenderness or masses.    CARDIOVASCULAR: No edema of peripheral extremities  BREASTS: Symmetrical, no visible skin lesions. Right implant with rupture without surrounding contracture or tenderness in area, left intact. No nipple discharge bilaterally.  PELVIC: Normal external genitalia without lesions.  Normal hair distribution.  Adequate perineal body, normal urethral meatus.  Vagina moist and smooth. Without lesions. Vagina without discharge.  Cervix pink, without lesions, discharge or tenderness.  No significant cystocele or rectocele.  Bimanual exam shows uterus to be normal size, regular, mobile and nontender.  Adnexa without masses or tenderness.    Gyn note:      A female chaperone was present for the breast and pelvic exam.     Assessment/Plan:     Encounter for well woman exam with routine gynecological exam  -     Counseled patient regarding healthy diet and regular exercise, daily multivitamin, daily seat belt use.   -     BP normotensive  -     She denies abuse and feels safe at home.  -     Pap smear:  UTD (next due 11/2025)  -     MMG:  Has scheduled for December.   -     Colon Cancer Screening: UTD, repeat in 2031    -     DEXA screening:  UTD, repeat in 2026    Menopause  -     FOLLICLE STIMULATING HORMONE; Future; Expected date: 11/13/2024    Follow up in about 1 year for annual exam or sooner PRN.    Counseling:     Patient was counseled today on current ASCCP pap guidelines, the recommendation for yearly physical exams, healthy diet and exercise routines, safe driving habits, and breast self awareness and annual mammograms. She is to see her PCP for other health maintenance.     Use of the [employer] Patient Portal discussed and encouraged during today's visit.    Counseling time: 15 minutes    Jagruti Wall (Maggie), FRANCES  Obstetrics and Gynecology  [facility]

## 2024-11-15 ENCOUNTER — SURGICAL CONSULT (OUTPATIENT)
Dept: PLASTIC SURGERY | Facility: CLINIC | Age: 54
End: 2024-11-15
Payer: COMMERCIAL

## 2024-11-15 VITALS
WEIGHT: 122.38 LBS | SYSTOLIC BLOOD PRESSURE: 123 MMHG | HEIGHT: 63 IN | HEART RATE: 94 BPM | DIASTOLIC BLOOD PRESSURE: 84 MMHG | BODY MASS INDEX: 21.68 KG/M2

## 2024-11-15 DIAGNOSIS — T85.43XA BREAST IMPLANT RUPTURE, INITIAL ENCOUNTER: ICD-10-CM

## 2024-11-15 DIAGNOSIS — T85.43XA BREAST IMPLANT RUPTURE, INITIAL ENCOUNTER: Primary | ICD-10-CM

## 2024-11-15 NOTE — PROGRESS NOTES
Plastic Surgery History & Physical    SUBJECTIVE:   Chief complaint: Breast implant rupture    History of Present Illness:  54 y.o. female who has a history of previous breast augmentation.  She originally underwent prepectoral breast augmentation with silicone implants.  He subsequently ruptured and then she had replacement to silicone implants approximately 10 years ago.  She states that recently she was holding her grandchild who picked her and she had some pain of the breasts and subsequently underwent an MRI that demonstrated right breast intracapsular rupture.  She is bothered by the appearance of her breasts.  She has also concerned about the fact that the implant is ruptured.  Currently, they are not causing her any significant problems.  Of note, she has a history of a liver transplantation for alcohol abuse and is on immunosuppression.    Past Medical History:   Diagnosis Date    ADD (attention deficit disorder)     Anxiety     Ascites of liver     Cirrhosis 2021    CKD (chronic kidney disease) stage 3, GFR 30-59 ml/min     Constipation     ETOH abuse     Hyperlipidemia     Hypothyroidism     Liver transplant candidate 2022    Other ascites 2021    Pancreatitis, acute     Tobacco use     Vitamin D deficiency        Past Surgical History:   Procedure Laterality Date    AUGMENTATION OF BREAST      second set    breast augmentation       SECTION      COLONOSCOPY N/A 2021    Procedure: COLONOSCOPY;  Surgeon: Dao Gray MD;  Location: [facility] ([address] [place] [address]);  Service: Endoscopy;  Laterality: N/A;  COVID test 21 General surgery clinic [facility]    CYSTOSCOPY N/A 09/10/2021    Procedure: CYSTOSCOPY;  Surgeon: Rj Sánchez Jr., MD;  Location: [address];  Service: Urology;  Laterality: N/A;    ESOPHAGOGASTRODUODENOSCOPY N/A 2021    Procedure: ESOPHAGOGASTRODUODENOSCOPY (EGD);  Surgeon: Dao Gray MD;  Location: [facility] ([address] [place] [address]);  Service: Endoscopy;   Laterality: N/A;  labs current on 9/7    ESOPHAGOGASTRODUODENOSCOPY N/A 10/26/2021    Procedure: ESOPHAGOGASTRODUODENOSCOPY (EGD);  Surgeon: Dao Gray MD;  Location: [facility] (2ND FLR);  Service: Endoscopy;  Laterality: N/A;  to be done the week of 10/18/21 per Dr. Gray-  covid-10/23/21-[facility]   10/25 arrival time confirmed with pt-rb    ESOPHAGOGASTRODUODENOSCOPY Left 12/21/2021    Procedure: EGD (ESOPHAGOGASTRODUODENOSCOPY);  Surgeon: Koffi Monteiro MD;  Location: [facility] (4TH FLR);  Service: Endoscopy;  Laterality: Left;  Rapid  pt requested AM appt and first available in December-  labs morning of procedure-  12/14 lvm to confirm appt-rb    HEMORRHOID SURGERY      LIVER TRANSPLANT N/A 06/05/2022    Procedure: TRANSPLANT, LIVER;  Surgeon: Franco Slaughter MD;  Location: [facility] OR 2ND FLR;  Service: Transplant;  Laterality: N/A;    PERITONEOCENTESIS Right 06/08/2021    Procedure: PARACENTESIS, ABDOMINAL;  Surgeon: Jay Torre MD;  Location: [facility] CATH LAB;  Service: Radiology;  Laterality: Right;    PERITONEOCENTESIS Right 06/25/2021    Procedure: PARACENTESIS, ABDOMINAL;  Surgeon: Jay Torre MD;  Location: [facility] CATH LAB;  Service: Radiology;  Laterality: Right;    PERITONEOCENTESIS Right 07/12/2021    Procedure: PARACENTESIS, ABDOMINAL;  Surgeon: Jay Torre MD;  Location: [facility] CATH LAB;  Service: Radiology;  Laterality: Right;    PERITONEOCENTESIS Right 07/23/2021    Procedure: PARACENTESIS, ABDOMINAL;  Surgeon: Edward De La Torre II, MD;  Location: [facility] CATH LAB;  Service: Interventional Radiology;  Laterality: Right;    PERITONEOCENTESIS Right 08/03/2021    Procedure: PARACENTESIS, ABDOMINAL;  Surgeon: Franco Cheung MD;  Location: [facility] CATH LAB;  Service: Radiology;  Laterality: Right;    PERITONEOCENTESIS Right 08/16/2021    Procedure: PARACENTESIS, ABDOMINAL;  Surgeon: Jay Torre MD;  Location: [facility] CATH LAB;  Service: Radiology;  Laterality: Right;     PERITONEOCENTESIS Right 08/23/2021    Procedure: PARACENTESIS, ABDOMINAL;  Surgeon: Jay Torre MD;  Location: [facility] CATH LAB;  Service: Radiology;  Laterality: Right;    PERITONEOCENTESIS Right 09/16/2021    Procedure: PARACENTESIS, ABDOMINAL;  Surgeon: Jay Torre MD;  Location: [facility] CATH LAB;  Service: Radiology;  Laterality: Right;    PERITONEOCENTESIS N/A 09/24/2021    Procedure: PARACENTESIS, ABDOMINAL;  Surgeon: Jay Torre MD;  Location: [facility] CATH LAB;  Service: Radiology;  Laterality: N/A;    PERITONEOCENTESIS Right 12/23/2021    Procedure: PARACENTESIS, ABDOMINAL;  Surgeon: Jay Torre MD;  Location: [facility] CATH LAB;  Service: Radiology;  Laterality: Right;    PERITONEOCENTESIS N/A 12/30/2021    Procedure: PARACENTESIS, ABDOMINAL;  Surgeon: Salas Cabrera MD;  Location: [facility] CATH LAB;  Service: Radiology;  Laterality: N/A;    RETROGRADE PYELOGRAPHY Bilateral 09/10/2021    Procedure: PYELOGRAM, RETROGRADE;  Surgeon: Rj Sánchez Jr., MD;  Location: [address];  Service: Urology;  Laterality: Bilateral;    TONSILLECTOMY         Family History   Problem Relation Name Age of Onset    Cancer Mother          Uterine Cancer     No Known Problems Father      No Known Problems Sister      Sleep apnea Daughter      ADD / ADHD Daughter      Heart disease Maternal Grandmother          CHF    COPD Maternal Grandfather      Heart disease Paternal Grandmother          CHF    Colon cancer Neg Hx      Inflammatory bowel disease Neg Hx      Stomach cancer Neg Hx      Breast cancer Neg Hx      Ovarian cancer Neg Hx      Learning disabilities Neg Hx         Social History     Socioeconomic History    Marital status:     Number of children: 1   Occupational History    Occupation: Assistant   Tobacco Use    Smoking status: Former     Current packs/day: 0.00     Average packs/day: 0.3 packs/day for 27.0 years (6.8 ttl pk-yrs)     Types: Vaping with nicotine, Cigarettes     Quit date:  2024     Years since quittin.0    Smokeless tobacco: Never    Tobacco comments:     Social nicotine vape use, but quit    Substance and Sexual Activity    Alcohol use: Not Currently     Comment: quit caridad 15    Drug use: No    Sexual activity: Yes     Partners: Male   Social History Narrative    They live in [place], [place]      Social Drivers of Health     Financial Resource Strain: Low Risk  (2023)    Overall Financial Resource Strain (CARDIA)     Difficulty of Paying Living Expenses: Not hard at all   Food Insecurity: No Food Insecurity (2023)    Hunger Vital Sign     Worried About Running Out of Food in the Last Year: Never true     Ran Out of Food in the Last Year: Never true   Transportation Needs: No Transportation Needs (2023)    PRAPARE - Transportation     Lack of Transportation (Medical): No     Lack of Transportation (Non-Medical): No   Physical Activity: Insufficiently Active (2023)    Exercise Vital Sign     Days of Exercise per Week: 3 days     Minutes of Exercise per Session: 30 min   Stress: No Stress Concern Present (2023)    [employer] - Occupational Stress Questionnaire     Feeling of Stress : Only a little   Housing Stability: Unknown (2023)    Housing Stability Vital Sign     Unable to Pay for Housing in the Last Year: No     Unstable Housing in the Last Year: No       Current Outpatient Medications   Medication Sig Dispense Refill    aspirin (ECOTRIN) 81 MG EC tablet Take 81 mg by mouth once daily.      atorvastatin (LIPITOR) 40 MG tablet Take 1 tablet (40 mg total) by mouth once daily. 90 tablet 3    bimatoprost (LATISSE) 0.03 % ophthalmic solution Place one drop on applicator and apply evenly along the skin of the upper eyelid at base of eyelashes once daily at bedtime; repeat procedure for second eye (use a clean applicator). 5 mL 12    dextroamphetamine-amphetamine (ADDERALL) 20 mg tablet Take 1 tablet by mouth 2 (two)  times a day. 60 tablet 0    dextroamphetamine-amphetamine (ADDERALL) 20 mg tablet Take 20 mg in the early afternoon as needed. 30 tablet 0    dextroamphetamine-amphetamine 30 mg Tab Take 1 tablet (30 mg total) by mouth every morning. 30 tablet 0    hydroquinone 8 % Emul Compound hydroquinone 8% / tretinoin 0.025% / kojic acid 1% / niacinamide 4% / fluocinolone 0.025% cream. Apply a pea-sized amount to face qhs. Do not use for longer than 12 weeks in a row then take a 1 month break. Use only to dark spots 30 g 1    hydrOXYzine HCL (ATARAX) 25 MG tablet TAKE 1 TO 2 TABLETS BY MOUTH EVERY NIGHT AT BEDTIME AS NEEDED FOR INSOMNIA 60 tablet 1    levothyroxine (SYNTHROID) 50 MCG tablet Take 1 tablet (50 mcg total) by mouth before breakfast. 90 tablet 1    linaCLOtide (LINZESS) 72 mcg Cap capsule Take 2 capsules (144 mcg total) by mouth before breakfast. 180 capsule 2    magnesium oxide 400 mg magnesium Tab Take 400 mg by mouth once daily. 30 tablet 11    metFORMIN (GLUCOPHAGE-XR) 500 MG ER 24hr tablet Take 1 tablet (500 mg total) by mouth 2 (two) times daily with meals. 180 tablet 3    mycophenolate (CELLCEPT) 250 mg Cap Take 2 capsules (500 mg total) by mouth 2 (two) times daily. 360 capsule 3    pantoprazole (PROTONIX) 40 MG tablet Take 1 tablet (40 mg total) by mouth once daily. 90 tablet 3    tacrolimus (PROGRAF) 1 MG Cap Take 1 capsule (1 mg total) by mouth every 12 (twelve) hours. 180 capsule 3    tirzepatide (MOUNJARO) 2.5 mg/0.5 mL PnIj Inject 2.5 mg into the skin every 7 days. 4 Pen 3    tretinoin (RETIN-A) 0.05 % cream Compound tretinoin 0.05% / niacinamide 2% cream. Start 2-3 times weekly then increase up to every night after 2-3 weeks if skin is not too dry. Apply pea sized amount to entire face 30 g 5    traZODone (DESYREL) 50 MG tablet Take  mg at bedtime as needed for sleep (Patient not taking: Reported on 11/15/2024) 60 tablet 1     No current facility-administered medications for this visit.  "      Review of patient's allergies indicates:  No Known Allergies      Review of Systems:  Review of Systems        OBJECTIVE:     /84 (BP Location: Left arm, Patient Position: Sitting)   Pulse 94   Ht 5' 3" (1.6 m)   Wt 55.5 kg (122 lb 5.7 oz)   BMI 21.67 kg/m²       Physical Exam:  Gen: NAD, appears stated age  Neuro: normal without focal findings, mental status and speech normal  HEENT: NCAT, neck supple, PEERL  CV: RRR  Pulm: Breathing non-labored, chest wall movement equal bilaterally   Breast:  Her bilateral breasts demonstrate capsular contracture were so on the left.  She has got ptosis of her breast tissue off the implants.  Abdomen: soft, nontender, no guarding  Gu: genitalia not examined  Extremity:normal strength, no cyanosis or edema  Skin: Skin color, texture, turgor normal. No rashes or lesions  Psych: oriented to time, place and person          ASSESSMENT/PLAN:     No problem-specific Assessment & Plan notes found for this encounter.    Malu was seen today for surgical consult.    Diagnoses and all orders for this visit:    Breast implant rupture, initial encounter  I had a long discussion with her about possible surgical options.  I discussed with her that if the implants are not causing her any problems and then she can leave them alone.  Her MRI demonstrates intracapsular rupture on the right side.  I informed her that this is not an emergency.  She saw another plastic surgeon in consultation who discussed removal of the implants and a second-stage including a lift and placement of new implants.  Given her history of liver transplantation and the fact that she is on immunosuppression I would not recommend doing a breast lift on her.  This is obviously what she would need but she is too high risk.  I discussed with her that the 3 options that I have for her to do nothing, implant removal, or implant exchange.  I discussed with her that exchange of the implants we will not change the " appearance of the breasts and that she has to decide whether or not she is satisfied with that.  Risks, benefits, alternatives, and expected recovery were discussed with her.  We will provide her a quote for an implant exchange and then she can decide whether or not she wants to move forward.  If she wants to move forward we will send her for photography and see her back again preop.  All questions were answered today.    Franco Cabrera MD  Plastic and Reconstructive Surgery    I spent a total of 45 minutes on the day of the visit.This includes face to face time and non-face to face time preparing to see the patient (eg, review of tests), obtaining and/or reviewing separately obtained history, documenting clinical information in the electronic or other health record, independently interpreting results and communicating results to the patient/family/caregiver, or care coordinator.

## 2024-11-20 ENCOUNTER — LAB VISIT (OUTPATIENT)
Dept: LAB | Facility: HOSPITAL | Age: 54
End: 2024-11-20
Attending: INTERNAL MEDICINE
Payer: COMMERCIAL

## 2024-11-20 ENCOUNTER — PATIENT MESSAGE (OUTPATIENT)
Dept: TRANSPLANT | Facility: CLINIC | Age: 54
End: 2024-11-20
Payer: COMMERCIAL

## 2024-11-20 DIAGNOSIS — Z94.4 S/P LIVER TRANSPLANT: ICD-10-CM

## 2024-11-20 LAB
ALBUMIN SERPL BCP-MCNC: 4.3 G/DL (ref 3.5–5.2)
ALP SERPL-CCNC: 74 U/L (ref 40–150)
ALT SERPL W/O P-5'-P-CCNC: 25 U/L (ref 10–44)
ANION GAP SERPL CALC-SCNC: 7 MMOL/L (ref 8–16)
AST SERPL-CCNC: 23 U/L (ref 10–40)
BASOPHILS # BLD AUTO: 0.07 K/UL (ref 0–0.2)
BASOPHILS NFR BLD: 0.8 % (ref 0–1.9)
BILIRUB SERPL-MCNC: 0.2 MG/DL (ref 0.1–1)
BUN SERPL-MCNC: 19 MG/DL (ref 6–20)
CALCIUM SERPL-MCNC: 9.7 MG/DL (ref 8.7–10.5)
CHLORIDE SERPL-SCNC: 104 MMOL/L (ref 95–110)
CO2 SERPL-SCNC: 25 MMOL/L (ref 23–29)
CREAT SERPL-MCNC: 1.2 MG/DL (ref 0.5–1.4)
DIFFERENTIAL METHOD BLD: ABNORMAL
EOSINOPHIL # BLD AUTO: 0.4 K/UL (ref 0–0.5)
EOSINOPHIL NFR BLD: 4.4 % (ref 0–8)
ERYTHROCYTE [DISTWIDTH] IN BLOOD BY AUTOMATED COUNT: 13.2 % (ref 11.5–14.5)
EST. GFR  (NO RACE VARIABLE): 54.1 ML/MIN/1.73 M^2
GLUCOSE SERPL-MCNC: 98 MG/DL (ref 70–110)
HCT VFR BLD AUTO: 39 % (ref 37–48.5)
HGB BLD-MCNC: 12 G/DL (ref 12–16)
IMM GRANULOCYTES # BLD AUTO: 0.03 K/UL (ref 0–0.04)
IMM GRANULOCYTES NFR BLD AUTO: 0.4 % (ref 0–0.5)
LYMPHOCYTES # BLD AUTO: 2.8 K/UL (ref 1–4.8)
LYMPHOCYTES NFR BLD: 33.8 % (ref 18–48)
MCH RBC QN AUTO: 28.6 PG (ref 27–31)
MCHC RBC AUTO-ENTMCNC: 30.8 G/DL (ref 32–36)
MCV RBC AUTO: 93 FL (ref 82–98)
MONOCYTES # BLD AUTO: 0.6 K/UL (ref 0.3–1)
MONOCYTES NFR BLD: 6.8 % (ref 4–15)
NEUTROPHILS # BLD AUTO: 4.5 K/UL (ref 1.8–7.7)
NEUTROPHILS NFR BLD: 53.8 % (ref 38–73)
NRBC BLD-RTO: 0 /100 WBC
PLATELET # BLD AUTO: 393 K/UL (ref 150–450)
PMV BLD AUTO: 10.1 FL (ref 9.2–12.9)
POTASSIUM SERPL-SCNC: 5.4 MMOL/L (ref 3.5–5.1)
PROT SERPL-MCNC: 7.2 G/DL (ref 6–8.4)
RBC # BLD AUTO: 4.2 M/UL (ref 4–5.4)
SODIUM SERPL-SCNC: 136 MMOL/L (ref 136–145)
WBC # BLD AUTO: 8.34 K/UL (ref 3.9–12.7)

## 2024-11-20 PROCEDURE — 85025 COMPLETE CBC W/AUTO DIFF WBC: CPT | Performed by: INTERNAL MEDICINE

## 2024-11-20 PROCEDURE — 80053 COMPREHEN METABOLIC PANEL: CPT | Performed by: INTERNAL MEDICINE

## 2024-11-20 PROCEDURE — 36415 COLL VENOUS BLD VENIPUNCTURE: CPT | Performed by: INTERNAL MEDICINE

## 2024-11-20 PROCEDURE — 80197 ASSAY OF TACROLIMUS: CPT | Performed by: INTERNAL MEDICINE

## 2024-11-20 PROCEDURE — 80321 ALCOHOLS BIOMARKERS 1OR 2: CPT | Performed by: INTERNAL MEDICINE

## 2024-11-21 ENCOUNTER — PATIENT MESSAGE (OUTPATIENT)
Dept: PLASTIC SURGERY | Facility: CLINIC | Age: 54
End: 2024-11-21
Payer: COMMERCIAL

## 2024-11-21 DIAGNOSIS — Z94.4 S/P LIVER TRANSPLANT: ICD-10-CM

## 2024-11-21 LAB — TACROLIMUS BLD-MCNC: 7.7 NG/ML (ref 5–15)

## 2024-11-21 RX ORDER — TACROLIMUS 1 MG/1
1 CAPSULE ORAL EVERY MORNING
Qty: 90 CAPSULE | Refills: 3 | Status: SHIPPED | OUTPATIENT
Start: 2024-11-21

## 2024-11-21 RX ORDER — TACROLIMUS 0.5 MG/1
0.5 CAPSULE ORAL NIGHTLY
Qty: 90 CAPSULE | Refills: 3 | Status: SHIPPED | OUTPATIENT
Start: 2024-11-21 | End: 2025-11-21

## 2024-11-21 NOTE — TELEPHONE ENCOUNTER
----- Message from Sharmila Pacheco MD sent at 11/21/2024 10:18 AM CST -----  The Labs are stable except prograf above target- lower to 1/0.5 from 1/1 and repeat labs in 2 weeks- please let patient know.

## 2024-11-22 LAB
CLINICAL BIOCHEMIST REVIEW: NORMAL
PLPETH BLD-MCNC: <10 NG/ML
POPETH BLD-MCNC: <10 NG/ML

## 2024-11-25 DIAGNOSIS — Z94.4 S/P LIVER TRANSPLANT: ICD-10-CM

## 2024-11-25 RX ORDER — TACROLIMUS 1 MG/1
1 CAPSULE ORAL EVERY MORNING
Qty: 90 CAPSULE | Refills: 3 | Status: SHIPPED | OUTPATIENT
Start: 2024-11-25

## 2024-11-25 RX ORDER — TACROLIMUS 0.5 MG/1
0.5 CAPSULE ORAL NIGHTLY
Qty: 90 CAPSULE | Refills: 3 | Status: SHIPPED | OUTPATIENT
Start: 2024-11-25 | End: 2025-11-25

## 2024-11-26 ENCOUNTER — PATIENT MESSAGE (OUTPATIENT)
Dept: TRANSPLANT | Facility: CLINIC | Age: 54
End: 2024-11-26
Payer: COMMERCIAL

## 2024-11-26 NOTE — PROGRESS NOTES
Subjective:      Patient ID: Malu Montes is a 54 y.o. female.    Chief Complaint:  No chief complaint on file.    History of Present Illness  Malu Montes with HLD, hypothyroidism, and ESLD who presents today for follow up of new diagnosis of osteopenia. Previous patient of mine. Last seen in Dec 2023.      The patient location is: at home   The chief complaint leading to consultation is: osteopenia    Visit type: audiovisual    Face to Face time with patient: 25 minutes  30 minutes of total time spent on the encounter, which includes face to face time and non-face to face time preparing to see the patient (eg, review of tests), Obtaining and/or reviewing separately obtained history, Documenting clinical information in the electronic or other health record, Independently interpreting results (not separately reported) and communicating results to the patient/family/caregiver, or Care coordination (not separately reported).    Each patient to whom he or she provides medical services by telemedicine is:  (1) informed of the relationship between the physician and patient and the respective role of any other health care provider with respect to management of the patient; and (2) notified that he or she may decline to receive medical services by telemedicine and may withdraw from such care at any time.    We first met patient during June 2022 admission for HLD, hypothyroidism, and ESLD. No personal history of DM. Patient admitted for liver transplant. Endocrinology consulted for BG management.    Denies changes in medical history since her last visit  Does report chronic constipation.     With regards to hyperglycemia,     She is no longer on prednisone (was on briefly earlier in 2023). Reviewed labs and noted BG of 172 with repeat a month later 112.     Denies FH of diabetes  Plans on starting to exercise.     She is on metformin 500 mg twice daily  She is on Mounjaro 2.5 mg weekly    She is trying to  follow low carb diet.   Does report she has a sweet tooth.     Lab Results   Component Value Date    HGBA1C 5.6 03/19/2024     With regards to osteopenia,     Family history: none    Last menses in Aug 2023- has been having more hot flashes     current medication: none    She was taken off of calcium around in 2023  Calcium intake- has calcium in diet  Vit D intake- OTC 50 mcg daily     Latest Reference Range & Units 02/20/23 07:09   Vitamin D 30 - 96 ng/mL 41     Weight bearing exercise- plans on starting  She is active  Recent falls- none  Dental work- does not need    No recent fractures   No significant height loss (>2 inches)    tob use - rarely; past use quit around 2022  etoh use-  denies    No current diarrhea or h/o malabsorption    Denies chronic exposure to steroid therapy, anticoagulants, proton pump inhibitors or antiseizure medications.     Denies GERD, indigestion, or gastric bypass surgery.     + history of thyroid disease,   Denies anemia  + kidney stones   + kidney disease.     Denies active malignancy, history of malignancy the involved the bone, prior radiation treatment, or unexplained elevations of alk phos on labs     Last BMD dated 8/8/2024  COMPARISON:  Comparison study done on 08/04/2023. Lumbar spine BMD 0.927 g/cm2 and the T-score -1.1.  The Total Hip BMD 0.791 g/cm2 and the T-score -1.2.     FINDINGS:  Lumbar spine (L1-L4):               T-score is -1.7, and Z-score is -0.8.     Compared with previous DXA, BMD at the lumbar spine has declined by -7.5%.     Femoral neck:                          T-score is -1.7, and Z-score is -0.8.     Total hip:                                T-score is -1.4, and Z-score is -0.8.     Compared with previous DXA, BMD at the total hip has remained stable.     Distal 1/3 radius:                      Not applicable        Trabecular Bone Score     The trabecular bone score (TBS) indicates normal bone quality.     Fracture Risk (FRAX adjusted for Trabecular  Bone Score)     4.7% risk of a major osteoporotic fracture in the next 10 years.     0.3% risk of hip fracture in the next 10 years.     Impression:     *Low bone mass (Osteopenia); FRAX calculations do not support treatment as osteoporosis.     RECOMMENDATIONS:  *Daily calcium intake 2257-4974 mg, dietary sources preferred; Vitamin D 0536-8661 IU daily.  *Weight bearing exercise and fall precautions.  *Repeat BMD in 2 years.     Latest Reference Range & Units 02/20/23 07:09   Vit D, 25-Hydroxy 30 - 96 ng/mL 41     With regards to hypothyroidism,     Managed by PCP   On LT4 50 mcg daily     Denies missing doses.  Not taking correctly but plans on starting.    Lab Results   Component Value Date    TSH 2.722 01/24/2024    FREET4 0.94 07/15/2021     Review of Systems   Constitutional:  Positive for fatigue. Negative for diaphoresis and unexpected weight change.   Eyes:  Negative for visual disturbance.   Gastrointestinal:  Positive for constipation.   Endocrine: Negative for polydipsia, polyphagia and polyuria.   Musculoskeletal:  Negative for arthralgias, back pain and gait problem.     Lab Review:   Lab Results   Component Value Date    HGBA1C 5.6 03/19/2024    HGBA1C 6.4 (H) 12/19/2023    HGBA1C 5.4 06/05/2022      Lab Results   Component Value Date    CHOL 146 02/06/2024    HDL 36 (L) 02/06/2024    LDLCALC 78.8 02/06/2024    TRIG 156 (H) 02/06/2024    CHOLHDL 24.7 02/06/2024     Lab Results   Component Value Date     11/20/2024    K 5.4 (H) 11/20/2024     11/20/2024    CO2 25 11/20/2024    GLU 98 11/20/2024    BUN 19 11/20/2024    CREATININE 1.2 11/20/2024    CALCIUM 9.7 11/20/2024    PROT 7.2 11/20/2024    ALBUMIN 4.3 11/20/2024    BILITOT 0.2 11/20/2024    ALKPHOS 74 11/20/2024    AST 23 11/20/2024    ALT 25 11/20/2024    ANIONGAP 7 (L) 11/20/2024    ESTGFRAFRICA >60.0 07/28/2022    EGFRNONAA 58.3 (A) 07/28/2022    TSH 2.722 01/24/2024     Vit D, 25-Hydroxy   Date Value Ref Range Status   02/20/2023  41 30 - 96 ng/mL Final     Comment:     Vitamin D deficiency.........<10 ng/mL                              Vitamin D insufficiency......10-29 ng/mL       Vitamin D sufficiency........> or equal to 30 ng/mL  Vitamin D toxicity............>100 ng/mL       Assessment and Plan     1. Hypothyroidism, unspecified type  TSH    levothyroxine (SYNTHROID) 50 MCG tablet      2. Osteopenia of left hip  Alkaline phosphatase, bone specific    PTH, Intact      3. Secondary hyperparathyroidism of renal origin        4. Steroid-induced hyperglycemia  Hemoglobin A1C      5. Vitamin D deficiency  Vitamin D      6. Hyperlipidemia, unspecified hyperlipidemia type  Lipid Panel      7. Hyperglycemia  tirzepatide (MOUNJARO) 2.5 mg/0.5 mL PnIj      8. Prediabetes  tirzepatide (MOUNJARO) 2.5 mg/0.5 mL PnIj        Hypothyroidism  Managed by PCP   On LT4 50 mcg daily   Last TSH at goal   Check TSH    Osteopenia of left hip  No indication for treatment for osteoporosis at this time  Encouraged weight bearing exercise  Avoid alcohol and tobacco     Continue vitamin D 2000 IU daily   Continue calcium in diet. RDA of calcium is 0847-8354 mg daily, preferably from food     Declined at spine. Discussed options. Will check PTH and bone specific alk phos to determine adynamic bone disease with kidney function decline.  Has history of esophageal varices and would avoid oral medications-fosamax. Can consider reclast. She would like to work on lifestyle changes with exercise and repeat bone density in one year.     Secondary hyperparathyroidism of renal origin  Managed by nephrology       Steroid-induced hyperglycemia  She is no longer on prednisone (was on briefly on in Nov 2023).   Check A1c  Continue Metformin 500 mg twice daily  Continue Mounjaro 2.5 mg weekly  Will not increase due to constipation    Vitamin D deficiency  Continue vitamin D  Check Vitamin D    Visit today included increased complexity associated with the care of episodic problems  osteopenia, hypothyroidism, vitamin D deficiency, hyperglycemia addressed and managing longitudinal care of the patient due to serious managed problems  osteopenia, hypothyroidism, vitamin D deficiency, hyperglycemia    Follow up in about 1 year (around 12/2/2025).

## 2024-12-02 ENCOUNTER — OFFICE VISIT (OUTPATIENT)
Dept: ENDOCRINOLOGY | Facility: CLINIC | Age: 54
End: 2024-12-02
Payer: COMMERCIAL

## 2024-12-02 DIAGNOSIS — R73.9 HYPERGLYCEMIA: ICD-10-CM

## 2024-12-02 DIAGNOSIS — R73.9 STEROID-INDUCED HYPERGLYCEMIA: Chronic | ICD-10-CM

## 2024-12-02 DIAGNOSIS — N25.81 SECONDARY HYPERPARATHYROIDISM OF RENAL ORIGIN: ICD-10-CM

## 2024-12-02 DIAGNOSIS — T38.0X5A STEROID-INDUCED HYPERGLYCEMIA: Chronic | ICD-10-CM

## 2024-12-02 DIAGNOSIS — R73.03 PREDIABETES: ICD-10-CM

## 2024-12-02 DIAGNOSIS — M85.852 OSTEOPENIA OF LEFT HIP: ICD-10-CM

## 2024-12-02 DIAGNOSIS — E55.9 VITAMIN D DEFICIENCY: ICD-10-CM

## 2024-12-02 DIAGNOSIS — E03.9 HYPOTHYROIDISM, UNSPECIFIED TYPE: Primary | ICD-10-CM

## 2024-12-02 DIAGNOSIS — E78.5 HYPERLIPIDEMIA, UNSPECIFIED HYPERLIPIDEMIA TYPE: ICD-10-CM

## 2024-12-02 PROCEDURE — 99214 OFFICE O/P EST MOD 30 MIN: CPT | Mod: 95,,, | Performed by: NURSE PRACTITIONER

## 2024-12-02 PROCEDURE — G2211 COMPLEX E/M VISIT ADD ON: HCPCS | Mod: 95,,, | Performed by: NURSE PRACTITIONER

## 2024-12-02 RX ORDER — TIRZEPATIDE 2.5 MG/.5ML
2.5 INJECTION, SOLUTION SUBCUTANEOUS
Qty: 4 PEN | Refills: 3 | Status: SHIPPED | OUTPATIENT
Start: 2024-12-02

## 2024-12-02 RX ORDER — LEVOTHYROXINE SODIUM 50 UG/1
50 TABLET ORAL
Qty: 90 TABLET | Refills: 1 | Status: SHIPPED | OUTPATIENT
Start: 2024-12-02

## 2024-12-02 NOTE — PATIENT INSTRUCTIONS
"Check A1c   Check lipid panel   Check TSH    Osteopenia  Declined at spine. Discussed options. Will check PTH and bone specific alk phos to determine adynamic bone disease with kidney function decline.  Has history of esophageal varices and would avoid oral medications-fosamax. Can consider reclast. She would like to work on lifestyle changes with exercise and repeat bone density in one year.     Osteoporosis medications  These are the medications we discussed for osteoporosis treatment:    - Bisphosphonates:         - these slow down bone loss         - oral forms (Fosamax and Actonel are examples) - taken once weekly or once monthly         - IV form (Reclast) - given intravenously once yearly    - Prolia (denosumab):          - slows down bone loss           - it is a subcutaneous injection (under the skin) every 6 months, given in the office    - Forteo (teriparatide) or Tymlos (abaloparatide):           - medications that "build bone" or increases bone formation           - subcutaneous injection (under the skin) taken once daily that you self-administer at home for 18-24 months    -Evenity (romosozumab)   -medications that "build bone" or increases bone formation   - subcutaneous injection (under the skin) taken once monthly for one year    For more information on medications: https://www.nof.org/patients/treatment/medicationadherence/      Estimated Calcium Content of Foods:  Produce  Serving Size Estimated Calcium*    Shay greens, frozen 8 oz 360 mg   Broccoli christina 8 oz 200 mg   Kale, frozen 8 oz 180 mg   Soy Beans, green, boiled 8 oz 175 mg   Bok Jeb, cooked, boiled 8 oz 160 mg   Figs, dried 2 figs 65 mg   Broccoli, fresh, cooked 8 oz 60 mg   Oranges 1 whole 55 mg   Seafood Serving Size Estimated Calcium*    Sardines, canned with bones 3 oz 325 mg   [address], canned with bones 3 oz 180 mg   Shrimp, canned 3 oz 125 mg   Dairy Serving Size Estimated Calcium*    Ricotta, part-skim 4 oz 335 mg   Yogurt, " plain, low-fat 6 oz 310 mg   Milk, skim, low-fat, whole 8 oz 300 mg   Yogurt with fruit, low-fat 6 oz 260 mg   Mozzarella, part-skim 1 oz 210 mg   Cheddar 1 oz 205 mg   Yogurt, Greek 6 oz 200 mg   American Cheese 1 oz 195 mg   Feta Cheese 4 oz 140 mg   Cottage Cheese, 2% 4 oz 105 mg   Frozen yogurt, vanilla 8 oz 105 mg   Ice Cream, vanilla 8 oz 85 mg   Parmesan 1 tbsp 55 mg   Fortified Food Serving Size Estimated Calcium*   [place] milk, rice milk or soy milk, fortified 8 oz 300 mg   Orange juice and other fruit juices, fortified 8 oz 300 mg   Tofu, prepared with calcium 4 oz 205 mg   Waffle, frozen, fortified 2 pieces 200 mg   Oatmeal, fortified 1 packet 140 mg   English muffin, fortified 1 muffin 100 mg   Cereal, fortified 8 oz 100-1,000 mg   Other Serving Size Estimated Calcium*   Mac & cheese, frozen 1 package 325 mg   Pizza, cheese, frozen 1 serving 115 mg   Pudding, chocolate, prepared with 2% milk 4 oz 160 mg   Beans, baked, canned 4 oz 160 mg   *The calcium content listed for most foods is estimated and can vary due to multiple factors. Check the food label to determine how much calcium is in a particular product.  If you read the nutrition label for a food source, it lists the % calcium in that food.  For an 8 oz glass of milk, for example, the label states calcium 30%.  This is equivalent to 300 mg of calcium (multiply the listed number by 10).   **Table from the National Osteoporosis Foundation

## 2024-12-02 NOTE — ASSESSMENT & PLAN NOTE
No indication for treatment for osteoporosis at this time  Encouraged weight bearing exercise  Avoid alcohol and tobacco     Continue vitamin D 2000 IU daily   Continue calcium in diet. RDA of calcium is 1546-3052 mg daily, preferably from food     Declined at spine. Discussed options. Will check PTH and bone specific alk phos to determine adynamic bone disease with kidney function decline.  Has history of esophageal varices and would avoid oral medications-fosamax. Can consider reclast. She would like to work on lifestyle changes with exercise and repeat bone density in one year.

## 2024-12-02 NOTE — ASSESSMENT & PLAN NOTE
She is no longer on prednisone (was on briefly on in Nov 2023).   Check A1c  Continue Metformin 500 mg twice daily  Continue Mounjaro 2.5 mg weekly  Will not increase due to constipation

## 2024-12-04 ENCOUNTER — TELEPHONE (OUTPATIENT)
Dept: OPTOMETRY | Facility: CLINIC | Age: 54
End: 2024-12-04
Payer: COMMERCIAL

## 2024-12-04 ENCOUNTER — PATIENT MESSAGE (OUTPATIENT)
Dept: TRANSPLANT | Facility: CLINIC | Age: 54
End: 2024-12-04
Payer: COMMERCIAL

## 2024-12-04 ENCOUNTER — OFFICE VISIT (OUTPATIENT)
Dept: DERMATOLOGY | Facility: CLINIC | Age: 54
End: 2024-12-04
Payer: COMMERCIAL

## 2024-12-04 DIAGNOSIS — Z94.4 HISTORY OF LIVER TRANSPLANT: ICD-10-CM

## 2024-12-04 DIAGNOSIS — L81.4 LENTIGO: ICD-10-CM

## 2024-12-04 DIAGNOSIS — L28.1 PRURIGO NODULARIS: ICD-10-CM

## 2024-12-04 DIAGNOSIS — D18.01 CHERRY ANGIOMA: ICD-10-CM

## 2024-12-04 DIAGNOSIS — D48.5 NEOPLASM OF UNCERTAIN BEHAVIOR OF SKIN OF EYELID: Primary | ICD-10-CM

## 2024-12-04 DIAGNOSIS — L82.1 SEBORRHEIC KERATOSES: ICD-10-CM

## 2024-12-04 DIAGNOSIS — D22.9 MULTIPLE BENIGN NEVI: ICD-10-CM

## 2024-12-04 DIAGNOSIS — Z12.83 SCREENING EXAM FOR SKIN CANCER: ICD-10-CM

## 2024-12-04 DIAGNOSIS — L81.1 MELASMA: ICD-10-CM

## 2024-12-04 PROCEDURE — 99999 PR PBB SHADOW E&M-EST. PATIENT-LVL III: CPT | Mod: PBBFAC,,, | Performed by: STUDENT IN AN ORGANIZED HEALTH CARE EDUCATION/TRAINING PROGRAM

## 2024-12-04 PROCEDURE — 99214 OFFICE O/P EST MOD 30 MIN: CPT | Mod: S$GLB,,, | Performed by: STUDENT IN AN ORGANIZED HEALTH CARE EDUCATION/TRAINING PROGRAM

## 2024-12-04 RX ORDER — TRETINOIN 0.5 MG/G
CREAM TOPICAL
Qty: 30 G | Refills: 5 | Status: SHIPPED | OUTPATIENT
Start: 2024-12-04

## 2024-12-04 NOTE — PROGRESS NOTES
Subjective:      Patient ID:  Malu Montes is a 54 y.o. female who presents for   Chief Complaint   Patient presents with    Skin Check     Pt here for TBSE    Pt denies personal h/o skin cancer     June 2022 Liver transplant- currently on prograf     Pt concerned about bump on top of scalp        Review of Systems    Objective:   Physical Exam   Constitutional: She appears well-developed and well-nourished. No distress.   Neurological: She is alert and oriented to person, place, and time. She is not disoriented.   Psychiatric: She has a normal mood and affect.   Skin:   Areas Examined (abnormalities noted in diagram):   Scalp / Hair Palpated and Inspected  Head / Face Inspection Performed  Neck Inspection Performed  Chest / Axilla Inspection Performed  Abdomen Inspection Performed  Back Inspection Performed  RUE Inspected  LUE Inspection Performed  RLE Inspected  LLE Inspection Performed  Nails and Digits Inspection Performed                     Diagram Legend     Erythematous scaling macule/papule c/w actinic keratosis       Vascular papule c/w angioma      Pigmented verrucoid papule/plaque c/w seborrheic keratosis      Yellow umbilicated papule c/w sebaceous hyperplasia      Irregularly shaped tan macule c/w lentigo     1-2 mm smooth white papules consistent with Milia      Movable subcutaneous cyst with punctum c/w epidermal inclusion cyst      Subcutaneous movable cyst c/w pilar cyst      Firm pink to brown papule c/w dermatofibroma      Pedunculated fleshy papule(s) c/w skin tag(s)      Evenly pigmented macule c/w junctional nevus     Mildly variegated pigmented, slightly irregular-bordered macule c/w mildly atypical nevus      Flesh colored to evenly pigmented papule c/w intradermal nevus       Pink pearly papule/plaque c/w basal cell carcinoma      Erythematous hyperkeratotic cursted plaque c/w SCC      Surgical scar with no sign of skin cancer recurrence      Open and closed comedones       Inflammatory papules and pustules      Verrucoid papule consistent consistent with wart     Erythematous eczematous patches and plaques     Dystrophic onycholytic nail with subungual debris c/w onychomycosis     Umbilicated papule    Erythematous-base heme-crusted tan verrucoid plaque consistent with inflamed seborrheic keratosis     Erythematous Silvery Scaling Plaque c/w Psoriasis     See annotation      Assessment / Plan:        Neoplasm of uncertain behavior of skin of eyelid  - 2mm pearly papule on left lower lid margin. Favor hidrocystoma but cannot r/o BCC from exam alone. Will refer to oculoplastics for eval    Prurigo nodularis  Multiple benign nevi  Lentigo  Seborrheic keratoses  Cherry angioma  Reassurance given to patient. No treatment is necessary.   Treatment of benign, asymptomatic lesions may be considered cosmetic.    Screening exam for skin cancer  History of Liver transplant  Total body skin examination performed today including at least 12 points as noted in physical examination. No lesions suspicious for malignancy noted.    Recommend daily sun protection/avoidance, use of at least SPF 30, broad spectrum sunscreen (OTC drug), skin self examinations, and routine physician surveillance to optimize early detection    Melasma  -     tretinoin (RETIN-A) 0.05 % cream; Compound tretinoin 0.05% / niacinamide 2% cream. Start 2-3 times weekly then increase up to every night after 2-3 weeks if skin is not too dry. Apply pea sized amount to entire face  Dispense: 30 g; Refill: 5  -     hydroquinone 8 % Emul; Compound hydroquinone 8% / tretinoin 0.025% / kojic acid 1% / niacinamide 4% / fluocinolone 0.025% cream. Apply a pea-sized amount to face qhs. Do not use for longer than 12 weeks in a row then take a 1 month break. Use only to dark spots  Dispense: 30 g; Refill: 1    - Benign nature of diagnosis reviewed  - Reviewed importance of photoprotection with UVA/UVB photoprotection and physical blockers as  well as a large-brimmed hat, educated regarding broad spectrum tinted sunscreen- UVA/UVB, zinc oxide + titanium dioxide   - Start combination topical therapy with above, r/b/se of topical therapies including paradoxical ochronosis due to hydroquinone reviewed  -moisture as needed   - Anticipatory guidance provided         Follow up in about 1 year (around 12/4/2025) for TBSE.

## 2024-12-04 NOTE — PATIENT INSTRUCTIONS
MELASMA    Melasma is a skin condition that causes patches and spots, usually on the face, which are darker than your natural skin tone. Many women first see these blotchy patches and freckle-like spots on their face during pregnancy or when they start taking birth control pills. Melasma is so common during pregnancy that it's sometimes called the mask of pregnancy. For some women, the melasma goes away after their baby is born or they stop taking birth control pills.    Treatments such as creams can help fade the discoloration, but treatments cannot make melasma go away forever. This skin condition can come back. It's common for melasma to return when you spend time outdoors without protecting your skin from the sun. In fact, many people who have melasma say the dark spots and patches become more noticeable during the summer and fade in winter. For this reason, it is important to use sunscreen every day and wear a wide-brimmed hat to keep the spots from getting darker or returning.    Sunscreen  The most important part of melasma treatment is sun protection. Daily sunscreen use is key, even if only getting sunlight through windows or being outdoors for minimal amounts of time. Wide-brimmed hats should be used in addition to sunscreen during prolonged sun exposure. Sunscreens should contain mineral blockers (zinc oxide and titanium dioxide) and be tinted to block visible light. Suggested brands include:  CeraVe tinted mineral sunscreen, SPF 30  EltaMD UV Clear Tinted, SPF 46  Tizo Ultra Zinc Tinted  BareMinerals Complexion Rescue (20 shades)--No Zinc oxide     Bleaching / Brightening Creams  Many topical ingredients have shown to have some benefit in lightening melasma if used consistently  Hydroquinone (apply to dark spots only)  2% is available over the counter 4% or higher is prescription only  Should be used 1-2 times daily to dark spots only  If used for longer than 2-3 months continuously, can cause  permanent dark spots called ochronosis  Use for 2-3 months, then take a 1 month break  Non-Hydroquinone (full face application)  Retinoid (Prescription: tretinoin, tazarotene; Over the counter: adapalene = differin)  Use nightly  Can cause dryness so start 2-3 times weekly for several weeks then increase frequency as tolerated and moisturize regularly  Antioxidants  Use morning +/- night  Affordable option:   The Ordinary, ethylated ascorbic acid 15% solution (deciem.com); can also use with The Ordinary, resveratrol 3% + Ferulic acid 3% (deciem.com)  Expensive option:  SkinCeuticals C E Ferulic (skinceuticals.com)  AHA/BHA  Replenix Gly-Sal 10-2 clarifying pads (Amazon), daily use  QRx Labs Glycolic/Salicylic acid 10/2 Acne Control Pads (Amazon), daily use  The Ordinary 30% AHA + 2% BHA Peeling solution--at-home mild chemical peel, 1-2 times weekly  Azelaic Acid  Can use twice daily  Available as prescription (15-20% concentration) or over the counter (10% concentration)  The Ordinary Azelaic Acid 10% ([place], [employer], [employer])  Naturium Azelaic Topical Acid 10% (Also has niacinamide, vitamin C)  Niacinamide: The Ordinary niacinamide 10% + zinc 1%, [email or web address].com  Cysteamine  Tranexamic acid and Kojic acid:   Affordable option:   Naturium tranexamic topical acid 5% ($20 from natrium.com or amazon.com, also containes kojic acid and niacinamide)  More expensive options  SkinCeuticals discoloration defense    Chemical Peels  Superficial chemical peels (glycolic acid, salicylic acid, Jessner) are generally safe in all skin tones but risks do include burning and hyperpigmentation, which have higher risk with deeper peels and in skin of color. Risk of hyperpigmentation can be reduced by using hydroquinone and daily sunscreen before and after treatment.  Stop retinoids and acids 1 week prior to treatment    Microneedling  Microneedling is generally safe in all skin tones but risks do include hyperpigmentation. Can alternate  every 3-4 weeks with chemical peels. Risk of hyperpigmentation can be reduced by using hydroquinone and daily sunscreen before and after treatment.  Stop retinoids and acids 1 week prior to treatment    Laser  Some lasers, such as picosecond lasers or Q-switched Nd:YAG, can help with melasma  Ensure you are receiving treatment from a trained professional as some lasers can worsen melasma    Oral Medication (Pills)  Tranexamic acid is the only pill commonly used for melasma at low doses (eg, 500 mg daily or 250 mg twice daily) for up to 2-3 months if not responsive to topical cream alone  There have been rare reports of blood clots in patients taking higher doses of transexamic acid for other reasons. Most studies of low doses for melasma had no or mild side effects with no blood clots observed, but a low risk should still be considered possible  You should not take tranexamic acid if you have renal dysfunction, malignancy, history of blood clots or pulmonary embolism, history of stroke or heart attack, are on blood thinners, are pregnant, are on oral hormonal contraception or other hormone replacement, or are a current smoker. The medication should be stopped for prolonged periods of immobilization (such as travel)

## 2024-12-05 ENCOUNTER — TELEPHONE (OUTPATIENT)
Dept: PLASTIC SURGERY | Facility: CLINIC | Age: 54
End: 2024-12-05
Payer: COMMERCIAL

## 2024-12-05 ENCOUNTER — PATIENT MESSAGE (OUTPATIENT)
Dept: PLASTIC SURGERY | Facility: CLINIC | Age: 54
End: 2024-12-05
Payer: COMMERCIAL

## 2024-12-05 NOTE — TELEPHONE ENCOUNTER
Spoke with patient in regards to surgery date. She would like to do her surgery sometime in February. Waiting for [facility] to get back to me on what dates are available for Dr. Cabrera in February.       ----- Message from Ricki sent at 12/2/2024  1:14 PM CST -----  Regarding: Surgery can be scheduled  Good afternoon,     Pt can be scheduled for surgery.  Cpt code 15036 , valid from 12/2/24 to 6/2/25. [facility]  [facility]metic quote was given for reinsertion of implant. I did advised pt that is she choose 2025 date I may have to resubmit the auth. Please let me know the date once scheduled.       Thank you,   Marianela

## 2024-12-06 ENCOUNTER — PATIENT MESSAGE (OUTPATIENT)
Dept: TRANSPLANT | Facility: CLINIC | Age: 54
End: 2024-12-06
Payer: COMMERCIAL

## 2024-12-06 ENCOUNTER — PATIENT MESSAGE (OUTPATIENT)
Dept: PLASTIC SURGERY | Facility: CLINIC | Age: 54
End: 2024-12-06
Payer: COMMERCIAL

## 2024-12-09 ENCOUNTER — TELEPHONE (OUTPATIENT)
Dept: NEPHROLOGY | Facility: CLINIC | Age: 54
End: 2024-12-09
Payer: COMMERCIAL

## 2024-12-10 ENCOUNTER — LAB VISIT (OUTPATIENT)
Dept: LAB | Facility: HOSPITAL | Age: 54
End: 2024-12-10
Attending: INTERNAL MEDICINE
Payer: COMMERCIAL

## 2024-12-10 DIAGNOSIS — E55.9 VITAMIN D DEFICIENCY: ICD-10-CM

## 2024-12-10 DIAGNOSIS — T38.0X5A STEROID-INDUCED HYPERGLYCEMIA: Chronic | ICD-10-CM

## 2024-12-10 DIAGNOSIS — Z94.4 S/P LIVER TRANSPLANT: ICD-10-CM

## 2024-12-10 DIAGNOSIS — E78.5 HYPERLIPIDEMIA, UNSPECIFIED HYPERLIPIDEMIA TYPE: ICD-10-CM

## 2024-12-10 DIAGNOSIS — M85.852 OSTEOPENIA OF LEFT HIP: ICD-10-CM

## 2024-12-10 DIAGNOSIS — R73.9 STEROID-INDUCED HYPERGLYCEMIA: Chronic | ICD-10-CM

## 2024-12-10 DIAGNOSIS — E03.9 HYPOTHYROIDISM, UNSPECIFIED TYPE: ICD-10-CM

## 2024-12-10 LAB
25(OH)D3+25(OH)D2 SERPL-MCNC: 43 NG/ML (ref 30–96)
ALBUMIN SERPL BCP-MCNC: 3.9 G/DL (ref 3.5–5.2)
ALP SERPL-CCNC: 73 U/L (ref 40–150)
ALT SERPL W/O P-5'-P-CCNC: 21 U/L (ref 10–44)
ANION GAP SERPL CALC-SCNC: 8 MMOL/L (ref 8–16)
AST SERPL-CCNC: 22 U/L (ref 10–40)
BASOPHILS # BLD AUTO: 0.08 K/UL (ref 0–0.2)
BASOPHILS NFR BLD: 0.9 % (ref 0–1.9)
BILIRUB SERPL-MCNC: 0.3 MG/DL (ref 0.1–1)
BUN SERPL-MCNC: 13 MG/DL (ref 6–20)
CALCIUM SERPL-MCNC: 9.1 MG/DL (ref 8.7–10.5)
CHLORIDE SERPL-SCNC: 108 MMOL/L (ref 95–110)
CHOLEST SERPL-MCNC: 147 MG/DL (ref 120–199)
CHOLEST/HDLC SERPL: 2.6 {RATIO} (ref 2–5)
CO2 SERPL-SCNC: 23 MMOL/L (ref 23–29)
CREAT SERPL-MCNC: 1.1 MG/DL (ref 0.5–1.4)
DIFFERENTIAL METHOD BLD: ABNORMAL
EOSINOPHIL # BLD AUTO: 0.5 K/UL (ref 0–0.5)
EOSINOPHIL NFR BLD: 5.8 % (ref 0–8)
ERYTHROCYTE [DISTWIDTH] IN BLOOD BY AUTOMATED COUNT: 13.7 % (ref 11.5–14.5)
EST. GFR  (NO RACE VARIABLE): >60 ML/MIN/1.73 M^2
ESTIMATED AVG GLUCOSE: 117 MG/DL (ref 68–131)
GLUCOSE SERPL-MCNC: 80 MG/DL (ref 70–110)
HBA1C MFR BLD: 5.7 % (ref 4–5.6)
HCT VFR BLD AUTO: 37.5 % (ref 37–48.5)
HDLC SERPL-MCNC: 56 MG/DL (ref 40–75)
HDLC SERPL: 38.1 % (ref 20–50)
HGB BLD-MCNC: 11.5 G/DL (ref 12–16)
IMM GRANULOCYTES # BLD AUTO: 0.02 K/UL (ref 0–0.04)
IMM GRANULOCYTES NFR BLD AUTO: 0.2 % (ref 0–0.5)
LDLC SERPL CALC-MCNC: 78.2 MG/DL (ref 63–159)
LYMPHOCYTES # BLD AUTO: 2.5 K/UL (ref 1–4.8)
LYMPHOCYTES NFR BLD: 29.1 % (ref 18–48)
MCH RBC QN AUTO: 29 PG (ref 27–31)
MCHC RBC AUTO-ENTMCNC: 30.7 G/DL (ref 32–36)
MCV RBC AUTO: 95 FL (ref 82–98)
MONOCYTES # BLD AUTO: 0.5 K/UL (ref 0.3–1)
MONOCYTES NFR BLD: 5.8 % (ref 4–15)
NEUTROPHILS # BLD AUTO: 5 K/UL (ref 1.8–7.7)
NEUTROPHILS NFR BLD: 58.2 % (ref 38–73)
NONHDLC SERPL-MCNC: 91 MG/DL
NRBC BLD-RTO: 0 /100 WBC
PLATELET # BLD AUTO: 304 K/UL (ref 150–450)
PMV BLD AUTO: 10.4 FL (ref 9.2–12.9)
POTASSIUM SERPL-SCNC: 4.6 MMOL/L (ref 3.5–5.1)
PROT SERPL-MCNC: 6.7 G/DL (ref 6–8.4)
PTH-INTACT SERPL-MCNC: 57.6 PG/ML (ref 9–77)
RBC # BLD AUTO: 3.97 M/UL (ref 4–5.4)
SODIUM SERPL-SCNC: 139 MMOL/L (ref 136–145)
TRIGL SERPL-MCNC: 64 MG/DL (ref 30–150)
TSH SERPL DL<=0.005 MIU/L-ACNC: 2.15 UIU/ML (ref 0.4–4)
WBC # BLD AUTO: 8.6 K/UL (ref 3.9–12.7)

## 2024-12-10 PROCEDURE — 80061 LIPID PANEL: CPT | Performed by: NURSE PRACTITIONER

## 2024-12-10 PROCEDURE — 84443 ASSAY THYROID STIM HORMONE: CPT | Performed by: NURSE PRACTITIONER

## 2024-12-10 PROCEDURE — 83970 ASSAY OF PARATHORMONE: CPT | Performed by: NURSE PRACTITIONER

## 2024-12-10 PROCEDURE — 84075 ASSAY ALKALINE PHOSPHATASE: CPT | Performed by: NURSE PRACTITIONER

## 2024-12-10 PROCEDURE — 80053 COMPREHEN METABOLIC PANEL: CPT | Performed by: INTERNAL MEDICINE

## 2024-12-10 PROCEDURE — 85025 COMPLETE CBC W/AUTO DIFF WBC: CPT | Performed by: INTERNAL MEDICINE

## 2024-12-10 PROCEDURE — 80197 ASSAY OF TACROLIMUS: CPT | Performed by: INTERNAL MEDICINE

## 2024-12-10 PROCEDURE — 82306 VITAMIN D 25 HYDROXY: CPT | Performed by: NURSE PRACTITIONER

## 2024-12-10 PROCEDURE — 83036 HEMOGLOBIN GLYCOSYLATED A1C: CPT | Performed by: NURSE PRACTITIONER

## 2024-12-11 ENCOUNTER — TELEPHONE (OUTPATIENT)
Dept: TRANSPLANT | Facility: CLINIC | Age: 54
End: 2024-12-11
Payer: COMMERCIAL

## 2024-12-11 LAB — TACROLIMUS BLD-MCNC: 3.9 NG/ML (ref 5–15)

## 2024-12-11 NOTE — TELEPHONE ENCOUNTER
Sent patient message via portal to let her know labs are stable.  No medication changes, next labs due on 3/03/25      ----- Message from Sharmila Pacheco MD sent at 12/11/2024 12:57 PM CST -----  The Labs are stable - please let patient know.

## 2024-12-13 DIAGNOSIS — R73.9 HYPERGLYCEMIA: ICD-10-CM

## 2024-12-16 RX ORDER — METFORMIN HYDROCHLORIDE 500 MG/1
500 TABLET, EXTENDED RELEASE ORAL 2 TIMES DAILY WITH MEALS
Qty: 180 TABLET | Refills: 3 | Status: SHIPPED | OUTPATIENT
Start: 2024-12-16

## 2024-12-18 LAB — ALP BONE SERPL-MCNC: 15.9 UG/L (ref 5.6–29)

## 2024-12-20 DIAGNOSIS — F90.9 ATTENTION DEFICIT HYPERACTIVITY DISORDER (ADHD), UNSPECIFIED ADHD TYPE: ICD-10-CM

## 2024-12-20 RX ORDER — DEXTROAMPHETAMINE SACCHARATE, AMPHETAMINE ASPARTATE, DEXTROAMPHETAMINE SULFATE AND AMPHETAMINE SULFATE 5; 5; 5; 5 MG/1; MG/1; MG/1; MG/1
TABLET ORAL
Qty: 30 TABLET | Refills: 0 | Status: SHIPPED | OUTPATIENT
Start: 2024-12-20

## 2024-12-20 RX ORDER — DEXTROAMPHETAMINE SACCHARATE, AMPHETAMINE ASPARTATE, DEXTROAMPHETAMINE SULFATE AND AMPHETAMINE SULFATE 7.5; 7.5; 7.5; 7.5 MG/1; MG/1; MG/1; MG/1
30 TABLET ORAL EVERY MORNING
Qty: 30 TABLET | Refills: 0 | Status: SHIPPED | OUTPATIENT
Start: 2024-12-20

## 2024-12-23 DIAGNOSIS — T85.43XA BREAST IMPLANT RUPTURE, INITIAL ENCOUNTER: Primary | ICD-10-CM

## 2024-12-27 ENCOUNTER — OFFICE VISIT (OUTPATIENT)
Dept: NEPHROLOGY | Facility: CLINIC | Age: 54
End: 2024-12-27
Payer: COMMERCIAL

## 2024-12-27 VITALS
HEIGHT: 63 IN | HEART RATE: 94 BPM | SYSTOLIC BLOOD PRESSURE: 126 MMHG | DIASTOLIC BLOOD PRESSURE: 85 MMHG | WEIGHT: 125.44 LBS | OXYGEN SATURATION: 99 % | BODY MASS INDEX: 22.23 KG/M2

## 2024-12-27 DIAGNOSIS — D84.821 IMMUNOSUPPRESSION DUE TO DRUG THERAPY: ICD-10-CM

## 2024-12-27 DIAGNOSIS — N18.2 CKD (CHRONIC KIDNEY DISEASE) STAGE 2, GFR 60-89 ML/MIN: Primary | ICD-10-CM

## 2024-12-27 DIAGNOSIS — Z94.4 S/P LIVER TRANSPLANT: ICD-10-CM

## 2024-12-27 DIAGNOSIS — Z79.899 IMMUNOSUPPRESSION DUE TO DRUG THERAPY: ICD-10-CM

## 2024-12-27 PROCEDURE — 99999 PR PBB SHADOW E&M-EST. PATIENT-LVL IV: CPT | Mod: PBBFAC,,, | Performed by: INTERNAL MEDICINE

## 2024-12-27 NOTE — PROGRESS NOTES
REASON FOR CONSULT/CHIEF COMPLAIN: Acute kidney injury    REFERRING PHYSICIAN: Killian Dodd DO    HISTORY OF PRESENT ILLNESS:     patient new to me was following with Dr Brown    54 y.o. female  here for CKD follow up     No chronic NSAID use, no known exposure to lithium, lead. No family history of Lupus, kidney disease or deafness. No ingestion of licorice. One kidney stone at age 25 after her first baby, needed lithotripsy. No smoking.  Had an episode of pancreatitis in 2021. Cirrhosis diagnosed 2021, Now s/p liver transplant on 2021. Post op one episode of rejection in .    Denied any new issues with urination including dysuria, hematuria, urgency, hesitancy, nocturia, incomplete voiding. Denied chest pain, nausea, vomiting, abd pain, nausea, vomiting, diarrhea, shortness of breath, pedal edema, Orthopnea, PND    ROS:  General: negative for chills, or fatigue  Respiratory: No cough, shortness of breath, or wheezing  Cardiovascular: No chest pain or dyspnea   Gastrointestinal: No abdominal pain, change in bowel habits    PAST MEDICAL HISTORY:  Past Medical History:   Diagnosis Date    ADD (attention deficit disorder)     Anxiety     Ascites of liver     Cirrhosis 2021    CKD (chronic kidney disease) stage 3, GFR 30-59 ml/min     Constipation     ETOH abuse     Hyperlipidemia     Hypothyroidism     Liver transplant candidate 2022    Other ascites 2021    Pancreatitis, acute     Tobacco use     Vitamin D deficiency        PAST SURGICAL HISTORY:  Past Surgical History:   Procedure Laterality Date    AUGMENTATION OF BREAST      second set    breast augmentation       SECTION      COLONOSCOPY N/A 2021    Procedure: COLONOSCOPY;  Surgeon: Dao Gray MD;  Location: [address];  Service: Endoscopy;  Laterality: N/A;  COVID test 21 General surgery clinic -     CYSTOSCOPY N/A 09/10/2021    Procedure: CYSTOSCOPY;  Surgeon: Rj CARR  Gonzalo Hurley MD;  Location: [facility] OR 1ST FLR;  Service: Urology;  Laterality: N/A;    ESOPHAGOGASTRODUODENOSCOPY N/A 09/17/2021    Procedure: ESOPHAGOGASTRODUODENOSCOPY (EGD);  Surgeon: Dao Gray MD;  Location: [facility] (2ND FLR);  Service: Endoscopy;  Laterality: N/A;  labs current on 9/7    ESOPHAGOGASTRODUODENOSCOPY N/A 10/26/2021    Procedure: ESOPHAGOGASTRODUODENOSCOPY (EGD);  Surgeon: Dao Gray MD;  Location: [facility] (2ND FLR);  Service: Endoscopy;  Laterality: N/A;  to be done the week of 10/18/21 per Dr. Gray-[facility]-10/23/21[facility]-   10/25 arrival time confirmed with pt-rb    ESOPHAGOGASTRODUODENOSCOPY Left 12/21/2021    Procedure: EGD (ESOPHAGOGASTRODUODENOSCOPY);  Surgeon: Koffi Monteiro MD;  Location: [facility] (4TH FLR);  Service: Endoscopy;  Laterality: Left;  Rapid  pt requested AM appt and first available in December-BB  labs morning of procedure-  12/14 lvm to confirm appt-rb    HEMORRHOID SURGERY      LIVER TRANSPLANT N/A 06/05/2022    Procedure: TRANSPLANT, LIVER;  Surgeon: Franco Slaughter MD;  Location: [facility] OR 2ND FLR;  Service: Transplant;  Laterality: N/A;    PERITONEOCENTESIS Right 06/08/2021    Procedure: PARACENTESIS, ABDOMINAL;  Surgeon: Jay Torre MD;  Location: [facility] CATH LAB;  Service: Radiology;  Laterality: Right;    PERITONEOCENTESIS Right 06/25/2021    Procedure: PARACENTESIS, ABDOMINAL;  Surgeon: Jay Torre MD;  Location: [facility] CATH LAB;  Service: Radiology;  Laterality: Right;    PERITONEOCENTESIS Right 07/12/2021    Procedure: PARACENTESIS, ABDOMINAL;  Surgeon: Jay Torre MD;  Location: [facility] CATH LAB;  Service: Radiology;  Laterality: Right;    PERITONEOCENTESIS Right 07/23/2021    Procedure: PARACENTESIS, ABDOMINAL;  Surgeon: Edward De La Torre II, MD;  Location: [facility] CATH LAB;  Service: Interventional Radiology;  Laterality: Right;    PERITONEOCENTESIS Right 08/03/2021    Procedure: PARACENTESIS, ABDOMINAL;  Surgeon: Franco TOVAR  MD Jonatan;  Location: [facility] CATH LAB;  Service: Radiology;  Laterality: Right;    PERITONEOCENTESIS Right 08/16/2021    Procedure: PARACENTESIS, ABDOMINAL;  Surgeon: Jay Torre MD;  Location: [facility] CATH LAB;  Service: Radiology;  Laterality: Right;    PERITONEOCENTESIS Right 08/23/2021    Procedure: PARACENTESIS, ABDOMINAL;  Surgeon: Jay Torre MD;  Location: [facility] CATH LAB;  Service: Radiology;  Laterality: Right;    PERITONEOCENTESIS Right 09/16/2021    Procedure: PARACENTESIS, ABDOMINAL;  Surgeon: Jay Torre MD;  Location: [facility] CATH LAB;  Service: Radiology;  Laterality: Right;    PERITONEOCENTESIS N/A 09/24/2021    Procedure: PARACENTESIS, ABDOMINAL;  Surgeon: Jay Torre MD;  Location: [facility] CATH LAB;  Service: Radiology;  Laterality: N/A;    PERITONEOCENTESIS Right 12/23/2021    Procedure: PARACENTESIS, ABDOMINAL;  Surgeon: Jay Torre MD;  Location: [facility] CATH LAB;  Service: Radiology;  Laterality: Right;    PERITONEOCENTESIS N/A 12/30/2021    Procedure: PARACENTESIS, ABDOMINAL;  Surgeon: Salas Cabrera MD;  Location: [facility] CATH LAB;  Service: Radiology;  Laterality: N/A;    RETROGRADE PYELOGRAPHY Bilateral 09/10/2021    Procedure: PYELOGRAM, RETROGRADE;  Surgeon: Rj Sánchez Jr., MD;  Location: [address];  Service: Urology;  Laterality: Bilateral;    TONSILLECTOMY         FAMILY HISTORY:   Family History   Problem Relation Name Age of Onset    Cancer Mother          Uterine Cancer     No Known Problems Father      No Known Problems Sister      Sleep apnea Daughter      ADD / ADHD Daughter      Heart disease Maternal Grandmother          CHF    COPD Maternal Grandfather      Heart disease Paternal Grandmother          CHF    Colon cancer Neg Hx      Inflammatory bowel disease Neg Hx      Stomach cancer Neg Hx      Breast cancer Neg Hx      Ovarian cancer Neg Hx      Learning disabilities Neg Hx         SOCIAL HISTORY:  Social History     Socioeconomic History     Marital status:     Number of children: 1   Occupational History    Occupation: Assistant   Tobacco Use    Smoking status: Former     Current packs/day: 0.00     Average packs/day: 0.3 packs/day for 27.0 years (6.8 ttl pk-yrs)     Types: Vaping with nicotine, Cigarettes     Quit date: 2024     Years since quittin.1    Smokeless tobacco: Never    Tobacco comments:     Social nicotine vape use, but quit    Substance and Sexual Activity    Alcohol use: Not Currently     Comment: quit caridad 15    Drug use: No    Sexual activity: Yes     Partners: Male   Social History Narrative    They live in [place], [place]      [employer]     Financial Resource Strain: Low Risk  (2024)    Overall Financial Resource Strain (CARDIA)     Difficulty of Paying Living Expenses: Not hard at all   Food Insecurity: No Food Insecurity (2024)    Hunger Vital Sign     Worried About Running Out of Food in the Last Year: Never true     Ran Out of Food in the Last Year: Never true   Transportation Needs: No Transportation Needs (2023)    PRAPARE - Transportation     Lack of Transportation (Medical): No     Lack of Transportation (Non-Medical): No   Physical Activity: Insufficiently Active (2024)    Exercise Vital Sign     Days of Exercise per Week: 1 day     Minutes of Exercise per Session: 20 min   Stress: No Stress Concern Present (2024)    [employer] [place] [employer] - Occupational Stress Questionnaire     Feeling of Stress : Only a little   Housing Stability: Unknown (2023)    Housing Stability Vital Sign     Unable to Pay for Housing in the Last Year: No     Unstable Housing in the Last Year: No       ALLERGIES:  Review of patient's allergies indicates:  No Known Allergies    MEDICATIONS:    Current Outpatient Medications:     aspirin (ECOTRIN) 81 MG EC tablet, Take 81 mg by mouth once daily., Disp: , Rfl:     atorvastatin (LIPITOR) 40 MG tablet, Take 1 tablet (40 mg  total) by mouth once daily., Disp: 90 tablet, Rfl: 3    bimatoprost (LATISSE) 0.03 % ophthalmic solution, Place one drop on applicator and apply evenly along the skin of the upper eyelid at base of eyelashes once daily at bedtime; repeat procedure for second eye (use a clean applicator)., Disp: 5 mL, Rfl: 12    dextroamphetamine-amphetamine (ADDERALL) 20 mg tablet, Take 1 tablet by mouth 2 (two) times a day., Disp: 60 tablet, Rfl: 0    dextroamphetamine-amphetamine (ADDERALL) 20 mg tablet, Take 20 mg in the early afternoon as needed., Disp: 30 tablet, Rfl: 0    dextroamphetamine-amphetamine 30 mg Tab, Take 1 tablet (30 mg total) by mouth every morning., Disp: 30 tablet, Rfl: 0    hydroquinone 8 % Emul, Compound hydroquinone 8% / tretinoin 0.025% / kojic acid 1% / niacinamide 4% / fluocinolone 0.025% cream. Apply a pea-sized amount to face qhs. Do not use for longer than 12 weeks in a row then take a 1 month break. Use only to dark spots, Disp: 30 g, Rfl: 1    hydrOXYzine HCL (ATARAX) 25 MG tablet, TAKE 1 TO 2 TABLETS BY MOUTH EVERY NIGHT AT BEDTIME AS NEEDED FOR INSOMNIA, Disp: 60 tablet, Rfl: 1    levothyroxine (SYNTHROID) 50 MCG tablet, Take 1 tablet (50 mcg total) by mouth before breakfast., Disp: 90 tablet, Rfl: 1    linaCLOtide (LINZESS) 72 mcg Cap capsule, Take 2 capsules (144 mcg total) by mouth before breakfast., Disp: 180 capsule, Rfl: 2    magnesium oxide 400 mg magnesium Tab, Take 400 mg by mouth once daily., Disp: 30 tablet, Rfl: 11    metFORMIN (GLUCOPHAGE-XR) 500 MG ER 24hr tablet, TAKE 1 TABLET(500 MG) BY MOUTH TWICE DAILY WITH MEALS, Disp: 180 tablet, Rfl: 3    mycophenolate (CELLCEPT) 250 mg Cap, Take 2 capsules (500 mg total) by mouth 2 (two) times daily., Disp: 360 capsule, Rfl: 3    pantoprazole (PROTONIX) 40 MG tablet, Take 1 tablet (40 mg total) by mouth once daily., Disp: 90 tablet, Rfl: 3    tacrolimus (PROGRAF) 0.5 MG Cap, Take 1 capsule (0.5 mg total) by mouth every evening., Disp: 90  capsule, Rfl: 3    tacrolimus (PROGRAF) 1 MG Cap, Take 1 capsule (1 mg total) by mouth every morning., Disp: 90 capsule, Rfl: 3    tirzepatide (MOUNJARO) 2.5 mg/0.5 mL PnIj, Inject 2.5 mg into the skin every 7 days., Disp: 4 Pen, Rfl: 3    traZODone (DESYREL) 50 MG tablet, Take  mg at bedtime as needed for sleep, Disp: 60 tablet, Rfl: 1    tretinoin (RETIN-A) 0.05 % cream, Compound tretinoin 0.05% / niacinamide 2% cream. Start 2-3 times weekly then increase up to every night after 2-3 weeks if skin is not too dry. Apply pea sized amount to entire face, Disp: 30 g, Rfl: 5   Medication List with Changes/Refills   Current Medications    ASPIRIN (ECOTRIN) 81 MG EC TABLET    Take 81 mg by mouth once daily.    ATORVASTATIN (LIPITOR) 40 MG TABLET    Take 1 tablet (40 mg total) by mouth once daily.    BIMATOPROST (LATISSE) 0.03 % OPHTHALMIC SOLUTION    Place one drop on applicator and apply evenly along the skin of the upper eyelid at base of eyelashes once daily at bedtime; repeat procedure for second eye (use a clean applicator).    DEXTROAMPHETAMINE-AMPHETAMINE (ADDERALL) 20 MG TABLET    Take 1 tablet by mouth 2 (two) times a day.    DEXTROAMPHETAMINE-AMPHETAMINE (ADDERALL) 20 MG TABLET    Take 20 mg in the early afternoon as needed.    DEXTROAMPHETAMINE-AMPHETAMINE 30 MG TAB    Take 1 tablet (30 mg total) by mouth every morning.    HYDROQUINONE 8 % EMUL    Compound hydroquinone 8% / tretinoin 0.025% / kojic acid 1% / niacinamide 4% / fluocinolone 0.025% cream. Apply a pea-sized amount to face qhs. Do not use for longer than 12 weeks in a row then take a 1 month break. Use only to dark spots    HYDROXYZINE HCL (ATARAX) 25 MG TABLET    TAKE 1 TO 2 TABLETS BY MOUTH EVERY NIGHT AT BEDTIME AS NEEDED FOR INSOMNIA    LEVOTHYROXINE (SYNTHROID) 50 MCG TABLET    Take 1 tablet (50 mcg total) by mouth before breakfast.    LINACLOTIDE (LINZESS) 72 MCG CAP CAPSULE    Take 2 capsules (144 mcg total) by mouth before breakfast.     MAGNESIUM OXIDE 400 MG MAGNESIUM TAB    Take 400 mg by mouth once daily.    METFORMIN (GLUCOPHAGE-XR) 500 MG ER 24HR TABLET    TAKE 1 TABLET(500 MG) BY MOUTH TWICE DAILY WITH MEALS    MYCOPHENOLATE (CELLCEPT) 250 MG CAP    Take 2 capsules (500 mg total) by mouth 2 (two) times daily.    PANTOPRAZOLE (PROTONIX) 40 MG TABLET    Take 1 tablet (40 mg total) by mouth once daily.    TACROLIMUS (PROGRAF) 0.5 MG CAP    Take 1 capsule (0.5 mg total) by mouth every evening.    TACROLIMUS (PROGRAF) 1 MG CAP    Take 1 capsule (1 mg total) by mouth every morning.    TIRZEPATIDE (MOUNJARO) 2.5 MG/0.5 ML PNIJ    Inject 2.5 mg into the skin every 7 days.    TRAZODONE (DESYREL) 50 MG TABLET    Take  mg at bedtime as needed for sleep    TRETINOIN (RETIN-A) 0.05 % CREAM    Compound tretinoin 0.05% / niacinamide 2% cream. Start 2-3 times weekly then increase up to every night after 2-3 weeks if skin is not too dry. Apply pea sized amount to entire face        PHYSICAL EXAM:  There were no vitals taken for this visit.    General: No distress, No fever or chills  Neck: No adenopathy,no carotid bruit,no JVD  Lungs:Clear to auscultation bilaterally, No Crackles  Heart: Regular rate and rhythm, no murmur, gallops or rubs  Abdomen: Soft, non distended  Extremities: Atraumatic, no edema in LE  Skin: Turgor normal. No rashes  Neurologic: No focal weakness, oriented.  Dialysis Access: Non applicable        LABS:  Lab Results   Component Value Date    HGB 11.5 (L) 12/10/2024        Lab Results   Component Value Date    CREATININE 1.1 12/10/2024       Prot/Creat Ratio, Urine   Date Value Ref Range Status   06/17/2024 0.07 0.00 - 0.20 Final   02/06/2024 0.10 0.00 - 0.20 Final   01/05/2024 0.12 0.00 - 0.20 Final       Lab Results   Component Value Date     12/10/2024    K 4.6 12/10/2024    CO2 23 12/10/2024       last PTH   Lab Results   Component Value Date    PTH 57.6 12/10/2024    CALCIUM 9.1 12/10/2024    CAION 1.00 (L) 06/05/2022     PHOS 3.9 06/17/2024       Lab Results   Component Value Date    HGBA1C 5.7 (H) 12/10/2024       Lab Results   Component Value Date    LDLCALC 78.2 12/10/2024         Creatinine, Urine   Date Value Ref Range Status   06/17/2024 185.0 15.0 - 325.0 mg/dL Final   02/06/2024 164.0 15.0 - 325.0 mg/dL Final   01/05/2024 106.0 15.0 - 325.0 mg/dL Final       Protein, Urine   Date Value Ref Range Status   10/18/2021 11 0 - 15 mg/dL Final   07/30/2021 28 (H) 0 - 15 mg/dL Final     Protein, Urine Random   Date Value Ref Range Status   06/17/2024 13 0 - 15 mg/dL Final   02/06/2024 16 (H) 0 - 15 mg/dL Final   01/05/2024 13 0 - 15 mg/dL Final       Prot/Creat Ratio, Urine   Date Value Ref Range Status   06/17/2024 0.07 0.00 - 0.20 Final   02/06/2024 0.10 0.00 - 0.20 Final   01/05/2024 0.12 0.00 - 0.20 Final         ASSESSMENT:    #Chronic Kidney disease stage 2   #S/P liver transplant June/2021   #immunosuppression due to drug therapy      Renal ultrasound 10/21 showed 9.6 and 11.3 cm kidneys (no known issues from child rosa that would explain the reason for her asymmetric kidney sizes). Patients baseline creatinine is less than 1 till June 2021, Prior to liver transplant her creatinine has been above 2 with significant acanthocytes. She has had cystoscopy done in the past with no significant detected abnormalities.Serological work up including ANCA, GBM, MARIBEL, Cryoglobulin are all negative. Since liver transplant the creatinine trended back down to 1.1 mg/dl, urine microscopy post transplant (x 2) did not show any acanthocytes likely due to resolution of secondary IgA..       Plan:   Scr stable at 1.1  No significant proteinuria UPCR 0.17   AVOID NSAIDS Adviased   Cellcept and prograf per hematology   Phos,ca,PTH all Within normal   Potassium controlled . Advised low K diet       RTC in 6 month with labs

## 2025-01-01 DIAGNOSIS — R73.03 PREDIABETES: ICD-10-CM

## 2025-01-01 DIAGNOSIS — R73.9 HYPERGLYCEMIA: ICD-10-CM

## 2025-01-02 RX ORDER — TIRZEPATIDE 2.5 MG/.5ML
2.5 INJECTION, SOLUTION SUBCUTANEOUS
Qty: 4 PEN | Refills: 3 | Status: SHIPPED | OUTPATIENT
Start: 2025-01-02

## 2025-01-07 DIAGNOSIS — Z94.4 S/P LIVER TRANSPLANT: ICD-10-CM

## 2025-01-07 RX ORDER — MYCOPHENOLATE MOFETIL 250 MG/1
CAPSULE ORAL
Qty: 360 CAPSULE | Refills: 3 | Status: SHIPPED | OUTPATIENT
Start: 2025-01-07

## 2025-01-14 DIAGNOSIS — E78.5 HYPERLIPIDEMIA, UNSPECIFIED HYPERLIPIDEMIA TYPE: ICD-10-CM

## 2025-01-14 RX ORDER — ATORVASTATIN CALCIUM 40 MG/1
40 TABLET, FILM COATED ORAL
Qty: 90 TABLET | Refills: 3 | Status: SHIPPED | OUTPATIENT
Start: 2025-01-14

## 2025-01-14 RX ORDER — HYDROXYZINE HYDROCHLORIDE 25 MG/1
TABLET, FILM COATED ORAL
Qty: 60 TABLET | Refills: 1 | Status: SHIPPED | OUTPATIENT
Start: 2025-01-14

## 2025-01-16 RX ORDER — BIMATOPROST 3 UG/ML
SOLUTION TOPICAL
Qty: 5 ML | Refills: 12 | Status: SHIPPED | OUTPATIENT
Start: 2025-01-16

## 2025-01-16 NOTE — TELEPHONE ENCOUNTER
Refill Routing Note   Medication(s) are not appropriate for processing by [facility] for the following reason(s):             [facility] action(s):  Route               Appointments  past 12m or future 3m with PCP    Date Provider   Last Visit   1/18/2024 Killian Dodd, DO   Next Visit   Visit date not found Killian Dodd, DO   ED visits in past 90 days: 0        Note composed:10:47 AM 01/16/2025

## 2025-01-23 DIAGNOSIS — F90.9 ATTENTION DEFICIT HYPERACTIVITY DISORDER (ADHD), UNSPECIFIED ADHD TYPE: ICD-10-CM

## 2025-01-23 RX ORDER — DEXTROAMPHETAMINE SACCHARATE, AMPHETAMINE ASPARTATE, DEXTROAMPHETAMINE SULFATE AND AMPHETAMINE SULFATE 5; 5; 5; 5 MG/1; MG/1; MG/1; MG/1
TABLET ORAL
Qty: 30 TABLET | Refills: 0 | Status: SHIPPED | OUTPATIENT
Start: 2025-01-23

## 2025-01-23 RX ORDER — DEXTROAMPHETAMINE SACCHARATE, AMPHETAMINE ASPARTATE, DEXTROAMPHETAMINE SULFATE AND AMPHETAMINE SULFATE 7.5; 7.5; 7.5; 7.5 MG/1; MG/1; MG/1; MG/1
30 TABLET ORAL EVERY MORNING
Qty: 30 TABLET | Refills: 0 | Status: SHIPPED | OUTPATIENT
Start: 2025-01-23

## 2025-01-23 NOTE — PRE-PROCEDURE INSTRUCTIONS
Chart reviewed- LVM for patient to schedule pre op visit with PCP.     1/23-Message was also sent to Dr. Dodd's staff to assist patient in scheduling.    1/28- patient was cleared by PCP on 1/24. Noted stable labs.

## 2025-01-24 ENCOUNTER — LAB VISIT (OUTPATIENT)
Dept: LAB | Facility: HOSPITAL | Age: 55
End: 2025-01-24
Attending: INTERNAL MEDICINE
Payer: COMMERCIAL

## 2025-01-24 ENCOUNTER — PATIENT MESSAGE (OUTPATIENT)
Dept: TRANSPLANT | Facility: CLINIC | Age: 55
End: 2025-01-24
Payer: COMMERCIAL

## 2025-01-24 ENCOUNTER — TELEPHONE (OUTPATIENT)
Dept: TRANSPLANT | Facility: CLINIC | Age: 55
End: 2025-01-24
Payer: COMMERCIAL

## 2025-01-24 ENCOUNTER — OFFICE VISIT (OUTPATIENT)
Dept: INTERNAL MEDICINE | Facility: CLINIC | Age: 55
End: 2025-01-24
Payer: COMMERCIAL

## 2025-01-24 ENCOUNTER — OFFICE VISIT (OUTPATIENT)
Dept: PLASTIC SURGERY | Facility: CLINIC | Age: 55
End: 2025-01-24
Payer: COMMERCIAL

## 2025-01-24 VITALS
RESPIRATION RATE: 18 BRPM | TEMPERATURE: 98 F | WEIGHT: 125.44 LBS | BODY MASS INDEX: 22.23 KG/M2 | HEART RATE: 88 BPM | OXYGEN SATURATION: 99 % | HEIGHT: 63 IN | SYSTOLIC BLOOD PRESSURE: 122 MMHG | DIASTOLIC BLOOD PRESSURE: 80 MMHG

## 2025-01-24 DIAGNOSIS — T85.43XD BREAST IMPLANT RUPTURE, SUBSEQUENT ENCOUNTER: Primary | ICD-10-CM

## 2025-01-24 DIAGNOSIS — Z01.818 PRE-OP EXAM: ICD-10-CM

## 2025-01-24 DIAGNOSIS — K76.9 IGA NEPHROPATHY ASSOCIATED WITH LIVER DISEASE: ICD-10-CM

## 2025-01-24 DIAGNOSIS — Z01.818 PRE-OP EXAM: Primary | ICD-10-CM

## 2025-01-24 DIAGNOSIS — N02.B9 IGA NEPHROPATHY ASSOCIATED WITH LIVER DISEASE: ICD-10-CM

## 2025-01-24 DIAGNOSIS — N25.81 SECONDARY HYPERPARATHYROIDISM OF RENAL ORIGIN: ICD-10-CM

## 2025-01-24 DIAGNOSIS — Z29.89 PROPHYLACTIC IMMUNOTHERAPY: ICD-10-CM

## 2025-01-24 DIAGNOSIS — E03.9 HYPOTHYROIDISM, UNSPECIFIED TYPE: ICD-10-CM

## 2025-01-24 DIAGNOSIS — E78.5 HYPERLIPIDEMIA, UNSPECIFIED HYPERLIPIDEMIA TYPE: ICD-10-CM

## 2025-01-24 DIAGNOSIS — N18.31 STAGE 3A CHRONIC KIDNEY DISEASE: ICD-10-CM

## 2025-01-24 DIAGNOSIS — Z94.4 S/P LIVER TRANSPLANT: ICD-10-CM

## 2025-01-24 DIAGNOSIS — K59.09 CHRONIC CONSTIPATION: ICD-10-CM

## 2025-01-24 DIAGNOSIS — F10.21 ALCOHOL USE DISORDER, SEVERE, IN EARLY REMISSION: Chronic | ICD-10-CM

## 2025-01-24 DIAGNOSIS — D63.8 ANEMIA OF CHRONIC DISEASE: ICD-10-CM

## 2025-01-24 DIAGNOSIS — Z79.60 LONG-TERM USE OF IMMUNOSUPPRESSANT MEDICATION: ICD-10-CM

## 2025-01-24 LAB
ANION GAP SERPL CALC-SCNC: 10 MMOL/L (ref 8–16)
BASOPHILS # BLD AUTO: 0.1 K/UL (ref 0–0.2)
BASOPHILS NFR BLD: 1.1 % (ref 0–1.9)
BUN SERPL-MCNC: 22 MG/DL (ref 6–20)
CALCIUM SERPL-MCNC: 9.7 MG/DL (ref 8.7–10.5)
CHLORIDE SERPL-SCNC: 104 MMOL/L (ref 95–110)
CO2 SERPL-SCNC: 23 MMOL/L (ref 23–29)
CREAT SERPL-MCNC: 1.4 MG/DL (ref 0.5–1.4)
DIFFERENTIAL METHOD BLD: ABNORMAL
EOSINOPHIL # BLD AUTO: 0.4 K/UL (ref 0–0.5)
EOSINOPHIL NFR BLD: 4.7 % (ref 0–8)
ERYTHROCYTE [DISTWIDTH] IN BLOOD BY AUTOMATED COUNT: 13.1 % (ref 11.5–14.5)
EST. GFR  (NO RACE VARIABLE): 45 ML/MIN/1.73 M^2
GLUCOSE SERPL-MCNC: 93 MG/DL (ref 70–110)
HCT VFR BLD AUTO: 38.8 % (ref 37–48.5)
HGB BLD-MCNC: 12.2 G/DL (ref 12–16)
IMM GRANULOCYTES # BLD AUTO: 0.03 K/UL (ref 0–0.04)
IMM GRANULOCYTES NFR BLD AUTO: 0.3 % (ref 0–0.5)
LYMPHOCYTES # BLD AUTO: 2.4 K/UL (ref 1–4.8)
LYMPHOCYTES NFR BLD: 25.9 % (ref 18–48)
MCH RBC QN AUTO: 28.8 PG (ref 27–31)
MCHC RBC AUTO-ENTMCNC: 31.4 G/DL (ref 32–36)
MCV RBC AUTO: 92 FL (ref 82–98)
MONOCYTES # BLD AUTO: 0.6 K/UL (ref 0.3–1)
MONOCYTES NFR BLD: 6.3 % (ref 4–15)
NEUTROPHILS # BLD AUTO: 5.6 K/UL (ref 1.8–7.7)
NEUTROPHILS NFR BLD: 61.7 % (ref 38–73)
NRBC BLD-RTO: 0 /100 WBC
OHS QRS DURATION: 76 MS
OHS QTC CALCULATION: 442 MS
PLATELET # BLD AUTO: 334 K/UL (ref 150–450)
PMV BLD AUTO: 10.2 FL (ref 9.2–12.9)
POTASSIUM SERPL-SCNC: 4.5 MMOL/L (ref 3.5–5.1)
RBC # BLD AUTO: 4.24 M/UL (ref 4–5.4)
SODIUM SERPL-SCNC: 137 MMOL/L (ref 136–145)
WBC # BLD AUTO: 9.11 K/UL (ref 3.9–12.7)

## 2025-01-24 PROCEDURE — 85025 COMPLETE CBC W/AUTO DIFF WBC: CPT | Performed by: INTERNAL MEDICINE

## 2025-01-24 PROCEDURE — 99999 PR PBB SHADOW E&M-EST. PATIENT-LVL III: CPT | Mod: PBBFAC,,, | Performed by: STUDENT IN AN ORGANIZED HEALTH CARE EDUCATION/TRAINING PROGRAM

## 2025-01-24 PROCEDURE — 93005 ELECTROCARDIOGRAM TRACING: CPT | Mod: S$GLB,,, | Performed by: INTERNAL MEDICINE

## 2025-01-24 PROCEDURE — 99999 PR PBB SHADOW E&M-EST. PATIENT-LVL V: CPT | Mod: PBBFAC,,, | Performed by: INTERNAL MEDICINE

## 2025-01-24 PROCEDURE — 99214 OFFICE O/P EST MOD 30 MIN: CPT | Mod: S$GLB,,, | Performed by: INTERNAL MEDICINE

## 2025-01-24 PROCEDURE — 99214 OFFICE O/P EST MOD 30 MIN: CPT | Mod: S$GLB,,, | Performed by: STUDENT IN AN ORGANIZED HEALTH CARE EDUCATION/TRAINING PROGRAM

## 2025-01-24 PROCEDURE — 36415 COLL VENOUS BLD VENIPUNCTURE: CPT | Mod: PO | Performed by: INTERNAL MEDICINE

## 2025-01-24 PROCEDURE — 93010 ELECTROCARDIOGRAM REPORT: CPT | Mod: S$GLB,,, | Performed by: INTERNAL MEDICINE

## 2025-01-24 PROCEDURE — 80048 BASIC METABOLIC PNL TOTAL CA: CPT | Performed by: INTERNAL MEDICINE

## 2025-01-24 NOTE — PROGRESS NOTES
Plastic Surgery History & Physical    SUBJECTIVE:   Chief complaint: Breast implant rupture, Pre op visit for implant exchange    History of Present Illness:  54 y.o. female who has a history of previous breast augmentation.  She originally underwent prepectoral breast augmentation with silicone implants.  He subsequently ruptured and then she had replacement to silicone implants approximately 10 years ago.  She states that recently she was holding her grandchild who picked her and she had some pain of the breasts and subsequently underwent an MRI that demonstrated right breast intracapsular rupture.  She is bothered by the appearance of her breasts.  She has also concerned about the fact that the implant is ruptured.  Currently, they are not causing her any significant problems.  Of note, she has a history of a liver transplantation for alcohol abuse and is on immunosuppression.    Past Medical History:   Diagnosis Date    ADD (attention deficit disorder)     Anxiety     Ascites of liver     Cirrhosis 2021    CKD (chronic kidney disease) stage 3, GFR 30-59 ml/min     Constipation     ETOH abuse     Hyperlipidemia     Hypothyroidism     Liver transplant candidate 2022    Other ascites 2021    Pancreatitis, acute     Tobacco use     Vitamin D deficiency        Past Surgical History:   Procedure Laterality Date    AUGMENTATION OF BREAST      second set    breast augmentation       SECTION      COLONOSCOPY N/A 2021    Procedure: COLONOSCOPY;  Surgeon: Dao Gray MD;  Location: [facility] ([address] [place] [address]);  Service: Endoscopy;  Laterality: N/A;  COVID test 21 General surgery clinic [facility]    CYSTOSCOPY N/A 09/10/2021    Procedure: CYSTOSCOPY;  Surgeon: Rj Sánchez Jr., MD;  Location: [address];  Service: Urology;  Laterality: N/A;    ESOPHAGOGASTRODUODENOSCOPY N/A 2021    Procedure: ESOPHAGOGASTRODUODENOSCOPY (EGD);  Surgeon: Dao Gray MD;  Location: [facility]  (2ND FLR);  Service: Endoscopy;  Laterality: N/A;  labs current on 9/7    ESOPHAGOGASTRODUODENOSCOPY N/A 10/26/2021    Procedure: ESOPHAGOGASTRODUODENOSCOPY (EGD);  Surgeon: Dao Gray MD;  Location: [facility] (2ND FLR);  Service: Endoscopy;  Laterality: N/A;  to be done the week of 10/18/21 per Dr[facility]   10/25 arrival time confirmed with pt-rb    ESOPHAGOGASTRODUODENOSCOPY Left 12/21/2021    Procedure: EGD (ESOPHAGOGASTRODUODENOSCOPY);  Surgeon: Koffi Monteiro MD;  Location: [facility] (4TH FLR);  Service: Endoscopy;  Laterality: Left;  Rapid  pt requested AM appt and first available in December-BB  labs morning of procedure-  12/14 lvm to confirm appt-rb    HEMORRHOID SURGERY      LIVER TRANSPLANT N/A 06/05/2022    Procedure: TRANSPLANT, LIVER;  Surgeon: Franco Slaughter MD;  Location: [facility] OR 2ND FLR;  Service: Transplant;  Laterality: N/A;    PERITONEOCENTESIS Right 06/08/2021    Procedure: PARACENTESIS, ABDOMINAL;  Surgeon: Jay Torre MD;  Location: [facility] CATH LAB;  Service: Radiology;  Laterality: Right;    PERITONEOCENTESIS Right 06/25/2021    Procedure: PARACENTESIS, ABDOMINAL;  Surgeon: Jay Torre MD;  Location: [facility] CATH LAB;  Service: Radiology;  Laterality: Right;    PERITONEOCENTESIS Right 07/12/2021    Procedure: PARACENTESIS, ABDOMINAL;  Surgeon: Jay Torre MD;  Location: [facility] CATH LAB;  Service: Radiology;  Laterality: Right;    PERITONEOCENTESIS Right 07/23/2021    Procedure: PARACENTESIS, ABDOMINAL;  Surgeon: Edward De La Torre II, MD;  Location: [facility] CATH LAB;  Service: Interventional Radiology;  Laterality: Right;    PERITONEOCENTESIS Right 08/03/2021    Procedure: PARACENTESIS, ABDOMINAL;  Surgeon: Franco Cheung MD;  Location: [facility] CATH LAB;  Service: Radiology;  Laterality: Right;    PERITONEOCENTESIS Right 08/16/2021    Procedure: PARACENTESIS, ABDOMINAL;  Surgeon: Jay Torre MD;  Location: [facility] CATH LAB;  Service:  Radiology;  Laterality: Right;    PERITONEOCENTESIS Right 08/23/2021    Procedure: PARACENTESIS, ABDOMINAL;  Surgeon: Jay Torre MD;  Location: [facility] CATH LAB;  Service: Radiology;  Laterality: Right;    PERITONEOCENTESIS Right 09/16/2021    Procedure: PARACENTESIS, ABDOMINAL;  Surgeon: Jay Torre MD;  Location: [facility] CATH LAB;  Service: Radiology;  Laterality: Right;    PERITONEOCENTESIS N/A 09/24/2021    Procedure: PARACENTESIS, ABDOMINAL;  Surgeon: Jay Torre MD;  Location: [facility] CATH LAB;  Service: Radiology;  Laterality: N/A;    PERITONEOCENTESIS Right 12/23/2021    Procedure: PARACENTESIS, ABDOMINAL;  Surgeon: Jay Torre MD;  Location: [facility] CATH LAB;  Service: Radiology;  Laterality: Right;    PERITONEOCENTESIS N/A 12/30/2021    Procedure: PARACENTESIS, ABDOMINAL;  Surgeon: Salas Cabrera MD;  Location: [facility] CATH LAB;  Service: Radiology;  Laterality: N/A;    RETROGRADE PYELOGRAPHY Bilateral 09/10/2021    Procedure: PYELOGRAM, RETROGRADE;  Surgeon: Rj Sánchez Jr., MD;  Location: [address];  Service: Urology;  Laterality: Bilateral;    TONSILLECTOMY         Family History   Problem Relation Name Age of Onset    Cancer Mother          Uterine Cancer     No Known Problems Father      No Known Problems Sister      Sleep apnea Daughter      ADD / ADHD Daughter      Heart disease Maternal Grandmother          CHF    COPD Maternal Grandfather      Heart disease Paternal Grandmother          CHF    Colon cancer Neg Hx      Inflammatory bowel disease Neg Hx      Stomach cancer Neg Hx      Breast cancer Neg Hx      Ovarian cancer Neg Hx      Learning disabilities Neg Hx         Social History     Socioeconomic History    Marital status:     Number of children: 1   Occupational History    Occupation: Assistant   Tobacco Use    Smoking status: Former     Current packs/day: 0.00     Average packs/day: 0.3 packs/day for 27.0 years (6.8 ttl pk-yrs)     Types: Vaping with  nicotine, Cigarettes     Quit date: 2024     Years since quittin.2    Smokeless tobacco: Never    Tobacco comments:     Social nicotine vape use, but quit    Substance and Sexual Activity    Alcohol use: Not Currently     Comment: quit caridad 15    Drug use: No    Sexual activity: Yes     Partners: Male   Social History Narrative    They live in [place], [place]      Social Drivers of Health     Financial Resource Strain: Low Risk  (2024)    Overall Financial Resource Strain (CARDIA)     Difficulty of Paying Living Expenses: Not hard at all   Food Insecurity: No Food Insecurity (2024)    Hunger Vital Sign     Worried About Running Out of Food in the Last Year: Never true     Ran Out of Food in the Last Year: Never true   Transportation Needs: No Transportation Needs (2023)    PRAPARE - Transportation     Lack of Transportation (Medical): No     Lack of Transportation (Non-Medical): No   Physical Activity: Insufficiently Active (2024)    Exercise Vital Sign     Days of Exercise per Week: 1 day     Minutes of Exercise per Session: 20 min   Stress: No Stress Concern Present (2024)    [employer] - Occupational Stress Questionnaire     Feeling of Stress : Only a little   Housing Stability: Unknown (2023)    Housing Stability Vital Sign     Unable to Pay for Housing in the Last Year: No     Unstable Housing in the Last Year: No       Current Outpatient Medications   Medication Sig Dispense Refill    aspirin (ECOTRIN) 81 MG EC tablet Take 81 mg by mouth once daily.      atorvastatin (LIPITOR) 40 MG tablet TAKE 1 TABLET(40 MG) BY MOUTH EVERY DAY 90 tablet 3    bimatoprost (LATISSE) 0.03 % ophthalmic solution Place one drop on applicator and apply evenly along the skin of the upper eyelid at base of eyelashes once daily at bedtime; repeat procedure for second eye (use a clean applicator). 5 mL 12    dextroamphetamine-amphetamine (ADDERALL) 20 mg tablet Take 1  tablet by mouth 2 (two) times a day. 60 tablet 0    dextroamphetamine-amphetamine (ADDERALL) 20 mg tablet Take 20 mg in the early afternoon as needed. 30 tablet 0    dextroamphetamine-amphetamine 30 mg Tab Take 1 tablet (30 mg total) by mouth every morning. 30 tablet 0    hydroquinone 8 % Emul Compound hydroquinone 8% / tretinoin 0.025% / kojic acid 1% / niacinamide 4% / fluocinolone 0.025% cream. Apply a pea-sized amount to face qhs. Do not use for longer than 12 weeks in a row then take a 1 month break. Use only to dark spots 30 g 1    hydrOXYzine HCL (ATARAX) 25 MG tablet TAKE 1 TO 2 TABLETS BY MOUTH EVERY NIGHT AT BEDTIME AS NEEDED FOR INSOMNIA 60 tablet 1    levothyroxine (SYNTHROID) 50 MCG tablet Take 1 tablet (50 mcg total) by mouth before breakfast. 90 tablet 1    linaCLOtide (LINZESS) 145 mcg Cap capsule Take 1 capsule (145 mcg total) by mouth before breakfast. 90 capsule 3    magnesium oxide 400 mg magnesium Tab Take 400 mg by mouth once daily. 30 tablet 11    metFORMIN (GLUCOPHAGE-XR) 500 MG ER 24hr tablet TAKE 1 TABLET(500 MG) BY MOUTH TWICE DAILY WITH MEALS 180 tablet 3    mycophenolate (CELLCEPT) 250 mg Cap TAKE 2 CAPSULES BY MOUTH 2 TIMES A  capsule 3    pantoprazole (PROTONIX) 40 MG tablet Take 1 tablet (40 mg total) by mouth once daily. 90 tablet 3    tacrolimus (PROGRAF) 0.5 MG Cap Take 1 capsule (0.5 mg total) by mouth every evening. 90 capsule 3    tacrolimus (PROGRAF) 1 MG Cap Take 1 capsule (1 mg total) by mouth every morning. 90 capsule 3    tirzepatide (MOUNJARO) 2.5 mg/0.5 mL PnIj Inject 2.5 mg into the skin every 7 days. 4 Pen 3    traZODone (DESYREL) 50 MG tablet Take  mg at bedtime as needed for sleep 60 tablet 1    tretinoin (RETIN-A) 0.05 % cream Compound tretinoin 0.05% / niacinamide 2% cream. Start 2-3 times weekly then increase up to every night after 2-3 weeks if skin is not too dry. Apply pea sized amount to entire face 30 g 5     No current facility-administered  medications for this visit.       Review of patient's allergies indicates:  No Known Allergies      Review of Systems:  Review of Systems        OBJECTIVE:     There were no vitals taken for this visit.      Physical Exam:  Gen: NAD, appears stated age  Neuro: normal without focal findings, mental status and speech normal  HEENT: NCAT, neck supple, PEERL  CV: RRR  Pulm: Breathing non-labored, chest wall movement equal bilaterally   Breast:  Her bilateral breasts demonstrate capsular contracture were so on the left.  She has got ptosis of her breast tissue off the implants.  Abdomen: soft, nontender, no guarding  Gu: genitalia not examined  Extremity:normal strength, no cyanosis or edema  Skin: Skin color, texture, turgor normal. No rashes or lesions  Psych: oriented to time, place and person          ASSESSMENT/PLAN:     Malu was seen today for pre-op exam.    Diagnoses and all orders for this visit:    Breast implant rupture, subsequent encounter        Malu was seen today for surgical pre op visit.    Diagnoses and all orders for this visit:    Breast implant rupture, subsequent encounter  I discussed implant exchange with her again.  She spoke to [employer] who will cover the implants. We will reach out as well to figure out ordering and processing. I discussed with her again that exchange of the implants will not change the appearance of the breasts. Will place her on Singulair after to try and minimize risks of capsular contracture again. Risks, benefits, alternatives, and expected recovery were discussed with her.  She has gotten clearance and instructions on medication management with her Transplant team. All questions were answered today. Consents signed.    Franco Cabrera MD  Plastic and Reconstructive Surgery    I spent a total of 30 minutes on the day of the visit.This includes face to face time and non-face to face time preparing to see the patient (eg, review of tests), obtaining and/or reviewing  separately obtained history, documenting clinical information in the electronic or other health record, independently interpreting results and communicating results to the patient/family/caregiver, or care coordinator.

## 2025-01-24 NOTE — H&P (VIEW-ONLY)
Plastic Surgery History & Physical    SUBJECTIVE:   Chief complaint: Breast implant rupture, Pre op visit for implant exchange    History of Present Illness:  54 y.o. female who has a history of previous breast augmentation.  She originally underwent prepectoral breast augmentation with silicone implants.  He subsequently ruptured and then she had replacement to silicone implants approximately 10 years ago.  She states that recently she was holding her grandchild who picked her and she had some pain of the breasts and subsequently underwent an MRI that demonstrated right breast intracapsular rupture.  She is bothered by the appearance of her breasts.  She has also concerned about the fact that the implant is ruptured.  Currently, they are not causing her any significant problems.  Of note, she has a history of a liver transplantation for alcohol abuse and is on immunosuppression.    Past Medical History:   Diagnosis Date    ADD (attention deficit disorder)     Anxiety     Ascites of liver     Cirrhosis 2021    CKD (chronic kidney disease) stage 3, GFR 30-59 ml/min     Constipation     ETOH abuse     Hyperlipidemia     Hypothyroidism     Liver transplant candidate 2022    Other ascites 2021    Pancreatitis, acute     Tobacco use     Vitamin D deficiency        Past Surgical History:   Procedure Laterality Date    AUGMENTATION OF BREAST      second set    breast augmentation       SECTION      COLONOSCOPY N/A 2021    Procedure: COLONOSCOPY;  Surgeon: Dao Gray MD;  Location: [facility] ([address] [place] [address]);  Service: Endoscopy;  Laterality: N/A;  COVID test 21 General surgery clinic [facility]    CYSTOSCOPY N/A 09/10/2021    Procedure: CYSTOSCOPY;  Surgeon: Rj Sánchez Jr., MD;  Location: [address];  Service: Urology;  Laterality: N/A;    ESOPHAGOGASTRODUODENOSCOPY N/A 2021    Procedure: ESOPHAGOGASTRODUODENOSCOPY (EGD);  Surgeon: Dao Gray MD;  Location: [facility]  (2ND FLR);  Service: Endoscopy;  Laterality: N/A;  labs current on 9/7    ESOPHAGOGASTRODUODENOSCOPY N/A 10/26/2021    Procedure: ESOPHAGOGASTRODUODENOSCOPY (EGD);  Surgeon: Dao Gray MD;  Location: [facility] (2ND FLR);  Service: Endoscopy;  Laterality: N/A;  to be done the week of 10/18/21 per Dr. Gray-[facility]10/23[facility]   10/25 arrival time confirmed with pt-rb    ESOPHAGOGASTRODUODENOSCOPY Left 12/21/2021    Procedure: EGD (ESOPHAGOGASTRODUODENOSCOPY);  Surgeon: Koffi Monteiro MD;  Location: [facility] (4TH FLR);  Service: Endoscopy;  Laterality: Left;  Rapid  pt requested AM appt and first available in December-BB  labs morning of procedure-  12/14 lvm to confirm appt-rb    HEMORRHOID SURGERY      LIVER TRANSPLANT N/A 06/05/2022    Procedure: TRANSPLANT, LIVER;  Surgeon: Franco Slaughter MD;  Location: [facility] OR 2ND FLR;  Service: Transplant;  Laterality: N/A;    PERITONEOCENTESIS Right 06/08/2021    Procedure: PARACENTESIS, ABDOMINAL;  Surgeon: Jay Torre MD;  Location: [facility] CATH LAB;  Service: Radiology;  Laterality: Right;    PERITONEOCENTESIS Right 06/25/2021    Procedure: PARACENTESIS, ABDOMINAL;  Surgeon: Jay Torre MD;  Location: [facility] CATH LAB;  Service: Radiology;  Laterality: Right;    PERITONEOCENTESIS Right 07/12/2021    Procedure: PARACENTESIS, ABDOMINAL;  Surgeon: Jay Torre MD;  Location: [facility] CATH LAB;  Service: Radiology;  Laterality: Right;    PERITONEOCENTESIS Right 07/23/2021    Procedure: PARACENTESIS, ABDOMINAL;  Surgeon: Edward De La Torre II, MD;  Location: [facility] CATH LAB;  Service: Interventional Radiology;  Laterality: Right;    PERITONEOCENTESIS Right 08/03/2021    Procedure: PARACENTESIS, ABDOMINAL;  Surgeon: Franco Cheung MD;  Location: [facility] CATH LAB;  Service: Radiology;  Laterality: Right;    PERITONEOCENTESIS Right 08/16/2021    Procedure: PARACENTESIS, ABDOMINAL;  Surgeon: Jay Torre MD;  Location: [facility] CATH LAB;  Service:  Radiology;  Laterality: Right;    PERITONEOCENTESIS Right 08/23/2021    Procedure: PARACENTESIS, ABDOMINAL;  Surgeon: Jay Torre MD;  Location: [facility] CATH LAB;  Service: Radiology;  Laterality: Right;    PERITONEOCENTESIS Right 09/16/2021    Procedure: PARACENTESIS, ABDOMINAL;  Surgeon: Jay Torre MD;  Location: [facility] CATH LAB;  Service: Radiology;  Laterality: Right;    PERITONEOCENTESIS N/A 09/24/2021    Procedure: PARACENTESIS, ABDOMINAL;  Surgeon: Jay Torre MD;  Location: [facility] CATH LAB;  Service: Radiology;  Laterality: N/A;    PERITONEOCENTESIS Right 12/23/2021    Procedure: PARACENTESIS, ABDOMINAL;  Surgeon: Jay Torre MD;  Location: [facility] CATH LAB;  Service: Radiology;  Laterality: Right;    PERITONEOCENTESIS N/A 12/30/2021    Procedure: PARACENTESIS, ABDOMINAL;  Surgeon: Salas Cabrera MD;  Location: [facility] CATH LAB;  Service: Radiology;  Laterality: N/A;    RETROGRADE PYELOGRAPHY Bilateral 09/10/2021    Procedure: PYELOGRAM, RETROGRADE;  Surgeon: Rj Sánchez Jr., MD;  Location: [address];  Service: Urology;  Laterality: Bilateral;    TONSILLECTOMY         Family History   Problem Relation Name Age of Onset    Cancer Mother          Uterine Cancer     No Known Problems Father      No Known Problems Sister      Sleep apnea Daughter      ADD / ADHD Daughter      Heart disease Maternal Grandmother          CHF    COPD Maternal Grandfather      Heart disease Paternal Grandmother          CHF    Colon cancer Neg Hx      Inflammatory bowel disease Neg Hx      Stomach cancer Neg Hx      Breast cancer Neg Hx      Ovarian cancer Neg Hx      Learning disabilities Neg Hx         Social History     Socioeconomic History    Marital status:     Number of children: 1   Occupational History    Occupation: Assistant   Tobacco Use    Smoking status: Former     Current packs/day: 0.00     Average packs/day: 0.3 packs/day for 27.0 years (6.8 ttl pk-yrs)     Types: Vaping with  nicotine, Cigarettes     Quit date: 2024     Years since quittin.2    Smokeless tobacco: Never    Tobacco comments:     Social nicotine vape use, but quit    Substance and Sexual Activity    Alcohol use: Not Currently     Comment: quit caridad 15    Drug use: No    Sexual activity: Yes     Partners: Male   Social History Narrative    They live in [place], [place]      Social Drivers of Health     Financial Resource Strain: Low Risk  (2024)    Overall Financial Resource Strain (CARDIA)     Difficulty of Paying Living Expenses: Not hard at all   Food Insecurity: No Food Insecurity (2024)    Hunger Vital Sign     Worried About Running Out of Food in the Last Year: Never true     Ran Out of Food in the Last Year: Never true   Transportation Needs: No Transportation Needs (2023)    PRAPARE - Transportation     Lack of Transportation (Medical): No     Lack of Transportation (Non-Medical): No   Physical Activity: Insufficiently Active (2024)    Exercise Vital Sign     Days of Exercise per Week: 1 day     Minutes of Exercise per Session: 20 min   Stress: No Stress Concern Present (2024)    [employer] - Occupational Stress Questionnaire     Feeling of Stress : Only a little   Housing Stability: Unknown (2023)    Housing Stability Vital Sign     Unable to Pay for Housing in the Last Year: No     Unstable Housing in the Last Year: No       Current Outpatient Medications   Medication Sig Dispense Refill    aspirin (ECOTRIN) 81 MG EC tablet Take 81 mg by mouth once daily.      atorvastatin (LIPITOR) 40 MG tablet TAKE 1 TABLET(40 MG) BY MOUTH EVERY DAY 90 tablet 3    bimatoprost (LATISSE) 0.03 % ophthalmic solution Place one drop on applicator and apply evenly along the skin of the upper eyelid at base of eyelashes once daily at bedtime; repeat procedure for second eye (use a clean applicator). 5 mL 12    dextroamphetamine-amphetamine (ADDERALL) 20 mg tablet Take 1  tablet by mouth 2 (two) times a day. 60 tablet 0    dextroamphetamine-amphetamine (ADDERALL) 20 mg tablet Take 20 mg in the early afternoon as needed. 30 tablet 0    dextroamphetamine-amphetamine 30 mg Tab Take 1 tablet (30 mg total) by mouth every morning. 30 tablet 0    hydroquinone 8 % Emul Compound hydroquinone 8% / tretinoin 0.025% / kojic acid 1% / niacinamide 4% / fluocinolone 0.025% cream. Apply a pea-sized amount to face qhs. Do not use for longer than 12 weeks in a row then take a 1 month break. Use only to dark spots 30 g 1    hydrOXYzine HCL (ATARAX) 25 MG tablet TAKE 1 TO 2 TABLETS BY MOUTH EVERY NIGHT AT BEDTIME AS NEEDED FOR INSOMNIA 60 tablet 1    levothyroxine (SYNTHROID) 50 MCG tablet Take 1 tablet (50 mcg total) by mouth before breakfast. 90 tablet 1    linaCLOtide (LINZESS) 145 mcg Cap capsule Take 1 capsule (145 mcg total) by mouth before breakfast. 90 capsule 3    magnesium oxide 400 mg magnesium Tab Take 400 mg by mouth once daily. 30 tablet 11    metFORMIN (GLUCOPHAGE-XR) 500 MG ER 24hr tablet TAKE 1 TABLET(500 MG) BY MOUTH TWICE DAILY WITH MEALS 180 tablet 3    mycophenolate (CELLCEPT) 250 mg Cap TAKE 2 CAPSULES BY MOUTH 2 TIMES A  capsule 3    pantoprazole (PROTONIX) 40 MG tablet Take 1 tablet (40 mg total) by mouth once daily. 90 tablet 3    tacrolimus (PROGRAF) 0.5 MG Cap Take 1 capsule (0.5 mg total) by mouth every evening. 90 capsule 3    tacrolimus (PROGRAF) 1 MG Cap Take 1 capsule (1 mg total) by mouth every morning. 90 capsule 3    tirzepatide (MOUNJARO) 2.5 mg/0.5 mL PnIj Inject 2.5 mg into the skin every 7 days. 4 Pen 3    traZODone (DESYREL) 50 MG tablet Take  mg at bedtime as needed for sleep 60 tablet 1    tretinoin (RETIN-A) 0.05 % cream Compound tretinoin 0.05% / niacinamide 2% cream. Start 2-3 times weekly then increase up to every night after 2-3 weeks if skin is not too dry. Apply pea sized amount to entire face 30 g 5     No current facility-administered  medications for this visit.       Review of patient's allergies indicates:  No Known Allergies      Review of Systems:  Review of Systems        OBJECTIVE:     There were no vitals taken for this visit.      Physical Exam:  Gen: NAD, appears stated age  Neuro: normal without focal findings, mental status and speech normal  HEENT: NCAT, neck supple, PEERL  CV: RRR  Pulm: Breathing non-labored, chest wall movement equal bilaterally   Breast:  Her bilateral breasts demonstrate capsular contracture were so on the left.  She has got ptosis of her breast tissue off the implants.  Abdomen: soft, nontender, no guarding  Gu: genitalia not examined  Extremity:normal strength, no cyanosis or edema  Skin: Skin color, texture, turgor normal. No rashes or lesions  Psych: oriented to time, place and person          ASSESSMENT/PLAN:     Malu was seen today for pre-op exam.    Diagnoses and all orders for this visit:    Breast implant rupture, subsequent encounter        Malu was seen today for surgical pre op visit.    Diagnoses and all orders for this visit:    Breast implant rupture, subsequent encounter  I discussed implant exchange with her again.  She spoke to [employer] who will cover the implants. We will reach out as well to figure out ordering and processing. I discussed with her again that exchange of the implants will not change the appearance of the breasts. Will place her on Singulair after to try and minimize risks of capsular contracture again. Risks, benefits, alternatives, and expected recovery were discussed with her.  She has gotten clearance and instructions on medication management with her Transplant team. All questions were answered today. Consents signed.    Franco Cabrera MD  Plastic and Reconstructive Surgery    I spent a total of 30 minutes on the day of the visit.This includes face to face time and non-face to face time preparing to see the patient (eg, review of tests), obtaining and/or reviewing  separately obtained history, documenting clinical information in the electronic or other health record, independently interpreting results and communicating results to the patient/family/caregiver, or care coordinator.

## 2025-01-24 NOTE — TELEPHONE ENCOUNTER
Patient message sent via portal with Dr. Pacheco's direction   Vitamin C ok.  Hold Cellcept day before surgery and for 5 days after surgery.

## 2025-01-24 NOTE — PROGRESS NOTES
Patient ID: Malu Montes is a 54 y.o. female.    Chief Complaint: Pre-op Exam    History of Present Illness    CHIEF COMPLAINT:  Malu presents today for pre-operative evaluation for breast implant surgery.    HISTORY OF PRESENT ILLNESS:  She has a right-sided breast implant rupture requiring bilateral implant replacement. Surgery is scheduled for February 6th with Dr. Ansari.    MEDICAL HISTORY:  She has history of liver transplant and is currently on immunosuppression with CellCept. She has prediabetes managed by endocrinology. She experiences significant acid reflux symptoms including nocturnal episodes with throat burning and dyspnea.    SUBSTANCE USE HISTORY:  She has history of alcohol use, currently in remission for almost 4 years.    MEDICATIONS:  Current medications include:  - Levothyroxine 50 mcg daily for hypothyroidism  - Pantoprazole 40mg daily for reflux  - Atorvastatin 40mg daily  - Adderall for ADHD managed by psychiatry  - Metformin and Mounjaro for prediabetes  - CellCept for immunosuppression         Physical Exam    General: No acute distress. Well-developed. Well-nourished.  Eyes: EOMI. Sclerae anicteric.  HENT: Normocephalic. Atraumatic. Nares patent. Moist oral mucosa.  Ears: Bilateral TMs clear. Bilateral EACs clear.  Cardiovascular: Regular rate. Regular rhythm. No murmurs. No rubs. No gallops. Normal S1, S2.  Respiratory: Normal respiratory effort. Clear to auscultation bilaterally. No rales. No rhonchi. No wheezing.  Abdomen: Soft. Non-tender. Non-distended. Normoactive bowel sounds.  Musculoskeletal: No  obvious deformity.  Extremities: No lower extremity edema.  Neurological: Alert & oriented x3. No slurred speech. Normal gait.  Psychiatric: Normal mood. Normal affect. Good insight. Good judgment.  Skin: Warm. Dry. No rash.         Assessment & Plan    IMPRESSION:  - Reviewed patient's history including alcohol use in remission, hyperlipidemia, CKD stage 3A, IgA nephropathy,  anemia of chronic disease, secondary hyperparathyroidism, hypothyroidism, and liver transplant on immunosuppression  - Assessed patient for upcoming breast implant replacement surgery  - Evaluated ECG - stable with no ST or T wave changes  - Reviewed thyroid function - stable on current levothyroxine dose  - Noted prediabetes, currently managed with Metformin and Mounjaro  - Considered anesthesia risks for upcoming surgery, including need to hold certain medications    LIVER TRANSPLANT:  - Monitored the patient's status post liver transplant and chronic immunosuppression.  - Reviewed the patient's transplant history and recovery.  - Discussed the importance of avoiding alcohol use post-liver transplant.  - Explained the rationale for holding Mounjaro and CellCept prior to surgery to reduce aspiration risk and manage immunosuppression.  - Instructed the patient to hold CellCept for 1 week before and 1 week after surgery.    ALCOHOL USE DISORDER (IN REMISSION):  - Monitored the patient's sobriety status, noting abstinence from alcohol for almost 4 years.  - Evaluated the patient's acknowledgment of significant life changes since stopping alcohol use.  - Assessed the patient's history of alcohol use and current remission status.  - Encouraged the patient to continue abstaining from alcohol.  - Noted the patient's expressed lack of desire to return to drinking.    CHRONIC KIDNEY DISEASE (CKD):  - Monitored the patient's CKD stage 3A status.  - Monitored the patient's IgA nephropathy associated with liver disease.  - Monitored the patient's secondary hyperparathyroidism of renal origin.    HYPERLIPIDEMIA:  - Monitored the patient's history of hyperlipidemia.  - Continued atorvastatin 40 mg daily for hyperlipidemia management.    ANEMIA:  - Monitored the patient's anemia of chronic disease.    HYPOTHYROIDISM:  - Monitored the patient's history of hypothyroidism.  - Evaluated thyroid function, noting it as stable.  -  Continued levothyroxine 50 mcg daily for hypothyroidism management.    BREAST IMPLANT RUPTURE:  - Monitored the patient's report of a breast implant rupture on the right side.  - Assessed the upcoming surgery for breast implant replacement.  - Noted the patient's scheduled breast implant replacement surgery.  - Monitored the patient's breast implant status.    GASTROESOPHAGEAL REFLUX DISEASE (GERD):  - Monitored the patient's history of severe reflux symptoms.  - Continued pantoprazole 40 mg daily for GERD management.    ATTENTION DEFICIT HYPERACTIVITY DISORDER (ADHD):  - Assessed that the patient's ADHD is being followed by psychiatry.  - Continued Adderall for ADHD management.    PREDIABETES:  - Evaluated the patient's condition as prediabetes.  - Assessed that the patient is being followed by endocrinology for prediabetes management.  - Continued Metformin for prediabetes management.  - Continued Mounjaro for prediabetes management.  - Instructed the patient to hold Mounjaro for 3 weeks before surgery.    LABS:  - Ordered CBC and BMP today for surgical clearance.    OTHER INSTRUCTIONS:  - Evaluated the patient's condition, noting normal physical exam and patient's report of feeling good.            Pt has no active cardiac condition (ACS/USA, decompenstated CHF, significant arrhythmias or severe valvular disease) and can easily achieve 4 METS.  Pt does not require any further workup prior to undergoing breast surgery. These recommendations follow the most current Guideline on Perioperative Cardiovascular Evaluation and Management of Patients Undergoing Noncardiac Surgery released by the ACC/AHA. (JACC 2014.07.944).      This note was generated with the assistance of ambient listening technology. Verbal consent was obtained by the patient and accompanying visitor(s) for the recording of patient appointment to facilitate this note. I attest to having reviewed and edited the generated note for accuracy, though some  syntax or spelling errors may persist. Please contact the author of this note for any clarification.

## 2025-01-29 ENCOUNTER — PATIENT MESSAGE (OUTPATIENT)
Dept: PSYCHIATRY | Facility: CLINIC | Age: 55
End: 2025-01-29
Payer: COMMERCIAL

## 2025-01-31 ENCOUNTER — OFFICE VISIT (OUTPATIENT)
Dept: PSYCHIATRY | Facility: CLINIC | Age: 55
End: 2025-01-31
Payer: COMMERCIAL

## 2025-01-31 VITALS
SYSTOLIC BLOOD PRESSURE: 136 MMHG | HEART RATE: 90 BPM | WEIGHT: 126.56 LBS | BODY MASS INDEX: 22.42 KG/M2 | DIASTOLIC BLOOD PRESSURE: 73 MMHG

## 2025-01-31 DIAGNOSIS — F90.9 ATTENTION DEFICIT HYPERACTIVITY DISORDER (ADHD), UNSPECIFIED ADHD TYPE: Primary | ICD-10-CM

## 2025-01-31 PROCEDURE — 99214 OFFICE O/P EST MOD 30 MIN: CPT | Mod: S$GLB,,, | Performed by: PSYCHIATRY & NEUROLOGY

## 2025-01-31 PROCEDURE — 99999 PR PBB SHADOW E&M-EST. PATIENT-LVL II: CPT | Mod: PBBFAC,,, | Performed by: PSYCHIATRY & NEUROLOGY

## 2025-01-31 RX ORDER — DEXTROAMPHETAMINE SACCHARATE, AMPHETAMINE ASPARTATE, DEXTROAMPHETAMINE SULFATE AND AMPHETAMINE SULFATE 7.5; 7.5; 7.5; 7.5 MG/1; MG/1; MG/1; MG/1
TABLET ORAL
Qty: 60 TABLET | Refills: 0 | Status: SHIPPED | OUTPATIENT
Start: 2025-01-31

## 2025-01-31 NOTE — PROGRESS NOTES
ESTABLISHED OUTPATIENT VISIT   E/M LEVEL 4: [id number]    ENCOUNTER DATE: 1/31/2025  SITE: [facility], [address]    HISTORY    CHIEF COMPLAINT   Malu Montes is a 54 y.o. female who presents for follow up of grief    HPI     Overall appears to be doing reasonably well psychiatrically.    No adverse effect from Adderall. Interested in increasing dose of Adderall to 30 mg BID.    Psychiatric Review Of Systems - Is patient experiencing or having changes in:  Sleep: taking Hydroxyzine  appetite: adequate  weight: no  energy/anergy: no  interest/pleasure/anhedonia: no  somatic symptoms: no  libido: no  anxiety/panic: no  guilty/hopelessness: no  concentration: no  S.I.B.s/risky behavior: no  Irritability: no  Racing thoughts: no  Impulsive behaviors: no  Paranoia:no  AVH:no    Recent alcohol: no  Recent drug: no    Medical ROS    General ROS: fatigue at times  ENT ROS: negative  Cardiovascular ROS: negative  Respiratory ROS: negative  Gastrointestinal ROS: negative  Neurological ROS: negative  Dermatologicl ROS: negative  Endocrine ROS: negative    PAST MEDICAL, FAMILY AND SOCIAL HISTORY: The patient's past medical, family and social history have been reviewed and updated as appropriate within the electronic medical record - see encounter notes.    PSYCHOTROPIC MEDICATIONS   Adderall 30 mg qam and 20 mg prn in the early afternoon, Hyroxyzine 50 mg at bedtime     Scheduled and PRN Medications     Current Outpatient Medications:     aspirin (ECOTRIN) 81 MG EC tablet, Take 81 mg by mouth once daily., Disp: , Rfl:     atorvastatin (LIPITOR) 40 MG tablet, TAKE 1 TABLET(40 MG) BY MOUTH EVERY DAY, Disp: 90 tablet, Rfl: 3    bimatoprost (LATISSE) 0.03 % ophthalmic solution, Place one drop on applicator and apply evenly along the skin of the upper eyelid at base of eyelashes once daily at bedtime; repeat procedure for second eye (use a clean applicator)., Disp: 5 mL, Rfl: 12    dextroamphetamine-amphetamine  (ADDERALL) 20 mg tablet, Take 1 tablet by mouth 2 (two) times a day., Disp: 60 tablet, Rfl: 0    dextroamphetamine-amphetamine (ADDERALL) 20 mg tablet, Take 20 mg in the early afternoon as needed., Disp: 30 tablet, Rfl: 0    dextroamphetamine-amphetamine 30 mg Tab, Take 1 tablet (30 mg total) by mouth every morning., Disp: 30 tablet, Rfl: 0    hydroquinone 8 % Emul, Compound hydroquinone 8% / tretinoin 0.025% / kojic acid 1% / niacinamide 4% / fluocinolone 0.025% cream. Apply a pea-sized amount to face qhs. Do not use for longer than 12 weeks in a row then take a 1 month break. Use only to dark spots, Disp: 30 g, Rfl: 1    hydrOXYzine HCL (ATARAX) 25 MG tablet, TAKE 1 TO 2 TABLETS BY MOUTH EVERY NIGHT AT BEDTIME AS NEEDED FOR INSOMNIA, Disp: 60 tablet, Rfl: 1    levothyroxine (SYNTHROID) 50 MCG tablet, Take 1 tablet (50 mcg total) by mouth before breakfast., Disp: 90 tablet, Rfl: 1    linaCLOtide (LINZESS) 145 mcg Cap capsule, Take 1 capsule (145 mcg total) by mouth before breakfast., Disp: 90 capsule, Rfl: 3    magnesium oxide 400 mg magnesium Tab, Take 400 mg by mouth once daily., Disp: 30 tablet, Rfl: 11    metFORMIN (GLUCOPHAGE-XR) 500 MG ER 24hr tablet, TAKE 1 TABLET(500 MG) BY MOUTH TWICE DAILY WITH MEALS, Disp: 180 tablet, Rfl: 3    mycophenolate (CELLCEPT) 250 mg Cap, TAKE 2 CAPSULES BY MOUTH 2 TIMES A DAY, Disp: 360 capsule, Rfl: 3    pantoprazole (PROTONIX) 40 MG tablet, Take 1 tablet (40 mg total) by mouth once daily., Disp: 90 tablet, Rfl: 3    tacrolimus (PROGRAF) 0.5 MG Cap, Take 1 capsule (0.5 mg total) by mouth every evening., Disp: 90 capsule, Rfl: 3    tacrolimus (PROGRAF) 1 MG Cap, Take 1 capsule (1 mg total) by mouth every morning., Disp: 90 capsule, Rfl: 3    tirzepatide (MOUNJARO) 2.5 mg/0.5 mL PnIj, Inject 2.5 mg into the skin every 7 days., Disp: 4 Pen, Rfl: 3    traZODone (DESYREL) 50 MG tablet, Take  mg at bedtime as needed for sleep, Disp: 60 tablet, Rfl: 1    tretinoin (RETIN-A)  0.05 % cream, Compound tretinoin 0.05% / niacinamide 2% cream. Start 2-3 times weekly then increase up to every night after 2-3 weeks if skin is not too dry. Apply pea sized amount to entire face, Disp: 30 g, Rfl: 5    EXAMINATION  Wt Readings from Last 3 Encounters:   01/31/25 57.4 kg (126 lb 8.7 oz)   01/24/25 56.9 kg (125 lb 7.1 oz)   12/27/24 56.9 kg (125 lb 7.1 oz)     Temp Readings from Last 3 Encounters:   01/24/25 97.9 °F (36.6 °C) (Temporal)   10/26/24 97.7 °F (36.5 °C) (Temporal)   01/18/24 96.8 °F (36 °C)     BP Readings from Last 3 Encounters:   01/31/25 136/73   01/24/25 122/80   12/27/24 126/85     Pulse Readings from Last 3 Encounters:   01/31/25 90   01/24/25 88   12/27/24 94       CONSTITUTIONAL  General Appearance: well nourished    MUSCULOSKELETAL  Muscle Strength and Tone: normal strength and tone  Abnormal Involuntary Movements: no abnormal movement noted  Gait and Station: normal gait    PSYCHIATRIC   Level of Consciousness: alert  Orientation: oriented to person, place and time  Grooming: well groomed  Psychomotor Behavior: no restlessness/agitation  Speech: normal in rate, rhythm and volume  Language: normal vocabulary  Mood: steady  Affect: full range and appropriate  Thought Process: logical and goal directed  Associations: intact associations  Thought Content: no SI/HI  Memory: grossly intact  Attention: intact to content of interview  Fund of Knowledge: appears adequate  Insight: good  Judgement: good    MEDICAL DECISION MAKING    DIAGNOSES  ADHD  Insomnia    PROBLEM LIST AND MANAGEMENT PLANS  - Adderall 30 mg qam and 30 mg in the early afternoon prn  - Hydroxyzine 50 mg at bedtime for sleep  - rtc 3-6 months    Total time, including chart review and time with patient: 20 min    LABORATORY DATA    Lab Visit on 01/24/2025   Component Date Value Ref Range Status    WBC 01/24/2025 9.11  3.90 - 12.70 K/uL Final    RBC 01/24/2025 4.24  4.00 - 5.40 M/uL Final    Hemoglobin 01/24/2025 12.2  12.0  - 16.0 g/dL Final    Hematocrit 01/24/2025 38.8  37.0 - 48.5 % Final    MCV 01/24/2025 92  82 - 98 fL Final    MCH 01/24/2025 28.8  27.0 - 31.0 pg Final    MCHC 01/24/2025 31.4 (L)  32.0 - 36.0 g/dL Final    RDW 01/24/2025 13.1  11.5 - 14.5 % Final    Platelets 01/24/2025 334  150 - 450 K/uL Final    MPV 01/24/2025 10.2  9.2 - 12.9 fL Final    Immature Granulocytes 01/24/2025 0.3  0.0 - 0.5 % Final    Gran # (ANC) 01/24/2025 5.6  1.8 - 7.7 K/uL Final    Immature Grans (Abs) 01/24/2025 0.03  0.00 - 0.04 K/uL Final    Comment: Mild elevation in immature granulocytes is non specific and   can be seen in a variety of conditions including stress response,   acute inflammation, trauma and pregnancy. Correlation with other   laboratory and clinical findings is essential.      Lymph # 01/24/2025 2.4  1.0 - 4.8 K/uL Final    Mono # 01/24/2025 0.6  0.3 - 1.0 K/uL Final    Eos # 01/24/2025 0.4  0.0 - 0.5 K/uL Final    Baso # 01/24/2025 0.10  0.00 - 0.20 K/uL Final    nRBC 01/24/2025 0  0 /100 WBC Final    Gran % 01/24/2025 61.7  38.0 - 73.0 % Final    Lymph % 01/24/2025 25.9  18.0 - 48.0 % Final    Mono % 01/24/2025 6.3  4.0 - 15.0 % Final    Eosinophil % 01/24/2025 4.7  0.0 - 8.0 % Final    Basophil % 01/24/2025 1.1  0.0 - 1.9 % Final    Differential Method 01/24/2025 Automated   Final    Sodium 01/24/2025 137  136 - 145 mmol/L Final    Potassium 01/24/2025 4.5  3.5 - 5.1 mmol/L Final    Chloride 01/24/2025 104  95 - 110 mmol/L Final    CO2 01/24/2025 23  23 - 29 mmol/L Final    Glucose 01/24/2025 93  70 - 110 mg/dL Final    BUN 01/24/2025 22 (H)  6 - 20 mg/dL Final    Creatinine 01/24/2025 1.4  0.5 - 1.4 mg/dL Final    Calcium 01/24/2025 9.7  8.7 - 10.5 mg/dL Final    Anion Gap 01/24/2025 10  8 - 16 mmol/L Final    eGFR 01/24/2025 45.0 (A)  >60 mL/min/1.73 m^2 Final   Office Visit on 01/24/2025   Component Date Value Ref Range Status    QRS Duration 01/24/2025 76  ms Final    [facility] QTC Calculation 01/24/2025 442  ms Final    Lab Visit on 12/10/2024   Component Date Value Ref Range Status    WBC 12/10/2024 8.60  3.90 - 12.70 K/uL Final    RBC 12/10/2024 3.97 (L)  4.00 - 5.40 M/uL Final    Hemoglobin 12/10/2024 11.5 (L)  12.0 - 16.0 g/dL Final    Hematocrit 12/10/2024 37.5  37.0 - 48.5 % Final    MCV 12/10/2024 95  82 - 98 fL Final    MCH 12/10/2024 29.0  27.0 - 31.0 pg Final    MCHC 12/10/2024 30.7 (L)  32.0 - 36.0 g/dL Final    RDW 12/10/2024 13.7  11.5 - 14.5 % Final    Platelets 12/10/2024 304  150 - 450 K/uL Final    MPV 12/10/2024 10.4  9.2 - 12.9 fL Final    Immature Granulocytes 12/10/2024 0.2  0.0 - 0.5 % Final    Gran # (ANC) 12/10/2024 5.0  1.8 - 7.7 K/uL Final    Immature Grans (Abs) 12/10/2024 0.02  0.00 - 0.04 K/uL Final    Comment: Mild elevation in immature granulocytes is non specific and   can be seen in a variety of conditions including stress response,   acute inflammation, trauma and pregnancy. Correlation with other   laboratory and clinical findings is essential.      Lymph # 12/10/2024 2.5  1.0 - 4.8 K/uL Final    Mono # 12/10/2024 0.5  0.3 - 1.0 K/uL Final    Eos # 12/10/2024 0.5  0.0 - 0.5 K/uL Final    Baso # 12/10/2024 0.08  0.00 - 0.20 K/uL Final    nRBC 12/10/2024 0  0 /100 WBC Final    Gran % 12/10/2024 58.2  38.0 - 73.0 % Final    Lymph % 12/10/2024 29.1  18.0 - 48.0 % Final    Mono % 12/10/2024 5.8  4.0 - 15.0 % Final    Eosinophil % 12/10/2024 5.8  0.0 - 8.0 % Final    Basophil % 12/10/2024 0.9  0.0 - 1.9 % Final    Differential Method 12/10/2024 Automated   Final    Sodium 12/10/2024 139  136 - 145 mmol/L Final    Potassium 12/10/2024 4.6  3.5 - 5.1 mmol/L Final    Chloride 12/10/2024 108  95 - 110 mmol/L Final    CO2 12/10/2024 23  23 - 29 mmol/L Final    Glucose 12/10/2024 80  70 - 110 mg/dL Final    BUN 12/10/2024 13  6 - 20 mg/dL Final    Creatinine 12/10/2024 1.1  0.5 - 1.4 mg/dL Final    Calcium 12/10/2024 9.1  8.7 - 10.5 mg/dL Final    Total Protein 12/10/2024 6.7  6.0 - 8.4 g/dL Final     Albumin 12/10/2024 3.9  3.5 - 5.2 g/dL Final    Total Bilirubin 12/10/2024 0.3  0.1 - 1.0 mg/dL Final    Comment: For infants and newborns, interpretation of results should be based  on gestational age, weight and in agreement with clinical  observations.    Premature Infant recommended reference ranges:  Up to 24 hours.............<8.0 mg/dL  Up to 48 hours............<12.0 mg/dL  3-5 days..................<15.0 mg/dL  6-29 days.................<15.0 mg/dL      Alkaline Phosphatase 12/10/2024 73  40 - 150 U/L Final    AST 12/10/2024 22  10 - 40 U/L Final    ALT 12/10/2024 21  10 - 44 U/L Final    eGFR 12/10/2024 >60.0  >60 mL/min/1.73 m^2 Final    Anion Gap 12/10/2024 8  8 - 16 mmol/L Final    Tacrolimus Lvl 12/10/2024 3.9 (L)  5.0 - 15.0 ng/mL Final    Comment: Testing performed by a chemiluminescent microparticle   immunoassay on the [employer] i System.    CAUTION: No firm therapeutic range exists for tacrolimus in whole   blood. The   complexity of the clinical state, individual differences in   sensitivity to   immunosuppressive and nephrotoxic effects of tacrolimus,   co-administration   of other immunosuppressants, type of transplant, time post-transplant   and a   number of other factors contribute to different requirements for   optimal   blood levels of tacrolimus. Therefore, individual tacrolimus values   cannot   be used as the sole indicator for making changes in treatment regimen   and   each patient should be thoroughly evaluated clinically before changes   in   treatment regimens are made. Each user must establish his or her own   ranges   based on clinical experience.  Therapeutic ranges vary according to the commercial test used, and   therefore   should be established for each commercial test. Values obtained with   diff                           erent assay methods cannot be used interchangeably due to   differences in   assay methods and cross-reactivity with metabolites, nor should    correction   factors be applied. Therefore, consistent use of one assay for   individual   patients is recommended.      Vit D, 25-Hydroxy 12/10/2024 43  30 - 96 ng/mL Final    Comment: Vitamin D deficiency.........<10 ng/mL                              Vitamin D insufficiency......10-29 ng/mL       Vitamin D sufficiency........> or equal to 30 ng/mL  Vitamin D toxicity............>100 ng/mL      Hemoglobin A1C 12/10/2024 5.7 (H)  4.0 - 5.6 % Final    Comment: ADA Screening Guidelines:  5.7-6.4%  Consistent with prediabetes  >or=6.5%  Consistent with diabetes    High levels of fetal hemoglobin interfere with the HbA1C  assay. Heterozygous hemoglobin variants (HbS, HgC, etc)do  not significantly interfere with this assay.   However, presence of multiple variants may affect accuracy.      Estimated Avg Glucose 12/10/2024 117  68 - 131 mg/dL Final    TSH 12/10/2024 2.152  0.400 - 4.000 uIU/mL Final    Cholesterol 12/10/2024 147  120 - 199 mg/dL Final    Comment: The National Cholesterol Education Program (NCEP) has set the  following guidelines (reference ranges) for Cholesterol:  Optimal.....................<200 mg/dL  Borderline High.............200-239 mg/dL  High........................> or = 240 mg/dL      Triglycerides 12/10/2024 64  30 - 150 mg/dL Final    Comment: The National Cholesterol Education Program (NCEP) has set the  following guidelines (reference values) for triglycerides:  Normal......................<150 mg/dL  Borderline High.............150-199 mg/dL  High........................200-499 mg/dL      HDL 12/10/2024 56  40 - 75 mg/dL Final    Comment: The National Cholesterol Education Program (NCEP) has set the  following guidelines (reference values) for HDL Cholesterol:  Low...............<40 mg/dL  Optimal...........>60 mg/dL      LDL Cholesterol 12/10/2024 78.2  63.0 - 159.0 mg/dL Final    Comment: The National Cholesterol Education Program (NCEP) has set the  following guidelines (reference  values) for LDL Cholesterol:  Optimal.......................<130 mg/dL  Borderline High...............130-159 mg/dL  High..........................160-189 mg/dL  Very High.....................>190 mg/dL      HDL/Cholesterol Ratio 12/10/2024 38.1  20.0 - 50.0 % Final    Total Cholesterol/HDL Ratio 12/10/2024 2.6  2.0 - 5.0 Final    Non-HDL Cholesterol 12/10/2024 91  mg/dL Final    Comment: Risk category and Non-HDL cholesterol goals:  Coronary heart disease (CHD)or equivalent (10-year risk of CHD >20%):  Non-HDL cholesterol goal     <130 mg/dL  Two or more CHD risk factors and 10-year risk of CHD <= 20%:  Non-HDL cholesterol goal     <160 mg/dL  0 to 1 CHD risk factor:  Non-HDL cholesterol goal     <190 mg/dL      Alkaline Phos Bone Specific 12/10/2024 15.9  5.6 - 29.0 ug/L Final    Comment: Liver alkaline phosphatase can affect the measurement of bone  specific alkaline phosphatase in this assay. Each 100 U/L of liver  alkaline phosphatase contributes an additional 2.5 to 5.8 ug/L to  the bone specific alkaline phosphatase result.    Test performed at [facility],  [address] , [place]  [address] [phone]  Maryan Fonseca MD, PhD - Medical Director      PTH, Intact 12/10/2024 57.6  9.0 - 77.0 pg/mL Final   Lab Visit on 11/20/2024   Component Date Value Ref Range Status    WBC 11/20/2024 8.34  3.90 - 12.70 K/uL Final    RBC 11/20/2024 4.20  4.00 - 5.40 M/uL Final    Hemoglobin 11/20/2024 12.0  12.0 - 16.0 g/dL Final    Hematocrit 11/20/2024 39.0  37.0 - 48.5 % Final    MCV 11/20/2024 93  82 - 98 fL Final    MCH 11/20/2024 28.6  27.0 - 31.0 pg Final    MCHC 11/20/2024 30.8 (L)  32.0 - 36.0 g/dL Final    RDW 11/20/2024 13.2  11.5 - 14.5 % Final    Platelets 11/20/2024 393  150 - 450 K/uL Final    MPV 11/20/2024 10.1  9.2 - 12.9 fL Final    Immature Granulocytes 11/20/2024 0.4  0.0 - 0.5 % Final    Gran # (ANC) 11/20/2024 4.5  1.8 - 7.7 K/uL Final    Immature Grans (Abs) 11/20/2024 0.03   0.00 - 0.04 K/uL Final    Comment: Mild elevation in immature granulocytes is non specific and   can be seen in a variety of conditions including stress response,   acute inflammation, trauma and pregnancy. Correlation with other   laboratory and clinical findings is essential.      Lymph # 11/20/2024 2.8  1.0 - 4.8 K/uL Final    Mono # 11/20/2024 0.6  0.3 - 1.0 K/uL Final    Eos # 11/20/2024 0.4  0.0 - 0.5 K/uL Final    Baso # 11/20/2024 0.07  0.00 - 0.20 K/uL Final    nRBC 11/20/2024 0  0 /100 WBC Final    Gran % 11/20/2024 53.8  38.0 - 73.0 % Final    Lymph % 11/20/2024 33.8  18.0 - 48.0 % Final    Mono % 11/20/2024 6.8  4.0 - 15.0 % Final    Eosinophil % 11/20/2024 4.4  0.0 - 8.0 % Final    Basophil % 11/20/2024 0.8  0.0 - 1.9 % Final    Differential Method 11/20/2024 Automated   Final    Sodium 11/20/2024 136  136 - 145 mmol/L Final    Potassium 11/20/2024 5.4 (H)  3.5 - 5.1 mmol/L Final    *No Visible Hemolysis    Chloride 11/20/2024 104  95 - 110 mmol/L Final    CO2 11/20/2024 25  23 - 29 mmol/L Final    Glucose 11/20/2024 98  70 - 110 mg/dL Final    BUN 11/20/2024 19  6 - 20 mg/dL Final    Creatinine 11/20/2024 1.2  0.5 - 1.4 mg/dL Final    Calcium 11/20/2024 9.7  8.7 - 10.5 mg/dL Final    Total Protein 11/20/2024 7.2  6.0 - 8.4 g/dL Final    Albumin 11/20/2024 4.3  3.5 - 5.2 g/dL Final    Total Bilirubin 11/20/2024 0.2  0.1 - 1.0 mg/dL Final    Comment: For infants and newborns, interpretation of results should be based  on gestational age, weight and in agreement with clinical  observations.    Premature Infant recommended reference ranges:  Up to 24 hours.............<8.0 mg/dL  Up to 48 hours............<12.0 mg/dL  3-5 days..................<15.0 mg/dL  6-29 days.................<15.0 mg/dL      Alkaline Phosphatase 11/20/2024 74  40 - 150 U/L Final    AST 11/20/2024 23  10 - 40 U/L Final    ALT 11/20/2024 25  10 - 44 U/L Final    eGFR 11/20/2024 54.1 (A)  >60 mL/min/1.73 m^2 Final    Anion Gap  11/20/2024 7 (L)  8 - 16 mmol/L Final    Tacrolimus Lvl 11/20/2024 7.7  5.0 - 15.0 ng/mL Final    Comment: Testing performed by a chemiluminescent microparticle   immunoassay on the [employer] i System.    CAUTION: No firm therapeutic range exists for tacrolimus in whole   blood. The   complexity of the clinical state, individual differences in   sensitivity to   immunosuppressive and nephrotoxic effects of tacrolimus,   co-administration   of other immunosuppressants, type of transplant, time post-transplant   and a   number of other factors contribute to different requirements for   optimal   blood levels of tacrolimus. Therefore, individual tacrolimus values   cannot   be used as the sole indicator for making changes in treatment regimen   and   each patient should be thoroughly evaluated clinically before changes   in   treatment regimens are made. Each user must establish his or her own   ranges   based on clinical experience.  Therapeutic ranges vary according to the commercial test used, and   therefore   should be established for each commercial test. Values obtained with   diff                           erent assay methods cannot be used interchangeably due to   differences in   assay methods and cross-reactivity with metabolites, nor should   correction   factors be applied. Therefore, consistent use of one assay for   individual   patients is recommended.      PEth 16:0/18.1 (POPEth) 11/20/2024 <10  Cutoff: 10 ng/mL Final    Comment: Phosphatidylethanol (PEth) homologues result interpretation    PEth 16:0/18:1 (POPEth)  Less than 10 ng/mL: Not detected  10 - 19 ng/mL: Abstinence or light alcohol consumption  (<2 drinks per day for several days a week)  20 - 200 ng/mL: Moderate alcohol consumption  (up to 4 drinks per day for several days a week)  Greater than 200 ng/mL: Heavy alcohol consumption or   chronic alcohol use (at least 4 drinks per day several days   a week)    (Reference: ANA Villafana and  GUCCI Lomax 2018 J. Forensic Sci)      PEth 16:0/18.2 (PLPEth) 11/20/2024 <10  Cutoff: 10 ng/mL Final    Comment: PEth 16:0/18:2 (PLPEth)  Reference ranges are not well established      PETH INTERPRETATION 11/20/2024 Negative.   Final    Comment: -------------------ADDITIONAL INFORMATION-------------------  This report is intended for use in clinical monitoring and   management of patients.  It is not intended for use in   employment-related testing.  This test was developed and its performance characteristics   determined by [facility] in a manner consistent with CLIA   requirements. This test has not been cleared or approved by   the U.S. Food and Drug Administration.    Test Performed by:  [facility] - [address] [address]  : Rupesh Schafer Ph.D.; CLIA# [id number]             Romero Bloom

## 2025-02-05 NOTE — PRE-PROCEDURE INSTRUCTIONS
Patient was informed of pre-procedure instructions and arrival time. Pt verbalized understanding on having reliable transportation following procedure.  PATIENT CONFIRMED ARRIVAL TIME OF 0515.    Patient reports taking asa this morning 2/5. A message was sent to Dr. Cabrera to confirm if patient is ok to proceed.       Patient denies taking GLP-1 injection for 7 days.     Patient has been holding cellcept as instructed for 7 days.

## 2025-02-06 ENCOUNTER — ANESTHESIA (OUTPATIENT)
Dept: SURGERY | Facility: HOSPITAL | Age: 55
End: 2025-02-06
Payer: COMMERCIAL

## 2025-02-06 ENCOUNTER — ANESTHESIA EVENT (OUTPATIENT)
Dept: SURGERY | Facility: HOSPITAL | Age: 55
End: 2025-02-06
Payer: COMMERCIAL

## 2025-02-06 ENCOUNTER — HOSPITAL ENCOUNTER (OUTPATIENT)
Facility: HOSPITAL | Age: 55
Discharge: HOME | End: 2025-02-06
Attending: STUDENT IN AN ORGANIZED HEALTH CARE EDUCATION/TRAINING PROGRAM | Admitting: STUDENT IN AN ORGANIZED HEALTH CARE EDUCATION/TRAINING PROGRAM
Payer: COMMERCIAL

## 2025-02-06 VITALS
DIASTOLIC BLOOD PRESSURE: 56 MMHG | TEMPERATURE: 98 F | SYSTOLIC BLOOD PRESSURE: 108 MMHG | HEART RATE: 67 BPM | RESPIRATION RATE: 16 BRPM | OXYGEN SATURATION: 95 %

## 2025-02-06 DIAGNOSIS — T85.43XA RUPTURED SILICONE BREAST IMPLANT, INITIAL ENCOUNTER: Primary | ICD-10-CM

## 2025-02-06 DIAGNOSIS — T85.43XA BREAST IMPLANT RUPTURE: ICD-10-CM

## 2025-02-06 PROCEDURE — 71000015 HC POSTOP RECOV 1ST HR: Performed by: STUDENT IN AN ORGANIZED HEALTH CARE EDUCATION/TRAINING PROGRAM

## 2025-02-06 PROCEDURE — 19342 INSJ/RPLCMT BRST IMPLT SEP D: CPT | Mod: 50,,, | Performed by: STUDENT IN AN ORGANIZED HEALTH CARE EDUCATION/TRAINING PROGRAM

## 2025-02-06 PROCEDURE — 36000707: Performed by: STUDENT IN AN ORGANIZED HEALTH CARE EDUCATION/TRAINING PROGRAM

## 2025-02-06 PROCEDURE — 63600175 PHARM REV CODE 636 W HCPCS: Performed by: NURSE ANESTHETIST, CERTIFIED REGISTERED

## 2025-02-06 PROCEDURE — D9220A PRA ANESTHESIA: Mod: ,,, | Performed by: NURSE ANESTHETIST, CERTIFIED REGISTERED

## 2025-02-06 PROCEDURE — 19342 INSJ/RPLCMT BRST IMPLT SEP D: CPT | Mod: 50,AS,, | Performed by: PHYSICIAN ASSISTANT

## 2025-02-06 PROCEDURE — 99900035 HC TECH TIME PER 15 MIN (STAT)

## 2025-02-06 PROCEDURE — 25000003 PHARM REV CODE 250: Performed by: PHYSICIAN ASSISTANT

## 2025-02-06 PROCEDURE — 94761 N-INVAS EAR/PLS OXIMETRY MLT: CPT

## 2025-02-06 PROCEDURE — 63600175 PHARM REV CODE 636 W HCPCS: Mod: JZ,TB | Performed by: STUDENT IN AN ORGANIZED HEALTH CARE EDUCATION/TRAINING PROGRAM

## 2025-02-06 PROCEDURE — 36000706: Performed by: STUDENT IN AN ORGANIZED HEALTH CARE EDUCATION/TRAINING PROGRAM

## 2025-02-06 PROCEDURE — 88300 SURGICAL PATH GROSS: CPT | Performed by: PATHOLOGY

## 2025-02-06 PROCEDURE — 63600175 PHARM REV CODE 636 W HCPCS: Performed by: STUDENT IN AN ORGANIZED HEALTH CARE EDUCATION/TRAINING PROGRAM

## 2025-02-06 PROCEDURE — 36000707: Mod: WS

## 2025-02-06 PROCEDURE — 37000009 HC ANESTHESIA EA ADD 15 MINS: Mod: WS

## 2025-02-06 PROCEDURE — 71000033 HC RECOVERY, INTIAL HOUR: Performed by: STUDENT IN AN ORGANIZED HEALTH CARE EDUCATION/TRAINING PROGRAM

## 2025-02-06 PROCEDURE — 25000003 PHARM REV CODE 250: Performed by: NURSE ANESTHETIST, CERTIFIED REGISTERED

## 2025-02-06 PROCEDURE — C1789 PROSTHESIS, BREAST, IMP: HCPCS | Performed by: STUDENT IN AN ORGANIZED HEALTH CARE EDUCATION/TRAINING PROGRAM

## 2025-02-06 PROCEDURE — 37000008 HC ANESTHESIA 1ST 15 MINUTES: Performed by: STUDENT IN AN ORGANIZED HEALTH CARE EDUCATION/TRAINING PROGRAM

## 2025-02-06 PROCEDURE — 88300 SURGICAL PATH GROSS: CPT | Mod: 26,,, | Performed by: PATHOLOGY

## 2025-02-06 PROCEDURE — 37000009 HC ANESTHESIA EA ADD 15 MINS: Performed by: STUDENT IN AN ORGANIZED HEALTH CARE EDUCATION/TRAINING PROGRAM

## 2025-02-06 PROCEDURE — 27201423 OPTIME MED/SURG SUP & DEVICES STERILE SUPPLY: Performed by: STUDENT IN AN ORGANIZED HEALTH CARE EDUCATION/TRAINING PROGRAM

## 2025-02-06 PROCEDURE — 25000003 PHARM REV CODE 250: Performed by: STUDENT IN AN ORGANIZED HEALTH CARE EDUCATION/TRAINING PROGRAM

## 2025-02-06 PROCEDURE — 19371 PERI-IMPLT CAPSLC BRST COMPL: CPT | Mod: 50,51,59, | Performed by: STUDENT IN AN ORGANIZED HEALTH CARE EDUCATION/TRAINING PROGRAM

## 2025-02-06 DEVICE — IMPLANTABLE DEVICE: Type: IMPLANTABLE DEVICE | Site: BREAST | Status: FUNCTIONAL

## 2025-02-06 RX ORDER — ONDANSETRON HYDROCHLORIDE 2 MG/ML
INJECTION, SOLUTION INTRAVENOUS
Status: DISCONTINUED | OUTPATIENT
Start: 2025-02-06 | End: 2025-02-06

## 2025-02-06 RX ORDER — MUPIROCIN 20 MG/G
OINTMENT TOPICAL
Status: DISCONTINUED | OUTPATIENT
Start: 2025-02-06 | End: 2025-02-06 | Stop reason: HOSPADM

## 2025-02-06 RX ORDER — MUPIROCIN 20 MG/G
OINTMENT TOPICAL 2 TIMES DAILY
Status: DISCONTINUED | OUTPATIENT
Start: 2025-02-06 | End: 2025-02-06 | Stop reason: HOSPADM

## 2025-02-06 RX ORDER — FENTANYL CITRATE 50 UG/ML
INJECTION, SOLUTION INTRAMUSCULAR; INTRAVENOUS
Status: DISCONTINUED | OUTPATIENT
Start: 2025-02-06 | End: 2025-02-06

## 2025-02-06 RX ORDER — SODIUM CHLORIDE 0.9 % (FLUSH) 0.9 %
10 SYRINGE (ML) INJECTION
Status: DISCONTINUED | OUTPATIENT
Start: 2025-02-06 | End: 2025-02-06 | Stop reason: HOSPADM

## 2025-02-06 RX ORDER — ROCURONIUM BROMIDE 10 MG/ML
INJECTION, SOLUTION INTRAVENOUS
Status: DISCONTINUED | OUTPATIENT
Start: 2025-02-06 | End: 2025-02-06

## 2025-02-06 RX ORDER — LIDOCAINE HYDROCHLORIDE 20 MG/ML
INJECTION INTRAVENOUS
Status: DISCONTINUED | OUTPATIENT
Start: 2025-02-06 | End: 2025-02-06

## 2025-02-06 RX ORDER — PROCHLORPERAZINE EDISYLATE 5 MG/ML
5 INJECTION INTRAMUSCULAR; INTRAVENOUS EVERY 30 MIN PRN
Status: DISCONTINUED | OUTPATIENT
Start: 2025-02-06 | End: 2025-02-06 | Stop reason: HOSPADM

## 2025-02-06 RX ORDER — ACETAMINOPHEN 10 MG/ML
1000 INJECTION, SOLUTION INTRAVENOUS ONCE
Status: COMPLETED | OUTPATIENT
Start: 2025-02-06 | End: 2025-02-06

## 2025-02-06 RX ORDER — GENTAMICIN 40 MG/ML
INJECTION, SOLUTION INTRAMUSCULAR; INTRAVENOUS
Status: DISCONTINUED | OUTPATIENT
Start: 2025-02-06 | End: 2025-02-06 | Stop reason: HOSPADM

## 2025-02-06 RX ORDER — HYDROCODONE BITARTRATE AND ACETAMINOPHEN 5; 325 MG/1; MG/1
1 TABLET ORAL EVERY 4 HOURS PRN
Status: DISCONTINUED | OUTPATIENT
Start: 2025-02-06 | End: 2025-02-06 | Stop reason: HOSPADM

## 2025-02-06 RX ORDER — HYDROMORPHONE HYDROCHLORIDE 1 MG/ML
0.2 INJECTION, SOLUTION INTRAMUSCULAR; INTRAVENOUS; SUBCUTANEOUS EVERY 5 MIN PRN
Status: DISCONTINUED | OUTPATIENT
Start: 2025-02-06 | End: 2025-02-06 | Stop reason: HOSPADM

## 2025-02-06 RX ORDER — SODIUM CHLORIDE 9 MG/ML
INJECTION, SOLUTION INTRAVENOUS CONTINUOUS
Status: DISCONTINUED | OUTPATIENT
Start: 2025-02-06 | End: 2025-02-06 | Stop reason: HOSPADM

## 2025-02-06 RX ORDER — OXYCODONE AND ACETAMINOPHEN 5; 325 MG/1; MG/1
1 TABLET ORAL EVERY 6 HOURS PRN
Qty: 20 TABLET | Refills: 0 | Status: SHIPPED | OUTPATIENT
Start: 2025-02-06

## 2025-02-06 RX ORDER — LIDOCAINE HYDROCHLORIDE 10 MG/ML
1 INJECTION, SOLUTION EPIDURAL; INFILTRATION; INTRACAUDAL; PERINEURAL ONCE
Status: DISCONTINUED | OUTPATIENT
Start: 2025-02-06 | End: 2025-02-06 | Stop reason: HOSPADM

## 2025-02-06 RX ORDER — PROPOFOL 10 MG/ML
VIAL (ML) INTRAVENOUS
Status: DISCONTINUED | OUTPATIENT
Start: 2025-02-06 | End: 2025-02-06

## 2025-02-06 RX ORDER — FAMOTIDINE 10 MG/ML
INJECTION INTRAVENOUS
Status: DISCONTINUED | OUTPATIENT
Start: 2025-02-06 | End: 2025-02-06

## 2025-02-06 RX ORDER — OXYCODONE HYDROCHLORIDE 5 MG/1
5 TABLET ORAL
Status: DISCONTINUED | OUTPATIENT
Start: 2025-02-06 | End: 2025-02-06 | Stop reason: HOSPADM

## 2025-02-06 RX ORDER — CEFAZOLIN 2 G/1
2 INJECTION, POWDER, FOR SOLUTION INTRAMUSCULAR; INTRAVENOUS
Status: DISCONTINUED | OUTPATIENT
Start: 2025-02-06 | End: 2025-02-06 | Stop reason: HOSPADM

## 2025-02-06 RX ORDER — MIDAZOLAM HYDROCHLORIDE 1 MG/ML
INJECTION INTRAMUSCULAR; INTRAVENOUS
Status: DISCONTINUED | OUTPATIENT
Start: 2025-02-06 | End: 2025-02-06

## 2025-02-06 RX ORDER — MONTELUKAST SODIUM 10 MG/1
10 TABLET ORAL NIGHTLY
Qty: 90 TABLET | Refills: 0 | Status: SHIPPED | OUTPATIENT
Start: 2025-02-06 | End: 2025-05-07

## 2025-02-06 RX ORDER — HYDROMORPHONE HYDROCHLORIDE 2 MG/ML
INJECTION, SOLUTION INTRAMUSCULAR; INTRAVENOUS; SUBCUTANEOUS
Status: DISCONTINUED | OUTPATIENT
Start: 2025-02-06 | End: 2025-02-06

## 2025-02-06 RX ORDER — GLUCAGON 1 MG
1 KIT INJECTION
Status: DISCONTINUED | OUTPATIENT
Start: 2025-02-06 | End: 2025-02-06 | Stop reason: HOSPADM

## 2025-02-06 RX ORDER — CEFAZOLIN SODIUM 1 G/3ML
INJECTION, POWDER, FOR SOLUTION INTRAMUSCULAR; INTRAVENOUS
Status: DISCONTINUED | OUTPATIENT
Start: 2025-02-06 | End: 2025-02-06 | Stop reason: HOSPADM

## 2025-02-06 RX ADMIN — ONDANSETRON 4 MG: 2 INJECTION INTRAMUSCULAR; INTRAVENOUS at 08:02

## 2025-02-06 RX ADMIN — MUPIROCIN: 20 OINTMENT TOPICAL at 05:02

## 2025-02-06 RX ADMIN — ROCURONIUM BROMIDE 10 MG: 10 INJECTION INTRAVENOUS at 08:02

## 2025-02-06 RX ADMIN — LIDOCAINE HYDROCHLORIDE 60 MG: 20 INJECTION INTRAVENOUS at 07:02

## 2025-02-06 RX ADMIN — SODIUM CHLORIDE: 0.9 INJECTION, SOLUTION INTRAVENOUS at 06:02

## 2025-02-06 RX ADMIN — ROCURONIUM BROMIDE 50 MG: 10 INJECTION INTRAVENOUS at 07:02

## 2025-02-06 RX ADMIN — FENTANYL CITRATE 100 MCG: 50 INJECTION, SOLUTION INTRAMUSCULAR; INTRAVENOUS at 07:02

## 2025-02-06 RX ADMIN — HYDROMORPHONE HYDROCHLORIDE 0.5 MG: 2 INJECTION INTRAMUSCULAR; INTRAVENOUS; SUBCUTANEOUS at 09:02

## 2025-02-06 RX ADMIN — MIDAZOLAM HYDROCHLORIDE 2 MG: 1 INJECTION, SOLUTION INTRAMUSCULAR; INTRAVENOUS at 07:02

## 2025-02-06 RX ADMIN — PROPOFOL 120 MG: 10 INJECTION, EMULSION INTRAVENOUS at 07:02

## 2025-02-06 RX ADMIN — SODIUM CHLORIDE: 9 INJECTION, SOLUTION INTRAVENOUS at 05:02

## 2025-02-06 RX ADMIN — SODIUM CHLORIDE: 0.9 INJECTION, SOLUTION INTRAVENOUS at 08:02

## 2025-02-06 RX ADMIN — OXYCODONE 5 MG: 5 TABLET ORAL at 09:02

## 2025-02-06 RX ADMIN — PROCHLORPERAZINE EDISYLATE 5 MG: 5 INJECTION INTRAMUSCULAR; INTRAVENOUS at 09:02

## 2025-02-06 RX ADMIN — FAMOTIDINE 20 MG: 10 INJECTION, SOLUTION INTRAVENOUS at 08:02

## 2025-02-06 RX ADMIN — ACETAMINOPHEN 1000 MG: 10 INJECTION INTRAVENOUS at 10:02

## 2025-02-06 RX ADMIN — SUGAMMADEX 200 MG: 100 INJECTION, SOLUTION INTRAVENOUS at 09:02

## 2025-02-06 NOTE — OP NOTE
[facility] ([facility])  Plastic Surgery  Operative Note    SUMMARY     Date of Procedure: 2/6/2025     Location:  [facility]    Procedure(s): Procedure(s) (LRB):  REMOVAL, IMPLANT, RUPTURED, BREAST (Bilateral)  REPLACEMENT, IMPLANT, BREAST (Bilateral)     Bilateral breast implant exchange  Bilateral total capsulectomy    Surgeons and Role:     * Franco Cabrera MD - Primary    Assistant: DANIEL Lester    Pre-Operative Diagnosis: Breast implant rupture, initial encounter [T85.43XA]    Post-Operative Diagnosis: same    Anesthesia: General    Indications for Procedure:  Ms. Montes is a 54-year-old female with a history of breast augmentation over 10 years ago.  She developed capsular contracture and also has a known rupture of the right breast implant.  She desires to have her implants exchanged.  Of note, she has a history of a liver transplant and is on immunosuppression.  I counseled her extensively that the ideal surgery for her would be implant exchange and a breast lift but given her liver transplant I felt that a implant exchange was the safest option for her.  She was in agreement and decided to proceed with surgery.    Operative Findings (including complications, if any):  The right breast implant was ruptured.  This was an intracapsular rupture.  She had significant capsular contracture and significant capsules around both implants.    Description of Procedures:  After verifying informed consent, the patient was brought to the operating room placed in the operating room table in the supine position with her arms out on arm boards.  General endotracheal anesthesia was administered without complication.  Nursing placed a Thomas.  All bony prominences were padded.  The patient's chest was then prepped and draped in usual sterile fashion.  Next a formal time-out was performed.  Both breasts were performed in a similar fashion.  Beginning on the right side a inframammary fold  incision was made with a 15 blade scalpel.  Electrocautery was used to dissect down through subcutaneous tissue until the capsule of the breast implant was identified.  The capsule was opened.  The implant was obviously ruptured with leaking silicone and it was removed in its entirety.  Then using Allis clamps of the capsule was grasped and dissected from the surrounding tissue using electrocautery.  A total capsulectomy was performed.  The pocket was then irrigated with hydrogen peroxide.  Next, I turned to the left side in a similar fashion removed the implant and entire capsule.  Both pockets were then copiously irrigated with antibiotic solution, Betadine, and Vashe solution.  The new implants were opened on the back table and then inserted into the breast pockets using a Sofia funnel and a no-touch technique.  I then proceeded with closure of the incisions in layers.  The deep breast tissue was closed with buried interrupted 2-0 Vicryl sutures.  The deep dermis was closed with buried interrupted 3-0 Monocryl sutures.  Skin was closed with a running 4 Monocryl subcuticular stitch.  The incisions were then dressed with Steri-Strips.  The patient tolerated the procedure well and was awakened from anesthesia and brought to the recovery room in stable condition.    All sponge, instrument, and needle counts correct x2 at the end of the case.    Significant Surgical Tasks Conducted by the Assistant(s), if Applicable: The presence of Indigo Novak PA-C as first assistant was required due to the complexity of the procedure relative to any available residents. I certify that no resident was available who was qualified to serve as first assistant. Duties performed by the assistant included assisting the primary surgeon    Estimated Blood Loss (EBL): 20 cc           Implants:   Implant Name Type Inv. Item Serial No.  Lot No. LRB No. Used Action   Arpan Jaun   [id number] [id number] Right 1  Implanted   Arpan Zamorano   [id number] ALLERGAN [id number] Left 1 Implanted   Right breast implant: 405 cc  Left breast implant: 445 cc    Specimens:   Specimen (24h ago, onward)      None                    Condition: Good    Disposition: PACU - hemodynamically stable.    Attestation: I was present and scrubbed for the entire procedure.

## 2025-02-06 NOTE — DISCHARGE INSTRUCTIONS
Patient Instructions:    Shower in 48 hours   Wear bra at all times except for shower or to wash it   Call clinic with concerns/questions    Wound care:  You have glue/tape on incisions.  Ok to shower and allow water to run down incisions. No tubs or soaking incisions.  Guaze in bra for comfort only and can remove at any time.     Activity:  Need to be out of bed (walking or in a chair) for >8h per day.  No strenuous activity, No lifting >15lbs.    Driving:  No driving while on narcotics.    Follow up:  See Dr Cabrera in clinic in one week for follow up. Please call for appointment.

## 2025-02-06 NOTE — TRANSFER OF CARE
Anesthesia Transfer of Care Note    Patient: Malu Montes    Procedure(s) Performed: Procedure(s) (LRB):  REMOVAL, IMPLANT, RUPTURED, BREAST (Bilateral)  REPLACEMENT, IMPLANT, BREAST (Bilateral)    Patient location: PACU    Anesthesia Type: general    Transport from OR: Transported from OR on room air with adequate spontaneous ventilation    Post pain: adequate analgesia    Post assessment: no apparent anesthetic complications    Post vital signs: stable    Level of consciousness: awake    Nausea/Vomiting: no nausea/vomiting    Complications: none    Transfer of care protocol was followed    Last vitals: Visit Vitals  /61 (BP Location: Left arm, Patient Position: Lying)   Pulse 83   Temp 36.6 °C (97.9 °F) (Temporal)   Resp 18   LMP 10/20/2022 (Approximate)   SpO2 98%   Breastfeeding No

## 2025-02-06 NOTE — BRIEF OP NOTE
Certification of Assistant at Surgery       Surgery Date: 2/6/2025     Participating Surgeons:  Surgeons and Role:     * Franco Cabrera MD - Primary    Procedures:  Procedure(s) (LRB):  REMOVAL, IMPLANT, RUPTURED, BREAST (Bilateral)  REPLACEMENT, IMPLANT, BREAST (Bilateral)    Assistant Surgeon's Certification of Necessity:  I understand that section 1842 (b) (6) (d) of the Social Security Act generally prohibits Medicare Part B reasonable charge payment for the services of assistants at surgery in teaching hospitals when qualified residents are available to furnish such services. I certify that the services for which payment is claimed were medically necessary, and that no qualified resident was available to perform the services. I further understand that these services are subject to post-payment review by the Medicare carrier.      Indigo Drummond PA-C    02/06/2025  9:39 AM

## 2025-02-06 NOTE — PLAN OF CARE
Pt in preop bay 32, VSS, and IV inserted. Pt denies any open wounds on body. Last used monjouro 1/23.Pt needs anesthesia consents signed, updated H&P, otherwise ready to roll.    Procedural consents verified with pt.

## 2025-02-06 NOTE — ANESTHESIA PROCEDURE NOTES
Intubation    Date/Time: 2/6/2025 7:30 AM    Performed by: Leila Ramirez CRNA  Authorized by: Luis De Jesus MD    Intubation:     Induction:  Intravenous    Intubated:  Postinduction    Mask Ventilation:  Not attempted    Attempts:  1    Attempted By:  CRNA    Method of Intubation:  Video laryngoscopy    Blade:  Carrera 3    Laryngeal View Grade: Grade I - full view of cords      Difficult Airway Encountered?: No      Complications:  None    Airway Device:  Oral endotracheal tube    Airway Device Size:  7.0    Style/Cuff Inflation:  Cuffed    Inflation Amount (mL):  8    Tube secured:  21    Secured at:  The lips    Placement Verified By:  Capnometry    Complicating Factors:  None    Findings Post-Intubation:  BS equal bilateral

## 2025-02-06 NOTE — ANESTHESIA PREPROCEDURE EVALUATION
02/06/2025  Malu Montes is a 54 y.o., female.    Procedure(s) (LRB):  REMOVAL, IMPLANT, RUPTURED, BREAST (Bilateral)    Active Problem List with Overview Notes    Diagnosis Date Noted    Osteopenia of left hip 08/11/2023    COVID 07/16/2023    Immunosuppression due to drug therapy 09/21/2022    Drug-induced neutropenia 08/04/2022    Long-term use of immunosuppressant medication 06/07/2022    At risk for opportunistic infections 06/07/2022    Received liver transplant from donor with hepatitis C 06/07/2022    S/P liver transplant 06/06/2022    Steroid-induced hyperglycemia 06/06/2022    Prophylactic immunotherapy 06/06/2022    Infection due to Streptococcus mitis group 05/10/2022    Constipation 05/09/2022    Secondary hyperparathyroidism of renal origin 04/24/2022    Metabolic acidosis 02/04/2022    Iron deficiency anemia 12/23/2021    Chronic low blood pressure 12/11/2021    IgA nephropathy associated with liver disease 12/09/2021    Anemia of chronic disease 12/08/2021    Hyperlipidemia     Microscopic hematuria 09/10/2021    Hypothyroidism 07/22/2021    Stage 3a chronic kidney disease 07/22/2021    Alcohol use disorder, severe, in early remission 01/27/2021    Hypokalemia 01/27/2021    Folate deficiency 01/26/2021    Ketoacidosis 01/25/2021    Nicotine use disorder 12/30/2016    Vitamin D deficiency 04/26/2015    Ptosis of breast 05/29/2014    Anal fissure 01/12/2012    Internal hemorrhoids with other complication 01/12/2012           Pre-op Assessment    I have reviewed the Patient Summary Reports.    I have reviewed the NPO Status.   I have reviewed the Medications.     Review of Systems  Anesthesia Hx:  No problems with previous Anesthesia                Hematology/Oncology:       -- Anemia:                                  Renal/:  Chronic Renal Disease, CKD                Hepatic/GI:       Liver Disease,  H/o liver transplant on cellcept             Endocrine:   Hypothyroidism          Psych:  Psychiatric History                  Physical Exam  General: Alert and Cooperative    Airway:  Mallampati: III / II  Mouth Opening: Normal  TM Distance: Normal  Tongue: Normal    Dental:  Any loose or missing teeth verified with patient  Chest/Lungs:  Normal Respiratory Rate    Heart:  Rate: Normal        Anesthesia Plan  Type of Anesthesia, risks & benefits discussed:    Anesthesia Type: Gen ETT  Intra-op Monitoring Plan: Standard ASA Monitors  Post Op Pain Control Plan: multimodal analgesia  Induction:  IV  Airway Plan: Direct, Post-Induction  Informed Consent: Informed consent signed with the Patient and all parties understand the risks and agree with anesthesia plan.  All questions answered.   ASA Score: 3  Day of Surgery Review of History & Physical: H&P Update referred to the surgeon/provider.    Ready For Surgery From Anesthesia Perspective.     .

## 2025-02-06 NOTE — DISCHARGE SUMMARY
[facility]  Discharge Note  Short Stay    Procedure(s) (LRB):  REMOVAL, IMPLANT, RUPTURED, BREAST (Bilateral)  REPLACEMENT, IMPLANT, BREAST (Bilateral)      OUTCOME: Patient tolerated treatment/procedure well without complication and is now ready for discharge.    DISPOSITION: Home or Self Care    FINAL DIAGNOSIS:  <principal problem not specified>    FOLLOWUP: In clinic    DISCHARGE INSTRUCTIONS:  No discharge procedures on file.     TIME SPENT ON DISCHARGE: 20 minutes

## 2025-02-07 LAB
FINAL PATHOLOGIC DIAGNOSIS: NORMAL
GROSS: NORMAL
Lab: NORMAL

## 2025-02-07 NOTE — ANESTHESIA POSTPROCEDURE EVALUATION
Anesthesia Post Evaluation    Patient: Malu Montes    Procedure(s) Performed: Procedure(s) (LRB):  REMOVAL, IMPLANT, RUPTURED, BREAST (Bilateral)  REPLACEMENT, IMPLANT, BREAST (Bilateral)    Final Anesthesia Type: general      Patient location during evaluation: PACU  Patient participation: Yes- Able to Participate  Level of consciousness: responds to stimulation  Post-procedure vital signs: reviewed and stable  Pain management: adequate  Airway patency: patent  [place] mitigation strategies: Multimodal analgesia  PONV status at discharge: No PONV  Anesthetic complications: no      Cardiovascular status: hemodynamically stable  Respiratory status: spontaneous ventilation and room air  Hydration status: euvolemic  Follow-up not needed.              Vitals Value Taken Time   /56 02/06/25 1101   Temp 36.8 °C (98.3 °F) 02/06/25 0938   Pulse 65 02/06/25 1107   Resp 88 02/06/25 1102   SpO2 98 % 02/06/25 1107   Vitals shown include unfiled device data.      Event Time   Out of Recovery 10:30:00         Pain/Herminia Score: Pain Rating Prior to Med Admin: 6 (2/6/2025 10:22 AM)  Herminia Score: 10 (2/6/2025 11:00 AM)

## 2025-02-14 ENCOUNTER — PATIENT MESSAGE (OUTPATIENT)
Dept: TRANSPLANT | Facility: CLINIC | Age: 55
End: 2025-02-14

## 2025-02-14 ENCOUNTER — OFFICE VISIT (OUTPATIENT)
Dept: TRANSPLANT | Facility: CLINIC | Age: 55
End: 2025-02-14
Payer: COMMERCIAL

## 2025-02-14 ENCOUNTER — OFFICE VISIT (OUTPATIENT)
Dept: PLASTIC SURGERY | Facility: CLINIC | Age: 55
End: 2025-02-14
Payer: COMMERCIAL

## 2025-02-14 DIAGNOSIS — Z94.4 S/P LIVER TRANSPLANT: ICD-10-CM

## 2025-02-14 DIAGNOSIS — T85.43XD BREAST IMPLANT RUPTURE, SUBSEQUENT ENCOUNTER: Primary | ICD-10-CM

## 2025-02-14 DIAGNOSIS — Z29.89 PROPHYLACTIC IMMUNOTHERAPY: Primary | ICD-10-CM

## 2025-02-14 DIAGNOSIS — T85.43XD RUPTURED SILICONE BREAST IMPLANT, SUBSEQUENT ENCOUNTER: ICD-10-CM

## 2025-02-14 PROCEDURE — 98005 SYNCH AUDIO-VIDEO EST LOW 20: CPT | Mod: 95,,, | Performed by: INTERNAL MEDICINE

## 2025-02-14 PROCEDURE — 99999 PR PBB SHADOW E&M-EST. PATIENT-LVL III: CPT | Mod: PBBFAC,,, | Performed by: STUDENT IN AN ORGANIZED HEALTH CARE EDUCATION/TRAINING PROGRAM

## 2025-02-14 NOTE — PATIENT INSTRUCTIONS
colonoscopy 2031  Pap test every 3 years  Mammogram 10/25  Dermatologist annually  Bone density 8/26- ca/vit d and exercise  Will review next prograf level -labs every 3 months  Return 6 months

## 2025-02-14 NOTE — Clinical Note
1. colonoscopy 2031 2. Pap test every 3 years 3. Mammogram 10/25 4. Dermatologist annually 5. Bone density 8/26- ca/vit d and exercise 6. Will review next prograf level -labs every 3 months 7. Return 6 months

## 2025-02-14 NOTE — LETTER
February 14, 2025        Killian Dodd  [address] [place] [address]  Phone: [phone]  Fax: [phone]             [facility] 1st Fl  [address]  [facility] [address]  Phone: [phone]     Patient: Malu Montes   MR Number: [id number]   YOB: 1970   Date of Visit: 2/14/2025       Dear Dr. Killian Dodd    Thank you for referring Malu Montes to me for evaluation. Attached you will find relevant portions of my assessment and plan of care.    If you have questions, please do not hesitate to call me. I look forward to following Malu Montes along with you.    Sincerely,    Sharmila Pacheco MD    Enclosure    If you would like to receive this communication electronically, please contact [email or web address] or [phone] to request [employer] access.    [employer] is a tool which provides read-only access to select patient information with whom you have a relationship. Its easy to use and provides real time access to review your patients record including encounter summaries, notes, results, and demographic information.    If you feel you have received this communication in error or would no longer like to receive these types of communications, please e-mail [email or web address]

## 2025-02-14 NOTE — PROGRESS NOTES
The patient location is: home  The chief complaint leading to consultation is: f/u transplanted liver    Visit type: audiovisual    Face to Face time with patient: 15 min  30 minutes of total time spent on the encounter, which includes face to face time and non-face to face time preparing to see the patient (eg, review of tests), Obtaining and/or reviewing separately obtained history, Documenting clinical information in the electronic or other health record, Independently interpreting results (not separately reported) and communicating results to the patient/family/caregiver, or Care coordination (not separately reported).     Each patient to whom he or she provides medical services by telemedicine is:  (1) informed of the relationship between the physician and patient and the respective role of any other health care provider with respect to management of the patient; and (2) notified that he or she may decline to receive medical services by telemedicine and may withdraw from such care at any time.    Notes:    Transplant Hepatology  Liver Transplant Recipient Follow-up    Transplant Date: 6/5/2022  UNOS Native Liver Dx: Alcoholic Cirrhosis    Malu is here for follow up of her liver transplant.    ORGAN: LIVER  Whole or Partial: whole liver  Donor Type: donation after brain death  CDC High Risk: yes  Donor CMV Status: Positive  Donor HCV Status: Positive  Donor HBcAb: Negative  Donor HBV JACOBO:   Donor HCV JACOBO: Negative  Biliary Anastomosis: end to end  Arterial Anatomy: standard  IVC reconstruction: end to end ivc  Portal vein status: patent    She has had the following complications since transplant:  leukopenia . The noted complications is well controlled.    Subjective:     Interval History: Malu is now 2 years and 8 months post liver transplant. Currently, she is doing well. Is not smoking cigarettes and has stopped vaping.     Now s/p DBD OLTX 6/5 for ETOH cirrhosis (donor HCVab+/JACOBO-). Had intra-op HD,  originally listed as liver-kidney transplant. Kidney function improved and she was delisted for kidney tx. Surgery went without complication.     Allograft function 12/10/24: ALT 21, AST 22, ALKP 73, Tbil 0.3, creat 1.1, prograf level 3.9  IS: prograf, cellcept 500 mg bid  HCV Ab+, JACOBO- donor: HCV RNA-ve 8/1/22; did not acquire the infection    Abdo US 6/8/23: satisfactory doppler    Prophylaxis:   Aspirin: 81mg daily     Key Complications:   Bile Leak - NO  HAT / HAS-NO  Back to OR- NO    Health maintenance  Mammogram- had MRI due to ruptured implant- will get mammogram 1025  Pap test: every 3 years  Bone density 8/24: osteopenia- on ca w vit D and weight bearing exercises; repeat  8/26  Colonoscopy: repeat 2031  Dermatologist: no skin cancer    Review of Systems   Constitutional: Negative.    HENT: Negative.     Eyes: Negative.    Respiratory: Negative.     Cardiovascular: Negative.    Gastrointestinal: Negative.    Genitourinary: Negative.    Musculoskeletal: Negative.    Skin: Negative.    Neurological: Negative.    Psychiatric/Behavioral: Negative.         Objective:     There were no vitals filed for this visit.        Lab Results   Component Value Date    BILITOT 0.3 12/10/2024    AST 22 12/10/2024    ALT 21 12/10/2024    ALKPHOS 73 12/10/2024    CREATININE 1.4 01/24/2025    ALBUMIN 3.9 12/10/2024     Lab Results   Component Value Date    WBC 9.11 01/24/2025    HGB 12.2 01/24/2025    HCT 38.8 01/24/2025    HCT 31 (L) 06/06/2022     01/24/2025     Lab Results   Component Value Date    TACROLIMUS 3.9 (L) 12/10/2024       Assessment/Plan:   The patient is now 2 years and 8 months post liver transplant. Current recommendations:  1. Allograft function and control of IS: continue prograf (target 3-5); cellcept 500 mg bid, continue cellcept; labs every 3 months indefinitely  2. Renal insufficiency: kidneys have improved; now delisted for kidney tx; monitor; continue to f/u with nephtology since has a  diagnosis of IgA nephropathy; continue to minimize prograf  3. HCV Ab+/JACOBO- donor: did not acquire HCV  4. Hx alcohol abuse: periodic peth tests to be drawn  5. Nicotine use disorder: continue off  6. R/O osteoporosis. I am recommending a repeat bone density 8/2026  7.  Health maintenance: the patient should see a dermatologist annually to screen for skin cancer, perform regular colonoscopies (due 2031), pap tests every 3 years and mammograms annually (10/25)      Return 6 months  A total of 60 minutes was spent reviewing the patient's chart, examining the patient, reviewing labs and imaging and coordinating care with the patient's care team.        Patient advised that it is recommended that all transplant candidates, and their close contacts and household members receive Covid vaccination.    Sharmila Pacheco MD           [facility] Patient Status  Functional Status: 90% - Able to carry on normal activity: minor symptoms of disease  Physical Capacity: No Limitations  Working for income: yes  New diabetes onset between last follow-up to the current follow-up: No  Did patient have any acute rejection episodes during the follow-up period: No  Post transplant malignancy: No

## 2025-02-14 NOTE — PROGRESS NOTES
Plastic Surgery Clinic Postop Visit  Malu Montes is a 54 y.o. year old female who presents to the Plastic Surgery Clinic for follow up visit status post bilateral breast implant exchange on 02/06/25 by Dr. Franco Cabrera. Patient doing well overall. No bleeding, drainage or pain to the surgical site. Patient happy with the results.  Denies fever, chills, nausea, vomiting, or other systemic signs of infection.      REVIEW OF SYSTEMS:   Negative unless otherwise stated above in HPI    PHYSICAL EXAM:  There were no vitals filed for this visit.  WD WN NAD  VSS  Normal resp effort  Bilateral breasts soft and non tender. Bilateral IMF surgical sites with steri strips in place    ASSESSMENT/PLAN:  54 y.o. female status post bilateral breast implant exchange.  - Doing well, no issues.  - Steri strips removed in clinic. Surgical site healing well without erythema or drainage. Advised to keep dry and clean.   - Pt was seen and evaluated by myself and Dr. Franco Cabrera.  - Return to clinic in 1 month. Staff to schedule.    All questions were answered. The patient was advised to call the clinic with any questions or concerns prior to their next visit.     Indigo Novak PA-C  Plastic and Reconstructive Surgery  [phone]

## 2025-03-05 ENCOUNTER — LAB VISIT (OUTPATIENT)
Dept: LAB | Facility: HOSPITAL | Age: 55
End: 2025-03-05
Attending: INTERNAL MEDICINE
Payer: COMMERCIAL

## 2025-03-05 DIAGNOSIS — Z94.4 S/P LIVER TRANSPLANT: ICD-10-CM

## 2025-03-05 LAB
ALBUMIN SERPL BCP-MCNC: 4 G/DL (ref 3.5–5.2)
ALP SERPL-CCNC: 67 U/L (ref 40–150)
ALT SERPL W/O P-5'-P-CCNC: 21 U/L (ref 10–44)
ANION GAP SERPL CALC-SCNC: 9 MMOL/L (ref 8–16)
AST SERPL-CCNC: 26 U/L (ref 10–40)
BASOPHILS # BLD AUTO: 0.08 K/UL (ref 0–0.2)
BASOPHILS NFR BLD: 1 % (ref 0–1.9)
BILIRUB SERPL-MCNC: 0.3 MG/DL (ref 0.1–1)
BUN SERPL-MCNC: 21 MG/DL (ref 6–20)
CALCIUM SERPL-MCNC: 8.9 MG/DL (ref 8.7–10.5)
CHLORIDE SERPL-SCNC: 110 MMOL/L (ref 95–110)
CO2 SERPL-SCNC: 22 MMOL/L (ref 23–29)
CREAT SERPL-MCNC: 1.1 MG/DL (ref 0.5–1.4)
DIFFERENTIAL METHOD BLD: ABNORMAL
EOSINOPHIL # BLD AUTO: 0.6 K/UL (ref 0–0.5)
EOSINOPHIL NFR BLD: 7.5 % (ref 0–8)
ERYTHROCYTE [DISTWIDTH] IN BLOOD BY AUTOMATED COUNT: 13.2 % (ref 11.5–14.5)
EST. GFR  (NO RACE VARIABLE): >60 ML/MIN/1.73 M^2
GLUCOSE SERPL-MCNC: 95 MG/DL (ref 70–110)
HCT VFR BLD AUTO: 37.2 % (ref 37–48.5)
HGB BLD-MCNC: 11.5 G/DL (ref 12–16)
IMM GRANULOCYTES # BLD AUTO: 0.02 K/UL (ref 0–0.04)
IMM GRANULOCYTES NFR BLD AUTO: 0.2 % (ref 0–0.5)
LYMPHOCYTES # BLD AUTO: 2.2 K/UL (ref 1–4.8)
LYMPHOCYTES NFR BLD: 27.2 % (ref 18–48)
MCH RBC QN AUTO: 28.7 PG (ref 27–31)
MCHC RBC AUTO-ENTMCNC: 30.9 G/DL (ref 32–36)
MCV RBC AUTO: 93 FL (ref 82–98)
MONOCYTES # BLD AUTO: 0.6 K/UL (ref 0.3–1)
MONOCYTES NFR BLD: 7.2 % (ref 4–15)
NEUTROPHILS # BLD AUTO: 4.6 K/UL (ref 1.8–7.7)
NEUTROPHILS NFR BLD: 56.9 % (ref 38–73)
NRBC BLD-RTO: 0 /100 WBC
PLATELET # BLD AUTO: 284 K/UL (ref 150–450)
PMV BLD AUTO: 10.3 FL (ref 9.2–12.9)
POTASSIUM SERPL-SCNC: 4.4 MMOL/L (ref 3.5–5.1)
PROT SERPL-MCNC: 6.8 G/DL (ref 6–8.4)
RBC # BLD AUTO: 4.01 M/UL (ref 4–5.4)
SODIUM SERPL-SCNC: 141 MMOL/L (ref 136–145)
WBC # BLD AUTO: 8.1 K/UL (ref 3.9–12.7)

## 2025-03-05 PROCEDURE — 80197 ASSAY OF TACROLIMUS: CPT | Performed by: INTERNAL MEDICINE

## 2025-03-05 PROCEDURE — 85025 COMPLETE CBC W/AUTO DIFF WBC: CPT | Performed by: INTERNAL MEDICINE

## 2025-03-05 PROCEDURE — 80053 COMPREHEN METABOLIC PANEL: CPT | Performed by: INTERNAL MEDICINE

## 2025-03-05 PROCEDURE — 36415 COLL VENOUS BLD VENIPUNCTURE: CPT | Performed by: INTERNAL MEDICINE

## 2025-03-06 ENCOUNTER — RESULTS FOLLOW-UP (OUTPATIENT)
Dept: TRANSPLANT | Facility: CLINIC | Age: 55
End: 2025-03-06
Payer: COMMERCIAL

## 2025-03-06 DIAGNOSIS — Z94.4 S/P LIVER TRANSPLANT: ICD-10-CM

## 2025-03-06 LAB — TACROLIMUS BLD-MCNC: 2 NG/ML (ref 5–15)

## 2025-03-06 NOTE — TELEPHONE ENCOUNTER
Sent message to let patient know of change and to repeat labs on 4/08/25    ----- Message from Sharmila Pacheco MD sent at 3/6/2025 11:24 AM CST -----  The Labs are stable; increase prograf to 1/1 from 1/0.5 and repeat labs in one month - please let patient know.  ----- Message -----  From: Teja, Soft Lab Interface  Sent: 3/5/2025   1:46 PM CST  To: Sharmila Pacheco MD

## 2025-03-07 RX ORDER — TACROLIMUS 1 MG/1
1 CAPSULE ORAL EVERY 12 HOURS
Qty: 180 CAPSULE | Refills: 3 | Status: SHIPPED | OUTPATIENT
Start: 2025-03-07

## 2025-03-12 ENCOUNTER — PATIENT MESSAGE (OUTPATIENT)
Dept: PLASTIC SURGERY | Facility: CLINIC | Age: 55
End: 2025-03-12
Payer: COMMERCIAL

## 2025-03-14 ENCOUNTER — OFFICE VISIT (OUTPATIENT)
Dept: PLASTIC SURGERY | Facility: CLINIC | Age: 55
End: 2025-03-14
Payer: COMMERCIAL

## 2025-03-14 VITALS — DIASTOLIC BLOOD PRESSURE: 71 MMHG | SYSTOLIC BLOOD PRESSURE: 102 MMHG | HEART RATE: 90 BPM

## 2025-03-14 DIAGNOSIS — T85.43XD BREAST IMPLANT RUPTURE, SUBSEQUENT ENCOUNTER: Primary | ICD-10-CM

## 2025-03-14 PROCEDURE — 99999 PR PBB SHADOW E&M-EST. PATIENT-LVL IV: CPT | Mod: PBBFAC,,, | Performed by: STUDENT IN AN ORGANIZED HEALTH CARE EDUCATION/TRAINING PROGRAM

## 2025-03-14 NOTE — PROGRESS NOTES
Plastic Surgery Clinic Postop Visit  Malu Montes is a 54 y.o. year old female who presents to the Plastic Surgery Clinic for follow up visit status post  bilateral breast implant exchange on 02/06/25 by Dr. Franco Cabrera. Patient doing well overall. Noted some redness to the IMF site earlier this week, however resolved at this time.  No bleeding or pain to the surgical site.  Denies fever, chills, nausea, vomiting, or other systemic signs of infection.      REVIEW OF SYSTEMS:   Negative unless otherwise stated above in HPI    PHYSICAL EXAM:  Vitals:    03/14/25 1518   BP: 102/71   Pulse: 90     WD WN NAD  VSS  Normal resp effort  IMF incisions healing well. No dehiscence, drainage or erythema. Breasts symmetrical and soft.     ASSESSMENT/PLAN:  54 y.o. female status post bilateral breast implant exchange.   - Doing well, no issues.  IMF incisions healing well with out signs of infection. Advised on wound care and scar management.   - Pt was seen and evaluated by myself and Dr. Franco Cabrera.  - Return to clinic in 2 months. Staff to schedule.    All questions were answered. The patient was advised to call the clinic with any questions or concerns prior to their next visit.     Indigo Novak PA-C  Plastic and Reconstructive Surgery  [phone]

## 2025-03-28 DIAGNOSIS — F90.9 ATTENTION DEFICIT HYPERACTIVITY DISORDER (ADHD), UNSPECIFIED ADHD TYPE: ICD-10-CM

## 2025-03-31 RX ORDER — HYDROXYZINE HYDROCHLORIDE 25 MG/1
TABLET, FILM COATED ORAL
Qty: 60 TABLET | Refills: 1 | Status: SHIPPED | OUTPATIENT
Start: 2025-03-31

## 2025-03-31 RX ORDER — DEXTROAMPHETAMINE SACCHARATE, AMPHETAMINE ASPARTATE, DEXTROAMPHETAMINE SULFATE AND AMPHETAMINE SULFATE 7.5; 7.5; 7.5; 7.5 MG/1; MG/1; MG/1; MG/1
TABLET ORAL
Qty: 60 TABLET | Refills: 0 | Status: SHIPPED | OUTPATIENT
Start: 2025-03-31

## 2025-04-09 ENCOUNTER — PATIENT MESSAGE (OUTPATIENT)
Dept: PLASTIC SURGERY | Facility: CLINIC | Age: 55
End: 2025-04-09
Payer: COMMERCIAL

## 2025-04-10 ENCOUNTER — OFFICE VISIT (OUTPATIENT)
Dept: OPHTHALMOLOGY | Facility: CLINIC | Age: 55
End: 2025-04-10
Payer: COMMERCIAL

## 2025-04-10 DIAGNOSIS — H02.825 CYST OF LEFT LOWER EYELID: Primary | ICD-10-CM

## 2025-04-10 DIAGNOSIS — L98.8 FACIAL RHYTIDS: ICD-10-CM

## 2025-04-10 PROCEDURE — 99204 OFFICE O/P NEW MOD 45 MIN: CPT | Mod: S$GLB,,, | Performed by: OPHTHALMOLOGY

## 2025-04-10 PROCEDURE — 92285 EXTERNAL OCULAR PHOTOGRAPHY: CPT | Mod: S$GLB,,, | Performed by: OPHTHALMOLOGY

## 2025-04-10 PROCEDURE — 99999 PR PBB SHADOW E&M-EST. PATIENT-LVL IV: CPT | Mod: PBBFAC,,, | Performed by: OPHTHALMOLOGY

## 2025-04-10 NOTE — PROGRESS NOTES
HPI    Malu Montes is a/an 54 y.o. female here for neoplasm LLL evaluation.   Referred by: Dr. Seth  Eye Meds: lumify prn    Pt sts she has cyst LLL near puncta for past 1 year. Not causing any   irritation or pain/discomfort.       Last edited by Leslie Arellano on 4/10/2025  1:48 PM.            Assessment /Plan     For exam results, see Encounter Report.    Cyst of left lower eyelid  -     Ambulatory referral/consult to Ophthalmology    Facial rhytids        54 y.o. female with left lower eyelid cyst.     On exam, left lower eyelid lesion measuring 2.2 mm X 2.2 mm . There is no adjacent madarosis. There is no preauricular or submandibular adenopathy.     We discussed observation vs. Excisional biopsy.     Recommend excisional biopsy and send for pathology.    Patient would like to proceed with biopsy.    Pertinent r/b/a d/w patient prior to obtaining informed consent.     Return for surgery     The patient is interested in botulinum toxin for cosmetic enhancement of facial rhytides in addition to ptosis evaluation.

## 2025-04-11 ENCOUNTER — LAB VISIT (OUTPATIENT)
Dept: LAB | Facility: HOSPITAL | Age: 55
End: 2025-04-11
Attending: INTERNAL MEDICINE
Payer: COMMERCIAL

## 2025-04-11 DIAGNOSIS — Z94.4 S/P LIVER TRANSPLANT: ICD-10-CM

## 2025-04-11 LAB
ABSOLUTE EOSINOPHIL (OHS): 0.46 K/UL
ABSOLUTE MONOCYTE (OHS): 0.63 K/UL (ref 0.3–1)
ABSOLUTE NEUTROPHIL COUNT (OHS): 5.16 K/UL (ref 1.8–7.7)
ALBUMIN SERPL BCP-MCNC: 3.9 G/DL (ref 3.5–5.2)
ALP SERPL-CCNC: 74 UNIT/L (ref 40–150)
ALT SERPL W/O P-5'-P-CCNC: 24 UNIT/L (ref 10–44)
ANION GAP (OHS): 5 MMOL/L (ref 8–16)
AST SERPL-CCNC: 27 UNIT/L (ref 11–45)
BASOPHILS # BLD AUTO: 0.08 K/UL
BASOPHILS NFR BLD AUTO: 0.9 %
BILIRUB SERPL-MCNC: 0.2 MG/DL (ref 0.1–1)
BUN SERPL-MCNC: 18 MG/DL (ref 6–20)
CALCIUM SERPL-MCNC: 9.1 MG/DL (ref 8.7–10.5)
CHLORIDE SERPL-SCNC: 108 MMOL/L (ref 95–110)
CO2 SERPL-SCNC: 26 MMOL/L (ref 23–29)
CREAT SERPL-MCNC: 1.2 MG/DL (ref 0.5–1.4)
ERYTHROCYTE [DISTWIDTH] IN BLOOD BY AUTOMATED COUNT: 13.1 % (ref 11.5–14.5)
GFR SERPLBLD CREATININE-BSD FMLA CKD-EPI: 54 ML/MIN/1.73/M2
GLUCOSE SERPL-MCNC: 82 MG/DL (ref 70–110)
HCT VFR BLD AUTO: 39.2 % (ref 37–48.5)
HGB BLD-MCNC: 12.2 GM/DL (ref 12–16)
IMM GRANULOCYTES # BLD AUTO: 0.04 K/UL (ref 0–0.04)
IMM GRANULOCYTES NFR BLD AUTO: 0.5 % (ref 0–0.5)
LYMPHOCYTES # BLD AUTO: 2.38 K/UL (ref 1–4.8)
MCH RBC QN AUTO: 29 PG (ref 27–31)
MCHC RBC AUTO-ENTMCNC: 31.1 G/DL (ref 32–36)
MCV RBC AUTO: 93 FL (ref 82–98)
NUCLEATED RBC (/100WBC) (OHS): 0 /100 WBC
PLATELET # BLD AUTO: 295 K/UL (ref 150–450)
PMV BLD AUTO: 10.3 FL (ref 9.2–12.9)
POTASSIUM SERPL-SCNC: 4.3 MMOL/L (ref 3.5–5.1)
PROT SERPL-MCNC: 6.8 GM/DL (ref 6–8.4)
RBC # BLD AUTO: 4.21 M/UL (ref 4–5.4)
RELATIVE EOSINOPHIL (OHS): 5.3 %
RELATIVE LYMPHOCYTE (OHS): 27.2 % (ref 18–48)
RELATIVE MONOCYTE (OHS): 7.2 % (ref 4–15)
RELATIVE NEUTROPHIL (OHS): 58.9 % (ref 38–73)
SODIUM SERPL-SCNC: 139 MMOL/L (ref 136–145)
WBC # BLD AUTO: 8.75 K/UL (ref 3.9–12.7)

## 2025-04-11 PROCEDURE — 80197 ASSAY OF TACROLIMUS: CPT

## 2025-04-11 PROCEDURE — 80053 COMPREHEN METABOLIC PANEL: CPT

## 2025-04-11 PROCEDURE — 36415 COLL VENOUS BLD VENIPUNCTURE: CPT

## 2025-04-11 PROCEDURE — 85025 COMPLETE CBC W/AUTO DIFF WBC: CPT

## 2025-04-12 LAB — TACROLIMUS BLD-MCNC: 3.5 NG/ML (ref 5–15)

## 2025-05-01 DIAGNOSIS — F90.9 ATTENTION DEFICIT HYPERACTIVITY DISORDER (ADHD), UNSPECIFIED ADHD TYPE: ICD-10-CM

## 2025-05-02 RX ORDER — DEXTROAMPHETAMINE SACCHARATE, AMPHETAMINE ASPARTATE, DEXTROAMPHETAMINE SULFATE AND AMPHETAMINE SULFATE 7.5; 7.5; 7.5; 7.5 MG/1; MG/1; MG/1; MG/1
TABLET ORAL
Qty: 60 TABLET | Refills: 0 | Status: SHIPPED | OUTPATIENT
Start: 2025-05-02

## 2025-05-05 ENCOUNTER — TELEPHONE (OUTPATIENT)
Dept: PLASTIC SURGERY | Facility: CLINIC | Age: 55
End: 2025-05-05
Payer: COMMERCIAL

## 2025-05-05 NOTE — TELEPHONE ENCOUNTER
Spoke with patient regarding rescheduling appointment with Plastics MD. Pt states she will call back when she is ready to schedule.

## 2025-05-30 DIAGNOSIS — F90.9 ATTENTION DEFICIT HYPERACTIVITY DISORDER (ADHD), UNSPECIFIED ADHD TYPE: ICD-10-CM

## 2025-05-30 RX ORDER — DEXTROAMPHETAMINE SACCHARATE, AMPHETAMINE ASPARTATE, DEXTROAMPHETAMINE SULFATE AND AMPHETAMINE SULFATE 7.5; 7.5; 7.5; 7.5 MG/1; MG/1; MG/1; MG/1
TABLET ORAL
Qty: 60 TABLET | Refills: 0 | Status: SHIPPED | OUTPATIENT
Start: 2025-05-30

## 2025-06-03 DIAGNOSIS — E03.9 HYPOTHYROIDISM, UNSPECIFIED TYPE: ICD-10-CM

## 2025-06-03 RX ORDER — HYDROXYZINE HYDROCHLORIDE 25 MG/1
TABLET, FILM COATED ORAL
Qty: 60 TABLET | Refills: 1 | Status: SHIPPED | OUTPATIENT
Start: 2025-06-03

## 2025-06-03 RX ORDER — LEVOTHYROXINE SODIUM 50 UG/1
50 TABLET ORAL
Qty: 90 TABLET | Refills: 1 | Status: SHIPPED | OUTPATIENT
Start: 2025-06-03

## 2025-06-09 DIAGNOSIS — H02.825 CYST OF LEFT LOWER EYELID: Primary | ICD-10-CM

## 2025-06-09 RX ORDER — DIAZEPAM 5 MG/1
TABLET ORAL
Qty: 1 TABLET | Refills: 0 | Status: SHIPPED | OUTPATIENT
Start: 2025-06-09

## 2025-06-17 ENCOUNTER — HOSPITAL ENCOUNTER (OUTPATIENT)
Facility: HOSPITAL | Age: 55
Discharge: HOME | End: 2025-06-18
Attending: EMERGENCY MEDICINE | Admitting: EMERGENCY MEDICINE
Payer: COMMERCIAL

## 2025-06-17 DIAGNOSIS — R07.9 CHEST PAIN: ICD-10-CM

## 2025-06-17 DIAGNOSIS — R10.9 ABDOMINAL PAIN: ICD-10-CM

## 2025-06-17 DIAGNOSIS — Z94.4 HISTORY OF LIVER TRANSPLANT: Primary | ICD-10-CM

## 2025-06-17 DIAGNOSIS — R10.13 EPIGASTRIC ABDOMINAL PAIN: ICD-10-CM

## 2025-06-17 PROBLEM — T86.41 ACUTE REJECTION OF LIVER TRANSPLANT: Status: RESOLVED | Noted: 2025-06-17 | Resolved: 2025-06-17

## 2025-06-17 PROBLEM — T86.41 ACUTE REJECTION OF LIVER TRANSPLANT: Status: ACTIVE | Noted: 2025-06-17

## 2025-06-17 LAB
ABSOLUTE EOSINOPHIL (OHS): 0.46 K/UL
ABSOLUTE MONOCYTE (OHS): 0.71 K/UL (ref 0.3–1)
ABSOLUTE NEUTROPHIL COUNT (OHS): 8.31 K/UL (ref 1.8–7.7)
ALBUMIN SERPL BCP-MCNC: 4.4 G/DL (ref 3.5–5.2)
ALP SERPL-CCNC: 76 UNIT/L (ref 40–150)
ALT SERPL W/O P-5'-P-CCNC: 19 UNIT/L (ref 10–44)
ANION GAP (OHS): 11 MMOL/L (ref 8–16)
AST SERPL-CCNC: 36 UNIT/L (ref 11–45)
BASOPHILS # BLD AUTO: 0.05 K/UL
BASOPHILS NFR BLD AUTO: 0.4 %
BILIRUB SERPL-MCNC: 0.4 MG/DL (ref 0.1–1)
BILIRUB UR QL STRIP.AUTO: NEGATIVE
BUN SERPL-MCNC: 21 MG/DL (ref 6–20)
BUN SERPL-MCNC: 27 MG/DL (ref 6–30)
CALCIUM SERPL-MCNC: 9.5 MG/DL (ref 8.7–10.5)
CHLORIDE SERPL-SCNC: 104 MMOL/L (ref 95–110)
CHLORIDE SERPL-SCNC: 106 MMOL/L (ref 95–110)
CLARITY UR: CLEAR
CO2 SERPL-SCNC: 20 MMOL/L (ref 23–29)
COLOR UR AUTO: YELLOW
CREAT SERPL-MCNC: 1.2 MG/DL (ref 0.5–1.4)
CREAT SERPL-MCNC: 1.3 MG/DL (ref 0.5–1.4)
ERYTHROCYTE [DISTWIDTH] IN BLOOD BY AUTOMATED COUNT: 13 % (ref 11.5–14.5)
GFR SERPLBLD CREATININE-BSD FMLA CKD-EPI: 54 ML/MIN/1.73/M2
GLUCOSE SERPL-MCNC: 93 MG/DL (ref 70–110)
GLUCOSE SERPL-MCNC: 98 MG/DL (ref 70–110)
GLUCOSE UR QL STRIP: NEGATIVE
HCT VFR BLD AUTO: 38.2 % (ref 37–48.5)
HCT VFR BLD CALC: 38 %PCV (ref 36–54)
HCV AB SERPL QL IA: NORMAL
HGB BLD-MCNC: 12.8 GM/DL (ref 12–16)
HGB UR QL STRIP: NEGATIVE
HIV 1+2 AB+HIV1 P24 AG SERPL QL IA: NORMAL
HOLD SPECIMEN: NORMAL
HOLD SPECIMEN: NORMAL
IMM GRANULOCYTES # BLD AUTO: 0.02 K/UL (ref 0–0.04)
IMM GRANULOCYTES NFR BLD AUTO: 0.2 % (ref 0–0.5)
KETONES UR QL STRIP: NEGATIVE
LEUKOCYTE ESTERASE UR QL STRIP: NEGATIVE
LIPASE SERPL-CCNC: 10 U/L (ref 4–60)
LYMPHOCYTES # BLD AUTO: 2.06 K/UL (ref 1–4.8)
MAGNESIUM SERPL-MCNC: 1.6 MG/DL (ref 1.6–2.6)
MCH RBC QN AUTO: 29 PG (ref 27–31)
MCHC RBC AUTO-ENTMCNC: 33.5 G/DL (ref 32–36)
MCV RBC AUTO: 86 FL (ref 82–98)
NITRITE UR QL STRIP: NEGATIVE
NUCLEATED RBC (/100WBC) (OHS): 0 /100 WBC
PH UR STRIP: 5 [PH]
PLATELET # BLD AUTO: 366 K/UL (ref 150–450)
PMV BLD AUTO: 10.1 FL (ref 9.2–12.9)
POC IONIZED CALCIUM: 1.13 MMOL/L (ref 1.06–1.42)
POC TCO2 (MEASURED): 22 MMOL/L (ref 23–29)
POTASSIUM BLD-SCNC: 4.6 MMOL/L (ref 3.5–5.1)
POTASSIUM SERPL-SCNC: 4.7 MMOL/L (ref 3.5–5.1)
PROT SERPL-MCNC: 7.9 GM/DL (ref 6–8.4)
PROT UR QL STRIP: NEGATIVE
RBC # BLD AUTO: 4.42 M/UL (ref 4–5.4)
RELATIVE EOSINOPHIL (OHS): 4 %
RELATIVE LYMPHOCYTE (OHS): 17.7 % (ref 18–48)
RELATIVE MONOCYTE (OHS): 6.1 % (ref 4–15)
RELATIVE NEUTROPHIL (OHS): 71.6 % (ref 38–73)
SAMPLE: ABNORMAL
SODIUM BLD-SCNC: 137 MMOL/L (ref 136–145)
SODIUM SERPL-SCNC: 137 MMOL/L (ref 136–145)
SP GR UR STRIP: 1.02
UROBILINOGEN UR STRIP-ACNC: NEGATIVE EU/DL
WBC # BLD AUTO: 11.61 K/UL (ref 3.9–12.7)

## 2025-06-17 PROCEDURE — 87389 HIV-1 AG W/HIV-1&-2 AB AG IA: CPT | Performed by: PHYSICIAN ASSISTANT

## 2025-06-17 PROCEDURE — G0378 HOSPITAL OBSERVATION PER HR: HCPCS

## 2025-06-17 PROCEDURE — 96375 TX/PRO/DX INJ NEW DRUG ADDON: CPT

## 2025-06-17 PROCEDURE — 80053 COMPREHEN METABOLIC PANEL: CPT | Performed by: STUDENT IN AN ORGANIZED HEALTH CARE EDUCATION/TRAINING PROGRAM

## 2025-06-17 PROCEDURE — 81003 URINALYSIS AUTO W/O SCOPE: CPT | Performed by: STUDENT IN AN ORGANIZED HEALTH CARE EDUCATION/TRAINING PROGRAM

## 2025-06-17 PROCEDURE — 86803 HEPATITIS C AB TEST: CPT | Performed by: PHYSICIAN ASSISTANT

## 2025-06-17 PROCEDURE — 80047 BASIC METABLC PNL IONIZED CA: CPT

## 2025-06-17 PROCEDURE — 83735 ASSAY OF MAGNESIUM: CPT | Performed by: STUDENT IN AN ORGANIZED HEALTH CARE EDUCATION/TRAINING PROGRAM

## 2025-06-17 PROCEDURE — 85025 COMPLETE CBC W/AUTO DIFF WBC: CPT | Performed by: STUDENT IN AN ORGANIZED HEALTH CARE EDUCATION/TRAINING PROGRAM

## 2025-06-17 PROCEDURE — 82330 ASSAY OF CALCIUM: CPT | Mod: 59

## 2025-06-17 PROCEDURE — 83690 ASSAY OF LIPASE: CPT | Performed by: STUDENT IN AN ORGANIZED HEALTH CARE EDUCATION/TRAINING PROGRAM

## 2025-06-17 PROCEDURE — 93010 ELECTROCARDIOGRAM REPORT: CPT | Mod: ,,, | Performed by: INTERNAL MEDICINE

## 2025-06-17 PROCEDURE — 93005 ELECTROCARDIOGRAM TRACING: CPT

## 2025-06-17 PROCEDURE — 80197 ASSAY OF TACROLIMUS: CPT | Performed by: STUDENT IN AN ORGANIZED HEALTH CARE EDUCATION/TRAINING PROGRAM

## 2025-06-17 PROCEDURE — 99285 EMERGENCY DEPT VISIT HI MDM: CPT | Mod: 25

## 2025-06-17 PROCEDURE — 63600175 PHARM REV CODE 636 W HCPCS: Mod: JZ,TB | Performed by: EMERGENCY MEDICINE

## 2025-06-17 PROCEDURE — 96374 THER/PROPH/DIAG INJ IV PUSH: CPT

## 2025-06-17 RX ORDER — LEVOTHYROXINE SODIUM 50 UG/1
50 TABLET ORAL
Status: DISCONTINUED | OUTPATIENT
Start: 2025-06-18 | End: 2025-06-18 | Stop reason: HOSPADM

## 2025-06-17 RX ORDER — PANTOPRAZOLE SODIUM 40 MG/1
40 TABLET, DELAYED RELEASE ORAL DAILY
Status: DISCONTINUED | OUTPATIENT
Start: 2025-06-18 | End: 2025-06-18 | Stop reason: HOSPADM

## 2025-06-17 RX ORDER — GLUCAGON 1 MG
1 KIT INJECTION
Status: DISCONTINUED | OUTPATIENT
Start: 2025-06-18 | End: 2025-06-18 | Stop reason: HOSPADM

## 2025-06-17 RX ORDER — ASPIRIN 81 MG/1
81 TABLET ORAL DAILY
Status: DISCONTINUED | OUTPATIENT
Start: 2025-06-18 | End: 2025-06-18 | Stop reason: HOSPADM

## 2025-06-17 RX ORDER — PROCHLORPERAZINE EDISYLATE 5 MG/ML
5 INJECTION INTRAMUSCULAR; INTRAVENOUS EVERY 6 HOURS PRN
Status: DISCONTINUED | OUTPATIENT
Start: 2025-06-18 | End: 2025-06-18 | Stop reason: HOSPADM

## 2025-06-17 RX ORDER — KETOROLAC TROMETHAMINE 30 MG/ML
10 INJECTION, SOLUTION INTRAMUSCULAR; INTRAVENOUS
Status: COMPLETED | OUTPATIENT
Start: 2025-06-17 | End: 2025-06-17

## 2025-06-17 RX ORDER — TACROLIMUS 1 MG/1
1 CAPSULE ORAL 2 TIMES DAILY
Status: DISCONTINUED | OUTPATIENT
Start: 2025-06-17 | End: 2025-06-18 | Stop reason: HOSPADM

## 2025-06-17 RX ORDER — IBUPROFEN 200 MG
16 TABLET ORAL
Status: DISCONTINUED | OUTPATIENT
Start: 2025-06-18 | End: 2025-06-18 | Stop reason: HOSPADM

## 2025-06-17 RX ORDER — ONDANSETRON HYDROCHLORIDE 2 MG/ML
4 INJECTION, SOLUTION INTRAVENOUS EVERY 8 HOURS PRN
Status: DISCONTINUED | OUTPATIENT
Start: 2025-06-18 | End: 2025-06-18 | Stop reason: HOSPADM

## 2025-06-17 RX ORDER — IBUPROFEN 200 MG
24 TABLET ORAL
Status: DISCONTINUED | OUTPATIENT
Start: 2025-06-18 | End: 2025-06-18 | Stop reason: HOSPADM

## 2025-06-17 RX ORDER — TALC
6 POWDER (GRAM) TOPICAL NIGHTLY PRN
Status: DISCONTINUED | OUTPATIENT
Start: 2025-06-18 | End: 2025-06-18 | Stop reason: HOSPADM

## 2025-06-17 RX ORDER — ACETAMINOPHEN 325 MG/1
650 TABLET ORAL EVERY 4 HOURS PRN
Status: DISCONTINUED | OUTPATIENT
Start: 2025-06-18 | End: 2025-06-18 | Stop reason: HOSPADM

## 2025-06-17 RX ORDER — MYCOPHENOLATE MOFETIL 250 MG/1
500 CAPSULE ORAL 2 TIMES DAILY
Status: DISCONTINUED | OUTPATIENT
Start: 2025-06-18 | End: 2025-06-18 | Stop reason: HOSPADM

## 2025-06-17 RX ORDER — MORPHINE SULFATE 2 MG/ML
2 INJECTION, SOLUTION INTRAMUSCULAR; INTRAVENOUS
Refills: 0 | Status: COMPLETED | OUTPATIENT
Start: 2025-06-17 | End: 2025-06-17

## 2025-06-17 RX ORDER — MYCOPHENOLATE MOFETIL 250 MG/1
250 CAPSULE ORAL 2 TIMES DAILY
Status: DISCONTINUED | OUTPATIENT
Start: 2025-06-17 | End: 2025-06-17

## 2025-06-17 RX ORDER — POLYETHYLENE GLYCOL 3350 17 G/17G
17 POWDER, FOR SOLUTION ORAL DAILY PRN
Status: DISCONTINUED | OUTPATIENT
Start: 2025-06-18 | End: 2025-06-17

## 2025-06-17 RX ORDER — ATORVASTATIN CALCIUM 40 MG/1
40 TABLET, FILM COATED ORAL DAILY
Status: DISCONTINUED | OUTPATIENT
Start: 2025-06-18 | End: 2025-06-18 | Stop reason: HOSPADM

## 2025-06-17 RX ORDER — ENOXAPARIN SODIUM 100 MG/ML
40 INJECTION SUBCUTANEOUS EVERY 24 HOURS
Status: DISCONTINUED | OUTPATIENT
Start: 2025-06-18 | End: 2025-06-18 | Stop reason: HOSPADM

## 2025-06-17 RX ORDER — HYDROXYZINE HYDROCHLORIDE 25 MG/1
25 TABLET, FILM COATED ORAL NIGHTLY PRN
Status: DISCONTINUED | OUTPATIENT
Start: 2025-06-18 | End: 2025-06-18 | Stop reason: HOSPADM

## 2025-06-17 RX ADMIN — KETOROLAC TROMETHAMINE 10 MG: 30 INJECTION, SOLUTION INTRAMUSCULAR; INTRAVENOUS at 08:06

## 2025-06-17 RX ADMIN — MORPHINE SULFATE 2 MG: 2 INJECTION, SOLUTION INTRAMUSCULAR; INTRAVENOUS at 08:06

## 2025-06-17 NOTE — ED PROVIDER NOTES
Encounter Date: 2025       History     Chief Complaint   Patient presents with    Abdominal Pain     Ruq pain, liver transplant,      HPI  Review of patient's allergies indicates:  No Known Allergies  Past Medical History:   Diagnosis Date    ADD (attention deficit disorder)     Anxiety     Ascites of liver     Cirrhosis 2021    CKD (chronic kidney disease) stage 3, GFR 30-59 ml/min     Constipation     ETOH abuse     Hyperlipidemia     Hypothyroidism     Liver transplant candidate 2022    Other ascites 2021    Pancreatitis, acute     Tobacco use     Vitamin D deficiency      Past Surgical History:   Procedure Laterality Date    AUGMENTATION OF BREAST      second set    breast augmentation       SECTION      COLONOSCOPY N/A 2021    Procedure: COLONOSCOPY;  Surgeon: Dao Gray MD;  Location: [facility] ENDO (2ND FLR);  Service: Endoscopy;  Laterality: N/A;  COVID test 21 General surgery clinic [facility]    CYSTOSCOPY N/A 09/10/2021    Procedure: CYSTOSCOPY;  Surgeon: Rj Sánchez Jr., MD;  Location: [facility] OR 1ST FLR;  Service: Urology;  Laterality: N/A;    ESOPHAGOGASTRODUODENOSCOPY N/A 2021    Procedure: ESOPHAGOGASTRODUODENOSCOPY (EGD);  Surgeon: Dao Gray MD;  Location: [facility] ENDO (2ND FLR);  Service: Endoscopy;  Laterality: N/A;  labs current on     ESOPHAGOGASTRODUODENOSCOPY N/A 10/26/2021    Procedure: ESOPHAGOGASTRODUODENOSCOPY (EGD);  Surgeon: Dao Gray MD;  Location: [facility] ENDO (2ND FLR);  Service: Endoscopy;  Laterality: N/A;  to be done the week of 10/18/21 per Dr. Gray-BB  covid-10/23/21-[facility]-   10/25 arrival time confirmed with pt-rb    ESOPHAGOGASTRODUODENOSCOPY Left 2021    Procedure: EGD (ESOPHAGOGASTRODUODENOSCOPY);  Surgeon: Koffi Monteiro MD;  Location: [facility] ENDO (4TH FLR);  Service: Endoscopy;  Laterality: Left;  Rapid  pt requested AM appt and first available in December-  labs morning of procedure-   lvm to  confirm appt-rb    HEMORRHOID SURGERY      LIVER TRANSPLANT N/A 06/05/2022    Procedure: TRANSPLANT, LIVER;  Surgeon: Franco Slaughter MD;  Location: [facility] OR [address] [place] [address];  Service: Transplant;  Laterality: N/A;    PERITONEOCENTESIS Right 06/08/2021    Procedure: PARACENTESIS, ABDOMINAL;  Surgeon: Jay Torre MD;  Location: [facility] CATH LAB;  Service: Radiology;  Laterality: Right;    PERITONEOCENTESIS Right 06/25/2021    Procedure: PARACENTESIS, ABDOMINAL;  Surgeon: Jay Torre MD;  Location: [facility] CATH LAB;  Service: Radiology;  Laterality: Right;    PERITONEOCENTESIS Right 07/12/2021    Procedure: PARACENTESIS, ABDOMINAL;  Surgeon: Jay Torre MD;  Location: [facility] CATH LAB;  Service: Radiology;  Laterality: Right;    PERITONEOCENTESIS Right 07/23/2021    Procedure: PARACENTESIS, ABDOMINAL;  Surgeon: Edward De La Torre II, MD;  Location: [facility] CATH LAB;  Service: Interventional Radiology;  Laterality: Right;    PERITONEOCENTESIS Right 08/03/2021    Procedure: PARACENTESIS, ABDOMINAL;  Surgeon: Franco Cheung MD;  Location: [facility] CATH LAB;  Service: Radiology;  Laterality: Right;    PERITONEOCENTESIS Right 08/16/2021    Procedure: PARACENTESIS, ABDOMINAL;  Surgeon: Jay Torre MD;  Location: [facility] LAB;  Service: Radiology;  Laterality: Right;    PERITONEOCENTESIS Right 08/23/2021    Procedure: PARACENTESIS, ABDOMINAL;  Surgeon: Jay Torre MD;  Location: [facility] CATH LAB;  Service: Radiology;  Laterality: Right;    PERITONEOCENTESIS Right 09/16/2021    Procedure: PARACENTESIS, ABDOMINAL;  Surgeon: Jay Torre MD;  Location: [facility] CATH LAB;  Service: Radiology;  Laterality: Right;    PERITONEOCENTESIS N/A 09/24/2021    Procedure: PARACENTESIS, ABDOMINAL;  Surgeon: Jay Torre MD;  Location: [facility] CATH LAB;  Service: Radiology;  Laterality: N/A;    PERITONEOCENTESIS Right 12/23/2021    Procedure: PARACENTESIS, ABDOMINAL;  Surgeon: Jay Torre MD;  Location: [facility]  LAB;  Service: Radiology;  Laterality: Right;    PERITONEOCENTESIS N/A 12/30/2021    Procedure: PARACENTESIS, ABDOMINAL;  Surgeon: Salas Cabrera MD;  Location: [facility] CATH LAB;  Service: Radiology;  Laterality: N/A;    REMOVAL, IMPLANT, RUPTURED, BREAST Bilateral 2/6/2025    Procedure: REMOVAL, IMPLANT, RUPTURED, BREAST;  Surgeon: Franco Cabrera MD;  [facility] OR;  Service: Plastics;  Laterality: Bilateral;    REPLACEMENT OF IMPLANT OF BREAST Bilateral 2/6/2025    Procedure: REPLACEMENT, IMPLANT, BREAST;  Surgeon: Franco Cabrera MD;  Location: [facility] OR;  Service: Plastics;  Laterality: Bilateral;    RETROGRADE PYELOGRAPHY Bilateral 09/10/2021    Procedure: PYELOGRAM, RETROGRADE;  Surgeon: Rj Sánchez Jr., MD;  Location: [address];  Service: Urology;  Laterality: Bilateral;    TONSILLECTOMY       Family History   Problem Relation Name Age of Onset    Cancer Mother          Uterine Cancer     No Known Problems Father      No Known Problems Sister      Sleep apnea Daughter      ADD / ADHD Daughter      Heart disease Maternal Grandmother          CHF    COPD Maternal Grandfather      Heart disease Paternal Grandmother          CHF    Colon cancer Neg Hx      Inflammatory bowel disease Neg Hx      Stomach cancer Neg Hx      Breast cancer Neg Hx      Ovarian cancer Neg Hx      Learning disabilities Neg Hx       Social History[1]  Review of Systems    Physical Exam     Initial Vitals [06/17/25 1803]   BP Pulse Resp Temp SpO2   (!) 152/78 104 20 98.3 °F (36.8 °C) 98 %      MAP       --         Physical Exam    ED Course   Procedures  Labs Reviewed   HEPATITIS C ANTIBODY   HEP C VIRUS HOLD SPECIMEN   HIV 1 / 2 ANTIBODY          Imaging Results    None          Medications - No data to display  Medical Decision Making                                    Clinical Impression:  Final diagnoses:  [R10.9] Abdominal pain                       [1]   Social History  Tobacco Use    Smoking status: Former     Current packs/day:  0.00     Average packs/day: 0.3 packs/day for 27.0 years (6.8 ttl pk-yrs)     Types: Vaping with nicotine, Cigarettes     Quit date: 2024     Years since quittin.6    Smokeless tobacco: Never    Tobacco comments:     Social nicotine vape use, but quit    Substance Use Topics    Alcohol use: Not Currently     Comment: quit caridad 15    Drug use: No

## 2025-06-17 NOTE — ED TRIAGE NOTES
Patient identifiers verified and correct for Malu Montes  C/C: abdominal pain  APPEARANCE: awake and alert in NAD.  SKIN: warm, dry and intact. No breakdown or bruising.  MUSCULOSKELETAL: Patient moving all extremities spontaneously, no obvious swelling or deformities noted. Ambulates independently.  RESPIRATORY: Denies shortness of breath.Respirations unlabored.   CARDIAC: Denies CP, 2+ distal pulses; no peripheral edema  ABDOMEN: S/ND/NT, reports some nausea  : voids spontaneously, denies difficulty  Neurologic: AAO x 4; follows commands equal strength in all extremities; denies numbness/tingling. Denies dizziness       Post-liver transplant pt (3 years ago) and stage 3 CKD. Pain in the upper right abdomen that radiates into the back for the past 5 hours.

## 2025-06-18 VITALS
WEIGHT: 130 LBS | RESPIRATION RATE: 18 BRPM | HEART RATE: 86 BPM | DIASTOLIC BLOOD PRESSURE: 69 MMHG | HEIGHT: 63 IN | TEMPERATURE: 98 F | OXYGEN SATURATION: 98 % | SYSTOLIC BLOOD PRESSURE: 121 MMHG | BODY MASS INDEX: 23.04 KG/M2

## 2025-06-18 PROBLEM — R10.13 EPIGASTRIC ABDOMINAL PAIN: Status: ACTIVE | Noted: 2025-06-18

## 2025-06-18 PROBLEM — R10.13 EPIGASTRIC ABDOMINAL PAIN: Status: RESOLVED | Noted: 2025-06-18 | Resolved: 2025-06-18

## 2025-06-18 LAB
ABSOLUTE EOSINOPHIL (OHS): 0.45 K/UL
ABSOLUTE MONOCYTE (OHS): 0.94 K/UL (ref 0.3–1)
ABSOLUTE NEUTROPHIL COUNT (OHS): 8.41 K/UL (ref 1.8–7.7)
ALBUMIN SERPL BCP-MCNC: 3.9 G/DL (ref 3.5–5.2)
ALP SERPL-CCNC: 74 UNIT/L (ref 40–150)
ALT SERPL W/O P-5'-P-CCNC: 16 UNIT/L (ref 10–44)
ANION GAP (OHS): 9 MMOL/L (ref 8–16)
AST SERPL-CCNC: 20 UNIT/L (ref 11–45)
BASOPHILS # BLD AUTO: 0.08 K/UL
BASOPHILS NFR BLD AUTO: 0.7 %
BILIRUB SERPL-MCNC: 0.4 MG/DL (ref 0.1–1)
BUN SERPL-MCNC: 21 MG/DL (ref 6–20)
CALCIUM SERPL-MCNC: 9.2 MG/DL (ref 8.7–10.5)
CHLORIDE SERPL-SCNC: 105 MMOL/L (ref 95–110)
CO2 SERPL-SCNC: 24 MMOL/L (ref 23–29)
CREAT SERPL-MCNC: 1.3 MG/DL (ref 0.5–1.4)
ERYTHROCYTE [DISTWIDTH] IN BLOOD BY AUTOMATED COUNT: 12.9 % (ref 11.5–14.5)
GFR SERPLBLD CREATININE-BSD FMLA CKD-EPI: 49 ML/MIN/1.73/M2
GLUCOSE SERPL-MCNC: 102 MG/DL (ref 70–110)
HCT VFR BLD AUTO: 33.7 % (ref 37–48.5)
HGB BLD-MCNC: 10.8 GM/DL (ref 12–16)
IMM GRANULOCYTES # BLD AUTO: 0.04 K/UL (ref 0–0.04)
IMM GRANULOCYTES NFR BLD AUTO: 0.3 % (ref 0–0.5)
LYMPHOCYTES # BLD AUTO: 2.23 K/UL (ref 1–4.8)
MAGNESIUM SERPL-MCNC: 1.7 MG/DL (ref 1.6–2.6)
MCH RBC QN AUTO: 28.5 PG (ref 27–31)
MCHC RBC AUTO-ENTMCNC: 32 G/DL (ref 32–36)
MCV RBC AUTO: 89 FL (ref 82–98)
NUCLEATED RBC (/100WBC) (OHS): 0 /100 WBC
OHS QRS DURATION: 76 MS
OHS QRS DURATION: 76 MS
OHS QTC CALCULATION: 439 MS
OHS QTC CALCULATION: 440 MS
PHOSPHATE SERPL-MCNC: 3.9 MG/DL (ref 2.7–4.5)
PLATELET # BLD AUTO: 287 K/UL (ref 150–450)
PMV BLD AUTO: 9.9 FL (ref 9.2–12.9)
POCT GLUCOSE: 95 MG/DL (ref 70–110)
POTASSIUM SERPL-SCNC: 4.2 MMOL/L (ref 3.5–5.1)
PROT SERPL-MCNC: 6.6 GM/DL (ref 6–8.4)
RBC # BLD AUTO: 3.79 M/UL (ref 4–5.4)
RELATIVE EOSINOPHIL (OHS): 3.7 %
RELATIVE LYMPHOCYTE (OHS): 18.4 % (ref 18–48)
RELATIVE MONOCYTE (OHS): 7.7 % (ref 4–15)
RELATIVE NEUTROPHIL (OHS): 69.2 % (ref 38–73)
SODIUM SERPL-SCNC: 138 MMOL/L (ref 136–145)
TACROLIMUS BLD-MCNC: 4.1 NG/ML (ref 5–15)
TACROLIMUS BLD-MCNC: 4.3 NG/ML (ref 5–15)
WBC # BLD AUTO: 12.15 K/UL (ref 3.9–12.7)

## 2025-06-18 PROCEDURE — 85025 COMPLETE CBC W/AUTO DIFF WBC: CPT | Performed by: PHYSICIAN ASSISTANT

## 2025-06-18 PROCEDURE — 83735 ASSAY OF MAGNESIUM: CPT | Performed by: PHYSICIAN ASSISTANT

## 2025-06-18 PROCEDURE — G0378 HOSPITAL OBSERVATION PER HR: HCPCS

## 2025-06-18 PROCEDURE — 82040 ASSAY OF SERUM ALBUMIN: CPT | Performed by: PHYSICIAN ASSISTANT

## 2025-06-18 PROCEDURE — 25000003 PHARM REV CODE 250: Performed by: PHYSICIAN ASSISTANT

## 2025-06-18 PROCEDURE — 80197 ASSAY OF TACROLIMUS: CPT | Performed by: PHYSICIAN ASSISTANT

## 2025-06-18 PROCEDURE — 84100 ASSAY OF PHOSPHORUS: CPT | Performed by: PHYSICIAN ASSISTANT

## 2025-06-18 PROCEDURE — 36415 COLL VENOUS BLD VENIPUNCTURE: CPT | Performed by: PHYSICIAN ASSISTANT

## 2025-06-18 PROCEDURE — 99214 OFFICE O/P EST MOD 30 MIN: CPT | Mod: ,,, | Performed by: STUDENT IN AN ORGANIZED HEALTH CARE EDUCATION/TRAINING PROGRAM

## 2025-06-18 PROCEDURE — 63600175 PHARM REV CODE 636 W HCPCS: Performed by: PHYSICIAN ASSISTANT

## 2025-06-18 RX ADMIN — MYCOPHENOLATE MOFETIL 500 MG: 250 CAPSULE ORAL at 08:06

## 2025-06-18 RX ADMIN — TACROLIMUS 1 MG: 1 CAPSULE ORAL at 08:06

## 2025-06-18 RX ADMIN — Medication 6 MG: at 12:06

## 2025-06-18 RX ADMIN — DOCUSATE SODIUM 50 MG: 50 CAPSULE, LIQUID FILLED ORAL at 08:06

## 2025-06-18 RX ADMIN — PANTOPRAZOLE SODIUM 40 MG: 40 TABLET, DELAYED RELEASE ORAL at 08:06

## 2025-06-18 RX ADMIN — ASPIRIN 81 MG: 81 TABLET, COATED ORAL at 08:06

## 2025-06-18 RX ADMIN — ATORVASTATIN CALCIUM 40 MG: 40 TABLET, FILM COATED ORAL at 08:06

## 2025-06-18 RX ADMIN — MYCOPHENOLATE MOFETIL 500 MG: 250 CAPSULE ORAL at 12:06

## 2025-06-18 RX ADMIN — TACROLIMUS 1 MG: 1 CAPSULE ORAL at 12:06

## 2025-06-18 RX ADMIN — LEVOTHYROXINE SODIUM 50 MCG: 0.05 TABLET ORAL at 05:06

## 2025-06-18 NOTE — HPI
Malu Montes is a 55 y.o. female with a PMHx of alcoholic cirrhosis s/p liver transplant 3 years, alcohol abuse in remission, hypothyroidism, hx pancreatitis, HLD who presents to [facility] for evaluation of abdominal pain. Patient reports sharp, squeezing epigastric abdominal pain began earlier today. She thought symptoms may be due to constipation and she occasionally gets abdominal cramping due to constipation, however symptoms were much more severe today. Notes associated chills, nausea and vomiting. Symptoms resolved with morphine in the ED. Reports compliance with home immunosuppressants. Denies fever, chest pain, SOB, LE swelling, or syncope. Last ETOH drink ~4 years ago. Last BM earlier today.     ED: AFVSS. No leukocytosis or electrolyte abnormalities. LFTs WNL. UA liver transplant shows resistive indices are higher when compared with prior study in 2023, though remain within the limits of normal and with normal waveforms. Hepatology consulted; rec admit to  to rule out acute liver rejection.

## 2025-06-18 NOTE — ED PROVIDER NOTES
Encounter Date: 2025       History     Chief Complaint   Patient presents with    Abdominal Pain     Ruq pain, liver transplant,      55-year-old female with a history of cirrhosis of the liver, status post liver transplant 3 years ago, CKD 3, pancreatitis presenting with right upper quadrant pain.  Patient reports pain is intermittent, stabbing, sharp.  Notes 1 episode of nausea and vomiting earlier today.  Denies any nausea currently.  Reports pain is mostly subsided but intermittently comes in waves.  Reports history of kidney stones, states it does not feel as bad as a kidney stone.  Denies diarrhea or constipation.  Notes mild chills though denies any fevers.  Denies chest pain, shortness of breath.  Reports compliance with rejection meds.      Review of patient's allergies indicates:  No Known Allergies  Past Medical History:   Diagnosis Date    ADD (attention deficit disorder)     Anxiety     Ascites of liver     Cirrhosis 2021    CKD (chronic kidney disease) stage 3, GFR 30-59 ml/min     Constipation     ETOH abuse     Hyperlipidemia     Hypothyroidism     Liver transplant candidate 2022    Other ascites 2021    Pancreatitis, acute     Tobacco use     Vitamin D deficiency      Past Surgical History:   Procedure Laterality Date    AUGMENTATION OF BREAST      second set    breast augmentation       SECTION      COLONOSCOPY N/A 2021    Procedure: COLONOSCOPY;  Surgeon: Dao Gray MD;  Location: [facility] ([address] [place] [address]);  Service: Endoscopy;  Laterality: N/A;  COVID test 21 General surgery clinic [facility]    CYSTOSCOPY N/A 09/10/2021    Procedure: CYSTOSCOPY;  Surgeon: Rj Sánchez Jr., MD;  Location: [address];  Service: Urology;  Laterality: N/A;    ESOPHAGOGASTRODUODENOSCOPY N/A 2021    Procedure: ESOPHAGOGASTRODUODENOSCOPY (EGD);  Surgeon: Dao Gray MD;  Location: [facility] ENDO (2ND FLR);  Service: Endoscopy;  Laterality: N/A;  labs current on      ESOPHAGOGASTRODUODENOSCOPY N/A 10/26/2021    Procedure: ESOPHAGOGASTRODUODENOSCOPY (EGD);  Surgeon: Dao Gray MD;  Location: [facility] (2ND FLR);  Service: Endoscopy;  Laterality: N/A;  to be done the week of 10/18/21 per Dr. Gray-  covid-10/23/21-[facility]   10/25 arrival time confirmed with pt-rb    ESOPHAGOGASTRODUODENOSCOPY Left 12/21/2021    Procedure: EGD (ESOPHAGOGASTRODUODENOSCOPY);  Surgeon: Koffi Monteiro MD;  Location: [facility] ENDO (4TH FLR);  Service: Endoscopy;  Laterality: Left;  Rapid  pt requested AM appt and first available in December-BB  labs morning of procedure-  12/14 lvm to confirm appt-rb    HEMORRHOID SURGERY      LIVER TRANSPLANT N/A 06/05/2022    Procedure: TRANSPLANT, LIVER;  Surgeon: Franco Slaughter MD;  Location: [facility] OR 2ND FLR;  Service: Transplant;  Laterality: N/A;    PERITONEOCENTESIS Right 06/08/2021    Procedure: PARACENTESIS, ABDOMINAL;  Surgeon: Jay Torre MD;  Location: [facility] CATH LAB;  Service: Radiology;  Laterality: Right;    PERITONEOCENTESIS Right 06/25/2021    Procedure: PARACENTESIS, ABDOMINAL;  Surgeon: Jay Torre MD;  Location: [facility] CATH LAB;  Service: Radiology;  Laterality: Right;    PERITONEOCENTESIS Right 07/12/2021    Procedure: PARACENTESIS, ABDOMINAL;  Surgeon: Jay Torre MD;  Location: [facility] CATH LAB;  Service: Radiology;  Laterality: Right;    PERITONEOCENTESIS Right 07/23/2021    Procedure: PARACENTESIS, ABDOMINAL;  Surgeon: Edward De La Torre II, MD;  Location: [facility] CATH LAB;  Service: Interventional Radiology;  Laterality: Right;    PERITONEOCENTESIS Right 08/03/2021    Procedure: PARACENTESIS, ABDOMINAL;  Surgeon: Franco Cheung MD;  Location: [facility] CATH LAB;  Service: Radiology;  Laterality: Right;    PERITONEOCENTESIS Right 08/16/2021    Procedure: PARACENTESIS, ABDOMINAL;  Surgeon: Jay Torre MD;  Location: [facility] CATH LAB;  Service: Radiology;  Laterality: Right;    PERITONEOCENTESIS Right 08/23/2021     Procedure: PARACENTESIS, ABDOMINAL;  Surgeon: Jay Torre MD;  Location: [facility] CATH LAB;  Service: Radiology;  Laterality: Right;    PERITONEOCENTESIS Right 09/16/2021    Procedure: PARACENTESIS, ABDOMINAL;  Surgeon: Jay Torre MD;  Location: [facility] CATH LAB;  Service: Radiology;  Laterality: Right;    PERITONEOCENTESIS N/A 09/24/2021    Procedure: PARACENTESIS, ABDOMINAL;  Surgeon: Jay Torre MD;  Location: [facility] CATH LAB;  Service: Radiology;  Laterality: N/A;    PERITONEOCENTESIS Right 12/23/2021    Procedure: PARACENTESIS, ABDOMINAL;  Surgeon: Jay Torre MD;  Location: [facility] CATH LAB;  Service: Radiology;  Laterality: Right;    PERITONEOCENTESIS N/A 12/30/2021    Procedure: PARACENTESIS, ABDOMINAL;  Surgeon: Salas Cabrera MD;  Location: [facility] CATH LAB;  Service: Radiology;  Laterality: N/A;    REMOVAL, IMPLANT, RUPTURED, BREAST Bilateral 2/6/2025    Procedure: REMOVAL, IMPLANT, RUPTURED, BREAST;  Surgeon: Franco Cabrera MD;  Location: [facility] OR;  Service: Plastics;  Laterality: Bilateral;    REPLACEMENT OF IMPLANT OF BREAST Bilateral 2/6/2025    Procedure: REPLACEMENT, IMPLANT, BREAST;  Surgeon: Franco Cabrera MD;  Location: [facility] OR;  Service: Plastics;  Laterality: Bilateral;    RETROGRADE PYELOGRAPHY Bilateral 09/10/2021    Procedure: PYELOGRAM, RETROGRADE;  Surgeon: Rj Sánchez Jr., MD;  Location: [address];  Service: Urology;  Laterality: Bilateral;    TONSILLECTOMY       Family History   Problem Relation Name Age of Onset    Cancer Mother          Uterine Cancer     No Known Problems Father      No Known Problems Sister      Sleep apnea Daughter      ADD / ADHD Daughter      Heart disease Maternal Grandmother          CHF    COPD Maternal Grandfather      Heart disease Paternal Grandmother          CHF    Colon cancer Neg Hx      Inflammatory bowel disease Neg Hx      Stomach cancer Neg Hx      Breast cancer Neg Hx      Ovarian cancer Neg Hx      Learning disabilities Neg  Hx       Social History[1]  Review of Systems   Gastrointestinal:  Positive for abdominal pain, nausea and vomiting.   Genitourinary:  Positive for flank pain.       Physical Exam     Initial Vitals [06/17/25 1803]   BP Pulse Resp Temp SpO2   (!) 152/78 104 20 98.3 °F (36.8 °C) 98 %      MAP       --         Physical Exam    Nursing note and vitals reviewed.  Constitutional: She appears well-developed and well-nourished. She is not diaphoretic. No distress.   HENT:   Head: Normocephalic and atraumatic.   Nose: Nose normal.   Eyes: EOM are normal. Pupils are equal, round, and reactive to light. No scleral icterus.   Neck: Neck supple.   Normal range of motion.  Cardiovascular:  Normal rate, regular rhythm, normal heart sounds and intact distal pulses.     Exam reveals no gallop and no friction rub.       No murmur heard.  Pulmonary/Chest: Breath sounds normal. No stridor. No respiratory distress. She has no wheezes. She has no rhonchi. She has no rales.   Abdominal: Abdomen is soft. Bowel sounds are normal. She exhibits no distension. There is no abdominal tenderness. There is no rebound and no guarding.   Musculoskeletal:         General: No tenderness or edema. Normal range of motion.      Cervical back: Normal range of motion and neck supple.     Neurological: She is alert and oriented to person, place, and time. No cranial nerve deficit. GCS score is 15. GCS eye subscore is 4. GCS verbal subscore is 5. GCS motor subscore is 6.   Skin: Skin is warm and dry. No rash noted.   Psychiatric: She has a normal mood and affect. Her behavior is normal.         ED Course   Procedures  Labs Reviewed   COMPREHENSIVE METABOLIC PANEL - Abnormal       Result Value    Sodium 137      Potassium 4.7      Chloride 106      CO2 20 (*)     Glucose 93      BUN 21 (*)     Creatinine 1.2      Calcium 9.5      Protein Total 7.9      Albumin 4.4      Bilirubin Total 0.4      ALP 76      AST 36      ALT 19      Anion Gap 11      eGFR 54 (*)     CBC WITH DIFFERENTIAL - Abnormal    WBC 11.61      RBC 4.42      HGB 12.8      HCT 38.2      MCV 86      MCH 29.0      MCHC 33.5      RDW 13.0      Platelet Count 366      MPV 10.1      Nucleated RBC 0      Neut % 71.6      Lymph % 17.7 (*)     Mono % 6.1      Eos % 4.0      Basophil % 0.4      Imm Grans % 0.2      Neut # 8.31 (*)     Lymph # 2.06      Mono # 0.71      Eos # 0.46      Baso # 0.05      Imm Grans # 0.02     ISTAT PROCEDURE - Abnormal    POC Glucose 98      POC BUN 27      POC Creatinine 1.3      POC Sodium 137      POC Potassium 4.6      POC Chloride 104      POC TCO2 (MEASURED) 22 (*)     POC Ionized Calcium 1.13      POC Hematocrit 38      Sample KEILY     HEPATITIS C ANTIBODY - Normal    Hep C Ab Interp Non-Reactive     HIV 1 / 2 ANTIBODY - Normal    HIV 1/2 Ag/Ab Non-Reactive     LIPASE - Normal    Lipase Level 10     URINALYSIS, REFLEX TO URINE CULTURE - Normal    Color, UA Yellow      Appearance, UA Clear      pH, UA 5.0      Spec Grav UA 1.025      Protein, UA Negative      Glucose, UA Negative      Ketones, UA Negative      Bilirubin, UA Negative      Blood, UA Negative      Nitrites, UA Negative      Urobilinogen, UA Negative      Leukocyte Esterase, UA Negative     MAGNESIUM - Normal    Magnesium  1.6     HEP C VIRUS HOLD SPECIMEN    Extra Tube Hold for add-ons.     CBC W/ AUTO DIFFERENTIAL    Narrative:     The following orders were created for panel order CBC W/ AUTO DIFFERENTIAL.  Procedure                               Abnormality         Status                     ---------                               -----------         ------                     CBC with Differential[[id number]]       Abnormal            Final result                 Please view results for these tests on the individual orders.   GREY TOP URINE HOLD    Extra Tube Hold for add-ons.       EKG Readings: (Independently Interpreted)   Initial Reading: No STEMI. Rhythm: Normal Sinus Rhythm. Heart Rate: 81. Ectopy: No  Ectopy. Conduction: Normal. ST Segments: Normal ST Segments. T Waves: Normal. Clinical Impression: Normal Sinus Rhythm   No ST segment elevation or depression concerning for acute ischemia.        ECG Results              EKG 12-lead (Final result)        Collection Time Result Time QRS Duration OHS QTC Calculation    06/17/25 18:11:26 06/18/25 14:20:35 76 440                     Final result by Interface, Lab In [facility] (06/18/25 14:20:39)                   Narrative:    Test Reason : R10.9,    Vent. Rate :  90 BPM     Atrial Rate :  90 BPM     P-R Int : 112 ms          QRS Dur :  76 ms      QT Int : 360 ms       P-R-T Axes :  77  60  68 degrees    QTcB Int : 440 ms    Normal sinus rhythm with sinus arrhythmia  Normal ECG  When compared with ECG of 24-Jan-2025 10:06,  No significant change was found  Confirmed by Cristal Lebron ([id number]) on 6/18/2025 2:20:32 PM    Referred By: AAAREFERRAL SELF           Confirmed By: Cristal Lebron                                  Imaging Results              US Doppler Liver Transplant Post (xpd) (Final result)  Result time 06/17/25 23:26:52      Final result by Kendrick Horvath MD (06/17/25 23:26:52)                   Impression:      Satisfactory doppler evaluation of the liver.    Resistive indices are higher when compared with prior study in 2023, though remain within the limits of normal and with normal waveforms.    Electronically signed by resident: Hang Beauchamp  Date:    06/17/2025  Time:    21:54    Electronically signed by: Kendrick Horvath MD  Date:    06/17/2025  Time:    23:26               Narrative:    EXAMINATION:  US DOPPLER LIVER TRANSPLANT POST (XPD)    CLINICAL HISTORY:  RUQ pain, Hx of liver transplant 3 years ago    TECHNIQUE:  Limited abdominal ultrasound of the transplant liver with Doppler evaluation.  Color and spectral Doppler were performed.    COMPARISON:  06/08/2023.    FINDINGS:  Patient is status post orthotopic liver transplant.  The liver  demonstrates homogeneous echotexture.  No focal hepatic lesions are seen.  No fluid collections.    The common duct is not dilated, measuring 5 mm.  No dilated intrahepatic radicles are seen.  Gallbladder is absent.    Color flow and spectral waveform analysis was performed.  The main portal vein, right portal vein, left portal vein, middle hepatic vein, right hepatic vein, left hepatic vein, and IVC are patent with proper directional flow.    Anastomosis site of the main hepatic artery demonstrates a peak systolic velocity 113 cm/sec (previously 158 cm/s) with normal waveform.    Main hepatic artery demonstrates resistive index 0.74 (previously 0.74) with normal waveform.    Left hepatic artery shows resistive index 0.76 (previously 0.63) with normal waveform.    Anterior branch of the right hepatic artery demonstrates resistive index 0.72 (previously 0.55) with normal waveform.    Posterior branch of the right hepatic artery demonstrates resistive index 0.65 (previously 0.45) with normal waveform.                        Preliminary result by Hang Beauchamp MD (06/17/25 22:05:21)                   Impression:      Nonspecific elevation of the intraparenchymal arterial resistive indices which may be seen in the setting of hepatic dysfunction or rejection.  Correlation advised.    Otherwise satisfactory Doppler evaluation of the liver.    Electronically signed by resident: Hang Beauchamp  Date:    06/17/2025  Time:    21:54                 Narrative:    EXAMINATION:  US DOPPLER LIVER TRANSPLANT POST (XPD)    CLINICAL HISTORY:  RUQ pain, Hx of liver transplant 3 years ago;    TECHNIQUE:  Limited abdominal ultrasound of the transplant liver with Doppler evaluation.  Color and spectral Doppler were performed.    COMPARISON:  None.    FINDINGS:  Patient is status post orthotopic liver transplant.  The liver demonstrates homogeneous echotexture.  No focal hepatic lesions are seen.  No fluid collections.    The common duct  is not dilated, measuring 5 mm.  No dilated intrahepatic radicles are seen.    Color flow and spectral waveform analysis was performed.  The main portal vein, right portal vein, left portal vein, middle hepatic vein, right hepatic vein, left hepatic vein, and IVC are patent with proper directional flow.    Anastomosis site of the main hepatic artery demonstrates a peak systolic velocity 113 cm/sec (previously 158 cm/s) with normal waveform.    Main hepatic artery demonstrates resistive index 0.74 (previously 0.74) with normal waveform.    Left hepatic artery shows resistive index 0.76 (previously 0.63) with normal waveform.    Anterior branch of the right hepatic artery demonstrates resistive index 0.72 (previously 0.55) with normal waveform.    Posterior branch of the right hepatic artery demonstrates resistive index 0.65 (previously 0.45) with normal waveform.                                       Medications   ketorolac injection 9.999 mg (9.999 mg Intravenous Given 6/17/25 2058)   morphine injection 2 mg (2 mg Intravenous Given 6/17/25 2058)     Medical Decision Making  Amount and/or Complexity of Data Reviewed  Radiology: ordered.    Risk  Prescription drug management.                          Medical Decision Making:   Initial Assessment:   55-year-old female with a history of liver disease status post liver transplant 3 years ago is presenting with right upper quadrant pain. Patient notes pain is sharp, intermittent, associated with nausea and vomiting x1 earlier today. Pain improved with morphine. LFTs normal. Right upper quadrant ultrasound with the liver shows increased resistive indexes concerning for possible acute rejection. Spoke with hepatology who has suggested the patient be admitted for further evaluation and workup.  Differential includes rejection, MSK pain, kidney stone.  Urine negative, kidney stone felt less likely.             Clinical Impression:  Final diagnoses:  [R10.9] Abdominal  pain  [Z94.4] History of liver transplant (Primary)  [R07.9] Chest pain          ED Disposition Condition    Observation                       [1]   Social History  Tobacco Use    Smoking status: Former     Current packs/day: 0.00     Average packs/day: 0.3 packs/day for 27.0 years (6.8 ttl pk-yrs)     Types: Vaping with nicotine, Cigarettes     Quit date: 2024     Years since quittin.6    Smokeless tobacco: Never    Tobacco comments:     Social nicotine vape use, but quit    Substance Use Topics    Alcohol use: Not Currently     Comment: quit caridad 15    Drug use: No        Sina Nielsen MD  25 [id number]

## 2025-06-18 NOTE — PLAN OF CARE
Patient discharged to home w/ no needs.    TRISTAN LanceN, RN, [facility]  Transitional Care Manager  [phone]  [email or web address]    [facility] - Telemetry Stepdown  Discharge Final Note    Primary Care Provider: Killian Dodd DO    Expected Discharge Date: 6/18/2025    Final Discharge Note (most recent)       Final Note - 06/18/25 1217          Final Note    Assessment Type Final Discharge Note     Anticipated Discharge Disposition Home or Self Care        Post-Acute Status    Discharge Delays None known at this time                     Important Message from Medicare             Contact Info       Sharmila Pacheco MD   Specialty: Transplant, Hepatology    [address]  [facility] [address]   Phone: [phone]       Next Steps: Schedule an appointment as soon as possible for a visit in 1 month(s)    [address]  [address]  [place] [address] [phone]       Next Steps: Follow up on 6/26/2025    Instructions: Established pt's hospital f/u visit @ 1:00 pm. Please bring discharge summary, ID, insurance card, and medication list.

## 2025-06-18 NOTE — PROGRESS NOTES
Ms. Montes is a a 55-year-old lady with history of alcoholic use disorder in remission, alcoholic cirrhosis status post transplant, history of pancreatitis, hypothyroidism.  Presented with abdominal pain associated with chills, nausea, vomiting.  Hepatic functional within normal limits.  Was found to have depressed bicarb and elevated BUN to creatinine ratio.  Creatinine itself appears to be at baseline.  Ultrasound with liver Doppler shows high resistive indices compared to prior.

## 2025-06-18 NOTE — SUBJECTIVE & OBJECTIVE
Past Medical History:   Diagnosis Date    ADD (attention deficit disorder)     Anxiety     Ascites of liver     Cirrhosis 2021    CKD (chronic kidney disease) stage 3, GFR 30-59 ml/min     Constipation     ETOH abuse     Hyperlipidemia     Hypothyroidism     Liver transplant candidate 2022    Other ascites 2021    Pancreatitis, acute     Tobacco use     Vitamin D deficiency        Past Surgical History:   Procedure Laterality Date    AUGMENTATION OF BREAST      second set    breast augmentation       SECTION      COLONOSCOPY N/A 2021    Procedure: COLONOSCOPY;  Surgeon: Dao Gray MD;  Location: [facility] (2ND FLR);  Service: Endoscopy;  Laterality: N/A;  COVID test 21 General surgery clinic [facility]    CYSTOSCOPY N/A 09/10/2021    Procedure: CYSTOSCOPY;  Surgeon: Rj Sánchez Jr., MD;  Location: [facility] OR 1ST FLR;  Service: Urology;  Laterality: N/A;    ESOPHAGOGASTRODUODENOSCOPY N/A 2021    Procedure: ESOPHAGOGASTRODUODENOSCOPY (EGD);  Surgeon: Dao Gray MD;  Location: [facility] (2ND FLR);  Service: Endoscopy;  Laterality: N/A;  labs current on     ESOPHAGOGASTRODUODENOSCOPY N/A 10/26/2021    Procedure: ESOPHAGOGASTRODUODENOSCOPY (EGD);  Surgeon: Dao Gray MD;  Location: [facility] (2ND FLR);  Service: Endoscopy;  Laterality: N/A;  to be done the week of 10/18/21 per Dr. [facility]-10/23/21-[facility]-   10/25 arrival time confirmed with pt-rb    ESOPHAGOGASTRODUODENOSCOPY Left 2021    Procedure: EGD (ESOPHAGOGASTRODUODENOSCOPY);  Surgeon: Koffi Monteiro MD;  Location: [facility] ENDO (4TH FLR);  Service: Endoscopy;  Laterality: Left;  Rapid  pt requested AM appt and first available in December-  labs morning of procedure-   lvm to confirm appt-rb    HEMORRHOID SURGERY      LIVER TRANSPLANT N/A 2022    Procedure: TRANSPLANT, LIVER;  Surgeon: Franco Slaughter MD;  Location: [facility] OR 2ND FLR;  Service: Transplant;  Laterality: N/A;     PERITONEOCENTESIS Right 06/08/2021    Procedure: PARACENTESIS, ABDOMINAL;  Surgeon: Jay Torre MD;  Location: [facility] CATH LAB;  Service: Radiology;  Laterality: Right;    PERITONEOCENTESIS Right 06/25/2021    Procedure: PARACENTESIS, ABDOMINAL;  Surgeon: Jay Torre MD;  Location: [facility] CATH LAB;  Service: Radiology;  Laterality: Right;    PERITONEOCENTESIS Right 07/12/2021    Procedure: PARACENTESIS, ABDOMINAL;  Surgeon: Jay Torre MD;  Location: [facility] CATH LAB;  Service: Radiology;  Laterality: Right;    PERITONEOCENTESIS Right 07/23/2021    Procedure: PARACENTESIS, ABDOMINAL;  Surgeon: Edward De La Torre II, MD;  Location: [facility] CATH LAB;  Service: Interventional Radiology;  Laterality: Right;    PERITONEOCENTESIS Right 08/03/2021    Procedure: PARACENTESIS, ABDOMINAL;  Surgeon: Franco Cheung MD;  Location: [facility] CATH LAB;  Service: Radiology;  Laterality: Right;    PERITONEOCENTESIS Right 08/16/2021    Procedure: PARACENTESIS, ABDOMINAL;  Surgeon: Jay Torre MD;  Location: [facility] CATH LAB;  Service: Radiology;  Laterality: Right;    PERITONEOCENTESIS Right 08/23/2021    Procedure: PARACENTESIS, ABDOMINAL;  Surgeon: Jay Torre MD;  Location: [facility] CATH LAB;  Service: Radiology;  Laterality: Right;    PERITONEOCENTESIS Right 09/16/2021    Procedure: PARACENTESIS, ABDOMINAL;  Surgeon: Jay Torre MD;  Location: [facility] CATH LAB;  Service: Radiology;  Laterality: Right;    PERITONEOCENTESIS N/A 09/24/2021    Procedure: PARACENTESIS, ABDOMINAL;  Surgeon: Jay Torre MD;  Location: [facility] CATH LAB;  Service: Radiology;  Laterality: N/A;    PERITONEOCENTESIS Right 12/23/2021    Procedure: PARACENTESIS, ABDOMINAL;  Surgeon: Jay Torre MD;  Location: [facility] CATH LAB;  Service: Radiology;  Laterality: Right;    PERITONEOCENTESIS N/A 12/30/2021    Procedure: PARACENTESIS, ABDOMINAL;  Surgeon: Salas Cabrera MD;  Location: [facility] CATH LAB;  Service: Radiology;   Laterality: N/A;    REMOVAL, IMPLANT, RUPTURED, BREAST Bilateral 2/6/2025    Procedure: REMOVAL, IMPLANT, RUPTURED, BREAST;  Surgeon: Franco Cabrera MD;  Location: [facility] OR;  Service: Plastics;  Laterality: Bilateral;    REPLACEMENT OF IMPLANT OF BREAST Bilateral 2/6/2025    Procedure: REPLACEMENT, IMPLANT, BREAST;  Surgeon: Franco Cabrera MD;  Location: [facility] OR;  Service: Plastics;  Laterality: Bilateral;    RETROGRADE PYELOGRAPHY Bilateral 09/10/2021    Procedure: PYELOGRAM, RETROGRADE;  Surgeon: Rj Sánchez Jr., MD;  Location: [facility] OR [address] [place] [address];  Service: Urology;  Laterality: Bilateral;    TONSILLECTOMY         Review of patient's allergies indicates:  No Known Allergies    No current facility-administered medications on file prior to encounter.     Current Outpatient Medications on File Prior to Encounter   Medication Sig    aspirin (ECOTRIN) 81 MG EC tablet Take 81 mg by mouth once daily.    atorvastatin (LIPITOR) 40 MG tablet TAKE 1 TABLET(40 MG) BY MOUTH EVERY DAY    bimatoprost (LATISSE) 0.03 % ophthalmic solution Place one drop on applicator and apply evenly along the skin of the upper eyelid at base of eyelashes once daily at bedtime; repeat procedure for second eye (use a clean applicator).    dextroamphetamine-amphetamine (ADDERALL) 20 mg tablet Take 1 tablet by mouth 2 (two) times a day. (Patient not taking: Reported on 3/14/2025)    dextroamphetamine-amphetamine (ADDERALL) 20 mg tablet Take 20 mg in the early afternoon as needed. (Patient not taking: Reported on 3/14/2025)    dextroamphetamine-amphetamine 30 mg Tab Take 1 tablet (30 mg total) by mouth every morning. (Patient not taking: Reported on 3/14/2025)    dextroamphetamine-amphetamine 30 mg Tab Take one tablet as needed in the morning and one tablet as needed in the early afternoon.    diazePAM (VALIUM) 5 MG tablet Bring to appointment.    hydroquinone 8 % Emul Compound hydroquinone 8% / tretinoin 0.025% / kojic acid 1% / niacinamide 4% /  fluocinolone 0.025% cream. Apply a pea-sized amount to face qhs. Do not use for longer than 12 weeks in a row then take a 1 month break. Use only to dark spots    hydrOXYzine HCL (ATARAX) 25 MG tablet TAKE 1 TO 2 TABLETS BY MOUTH EVERY NIGHT AT BEDTIME AS NEEDED FOR INSOMNIA    levothyroxine (SYNTHROID) 50 MCG tablet Take 1 tablet (50 mcg total) by mouth before breakfast.    linaCLOtide (LINZESS) 145 mcg Cap capsule Take 1 capsule (145 mcg total) by mouth before breakfast.    magnesium oxide 400 mg magnesium Tab Take 400 mg by mouth once daily. (Patient not taking: Reported on 3/14/2025)    metFORMIN (GLUCOPHAGE-XR) 500 MG ER 24hr tablet TAKE 1 TABLET(500 MG) BY MOUTH TWICE DAILY WITH MEALS    mycophenolate (CELLCEPT) 250 mg Cap TAKE 2 CAPSULES BY MOUTH 2 TIMES A DAY    oxyCODONE-acetaminophen (PERCOCET) 5-325 mg per tablet Take 1 tablet by mouth every 6 (six) hours as needed for Pain.    pantoprazole (PROTONIX) 40 MG tablet Take 1 tablet (40 mg total) by mouth once daily.    tacrolimus (PROGRAF) 1 MG Cap Take 1 capsule (1 mg total) by mouth every 12 (twelve) hours.    tirzepatide (MOUNJARO) 2.5 mg/0.5 mL PnIj Inject 2.5 mg into the skin every 7 days.    traZODone (DESYREL) 50 MG tablet Take  mg at bedtime as needed for sleep (Patient not taking: Reported on 3/14/2025)    tretinoin (RETIN-A) 0.05 % cream Compound tretinoin 0.05% / niacinamide 2% cream. Start 2-3 times weekly then increase up to every night after 2-3 weeks if skin is not too dry. Apply pea sized amount to entire face    [DISCONTINUED] calcium carbonate (OS-SUN) 500 mg calcium (1,250 mg) tablet Take 1 tablet (500 mg total) by mouth once daily.    [DISCONTINUED] folic acid (FOLVITE) 1 MG tablet Take 2 tablets (2 mg total) by mouth every evening.     Family History       Problem Relation (Age of Onset)    ADD / ADHD Daughter    COPD Maternal Grandfather    Cancer Mother    Heart disease Maternal Grandmother, Paternal Grandmother    No Known  Problems Father, Sister    Sleep apnea Daughter          Tobacco Use    Smoking status: Former     Current packs/day: 0.00     Average packs/day: 0.3 packs/day for 27.0 years (6.8 ttl pk-yrs)     Types: Vaping with nicotine, Cigarettes     Quit date: 2024     Years since quittin.6    Smokeless tobacco: Never    Tobacco comments:     Social nicotine vape use, but quit    Substance and Sexual Activity    Alcohol use: Not Currently     Comment: quit caridad 15    Drug use: No    Sexual activity: Yes     Partners: Male     Review of Systems   Constitutional:  Positive for chills. Negative for fatigue and fever.   HENT:  Negative for trouble swallowing and voice change.    Respiratory:  Negative for chest tightness, shortness of breath and wheezing.    Cardiovascular:  Negative for chest pain, palpitations and leg swelling.   Gastrointestinal:  Positive for abdominal pain, nausea and vomiting. Negative for abdominal distention.   Genitourinary:  Negative for enuresis, flank pain and hematuria.   Musculoskeletal:  Negative for arthralgias, back pain and gait problem.   Skin:  Negative for color change and wound.   Neurological:  Negative for dizziness, weakness, light-headedness and numbness.   Psychiatric/Behavioral:  Negative for behavioral problems, confusion and decreased concentration.      Objective:     Vital Signs (Most Recent):  Temp: 98.4 °F (36.9 °C) (25 0024)  Pulse: 84 (25 0024)  Resp: 14 (25 0024)  BP: 119/83 (25 0024)  SpO2: 98 % (25 0024) Vital Signs (24h Range):  Temp:  [98.2 °F (36.8 °C)-98.4 °F (36.9 °C)] 98.4 °F (36.9 °C)  Pulse:  [] 84  Resp:  [14-20] 14  SpO2:  [98 %-100 %] 98 %  BP: (119-167)/(74-91) 119/83     Weight: 59 kg (130 lb)  Body mass index is 23.03 kg/m².     Physical Exam  Vitals and nursing note reviewed.   Constitutional:       General: She is not in acute distress.     Appearance: She is well-developed.   HENT:      Head: Normocephalic and  atraumatic.      Mouth/Throat:      Pharynx: No oropharyngeal exudate.   Eyes:      General: No scleral icterus.     Conjunctiva/sclera: Conjunctivae normal.   Cardiovascular:      Rate and Rhythm: Normal rate and regular rhythm.      Heart sounds: Normal heart sounds.   Pulmonary:      Effort: Pulmonary effort is normal. No respiratory distress.      Breath sounds: Normal breath sounds. No wheezing.   Abdominal:      General: Bowel sounds are normal. There is no distension.      Palpations: Abdomen is soft.      Tenderness: There is abdominal tenderness (very mild soreness, epigastric). There is no guarding or rebound.   Musculoskeletal:         General: No tenderness. Normal range of motion.      Cervical back: Normal range of motion and neck supple.   Lymphadenopathy:      Cervical: No cervical adenopathy.   Skin:     General: Skin is warm and dry.      Capillary Refill: Capillary refill takes less than 2 seconds.      Findings: No rash.   Neurological:      Mental Status: She is alert and oriented to person, place, and time.      Cranial Nerves: No cranial nerve deficit.      Sensory: No sensory deficit.      Coordination: Coordination normal.   Psychiatric:         Behavior: Behavior normal.         Thought Content: Thought content normal.         Judgment: Judgment normal.                Significant Labs: All pertinent labs within the past 24 hours have been reviewed.  CBC:   Recent Labs   Lab 06/17/25 1910 06/17/25 1916   WBC 11.61  --    HGB 12.8  --    HCT 38.2 38     --      CMP:   Recent Labs   Lab 06/17/25 1910      K 4.7      CO2 20*   GLU 93   BUN 21*   CREATININE 1.2   CALCIUM 9.5   PROT 7.9   ALBUMIN 4.4   BILITOT 0.4   ALKPHOS 76   AST 36   ALT 19   ANIONGAP 11       Significant Imaging: I have reviewed all pertinent imaging results/findings within the past 24 hours.  US Doppler Liver Transplant Post (xpd)  Narrative: EXAMINATION:  US DOPPLER LIVER TRANSPLANT POST  (XPD)    CLINICAL HISTORY:  RUQ pain, Hx of liver transplant 3 years ago    TECHNIQUE:  Limited abdominal ultrasound of the transplant liver with Doppler evaluation.  Color and spectral Doppler were performed.    COMPARISON:  06/08/2023.    FINDINGS:  Patient is status post orthotopic liver transplant.  The liver demonstrates homogeneous echotexture.  No focal hepatic lesions are seen.  No fluid collections.    The common duct is not dilated, measuring 5 mm.  No dilated intrahepatic radicles are seen.  Gallbladder is absent.    Color flow and spectral waveform analysis was performed.  The main portal vein, right portal vein, left portal vein, middle hepatic vein, right hepatic vein, left hepatic vein, and IVC are patent with proper directional flow.    Anastomosis site of the main hepatic artery demonstrates a peak systolic velocity 113 cm/sec (previously 158 cm/s) with normal waveform.    Main hepatic artery demonstrates resistive index 0.74 (previously 0.74) with normal waveform.    Left hepatic artery shows resistive index 0.76 (previously 0.63) with normal waveform.    Anterior branch of the right hepatic artery demonstrates resistive index 0.72 (previously 0.55) with normal waveform.    Posterior branch of the right hepatic artery demonstrates resistive index 0.65 (previously 0.45) with normal waveform.  Impression: Satisfactory doppler evaluation of the liver.    Resistive indices are higher when compared with prior study in 2023, though remain within the limits of normal and with normal waveforms.    Electronically signed by resident: Hang Beauchamp  Date:    06/17/2025  Time:    21:54    Electronically signed by: Kendrick Horvath MD  Date:    06/17/2025  Time:    23:26

## 2025-06-18 NOTE — PLAN OF CARE
CM to bedside - pt and spouse present; pt provided assessment info. Pt is independent w/ no DME in place, lives w/ spouse. Pt will likely d/c home w/ no needs. Pt lives in a 2 story home w/ 1 step to enter and her bedroom and bathroom are on 1st floor.    TRISTAN LanceN, RN, [facility]  Transitional Care Manager  [phone]  [email or web address] - Telemetry Stepdown  Initial Discharge Assessment       Primary Care Provider: Killian Dodd DO    Admission Diagnosis: History of liver transplant [Z94.4]  Abdominal pain [R10.9]  Chest pain [R07.9]    Admission Date: 6/17/2025  Expected Discharge Date: 6/18/2025    Transition of Care Barriers: None    Payor: AETNA / Plan: AETNA OPEN CHOICE / Product Type: PPO /     Extended Emergency Contact Information  Primary Emergency Contact: Soco Mera   [place]  Mobile Phone: [phone]  Relation: Mother  Secondary Emergency Contact: VERONICA DIAZ  Mobile Phone: [phone]  Relation: Spouse  Preferred language: English   needed? No    Discharge Plan A: Home with family  Discharge Plan B: Home with family, Home Health      [facility] [place] - [place] - [address]  [address] [place] [address]  Phone: [phone] Fax: [phone]    [id number]  [place] - [address] AT [facility] [address] [place]  [address]  [place] [address]  Phone: [phone] Fax: [phone]    [facility] [place] - [address]  [address] [facility] [address]  Phone: [phone] Fax: [phone]    [facility] [place] - [address]  [address]  [address]  [facility] [address]  Phone: [phone] Fax: [phone]      Initial Assessment (most recent)       Adult Discharge Assessment - 06/18/25 1214          Discharge Assessment    Assessment Type Discharge Planning Assessment     Confirmed/corrected address,  phone number and insurance Yes     Confirmed Demographics Correct on Facesheet     Source of Information patient;family     Communicated EMBER with patient/caregiver Yes     People in Home spouse     Name(s) of People in Home spouse Berny [phone]     Do you expect to return to your current living situation? Yes     Do you have help at home or someone to help you manage your care at home? Yes     Who are your caregiver(s) and their phone number(s)? spouse Berny [phone]     Prior to hospitilization cognitive status: Alert/Oriented     Current cognitive status: Alert/Oriented     Walking or Climbing Stairs Difficulty no     Dressing/Bathing Difficulty no     Home Layout Able to live on 1st floor     Equipment Currently Used at Home none     Readmission within 30 days? No     Do you currently have service(s) that help you manage your care at home? No     Do you take prescription medications? Yes     Who is going to help you get home at discharge? spouse Berny [phone]     How do you get to doctors appointments? car, drives self;family or friend will provide     Are you on dialysis? No     Do you take coumadin? No     Discharge Plan A Home with family     Discharge Plan B Home with family;Home Health     DME Needed Upon Discharge  none     Discharge Plan discussed with: Spouse/sig other;Patient     Name(s) and Number(s) spouse Berny [phone]     Transition of Care Barriers None        Physical Activity    On average, how many days per week do you engage in moderate to strenuous exercise (like a brisk walk)? 7 days     On average, how many minutes do you engage in exercise at this level? 30 min        Financial Resource Strain    How hard is it for you to pay for the very basics like food, housing, medical care, and heating? Not hard at all        Housing Stability    In the last 12 months, was there a time when you were not able to pay the mortgage or rent on time? No     At any time in the past 12 months, were  you homeless or living in a shelter (including now)? No        Transportation Needs    In the past 12 months, has lack of transportation kept you from medical appointments or from getting medications? No     In the past 12 months, has lack of transportation kept you from meetings, work, or from getting things needed for daily living? No        Food Insecurity    Within the past 12 months, you worried that your food would run out before you got the money to buy more. Never true     Within the past 12 months, the food you bought just didn't last and you didn't have money to get more. Never true        Stress    Do you feel stress - tense, restless, nervous, or anxious, or unable to sleep at night because your mind is troubled all the time - these days? Only a little        Social Isolation    How often do you feel lonely or isolated from those around you?  Rarely        Alcohol Use    Q1: How often do you have a drink containing alcohol? Never     Q2: How many drinks containing alcohol do you have on a typical day when you are drinking? Patient does not drink     Q3: How often do you have six or more drinks on one occasion? Never        Utilities    In the past 12 months has the electric, gas, oil, or water company threatened to shut off services in your home? No        Health Literacy    How often do you need to have someone help you when you read instructions, pamphlets, or other written material from your doctor or pharmacy? Rarely

## 2025-06-18 NOTE — ASSESSMENT & PLAN NOTE
- symptoms resolved s/p morphine and Toradol in the ED  - afebrile without leukocytosis  - LFTs, lipase WNL  - abd exam benign  - US liver doppler shows higher resistive indices coimpared to prior  - low suspicion for acute liver rejection given normal LFTs  - hepatology consulted; appreciate recs  - continue home tacro and cellcept

## 2025-06-18 NOTE — ASSESSMENT & PLAN NOTE
Admitted for acute rejection r/o. US findings wnl. LFTs wnl. Feeling well. Unclear etiology of pain, possible 2/2 constipation +/- GERD. No missed IS doses. Okay to discharge from hepatology standpoint with regularly scheduled follow-up and labs with Transplant Hepatology (appt scheduled 8/21/25)

## 2025-06-18 NOTE — HPI
55yoF w h/o EtOH cirrhosis (last drink>4yrs ago) s/p liver tx 2022 (prograf 1BID, cellcept 500BID), acute pancreatitis (last flare>4yrs ago) who presented to [facility] ED on 6/17 with 7/10 mid epigastric cramping, burning pain. Liver tx US with resistive indices increased from prior 2 yrs ago but within ULN. Admitted for rejection r/o.     H/o whole liver tx DBD Donor HCV+, no post-transplant complications, did not develop Hep C. Prograf goal 3-5, prograf dosing increased from 1/0.5 to 1/1 in March 2025 after tacro level 2.0, repeat 1 month later 3.5. Denies any missed IS doses.     Tacro on arrival 4.1. LFTs, lipase, Tbili, PLT, INR wnl. HCV/HIV negative. Pain resolved following opioids in ED, feeling at baseline this AM besides constipation, which is a chronic issue worsened with starting mounjaro 1 yr ago but controlled with linzess and dulcolax. Nontender abd on exam, AAOx4. Pain was unlike prior pancreatitis pain, no flares since EtOH cessation. Denies N/V.  L hepatic artery RI 0.76 from 0.63 two years prior, Ant R hepatic artery 0.72 from 0.55, posterior R hepatic artery 0.65 from 0.45, and main hepatic artery unchanged at 0.76.

## 2025-06-18 NOTE — H&P
[facility] Medicine  History & Physical    Patient Name: Malu Montes  MRN: [id number]  Patient Class: OP- Observation  Admission Date: 6/17/2025  Attending Physician: Fabian Adame MD   Primary Care Provider: No primary care provider on file.         Patient information was obtained from patient, relative(s), past medical records, and ER records.     Subjective:     Principal Problem:S/P liver transplant    Chief Complaint:   Chief Complaint   Patient presents with    Abdominal Pain     Ruq pain, liver transplant,         HPI: Malu Montes is a 55 y.o. female with a PMHx of alcoholic cirrhosis s/p liver transplant 3 years, alcohol abuse in remission, hypothyroidism, hx pancreatitis, HLD who presents to [facility] for evaluation of abdominal pain. Patient reports sharp, squeezing epigastric abdominal pain began earlier today. She thought symptoms may be due to constipation and she occasionally gets abdominal cramping due to constipation, however symptoms were much more severe today. Notes associated chills, nausea and vomiting. Symptoms resolved with morphine in the ED. Reports compliance with home immunosuppressants. Denies fever, chest pain, SOB, LE swelling, or syncope. Last ETOH drink ~4 years ago. Last BM earlier today.     ED: AFVSS. No leukocytosis or electrolyte abnormalities. LFTs WNL. UA liver transplant shows resistive indices are higher when compared with prior study in 2023, though remain within the limits of normal and with normal waveforms. Hepatology consulted; rec admit to  to rule out acute liver rejection.    Past Medical History:   Diagnosis Date    ADD (attention deficit disorder)     Anxiety     Ascites of liver     Cirrhosis 9/16/2021    CKD (chronic kidney disease) stage 3, GFR 30-59 ml/min     Constipation     ETOH abuse     Hyperlipidemia     Hypothyroidism     Liver transplant candidate 6/5/2022    Other ascites 6/14/2021    Pancreatitis, acute      Tobacco use     Vitamin D deficiency        Past Surgical History:   Procedure Laterality Date    AUGMENTATION OF BREAST      second set    breast augmentation       SECTION      COLONOSCOPY N/A 2021    Procedure: COLONOSCOPY;  Surgeon: Dao Gray MD;  Location: [facility] ENDO (2ND FLR);  Service: Endoscopy;  Laterality: N/A;  COVID test 21 General surgery clinic [facility]    CYSTOSCOPY N/A 09/10/2021    Procedure: CYSTOSCOPY;  Surgeon: Rj Sánchez Jr., MD;  Location: [facility] OR 1ST FLR;  Service: Urology;  Laterality: N/A;    ESOPHAGOGASTRODUODENOSCOPY N/A 2021    Procedure: ESOPHAGOGASTRODUODENOSCOPY (EGD);  Surgeon: Dao Gray MD;  Location: [facility] (2ND FLR);  Service: Endoscopy;  Laterality: N/A;  labs current on     ESOPHAGOGASTRODUODENOSCOPY N/A 10/26/2021    Procedure: ESOPHAGOGASTRODUODENOSCOPY (EGD);  Surgeon: Dao Gray MD;  Location: [facility] (2ND FLR);  Service: Endoscopy;  Laterality: N/A;  [facility] 10/18/21 [facility]   10/25 arrival time confirmed with pt-rb    ESOPHAGOGASTRODUODENOSCOPY Left 2021    Procedure: EGD (ESOPHAGOGASTRODUODENOSCOPY);  Surgeon: Koffi Monteiro MD;  Location: [facility] (4TH FLR);  Service: Endoscopy;  Laterality: Left;  Rapid  pt requested AM appt and first available in December-  labs morning of procedure-BB   lvm to confirm appt-rb    HEMORRHOID SURGERY      LIVER TRANSPLANT N/A 2022    Procedure: TRANSPLANT, LIVER;  Surgeon: Franco Slaughter MD;  Location: [facility] OR 2ND FLR;  Service: Transplant;  Laterality: N/A;    PERITONEOCENTESIS Right 2021    Procedure: PARACENTESIS, ABDOMINAL;  Surgeon: Jay Torre MD;  Location: [facility] CATH LAB;  Service: Radiology;  Laterality: Right;    PERITONEOCENTESIS Right 2021    Procedure: PARACENTESIS, ABDOMINAL;  Surgeon: Jay Torre MD;  Location: [facility] CATH LAB;  Service: Radiology;  Laterality: Right;     PERITONEOCENTESIS Right 07/12/2021    Procedure: PARACENTESIS, ABDOMINAL;  Surgeon: Jay Torre MD;  Location: [facility] CATH LAB;  Service: Radiology;  Laterality: Right;    PERITONEOCENTESIS Right 07/23/2021    Procedure: PARACENTESIS, ABDOMINAL;  Surgeon: Edward De La Torre II, MD;  Location: [facility] CATH LAB;  Service: Interventional Radiology;  Laterality: Right;    PERITONEOCENTESIS Right 08/03/2021    Procedure: PARACENTESIS, ABDOMINAL;  Surgeon: Franco Cheung MD;  Location: [facility] CATH LAB;  Service: Radiology;  Laterality: Right;    PERITONEOCENTESIS Right 08/16/2021    Procedure: PARACENTESIS, ABDOMINAL;  Surgeon: Jay Torre MD;  Location: [facility] CATH LAB;  Service: Radiology;  Laterality: Right;    PERITONEOCENTESIS Right 08/23/2021    Procedure: PARACENTESIS, ABDOMINAL;  Surgeon: Jay Torre MD;  Location: [facility] CATH LAB;  Service: Radiology;  Laterality: Right;    PERITONEOCENTESIS Right 09/16/2021    Procedure: PARACENTESIS, ABDOMINAL;  Surgeon: Jay Torre MD;  Location: [facility] CATH LAB;  Service: Radiology;  Laterality: Right;    PERITONEOCENTESIS N/A 09/24/2021    Procedure: PARACENTESIS, ABDOMINAL;  Surgeon: Jay Torre MD;  Location: [facility] CATH LAB;  Service: Radiology;  Laterality: N/A;    PERITONEOCENTESIS Right 12/23/2021    Procedure: PARACENTESIS, ABDOMINAL;  Surgeon: Jay Torre MD;  Location: [facility] CATH LAB;  Service: Radiology;  Laterality: Right;    PERITONEOCENTESIS N/A 12/30/2021    Procedure: PARACENTESIS, ABDOMINAL;  Surgeon: Salas Cabrera MD;  Location: [facility] CATH LAB;  Service: Radiology;  Laterality: N/A;    REMOVAL, IMPLANT, RUPTURED, BREAST Bilateral 2/6/2025    Procedure: REMOVAL, IMPLANT, RUPTURED, BREAST;  Surgeon: Franco Cabrera MD;  Location: [facility];  Service: Plastics;  Laterality: Bilateral;    REPLACEMENT OF IMPLANT OF BREAST Bilateral 2/6/2025    Procedure: REPLACEMENT, IMPLANT, BREAST;  Surgeon: Franco Cabrera MD;  Location: [facility];   Service: Plastics;  Laterality: Bilateral;    RETROGRADE PYELOGRAPHY Bilateral 09/10/2021    Procedure: PYELOGRAM, RETROGRADE;  Surgeon: Rj Sánchez Jr., MD;  Location: [facility] OR [address] [place] [address];  Service: Urology;  Laterality: Bilateral;    TONSILLECTOMY         Review of patient's allergies indicates:  No Known Allergies    No current facility-administered medications on file prior to encounter.     Current Outpatient Medications on File Prior to Encounter   Medication Sig    aspirin (ECOTRIN) 81 MG EC tablet Take 81 mg by mouth once daily.    atorvastatin (LIPITOR) 40 MG tablet TAKE 1 TABLET(40 MG) BY MOUTH EVERY DAY    bimatoprost (LATISSE) 0.03 % ophthalmic solution Place one drop on applicator and apply evenly along the skin of the upper eyelid at base of eyelashes once daily at bedtime; repeat procedure for second eye (use a clean applicator).    dextroamphetamine-amphetamine (ADDERALL) 20 mg tablet Take 1 tablet by mouth 2 (two) times a day. (Patient not taking: Reported on 3/14/2025)    dextroamphetamine-amphetamine (ADDERALL) 20 mg tablet Take 20 mg in the early afternoon as needed. (Patient not taking: Reported on 3/14/2025)    dextroamphetamine-amphetamine 30 mg Tab Take 1 tablet (30 mg total) by mouth every morning. (Patient not taking: Reported on 3/14/2025)    dextroamphetamine-amphetamine 30 mg Tab Take one tablet as needed in the morning and one tablet as needed in the early afternoon.    diazePAM (VALIUM) 5 MG tablet Bring to appointment.    hydroquinone 8 % Emul Compound hydroquinone 8% / tretinoin 0.025% / kojic acid 1% / niacinamide 4% / fluocinolone 0.025% cream. Apply a pea-sized amount to face qhs. Do not use for longer than 12 weeks in a row then take a 1 month break. Use only to dark spots    hydrOXYzine HCL (ATARAX) 25 MG tablet TAKE 1 TO 2 TABLETS BY MOUTH EVERY NIGHT AT BEDTIME AS NEEDED FOR INSOMNIA    levothyroxine (SYNTHROID) 50 MCG tablet Take 1 tablet (50 mcg total) by mouth before  breakfast.    linaCLOtide (LINZESS) 145 mcg Cap capsule Take 1 capsule (145 mcg total) by mouth before breakfast.    magnesium oxide 400 mg magnesium Tab Take 400 mg by mouth once daily. (Patient not taking: Reported on 3/14/2025)    metFORMIN (GLUCOPHAGE-XR) 500 MG ER 24hr tablet TAKE 1 TABLET(500 MG) BY MOUTH TWICE DAILY WITH MEALS    mycophenolate (CELLCEPT) 250 mg Cap TAKE 2 CAPSULES BY MOUTH 2 TIMES A DAY    oxyCODONE-acetaminophen (PERCOCET) 5-325 mg per tablet Take 1 tablet by mouth every 6 (six) hours as needed for Pain.    pantoprazole (PROTONIX) 40 MG tablet Take 1 tablet (40 mg total) by mouth once daily.    tacrolimus (PROGRAF) 1 MG Cap Take 1 capsule (1 mg total) by mouth every 12 (twelve) hours.    tirzepatide (MOUNJARO) 2.5 mg/0.5 mL PnIj Inject 2.5 mg into the skin every 7 days.    traZODone (DESYREL) 50 MG tablet Take  mg at bedtime as needed for sleep (Patient not taking: Reported on 3/14/2025)    tretinoin (RETIN-A) 0.05 % cream Compound tretinoin 0.05% / niacinamide 2% cream. Start 2-3 times weekly then increase up to every night after 2-3 weeks if skin is not too dry. Apply pea sized amount to entire face    [DISCONTINUED] calcium carbonate (OS-SUN) 500 mg calcium (1,250 mg) tablet Take 1 tablet (500 mg total) by mouth once daily.    [DISCONTINUED] folic acid (FOLVITE) 1 MG tablet Take 2 tablets (2 mg total) by mouth every evening.     Family History       Problem Relation (Age of Onset)    ADD / ADHD Daughter    COPD Maternal Grandfather    Cancer Mother    Heart disease Maternal Grandmother, Paternal Grandmother    No Known Problems Father, Sister    Sleep apnea Daughter          Tobacco Use    Smoking status: Former     Current packs/day: 0.00     Average packs/day: 0.3 packs/day for 27.0 years (6.8 ttl pk-yrs)     Types: Vaping with nicotine, Cigarettes     Quit date: 2024     Years since quittin.6    Smokeless tobacco: Never    Tobacco comments:     Social nicotine vape use,  but quit 11/6   Substance and Sexual Activity    Alcohol use: Not Currently     Comment: quit caridad 15    Drug use: No    Sexual activity: Yes     Partners: Male     Review of Systems   Constitutional:  Positive for chills. Negative for fatigue and fever.   HENT:  Negative for trouble swallowing and voice change.    Respiratory:  Negative for chest tightness, shortness of breath and wheezing.    Cardiovascular:  Negative for chest pain, palpitations and leg swelling.   Gastrointestinal:  Positive for abdominal pain, nausea and vomiting. Negative for abdominal distention.   Genitourinary:  Negative for enuresis, flank pain and hematuria.   Musculoskeletal:  Negative for arthralgias, back pain and gait problem.   Skin:  Negative for color change and wound.   Neurological:  Negative for dizziness, weakness, light-headedness and numbness.   Psychiatric/Behavioral:  Negative for behavioral problems, confusion and decreased concentration.      Objective:     Vital Signs (Most Recent):  Temp: 98.4 °F (36.9 °C) (06/18/25 0024)  Pulse: 84 (06/18/25 0024)  Resp: 14 (06/18/25 0024)  BP: 119/83 (06/18/25 0024)  SpO2: 98 % (06/18/25 0024) Vital Signs (24h Range):  Temp:  [98.2 °F (36.8 °C)-98.4 °F (36.9 °C)] 98.4 °F (36.9 °C)  Pulse:  [] 84  Resp:  [14-20] 14  SpO2:  [98 %-100 %] 98 %  BP: (119-167)/(74-91) 119/83     Weight: 59 kg (130 lb)  Body mass index is 23.03 kg/m².     Physical Exam  Vitals and nursing note reviewed.   Constitutional:       General: She is not in acute distress.     Appearance: She is well-developed.   HENT:      Head: Normocephalic and atraumatic.      Mouth/Throat:      Pharynx: No oropharyngeal exudate.   Eyes:      General: No scleral icterus.     Conjunctiva/sclera: Conjunctivae normal.   Cardiovascular:      Rate and Rhythm: Normal rate and regular rhythm.      Heart sounds: Normal heart sounds.   Pulmonary:      Effort: Pulmonary effort is normal. No respiratory distress.      Breath sounds:  Normal breath sounds. No wheezing.   Abdominal:      General: Bowel sounds are normal. There is no distension.      Palpations: Abdomen is soft.      Tenderness: There is abdominal tenderness (very mild soreness, epigastric). There is no guarding or rebound.   Musculoskeletal:         General: No tenderness. Normal range of motion.      Cervical back: Normal range of motion and neck supple.   Lymphadenopathy:      Cervical: No cervical adenopathy.   Skin:     General: Skin is warm and dry.      Capillary Refill: Capillary refill takes less than 2 seconds.      Findings: No rash.   Neurological:      Mental Status: She is alert and oriented to person, place, and time.      Cranial Nerves: No cranial nerve deficit.      Sensory: No sensory deficit.      Coordination: Coordination normal.   Psychiatric:         Behavior: Behavior normal.         Thought Content: Thought content normal.         Judgment: Judgment normal.                Significant Labs: All pertinent labs within the past 24 hours have been reviewed.  CBC:   Recent Labs   Lab 06/17/25 1910 06/17/25 1916   WBC 11.61  --    HGB 12.8  --    HCT 38.2 38     --      CMP:   Recent Labs   Lab 06/17/25 1910      K 4.7      CO2 20*   GLU 93   BUN 21*   CREATININE 1.2   CALCIUM 9.5   PROT 7.9   ALBUMIN 4.4   BILITOT 0.4   ALKPHOS 76   AST 36   ALT 19   ANIONGAP 11       Significant Imaging: I have reviewed all pertinent imaging results/findings within the past 24 hours.  US Doppler Liver Transplant Post (xpd)  Narrative: EXAMINATION:  US DOPPLER LIVER TRANSPLANT POST (XPD)    CLINICAL HISTORY:  RUQ pain, Hx of liver transplant 3 years ago    TECHNIQUE:  Limited abdominal ultrasound of the transplant liver with Doppler evaluation.  Color and spectral Doppler were performed.    COMPARISON:  06/08/2023.    FINDINGS:  Patient is status post orthotopic liver transplant.  The liver demonstrates homogeneous echotexture.  No focal hepatic lesions are  seen.  No fluid collections.    The common duct is not dilated, measuring 5 mm.  No dilated intrahepatic radicles are seen.  Gallbladder is absent.    Color flow and spectral waveform analysis was performed.  The main portal vein, right portal vein, left portal vein, middle hepatic vein, right hepatic vein, left hepatic vein, and IVC are patent with proper directional flow.    Anastomosis site of the main hepatic artery demonstrates a peak systolic velocity 113 cm/sec (previously 158 cm/s) with normal waveform.    Main hepatic artery demonstrates resistive index 0.74 (previously 0.74) with normal waveform.    Left hepatic artery shows resistive index 0.76 (previously 0.63) with normal waveform.    Anterior branch of the right hepatic artery demonstrates resistive index 0.72 (previously 0.55) with normal waveform.    Posterior branch of the right hepatic artery demonstrates resistive index 0.65 (previously 0.45) with normal waveform.  Impression: Satisfactory doppler evaluation of the liver.    Resistive indices are higher when compared with prior study in 2023, though remain within the limits of normal and with normal waveforms.    Electronically signed by resident: Hang Beauchamp  Date:    06/17/2025  Time:    21:54    Electronically signed by: Kendrick Horvath MD  Date:    06/17/2025  Time:    23:26      Assessment/Plan:     Assessment & Plan  Epigastric abdominal pain  S/P liver transplant  Long-term use of immunosuppressant medication  - symptoms resolved s/p morphine and Toradol in the ED  - afebrile without leukocytosis  - LFTs, lipase WNL  - abd exam benign  - US liver doppler shows higher resistive indices coimpared to prior  - low suspicion for acute liver rejection given normal LFTs  - hepatology consulted; appreciate recs  - continue home tacro and cellcept    Hypothyroidism  - continue synthroid   Hyperlipidemia  - continue statin     VTE Risk Mitigation (From admission, onward)           Ordered      enoxaparin injection 40 mg  Daily         06/17/25 2306     IP VTE HIGH RISK PATIENT  Once         06/17/25 2306                         On 06/18/2025, patient should be placed in hospital observation services under my care in collaboration with Dr. Dolan.           Sandra Balderrama PA-C  Department of Hospital Medicine  [facility] - Telemetry Stepdown

## 2025-06-18 NOTE — SUBJECTIVE & OBJECTIVE
Review of Systems   Constitutional:  Negative for activity change, appetite change, fatigue and fever.   Respiratory:  Negative for shortness of breath.    Cardiovascular:  Negative for chest pain.   Gastrointestinal:  Negative for abdominal distention, abdominal pain, nausea and vomiting.   Musculoskeletal:  Negative for back pain.   Skin:  Negative for rash.   Psychiatric/Behavioral:  Negative for confusion and decreased concentration.        Past Medical History:   Diagnosis Date    ADD (attention deficit disorder)     Anxiety     Ascites of liver     Cirrhosis 2021    CKD (chronic kidney disease) stage 3, GFR 30-59 ml/min     Constipation     ETOH abuse     Hyperlipidemia     Hypothyroidism     Liver transplant candidate 2022    Other ascites 2021    Pancreatitis, acute     Tobacco use     Vitamin D deficiency        Past Surgical History:   Procedure Laterality Date    AUGMENTATION OF BREAST      second set    breast augmentation       SECTION      COLONOSCOPY N/A 2021    Procedure: COLONOSCOPY;  Surgeon: Dao Gray MD;  Location: [facility] ([address] [place] [address]);  Service: Endoscopy;  Laterality: N/A;  COVID test 21 General surgery clinic [facility]    CYSTOSCOPY N/A 09/10/2021    Procedure: CYSTOSCOPY;  Surgeon: Rj Sánchez Jr., MD;  [facility] [address] [place] [address];  Service: Urology;  Laterality: N/A;    ESOPHAGOGASTRODUODENOSCOPY N/A 2021    Procedure: ESOPHAGOGASTRODUODENOSCOPY (EGD);  Surgeon: Dao Gray MD;  [facility]  Service: Endoscopy;  Laterality: N/A;  labs current on     ESOPHAGOGASTRODUODENOSCOPY N/A 10/26/2021    Procedure: ESOPHAGOGASTRODUODENOSCOPY (EGD);  Surgeon: Dao Gray MD;  [facility]  Service: Endoscopy;  Laterality: N/A;  to be done the week of 10/18/21 per Dr. Gray-BB  covid-10/23/21-[facility]-BB   10/25 arrival time confirmed with pt-rb    ESOPHAGOGASTRODUODENOSCOPY Left 2021    Procedure:  EGD (ESOPHAGOGASTRODUODENOSCOPY);  Surgeon: Koffi Monteiro MD;  Location: [facility] ENDO (4TH FLR);  Service: Endoscopy;  Laterality: Left;  Rapid  pt requested AM appt and first available in December-BB  labs morning of procedure-BB 12/14 lvm to confirm appt-rb    HEMORRHOID SURGERY      LIVER TRANSPLANT N/A 06/05/2022    Procedure: TRANSPLANT, LIVER;  Surgeon: Franco Slaughter MD;  Location: [facility] OR 2ND FLR;  Service: Transplant;  Laterality: N/A;    PERITONEOCENTESIS Right 06/08/2021    Procedure: PARACENTESIS, ABDOMINAL;  Surgeon: Jay Torre MD;  Location: [facility] CATH LAB;  Service: Radiology;  Laterality: Right;    PERITONEOCENTESIS Right 06/25/2021    Procedure: PARACENTESIS, ABDOMINAL;  Surgeon: Jay Torre MD;  Location: [facility] CATH LAB;  Service: Radiology;  Laterality: Right;    PERITONEOCENTESIS Right 07/12/2021    Procedure: PARACENTESIS, ABDOMINAL;  Surgeon: Jay Torre MD;  Location: [facility] CATH LAB;  Service: Radiology;  Laterality: Right;    PERITONEOCENTESIS Right 07/23/2021    Procedure: PARACENTESIS, ABDOMINAL;  Surgeon: Edward De La Torre II, MD;  Location: [facility] CATH LAB;  Service: Interventional Radiology;  Laterality: Right;    PERITONEOCENTESIS Right 08/03/2021    Procedure: PARACENTESIS, ABDOMINAL;  Surgeon: Franco Cheung MD;  Location: [facility] CATH LAB;  Service: Radiology;  Laterality: Right;    PERITONEOCENTESIS Right 08/16/2021    Procedure: PARACENTESIS, ABDOMINAL;  Surgeon: Jay Torre MD;  Location: [facility] CATH LAB;  Service: Radiology;  Laterality: Right;    PERITONEOCENTESIS Right 08/23/2021    Procedure: PARACENTESIS, ABDOMINAL;  Surgeon: Jay Torre MD;  Location: [facility] CATH LAB;  Service: Radiology;  Laterality: Right;    PERITONEOCENTESIS Right 09/16/2021    Procedure: PARACENTESIS, ABDOMINAL;  Surgeon: Jay Torre MD;  Location: [facility] CATH LAB;  Service: Radiology;  Laterality: Right;    PERITONEOCENTESIS N/A 09/24/2021    Procedure: PARACENTESIS,  ABDOMINAL;  Surgeon: Jay Torre MD;  Location: [facility] CATH LAB;  Service: Radiology;  Laterality: N/A;    PERITONEOCENTESIS Right 2021    Procedure: PARACENTESIS, ABDOMINAL;  Surgeon: Jay Torre MD;  Location: [facility] CATH LAB;  Service: Radiology;  Laterality: Right;    PERITONEOCENTESIS N/A 2021    Procedure: PARACENTESIS, ABDOMINAL;  Surgeon: Salas Cabrera MD;  Location: [facility] CATH LAB;  Service: Radiology;  Laterality: N/A;    REMOVAL, IMPLANT, RUPTURED, BREAST Bilateral 2025    Procedure: REMOVAL, IMPLANT, RUPTURED, BREAST;  Surgeon: Franco Cabrera MD;  Location: [facility] OR;  Service: Plastics;  Laterality: Bilateral;    REPLACEMENT OF IMPLANT OF BREAST Bilateral 2025    Procedure: REPLACEMENT, IMPLANT, BREAST;  Surgeon: Franco Cabrera MD;  Location: [facility] OR;  Service: Plastics;  Laterality: Bilateral;    RETROGRADE PYELOGRAPHY Bilateral 09/10/2021    Procedure: PYELOGRAM, RETROGRADE;  Surgeon: Rj Sánchez Jr., MD;  Location: [address];  Service: Urology;  Laterality: Bilateral;    TONSILLECTOMY         Review of patient's allergies indicates:  No Known Allergies      Tobacco Use    Smoking status: Former     Current packs/day: 0.00     Average packs/day: 0.3 packs/day for 27.0 years (6.8 ttl pk-yrs)     Types: Vaping with nicotine, Cigarettes     Quit date: 2024     Years since quittin.6    Smokeless tobacco: Never    Tobacco comments:     Social nicotine vape use, but quit    Substance and Sexual Activity    Alcohol use: Not Currently     Comment: quit caridad 15    Drug use: No    Sexual activity: Yes     Partners: Male       Prescriptions Prior to Admission[1]    Objective:     Vital Signs (Most Recent):  Temp: 98 °F (36.7 °C) (25 0755)  Pulse: 86 (25 0755)  Resp: 18 (25 075)  BP: 121/69 (25 0755)  SpO2: 98 % (25 0755) Vital Signs (24h Range):  Temp:  [98 °F (36.7 °C)-98.4 °F (36.9 °C)] 98 °F (36.7 °C)  Pulse:  [] 86  Resp:   [14-20] 18  SpO2:  [98 %-100 %] 98 %  BP: (119-167)/(69-91) 121/69     Weight: 59 kg (130 lb) (06/17/25 1803)  Body mass index is 23.03 kg/m².       Physical Exam  Constitutional:       General: She is not in acute distress.     Appearance: Normal appearance. She is not ill-appearing.   HENT:      Head: Normocephalic and atraumatic.      Right Ear: External ear normal.      Left Ear: External ear normal.      Nose: Nose normal.   Eyes:      General: No scleral icterus.     Extraocular Movements: Extraocular movements intact.   Cardiovascular:      Rate and Rhythm: Normal rate.   Pulmonary:      Effort: Pulmonary effort is normal.   Abdominal:      General: Abdomen is flat. There is no distension.      Palpations: Abdomen is soft. There is no mass.      Tenderness: There is no abdominal tenderness.   Musculoskeletal:         General: Normal range of motion.      Cervical back: Normal range of motion.   Skin:     General: Skin is warm.   Neurological:      General: No focal deficit present.      Mental Status: She is alert and oriented to person, place, and time.   Psychiatric:         Mood and Affect: Mood normal.         Behavior: Behavior normal.         Thought Content: Thought content normal.         Judgment: Judgment normal.            Computed MELD 3.0 unavailable. One or more values for this score either were not found within the given timeframe or did not fit some other criterion.  Computed MELD-Na unavailable. One or more values for this score either were not found within the given timeframe or did not fit some other criterion.      Significant Labs:  CBC:   Recent Labs   Lab 06/18/25  0235   WBC 12.15   RBC 3.79*   HGB 10.8*   HCT 33.7*        CMP:   Recent Labs   Lab 06/18/25  0235      CALCIUM 9.2   ALBUMIN 3.9   PROT 6.6      K 4.2   CO2 24      BUN 21*   CREATININE 1.3   ALKPHOS 74   ALT 16   AST 20   BILITOT 0.4       Significant Imaging:  US: Reviewed         [1]    Medications Prior to Admission   Medication Sig Dispense Refill Last Dose/Taking    atorvastatin (LIPITOR) 40 MG tablet TAKE 1 TABLET(40 MG) BY MOUTH EVERY DAY 90 tablet 3 Taking    dextroamphetamine-amphetamine 30 mg Tab Take one tablet as needed in the morning and one tablet as needed in the early afternoon. 60 tablet 0 Taking    hydrOXYzine HCL (ATARAX) 25 MG tablet TAKE 1 TO 2 TABLETS BY MOUTH EVERY NIGHT AT BEDTIME AS NEEDED FOR INSOMNIA 60 tablet 1 Taking    levothyroxine (SYNTHROID) 50 MCG tablet Take 1 tablet (50 mcg total) by mouth before breakfast. 90 tablet 1 Taking    linaCLOtide (LINZESS) 145 mcg Cap capsule Take 1 capsule (145 mcg total) by mouth before breakfast. 90 capsule 3 Taking    metFORMIN (GLUCOPHAGE-XR) 500 MG ER 24hr tablet TAKE 1 TABLET(500 MG) BY MOUTH TWICE DAILY WITH MEALS 180 tablet 3 Taking    mycophenolate (CELLCEPT) 250 mg Cap TAKE 2 CAPSULES BY MOUTH 2 TIMES A  capsule 3 Taking    pantoprazole (PROTONIX) 40 MG tablet Take 1 tablet (40 mg total) by mouth once daily. 90 tablet 3 Taking    tacrolimus (PROGRAF) 1 MG Cap Take 1 capsule (1 mg total) by mouth every 12 (twelve) hours. 180 capsule 3 Taking    tirzepatide (MOUNJARO) 2.5 mg/0.5 mL PnIj Inject 2.5 mg into the skin every 7 days. 4 Pen 3 Taking    aspirin (ECOTRIN) 81 MG EC tablet Take 81 mg by mouth once daily.       bimatoprost (LATISSE) 0.03 % ophthalmic solution Place one drop on applicator and apply evenly along the skin of the upper eyelid at base of eyelashes once daily at bedtime; repeat procedure for second eye (use a clean applicator). 5 mL 12     hydroquinone 8 % Emul Compound hydroquinone 8% / tretinoin 0.025% / kojic acid 1% / niacinamide 4% / fluocinolone 0.025% cream. Apply a pea-sized amount to face qhs. Do not use for longer than 12 weeks in a row then take a 1 month break. Use only to dark spots 30 g 1     tretinoin (RETIN-A) 0.05 % cream Compound tretinoin 0.05% / niacinamide 2% cream. Start 2-3 times  weekly then increase up to every night after 2-3 weeks if skin is not too dry. Apply pea sized amount to entire face 30 g 5

## 2025-06-18 NOTE — CONSULTS
[facility] - Telemetry Stepdown  Hepatology  Consult Note    Patient Name: Malu Montes  MRN: [id number]  Admission Date: 6/17/2025  Hospital Length of Stay: 0 days  Attending Provider: Fabian Adame MD   Primary Care Physician: No primary care provider on file.  Principal Problem:Epigastric abdominal pain    Inpatient consult to Hepatology  Consult performed by: Pita Fraser MD  Consult ordered by: Sandra Balderrama PA-C        Subjective:     Transplant status: Post-transplant    HPI:  55yoF w h/o EtOH cirrhosis (last drink>4yrs ago) s/p liver tx 2022 (prograf 1BID, cellcept 500BID), acute pancreatitis (last flare>4yrs ago) who presented to [facility] ED on 6/17 with 7/10 mid epigastric cramping, burning pain. Liver tx US with resistive indices increased from prior 2 yrs ago but within ULN. Admitted for rejection r/o.     H/o whole liver tx DBD Donor HCV+, no post-transplant complications, did not develop Hep C. Prograf goal 3-5, prograf dosing increased from 1/0.5 to 1/1 in March 2025 after tacro level 2.0, repeat 1 month later 3.5. Denies any missed IS doses.     Tacro on arrival 4.1. LFTs, lipase, Tbili, PLT, INR wnl. HCV/HIV negative. Pain resolved following opioids in ED, feeling at baseline this AM besides constipation, which is a chronic issue worsened with starting mounjaro 1 yr ago but controlled with linzess and dulcolax. Nontender abd on exam, AAOx4. Pain was unlike prior pancreatitis pain, no flares since EtOH cessation. Denies N/V.  L hepatic artery RI 0.76 from 0.63 two years prior, Ant R hepatic artery 0.72 from 0.55, posterior R hepatic artery 0.65 from 0.45, and main hepatic artery unchanged at 0.76.     Review of Systems   Constitutional:  Negative for activity change, appetite change, fatigue and fever.   Respiratory:  Negative for shortness of breath.    Cardiovascular:  Negative for chest pain.   Gastrointestinal:  Negative for abdominal distention, abdominal pain, nausea and vomiting.    Musculoskeletal:  Negative for back pain.   Skin:  Negative for rash.   Psychiatric/Behavioral:  Negative for confusion and decreased concentration.        Past Medical History:   Diagnosis Date    ADD (attention deficit disorder)     Anxiety     Ascites of liver     Cirrhosis 2021    CKD (chronic kidney disease) stage 3, GFR 30-59 ml/min     Constipation     ETOH abuse     Hyperlipidemia     Hypothyroidism     Liver transplant candidate 2022    Other ascites 2021    Pancreatitis, acute     Tobacco use     Vitamin D deficiency        Past Surgical History:   Procedure Laterality Date    AUGMENTATION OF BREAST      second set    breast augmentation       SECTION      COLONOSCOPY N/A 2021    Procedure: COLONOSCOPY;  Surgeon: Dao Gray MD;  Location: [facility] (2ND FLR);  Service: Endoscopy;  Laterality: N/A;  COVID test 21 General surgery clinic [facility]    CYSTOSCOPY N/A 09/10/2021    Procedure: CYSTOSCOPY;  Surgeon: Rj Sánchez Jr., MD;  [facility];  Service: Urology;  Laterality: N/A;    ESOPHAGOGASTRODUODENOSCOPY N/A 2021    Procedure: ESOPHAGOGASTRODUODENOSCOPY (EGD);  Surgeon: Dao Gray MD;  Location: [facility] (2ND FLR);  Service: Endoscopy;  Laterality: N/A;  labs current on     ESOPHAGOGASTRODUODENOSCOPY N/A 10/26/2021    Procedure: ESOPHAGOGASTRODUODENOSCOPY (EGD);  Surgeon: Dao Gray MD;  Location: [facility] (2ND FLR);  Service: Endoscopy;  Laterality: N/A;  to be done the week of 10/18/21 per Dr. Gray-BB  covid-10/23/21-[facility]-   10/25 arrival time confirmed with pt-rb    ESOPHAGOGASTRODUODENOSCOPY Left 2021    Procedure: EGD (ESOPHAGOGASTRODUODENOSCOPY);  Surgeon: Koffi Monteiro MD;  Location: [facility] (4TH FLR);  Service: Endoscopy;  Laterality: Left;  Rapid  pt requested AM appt and first available in December-  labs morning of procedure-BB   lvm to confirm appt-rb    HEMORRHOID SURGERY      LIVER  TRANSPLANT N/A 06/05/2022    Procedure: TRANSPLANT, LIVER;  Surgeon: Franco Slaughter MD;  Location: [address];  Service: Transplant;  Laterality: N/A;    PERITONEOCENTESIS Right 06/08/2021    Procedure: PARACENTESIS, ABDOMINAL;  Surgeon: Jay Torre MD;  Location: [facility] CATH LAB;  Service: Radiology;  Laterality: Right;    PERITONEOCENTESIS Right 06/25/2021    Procedure: PARACENTESIS, ABDOMINAL;  Surgeon: Jay Torre MD;  Location: [facility] CATH LAB;  Service: Radiology;  Laterality: Right;    PERITONEOCENTESIS Right 07/12/2021    Procedure: PARACENTESIS, ABDOMINAL;  Surgeon: Jay Torre MD;  Location: [facility] CATH LAB;  Service: Radiology;  Laterality: Right;    PERITONEOCENTESIS Right 07/23/2021    Procedure: PARACENTESIS, ABDOMINAL;  Surgeon: Edward De La Torre II, MD;  Location: [facility] CATH LAB;  Service: Interventional Radiology;  Laterality: Right;    PERITONEOCENTESIS Right 08/03/2021    Procedure: PARACENTESIS, ABDOMINAL;  Surgeon: Franco Cheung MD;  Location: [facility] CATH LAB;  Service: Radiology;  Laterality: Right;    PERITONEOCENTESIS Right 08/16/2021    Procedure: PARACENTESIS, ABDOMINAL;  Surgeon: Jay Torre MD;  Location: [facility] CATH LAB;  Service: Radiology;  Laterality: Right;    PERITONEOCENTESIS Right 08/23/2021    Procedure: PARACENTESIS, ABDOMINAL;  Surgeon: Jay Torre MD;  Location: [facility] CATH LAB;  Service: Radiology;  Laterality: Right;    PERITONEOCENTESIS Right 09/16/2021    Procedure: PARACENTESIS, ABDOMINAL;  Surgeon: Jay Torre MD;  Location: [facility] CATH LAB;  Service: Radiology;  Laterality: Right;    PERITONEOCENTESIS N/A 09/24/2021    Procedure: PARACENTESIS, ABDOMINAL;  Surgeon: Jay Torre MD;  Location: [facility] CATH LAB;  Service: Radiology;  Laterality: N/A;    PERITONEOCENTESIS Right 12/23/2021    Procedure: PARACENTESIS, ABDOMINAL;  Surgeon: Jay Torre MD;  Location: [facility] CATH LAB;  Service: Radiology;  Laterality: Right;     PERITONEOCENTESIS N/A 2021    Procedure: PARACENTESIS, ABDOMINAL;  Surgeon: Salas Cabrera MD;  Location: [facility] CATH LAB;  Service: Radiology;  Laterality: N/A;    REMOVAL, IMPLANT, RUPTURED, BREAST Bilateral 2025    Procedure: REMOVAL, IMPLANT, RUPTURED, BREAST;  Surgeon: Franco Cabrera MD;  Location: [facility] OR;  Service: Plastics;  Laterality: Bilateral;    REPLACEMENT OF IMPLANT OF BREAST Bilateral 2025    Procedure: REPLACEMENT, IMPLANT, BREAST;  Surgeon: Franco Cabrera MD;  Location: [facility] OR;  Service: Plastics;  Laterality: Bilateral;    RETROGRADE PYELOGRAPHY Bilateral 09/10/2021    Procedure: PYELOGRAM, RETROGRADE;  Surgeon: Rj Sánchez Jr., MD;  Location: [address];  Service: Urology;  Laterality: Bilateral;    TONSILLECTOMY         Review of patient's allergies indicates:  No Known Allergies      Tobacco Use    Smoking status: Former     Current packs/day: 0.00     Average packs/day: 0.3 packs/day for 27.0 years (6.8 ttl pk-yrs)     Types: Vaping with nicotine, Cigarettes     Quit date: 2024     Years since quittin.6    Smokeless tobacco: Never    Tobacco comments:     Social nicotine vape use, but quit    Substance and Sexual Activity    Alcohol use: Not Currently     Comment: quit caridad 15    Drug use: No    Sexual activity: Yes     Partners: Male       Prescriptions Prior to Admission[1]    Objective:     Vital Signs (Most Recent):  Temp: 98 °F (36.7 °C) (25 0755)  Pulse: 86 (25 0755)  Resp: 18 (25 0755)  BP: 121/69 (25 0755)  SpO2: 98 % (25 0755) Vital Signs (24h Range):  Temp:  [98 °F (36.7 °C)-98.4 °F (36.9 °C)] 98 °F (36.7 °C)  Pulse:  [] 86  Resp:  [14-20] 18  SpO2:  [98 %-100 %] 98 %  BP: (119-167)/(69-91) 121/69     Weight: 59 kg (130 lb) (25 1803)  Body mass index is 23.03 kg/m².       Physical Exam  Constitutional:       General: She is not in acute distress.     Appearance: Normal appearance. She is not ill-appearing.    HENT:      Head: Normocephalic and atraumatic.      Right Ear: External ear normal.      Left Ear: External ear normal.      Nose: Nose normal.   Eyes:      General: No scleral icterus.     Extraocular Movements: Extraocular movements intact.   Cardiovascular:      Rate and Rhythm: Normal rate.   Pulmonary:      Effort: Pulmonary effort is normal.   Abdominal:      General: Abdomen is flat. There is no distension.      Palpations: Abdomen is soft. There is no mass.      Tenderness: There is no abdominal tenderness.   Musculoskeletal:         General: Normal range of motion.      Cervical back: Normal range of motion.   Skin:     General: Skin is warm.   Neurological:      General: No focal deficit present.      Mental Status: She is alert and oriented to person, place, and time.   Psychiatric:         Mood and Affect: Mood normal.         Behavior: Behavior normal.         Thought Content: Thought content normal.         Judgment: Judgment normal.            Computed MELD 3.0 unavailable. One or more values for this score either were not found within the given timeframe or did not fit some other criterion.  Computed MELD-Na unavailable. One or more values for this score either were not found within the given timeframe or did not fit some other criterion.      Significant Labs:  CBC:   Recent Labs   Lab 06/18/25  0235   WBC 12.15   RBC 3.79*   HGB 10.8*   HCT 33.7*        CMP:   Recent Labs   Lab 06/18/25  0235      CALCIUM 9.2   ALBUMIN 3.9   PROT 6.6      K 4.2   CO2 24      BUN 21*   CREATININE 1.3   ALKPHOS 74   ALT 16   AST 20   BILITOT 0.4       Significant Imaging:  US: Reviewed    Assessment/Plan:     GI  S/P liver transplant  Admitted for acute rejection r/o. US findings wnl. LFTs wnl. Feeling well. Unclear etiology of pain, possible 2/2 constipation +/- GERD. No missed IS doses. Okay to discharge from hepatology standpoint with regularly scheduled follow-up and labs with  Transplant Hepatology (appt scheduled 8/21/25)        Thank you for your consult. I will sign off. Please contact us if you have any additional questions.    Pita Fraser MD  Hepatology  [facility] - Telemetry Stepdown       [1]   Medications Prior to Admission   Medication Sig Dispense Refill Last Dose/Taking    atorvastatin (LIPITOR) 40 MG tablet TAKE 1 TABLET(40 MG) BY MOUTH EVERY DAY 90 tablet 3 Taking    dextroamphetamine-amphetamine 30 mg Tab Take one tablet as needed in the morning and one tablet as needed in the early afternoon. 60 tablet 0 Taking    hydrOXYzine HCL (ATARAX) 25 MG tablet TAKE 1 TO 2 TABLETS BY MOUTH EVERY NIGHT AT BEDTIME AS NEEDED FOR INSOMNIA 60 tablet 1 Taking    levothyroxine (SYNTHROID) 50 MCG tablet Take 1 tablet (50 mcg total) by mouth before breakfast. 90 tablet 1 Taking    linaCLOtide (LINZESS) 145 mcg Cap capsule Take 1 capsule (145 mcg total) by mouth before breakfast. 90 capsule 3 Taking    metFORMIN (GLUCOPHAGE-XR) 500 MG ER 24hr tablet TAKE 1 TABLET(500 MG) BY MOUTH TWICE DAILY WITH MEALS 180 tablet 3 Taking    mycophenolate (CELLCEPT) 250 mg Cap TAKE 2 CAPSULES BY MOUTH 2 TIMES A  capsule 3 Taking    pantoprazole (PROTONIX) 40 MG tablet Take 1 tablet (40 mg total) by mouth once daily. 90 tablet 3 Taking    tacrolimus (PROGRAF) 1 MG Cap Take 1 capsule (1 mg total) by mouth every 12 (twelve) hours. 180 capsule 3 Taking    tirzepatide (MOUNJARO) 2.5 mg/0.5 mL PnIj Inject 2.5 mg into the skin every 7 days. 4 Pen 3 Taking    aspirin (ECOTRIN) 81 MG EC tablet Take 81 mg by mouth once daily.       bimatoprost (LATISSE) 0.03 % ophthalmic solution Place one drop on applicator and apply evenly along the skin of the upper eyelid at base of eyelashes once daily at bedtime; repeat procedure for second eye (use a clean applicator). 5 mL 12     hydroquinone 8 % Emul Compound hydroquinone 8% / tretinoin 0.025% / kojic acid 1% / niacinamide 4% / fluocinolone 0.025% cream. Apply a  pea-sized amount to face qhs. Do not use for longer than 12 weeks in a row then take a 1 month break. Use only to dark spots 30 g 1     tretinoin (RETIN-A) 0.05 % cream Compound tretinoin 0.05% / niacinamide 2% cream. Start 2-3 times weekly then increase up to every night after 2-3 weeks if skin is not too dry. Apply pea sized amount to entire face 30 g 5

## 2025-06-18 NOTE — NURSING
Patient is discharged to [facility] care.  Discharge instruction given and explained. Questions answered.  IV DC'd.  Patient belonging returned.  Patient is awake and oriented X4. No signs of distress or discomfort noted.  Patient ready for discharge

## 2025-06-18 NOTE — ED NOTES
I-STAT Chem-8+ Results:   Value Reference Range   Sodium 137 136-145 mmol/L   Potassium  4.6 3.5-5.1 mmol/L   Chloride 104  mmol/L   Ionized Calcium 1.13 1.06-1.42 mmol/L   CO2 (measured) 22 23-29 mmol/L   Glucose 98  mg/dL   BUN 27 6-30 mg/dL   Creatinine 1.3 0.5-1.4 mg/dL   Hematocrit 38 36-54%

## 2025-06-27 ENCOUNTER — PATIENT OUTREACH (OUTPATIENT)
Dept: ADMINISTRATIVE | Facility: HOSPITAL | Age: 55
End: 2025-06-27
Payer: COMMERCIAL

## 2025-06-27 DIAGNOSIS — N18.31 STAGE 3A CHRONIC KIDNEY DISEASE: Primary | ICD-10-CM

## 2025-07-02 ENCOUNTER — RESULTS FOLLOW-UP (OUTPATIENT)
Dept: TRANSPLANT | Facility: CLINIC | Age: 55
End: 2025-07-02
Payer: COMMERCIAL

## 2025-07-02 ENCOUNTER — LAB VISIT (OUTPATIENT)
Dept: LAB | Facility: HOSPITAL | Age: 55
End: 2025-07-02
Attending: INTERNAL MEDICINE
Payer: COMMERCIAL

## 2025-07-02 DIAGNOSIS — N18.31 STAGE 3A CHRONIC KIDNEY DISEASE: ICD-10-CM

## 2025-07-02 DIAGNOSIS — Z94.4 S/P LIVER TRANSPLANT: ICD-10-CM

## 2025-07-02 LAB
ABSOLUTE EOSINOPHIL (OHS): 0.46 K/UL
ABSOLUTE MONOCYTE (OHS): 0.55 K/UL (ref 0.3–1)
ABSOLUTE NEUTROPHIL COUNT (OHS): 6.69 K/UL (ref 1.8–7.7)
ALBUMIN SERPL BCP-MCNC: 4.1 G/DL (ref 3.5–5.2)
ALBUMIN/CREAT UR: 8.7 UG/MG
ALP SERPL-CCNC: 76 UNIT/L (ref 40–150)
ALT SERPL W/O P-5'-P-CCNC: 18 UNIT/L (ref 10–44)
ANION GAP (OHS): 10 MMOL/L (ref 8–16)
AST SERPL-CCNC: 24 UNIT/L (ref 11–45)
BASOPHILS # BLD AUTO: 0.08 K/UL
BASOPHILS NFR BLD AUTO: 0.8 %
BILIRUB SERPL-MCNC: 0.2 MG/DL (ref 0.1–1)
BUN SERPL-MCNC: 24 MG/DL (ref 6–20)
CALCIUM SERPL-MCNC: 9.3 MG/DL (ref 8.7–10.5)
CHLORIDE SERPL-SCNC: 106 MMOL/L (ref 95–110)
CO2 SERPL-SCNC: 23 MMOL/L (ref 23–29)
CREAT SERPL-MCNC: 1.4 MG/DL (ref 0.5–1.4)
CREAT UR-MCNC: 115 MG/DL (ref 15–325)
ERYTHROCYTE [DISTWIDTH] IN BLOOD BY AUTOMATED COUNT: 13.4 % (ref 11.5–14.5)
GFR SERPLBLD CREATININE-BSD FMLA CKD-EPI: 45 ML/MIN/1.73/M2
GLUCOSE SERPL-MCNC: 117 MG/DL (ref 70–110)
HCT VFR BLD AUTO: 37.2 % (ref 37–48.5)
HGB BLD-MCNC: 11.7 GM/DL (ref 12–16)
IMM GRANULOCYTES # BLD AUTO: 0.03 K/UL (ref 0–0.04)
IMM GRANULOCYTES NFR BLD AUTO: 0.3 % (ref 0–0.5)
LYMPHOCYTES # BLD AUTO: 2.1 K/UL (ref 1–4.8)
MAGNESIUM SERPL-MCNC: 1.6 MG/DL (ref 1.6–2.6)
MCH RBC QN AUTO: 28.8 PG (ref 27–31)
MCHC RBC AUTO-ENTMCNC: 31.5 G/DL (ref 32–36)
MCV RBC AUTO: 92 FL (ref 82–98)
MICROALBUMIN UR-MCNC: 10 UG/ML (ref ?–5000)
NUCLEATED RBC (/100WBC) (OHS): 0 /100 WBC
PLATELET # BLD AUTO: 350 K/UL (ref 150–450)
PMV BLD AUTO: 10.2 FL (ref 9.2–12.9)
POTASSIUM SERPL-SCNC: 4.6 MMOL/L (ref 3.5–5.1)
PROT SERPL-MCNC: 7 GM/DL (ref 6–8.4)
RBC # BLD AUTO: 4.06 M/UL (ref 4–5.4)
RELATIVE EOSINOPHIL (OHS): 4.6 %
RELATIVE LYMPHOCYTE (OHS): 21.2 % (ref 18–48)
RELATIVE MONOCYTE (OHS): 5.5 % (ref 4–15)
RELATIVE NEUTROPHIL (OHS): 67.6 % (ref 38–73)
SODIUM SERPL-SCNC: 139 MMOL/L (ref 136–145)
WBC # BLD AUTO: 9.91 K/UL (ref 3.9–12.7)

## 2025-07-02 PROCEDURE — 85025 COMPLETE CBC W/AUTO DIFF WBC: CPT

## 2025-07-02 PROCEDURE — 80197 ASSAY OF TACROLIMUS: CPT

## 2025-07-02 PROCEDURE — 80053 COMPREHEN METABOLIC PANEL: CPT

## 2025-07-02 PROCEDURE — 82570 ASSAY OF URINE CREATININE: CPT

## 2025-07-02 PROCEDURE — 83735 ASSAY OF MAGNESIUM: CPT

## 2025-07-02 PROCEDURE — 36415 COLL VENOUS BLD VENIPUNCTURE: CPT

## 2025-07-03 ENCOUNTER — RESULTS FOLLOW-UP (OUTPATIENT)
Dept: INTERNAL MEDICINE | Facility: CLINIC | Age: 55
End: 2025-07-03

## 2025-07-03 LAB — TACROLIMUS BLD-MCNC: 4.2 NG/ML (ref 5–15)

## 2025-07-08 NOTE — TELEPHONE ENCOUNTER
Sent patient message via portal to let her know labs are stable.  No medication changes, next labs due on 9/30/25    ----- Message from Sharmila Pacheco MD sent at 7/3/2025  4:47 PM CDT -----  The Labs are stable - please let patient know.  ----- Message -----  From: Lab, Background User  Sent: 7/2/2025   1:39 PM CDT  To: Sharmila Pacheco MD

## 2025-07-10 ENCOUNTER — PATIENT MESSAGE (OUTPATIENT)
Dept: ENDOCRINOLOGY | Facility: CLINIC | Age: 55
End: 2025-07-10
Payer: COMMERCIAL

## 2025-07-14 DIAGNOSIS — R73.03 PREDIABETES: ICD-10-CM

## 2025-07-14 DIAGNOSIS — R73.9 HYPERGLYCEMIA: ICD-10-CM

## 2025-07-14 RX ORDER — TIRZEPATIDE 2.5 MG/.5ML
2.5 INJECTION, SOLUTION SUBCUTANEOUS
Qty: 4 PEN | Refills: 3 | Status: SHIPPED | OUTPATIENT
Start: 2025-07-14

## 2025-07-22 NOTE — PROGRESS NOTES
Subjective:      Patient ID: Malu Montes is a 55 y.o. female.    Chief Complaint:  No chief complaint on file.    History of Present Illness  Malu Montes with HLD, hypothyroidism, and ESLD who presents today for follow up of new diagnosis of osteopenia. Previous patient of mine. Last seen in Dec 2024.      The patient location is: at home   The chief complaint leading to consultation is: osteopenia and hyperglycemia     Visit type: audiovisual    Face to Face time with patient: 17 minutes  40 minutes of total time spent on the encounter, which includes face to face time and non-face to face time preparing to see the patient (eg, review of tests), Obtaining and/or reviewing separately obtained history, Documenting clinical information in the electronic or other health record, Independently interpreting results (not separately reported) and communicating results to the patient/family/caregiver, or Care coordination (not separately reported).    Each patient to whom he or she provides medical services by telemedicine is:  (1) informed of the relationship between the physician and patient and the respective role of any other health care provider with respect to management of the patient; and (2) notified that he or she may decline to receive medical services by telemedicine and may withdraw from such care at any time.    We first met patient during June 2022 admission for HLD, hypothyroidism, and ESLD. No personal history of DM. Patient admitted for liver transplant. Endocrinology consulted for BG management.    Reports she has increased weight over the last 2 months ~ 9 pounds. Was not watching diet as well as she had been in the past. Not exercising as much.    She has chronic constipation- taking linzess and dulcolax     Denies falls since her last visit     She would like to be checked for high cortisol  She denies blood clots   Denies fractures   Denies upper extremity weakness   + easy bruising    Denies abnormal striae     With regards to hyperglycemia,     She is no longer on prednisone (was on briefly earlier in 2023). Reviewed labs and noted BG of 172 with repeat a month later 112.     Denies FH of diabetes  Plans on starting to exercise.     She is on metformin 500 mg twice daily  She is on Mounjaro 2.5 mg weekly    She is trying to follow low carb diet.   Does report she has a sweet tooth.   No formal exercise     Lab Results   Component Value Date    HGBA1C 5.7 (H) 12/10/2024     With regards to osteopenia,     Family history: none    Last menses in Aug 2023- has been having more hot flashes     current medication: none    She was taken off of calcium around in 2023  Calcium intake- has calcium in diet  Vit D intake- OTC 50 mcg daily     Latest Reference Range & Units 02/20/23 07:09   Vitamin D 30 - 96 ng/mL 41     Weight bearing exercise-no formal   Has gym at work and plans on starting  She is active  Recent falls- none  Dental work- does not need    No recent fractures   No significant height loss (>2 inches)    tob use - rarely; past use quit around 2022  etoh use-  denies    No current diarrhea or h/o malabsorption    Denies chronic exposure to steroid therapy, anticoagulants, proton pump inhibitors or antiseizure medications.     Denies GERD, indigestion, or gastric bypass surgery.     + history of thyroid disease,   Denies anemia  + kidney stones   + kidney disease.     Denies active malignancy, history of malignancy the involved the bone, prior radiation treatment, or unexplained elevations of alk phos on labs     Last BMD dated 8/8/2024  COMPARISON:  Comparison study done on 08/04/2023. Lumbar spine BMD 0.927 g/cm2 and the T-score -1.1.  The Total Hip BMD 0.791 g/cm2 and the T-score -1.2.     FINDINGS:  Lumbar spine (L1-L4):               T-score is -1.7, and Z-score is -0.8.     Compared with previous DXA, BMD at the lumbar spine has declined by -7.5%.     Femoral neck:                           T-score is -1.7, and Z-score is -0.8.     Total hip:                                T-score is -1.4, and Z-score is -0.8.     Compared with previous DXA, BMD at the total hip has remained stable.     Distal 1/3 radius:                      Not applicable        Trabecular Bone Score     The trabecular bone score (TBS) indicates normal bone quality.     Fracture Risk (FRAX adjusted for Trabecular Bone Score)     4.7% risk of a major osteoporotic fracture in the next 10 years.     0.3% risk of hip fracture in the next 10 years.     Impression:     *Low bone mass (Osteopenia); FRAX calculations do not support treatment as osteoporosis.     RECOMMENDATIONS:  *Daily calcium intake 6340-6278 mg, dietary sources preferred; Vitamin D 3344-7772 IU daily.  *Weight bearing exercise and fall precautions.  *Repeat BMD in 2 years.     Latest Reference Range & Units 02/20/23 07:09   Vit D, 25-Hydroxy 30 - 96 ng/mL 41     With regards to hypothyroidism,     Managed by PCP   On LT4 50 mcg daily     Denies missing doses.  Now taking correctly     + hair loss     Lab Results   Component Value Date    TSH 2.152 12/10/2024    FREET4 0.94 07/15/2021     Review of Systems   Constitutional:  Positive for fatigue. Negative for diaphoresis and unexpected weight change.   Eyes:  Negative for visual disturbance.   Gastrointestinal:  Positive for constipation.   Endocrine: Negative for polydipsia, polyphagia and polyuria.   Musculoskeletal:  Negative for arthralgias, back pain and gait problem.     Lab Review:   Lab Results   Component Value Date    HGBA1C 5.7 (H) 12/10/2024    HGBA1C 5.6 03/19/2024    HGBA1C 6.4 (H) 12/19/2023      Lab Results   Component Value Date    CHOL 147 12/10/2024    HDL 56 12/10/2024    LDLCALC 78.2 12/10/2024    TRIG 64 12/10/2024    CHOLHDL 38.1 12/10/2024     Lab Results   Component Value Date     07/02/2025    K 4.6 07/02/2025     07/02/2025    CO2 23 07/02/2025     (H) 07/02/2025    BUN 24  (H) 07/02/2025    CREATININE 1.4 07/02/2025    CALCIUM 9.3 07/02/2025    PROT 7.0 07/02/2025    ALBUMIN 4.1 07/02/2025    BILITOT 0.2 07/02/2025    ALKPHOS 76 07/02/2025    AST 24 07/02/2025    ALT 18 07/02/2025    ANIONGAP 10 07/02/2025    ESTGFRAFRICA >60.0 07/28/2022    EGFRNONAA 58.3 (A) 07/28/2022    TSH 2.152 12/10/2024     Vit D, 25-Hydroxy   Date Value Ref Range Status   12/10/2024 43 30 - 96 ng/mL Final     Comment:     Vitamin D deficiency.........<10 ng/mL                              Vitamin D insufficiency......10-29 ng/mL       Vitamin D sufficiency........> or equal to 30 ng/mL  Vitamin D toxicity............>100 ng/mL       Assessment and Plan     1. Steroid-induced hyperglycemia  Hemoglobin A1C    Cortisol, 8AM    dexAMETHasone (DECADRON) 1 MG Tab    Dexamethasone    tirzepatide (MOUNJARO) 5 mg/0.5 mL PnIj      2. Hypothyroidism, unspecified type  TSH    Follicle Stimulating Hormone    Luteinizing Hormone      3. Osteopenia of left hip        4. Vitamin D deficiency        5. Hyperlipidemia, unspecified hyperlipidemia type          Steroid-induced hyperglycemia  She is no longer on prednisone (was on briefly on in Nov 2023).    Check A1c and DST   Continue Metformin 500 mg twice daily    Increase Mounjaro 5 mg weekly     Hypothyroidism  Managed by PCP   On LT4 50 mcg daily   Last TSH at goal   Check TSH    Osteopenia of left hip  No indication for treatment for osteoporosis at this time  Encouraged weight bearing exercise  Avoid alcohol and tobacco     Continue vitamin D 2000 IU daily   Continue calcium in diet. RDA of calcium is 8275-6687 mg daily, preferably from food     Declined at spine. Discussed options. Will check PTH and bone specific alk phos to determine adynamic bone disease with kidney function decline.  Has history of esophageal varices and would avoid oral medications-fosamax. Can consider reclast. She would like to work on lifestyle changes with exercise and repeat bone density in  8/2026    Vitamin D deficiency  Continue vitamin D  Last at goal    Hyperlipidemia  Not on medications   LP due in Dec 2025    Visit today included increased complexity associated with the care of episodic problems osteopenia, hypothyroidism, vitamin D deficiency, hyperglycemia addressed and managing longitudinal care of the patient due to serious managed problems  osteopenia, hypothyroidism, vitamin D deficiency, hyperglycemia    Follow up in about 6 months (around 1/23/2026).

## 2025-07-23 ENCOUNTER — OFFICE VISIT (OUTPATIENT)
Dept: ENDOCRINOLOGY | Facility: CLINIC | Age: 55
End: 2025-07-23
Payer: COMMERCIAL

## 2025-07-23 DIAGNOSIS — E78.5 HYPERLIPIDEMIA, UNSPECIFIED HYPERLIPIDEMIA TYPE: ICD-10-CM

## 2025-07-23 DIAGNOSIS — E03.9 HYPOTHYROIDISM, UNSPECIFIED TYPE: ICD-10-CM

## 2025-07-23 DIAGNOSIS — T38.0X5A STEROID-INDUCED HYPERGLYCEMIA: Primary | Chronic | ICD-10-CM

## 2025-07-23 DIAGNOSIS — E55.9 VITAMIN D DEFICIENCY: ICD-10-CM

## 2025-07-23 DIAGNOSIS — M85.852 OSTEOPENIA OF LEFT HIP: ICD-10-CM

## 2025-07-23 DIAGNOSIS — R73.9 STEROID-INDUCED HYPERGLYCEMIA: Primary | Chronic | ICD-10-CM

## 2025-07-23 PROCEDURE — 99213 OFFICE O/P EST LOW 20 MIN: CPT | Performed by: NURSE PRACTITIONER

## 2025-07-23 RX ORDER — DEXAMETHASONE 1 MG/1
1 TABLET ORAL ONCE
Qty: 1 TABLET | Refills: 0 | Status: SHIPPED | OUTPATIENT
Start: 2025-07-23 | End: 2025-07-23

## 2025-07-23 RX ORDER — TIRZEPATIDE 5 MG/.5ML
5 INJECTION, SOLUTION SUBCUTANEOUS
Qty: 2 ML | Refills: 11 | Status: SHIPPED | OUTPATIENT
Start: 2025-07-23

## 2025-07-23 NOTE — PATIENT INSTRUCTIONS
Hypothyroidism     Continue levothyroxine 50 mcg daily     Osteopenia    Check bone density in Aug 2026     Hyperglycemia    Check A1c and DST   Continue Metformin 500 mg twice daily    Increase Mounjaro 5 mg weekly     Here are the instructions for the dexamethasone suppression test.    Please take 1 mg dexamethasone between 11 PM-12 AM. Then have your blood drawn at 8-9 AM the next morning.    I have sent the prescription of dexamethasone to your pharmacy and entered the blood tests for you.     Snacks can be an important part of a balanced, healthy meal plan. They allow you to eat more frequently, feeling full and satisfied throughout the day. Also, they allow you to spread carbohydrates evenly, which may stabilize blood sugars.  Plus, snacks are enjoyable!     The amount of carbohydrate needed at snacks varies. Generally, about 15-30 grams of carbohydrate per snack is recommended.  Below you will find some tasty treats.       0-5 gm carb  Crystal Light  Vitamin Water Zero  Herbal tea, unsweetened  2 tsp peanut butter on celery  1./2 cup sugar-free jell-o  1 sugar-free popsicle  ¼ cup blueberries  8oz Blue Love unsweetened almond milk  5 baby carrots & celery sticks, cucumbers, bell peppers dipped in ¼ cup salsa, 2Tbsp light ranch dressing or 2Tbsp plain Greek yogurt  10 Goldfish crackers  ½ oz low-fat cheese or string cheese  1 closed handful of nuts, unsalted  1 Tbsp of sunflower seeds, unsalted  1 cup Smart Pop popcorn  1 whole grain brown rice cake        15 gm carb  1 small piece of fruit or ½ banana or 1/2 cup lite canned fruit  3 bennie cracker squares  3 cups Smart Pop popcorn, top spray butter, Elias lite salt or cinnamon and Truvia  5 Vanilla Wafers  ½ cup low fat, no added sugar ice cream or frozen yogurt (Blue bell, Blue Bunny, Weight Watchers, Skinny Cow)  ½ turkey, ham, or chicken sandwich  ½ c fruit with ½ c Cottage cheese  4-6 unsalted wheat crackers with 1 oz low fat cheese or 1 tbsp peanut  butter   30-45 goldfish crackers (depending on flavor)   7-8 Methodist mini brown rice cakes (caramel, apple cinnamon, chocolate)   12 Methodist mini brown rice cakes (cheddar, bbq, ranch)   1/3 cup hummus dip with raw veg  1/2 whole wheat olivia, 1Tbsp hummus  Mini Pizza (1/2 whole wheat English muffin, low-fat  cheese, tomato sauce)  100 calorie snack pack (Oreo, Chips Ahoy, Ritz Mix, Baked Cheetos)  4-6 oz. light or Greek Style yogurt (Chobani, Yoplait, Okios, Stoneyfield)  ½ cup sugar-free pudding    6 in. wheat tortilla or olivia oven toasted chips (topped with spray butter flavoring, cinnamon, Truvia OR spray butter, garlic powder, chili powder)   18 BBQ Popchips (available at [employer], [employer], Fresh Market)

## 2025-07-23 NOTE — ASSESSMENT & PLAN NOTE
She is no longer on prednisone (was on briefly on in Nov 2023).    Check A1c and DST   Continue Metformin 500 mg twice daily    Increase Mounjaro 5 mg weekly

## 2025-07-23 NOTE — ASSESSMENT & PLAN NOTE
No indication for treatment for osteoporosis at this time  Encouraged weight bearing exercise  Avoid alcohol and tobacco     Continue vitamin D 2000 IU daily   Continue calcium in diet. RDA of calcium is 1273-3163 mg daily, preferably from food     Declined at spine. Discussed options. Will check PTH and bone specific alk phos to determine adynamic bone disease with kidney function decline.  Has history of esophageal varices and would avoid oral medications-fosamax. Can consider reclast. She would like to work on lifestyle changes with exercise and repeat bone density in 8/2026

## 2025-07-24 ENCOUNTER — LAB VISIT (OUTPATIENT)
Dept: LAB | Facility: HOSPITAL | Age: 55
End: 2025-07-24
Attending: NURSE PRACTITIONER
Payer: COMMERCIAL

## 2025-07-24 DIAGNOSIS — T38.0X5A STEROID-INDUCED HYPERGLYCEMIA: Chronic | ICD-10-CM

## 2025-07-24 DIAGNOSIS — E03.9 HYPOTHYROIDISM, UNSPECIFIED TYPE: ICD-10-CM

## 2025-07-24 DIAGNOSIS — R73.9 STEROID-INDUCED HYPERGLYCEMIA: Chronic | ICD-10-CM

## 2025-07-24 LAB
CORTIS SERPL-MCNC: 1.6 UG/DL (ref 4.3–22.4)
EAG (OHS): 111 MG/DL (ref 68–131)
FSH SERPL-ACNC: 100.29 MIU/ML
HBA1C MFR BLD: 5.5 % (ref 4–5.6)
LH SERPL-ACNC: 47.7 MIU/ML
TSH SERPL-ACNC: 1.29 UIU/ML (ref 0.4–4)

## 2025-07-24 PROCEDURE — 82533 TOTAL CORTISOL: CPT

## 2025-07-24 PROCEDURE — 83002 ASSAY OF GONADOTROPIN (LH): CPT

## 2025-07-24 PROCEDURE — 82542 COL CHROMOTOGRAPHY QUAL/QUAN: CPT

## 2025-07-24 PROCEDURE — 83001 ASSAY OF GONADOTROPIN (FSH): CPT

## 2025-07-24 PROCEDURE — 84443 ASSAY THYROID STIM HORMONE: CPT

## 2025-07-24 PROCEDURE — 83036 HEMOGLOBIN GLYCOSYLATED A1C: CPT

## 2025-07-24 PROCEDURE — 36415 COLL VENOUS BLD VENIPUNCTURE: CPT

## 2025-07-25 ENCOUNTER — PATIENT MESSAGE (OUTPATIENT)
Dept: OBSTETRICS AND GYNECOLOGY | Facility: CLINIC | Age: 55
End: 2025-07-25
Payer: COMMERCIAL

## 2025-07-26 DIAGNOSIS — F90.9 ATTENTION DEFICIT HYPERACTIVITY DISORDER (ADHD), UNSPECIFIED ADHD TYPE: ICD-10-CM

## 2025-07-27 RX ORDER — DEXTROAMPHETAMINE SACCHARATE, AMPHETAMINE ASPARTATE, DEXTROAMPHETAMINE SULFATE AND AMPHETAMINE SULFATE 7.5; 7.5; 7.5; 7.5 MG/1; MG/1; MG/1; MG/1
TABLET ORAL
Qty: 60 TABLET | Refills: 0 | Status: SHIPPED | OUTPATIENT
Start: 2025-07-27 | End: 2025-07-30 | Stop reason: SDUPTHER

## 2025-07-28 ENCOUNTER — PATIENT MESSAGE (OUTPATIENT)
Dept: OBSTETRICS AND GYNECOLOGY | Facility: CLINIC | Age: 55
End: 2025-07-28
Payer: COMMERCIAL

## 2025-07-28 DIAGNOSIS — N95.0 PMB (POSTMENOPAUSAL BLEEDING): Primary | ICD-10-CM

## 2025-07-28 RX ORDER — HYDROXYZINE HYDROCHLORIDE 25 MG/1
TABLET, FILM COATED ORAL
Qty: 60 TABLET | Refills: 1 | Status: SHIPPED | OUTPATIENT
Start: 2025-07-28

## 2025-07-30 DIAGNOSIS — F90.9 ATTENTION DEFICIT HYPERACTIVITY DISORDER (ADHD), UNSPECIFIED ADHD TYPE: ICD-10-CM

## 2025-07-30 RX ORDER — DEXTROAMPHETAMINE SACCHARATE, AMPHETAMINE ASPARTATE, DEXTROAMPHETAMINE SULFATE AND AMPHETAMINE SULFATE 7.5; 7.5; 7.5; 7.5 MG/1; MG/1; MG/1; MG/1
TABLET ORAL
Qty: 60 TABLET | Refills: 0 | Status: SHIPPED | OUTPATIENT
Start: 2025-07-30 | End: 2025-07-30 | Stop reason: SDUPTHER

## 2025-07-30 RX ORDER — DEXTROAMPHETAMINE SACCHARATE, AMPHETAMINE ASPARTATE, DEXTROAMPHETAMINE SULFATE AND AMPHETAMINE SULFATE 7.5; 7.5; 7.5; 7.5 MG/1; MG/1; MG/1; MG/1
TABLET ORAL
Qty: 60 TABLET | Refills: 0 | Status: SHIPPED | OUTPATIENT
Start: 2025-07-30

## 2025-07-31 ENCOUNTER — OFFICE VISIT (OUTPATIENT)
Dept: OBSTETRICS AND GYNECOLOGY | Facility: CLINIC | Age: 55
End: 2025-07-31
Attending: STUDENT IN AN ORGANIZED HEALTH CARE EDUCATION/TRAINING PROGRAM
Payer: COMMERCIAL

## 2025-07-31 VITALS
BODY MASS INDEX: 23.04 KG/M2 | DIASTOLIC BLOOD PRESSURE: 72 MMHG | SYSTOLIC BLOOD PRESSURE: 110 MMHG | HEIGHT: 63 IN | WEIGHT: 130.06 LBS

## 2025-07-31 DIAGNOSIS — N95.0 PMB (POSTMENOPAUSAL BLEEDING): Primary | ICD-10-CM

## 2025-07-31 DIAGNOSIS — Z12.4 SCREENING FOR CERVICAL CANCER: ICD-10-CM

## 2025-07-31 LAB
BILIRUB UR QL STRIP.AUTO: NEGATIVE
CLARITY UR: CLEAR
COLOR UR AUTO: YELLOW
GLUCOSE UR QL STRIP: NEGATIVE
HGB UR QL STRIP: ABNORMAL
KETONES UR QL STRIP: NEGATIVE
LEUKOCYTE ESTERASE UR QL STRIP: NEGATIVE
NITRITE UR QL STRIP: NEGATIVE
PH UR STRIP: 6 [PH]
PROT UR QL STRIP: NEGATIVE
SP GR UR STRIP: 1.02
UROBILINOGEN UR STRIP-ACNC: NEGATIVE EU/DL

## 2025-07-31 PROCEDURE — 87086 URINE CULTURE/COLONY COUNT: CPT | Performed by: STUDENT IN AN ORGANIZED HEALTH CARE EDUCATION/TRAINING PROGRAM

## 2025-07-31 PROCEDURE — 99999 PR PBB SHADOW E&M-EST. PATIENT-LVL III: CPT | Mod: PBBFAC,,, | Performed by: STUDENT IN AN ORGANIZED HEALTH CARE EDUCATION/TRAINING PROGRAM

## 2025-07-31 PROCEDURE — 81003 URINALYSIS AUTO W/O SCOPE: CPT | Performed by: STUDENT IN AN ORGANIZED HEALTH CARE EDUCATION/TRAINING PROGRAM

## 2025-07-31 PROCEDURE — 99213 OFFICE O/P EST LOW 20 MIN: CPT | Mod: S$GLB,,, | Performed by: STUDENT IN AN ORGANIZED HEALTH CARE EDUCATION/TRAINING PROGRAM

## 2025-07-31 PROCEDURE — 87624 HPV HI-RISK TYP POOLED RSLT: CPT | Performed by: STUDENT IN AN ORGANIZED HEALTH CARE EDUCATION/TRAINING PROGRAM

## 2025-07-31 PROCEDURE — 88175 CYTOPATH C/V AUTO FLUID REDO: CPT | Mod: TC | Performed by: STUDENT IN AN ORGANIZED HEALTH CARE EDUCATION/TRAINING PROGRAM

## 2025-07-31 NOTE — PROGRESS NOTES
Chief Complaint: U/S Results     HPI:      Malu Montes is a 55 y.o.  who presents today for follow up of U/S results.  Has had bleeding and spotting that started at the end of July.  Happened for a few days then stopped then returned.  Is not heavy.  LMP: Patient's last menstrual period was 10/20/2022 (approximate).  No other issues, problems, or complaints.     OB History          4    Para   4    Term   2            AB        Living   2         SAB        IAB        Ectopic        Multiple        Live Births   2               Past Medical History:   Diagnosis Date    ADD (attention deficit disorder)     Anxiety     Ascites of liver     Cirrhosis 2021    CKD (chronic kidney disease) stage 3, GFR 30-59 ml/min     Constipation     ETOH abuse     Hyperlipidemia     Hypothyroidism     Liver transplant candidate 2022    Other ascites 2021    Pancreatitis, acute     Tobacco use     Vitamin D deficiency      Past Surgical History:   Procedure Laterality Date    AUGMENTATION OF BREAST      second set    breast augmentation       SECTION      COLONOSCOPY N/A 2021    Procedure: COLONOSCOPY;  Surgeon: Dao Gray MD;  Location: [facility] (2ND FLR);  Service: Endoscopy;  Laterality: N/A;  COVID test 21 General surgery clinic [facility]    CYSTOSCOPY N/A 09/10/2021    Procedure: CYSTOSCOPY;  Surgeon: Rj Sánchez Jr., MD;  Location: [facility] OR [address] [place] [address];  Service: Urology;  Laterality: N/A;    ESOPHAGOGASTRODUODENOSCOPY N/A 2021    Procedure: ESOPHAGOGASTRODUODENOSCOPY (EGD);  Surgeon: Dao Gray MD;  Location: [facility] (2ND FLR);  Service: Endoscopy;  Laterality: N/A;  labs current on     ESOPHAGOGASTRODUODENOSCOPY N/A 10/26/2021    Procedure: ESOPHAGOGASTRODUODENOSCOPY (EGD);  Surgeon: Dao Gray MD;  Location: [facility] (2ND FLR);  Service: Endoscopy;  Laterality: N/A;  to be done the week of 10/18/21 per   [facility]-  covid-10/23/21-[facility]-   10/25 arrival time confirmed with pt-rb    ESOPHAGOGASTRODUODENOSCOPY Left 12/21/2021    Procedure: EGD (ESOPHAGOGASTRODUODENOSCOPY);  Surgeon: Koffi Monteiro MD;  [facility]  Service: Endoscopy;  Laterality: Left;  Rapid  pt requested AM appt and first available in December-  labs morning of procedure-  12/14 lvm to confirm appt-rb    HEMORRHOID SURGERY      LIVER TRANSPLANT N/A 06/05/2022    Procedure: TRANSPLANT, LIVER;  Surgeon: Franco Slaughter MD;  Location: [facility] OR 2ND FLR;  Service: Transplant;  Laterality: N/A;    PERITONEOCENTESIS Right 06/08/2021    Procedure: PARACENTESIS, ABDOMINAL;  Surgeon: Jay Torre MD;  Location: [facility] [place] CATH LAB;  Service: Radiology;  Laterality: Right;    PERITONEOCENTESIS Right 06/25/2021    Procedure: PARACENTESIS, ABDOMINAL;  Surgeon: Jay Torre MD;  Location: [facility] [place] CATH LAB;  Service: Radiology;  Laterality: Right;    PERITONEOCENTESIS Right 07/12/2021    Procedure: PARACENTESIS, ABDOMINAL;  Surgeon: Jay Torre MD;  Location: [facility] [place] CATH LAB;  Service: Radiology;  Laterality: Right;    PERITONEOCENTESIS Right 07/23/2021    Procedure: PARACENTESIS, ABDOMINAL;  Surgeon: Edward De La Torre II, MD;  Location: [facility] [place] CATH LAB;  Service: Interventional Radiology;  Laterality: Right;    PERITONEOCENTESIS Right 08/03/2021    Procedure: PARACENTESIS, ABDOMINAL;  Surgeon: Franco Cheung MD;  Location: [facility] [place] CATH LAB;  Service: Radiology;  Laterality: Right;    PERITONEOCENTESIS Right 08/16/2021    Procedure: PARACENTESIS, ABDOMINAL;  Surgeon: Jay Torre MD;  Location: [facility] [place] CATH LAB;  Service: Radiology;  Laterality: Right;    PERITONEOCENTESIS Right 08/23/2021    Procedure: PARACENTESIS, ABDOMINAL;  Surgeon: Jay Torre MD;  Location: [facility] [place] CATH LAB;  Service: Radiology;  Laterality: Right;    PERITONEOCENTESIS Right 09/16/2021    Procedure: PARACENTESIS, ABDOMINAL;  Surgeon: Jay Torre,  MD;  Location: [facility] CATH LAB;  Service: Radiology;  Laterality: Right;    PERITONEOCENTESIS N/A 09/24/2021    Procedure: PARACENTESIS, ABDOMINAL;  Surgeon: Jay Torre MD;  Location: [facility] CATH LAB;  Service: Radiology;  Laterality: N/A;    PERITONEOCENTESIS Right 12/23/2021    Procedure: PARACENTESIS, ABDOMINAL;  Surgeon: Jay Torre MD;  Location: [facility] CATH LAB;  Service: Radiology;  Laterality: Right;    PERITONEOCENTESIS N/A 12/30/2021    Procedure: PARACENTESIS, ABDOMINAL;  Surgeon: Salas Cabrera MD;  Location: [facility] CATH LAB;  Service: Radiology;  Laterality: N/A;    REMOVAL, IMPLANT, RUPTURED, BREAST Bilateral 2/6/2025    Procedure: REMOVAL, IMPLANT, RUPTURED, BREAST;  Surgeon: Franco Cabrera MD;  Location: [facility] OR;  Service: Plastics;  Laterality: Bilateral;    REPLACEMENT OF IMPLANT OF BREAST Bilateral 2/6/2025    Procedure: REPLACEMENT, IMPLANT, BREAST;  Surgeon: Franco Cabrera MD;  Location: [facility] OR;  Service: Plastics;  Laterality: Bilateral;    RETROGRADE PYELOGRAPHY Bilateral 09/10/2021    Procedure: PYELOGRAM, RETROGRADE;  Surgeon: Rj Sánchez Jr., MD;  Location: [address];  Service: Urology;  Laterality: Bilateral;    TONSILLECTOMY       Social History[1]  Family History   Problem Relation Name Age of Onset    Cancer Mother          Uterine Cancer     No Known Problems Father      No Known Problems Sister      Sleep apnea Daughter      ADD / ADHD Daughter      Heart disease Maternal Grandmother          CHF    COPD Maternal Grandfather      Heart disease Paternal Grandmother          CHF    Colon cancer Neg Hx      Inflammatory bowel disease Neg Hx      Stomach cancer Neg Hx      Breast cancer Neg Hx      Ovarian cancer Neg Hx      Learning disabilities Neg Hx       Current Medications[2]    ROS:     GENERAL: Denies unintentional weight gain or weight loss. Feeling well overall.   SKIN: Denies rash or lesions.   HEENT: Denies headaches, or vision changes.   ABDOMEN: Denies  "abdominal pain, constipation, diarrhea, nausea, vomiting, change in appetite.  URINARY: Denies frequency, dysuria, hematuria.  NEUROLOGIC: Denies syncope or weakness.   PSYCHIATRIC: Denies depression, anxiety or mood swings.    Physical Exam:      PHYSICAL EXAM:  /72   Ht 5' 3" (1.6 m)   Wt 59 kg (130 lb 1.1 oz)   LMP 10/20/2022 (Approximate)   BMI 23.04 kg/m²   Body mass index is 23.04 kg/m².     APPEARANCE: Well nourished, well developed, in no acute distress.  PSYCH: Appropriate mood and affect.  Abdo soft, nontender   external genitalia normal in appearance, vagina with scant discharge, cervix without lesion, uterus mobile and nontender  SKIN: No acne or hirsutism.        Assessment/Plan:     55 y.o.     PMB (postmenopausal bleeding)  -     Urinalysis  -     Urine Culture High Risk  -     Liquid-Based Pap Smear, Screening    Screening for cervical cancer  -     Liquid-Based Pap Smear, Screening          Counselin.  PMB:  U/S results reviewed with patient.  Pap smear collected and exam today.  UA also done.  Discussed EMB as bleeding has continued.  She would like to schedule for a different day.  All questions answered.  2.  Follow up with PCP for other health maintenance.  3.  RTC for emb or sooner if needed.    Use of the [facility] Patient Portal discussed and encouraged during today's visit.                  [1]   Social History  Socioeconomic History    Marital status:     Number of children: 1   Occupational History    Occupation: Assistant   Tobacco Use    Smoking status: Former     Current packs/day: 0.00     Average packs/day: 0.3 packs/day for 27.0 years (6.8 ttl pk-yrs)     Types: Vaping with nicotine, Cigarettes     Quit date: 2024     Years since quittin.7    Smokeless tobacco: Never    Tobacco comments:     Social nicotine vape use, but quit    Substance and Sexual Activity    Alcohol use: Not Currently     Comment: quit caridad 15    Drug use: No    Sexual " activity: Yes     Partners: Male   Social History Narrative    They live in [place], [place]      Social Drivers of Health     Financial Resource Strain: Low Risk  (6/18/2025)    Overall Financial Resource Strain (CARDIA)     Difficulty of Paying Living Expenses: Not hard at all   Food Insecurity: No Food Insecurity (6/18/2025)    Hunger Vital Sign     Worried About Running Out of Food in the Last Year: Never true     Ran Out of Food in the Last Year: Never true   Transportation Needs: No Transportation Needs (6/18/2025)    PRAPARE - Transportation     Lack of Transportation (Medical): No     Lack of Transportation (Non-Medical): No   Physical Activity: Sufficiently Active (6/18/2025)    Exercise Vital Sign     Days of Exercise per Week: 7 days     Minutes of Exercise per Session: 30 min   Stress: No Stress Concern Present (6/18/2025)    [employer] - Occupational Stress Questionnaire     Feeling of Stress : Only a little   Housing Stability: Low Risk  (6/18/2025)    Housing Stability Vital Sign     Unable to Pay for Housing in the Last Year: No     Homeless in the Last Year: No   [2]   Current Outpatient Medications:     aspirin (ECOTRIN) 81 MG EC tablet, Take 81 mg by mouth once daily., Disp: , Rfl:     atorvastatin (LIPITOR) 40 MG tablet, TAKE 1 TABLET(40 MG) BY MOUTH EVERY DAY, Disp: 90 tablet, Rfl: 3    bimatoprost (LATISSE) 0.03 % ophthalmic solution, Place one drop on applicator and apply evenly along the skin of the upper eyelid at base of eyelashes once daily at bedtime; repeat procedure for second eye (use a clean applicator)., Disp: 5 mL, Rfl: 12    dextroamphetamine-amphetamine 30 mg Tab, Take one tablet as needed in the morning and one tablet as needed in the early afternoon., Disp: 60 tablet, Rfl: 0    hydrOXYzine HCL (ATARAX) 25 MG tablet, TAKE 1 TO 2 TABLETS BY MOUTH EVERY NIGHT AT BEDTIME AS NEEDED FOR INSOMNIA, Disp: 60 tablet, Rfl: 1    levothyroxine (SYNTHROID) 50 MCG  tablet, Take 1 tablet (50 mcg total) by mouth before breakfast., Disp: 90 tablet, Rfl: 1    linaCLOtide (LINZESS) 145 mcg Cap capsule, Take 1 capsule (145 mcg total) by mouth before breakfast., Disp: 90 capsule, Rfl: 3    metFORMIN (GLUCOPHAGE-XR) 500 MG ER 24hr tablet, TAKE 1 TABLET(500 MG) BY MOUTH TWICE DAILY WITH MEALS, Disp: 180 tablet, Rfl: 3    mycophenolate (CELLCEPT) 250 mg Cap, TAKE 2 CAPSULES BY MOUTH 2 TIMES A DAY, Disp: 360 capsule, Rfl: 3    pantoprazole (PROTONIX) 40 MG tablet, Take 1 tablet (40 mg total) by mouth once daily., Disp: 90 tablet, Rfl: 3    tacrolimus (PROGRAF) 1 MG Cap, Take 1 capsule (1 mg total) by mouth every 12 (twelve) hours., Disp: 180 capsule, Rfl: 3    tirzepatide (MOUNJARO) 5 mg/0.5 mL PnIj, Inject 5 mg into the skin every 7 days., Disp: 2 mL, Rfl: 11    tretinoin (RETIN-A) 0.05 % cream, Compound tretinoin 0.05% / niacinamide 2% cream. Start 2-3 times weekly then increase up to every night after 2-3 weeks if skin is not too dry. Apply pea sized amount to entire face, Disp: 30 g, Rfl: 5

## 2025-08-01 LAB — BACTERIA UR CULT: NO GROWTH

## 2025-08-05 LAB
INSULIN SERPL-ACNC: NORMAL U[IU]/ML
LAB AP BETHESDA CATEGORY: NORMAL
LAB AP CLINICAL FINDINGS: NORMAL
LAB AP CONTRACEPTIVES: NORMAL
LAB AP GYN ADDITIONAL FINDINGS: NORMAL
LAB AP LMP DATE: NORMAL
LAB AP OCHS PAP SPECIMEN ADEQUACY: NORMAL
LAB AP OHS PAP INTERPRETATION: NORMAL
LAB AP PAP DISCLAIMER COMMENTS: NORMAL
LAB AP PAP ESTROGEN REPLACEMENT THERAPY: NORMAL
LAB AP PAP PMP: NORMAL
LAB AP PAP PREVIOUS BX: NORMAL
LAB AP PAP PRIOR TREATMENT: NORMAL
LAB AP PERFORMING LOCATION(S): NORMAL

## 2025-08-08 LAB
HPV DNA, HIGH RISK TYPE 16, PCR (OHS): NEGATIVE
HPV DNA, HIGH RISK TYPE 18, PCR (OHS): NEGATIVE
HPV DNA, HIGH RISK TYPE OTHER, PCR (OHS): NEGATIVE

## 2025-08-18 ENCOUNTER — PATIENT MESSAGE (OUTPATIENT)
Dept: OBSTETRICS AND GYNECOLOGY | Facility: CLINIC | Age: 55
End: 2025-08-18
Payer: COMMERCIAL

## 2025-08-22 DIAGNOSIS — K21.9 GASTROESOPHAGEAL REFLUX DISEASE, UNSPECIFIED WHETHER ESOPHAGITIS PRESENT: ICD-10-CM

## 2025-08-22 RX ORDER — PANTOPRAZOLE SODIUM 40 MG/1
40 TABLET, DELAYED RELEASE ORAL DAILY
Qty: 90 TABLET | Refills: 3 | Status: SHIPPED | OUTPATIENT
Start: 2025-08-22 | End: 2026-08-22

## 2025-09-02 DIAGNOSIS — F90.9 ATTENTION DEFICIT HYPERACTIVITY DISORDER (ADHD), UNSPECIFIED ADHD TYPE: ICD-10-CM

## 2025-09-02 RX ORDER — DEXTROAMPHETAMINE SACCHARATE, AMPHETAMINE ASPARTATE, DEXTROAMPHETAMINE SULFATE AND AMPHETAMINE SULFATE 7.5; 7.5; 7.5; 7.5 MG/1; MG/1; MG/1; MG/1
TABLET ORAL
Qty: 60 TABLET | Refills: 0 | Status: SHIPPED | OUTPATIENT
Start: 2025-09-02

## 2025-09-03 ENCOUNTER — PATIENT MESSAGE (OUTPATIENT)
Dept: OBSTETRICS AND GYNECOLOGY | Facility: CLINIC | Age: 55
End: 2025-09-03
Payer: COMMERCIAL

## 2025-09-03 DIAGNOSIS — F41.9 ANXIETY: Primary | ICD-10-CM

## 2025-09-03 RX ORDER — ALPRAZOLAM 1 MG/1
1 TABLET ORAL 2 TIMES DAILY
Qty: 2 TABLET | Refills: 0 | Status: SHIPPED | OUTPATIENT
Start: 2025-09-03

## 2025-09-03 RX ORDER — ALPRAZOLAM 1 MG/1
1 TABLET ORAL 2 TIMES DAILY
COMMUNITY
End: 2025-09-03 | Stop reason: SDUPTHER

## 2025-09-04 ENCOUNTER — PROCEDURE VISIT (OUTPATIENT)
Dept: OBSTETRICS AND GYNECOLOGY | Facility: CLINIC | Age: 55
End: 2025-09-04
Attending: STUDENT IN AN ORGANIZED HEALTH CARE EDUCATION/TRAINING PROGRAM
Payer: COMMERCIAL

## 2025-09-04 VITALS — BODY MASS INDEX: 22.3 KG/M2 | HEIGHT: 63 IN | WEIGHT: 125.88 LBS

## 2025-09-04 DIAGNOSIS — Z32.02 NEGATIVE PREGNANCY TEST: Primary | ICD-10-CM

## 2025-09-04 DIAGNOSIS — N95.0 PMB (POSTMENOPAUSAL BLEEDING): ICD-10-CM

## 2025-09-04 PROCEDURE — 99499 UNLISTED E&M SERVICE: CPT | Mod: S$GLB,,, | Performed by: STUDENT IN AN ORGANIZED HEALTH CARE EDUCATION/TRAINING PROGRAM

## 2025-09-04 PROCEDURE — 58100 BIOPSY OF UTERUS LINING: CPT | Mod: S$GLB,,, | Performed by: STUDENT IN AN ORGANIZED HEALTH CARE EDUCATION/TRAINING PROGRAM

## (undated) DEVICE — TRAY PARACENTESIS 8FR

## (undated) DEVICE — GLOVE BIOGEL SKINSENSE PI 7.5

## (undated) DEVICE — BOTTLE  EVOLUTION EVAC SUC

## (undated) DEVICE — DRESSING LEUKOPLAST FLEX 1X3IN

## (undated) DEVICE — UNDERGLOVES BIOGEL PI SIZE 7.5

## (undated) DEVICE — SUT ETHILON 4-0 PS2 18 BLK

## (undated) DEVICE — HYDROGEN PEROXIDE 3% 4OZ

## (undated) DEVICE — SUT MCRYL PLUS 4-0 PS2 27IN

## (undated) DEVICE — CATH DRN CENTESIS 5FR 10CM L

## (undated) DEVICE — SYR ONLY LUER LOCK 20CC

## (undated) DEVICE — GOWN SMART IMP BREATHABLE XXLG

## (undated) DEVICE — TIP YANKAUERS BULB NO VENT

## (undated) DEVICE — WIPE ESENTA BARR STNG FREE 3ML

## (undated) DEVICE — SEE MEDLINE ITEM 152622

## (undated) DEVICE — CATH CENTESIS ONESTEP 5FRX10CM

## (undated) DEVICE — KIT PROBE COVER WITH GEL

## (undated) DEVICE — GLOVE BIOGEL SKINSENSE PI 8.0

## (undated) DEVICE — HANDSET ARGON PLUS

## (undated) DEVICE — TRAY FOLEY 16FR INFECTION CONT

## (undated) DEVICE — NDL MONOPTY BIOPSY 14GX10CM

## (undated) DEVICE — STAPLER SKIN PROXIMATE WIDE

## (undated) DEVICE — HEMOSTAT SURGICEL NU-KNIT 6X9

## (undated) DEVICE — SUT PROLENE 5-0 36IN C-1

## (undated) DEVICE — GUIDE WIRE MOTION .035 X 150CM

## (undated) DEVICE — SOL WATER STRL IRR 1000ML

## (undated) DEVICE — SET EXTENSION STERILE 30IN

## (undated) DEVICE — SUT SILK 3-0 STRANDS 30IN

## (undated) DEVICE — DRAPE HALF SURGICAL 40X58IN

## (undated) DEVICE — CATH URET OPEN END 4.8X8F 70CM

## (undated) DEVICE — SOL VASHE INSTILLATION WND 16

## (undated) DEVICE — BLADE SURG CARBON STEEL #10

## (undated) DEVICE — SUT 2-0 12-18IN SILK

## (undated) DEVICE — SET CYSTO IRRIGATION UNIV SPIK

## (undated) DEVICE — EVACUATOR WOUND BULB 100CC

## (undated) DEVICE — GOWN SURGICAL X-LARGE

## (undated) DEVICE — SUT PROLENE 6-0 BV-1 30IN

## (undated) DEVICE — PAD K-THERMIA 24IN X 60IN

## (undated) DEVICE — DRESSING ADH ISLAND 3.6 X 14

## (undated) DEVICE — TRAY CYSTO BASIN

## (undated) DEVICE — SUT PDS BV 6-0

## (undated) DEVICE — DRAPE CORETEMP FLD WRM 56X62IN

## (undated) DEVICE — GOWN AERO CHROME W/ TOWEL XL

## (undated) DEVICE — BELLOW CANN HEMOBLAST 1.65GR

## (undated) DEVICE — SUT 1 36IN PDS II VIO MONO

## (undated) DEVICE — SUT SILK 0 STRANDS 30IN BLK

## (undated) DEVICE — CLIP SPRING 6MM

## (undated) DEVICE — PACK CYSTO

## (undated) DEVICE — TOWEL OR XRAY WHITE 17X26IN

## (undated) DEVICE — ELECTRODE REM PLYHSV RETURN 9

## (undated) DEVICE — SUT SILK 3-0 SH 18IN BLACK

## (undated) DEVICE — MARKER FN REG DUAL UTIL RULER

## (undated) DEVICE — SEE MEDLINE ITEM 156901

## (undated) DEVICE — SOL NS 1000CC

## (undated) DEVICE — UNDERGLOVES BIOGEL PI SZ 7 LF

## (undated) DEVICE — SUT MONOCRYL PLUS UD 3-0 27

## (undated) DEVICE — SET DECANTER MEDICHOICE

## (undated) DEVICE — SUT 3-0 12-18IN SILK

## (undated) DEVICE — Device

## (undated) DEVICE — BLADE SURG #15 CARBON STEEL

## (undated) DEVICE — SOL IRR WATER STRL 3000 ML

## (undated) DEVICE — SET IV ADMIN 15DROP 3 CARESITE

## (undated) DEVICE — BLADE 4 INCH EDGE UN-INS

## (undated) DEVICE — GOWN SMARTGOWN LVL4 X-LONG XL

## (undated) DEVICE — SUT PROLENE 4-0 SH BLU 36IN

## (undated) DEVICE — ADAPTER HOSE 10FT 8MM

## (undated) DEVICE — CONNECTOR TUBING STR 5 IN 1

## (undated) DEVICE — BOOT AIR FLUID HEEL ADLT STD

## (undated) DEVICE — TRAY SAFE-T PLUS THORA-PARA 8F

## (undated) DEVICE — FIBRILLAR ABS HEMOSTAT 4X4

## (undated) DEVICE — SUT 4-0 12-30IN SILK

## (undated) DEVICE — ELECTRODE PAD DEFIB STERILE

## (undated) DEVICE — CATH URETHRAL RED RUBBER 18FR

## (undated) DEVICE — KIT SAHARA DRAPE DRAW/LIFT

## (undated) DEVICE — SOL BETADINE 5%

## (undated) DEVICE — SUT SILK 2-0 STRANDS 30IN

## (undated) DEVICE — SUT 4-0 12-18IN SILK BLACK

## (undated) DEVICE — DRESSING AQUACEL SACRAL 9 X 9

## (undated) DEVICE — DRAIN CHANNEL ROUND 19FR

## (undated) DEVICE — SPONGE LAP 18X18 PREWASHED

## (undated) DEVICE — SUT PROLENE 3-0 SH DA 36 BL

## (undated) DEVICE — TRAY SKIN SCRUB WET PREMIUM

## (undated) DEVICE — TRAY MINOR GEN SURG OMC

## (undated) DEVICE — SUT ETHILON 3-0 PS2 18 BLK